# Patient Record
Sex: MALE | Race: WHITE | NOT HISPANIC OR LATINO | Employment: UNEMPLOYED | URBAN - METROPOLITAN AREA
[De-identification: names, ages, dates, MRNs, and addresses within clinical notes are randomized per-mention and may not be internally consistent; named-entity substitution may affect disease eponyms.]

---

## 2017-01-13 ENCOUNTER — OFFICE VISIT (OUTPATIENT)
Dept: URGENT CARE | Facility: URGENT CARE | Age: 52
End: 2017-01-13
Payer: COMMERCIAL

## 2017-01-13 VITALS
WEIGHT: 299.38 LBS | OXYGEN SATURATION: 98 % | BODY MASS INDEX: 41.77 KG/M2 | SYSTOLIC BLOOD PRESSURE: 120 MMHG | TEMPERATURE: 97.4 F | DIASTOLIC BLOOD PRESSURE: 70 MMHG | HEART RATE: 73 BPM

## 2017-01-13 DIAGNOSIS — I10 HYPERTENSION GOAL BP (BLOOD PRESSURE) < 140/90: ICD-10-CM

## 2017-01-13 DIAGNOSIS — J01.90 ACUTE SINUSITIS WITH SYMPTOMS > 10 DAYS: Primary | ICD-10-CM

## 2017-01-13 DIAGNOSIS — J20.9 ACUTE BRONCHITIS WITH SYMPTOMS > 10 DAYS: ICD-10-CM

## 2017-01-13 DIAGNOSIS — K58.0 IRRITABLE BOWEL SYNDROME WITH DIARRHEA: ICD-10-CM

## 2017-01-13 PROCEDURE — 99214 OFFICE O/P EST MOD 30 MIN: CPT | Performed by: FAMILY MEDICINE

## 2017-01-13 RX ORDER — CALCIUM CARBONATE 500 MG/1
1 TABLET, CHEWABLE ORAL DAILY
COMMUNITY

## 2017-01-13 NOTE — MR AVS SNAPSHOT
"              After Visit Summary   2017    Adarsh Salvador    MRN: [id number]           Patient Information     Date Of Birth          1965        Visit Information        Provider Department      2017 6:35 PM Vashti Davis MD [facility]        Today's Diagnoses     Acute sinusitis with symptoms > 10 days    -  1     Acute bronchitis with symptoms > 10 days         Irritable bowel syndrome with diarrhea         Hypertension goal BP (blood pressure) < 140/90            Follow-ups after your visit        Who to contact     If you have questions or need follow up information about today's clinic visit or your schedule please contact [facility] directly at [phone].  Normal or non-critical lab and imaging results will be communicated to you by MyChart, letter or phone within 4 business days after the clinic has received the results. If you do not hear from us within 7 days, please contact the clinic through MyChart or phone. If you have a critical or abnormal lab result, we will notify you by phone as soon as possible.  Submit refill requests through [employer] or call your pharmacy and they will forward the refill request to us. Please allow 3 business days for your refill to be completed.          Additional Information About Your Visit        MyChart Information     [employer] lets you send messages to your doctor, view your test results, renew your prescriptions, schedule appointments and more. To sign up, go to [email or web address] . Click on \"Log in\" on the left side of the screen, which will take you to the Welcome page. Then click on \"Sign up Now\" on the right side of the page.     You will be asked to enter the access code listed below, as well as some personal information. Please follow the directions to create your username and password.     Your access code is: [email or web address]  Expires: 2017 11:35 AM     Your access code will  in 90 days. If you need " help or a new code, please call your [facility] or [phone].        Care EveryWhere ID     This is your Care EveryWhere ID. This could be used by other organizations to access your [place] medical records  [id number]        Your Vitals Were     Pulse Temperature Pulse Oximetry             73 97.4  F (36.3  C) (Oral) 98%          Blood Pressure from Last 3 Encounters:   01/13/17 120/70   10/25/16 138/86   08/16/16 160/105    Weight from Last 3 Encounters:   01/13/17 299 lb 6 oz (135.796 kg)   10/25/16 293 lb (132.904 kg)   08/16/16 282 lb (127.914 kg)              Today, you had the following     No orders found for display         Today's Medication Changes          These changes are accurate as of: 1/13/17  7:46 PM.  If you have any questions, ask your nurse or doctor.               Start taking these medicines.        Dose/Directions    amoxicillin-clavulanate 875-125 MG per tablet   Commonly known as:  AUGMENTIN   Used for:  Acute sinusitis with symptoms > 10 days, Acute bronchitis with symptoms > 10 days   Started by:  Vashti Davis MD        Dose:  1 tablet   Take 1 tablet by mouth 2 times daily for 10 days   Quantity:  20 tablet   Refills:  0            Where to get your medicines      These medications were sent to [facility]/pharmacy #[id number] - [place] - [address]  [address] [place] [address]     Phone:  [phone]    - amoxicillin-clavulanate 875-125 MG per tablet             Primary Care Provider Office Phone # Fax #    Bertha Romero PA-C [phone]       [facility] [address] [address] [facility] [address]        Thank you!     Thank you for choosing [facility]  for your care. Our goal is always to provide you with excellent care. Hearing back from our patients is one way we can continue to improve our services. Please take a few minutes to complete the written survey that you may receive in the mail after your visit  with us. Thank you!             Your Updated Medication List - Protect others around you: Learn how to safely use, store and throw away your medicines at www.disposemymeds.org.          This list is accurate as of: 1/13/17  7:46 PM.  Always use your most recent med list.                   Brand Name Dispense Instructions for use    albuterol 108 (90 BASE) MCG/ACT Inhaler    PROAIR HFA/PROVENTIL HFA/VENTOLIN HFA    1 Inhaler    Inhale 2 puffs into the lungs every 6 hours as needed for shortness of breath / dyspnea or wheezing       ALEVE PO          amLODIPine 10 MG tablet    NORVASC    90 tablet    Take 1 tablet (10 mg) by mouth daily       amoxicillin-clavulanate 875-125 MG per tablet    AUGMENTIN    20 tablet    Take 1 tablet by mouth 2 times daily for 10 days       calcium carbonate 500 MG chewable tablet    TUMS     Take 1 chew tab by mouth daily       dicyclomine 20 MG tablet    BENTYL    40 tablet    Take 1 tablet (20 mg) by mouth 4 times daily as needed       diphenoxylate-atropine 2.5-0.025 MG per tablet    LOMOTIL    30 tablet    Take 2 tablets by mouth 4 times daily as needed for diarrhea       EPINEPHrine 0.3 MG/0.3ML injection     2 each    Inject 0.3 mLs (0.3 mg) into the muscle once as needed for anaphylaxis       hydrOXYzine 25 MG tablet    ATARAX    30 tablet    Take 2-4 tablets ( mg) by mouth nightly as needed for other (for sleep)       lisinopril 20 MG tablet    PRINIVIL/ZESTRIL    90 tablet    Take 1 tablet (20 mg) by mouth daily       multivitamin, therapeutic Tabs tablet      Take 1 tablet by mouth daily       pantoprazole 20 MG EC tablet    PROTONIX    30 tablet    Take by mouth 30-60 minutes before a meal.       sertraline 50 MG tablet    ZOLOFT    90 tablet    Take 1 tablet (50 mg) by mouth daily       simvastatin 20 MG tablet    ZOCOR    30 tablet    Take 1 tablet (20 mg) by mouth At Bedtime       TYLENOL PO      Take 1,000 mg by mouth every 8 hours as needed for mild pain or fever        VSL#3 Pack     30 each    Use 1-2 capsules/day

## 2017-01-13 NOTE — Clinical Note
[facility]  [address] [facility] [address]  Phone: [phone]    January 13, 2017        Adarsh Salvador  [address] [facility] [address]          To whom it may concern:    RE: Adarsh Salvador    Patient was seen and treated today at our clinic and missed work.  Please excuse from work tomorrow and on January 14, 2017.    Please contact me for questions or concerns.      Sincerely,        Vashti Davis MD

## 2017-01-14 NOTE — NURSING NOTE
"Chief Complaint   Patient presents with     URI     chest feels tight, right ear hurts, swollen glands on right neck, cannot cough. symptoms present since last Sunday       Initial /84 mmHg  Pulse 73  Temp(Src) 97.4  F (36.3  C) (Oral)  Wt 299 lb 6 oz (135.796 kg)  SpO2 98% Estimated body mass index is 41.77 kg/(m^2) as calculated from the following:    Height as of 8/16/16: 5' 11\" (1.803 m).    Weight as of this encounter: 299 lb 6 oz (135.796 kg).  BP completed using cuff size: bobo Carr [place]    "

## 2017-01-14 NOTE — PROGRESS NOTES
SUBJECTIVE:                                                    Adarsh Salvador is a 51 year old male who presents to clinic today for the following health issues:      RESPIRATORY SYMPTOMS      Duration: cold, chest congestion, coughing hurts    Description  nasal congestion, rhinorrhea, sore throat, facial pain/pressure, cough, ear pain right, headache, fatigue/malaise, hoarse voice     Severity: severe    Accompanying signs and symptoms: tight chest, coughing hurts    History (predisposing factors):  none    Precipitating or alleviating factors: Aleve and Antacids    Therapies tried and outcome:  Aleve and Antacids     Has had sinus drainage and cold for past couple weeks  Past 5 days has gotten much worse  Chest congestion. When the cold hits his lungs starts coughing   4 days ago right ear started hurting to yawn cough and eat.  Colds, sinus congestion, facial pain  Cough Yes  Greenish discharge  Fever No  Progressively getting worse: YES  Thought was getting better then started getting worse: YES  Getting better:  No  Exposure to pertussis or pertussis like symptoms: No    Problem list and histories reviewed & adjusted, as indicated.  Additional history: as documented    Problem list, Medication list, Allergies, and Medical/Social/Surgical histories reviewed in [facility] and updated as appropriate.    ROS:  Constitutional, HEENT, cardiovascular, pulmonary, gi and gu systems are negative, except as otherwise noted.    OBJECTIVE:                                                    /70 mmHg  Pulse 73  Temp(Src) 97.4  F (36.3  C) (Oral)  Wt 299 lb 6 oz (135.796 kg)  SpO2 98%  Body mass index is 41.77 kg/(m^2).  GENERAL: healthy, alert and no distress  EYES: Eyes grossly normal to inspection, PERRL and conjunctivae and sclerae normal  HENT: ear canals and TM's normal, nose and mouth without ulcers or lesions  Sinuses: turbinates erythematous maxillary sinus tenderness frontal sinus tenderness   NECK: no adenopathy,  no asymmetry, masses, or scars and thyroid normal to palpation  RESP: lungs clear to auscultation - no rales, rhonchi or wheezes   CV: regular rate and rhythm, normal S1 S2, no S3 or S4, no murmur, click or rub, no peripheral edema and peripheral pulses strong  ABDOMEN: soft, nontender, no hepatosplenomegaly, no masses and bowel sounds normal  MS: no gross musculoskeletal defects noted, no edema  SKIN: no suspicious lesions or rashes  NEURO: Normal strength and tone, mentation intact and speech normal  PSYCH: mentation appears normal, affect normal/bright    Diagnostic Test Results:  No results found for this or any previous visit (from the past 24 hour(s)).     ASSESSMENT/PLAN:                                                        ICD-10-CM    1. Acute sinusitis with symptoms > 10 days J01.90 amoxicillin-clavulanate (AUGMENTIN) 875-125 MG per tablet   2. Acute bronchitis with symptoms > 10 days J20.9 amoxicillin-clavulanate (AUGMENTIN) 875-125 MG per tablet   3. Irritable bowel syndrome with diarrhea K58.0    4. Hypertension goal BP (blood pressure) < 140/90 I10      Prescribed with augmentin  Side effects discussed warned about GI side effects and risk of cdiff.  Patient has IBS but has tolerated augmentin.  BP is at goal follow up with primary care provider   Recommend follow up with primary care provider if no, sooner if worse  Adverse reactions of medications discussed.  Over the counter medications discussed.   Aware to come back in if with worsening symptoms or if no relief despite treatment plan  Patient voiced understanding and had no further questions.     MD Vashti Bah MD  [facility]

## 2017-01-26 ENCOUNTER — OFFICE VISIT (OUTPATIENT)
Dept: FAMILY MEDICINE | Facility: CLINIC | Age: 52
End: 2017-01-26
Payer: COMMERCIAL

## 2017-01-26 ENCOUNTER — TELEPHONE (OUTPATIENT)
Dept: FAMILY MEDICINE | Facility: CLINIC | Age: 52
End: 2017-01-26

## 2017-01-26 VITALS
TEMPERATURE: 98.1 F | SYSTOLIC BLOOD PRESSURE: 129 MMHG | HEART RATE: 77 BPM | DIASTOLIC BLOOD PRESSURE: 83 MMHG | WEIGHT: 296 LBS | BODY MASS INDEX: 41.3 KG/M2 | OXYGEN SATURATION: 97 %

## 2017-01-26 DIAGNOSIS — R05.9 COUGH: Primary | ICD-10-CM

## 2017-01-26 DIAGNOSIS — R06.2 WHEEZING: Primary | ICD-10-CM

## 2017-01-26 PROCEDURE — 99214 OFFICE O/P EST MOD 30 MIN: CPT | Performed by: PHYSICIAN ASSISTANT

## 2017-01-26 RX ORDER — PREDNISONE 20 MG/1
TABLET ORAL
Qty: 20 TABLET | Refills: 0 | Status: SHIPPED | OUTPATIENT
Start: 2017-01-26 | End: 2017-03-06

## 2017-01-26 NOTE — TELEPHONE ENCOUNTER
dulera is not covered  We were able to give him a voucher for a free fill  Step therapy   Flovent, spiriva or combivent    New rx is needed for next fill    Hallie F/Tech  [facility]

## 2017-01-26 NOTE — MR AVS SNAPSHOT
After Visit Summary   1/26/2017    Adarsh Salvador    MRN: [id number]           Patient Information     Date Of Birth          1965        Visit Information        Provider Department      1/26/2017 11:00 AM Bertha Romero PA-C [facility]        Today's Diagnoses     Cough    -  1     BMI 40.0-44.9, adult (H)           Care Instructions    Schedule with nutritionist    Take prednisone with food when you have breakfast    Take antibiotic with food    Use dulera  inhaler twice daily until cough and shortness of breath are gone.  Then you can stop the inhaler.   Continue albuterol as needed          Follow-ups after your visit        Additional Services     NUTRITION REFERRAL       Your provider has referred you to: [facility]: [facility] [phone]   [email or web address]    Please be aware that coverage of these services is subject to the terms and limitations of your health insurance plan.  Call member services at your health plan with any benefit or coverage questions.      Please bring the following with you to your appointment:    (1) This referral request  (2) Any documents given to you regarding this referral  (3) Any specific questions you have about diet and/or food choices                  Who to contact     If you have questions or need follow up information about today's clinic visit or your schedule please contact [facility] directly at [phone].  Normal or non-critical lab and imaging results will be communicated to you by MyChart, letter or phone within 4 business days after the clinic has received the results. If you do not hear from us within 7 days, please contact the clinic through MyChart or phone. If you have a critical or abnormal lab result, we will notify you by phone as soon as possible.  Submit refill requests through [employer] or call your pharmacy and they will forward the refill  "request to us. Please allow 3 business days for your refill to be completed.          Additional Information About Your Visit        [employer] Information     [employer] lets you send messages to your doctor, view your test results, renew your prescriptions, schedule appointments and more. To sign up, go to [email or web address] . Click on \"Log in\" on the left side of the screen, which will take you to the Welcome page. Then click on \"Sign up Now\" on the right side of the page.     You will be asked to enter the access code listed below, as well as some personal information. Please follow the directions to create your username and password.     Your access code is: [email or web address]  Expires: 2017 11:40 AM     Your access code will  in 90 days. If you need help or a new code, please call your [place] clinic or [phone].        Care EveryWhere ID     This is your Care EveryWhere ID. This could be used by other organizations to access your [place] medical records  [id number]        Your Vitals Were     Pulse Temperature Pulse Oximetry             77 98.1  F (36.7  C) (Oral) 97%          Blood Pressure from Last 3 Encounters:   17 129/83   17 120/70   10/25/16 138/86    Weight from Last 3 Encounters:   17 296 lb (134.265 kg)   17 299 lb 6 oz (135.796 kg)   10/25/16 293 lb (132.904 kg)              We Performed the Following     NUTRITION REFERRAL          Today's Medication Changes          These changes are accurate as of: 17 11:40 AM.  If you have any questions, ask your nurse or doctor.               Start taking these medicines.        Dose/Directions    amoxicillin-clavulanate 875-125 MG per tablet   Commonly known as:  AUGMENTIN   Used for:  Cough   Started by:  Bertha Romero PA-C        Dose:  1 tablet   Take 1 tablet by mouth 2 times daily   Quantity:  20 tablet   Refills:  0       mometasone-formoterol 100-5 MCG/ACT oral inhaler   Commonly known as:  DULERA "   Used for:  Cough   Started by:  Bertha Romero PA-C        Dose:  2 puff   Inhale 2 puffs into the lungs 2 times daily Rinse mouth after use.   Quantity:  1 Inhaler   Refills:  3       predniSONE 20 MG tablet   Commonly known as:  DELTASONE   Used for:  Cough   Started by:  Bertha Romero PA-C        With food. Take 3 tabs (60 mg) by mouth daily x 3 days, 2 tabs (40 mg) daily x 3 days, 1 tab (20 mg) daily x 3 days, then 1/2 tab (10 mg) x 3 days.   Quantity:  20 tablet   Refills:  0            Where to get your medicines      These medications were sent to [facility] - [place], [place] - [address] [address]  [address] [address], [facility] [address]     Phone:  [phone]    - amoxicillin-clavulanate 875-125 MG per tablet  - mometasone-formoterol 100-5 MCG/ACT oral inhaler      Some of these will need a paper prescription and others can be bought over the counter.  Ask your nurse if you have questions.     Bring a paper prescription for each of these medications    - predniSONE 20 MG tablet             Primary Care Provider Office Phone # Fax #    Bertha Romero PA-C [phone]       [address] [address]        Thank you!     Thank you for choosing [facility]  for your care. Our goal is always to provide you with excellent care. Hearing back from our patients is one way we can continue to improve our services. Please take a few minutes to complete the written survey that you may receive in the mail after your visit with us. Thank you!             Your Updated Medication List - Protect others around you: Learn how to safely use, store and throw away your medicines at www.disposemymeds.org.          This list is accurate as of: 1/26/17 11:40 AM.  Always use your most recent med list.                   Brand Name Dispense Instructions for use    albuterol 108 (90 BASE) MCG/ACT Inhaler     PROAIR HFA/PROVENTIL HFA/VENTOLIN HFA    1 Inhaler    Inhale 2 puffs into the lungs every 6 hours as needed for shortness of breath / dyspnea or wheezing       ALEVE PO          amLODIPine 10 MG tablet    NORVASC    90 tablet    Take 1 tablet (10 mg) by mouth daily       amoxicillin-clavulanate 875-125 MG per tablet    AUGMENTIN    20 tablet    Take 1 tablet by mouth 2 times daily       calcium carbonate 500 MG chewable tablet    TUMS     Take 1 chew tab by mouth daily       dicyclomine 20 MG tablet    BENTYL    40 tablet    Take 1 tablet (20 mg) by mouth 4 times daily as needed       diphenoxylate-atropine 2.5-0.025 MG per tablet    LOMOTIL    30 tablet    Take 2 tablets by mouth 4 times daily as needed for diarrhea       EPINEPHrine 0.3 MG/0.3ML injection     2 each    Inject 0.3 mLs (0.3 mg) into the muscle once as needed for anaphylaxis       hydrOXYzine 25 MG tablet    ATARAX    30 tablet    Take 2-4 tablets ( mg) by mouth nightly as needed for other (for sleep)       lisinopril 20 MG tablet    PRINIVIL/ZESTRIL    90 tablet    Take 1 tablet (20 mg) by mouth daily       mometasone-formoterol 100-5 MCG/ACT oral inhaler    DULERA    1 Inhaler    Inhale 2 puffs into the lungs 2 times daily Rinse mouth after use.       multivitamin, therapeutic Tabs tablet      Take 1 tablet by mouth daily       pantoprazole 20 MG EC tablet    PROTONIX    30 tablet    Take by mouth 30-60 minutes before a meal.       predniSONE 20 MG tablet    DELTASONE    20 tablet    With food. Take 3 tabs (60 mg) by mouth daily x 3 days, 2 tabs (40 mg) daily x 3 days, 1 tab (20 mg) daily x 3 days, then 1/2 tab (10 mg) x 3 days.       sertraline 50 MG tablet    ZOLOFT    90 tablet    Take 1 tablet (50 mg) by mouth daily       simvastatin 20 MG tablet    ZOCOR    30 tablet    Take 1 tablet (20 mg) by mouth At Bedtime       TYLENOL PO      Take 1,000 mg by mouth every 8 hours as needed for mild pain or fever       VSL#3 Pack     30 each    Use  1-2 capsules/day

## 2017-01-26 NOTE — PROGRESS NOTES
SUBJECTIVE:                                                    Adarsh Salvador is a 51 year old male who presents to clinic today for the following health issues:      ENT Symptoms             Symptoms: cc Present Absent Comment   Fever/Chills  x  chills   Fatigue  x     Muscle Aches  x     Eye Irritation  x     Sneezing  x     Nasal Lobo/Drg  x  congestion   Sinus Pressure/Pain   x    Loss of smell   x    Dental pain   x    Sore Throat  x  slight   Swollen Glands   x    Ear Pain/Fullness  x  both   Cough  x     Wheeze   x    Chest Pain  x  Worse as day goes on    Shortness of breath  x     Rash   x    Other   x      Symptom duration:  over a month   Symptom severity:  moderate   Treatments tried:  Augmentin, mucinex at night, robitussin     Contacts:         Seen in  13 days ago, given augmentin for sinusitis and bronchitis. Has albuterol inhaler PRN. Has not used ICS. Not helping.  No recent chest xray.   Nonsmoker. oxygen 97 percent.  No edema, PND, or orthopnea.   Symptoms improved at first, then symptoms came back.   Cough and severe sinus congestion.  Productive sputum sometimes, is yellow. Has lots of sinus congestion also.   clear drainage from nose.   Tolerated prednisone previously.     Also weight question-  Works a lot.  Exercise-hard to fit in. Has not been able to lose weight. Has not been monitoring his diet.  Has not met with dietician.  Is open to this. His BMI is 41. When comparing his weight to a year ago it is about the same.        Problem list and histories reviewed & adjusted, as indicated.  Additional history: as documented    Patient Active Problem List   Diagnosis     GERD (gastroesophageal reflux disease)     Kidney stone     Chronic RLQ pain     Hyperlipidemia LDL goal <160     Hypertension goal BP (blood pressure) < 140/90     Constipation     Abdominal pain, right upper quadrant     Adult celiac disease     Chronic maxillary sinusitis     Chronic rhinitis     Obesity (BMI 30-39.9)      "Pure hyperglyceridemia     Anaphylactic reaction     Benign essential hypertension     Anemia in other chronic diseases classified elsewhere     Fatty liver     Irritable bowel syndrome with diarrhea     Right inguinal hernia     BMI 40.0-44.9, adult (H)     Past Surgical History   Procedure Laterality Date     Colonoscopy  5/1/2012     Procedure:COLONOSCOPY; screening colonoscopy; Surgeon:KINGSLEY DOS SANTOS; Location:[facility]     Hc breath hydrogen test  3/7/2014     Procedure: HYDROGEN BREATH TEST;  Surgeon: Darion Swift MD;  Location: [facility]     Orthopedic surgery       x3 Rt knee      C removal of kidney stone       Colonoscopy  4/21/2014     Procedure: COMBINED COLONOSCOPY, SINGLE BIOPSY/POLYPECTOMY BY BIOPSY;  Surgeon: Duane, William Charles, MD;  Location: [facility] OR       Social History   Substance Use Topics     Smoking status: Never Smoker      Smokeless tobacco: Current User     Types: Chew      Comment: Chew     Alcohol Use: No      Comment: \"quart a year\" 2012     Family History   Problem Relation Age of Onset     CANCER Mother 52     lung, smoked     Cancer - colorectal Father 53     unsure type, pt attributes to radiation exposure     DIABETES Other      nephew- type 1     DIABETES Maternal Aunt      1     DIABETES Maternal Aunt      2     DIABETES Paternal Uncle      1     DIABETES Paternal Uncle      2     DIABETES Paternal Uncle      3     DIABETES Paternal Uncle      4     Myocardial Infarction Father 45     Myocardial Infarction Paternal Grandfather 35         Current Outpatient Prescriptions   Medication Sig Dispense Refill     predniSONE (DELTASONE) 20 MG tablet With food. Take 3 tabs (60 mg) by mouth daily x 3 days, 2 tabs (40 mg) daily x 3 days, 1 tab (20 mg) daily x 3 days, then 1/2 tab (10 mg) x 3 days. 20 tablet 0     amoxicillin-clavulanate (AUGMENTIN) 875-125 MG per tablet Take 1 tablet by mouth 2 times daily 20 tablet 0     mometasone-formoterol (DULERA) 100-5 MCG/ACT oral inhaler Inhale " 2 puffs into the lungs 2 times daily Rinse mouth after use. 1 Inhaler 3     Naproxen Sodium (ALEVE PO)        calcium carbonate (TUMS) 500 MG chewable tablet Take 1 chew tab by mouth daily       albuterol (PROAIR HFA, PROVENTIL HFA, VENTOLIN HFA) 108 (90 BASE) MCG/ACT inhaler Inhale 2 puffs into the lungs every 6 hours as needed for shortness of breath / dyspnea or wheezing 1 Inhaler 1     sertraline (ZOLOFT) 50 MG tablet Take 1 tablet (50 mg) by mouth daily 90 tablet 0     dicyclomine (BENTYL) 20 MG tablet Take 1 tablet (20 mg) by mouth 4 times daily as needed 40 tablet 5     diphenoxylate-atropine (LOMOTIL) 2.5-0.025 MG per tablet Take 2 tablets by mouth 4 times daily as needed for diarrhea 30 tablet 1     Acetaminophen (TYLENOL PO) Take 1,000 mg by mouth every 8 hours as needed for mild pain or fever       pantoprazole (PROTONIX) 20 MG tablet Take by mouth 30-60 minutes before a meal. 30 tablet 3     multivitamin, therapeutic (THERA-VIT) TABS Take 1 tablet by mouth daily       amLODIPine (NORVASC) 10 MG tablet Take 1 tablet (10 mg) by mouth daily 90 tablet 1     lisinopril (PRINIVIL,ZESTRIL) 20 MG tablet Take 1 tablet (20 mg) by mouth daily 90 tablet 1     simvastatin (ZOCOR) 20 MG tablet Take 1 tablet (20 mg) by mouth At Bedtime 30 tablet 3     EPINEPHrine (EPIPEN) 0.3 MG/0.3ML injection Inject 0.3 mLs (0.3 mg) into the muscle once as needed for anaphylaxis 2 each 2     hydrOXYzine (ATARAX) 25 MG tablet Take 2-4 tablets ( mg) by mouth nightly as needed for other (for sleep) 30 tablet 1     Dietary Management Product (VSL#3) PACK Use 1-2 capsules/day 30 each 3     Allergies   Allergen Reactions     Hydrocodone-Acetaminophen Anaphylaxis and Itching     Hydromorphone Anaphylaxis     Morphine [Fumaric Acid] Anaphylaxis     Codeine Phosphate Itching     Tramadol Hives     Trazodone Hives     Ketorolac Tromethamine Rash       ROS:  Constitutional, HEENT, cardiovascular, pulmonary, gi and gu systems are negative,  except as otherwise noted.    OBJECTIVE:                                                    /83 mmHg  Pulse 77  Temp(Src) 98.1  F (36.7  C) (Oral)  Wt 296 lb (134.265 kg)  SpO2 97%  Body mass index is 41.3 kg/(m^2).  GENERAL:  No acute distress.  Interacts appropriately.  Breathing without difficulty.  Alert.  HEENT:  Tympanic membranes intact without effusion or erythema.  Oral mucosa moist. Posterior pharynx has no erythema.  Posterior pharynx has no exudate. Without edema.  NECK:  Soft and supple.  without tenderness.  Without lymphadenopathy.  Normal range of motion.    CARDIAC:   Regular rate and rhythm.  No murmurs, rubs, or gallops.   PULMONARY: Clear to auscultation bilaterally.  No  wheezes, crackles, or rhonchi.  Normal air exchange/breath sounds.  No use of accessory muscles.    PSYCH: Normal affect.  SKIN: No rashes.         ASSESSMENT/PLAN:                                                    ASSESSMENT / PLAN:  (R05) Cough  (primary encounter diagnosis)  Comment: exam benign, per patient  wheezing is worse at night.  Still has sinus congestion and bronchitis symptoms.   Plan: predniSONE (DELTASONE) 20 MG tablet,         amoxicillin-clavulanate (AUGMENTIN) 875-125 MG         per tablet, mometasone-formoterol (DULERA)         100-5 MCG/ACT oral inhaler        Chest x-ray if not improving in a week    Take with food. Side effects discussed.  Call with worsening symptoms or if no improvement in 5 days.  Analgesics for pain with food as needed.      (Z68.41) BMI 40.0-44.9, adult (H)  Comment: Likely related to excessive calories and lack of exercise. We'll start with nutrition referral as patient is not motivated to exercise at this time. He was warned that prednisone will cause him to be more hungry temporarily  Plan: NUTRITION REFERRAL            Patient Instructions   Schedule with nutritionist    Take prednisone with food when you have breakfast    Take antibiotic with food    Use dulera   inhaler twice daily until cough and shortness of breath are gone.  Then you can stop the inhaler.   Continue albuterol as needed            Bertha Romero PA-C  [facility]

## 2017-01-26 NOTE — NURSING NOTE
"Chief Complaint   Patient presents with     URI       Initial /83 mmHg  Pulse 77  Temp(Src) 98.1  F (36.7  C) (Oral)  Wt 296 lb (134.265 kg)  SpO2 97% Estimated body mass index is 41.3 kg/(m^2) as calculated from the following:    Height as of 8/16/16: 5' 11\" (1.803 m).    Weight as of this encounter: 296 lb (134.265 kg).  BP completed using cuff size: bobo Pagan MA    "

## 2017-01-26 NOTE — PATIENT INSTRUCTIONS
Schedule with nutritionist    Take prednisone with food when you have breakfast    Take antibiotic with food    Use dulera  inhaler twice daily until cough and shortness of breath are gone.  Then you can stop the inhaler.   Continue albuterol as needed

## 2017-01-31 ENCOUNTER — TELEPHONE (OUTPATIENT)
Dept: NURSING | Facility: CLINIC | Age: 52
End: 2017-01-31

## 2017-02-01 ENCOUNTER — TRANSFERRED RECORDS (OUTPATIENT)
Dept: HEALTH INFORMATION MANAGEMENT | Facility: CLINIC | Age: 52
End: 2017-02-01

## 2017-02-01 NOTE — TELEPHONE ENCOUNTER
"Call Type: Triage Call    Presenting Problem: \"Fifteeen minutes ago, my heart was beating very  hard and this knocked me on the floor.\" Pt. says that he was  sweating profusely and that his clothes are drenched. Chest pain now  = 4/10. Pt. is currently at work, as a .  Triage Note:  Guideline Title: Chest Pain  Recommended Disposition: Activate   Original Inclination: Wanted to speak with a nurse  Override Disposition:  Intended Action: Call 911  Physician Contacted: No  Chest pain spreading to the shoulders, neck, jaw, in one or both arms, stomach or  back lasting 5 or more minutes now or within the last hour. Pain is NOT  associated with taking a deep breath or a productive cough, movement, or touch to  a localized area. ?  YES  Loss of consciousness for any period of time ? NO  New or worsening signs and symptoms that may indicate shock ? NO  Pressure, fullness, squeezing sensation or pain anywhere in the chest lasting 5 or  more minutes now or within the last hour. Pain is NOT associated with taking a  deep breath or a productive cough, movement, or touch to a localized area on the  chest. ? NO  Physician Instructions:  Care Advice: IMMEDIATE ACTION  Write down provider's name. List or place the following in a bag for  transport with the patient: current prescription and/or nonprescription  medications  alternative treatments, therapies and medications  and street drugs.  Place person in a position of comfort and loosen tight clothing.  After calling , have the person chew one aspirin tablet (325 mg), or  4 baby aspirin (81mg) with a small amount of water now if conscious, not  allergic to aspirin, or if has not been told to avoid taking aspirin by  their provider.  It is important to use aspirin, not acetaminophen.  Take nitroglycerin as directed if prescribed by your provider. Men should  not take nitroglycerin within 36 hours of taking erectile dysfunction drugs  unless directed to do so " by their provider as it may cause a sudden drop in  blood pressure.  An adult should stay with the patient, preferably one trained in CPR. If  the person is not trained in CPR, then he or she should provide hands-only  (compression-only) CPR as recommended by the American Heart Association.

## 2017-02-15 ENCOUNTER — OFFICE VISIT (OUTPATIENT)
Dept: FAMILY MEDICINE | Facility: CLINIC | Age: 52
End: 2017-02-15
Payer: COMMERCIAL

## 2017-02-15 VITALS
HEIGHT: 71 IN | WEIGHT: 296 LBS | HEART RATE: 77 BPM | SYSTOLIC BLOOD PRESSURE: 112 MMHG | BODY MASS INDEX: 41.44 KG/M2 | TEMPERATURE: 97.5 F | OXYGEN SATURATION: 99 % | DIASTOLIC BLOOD PRESSURE: 70 MMHG

## 2017-02-15 DIAGNOSIS — S46.811A STRAIN OF TRAPEZIUS MUSCLE, RIGHT, INITIAL ENCOUNTER: Primary | ICD-10-CM

## 2017-02-15 DIAGNOSIS — M62.830 BACK MUSCLE SPASM: ICD-10-CM

## 2017-02-15 PROCEDURE — 99213 OFFICE O/P EST LOW 20 MIN: CPT | Performed by: PHYSICIAN ASSISTANT

## 2017-02-15 RX ORDER — NAPROXEN 500 MG/1
500 TABLET ORAL 2 TIMES DAILY PRN
Qty: 30 TABLET | Refills: 0 | Status: SHIPPED | OUTPATIENT
Start: 2017-02-15 | End: 2018-01-12

## 2017-02-15 RX ORDER — CYCLOBENZAPRINE HCL 10 MG
5-10 TABLET ORAL 3 TIMES DAILY PRN
Qty: 30 TABLET | Refills: 0 | Status: SHIPPED | OUTPATIENT
Start: 2017-02-15 | End: 2017-04-04

## 2017-02-15 NOTE — PATIENT INSTRUCTIONS
Rest the back. No heavy lifting or straining until your pain has resolved.     May slowly increased activities as tolerated. If something causes you pain, you are doing too much.    May take tylenol as needed for discomfort.    Do not take any other NSAIDs (ibuprofen, advil, aleve, naprosyn, etc) while taking the Naproxen.    Apply ice multiple times daily for the first 2-3 days. Then, alternate ice and heat multiple times daily. Ice will help to decrease swelling and pain. Heat will help to relax the muscles.    Take the flexeril as prescribed for muscle spasms. This may make you drowsy. Do not drive while taking this medication.    Follow up with physical therapy for evaluation and treatment of your back pain.    Follow up with primary care provider next week if no improvement of symptoms. Sooner if any worsening of symptoms.    Go to the Emergency Department if any weakness, numbness, severe worsening pain, or any other concerning symptoms.

## 2017-02-15 NOTE — MR AVS SNAPSHOT
After Visit Summary   2/15/2017    Adarsh Salvador    MRN: [id number]           Patient Information     Date Of Birth          1965        Visit Information        Provider Department      2/15/2017 10:40 AM Carolina Bernardo PA-C [facility]        Today's Diagnoses     Strain of trapezius muscle, right, initial encounter    -  1    Back muscle spasm          Care Instructions    Rest the back. No heavy lifting or straining until your pain has resolved.     May slowly increased activities as tolerated. If something causes you pain, you are doing too much.    May take tylenol as needed for discomfort.    Do not take any other NSAIDs (ibuprofen, advil, aleve, naprosyn, etc) while taking the Naproxen.    Apply ice multiple times daily for the first 2-3 days. Then, alternate ice and heat multiple times daily. Ice will help to decrease swelling and pain. Heat will help to relax the muscles.    Take the flexeril as prescribed for muscle spasms. This may make you drowsy. Do not drive while taking this medication.    Follow up with physical therapy for evaluation and treatment of your back pain.    Follow up with primary care provider next week if no improvement of symptoms. Sooner if any worsening of symptoms.    Go to the Emergency Department if any weakness, numbness, severe worsening pain, or any other concerning symptoms.           Follow-ups after your visit        Additional Services     [facility] PT, HAND, AND CHIROPRACTIC REFERRAL       **This order will print in the [facility] Scheduling Office**    Physical Therapy, Hand Therapy and Chiropractic Care are available through:    *[facility]  *[facility]  *[facility]    Call one number to schedule at any of the above locations: [phone].    Your provider has referred you to: Physical Therapy at [facility] or [facility]    Indication/Reason for Referral: Right trapezius strain and spasm  Onset of  "Illness: 2/14/17  Therapy Orders: Evaluate and Treat  Special Programs: None  Special Request: Manual Therapy: Myofascial Release/Massage    Chandler Arteaga      Additional Comments for the Therapist or Chiropractor: none    Please be aware that coverage of these services is subject to the terms and limitations of your health insurance plan.  Call member services at your health plan with any benefit or coverage questions.      Please bring the following to your appointment:    *Your personal calendar for scheduling future appointments  *Comfortable clothing                  Who to contact     If you have questions or need follow up information about today's clinic visit or your schedule please contact [facility] directly at [phone].  Normal or non-critical lab and imaging results will be communicated to you by MyChart, letter or phone within 4 business days after the clinic has received the results. If you do not hear from us within 7 days, please contact the clinic through MyChart or phone. If you have a critical or abnormal lab result, we will notify you by phone as soon as possible.  Submit refill requests through [employer] or call your pharmacy and they will forward the refill request to us. Please allow 3 business days for your refill to be completed.          Additional Information About Your Visit        Care EveryWhere ID     This is your Care EveryWhere ID. This could be used by other organizations to access your [place] medical records  [id number]        Your Vitals Were     Pulse Temperature Height Pulse Oximetry BMI (Body Mass Index)       77 97.5  F (36.4  C) (Oral) 5' 11\" (1.803 m) 99% 41.28 kg/m2        Blood Pressure from Last 3 Encounters:   02/15/17 (!) 139/91   01/26/17 129/83   01/13/17 120/70    Weight from Last 3 Encounters:   02/15/17 296 lb (134.3 kg)   01/26/17 296 lb (134.3 kg)   01/13/17 299 lb 6 oz (135.8 kg)              We Performed the Following     [facility] PT, HAND, AND " CHIROPRACTIC REFERRAL          Today's Medication Changes          These changes are accurate as of: 2/15/17 11:13 AM.  If you have any questions, ask your nurse or doctor.               Start taking these medicines.        Dose/Directions    cyclobenzaprine 10 MG tablet   Commonly known as:  FLEXERIL   Used for:  Back muscle spasm   Started by:  Carolina Bernardo PA-C        Dose:  5-10 mg   Take 0.5-1 tablets (5-10 mg) by mouth 3 times daily as needed for muscle spasms   Quantity:  30 tablet   Refills:  0       naproxen 500 MG tablet   Commonly known as:  NAPROSYN   Used for:  Back muscle spasm, Strain of trapezius muscle, right, initial encounter   Started by:  Carolina Bernardo PA-C        Dose:  500 mg   Take 1 tablet (500 mg) by mouth 2 times daily as needed for moderate pain   Quantity:  30 tablet   Refills:  0            Where to get your medicines      These medications were sent to [facility] [address] [place] [address]  [id number] [place] [address], [facility] [address]     Phone:  [phone]     cyclobenzaprine 10 MG tablet    naproxen 500 MG tablet                Primary Care Provider Office Phone # Fax #    Bertha Romero PA-C [phone]       [address] [address]        Thank you!     Thank you for choosing [facility]  for your care. Our goal is always to provide you with excellent care. Hearing back from our patients is one way we can continue to improve our services. Please take a few minutes to complete the written survey that you may receive in the mail after your visit with us. Thank you!             Your Updated Medication List - Protect others around you: Learn how to safely use, store and throw away your medicines at www.disposemymeds.org.          This list is accurate as of: 2/15/17 11:13 AM.  Always use your most recent med list.                   Brand Name Dispense  Instructions for use    albuterol 108 (90 BASE) MCG/ACT Inhaler    PROAIR HFA/PROVENTIL HFA/VENTOLIN HFA    1 Inhaler    Inhale 2 puffs into the lungs every 6 hours as needed for shortness of breath / dyspnea or wheezing       ALEVE PO          amLODIPine 10 MG tablet    NORVASC    90 tablet    Take 1 tablet (10 mg) by mouth daily       calcium carbonate 500 MG chewable tablet    TUMS     Take 1 chew tab by mouth daily       cyclobenzaprine 10 MG tablet    FLEXERIL    30 tablet    Take 0.5-1 tablets (5-10 mg) by mouth 3 times daily as needed for muscle spasms       dicyclomine 20 MG tablet    BENTYL    40 tablet    Take 1 tablet (20 mg) by mouth 4 times daily as needed       diphenoxylate-atropine 2.5-0.025 MG per tablet    LOMOTIL    30 tablet    Take 2 tablets by mouth 4 times daily as needed for diarrhea       EPINEPHrine 0.3 MG/0.3ML injection     2 each    Inject 0.3 mLs (0.3 mg) into the muscle once as needed for anaphylaxis       fluticasone 100 MCG/BLIST Aepb    FLOVENT DISKUS    1 Inhaler    Inhale 1 puff into the lungs 2 times daily       lisinopril 20 MG tablet    PRINIVIL/ZESTRIL    90 tablet    Take 1 tablet (20 mg) by mouth daily       mometasone-formoterol 100-5 MCG/ACT oral inhaler    DULERA    1 Inhaler    Inhale 2 puffs into the lungs 2 times daily Rinse mouth after use.       multivitamin, therapeutic Tabs tablet      Take 1 tablet by mouth daily       naproxen 500 MG tablet    NAPROSYN    30 tablet    Take 1 tablet (500 mg) by mouth 2 times daily as needed for moderate pain       pantoprazole 20 MG EC tablet    PROTONIX    30 tablet    Take by mouth 30-60 minutes before a meal.       predniSONE 20 MG tablet    DELTASONE    20 tablet    With food. Take 3 tabs (60 mg) by mouth daily x 3 days, 2 tabs (40 mg) daily x 3 days, 1 tab (20 mg) daily x 3 days, then 1/2 tab (10 mg) x 3 days.       sertraline 50 MG tablet    ZOLOFT    90 tablet    Take 1 tablet (50 mg) by mouth daily       simvastatin 20 MG  tablet    ZOCOR    30 tablet    Take 1 tablet (20 mg) by mouth At Bedtime       TYLENOL PO      Take 1,000 mg by mouth every 8 hours as needed for mild pain or fever       VSL#3 Pack     30 each    Use 1-2 capsules/day

## 2017-02-15 NOTE — PROGRESS NOTES
"  SUBJECTIVE:                                                    Adarsh Salvador is a 51 year old male who presents to clinic today for the following health issues:      Joint Pain     Onset: x 1 day    Description:   Location: right shoulder  Character: Sharp, Dull ache and Stabbing    Intensity: moderate    Progression of Symptoms: worse    Accompanying Signs & Symptoms:  Other symptoms: numbness   History:   Previous similar pain: no       Precipitating factors:   Trauma or overuse: YES    Alleviating factors:  Improved by: rest/inactivity and stretching       Therapies Tried and outcome: none    While building a bench, he lifted a table top and felt pain. Locates pain to the right trapezius area. Intermittent tingling in the forearm. Decreased range of motion the shoulder and neck due to pain.  He is right hand dominant.  He applied ice for a short period of time. Aleve took the edge off for a short period of time. Applied something like \"bengay.\"     This is keeping him from work as he is a .       Problem list and histories reviewed & adjusted, as indicated.  Additional history: as documented    Patient Active Problem List   Diagnosis     GERD (gastroesophageal reflux disease)     Kidney stone     Chronic RLQ pain     Hyperlipidemia LDL goal <160     Hypertension goal BP (blood pressure) < 140/90     Constipation     Abdominal pain, right upper quadrant     Adult celiac disease     Chronic maxillary sinusitis     Chronic rhinitis     Obesity (BMI 30-39.9)     Pure hyperglyceridemia     Anaphylactic reaction     Benign essential hypertension     Anemia in other chronic diseases classified elsewhere     Fatty liver     Irritable bowel syndrome with diarrhea     Right inguinal hernia     BMI 40.0-44.9, adult (H)     Past Surgical History   Procedure Laterality Date     Colonoscopy  5/1/2012     Procedure:COLONOSCOPY; screening colonoscopy; Surgeon:KINGSLEY DOS SANTOS; Location:[facility] OR     Hc breath hydrogen test  " "3/7/2014     Procedure: HYDROGEN BREATH TEST;  Surgeon: Darion Swift MD;  Location: [facility]     Orthopedic surgery       x3 Rt knee      C removal of kidney stone       Colonoscopy  4/21/2014     Procedure: COMBINED COLONOSCOPY, SINGLE BIOPSY/POLYPECTOMY BY BIOPSY;  Surgeon: Duane, William Charles, MD;  Location:  OR       Social History   Substance Use Topics     Smoking status: Never Smoker     Smokeless tobacco: Current User     Types: Chew      Comment: Chew     Alcohol use No      Comment: \"quart a year\" 2012     Family History   Problem Relation Age of Onset     CANCER Mother 52     lung, smoked     Cancer - colorectal Father 53     unsure type, pt attributes to radiation exposure     Myocardial Infarction Father 45     Myocardial Infarction Paternal Grandfather 35     DIABETES Other      nephew- type 1     DIABETES Maternal Aunt      1     DIABETES Maternal Aunt      2     DIABETES Paternal Uncle      1     DIABETES Paternal Uncle      2     DIABETES Paternal Uncle      3     DIABETES Paternal Uncle      4         Current Outpatient Prescriptions   Medication Sig Dispense Refill     predniSONE (DELTASONE) 20 MG tablet With food. Take 3 tabs (60 mg) by mouth daily x 3 days, 2 tabs (40 mg) daily x 3 days, 1 tab (20 mg) daily x 3 days, then 1/2 tab (10 mg) x 3 days. 20 tablet 0     mometasone-formoterol (DULERA) 100-5 MCG/ACT oral inhaler Inhale 2 puffs into the lungs 2 times daily Rinse mouth after use. 1 Inhaler 3     fluticasone (FLOVENT DISKUS) 100 MCG/BLIST AEPB Inhale 1 puff into the lungs 2 times daily 1 Inhaler 1     Naproxen Sodium (ALEVE PO)        calcium carbonate (TUMS) 500 MG chewable tablet Take 1 chew tab by mouth daily       albuterol (PROAIR HFA, PROVENTIL HFA, VENTOLIN HFA) 108 (90 BASE) MCG/ACT inhaler Inhale 2 puffs into the lungs every 6 hours as needed for shortness of breath / dyspnea or wheezing 1 Inhaler 1     sertraline (ZOLOFT) 50 MG tablet Take 1 tablet (50 mg) by mouth " "daily 90 tablet 0     dicyclomine (BENTYL) 20 MG tablet Take 1 tablet (20 mg) by mouth 4 times daily as needed 40 tablet 5     diphenoxylate-atropine (LOMOTIL) 2.5-0.025 MG per tablet Take 2 tablets by mouth 4 times daily as needed for diarrhea 30 tablet 1     Acetaminophen (TYLENOL PO) Take 1,000 mg by mouth every 8 hours as needed for mild pain or fever       pantoprazole (PROTONIX) 20 MG tablet Take by mouth 30-60 minutes before a meal. 30 tablet 3     multivitamin, therapeutic (THERA-VIT) TABS Take 1 tablet by mouth daily       amLODIPine (NORVASC) 10 MG tablet Take 1 tablet (10 mg) by mouth daily 90 tablet 1     lisinopril (PRINIVIL,ZESTRIL) 20 MG tablet Take 1 tablet (20 mg) by mouth daily 90 tablet 1     simvastatin (ZOCOR) 20 MG tablet Take 1 tablet (20 mg) by mouth At Bedtime 30 tablet 3     EPINEPHrine (EPIPEN) 0.3 MG/0.3ML injection Inject 0.3 mLs (0.3 mg) into the muscle once as needed for anaphylaxis 2 each 2     Dietary Management Product (VSL#3) PACK Use 1-2 capsules/day 30 each 3     Allergies   Allergen Reactions     Hydrocodone-Acetaminophen Anaphylaxis and Itching     Hydromorphone Anaphylaxis     Morphine [Fumaric Acid] Anaphylaxis     Codeine Phosphate Itching     Tramadol Hives     Trazodone Hives     Ketorolac Tromethamine Rash     BP Readings from Last 3 Encounters:   02/15/17 (!) 139/91   01/26/17 129/83   01/13/17 120/70    Wt Readings from Last 3 Encounters:   02/15/17 296 lb (134.3 kg)   01/26/17 296 lb (134.3 kg)   01/13/17 299 lb 6 oz (135.8 kg)                  Problem list, Medication list, Allergies, and Medical/Social/Surgical histories reviewed in EPIC and updated as appropriate.    ROS:  Constitutional, cardiovascular, pulmonary, and musculoskeletal systems are negative, except as otherwise noted.    OBJECTIVE:                                                    /70  Pulse 77  Temp 97.5  F (36.4  C) (Oral)  Ht 5' 11\" (1.803 m)  Wt 296 lb (134.3 kg)  SpO2 99%  BMI 41.28 " kg/m2  Body mass index is 41.28 kg/(m^2).  GENERAL: healthy, alert and no distress  RESP: lungs clear to auscultation - no rales, rhonchi or wheezes  CV: regular rate and rhythm, normal S1 S2, no S3 or S4, no murmur, click or rub, no peripheral edema and peripheral pulses strong  MS: Right Trapezius: moderate spasm present with moderate tenderness to palpation, no bony tenderness to palpation, decreased range of motion of the neck and right shoulder due to pain in the trapezius, equal  strength bilaterally, DTR's +2 bilaterally of the upper extremities, normal sensation of the upper extremities, radial pulses +2 bilaterally   SKIN: no suspicious lesions or rashes         ASSESSMENT/PLAN:                                                        ICD-10-CM    1. Strain of trapezius muscle, right, initial encounter S46.811A [facility]     naproxen (NAPROSYN) 500 MG tablet   2. Back muscle spasm M62.830 [facility]     cyclobenzaprine (FLEXERIL) 10 MG tablet     naproxen (NAPROSYN) 500 MG tablet       Patient Instructions   Rest the back. No heavy lifting or straining until your pain has resolved.     May slowly increased activities as tolerated. If something causes you pain, you are doing too much.    May take tylenol as needed for discomfort.    Do not take any other NSAIDs (ibuprofen, advil, aleve, naprosyn, etc) while taking the Naproxen.    Apply ice multiple times daily for the first 2-3 days. Then, alternate ice and heat multiple times daily. Ice will help to decrease swelling and pain. Heat will help to relax the muscles.    Take the flexeril as prescribed for muscle spasms. This may make you drowsy. Do not drive while taking this medication.    Follow up with physical therapy for evaluation and treatment of your back pain.    Follow up with primary care provider next week if no improvement of symptoms. Sooner if any worsening of symptoms.    Go to the  Emergency Department if any weakness, numbness, severe worsening pain, or any other concerning symptoms.         Carolina Bernardo PA-C  [facility]

## 2017-02-15 NOTE — NURSING NOTE
"Chief Complaint   Patient presents with     Musculoskeletal Problem     Pulled muscle on the R shoulder per pt x 1 day lifting are and possibly pulled it        Initial BP (!) 139/91  Pulse 77  Temp 97.5  F (36.4  C) (Oral)  Ht 5' 11\" (1.803 m)  Wt 296 lb (134.3 kg)  SpO2 99%  BMI 41.28 kg/m2 Estimated body mass index is 41.28 kg/(m^2) as calculated from the following:    Height as of this encounter: 5' 11\" (1.803 m).    Weight as of this encounter: 296 lb (134.3 kg).  Medication Reconciliation: complete      Munir Amaral MA    "

## 2017-02-21 ENCOUNTER — THERAPY VISIT (OUTPATIENT)
Dept: PHYSICAL THERAPY | Facility: CLINIC | Age: 52
End: 2017-02-21
Payer: COMMERCIAL

## 2017-02-21 DIAGNOSIS — M54.2 CERVICALGIA: Primary | ICD-10-CM

## 2017-02-21 DIAGNOSIS — M54.6 PAIN IN THORACIC SPINE: ICD-10-CM

## 2017-02-21 PROCEDURE — 97110 THERAPEUTIC EXERCISES: CPT | Mod: GP | Performed by: PHYSICAL THERAPIST

## 2017-02-21 PROCEDURE — 97161 PT EVAL LOW COMPLEX 20 MIN: CPT | Mod: GP | Performed by: PHYSICAL THERAPIST

## 2017-02-21 NOTE — PROGRESS NOTES
[facility] Initial Evaluation    Subjective:    Adarsh Salvador is a 51 year old male with a cervical spine condition.  Condition occurred with:  Lifting.  Condition occurred: at home.  This is a new condition  Onset 2/14/17 after lifting a table top - very little pain initially, then started having increased pain while at work. Date of MD order 2/15/17.    Patient reports pain:  Cervical right side, cervical left side and other (central thoracic pain).  Radiates to:  Lower arm right.   and is constant and reported as 7/10.  Associated symptoms:  Tingling and numbness (initially N/T, that has since resolved; occasional HA but not a main symptom). Pain is the same all the time.  Symptoms are exacerbated by rotating head, lifting and other (more activity during the day) and relieved by ice, heat, muscle relaxants and NSAID's (topical creams - not helpful).  Since onset symptoms are gradually improving.        General health as reported by patient is fair.  Pertinent medical history includes:  Overweight, high blood pressure and asthma (kidney stones).  Medical allergies: no.  Other surgeries include:  No.  Current medications:  Anti-depressants, muscle relaxants and high blood pressure medication.  Current occupation is  .  Patient is working in normal job without restrictions.  Primary job tasks include:  Lifting.    Barriers include:  None as reported by the patient.    Red flags:  None as reported by the patient.    Patient's goals are to decrease the pain, be able to lift without pain and be able to turn his head without pain.                     Objective:    Standing Alignment:    Cervical/Thoracic:  Forward head  Shoulder/UE:  Rounded shoulders                                  Cervical/Thoracic Evaluation    AROM:  AROM Cervical:    Flexion:            Min loss with ERP  Extension:       Min loss no pain  Rotation:         Left: min loss with ERP     Right: min loss with ERP  Side Bend:       Left: min loss with end range strain     Right:  Min loss with end range strain  AROM Thoracic:    Flexion:               WNL  Extension:          Mod loss with ERP  Rotation:            Left: min loss with end range strain     Right: min loss with end range strain      Headaches: cervical  Cervical Myotomes:  normal                  DTR's:  Cervical dtr's: diminished but symmetrical.  C5 (Biceps):  Left:  0  Right:  0     C6 (Brachioradialis):  Left:  0  Right:  0     C7 (Triceps):  Left:  0  Right:  0    Cervical Dermatomes:  normal                          Spinal Segmental Conclusions:    Level:  Hypo at T4, T5, T6, C6, C7 and T1      Cord Sign:  normal                                            [facility] Cervical Evaluation    Posture:  Sitting: fair  Standing: fair  Protruding Head: yes  Wry Neck: no  Correction of Posture: no effect    Movement Loss:    Flexion (Flex): min and pain  Retraction (RET): mod and pain  Extension (EXT): min  Lateral Flexion Right (LF R): min and pain  Lateral Flexion Left (LF L): min and pain  Rotation Right (ROT R): pain and min  Rotation Left (ROT L): pain and min  Test Movements:      RET: During: increases  After: no worse  Mechanical Response: no effect  Repeat RET: During: increases  After: no worse  Mechanical Response: IncROM                          Conclusion: derangement  Principle of Treatment:      Extension: retraction with self OP every 2 hours; posture correction                                             ROS    Assessment/Plan:      Patient is a 51 year old male with cervical and thoracic complaints.    Patient has the following significant findings with corresponding treatment plan.                Diagnosis 1:  Neck and arm pain with underlying hypomobility and poor posture - response consistent with a derangement    Pain -  hot/cold therapy, manual therapy, education, directional preference exercise and home program  Decreased ROM/flexibility - manual therapy,  therapeutic exercise and home program  Impaired muscle performance - neuro re-education and home program  Impaired posture - neuro re-education and home program    Therapy Evaluation Codes:   1) History comprised of:   Personal factors that impact the plan of care:      Past/current experiences.    Comorbidity factors that impact the plan of care are:      None.     Medications impacting care: None.  2) Examination of Body Systems comprised of:   Body structures and functions that impact the plan of care:      Cervical spine and Thoracic Spine.   Activity limitations that impact the plan of care are:      Lifting and Sitting.  3) Clinical presentation characteristics are:   Stable/Uncomplicated.  4) Decision-Making    Low complexity using standardized patient assessment instrument and/or measureable assessment of functional outcome.  Cumulative Therapy Evaluation is: Low complexity.    Previous and current functional limitations:  (See Goal Flow Sheet for this information)    Short term and Long term goals: (See Goal Flow Sheet for this information)     Communication ability:  Patient appears to be able to clearly communicate and understand verbal and written communication and follow directions correctly.  Treatment Explanation - The following has been discussed with the patient:   RX ordered/plan of care  Anticipated outcomes  Possible risks and side effects  This patient would benefit from PT intervention to resume normal activities.   Rehab potential is excellent.    Frequency:  1 X week, once daily  Duration:  for 5 weeks  Discharge Plan:  Achieve all LTG.  Independent in home treatment program.  Reach maximal therapeutic benefit.    Please refer to the daily flowsheet for treatment today, total treatment time and time spent performing 1:1 timed codes.

## 2017-02-21 NOTE — MR AVS SNAPSHOT
After Visit Summary   2/21/2017    Adarsh Salvador    MRN: [id number]           Patient Information     Date Of Birth          1965        Visit Information        Provider Department      2/21/2017 8:10 AM Xu Cummins, PT [facility]        Today's Diagnoses     Cervicalgia    -  1    Pain in thoracic spine           Follow-ups after your visit        Your next 10 appointments already scheduled     Feb 28, 2017  9:30 AM CST   [facility] Extremity with Xu Cummins PT   [facility] ([facility])    [address]  [place] [address]   [phone]            Mar 09, 2017  9:40 AM CST   [facility] Extremity with Xu Cummins PT   [facility] ([facility])    [address]  [place] [address]   [phone]            Mar 16, 2017  9:40 AM CDT   [facility] Extremity with Xu Cummins PT   [facility] ([facility])    [address]  [place] [address]   [phone]            Mar 27, 2017  9:20 AM CDT   [facility] Extremity with Xu Cummins PT   [facility] ([facility])    [address]  [place] [address]   [phone]              Who to contact     If you have questions or need follow up information about today's clinic visit or your schedule please contact [facility] directly at [phone].  Normal or non-critical lab and imaging results will be communicated to you by MyChart, letter or phone within 4 business days after the clinic has received the results. If you do not hear from us within 7 days, please contact the clinic through MyChart or phone. If you have a critical or abnormal lab result, we will notify you by phone as soon as possible.  Submit refill requests through [email or web address] or call your pharmacy and they will  forward the refill request to us. Please allow 3 business days for your refill to be completed.          Additional Information About Your Visit        Care EveryWhere ID     This is your Care EveryWhere ID. This could be used by other organizations to access your [facility] medical records  [id number]         Blood Pressure from Last 3 Encounters:   02/15/17 112/70   01/26/17 129/83   01/13/17 120/70    Weight from Last 3 Encounters:   02/15/17 134.3 kg (296 lb)   01/26/17 134.3 kg (296 lb)   01/13/17 135.8 kg (299 lb 6 oz)              We Performed the Following     HC PT EVAL, LOW COMPLEXITY     [facility] INITIAL EVAL REPORT     THERAPEUTIC EXERCISES        Primary Care Provider Office Phone # Fax #    Bertha Romero PA-C [phone]       [facility] [address] [address] [address]        Thank you!     Thank you for choosing [facility]  for your care. Our goal is always to provide you with excellent care. Hearing back from our patients is one way we can continue to improve our services. Please take a few minutes to complete the written survey that you may receive in the mail after your visit with us. Thank you!             Your Updated Medication List - Protect others around you: Learn how to safely use, store and throw away your medicines at www.disposemymeds.org.          This list is accurate as of: 2/21/17  8:46 AM.  Always use your most recent med list.                   Brand Name Dispense Instructions for use    albuterol 108 (90 BASE) MCG/ACT Inhaler    PROAIR HFA/PROVENTIL HFA/VENTOLIN HFA    1 Inhaler    Inhale 2 puffs into the lungs every 6 hours as needed for shortness of breath / dyspnea or wheezing       ALEVE PO          amLODIPine 10 MG tablet    NORVASC    90 tablet    Take 1 tablet (10 mg) by mouth daily       calcium carbonate 500 MG chewable tablet    TUMS     Take 1 chew tab by mouth daily       cyclobenzaprine 10 MG  tablet    FLEXERIL    30 tablet    Take 0.5-1 tablets (5-10 mg) by mouth 3 times daily as needed for muscle spasms       dicyclomine 20 MG tablet    BENTYL    40 tablet    Take 1 tablet (20 mg) by mouth 4 times daily as needed       diphenoxylate-atropine 2.5-0.025 MG per tablet    LOMOTIL    30 tablet    Take 2 tablets by mouth 4 times daily as needed for diarrhea       EPINEPHrine 0.3 MG/0.3ML injection     2 each    Inject 0.3 mLs (0.3 mg) into the muscle once as needed for anaphylaxis       fluticasone 100 MCG/BLIST Aepb    FLOVENT DISKUS    1 Inhaler    Inhale 1 puff into the lungs 2 times daily       lisinopril 20 MG tablet    PRINIVIL/ZESTRIL    90 tablet    Take 1 tablet (20 mg) by mouth daily       mometasone-formoterol 100-5 MCG/ACT oral inhaler    DULERA    1 Inhaler    Inhale 2 puffs into the lungs 2 times daily Rinse mouth after use.       multivitamin, therapeutic Tabs tablet      Take 1 tablet by mouth daily       naproxen 500 MG tablet    NAPROSYN    30 tablet    Take 1 tablet (500 mg) by mouth 2 times daily as needed for moderate pain       pantoprazole 20 MG EC tablet    PROTONIX    30 tablet    Take by mouth 30-60 minutes before a meal.       predniSONE 20 MG tablet    DELTASONE    20 tablet    With food. Take 3 tabs (60 mg) by mouth daily x 3 days, 2 tabs (40 mg) daily x 3 days, 1 tab (20 mg) daily x 3 days, then 1/2 tab (10 mg) x 3 days.       sertraline 50 MG tablet    ZOLOFT    90 tablet    Take 1 tablet (50 mg) by mouth daily       simvastatin 20 MG tablet    ZOCOR    30 tablet    Take 1 tablet (20 mg) by mouth At Bedtime       TYLENOL PO      Take 1,000 mg by mouth every 8 hours as needed for mild pain or fever       VSL#3 Pack     30 each    Use 1-2 capsules/day

## 2017-02-28 ENCOUNTER — THERAPY VISIT (OUTPATIENT)
Dept: PHYSICAL THERAPY | Facility: CLINIC | Age: 52
End: 2017-02-28
Payer: COMMERCIAL

## 2017-02-28 DIAGNOSIS — M54.6 PAIN IN THORACIC SPINE: ICD-10-CM

## 2017-02-28 DIAGNOSIS — M54.2 CERVICALGIA: ICD-10-CM

## 2017-02-28 PROCEDURE — 97140 MANUAL THERAPY 1/> REGIONS: CPT | Mod: GP | Performed by: PHYSICAL THERAPIST

## 2017-02-28 PROCEDURE — 97112 NEUROMUSCULAR REEDUCATION: CPT | Mod: GP | Performed by: PHYSICAL THERAPIST

## 2017-02-28 PROCEDURE — 97110 THERAPEUTIC EXERCISES: CPT | Mod: GP | Performed by: PHYSICAL THERAPIST

## 2017-02-28 NOTE — PROGRESS NOTES
Subjective:    HPI                    Objective:    System    Physical Exam    General     ROS    Assessment/Plan:      SUBJECTIVE  Subjective changes as noted by pt: Patient reports he is a little sore today. The neck is getting quite a bit better. No ill effects after last visit. Pain is located in the right shoulder and upper trap region currently. Has been doing retraction 2 X per day.   Current pain level: 5/10   Changes in function:  Yes (See Goal flowsheet attached for changes in current functional level)     Adverse reaction to treatment or activity:  None    OBJECTIVE  Changes in objective findings: Cervical AROM flexion chin to chest, extension min loss; SB min loss bilat; ROT min loss L only.      ASSESSMENT  Adarsh continues to require intervention to meet STG and LTG's: PT  Patient is progressing as expected.  Response to therapy has shown an improvement in  pain level and ROM   Progress made towards STG/LTG?  Yes (See Goal flowsheet attached for updates on achievement of STG and LTG)    PLAN  Current treatment program is being advanced to more complex exercises.    PTA/ATC plan:  N/A    Please refer to the daily flowsheet for treatment today, total treatment time and time spent performing 1:1 timed codes.

## 2017-02-28 NOTE — MR AVS SNAPSHOT
After Visit Summary   2/28/2017    Adarsh Salvador    MRN: [id number]           Patient Information     Date Of Birth          1965        Visit Information        Provider Department      2/28/2017 9:30 AM Xu Cummins, PT [facility]        Today's Diagnoses     Cervicalgia        Pain in thoracic spine           Follow-ups after your visit        Your next 10 appointments already scheduled     Mar 09, 2017  9:40 AM CST   RAMOS Extremity with Xu Cummins PT   [facility] ([facility])    [address]  [place] [address]   [phone]            Mar 16, 2017  9:40 AM CDT   RAMOS Extremity with Xu Cummins PT   [facility] ([facility])    [address]  [place] [address]   [phone]            Mar 27, 2017  9:20 AM CDT   RAM[facility] Extremity with Xu Cummins PT   [facility] ([facility])    [address]  [place] [address]   [phone]              Who to contact     If you have questions or need follow up information about today's clinic visit or your schedule please contact [facility] directly at [phone].  Normal or non-critical lab and imaging results will be communicated to you by MyChart, letter or phone within 4 business days after the clinic has received the results. If you do not hear from us within 7 days, please contact the clinic through MyChart or phone. If you have a critical or abnormal lab result, we will notify you by phone as soon as possible.  Submit refill requests through [employer] or call your pharmacy and they will forward the refill request to us. Please allow 3 business days for your refill to be completed.          Additional Information About Your Visit        Care EveryWhere ID     This is your Care EveryWhere ID. This could be  used by other organizations to access your [facility] medical records  [id number]         Blood Pressure from Last 3 Encounters:   02/15/17 112/70   01/26/17 129/83   01/13/17 120/70    Weight from Last 3 Encounters:   02/15/17 134.3 kg (296 lb)   01/26/17 134.3 kg (296 lb)   01/13/17 135.8 kg (299 lb 6 oz)              We Performed the Following     MANUAL THER TECH,1+REGIONS,EA 15 MIN     NEUROMUSCULAR RE-EDUCATION     THERAPEUTIC EXERCISES        Primary Care Provider Office Phone # Fax #    Bertha Romero PA-C [phone]       [facility] [address] [address] [facility] [address]        Thank you!     Thank you for choosing [facility]  for your care. Our goal is always to provide you with excellent care. Hearing back from our patients is one way we can continue to improve our services. Please take a few minutes to complete the written survey that you may receive in the mail after your visit with us. Thank you!             Your Updated Medication List - Protect others around you: Learn how to safely use, store and throw away your medicines at www.disposemymeds.org.          This list is accurate as of: 2/28/17  9:59 AM.  Always use your most recent med list.                   Brand Name Dispense Instructions for use    albuterol 108 (90 BASE) MCG/ACT Inhaler    PROAIR HFA/PROVENTIL HFA/VENTOLIN HFA    1 Inhaler    Inhale 2 puffs into the lungs every 6 hours as needed for shortness of breath / dyspnea or wheezing       ALEVE PO          amLODIPine 10 MG tablet    NORVASC    90 tablet    Take 1 tablet (10 mg) by mouth daily       calcium carbonate 500 MG chewable tablet    TUMS     Take 1 chew tab by mouth daily       cyclobenzaprine 10 MG tablet    FLEXERIL    30 tablet    Take 0.5-1 tablets (5-10 mg) by mouth 3 times daily as needed for muscle spasms       dicyclomine 20 MG tablet    BENTYL    40 tablet    Take 1 tablet (20 mg) by mouth 4 times  daily as needed       diphenoxylate-atropine 2.5-0.025 MG per tablet    LOMOTIL    30 tablet    Take 2 tablets by mouth 4 times daily as needed for diarrhea       EPINEPHrine 0.3 MG/0.3ML injection     2 each    Inject 0.3 mLs (0.3 mg) into the muscle once as needed for anaphylaxis       fluticasone 100 MCG/BLIST Aepb    FLOVENT DISKUS    1 Inhaler    Inhale 1 puff into the lungs 2 times daily       lisinopril 20 MG tablet    PRINIVIL/ZESTRIL    90 tablet    Take 1 tablet (20 mg) by mouth daily       mometasone-formoterol 100-5 MCG/ACT oral inhaler    DULERA    1 Inhaler    Inhale 2 puffs into the lungs 2 times daily Rinse mouth after use.       multivitamin, therapeutic Tabs tablet      Take 1 tablet by mouth daily       naproxen 500 MG tablet    NAPROSYN    30 tablet    Take 1 tablet (500 mg) by mouth 2 times daily as needed for moderate pain       pantoprazole 20 MG EC tablet    PROTONIX    30 tablet    Take by mouth 30-60 minutes before a meal.       predniSONE 20 MG tablet    DELTASONE    20 tablet    With food. Take 3 tabs (60 mg) by mouth daily x 3 days, 2 tabs (40 mg) daily x 3 days, 1 tab (20 mg) daily x 3 days, then 1/2 tab (10 mg) x 3 days.       sertraline 50 MG tablet    ZOLOFT    90 tablet    Take 1 tablet (50 mg) by mouth daily       simvastatin 20 MG tablet    ZOCOR    30 tablet    Take 1 tablet (20 mg) by mouth At Bedtime       TYLENOL PO      Take 1,000 mg by mouth every 8 hours as needed for mild pain or fever       VSL#3 Pack     30 each    Use 1-2 capsules/day

## 2017-03-06 ENCOUNTER — OFFICE VISIT (OUTPATIENT)
Dept: FAMILY MEDICINE | Facility: CLINIC | Age: 52
End: 2017-03-06
Payer: COMMERCIAL

## 2017-03-06 VITALS
OXYGEN SATURATION: 99 % | BODY MASS INDEX: 41.7 KG/M2 | TEMPERATURE: 99.1 F | SYSTOLIC BLOOD PRESSURE: 124 MMHG | HEART RATE: 106 BPM | DIASTOLIC BLOOD PRESSURE: 84 MMHG | WEIGHT: 299 LBS

## 2017-03-06 DIAGNOSIS — R07.0 THROAT PAIN: ICD-10-CM

## 2017-03-06 DIAGNOSIS — J10.1 INFLUENZA B: Primary | ICD-10-CM

## 2017-03-06 DIAGNOSIS — R05.9 COUGH: ICD-10-CM

## 2017-03-06 LAB
DEPRECATED S PYO AG THROAT QL EIA: NORMAL
FLUAV+FLUBV AG SPEC QL: ABNORMAL
FLUAV+FLUBV AG SPEC QL: NEGATIVE
MICRO REPORT STATUS: NORMAL
SPECIMEN SOURCE: ABNORMAL
SPECIMEN SOURCE: NORMAL

## 2017-03-06 PROCEDURE — 87804 INFLUENZA ASSAY W/OPTIC: CPT | Performed by: PHYSICIAN ASSISTANT

## 2017-03-06 PROCEDURE — 99213 OFFICE O/P EST LOW 20 MIN: CPT | Performed by: PHYSICIAN ASSISTANT

## 2017-03-06 PROCEDURE — 87880 STREP A ASSAY W/OPTIC: CPT | Performed by: PHYSICIAN ASSISTANT

## 2017-03-06 PROCEDURE — 87081 CULTURE SCREEN ONLY: CPT | Performed by: PHYSICIAN ASSISTANT

## 2017-03-06 RX ORDER — OSELTAMIVIR PHOSPHATE 75 MG/1
75 CAPSULE ORAL 2 TIMES DAILY
Qty: 10 CAPSULE | Refills: 0 | Status: SHIPPED | OUTPATIENT
Start: 2017-03-06 | End: 2017-04-04

## 2017-03-06 NOTE — MR AVS SNAPSHOT
After Visit Summary   3/6/2017    Adarsh Salvador    MRN: [id number]           Patient Information     Date Of Birth          1965        Visit Information        Provider Department      3/6/2017 11:20 AM Bertha Romero PA-C [facility]        Today's Diagnoses     Influenza B    -  1    Throat pain        Cough          Care Instructions      Influenza (Adult)    Influenza is also called the flu. It is a viral illness that affects the air passages of your lungs. It is different from the common cold. The flu can easily be passed from one to person to another. It may be spread through the air by coughing and sneezing. Or it can be spread by touching the sick person and then touching your own eyes, nose, or mouth.  The flu starts 1 to 3 days after you are exposed to the flu virus. It may last for 1 to 2 weeks. You usually don t need to take antibiotics unless you have a complication. This might be an ear or sinus infection or pneumonia.  Symptoms of the flu may be mild or severe. They can include extreme tiredness (wanting to stay in bed all day), chills, fevers, muscle aches, soreness with eye movement, headache, and a dry, hacking cough.  Home care  Follow these guidelines when caring for yourself at home:    Avoid being around cigarette smoke, whether yours or other people s.    Acetaminophen or ibuprofen will help ease your fever, muscle aches, and headache. Don t give aspirin to anyone younger than 18 who has the flu. Aspirin can harm the liver.    Nausea and loss of appetite are common with the flu. Eat light meals. Drink 6 to 8 glasses of liquids every day. Good choices are water, sport drinks, soft drinks without caffeine, juices, tea, and soup. Extra fluids will also help loosen secretions in your nose and lungs.    Over-the-counter cold medicines will not make the flu go away faster. But the medicines may help with coughing, sore throat, and congestion in your nose and  sinuses. Don t use a decongestant if you have high blood pressure.    Stay home until your fever has been gone for at least 24 hours without using medicine to reduce fever.  Follow-up care  Follow up with your health care provider, or as advised, if you are not getting better over the next week.  If you are 65 or older, talk with your provider about getting a pneumococcal vaccine every 5 years. You should also get this vaccine if you have chronic asthma or COPD. All adults should get a flu vaccine every fall. Ask your provider about this.  When to seek medical advice  Call your health care provider right away if any of these occur:    Cough with lots of colored sputum (mucus) or blood in your sputum    Chest pain, shortness of breath, wheezing, or difficulty breathing    Severe headache, or face, neck, or ear pain    New rash with fever    Fever of 101 F (38 C) oral or higher that doesn t get better with fever medicine    Confusion, behavior change, or seizure    Severe weakness or dizziness    You get a fever or cough after getting better for a few days       2788-0625 [employer]. [address] [place] [address]. All rights reserved. This information is not intended as a substitute for professional medical care. Always follow your healthcare professional's instructions.              Follow-ups after your visit        Your next 10 appointments already scheduled     Mar 09, 2017  9:40 AM CST   RAM[facility] Extremity with Xu Cummins PT   [facility] ([facility])    [address] [address] [address]   [phone]            Mar 16, 2017  9:40 AM CDT   RAM[facility] Extremity with [facility] ([facility])    [address]  [facility] [address]   [phone]            Mar 27, 2017  9:20 AM CDT   RAM[facility] Extremity with [facility] ([facility])    [address]  [address]  [place] [address] [phone]              Who to contact     If you have questions or need follow up information about today's clinic visit or your schedule please contact [facility] directly at [phone].  Normal or non-critical lab and imaging results will be communicated to you by MyChart, letter or phone within 4 business days after the clinic has received the results. If you do not hear from us within 7 days, please contact the clinic through MyChart or phone. If you have a critical or abnormal lab result, we will notify you by phone as soon as possible.  Submit refill requests through [employer] or call your pharmacy and they will forward the refill request to us. Please allow 3 business days for your refill to be completed.          Additional Information About Your Visit        Care EveryWhere ID     This is your Care EveryWhere ID. This could be used by other organizations to access your [facility] medical records  [id number]        Your Vitals Were     Pulse Temperature Pulse Oximetry BMI (Body Mass Index)          106 99.1  F (37.3  C) (Oral) 99% 41.7 kg/m2         Blood Pressure from Last 3 Encounters:   03/06/17 124/84   02/15/17 112/70   01/26/17 129/83    Weight from Last 3 Encounters:   03/06/17 299 lb (135.6 kg)   02/15/17 296 lb (134.3 kg)   01/26/17 296 lb (134.3 kg)              We Performed the Following     Beta strep group A culture     Influenza A/B antigen     Rapid strep screen          Today's Medication Changes          These changes are accurate as of: 3/6/17 12:35 PM.  If you have any questions, ask your nurse or doctor.               Start taking these medicines.        Dose/Directions    oseltamivir 75 MG capsule   Commonly known as:  TAMIFLU   Used for:  Cough, Influenza B   Started by:  Bertha Romero PA-C        Dose:  75 mg   Take 1 capsule (75 mg) by mouth 2 times daily   Quantity:  10  capsule   Refills:  0            Where to get your medicines      These medications were sent to [facility] #[id number] - [place], [place] - [address]  [address], [place] [address]     Phone:  [phone]     oseltamivir 75 MG capsule                Primary Care Provider Office Phone # Fax #    Bertha Romero PA-C [phone]       [facility] [address] [address] [facility] [address]        Thank you!     Thank you for choosing [facility]  for your care. Our goal is always to provide you with excellent care. Hearing back from our patients is one way we can continue to improve our services. Please take a few minutes to complete the written survey that you may receive in the mail after your visit with us. Thank you!             Your Updated Medication List - Protect others around you: Learn how to safely use, store and throw away your medicines at www.disposemymeds.org.          This list is accurate as of: 3/6/17 12:35 PM.  Always use your most recent med list.                   Brand Name Dispense Instructions for use    albuterol 108 (90 BASE) MCG/ACT Inhaler    PROAIR HFA/PROVENTIL HFA/VENTOLIN HFA    1 Inhaler    Inhale 2 puffs into the lungs every 6 hours as needed for shortness of breath / dyspnea or wheezing       amLODIPine 10 MG tablet    NORVASC    90 tablet    Take 1 tablet (10 mg) by mouth daily       calcium carbonate 500 MG chewable tablet    TUMS     Take 1 chew tab by mouth daily       cyclobenzaprine 10 MG tablet    FLEXERIL    30 tablet    Take 0.5-1 tablets (5-10 mg) by mouth 3 times daily as needed for muscle spasms       dicyclomine 20 MG tablet    BENTYL    40 tablet    Take 1 tablet (20 mg) by mouth 4 times daily as needed       diphenoxylate-atropine 2.5-0.025 MG per tablet    LOMOTIL    30 tablet    Take 2 tablets by mouth 4 times daily as needed for diarrhea       EPINEPHrine 0.3 MG/0.3ML injection     2 each    Inject 0.3 mLs (0.3  mg) into the muscle once as needed for anaphylaxis       fluticasone 100 MCG/BLIST Aepb    FLOVENT DISKUS    1 Inhaler    Inhale 1 puff into the lungs 2 times daily       lisinopril 20 MG tablet    PRINIVIL/ZESTRIL    90 tablet    Take 1 tablet (20 mg) by mouth daily       mometasone-formoterol 100-5 MCG/ACT oral inhaler    DULERA    1 Inhaler    Inhale 2 puffs into the lungs 2 times daily Rinse mouth after use.       multivitamin, therapeutic Tabs tablet      Take 1 tablet by mouth daily       naproxen 500 MG tablet    NAPROSYN    30 tablet    Take 1 tablet (500 mg) by mouth 2 times daily as needed for moderate pain       oseltamivir 75 MG capsule    TAMIFLU    10 capsule    Take 1 capsule (75 mg) by mouth 2 times daily       pantoprazole 20 MG EC tablet    PROTONIX    30 tablet    Take by mouth 30-60 minutes before a meal.       sertraline 50 MG tablet    ZOLOFT    90 tablet    Take 1 tablet (50 mg) by mouth daily       simvastatin 20 MG tablet    ZOCOR    30 tablet    Take 1 tablet (20 mg) by mouth At Bedtime       TYLENOL PO      Take 1,000 mg by mouth every 8 hours as needed for mild pain or fever       VSL#3 Pack     30 each    Use 1-2 capsules/day

## 2017-03-06 NOTE — PROGRESS NOTES
SUBJECTIVE:                                                    Adarsh Salvador is a 51 year old male who presents to clinic today for the following health issues:    ENT Symptoms             Symptoms: cc Present Absent Comment   Fever/Chills  x     Fatigue  x     Muscle Aches  x     Eye Irritation   x    Sneezing  x     Nasal Lobo/Drg  x     Sinus Pressure/Pain  x     Loss of smell  x     Dental pain   x    Sore Throat   x    Swollen Glands  x     Ear Pain/Fullness  x     Cough  x     Wheeze  x     Chest Pain   x    Shortness of breath  x     Rash   x    Other   x      Symptom duration:  x 3 days   Symptom severity:  mild   Treatments tried:  none   Contacts:  none       Oxygen is 99 percent.   Has been given inhaler previously when sick with cough.   Started off as sore throat, now has cough and ears are full. Is tired.  Has body aches.  Has had a fever of 101, this happened yesterday.   Chest feels tighter with shortness of breath.   No runny nose. No vomiting or diarrhea.   No rashes.   Decreased appetite.  Drinking fluids well.     Using advair and albuterol.         Problem list and histories reviewed & adjusted, as indicated.  Additional history: as documented    Patient Active Problem List   Diagnosis     GERD (gastroesophageal reflux disease)     Kidney stone     Chronic RLQ pain     Hyperlipidemia LDL goal <160     Hypertension goal BP (blood pressure) < 140/90     Constipation     Abdominal pain, right upper quadrant     Adult celiac disease     Chronic maxillary sinusitis     Chronic rhinitis     Obesity (BMI 30-39.9)     Pure hyperglyceridemia     Anaphylactic reaction     Benign essential hypertension     Anemia in other chronic diseases classified elsewhere     Fatty liver     Irritable bowel syndrome with diarrhea     Right inguinal hernia     BMI 40.0-44.9, adult (H)     Cervicalgia     Pain in thoracic spine     Past Surgical History   Procedure Laterality Date     Colonoscopy  5/1/2012      "Procedure:COLONOSCOPY; screening colonoscopy; Surgeon:KINGSLEY DOS SANTOS; Location:[facility]     Hc breath hydrogen test  3/7/2014     Procedure: HYDROGEN BREATH TEST;  Surgeon: Darion Swift MD;  Location: [facility]     Orthopedic surgery       x3 Rt knee      C removal of kidney stone       Colonoscopy  4/21/2014     Procedure: COMBINED COLONOSCOPY, SINGLE BIOPSY/POLYPECTOMY BY BIOPSY;  Surgeon: Duane, William Charles, MD;  Location: [facility] OR       Social History   Substance Use Topics     Smoking status: Never Smoker     Smokeless tobacco: Current User     Types: Chew      Comment: Chew     Alcohol use No      Comment: \"quart a year\" 2012     Family History   Problem Relation Age of Onset     CANCER Mother 52     lung, smoked     Cancer - colorectal Father 53     unsure type, pt attributes to radiation exposure     Myocardial Infarction Father 45     Myocardial Infarction Paternal Grandfather 35     DIABETES Other      nephew- type 1     DIABETES Maternal Aunt      1     DIABETES Maternal Aunt      2     DIABETES Paternal Uncle      1     DIABETES Paternal Uncle      2     DIABETES Paternal Uncle      3     DIABETES Paternal Uncle      4         Current Outpatient Prescriptions   Medication Sig Dispense Refill     cyclobenzaprine (FLEXERIL) 10 MG tablet Take 0.5-1 tablets (5-10 mg) by mouth 3 times daily as needed for muscle spasms 30 tablet 0     naproxen (NAPROSYN) 500 MG tablet Take 1 tablet (500 mg) by mouth 2 times daily as needed for moderate pain 30 tablet 0     mometasone-formoterol (DULERA) 100-5 MCG/ACT oral inhaler Inhale 2 puffs into the lungs 2 times daily Rinse mouth after use. 1 Inhaler 3     fluticasone (FLOVENT DISKUS) 100 MCG/BLIST AEPB Inhale 1 puff into the lungs 2 times daily 1 Inhaler 1     calcium carbonate (TUMS) 500 MG chewable tablet Take 1 chew tab by mouth daily       albuterol (PROAIR HFA, PROVENTIL HFA, VENTOLIN HFA) 108 (90 BASE) MCG/ACT inhaler Inhale 2 puffs into the lungs every 6 " hours as needed for shortness of breath / dyspnea or wheezing 1 Inhaler 1     sertraline (ZOLOFT) 50 MG tablet Take 1 tablet (50 mg) by mouth daily 90 tablet 0     dicyclomine (BENTYL) 20 MG tablet Take 1 tablet (20 mg) by mouth 4 times daily as needed 40 tablet 5     diphenoxylate-atropine (LOMOTIL) 2.5-0.025 MG per tablet Take 2 tablets by mouth 4 times daily as needed for diarrhea 30 tablet 1     Acetaminophen (TYLENOL PO) Take 1,000 mg by mouth every 8 hours as needed for mild pain or fever       pantoprazole (PROTONIX) 20 MG tablet Take by mouth 30-60 minutes before a meal. 30 tablet 3     multivitamin, therapeutic (THERA-VIT) TABS Take 1 tablet by mouth daily       amLODIPine (NORVASC) 10 MG tablet Take 1 tablet (10 mg) by mouth daily 90 tablet 1     lisinopril (PRINIVIL,ZESTRIL) 20 MG tablet Take 1 tablet (20 mg) by mouth daily 90 tablet 1     simvastatin (ZOCOR) 20 MG tablet Take 1 tablet (20 mg) by mouth At Bedtime 30 tablet 3     EPINEPHrine (EPIPEN) 0.3 MG/0.3ML injection Inject 0.3 mLs (0.3 mg) into the muscle once as needed for anaphylaxis 2 each 2     Dietary Management Product (VSL#3) PACK Use 1-2 capsules/day 30 each 3     Allergies   Allergen Reactions     Hydrocodone-Acetaminophen Anaphylaxis and Itching     Hydromorphone Anaphylaxis     Morphine [Fumaric Acid] Anaphylaxis     Codeine Phosphate Itching     Tramadol Hives     Trazodone Hives     Ketorolac Tromethamine Rash       ROS:  Constitutional, HEENT, cardiovascular, pulmonary, gi and gu systems are negative, except as otherwise noted.    OBJECTIVE:                                                    /84  Pulse 106  Temp 99.1  F (37.3  C) (Oral)  Wt 299 lb (135.6 kg)  SpO2 99%  BMI 41.7 kg/m2  Body mass index is 41.7 kg/(m^2).  GENERAL:  No acute distress.  Interacts appropriately.  Breathing without difficulty.  Alert.  HEENT:  Tympanic membranes intact without effusion or erythema.  Oral mucosa moist. Posterior pharynx has no erythema.   Posterior pharynx has no exudate. Without edema.  NECK:  Soft and supple.  without tenderness.  Without lymphadenopathy.  Normal range of motion.    CARDIAC:   Regular rate and rhythm.  No murmurs, rubs, or gallops.   PULMONARY: Clear to auscultation bilaterally.  No  wheezes, crackles, or rhonchi.  Normal air exchange/breath sounds.  No use of accessory muscles.    PSYCH: Normal affect.  SKIN: No rashes.      Results for orders placed or performed in visit on 03/06/17 (from the past 24 hour(s))   Influenza A/B antigen   Result Value Ref Range    Influenza A/B Agn Specimen Nasal     Influenza A Negative NEG    Influenza B (A) NEG     Positive   Test results must be correlated with clinical data. If necessary, results   should be confirmed by a molecular assay or viral culture.     Rapid strep screen   Result Value Ref Range    Specimen Description Throat     Rapid Strep A Screen       NEGATIVE: No Group A streptococcal antigen detected by immunoassay, await   culture report.      Micro Report Status FINAL 03/06/2017           ASSESSMENT/PLAN:                                                    ASSESSMENT / PLAN:  (J10.1) Influenza B  (primary encounter diagnosis)  Comment:   Plan: oseltamivir (TAMIFLU) 75 MG capsule        Continue inhalers PRN  See below  Note given for work  Rest, analgesics    (R07.0) Throat pain  Comment:   Plan: Influenza A/B antigen, Rapid strep screen, Beta        strep group A culture            (R05) Cough  Comment:   Plan: Influenza A/B antigen, oseltamivir (TAMIFLU) 75        MG capsule            Patient Instructions     Influenza (Adult)    Influenza is also called the flu. It is a viral illness that affects the air passages of your lungs. It is different from the common cold. The flu can easily be passed from one to person to another. It may be spread through the air by coughing and sneezing. Or it can be spread by touching the sick person and then touching your own eyes, nose, or  mouth.  The flu starts 1 to 3 days after you are exposed to the flu virus. It may last for 1 to 2 weeks. You usually don t need to take antibiotics unless you have a complication. This might be an ear or sinus infection or pneumonia.  Symptoms of the flu may be mild or severe. They can include extreme tiredness (wanting to stay in bed all day), chills, fevers, muscle aches, soreness with eye movement, headache, and a dry, hacking cough.  Home care  Follow these guidelines when caring for yourself at home:    Avoid being around cigarette smoke, whether yours or other people s.    Acetaminophen or ibuprofen will help ease your fever, muscle aches, and headache. Don t give aspirin to anyone younger than 18 who has the flu. Aspirin can harm the liver.    Nausea and loss of appetite are common with the flu. Eat light meals. Drink 6 to 8 glasses of liquids every day. Good choices are water, sport drinks, soft drinks without caffeine, juices, tea, and soup. Extra fluids will also help loosen secretions in your nose and lungs.    Over-the-counter cold medicines will not make the flu go away faster. But the medicines may help with coughing, sore throat, and congestion in your nose and sinuses. Don t use a decongestant if you have high blood pressure.    Stay home until your fever has been gone for at least 24 hours without using medicine to reduce fever.  Follow-up care  Follow up with your health care provider, or as advised, if you are not getting better over the next week.  If you are 65 or older, talk with your provider about getting a pneumococcal vaccine every 5 years. You should also get this vaccine if you have chronic asthma or COPD. All adults should get a flu vaccine every fall. Ask your provider about this.  When to seek medical advice  Call your health care provider right away if any of these occur:    Cough with lots of colored sputum (mucus) or blood in your sputum    Chest pain, shortness of breath, wheezing,  or difficulty breathing    Severe headache, or face, neck, or ear pain    New rash with fever    Fever of 101 F (38 C) oral or higher that doesn t get better with fever medicine    Confusion, behavior change, or seizure    Severe weakness or dizziness    You get a fever or cough after getting better for a few days       6525-3296 [employer]. [address] [place] [address]. All rights reserved. This information is not intended as a substitute for professional medical care. Always follow your healthcare professional's instructions.            Bertha Romero PA-C  [facility]

## 2017-03-06 NOTE — PATIENT INSTRUCTIONS
Influenza (Adult)    Influenza is also called the flu. It is a viral illness that affects the air passages of your lungs. It is different from the common cold. The flu can easily be passed from one to person to another. It may be spread through the air by coughing and sneezing. Or it can be spread by touching the sick person and then touching your own eyes, nose, or mouth.  The flu starts 1 to 3 days after you are exposed to the flu virus. It may last for 1 to 2 weeks. You usually don t need to take antibiotics unless you have a complication. This might be an ear or sinus infection or pneumonia.  Symptoms of the flu may be mild or severe. They can include extreme tiredness (wanting to stay in bed all day), chills, fevers, muscle aches, soreness with eye movement, headache, and a dry, hacking cough.  Home care  Follow these guidelines when caring for yourself at home:    Avoid being around cigarette smoke, whether yours or other people s.    Acetaminophen or ibuprofen will help ease your fever, muscle aches, and headache. Don t give aspirin to anyone younger than 18 who has the flu. Aspirin can harm the liver.    Nausea and loss of appetite are common with the flu. Eat light meals. Drink 6 to 8 glasses of liquids every day. Good choices are water, sport drinks, soft drinks without caffeine, juices, tea, and soup. Extra fluids will also help loosen secretions in your nose and lungs.    Over-the-counter cold medicines will not make the flu go away faster. But the medicines may help with coughing, sore throat, and congestion in your nose and sinuses. Don t use a decongestant if you have high blood pressure.    Stay home until your fever has been gone for at least 24 hours without using medicine to reduce fever.  Follow-up care  Follow up with your health care provider, or as advised, if you are not getting better over the next week.  If you are 65 or older, talk with your provider about getting a pneumococcal vaccine  every 5 years. You should also get this vaccine if you have chronic asthma or COPD. All adults should get a flu vaccine every fall. Ask your provider about this.  When to seek medical advice  Call your health care provider right away if any of these occur:    Cough with lots of colored sputum (mucus) or blood in your sputum    Chest pain, shortness of breath, wheezing, or difficulty breathing    Severe headache, or face, neck, or ear pain    New rash with fever    Fever of 101 F (38 C) oral or higher that doesn t get better with fever medicine    Confusion, behavior change, or seizure    Severe weakness or dizziness    You get a fever or cough after getting better for a few days       6116-2082 [employer]. [address] [place] [address], [place] [address]. All rights reserved. This information is not intended as a substitute for professional medical care. Always follow your healthcare professional's instructions.

## 2017-03-06 NOTE — NURSING NOTE
"Chief Complaint   Patient presents with     Cough     possible bronchitis per pt x 3 days       Initial /84  Pulse 106  Temp 99.1  F (37.3  C) (Oral)  Wt 299 lb (135.6 kg)  SpO2 99%  BMI 41.7 kg/m2 Estimated body mass index is 41.7 kg/(m^2) as calculated from the following:    Height as of 2/15/17: 5' 11\" (1.803 m).    Weight as of this encounter: 299 lb (135.6 kg).  Medication Reconciliation: complete      Munir Amaral MA    "

## 2017-03-06 NOTE — LETTER
[facility]  [address] [facility] [address]  Phone: [phone]    March 6, 2017        Adarsh Salvador  [address] [address]          To whom it may concern:    RE: Adarsh Salvador    Patient was seen and treated today at our clinic and missed work for acute illness. He may be excused today and return when symptoms are improving.     Please contact me for questions or concerns.      Sincerely,        Bertha Romero PA-C

## 2017-03-08 LAB
BACTERIA SPEC CULT: NORMAL
MICRO REPORT STATUS: NORMAL
SPECIMEN SOURCE: NORMAL

## 2017-03-16 ENCOUNTER — THERAPY VISIT (OUTPATIENT)
Dept: PHYSICAL THERAPY | Facility: CLINIC | Age: 52
End: 2017-03-16
Payer: COMMERCIAL

## 2017-03-16 DIAGNOSIS — M54.6 PAIN IN THORACIC SPINE: ICD-10-CM

## 2017-03-16 DIAGNOSIS — M54.2 CERVICALGIA: ICD-10-CM

## 2017-03-16 DIAGNOSIS — I10 HYPERTENSION GOAL BP (BLOOD PRESSURE) < 140/90: ICD-10-CM

## 2017-03-16 PROCEDURE — 97140 MANUAL THERAPY 1/> REGIONS: CPT | Mod: GP | Performed by: PHYSICAL THERAPIST

## 2017-03-16 PROCEDURE — 97110 THERAPEUTIC EXERCISES: CPT | Mod: GP | Performed by: PHYSICAL THERAPIST

## 2017-03-16 PROCEDURE — 97112 NEUROMUSCULAR REEDUCATION: CPT | Mod: GP | Performed by: PHYSICAL THERAPIST

## 2017-03-16 RX ORDER — LISINOPRIL 20 MG/1
20 TABLET ORAL DAILY
Qty: 90 TABLET | Refills: 0 | Status: SHIPPED | OUTPATIENT
Start: 2017-03-16 | End: 2017-06-11

## 2017-03-16 RX ORDER — AMLODIPINE BESYLATE 10 MG/1
10 TABLET ORAL DAILY
Qty: 90 TABLET | Refills: 0 | Status: SHIPPED | OUTPATIENT
Start: 2017-03-16 | End: 2017-06-21

## 2017-03-16 NOTE — PROGRESS NOTES
Subjective:    HPI                    Objective:    System    Physical Exam    General     ROS    Assessment/Plan:      SUBJECTIVE  Subjective changes as noted by pt: Patient returns after a 2 week hiatus from PT. He had to cancel his last visit because he was ill. He is feeling better but still recovering. The back has felt about the same through the illness. He has had a busy/heavy lifting week at work and he feels like that doesn't help him.   Current pain level: 4/10   Changes in function:  Yes (See Goal flowsheet attached for changes in current functional level)     Adverse reaction to treatment or activity:  None    OBJECTIVE  Changes in objective findings: Patient continues to sit with slouched posture and forward head. Improved mobility in thoracic spine but TTP remains.     ASSESSMENT  Adarsh continues to require intervention to meet STG and LTG's: PT  Patient is progressing as expected.  Response to therapy has shown an improvement in  pain level and ROM   Progress made towards STG/LTG?  Yes (See Goal flowsheet attached for updates on achievement of STG and LTG)    PLAN  Current treatment program is being advanced to more complex exercises.    PTA/ATC plan:  N/A    Please refer to the daily flowsheet for treatment today, total treatment time and time spent performing 1:1 timed codes.

## 2017-03-16 NOTE — MR AVS SNAPSHOT
After Visit Summary   3/16/2017    Adarsh Salvador    MRN: [id number]           Patient Information     Date Of Birth          1965        Visit Information        Provider Department      3/16/2017 9:40 AM Xu Cummins, PT [facility]        Today's Diagnoses     Cervicalgia        Pain in thoracic spine           Follow-ups after your visit        Your next 10 appointments already scheduled     Mar 27, 2017  9:20 AM CDT   [facility] Extremity with Xu Cummins PT   [facility] ([facility])    [address] [address]  [address]  [place] [id number] [phone]              Who to contact     If you have questions or need follow up information about today's clinic visit or your schedule please contact [facility] directly at [phone].  Normal or non-critical lab and imaging results will be communicated to you by MyChart, letter or phone within 4 business days after the clinic has received the results. If you do not hear from us within 7 days, please contact the clinic through MyChart or phone. If you have a critical or abnormal lab result, we will notify you by phone as soon as possible.  Submit refill requests through [employer] or call your pharmacy and they will forward the refill request to us. Please allow 3 business days for your refill to be completed.          Additional Information About Your Visit        Care EveryWhere ID     This is your Care EveryWhere ID. This could be used by other organizations to access your [facility] medical records  [id number]         Blood Pressure from Last 3 Encounters:   03/06/17 124/84   02/15/17 112/70   01/26/17 129/83    Weight from Last 3 Encounters:   03/06/17 135.6 kg (299 lb)   02/15/17 134.3 kg (296 lb)   01/26/17 134.3 kg (296 lb)              We Performed the Following     MANUAL THER TECH,1+REGIONS,EA 15 MIN     NEUROMUSCULAR RE-EDUCATION     THERAPEUTIC  EXERCISES        Primary Care Provider Office Phone # Fax #    Bertha Romero PA-C [phone]       [facility] [id number] [place] [address]        Thank you!     Thank you for choosing [facility]  for your care. Our goal is always to provide you with excellent care. Hearing back from our patients is one way we can continue to improve our services. Please take a few minutes to complete the written survey that you may receive in the mail after your visit with us. Thank you!             Your Updated Medication List - Protect others around you: Learn how to safely use, store and throw away your medicines at www.disposemymeds.org.          This list is accurate as of: 3/16/17 10:08 AM.  Always use your most recent med list.                   Brand Name Dispense Instructions for use    albuterol 108 (90 BASE) MCG/ACT Inhaler    PROAIR HFA/PROVENTIL HFA/VENTOLIN HFA    1 Inhaler    Inhale 2 puffs into the lungs every 6 hours as needed for shortness of breath / dyspnea or wheezing       amLODIPine 10 MG tablet    NORVASC    90 tablet    Take 1 tablet (10 mg) by mouth daily       calcium carbonate 500 MG chewable tablet    TUMS     Take 1 chew tab by mouth daily       cyclobenzaprine 10 MG tablet    FLEXERIL    30 tablet    Take 0.5-1 tablets (5-10 mg) by mouth 3 times daily as needed for muscle spasms       dicyclomine 20 MG tablet    BENTYL    40 tablet    Take 1 tablet (20 mg) by mouth 4 times daily as needed       diphenoxylate-atropine 2.5-0.025 MG per tablet    LOMOTIL    30 tablet    Take 2 tablets by mouth 4 times daily as needed for diarrhea       EPINEPHrine 0.3 MG/0.3ML injection     2 each    Inject 0.3 mLs (0.3 mg) into the muscle once as needed for anaphylaxis       fluticasone 100 MCG/BLIST Aepb    FLOVENT DISKUS    1 Inhaler    Inhale 1 puff into the lungs 2 times daily       lisinopril 20 MG tablet    PRINIVIL/ZESTRIL    90  tablet    Take 1 tablet (20 mg) by mouth daily       mometasone-formoterol 100-5 MCG/ACT oral inhaler    DULERA    1 Inhaler    Inhale 2 puffs into the lungs 2 times daily Rinse mouth after use.       multivitamin, therapeutic Tabs tablet      Take 1 tablet by mouth daily       naproxen 500 MG tablet    NAPROSYN    30 tablet    Take 1 tablet (500 mg) by mouth 2 times daily as needed for moderate pain       oseltamivir 75 MG capsule    TAMIFLU    10 capsule    Take 1 capsule (75 mg) by mouth 2 times daily       pantoprazole 20 MG EC tablet    PROTONIX    30 tablet    Take by mouth 30-60 minutes before a meal.       sertraline 50 MG tablet    ZOLOFT    90 tablet    Take 1 tablet (50 mg) by mouth daily       simvastatin 20 MG tablet    ZOCOR    30 tablet    Take 1 tablet (20 mg) by mouth At Bedtime       TYLENOL PO      Take 1,000 mg by mouth every 8 hours as needed for mild pain or fever       VSL#3 Pack     30 each    Use 1-2 capsules/day

## 2017-04-04 ENCOUNTER — OFFICE VISIT (OUTPATIENT)
Dept: FAMILY MEDICINE | Facility: CLINIC | Age: 52
End: 2017-04-04
Payer: COMMERCIAL

## 2017-04-04 VITALS
OXYGEN SATURATION: 98 % | DIASTOLIC BLOOD PRESSURE: 84 MMHG | SYSTOLIC BLOOD PRESSURE: 126 MMHG | BODY MASS INDEX: 41.98 KG/M2 | WEIGHT: 301 LBS | TEMPERATURE: 98.1 F | HEART RATE: 97 BPM

## 2017-04-04 DIAGNOSIS — M54.2 CERVICALGIA: Primary | ICD-10-CM

## 2017-04-04 DIAGNOSIS — F41.9 ANXIETY: ICD-10-CM

## 2017-04-04 DIAGNOSIS — M62.830 BACK MUSCLE SPASM: ICD-10-CM

## 2017-04-04 PROCEDURE — 99214 OFFICE O/P EST MOD 30 MIN: CPT | Performed by: PHYSICIAN ASSISTANT

## 2017-04-04 RX ORDER — CYCLOBENZAPRINE HCL 10 MG
5-10 TABLET ORAL 3 TIMES DAILY PRN
Qty: 90 TABLET | Refills: 1 | Status: SHIPPED | OUTPATIENT
Start: 2017-04-04 | End: 2018-01-12

## 2017-04-04 NOTE — MR AVS SNAPSHOT
After Visit Summary   4/4/2017    Adarsh Salvador    MRN: [id number]           Patient Information     Date Of Birth          1965        Visit Information        Provider Department      4/4/2017 12:00 PM Bertha Romero PA-C [facility]        Today's Diagnoses     Cervicalgia    -  1    Back muscle spasm        Anxiety          Care Instructions    Take flexeril as needed but do not mix with alcohol or drive after taking  To emergency room with worsening symptoms  Schedule with the specialist below, we may need to have you see neurologist also in the future if needed    Rafaela Phelan [facility]  [facility]  [address] [address]     Tel [phone]  Pager [phone]        Follow-ups after your visit        Additional Services     NEUROSURGERY REFERRAL       Your provider has referred you to: [place]: [facility] - [facility] [phone]   [email or web address]    Please be aware that coverage of these services is subject to the terms and limitations of your health insurance plan.  Call member services at your health plan with any benefit or coverage questions.      Please bring the following with you to your appointment:    (1) Any X-Rays, CTs or MRIs which have been performed.  Contact the facility where they were done to arrange for  prior to your scheduled appointment.   (2) List of current medications  (3) This referral request   (4) Any documents/labs given to you for this referral                  Who to contact     If you have questions or need follow up information about today's clinic visit or your schedule please contact [facility] directly at [phone].  Normal or non-critical lab and imaging results will be communicated to you by MyChart, letter or phone within 4 business days after the clinic has received the results. If you  do not hear from us within 7 days, please contact the clinic through [employer] or phone. If you have a critical or abnormal lab result, we will notify you by phone as soon as possible.  Submit refill requests through [employer] or call your pharmacy and they will forward the refill request to us. Please allow 3 business days for your refill to be completed.          Additional Information About Your Visit        Care EveryWhere ID     This is your Care EveryWhere ID. This could be used by other organizations to access your [place] medical records  [id number]        Your Vitals Were     Pulse Temperature Pulse Oximetry BMI (Body Mass Index)          97 98.1  F (36.7  C) (Oral) 98% 41.98 kg/m2         Blood Pressure from Last 3 Encounters:   04/04/17 126/84   03/06/17 124/84   02/15/17 112/70    Weight from Last 3 Encounters:   04/04/17 (!) 301 lb (136.5 kg)   03/06/17 299 lb (135.6 kg)   02/15/17 296 lb (134.3 kg)              We Performed the Following     NEUROSURGERY REFERRAL          Today's Medication Changes          These changes are accurate as of: 4/4/17 12:35 PM.  If you have any questions, ask your nurse or doctor.               Stop taking these medicines if you haven't already. Please contact your care team if you have questions.     oseltamivir 75 MG capsule   Commonly known as:  TAMIFLU   Stopped by:  Bertha Romero PA-C                Where to get your medicines      These medications were sent to [address], [address]  [address] [place] [address], [facility] [address]     Phone:  [phone]     cyclobenzaprine 10 MG tablet    sertraline 50 MG tablet                Primary Care Provider Office Phone # Fax #    Bertha Romero PA-C [phone]       [address] [address]        Thank you!     Thank you for choosing [facility]  for your care. Our goal is always to  provide you with excellent care. Hearing back from our patients is one way we can continue to improve our services. Please take a few minutes to complete the written survey that you may receive in the mail after your visit with us. Thank you!             Your Updated Medication List - Protect others around you: Learn how to safely use, store and throw away your medicines at www.disposemymeds.org.          This list is accurate as of: 4/4/17 12:35 PM.  Always use your most recent med list.                   Brand Name Dispense Instructions for use    albuterol 108 (90 BASE) MCG/ACT Inhaler    PROAIR HFA/PROVENTIL HFA/VENTOLIN HFA    1 Inhaler    Inhale 2 puffs into the lungs every 6 hours as needed for shortness of breath / dyspnea or wheezing       amLODIPine 10 MG tablet    NORVASC    90 tablet    Take 1 tablet (10 mg) by mouth daily       calcium carbonate 500 MG chewable tablet    TUMS     Take 1 chew tab by mouth daily       cyclobenzaprine 10 MG tablet    FLEXERIL    90 tablet    Take 0.5-1 tablets (5-10 mg) by mouth 3 times daily as needed for muscle spasms       dicyclomine 20 MG tablet    BENTYL    40 tablet    Take 1 tablet (20 mg) by mouth 4 times daily as needed       diphenoxylate-atropine 2.5-0.025 MG per tablet    LOMOTIL    30 tablet    Take 2 tablets by mouth 4 times daily as needed for diarrhea       EPINEPHrine 0.3 MG/0.3ML injection     2 each    Inject 0.3 mLs (0.3 mg) into the muscle once as needed for anaphylaxis       fluticasone 100 MCG/BLIST Aepb    FLOVENT DISKUS    1 Inhaler    Inhale 1 puff into the lungs 2 times daily       lisinopril 20 MG tablet    PRINIVIL/ZESTRIL    90 tablet    Take 1 tablet (20 mg) by mouth daily       mometasone-formoterol 100-5 MCG/ACT oral inhaler    DULERA    1 Inhaler    Inhale 2 puffs into the lungs 2 times daily Rinse mouth after use.       multivitamin, therapeutic Tabs tablet      Take 1 tablet by mouth daily       naproxen 500 MG tablet    NAPROSYN    30  tablet    Take 1 tablet (500 mg) by mouth 2 times daily as needed for moderate pain       pantoprazole 20 MG EC tablet    PROTONIX    30 tablet    Take by mouth 30-60 minutes before a meal.       sertraline 50 MG tablet    ZOLOFT    90 tablet    Take 1 tablet (50 mg) by mouth daily       simvastatin 20 MG tablet    ZOCOR    30 tablet    Take 1 tablet (20 mg) by mouth At Bedtime       TYLENOL PO      Take 1,000 mg by mouth every 8 hours as needed for mild pain or fever       VSL#3 Pack     30 each    Use 1-2 capsules/day

## 2017-04-04 NOTE — PROGRESS NOTES
SUBJECTIVE:                                                    Adarsh Salvador is a 51 year old male who presents to clinic today for the following health issues:      Neck Pain     Onset: x 1 month on and off    Description:   Location: Middle of the neck on the back  Radiation: none    Intensity: moderate    Progression of Symptoms:  same    Accompanying Signs & Symptoms:  Burning, prickly sensation (paresthesias) in arm(s): YES- fingers  Numbness in arm(s): no   Weakness in arm(s):  no   Fever: no   Headache: YES  Nausea and/or vomiting: no    History:   Trauma: no   Previous neck pain: yes  Previous surgery or injections: no   Previous Imaging (MRI,X ray):yes     Precipitating factors:   Does movement increase the pain:  YES    Alleviating factors:  none     Therapies Tried and outcome:  Ibuprofen and tylenol  Bilateral neck pain x 1 month.     No injury.   Does feel like his balance is off a lot and vertigo sensation comes and goes also for one month. Headache starts occipitally and goes to the front, no aura.  No vomiting but is nauseous.  No cold symptoms prior to this. When he looks up sometimes the room is spinning. This has been going on for a month also.  Also worsens if he moves his head side to side.   Wears glasses.   Does have a history of migraines.  This feels worse than a migraine sometimes. exedrin migraine did not help this last time.   Is out of flexeril. Neck does feel tight. He would like more of this.  Has had neck pain in the past.    Saw our sports medicine/spine specialist Dr. Ibarra in [phone] for neck pain.  Patient does not think he did physical therapy for his neck but has done it for his back.  Had MRI of neck in 2012 for neck pain.  Results are as follows:     IMPRESSION: Mild, diffuse degenerative changes throughout the  cervical spine. No spinal canal or neural foraminal stenosis at any  level of the cervical spine. No evidence for abnormality of the  cervical spinal  "cord.    Previously used flexeril and naproxen for tight muscles and aches.        Low back pain is a lot better. Did injections and physical therapy for that and it was very helpful.    No fevers.    Also will be runnning out of zoloft soon, feels it does help his mood. He would like to continue. Denies suicidal or homicidal thoughts.  Patient instructed to go to the emergency room or call 911 if these occur.    Problem list and histories reviewed & adjusted, as indicated.  Additional history: as documented    Patient Active Problem List   Diagnosis     GERD (gastroesophageal reflux disease)     Kidney stone     Chronic RLQ pain     Hyperlipidemia LDL goal <160     Hypertension goal BP (blood pressure) < 140/90     Constipation     Abdominal pain, right upper quadrant     Adult celiac disease     Chronic maxillary sinusitis     Chronic rhinitis     Obesity (BMI 30-39.9)     Pure hyperglyceridemia     Anaphylactic reaction     Benign essential hypertension     Anemia in other chronic diseases classified elsewhere     Fatty liver     Irritable bowel syndrome with diarrhea     Right inguinal hernia     BMI 40.0-44.9, adult (H)     Cervicalgia     Pain in thoracic spine     Past Surgical History:   Procedure Laterality Date     C REMOVAL OF KIDNEY STONE       COLONOSCOPY  5/1/2012    Procedure:COLONOSCOPY; screening colonoscopy; Surgeon:KINGSLEY DOS SANTOS; Location: OR     COLONOSCOPY  4/21/2014    Procedure: COMBINED COLONOSCOPY, SINGLE BIOPSY/POLYPECTOMY BY BIOPSY;  Surgeon: Duane, William Charles, MD;  Location:  OR     HC BREATH HYDROGEN TEST  3/7/2014    Procedure: HYDROGEN BREATH TEST;  Surgeon: Darion Swift MD;  Location: [facility]     ORTHOPEDIC SURGERY      x3 Rt knee        Social History   Substance Use Topics     Smoking status: Never Smoker     Smokeless tobacco: Current User     Types: Chew      Comment: Chew     Alcohol use No      Comment: \"quart a year\" 2012     Family History   Problem " Relation Age of Onset     CANCER Mother 52     lung, smoked     Cancer - colorectal Father 53     unsure type, pt attributes to radiation exposure     Myocardial Infarction Father 45     Myocardial Infarction Paternal Grandfather 35     DIABETES Other      nephew- type 1     DIABETES Maternal Aunt      1     DIABETES Maternal Aunt      2     DIABETES Paternal Uncle      1     DIABETES Paternal Uncle      2     DIABETES Paternal Uncle      3     DIABETES Paternal Uncle      4         Current Outpatient Prescriptions   Medication Sig Dispense Refill     cyclobenzaprine (FLEXERIL) 10 MG tablet Take 0.5-1 tablets (5-10 mg) by mouth 3 times daily as needed for muscle spasms 90 tablet 1     sertraline (ZOLOFT) 50 MG tablet Take 1 tablet (50 mg) by mouth daily 90 tablet 0     lisinopril (PRINIVIL/ZESTRIL) 20 MG tablet Take 1 tablet (20 mg) by mouth daily 90 tablet 0     amLODIPine (NORVASC) 10 MG tablet Take 1 tablet (10 mg) by mouth daily 90 tablet 0     naproxen (NAPROSYN) 500 MG tablet Take 1 tablet (500 mg) by mouth 2 times daily as needed for moderate pain 30 tablet 0     mometasone-formoterol (DULERA) 100-5 MCG/ACT oral inhaler Inhale 2 puffs into the lungs 2 times daily Rinse mouth after use. 1 Inhaler 3     fluticasone (FLOVENT DISKUS) 100 MCG/BLIST AEPB Inhale 1 puff into the lungs 2 times daily 1 Inhaler 1     calcium carbonate (TUMS) 500 MG chewable tablet Take 1 chew tab by mouth daily       albuterol (PROAIR HFA, PROVENTIL HFA, VENTOLIN HFA) 108 (90 BASE) MCG/ACT inhaler Inhale 2 puffs into the lungs every 6 hours as needed for shortness of breath / dyspnea or wheezing 1 Inhaler 1     dicyclomine (BENTYL) 20 MG tablet Take 1 tablet (20 mg) by mouth 4 times daily as needed 40 tablet 5     diphenoxylate-atropine (LOMOTIL) 2.5-0.025 MG per tablet Take 2 tablets by mouth 4 times daily as needed for diarrhea 30 tablet 1     Acetaminophen (TYLENOL PO) Take 1,000 mg by mouth every 8 hours as needed for mild pain or  fever       pantoprazole (PROTONIX) 20 MG tablet Take by mouth 30-60 minutes before a meal. 30 tablet 3     multivitamin, therapeutic (THERA-VIT) TABS Take 1 tablet by mouth daily       simvastatin (ZOCOR) 20 MG tablet Take 1 tablet (20 mg) by mouth At Bedtime 30 tablet 3     EPINEPHrine (EPIPEN) 0.3 MG/0.3ML injection Inject 0.3 mLs (0.3 mg) into the muscle once as needed for anaphylaxis 2 each 2     Dietary Management Product (VSL#3) PACK Use 1-2 capsules/day 30 each 3     [DISCONTINUED] sertraline (ZOLOFT) 50 MG tablet Take 1 tablet (50 mg) by mouth daily 90 tablet 0     Allergies   Allergen Reactions     Hydrocodone-Acetaminophen Anaphylaxis and Itching     Hydromorphone Anaphylaxis     Morphine [Fumaric Acid] Anaphylaxis     Codeine Phosphate Itching     Tramadol Hives     Trazodone Hives     Ketorolac Tromethamine Rash       ROS:  Constitutional, HEENT, cardiovascular, pulmonary, GI, , musculoskeletal, neuro, skin, endocrine and psych systems are negative, except as otherwise noted.    OBJECTIVE:                                                    /84  Pulse 97  Temp 98.1  F (36.7  C) (Oral)  Wt (!) 301 lb (136.5 kg)  SpO2 98%  BMI 41.98 kg/m2  Body mass index is 41.98 kg/(m^2).  GENERAL: alert, no distress and obese  EYES: Eyes grossly normal to inspection, PERRL and conjunctivae and sclerae normal  HENT: ear canals and TM's normal, nose and mouth without ulcers or lesions  NECK: no adenopathy, no asymmetry, masses, or scars and thyroid normal to palpation  RESP: lungs clear to auscultation - no rales, rhonchi or wheezes  CV: regular rate and rhythm, normal S1 S2, no S3 or S4, no murmur, click or rub, no peripheral edema and peripheral pulses strong  MS: no gross musculoskeletal defects noted, no edema  SKIN: no suspicious lesions or rashes  NEURO: Normal strength and tone, sensory exam grossly normal, mentation intact, speech normal, cranial nerves 2-12 intact, Romberg normal and rapid  alternating movements normal  PSYCH: mentation appears normal, affect normal/bright         ASSESSMENT/PLAN:                                                    ASSESSMENT / PLAN:  (M54.2) Cervicalgia  (primary encounter diagnosis)  Comment: with headache and dizziness/vertigo.  stenosis versus migraine headache versus tension headache versus disc herniation versus referred pain.  Depending on dizziness symptoms, may need to see neurologist also but will start with neurosurg. Patient prefers to see the provider that he saw for his lumbar spine for his cervical spine.  Plan: NEUROSURGERY REFERRAL            (F11.460) Back muscle spasm  Comment:   Plan: cyclobenzaprine (FLEXERIL) 10 MG tablet        Continue NSAIDs as needed    (F41.9) Anxiety  Comment: stable  Plan: sertraline (ZOLOFT) 50 MG tablet        Recheck 6 months    Patient Instructions   Take flexeril as needed but do not mix with alcohol or drive after taking  To emergency room with worsening symptoms  Schedule with the specialist below, we may need to have you see neurologist also in the future if needed    Rafaela Phelan [facility]  Spine and Brain Clinic  [address] [address]     Tel [phone]  Pager [phone]      Bertha Romero PA-C  [facility]

## 2017-04-04 NOTE — PATIENT INSTRUCTIONS
Take flexeril as needed but do not mix with alcohol or drive after taking  To emergency room with worsening symptoms  Schedule with the specialist below, we may need to have you see neurologist also in the future if needed    Rafaela Phelan [facility]  Spine and Brain Clinic  [address] [address]     Tel [phone]  Pager [phone]

## 2017-04-04 NOTE — NURSING NOTE
"Chief Complaint   Patient presents with     Neck Pain     neck pain and light headiness per pt on and off  x 1 month      Dizziness       Initial /84  Pulse 97  Temp 98.1  F (36.7  C) (Oral)  Wt (!) 301 lb (136.5 kg)  SpO2 98%  BMI 41.98 kg/m2 Estimated body mass index is 41.98 kg/(m^2) as calculated from the following:    Height as of 2/15/17: 5' 11\" (1.803 m).    Weight as of this encounter: 301 lb (136.5 kg).  Medication Reconciliation: complete      Munir Amaral MA    "

## 2017-04-07 ENCOUNTER — OFFICE VISIT (OUTPATIENT)
Dept: NEUROSURGERY | Facility: CLINIC | Age: 52
End: 2017-04-07
Attending: NURSE PRACTITIONER
Payer: COMMERCIAL

## 2017-04-07 VITALS
BODY MASS INDEX: 41.49 KG/M2 | WEIGHT: 296.4 LBS | HEIGHT: 71 IN | HEART RATE: 80 BPM | SYSTOLIC BLOOD PRESSURE: 122 MMHG | TEMPERATURE: 97.3 F | OXYGEN SATURATION: 98 % | DIASTOLIC BLOOD PRESSURE: 72 MMHG

## 2017-04-07 DIAGNOSIS — M54.2 CERVICALGIA: Primary | ICD-10-CM

## 2017-04-07 PROCEDURE — 99211 OFF/OP EST MAY X REQ PHY/QHP: CPT | Performed by: NURSE PRACTITIONER

## 2017-04-07 PROCEDURE — 99203 OFFICE O/P NEW LOW 30 MIN: CPT | Performed by: NURSE PRACTITIONER

## 2017-04-07 ASSESSMENT — PAIN SCALES - GENERAL: PAINLEVEL: SEVERE PAIN (6)

## 2017-04-07 NOTE — MR AVS SNAPSHOT
"              After Visit Summary   4/7/2017    Adarsh Savlador    MRN: [id number]           Patient Information     Date Of Birth          1965        Visit Information        Provider Department      4/7/2017 11:00 AM Rafaela Phelan APRN CNP [facility] Neurosurgery [facility]        Today's Diagnoses     Cervicalgia    -  1      Care Instructions    1.  Please schedule your CT cervical spine and xray.  I will contact you with the results.           Follow-ups after your visit        Future tests that were ordered for you today     Open Future Orders        Priority Expected Expires Ordered    CT Cervical Spine w/o Contrast Routine  4/7/2018 4/7/2017    XR Cervical Spine 2/3 Views Routine 4/7/2017 4/7/2018 4/7/2017            Who to contact     If you have questions or need follow up information about today's clinic visit or your schedule please contact [facility] NEUROSURGERY [facility] directly at [phone].  Normal or non-critical lab and imaging results will be communicated to you by MyChart, letter or phone within 4 business days after the clinic has received the results. If you do not hear from us within 7 days, please contact the clinic through MyChart or phone. If you have a critical or abnormal lab result, we will notify you by phone as soon as possible.  Submit refill requests through [employer] or call your pharmacy and they will forward the refill request to us. Please allow 3 business days for your refill to be completed.          Additional Information About Your Visit        Care EveryWhere ID     This is your Care EveryWhere ID. This could be used by other organizations to access your [place] medical records  [id number]        Your Vitals Were     Pulse Temperature Height Pulse Oximetry BMI (Body Mass Index)       80 97.3  F (36.3  C) (Oral) 5' 11.26\" (1.81 m) 98% 41.04 kg/m2        Blood Pressure from Last 3 Encounters:   04/07/17 122/72   04/04/17 126/84   03/06/17 124/84    Weight " from Last 3 Encounters:   04/07/17 296 lb 6.4 oz (134.4 kg)   04/04/17 (!) 301 lb (136.5 kg)   03/06/17 299 lb (135.6 kg)               Primary Care Provider Office Phone # Fax #    Bertha Romero PA-C [phone]       [facility] [address] [facility] [address]        Thank you!     Thank you for choosing [facility] NEUROSURGERY [facility]  for your care. Our goal is always to provide you with excellent care. Hearing back from our patients is one way we can continue to improve our services. Please take a few minutes to complete the written survey that you may receive in the mail after your visit with us. Thank you!             Your Updated Medication List - Protect others around you: Learn how to safely use, store and throw away your medicines at www.disposemymeds.org.          This list is accurate as of: 4/7/17 11:04 AM.  Always use your most recent med list.                   Brand Name Dispense Instructions for use    albuterol 108 (90 BASE) MCG/ACT Inhaler    PROAIR HFA/PROVENTIL HFA/VENTOLIN HFA    1 Inhaler    Inhale 2 puffs into the lungs every 6 hours as needed for shortness of breath / dyspnea or wheezing       amLODIPine 10 MG tablet    NORVASC    90 tablet    Take 1 tablet (10 mg) by mouth daily       calcium carbonate 500 MG chewable tablet    TUMS     Take 1 chew tab by mouth daily       cyclobenzaprine 10 MG tablet    FLEXERIL    90 tablet    Take 0.5-1 tablets (5-10 mg) by mouth 3 times daily as needed for muscle spasms       dicyclomine 20 MG tablet    BENTYL    40 tablet    Take 1 tablet (20 mg) by mouth 4 times daily as needed       diphenoxylate-atropine 2.5-0.025 MG per tablet    LOMOTIL    30 tablet    Take 2 tablets by mouth 4 times daily as needed for diarrhea       EPINEPHrine 0.3 MG/0.3ML injection     2 each    Inject 0.3 mLs (0.3 mg) into the muscle once as needed for anaphylaxis       fluticasone 100 MCG/BLIST Aepb    FLOVENT DISKUS    1  Inhaler    Inhale 1 puff into the lungs 2 times daily       lisinopril 20 MG tablet    PRINIVIL/ZESTRIL    90 tablet    Take 1 tablet (20 mg) by mouth daily       mometasone-formoterol 100-5 MCG/ACT oral inhaler    DULERA    1 Inhaler    Inhale 2 puffs into the lungs 2 times daily Rinse mouth after use.       multivitamin, therapeutic Tabs tablet      Take 1 tablet by mouth daily       naproxen 500 MG tablet    NAPROSYN    30 tablet    Take 1 tablet (500 mg) by mouth 2 times daily as needed for moderate pain       pantoprazole 20 MG EC tablet    PROTONIX    30 tablet    Take by mouth 30-60 minutes before a meal.       sertraline 50 MG tablet    ZOLOFT    90 tablet    Take 1 tablet (50 mg) by mouth daily       simvastatin 20 MG tablet    ZOCOR    30 tablet    Take 1 tablet (20 mg) by mouth At Bedtime       TYLENOL PO      Take 1,000 mg by mouth every 8 hours as needed for mild pain or fever       VSL#3 Pack     30 each    Use 1-2 capsules/day

## 2017-04-07 NOTE — PROGRESS NOTES
Dr. Laith Zuniga  [facility]  Neurosurgery Clinic Visit        CC: neck and arm pain    Primary care Provider: Bertha Romero      Reason For Visit:   I was asked by Dr. Romero to consult on the patient for cervical radicular pain.      HPI: Adarsh Salvador is a 51 year old male with cervical radicular pain. He reports that the pain began about 1 month ago. 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 He reports that the pain is in his neck and does radiate down both his arms. He has numbness to his fingers at times. He reports that Monday the pain was severe and had to leave work. He feels his arms are weak and he has been tripping more. He has not had PT or injection therapy for his pain.     Pain at its worst 10  Pain right now:  6    Past Medical History:   Diagnosis Date     Adult celiac disease      GERD (gastroesophageal reflux disease) 6/15/2011     Hypercholesterolemia 6/15/2011     Hypertension 6/15/2011     IBS (irritable bowel syndrome)      Kidney stone 6/15/2011    Pt believes these were Calcium stones       Past Medical History reviewed with patient during visit.    Past Surgical History:   Procedure Laterality Date     C REMOVAL OF KIDNEY STONE       COLONOSCOPY  5/1/2012    Procedure:COLONOSCOPY; screening colonoscopy; Surgeon:KINGSLEY DOS SANTOS; Location: OR     COLONOSCOPY  4/21/2014    Procedure: COMBINED COLONOSCOPY, SINGLE BIOPSY/POLYPECTOMY BY BIOPSY;  Surgeon: Duane, William Charles, MD;  Location:  OR     HC BREATH HYDROGEN TEST  3/7/2014    Procedure: HYDROGEN BREATH TEST;  Surgeon: Darion Swift MD;  Location: [facility]     ORTHOPEDIC SURGERY      x3 Rt knee      Past Surgical History reviewed with patient during visit.    Current Outpatient Prescriptions   Medication     cyclobenzaprine (FLEXERIL) 10 MG tablet     sertraline (ZOLOFT) 50 MG tablet     lisinopril (PRINIVIL/ZESTRIL) 20 MG tablet     amLODIPine  "(NORVASC) 10 MG tablet     naproxen (NAPROSYN) 500 MG tablet     mometasone-formoterol (DULERA) 100-5 MCG/ACT oral inhaler     fluticasone (FLOVENT DISKUS) 100 MCG/BLIST AEPB     calcium carbonate (TUMS) 500 MG chewable tablet     albuterol (PROAIR HFA, PROVENTIL HFA, VENTOLIN HFA) 108 (90 BASE) MCG/ACT inhaler     dicyclomine (BENTYL) 20 MG tablet     diphenoxylate-atropine (LOMOTIL) 2.5-0.025 MG per tablet     Acetaminophen (TYLENOL PO)     pantoprazole (PROTONIX) 20 MG tablet     multivitamin, therapeutic (THERA-VIT) TABS     simvastatin (ZOCOR) 20 MG tablet     EPINEPHrine (EPIPEN) 0.3 MG/0.3ML injection     Dietary Management Product (VSL#3) PACK     No current facility-administered medications for this visit.        Allergies   Allergen Reactions     Hydrocodone-Acetaminophen Anaphylaxis and Itching     Hydromorphone Anaphylaxis     Morphine [Fumaric Acid] Anaphylaxis     Codeine Phosphate Itching     Tramadol Hives     Trazodone Hives     Ketorolac Tromethamine Rash       Social History     Social History     Marital status: Single     Spouse name: N/A     Number of children: 0     Years of education: N/A     Occupational History     - with [employer]     Social History Main Topics     Smoking status: Never Smoker     Smokeless tobacco: Current User     Types: Chew      Comment: Chew     Alcohol use No      Comment: \"quart a year\" 2012     Drug use: No     Sexual activity: No     Other Topics Concern     None     Social History Narrative    Works at [employer], as a  (25+ years experience).         Family History   Problem Relation Age of Onset     CANCER Mother 52     lung, smoked     Cancer - colorectal Father 53     unsure type, pt attributes to radiation exposure     Myocardial Infarction Father 45     Myocardial Infarction Paternal Grandfather 35     DIABETES Other      nephew- type 1     DIABETES Maternal Aunt      1     DIABETES Maternal Aunt      2     DIABETES " "Paternal Uncle      1     DIABETES Paternal Uncle      2     DIABETES Paternal Uncle      3     DIABETES Paternal Uncle      4         Review Of Systems  Skin: negative  Eyes: negative  Ears/Nose/Throat: negative  Respiratory: No shortness of breath, dyspnea on exertion, cough, or hemoptysis  Cardiovascular: HTN, hyperlipidemia  Gastrointestinal: IBS, Has gastric pacer and can not have MRI scans.    Genitourinary: negative  Musculoskeletal: neck pain  Neurologic: numbness or tingling of hands  Psychiatric: negative  Hematologic/Lymphatic/Immunologic: negative  Endocrine: negative     ROS: 10 point ROS neg other than the symptoms noted above in the HPI.      Vital Signs: /72 (BP Location: Right arm)  Pulse 80  Temp 97.3  F (36.3  C) (Oral)  Ht 5' 11.26\" (1.81 m)  Wt 296 lb 6.4 oz (134.4 kg)  SpO2 98%  BMI 41.04 kg/m2    Examination:  Constitutional:  Alert, well nourished, NAD.  HEENT: Normocephalic, atraumatic.   Pulm:  Without shortness of breath   CV:  No pitting edema of BLE.    Neurological:  Awake  Alert  Oriented x 3  Speech clear  Cranial nerves II - XII intact  PERRL  EOMI  Face symmetric  Tongue midline  Motor exam   Shoulder Abduction:  Right:  5/5   Left:  5/5  Biceps:                      Right:  5/5   Left:  5/5  Triceps:                     Right:  5/5   Left:  5/5  Wrist Extensors:       Right:  5/5   Left:  5/5  Wrist Flexors:           Right:  5/5   Left:  5/5  Intrinsics:                   Right:  5/5   Left:  5/5   Hip Flexor:                Right: 5/5  Left:  5/5  Hip Adductor:             Right:  5/5  Left:  5/5  Hip Abductor:             Right:  5/5  Left:  5/5  Gastroc Soleus:        Right:  5/5  Left:  5/5  Tib/Ant:                      Right:  5/5  Left:  5/5  EHL:                          Right:  5/5  Left:  5/5   Sensation normal to bilateral upper and lower extremities    Gait: Able to stand from a seated position. Normal non-antalgic, non-myelopathic gait.  Able to heel/toe " walk without loss of balance  Cervical examination reveals good range of motion.  No tenderness to palpation of the cervical spine or paraspinous muscles bilaterally.    Imaging: NONE    Assessment/Plan:   Adarsh Salvador is a 51 year old male with cervical radicular pain. He reports that the pain began about 1 month ago. 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 He reports that the pain is in his neck and does radiate down both his arms. He has numbness to his fingers at times. He reports that Monday the pain was severe and had to leave work. He feels his arms are weak and he has been tripping more. He has not had PT or injection therapy for his pain. After further discussion it was noted that he has a gastric pacer for his IBS and he can not have MRI's. At this time we will obtain a xray and cervical CT scan. He would like a repeat injection to his lumbar spine but we will wait for the results of the scans.     Patient Instructions   1.  Please schedule your CT cervical spine and xray.  I will contact you with the results.            Rafaela Phelan [facility]  [facility]  [address] [address]    Tel [phone]  Pager [phone]

## 2017-04-07 NOTE — NURSING NOTE
"Adarsh Salvador is a 51 year old male who presents for:  Chief Complaint   Patient presents with     Neurologic Problem     Neck pain with bilateral arm numbness and tingling, headaches, hearing clicking and popping         Initial Vitals:  /72 (BP Location: Right arm)  Pulse 80  Temp 97.3  F (36.3  C) (Oral)  Ht 5' 11.26\" (1.81 m)  Wt 296 lb 6.4 oz (134.4 kg)  SpO2 98%  BMI 41.04 kg/m2 Estimated body mass index is 41.04 kg/(m^2) as calculated from the following:    Height as of this encounter: 5' 11.26\" (1.81 m).    Weight as of this encounter: 296 lb 6.4 oz (134.4 kg).. Body surface area is 2.6 meters squared. BP completed using cuff size: large  Severe Pain (6)    Do you feel safe in your environment?  Yes  Do you need any refills today? No    Nursing Comments: Neck pain with bilateral arm numbness and tingling, headaches, hearing clicking and popping.      5 min. nursing intake time  Aneta Lambert MA       Discharge plan: 1.  Please schedule your CT cervical spine and xray.  I will contact you with the results.     2 min. nursing discharge time  Aneta Lambert MA        "

## 2017-04-10 ENCOUNTER — HOSPITAL ENCOUNTER (OUTPATIENT)
Dept: CT IMAGING | Facility: CLINIC | Age: 52
End: 2017-04-10
Attending: NURSE PRACTITIONER
Payer: COMMERCIAL

## 2017-04-10 ENCOUNTER — TELEPHONE (OUTPATIENT)
Dept: NEUROSURGERY | Facility: CLINIC | Age: 52
End: 2017-04-10

## 2017-04-10 ENCOUNTER — TELEPHONE (OUTPATIENT)
Dept: PALLIATIVE MEDICINE | Facility: CLINIC | Age: 52
End: 2017-04-10

## 2017-04-10 ENCOUNTER — HOSPITAL ENCOUNTER (OUTPATIENT)
Dept: GENERAL RADIOLOGY | Facility: CLINIC | Age: 52
Discharge: HOME | End: 2017-04-10
Attending: NURSE PRACTITIONER | Admitting: NURSE PRACTITIONER
Payer: COMMERCIAL

## 2017-04-10 DIAGNOSIS — M54.12 CERVICAL RADICULAR PAIN: Primary | ICD-10-CM

## 2017-04-10 DIAGNOSIS — M54.2 CERVICALGIA: ICD-10-CM

## 2017-04-10 DIAGNOSIS — M54.16 LUMBAR RADICULAR PAIN: ICD-10-CM

## 2017-04-10 PROCEDURE — 72040 X-RAY EXAM NECK SPINE 2-3 VW: CPT

## 2017-04-10 PROCEDURE — 72125 CT NECK SPINE W/O DYE: CPT

## 2017-04-10 NOTE — TELEPHONE ENCOUNTER
Left voicemail for patient to schedule ROBERT.     Nohemi PICKETT    [facility]

## 2017-04-10 NOTE — TELEPHONE ENCOUNTER
Pt returned my call.  CT shows C5-6 right sided stenosis. Recommend injections and PT. He is open to this.

## 2017-04-11 NOTE — TELEPHONE ENCOUNTER
Pre-screening Questions for Radiology Injections:    Injection to be done at which interventional clinic site? [facility]    Procedure ordered by Rafaela Phelan    Procedure ordered? Cervical Epidural Steroid Injection    What insurance would patient like us to bill for this procedure? [employer]      [employer]-Any injection DO NOT SCHEDULE and route to Selma León.      [employer] insurance - For SI joint injections, DO NOT SCHEDULE and route Selma León.      [employer]- MBB's must be scheduled at LEAST two weeks apart      [employer] - Any injection besides hip/shoulder/knee joint DO NOT SCHEDULE and route to Selma León. She will obtain PA and call pt back to schedule procedure or notify pt of denial.     Any chance of pregnancy? Not Applicable   If YES, do NOT schedule and route to RN pool    Is an  needed? No     Patient has a drive home? (mandatory) YES:     Is patient taking any blood thinners (plavix, coumadin, jantoven, warfarin, heparin, pradaxa or dabigatran )? No   If hold needed, do NOT schedule, route to RN pool     Is patient taking any aspirin products? Yes - Pt takes 325mg daily; instructed to hold 6 day(s) prior to procedure.      If more than 325mg/day do NOT schedule; route to RN pool     For CERVICAL procedures, hold all aspirin products for 6 days.      Does the patient have a bleeding or clotting disorder? No     If yes, okay to schedule AND route to RN nurse pool    **For any patients with platelet count <100, must be forwarded to provider**    Is patient diabetic?  No  If YES, have them bring their glucometer.    Does patient have an active infection or treated for one within the past week? No     Is patient currently taking any antibiotics?  No     For patients on chronic, preventative, or prophylactic antibiotics, procedures may be scheduled.     For patients on antibiotics for active or recent infection:    Zack De Jesus,  Patel Wade-antibiotic course must have been completed for 4 days    Gila Feldman-antibiotic course must have been completed for 7 days    Is patient currently taking any steroid medications? (i.e. Prednisone, Medrol)  No     For patients on steroid medications:    Zack De Jesus [place], [place]-steroid course must have been completed for 4 days    Gila Feldman-steroid course must have been completed for 7 days    Reviewed with patient:  If you are started on any steroids or antibiotics between now and your appointment, you must contact us because it may affect our ability to perform your procedure.  Yes    Is patient actively being treated for cancer or immunocompromised, including the spleen having been removed? No    If YES, do NOT schedule and route to RN pool     **For Dr. Taylor patients without spleens should have the chart sent to her**    Are you able to get on and off an exam table with minimal or no assistance? Yes  If NO, do NOT schedule and route to RN pool    Are you able to roll over and lay on your stomach with minimal or no assistance? Yes  If NO, do NOT schedule and route to RN pool     Any allergies to contrast dye, iodine, shellfish, or numbing and steroid medications? No  If YES, route to RN pool AND add allergy information to appointment notes    Allergies: Hydrocodone-acetaminophen; Hydromorphone; Morphine [fumaric acid]; Codeine phosphate; Tramadol; Trazodone; and Ketorolac tromethamine        Has the patient had a flu shot or any other vaccinations within 7 days before or after the procedure.  No       Does patient have an MRI/CT?  YES: CT  (SI joint, hip injections, lumbar sympathetic blocks, and stellate ganglion blocks do not require an MRI)    Was the MRI done w/in the last 3 years?  Yes    Was MRI done at [facility]? Yes      If not, where was it done? N/A       If MRI was not done at [facility], [facility] do NOT schedule and route to nursing.  If  pt has an imaging disc, the injection may be scheduled but pt has to bring disc to appt. If they show up w/out disc the injection cannot be done    Reminders (please tell patient if applicable):       Instructed pt to arrive 30 minutes early for IV start if this is for a cervical procedure, ALL sympathetic (stellate ganglion, hypogastric, or lumbar sympathetic block) and all sedation procedures (RFA, spinal cord stimulation trials).  YES: Informed    -IVs are not routinely placed for Dixon and Egyhazi cervical case       If NPO for sedation, informed patient that it is okay to take medications with sips of water (except if they are to hold blood thinners).  Not Applicable   *DO take blood pressure medication if it is prescribed*      If this is for a cervical JANUARY, informed patient that aspirin needs to be held for 6 days.   YES: Informed      For all patients not having spinal cord stimulator (SCS) trials or radiofrequency ablations (RFAs), informed patient:  IV sedation is not provided for this procedure.  If you feel that an oral anti-anxiety medication is needed, you can discuss this further with your referring provider or primary care provider.  The Pain Clinic provider will discuss specifics of what the procedure includes at your appointment.  Most procedures last 10-20 minutes.  We use numbing medications to help with any discomfort during the procedure.  Not Applicable      Do not schedule procedures requiring IV placement in the first appointment of the day or first appointment after lunch.       For patients 85 or older we recommend having an adult stay w/ them for the remainder of the day.       Does the patient have any questions?  NO  Katja Armendariz  [facility]

## 2017-04-28 ENCOUNTER — RADIOLOGY INJECTION OFFICE VISIT (OUTPATIENT)
Dept: PALLIATIVE MEDICINE | Facility: CLINIC | Age: 52
End: 2017-04-28
Payer: COMMERCIAL

## 2017-04-28 ENCOUNTER — RADIANT APPOINTMENT (OUTPATIENT)
Dept: GENERAL RADIOLOGY | Facility: CLINIC | Age: 52
End: 2017-04-28
Attending: PHYSICAL MEDICINE & REHABILITATION
Payer: COMMERCIAL

## 2017-04-28 VITALS — OXYGEN SATURATION: 97 % | SYSTOLIC BLOOD PRESSURE: 160 MMHG | HEART RATE: 71 BPM | DIASTOLIC BLOOD PRESSURE: 100 MMHG

## 2017-04-28 DIAGNOSIS — M54.12 CERVICAL RADICULAR PAIN: ICD-10-CM

## 2017-04-28 DIAGNOSIS — M47.812 CERVICAL SPONDYLOSIS WITHOUT MYELOPATHY: ICD-10-CM

## 2017-04-28 DIAGNOSIS — M54.12 CERVICAL RADICULOPATHY: Primary | ICD-10-CM

## 2017-04-28 PROCEDURE — 62321 NJX INTERLAMINAR CRV/THRC: CPT | Performed by: PHYSICAL MEDICINE & REHABILITATION

## 2017-04-28 ASSESSMENT — PAIN SCALES - GENERAL: PAINLEVEL: SEVERE PAIN (7)

## 2017-04-28 NOTE — MR AVS SNAPSHOT
After Visit Summary   4/28/2017    Adarsh Salvador    MRN: [id number]           Patient Information     Date Of Birth          1965        Visit Information        Provider Department      4/28/2017 12:45 PM Vicki Dixon DO [facility]        Care Instructions    [facility] Procedure Discharge Instructions    Today you saw:    Dr. Vicki Dixon     Your procedure:  Epidural steroid injection     Medications used:  Lidocaine (anesthetic) Kenalog (steroid), normal saline  Omnipaque (contrast)            Be cautious when walking as numbness and/or weakness in the legs may occur up to 6-8 hours after the procedure due to effect of the local anesthetic    Do not drive for 6 hours. The effect of the local anesthetic could slow your reflexes.     Avoid strenuous activity for the first 24 hours. You may resume your regular activities after that.     You may shower, however avoid swimming, tub baths or hot tubs for 24 hours following your procedure    If you were fasting, you can resume your regular diet and medications    You may have a mild to moderate increase in pain for several days following the injection.      You may use ice packs for 10-15 minutes, 3 to 4 times a day at the injection site for comfort    Do not use heat to painful areas for 6 to 8 hours. This will give the local anesthetic time to wear off and prevent you from accidentally burning your skin.    You may use anti-inflammatory medications (such as Ibuprofen/Advil or Aleve) or Tylenol for pain control if necessary    If you have diabetes, check your blood sugar more frequently than usual as your blood sugar may be higher than normal for 10-14 days following a steroid injection. Contact your doctor who manages your diabetes if your blood sugar is higher than usual    It may take up to 14 days for the steroid medication to start working although you may feel the effect as early as a few days after the procedure.        If you experience any of the following, call the pain center nursing line during work hours at [phone] or on-call physician after hours at [phone]:  -Fever over 100 degree F  -Swelling, bleeding, redness, drainage, warmth at the injection site  -Progressive weakness or numbness in your legs or arms  -Loss of bowel or bladder function  -Unusual headache that is not relieved by Tylenol  -Unusual new onset of pain that is not improving    Phone #s:  Appointment scheduling line: [phone]          Follow-ups after your visit        Your next 10 appointments already scheduled     May 03, 2017  9:30 AM CDT   [facility]   [facility] ([facility])    [address]  [address]  [place] [address] [phone]              Who to contact     If you have questions or need follow up information about today's clinic visit or your schedule please contact [facility] directly at [phone].  Normal or non-critical lab and imaging results will be communicated to you by MyChart, letter or phone within 4 business days after the clinic has received the results. If you do not hear from us within 7 days, please contact the clinic through MyChart or phone. If you have a critical or abnormal lab result, we will notify you by phone as soon as possible.  Submit refill requests through [facility] or call your pharmacy and they will forward the refill request to us. Please allow 3 business days for your refill to be completed.          Additional Information About Your Visit        Care EveryWhere ID     This is your Care EveryWhere ID. This could be used by other organizations to access your [facility] medical records  [id number]         Blood Pressure from Last 3 Encounters:   04/07/17 122/72   04/04/17 126/84   03/06/17 124/84    Weight from Last 3 Encounters:   04/07/17 134.4 kg (296 lb 6.4 oz)   04/04/17 (!) 136.5 kg (301 lb)    03/06/17 135.6 kg (299 lb)              Today, you had the following     No orders found for display       Primary Care Provider Office Phone # Fax #    Bertha Romero PA-C [phone]       [facility] [address] [facility] [address]        Thank you!     Thank you for choosing [facility]  for your care. Our goal is always to provide you with excellent care. Hearing back from our patients is one way we can continue to improve our services. Please take a few minutes to complete the written survey that you may receive in the mail after your visit with us. Thank you!             Your Updated Medication List - Protect others around you: Learn how to safely use, store and throw away your medicines at www.disposemymeds.org.          This list is accurate as of: 4/28/17 12:51 PM.  Always use your most recent med list.                   Brand Name Dispense Instructions for use    albuterol 108 (90 BASE) MCG/ACT Inhaler    PROAIR HFA/PROVENTIL HFA/VENTOLIN HFA    1 Inhaler    Inhale 2 puffs into the lungs every 6 hours as needed for shortness of breath / dyspnea or wheezing       amLODIPine 10 MG tablet    NORVASC    90 tablet    Take 1 tablet (10 mg) by mouth daily       calcium carbonate 500 MG chewable tablet    TUMS     Take 1 chew tab by mouth daily       cyclobenzaprine 10 MG tablet    FLEXERIL    90 tablet    Take 0.5-1 tablets (5-10 mg) by mouth 3 times daily as needed for muscle spasms       dicyclomine 20 MG tablet    BENTYL    40 tablet    Take 1 tablet (20 mg) by mouth 4 times daily as needed       diphenoxylate-atropine 2.5-0.025 MG per tablet    LOMOTIL    30 tablet    Take 2 tablets by mouth 4 times daily as needed for diarrhea       EPINEPHrine 0.3 MG/0.3ML injection     2 each    Inject 0.3 mLs (0.3 mg) into the muscle once as needed for anaphylaxis       fluticasone 100 MCG/BLIST Aepb    FLOVENT DISKUS    1 Inhaler    Inhale 1 puff into the  lungs 2 times daily       lisinopril 20 MG tablet    PRINIVIL/ZESTRIL    90 tablet    Take 1 tablet (20 mg) by mouth daily       mometasone-formoterol 100-5 MCG/ACT oral inhaler    DULERA    1 Inhaler    Inhale 2 puffs into the lungs 2 times daily Rinse mouth after use.       multivitamin, therapeutic Tabs tablet      Take 1 tablet by mouth daily       naproxen 500 MG tablet    NAPROSYN    30 tablet    Take 1 tablet (500 mg) by mouth 2 times daily as needed for moderate pain       pantoprazole 20 MG EC tablet    PROTONIX    30 tablet    Take by mouth 30-60 minutes before a meal.       sertraline 50 MG tablet    ZOLOFT    90 tablet    Take 1 tablet (50 mg) by mouth daily       simvastatin 20 MG tablet    ZOCOR    30 tablet    Take 1 tablet (20 mg) by mouth At Bedtime       TYLENOL PO      Take 1,000 mg by mouth every 8 hours as needed for mild pain or fever       VSL#3 Pack     30 each    Use 1-2 capsules/day

## 2017-04-28 NOTE — PATIENT INSTRUCTIONS
[facility] Procedure Discharge Instructions    Today you saw:    Dr. Vicki Dixon     Your procedure:  Epidural steroid injection     Medications used:  Lidocaine (anesthetic) Kenalog (steroid), normal saline  Omnipaque (contrast)            Be cautious when walking as numbness and/or weakness in the legs may occur up to 6-8 hours after the procedure due to effect of the local anesthetic    Do not drive for 6 hours. The effect of the local anesthetic could slow your reflexes.     Avoid strenuous activity for the first 24 hours. You may resume your regular activities after that.     You may shower, however avoid swimming, tub baths or hot tubs for 24 hours following your procedure    If you were fasting, you can resume your regular diet and medications    You may have a mild to moderate increase in pain for several days following the injection.      You may use ice packs for 10-15 minutes, 3 to 4 times a day at the injection site for comfort    Do not use heat to painful areas for 6 to 8 hours. This will give the local anesthetic time to wear off and prevent you from accidentally burning your skin.    You may use anti-inflammatory medications (such as Ibuprofen/Advil or Aleve) or Tylenol for pain control if necessary    If you have diabetes, check your blood sugar more frequently than usual as your blood sugar may be higher than normal for 10-14 days following a steroid injection. Contact your doctor who manages your diabetes if your blood sugar is higher than usual    It may take up to 14 days for the steroid medication to start working although you may feel the effect as early as a few days after the procedure.       If you experience any of the following, call the pain center nursing line during work hours at [phone] or on-call physician after hours at [phone]:  -Fever over 100 degree F  -Swelling, bleeding, redness, drainage, warmth at the injection site  -Progressive weakness or numbness in  your legs or arms  -Loss of bowel or bladder function  -Unusual headache that is not relieved by Tylenol  -Unusual new onset of pain that is not improving    Phone #s:  Appointment scheduling line: [phone]

## 2017-04-28 NOTE — PROGRESS NOTES
[facility] - Procedure Note    Date of Visit: 4/28/2017    Procedure performed: C7-T1 interlaminar epidural steroid injection with fluoroscopic guidance  Diagnosis: Cervical spondylosis; Cervical radiculitis/radiculopathy  : Vicki Dixon DO  Anesthesia: none    Indications: Adarsh Salvador is a 51 year old male who is seen at the request of IRENE Hare CNP for cervical epidural steroid injection. The patient describes neck pain. The patient has been exhibiting symptoms consistent with cervical intraspinal inflammation and radiculopathy. Symptoms have been persistent, disabling, and intermittently severe. The patient reports minimal improvement with conservative treatment, including pain medication.    Cervical CT was done on 4/10/17 which showed   FINDINGS: There is no evidence of fracture.      Alignment: Normal.      Craniocervical junction: Normal.      C1-C2: Normal.      C2-C3: Normal disc, facet joints, spinal canal and neural foramina.      C3-C4: Minimal disc bulge. No stenosis.     C4-C5: Minimal disc bulge. No stenosis.      C5-C6: Right-sided uncinate spurring is present causing some mild to  moderate right-sided foraminal stenosis. There is no central canal  stenosis.     C6-C7: Minimal posterior osteophyte formation and disc bulging. No  stenosis.      C7-T1: Mild right-sided facet hypertrophy. No stenosis.          IMPRESSION: Mild multilevel degenerative changes with mild to moderate  right-sided foraminal stenosis at C5-C6.        Allergies:      Allergies   Allergen Reactions     Hydrocodone-Acetaminophen Anaphylaxis and Itching     Hydromorphone Anaphylaxis     Morphine [Fumaric Acid] Anaphylaxis     Codeine Phosphate Itching     Tramadol Hives     Trazodone Hives     Ketorolac Tromethamine Rash        Vitals:  BP (!) 162/96  Pulse 83  SpO2 96%    Review of Systems: The patient denies recent fever, chills, illness, use of antibiotics or anticoagulants. All other  10-point review of systems negative.     Procedure: The procedure and risks were explained, and informed written consent was obtained from the patient. Risks include but are not limited to: infection, bleeding, increased pain, and damage to soft tissue, nerve, muscle, and vasculature structures. After getting informed consent, patient was brought into the procedure suite and was placed in a prone position on the procedure table. A Pause for the Cause was performed. Patient was prepped and draped in sterile fashion.     The C7-T1 interspace was identified with use of fluoroscopy in AP view. A 25-gauge, 1.5 inch needle was used to anesthetize the skin and subcutaneous tissue entry site with a total of 2 ml of 1% lidocaine. Under fluoroscopic visualization, a 22-gauge, 3.5 inch Tuohy epidural needle was slowly advanced towards the epidural space a few millimeters right of midline. The latter part of the needle advancement was guided with fluoroscopy in the lateral view. The epidural space was identified using loss of resistance technique. After negative aspiration for heme and cerebrospinal fluid, a total of 1 mL of non-ionic contrast was injected to confirm needle placement. 9 mL of contrast was wasted. Epidurogram confirmed spread within the posterior epidural space. 2 ml of 40mg/ml of triamcinolone and 1 ml of preservative free saline was injected. The needle was removed.  Images were saved to PACS.    The patient tolerated the procedure well, and there was no evidence of procedural complications. No new sensory or motor deficits were noted following the procedure. The patient was stable and able to ambulate on discharge home. Post-procedure instructions were provided.     Pre-procedure pain score: 7/10 in the neck, 6/10 in the arm  Post-procedure pain score: 5/10 in the neck, 6/10 in the arm    Assessment/Plan: Adarsh Salvador is a 51 year old male s/p cervical interlaminar epidural steroid injection today for cervical  spondylosis and radiculitis/radiculopathy.     1. Following today's procedure, the patient was advised to contact the [facility] for any of the following:   Fever, chills, or night sweats   New onset of pain, numbness, or weakness   Any questions/concerns regarding the procedure  If unable to contact the Pain Center, the patient was instructed to go to a local Emergency Room for any complications.   2. The patient will receive a follow-up call in 1 week.   3. Follow-up with IRENE Hare CNP in 2 weeks for post-procedure evaluation.    Vicki Dixon DO  [facility]  [facility]

## 2017-04-28 NOTE — NURSING NOTE
Injection intake:    If this procedure is requiring IV sedation has patient been NPO for 6  Hours? NA    Is patient on coumadin, plavix or other prescribed blood thinner?   No    If patient is on coumadin was it held for 5 days?   NA    If patient is on plavix was it held for 7 days?    NA     Does patient take aspirin?  Yes -   ASA    If this is for a cervical procedure and patient is on aspirin has it been held for 6 days?   Yes    Any allergies to contrast dye, iodine, steroid and/or numbing medications?  NO    Is patient currently taking antibiotics or have an active infection?  NO    Does patient have a ? Yes       Is patient pregnant or breastfeeding?  Not Applicable    Are the vital signs normal?  Yes      Marizol Keene [facility] [place]

## 2017-04-28 NOTE — NURSING NOTE
Discharge Information    IV Discontiued Time:  NA    Amount of Fluid Infused:  NA    Discharge Criteria = When patient returns to baseline or as per MD order    Consciousness:  Pt is fully awake    Circulation:  BP +/- 20% of pre-procedure level    Respiration:  Patient is able to breathe deeply    O2 Sat:  Patient is able to maintain O2 Sat >92% on room air    Activity:  Moves 4 extremities on command    Ambulation:  Patient is able to stand and walk or stand and pivot into wheelchair    Dressing:  Clean/dry or No Dressing    Notes:   Discharge instructions and AVS given to patient    Patient meets criteria for discharge?  YES    Admitted to PCU?  No    Responsible adult present to accompany patient home?  Yes    Signature/Title:    Thalia Weber  RN Care Coordinator  [facility]

## 2017-05-03 ENCOUNTER — THERAPY VISIT (OUTPATIENT)
Dept: PHYSICAL THERAPY | Facility: CLINIC | Age: 52
End: 2017-05-03
Payer: COMMERCIAL

## 2017-05-03 DIAGNOSIS — M54.5 CHRONIC LEFT-SIDED LOW BACK PAIN, WITH SCIATICA PRESENCE UNSPECIFIED: ICD-10-CM

## 2017-05-03 DIAGNOSIS — M54.2 CERVICALGIA: Primary | ICD-10-CM

## 2017-05-03 DIAGNOSIS — G89.29 CHRONIC LEFT-SIDED LOW BACK PAIN, WITH SCIATICA PRESENCE UNSPECIFIED: ICD-10-CM

## 2017-05-03 PROCEDURE — 97110 THERAPEUTIC EXERCISES: CPT | Mod: GP | Performed by: PHYSICAL THERAPIST

## 2017-05-03 PROCEDURE — 97162 PT EVAL MOD COMPLEX 30 MIN: CPT | Mod: GP | Performed by: PHYSICAL THERAPIST

## 2017-05-03 NOTE — MR AVS SNAPSHOT
After Visit Summary   5/3/2017    Adarsh Salvador    MRN: [id number]           Patient Information     Date Of Birth          1965        Visit Information        Provider Department      5/3/2017 9:30 AM Jerry Gonzales, PT [facility]        Today's Diagnoses     Cervicalgia    -  1    Chronic left-sided low back pain, with sciatica presence unspecified           Follow-ups after your visit        Your next 10 appointments already scheduled     May 11, 2017  9:25 AM CDT   [facility] with Jerry Gonzales PT   [facility] ([facility])    [address] [facility] [address]  [place] [address]   [phone]            May 17, 2017 10:10 AM CDT   [facility] with Jerry Gonzales PT   [facility] ([facility])    [address] [facility] [address] [facility] [address]  [place] [address]   [phone]            May 25, 2017 10:15 AM CDT   [facility] with Jerry Gonzales PT   [facility] ([facility])    [address] [facility] [address]  [place] [address]   [phone]            May 31, 2017 10:10 AM CDT   [facility] with Jerry Gonzales PT   [facility] ([facility])    [address] [facility] [address]  [place] [address]   [phone]              Who to contact     If you have questions or need follow up information about today's clinic visit or your schedule please contact [facility] directly at [phone].  Normal or non-critical lab and imaging results will be communicated to you by MyChart, letter or phone within 4 business days after the clinic has received the results. If you do not hear from us within 7 days, please contact the clinic through MyChart or phone. If you have a critical or abnormal lab result, we will notify you by phone as soon as possible.  Submit refill requests through [employer] or call  your pharmacy and they will forward the refill request to us. Please allow 3 business days for your refill to be completed.          Additional Information About Your Visit        Care EveryWhere ID     This is your Care EveryWhere ID. This could be used by other organizations to access your [place] medical records  [id number]         Blood Pressure from Last 3 Encounters:   04/28/17 (!) 160/100   04/07/17 122/72   04/04/17 126/84    Weight from Last 3 Encounters:   04/07/17 134.4 kg (296 lb 6.4 oz)   04/04/17 (!) 136.5 kg (301 lb)   03/06/17 135.6 kg (299 lb)              We Performed the Following     PT Eval, Moderate Complexity ([address])     Therapeutic Exercises        Primary Care Provider Office Phone # Fax #    Bertha Romero PA-C [phone]       [address] [address]        Thank you!     Thank you for choosing [facility]  for your care. Our goal is always to provide you with excellent care. Hearing back from our patients is one way we can continue to improve our services. Please take a few minutes to complete the written survey that you may receive in the mail after your visit with us. Thank you!             Your Updated Medication List - Protect others around you: Learn how to safely use, store and throw away your medicines at www.disposemymeds.org.          This list is accurate as of: 5/3/17 11:47 AM.  Always use your most recent med list.                   Brand Name Dispense Instructions for use    albuterol 108 (90 BASE) MCG/ACT Inhaler    PROAIR HFA/PROVENTIL HFA/VENTOLIN HFA    1 Inhaler    Inhale 2 puffs into the lungs every 6 hours as needed for shortness of breath / dyspnea or wheezing       amLODIPine 10 MG tablet    NORVASC    90 tablet    Take 1 tablet (10 mg) by mouth daily       calcium carbonate 500 MG chewable tablet    TUMS     Take 1 chew tab by mouth daily       cyclobenzaprine 10 MG  tablet    FLEXERIL    90 tablet    Take 0.5-1 tablets (5-10 mg) by mouth 3 times daily as needed for muscle spasms       dicyclomine 20 MG tablet    BENTYL    40 tablet    Take 1 tablet (20 mg) by mouth 4 times daily as needed       diphenoxylate-atropine 2.5-0.025 MG per tablet    LOMOTIL    30 tablet    Take 2 tablets by mouth 4 times daily as needed for diarrhea       EPINEPHrine 0.3 MG/0.3ML injection     2 each    Inject 0.3 mLs (0.3 mg) into the muscle once as needed for anaphylaxis       fluticasone 100 MCG/BLIST Aepb    FLOVENT DISKUS    1 Inhaler    Inhale 1 puff into the lungs 2 times daily       lisinopril 20 MG tablet    PRINIVIL/ZESTRIL    90 tablet    Take 1 tablet (20 mg) by mouth daily       mometasone-formoterol 100-5 MCG/ACT oral inhaler    DULERA    1 Inhaler    Inhale 2 puffs into the lungs 2 times daily Rinse mouth after use.       multivitamin, therapeutic Tabs tablet      Take 1 tablet by mouth daily       naproxen 500 MG tablet    NAPROSYN    30 tablet    Take 1 tablet (500 mg) by mouth 2 times daily as needed for moderate pain       pantoprazole 20 MG EC tablet    PROTONIX    30 tablet    Take by mouth 30-60 minutes before a meal.       sertraline 50 MG tablet    ZOLOFT    90 tablet    Take 1 tablet (50 mg) by mouth daily       simvastatin 20 MG tablet    ZOCOR    30 tablet    Take 1 tablet (20 mg) by mouth At Bedtime       TYLENOL PO      Take 1,000 mg by mouth every 8 hours as needed for mild pain or fever       VSL#3 Pack     30 each    Use 1-2 capsules/day

## 2017-05-11 ENCOUNTER — TELEPHONE (OUTPATIENT)
Dept: NEUROSURGERY | Facility: CLINIC | Age: 52
End: 2017-05-11

## 2017-05-11 ENCOUNTER — THERAPY VISIT (OUTPATIENT)
Dept: PHYSICAL THERAPY | Facility: CLINIC | Age: 52
End: 2017-05-11
Payer: COMMERCIAL

## 2017-05-11 DIAGNOSIS — M54.5 CHRONIC LEFT-SIDED LOW BACK PAIN, WITH SCIATICA PRESENCE UNSPECIFIED: ICD-10-CM

## 2017-05-11 DIAGNOSIS — G89.29 CHRONIC LEFT-SIDED LOW BACK PAIN, WITH SCIATICA PRESENCE UNSPECIFIED: ICD-10-CM

## 2017-05-11 DIAGNOSIS — M54.2 CERVICALGIA: ICD-10-CM

## 2017-05-11 DIAGNOSIS — M54.16 LUMBAR RADICULAR PAIN: Primary | ICD-10-CM

## 2017-05-11 PROCEDURE — 97112 NEUROMUSCULAR REEDUCATION: CPT | Mod: GP | Performed by: PHYSICAL THERAPIST

## 2017-05-11 PROCEDURE — 97110 THERAPEUTIC EXERCISES: CPT | Mod: GP | Performed by: PHYSICAL THERAPIST

## 2017-05-11 NOTE — PROGRESS NOTES
Subjective:    HPI                    Objective:    System    Physical Exam    General     ROS    Assessment/Plan:      SUBJECTIVE  Subjective: Pt reports has largely had a good week until last night when back seemed more sore after doing more lifting.   Current Pain level: 6/10   Changes in function:  None     Adverse reaction to treatment or activity:  None    OBJECTIVE  Objective: Standing trunk flexion produced B LBP that did not change with repetition.  Relief of discomfort on standing extension and prone extension with pt applied overpressure.     ASSESSMENT  Adarsh continues to require intervention to meet STG and LTG's: PT  Patient is progressing as expected.  Response to therapy has shown an improvement in symptoms with extension  Progress made towards STG/LTG?  None    PLAN  Current treatment program is being advanced to more complex exercises.    PTA/ATC plan:  N/A    Please refer to the daily flowsheet for treatment today, total treatment time and time spent performing 1:1 timed codes.

## 2017-05-11 NOTE — TELEPHONE ENCOUNTER
Pt called requesting order for lumbar injection. Would like the injection done at [facility] again. Also wants to discuss possibility of getting work restrictions in place as pt states his job is physically demanding and recently pushing him past his limits, which furthers his already existing pain/symptoms.

## 2017-05-11 NOTE — MR AVS SNAPSHOT
After Visit Summary   5/11/2017    Adarsh Salvador    MRN: [id number]           Patient Information     Date Of Birth          1965        Visit Information        Provider Department      5/11/2017 9:25 AM Jerry Gonzales, PT [facility]        Today's Diagnoses     Chronic left-sided low back pain, with sciatica presence unspecified        Cervicalgia           Follow-ups after your visit        Your next 10 appointments already scheduled     May 17, 2017 10:10 AM CDT   [facility] with Jerry Gonzales PT   [facility] ([facility])    [address]  [place] [address]   [phone]            May 25, 2017 10:15 AM CDT   [facility] with Jerry Gonzales PT   [facility] ([facility])    [address] [facility] [address]  [place] [address]   [phone]            May 31, 2017 10:10 AM CDT   [facility] with Jerry Gonzales PT   [facility] ([facility]    [address] [facility] [address]  [place] [address]   [phone]              Who to contact     If you have questions or need follow up information about today's clinic visit or your schedule please contact [facility] directly at [phone].  Normal or non-critical lab and imaging results will be communicated to you by MyChart, letter or phone within 4 business days after the clinic has received the results. If you do not hear from us within 7 days, please contact the clinic through MyChart or phone. If you have a critical or abnormal lab result, we will notify you by phone as soon as possible.  Submit refill requests through [employer] or call your pharmacy and they will forward the refill request to us. Please allow 3 business days for your refill to be completed.          Additional Information About Your Visit        Care EveryWhere ID     This is  your Care EveryWhere ID. This could be used by other organizations to access your [place] medical records  [id number]         Blood Pressure from Last 3 Encounters:   04/28/17 (!) 160/100   04/07/17 122/72   04/04/17 126/84    Weight from Last 3 Encounters:   04/07/17 134.4 kg (296 lb 6.4 oz)   04/04/17 (!) 136.5 kg (301 lb)   03/06/17 135.6 kg (299 lb)              We Performed the Following     Neuromuscular Re-Education     Therapeutic Exercises        Primary Care Provider Office Phone # Fax #    Bertha Romero PA-C [phone]       [facility] [address] [address]        Thank you!     Thank you for choosing [facility]  for your care. Our goal is always to provide you with excellent care. Hearing back from our patients is one way we can continue to improve our services. Please take a few minutes to complete the written survey that you may receive in the mail after your visit with us. Thank you!             Your Updated Medication List - Protect others around you: Learn how to safely use, store and throw away your medicines at www.disposemymeds.org.          This list is accurate as of: 5/11/17 12:17 PM.  Always use your most recent med list.                   Brand Name Dispense Instructions for use    albuterol 108 (90 BASE) MCG/ACT Inhaler    PROAIR HFA/PROVENTIL HFA/VENTOLIN HFA    1 Inhaler    Inhale 2 puffs into the lungs every 6 hours as needed for shortness of breath / dyspnea or wheezing       amLODIPine 10 MG tablet    NORVASC    90 tablet    Take 1 tablet (10 mg) by mouth daily       calcium carbonate 500 MG chewable tablet    TUMS     Take 1 chew tab by mouth daily       cyclobenzaprine 10 MG tablet    FLEXERIL    90 tablet    Take 0.5-1 tablets (5-10 mg) by mouth 3 times daily as needed for muscle spasms       dicyclomine 20 MG tablet    BENTYL    40 tablet    Take 1 tablet (20 mg) by mouth 4 times daily as  needed       diphenoxylate-atropine 2.5-0.025 MG per tablet    LOMOTIL    30 tablet    Take 2 tablets by mouth 4 times daily as needed for diarrhea       EPINEPHrine 0.3 MG/0.3ML injection     2 each    Inject 0.3 mLs (0.3 mg) into the muscle once as needed for anaphylaxis       fluticasone 100 MCG/BLIST Aepb    FLOVENT DISKUS    1 Inhaler    Inhale 1 puff into the lungs 2 times daily       lisinopril 20 MG tablet    PRINIVIL/ZESTRIL    90 tablet    Take 1 tablet (20 mg) by mouth daily       mometasone-formoterol 100-5 MCG/ACT oral inhaler    DULERA    1 Inhaler    Inhale 2 puffs into the lungs 2 times daily Rinse mouth after use.       multivitamin, therapeutic Tabs tablet      Take 1 tablet by mouth daily       naproxen 500 MG tablet    NAPROSYN    30 tablet    Take 1 tablet (500 mg) by mouth 2 times daily as needed for moderate pain       pantoprazole 20 MG EC tablet    PROTONIX    30 tablet    Take by mouth 30-60 minutes before a meal.       sertraline 50 MG tablet    ZOLOFT    90 tablet    Take 1 tablet (50 mg) by mouth daily       simvastatin 20 MG tablet    ZOCOR    30 tablet    Take 1 tablet (20 mg) by mouth At Bedtime       TYLENOL PO      Take 1,000 mg by mouth every 8 hours as needed for mild pain or fever       VSL#3 Pack     30 each    Use 1-2 capsules/day

## 2017-05-12 NOTE — TELEPHONE ENCOUNTER
Spoke to patient. He had Lumbar JANUARY 4/22/2016, had great relief from it. He reports low back pain and left leg pain . OK per Rafaela Phelan to place injection order. Order for Transforaminal L[facility]I in [facility]. Patient would like injection at [facility]. Provided number to central scheduling to schedule injection. Advised to call back if any further question or concerns or if pain persists. Patient verbalized understanding.

## 2017-05-17 ENCOUNTER — THERAPY VISIT (OUTPATIENT)
Dept: PHYSICAL THERAPY | Facility: CLINIC | Age: 52
End: 2017-05-17
Payer: COMMERCIAL

## 2017-05-17 DIAGNOSIS — M54.2 CERVICALGIA: ICD-10-CM

## 2017-05-17 DIAGNOSIS — M54.5 CHRONIC LEFT-SIDED LOW BACK PAIN, WITH SCIATICA PRESENCE UNSPECIFIED: ICD-10-CM

## 2017-05-17 DIAGNOSIS — G89.29 CHRONIC LEFT-SIDED LOW BACK PAIN, WITH SCIATICA PRESENCE UNSPECIFIED: ICD-10-CM

## 2017-05-17 PROCEDURE — 97112 NEUROMUSCULAR REEDUCATION: CPT | Mod: GP | Performed by: PHYSICAL THERAPIST

## 2017-05-17 PROCEDURE — 97140 MANUAL THERAPY 1/> REGIONS: CPT | Mod: GP | Performed by: PHYSICAL THERAPIST

## 2017-05-17 PROCEDURE — 97110 THERAPEUTIC EXERCISES: CPT | Mod: GP | Performed by: PHYSICAL THERAPIST

## 2017-05-17 NOTE — PROGRESS NOTES
Subjective:    HPI                    Objective:    System    Physical Exam    General     ROS    Assessment/Plan:      SUBJECTIVE  Subjective: Pt reports has continued to stevens back pain that only sometimes responds to bending backward.  Is having repeat lumbar epidural injection later this week.  Reports overall best relief over course of pain has been with PT and injection.  He reports some tingling to L thigh that has not changed at all during PT but was better after last injection.   Current Pain level: 6/10   Changes in function:  None     Adverse reaction to treatment or activity:  None    OBJECTIVE  Objective: Standing trunk flexion increased pain in low back, relieved with extension.  No change in L thigh tingling with standing trunk AROM or prone extension.  Tender to palpation L paralumbars.     ASSESSMENT  Adarsh continues to require intervention to meet STG and LTG's: PT  No change of symptoms has been noted.  Response to therapy has shown lack of progress in  function  Progress made towards STG/LTG?  None    PLAN  Would like to see greater consistency with HEP as he has noted relief with extension in clinic.  F/u after injection.    PTA/ATC plan:  N/A    Please refer to the daily flowsheet for treatment today, total treatment time and time spent performing 1:1 timed codes.

## 2017-05-17 NOTE — MR AVS SNAPSHOT
After Visit Summary   5/17/2017    Adarsh Salvador    MRN: [id number]           Patient Information     Date Of Birth          1965        Visit Information        Provider Department      5/17/2017 10:10 AM Jerry Gonzales, PT [facility]        Today's Diagnoses     Chronic left-sided low back pain, with sciatica presence unspecified        Cervicalgia           Follow-ups after your visit        Your next 10 appointments already scheduled     May 19, 2017 11:00 AM CDT   XR LUMBAR TRANSFORAMINAL INJ SINGLE with SHXR4, SH IMAGING NURSE, SH MSK RAD   [facility] Radiology ([facility])    [address] [address] [phone]           For nerve root injection, please send or bring copies of any MRIs or other scans you have had.  Bring a list of your current medicines to your exam. (Include vitamins, minerals and over-the-counter medicines.) Leave your valuables at home.  Plan to have someone drive you home afterward.  Stop taking the following medicines (but talk to your doctor first):   If you take blood thinners, you may need to stop taking them a few days before treatment. Talk to your doctor before stopping these medicines.Stop taking Coumadin (warfarin) 3 days before treatment. Restart the day after treatment.   If you take Plavix, Ticlid, Pletal or Persantine, please ask your doctor if you should stop these medicines. You may need extra tests on the morning of your scan. You may take your other medicines as normal.  Stop all food and drink (including water) 3 hours before your test or treatment.  Please tell the doctor:   If you are allergic to X-ray dye (contrast fluid).   If you may be pregnant.  Injections take about 30 to 45 minutes. Most people spend up to 2 hours in the clinic or hospital.  Please call the Imaging Department at your exam site with any questions            May 25, 2017 10:15 AM CDT   RAM[facility] Spine with  "Jerry Gonzales PT   [facility] ([facility])    [address]  [address]  [place] [address] [phone]            May 31, 2017 10:10 AM CDT   RAM[facility] Spine with Jerry Gonzales PT   [facility] ([facility])    [address]  [place] [address] [phone]              Who to contact     If you have questions or need follow up information about today's clinic visit or your schedule please contact [facility] directly at [phone].  Normal or non-critical lab and imaging results will be communicated to you by WalkSourcehart, letter or phone within 4 business days after the clinic has received the results. If you do not hear from us within 7 days, please contact the clinic through WalkSourcehart or phone. If you have a critical or abnormal lab result, we will notify you by phone as soon as possible.  Submit refill requests through eVendor Check or call your pharmacy and they will forward the refill request to us. Please allow 3 business days for your refill to be completed.          Additional Information About Your Visit        WalkSourcehart Information     eVendor Check lets you send messages to your doctor, view your test results, renew your prescriptions, schedule appointments and more. To sign up, go to [email or web address] . Click on \"Log in\" on the left side of the screen, which will take you to the Welcome page. Then click on \"Sign up Now\" on the right side of the page.     You will be asked to enter the access code listed below, as well as some personal information. Please follow the directions to create your username and password.     Your access code is: [email or web address]  Expires: 8/15/2017  1:52 PM     Your access code will  in 90 days. If you need help or a new code, please call your [place] clinic or [phone].        Care EveryWhere ID     This is your Care EveryWhere ID. This could " be used by other organizations to access your [place] medical records  [id number]         Blood Pressure from Last 3 Encounters:   04/28/17 (!) 160/100   04/07/17 122/72   04/04/17 126/84    Weight from Last 3 Encounters:   04/07/17 134.4 kg (296 lb 6.4 oz)   04/04/17 (!) 136.5 kg (301 lb)   03/06/17 135.6 kg (299 lb)              We Performed the Following     Manual Ther Tech, 1+Regions, EA 15 min     Neuromuscular Re-Education     Therapeutic Exercises        Primary Care Provider Office Phone # Fax #    Bertha Romero PA-C [phone]       [address] [address]        Thank you!     Thank you for choosing [facility]  for your care. Our goal is always to provide you with excellent care. Hearing back from our patients is one way we can continue to improve our services. Please take a few minutes to complete the written survey that you may receive in the mail after your visit with us. Thank you!             Your Updated Medication List - Protect others around you: Learn how to safely use, store and throw away your medicines at www.disposemymeds.org.          This list is accurate as of: 5/17/17  1:52 PM.  Always use your most recent med list.                   Brand Name Dispense Instructions for use    albuterol 108 (90 BASE) MCG/ACT Inhaler    PROAIR HFA/PROVENTIL HFA/VENTOLIN HFA    1 Inhaler    Inhale 2 puffs into the lungs every 6 hours as needed for shortness of breath / dyspnea or wheezing       amLODIPine 10 MG tablet    NORVASC    90 tablet    Take 1 tablet (10 mg) by mouth daily       calcium carbonate 500 MG chewable tablet    TUMS     Take 1 chew tab by mouth daily       cyclobenzaprine 10 MG tablet    FLEXERIL    90 tablet    Take 0.5-1 tablets (5-10 mg) by mouth 3 times daily as needed for muscle spasms       dicyclomine 20 MG tablet    BENTYL    40 tablet    Take 1 tablet (20 mg) by mouth 4 times daily  as needed       diphenoxylate-atropine 2.5-0.025 MG per tablet    LOMOTIL    30 tablet    Take 2 tablets by mouth 4 times daily as needed for diarrhea       EPINEPHrine 0.3 MG/0.3ML injection     2 each    Inject 0.3 mLs (0.3 mg) into the muscle once as needed for anaphylaxis       fluticasone 100 MCG/BLIST Aepb    FLOVENT DISKUS    1 Inhaler    Inhale 1 puff into the lungs 2 times daily       lisinopril 20 MG tablet    PRINIVIL/ZESTRIL    90 tablet    Take 1 tablet (20 mg) by mouth daily       mometasone-formoterol 100-5 MCG/ACT oral inhaler    DULERA    1 Inhaler    Inhale 2 puffs into the lungs 2 times daily Rinse mouth after use.       multivitamin, therapeutic Tabs tablet      Take 1 tablet by mouth daily       naproxen 500 MG tablet    NAPROSYN    30 tablet    Take 1 tablet (500 mg) by mouth 2 times daily as needed for moderate pain       pantoprazole 20 MG EC tablet    PROTONIX    30 tablet    Take by mouth 30-60 minutes before a meal.       sertraline 50 MG tablet    ZOLOFT    90 tablet    Take 1 tablet (50 mg) by mouth daily       simvastatin 20 MG tablet    ZOCOR    30 tablet    Take 1 tablet (20 mg) by mouth At Bedtime       TYLENOL PO      Take 1,000 mg by mouth every 8 hours as needed for mild pain or fever       VSL#3 Pack     30 each    Use 1-2 capsules/day

## 2017-05-19 ENCOUNTER — HOSPITAL ENCOUNTER (OUTPATIENT)
Dept: GENERAL RADIOLOGY | Facility: CLINIC | Age: 52
End: 2017-05-19
Attending: NURSE PRACTITIONER | Admitting: RADIOLOGY
Payer: COMMERCIAL

## 2017-05-19 ENCOUNTER — HOSPITAL ENCOUNTER (OUTPATIENT)
Facility: CLINIC | Age: 52
Discharge: HOME | End: 2017-05-19
Attending: RADIOLOGY | Admitting: RADIOLOGY
Payer: COMMERCIAL

## 2017-05-19 VITALS
TEMPERATURE: 98.5 F | DIASTOLIC BLOOD PRESSURE: 56 MMHG | HEART RATE: 73 BPM | BODY MASS INDEX: 41.3 KG/M2 | RESPIRATION RATE: 18 BRPM | SYSTOLIC BLOOD PRESSURE: 109 MMHG | WEIGHT: 295 LBS | OXYGEN SATURATION: 96 % | HEIGHT: 71 IN

## 2017-05-19 DIAGNOSIS — M54.16 LUMBAR RADICULAR PAIN: ICD-10-CM

## 2017-05-19 PROCEDURE — 40000863 ZZH STATISTIC RADIOLOGY XRAY, US, CT, MAR, NM

## 2017-05-19 PROCEDURE — 25000125 ZZHC RX 250: Performed by: NURSE PRACTITIONER

## 2017-05-19 PROCEDURE — 25000128 H RX IP 250 OP 636: Performed by: NURSE PRACTITIONER

## 2017-05-19 PROCEDURE — 62323 NJX INTERLAMINAR LMBR/SAC: CPT

## 2017-05-19 PROCEDURE — 25000125 ZZHC RX 250: Performed by: PHYSICIAN ASSISTANT

## 2017-05-19 PROCEDURE — 25500064 ZZH RX 255 OP 636: Performed by: NURSE PRACTITIONER

## 2017-05-19 RX ORDER — IOPAMIDOL 408 MG/ML
10 INJECTION, SOLUTION INTRATHECAL ONCE
Status: COMPLETED | OUTPATIENT
Start: 2017-05-19 | End: 2017-05-19

## 2017-05-19 RX ORDER — DEXAMETHASONE SODIUM PHOSPHATE 10 MG/ML
20 INJECTION, SOLUTION INTRAMUSCULAR; INTRAVENOUS ONCE
Status: COMPLETED | OUTPATIENT
Start: 2017-05-19 | End: 2017-05-19

## 2017-05-19 RX ORDER — ONDANSETRON 4 MG/1
4 TABLET, FILM COATED ORAL ONCE
Status: COMPLETED | OUTPATIENT
Start: 2017-05-19 | End: 2017-05-19

## 2017-05-19 RX ADMIN — IOPAMIDOL 3 ML: 408 INJECTION, SOLUTION INTRATHECAL at 11:28

## 2017-05-19 RX ADMIN — ONDANSETRON 4 MG: 4 TABLET, ORALLY DISINTEGRATING ORAL at 11:35

## 2017-05-19 RX ADMIN — LIDOCAINE HYDROCHLORIDE 4 ML: 10 INJECTION, SOLUTION EPIDURAL; INFILTRATION; INTRACAUDAL; PERINEURAL at 11:45

## 2017-05-19 RX ADMIN — LIDOCAINE HYDROCHLORIDE 4 ML: 10 INJECTION, SOLUTION EPIDURAL; INFILTRATION; INTRACAUDAL; PERINEURAL at 11:25

## 2017-05-19 RX ADMIN — DEXAMETHASONE SODIUM PHOSPHATE 20 MG: 10 INJECTION, SOLUTION INTRAMUSCULAR; INTRAVENOUS at 11:45

## 2017-05-19 NOTE — PROGRESS NOTES
1155 Pt returned from radiology. Bandaid CDI to low back. Pt reports no change to pain.    OOB - steady on feet. Ambulates w/o difficulty. Bandaid CDI to puncture site.     Pt discharged per w/c to private vehicle. All personal belongings taken with pt.

## 2017-05-19 NOTE — PROGRESS NOTES
1039 pain at 7 low back. Reviewed discharge instructions and copy given. Aspirin held since the 10th.   1240 VSS. Pain at 4 low back. Had beverage and snack. Denies nausea. Some tingling in left leg same as he usually has. Epidural site CDI. Has copy of discharge instructions. Discharged to sister.

## 2017-05-19 NOTE — IP AVS SNAPSHOT
[address] [place] [address]    Phone:  [phone]                                       After Visit Summary   5/19/2017    Adarsh Salvador    MRN: [id number]           After Visit Summary Signature Page     I have received my discharge instructions, and my questions have been answered. I have discussed any challenges I see with this plan with the nurse or doctor.    ..........................................................................................................................................  Patient/Patient Representative Signature      ..........................................................................................................................................  Patient Representative Print Name and Relationship to Patient    ..................................................               ................................................  Date                                            Time    ..........................................................................................................................................  Reviewed by Signature/Title    ...................................................              ..............................................  Date                                                            Time

## 2017-05-19 NOTE — PROGRESS NOTES
RADIOLOGY PROCEDURE NOTE  Patient name: Adarsh Salvador  MRN: [id number]  : 1965    Pre-procedure diagnosis: Low back pain  Post-procedure diagnosis: Same    Procedure Date/Time: May 19, 2017  12:03 PM  Procedure: Caudal JANUARY  Estimated blood loss: None  Specimen(s) collected with description: none  The patient tolerated the procedure well with no immediate complications.  Significant findings:none    See imaging dictation for procedural details.    Provider name: Carter Mai  Assistant(s):None

## 2017-05-19 NOTE — IP AVS SNAPSHOT
MRN:[id number]                      After Visit Summary   5/19/2017    Adarsh Salvador    MRN: [id number]           Visit Information        Department      5/19/2017 10:12 AM [facility]          Review of your medicines      UNREVIEWED medicines. Ask your doctor about these medicines        Dose / Directions    albuterol 108 (90 BASE) MCG/ACT Inhaler   Commonly known as:  PROAIR HFA/PROVENTIL HFA/VENTOLIN HFA   Used for:  Cough        Dose:  2 puff   Inhale 2 puffs into the lungs every 6 hours as needed for shortness of breath / dyspnea or wheezing   Quantity:  1 Inhaler   Refills:  1       amLODIPine 10 MG tablet   Commonly known as:  NORVASC   Used for:  Hypertension goal BP (blood pressure) < 140/90        Dose:  10 mg   Take 1 tablet (10 mg) by mouth daily   Quantity:  90 tablet   Refills:  0       calcium carbonate 500 MG chewable tablet   Commonly known as:  TUMS        Dose:  1 chew tab   Take 1 chew tab by mouth daily   Refills:  0       cyclobenzaprine 10 MG tablet   Commonly known as:  FLEXERIL   Used for:  Back muscle spasm        Dose:  5-10 mg   Take 0.5-1 tablets (5-10 mg) by mouth 3 times daily as needed for muscle spasms   Quantity:  90 tablet   Refills:  1       dicyclomine 20 MG tablet   Commonly known as:  BENTYL   Used for:  Irritable bowel syndrome with diarrhea, Chronic diarrhea        Dose:  20 mg   Take 1 tablet (20 mg) by mouth 4 times daily as needed   Quantity:  40 tablet   Refills:  5       diphenoxylate-atropine 2.5-0.025 MG per tablet   Commonly known as:  LOMOTIL   Used for:  Chronic diarrhea, Irritable bowel syndrome with diarrhea        Dose:  2 tablet   Take 2 tablets by mouth 4 times daily as needed for diarrhea   Quantity:  30 tablet   Refills:  1       EPINEPHrine 0.3 MG/0.3ML injection   Used for:  Anaphylactic reaction        Dose:  0.3 mg   Inject 0.3 mLs (0.3 mg) into the muscle once as needed for anaphylaxis   Quantity:  2 each   Refills:  2        fluticasone 100 MCG/BLIST Aepb   Commonly known as:  FLOVENT DISKUS   Used for:  Wheezing        Dose:  1 puff   Inhale 1 puff into the lungs 2 times daily   Quantity:  1 Inhaler   Refills:  1       lisinopril 20 MG tablet   Commonly known as:  PRINIVIL/ZESTRIL   Used for:  Hypertension goal BP (blood pressure) < 140/90        Dose:  20 mg   Take 1 tablet (20 mg) by mouth daily   Quantity:  90 tablet   Refills:  0       mometasone-formoterol 100-5 MCG/ACT oral inhaler   Commonly known as:  DULERA   Used for:  Cough        Dose:  2 puff   Inhale 2 puffs into the lungs 2 times daily Rinse mouth after use.   Quantity:  1 Inhaler   Refills:  3       multivitamin, therapeutic Tabs tablet        Dose:  1 tablet   Take 1 tablet by mouth daily   Refills:  0       naproxen 500 MG tablet   Commonly known as:  NAPROSYN   Used for:  Back muscle spasm, Strain of trapezius muscle, right, initial encounter        Dose:  500 mg   Take 1 tablet (500 mg) by mouth 2 times daily as needed for moderate pain   Quantity:  30 tablet   Refills:  0       pantoprazole 20 MG EC tablet   Commonly known as:  PROTONIX   Used for:  Gastroesophageal reflux disease without esophagitis        Take by mouth 30-60 minutes before a meal.   Quantity:  30 tablet   Refills:  3       sertraline 50 MG tablet   Commonly known as:  ZOLOFT   Used for:  Anxiety        Dose:  50 mg   Take 1 tablet (50 mg) by mouth daily   Quantity:  90 tablet   Refills:  0       simvastatin 20 MG tablet   Commonly known as:  ZOCOR   Used for:  Hyperlipidemia LDL goal <160        Dose:  20 mg   Take 1 tablet (20 mg) by mouth At Bedtime   Quantity:  30 tablet   Refills:  3       TYLENOL PO        Dose:  1000 mg   Take 1,000 mg by mouth every 8 hours as needed for mild pain or fever   Refills:  0       VSL#3 Pack   Used for:  Chronic diarrhea        Use 1-2 capsules/day   Quantity:  30 each   Refills:  3                Protect others around you: Learn how to safely use, store  and throw away your medicines at www.disposemymeds.org.         Follow-ups after your visit        Your next 10 appointments already scheduled     May 19, 2017 11:00 AM CDT   XR LUMBAR TRANSFORAMINAL INJ SINGLE with NERY PAGE IMAGING NURSE, NERY SAMS   [facility] Radiology ([facility])    [address] [address] [phone]           For nerve root injection, please send or bring copies of any MRIs or other scans you have had.  Bring a list of your current medicines to your exam. (Include vitamins, minerals and over-the-counter medicines.) Leave your valuables at home.  Plan to have someone drive you home afterward.  Stop taking the following medicines (but talk to your doctor first):   If you take blood thinners, you may need to stop taking them a few days before treatment. Talk to your doctor before stopping these medicines.Stop taking Coumadin (warfarin) 3 days before treatment. Restart the day after treatment.   If you take Plavix, Ticlid, Pletal or Persantine, please ask your doctor if you should stop these medicines. You may need extra tests on the morning of your scan. You may take your other medicines as normal.  Stop all food and drink (including water) 3 hours before your test or treatment.  Please tell the doctor:   If you are allergic to X-ray dye (contrast fluid).   If you may be pregnant.  Injections take about 30 to 45 minutes. Most people spend up to 2 hours in the clinic or hospital.  Please call the Imaging Department at your exam site with any questions            May 25, 2017 10:15 AM CDT   RAMOS Spine with Jerry Gonzales PT   [facility] ([facility])    [address]  [address]  [place] [address]   [phone]            May 31, 2017 10:10 AM CDT   RAM[facility] Spine with Jerry Gonzales PT   [facility] ([facility])    [address]  [address]  [place]  [address]   [phone]               Care Instructions        Further instructions from your care team       Steroid Injection Discharge Instructions     After you go home:      You may resume your normal diet.    Care of Puncture Site:      If you have a bandaid on your puncture site, you may remove it the next morning    You may shower tomorrow    No bath tubs, whirlpools or swimming for at least 3 days     Activity:      You may go back to normal activity in 24 hours    You should let pain be your guide as to the extent of your activities    Maintain any activity limitations as ordered by your provider    Do NOT drive a vehicle until tomorrow    Medicines:      You may resume all medications    Resume your Warfarin/Coumadin at your regular dose today. Follow up with your provider to have your INR rechecked    Resume your Platelet Inhibitors and Aspirin tomorrow at your regular dose    For minor pain, you may take Acetaminophen (Tylenol) or Ibuprofen (Advil)    Pain:       You may experience increased or different pain over the next 24-48 hours    For the next 48 hrs - you may use ice packs for discomfort     Call your primary care doctor if:      You have severe pain that does not improve with pain medication    You have chills or a fever greater than 101 F (38 C)    The site is red, swollen, hot or tender    New problems with your bowel or bladder    Any questions or concerns    Other Instructions:      New numbness down your leg post injection is temporary and may last for up to 6 hours. You may need assistance with activity until your leg has normal sensation.    If you are diabetic, monitor your blood sugar closely. Contact the provider who manages your diabetes to help you control your blood sugar if needed.    For Your Information:      A steroid was injected to help decrease swelling and may help to reduce pain. It may take up to 7-10 days to obtain full results.    Some patients will get lasting relief  "from a single injection. Others may require up to 3 injections to get results. If you have more than one steroid injection, they should be given 2 weeks apart.    Side effects of your steroid injection are mild and will go away in 2-3 days  - Insomnia  - Heartburn  - Flushed face  - Water retention  - Increased appetite  - Increased blood sugar      If you have questions call:        [facility] Radiology Dept @ [phone]      The provider who performed your procedure was Dr Harman       Additional Information About Your Visit        GIGAS Information     GIGAS lets you send messages to your doctor, view your test results, renew your prescriptions, schedule appointments and more. To sign up, go to [email or web address] . Click on \"Log in\" on the left side of the screen, which will take you to the Welcome page. Then click on \"Sign up Now\" on the right side of the page.     You will be asked to enter the access code listed below, as well as some personal information. Please follow the directions to create your username and password.     Your access code is: [email or web address]  Expires: 8/15/2017  1:52 PM     Your access code will  in 90 days. If you need help or a new code, please call your [place] clinic or [phone].        Care EveryWhere ID     This is your Care EveryWhere ID. This could be used by other organizations to access your [place] medical records  [id number]         Primary Care Provider Office Phone # Fax #    Bertha Romero PA-C [phone]      Thank you!     Thank you for choosing [place] for your care. Our goal is always to provide you with excellent care. Hearing back from our patients is one way we can continue to improve our services. Please take a few minutes to complete the written survey that you may receive in the mail after you visit with us. Thank you!             Medication List: This is a list of all your medications and when to take " them. Check marks below indicate your daily home schedule. Keep this list as a reference.      Medications           Morning Afternoon Evening Bedtime As Needed    albuterol 108 (90 BASE) MCG/ACT Inhaler   Commonly known as:  PROAIR HFA/PROVENTIL HFA/VENTOLIN HFA   Inhale 2 puffs into the lungs every 6 hours as needed for shortness of breath / dyspnea or wheezing                                amLODIPine 10 MG tablet   Commonly known as:  NORVASC   Take 1 tablet (10 mg) by mouth daily                                calcium carbonate 500 MG chewable tablet   Commonly known as:  TUMS   Take 1 chew tab by mouth daily                                cyclobenzaprine 10 MG tablet   Commonly known as:  FLEXERIL   Take 0.5-1 tablets (5-10 mg) by mouth 3 times daily as needed for muscle spasms                                dicyclomine 20 MG tablet   Commonly known as:  BENTYL   Take 1 tablet (20 mg) by mouth 4 times daily as needed                                diphenoxylate-atropine 2.5-0.025 MG per tablet   Commonly known as:  LOMOTIL   Take 2 tablets by mouth 4 times daily as needed for diarrhea                                EPINEPHrine 0.3 MG/0.3ML injection   Inject 0.3 mLs (0.3 mg) into the muscle once as needed for anaphylaxis                                fluticasone 100 MCG/BLIST Aepb   Commonly known as:  FLOVENT DISKUS   Inhale 1 puff into the lungs 2 times daily                                lisinopril 20 MG tablet   Commonly known as:  PRINIVIL/ZESTRIL   Take 1 tablet (20 mg) by mouth daily                                mometasone-formoterol 100-5 MCG/ACT oral inhaler   Commonly known as:  DULERA   Inhale 2 puffs into the lungs 2 times daily Rinse mouth after use.                                multivitamin, therapeutic Tabs tablet   Take 1 tablet by mouth daily                                naproxen 500 MG tablet   Commonly known as:  NAPROSYN   Take 1 tablet (500 mg) by mouth 2 times daily as needed for  moderate pain                                pantoprazole 20 MG EC tablet   Commonly known as:  PROTONIX   Take by mouth 30-60 minutes before a meal.                                sertraline 50 MG tablet   Commonly known as:  ZOLOFT   Take 1 tablet (50 mg) by mouth daily                                simvastatin 20 MG tablet   Commonly known as:  ZOCOR   Take 1 tablet (20 mg) by mouth At Bedtime                                TYLENOL PO   Take 1,000 mg by mouth every 8 hours as needed for mild pain or fever                                VSL#3 Pack   Use 1-2 capsules/day

## 2017-05-19 NOTE — DISCHARGE INSTRUCTIONS
Steroid Injection Discharge Instructions     After you go home:      You may resume your normal diet.    Care of Puncture Site:      If you have a bandaid on your puncture site, you may remove it the next morning    You may shower tomorrow    No bath tubs, whirlpools or swimming for at least 3 days     Activity:      You may go back to normal activity in 24 hours    You should let pain be your guide as to the extent of your activities    Maintain any activity limitations as ordered by your provider    Do NOT drive a vehicle until tomorrow    Medicines:      You may resume all medications    Resume your Warfarin/Coumadin at your regular dose today. Follow up with your provider to have your INR rechecked    Resume your Platelet Inhibitors and Aspirin tomorrow at your regular dose    For minor pain, you may take Acetaminophen (Tylenol) or Ibuprofen (Advil)    Pain:       You may experience increased or different pain over the next 24-48 hours    For the next 48 hrs - you may use ice packs for discomfort     Call your primary care doctor if:      You have severe pain that does not improve with pain medication    You have chills or a fever greater than 101 F (38 C)    The site is red, swollen, hot or tender    New problems with your bowel or bladder    Any questions or concerns    Other Instructions:      New numbness down your leg post injection is temporary and may last for up to 6 hours. You may need assistance with activity until your leg has normal sensation.    If you are diabetic, monitor your blood sugar closely. Contact the provider who manages your diabetes to help you control your blood sugar if needed.    For Your Information:      A steroid was injected to help decrease swelling and may help to reduce pain. It may take up to 7-10 days to obtain full results.    Some patients will get lasting relief from a single injection. Others may require up to 3 injections to get results. If you have more than one  steroid injection, they should be given 2 weeks apart.    Side effects of your steroid injection are mild and will go away in 2-3 days  - Insomnia  - Heartburn  - Flushed face  - Water retention  - Increased appetite  - Increased blood sugar      If you have questions call:        [facility] Radiology Dept @ [phone]      The provider who performed your procedure was Dr Harman

## 2017-05-24 ENCOUNTER — TELEPHONE (OUTPATIENT)
Dept: FAMILY MEDICINE | Facility: CLINIC | Age: 52
End: 2017-05-24

## 2017-05-30 ENCOUNTER — TELEPHONE (OUTPATIENT)
Dept: NEUROSURGERY | Facility: CLINIC | Age: 52
End: 2017-05-30

## 2017-05-30 NOTE — TELEPHONE ENCOUNTER
Pt had injection on 5/19/17. States he is not getting any relief and has since then developed cramps in his leg, which was not a symptom previous to the injection. Would like to discuss.

## 2017-05-31 ENCOUNTER — THERAPY VISIT (OUTPATIENT)
Dept: PHYSICAL THERAPY | Facility: CLINIC | Age: 52
End: 2017-05-31
Payer: COMMERCIAL

## 2017-05-31 DIAGNOSIS — M54.5 CHRONIC LEFT-SIDED LOW BACK PAIN, WITH SCIATICA PRESENCE UNSPECIFIED: ICD-10-CM

## 2017-05-31 DIAGNOSIS — M54.2 CERVICALGIA: ICD-10-CM

## 2017-05-31 DIAGNOSIS — G89.29 CHRONIC LEFT-SIDED LOW BACK PAIN, WITH SCIATICA PRESENCE UNSPECIFIED: ICD-10-CM

## 2017-05-31 PROCEDURE — 97110 THERAPEUTIC EXERCISES: CPT | Mod: GP | Performed by: PHYSICAL THERAPIST

## 2017-05-31 PROCEDURE — 97140 MANUAL THERAPY 1/> REGIONS: CPT | Mod: GP | Performed by: PHYSICAL THERAPIST

## 2017-05-31 NOTE — PROGRESS NOTES
"Subjective:    HPI                    Objective:    System    Physical Exam    General     ROS    Assessment/Plan:      SUBJECTIVE  Subjective: Pt reports recent injection didn't change anything as far as symptoms have gone.  Still having unchanged back pain and symptoms into the leg.  He indicates injection a year ago had \"100% relief within two hours and it stayed that way\" while current injection ten days ago has not changed symptoms at all.   Current Pain level: 6/10   Changes in function:  Yes (See Goal flowsheet attached for changes in current functional level)     Adverse reaction to treatment or activity:  None    OBJECTIVE  Objective: Relief of low back pain with standing trunk extension.  Tender to palpation B upper traps.  Increased discomfort cervical AROM all directions with exception of B rotation.     ASSESSMENT  Adarsh continues to require intervention to meet STG and LTG's: PT  Continued good response to extension.  Progress made towards STG/LTG?  Yes (See Goal flowsheet attached for updates on achievement of STG and LTG)    PLAN  Focused more toward neck today with good initial response to MFR.    PTA/ATC plan:  N/A    Please refer to the daily flowsheet for treatment today, total treatment time and time spent performing 1:1 timed codes.              "

## 2017-05-31 NOTE — MR AVS SNAPSHOT
"              After Visit Summary   2017    Adarsh Salvador    MRN: [id number]           Patient Information     Date Of Birth          1965        Visit Information        Provider Department      2017 10:10 AM Jerry Gonzales PT [facility]        Today's Diagnoses     Chronic left-sided low back pain, with sciatica presence unspecified        Cervicalgia           Follow-ups after your visit        Who to contact     If you have questions or need follow up information about today's clinic visit or your schedule please contact [facility] directly at [phone].  Normal or non-critical lab and imaging results will be communicated to you by [email or web address], letter or phone within 4 business days after the clinic has received the results. If you do not hear from us within 7 days, please contact the clinic through [email or web address] or phone. If you have a critical or abnormal lab result, we will notify you by phone as soon as possible.  Submit refill requests through "[email or web address] or call your pharmacy and they will forward the refill request to us. Please allow 3 business days for your refill to be completed.          Additional Information About Your Visit        MyChart Information     "[email or web address] lets you send messages to your doctor, view your test results, renew your prescriptions, schedule appointments and more. To sign up, go to [email or web address] . Click on \"Log in\" on the left side of the screen, which will take you to the Welcome page. Then click on \"Sign up Now\" on the right side of the page.     You will be asked to enter the access code listed below, as well as some personal information. Please follow the directions to create your username and password.     Your access code is: [email or web address]  Expires: 8/15/2017  1:52 PM     Your access code will  in 90 days. If you need help or a new code, please call your [facility] clinic or [phone].        Care " EveryWhere ID     This is your Care EveryWhere ID. This could be used by other organizations to access your [facility] medical records  [id number]         Blood Pressure from Last 3 Encounters:   05/19/17 109/56   04/28/17 (!) 160/100   04/07/17 122/72    Weight from Last 3 Encounters:   05/19/17 133.8 kg (295 lb)   04/07/17 134.4 kg (296 lb 6.4 oz)   04/04/17 (!) 136.5 kg (301 lb)              We Performed the Following     Manual Ther Tech, 1+Regions, EA 15 min     Therapeutic Exercises        Primary Care Provider Office Phone # Fax #    Bertha Romero PA-C [phone]       [address] [address]        Thank you!     Thank you for choosing [facility]  for your care. Our goal is always to provide you with excellent care. Hearing back from our patients is one way we can continue to improve our services. Please take a few minutes to complete the written survey that you may receive in the mail after your visit with us. Thank you!             Your Updated Medication List - Protect others around you: Learn how to safely use, store and throw away your medicines at www.disposemymeds.org.          This list is accurate as of: 5/31/17 10:49 AM.  Always use your most recent med list.                   Brand Name Dispense Instructions for use    albuterol 108 (90 BASE) MCG/ACT Inhaler    PROAIR HFA/PROVENTIL HFA/VENTOLIN HFA    1 Inhaler    Inhale 2 puffs into the lungs every 6 hours as needed for shortness of breath / dyspnea or wheezing       amLODIPine 10 MG tablet    NORVASC    90 tablet    Take 1 tablet (10 mg) by mouth daily       ASPIRIN PO      Take 325 mg by mouth daily       calcium carbonate 500 MG chewable tablet    TUMS     Take 1 chew tab by mouth daily       cyclobenzaprine 10 MG tablet    FLEXERIL    90 tablet    Take 0.5-1 tablets (5-10 mg) by mouth 3 times daily as needed for muscle spasms       dicyclomine 20  MG tablet    BENTYL    40 tablet    Take 1 tablet (20 mg) by mouth 4 times daily as needed       diphenoxylate-atropine 2.5-0.025 MG per tablet    LOMOTIL    30 tablet    Take 2 tablets by mouth 4 times daily as needed for diarrhea       EPINEPHrine 0.3 MG/0.3ML injection     2 each    Inject 0.3 mLs (0.3 mg) into the muscle once as needed for anaphylaxis       fluticasone 100 MCG/BLIST Aepb    FLOVENT DISKUS    1 Inhaler    Inhale 1 puff into the lungs 2 times daily       lisinopril 20 MG tablet    PRINIVIL/ZESTRIL    90 tablet    Take 1 tablet (20 mg) by mouth daily       mometasone-formoterol 100-5 MCG/ACT oral inhaler    DULERA    1 Inhaler    Inhale 2 puffs into the lungs 2 times daily Rinse mouth after use.       multivitamin, therapeutic Tabs tablet      Take 1 tablet by mouth daily       naproxen 500 MG tablet    NAPROSYN    30 tablet    Take 1 tablet (500 mg) by mouth 2 times daily as needed for moderate pain       pantoprazole 20 MG EC tablet    PROTONIX    30 tablet    Take by mouth 30-60 minutes before a meal.       sertraline 50 MG tablet    ZOLOFT    90 tablet    Take 1 tablet (50 mg) by mouth daily       simvastatin 20 MG tablet    ZOCOR    30 tablet    Take 1 tablet (20 mg) by mouth At Bedtime       TYLENOL PO      Take 1,000 mg by mouth every 8 hours as needed for mild pain or fever       VSL#3 Pack     30 each    Use 1-2 capsules/day

## 2017-06-02 ENCOUNTER — TELEPHONE (OUTPATIENT)
Dept: NEUROSURGERY | Facility: CLINIC | Age: 52
End: 2017-06-02

## 2017-06-02 DIAGNOSIS — M54.16 LUMBAR RADICULAR PAIN: Primary | ICD-10-CM

## 2017-06-02 NOTE — TELEPHONE ENCOUNTER
Spoke with patient. He had caudal injection on 5/19 but it didn't provide as much relief like the lumbar JANUARY did on 4/22. He's wondering next steps.    Rafaela Phelan CNP offered him to try lumbar JANUARY again. If no relief, he can go to pain management since he's non-surgical. Pt states he'd like to proceed with lumbar JANUARY and will call to schedule.

## 2017-06-03 ENCOUNTER — NURSE TRIAGE (OUTPATIENT)
Dept: NURSING | Facility: CLINIC | Age: 52
End: 2017-06-03

## 2017-06-04 NOTE — TELEPHONE ENCOUNTER
"\"I was lifting a boat motor today when I suddenly felt a pain in my groin area.  The pain is constant and severe.  I have a history of a hernia repair last August.\"      Reason for Disposition    [1] MODERATE-SEVERE pain in penis, scrotum, or testicle AND [2] lasts > 1 hour    Additional Information    Negative: [1] Rash or color change of scrotum BUT [2] no swelling or pain    Negative: Inguinal hernia previously diagnosed by a physician    Followed a genital injury (e.g., penis, scrotum)    Negative: [1] Major bleeding (e.g., actively dripping or spurting) AND [2] can't be stopped    Negative: Shock suspected (e.g., cold/pale/clammy skin, too weak to stand, low BP, rapid pulse)    Negative: Amputation of penis    Negative: Sounds like a life-threatening emergency to the triager    Negative: Pulled muscle in thigh or groin    Negative: Wound looks infected    Protocols used: GENITAL INJURY - MALE-ADULT-, SCROTAL PAIN-ADULT-    "

## 2017-06-09 ENCOUNTER — HOSPITAL ENCOUNTER (OUTPATIENT)
Dept: GENERAL RADIOLOGY | Facility: CLINIC | Age: 52
End: 2017-06-09
Attending: NURSE PRACTITIONER | Admitting: COLON & RECTAL SURGERY
Payer: COMMERCIAL

## 2017-06-09 ENCOUNTER — HOSPITAL ENCOUNTER (OUTPATIENT)
Facility: CLINIC | Age: 52
Discharge: HOME | End: 2017-06-09
Admitting: NURSE PRACTITIONER
Payer: COMMERCIAL

## 2017-06-09 VITALS
HEIGHT: 71 IN | HEART RATE: 86 BPM | DIASTOLIC BLOOD PRESSURE: 85 MMHG | WEIGHT: 290 LBS | SYSTOLIC BLOOD PRESSURE: 116 MMHG | BODY MASS INDEX: 40.6 KG/M2 | RESPIRATION RATE: 18 BRPM | TEMPERATURE: 96.5 F | OXYGEN SATURATION: 97 %

## 2017-06-09 DIAGNOSIS — M54.16 LUMBAR RADICULAR PAIN: ICD-10-CM

## 2017-06-09 PROCEDURE — 62323 NJX INTERLAMINAR LMBR/SAC: CPT

## 2017-06-09 PROCEDURE — 25500064 ZZH RX 255 OP 636: Performed by: NURSE PRACTITIONER

## 2017-06-09 PROCEDURE — 40000863 ZZH STATISTIC RADIOLOGY XRAY, US, CT, MAR, NM

## 2017-06-09 PROCEDURE — 25000128 H RX IP 250 OP 636: Performed by: NURSE PRACTITIONER

## 2017-06-09 PROCEDURE — 25000125 ZZHC RX 250: Performed by: NURSE PRACTITIONER

## 2017-06-09 RX ORDER — DEXAMETHASONE SODIUM PHOSPHATE 10 MG/ML
10 INJECTION, SOLUTION INTRAMUSCULAR; INTRAVENOUS ONCE
Status: COMPLETED | OUTPATIENT
Start: 2017-06-09 | End: 2017-06-09

## 2017-06-09 RX ORDER — IOPAMIDOL 408 MG/ML
10 INJECTION, SOLUTION INTRATHECAL ONCE
Status: COMPLETED | OUTPATIENT
Start: 2017-06-09 | End: 2017-06-09

## 2017-06-09 RX ADMIN — LIDOCAINE HYDROCHLORIDE 10 ML: 10 INJECTION, SOLUTION EPIDURAL; INFILTRATION; INTRACAUDAL; PERINEURAL at 10:32

## 2017-06-09 RX ADMIN — IOPAMIDOL 3 ML: 408 INJECTION, SOLUTION INTRATHECAL at 10:40

## 2017-06-09 RX ADMIN — DEXAMETHASONE SODIUM PHOSPHATE 20 MG: 10 INJECTION, SOLUTION INTRAMUSCULAR; INTRAVENOUS at 10:42

## 2017-06-09 NOTE — DISCHARGE INSTRUCTIONS
Steroid Injection Discharge Instructions     After you go home:      You may resume your normal diet.    Care of Puncture Site:      If you have a bandaid on your puncture site, you may remove it the next morning    You may shower tomorrow    No bath tubs, whirlpools or swimming for at least 3 days     Activity:      You may go back to normal activity in 24 hours    You should let pain be your guide as to the extent of your activities    Maintain any activity limitations as ordered by your provider    Do NOT drive a vehicle until tomorrow    Medicines:      You may resume all medications    Resume your Warfarin/Coumadin at your regular dose today. Follow up with your provider to have your INR rechecked    Resume your Platelet Inhibitors and Aspirin tomorrow at your regular dose    For minor pain, you may take Acetaminophen (Tylenol) or Ibuprofen (Advil)    Pain:       You may experience increased or different pain over the next 24-48 hours    For the next 48 hrs - you may use ice packs for discomfort     Call your primary care doctor if:      You have severe pain that does not improve with pain medication    You have chills or a fever greater than 101 F (38 C)    The site is red, swollen, hot or tender    New problems with your bowel or bladder    Any questions or concerns    Other Instructions:      New numbness down your leg post injection is temporary and may last for up to 6 hours. You may need assistance with activity until your leg has normal sensation.    If you are diabetic, monitor your blood sugar closely. Contact the provider who manages your diabetes to help you control your blood sugar if needed.    For Your Information:      A steroid was injected to help decrease swelling and may help to reduce pain. It may take up to 7-10 days to obtain full results.    Some patients will get lasting relief from a single injection. Others may require up to 3 injections to get results. If you have more than one  steroid injection, they should be given 2 weeks apart.    Side effects of your steroid injection are mild and will go away in 2-3 days  - Insomnia  - Heartburn  - Flushed face  - Water retention  - Increased appetite  - Increased blood sugar      If you have questions call:        [facility] @ [phone]

## 2017-06-09 NOTE — IP AVS SNAPSHOT
[address] [place] [address]    Phone:  [phone]                                       After Visit Summary   6/9/2017    Adarsh Salvador    MRN: [id number]           After Visit Summary Signature Page     I have received my discharge instructions, and my questions have been answered. I have discussed any challenges I see with this plan with the nurse or doctor.    ..........................................................................................................................................  Patient/Patient Representative Signature      ..........................................................................................................................................  Patient Representative Print Name and Relationship to Patient    ..................................................               ................................................  Date                                            Time    ..........................................................................................................................................  Reviewed by Signature/Title    ...................................................              ..............................................  Date                                                            Time

## 2017-06-09 NOTE — PROGRESS NOTES
Care Suites Discharge Summary    Lower back Steroid epidural injection site dry and intact at time of discharge.    Discharge Criteria:   Discharge Criteria met per MD orders: Yes.   Vital signs stable.     Pt demonstrates ability to ambulate safely: Yes.  (See discharge questionnaire for additional information)    Discharge instructions & education:   Discharge instructions reviewed with patient and friend. Patient verbalizes understanding.   Additional patient education provided:  Steroid epidural injection     Medications:   Patient will be discharging on new medications- No. Patient verbalizes reason for use, start date, and side effects NA.    Items returned to patient:   Home and hospital acquired medications returned to patient NA   Listed belongings gathered and returned to patient: yes    Patient discharged to home with friend.     Adriel Galarza

## 2017-06-09 NOTE — IP AVS SNAPSHOT
MRN:[id number]                      After Visit Summary   6/9/2017    Adarsh Salvador    MRN: [id number]           Visit Information        Department      6/9/2017  8:43 AM [facility]          Review of your medicines      UNREVIEWED medicines. Ask your doctor about these medicines        Dose / Directions    albuterol 108 (90 BASE) MCG/ACT Inhaler   Commonly known as:  PROAIR HFA/PROVENTIL HFA/VENTOLIN HFA   Used for:  Cough        Dose:  2 puff   Inhale 2 puffs into the lungs every 6 hours as needed for shortness of breath / dyspnea or wheezing   Quantity:  1 Inhaler   Refills:  1       amLODIPine 10 MG tablet   Commonly known as:  NORVASC   Used for:  Hypertension goal BP (blood pressure) < 140/90        Dose:  10 mg   Take 1 tablet (10 mg) by mouth daily   Quantity:  90 tablet   Refills:  0       ASPIRIN PO        Dose:  325 mg   Take 325 mg by mouth daily   Refills:  0       calcium carbonate 500 MG chewable tablet   Commonly known as:  TUMS        Dose:  1 chew tab   Take 1 chew tab by mouth daily   Refills:  0       cyclobenzaprine 10 MG tablet   Commonly known as:  FLEXERIL   Used for:  Back muscle spasm        Dose:  5-10 mg   Take 0.5-1 tablets (5-10 mg) by mouth 3 times daily as needed for muscle spasms   Quantity:  90 tablet   Refills:  1       dicyclomine 20 MG tablet   Commonly known as:  BENTYL   Used for:  Irritable bowel syndrome with diarrhea, Chronic diarrhea        Dose:  20 mg   Take 1 tablet (20 mg) by mouth 4 times daily as needed   Quantity:  40 tablet   Refills:  5       diphenoxylate-atropine 2.5-0.025 MG per tablet   Commonly known as:  LOMOTIL   Used for:  Chronic diarrhea, Irritable bowel syndrome with diarrhea        Dose:  2 tablet   Take 2 tablets by mouth 4 times daily as needed for diarrhea   Quantity:  30 tablet   Refills:  1       EPINEPHrine 0.3 MG/0.3ML injection   Used for:  Anaphylactic reaction        Dose:  0.3 mg   Inject 0.3 mLs (0.3 mg)  into the muscle once as needed for anaphylaxis   Quantity:  2 each   Refills:  2       fluticasone 100 MCG/BLIST Aepb   Commonly known as:  FLOVENT DISKUS   Used for:  Wheezing        Dose:  1 puff   Inhale 1 puff into the lungs 2 times daily   Quantity:  1 Inhaler   Refills:  1       lisinopril 20 MG tablet   Commonly known as:  PRINIVIL/ZESTRIL   Used for:  Hypertension goal BP (blood pressure) < 140/90        Dose:  20 mg   Take 1 tablet (20 mg) by mouth daily   Quantity:  90 tablet   Refills:  0       mometasone-formoterol 100-5 MCG/ACT oral inhaler   Commonly known as:  DULERA   Used for:  Cough        Dose:  2 puff   Inhale 2 puffs into the lungs 2 times daily Rinse mouth after use.   Quantity:  1 Inhaler   Refills:  3       multivitamin, therapeutic Tabs tablet        Dose:  1 tablet   Take 1 tablet by mouth daily   Refills:  0       naproxen 500 MG tablet   Commonly known as:  NAPROSYN   Used for:  Back muscle spasm, Strain of trapezius muscle, right, initial encounter        Dose:  500 mg   Take 1 tablet (500 mg) by mouth 2 times daily as needed for moderate pain   Quantity:  30 tablet   Refills:  0       pantoprazole 20 MG EC tablet   Commonly known as:  PROTONIX   Used for:  Gastroesophageal reflux disease without esophagitis        Take by mouth 30-60 minutes before a meal.   Quantity:  30 tablet   Refills:  3       sertraline 50 MG tablet   Commonly known as:  ZOLOFT   Used for:  Anxiety        Dose:  50 mg   Take 1 tablet (50 mg) by mouth daily   Quantity:  90 tablet   Refills:  0       simvastatin 20 MG tablet   Commonly known as:  ZOCOR   Used for:  Hyperlipidemia LDL goal <160        Dose:  20 mg   Take 1 tablet (20 mg) by mouth At Bedtime   Quantity:  30 tablet   Refills:  3       TYLENOL PO        Dose:  1000 mg   Take 1,000 mg by mouth every 8 hours as needed for mild pain or fever   Refills:  0       VSL#3 Pack   Used for:  Chronic diarrhea        Use 1-2 capsules/day   Quantity:  30 each    Refills:  3                Protect others around you: Learn how to safely use, store and throw away your medicines at www.disposemymeds.org.         Follow-ups after your visit        Your next 10 appointments already scheduled     Jun 09, 2017 10:00 AM CDT   XR LUMBAR EPIDURAL INJECTION with NERY PAGE IMAGING NURSE, NERY SAMS   [facility] ([facility])    [address] [place] [address] [phone]           For nerve root injection, please send or bring copies of any MRIs or other scans you have had.  Bring a list of your current medicines to your exam. (Include vitamins, minerals and over-the-counter medicines.) Leave your valuables at home.  Plan to have someone drive you home afterward.  Stop taking the following medicines (but talk to your doctor first):   If you take blood thinners, you may need to stop taking them a few days before treatment. Talk to your doctor before stopping these medicines.Stop taking Coumadin (warfarin) 3 days before treatment. Restart the day after treatment.   If you take Plavix, Ticlid, Pletal or Persantine, please ask your doctor if you should stop these medicines. You may need extra tests on the morning of your scan. You may take your other medicines as normal.  Stop all food and drink (including water) 3 hours before your test or treatment.  Please tell the doctor:   If you are allergic to X-ray dye (contrast fluid).   If you may be pregnant.  Injections take about 30 to 45 minutes. Most people spend up to 2 hours in the clinic or hospital.  Please call the Imaging Department at your exam site with any questions            Jun 16, 2017  8:45 AM TERET   RAM[facility] Spine with Jerry Gonzales PT   [facility] ([facility])    [address]  [address]  [place] [address]   [phone]            Jun 21, 2017 10:10 AM TERET   RAM[facility] Spine with Jerry Gonzales PT   [facility] ([facility])    [address]  [address]  [place] [address]   [phone]            Jun 28, 2017 10:10 AM CDT   RAM[facility] Spine with Jerry Gonzales PT   [facility] ([facility])    [address]  [place] [address]   [phone]               Care Instructions        Further instructions from your care team       Steroid Injection Discharge Instructions     After you go home:      You may resume your normal diet.    Care of Puncture Site:      If you have a bandaid on your puncture site, you may remove it the next morning    You may shower tomorrow    No bath tubs, whirlpools or swimming for at least 3 days     Activity:      You may go back to normal activity in 24 hours    You should let pain be your guide as to the extent of your activities    Maintain any activity limitations as ordered by your provider    Do NOT drive a vehicle until tomorrow    Medicines:      You may resume all medications    Resume your Warfarin/Coumadin at your regular dose today. Follow up with your provider to have your INR rechecked    Resume your Platelet Inhibitors and Aspirin tomorrow at your regular dose    For minor pain, you may take Acetaminophen (Tylenol) or Ibuprofen (Advil)    Pain:       You may experience increased or different pain over the next 24-48 hours    For the next 48 hrs - you may use ice packs for discomfort     Call your primary care doctor if:      You have severe pain that does not improve with pain medication    You have chills or a fever greater than 101 F (38 C)    The site is red, swollen, hot or tender    New problems with your bowel or bladder    Any questions or concerns    Other Instructions:      New numbness down your leg post injection is temporary and may last for up to 6 hours. You may need assistance with activity until your leg has normal sensation.    If you are diabetic, monitor your blood sugar closely.  "Contact the provider who manages your diabetes to help you control your blood sugar if needed.    For Your Information:      A steroid was injected to help decrease swelling and may help to reduce pain. It may take up to 7-10 days to obtain full results.    Some patients will get lasting relief from a single injection. Others may require up to 3 injections to get results. If you have more than one steroid injection, they should be given 2 weeks apart.    Side effects of your steroid injection are mild and will go away in 2-3 days  - Insomnia  - Heartburn  - Flushed face  - Water retention  - Increased appetite  - Increased blood sugar      If you have questions call:        [facility] Radiology Dept @ [phone]               Additional Information About Your Visit        [employer] Information     The Personal Bee lets you send messages to your doctor, view your test results, renew your prescriptions, schedule appointments and more. To sign up, go to [email or web address] . Click on \"Log in\" on the left side of the screen, which will take you to the Welcome page. Then click on \"Sign up Now\" on the right side of the page.     You will be asked to enter the access code listed below, as well as some personal information. Please follow the directions to create your username and password.     Your access code is: [email or web address]  Expires: 8/15/2017  1:52 PM     Your access code will  in 90 days. If you need help or a new code, please call your [place] clinic or [phone].        Care EveryWhere ID     This is your Care EveryWhere ID. This could be used by other organizations to access your [place] medical records  [id number]        Your Vitals Were     Blood Pressure Pulse Temperature Respirations Height Weight    148/84 (BP Location: Left arm) 86 96.5  F (35.8  C) (Oral) 18 1.803 m (5' 11\") 131.5 kg (290 lb)    Pulse Oximetry BMI (Body Mass Index)                98% 40.45 kg/m2           Primary Care Provider " Office Phone # Fax #    Bertha Cummins ARIANE Romero [phone]      Thank you!     Thank you for choosing [facility] for your care. Our goal is always to provide you with excellent care. Hearing back from our patients is one way we can continue to improve our services. Please take a few minutes to complete the written survey that you may receive in the mail after you visit with us. Thank you!             Medication List: This is a list of all your medications and when to take them. Check marks below indicate your daily home schedule. Keep this list as a reference.      Medications           Morning Afternoon Evening Bedtime As Needed    albuterol 108 (90 BASE) MCG/ACT Inhaler   Commonly known as:  PROAIR HFA/PROVENTIL HFA/VENTOLIN HFA   Inhale 2 puffs into the lungs every 6 hours as needed for shortness of breath / dyspnea or wheezing                                amLODIPine 10 MG tablet   Commonly known as:  NORVASC   Take 1 tablet (10 mg) by mouth daily                                ASPIRIN PO   Take 325 mg by mouth daily                                calcium carbonate 500 MG chewable tablet   Commonly known as:  TUMS   Take 1 chew tab by mouth daily                                cyclobenzaprine 10 MG tablet   Commonly known as:  FLEXERIL   Take 0.5-1 tablets (5-10 mg) by mouth 3 times daily as needed for muscle spasms                                dicyclomine 20 MG tablet   Commonly known as:  BENTYL   Take 1 tablet (20 mg) by mouth 4 times daily as needed                                diphenoxylate-atropine 2.5-0.025 MG per tablet   Commonly known as:  LOMOTIL   Take 2 tablets by mouth 4 times daily as needed for diarrhea                                EPINEPHrine 0.3 MG/0.3ML injection   Inject 0.3 mLs (0.3 mg) into the muscle once as needed for anaphylaxis                                fluticasone 100 MCG/BLIST Aepb   Commonly known as:  FLOVENT DISKUS   Inhale 1 puff into the lungs 2  times daily                                lisinopril 20 MG tablet   Commonly known as:  PRINIVIL/ZESTRIL   Take 1 tablet (20 mg) by mouth daily                                mometasone-formoterol 100-5 MCG/ACT oral inhaler   Commonly known as:  DULERA   Inhale 2 puffs into the lungs 2 times daily Rinse mouth after use.                                multivitamin, therapeutic Tabs tablet   Take 1 tablet by mouth daily                                naproxen 500 MG tablet   Commonly known as:  NAPROSYN   Take 1 tablet (500 mg) by mouth 2 times daily as needed for moderate pain                                pantoprazole 20 MG EC tablet   Commonly known as:  PROTONIX   Take by mouth 30-60 minutes before a meal.                                sertraline 50 MG tablet   Commonly known as:  ZOLOFT   Take 1 tablet (50 mg) by mouth daily                                simvastatin 20 MG tablet   Commonly known as:  ZOCOR   Take 1 tablet (20 mg) by mouth At Bedtime                                TYLENOL PO   Take 1,000 mg by mouth every 8 hours as needed for mild pain or fever                                VSL#3 Pack   Use 1-2 capsules/day

## 2017-06-11 DIAGNOSIS — I10 HYPERTENSION GOAL BP (BLOOD PRESSURE) < 140/90: ICD-10-CM

## 2017-06-11 DIAGNOSIS — E78.5 HYPERLIPIDEMIA LDL GOAL <160: Primary | ICD-10-CM

## 2017-06-11 NOTE — LETTER
[facility]                                           [address] [place]  [address]    June 14, 2017    Adarsh Salvador  [address]  [place] [address]    Dear Adarsh,       We recently received a refill request for lisinopril (PRINIVIL/ZESTRIL) 20 MG tablet.  We have refilled this for a one time 30 day supply only because you are due for a:    Blood pressure office visit with provider and fasting lab appointment      Please schedule this lab appointment 4-5 days prior to the office visit.     Please call at your earliest convenience so that there will not be a delay with your future refills.          Thank you,   Your [facility] Care Team/  [phone]

## 2017-06-13 RX ORDER — LISINOPRIL 20 MG/1
TABLET ORAL
Qty: 30 TABLET | Refills: 0 | Status: SHIPPED | OUTPATIENT
Start: 2017-06-13 | End: 2017-12-12

## 2017-06-14 NOTE — TELEPHONE ENCOUNTER
Please review lab orders sign and close encounter. Dalila Tinajero MA/[place]    Mailed letter that lab and provider appt are due

## 2017-06-21 ENCOUNTER — THERAPY VISIT (OUTPATIENT)
Dept: PHYSICAL THERAPY | Facility: CLINIC | Age: 52
End: 2017-06-21
Payer: COMMERCIAL

## 2017-06-21 DIAGNOSIS — M54.5 CHRONIC LEFT-SIDED LOW BACK PAIN, WITH SCIATICA PRESENCE UNSPECIFIED: ICD-10-CM

## 2017-06-21 DIAGNOSIS — I10 HYPERTENSION GOAL BP (BLOOD PRESSURE) < 140/90: ICD-10-CM

## 2017-06-21 DIAGNOSIS — G89.29 CHRONIC LEFT-SIDED LOW BACK PAIN, WITH SCIATICA PRESENCE UNSPECIFIED: ICD-10-CM

## 2017-06-21 DIAGNOSIS — M54.2 CERVICALGIA: ICD-10-CM

## 2017-06-21 PROCEDURE — 97140 MANUAL THERAPY 1/> REGIONS: CPT | Mod: GP | Performed by: PHYSICAL THERAPIST

## 2017-06-21 PROCEDURE — 97110 THERAPEUTIC EXERCISES: CPT | Mod: GP | Performed by: PHYSICAL THERAPIST

## 2017-06-21 RX ORDER — AMLODIPINE BESYLATE 10 MG/1
10 TABLET ORAL DAILY
Qty: 30 TABLET | Refills: 0 | Status: SHIPPED | OUTPATIENT
Start: 2017-06-21 | End: 2017-08-29

## 2017-06-21 NOTE — MR AVS SNAPSHOT
"              After Visit Summary   6/21/2017    Adarsh Salvador    MRN: [id number]           Patient Information     Date Of Birth          1965        Visit Information        Provider Department      6/21/2017 10:10 AM Jerry Gonzales, [facility]        Today's Diagnoses     Chronic left-sided low back pain, with sciatica presence unspecified        Cervicalgia           Follow-ups after your visit        Your next 10 appointments already scheduled     Jun 28, 2017 10:10 AM CDT   RAM[facility] Spine with Jerry Gonzales PT   [facility] ([facility])    [address]  [address]  [place] [address] [phone]              Who to contact     If you have questions or need follow up information about today's clinic visit or your schedule please contact [facility] directly at [phone].  Normal or non-critical lab and imaging results will be communicated to you by Y-Clientshart, letter or phone within 4 business days after the clinic has received the results. If you do not hear from us within 7 days, please contact the clinic through [employer]-Clients[employer] or phone. If you have a critical or abnormal lab result, we will notify you by phone as soon as possible.  Submit refill requests through Proximetry or call your pharmacy and they will forward the refill request to us. Please allow 3 business days for your refill to be completed.          Additional Information About Your Visit        MyChart Information     Proximetry lets you send messages to your doctor, view your test results, renew your prescriptions, schedule appointments and more. To sign up, go to [email or web address] . Click on \"Log in\" on the left side of the screen, which will take you to the Welcome page. Then click on \"Sign up Now\" on the right side of the page.     You will be asked to enter the access code listed below, as well as some personal information. Please " follow the directions to create your username and password.     Your access code is: [email or web address]  Expires: 8/15/2017  1:52 PM     Your access code will  in 90 days. If you need help or a new code, please call your [place] clinic or [phone].        Care EveryWhere ID     This is your Care EveryWhere ID. This could be used by other organizations to access your [place] medical records  [id number]         Blood Pressure from Last 3 Encounters:   17 116/85   17 109/56   17 (!) 160/100    Weight from Last 3 Encounters:   17 131.5 kg (290 lb)   17 133.8 kg (295 lb)   17 134.4 kg (296 lb 6.4 oz)              We Performed the Following     Manual Ther Tech, 1+Regions, EA 15 min     Therapeutic Exercises        Primary Care Provider Office Phone # Fax #    Bertha Romero PA-C [phone]       [address] [address]        Equal Access to Services     SIMONE ZAYAS : Hadii aad ku hadasho Soomaali, waaxda luqadaha, qaybta kaalmada adeegyada, victor hugo saha haypatricen paolo armendariz . So [facility] [phone].    ATENCIÓN: Si habla español, tiene a lyons disposición servicios gratuitos de asistencia lingüística. [facility] [phone].    We comply with applicable federal civil rights laws and [place] laws. We do not discriminate on the basis of race, color, national origin, age, disability sex, sexual orientation or gender identity.            Thank you!     Thank you for choosing [facility]  for your care. Our goal is always to provide you with excellent care. Hearing back from our patients is one way we can continue to improve our services. Please take a few minutes to complete the written survey that you may receive in the mail after your visit with us. Thank you!             Your Updated Medication List - Protect others around you: Learn how to safely use, store and throw away your  medicines at www.disposemymeds.org.          This list is accurate as of: 6/21/17 10:51 AM.  Always use your most recent med list.                   Brand Name Dispense Instructions for use Diagnosis    albuterol 108 (90 BASE) MCG/ACT Inhaler    PROAIR HFA/PROVENTIL HFA/VENTOLIN HFA    1 Inhaler    Inhale 2 puffs into the lungs every 6 hours as needed for shortness of breath / dyspnea or wheezing    Cough       amLODIPine 10 MG tablet    NORVASC    90 tablet    Take 1 tablet (10 mg) by mouth daily    Hypertension goal BP (blood pressure) < 140/90       ASPIRIN PO      Take 325 mg by mouth daily        calcium carbonate 500 MG chewable tablet    TUMS     Take 1 chew tab by mouth daily        cyclobenzaprine 10 MG tablet    FLEXERIL    90 tablet    Take 0.5-1 tablets (5-10 mg) by mouth 3 times daily as needed for muscle spasms    Back muscle spasm       dicyclomine 20 MG tablet    BENTYL    40 tablet    Take 1 tablet (20 mg) by mouth 4 times daily as needed    Irritable bowel syndrome with diarrhea, Chronic diarrhea       diphenoxylate-atropine 2.5-0.025 MG per tablet    LOMOTIL    30 tablet    Take 2 tablets by mouth 4 times daily as needed for diarrhea    Chronic diarrhea, Irritable bowel syndrome with diarrhea       EPINEPHrine 0.3 MG/0.3ML injection     2 each    Inject 0.3 mLs (0.3 mg) into the muscle once as needed for anaphylaxis    Anaphylactic reaction       fluticasone 100 MCG/BLIST Aepb    FLOVENT DISKUS    1 Inhaler    Inhale 1 puff into the lungs 2 times daily    Wheezing       lisinopril 20 MG tablet    PRINIVIL/ZESTRIL    30 tablet    TAKE 1 TABLET EVERY DAY    Hypertension goal BP (blood pressure) < 140/90       mometasone-formoterol 100-5 MCG/ACT oral inhaler    DULERA    1 Inhaler    Inhale 2 puffs into the lungs 2 times daily Rinse mouth after use.    Cough       multivitamin, therapeutic Tabs tablet      Take 1 tablet by mouth daily        naproxen 500 MG tablet    NAPROSYN    30 tablet    Take 1  tablet (500 mg) by mouth 2 times daily as needed for moderate pain    Back muscle spasm, Strain of trapezius muscle, right, initial encounter       pantoprazole 20 MG EC tablet    PROTONIX    30 tablet    Take by mouth 30-60 minutes before a meal.    Gastroesophageal reflux disease without esophagitis       sertraline 50 MG tablet    ZOLOFT    90 tablet    Take 1 tablet (50 mg) by mouth daily    Anxiety       simvastatin 20 MG tablet    ZOCOR    30 tablet    Take 1 tablet (20 mg) by mouth At Bedtime    Hyperlipidemia LDL goal <160       TYLENOL PO      Take 1,000 mg by mouth every 8 hours as needed for mild pain or fever        VSL#3 Pack     30 each    Use 1-2 capsules/day    Chronic diarrhea

## 2017-06-21 NOTE — PROGRESS NOTES
Subjective:    HPI                    Objective:    System    Physical Exam    General     ROS    Assessment/Plan:      SUBJECTIVE  Subjective: Pt reports neck is improving, although isn't perfect yet.  Has had another injection in the back since last PT appt and he reports the back feels quite good but leg is unchanged.   Current Pain level: 4/10 (at neck)   Changes in function:  Yes (See Goal flowsheet attached for changes in current functional level)     Adverse reaction to treatment or activity:  None    OBJECTIVE  Objective: Tender to palpation B upper traps.  No symptoms centrally or distally with standing trunk AROM all directions.     ASSESSMENT  Adarsh continues to require intervention to meet STG and LTG's: PT  Patient is progressing as expected.  Response to therapy has shown an improvement in  function  Progress made towards STG/LTG?  Yes (See Goal flowsheet attached for updates on achievement of STG and LTG)    PLAN  Pt appears to be progressing overall.  One visit remains scheduled.  Reassess and determine further course of care.    PTA/ATC plan:  N/A    Please refer to the daily flowsheet for treatment today, total treatment time and time spent performing 1:1 timed codes.

## 2017-06-21 NOTE — TELEPHONE ENCOUNTER
Medication is being filled for 1 time refill only due to:  Due for OV for further refills. 30 day supply sent.      Saima Hernandez RN   [facility]

## 2017-06-28 ENCOUNTER — THERAPY VISIT (OUTPATIENT)
Dept: PHYSICAL THERAPY | Facility: CLINIC | Age: 52
End: 2017-06-28
Payer: COMMERCIAL

## 2017-06-28 DIAGNOSIS — M54.2 CERVICALGIA: ICD-10-CM

## 2017-06-28 DIAGNOSIS — M54.5 CHRONIC LEFT-SIDED LOW BACK PAIN, WITH SCIATICA PRESENCE UNSPECIFIED: ICD-10-CM

## 2017-06-28 DIAGNOSIS — G89.29 CHRONIC LEFT-SIDED LOW BACK PAIN, WITH SCIATICA PRESENCE UNSPECIFIED: ICD-10-CM

## 2017-06-28 PROCEDURE — 97140 MANUAL THERAPY 1/> REGIONS: CPT | Mod: GP | Performed by: PHYSICAL THERAPIST

## 2017-06-28 PROCEDURE — 97110 THERAPEUTIC EXERCISES: CPT | Mod: GP | Performed by: PHYSICAL THERAPIST

## 2017-06-28 NOTE — PROGRESS NOTES
"Subjective:    HPI  Oswestry Score: 6.67 %                 Objective:    System    Physical Exam    General     ROS    Assessment/Plan:      PROGRESS  REPORT    Progress reporting period is from 05/03/2017 to today.       SUBJECTIVE  Subjective: Pt reports low back feeling good, although no change to the leg.  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  \"You've given me a bunch of exercises and they all work.\"    Current Pain level: 3/10.     Initial Pain level: 6/10.   Changes in function:  Yes (See Goal flowsheet attached for changes in current functional level)  Adverse reaction to treatment or activity: None    OBJECTIVE  Objective: \"Pulling\" with cervical AROM flexion, retraction but negative extension, B rotation.  No discomfort trunk AROM all directions.  Tender to palpation R > L upper trap trigger points.     ASSESSMENT/PLAN  Updated problem list and treatment plan: Diagnosis 1:  Neck, back pain -- home program  STG/LTGs have been met or progress has been made towards goals:  Yes (See Goal flow sheet completed today.)  Assessment of Progress: The patient's condition is improving.  Self Management Plans:  Patient is independent in a home treatment program.  I have re-evaluated this patient and find that the nature, scope, duration and intensity of the therapy is appropriate for the medical condition of the patient.  Adarsh continues to require the following intervention to meet STG and LTG's:  PT intervention is no longer required to meet STG/LTG.    Recommendations:  Given progress, pt agrees no further PT scheduled.  He will return or let me know if there are further issues.  Otherwise will consider him discharged if I haven't heard from him in 60 days.    Please refer to the daily flowsheet for treatment today, total treatment time and time spent performing 1:1 timed codes.                "

## 2017-06-28 NOTE — MR AVS SNAPSHOT
"              After Visit Summary   2017    Adarsh Salvador    MRN: [id number]           Patient Information     Date Of Birth          1965        Visit Information        Provider Department      2017 10:10 AM Jerry Gonzales PT [facility]        Today's Diagnoses     Chronic left-sided low back pain, with sciatica presence unspecified        Cervicalgia           Follow-ups after your visit        Who to contact     If you have questions or need follow up information about today's clinic visit or your schedule please contact [facility] directly at [phone].  Normal or non-critical lab and imaging results will be communicated to you by [employer] Systemshart, letter or phone within 4 business days after the clinic has received the results. If you do not hear from us within 7 days, please contact the clinic through [employer] System[employer]art or phone. If you have a critical or abnormal lab result, we will notify you by phone as soon as possible.  Submit refill requests through [employer]ia or call your pharmacy and they will forward the refill request to us. Please allow 3 business days for your refill to be completed.          Additional Information About Your Visit        MyChart Information     Ph[employer]ia lets you send messages to your doctor, view your test results, renew your prescriptions, schedule appointments and more. To sign up, go to [email or web address] . Click on \"Log in\" on the left side of the screen, which will take you to the Welcome page. Then click on \"Sign up Now\" on the right side of the page.     You will be asked to enter the access code listed below, as well as some personal information. Please follow the directions to create your username and password.     Your access code is: [email or web address]  Expires: 8/15/2017  1:52 PM     Your access code will  in 90 days. If you need help or a new code, please call your [place] clinic or [phone].        Care " EveryWhere ID     This is your Care EveryWhere ID. This could be used by other organizations to access your [place] medical records  [id number]         Blood Pressure from Last 3 Encounters:   06/09/17 116/85   05/19/17 109/56   04/28/17 (!) 160/100    Weight from Last 3 Encounters:   06/09/17 131.5 kg (290 lb)   05/19/17 133.8 kg (295 lb)   04/07/17 134.4 kg (296 lb 6.4 oz)              We Performed the Following     Manual Ther Tech, 1+Regions, EA 15 min     Therapeutic Exercises        Primary Care Provider Office Phone # Fax #    Bertha Romero PA-C [phone]       [facility] [id number] [place] [address]        Equal Access to Services     SIMONE ZAYAS : Hadii aad ku hadasho Soomaali, waaxda luqadaha, qaybta kaalmada adeegyada, waxay yifan haydave armendariz . So [facility] [phone].    ATENCIÓN: Si habla español, tiene a lyons disposición servicios gratuitos de asistencia lingüística. Imani al [phone].    We comply with applicable federal civil rights laws and [place] laws. We do not discriminate on the basis of race, color, national origin, age, disability sex, sexual orientation or gender identity.            Thank you!     Thank you for choosing [facility]   for your care. Our goal is always to provide you with excellent care. Hearing back from our patients is one way we can continue to improve our services. Please take a few minutes to complete the written survey that you may receive in the mail after your visit with us. Thank you!             Your Updated Medication List - Protect others around you: Learn how to safely use, store and throw away your medicines at www.disposemymeds.org.          This list is accurate as of: 6/28/17 10:47 AM.  Always use your most recent med list.                   Brand Name Dispense Instructions for use Diagnosis    albuterol 108 (90 BASE) MCG/ACT Inhaler    PROAIR HFA/PROVENTIL  HFA/VENTOLIN HFA    1 Inhaler    Inhale 2 puffs into the lungs every 6 hours as needed for shortness of breath / dyspnea or wheezing    Cough       amLODIPine 10 MG tablet    NORVASC    30 tablet    Take 1 tablet (10 mg) by mouth daily Due for appointment for refills    Hypertension goal BP (blood pressure) < 140/90       ASPIRIN PO      Take 325 mg by mouth daily        calcium carbonate 500 MG chewable tablet    TUMS     Take 1 chew tab by mouth daily        cyclobenzaprine 10 MG tablet    FLEXERIL    90 tablet    Take 0.5-1 tablets (5-10 mg) by mouth 3 times daily as needed for muscle spasms    Back muscle spasm       dicyclomine 20 MG tablet    BENTYL    40 tablet    Take 1 tablet (20 mg) by mouth 4 times daily as needed    Irritable bowel syndrome with diarrhea, Chronic diarrhea       diphenoxylate-atropine 2.5-0.025 MG per tablet    LOMOTIL    30 tablet    Take 2 tablets by mouth 4 times daily as needed for diarrhea    Chronic diarrhea, Irritable bowel syndrome with diarrhea       EPINEPHrine 0.3 MG/0.3ML injection     2 each    Inject 0.3 mLs (0.3 mg) into the muscle once as needed for anaphylaxis    Anaphylactic reaction       fluticasone 100 MCG/BLIST Aepb    FLOVENT DISKUS    1 Inhaler    Inhale 1 puff into the lungs 2 times daily    Wheezing       lisinopril 20 MG tablet    PRINIVIL/ZESTRIL    30 tablet    TAKE 1 TABLET EVERY DAY    Hypertension goal BP (blood pressure) < 140/90       mometasone-formoterol 100-5 MCG/ACT oral inhaler    DULERA    1 Inhaler    Inhale 2 puffs into the lungs 2 times daily Rinse mouth after use.    Cough       multivitamin, therapeutic Tabs tablet      Take 1 tablet by mouth daily        naproxen 500 MG tablet    NAPROSYN    30 tablet    Take 1 tablet (500 mg) by mouth 2 times daily as needed for moderate pain    Back muscle spasm, Strain of trapezius muscle, right, initial encounter       pantoprazole 20 MG EC tablet    PROTONIX    30 tablet    Take by mouth 30-60 minutes  before a meal.    Gastroesophageal reflux disease without esophagitis       sertraline 50 MG tablet    ZOLOFT    90 tablet    Take 1 tablet (50 mg) by mouth daily    Anxiety       simvastatin 20 MG tablet    ZOCOR    30 tablet    Take 1 tablet (20 mg) by mouth At Bedtime    Hyperlipidemia LDL goal <160       TYLENOL PO      Take 1,000 mg by mouth every 8 hours as needed for mild pain or fever        VSL#3 Pack     30 each    Use 1-2 capsules/day    Chronic diarrhea

## 2017-06-29 ENCOUNTER — TELEPHONE (OUTPATIENT)
Dept: FAMILY MEDICINE | Facility: CLINIC | Age: 52
End: 2017-06-29

## 2017-06-29 NOTE — TELEPHONE ENCOUNTER
"Patient states that he is having dizziness.  States room is spinning continuously and can't even get up to go to bathroom.  Onset 10pm last night;  Was at work when started.  Did drive self home.  States is ok when lying down but difficult to sit up due to dizziness.  Room is spinning.  He states has not been drinking and doesn't use drugs.  Denies any other symptoms.  Had had an incidence of dizziness once prior but never went in;  \"It eventually went away\".  He was offered appointment today at 1:20; instructed will need .  He said can't wait that long has to go to work this afternoon.  Says not working is not an option for him.  Instruced him ED now or urgent care [facility] at 11am as his other options.  He states he will \"figure it out\".    Did cancel the 1:20 appointment scheduling had made with EVAN Romero PA-C for today per his request.  Lena Casillas RN    "

## 2017-07-12 ENCOUNTER — OFFICE VISIT (OUTPATIENT)
Dept: FAMILY MEDICINE | Facility: CLINIC | Age: 52
End: 2017-07-12
Payer: COMMERCIAL

## 2017-07-12 VITALS
DIASTOLIC BLOOD PRESSURE: 90 MMHG | HEIGHT: 71 IN | OXYGEN SATURATION: 97 % | TEMPERATURE: 97 F | BODY MASS INDEX: 41.16 KG/M2 | WEIGHT: 294 LBS | SYSTOLIC BLOOD PRESSURE: 138 MMHG | HEART RATE: 80 BPM

## 2017-07-12 DIAGNOSIS — M79.652 PAIN OF LEFT THIGH: Primary | ICD-10-CM

## 2017-07-12 PROCEDURE — 99213 OFFICE O/P EST LOW 20 MIN: CPT | Performed by: PHYSICIAN ASSISTANT

## 2017-07-12 RX ORDER — GABAPENTIN 300 MG/1
CAPSULE ORAL
Qty: 90 CAPSULE | Refills: 0 | Status: SHIPPED | OUTPATIENT
Start: 2017-07-12 | End: 2017-10-11

## 2017-07-12 ASSESSMENT — ANXIETY QUESTIONNAIRES
3. WORRYING TOO MUCH ABOUT DIFFERENT THINGS: NEARLY EVERY DAY
IF YOU CHECKED OFF ANY PROBLEMS ON THIS QUESTIONNAIRE, HOW DIFFICULT HAVE THESE PROBLEMS MADE IT FOR YOU TO DO YOUR WORK, TAKE CARE OF THINGS AT HOME, OR GET ALONG WITH OTHER PEOPLE: VERY DIFFICULT
2. NOT BEING ABLE TO STOP OR CONTROL WORRYING: NEARLY EVERY DAY
6. BECOMING EASILY ANNOYED OR IRRITABLE: NEARLY EVERY DAY
1. FEELING NERVOUS, ANXIOUS, OR ON EDGE: NEARLY EVERY DAY
7. FEELING AFRAID AS IF SOMETHING AWFUL MIGHT HAPPEN: NEARLY EVERY DAY
5. BEING SO RESTLESS THAT IT IS HARD TO SIT STILL: NEARLY EVERY DAY
GAD7 TOTAL SCORE: 21

## 2017-07-12 ASSESSMENT — PATIENT HEALTH QUESTIONNAIRE - PHQ9: 5. POOR APPETITE OR OVEREATING: NEARLY EVERY DAY

## 2017-07-12 NOTE — PROGRESS NOTES
SUBJECTIVE:                                                    Adarsh Salvador is a 52 year old male who presents to clinic today for the following health issues:        Musculoskeletal problem/pain      Duration: on and off 2 years    Description  Location: left upper leg    Intensity:  moderate    Accompanying signs and symptoms: see below    History  Previous similar problem: no   Previous evaluation:  none    Precipitating or alleviating factors:  Trauma or overuse: no   Aggravating factors include: standing, walking, climbing stairs and lifting    Therapies tried and outcome: rest/inactivity, ice and NSAID - aleve - not effective    He has had injections before, which have helped the pain, but his most recent injection did not relieve the pain in his thigh. He denies any of his back pain today, states it has resolved with his most recent injection.    Feels stabbing pain in his left thigh. Decreased sensation of the skin in the area of the pain. Denies tingling. Any pressure or hitting the leg causes more pain. Normal range of motion of the leg without any change in his symptoms. Denies loss of control of his bowel bladder. He will have weakness in the leg when the pain is at its worse. He denies any foot drop, tripping or falling due to leg weakness.  He has not seen orthopedics for evaluation.   He has not tried gabapentin.   No known injury to the thigh.  Ambulating with a limp by the evening.    Ears feel plugged bilaterally. He denies pain, drainage, and hearing loss. He is having some nasal congestion. Denies any other symptoms or concerns.       Problem list and histories reviewed & adjusted, as indicated.  Additional history: as documented    Patient Active Problem List   Diagnosis     GERD (gastroesophageal reflux disease)     Kidney stone     Chronic RLQ pain     Hyperlipidemia LDL goal <160     Hypertension goal BP (blood pressure) < 140/90     Constipation     Abdominal pain, right upper quadrant      "Adult celiac disease     Chronic maxillary sinusitis     Chronic rhinitis     Obesity (BMI 30-39.9)     Pure hyperglyceridemia     Anaphylactic reaction     Benign essential hypertension     Anemia in other chronic diseases classified elsewhere     Fatty liver     Irritable bowel syndrome with diarrhea     Right inguinal hernia     BMI 40.0-44.9, adult (H)     Cervicalgia     Pain in thoracic spine     Chronic left-sided low back pain, with sciatica presence unspecified     Past Surgical History:   Procedure Laterality Date     C REMOVAL OF KIDNEY STONE       COLONOSCOPY  5/1/2012    Procedure:COLONOSCOPY; screening colonoscopy; Surgeon:KINGSLEY DOS SANTOS; Location:[facility] OR     COLONOSCOPY  4/21/2014    Procedure: COMBINED COLONOSCOPY, SINGLE BIOPSY/POLYPECTOMY BY BIOPSY;  Surgeon: Duane, William Charles, MD;  Location: [facility] OR     HC BREATH HYDROGEN TEST  3/7/2014    Procedure: HYDROGEN BREATH TEST;  Surgeon: Darion Swift MD;  Location:  GI     ORTHOPEDIC SURGERY      x3 Rt knee        Social History   Substance Use Topics     Smoking status: Never Smoker     Smokeless tobacco: Current User     Types: Chew      Comment: Chew     Alcohol use No      Comment: \"quart a year\" 2012     Family History   Problem Relation Age of Onset     CANCER Mother 52     lung, smoked     Cancer - colorectal Father 53     unsure type, pt attributes to radiation exposure     Myocardial Infarction Father 45     Myocardial Infarction Paternal Grandfather 35     DIABETES Other      nephew- type 1     DIABETES Maternal Aunt      1     DIABETES Maternal Aunt      2     DIABETES Paternal Uncle      1     DIABETES Paternal Uncle      2     DIABETES Paternal Uncle      3     DIABETES Paternal Uncle      4         Current Outpatient Prescriptions   Medication Sig Dispense Refill     amLODIPine (NORVASC) 10 MG tablet Take 1 tablet (10 mg) by mouth daily Due for appointment for refills 30 tablet 0     lisinopril (PRINIVIL/ZESTRIL) 20 MG " tablet TAKE 1 TABLET EVERY DAY 30 tablet 0     ASPIRIN PO Take 325 mg by mouth daily       cyclobenzaprine (FLEXERIL) 10 MG tablet Take 0.5-1 tablets (5-10 mg) by mouth 3 times daily as needed for muscle spasms 90 tablet 1     sertraline (ZOLOFT) 50 MG tablet Take 1 tablet (50 mg) by mouth daily 90 tablet 0     naproxen (NAPROSYN) 500 MG tablet Take 1 tablet (500 mg) by mouth 2 times daily as needed for moderate pain 30 tablet 0     mometasone-formoterol (DULERA) 100-5 MCG/ACT oral inhaler Inhale 2 puffs into the lungs 2 times daily Rinse mouth after use. 1 Inhaler 3     fluticasone (FLOVENT DISKUS) 100 MCG/BLIST AEPB Inhale 1 puff into the lungs 2 times daily 1 Inhaler 1     calcium carbonate (TUMS) 500 MG chewable tablet Take 1 chew tab by mouth daily       albuterol (PROAIR HFA, PROVENTIL HFA, VENTOLIN HFA) 108 (90 BASE) MCG/ACT inhaler Inhale 2 puffs into the lungs every 6 hours as needed for shortness of breath / dyspnea or wheezing 1 Inhaler 1     dicyclomine (BENTYL) 20 MG tablet Take 1 tablet (20 mg) by mouth 4 times daily as needed 40 tablet 5     diphenoxylate-atropine (LOMOTIL) 2.5-0.025 MG per tablet Take 2 tablets by mouth 4 times daily as needed for diarrhea 30 tablet 1     Acetaminophen (TYLENOL PO) Take 1,000 mg by mouth every 8 hours as needed for mild pain or fever       pantoprazole (PROTONIX) 20 MG tablet Take by mouth 30-60 minutes before a meal. 30 tablet 3     multivitamin, therapeutic (THERA-VIT) TABS Take 1 tablet by mouth daily       simvastatin (ZOCOR) 20 MG tablet Take 1 tablet (20 mg) by mouth At Bedtime 30 tablet 3     EPINEPHrine (EPIPEN) 0.3 MG/0.3ML injection Inject 0.3 mLs (0.3 mg) into the muscle once as needed for anaphylaxis 2 each 2     Dietary Management Product (VSL#3) PACK Use 1-2 capsules/day 30 each 3     Allergies   Allergen Reactions     Artificial Sweetner (Informational Only) [Artificial Sweetner (Informational Only) ] GI Disturbance     ALL artificial sweetners cause  "severe diarrhea & flu symptoms     Hydromorphone Anaphylaxis     Morphine [Fumaric Acid] Anaphylaxis     Hydrocodone-Acetaminophen Itching     Tramadol Hives     Trazodone Hives     Codeine Phosphate Itching     Ketorolac Tromethamine Rash     BP Readings from Last 3 Encounters:   07/12/17 (!) 158/99   06/09/17 116/85   05/19/17 109/56    Wt Readings from Last 3 Encounters:   07/12/17 294 lb (133.4 kg)   06/09/17 290 lb (131.5 kg)   05/19/17 295 lb (133.8 kg)                    Reviewed and updated as needed this visit by clinical staff       Reviewed and updated as needed this visit by Provider         ROS:  Constitutional, HEENT, cardiovascular, pulmonary, musculoskeletal and integumentary systems are negative, except as otherwise noted.    OBJECTIVE:     BP (!) 158/99  Pulse 80  Temp 97  F (36.1  C) (Oral)  Ht 5' 11\" (1.803 m)  Wt 294 lb (133.4 kg)  SpO2 97%  BMI 41 kg/m2  Body mass index is 41 kg/(m^2).  GENERAL: healthy, alert and no distress  HENT: normal cephalic/atraumatic, ear canals and TM's normal, nose and mouth without ulcers or lesions, oropharynx clear and oral mucous membranes moist  NECK: no adenopathy, no asymmetry, masses, or scars and thyroid normal to palpation  RESP: lungs clear to auscultation - no rales, rhonchi or wheezes  CV: regular rate and rhythm, normal S1 S2, no S3 or S4, no murmur, click or rub, no peripheral edema and peripheral pulses strong  MS: Left thigh - no tenderness to light palpation, moderate tenderness to deep palpation of the anterior thigh, decreased sensation of the skin on the anterior thigh, normal range of motion of the leg without any pain, ambulating without a limp, DTR's +2 bilaterally, normal distal sensation, posterior tibial pulses +2 bilaterally, no obvious deformity observed or palpated  SKIN: no suspicious lesions or rashes        ASSESSMENT/PLAN:       ICD-10-CM    1. Pain of left thigh M79.652 gabapentin (NEURONTIN) 300 MG capsule     ORTHO  " REFERRAL     Suspect BP is elevated due to pain. Patient was advised to return to pharmacy in 2-3 weeks for recheck of his BP. He understood and agreed.    Patient Instructions   Take the gabapentin as prescribed.    May continue motrin and tylenol for discomfort.    May try wearing an ace wrap to help support and compress the thigh while at work.    Follow up with orthopedics for further evaluation.       Carolina Bernardo PA-C  [facility]

## 2017-07-12 NOTE — PATIENT INSTRUCTIONS
Take the gabapentin as prescribed.    May continue motrin and tylenol for discomfort.    May try wearing an ace wrap to help support and compress the thigh while at work.    Follow up with orthopedics for further evaluation.

## 2017-07-12 NOTE — MR AVS SNAPSHOT
After Visit Summary   7/12/2017    Adarsh Salvador    MRN: [id number]           Patient Information     Date Of Birth          1965        Visit Information        Provider Department      7/12/2017 9:40 AM Carolina Bernardo PA-C [facility]        Today's Diagnoses     Pain of left thigh    -  1      Care Instructions    Take the gabapentin as prescribed.    May continue motrin and tylenol for discomfort.    May try wearing an ace wrap to help support and compress the thigh while at work.    Follow up with orthopedics for further evaluation.           Follow-ups after your visit        Additional Services     ORTHO  REFERRAL       [facility] is referring you to the Orthopedic  Services at [facility].       The  Representative will assist you in the coordination of your Orthopedic and Musculoskeletal Care as prescribed by your physician.    The  Representative will call you within 1 business day to help schedule your appointment, or you may contact the  Representative at:    All areas ~ [phone]     Type of Referral : Non Surgical       Timeframe requested: 1 - 2 days    Coverage of these services is subject to the terms and limitations of your health insurance plan.  Please call member services at your health plan with any benefit or coverage questions.      If X-rays, CT or MRI's have been performed, please contact the facility where they were done to arrange for , prior to your scheduled appointment.  Please bring this referral request to your appointment and present it to your specialist.                  Who to contact     If you have questions or need follow up information about today's clinic visit or your schedule please contact [facility] directly at [phone].  Normal or non-critical lab and imaging results will be communicated to you by MyChart, letter or phone  "within 4 business days after the clinic has received the results. If you do not hear from us within 7 days, please contact the clinic through [employer] or phone. If you have a critical or abnormal lab result, we will notify you by phone as soon as possible.  Submit refill requests through [employer] or call your pharmacy and they will forward the refill request to us. Please allow 3 business days for your refill to be completed.          Additional Information About Your Visit        Uniiverse Information     Uniiverse lets you send messages to your doctor, view your test results, renew your prescriptions, schedule appointments and more. To sign up, go to [email or web address] . Click on \"Log in\" on the left side of the screen, which will take you to the Welcome page. Then click on \"Sign up Now\" on the right side of the page.     You will be asked to enter the access code listed below, as well as some personal information. Please follow the directions to create your username and password.     Your access code is: [email or web address]  Expires: 8/15/2017  1:52 PM     Your access code will  in 90 days. If you need help or a new code, please call your [employer] clinic or [phone].        Care EveryWhere ID     This is your Care EveryWhere ID. This could be used by other organizations to access your [employer] medical records  [id number]        Your Vitals Were     Pulse Temperature Height Pulse Oximetry BMI (Body Mass Index)       80 97  F (36.1  C) (Oral) 5' 11\" (1.803 m) 97% 41 kg/m2        Blood Pressure from Last 3 Encounters:   17 (!) 158/99   17 116/85   17 109/56    Weight from Last 3 Encounters:   17 294 lb (133.4 kg)   17 290 lb (131.5 kg)   17 295 lb (133.8 kg)              We Performed the Following     ORTHO  REFERRAL          Today's Medication Changes          These changes are accurate as of: 17 10:11 AM.  If you have any questions, ask your nurse or doctor.    "            Start taking these medicines.        Dose/Directions    gabapentin 300 MG capsule   Commonly known as:  NEURONTIN   Used for:  Pain of left thigh   Started by:  Carolina Bernardo PA-C        Take 1 tablet (300 mg) every night for 1-3 days, then 1 tablet twice daily for 1-3 days, then 1 tablet three times daily   Quantity:  90 capsule   Refills:  0            Where to get your medicines      These medications were sent to [facility]/pharmacy #[id number] - [place], [place] - [address]  [address] [address]     Phone:  [phone]     gabapentin 300 MG capsule                Primary Care Provider Office Phone # Fax #    Bertha Placido Romero PA-C [phone]       [facility] [address] [place] [address]        Equal Access to Services     SIMONE ZAYAS : Hadii aad ku hadasho Soomaali, waaxda luqadaha, qaybta kaalmada adeegyada, victor hugo armendariz . So [facility] [phone].    ATENCIÓN: Si habla español, tiene a lyons disposición servicios gratuitos de asistencia lingüística. Imani al [phone].    We comply with applicable federal civil rights laws and [place] laws. We do not discriminate on the basis of race, color, national origin, age, disability sex, sexual orientation or gender identity.            Thank you!     Thank you for choosing [facility]  for your care. Our goal is always to provide you with excellent care. Hearing back from our patients is one way we can continue to improve our services. Please take a few minutes to complete the written survey that you may receive in the mail after your visit with us. Thank you!             Your Updated Medication List - Protect others around you: Learn how to safely use, store and throw away your medicines at www.disposemymeds.org.          This list is accurate as of: 7/12/17 10:11 AM.  Always use your most recent med list.                   Brand Name Dispense  Instructions for use Diagnosis    albuterol 108 (90 BASE) MCG/ACT Inhaler    PROAIR HFA/PROVENTIL HFA/VENTOLIN HFA    1 Inhaler    Inhale 2 puffs into the lungs every 6 hours as needed for shortness of breath / dyspnea or wheezing    Cough       amLODIPine 10 MG tablet    NORVASC    30 tablet    Take 1 tablet (10 mg) by mouth daily Due for appointment for refills    Hypertension goal BP (blood pressure) < 140/90       ASPIRIN PO      Take 325 mg by mouth daily        calcium carbonate 500 MG chewable tablet    TUMS     Take 1 chew tab by mouth daily        cyclobenzaprine 10 MG tablet    FLEXERIL    90 tablet    Take 0.5-1 tablets (5-10 mg) by mouth 3 times daily as needed for muscle spasms    Back muscle spasm       dicyclomine 20 MG tablet    BENTYL    40 tablet    Take 1 tablet (20 mg) by mouth 4 times daily as needed    Irritable bowel syndrome with diarrhea, Chronic diarrhea       diphenoxylate-atropine 2.5-0.025 MG per tablet    LOMOTIL    30 tablet    Take 2 tablets by mouth 4 times daily as needed for diarrhea    Chronic diarrhea, Irritable bowel syndrome with diarrhea       EPINEPHrine 0.3 MG/0.3ML injection     2 each    Inject 0.3 mLs (0.3 mg) into the muscle once as needed for anaphylaxis    Anaphylactic reaction       fluticasone 100 MCG/BLIST Aepb    FLOVENT DISKUS    1 Inhaler    Inhale 1 puff into the lungs 2 times daily    Wheezing       gabapentin 300 MG capsule    NEURONTIN    90 capsule    Take 1 tablet (300 mg) every night for 1-3 days, then 1 tablet twice daily for 1-3 days, then 1 tablet three times daily    Pain of left thigh       lisinopril 20 MG tablet    PRINIVIL/ZESTRIL    30 tablet    TAKE 1 TABLET EVERY DAY    Hypertension goal BP (blood pressure) < 140/90       mometasone-formoterol 100-5 MCG/ACT oral inhaler    DULERA    1 Inhaler    Inhale 2 puffs into the lungs 2 times daily Rinse mouth after use.    Cough       multivitamin, therapeutic Tabs tablet      Take 1 tablet by mouth  daily        naproxen 500 MG tablet    NAPROSYN    30 tablet    Take 1 tablet (500 mg) by mouth 2 times daily as needed for moderate pain    Back muscle spasm, Strain of trapezius muscle, right, initial encounter       pantoprazole 20 MG EC tablet    PROTONIX    30 tablet    Take by mouth 30-60 minutes before a meal.    Gastroesophageal reflux disease without esophagitis       sertraline 50 MG tablet    ZOLOFT    90 tablet    Take 1 tablet (50 mg) by mouth daily    Anxiety       simvastatin 20 MG tablet    ZOCOR    30 tablet    Take 1 tablet (20 mg) by mouth At Bedtime    Hyperlipidemia LDL goal <160       TYLENOL PO      Take 1,000 mg by mouth every 8 hours as needed for mild pain or fever        VSL#3 Pack     30 each    Use 1-2 capsules/day    Chronic diarrhea

## 2017-07-12 NOTE — NURSING NOTE
"Chief Complaint   Patient presents with     Musculoskeletal Problem     left leg - upper - numbness  no known injury - x 2 years on and off     Ear Problem     plugged       Initial BP (!) 158/99  Pulse 80  Temp 97  F (36.1  C) (Oral)  Ht 5' 11\" (1.803 m)  Wt 294 lb (133.4 kg)  SpO2 97%  BMI 41 kg/m2 Estimated body mass index is 41 kg/(m^2) as calculated from the following:    Height as of this encounter: 5' 11\" (1.803 m).    Weight as of this encounter: 294 lb (133.4 kg).  Medication Reconciliation: complete  Shanthi Momin M.A.    "

## 2017-07-13 ASSESSMENT — PATIENT HEALTH QUESTIONNAIRE - PHQ9: SUM OF ALL RESPONSES TO PHQ QUESTIONS 1-9: 4

## 2017-07-13 ASSESSMENT — ANXIETY QUESTIONNAIRES: GAD7 TOTAL SCORE: 21

## 2017-07-17 NOTE — PROGRESS NOTES
This patient did not return to Physical Therapy to complete their plan of care, thus, full DC status is unknown. Please refer to the SOAP note dated 3/16/17 for discharge information.   This bout of care ranged from 2/21/17 to 3/16/17.  Discharge patient from PT at this time.

## 2017-07-27 ENCOUNTER — OFFICE VISIT (OUTPATIENT)
Dept: ORTHOPEDICS | Facility: CLINIC | Age: 52
End: 2017-07-27
Payer: COMMERCIAL

## 2017-07-27 VITALS
WEIGHT: 296 LBS | DIASTOLIC BLOOD PRESSURE: 84 MMHG | SYSTOLIC BLOOD PRESSURE: 124 MMHG | HEIGHT: 71 IN | HEART RATE: 78 BPM | BODY MASS INDEX: 41.44 KG/M2 | OXYGEN SATURATION: 98 %

## 2017-07-27 DIAGNOSIS — R20.0 NUMBNESS AND TINGLING OF LEFT LEG: ICD-10-CM

## 2017-07-27 DIAGNOSIS — M79.605 LEFT LEG PAIN: Primary | ICD-10-CM

## 2017-07-27 DIAGNOSIS — R20.2 NUMBNESS AND TINGLING OF LEFT LEG: ICD-10-CM

## 2017-07-27 PROCEDURE — 99213 OFFICE O/P EST LOW 20 MIN: CPT | Performed by: FAMILY MEDICINE

## 2017-07-27 ASSESSMENT — PAIN SCALES - GENERAL: PAINLEVEL: WORST PAIN (10)

## 2017-07-27 NOTE — MR AVS SNAPSHOT
After Visit Summary   2017    Adarsh Salvador    MRN: [id number]           Patient Information     Date Of Birth          1965        Visit Information        Provider Department      2017 9:40 AM Vinnie Espinoza, DO [facility]        Today's Diagnoses     Left leg pain    -  1    Numbness and tingling of left leg          Care Instructions    Thanks for coming today.  Ortho/Sports Medicine Clinic  [address] [place] [address]    To schedule future appointments in Ortho Clinic, you may call [phone].    To schedule ordered imaging by your provider:   Call Central Imaging [phone]    To schedule an injection ordered by your provider:  Call Central Imaging Injection scheduling line: [phone]  [employer] available online at:  [email or web address]    Please call if any further questions or concerns ([phone]).  Clinic hours 8 am to 5 pm.    Return to clinic (call) if symptoms worsen or fail to improve.            Follow-ups after your visit        Who to contact     If you have questions or need follow up information about today's clinic visit or your schedule please contact [facility] directly at [phone].  Normal or non-critical lab and imaging results will be communicated to you by [employer], letter or phone within 4 business days after the clinic has received the results. If you do not hear from us within 7 days, please contact the clinic through [employer] or phone. If you have a critical or abnormal lab result, we will notify you by phone as soon as possible.  Submit refill requests through [employer]and or call your pharmacy and they will forward the refill request to us. Please allow 3 business days for your refill to be completed.          Additional Information About Your Visit        [employer] Information     Xand is an electronic gateway that provides easy, online access to your medical records. With [employer]and,  "you can request a clinic appointment, read your test results, renew a prescription or communicate with your care team.     To sign up for [employer] visit the website at [email or web address]   You will be asked to enter the access code listed below, as well as some personal information. Please follow the directions to create your username and password.     Your access code is: [email or web address]  Expires: 8/15/2017  1:52 PM     Your access code will  in 90 days. If you need help or a new code, please contact your [facility] or call [phone] for assistance.        Care EveryWhere ID     This is your Care EveryWhere ID. This could be used by other organizations to access your [facility] medical records  [id number]        Your Vitals Were     Pulse Height Pulse Oximetry BMI (Body Mass Index)          78 1.803 m (5' 11\") 98% 41.28 kg/m2         Blood Pressure from Last 3 Encounters:   17 124/84   17 138/90   17 116/85    Weight from Last 3 Encounters:   17 134.3 kg (296 lb)   17 133.4 kg (294 lb)   17 131.5 kg (290 lb)              We Performed the Following     NEEDLE EMG 1 EXTREMITY          Today's Medication Changes          These changes are accurate as of: 17  9:53 AM.  If you have any questions, ask your nurse or doctor.               Start taking these medicines.        Dose/Directions    amitriptyline 25 MG tablet   Commonly known as:  ELAVIL   Used for:  Left leg pain, Numbness and tingling of left leg        Dose:  25 mg   Take 1 tablet (25 mg) by mouth At Bedtime   Quantity:  30 tablet   Refills:  1            Where to get your medicines      These medications were sent to [facility]/pharmacy #[id number] - [place], [place] - [address] [address]     Phone:  [phone]     amitriptyline 25 MG tablet                Primary Care Provider Office Phone # Fax #    Bertha Romero PA-C [phone] " [phone]       [facility] [address] [address]        Equal Access to Services     SIMONE ZAYAS : Hadii aad ku haddoloreso Sobeti, waaxda luqadaha, qaybta kaalmada aderyan, victor hugo artin hayaajefry fritzvioletta nazario kala machado. So [facility] [phone].    ATENCIÓN: Si habla español, tiene a lyons disposición servicios gratuitos de asistencia lingüística. Imani al [phone].    We comply with applicable federal civil rights laws and [place] laws. We do not discriminate on the basis of race, color, national origin, age, disability sex, sexual orientation or gender identity.            Thank you!     Thank you for choosing [facility]  for your care. Our goal is always to provide you with excellent care. Hearing back from our patients is one way we can continue to improve our services. Please take a few minutes to complete the written survey that you may receive in the mail after your visit with us. Thank you!             Your Updated Medication List - Protect others around you: Learn how to safely use, store and throw away your medicines at www.disposemymeds.org.          This list is accurate as of: 7/27/17  9:53 AM.  Always use your most recent med list.                   Brand Name Dispense Instructions for use Diagnosis    albuterol 108 (90 BASE) MCG/ACT Inhaler    PROAIR HFA/PROVENTIL HFA/VENTOLIN HFA    1 Inhaler    Inhale 2 puffs into the lungs every 6 hours as needed for shortness of breath / dyspnea or wheezing    Cough       amitriptyline 25 MG tablet    ELAVIL    30 tablet    Take 1 tablet (25 mg) by mouth At Bedtime    Left leg pain, Numbness and tingling of left leg       amLODIPine 10 MG tablet    NORVASC    30 tablet    Take 1 tablet (10 mg) by mouth daily Due for appointment for refills    Hypertension goal BP (blood pressure) < 140/90       ASPIRIN PO      Take 325 mg by mouth daily        calcium carbonate 500 MG chewable tablet    TUMS     Take 1 chew tab by  mouth daily        cyclobenzaprine 10 MG tablet    FLEXERIL    90 tablet    Take 0.5-1 tablets (5-10 mg) by mouth 3 times daily as needed for muscle spasms    Back muscle spasm       dicyclomine 20 MG tablet    BENTYL    40 tablet    Take 1 tablet (20 mg) by mouth 4 times daily as needed    Irritable bowel syndrome with diarrhea, Chronic diarrhea       diphenoxylate-atropine 2.5-0.025 MG per tablet    LOMOTIL    30 tablet    Take 2 tablets by mouth 4 times daily as needed for diarrhea    Chronic diarrhea, Irritable bowel syndrome with diarrhea       EPINEPHrine 0.3 MG/0.3ML injection 2-pack    EPIPEN/ADRENACLICK/or ANY BX GENERIC EQUIV    2 each    Inject 0.3 mLs (0.3 mg) into the muscle once as needed for anaphylaxis    Anaphylactic reaction       fluticasone 100 MCG/BLIST Aepb    FLOVENT DISKUS    1 Inhaler    Inhale 1 puff into the lungs 2 times daily    Wheezing       gabapentin 300 MG capsule    NEURONTIN    90 capsule    Take 1 tablet (300 mg) every night for 1-3 days, then 1 tablet twice daily for 1-3 days, then 1 tablet three times daily    Pain of left thigh       lisinopril 20 MG tablet    PRINIVIL/ZESTRIL    30 tablet    TAKE 1 TABLET EVERY DAY    Hypertension goal BP (blood pressure) < 140/90       mometasone-formoterol 100-5 MCG/ACT oral inhaler    DULERA    1 Inhaler    Inhale 2 puffs into the lungs 2 times daily Rinse mouth after use.    Cough       multivitamin, therapeutic Tabs tablet      Take 1 tablet by mouth daily        naproxen 500 MG tablet    NAPROSYN    30 tablet    Take 1 tablet (500 mg) by mouth 2 times daily as needed for moderate pain    Back muscle spasm, Strain of trapezius muscle, right, initial encounter       pantoprazole 20 MG EC tablet    PROTONIX    30 tablet    Take by mouth 30-60 minutes before a meal.    Gastroesophageal reflux disease without esophagitis       sertraline 50 MG tablet    ZOLOFT    90 tablet    Take 1 tablet (50 mg) by mouth daily    Anxiety       simvastatin  20 MG tablet    ZOCOR    30 tablet    Take 1 tablet (20 mg) by mouth At Bedtime    Hyperlipidemia LDL goal <160       TYLENOL PO      Take 1,000 mg by mouth every 8 hours as needed for mild pain or fever        VSL#3 Pack     30 each    Use 1-2 capsules/day    Chronic diarrhea

## 2017-07-27 NOTE — NURSING NOTE
"Adarsh Salvador's goals for this visit include: Evaluate and treat left leg pain  He requests these members of his care team be copied on today's visit information: yes    PCP: Bertha Romero    Referring Provider:  No referring provider defined for this encounter.    Chief Complaint   Patient presents with     Consult     Musculoskeletal Problem     Left lateral thigh pain. Starts in the left hip and goes to the knee. By the time he is done with work the pain is going from his left hip to his toes. No known injury. Pain x 2.5 years. Has had lumbar and cervical injections with no relief.        Initial /84 (BP Location: Left arm, Patient Position: Chair, Cuff Size: Adult Large)  Pulse 78  Ht 1.803 m (5' 11\")  Wt 134.3 kg (296 lb)  SpO2 98%  BMI 41.28 kg/m2 Estimated body mass index is 41.28 kg/(m^2) as calculated from the following:    Height as of this encounter: 1.803 m (5' 11\").    Weight as of this encounter: 134.3 kg (296 lb).  Medication Reconciliation: complete    "

## 2017-07-27 NOTE — PATIENT INSTRUCTIONS
Thanks for coming today.  Ortho/Sports Medicine Clinic  [address] [place] [address]    To schedule future appointments in Ortho Clinic, you may call [phone].    To schedule ordered imaging by your provider:   Call Central Imaging [phone]    To schedule an injection ordered by your provider:  Call Central Imaging Injection scheduling line: [phone]  [employer] available online at:  [email or web address]    Please call if any further questions or concerns ([phone]).  Clinic hours 8 am to 5 pm.    Return to clinic (call) if symptoms worsen or fail to improve.

## 2017-07-27 NOTE — PROGRESS NOTES
"HISTORY OF PRESENT ILLNESS  Mr. Salvador is a pleasant 52 year old year old male who presents to clinic today with left thigh pain and numbness.  He's seen at the request of Carolina Bernardo.  Adarsh explains that this started just over 2 years ago with no clear event.  He describes a left lateral leg numbness, tingling, and pain that starts at his lateral hip and radiates down to his knee. Symptoms wax and wane, unsure of a pattern.  Severe as of late.  Worse at the end of a long shift, he works as a , will radiate to his left foot at the end of the day.    He's been seen by multiple providers for this in the past, including neurosurgery. He had a CT myelogram performed without was essentially normal, not revealing a true etiology for his symptoms. He then had a series of lumbar injections, he does report that one of these injections helped his pain for a limited amount of time, maybe a few days. He received another injection in June that did not help him.  He's been taking gabapentin for about 2 weeks and has felt no effect on 300 mg three times a day.  This is severely affecting Adarsh's life, \"I just want solution and to get rid of it.\"  Additional history: as documented    MEDICAL HISTORY  Patient Active Problem List   Diagnosis     GERD (gastroesophageal reflux disease)     Kidney stone     Chronic RLQ pain     Hyperlipidemia LDL goal <160     Hypertension goal BP (blood pressure) < 140/90     Constipation     Abdominal pain, right upper quadrant     Adult celiac disease     Chronic maxillary sinusitis     Chronic rhinitis     Obesity (BMI 30-39.9)     Pure hyperglyceridemia     Anaphylactic reaction     Benign essential hypertension     Anemia in other chronic diseases classified elsewhere     Fatty liver     Irritable bowel syndrome with diarrhea     Right inguinal hernia     BMI 40.0-44.9, adult (H)     Cervicalgia     Pain in thoracic spine     Chronic left-sided low back pain, with sciatica presence unspecified "       Current Outpatient Prescriptions   Medication Sig Dispense Refill     gabapentin (NEURONTIN) 300 MG capsule Take 1 tablet (300 mg) every night for 1-3 days, then 1 tablet twice daily for 1-3 days, then 1 tablet three times daily 90 capsule 0     amLODIPine (NORVASC) 10 MG tablet Take 1 tablet (10 mg) by mouth daily Due for appointment for refills 30 tablet 0     lisinopril (PRINIVIL/ZESTRIL) 20 MG tablet TAKE 1 TABLET EVERY DAY 30 tablet 0     ASPIRIN PO Take 325 mg by mouth daily       cyclobenzaprine (FLEXERIL) 10 MG tablet Take 0.5-1 tablets (5-10 mg) by mouth 3 times daily as needed for muscle spasms 90 tablet 1     sertraline (ZOLOFT) 50 MG tablet Take 1 tablet (50 mg) by mouth daily 90 tablet 0     naproxen (NAPROSYN) 500 MG tablet Take 1 tablet (500 mg) by mouth 2 times daily as needed for moderate pain 30 tablet 0     mometasone-formoterol (DULERA) 100-5 MCG/ACT oral inhaler Inhale 2 puffs into the lungs 2 times daily Rinse mouth after use. 1 Inhaler 3     fluticasone (FLOVENT DISKUS) 100 MCG/BLIST AEPB Inhale 1 puff into the lungs 2 times daily 1 Inhaler 1     calcium carbonate (TUMS) 500 MG chewable tablet Take 1 chew tab by mouth daily       albuterol (PROAIR HFA, PROVENTIL HFA, VENTOLIN HFA) 108 (90 BASE) MCG/ACT inhaler Inhale 2 puffs into the lungs every 6 hours as needed for shortness of breath / dyspnea or wheezing 1 Inhaler 1     dicyclomine (BENTYL) 20 MG tablet Take 1 tablet (20 mg) by mouth 4 times daily as needed 40 tablet 5     diphenoxylate-atropine (LOMOTIL) 2.5-0.025 MG per tablet Take 2 tablets by mouth 4 times daily as needed for diarrhea 30 tablet 1     Acetaminophen (TYLENOL PO) Take 1,000 mg by mouth every 8 hours as needed for mild pain or fever       pantoprazole (PROTONIX) 20 MG tablet Take by mouth 30-60 minutes before a meal. 30 tablet 3     multivitamin, therapeutic (THERA-VIT) TABS Take 1 tablet by mouth daily       simvastatin (ZOCOR) 20 MG tablet Take 1 tablet (20 mg) by  "mouth At Bedtime 30 tablet 3     EPINEPHrine (EPIPEN) 0.3 MG/0.3ML injection Inject 0.3 mLs (0.3 mg) into the muscle once as needed for anaphylaxis 2 each 2     Dietary Management Product (VSL#3) PACK Use 1-2 capsules/day 30 each 3       Allergies   Allergen Reactions     Artificial Sweetner (Informational Only) [Artificial Sweetner (Informational Only) ] GI Disturbance     ALL artificial sweetners cause severe diarrhea & flu symptoms     Hydromorphone Anaphylaxis     Morphine [Fumaric Acid] Anaphylaxis     Hydrocodone-Acetaminophen Itching     Tramadol Hives     Trazodone Hives     Codeine Phosphate Itching     Ketorolac Tromethamine Rash       Family History   Problem Relation Age of Onset     CANCER Mother 52     lung, smoked     Cancer - colorectal Father 53     unsure type, pt attributes to radiation exposure     Myocardial Infarction Father 45     Myocardial Infarction Paternal Grandfather 35     DIABETES Other      nephew- type 1     DIABETES Maternal Aunt      1     DIABETES Maternal Aunt      2     DIABETES Paternal Uncle      1     DIABETES Paternal Uncle      2     DIABETES Paternal Uncle      3     DIABETES Paternal Uncle      4       Additional medical/Social/Surgical histories [facility] in [facility] and updated as appropriate.     REVIEW OF SYSTEMS (7/27/2017)  10 point ROS of systems including Constitutional, Eyes, Respiratory, Cardiovascular, Gastroenterology, Genitourinary, Integumentary, Musculoskeletal, Psychiatric were all negative except for pertinent positives noted in my HPI.     PHYSICAL EXAM  Vitals:    07/27/17 0922   BP: 124/84   BP Location: Left arm   Patient Position: Chair   Cuff Size: Adult Large   Pulse: 78   SpO2: 98%   Weight: 134.3 kg (296 lb)   Height: 1.803 m (5' 11\")     General  - obese  CV  - normal peripheral perfusion  Pulm  - normal respiratory pattern, non-labored  Musculoskeletal - lumbar spine  - stance: slow to rise  - inspection: normal bone and joint alignment, no obvious " scoliosis  - palpation: no paravertebral or bony tenderness  - strength: lower extremities 5/5 in all planes  - special tests:  (-) slump test  Neuro  - patellar and Achilles DTRs 2+ bilaterally, lower extremity sensory deficit throughout L4 and L5 distribution, grossly normal coordination, normal muscle tone  Skin  - no ecchymosis, erythema, warmth, or induration, no obvious rash  Psych  - interactive, appropriate, normal mood and affect          ASSESSMENT & PLAN  Mr. Salvador is a 52 year old year old male who is in the office today with thigh pain and numbness.    I reviewed Adarsh's films in the room with him, including his CT myelogram of the thoracic and lumbar spine.    The etiology of Adarsh's symptoms is unclear, although his symptoms do suggest a neurological etiology.  His symptoms do fit a dermatome pattern of L4 and L5.    Given the fact that his spine anatomy appears normal I do think it's reasonable to obtain a EMG of the left lower extremity.  He can get this at his earliest convenience.    We should follow up after his EMG.     I did prescribe Adarsh amitriptyline in place of gabapentin.    It was a pleasure taking care of Adarsh.        Vinnie Espinoza, , CAM

## 2017-07-28 ENCOUNTER — OFFICE VISIT (OUTPATIENT)
Dept: FAMILY MEDICINE | Facility: CLINIC | Age: 52
End: 2017-07-28
Payer: COMMERCIAL

## 2017-07-28 ENCOUNTER — RADIANT APPOINTMENT (OUTPATIENT)
Dept: GENERAL RADIOLOGY | Facility: CLINIC | Age: 52
End: 2017-07-28
Attending: PHYSICIAN ASSISTANT
Payer: COMMERCIAL

## 2017-07-28 VITALS
SYSTOLIC BLOOD PRESSURE: 127 MMHG | HEART RATE: 82 BPM | TEMPERATURE: 99 F | OXYGEN SATURATION: 97 % | BODY MASS INDEX: 39.61 KG/M2 | WEIGHT: 284 LBS | DIASTOLIC BLOOD PRESSURE: 78 MMHG

## 2017-07-28 DIAGNOSIS — M54.2 CERVICALGIA: Primary | ICD-10-CM

## 2017-07-28 DIAGNOSIS — M25.519 SHOULDER PAIN: ICD-10-CM

## 2017-07-28 PROCEDURE — 99213 OFFICE O/P EST LOW 20 MIN: CPT | Performed by: PHYSICIAN ASSISTANT

## 2017-07-28 PROCEDURE — 73030 X-RAY EXAM OF SHOULDER: CPT | Mod: RT

## 2017-07-28 ASSESSMENT — PAIN SCALES - GENERAL: PAINLEVEL: EXTREME PAIN (9)

## 2017-07-28 NOTE — PATIENT INSTRUCTIONS
For the neck/shoulder pain:  Follow up with Dr. Espinoza in 4-6 weeks  Continue with over the counter treatments, ice, heat, and gentle motion.  This is a flare that will likely improve in the next 4-6 weeks.  It is likely there is an underlying nerve impingement, however with normal strength and sensation, we would not image further at this time    Come in sooner if the pain is getting worse, or you have weakness or numbness and tingling in the right arm.

## 2017-07-28 NOTE — NURSING NOTE
"Chief Complaint   Patient presents with     Shoulder Pain     no known injury        Initial /78  Pulse 82  Temp 99  F (37.2  C) (Oral)  Wt 284 lb (128.8 kg)  SpO2 97%  BMI 39.61 kg/m2 Estimated body mass index is 39.61 kg/(m^2) as calculated from the following:    Height as of 7/27/17: 5' 11\" (1.803 m).    Weight as of this encounter: 284 lb (128.8 kg).  Medication Reconciliation: complete  "

## 2017-07-28 NOTE — MR AVS SNAPSHOT
After Visit Summary   7/28/2017    Adarsh Salvador    MRN: [id number]           Patient Information     Date Of Birth          1965        Visit Information        Provider Department      7/28/2017 12:00 PM Miriam Rodarte PA-C [facility]        Today's Diagnoses     Cervicalgia    -  1    Shoulder pain          Care Instructions    For the neck/shoulder pain:  Follow up with Dr. Espinoza in 4-6 weeks  Continue with over the counter treatments, ice, heat, and gentle motion.  This is a flare that will likely improve in the next 4-6 weeks.  It is likely there is an underlying nerve impingement, however with normal strength and sensation, we would not image further at this time    Come in sooner if the pain is getting worse, or you have weakness or numbness and tingling in the right arm.           Follow-ups after your visit        Your next 10 appointments already scheduled     Aug 21, 2017  8:00 AM CDT   New Visit with Rosette Gaspar MD   [facility] ([facility]    [address] [place] [id number] [phone]              Future tests that were ordered for you today     Open Future Orders        Priority Expected Expires Ordered    EMG Routine  7/27/2018 7/27/2017            Who to contact     If you have questions or need follow up information about today's clinic visit or your schedule please contact [facility] directly at [phone].  Normal or non-critical lab and imaging results will be communicated to you by MyChart, letter or phone within 4 business days after the clinic has received the results. If you do not hear from us within 7 days, please contact the clinic through MyChart or phone. If you have a critical or abnormal lab result, we will notify you by phone as soon as possible.  Submit refill requests through [employer] or call your pharmacy and they will forward the refill request to us. Please allow 3  "business days for your refill to be completed.          Additional Information About Your Visit        MyChart Information     [employer] lets you send messages to your doctor, view your test results, renew your prescriptions, schedule appointments and more. To sign up, go to [email or web address] . Click on \"Log in\" on the left side of the screen, which will take you to the Welcome page. Then click on \"Sign up Now\" on the right side of the page.     You will be asked to enter the access code listed below, as well as some personal information. Please follow the directions to create your username and password.     Your access code is: [email or web address]  Expires: 8/15/2017  1:52 PM     Your access code will  in 90 days. If you need help or a new code, please call your [place] clinic or [phone].        Care EveryWhere ID     This is your Care EveryWhere ID. This could be used by other organizations to access your [place] medical records  [id number]        Your Vitals Were     Pulse Temperature Pulse Oximetry BMI (Body Mass Index)          82 99  F (37.2  C) (Oral) 97% 39.61 kg/m2         Blood Pressure from Last 3 Encounters:   17 127/78   17 124/84   17 138/90    Weight from Last 3 Encounters:   17 284 lb (128.8 kg)   17 296 lb (134.3 kg)   17 294 lb (133.4 kg)               Primary Care Provider Office Phone # Fax #    Bertha Romero PA-C [phone]       [facility] [address] [facility] [address]        Equal Access to Services     SIMONE ZAYAS : Hadii mo Carter, waaxda luqadaha, qaybta kaalmada lisa, victor hugo machado. So [facility] [phone].    ATENCIÓN: Si habla español, tiene a lyons disposición servicios gratuitos de asistencia lingüística. Llame al [phone].    We comply with applicable federal civil rights laws and [place] laws. We do not discriminate on the basis of race, " color, national origin, age, disability sex, sexual orientation or gender identity.            Thank you!     Thank you for choosing [facility]  [facility]. Our goal is always to provide you with excellent care. Hearing back from our patients is one way we can continue to improve our services. Please take a few minutes to complete the written survey that you may receive in the mail after your visit with us. Thank you!             Your Updated Medication List - Protect others around you: Learn how to safely use, store and throw away your medicines at www.disposemymeds.org.          This list is accurate as of: 7/28/17 12:59 PM.  Always use your most recent med list.                   Brand Name Dispense Instructions for use Diagnosis    albuterol 108 (90 BASE) MCG/ACT Inhaler    PROAIR HFA/PROVENTIL HFA/VENTOLIN HFA    1 Inhaler    Inhale 2 puffs into the lungs every 6 hours as needed for shortness of breath / dyspnea or wheezing    Cough       amitriptyline 25 MG tablet    ELAVIL    30 tablet    Take 1 tablet (25 mg) by mouth At Bedtime    Left leg pain, Numbness and tingling of left leg       amLODIPine 10 MG tablet    NORVASC    30 tablet    Take 1 tablet (10 mg) by mouth daily Due for appointment for refills    Hypertension goal BP (blood pressure) < 140/90       ASPIRIN PO      Take 325 mg by mouth daily        calcium carbonate 500 MG chewable tablet    TUMS     Take 1 chew tab by mouth daily        cyclobenzaprine 10 MG tablet    FLEXERIL    90 tablet    Take 0.5-1 tablets (5-10 mg) by mouth 3 times daily as needed for muscle spasms    Back muscle spasm       dicyclomine 20 MG tablet    BENTYL    40 tablet    Take 1 tablet (20 mg) by mouth 4 times daily as needed    Irritable bowel syndrome with diarrhea, Chronic diarrhea       diphenoxylate-atropine 2.5-0.025 MG per tablet    LOMOTIL    30 tablet    Take 2 tablets by mouth 4 times daily as needed for diarrhea    Chronic diarrhea, Irritable  bowel syndrome with diarrhea       EPINEPHrine 0.3 MG/0.3ML injection 2-pack    EPIPEN/ADRENACLICK/or ANY BX GENERIC EQUIV    2 each    Inject 0.3 mLs (0.3 mg) into the muscle once as needed for anaphylaxis    Anaphylactic reaction       fluticasone 100 MCG/BLIST Aepb    FLOVENT DISKUS    1 Inhaler    Inhale 1 puff into the lungs 2 times daily    Wheezing       gabapentin 300 MG capsule    NEURONTIN    90 capsule    Take 1 tablet (300 mg) every night for 1-3 days, then 1 tablet twice daily for 1-3 days, then 1 tablet three times daily    Pain of left thigh       lisinopril 20 MG tablet    PRINIVIL/ZESTRIL    30 tablet    TAKE 1 TABLET EVERY DAY    Hypertension goal BP (blood pressure) < 140/90       mometasone-formoterol 100-5 MCG/ACT oral inhaler    DULERA    1 Inhaler    Inhale 2 puffs into the lungs 2 times daily Rinse mouth after use.    Cough       multivitamin, therapeutic Tabs tablet      Take 1 tablet by mouth daily        naproxen 500 MG tablet    NAPROSYN    30 tablet    Take 1 tablet (500 mg) by mouth 2 times daily as needed for moderate pain    Back muscle spasm, Strain of trapezius muscle, right, initial encounter       pantoprazole 20 MG EC tablet    PROTONIX    30 tablet    Take by mouth 30-60 minutes before a meal.    Gastroesophageal reflux disease without esophagitis       sertraline 50 MG tablet    ZOLOFT    90 tablet    Take 1 tablet (50 mg) by mouth daily    Anxiety       simvastatin 20 MG tablet    ZOCOR    30 tablet    Take 1 tablet (20 mg) by mouth At Bedtime    Hyperlipidemia LDL goal <160       TYLENOL PO      Take 1,000 mg by mouth every 8 hours as needed for mild pain or fever        VSL#3 Pack     30 each    Use 1-2 capsules/day    Chronic diarrhea

## 2017-07-28 NOTE — PROGRESS NOTES
SUBJECTIVE:                                                    Adarsh Salvador is a 52 year old male who presents to clinic today for the following health issues:      Joint Pain    Onset: last night    Description:   Location: right shoulder and right side neck  Character: Sharp    Intensity: severe    Progression of Symptoms: worse    Accompanying Signs & Symptoms:  Other symptoms: neck    History:   Previous similar pain: YES      Precipitating factors:   Trauma or overuse: YES    Alleviating factors:  Improved by: rest/inactivity and heat    Therapies Tried and outcome: heat and rest didn't help     This started without injury last night  The pain is worse with any movement, even standing  Pain is worse with arm or shoulder movement, driving is also difficult with moving the neck  He has some numbness in the right hand  Not sure about weakness  He tried Flexeril last night, without improvement - just made him tired  He was given amitriptyline yesterday for his radicular low back pain  He was welding all night, 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  Additional history: as documented    Patient Active Problem List   Diagnosis     GERD (gastroesophageal reflux disease)     Kidney stone     Chronic RLQ pain     Hyperlipidemia LDL goal <160     Hypertension goal BP (blood pressure) < 140/90     Constipation     Abdominal pain, right upper quadrant     Adult celiac disease     Chronic maxillary sinusitis     Chronic rhinitis     Obesity (BMI 30-39.9)     Pure hyperglyceridemia     Anaphylactic reaction     Benign essential hypertension     Anemia in other chronic diseases classified elsewhere     Fatty liver     Irritable bowel syndrome with diarrhea     Right inguinal hernia     BMI 40.0-44.9, adult (H)     Cervicalgia     Pain in thoracic spine     Chronic left-sided low back pain, with sciatica  "presence unspecified     Past Surgical History:   Procedure Laterality Date     C REMOVAL OF KIDNEY STONE       COLONOSCOPY  5/1/2012    Procedure:COLONOSCOPY; screening colonoscopy; Surgeon:KINGSLEY DOS SANTOS; Location:[facility] OR     COLONOSCOPY  4/21/2014    Procedure: COMBINED COLONOSCOPY, SINGLE BIOPSY/POLYPECTOMY BY BIOPSY;  Surgeon: Duane, William Charles, MD;  Location: [facility] OR     HC BREATH HYDROGEN TEST  3/7/2014    Procedure: HYDROGEN BREATH TEST;  Surgeon: Darion Swift MD;  [facility]     ORTHOPEDIC SURGERY      x3 Rt knee        Social History   Substance Use Topics     Smoking status: Never Smoker     Smokeless tobacco: Current User     Types: Chew      Comment: Chew     Alcohol use No      Comment: \"quart a year\" 2012     Family History   Problem Relation Age of Onset     CANCER Mother 52     lung, smoked     Cancer - colorectal Father 53     unsure type, pt attributes to radiation exposure     Myocardial Infarction Father 45     Myocardial Infarction Paternal Grandfather 35     DIABETES Other      nephew- type 1     DIABETES Maternal Aunt      1     DIABETES Maternal Aunt      2     DIABETES Paternal Uncle      1     DIABETES Paternal Uncle      2     DIABETES Paternal Uncle      3     DIABETES Paternal Uncle      4         Current Outpatient Prescriptions   Medication Sig Dispense Refill     amitriptyline (ELAVIL) 25 MG tablet Take 1 tablet (25 mg) by mouth At Bedtime 30 tablet 1     gabapentin (NEURONTIN) 300 MG capsule Take 1 tablet (300 mg) every night for 1-3 days, then 1 tablet twice daily for 1-3 days, then 1 tablet three times daily 90 capsule 0     amLODIPine (NORVASC) 10 MG tablet Take 1 tablet (10 mg) by mouth daily Due for appointment for refills 30 tablet 0     lisinopril (PRINIVIL/ZESTRIL) 20 MG tablet TAKE 1 TABLET EVERY DAY 30 tablet 0     ASPIRIN PO Take 325 mg by mouth daily       cyclobenzaprine (FLEXERIL) 10 MG tablet Take 0.5-1 tablets (5-10 mg) by mouth 3 times daily as " needed for muscle spasms 90 tablet 1     sertraline (ZOLOFT) 50 MG tablet Take 1 tablet (50 mg) by mouth daily 90 tablet 0     naproxen (NAPROSYN) 500 MG tablet Take 1 tablet (500 mg) by mouth 2 times daily as needed for moderate pain 30 tablet 0     mometasone-formoterol (DULERA) 100-5 MCG/ACT oral inhaler Inhale 2 puffs into the lungs 2 times daily Rinse mouth after use. 1 Inhaler 3     fluticasone (FLOVENT DISKUS) 100 MCG/BLIST AEPB Inhale 1 puff into the lungs 2 times daily 1 Inhaler 1     calcium carbonate (TUMS) 500 MG chewable tablet Take 1 chew tab by mouth daily       albuterol (PROAIR HFA, PROVENTIL HFA, VENTOLIN HFA) 108 (90 BASE) MCG/ACT inhaler Inhale 2 puffs into the lungs every 6 hours as needed for shortness of breath / dyspnea or wheezing 1 Inhaler 1     dicyclomine (BENTYL) 20 MG tablet Take 1 tablet (20 mg) by mouth 4 times daily as needed 40 tablet 5     diphenoxylate-atropine (LOMOTIL) 2.5-0.025 MG per tablet Take 2 tablets by mouth 4 times daily as needed for diarrhea 30 tablet 1     Acetaminophen (TYLENOL PO) Take 1,000 mg by mouth every 8 hours as needed for mild pain or fever       pantoprazole (PROTONIX) 20 MG tablet Take by mouth 30-60 minutes before a meal. 30 tablet 3     multivitamin, therapeutic (THERA-VIT) TABS Take 1 tablet by mouth daily       simvastatin (ZOCOR) 20 MG tablet Take 1 tablet (20 mg) by mouth At Bedtime 30 tablet 3     EPINEPHrine (EPIPEN) 0.3 MG/0.3ML injection Inject 0.3 mLs (0.3 mg) into the muscle once as needed for anaphylaxis 2 each 2     Dietary Management Product (VSL#3) PACK Use 1-2 capsules/day 30 each 3     Allergies   Allergen Reactions     Artificial Sweetner (Informational Only) [Artificial Sweetner (Informational Only) ] GI Disturbance     ALL artificial sweetners cause severe diarrhea & flu symptoms     Hydromorphone Anaphylaxis     Morphine [Fumaric Acid] Anaphylaxis     Hydrocodone-Acetaminophen Itching     Tramadol Hives     Trazodone Hives      Codeine Phosphate Itching     Ketorolac Tromethamine Rash         Reviewed and updated as needed this [facility] by [facility]         ROS:  As in HPI      OBJECTIVE:     /78  Pulse 82  Temp 99  F (37.2  C) (Oral)  Wt 284 lb (128.8 kg)  SpO2 97%  BMI 39.61 kg/m2  Body mass index is 39.61 kg/(m^2).  GENERAL: healthy, alert and no distress  RESP: lungs clear to auscultation - no rales, rhonchi or wheezes  CV: regular rate and rhythm, normal S1 S2, no murmur  MS: upper extremities: normal muscle tone, normal range of motion, no edema, peripheral pulses normal and Normal rotator cuff strength, biceps and triceps strength, and  strength bilaterally.   BACK: Cervical spine: restricted motion in all directions and greatest with neck extension, positive Spurling's test, tenderness right paraspinal muscles, neck motion makes his shoulder pain worse.   SKIN: no suspicious lesions or rashes        Diagnostic Test Results:  Right shoulder XR  3 views were ordered, obtained and interpreted  Normal alignment, no arthritic changes, no fracture.     ASSESSMENT/PLAN:     1. Cervicalgia  - I would not add any medications at this time.    2. Shoulder pain  - XR Shoulder Right G/E 3 Views; Future    For the neck/shoulder pain:  Follow up with Dr. Espinoza in 4-6 weeks  Continue with over the counter treatments, ice, heat, and gentle motion.  This is a flare that will likely improve in the next 4-6 weeks.  It is likely there is an underlying nerve impingement, however with normal strength and sensation, we would not image further at this time    Come in sooner if the pain is getting worse, or you have weakness or numbness and tingling in the right arm.       Miriam Rodarte PA-C  [facility]

## 2017-08-21 ENCOUNTER — OFFICE VISIT (OUTPATIENT)
Dept: NEUROLOGY | Facility: CLINIC | Age: 52
End: 2017-08-21
Attending: FAMILY MEDICINE
Payer: COMMERCIAL

## 2017-08-21 DIAGNOSIS — R20.0 NUMBNESS AND TINGLING OF LEFT LEG: ICD-10-CM

## 2017-08-21 DIAGNOSIS — R20.2 NUMBNESS AND TINGLING OF LEFT LEG: ICD-10-CM

## 2017-08-21 DIAGNOSIS — M79.605 LEFT LEG PAIN: ICD-10-CM

## 2017-08-21 DIAGNOSIS — G60.3 IDIOPATHIC PROGRESSIVE POLYNEUROPATHY: Primary | ICD-10-CM

## 2017-08-21 PROCEDURE — 95885 MUSC TST DONE W/NERV TST LIM: CPT | Performed by: PSYCHIATRY & NEUROLOGY

## 2017-08-21 PROCEDURE — 95910 NRV CNDJ TEST 7-8 STUDIES: CPT | Performed by: PSYCHIATRY & NEUROLOGY

## 2017-08-21 NOTE — PROGRESS NOTES
[facility]  Electrodiagnostic Laboratory    Nerve Conduction & EMG Report          Patient: Adarsh Salvador YOB: 1965  Patient ID: [id number] Age: 52 Years 3 Months  Gender: Male        History & Examination: 52 years old male with history of pain and numbness over the left lateral thigh radiating down to the leg, mild pain over the right leg. No symptoms over the hand.       Techniques: Motor conduction studies were done with surface recording electrodes. Sensory conduction studies were performed with surface electrodes, unless indicated otherwise by (n), designating the use of subdermal recording electrodes. Temperature was monitored and recorded throughout the study. Upper extremities were maintained at a temperature of 32 degrees Centigrade or higher.  Lower extremities were maintained at a temperature of 31degrees Centigrade or higher. EMG was done with a concentric needle electrode.        Results:  Nerve conduction studies  1. Sensory conduction studies show decreased SNAP with normal conduction velocity in the bilateral lower extremity.   2. Left ulnar SNAP is normal with mildly prolonged distal latency and reduced conduction velocity.   3. Motor conduction studies show decreased CMAP with normal distal latency in the bilateral lower extremities.     Needle EMG  1. MUAP was mildly reduced with increased duration in the left EHL. MUAPs were normal in the proximal muscles in the left lower extremities. There were no abnormal spontaneous activities.     Interpretation:  1. This test is abnormal.  2. 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 The study is not suggestive of left sided radiculopathy or lumbosacral plexopathy.   3. Nerve conduction studies for lateral femoral cutaneous nerves were attempted, but reliable results were not obtained due to the patient's body habitus. Therefore, this test does not provide diagnostic yield for  maralgia paresthetica. Clinical correlation is suggested.       EMG Physician: Rosette Gaspar MD           Sensory NCS      Nerve / Sites Rec. Site Onset Peak NP Amp Ref. PP Amp Dist Jacob Ref. Temp     ms ms  V  V  V cm m/s m/s  C   L ULNAR - Dig V      Dig V Wrist 2.92 3.70 14.0 8.0 8.2 12.5 42.9 48.0 33.4   L SURAL - Lat Mall      Calf Ankle 2.97 3.96 6.2 8.0 4.1 14 47.2 38.0 31.1   R SURAL - Lat Mall      Calf Ankle 3.54 4.43 6.4 8.0 4.0 14 39.5 38.0 30.4   L SUP PERONEAL      Lat Leg Martínez 3.59 4.53 2.8 6.0 3.0 14 39.0 38.0 31       Motor NCS      Nerve / Sites Rec. Site Lat Ref. Amp Ref. Rel Amp Dist Jacob Ref. Dur. Area Temp.     ms ms mV mV % cm m/s m/s ms %  C   L DEEP PERONEAL - EDB      Ankle EDB 5.52 6.00 2.0 2.5 100 8   5.99 100 31      FibHead EDB 15.16  1.5  76.3 37 38.4 38.0 9.06 81.7 31      Pop Fos EDB 17.60  1.5  73.9 10 40.9 38.0 9.48 76.6 30.8   R DEEP PERONEAL - EDB      Ankle EDB 4.74 6.00 2.5 2.5 100 8   6.61 100 31.2      FibHead EDB 14.84  2.3  94.5 37 36.6 38.0 8.39 96 31.1   L TIBIAL - AH      Ankle AH 4.38 6.00 1.5 4.0 100 8   9.22 100 31.2      Pop Fos AH 16.35  0.8  56.5 42 35.1 38.0 9.64 80.6 31.1   R TIBIAL - AH      Ankle AH 5.36 6.00 1.1 4.0 100 8   5.57 100 30.8       F  Wave      Nerve Min F Lat Max F Lat Mean FLat Temp.    ms ms ms  C   L TIBIAL 55.16 58.91 56.84 31.1       EMG Summary Table     Spontaneous MUAP Recruitment    IA Fib Fasc H.F. Amp Dur. PPP Pattern   L. TIB ANTERIOR Normal None None None N N None Normal   L. GASTROCN (MED) Normal None None None N N None Normal   L. VAST LATERALIS Normal None None None N N None Normal   L. EXT MILLER LONG Normal None None None N 2+ None Mild Reduced

## 2017-08-21 NOTE — MR AVS SNAPSHOT
After Visit Summary   2017    Adarsh Salvador    MRN: [id number]           Patient Information     Date Of Birth          1965        Visit Information        Provider Department      2017 8:00 AM Rosette Gaspar MD [facility]        Today's Diagnoses     Idiopathic progressive polyneuropathy    -  1    Numbness and tingling of left leg        Left leg pain           Follow-ups after your visit        Who to contact     If you have questions or need follow up information about today's clinic visit or your schedule please contact [facility] directly at [phone].  Normal or non-critical lab and imaging results will be communicated to you by MyChart, letter or phone within 4 business days after the clinic has received the results. If you do not hear from us within 7 days, please contact the clinic through Kueskihart or phone. If you have a critical or abnormal lab result, we will notify you by phone as soon as possible.  Submit refill requests through Edxact or call your pharmacy and they will forward the refill request to us. Please allow 3 business days for your refill to be completed.          Additional Information About Your Visit        MyChart Information     Edxact is an electronic gateway that provides easy, online access to your medical records. With Edxact, you can request a clinic appointment, read your test results, renew a prescription or communicate with your care team.     To sign up for Edxact visit the website at [email or web address]   You will be asked to enter the access code listed below, as well as some personal information. Please follow the directions to create your username and password.     Your access code is: [email or web address]  Expires: 2017 10:09 AM     Your access code will  in 90 days. If you need help or a new code, please contact your [facility] or call [phone] for  assistance.        Care EveryWhere ID     This is your Care EveryWhere ID. This could be used by other organizations to access your [place] medical records  [id number]         Blood Pressure from Last 3 Encounters:   07/28/17 127/78   07/27/17 124/84   07/12/17 138/90    Weight from Last 3 Encounters:   07/28/17 128.8 kg (284 lb)   07/27/17 134.3 kg (296 lb)   07/12/17 133.4 kg (294 lb)              We Performed the Following     EMG     NCS Motor with or without F-Wave, 9-10 nerves (66457)     NEEDLE EMG 1 EXTREMITY (94807)        Primary Care Provider Office Phone # Fax #    Bertha Romero PA-C [phone] [address] [address] [facility] [address]        Equal Access to Services     SIMONE ZAYAS : Hadii aad ku hadasho Sobeti, waaxda luqadaha, qaybta kaalmada adeviolettayagema, victor hugo armendariz . So [facility] [phone].    ATENCIÓN: Si habla español, tiene a lyons disposición servicios gratuitos de asistencia lingüística. Imani al [phone].    We comply with applicable federal civil rights laws and [place] laws. We do not discriminate on the basis of race, color, national origin, age, disability sex, sexual orientation or gender identity.            Thank you!     Thank you for choosing [facility]  for your care. Our goal is always to provide you with excellent care. Hearing back from our patients is one way we can continue to improve our services. Please take a few minutes to complete the written survey that you may receive in the mail after your visit with us. Thank you!             Your Updated Medication List - Protect others around you: Learn how to safely use, store and throw away your medicines at www.disposemymeds.org.          This list is accurate as of: 8/21/17 10:09 AM.  Always use your most recent med list.                   Brand Name Dispense Instructions for use Diagnosis    albuterol 108 (90 BASE) MCG/ACT Inhaler    PROAIR  HFA/PROVENTIL HFA/VENTOLIN HFA    1 Inhaler    Inhale 2 puffs into the lungs every 6 hours as needed for shortness of breath / dyspnea or wheezing    Cough       amitriptyline 25 MG tablet    ELAVIL    30 tablet    Take 1 tablet (25 mg) by mouth At Bedtime    Left leg pain, Numbness and tingling of left leg       amLODIPine 10 MG tablet    NORVASC    30 tablet    Take 1 tablet (10 mg) by mouth daily Due for appointment for refills    Hypertension goal BP (blood pressure) < 140/90       ASPIRIN PO      Take 325 mg by mouth daily        calcium carbonate 500 MG chewable tablet    TUMS     Take 1 chew tab by mouth daily        cyclobenzaprine 10 MG tablet    FLEXERIL    90 tablet    Take 0.5-1 tablets (5-10 mg) by mouth 3 times daily as needed for muscle spasms    Back muscle spasm       dicyclomine 20 MG tablet    BENTYL    40 tablet    Take 1 tablet (20 mg) by mouth 4 times daily as needed    Irritable bowel syndrome with diarrhea, Chronic diarrhea       diphenoxylate-atropine 2.5-0.025 MG per tablet    LOMOTIL    30 tablet    Take 2 tablets by mouth 4 times daily as needed for diarrhea    Chronic diarrhea, Irritable bowel syndrome with diarrhea       EPINEPHrine 0.3 MG/0.3ML injection 2-pack    EPIPEN/ADRENACLICK/or ANY BX GENERIC EQUIV    2 each    Inject 0.3 mLs (0.3 mg) into the muscle once as needed for anaphylaxis    Anaphylactic reaction       fluticasone 100 MCG/BLIST Aepb    FLOVENT DISKUS    1 Inhaler    Inhale 1 puff into the lungs 2 times daily    Wheezing       gabapentin 300 MG capsule    NEURONTIN    90 capsule    Take 1 tablet (300 mg) every night for 1-3 days, then 1 tablet twice daily for 1-3 days, then 1 tablet three times daily    Pain of left thigh       lisinopril 20 MG tablet    PRINIVIL/ZESTRIL    30 tablet    TAKE 1 TABLET EVERY DAY    Hypertension goal BP (blood pressure) < 140/90       mometasone-formoterol 100-5 MCG/ACT oral inhaler    DULERA    1 Inhaler    Inhale 2 puffs into the lungs 2  times daily Rinse mouth after use.    Cough       multivitamin, therapeutic Tabs tablet      Take 1 tablet by mouth daily        naproxen 500 MG tablet    NAPROSYN    30 tablet    Take 1 tablet (500 mg) by mouth 2 times daily as needed for moderate pain    Back muscle spasm, Strain of trapezius muscle, right, initial encounter       pantoprazole 20 MG EC tablet    PROTONIX    30 tablet    Take by mouth 30-60 minutes before a meal.    Gastroesophageal reflux disease without esophagitis       sertraline 50 MG tablet    ZOLOFT    90 tablet    Take 1 tablet (50 mg) by mouth daily    Anxiety       simvastatin 20 MG tablet    ZOCOR    30 tablet    Take 1 tablet (20 mg) by mouth At Bedtime    Hyperlipidemia LDL goal <160       TYLENOL PO      Take 1,000 mg by mouth every 8 hours as needed for mild pain or fever        VSL#3 Pack     30 each    Use 1-2 capsules/day    Chronic diarrhea

## 2017-08-28 PROBLEM — M54.5 CHRONIC LEFT-SIDED LOW BACK PAIN, WITH SCIATICA PRESENCE UNSPECIFIED: Status: RESOLVED | Noted: 2017-05-03 | Resolved: 2017-08-28

## 2017-08-28 PROBLEM — M54.2 CERVICALGIA: Status: RESOLVED | Noted: 2017-02-21 | Resolved: 2017-08-28

## 2017-08-28 PROBLEM — G89.29 CHRONIC LEFT-SIDED LOW BACK PAIN, WITH SCIATICA PRESENCE UNSPECIFIED: Status: RESOLVED | Noted: 2017-05-03 | Resolved: 2017-08-28

## 2017-08-28 NOTE — PROGRESS NOTES
See plan 06/28.  Have not heard from pt since and no appts are scheduled.  Consider note from that date to serve as final summary.

## 2017-08-29 DIAGNOSIS — I10 HYPERTENSION GOAL BP (BLOOD PRESSURE) < 140/90: ICD-10-CM

## 2017-08-29 DIAGNOSIS — M79.652 PAIN OF LEFT THIGH: ICD-10-CM

## 2017-08-29 NOTE — TELEPHONE ENCOUNTER
Appears the orthopedic provider stopped the gabapentin and started amitriptyline. Patient should not need refill of gabapentin. Please verify with patient. Thank you.    Carolina Bernardo PA-C

## 2017-08-30 RX ORDER — AMLODIPINE BESYLATE 10 MG/1
10 TABLET ORAL DAILY
Qty: 90 TABLET | Refills: 3 | Status: SHIPPED | OUTPATIENT
Start: 2017-08-30 | End: 2018-12-19

## 2017-08-30 RX ORDER — GABAPENTIN 300 MG/1
CAPSULE ORAL
OUTPATIENT
Start: 2017-08-30

## 2017-10-02 ENCOUNTER — OFFICE VISIT (OUTPATIENT)
Dept: ORTHOPEDICS | Facility: CLINIC | Age: 52
End: 2017-10-02
Payer: COMMERCIAL

## 2017-10-02 VITALS — HEART RATE: 85 BPM | SYSTOLIC BLOOD PRESSURE: 173 MMHG | DIASTOLIC BLOOD PRESSURE: 103 MMHG

## 2017-10-02 DIAGNOSIS — G62.9 POLYNEUROPATHY: Primary | ICD-10-CM

## 2017-10-02 PROCEDURE — 99213 OFFICE O/P EST LOW 20 MIN: CPT | Performed by: FAMILY MEDICINE

## 2017-10-02 ASSESSMENT — PAIN SCALES - GENERAL: PAINLEVEL: SEVERE PAIN (6)

## 2017-10-02 NOTE — MR AVS SNAPSHOT
After Visit Summary   10/2/2017    Adarsh Salvador    MRN: [id number]           Patient Information     Date Of Birth          1965        Visit Information        Provider Department      10/2/2017 10:00 AM Vinnie Espinoza DO [facility]        Today's Diagnoses     Polyneuropathy (H)    -  1      Care Instructions    Thanks for coming today.  Ortho/Sports Medicine Clinic  [address] [place] [address]    To schedule future appointments in Ortho Clinic, you may call [phone].    To schedule ordered imaging by your provider:   Call Central Imaging [phone]    To schedule an injection ordered by your provider:  Call Central Imaging Injection scheduling line: [phone]  Sensika Technologieshart available online at:  [email or web address]    Please call if any further questions or concerns ([phone]).  Clinic hours 8 am to 5 pm.    Return to clinic (call) if symptoms worsen or fail to improve.          Follow-ups after your visit        Additional Services     NEUROLOGY ADULT REFERRAL       Your provider has referred you for the following:   Consult at [facility]: [facility] [phone]   [email or web address]    Please be aware that coverage of these services is subject to the terms and limitations of your health insurance plan.  Call member services at your health plan with any benefit or coverage questions.      Please bring the following with you to your appointment:    (1) Any X-Rays, CTs or MRIs which have been performed.  Contact the facility where they were done to arrange for  prior to your scheduled appointment.    (2) List of current medications  (3) This referral request   (4) Any documents/labs given to you for this referral                  Your next 10 appointments already scheduled     Oct 11, 2017 11:00 AM CDT   New Visit with Wiliam Montalvo MD   [facility]  [facility] ([facility])    [address] [address] [place] [address] [phone]              Who to contact     If you have questions or need follow up information about today's clinic visit or your schedule please contact [facility] directly at [phone].  Normal or non-critical lab and imaging results will be communicated to you by MyChart, letter or phone within 4 business days after the clinic has received the results. If you do not hear from us within 7 days, please contact the clinic through MyChart or phone. If you have a critical or abnormal lab result, we will notify you by phone as soon as possible.  Submit refill requests through Askem or call your pharmacy and they will forward the refill request to us. Please allow 3 business days for your refill to be completed.          Additional Information About Your Visit        Askem Information     Askem is an electronic gateway that provides easy, online access to your medical records. With Askem, you can request a clinic appointment, read your test results, renew a prescription or communicate with your care team.     To sign up for Askem visit the website at [email or web address]   You will be asked to enter the access code listed below, as well as some personal information. Please follow the directions to create your username and password.     Your access code is: [email or web address]  Expires: 2017 10:09 AM     Your access code will  in 90 days. If you need help or a new code, please contact your [facility] or call [phone] for assistance.        Care EveryWhere ID     This is your Care EveryWhere ID. This could be used by other organizations to access your [place] medical records  [id number]        Your Vitals Were     Pulse                   85            Blood Pressure from Last 3 Encounters:   10/02/17 (!) 173/103   17 127/78    07/27/17 124/84    Weight from Last 3 Encounters:   07/28/17 128.8 kg (284 lb)   07/27/17 134.3 kg (296 lb)   07/12/17 133.4 kg (294 lb)              We Performed the Following     NEUROLOGY ADULT REFERRAL        Primary Care Provider Office Phone # Fax #    Bertha Placido Romero PA-C [phone] [address] [facility] [address]        Equal Access to Services     [facility] CHANA : Hadii aad ku hadasho Soomaali, waaxda luqadaha, qaybta kaalmada adeegyada, waxay idiin hayaan adeeg kharachucho laefrem . So [place] [phone].    ATENCIÓN: Si habla español, tiene a lyons disposición servicios gratuitos de asistencia lingüística. [facility] [phone].    We comply with applicable federal civil rights laws and [place] laws. We do not discriminate on the basis of race, color, national origin, age, disability, sex, sexual orientation, or gender identity.            Thank you!     Thank you for choosing [facility]  for your care. Our goal is always to provide you with excellent care. Hearing back from our patients is one way we can continue to improve our services. Please take a few minutes to complete the written survey that you may receive in the mail after your visit with us. Thank you!             Your Updated Medication List - Protect others around you: Learn how to safely use, store and throw away your medicines at www.disposemymeds.org.          This list is accurate as of: 10/2/17 10:41 AM.  Always use your most recent med list.                   Brand Name Dispense Instructions for use Diagnosis    albuterol 108 (90 BASE) MCG/ACT Inhaler    PROAIR HFA/PROVENTIL HFA/VENTOLIN HFA    1 Inhaler    Inhale 2 puffs into the lungs every 6 hours as needed for shortness of breath / dyspnea or wheezing    Cough       amitriptyline 25 MG tablet    ELAVIL    30 tablet    Take 1 tablet (25 mg) by mouth At Bedtime    Left leg pain, Numbness and tingling of left leg       amLODIPine 10 MG  tablet    NORVASC    90 tablet    Take 1 tablet (10 mg) by mouth daily    Hypertension goal BP (blood pressure) < 140/90       ASPIRIN PO      Take 325 mg by mouth daily        calcium carbonate 500 MG chewable tablet    TUMS     Take 1 chew tab by mouth daily        cyclobenzaprine 10 MG tablet    FLEXERIL    90 tablet    Take 0.5-1 tablets (5-10 mg) by mouth 3 times daily as needed for muscle spasms    Back muscle spasm       dicyclomine 20 MG tablet    BENTYL    40 tablet    Take 1 tablet (20 mg) by mouth 4 times daily as needed    Irritable bowel syndrome with diarrhea, Chronic diarrhea       diphenoxylate-atropine 2.5-0.025 MG per tablet    LOMOTIL    30 tablet    Take 2 tablets by mouth 4 times daily as needed for diarrhea    Chronic diarrhea, Irritable bowel syndrome with diarrhea       EPINEPHrine 0.3 MG/0.3ML injection 2-pack    EPIPEN/ADRENACLICK/or ANY BX GENERIC EQUIV    2 each    Inject 0.3 mLs (0.3 mg) into the muscle once as needed for anaphylaxis    Anaphylactic reaction       fluticasone 100 MCG/BLIST Aepb    FLOVENT DISKUS    1 Inhaler    Inhale 1 puff into the lungs 2 times daily    Wheezing       gabapentin 300 MG capsule    NEURONTIN    90 capsule    Take 1 tablet (300 mg) every night for 1-3 days, then 1 tablet twice daily for 1-3 days, then 1 tablet three times daily    Pain of left thigh       lisinopril 20 MG tablet    PRINIVIL/ZESTRIL    30 tablet    TAKE 1 TABLET EVERY DAY    Hypertension goal BP (blood pressure) < 140/90       mometasone-formoterol 100-5 MCG/ACT oral inhaler    DULERA    1 Inhaler    Inhale 2 puffs into the lungs 2 times daily Rinse mouth after use.    Cough       multivitamin, therapeutic Tabs tablet      Take 1 tablet by mouth daily        naproxen 500 MG tablet    NAPROSYN    30 tablet    Take 1 tablet (500 mg) by mouth 2 times daily as needed for moderate pain    Back muscle spasm, Strain of trapezius muscle, right, initial encounter       pantoprazole 20 MG EC tablet     PROTONIX    30 tablet    Take by mouth 30-60 minutes before a meal.    Gastroesophageal reflux disease without esophagitis       sertraline 50 MG tablet    ZOLOFT    90 tablet    Take 1 tablet (50 mg) by mouth daily    Anxiety       simvastatin 20 MG tablet    ZOCOR    30 tablet    Take 1 tablet (20 mg) by mouth At Bedtime    Hyperlipidemia LDL goal <160       TYLENOL PO      Take 1,000 mg by mouth every 8 hours as needed for mild pain or fever        VSL#3 Pack     30 each    Use 1-2 capsules/day    Chronic diarrhea

## 2017-10-02 NOTE — PROGRESS NOTES
HISTORY OF PRESENT ILLNESS  Mr. Salvador is a pleasant 52 year old year old male following up with left thigh pain.  He's here to review his EMG.  Adarsh is still having symptoms throughout each day.  Preventing him from doing daily activities, wakes him up at night.  Amitriptyline is helping with nighttime symptoms, although daytime is still problematic.  Additional history: as documented      REVIEW OF SYSTEMS (10/2/2017)  10 point ROS of systems including Constitutional, Eyes, Respiratory, Cardiovascular, Gastroenterology, Genitourinary, Integumentary, Musculoskeletal, Psychiatric were all negative except for pertinent positives noted in my HPI.     PHYSICAL EXAM  Vitals:    10/02/17 0953   BP: (!) 173/103   Pulse: 85       ASSESSMENT & PLAN  Mr. Salvador is a 52 year old year old male following up with left thigh pain.    I reviewed his EMG results in the room which shows polyneuropathy without evidence of radiculopathy.    Adarsh is bothered daily by his symptoms.  I'm not certain as to the etiology of his pain, we discussed common causes including diabetes, primary nerve disorders, and other conditions.    I'm referring Adarsh to neurology to discuss.  He's going to continue amitriptyline for now.    Adarsh has an appointment with his primary care team as well, his last A1C was 5.6%.    It was a pleasure seeing Adarsh.    20 minutes was spent in the room, 15 of which was spent on counseling and coordination of care.        Vinnie Espinoza DO, CAM

## 2017-10-02 NOTE — PATIENT INSTRUCTIONS
Thanks for coming today.  Ortho/Sports Medicine Clinic  [address] [place] [address]    To schedule future appointments in Ortho Clinic, you may call [phone].    To schedule ordered imaging by your provider:   Call Central Imaging [phone]    To schedule an injection ordered by your provider:  Call Central Imaging Injection scheduling line: [phone]  Z[employer] available online at:  [email or web address]    Please call if any further questions or concerns ([phone]).  Clinic hours 8 am to 5 pm.    Return to clinic (call) if symptoms worsen or fail to improve.

## 2017-10-03 NOTE — PROGRESS NOTES
SUBJECTIVE:   Adarsh Salvador is a 52 year old male who presents to clinic today for the following health issues:        Musculoskeletal problem/pain          Duration: 2 weeks     Description  Location: right hand    Intensity:  moderate    Accompanying signs and symptoms: swelling and stiffness of the right hand. He does have intermittent tingling of the 1-4 digits.    History  Previous similar problem: YES- many years ago  Previous evaluation:  none    Precipitating or alleviating factors:  Trauma or overuse: YES - overuse from work  Aggravating factors include: none    Therapies tried and outcome: rest/inactivity      He is right hand dominant.  He does a lot of repetitive work with his hands as he is a .      He would like to be checked for diabetes due to neuropathy in left leg. Previous Hemoglobin A1C was 5.6 in July. He does have a follow up appointment with neurology for further evaluation of his neuropathy next week.    Problem list and histories reviewed & adjusted, as indicated.  Additional history: as documented    Patient Active Problem List   Diagnosis     GERD (gastroesophageal reflux disease)     Kidney stone     Chronic RLQ pain     Hyperlipidemia LDL goal <160     Hypertension goal BP (blood pressure) < 140/90     Constipation     Abdominal pain, right upper quadrant     Adult celiac disease     Chronic maxillary sinusitis     Chronic rhinitis     Obesity (BMI 30-39.9)     Pure hyperglyceridemia     Anaphylactic reaction     Benign essential hypertension     Anemia in other chronic diseases classified elsewhere     Fatty liver     Irritable bowel syndrome with diarrhea     Right inguinal hernia     BMI 40.0-44.9, adult (H)     Pain in thoracic spine     Past Surgical History:   Procedure Laterality Date     C REMOVAL OF KIDNEY STONE       COLONOSCOPY  5/1/2012    Procedure:COLONOSCOPY; screening colonoscopy; Surgeon:KINGSLEY DOS SANTOS; Location:[facility]     COLONOSCOPY  4/21/2014    Procedure:  "COMBINED COLONOSCOPY, SINGLE BIOPSY/POLYPECTOMY BY BIOPSY;  Surgeon: Duane, William Charles, MD;  Location: [facility] OR     HC BREATH HYDROGEN TEST  3/7/2014    Procedure: HYDROGEN BREATH TEST;  Surgeon: Darion Swift MD;  Location: [facility]     ORTHOPEDIC SURGERY      x3 Rt knee        Social History   Substance Use Topics     Smoking status: Never Smoker     Smokeless tobacco: Current User     Types: Chew      Comment: Chew     Alcohol use No      Comment: \"quart a year\" 2012     Family History   Problem Relation Age of Onset     CANCER Mother 52     lung, smoked     Cancer - colorectal Father 53     unsure type, pt attributes to radiation exposure     Myocardial Infarction Father 45     Myocardial Infarction Paternal Grandfather 35     DIABETES Other      nephew- type 1     DIABETES Maternal Aunt      1     DIABETES Maternal Aunt      2     DIABETES Paternal Uncle      1     DIABETES Paternal Uncle      2     DIABETES Paternal Uncle      3     DIABETES Paternal Uncle      4         Current Outpatient Prescriptions   Medication Sig Dispense Refill     amLODIPine (NORVASC) 10 MG tablet Take 1 tablet (10 mg) by mouth daily 90 tablet 3     amitriptyline (ELAVIL) 25 MG tablet Take 1 tablet (25 mg) by mouth At Bedtime 30 tablet 1     gabapentin (NEURONTIN) 300 MG capsule Take 1 tablet (300 mg) every night for 1-3 days, then 1 tablet twice daily for 1-3 days, then 1 tablet three times daily 90 capsule 0     lisinopril (PRINIVIL/ZESTRIL) 20 MG tablet TAKE 1 TABLET EVERY DAY 30 tablet 0     ASPIRIN PO Take 325 mg by mouth daily       cyclobenzaprine (FLEXERIL) 10 MG tablet Take 0.5-1 tablets (5-10 mg) by mouth 3 times daily as needed for muscle spasms 90 tablet 1     sertraline (ZOLOFT) 50 MG tablet Take 1 tablet (50 mg) by mouth daily 90 tablet 0     naproxen (NAPROSYN) 500 MG tablet Take 1 tablet (500 mg) by mouth 2 times daily as needed for moderate pain 30 tablet 0     mometasone-formoterol (DULERA) 100-5 MCG/ACT oral " inhaler Inhale 2 puffs into the lungs 2 times daily Rinse mouth after use. 1 Inhaler 3     fluticasone (FLOVENT DISKUS) 100 MCG/BLIST AEPB Inhale 1 puff into the lungs 2 times daily 1 Inhaler 1     calcium carbonate (TUMS) 500 MG chewable tablet Take 1 chew tab by mouth daily       albuterol (PROAIR HFA, PROVENTIL HFA, VENTOLIN HFA) 108 (90 BASE) MCG/ACT inhaler Inhale 2 puffs into the lungs every 6 hours as needed for shortness of breath / dyspnea or wheezing 1 Inhaler 1     dicyclomine (BENTYL) 20 MG tablet Take 1 tablet (20 mg) by mouth 4 times daily as needed 40 tablet 5     diphenoxylate-atropine (LOMOTIL) 2.5-0.025 MG per tablet Take 2 tablets by mouth 4 times daily as needed for diarrhea 30 tablet 1     Acetaminophen (TYLENOL PO) Take 1,000 mg by mouth every 8 hours as needed for mild pain or fever       pantoprazole (PROTONIX) 20 MG tablet Take by mouth 30-60 minutes before a meal. 30 tablet 3     multivitamin, therapeutic (THERA-VIT) TABS Take 1 tablet by mouth daily       simvastatin (ZOCOR) 20 MG tablet Take 1 tablet (20 mg) by mouth At Bedtime 30 tablet 3     EPINEPHrine (EPIPEN) 0.3 MG/0.3ML injection Inject 0.3 mLs (0.3 mg) into the muscle once as needed for anaphylaxis 2 each 2     Dietary Management Product (VSL#3) PACK Use 1-2 capsules/day 30 each 3     Allergies   Allergen Reactions     Artificial Sweetner (Informational Only) [Artificial Sweetner (Informational Only) ] GI Disturbance     ALL artificial sweetners cause severe diarrhea & flu symptoms     Hydromorphone Anaphylaxis     Morphine [Fumaric Acid] Anaphylaxis     Hydrocodone-Acetaminophen Itching     Tramadol Hives     Trazodone Hives     Codeine Phosphate Itching     Ketorolac Tromethamine Rash     BP Readings from Last 3 Encounters:   10/04/17 134/83   10/02/17 (!) 173/103   07/28/17 127/78    Wt Readings from Last 3 Encounters:   10/04/17 295 lb (133.8 kg)   07/28/17 284 lb (128.8 kg)   07/27/17 296 lb (134.3 kg)                         Reviewed and updated as needed this visit by clinical staff     Reviewed and updated as needed this visit by Provider         ROS:  Constitutional, musculoskeletal and integumentary systems are negative, except as otherwise noted.      OBJECTIVE:   /83  Pulse 77  Temp 98  F (36.7  C) (Oral)  Wt 295 lb (133.8 kg)  SpO2 97%  BMI 41.14 kg/m2  Body mass index is 41.14 kg/(m^2).  GENERAL: healthy, alert and no distress  MS: right wrist/hand - no tenderness to palpation, no significant swelling, normal sensation and capillary refill, equal  strength bilaterally, increased tingling of the 1-4th digits with Phalen test.   SKIN: no suspicious lesions or rashes    Diagnostic Test Results:  Hemoglobin A1C is pending    ASSESSMENT/PLAN:       ICD-10-CM    1. Carpal tunnel syndrome of right wrist G56.01 ORTHO  REFERRAL   2. Screening for diabetes mellitus Z13.1 Hemoglobin A1c   3. Polyneuropathy G62.9 Hemoglobin A1c   4. Need for prophylactic vaccination and inoculation against influenza Z23 FLU VAC, SPLIT VIRUS IM > 3 YO (QUADRIVALENT) [02483]     Vaccine Administration, Initial [50538]       Patient Instructions   Wear the wrist brace daily.     Rest, ice, and elevate the right wrist often.    Follow up with hand specialist as soon as possible for further evaluation of your carpal tunnel symptoms.     If your lab results are abnormal and require treatment, you will be contacted by a nurse.    If your lab results are normal, a letter will be sent to you.          Carolina Bernardo PA-C  [facility]

## 2017-10-04 ENCOUNTER — TELEPHONE (OUTPATIENT)
Dept: FAMILY MEDICINE | Facility: CLINIC | Age: 52
End: 2017-10-04

## 2017-10-04 ENCOUNTER — OFFICE VISIT (OUTPATIENT)
Dept: FAMILY MEDICINE | Facility: CLINIC | Age: 52
End: 2017-10-04
Payer: COMMERCIAL

## 2017-10-04 VITALS
SYSTOLIC BLOOD PRESSURE: 134 MMHG | TEMPERATURE: 98 F | BODY MASS INDEX: 41.14 KG/M2 | OXYGEN SATURATION: 97 % | HEART RATE: 77 BPM | DIASTOLIC BLOOD PRESSURE: 83 MMHG | WEIGHT: 295 LBS

## 2017-10-04 DIAGNOSIS — Z23 NEED FOR PROPHYLACTIC VACCINATION AND INOCULATION AGAINST INFLUENZA: ICD-10-CM

## 2017-10-04 DIAGNOSIS — R73.09 ELEVATED HEMOGLOBIN A1C: Primary | ICD-10-CM

## 2017-10-04 DIAGNOSIS — G62.9 POLYNEUROPATHY: ICD-10-CM

## 2017-10-04 DIAGNOSIS — G56.01 CARPAL TUNNEL SYNDROME OF RIGHT WRIST: Primary | ICD-10-CM

## 2017-10-04 DIAGNOSIS — Z13.1 SCREENING FOR DIABETES MELLITUS: ICD-10-CM

## 2017-10-04 LAB — HBA1C MFR BLD: 6.2 % (ref 4.3–6)

## 2017-10-04 PROCEDURE — 90686 IIV4 VACC NO PRSV 0.5 ML IM: CPT | Performed by: PHYSICIAN ASSISTANT

## 2017-10-04 PROCEDURE — 83036 HEMOGLOBIN GLYCOSYLATED A1C: CPT | Performed by: PHYSICIAN ASSISTANT

## 2017-10-04 PROCEDURE — 99213 OFFICE O/P EST LOW 20 MIN: CPT | Mod: 25 | Performed by: PHYSICIAN ASSISTANT

## 2017-10-04 PROCEDURE — 36415 COLL VENOUS BLD VENIPUNCTURE: CPT | Performed by: PHYSICIAN ASSISTANT

## 2017-10-04 PROCEDURE — 90471 IMMUNIZATION ADMIN: CPT | Performed by: PHYSICIAN ASSISTANT

## 2017-10-04 NOTE — TELEPHONE ENCOUNTER
Left message on answering machine for patient to call back.  [place] [phone]   Zita Lee RN

## 2017-10-04 NOTE — PROGRESS NOTES
Injectable Influenza Immunization Documentation    1.  Is the person to be vaccinated sick today?   No    2. Does the person to be vaccinated have an allergy to a component   of the vaccine?   No    3. Has the person to be vaccinated ever had a serious reaction   to influenza vaccine in the past?   No    4. Has the person to be vaccinated ever had Guillain-Barré syndrome?   No    Form completed by Keisha Adam M.A.

## 2017-10-04 NOTE — MR AVS SNAPSHOT
After Visit Summary   10/4/2017    Adarsh Salvador    MRN: [id number]           Patient Information     Date Of Birth          1965        Visit Information        Provider Department      10/4/2017 10:40 AM Carolina Bernardo PA-C [facility]        Today's Diagnoses     Carpal tunnel syndrome of right wrist    -  1    Screening for diabetes mellitus        Polyneuropathy          Care Instructions    Wear the wrist brace daily.     Rest, ice, and elevate the right wrist often.    Follow up with hand specialist as soon as possible for further evaluation of your carpal tunnel symptoms.     If your lab results are abnormal and require treatment, you will be contacted by a nurse.    If your lab results are normal, a letter will be sent to you.              Follow-ups after your visit        Additional Services     ORTHO  REFERRAL       [facility] is referring you to the Orthopedic  Services at [facility].       The  Representative will assist you in the coordination of your Orthopedic and Musculoskeletal Care as prescribed by your physician.    The  Representative will call you within 1 business day to help schedule your appointment, or you may contact the  Representative at:    All areas ~ [phone]     Type of Referral : Hand specialist       Timeframe requested: earliest available appointment    Coverage of these services is subject to the terms and limitations of your health insurance plan.  Please call member services at your health plan with any benefit or coverage questions.      If X-rays, CT or MRI's have been performed, please contact the facility where they were done to arrange for , prior to your scheduled appointment.  Please bring this referral request to your appointment and present it to your specialist.                  Your next 10 appointments already scheduled     Oct 11,  " 11:00 AM CDT   New Visit with Wiliam Montalvo MD   [facility]    [address] [place] [id number] [phone]              Who to contact     If you have questions or need follow up information about today's clinic visit or your schedule please contact [facility] directly at [phone].  Normal or non-critical lab and imaging results will be communicated to you by [employer], letter or phone within 4 business days after the clinic has received the results. If you do not hear from us within 7 days, please contact the clinic through [employer] or phone. If you have a critical or abnormal lab result, we will notify you by phone as soon as possible.  Submit refill requests through [employer] or call your pharmacy and they will forward the refill request to us. Please allow 3 business days for your refill to be completed.          Additional Information About Your Visit        [employer] Information     [employer] lets you send messages to your doctor, view your test results, renew your prescriptions, schedule appointments and more. To sign up, go to [email or web address] . Click on \"Log in\" on the left side of the screen, which will take you to the Welcome page. Then click on \"Sign up Now\" on the right side of the page.     You will be asked to enter the access code listed below, as well as some personal information. Please follow the directions to create your username and password.     Your access code is: [email or web address]  Expires: 2017 10:09 AM     Your access code will  in 90 days. If you need help or a new code, please call your [facility] or [phone].        Care EveryWhere ID     This is your Care EveryWhere ID. This could be used by other organizations to access your [facility] medical records  [id number]        Your Vitals Were     Pulse Temperature Pulse Oximetry BMI (Body Mass Index)          77 98  F (36.7  C) " (Oral) 97% 41.14 kg/m2         Blood Pressure from Last 3 Encounters:   10/04/17 134/83   10/02/17 (!) 173/103   07/28/17 127/78    Weight from Last 3 Encounters:   10/04/17 295 lb (133.8 kg)   07/28/17 284 lb (128.8 kg)   07/27/17 296 lb (134.3 kg)              We Performed the Following     Hemoglobin A1c     ORTHO  REFERRAL        Primary Care Provider Office Phone # Fax #    Bertha Romero PA-C [phone] [address] [facility] [address]        Equal Access to Services     [facility] CHANA : Hadii aad ku hadasho Sobeti, waaxda luqadaha, qaybta kaalmada adeviolettayada, victor hugo armendariz . So [facility] [phone].    ATENCIÓN: Si habla español, tiene a lyons disposición servicios gratuitos de asistencia lingüística. [facility] [phone].    We comply with applicable federal civil rights laws and [place] laws. We do not discriminate on the basis of race, color, national origin, age, disability, sex, sexual orientation, or gender identity.            Thank you!     Thank you for choosing [facility]  for your care. Our goal is always to provide you with excellent care. Hearing back from our patients is one way we can continue to improve our services. Please take a few minutes to complete the written survey that you may receive in the mail after your visit with us. Thank you!             Your Updated Medication List - Protect others around you: Learn how to safely use, store and throw away your medicines at www.disposemymeds.org.          This list is accurate as of: 10/4/17 11:24 AM.  Always use your most recent med list.                   Brand Name Dispense Instructions for use Diagnosis    albuterol 108 (90 BASE) MCG/ACT Inhaler    PROAIR HFA/PROVENTIL HFA/VENTOLIN HFA    1 Inhaler    Inhale 2 puffs into the lungs every 6 hours as needed for shortness of breath / dyspnea or wheezing    Cough       amitriptyline 25 MG tablet    ELAVIL    30  tablet    Take 1 tablet (25 mg) by mouth At Bedtime    Left leg pain, Numbness and tingling of left leg       amLODIPine 10 MG tablet    NORVASC    90 tablet    Take 1 tablet (10 mg) by mouth daily    Hypertension goal BP (blood pressure) < 140/90       ASPIRIN PO      Take 325 mg by mouth daily        calcium carbonate 500 MG chewable tablet    TUMS     Take 1 chew tab by mouth daily        cyclobenzaprine 10 MG tablet    FLEXERIL    90 tablet    Take 0.5-1 tablets (5-10 mg) by mouth 3 times daily as needed for muscle spasms    Back muscle spasm       dicyclomine 20 MG tablet    BENTYL    40 tablet    Take 1 tablet (20 mg) by mouth 4 times daily as needed    Irritable bowel syndrome with diarrhea, Chronic diarrhea       diphenoxylate-atropine 2.5-0.025 MG per tablet    LOMOTIL    30 tablet    Take 2 tablets by mouth 4 times daily as needed for diarrhea    Chronic diarrhea, Irritable bowel syndrome with diarrhea       EPINEPHrine 0.3 MG/0.3ML injection 2-pack    EPIPEN/ADRENACLICK/or ANY BX GENERIC EQUIV    2 each    Inject 0.3 mLs (0.3 mg) into the muscle once as needed for anaphylaxis    Anaphylactic reaction       fluticasone 100 MCG/BLIST Aepb    FLOVENT DISKUS    1 Inhaler    Inhale 1 puff into the lungs 2 times daily    Wheezing       gabapentin 300 MG capsule    NEURONTIN    90 capsule    Take 1 tablet (300 mg) every night for 1-3 days, then 1 tablet twice daily for 1-3 days, then 1 tablet three times daily    Pain of left thigh       lisinopril 20 MG tablet    PRINIVIL/ZESTRIL    30 tablet    TAKE 1 TABLET EVERY DAY    Hypertension goal BP (blood pressure) < 140/90       mometasone-formoterol 100-5 MCG/ACT oral inhaler    DULERA    1 Inhaler    Inhale 2 puffs into the lungs 2 times daily Rinse mouth after use.    Cough       multivitamin, therapeutic Tabs tablet      Take 1 tablet by mouth daily        naproxen 500 MG tablet    NAPROSYN    30 tablet    Take 1 tablet (500 mg) by mouth 2 times daily as needed  for moderate pain    Back muscle spasm, Strain of trapezius muscle, right, initial encounter       pantoprazole 20 MG EC tablet    PROTONIX    30 tablet    Take by mouth 30-60 minutes before a meal.    Gastroesophageal reflux disease without esophagitis       sertraline 50 MG tablet    ZOLOFT    90 tablet    Take 1 tablet (50 mg) by mouth daily    Anxiety       simvastatin 20 MG tablet    ZOCOR    30 tablet    Take 1 tablet (20 mg) by mouth At Bedtime    Hyperlipidemia LDL goal <160       TYLENOL PO      Take 1,000 mg by mouth every 8 hours as needed for mild pain or fever        VSL#3 Pack     30 each    Use 1-2 capsules/day    Chronic diarrhea

## 2017-10-04 NOTE — NURSING NOTE
"Chief Complaint   Patient presents with     Musculoskeletal Problem     right hand stiffness - swelling x 2 weeks       Initial /83  Pulse 77  Temp 98  F (36.7  C) (Oral)  Wt 295 lb (133.8 kg)  SpO2 97%  BMI 41.14 kg/m2 Estimated body mass index is 41.14 kg/(m^2) as calculated from the following:    Height as of 7/27/17: 5' 11\" (1.803 m).    Weight as of this encounter: 295 lb (133.8 kg).  Medication Reconciliation: complete  Shanthi Momin M.A.    "

## 2017-10-04 NOTE — PATIENT INSTRUCTIONS
Wear the wrist brace daily.     Rest, ice, and elevate the right wrist often.    Follow up with hand specialist as soon as possible for further evaluation of your carpal tunnel symptoms.     If your lab results are abnormal and require treatment, you will be contacted by a nurse.    If your lab results are normal, a letter will be sent to you.

## 2017-10-04 NOTE — TELEPHONE ENCOUNTER
Please inform patient his hemoglobin A1C is in the prediabetic state. I would like him to follow up with diabetic education to help him improve his diet and exercise to help prevent him from becoming a diabetic. I have placed the referral. Please give him the information to set up an appointment. Thank you.    Carolina Bernardo PA-C

## 2017-10-06 NOTE — TELEPHONE ENCOUNTER
Pt notified of provider message as written.  Pt verbalized good understanding.  Scheduling number given to pt.  Mary Majano RN

## 2017-10-09 ENCOUNTER — PRE VISIT (OUTPATIENT)
Dept: NEUROLOGY | Facility: CLINIC | Age: 52
End: 2017-10-09

## 2017-10-09 NOTE — TELEPHONE ENCOUNTER
Left message with detailed instructions requesting pt call the clinic back to complete previsit call. Aleisha Patel RN

## 2017-10-10 DIAGNOSIS — M79.605 LEFT LEG PAIN: ICD-10-CM

## 2017-10-10 DIAGNOSIS — R20.0 NUMBNESS AND TINGLING OF LEFT LEG: ICD-10-CM

## 2017-10-10 DIAGNOSIS — R20.2 NUMBNESS AND TINGLING OF LEFT LEG: ICD-10-CM

## 2017-10-10 NOTE — TELEPHONE ENCOUNTER
amitriptyline (ELAVIL) 25 MG tablet 30 tablet 1 7/27/2017  --   Sig: Take 1 tablet (25 mg) by mouth At Bedtime   Class: E-Prescribe   Route: Oral   Order: [id number]   E-Prescribing Status: Receipt confirmed by pharmacy (7/27/2017  9:50 AM CDT)

## 2017-10-11 ENCOUNTER — OFFICE VISIT (OUTPATIENT)
Dept: NEUROLOGY | Facility: CLINIC | Age: 52
End: 2017-10-11
Attending: FAMILY MEDICINE
Payer: COMMERCIAL

## 2017-10-11 VITALS
DIASTOLIC BLOOD PRESSURE: 84 MMHG | BODY MASS INDEX: 41.84 KG/M2 | WEIGHT: 300 LBS | TEMPERATURE: 97.7 F | SYSTOLIC BLOOD PRESSURE: 132 MMHG | HEART RATE: 77 BPM | OXYGEN SATURATION: 99 %

## 2017-10-11 DIAGNOSIS — G56.01 CARPAL TUNNEL SYNDROME OF RIGHT WRIST: ICD-10-CM

## 2017-10-11 DIAGNOSIS — G57.12 MERALGIA PARESTHETICA OF LEFT SIDE: Primary | ICD-10-CM

## 2017-10-11 PROCEDURE — 99204 OFFICE O/P NEW MOD 45 MIN: CPT | Performed by: PSYCHIATRY & NEUROLOGY

## 2017-10-11 RX ORDER — NORTRIPTYLINE HCL 10 MG
CAPSULE ORAL
Qty: 90 CAPSULE | Refills: 0 | Status: SHIPPED | OUTPATIENT
Start: 2017-10-11 | End: 2017-11-12

## 2017-10-11 ASSESSMENT — PAIN SCALES - GENERAL: PAINLEVEL: SEVERE PAIN (6)

## 2017-10-11 NOTE — LETTER
10/11/2017        RE: Adarsh Salvador  [address] [facility] [address]        NEUROLOGY SPECIALTY CLINIC CONSULTATION         REQUESTING PHYSICIAN:  Vinnie Espinoza, , Sports Medicine      HISTORY OF PRESENT ILLNESS:  The patient Adarsh Salvador is a 52-year-old right-handed man seen at the request of Dr. Espinoza for neurologic consultation.  He has left leg numbness and pain and also right hand pain.        With regard to the left leg numbness the symptom is in the left lateral thigh.  He works as a .  When he is on his feet the symptom is worse.  He can only walk approximately one-half mile or so before the pain gets quite disabling.  He was fly-fishing, had been on his feet, and the pain got so bad he had to get out of the river.  If he sits down the pain is not quite as bad.  Lying down does not have much impact on the symptom.  He has had the problem for approximately 1 year.  It came on rather abruptly and was significant at onset.  It did not have a gradual onset.  It does seem worse now than it was at onset, however.  He did have a lumbar CT scan 2 years ago that showed degenerative change.  The symptoms he is currently experiencing are new since the scan 2 years ago.  In May and June of this year he did have caudal sacral epidural injections without benefit.  He does have a nerve stimulator for bowel control that was placed 2 years ago and has helped greatly.  Because of that, however, he cannot have an MRI.  He gets occasional symptoms in the right leg, but by and large the symptom is in the left lateral thigh.  If it gets bad it extends below the knee, but it is mostly above the knee.  It is not a weakness, but more a pain and numbness.  He describes the pain as pins and needles and a sticking sensation with electric shocks and burning.  He has no issues with bladder control.  He did try gabapentin, but it made him nauseated.  He is now on amitriptyline which does help, but he cannot take more than 25 mg at  night because it makes him quite tired.  He also gets nauseated and dopey with the amitriptyline.      The patient also has symptoms in the right hand which began 1-2 months ago.  He does have a history of carpal tunnel surgery 20 years ago.  These symptoms involve the right index and little finger.  It is a pain and awakens him at night.  He has to shake his hand out.  He does not have problems with speech or swallowing.  He does complain of impaired vision and reduced hearing.      PAST MEDICAL HISTORY:     1.  High blood pressure.   2.  Borderline diabetes.   3.  Asthma.        MEDICATIONS:  As listed in the EMR.      ALLERGIES:  Numerous medication allergies as listed in the EMR.        REVIEW OF SYSTEMS:  The patient has not had pertinent surgery or trauma to the head, neck or back.  He has no thyroid problems.        SOCIAL HISTORY:  The patient does not drink or smoke.  His diet is good, and he does take a daily multivitamin.  He is unmarried and without children.  He is a .      FAMILY HISTORY:  Positive for cancer and kidney stones.      PHYSICAL EXAMINATION:   GENERAL:  The patient is cooperative and in no distress.  He is obese.   VITAL SIGNS:  Blood pressure 132/84.     NECK:  There are no carotid bruits.   HEART:  Auscultation of the heart shows S1, S2 without murmur, rub or gallop.   RESPIRATORY:  Lungs clear to auscultation.   NEUROLOGIC:  The patient is alert, oriented and lucid.  Cranial nerve testing shows full visual fields to confrontation.  Funduscopic examination shows sharp disks bilaterally.  Eye movements are complete and conjugate without nystagmus.  Pupils react to light.  Facies are symmetric.  Tongue protrudes in the midline.  Motor evaluation shows no pronator drift, normal finger tapping, finger-nose-finger and heel-knee-shin.  He has good strength in the arms, hands, legs and feet.  He can walk on his heels and toes.  He can perform a partial genuflection with each  leg.  He can step up on a stair without difficulty.  Muscle stretch reflexes are absent in the arms, normal at the knees and questionably present at the right ankle and absent at the left ankle.  Toes are downgoing.  Sensory examination shows reduced light touch sensation in the right median territory.  Temperature and pinprick sensation are reduced in the territory of the left lateral cutaneous nerve of the thigh with involvement below the knee on the left as well.  Otherwise modalities are normal, including vibration.  Romberg sign is absent.      ASSESSMENT:   1.  Left meralgia paraesthetica.   2.  Right carpal tunnel syndrome.      DISCUSSION:  The patient is seen with the above problems.  He does have glucose intolerance.  He did have an electrodiagnostic study performed in 2017.  He says the symptoms in his hands were not present at the time of that test, and the right median nerve was not specifically tested on that occasion.      PLAN:      1.  With regard to the meralgia paraesthetica I am going to obtain a CT scan of the lumbar spine to make sure there is no structural lesion that could be contributing to this.  Amitriptyline has helped, but he cannot tolerate higher doses.  I am going to switch him to nortriptyline, building up to 30 mg at night to see if that will take the edge off his discomfort.  I told him that definitive treatment would involve weight loss, and he says he has been trying to do that for the last few years.   2.  With regard to the symptoms in the right hand I am going to check an EMG and nerve conduction study with specific attention to the right median nerve.     3.  I will see the patient in follow-up in 4 weeks for re-examination.         CHELSEA POZO MD             D: 10/11/2017 12:10   T: 10/12/2017 01:49   MT: [id number]      Name:     LUTHER BLACKMAN   MRN:      [id number]        Account:      [id number]   :      1965           Visit Date:   10/11/2017       Document: [id number]       cc: Bertha Espinoza DO        Sincerely,        Wiliam Montalvo MD

## 2017-10-11 NOTE — PATIENT INSTRUCTIONS
Having EMG and NCS Tests  You will be having electromyography (EMG) and nerve conduction studies (NCS) to measure muscle and nerve function. In most cases, both tests are done. NCS is most often done first. You will be asked to lie on an exam table with a blanket over you. You may have one or both of the following:    Nerve conduction study (NCS)  During NCS, mild electrical currents are used to test how fast impulses move along your nerves. The healthcare provider will put small metal disks (electrodes) on your skin on the area of your body being tested. This will be done using water-based gel or paste. A doctor or technologist will apply mild electrical currents to your skin. Your muscles will twitch, but the test won t harm you. Currents are usually applied to the same area several times. Usually the intensity of the electrical stimulation is increased on each body part. Despite some increasing discomfort that varies from person to person, the electrical shock is not dangerous. The test may continue on other parts of your body unless the reason for doing the test is limited to a small part of the body.  Electromyography (EMG)  Most of the electrodes will be removed for EMG. The doctor will clean the area being tested with alcohol. A very fine needle will be put into the muscles in this region. When the needle is inserted, you may feel as if your skin is being pinched. Try to relax and do as instructed, since you will be asked to relax and contract the muscle being tested. Following instructions will allow your doctor to interpret the test results.  Let the technologist know  For your safety and for the success of your test, tell the technologist if you:    Have any bleeding problems.    Take blood thinners (anticoagulants) or other medications, including aspirin.    Have any immune system problems.    Have had neck or back surgery.  You may also be asked questions about your overall health.  Before the  test  Prepare for your test as instructed. Shower or bathe, but don't use powder, oil, or lotion. Your skin should be clean and free of excess oil. Wear loose clothes. But know that you may be asked to change into a hospital gown. The entire test will take about 60 minutes. Be sure to allow extra time to check in.  After your test  Before you leave, all electrodes will be removed. You can then get right back to your normal routine. If you feel tired or have some discomfort, take it easy. If you were told to stop taking any medicines for your test, ask when you can start taking them again. Your doctor will let you know when your test results are ready.  Date Last Reviewed: 9/12/2015 2000-2017 [employer]. [address] [place] [address], [place] [address]. All rights reserved. This information is not intended as a substitute for professional medical care. Always follow your healthcare professional's instructions.

## 2017-10-11 NOTE — MR AVS SNAPSHOT
After Visit Summary   10/11/2017    Adarsh Salvador    MRN: [id number]           Patient Information     Date Of Birth          1965        Visit Information        Provider Department      10/11/2017 11:00 AM Wiliam Montalvo MD [facility]        Today's Diagnoses     Meralgia paresthetica of left side    -  1    Carpal tunnel syndrome of right wrist           Follow-ups after your visit        Follow-up notes from your care team     Return in about 4 weeks (around 11/8/2017).      Your next 10 appointments already scheduled     Oct 12, 2017 11:00 AM CDT   CT LUMBAR SPINE W/O CONTRAST with MGCT1   [facility])    [address] [place] [address] [id number] [phone]           Please bring any scans or X-rays taken at other hospitals, if similar tests were done. Also bring a list of your medicines, including vitamins, minerals and over-the-counter drugs. It is safest to leave personal items at home.  Be sure to tell your doctor:   If you have any allergies.   If there s any chance you are pregnant.   If you are breastfeeding.   If you have any special needs.  You do not need to do anything special to prepare.  Please wear loose clothing, such as a sweat suit or jogging clothes. Avoid snaps, zippers and other metal. We may ask you to undress and put on a hospital gown.            Oct 24, 2017  3:00 PM CDT   Return Visit with Wiliam Montalvo MD   [facility])    [address] [address] [place] [address] [phone]              Future tests that were ordered for you today     Open Future Orders        Priority Expected Expires Ordered    EMG Routine  10/11/2018 10/11/2017    CT Lumbar Spine w/o Contrast Routine  10/11/2018 10/11/2017            Who to contact     If you have questions or need follow up information about today's clinic visit or your schedule please  contact [facility] directly at [phone].  Normal or non-critical lab and imaging results will be communicated to you by MyChart, letter or phone within 4 business days after the clinic has received the results. If you do not hear from us within 7 days, please contact the clinic through MyChart or phone. If you have a critical or abnormal lab result, we will notify you by phone as soon as possible.  Submit refill requests through [employer] or call your pharmacy and they will forward the refill request to us. Please allow 3 business days for your refill to be completed.          Additional Information About Your Visit        [employer] Information     [employer] is an electronic gateway that provides easy, online access to your medical records. With [employer], you can request a clinic appointment, read your test results, renew a prescription or communicate with your care team.     To sign up for [employer] visit the website at [email or web address]   You will be asked to enter the access code listed below, as well as some personal information. Please follow the directions to create your username and password.     Your access code is: [email or web address]  Expires: 2017 10:09 AM     Your access code will  in 90 days. If you need help or a new code, please contact your [facility] or call [phone] for assistance.        Care EveryWhere ID     This is your Care EveryWhere ID. This could be used by other organizations to access your [place] medical records  [id number]        Your Vitals Were     Pulse Temperature Pulse Oximetry BMI (Body Mass Index)          77 97.7  F (36.5  C) (Oral) 99% 41.84 kg/m2         Blood Pressure from Last 3 Encounters:   10/11/17 132/84   10/04/17 134/83   10/02/17 (!) 173/103    Weight from Last 3 Encounters:   10/11/17 300 lb (136.1 kg)   10/04/17 295 lb (133.8 kg)   17 284 lb (128.8 kg)                 Today's Medication Changes           These changes are accurate as of: 10/11/17 12:04 PM.  If you have any questions, ask your nurse or doctor.               Start taking these medicines.        Dose/Directions    nortriptyline 10 MG capsule   Commonly known as:  PAMELOR   Used for:  Meralgia paresthetica of left side   Started by:  Wiliam Montalvo MD        Start at 1 tab at bedtime for 5 days, then 2 tabs at bedtime for 5 days, then 3 tabs at bedtime.   Quantity:  90 capsule   Refills:  0         Stop taking these medicines if you haven't already. Please contact your care team if you have questions.     amitriptyline 25 MG tablet   Commonly known as:  ELAVIL   Stopped by:  Wiliam Montalvo MD                Where to get your medicines      These medications were sent to [facility]/pharmacy #[id number] - [place], [place]  [address] [facility]  [address] [facility] [address]     Phone:  [phone]     nortriptyline 10 MG capsule                Primary Care Provider Office Phone # Fax #    Bertha Romero PA-C [phone] [address] [facility] [address]        Equal Access to Services     [facility]: Hadii aad ku hadasho Soomaali, waaxda luqadaha, qaybta kaalmada adeegyada, victor hugo armendariz . So [facility] [phone].    ATENCIÓN: Si habla español, tiene a lyons disposición servicios gratuitos de asistencia lingüística. [facility] [phone].    We comply with applicable federal civil rights laws and [place] laws. We do not discriminate on the basis of race, color, national origin, age, disability, sex, sexual orientation, or gender identity.            Thank you!     Thank you for choosing [facility]  for your care. Our goal is always to provide you with excellent care. Hearing back from our patients is one way we can continue to improve our services. Please take a few minutes to complete the written survey that you may receive in the mail after your visit with us.  Thank you!             Your Updated Medication List - Protect others around you: Learn how to safely use, store and throw away your medicines at www.disposemymeds.org.          This list is accurate as of: 10/11/17 12:04 PM.  Always use your most recent med list.                   Brand Name Dispense Instructions for use Diagnosis    albuterol 108 (90 BASE) MCG/ACT Inhaler    PROAIR HFA/PROVENTIL HFA/VENTOLIN HFA    1 Inhaler    Inhale 2 puffs into the lungs every 6 hours as needed for shortness of breath / dyspnea or wheezing    Cough       amLODIPine 10 MG tablet    NORVASC    90 tablet    Take 1 tablet (10 mg) by mouth daily    Hypertension goal BP (blood pressure) < 140/90       ASPIRIN PO      Take 325 mg by mouth daily        calcium carbonate 500 MG chewable tablet    TUMS     Take 1 chew tab by mouth daily        cyclobenzaprine 10 MG tablet    FLEXERIL    90 tablet    Take 0.5-1 tablets (5-10 mg) by mouth 3 times daily as needed for muscle spasms    Back muscle spasm       dicyclomine 20 MG tablet    BENTYL    40 tablet    Take 1 tablet (20 mg) by mouth 4 times daily as needed    Irritable bowel syndrome with diarrhea, Chronic diarrhea       diphenoxylate-atropine 2.5-0.025 MG per tablet    LOMOTIL    30 tablet    Take 2 tablets by mouth 4 times daily as needed for diarrhea    Chronic diarrhea, Irritable bowel syndrome with diarrhea       EPINEPHrine 0.3 MG/0.3ML injection 2-pack    EPIPEN/ADRENACLICK/or ANY BX GENERIC EQUIV    2 each    Inject 0.3 mLs (0.3 mg) into the muscle once as needed for anaphylaxis    Anaphylactic reaction       fluticasone 100 MCG/BLIST Aepb    FLOVENT DISKUS    1 Inhaler    Inhale 1 puff into the lungs 2 times daily    Wheezing       lisinopril 20 MG tablet    PRINIVIL/ZESTRIL    30 tablet    TAKE 1 TABLET EVERY DAY    Hypertension goal BP (blood pressure) < 140/90       mometasone-formoterol 100-5 MCG/ACT oral inhaler    DULERA    1 Inhaler    Inhale 2 puffs into the lungs 2  times daily Rinse mouth after use.    Cough       multivitamin, therapeutic Tabs tablet      Take 1 tablet by mouth daily        naproxen 500 MG tablet    NAPROSYN    30 tablet    Take 1 tablet (500 mg) by mouth 2 times daily as needed for moderate pain    Back muscle spasm, Strain of trapezius muscle, right, initial encounter       nortriptyline 10 MG capsule    PAMELOR    90 capsule    Start at 1 tab at bedtime for 5 days, then 2 tabs at bedtime for 5 days, then 3 tabs at bedtime.    Meralgia paresthetica of left side       sertraline 50 MG tablet    ZOLOFT    90 tablet    Take 1 tablet (50 mg) by mouth daily    Anxiety       simvastatin 20 MG tablet    ZOCOR    30 tablet    Take 1 tablet (20 mg) by mouth At Bedtime    Hyperlipidemia LDL goal <160       TYLENOL PO      Take 1,000 mg by mouth every 8 hours as needed for mild pain or fever        VSL#3 Pack     30 each    Use 1-2 capsules/day    Chronic diarrhea

## 2017-10-12 ENCOUNTER — RADIANT APPOINTMENT (OUTPATIENT)
Dept: CT IMAGING | Facility: CLINIC | Age: 52
End: 2017-10-12
Attending: PSYCHIATRY & NEUROLOGY
Payer: COMMERCIAL

## 2017-10-12 DIAGNOSIS — G57.12 MERALGIA PARESTHETICA OF LEFT SIDE: ICD-10-CM

## 2017-10-12 PROCEDURE — 72131 CT LUMBAR SPINE W/O DYE: CPT | Performed by: RADIOLOGY

## 2017-10-12 NOTE — PROGRESS NOTES
NEUROLOGY SPECIALTY CLINIC CONSULTATION         REQUESTING PHYSICIAN:  Vinnie Espinoza, DO, Sports Medicine      HISTORY OF PRESENT ILLNESS:  The patient Adarsh Salvador is a 52-year-old right-handed man seen at the request of Dr. Espinoza for neurologic consultation.  He has left leg numbness and pain and also right hand pain.        With regard to the left leg numbness the symptom is in the left lateral thigh.  He works as a .  When he is on his feet the symptom is worse.  He can only walk approximately one-half mile or so before the pain gets quite disabling.  He was fly-fishing, had been on his feet, and the pain got so bad he had to get out of the river.  If he sits down the pain is not quite as bad.  Lying down does not have much impact on the symptom.  He has had the problem for approximately 1 year.  It came on rather abruptly and was significant at onset.  It did not have a gradual onset.  It does seem worse now than it was at onset, however.  He did have a lumbar CT scan 2 years ago that showed degenerative change.  The symptoms he is currently experiencing are new since the scan 2 years ago.  In May and June of this year he did have caudal sacral epidural injections without benefit.  He does have a nerve stimulator for bowel control that was placed 2 years ago and has helped greatly.  Because of that, however, he cannot have an MRI.  He gets occasional symptoms in the right leg, but by and large the symptom is in the left lateral thigh.  If it gets bad it extends below the knee, but it is mostly above the knee.  It is not a weakness, but more a pain and numbness.  He describes the pain as pins and needles and a sticking sensation with electric shocks and burning.  He has no issues with bladder control.  He did try gabapentin, but it made him nauseated.  He is now on amitriptyline which does help, but he cannot take more than 25 mg at night because it makes him quite tired.  He also gets nauseated and dopey  with the amitriptyline.      The patient also has symptoms in the right hand which began 1-2 months ago.  He does have a history of carpal tunnel surgery 20 years ago.  These symptoms involve the right index and little finger.  It is a pain and awakens him at night.  He has to shake his hand out.  He does not have problems with speech or swallowing.  He does complain of impaired vision and reduced hearing.      PAST MEDICAL HISTORY:     1.  High blood pressure.   2.  Borderline diabetes.   3.  Asthma.        MEDICATIONS:  As listed in the EMR.      ALLERGIES:  Numerous medication allergies as listed in the EMR.        REVIEW OF SYSTEMS:  The patient has not had pertinent surgery or trauma to the head, neck or back.  He has no thyroid problems.        SOCIAL HISTORY:  The patient does not drink or smoke.  His diet is good, and he does take a daily multivitamin.  He is unmarried and without children.  He is a .      FAMILY HISTORY:  Positive for cancer and kidney stones.      PHYSICAL EXAMINATION:   GENERAL:  The patient is cooperative and in no distress.  He is obese.   VITAL SIGNS:  Blood pressure 132/84.     NECK:  There are no carotid bruits.   HEART:  Auscultation of the heart shows S1, S2 without murmur, rub or gallop.   RESPIRATORY:  Lungs clear to auscultation.   NEUROLOGIC:  The patient is alert, oriented and lucid.  Cranial nerve testing shows full visual fields to confrontation.  Funduscopic examination shows sharp disks bilaterally.  Eye movements are complete and conjugate without nystagmus.  Pupils react to light.  Facies are symmetric.  Tongue protrudes in the midline.  Motor evaluation shows no pronator drift, normal finger tapping, finger-nose-finger and heel-knee-shin.  He has good strength in the arms, hands, legs and feet.  He can walk on his heels and toes.  He can perform a partial genuflection with each leg.  He can step up on a stair without difficulty.  Muscle stretch  reflexes are absent in the arms, normal at the knees and questionably present at the right ankle and absent at the left ankle.  Toes are downgoing.  Sensory examination shows reduced light touch sensation in the right median territory.  Temperature and pinprick sensation are reduced in the territory of the left lateral cutaneous nerve of the thigh with involvement below the knee on the left as well.  Otherwise modalities are normal, including vibration.  Romberg sign is absent.      ASSESSMENT:   1.  Left meralgia paraesthetica.   2.  Right carpal tunnel syndrome.      DISCUSSION:  The patient is seen with the above problems.  He does have glucose intolerance.  He did have an electrodiagnostic study performed in 2017.  He says the symptoms in his hands were not present at the time of that test, and the right median nerve was not specifically tested on that occasion.      PLAN:      1.  With regard to the meralgia paraesthetica I am going to obtain a CT scan of the lumbar spine to make sure there is no structural lesion that could be contributing to this.  Amitriptyline has helped, but he cannot tolerate higher doses.  I am going to switch him to nortriptyline, building up to 30 mg at night to see if that will take the edge off his discomfort.  I told him that definitive treatment would involve weight loss, and he says he has been trying to do that for the last few years.   2.  With regard to the symptoms in the right hand I am going to check an EMG and nerve conduction study with specific attention to the right median nerve.     3.  I will see the patient in follow-up in 4 weeks for re-examination.         CHELSEA POZO MD             D: 10/11/2017 12:10   T: 10/12/2017 01:49   MT: [id number]      Name:     LUTHER BLACKMAN   MRN:      [id number]        Account:      [id number]   :      1965           Visit Date:   10/11/2017      Document: [id number]       cc: Bertha Birmingham  Alexis CONTEH

## 2017-10-24 ENCOUNTER — OFFICE VISIT (OUTPATIENT)
Dept: NEUROLOGY | Facility: CLINIC | Age: 52
End: 2017-10-24

## 2017-10-24 ENCOUNTER — OFFICE VISIT (OUTPATIENT)
Dept: NEUROLOGY | Facility: CLINIC | Age: 52
End: 2017-10-24
Payer: COMMERCIAL

## 2017-10-24 VITALS
BODY MASS INDEX: 41.53 KG/M2 | WEIGHT: 297.8 LBS | DIASTOLIC BLOOD PRESSURE: 93 MMHG | OXYGEN SATURATION: 97 % | HEART RATE: 74 BPM | SYSTOLIC BLOOD PRESSURE: 149 MMHG

## 2017-10-24 DIAGNOSIS — G60.3 IDIOPATHIC PROGRESSIVE POLYNEUROPATHY: Primary | ICD-10-CM

## 2017-10-24 DIAGNOSIS — M54.17 L-S RADICULOPATHY: ICD-10-CM

## 2017-10-24 DIAGNOSIS — G62.9 NEUROPATHY: Primary | ICD-10-CM

## 2017-10-24 DIAGNOSIS — M54.17 LEFT LUMBOSACRAL RADICULOPATHY: ICD-10-CM

## 2017-10-24 DIAGNOSIS — G56.01 CARPAL TUNNEL SYNDROME OF RIGHT WRIST: ICD-10-CM

## 2017-10-24 PROCEDURE — 86334 IMMUNOFIX E-PHORESIS SERUM: CPT | Performed by: PSYCHIATRY & NEUROLOGY

## 2017-10-24 PROCEDURE — 82784 ASSAY IGA/IGD/IGG/IGM EACH: CPT | Mod: 91 | Performed by: PSYCHIATRY & NEUROLOGY

## 2017-10-24 PROCEDURE — 36415 COLL VENOUS BLD VENIPUNCTURE: CPT | Performed by: PSYCHIATRY & NEUROLOGY

## 2017-10-24 PROCEDURE — 82784 ASSAY IGA/IGD/IGG/IGM EACH: CPT | Performed by: PSYCHIATRY & NEUROLOGY

## 2017-10-24 PROCEDURE — 00000402 ZZHCL STATISTIC TOTAL PROTEIN: Performed by: PSYCHIATRY & NEUROLOGY

## 2017-10-24 PROCEDURE — 84165 PROTEIN E-PHORESIS SERUM: CPT | Performed by: PSYCHIATRY & NEUROLOGY

## 2017-10-24 PROCEDURE — 99213 OFFICE O/P EST LOW 20 MIN: CPT | Performed by: PSYCHIATRY & NEUROLOGY

## 2017-10-24 ASSESSMENT — PAIN SCALES - GENERAL: PAINLEVEL: NO PAIN (0)

## 2017-10-24 NOTE — PROGRESS NOTES
[facility]  Electrodiagnostic Laboratory    Nerve Conduction & EMG Report          Patient:       Adarsh Salvador  Patient ID:    [id number]  Gender:        Male  YOB: 1965  Age:           52 Years 5 Months        History & Examination:  52 year old man with numbness and pain in lateral right hand and numbness in left lateral leg radiating down to foot. Eval for focal neuropathy in right hand and focal neuropathy vs radiculopathy in left leg.     Techniques: Motor and sensory conduction studies were done with surface recording electrodes. EMG was done with a concentric needle electrode.     Results:  Nerve conduction studies:  1. Bilateral lateral femoral cutaneous sensory responses were unobtainable.   2. Bilateral superficial peroneal and left sural sensory responses were normal.   3. Right median-D2, ulnar-D5, and radial sensory responses were normal.   4. Right median palmar response CV was mildly slowed. Right ulnar palmar CV was normal.   5. Left tibial-AH motor response showed normal DL, moderately reduced amplitude and mildly slowed CV.   6. Right tibial-AH motor response showed mildly prolonged DL and moderately reduced amplitude.  7. Right peroneal-EDB motor response showed mildly prolonged DL, borderline reduced amplitude and mildly slowed CV.  8. Left peroneal-EDB motor response showed normal DL, mildly reduced amplitude and mildly slowed CV.  9. Right median-APB and ulnar-ADM motor responses were normal.     Needle EM. Fibrillation potentials and positive sharp waves were seen in the right gastrocnemius muscle.   2. Large amplitude and/or long duration motor unit potentials (MUP) were seen in the bilateral TA and bilateral gastrocnemius muscles.   3. Rapidly firing MUPs with reduced recruitment were seen in the left gastrocnemius muscle.     Interpretation:  This is an abnormal study. 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 Although there are some modest demyelinating changes, these are likely explained on the basis of axon loss. Diagnostic considerations include lead or mercury toxicity, neuropathy associated with monoclonal gammopathy, diabetes (painless diabetic motor neuropathy), and genetically determined neuropathy. These electrophysiologic findings are not suggestive of a generalized disorder of lower motor neurons (e.g., motor neuron disease) or of a generalized or multifocal demyelinating neuropathy (e.g., CIDP or MMN).   2.) The abnormalities noted on the needle EMG study are most likely secondary to the chronic motor predominant polyneuropathy but could also be explained on the basis of chronic bilateral L5 and S1 lumbosacral polyradiculopathies. Chronic bilateral lumbosacral radiculopathies can not be excluded.   3.)  Attempt was made to look for a left lateral femoral cutaneous neuropathy. This nerve can be difficult to record in normal individuals with abdominal obesity. Because lateral femoral cutaneous responses could not be recorded on either the right or the left sides, this study is unable to confirm or exclude the presence of a left-sided injury to the lateral femoral cutaneous nerve.   4.) Finally, there is evidence of a very mild right-sided median neuropathy at the wrist (e.g., carpal tunnel syndrome).     Clinical correlation is recommended.     Carlos A Deleon MD  Department of Neurology           Sensory NCS      Nerve / Sites Rec. Site Onset Peak Ref. NP Amp Ref. PP Amp Dist Jacob Ref. Temp     ms ms ms  V  V  V cm m/s m/s  C   R MEDIAN - Dig II Anti      Wrist Dig II 2.92 3.80  15.4 10.0 24.0 14 48.0 48.0 33.5   R ULNAR - Dig V Anti      Wrist Dig V 2.55 3.59  11.2 8.0 14.4 12.5 49.0 48.0 33.4   R RADIAL - Snuff      Forearm Snuff 1.51 1.82  17.2 15.0 26.2 8 53.0 48.0 33.5   L SURAL - Lat Mall      Calf Ankle 2.92 3.80  8.2 8.0 7.5 14 48.0 38.0 30.8   L SUP  PERONEAL      Lat Leg Martínez 3.07 3.80  3.4  2.5 12.5 40.7 38.0 30.6   R SUP PERONEAL      Lat Leg Martínez 2.60 3.28  4.5  4.4 12.5 48.0 38.0 30.6   R MEDIAN - Ulnar - Palmar      Median Wrist 1.72 2.19 2.40 20.2  18.9 8 46.5  30.2      Ulnar Wrist 1.51 2.08 2.40 7.5  10.8 8 53.0  30.2   R LAT FEM CUT      Ing Lig Thigh NR NR  NR  NR 12 NR  30.6   L LAT FEM CUT      Ing Lig Thigh NR NR  NR  NR    30.8       Motor NCS      Nerve / Sites Rec. Site Lat Ref. Amp Ref. Rel Amp Dist Jacob Ref. Dur. Area Temp.     ms ms mV mV % cm m/s m/s ms %  C   R MEDIAN - APB      Wrist APB 3.85 4.40 6.1 5.0 100 8   5.52 100 33.1      Elbow APB 9.32  5.9  95.9 28 51.2 48.0 6.09 93.1 33.1   R ULNAR - ADM      Wrist ADM 3.49 3.50 9.6 5.0 100 8   5.52 100 33.4      B.Elbow ADM 7.60  9.7  101 22 53.5 48.0 5.78 102 33.4      A.Elbow ADM 9.53  9.7  101 10.5 54.5 48.0 5.78 102 33.4   L DEEP PERONEAL - EDB      Ankle EDB 5.68 6.00 2.3 2.5 100 8   7.29 100 31.1      FibHead EDB 15.05  1.8  78.5 35 37.3 38.0 9.79 68.7 31.1      Pop Fos EDB 18.02  1.7  73.8 11 37.1 38.0 9.53 62.8 31.1   R DEEP PERONEAL - EDB      Ankle EDB 6.20 6.00 2.8 2.5 100 8   6.56 100 24.6      FibHead EDB 15.52  2.6  94.8 34 36.5 38.0 7.66 109 24.6      Pop Fos EDB 17.81  2.4  86.8 10 43.6 38.0 7.86 108 24.6   L TIBIAL - AH      Ankle AH 5.26 6.00 1.8 4.0 100 8   7.45 100 24.6      Pop Fos AH 17.50  0.7  42 43 35.1 38.0 32.14 63.1 24.4   R TIBIAL - AH      Ankle AH 6.09 6.00 1.5 4.0 100 8   6.88 100 24.4       F  Wave      Nerve Min F Lat Max F Lat Mean FLat Temp.    ms ms ms  C   R MEDIAN 32.66 37.34 34.61 33.5   R ULNAR 28.70 34.95 33.18 33.4       EMG Summary Table     Spontaneous MUAP Recruitment    IA Fib PSW Fasc H.F. Amp Dur. PPP Pattern   L. VAST LATERALIS N None None None None N N N N   L. TIB ANTERIOR N None None None None 1+ 1+ N N   L. GASTROCN (MED) 1+ 1+ 1+ None None 1+ 1+ N MildReduced   L. PERON LONGUS N None None None None N N N N   L. GLUTEUS MAX N None None None  None N N N N   L. LUMB PSP (L) N None None None None       R. TIB ANTERIOR N None None None None 1+ 1+ N N   R. GASTROCN (MED) N None None None None 2+ 1+ N N

## 2017-10-24 NOTE — MR AVS SNAPSHOT
After Visit Summary   10/24/2017    Adarsh Salvador    MRN: [id number]           Patient Information     Date Of Birth          1965        Visit Information        Provider Department      10/24/2017 8:00 AM Carlos A Deleon MD [facility] NEUROSPECIALTIES         Follow-ups after your visit        Your next 10 appointments already scheduled     Oct 24, 2017  3:00 PM CDT   Return Visit with Wiliam Montalvo MD   [facility]    [address] [place] [address] [phone]            2017 10:30 AM CDT   New Visit with Wiliam Saenz MD   [facility] ([facility]    [address] [facility] [address] [phone]              Who to contact     Please call your clinic at [phone] to:    Ask questions about your health    Make or cancel appointments    Discuss your medicines    Learn about your test results    Speak to your doctor   If you have compliments or concerns about an experience at your clinic, or if you wish to file a complaint, please contact [facility] Patient Relations at [phone] or email us at [email or web address]         Additional Information About Your Visit        MyChart Information     R[employer] is an electronic gateway that provides easy, online access to your medical records. With [employer], you can request a clinic appointment, read your test results, renew a prescription or communicate with your care team.     To sign up for Rkylint visit the website at [email or web address]   You will be asked to enter the access code listed below, as well as some personal information. Please follow the directions to create your username and password.     Your access code is: [email or web address]  Expires: 2017 10:09 AM     Your access code will  in 90 days. If you need help or a new code, please contact your [facility] or call [phone] for assistance.        Care EveryWhere ID     This is your Care EveryWhere ID. This could be used by other organizations to access your [place] medical records  [id number]         Blood Pressure from Last 3 Encounters:   10/11/17 132/84   10/04/17 134/83   10/02/17 (!) 173/103    Weight from Last 3 Encounters:   10/11/17 300 lb (136.1 kg)   10/04/17 295 lb (133.8 kg)   07/28/17 284 lb (128.8 kg)              Today, you had the following     No orders found for display       Primary Care Provider Office Phone # Fax #    Bertha Romero PA-C [phone] [address] [address] [facility] [address]        Equal Access to Services     CHUCKIE ZAYAS : Hadii aad alex hadasho Soomaali, waaxda luqadaha, qaybta kaalmada adeegyada, victor hugo armendariz . So [facility] [phone].    ATENCIÓN: Si habla español, tiene a lyons disposición servicios gratuitos de asistencia lingüística. Imani al [phone].    We comply with applicable federal civil rights laws and [place] laws. We do not discriminate on the basis of race, color, national origin, age, disability, sex, sexual orientation, or gender identity.            Thank you!     Thank you for choosing [facility] NEUROSPECIALTIES  for your care. Our goal is always to provide you with excellent care. Hearing back from our patients is one way we can continue to improve our services. Please take a few minutes to complete the written survey that you may receive in the mail after your visit with us. Thank you!             Your Updated Medication List - Protect others around you: Learn how to safely use, store and throw away your medicines at www.disposemymeds.org.          This list is accurate as of: 10/24/17  9:18 AM.  Always use your most recent med list.                   Brand Name Dispense Instructions for use Diagnosis    albuterol 108 (90 BASE) MCG/ACT Inhaler    PROAIR HFA/PROVENTIL HFA/VENTOLIN HFA     1 Inhaler    Inhale 2 puffs into the lungs every 6 hours as needed for shortness of breath / dyspnea or wheezing    Cough       amLODIPine 10 MG tablet    NORVASC    90 tablet    Take 1 tablet (10 mg) by mouth daily    Hypertension goal BP (blood pressure) < 140/90       ASPIRIN PO      Take 325 mg by mouth daily        calcium carbonate 500 MG chewable tablet    TUMS     Take 1 chew tab by mouth daily        cyclobenzaprine 10 MG tablet    FLEXERIL    90 tablet    Take 0.5-1 tablets (5-10 mg) by mouth 3 times daily as needed for muscle spasms    Back muscle spasm       dicyclomine 20 MG tablet    BENTYL    40 tablet    Take 1 tablet (20 mg) by mouth 4 times daily as needed    Irritable bowel syndrome with diarrhea, Chronic diarrhea       diphenoxylate-atropine 2.5-0.025 MG per tablet    LOMOTIL    30 tablet    Take 2 tablets by mouth 4 times daily as needed for diarrhea    Chronic diarrhea, Irritable bowel syndrome with diarrhea       EPINEPHrine 0.3 MG/0.3ML injection 2-pack    EPIPEN/ADRENACLICK/or ANY BX GENERIC EQUIV    2 each    Inject 0.3 mLs (0.3 mg) into the muscle once as needed for anaphylaxis    Anaphylactic reaction       fluticasone 100 MCG/BLIST Aepb    FLOVENT DISKUS    1 Inhaler    Inhale 1 puff into the lungs 2 times daily    Wheezing       lisinopril 20 MG tablet    PRINIVIL/ZESTRIL    30 tablet    TAKE 1 TABLET EVERY DAY    Hypertension goal BP (blood pressure) < 140/90       mometasone-formoterol 100-5 MCG/ACT oral inhaler    DULERA    1 Inhaler    Inhale 2 puffs into the lungs 2 times daily Rinse mouth after use.    Cough       multivitamin, therapeutic Tabs tablet      Take 1 tablet by mouth daily        naproxen 500 MG tablet    NAPROSYN    30 tablet    Take 1 tablet (500 mg) by mouth 2 times daily as needed for moderate pain    Back muscle spasm, Strain of trapezius muscle, right, initial encounter       nortriptyline 10 MG capsule    PAMELOR    90 capsule    Start at 1 tab at bedtime for 5  days, then 2 tabs at bedtime for 5 days, then 3 tabs at bedtime.    Meralgia paresthetica of left side       sertraline 50 MG tablet    ZOLOFT    90 tablet    Take 1 tablet (50 mg) by mouth daily    Anxiety       simvastatin 20 MG tablet    ZOCOR    30 tablet    Take 1 tablet (20 mg) by mouth At Bedtime    Hyperlipidemia LDL goal <160       TYLENOL PO      Take 1,000 mg by mouth every 8 hours as needed for mild pain or fever        VSL#3 Pack     30 each    Use 1-2 capsules/day    Chronic diarrhea

## 2017-10-24 NOTE — NURSING NOTE
"Adarsh Salvador's goals for this visit include: return  He requests these members of his care team be copied on today's visit information:     PCP: Bertha Romero    Referring Provider:  No referring provider defined for this encounter.    Chief Complaint   Patient presents with     RECHECK       Initial BP (!) 149/93  Pulse 74  Wt 135.1 kg (297 lb 12.8 oz)  SpO2 97%  BMI 41.53 kg/m2 Estimated body mass index is 41.53 kg/(m^2) as calculated from the following:    Height as of 7/27/17: 1.803 m (5' 11\").    Weight as of this encounter: 135.1 kg (297 lb 12.8 oz).  Medication Reconciliation: complete    Do you need any medication refills at today's visit? n  "

## 2017-10-24 NOTE — LETTER
10/24/2017        RE: Adarsh Salvador  [address] [facility] [address]        NEUROLOGY SPECIALTY CLINIC CONSULTATION          REFERRING PROVIDER:  Vinnie Espinoza DO, Sports Medicine      HISTORY OF PRESENT ILLNESS:  The patient Adarsh Salvador is seen in follow-up with left thigh pain and sensory disturbance.  His presentation suggests meralgia paresthetica.  I did obtain a CT scan of the lumbar spine, and I reviewed those images with him.  There is a disk that is far lateral of left L4-5 and could compromise the L4 nerve root.  This could explain his symptoms.  The patient also had an electrodiagnostic study today with Dr. Deleon, and I reviewed that report with the patient.  He does have neuropathy that is motor greater than sensory.  The electrodiagnostic study also looked at the lateral cutaneous nerves of the thigh, but no response was obtained on either side, even the asymptomatic side.    Differential diagnosis includes lead or mercury toxicity, neuropathy associated with monoclonal gammopathy and painless diabetic neuropathy.  The patient says there is no way he could have exposure to lead or mercury.  He does have numbness in his feet.  He also has a mild right carpal tunnel syndrome.  The patient is on nortriptyline 30 mg at night which I prescribed for him, and he has found it more effective than the amitriptyline and tolerates it much better.  He says that things are going fairly well.      ASSESSMENT:   1.  Left thigh pain, L4 radiculopathy versus meralgia paraesthetica.   2.  Carpal tunnel syndrome.   3.  Sensory motor polyneuropathy.      DISCUSSION:  The patient is seen with the above problems.  He does have glucose intolerance, and that could explain the sensory motor polyneuropathy.  The patient says that it is not  possible that he could have a toxic neuropathy related to heavy metals.        PLAN:      1.  I am going to check a protein electrophoresis and immune fixation.       2.  Nortriptyline will be  continued at the current dose.     3.  I will see him in follow-up in 3 months for re-examination.     4.  The patient knows to call if there are questions or problems before the follow-up visit.   5.  Referral to PT for lumbar radic.        CHELSEA MONTALVO MD             D: 10/24/2017 15:26   T: 10/25/2017 05:06   MT: [id number]      Name:     LUTHER BLACKMAN   MRN:      [id number]        Account:      [id number]   :      1965           Visit Date:   10/24/2017      Document: [id number]       cc: Bertha Espinoza DO         Sincerely,        Chelsea Montalvo MD

## 2017-10-24 NOTE — MR AVS SNAPSHOT
"              After Visit Summary   10/24/2017    Adarsh Salvador    MRN: [id number]           Patient Information     Date Of Birth          1965        Visit Information        Provider Department      10/24/2017 3:00 PM Wiliam Montalvo MD [facility]        Today's Diagnoses     Neuropathy    -  1    Left lumbosacral radiculopathy           Follow-ups after your visit        Additional Services     PHYSICAL THERAPY REFERRAL       *This therapy referral will be filtered to a centralized scheduling office at [facility] and the patient will receive a call to schedule an appointment at a [facility] location most convenient for them. *     [facility] provides Physical Therapy evaluation and treatment and many specialty services across the [facility] system.  If requesting a specialty program, please choose from the list below.    If you have not heard from the scheduling office within 2 business days, please call [phone] for all locations, with the exception of [place], please call [phone].  Treatment: Evaluation & Treatment  Special Instructions/Modalities: none  Special Programs: None    Please be aware that coverage of these services is subject to the terms and limitations of your health insurance plan.  Call member services at your health plan with any benefit or coverage questions.      **Note to Provider:  If you are referring outside of [facility] for the therapy appointment, please list the name of the location in the \"special instructions\" above, print the referral and give to the patient to schedule the appointment.                  Follow-up notes from your care team     Return in about 3 months (around 1/24/2018).      Your next 10 appointments already scheduled     Nov 02, 2017 10:30 AM CDT   New Visit with Wiliam Saenz MD   [facility] ([facility]    [address] [facility] [address] "   [phone]            2018 10:30 AM CST   Return Visit with Wiliam Montalvo MD   [facility]    [address] [place] [address] [phone]              Future tests that were ordered for you today     Open Future Orders        Priority Expected Expires Ordered    Protein Immunofixation Serum Routine  10/24/2018 10/24/2017            Who to contact     If you have questions or need follow up information about today's clinic visit or your schedule please contact [facility] directly at [phone].  Normal or non-critical lab and imaging results will be communicated to you by MyChart, letter or phone within 4 business days after the clinic has received the results. If you do not hear from us within 7 days, please contact the clinic through [employer] or phone. If you have a critical or abnormal lab result, we will notify you by phone as soon as possible.  Submit refill requests through [email or web address] or call your pharmacy and they will forward the refill request to us. Please allow 3 business days for your refill to be completed.          Additional Information About Your Visit        [email or web address] Information     [email or web address] is an electronic gateway that provides easy, online access to your medical records. With [email or web address], you can request a clinic appointment, read your test results, renew a prescription or communicate with your care team.     To sign up for [email or web address] visit the website at [email or web address]   You will be asked to enter the access code listed below, as well as some personal information. Please follow the directions to create your username and password.     Your access code is: [email or web address]  Expires: 2017 10:09 AM     Your access code will  in 90 days. If you need help or a new code, please contact your [facility] or call [phone] for assistance.        Care EveryWhere  ID     This is your Care EveryWhere ID. This could be used by other organizations to access your [facility] medical records  [id number]        Your Vitals Were     Pulse Pulse Oximetry BMI (Body Mass Index)             74 97% 41.53 kg/m2          Blood Pressure from Last 3 Encounters:   10/24/17 (!) 149/93   10/11/17 132/84   10/04/17 134/83    Weight from Last 3 Encounters:   10/24/17 135.1 kg (297 lb 12.8 oz)   10/11/17 136.1 kg (300 lb)   10/04/17 133.8 kg (295 lb)              We Performed the Following     PHYSICAL THERAPY REFERRAL     Protein electrophoresis          Today's Medication Changes          These changes are accurate as of: 10/24/17  3:03 PM.  If you have any questions, ask your nurse or doctor.               These medicines have changed or have updated prescriptions.        Dose/Directions    nortriptyline 10 MG capsule   Commonly known as:  PAMELOR   This may have changed:    - how much to take  - how to take this  - when to take this  - additional instructions   Used for:  Meralgia paresthetica of left side        Start at 1 tab at bedtime for 5 days, then 2 tabs at bedtime for 5 days, then 3 tabs at bedtime.   Quantity:  90 capsule   Refills:  0                Primary Care Provider Office Phone # Fax #    Bertha Romero PA-C [phone] [address] [facility] [address]        Equal Access to Services     CHUCKIE ZAYAS AH: Hadii mo johnson hadasho Sofeliciaali, waaxda luqadaha, qaybta kaalmada adeegyada, victor hugo armendariz . So [facility] [phone].    ATENCIÓN: Si habla español, tiene a lyons disposición servicios gratuitos de asistencia lingüística. Llame al [phone].    We comply with applicable federal civil rights laws and [place] laws. We do not discriminate on the basis of race, color, national origin, age, disability, sex, sexual orientation, or gender identity.            Thank you!     Thank you for choosing [facility]   for your care. Our goal is always to provide you with excellent care. Hearing back from our patients is one way we can continue to improve our services. Please take a few minutes to complete the written survey that you may receive in the mail after your visit with us. Thank you!             Your Updated Medication List - Protect others around you: Learn how to safely use, store and throw away your medicines at www.disposemymeds.org.          This list is accurate as of: 10/24/17  3:03 PM.  Always use your most recent med list.                   Brand Name Dispense Instructions for use Diagnosis    albuterol 108 (90 BASE) MCG/ACT Inhaler    PROAIR HFA/PROVENTIL HFA/VENTOLIN HFA    1 Inhaler    Inhale 2 puffs into the lungs every 6 hours as needed for shortness of breath / dyspnea or wheezing    Cough       amLODIPine 10 MG tablet    NORVASC    90 tablet    Take 1 tablet (10 mg) by mouth daily    Hypertension goal BP (blood pressure) < 140/90       ASPIRIN PO      Take 325 mg by mouth daily        calcium carbonate 500 MG chewable tablet    TUMS     Take 1 chew tab by mouth daily        cyclobenzaprine 10 MG tablet    FLEXERIL    90 tablet    Take 0.5-1 tablets (5-10 mg) by mouth 3 times daily as needed for muscle spasms    Back muscle spasm       dicyclomine 20 MG tablet    BENTYL    40 tablet    Take 1 tablet (20 mg) by mouth 4 times daily as needed    Irritable bowel syndrome with diarrhea, Chronic diarrhea       diphenoxylate-atropine 2.5-0.025 MG per tablet    LOMOTIL    30 tablet    Take 2 tablets by mouth 4 times daily as needed for diarrhea    Chronic diarrhea, Irritable bowel syndrome with diarrhea       EPINEPHrine 0.3 MG/0.3ML injection 2-pack    EPIPEN/ADRENACLICK/or ANY BX GENERIC EQUIV    2 each    Inject 0.3 mLs (0.3 mg) into the muscle once as needed for anaphylaxis    Anaphylactic reaction       fluticasone 100 MCG/BLIST Aepb    FLOVENT DISKUS    1 Inhaler    Inhale 1 puff into the lungs 2 times daily     Wheezing       lisinopril 20 MG tablet    PRINIVIL/ZESTRIL    30 tablet    TAKE 1 TABLET EVERY DAY    Hypertension goal BP (blood pressure) < 140/90       mometasone-formoterol 100-5 MCG/ACT oral inhaler    DULERA    1 Inhaler    Inhale 2 puffs into the lungs 2 times daily Rinse mouth after use.    Cough       multivitamin, therapeutic Tabs tablet      Take 1 tablet by mouth daily        naproxen 500 MG tablet    NAPROSYN    30 tablet    Take 1 tablet (500 mg) by mouth 2 times daily as needed for moderate pain    Back muscle spasm, Strain of trapezius muscle, right, initial encounter       nortriptyline 10 MG capsule    PAMELOR    90 capsule    Start at 1 tab at bedtime for 5 days, then 2 tabs at bedtime for 5 days, then 3 tabs at bedtime.    Meralgia paresthetica of left side       sertraline 50 MG tablet    ZOLOFT    90 tablet    Take 1 tablet (50 mg) by mouth daily    Anxiety       simvastatin 20 MG tablet    ZOCOR    30 tablet    Take 1 tablet (20 mg) by mouth At Bedtime    Hyperlipidemia LDL goal <160       TYLENOL PO      Take 1,000 mg by mouth every 8 hours as needed for mild pain or fever        VSL#3 Pack     30 each    Use 1-2 capsules/day    Chronic diarrhea

## 2017-10-24 NOTE — LETTER
10/24/2017       RE: Adarsh Salvador  [address] [facility] [address]     Dear Colleague,    Thank you for referring your patient, Adarsh Salvador, to the [facility] at [facility]. Please see a copy of my visit note below.        [facility]  Electrodiagnostic Laboratory    Nerve Conduction & EMG Report          Patient:       Adarsh Salvador  Patient ID:    [id number]  Gender:        Male  YOB: 1965  Age:           52 Years 5 Months        History & Examination:  52 year old man with numbness and pain in lateral right hand and numbness in left lateral leg radiating down to foot. Eval for focal neuropathy in right hand and focal neuropathy vs radiculopathy in left leg.     Techniques: Motor and sensory conduction studies were done with surface recording electrodes. EMG was done with a concentric needle electrode.     Results:  Nerve conduction studies:  1. Bilateral lateral femoral cutaneous sensory responses were unobtainable.   2. Bilateral superficial peroneal and left sural sensory responses were normal.   3. Right median-D2, ulnar-D5, and radial sensory responses were normal.   4. Right median palmar response CV was mildly slowed. Right ulnar palmar CV was normal.   5. Left tibial-AH motor response showed normal DL, moderately reduced amplitude and mildly slowed CV.   6. Right tibial-AH motor response showed mildly prolonged DL and moderately reduced amplitude.  7. Right peroneal-EDB motor response showed mildly prolonged DL, borderline reduced amplitude and mildly slowed CV.  8. Left peroneal-EDB motor response showed normal DL, mildly reduced amplitude and mildly slowed CV.  9. Right median-APB and ulnar-ADM motor responses were normal.     Needle EM. Fibrillation potentials and positive sharp waves were seen in the right gastrocnemius muscle.   2. Large amplitude and/or long duration motor unit potentials (MUP) were seen in the bilateral TA and  bilateral gastrocnemius muscles.   3. Rapidly firing MUPs with reduced recruitment were seen in the left gastrocnemius muscle.     Interpretation:  This is an abnormal study. 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 Although there are some modest demyelinating changes, these are likely explained on the basis of axon loss. Diagnostic considerations include lead or mercury toxicity, neuropathy associated with monoclonal gammopathy, diabetes (painless diabetic motor neuropathy), and genetically determined neuropathy. These electrophysiologic findings are not suggestive of a generalized disorder of lower motor neurons (e.g., motor neuron disease) or of a generalized or multifocal demyelinating neuropathy (e.g., CIDP or MMN).   2.) The abnormalities noted on the needle EMG study are most likely secondary to the chronic motor predominant polyneuropathy but could also be explained on the basis of chronic bilateral L5 and S1 lumbosacral polyradiculopathies. Chronic bilateral lumbosacral radiculopathies can not be excluded.   3.)  Attempt was made to look for a left lateral femoral cutaneous neuropathy. This nerve can be difficult to record in normal individuals with abdominal obesity. Because lateral femoral cutaneous responses could not be recorded on either the right or the left sides, this study is unable to confirm or exclude the presence of a left-sided injury to the lateral femoral cutaneous nerve.   4.) Finally, there is evidence of a very mild right-sided median neuropathy at the wrist (e.g., carpal tunnel syndrome).     Clinical correlation is recommended.     Carlos A Deleon MD  Department of Neurology           Sensory NCS      Nerve / Sites Rec. Site Onset Peak Ref. NP Amp Ref. PP Amp Dist Jacob Ref. Temp     ms ms ms  V  V  V cm m/s m/s  C   R MEDIAN - Dig II Anti      Wrist Dig II 2.92 3.80  15.4 10.0 24.0 14 48.0 48.0 33.5    R ULNAR - Dig V Anti      Wrist Dig V 2.55 3.59  11.2 8.0 14.4 12.5 49.0 48.0 33.4   R RADIAL - Snuff      Forearm Snuff 1.51 1.82  17.2 15.0 26.2 8 53.0 48.0 33.5   L SURAL - Lat Mall      Calf Ankle 2.92 3.80  8.2 8.0 7.5 14 48.0 38.0 30.8   L SUP PERONEAL      Lat Leg Martínez 3.07 3.80  3.4  2.5 12.5 40.7 38.0 30.6   R SUP PERONEAL      Lat Leg Martínez 2.60 3.28  4.5  4.4 12.5 48.0 38.0 30.6   R MEDIAN - Ulnar - Palmar      Median Wrist 1.72 2.19 2.40 20.2  18.9 8 46.5  30.2      Ulnar Wrist 1.51 2.08 2.40 7.5  10.8 8 53.0  30.2   R LAT FEM CUT      Ing Lig Thigh NR NR  NR  NR 12 NR  30.6   L LAT FEM CUT      Ing Lig Thigh NR NR  NR  NR    30.8       Motor NCS      Nerve / Sites Rec. Site Lat Ref. Amp Ref. Rel Amp Dist Jacob Ref. Dur. Area Temp.     ms ms mV mV % cm m/s m/s ms %  C   R MEDIAN - APB      Wrist APB 3.85 4.40 6.1 5.0 100 8   5.52 100 33.1      Elbow APB 9.32  5.9  95.9 28 51.2 48.0 6.09 93.1 33.1   R ULNAR - ADM      Wrist ADM 3.49 3.50 9.6 5.0 100 8   5.52 100 33.4      B.Elbow ADM 7.60  9.7  101 22 53.5 48.0 5.78 102 33.4      A.Elbow ADM 9.53  9.7  101 10.5 54.5 48.0 5.78 102 33.4   L DEEP PERONEAL - EDB      Ankle EDB 5.68 6.00 2.3 2.5 100 8   7.29 100 31.1      FibHead EDB 15.05  1.8  78.5 35 37.3 38.0 9.79 68.7 31.1      Pop Fos EDB 18.02  1.7  73.8 11 37.1 38.0 9.53 62.8 31.1   R DEEP PERONEAL - EDB      Ankle EDB 6.20 6.00 2.8 2.5 100 8   6.56 100 24.6      FibHead EDB 15.52  2.6  94.8 34 36.5 38.0 7.66 109 24.6      Pop Fos EDB 17.81  2.4  86.8 10 43.6 38.0 7.86 108 24.6   L TIBIAL - AH      Ankle AH 5.26 6.00 1.8 4.0 100 8   7.45 100 24.6      Pop Fos AH 17.50  0.7  42 43 35.1 38.0 32.14 63.1 24.4   R TIBIAL - AH      Ankle AH 6.09 6.00 1.5 4.0 100 8   6.88 100 24.4       F  Wave      Nerve Min F Lat Max F Lat Mean FLat Temp.    ms ms ms  C   R MEDIAN 32.66 37.34 34.61 33.5   R ULNAR 28.70 34.95 33.18 33.4       EMG Summary Table     Spontaneous MUAP Recruitment    IA Fib PSW Fasc H.F. Amp Dur.  PPP Pattern   L. VAST LATERALIS N None None None None N N N N   L. TIB ANTERIOR N None None None None 1+ 1+ N N   L. GASTROCN (MED) 1+ 1+ 1+ None None 1+ 1+ N MildReduced   L. PERON LONGUS N None None None None N N N N   L. GLUTEUS MAX N None None None None N N N N   L. LUMB PSP (L) N None None None None       R. TIB ANTERIOR N None None None None 1+ 1+ N N   R. GASTROCN (MED) N None None None None 2+ 1+ N N

## 2017-10-24 NOTE — LETTER
October 24, 2017      Adarsh JERRY Modesto  [address] [facility] [address]        Dear ,    We are writing to inform you of your test results.    {results letter list:002746}    No results found from the In Basket message.    If you have any questions or concerns, please call the clinic at the number listed above.       Sincerely,        Wiliam Montalvo MD

## 2017-10-25 LAB
ALBUMIN SERPL ELPH-MCNC: 4.4 G/DL (ref 3.7–5.1)
ALPHA1 GLOB SERPL ELPH-MCNC: 0.3 G/DL (ref 0.2–0.4)
ALPHA2 GLOB SERPL ELPH-MCNC: 0.8 G/DL (ref 0.5–0.9)
B-GLOBULIN SERPL ELPH-MCNC: 0.9 G/DL (ref 0.6–1)
GAMMA GLOB SERPL ELPH-MCNC: 1.1 G/DL (ref 0.7–1.6)
IGA SERPL-MCNC: 185 MG/DL (ref 70–380)
IGG SERPL-MCNC: 1110 MG/DL (ref 695–1620)
IGM SERPL-MCNC: 90 MG/DL (ref 60–265)
M PROTEIN SERPL ELPH-MCNC: 0 G/DL
PROT PATTERN SERPL ELPH-IMP: NORMAL
PROT PATTERN SERPL IFE-IMP: NORMAL

## 2017-10-25 NOTE — PROGRESS NOTES
NEUROLOGY SPECIALTY CLINIC CONSULTATION          REFERRING PROVIDER:  Vinnie Espinoza DO, Sports Medicine      HISTORY OF PRESENT ILLNESS:  The patient Adarsh Salvador is seen in follow-up with left thigh pain and sensory disturbance.  His presentation suggests meralgia paresthetica.  I did obtain a CT scan of the lumbar spine, and I reviewed those images with him.  There is a disk that is far lateral of left L4-5 and could compromise the L4 nerve root.  This could explain his symptoms.  The patient also had an electrodiagnostic study today with Dr. Deleon, and I reviewed that report with the patient.  He does have neuropathy that is motor greater than sensory.  The electrodiagnostic study also looked at the lateral cutaneous nerves of the thigh, but no response was obtained on either side, even the asymptomatic side.    Differential diagnosis includes lead or mercury toxicity, neuropathy associated with monoclonal gammopathy and painless diabetic neuropathy.  The patient says there is no way he could have exposure to lead or mercury.  He does have numbness in his feet.  He also has a mild right carpal tunnel syndrome.  The patient is on nortriptyline 30 mg at night which I prescribed for him, and he has found it more effective than the amitriptyline and tolerates it much better.  He says that things are going fairly well.      ASSESSMENT:   1.  Left thigh pain, L4 radiculopathy versus meralgia paraesthetica.   2.  Carpal tunnel syndrome.   3.  Sensory motor polyneuropathy.      DISCUSSION:  The patient is seen with the above problems.  He does have glucose intolerance, and that could explain the sensory motor polyneuropathy.  The patient says that it is not  possible that he could have a toxic neuropathy related to heavy metals.        PLAN:      1.  I am going to check a protein electrophoresis and immune fixation.       2.  Nortriptyline will be continued at the current dose.     3.  I will see him in follow-up in 3  months for re-examination.     4.  The patient knows to call if there are questions or problems before the follow-up visit.   5.  Referral to PT for lumbar radic.        CHELSEA POZO MD             D: 10/24/2017 15:26   T: 10/25/2017 05:06   MT: KAYLENE#[id number]      Name:     LUTHER BLACKMAN   MRN:      [id number]        Account:      [id number]   :      1965           Visit Date:   10/24/2017      Document: [id number]       cc: Bertha Espinoza DO          addendum: left message on phone that IEP/ELP normal. Call if questions.

## 2017-11-02 ENCOUNTER — OFFICE VISIT (OUTPATIENT)
Dept: ORTHOPEDICS | Facility: CLINIC | Age: 52
End: 2017-11-02
Payer: COMMERCIAL

## 2017-11-02 VITALS
BODY MASS INDEX: 42.12 KG/M2 | DIASTOLIC BLOOD PRESSURE: 96 MMHG | WEIGHT: 302 LBS | HEART RATE: 89 BPM | SYSTOLIC BLOOD PRESSURE: 161 MMHG

## 2017-11-02 DIAGNOSIS — G56.01 CARPAL TUNNEL SYNDROME OF RIGHT WRIST: Primary | ICD-10-CM

## 2017-11-02 PROCEDURE — 99203 OFFICE O/P NEW LOW 30 MIN: CPT | Performed by: ORTHOPAEDIC SURGERY

## 2017-11-02 RX ORDER — METHYLPREDNISOLONE 4 MG
TABLET, DOSE PACK ORAL
Qty: 21 TABLET | Refills: 0 | Status: SHIPPED | OUTPATIENT
Start: 2017-11-02 | End: 2018-01-12

## 2017-11-02 ASSESSMENT — PAIN SCALES - GENERAL: PAINLEVEL: SEVERE PAIN (6)

## 2017-11-02 NOTE — LETTER
11/2/2017         RE: Adarsh Salvador  [address] [facility] [address]        Dear Colleague,    Thank you for referring your patient, Adarsh Salvador, to the [facility]. Please see a copy of my visit note below.    SUBJECTIVE:  Adarsh Salvador is a 52 year old male who is seen in consultation at the request of Carolina Bernardo PA-C for right hand pain, weakness, numbness, and tingling x 9-12 months. Patient reports dropping objects and severe pain in palm, thumb and index finger. Numbness is located in the index finger and third fingers    Treatments up to this point: wrist brace which has not helped    EMG: 10/24/17 with Dr Deleon @ [facility] Neurology Department:  Results:   4. Right median palmar response CV was mildly slowed. Right ulnar palmar CV was normal.   Consistent with very mild carpal tunnel syndrome      Job description:   .   Patients past medical, surgical, social and family histories reviewed.       Past medical history notable for:   1.  Left thigh pain, L4 radiculopathy versus meralgia paraesthetica.   2.  Carpal tunnel syndrome.   3.  Sensory motor polyneuropathy.  Past Medical History:   Diagnosis Date     Adult celiac disease      GERD (gastroesophageal reflux disease) 6/15/2011     Hypercholesterolemia 6/15/2011     Hypertension 6/15/2011     IBS (irritable bowel syndrome)      Kidney stone 6/15/2011    Pt believes these were Calcium stones     Past Surgical History:   Procedure Laterality Date     C REMOVAL OF KIDNEY STONE       COLONOSCOPY  5/1/2012    Procedure:COLONOSCOPY; screening colonoscopy; Surgeon:KINGSLEY DOS SANTOS; Location: OR     COLONOSCOPY  4/21/2014    Procedure: COMBINED COLONOSCOPY, SINGLE BIOPSY/POLYPECTOMY BY BIOPSY;  Surgeon: Duane, William Charles, MD;  Location:  OR     HC BREATH HYDROGEN TEST  3/7/2014    Procedure: HYDROGEN BREATH TEST;  Surgeon: Darion Swift MD;  Location: [facility]     ORTHOPEDIC SURGERY      x3 Rt knee       Social History     Social History  "    Marital status: Single     Spouse name: N/A     Number of children: 0     Years of education: N/A     Occupational History     - with [employer]     Social History Main Topics     Smoking status: Never Smoker     Smokeless tobacco: Current User     Types: Chew      Comment: Chew     Alcohol use No      Comment: \"quart a year\" 2012     Drug use: No     Sexual activity: No     Other Topics Concern     Not on file     Social History Narrative    Works at [employer], as a  (25+ years experience).            REVIEW OF SYSTEMS:  CONSTITUTIONAL:  NEGATIVE for fever, chills, change in weight  INTEGUMENTARY/SKIN:  NEGATIVE for worrisome rashes, moles or lesions  EYES:  NEGATIVE for vision changes or irritation  ENT/MOUTH:  NEGATIVE for ear, mouth and throat problems  RESP:  NEGATIVE for significant cough or SOB  BREAST:  NEGATIVE for masses, tenderness or discharge  CV:  NEGATIVE for chest pain, palpitations or peripheral edema  GI:  NEGATIVE for nausea, abdominal pain, heartburn, or change in bowel habits  :  Negative   MUSCULOSKELETAL:  See HPI above  NEURO:  NEGATIVE for weakness, dizziness or paresthesias  ENDOCRINE:  NEGATIVE for temperature intolerance, skin/hair changes  HEME/ALLERGY/IMMUNE:  NEGATIVE for bleeding problems  PSYCHIATRIC:  NEGATIVE for changes in mood or affect    EXAM:  BP (!) 161/96  Pulse 89  Wt (!) 137 kg (302 lb)  BMI 42.12 kg/m2  GENERAL APPEARANCE: healthy, alert and no distress   GAIT: NORMAL  PSYCH:  mentation appears normal and affect normal/bright  SKIN: no suspicious lesions or rashes  NEURO:  strength: adequate  negative thenar fasciculations.  Thenar atrophy when compared to the left side   Sensation diminished in index finger today.  Reflexes normal in upper extremities.   Vascular: Good capp refill and pulses    MUSCULOSKELETAL:    Tinel's: positive, numbenss is usually in index and third fingers  Phalen's: positive after 5 " seconds    ASSESSMENT/PLAN  1. Carpal tunnel syndrome, right side. Based on exam, moderate in severity but the EMG suggests very mild CTS     We talked about the options, which included activity modification,splinting, corticosteroid injection, medrol dose pack, and CTR. It appears the symptoms are suggestive of moderate than mild and I am not sure the extent the patient's neuropathy is playing in his symptoms. He is desperate to continue working without dropping things so we gave him a prescription for medrol dose pack and ordered image-guided corticosteroid injection right carpal tunnel. If he gets full relief from the oral steroid, he can postpone the injection. All questions were answered.     Return to clinic ZULMA Saenz MD  Dept. Orthopedic Surgery  [facility]    This document serves as a record of the services and decisions personally performed and made by Dr. DI Saenz MD. It was created on his behalf by Abhilash Dowling, a trained medical scribe. The creation of this record is based on the provider's personal observations and the statements of the patient. This document has been checked and approved by the attending provider.   Abhilash Dowling November 2, 2017 12:29 PM        Again, thank you for allowing me to participate in the care of your patient.        Sincerely,        Wiliam Saenz MD

## 2017-11-02 NOTE — PROGRESS NOTES
SUBJECTIVE:  Adarsh Salvador is a 52 year old male who is seen in consultation at the request of Carolina Bernardo PA-C for right hand pain, weakness, numbness, and tingling x 9-12 months. Patient reports dropping objects and severe pain in palm, thumb and index finger. Numbness is located in the index finger and third fingers    Treatments up to this point: wrist brace which has not helped    EMG: 10/24/17 with Dr Deleon @ [facility] Neurology Department:  Results:   4. Right median palmar response CV was mildly slowed. Right ulnar palmar CV was normal.   Consistent with very mild carpal tunnel syndrome      Job description:   .   Patients past medical, surgical, social and family histories reviewed.       Past medical history notable for:   1.  Left thigh pain, L4 radiculopathy versus meralgia paraesthetica.   2.  Carpal tunnel syndrome.   3.  Sensory motor polyneuropathy.  Past Medical History:   Diagnosis Date     Adult celiac disease      GERD (gastroesophageal reflux disease) 6/15/2011     Hypercholesterolemia 6/15/2011     Hypertension 6/15/2011     IBS (irritable bowel syndrome)      Kidney stone 6/15/2011    Pt believes these were Calcium stones     Past Surgical History:   Procedure Laterality Date     C REMOVAL OF KIDNEY STONE       COLONOSCOPY  5/1/2012    Procedure:COLONOSCOPY; screening colonoscopy; Surgeon:KINGSLEY DOS SANTOS; Location: OR     COLONOSCOPY  4/21/2014    Procedure: COMBINED COLONOSCOPY, SINGLE BIOPSY/POLYPECTOMY BY BIOPSY;  Surgeon: Duane, William Charles, MD;  Location:  OR     HC BREATH HYDROGEN TEST  3/7/2014    Procedure: HYDROGEN BREATH TEST;  Surgeon: Darion Swift MD;  Location: [facility]     ORTHOPEDIC SURGERY      x3 Rt knee       Social History     Social History     Marital status: Single     Spouse name: N/A     Number of children: 0     Years of education: N/A     Occupational History     - with [employer]     Social History Main Topics     Smoking status:  "Never Smoker     Smokeless tobacco: Current User     Types: Chew      Comment: Chew     Alcohol use No      Comment: \"quart a year\" 2012     Drug use: No     Sexual activity: No     Other Topics Concern     Not on file     Social History Narrative    Works at [employer], as a  (25+ years experience).            REVIEW OF SYSTEMS:  CONSTITUTIONAL:  NEGATIVE for fever, chills, change in weight  INTEGUMENTARY/SKIN:  NEGATIVE for worrisome rashes, moles or lesions  EYES:  NEGATIVE for vision changes or irritation  ENT/MOUTH:  NEGATIVE for ear, mouth and throat problems  RESP:  NEGATIVE for significant cough or SOB  BREAST:  NEGATIVE for masses, tenderness or discharge  CV:  NEGATIVE for chest pain, palpitations or peripheral edema  GI:  NEGATIVE for nausea, abdominal pain, heartburn, or change in bowel habits  :  Negative   MUSCULOSKELETAL:  See HPI above  NEURO:  NEGATIVE for weakness, dizziness or paresthesias  ENDOCRINE:  NEGATIVE for temperature intolerance, skin/hair changes  HEME/ALLERGY/IMMUNE:  NEGATIVE for bleeding problems  PSYCHIATRIC:  NEGATIVE for changes in mood or affect    EXAM:  BP (!) 161/96  Pulse 89  Wt (!) 137 kg (302 lb)  BMI 42.12 kg/m2  GENERAL APPEARANCE: healthy, alert and no distress   GAIT: NORMAL  PSYCH:  mentation appears normal and affect normal/bright  SKIN: no suspicious lesions or rashes  NEURO:  strength: adequate  negative thenar fasciculations.  Thenar atrophy when compared to the left side   Sensation diminished in index finger today.  Reflexes normal in upper extremities.   Vascular: Good capp refill and pulses    MUSCULOSKELETAL:    Tinel's: positive, numbenss is usually in index and third fingers  Phalen's: positive after 5 seconds    ASSESSMENT/PLAN  1. Carpal tunnel syndrome, right side. Based on exam, moderate in severity but the EMG suggests very mild CTS     We talked about the options, which included activity modification,splinting, corticosteroid " injection, medrol dose pack, and CTR. It appears the symptoms are suggestive of moderate than mild and I am not sure the extent the patient's neuropathy is playing in his symptoms. He is desperate to continue working without dropping things so we gave him a prescription for medrol dose pack and ordered image-guided corticosteroid injection right carpal tunnel. If he gets full relief from the oral steroid, he can postpone the injection. All questions were answered.     Return to clinic ZULMA Saenz MD  Dept. Orthopedic Surgery  [facility]    This document serves as a record of the services and decisions personally performed and made by Dr. DI Saenz MD. It was created on his behalf by Abhilash Dowling, a trained medical scribe. The creation of this record is based on the provider's personal observations and the statements of the patient. This document has been checked and approved by the attending provider.   Abhilash Dowling November 2, 2017 12:29 PM

## 2017-11-02 NOTE — NURSING NOTE
"Chief Complaint   Patient presents with     Consult     Right wrist carpal tunnel Pt states pain has been going on for a while. Pt states he is losing strength in his hand and is dropping objects out of his hand and is painful to  objects. 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 Therapies wears wrist brace to help and Aleve       Initial BP (!) 161/96  Pulse 89  Wt (!) 137 kg (302 lb)  BMI 42.12 kg/m2 Estimated body mass index is 42.12 kg/(m^2) as calculated from the following:    Height as of 7/27/17: 1.803 m (5' 11\").    Weight as of this encounter: 137 kg (302 lb).  Medication Reconciliation: manuel Parada CMA 11/2/2017 11:08 AM      "

## 2017-11-02 NOTE — MR AVS SNAPSHOT
After Visit Summary   11/2/2017    Adarsh Salvador    MRN: [id number]           Patient Information     Date Of Birth          1965        Visit Information        Provider Department      11/2/2017 10:30 AM Wiliam Saenz MD [facility]        Today's Diagnoses     Carpal tunnel syndrome of right wrist    -  1       Follow-ups after your visit        Additional Services     ORTHO  REFERRAL       [facility] is referring you to the Orthopedic  Services at [facility].       The  Representative will assist you in the coordination of your Orthopedic and Musculoskeletal Care as prescribed by your physician.    The  Representative will call you within 1 business day to help schedule your appointment, or you may contact the  Representative at:    All areas ~ [phone]     Type of Referral : Surgical / Specialist  Favio Oliveira DO @ [facility]    Procedure: Image guided Right wrist carpal tunnel steroid injection.      Timeframe requested: Routine    Coverage of these services is subject to the terms and limitations of your health insurance plan.  Please call member services at your health plan with any benefit or coverage questions.      If X-rays, CT or MRI's have been performed, please contact the facility where they were done to arrange for , prior to your scheduled appointment.  Please bring this referral request to your appointment and present it to your specialist.                  Your next 10 appointments already scheduled     Nov 06, 2017 11:00 AM CST   (Arrive by 10:45 AM)   [facility] ([facility]    [address]  [place] [address]   [phone]            Jan 09, 2018 10:30 AM CST   Return Visit with Wiliam Montalvo MD   [facility]    [id number] [place] [address]  [phone]              Who to contact     If you have questions or need follow up information about today's clinic visit or your schedule please contact [facility] directly at [phone].  Normal or non-critical lab and imaging results will be communicated to you by MyChart, letter or phone within 4 business days after the clinic has received the results. If you do not hear from us within 7 days, please contact the clinic through MyChart or phone. If you have a critical or abnormal lab result, we will notify you by phone as soon as possible.  Submit refill requests through [employer] or call your pharmacy and they will forward the refill request to us. Please allow 3 business days for your refill to be completed.          Additional Information About Your Visit        [employer] Information     [employer] lets you send messages to your doctor, view your test results, renew your prescriptions, schedule appointments and more. To sign up, go to [email or web address] . Click on \"Log in\" on the left side of the screen, which will take you to the Welcome page. Then click on \"Sign up Now\" on the right side of the page.     You will be asked to enter the access code listed below, as well as some personal information. Please follow the directions to create your username and password.     Your access code is: [email or web address]  Expires: 2017 10:09 AM     Your access code will  in 90 days. If you need help or a new code, please call your [facility] or [phone].        Care EveryWhere ID     This is your Care EveryWhere ID. This could be used by other organizations to access your [place] medical records  [id number]        Your Vitals Were     Pulse BMI (Body Mass Index)                89 42.12 kg/m2           Blood Pressure from Last 3 Encounters:   17 (!) 161/96   10/24/17 (!) 149/93   10/11/17 132/84    Weight from Last 3 Encounters:   17 (!) 137 kg (302 lb)   10/24/17 " 135.1 kg (297 lb 12.8 oz)   10/11/17 136.1 kg (300 lb)              We Performed the Following     ORTHO  REFERRAL          Today's Medication Changes          These changes are accurate as of: 11/2/17 11:59 PM.  If you have any questions, ask your nurse or doctor.               Start taking these medicines.        Dose/Directions    methylPREDNISolone 4 MG tablet   Commonly known as:  MEDROL DOSEPAK   Used for:  Carpal tunnel syndrome of right wrist   Started by:  Wiliam Saenz MD        Follow package instructions   Quantity:  21 tablet   Refills:  0         These medicines have changed or have updated prescriptions.        Dose/Directions    nortriptyline 10 MG capsule   Commonly known as:  PAMELOR   This may have changed:    - how much to take  - how to take this  - when to take this  - additional instructions   Used for:  Meralgia paresthetica of left side        Start at 1 tab at bedtime for 5 days, then 2 tabs at bedtime for 5 days, then 3 tabs at bedtime.   Quantity:  90 capsule   Refills:  0            Where to get your medicines      These medications were sent to [facility] [id number] [place] [address], [facility] [address]  [id number] [place] [address]     Phone:  [phone]     methylPREDNISolone 4 MG tablet                Primary Care Provider Office Phone # Fax #    Bertha Romero PA-C [phone] [address] [facility] [address]        Equal Access to Services     [facility] Hospital[facility]ELINDA : Hadii mo johnson hadasho Soomaali, waaxda luqadaha, qaybta kaalmada adeegyada, victor hugo armendariz . So [facility] [phone].    ATENCIÓN: Si habla lettyañol, tiene a lyons disposición servicios gratuitos de asistencia lingüística. Imani al [phone].    We comply with applicable federal civil rights laws and [place] laws. We do not discriminate on the basis of race, color, national origin, age, disability, sex, sexual orientation,  or gender identity.            Thank you!     Thank you for choosing [facility]  for your care. Our goal is always to provide you with excellent care. Hearing back from our patients is one way we can continue to improve our services. Please take a few minutes to complete the written survey that you may receive in the mail after your visit with us. Thank you!             Your Updated Medication List - Protect others around you: Learn how to safely use, store and throw away your medicines at www.disposemymeds.org.          This list is accurate as of: 11/2/17 11:59 PM.  Always use your most recent med list.                   Brand Name Dispense Instructions for use Diagnosis    albuterol 108 (90 BASE) MCG/ACT Inhaler    PROAIR HFA/PROVENTIL HFA/VENTOLIN HFA    1 Inhaler    Inhale 2 puffs into the lungs every 6 hours as needed for shortness of breath / dyspnea or wheezing    Cough       amLODIPine 10 MG tablet    NORVASC    90 tablet    Take 1 tablet (10 mg) by mouth daily    Hypertension goal BP (blood pressure) < 140/90       ASPIRIN PO      Take 325 mg by mouth daily        calcium carbonate 500 MG chewable tablet    TUMS     Take 1 chew tab by mouth daily        cyclobenzaprine 10 MG tablet    FLEXERIL    90 tablet    Take 0.5-1 tablets (5-10 mg) by mouth 3 times daily as needed for muscle spasms    Back muscle spasm       dicyclomine 20 MG tablet    BENTYL    40 tablet    Take 1 tablet (20 mg) by mouth 4 times daily as needed    Irritable bowel syndrome with diarrhea, Chronic diarrhea       diphenoxylate-atropine 2.5-0.025 MG per tablet    LOMOTIL    30 tablet    Take 2 tablets by mouth 4 times daily as needed for diarrhea    Chronic diarrhea, Irritable bowel syndrome with diarrhea       EPINEPHrine 0.3 MG/0.3ML injection 2-pack    EPIPEN/ADRENACLICK/or ANY BX GENERIC EQUIV    2 each    Inject 0.3 mLs (0.3 mg) into the muscle once as needed for anaphylaxis    Anaphylactic reaction       fluticasone  100 MCG/BLIST Aepb    FLOVENT DISKUS    1 Inhaler    Inhale 1 puff into the lungs 2 times daily    Wheezing       lisinopril 20 MG tablet    PRINIVIL/ZESTRIL    30 tablet    TAKE 1 TABLET EVERY DAY    Hypertension goal BP (blood pressure) < 140/90       methylPREDNISolone 4 MG tablet    MEDROL DOSEPAK    21 tablet    Follow package instructions    Carpal tunnel syndrome of right wrist       mometasone-formoterol 100-5 MCG/ACT oral inhaler    DULERA    1 Inhaler    Inhale 2 puffs into the lungs 2 times daily Rinse mouth after use.    Cough       multivitamin, therapeutic Tabs tablet      Take 1 tablet by mouth daily        naproxen 500 MG tablet    NAPROSYN    30 tablet    Take 1 tablet (500 mg) by mouth 2 times daily as needed for moderate pain    Back muscle spasm, Strain of trapezius muscle, right, initial encounter       nortriptyline 10 MG capsule    PAMELOR    90 capsule    Start at 1 tab at bedtime for 5 days, then 2 tabs at bedtime for 5 days, then 3 tabs at bedtime.    Meralgia paresthetica of left side       sertraline 50 MG tablet    ZOLOFT    90 tablet    Take 1 tablet (50 mg) by mouth daily    Anxiety       simvastatin 20 MG tablet    ZOCOR    30 tablet    Take 1 tablet (20 mg) by mouth At Bedtime    Hyperlipidemia LDL goal <160       TYLENOL PO      Take 1,000 mg by mouth every 8 hours as needed for mild pain or fever        VSL#3 Pack     30 each    Use 1-2 capsules/day    Chronic diarrhea

## 2017-11-12 DIAGNOSIS — G57.12 MERALGIA PARESTHETICA OF LEFT SIDE: ICD-10-CM

## 2017-11-13 RX ORDER — NORTRIPTYLINE HCL 10 MG
CAPSULE ORAL
Qty: 90 CAPSULE | Refills: 0 | Status: SHIPPED | OUTPATIENT
Start: 2017-11-13 | End: 2017-12-22

## 2017-12-08 ENCOUNTER — TELEPHONE (OUTPATIENT)
Dept: FAMILY MEDICINE | Facility: CLINIC | Age: 52
End: 2017-12-08

## 2017-12-08 NOTE — TELEPHONE ENCOUNTER
Only appointment left in clinic was at 2:10 pm, offered appointment to pt who declined.  Pt states he will come to urgent care [facility].  Mary Majano RN

## 2017-12-08 NOTE — TELEPHONE ENCOUNTER
Pt calling to request a same day appointment with Bertha Romero PA-C on 12/8/17 for a cough. I asked pt how long hes been experiencing this and he states  Quite a while, was unable to give exact length. Pt states he is a  and it is hard for him to do is job and handle this cough. Please call him at [phone] Ok to leave a detailed message.     Carri Das Scheduling

## 2017-12-12 ENCOUNTER — TELEPHONE (OUTPATIENT)
Dept: FAMILY MEDICINE | Facility: CLINIC | Age: 52
End: 2017-12-12

## 2017-12-12 ENCOUNTER — OFFICE VISIT (OUTPATIENT)
Dept: FAMILY MEDICINE | Facility: CLINIC | Age: 52
End: 2017-12-12
Payer: COMMERCIAL

## 2017-12-12 VITALS
OXYGEN SATURATION: 98 % | HEART RATE: 82 BPM | BODY MASS INDEX: 41.14 KG/M2 | TEMPERATURE: 98.3 F | DIASTOLIC BLOOD PRESSURE: 88 MMHG | SYSTOLIC BLOOD PRESSURE: 132 MMHG | WEIGHT: 295 LBS

## 2017-12-12 DIAGNOSIS — R05.9 COUGH: ICD-10-CM

## 2017-12-12 DIAGNOSIS — I10 HYPERTENSION GOAL BP (BLOOD PRESSURE) < 140/90: ICD-10-CM

## 2017-12-12 DIAGNOSIS — N20.0 KIDNEY STONE: Primary | ICD-10-CM

## 2017-12-12 DIAGNOSIS — R05.9 COUGH: Primary | ICD-10-CM

## 2017-12-12 DIAGNOSIS — Z13.220 LIPID SCREENING: ICD-10-CM

## 2017-12-12 LAB
ALBUMIN UR-MCNC: ABNORMAL MG/DL
APPEARANCE UR: CLEAR
BILIRUB UR QL STRIP: NEGATIVE
COLOR UR AUTO: YELLOW
GLUCOSE UR STRIP-MCNC: NEGATIVE MG/DL
HGB UR QL STRIP: ABNORMAL
KETONES UR STRIP-MCNC: NEGATIVE MG/DL
LEUKOCYTE ESTERASE UR QL STRIP: NEGATIVE
NITRATE UR QL: NEGATIVE
PH UR STRIP: 6 PH (ref 5–7)
RBC #/AREA URNS AUTO: ABNORMAL /HPF
SOURCE: ABNORMAL
SP GR UR STRIP: 1.02 (ref 1–1.03)
UROBILINOGEN UR STRIP-ACNC: 0.2 EU/DL (ref 0.2–1)
WBC #/AREA URNS AUTO: ABNORMAL /HPF

## 2017-12-12 PROCEDURE — 99214 OFFICE O/P EST MOD 30 MIN: CPT | Performed by: PHYSICIAN ASSISTANT

## 2017-12-12 PROCEDURE — 81001 URINALYSIS AUTO W/SCOPE: CPT | Performed by: PHYSICIAN ASSISTANT

## 2017-12-12 RX ORDER — LISINOPRIL 20 MG/1
TABLET ORAL
Qty: 30 TABLET | Refills: 1 | Status: SHIPPED | OUTPATIENT
Start: 2017-12-12 | End: 2018-01-12

## 2017-12-12 RX ORDER — ALBUTEROL SULFATE 90 UG/1
2 AEROSOL, METERED RESPIRATORY (INHALATION) EVERY 6 HOURS PRN
Qty: 1 INHALER | Refills: 3 | Status: SHIPPED | OUTPATIENT
Start: 2017-12-12 | End: 2019-01-11

## 2017-12-12 NOTE — MR AVS SNAPSHOT
After Visit Summary   12/12/2017    Adarsh Salvador    MRN: [id number]           Patient Information     Date Of Birth          1965        Visit Information        Provider Department      12/12/2017 9:40 AM Bertha Romero PA-C [facility]        Today's Diagnoses     Kidney stone    -  1    Hypertension goal BP (blood pressure) < 140/90        Cough        Lipid screening          Care Instructions    Return fasting for labs in 1 month (make lab only appointment)  Start back on lisinopril medication, I would like labs about 1 month after that  Take antibiotic with food  Let me know if your symptoms do not start improving over the next week or if they worsen at any time (fever, chest pain, etc)          Follow-ups after your visit        Your next 10 appointments already scheduled     Dec 14, 2017  8:30 AM CST   Return Visit with Wiliam Saenz MD   [facility] ([facility]    [address] [facility] [id number] [phone]            Jan 09, 2018 10:30 AM CST   Return Visit with Wiliam Montalvo MD   [facility] ([facility]    [address] [place] [address] [phone]              Future tests that were ordered for you today     Open Future Orders        Priority Expected Expires Ordered    Comprehensive metabolic panel Routine 1/12/2018 6/12/2018 12/12/2017    Lipid panel reflex to direct LDL Fasting Routine 1/12/2018 6/12/2018 12/12/2017            Who to contact     If you have questions or need follow up information about today's clinic visit or your schedule please contact [facility] directly at [phone].  Normal or non-critical lab and imaging results will be communicated to you by MyChart, letter or phone within 4 business days after the clinic has received the results. If you do not hear from us within 7 days, please contact the clinic through  [employer] or phone. If you have a critical or abnormal lab result, we will notify you by phone as soon as possible.  Submit refill requests through [employer] or call your pharmacy and they will forward the refill request to us. Please allow 3 business days for your refill to be completed.          Additional Information About Your Visit        Care EveryWhere ID     This is your Care EveryWhere ID. This could be used by other organizations to access your [facility] medical records  [id number]        Your Vitals Were     Pulse Temperature Pulse Oximetry BMI (Body Mass Index)          82 98.3  F (36.8  C) (Oral) 98% 41.14 kg/m2         Blood Pressure from Last 3 Encounters:   12/12/17 132/88   11/02/17 (!) 161/96   10/24/17 (!) 149/93    Weight from Last 3 Encounters:   12/12/17 295 lb (133.8 kg)   11/02/17 (!) 302 lb (137 kg)   10/24/17 297 lb 12.8 oz (135.1 kg)              We Performed the Following     UA reflex to Microscopic     Urine Microscopic          Today's Medication Changes          These changes are accurate as of: 12/12/17 10:48 AM.  If you have any questions, ask your nurse or doctor.               Start taking these medicines.        Dose/Directions    amoxicillin-clavulanate 875-125 MG per tablet   Commonly known as:  AUGMENTIN   Used for:  Cough   Started by:  Bertha Romero PA-C        Dose:  1 tablet   Take 1 tablet by mouth 2 times daily   Quantity:  20 tablet   Refills:  0         These medicines have changed or have updated prescriptions.        Dose/Directions    lisinopril 20 MG tablet   Commonly known as:  PRINIVIL/ZESTRIL   This may have changed:  See the new instructions.   Used for:  Hypertension goal BP (blood pressure) < 140/90   Changed by:  Bertha Romero PA-C        TAKE 1 TABLET EVERY DAY   Quantity:  30 tablet   Refills:  1         Stop taking these medicines if you haven't already. Please contact your care team if you have questions.     fluticasone 100  MCG/BLIST Aepb   Commonly known as:  FLOVENT DISKUS   Stopped by:  Bertha Romero PA-C                Where to get your medicines      These medications were sent to [facility]/pharmacy #[id number] - [place] - [address]  [address] [place] [address]     Phone:  [phone]     albuterol 108 (90 BASE) MCG/ACT Inhaler    amoxicillin-clavulanate 875-125 MG per tablet    lisinopril 20 MG tablet    mometasone-formoterol 100-5 MCG/ACT oral inhaler                Primary Care Provider Office Phone # Fax #    Bertha Romero PA-C [phone] [address] [facility] [address]        Equal Access to Services     SIMONE ZAYAS : Hadii mo johnson hadasho Soomaali, waaxda luqadaha, qaybta kaalmada adeegyada, victor hugo cordovain dena armendariz . So [facility] [phone].    ATENCIÓN: Si habla español, tiene a lyons disposición servicios gratuitos de asistencia lingüística. [facility] [phone].    We comply with applicable federal civil rights laws and [place] laws. We do not discriminate on the basis of race, color, national origin, age, disability, sex, sexual orientation, or gender identity.            Thank you!     Thank you for choosing [facility]  for your care. Our goal is always to provide you with excellent care. Hearing back from our patients is one way we can continue to improve our services. Please take a few minutes to complete the written survey that you may receive in the mail after your visit with us. Thank you!             Your Updated Medication List - Protect others around you: Learn how to safely use, store and throw away your medicines at www.disposemymeds.org.          This list is accurate as of: 12/12/17 10:48 AM.  Always use your most recent med list.                   Brand Name Dispense Instructions for use Diagnosis    albuterol 108 (90 BASE) MCG/ACT Inhaler    PROAIR HFA/PROVENTIL HFA/VENTOLIN HFA    1 Inhaler    Inhale 2 puffs into the  lungs every 6 hours as needed for shortness of breath / dyspnea or wheezing    Cough       amLODIPine 10 MG tablet    NORVASC    90 tablet    Take 1 tablet (10 mg) by mouth daily    Hypertension goal BP (blood pressure) < 140/90       amoxicillin-clavulanate 875-125 MG per tablet    AUGMENTIN    20 tablet    Take 1 tablet by mouth 2 times daily    Cough       ASPIRIN PO      Take 325 mg by mouth daily        calcium carbonate 500 MG chewable tablet    TUMS     Take 1 chew tab by mouth daily        cyclobenzaprine 10 MG tablet    FLEXERIL    90 tablet    Take 0.5-1 tablets (5-10 mg) by mouth 3 times daily as needed for muscle spasms    Back muscle spasm       dicyclomine 20 MG tablet    BENTYL    40 tablet    Take 1 tablet (20 mg) by mouth 4 times daily as needed    Irritable bowel syndrome with diarrhea, Chronic diarrhea       diphenoxylate-atropine 2.5-0.025 MG per tablet    LOMOTIL    30 tablet    Take 2 tablets by mouth 4 times daily as needed for diarrhea    Chronic diarrhea, Irritable bowel syndrome with diarrhea       EPINEPHrine 0.3 MG/0.3ML injection 2-pack    EPIPEN/ADRENACLICK/or ANY BX GENERIC EQUIV    2 each    Inject 0.3 mLs (0.3 mg) into the muscle once as needed for anaphylaxis    Anaphylactic reaction       lisinopril 20 MG tablet    PRINIVIL/ZESTRIL    30 tablet    TAKE 1 TABLET EVERY DAY    Hypertension goal BP (blood pressure) < 140/90       methylPREDNISolone 4 MG tablet    MEDROL DOSEPAK    21 tablet    Follow package instructions    Carpal tunnel syndrome of right wrist       mometasone-formoterol 100-5 MCG/ACT oral inhaler    DULERA    1 Inhaler    Inhale 2 puffs into the lungs 2 times daily Rinse mouth after use.    Cough       multivitamin, therapeutic Tabs tablet      Take 1 tablet by mouth daily        naproxen 500 MG tablet    NAPROSYN    30 tablet    Take 1 tablet (500 mg) by mouth 2 times daily as needed for moderate pain    Back muscle spasm, Strain of trapezius muscle, right, initial  encounter       nortriptyline 10 MG capsule    PAMELOR    90 capsule    START AT 1 CAP AT BEDTIME FOR 5 DAYS, THEN 2 CAPS AT BEDTIME FOR 5 DAYS, THEN 3 CAPS AT BEDTIME.    Meralgia paresthetica of left side       sertraline 50 MG tablet    ZOLOFT    90 tablet    Take 1 tablet (50 mg) by mouth daily    Anxiety       simvastatin 20 MG tablet    ZOCOR    30 tablet    Take 1 tablet (20 mg) by mouth At Bedtime    Hyperlipidemia LDL goal <160       TYLENOL PO      Take 1,000 mg by mouth every 8 hours as needed for mild pain or fever        VSL#3 Pack     30 each    Use 1-2 capsules/day    Chronic diarrhea

## 2017-12-12 NOTE — TELEPHONE ENCOUNTER
What does insurance cover, I will send whatever is covered that is similar.  If needed have the patient call if pharmacy doesn't know.  Have patient call insurance to see what is covered similar to dulera.     Cehrelle

## 2017-12-12 NOTE — NURSING NOTE
"Chief Complaint   Patient presents with     Cough     cough per pt x 3-4 weeks SX getting worst       Initial BP (!) 171/105  Pulse 82  Temp 98.3  F (36.8  C) (Oral)  Wt 295 lb (133.8 kg)  SpO2 98%  BMI 41.14 kg/m2 Estimated body mass index is 41.14 kg/(m^2) as calculated from the following:    Height as of 7/27/17: 5' 11\" (1.803 m).    Weight as of this encounter: 295 lb (133.8 kg).  Medication Reconciliation: complete      Munir Amaral MA    "

## 2017-12-12 NOTE — PATIENT INSTRUCTIONS
Return fasting for labs in 1 month (make lab only appointment)  Start back on lisinopril medication, I would like labs about 1 month after that  Take antibiotic with food  Let me know if your symptoms do not start improving over the next week or if they worsen at any time (fever, chest pain, etc)

## 2017-12-12 NOTE — LETTER
[facility]  [address] [facility] [address]  Phone: [phone]    December 12, 2017        Adarsh Salvador  [address] [address]          To whom it may concern:    RE: Adarsh Salvador    Patient was seen and treated today at our clinic for acute illness. He may be excused today/tonight for work and return when his symptoms improve.     Please contact me for questions or concerns.      Sincerely,        Bertha Romero PA-C

## 2017-12-13 NOTE — PROGRESS NOTES
"HISTORY OF PRESENT ILLNESS:    Adarsh Salvador is a 52 year old male with a history of neuropathy who is seen in follow up for his right wrist CTS which was very mild based on his 10/24/17 EMG but seemingly more moderate in severity based on the exam. The oral steroid stopped working after a few days. The patient did not get the image-guided steroid injection we ordered. He reports occasional swelling of the right hand at work, pain, numbness, weakness.    Treatments tried to this point: Medrol dose pack    Physical Exam:  Resp 16  Ht 1.803 m (5' 11\")  Wt 133.8 kg (295 lb)  BMI 41.14 kg/m2   Body mass index is 41.14 kg/(m^2).  General Appearance: healthy, alert and no distress   Skin: no suspicious lesions or rashes  Neuro: Normal strength and tone, mentation intact and speech normal  Vascular: good pulses, and cappillary refill   Lymph: no lymphadenopathy   Psych:  mentation appears normal and affect normal/bright  Resp: no increased work of breathing     MSK:  Neuro:   Definite thenar atrophy when compared to the left side.   Sensation: diminished in radial three digits  Tinel's: Positive over carpal tunnel  Phalen's: Positive     Impression:   1. Moderately severe CTS based on exam    Plan:   We discussed the treatment options. We decided to proceed with right CTR. He did not want to consider corticosteroid injection.  I discussed the nature, recovery times, risks, and alternatives of the procedure. All questions were answered.    Return to clinic: post-op follow up    DI Saenz MD  Dept. Orthopedic Surgery  [facility]     This document serves as a record of the services and decisions personally performed and made by Dr. DI Saenz MD. It was created on his behalf by Abhilash Dowling, a trained medical scribe. The creation of this record is based on the provider's personal observations and the statements of the patient. This document has been checked and approved by the attending provider. "   Abhilash Dowling December 14, 2017 8:44 AM

## 2017-12-13 NOTE — TELEPHONE ENCOUNTER
Called and spoke to pharmacy to see if there were alternatives. They said that he has [facility] and the phone bertram is [phone]. ID is [id number].    Called and spoke to [facility], patient needs to have tried and failed 2 of the following. Advair, Symbicort or Breo Elipta.     Can you send an RX for one of there please.    Dalila Tinajero MA/TC

## 2017-12-14 ENCOUNTER — OFFICE VISIT (OUTPATIENT)
Dept: ORTHOPEDICS | Facility: CLINIC | Age: 52
End: 2017-12-14
Payer: COMMERCIAL

## 2017-12-14 VITALS — BODY MASS INDEX: 41.3 KG/M2 | HEIGHT: 71 IN | WEIGHT: 295 LBS | RESPIRATION RATE: 16 BRPM

## 2017-12-14 DIAGNOSIS — G56.01 CARPAL TUNNEL SYNDROME OF RIGHT WRIST: Primary | ICD-10-CM

## 2017-12-14 PROCEDURE — 99213 OFFICE O/P EST LOW 20 MIN: CPT | Performed by: ORTHOPAEDIC SURGERY

## 2017-12-14 NOTE — NURSING NOTE
"Chief Complaint   Patient presents with     RECHECK     Right wrist carpal tunnel. Pt states wrist is still sore. Has been wearing the brace every but work because brace will not fit in glove he is for work.        Initial Resp 16  Ht 1.803 m (5' 11\")  Wt 133.8 kg (295 lb)  BMI 41.14 kg/m2 Estimated body mass index is 41.14 kg/(m^2) as calculated from the following:    Height as of this encounter: 1.803 m (5' 11\").    Weight as of this encounter: 133.8 kg (295 lb).  Medication Reconciliation: complete   Alisia Parada CMA 12/14/2017 8:22 AM      "

## 2017-12-14 NOTE — LETTER
"    12/14/2017         RE: Adarsh Salvador  [address] [facility] [address]        Dear Colleague,    Thank you for referring your patient, Adarsh Salvador, to the [facility]. Please see a copy of my visit note below.    HISTORY OF PRESENT ILLNESS:    Adarsh Salvador is a 52 year old male with a history of neuropathy who is seen in follow up for his right wrist CTS which was very mild based on his 10/24/17 EMG but seemingly more moderate in severity based on the exam. The oral steroid stopped working after a few days. The patient did not get the image-guided steroid injection we ordered. He reports occasional swelling of the right hand at work, pain, numbness, weakness.    Treatments tried to this point: Medrol dose pack    Physical Exam:  Resp 16  Ht 1.803 m (5' 11\")  Wt 133.8 kg (295 lb)  BMI 41.14 kg/m2   Body mass index is 41.14 kg/(m^2).  General Appearance: healthy, alert and no distress   Skin: no suspicious lesions or rashes  Neuro: Normal strength and tone, mentation intact and speech normal  Vascular: good pulses, and cappillary refill   Lymph: no lymphadenopathy   Psych:  mentation appears normal and affect normal/bright  Resp: no increased work of breathing     MSK:  Neuro:   Definite thenar atrophy when compared to the left side.   Sensation: diminished in radial three digits  Tinel's: Positive over carpal tunnel  Phalen's: Positive     Impression:   1. Moderately severe CTS based on exam    Plan:   We discussed the treatment options. We decided to proceed with right CTR. He did not want to consider corticosteroid injection.  I discussed the nature, recovery times, risks, and alternatives of the procedure. All questions were answered.    Return to clinic: post-op follow up    DI Saenz MD  Dept. Orthopedic Surgery  [facility]     This document serves as a record of the services and decisions personally performed and made by Dr. DI Saenz MD. It was created on his behalf by " Abhilash Dowling, a trained medical scribe. The creation of this record is based on the provider's personal observations and the statements of the patient. This document has been checked and approved by the attending provider.   Abhilash Dowling December 14, 2017 8:44 AM        Again, thank you for allowing me to participate in the care of your patient.        Sincerely,        Wiliam Saenz MD

## 2017-12-14 NOTE — NURSING NOTE
Medical Record Number: [id number]  Adarsh Salvador  YOB: 1965   Phone: [phone] (home) none (work)  Primary Provider: Bertha Romero    Diagnosis:  Carpal tunnel syndrome right wrist.    Surgeon: HUBER Saenz MD  Surgical Procedure: Right carpal tunnel release  ICD-10 Coded: g5601  Hospital: [facility]    In-Patient/ Out-Patient status: Outpatient  Length of surgery: 30 mn  Anesthesia: MAC  Implanted Cardiac Device:     Date of Surgery:  Call pt to coordinate  When post-op appointment needed:  2 weeks    Special Requests/ Equipment:: na    CPM Needed: na

## 2017-12-14 NOTE — MR AVS SNAPSHOT
"              After Visit Summary   12/14/2017    Adarsh Salvador    MRN: [id number]           Patient Information     Date Of Birth          1965        Visit Information        Provider Department      12/14/2017 8:30 AM Wiliam Saenz MD [facility]        Today's Diagnoses     Carpal tunnel syndrome of right wrist    -  1       Follow-ups after your visit        Your next 10 appointments already scheduled     Jan 09, 2018 10:30 AM CST   [facility] with [facility]ili[facility]vo [facility]   [facility]    [address] [place] [address] [phone]              Who to contact     If you have questions or need follow up information about today's clinic visit or your schedule please contact [facility] directly at [phone].  Normal or non-critical lab and imaging results will be communicated to you by MyChart, letter or phone within 4 business days after the clinic has received the results. If you do not hear from us within 7 days, please contact the clinic through MyChart or phone. If you have a critical or abnormal lab result, we will notify you by phone as soon as possible.  Submit refill requests through [employer] or call your pharmacy and they will forward the refill request to us. Please allow 3 business days for your refill to be completed.          Additional Information About Your Visit        Care EveryWhere ID     This is your Care EveryWhere ID. This could be used by other organizations to access your [facility] medical records  [id number]        Your Vitals Were     Respirations Height BMI (Body Mass Index)             16 1.803 m (5' 11\") 41.14 kg/m2          Blood Pressure from Last 3 Encounters:   12/12/17 132/88   11/02/17 (!) 161/96   10/24/17 (!) 149/93    Weight from Last 3 Encounters:   12/14/17 133.8 kg (295 lb)   12/12/17 133.8 kg (295 lb)   11/02/17 (!) 137 kg (302 lb)              Today, you had the " following     No orders found for display       Primary Care Provider Office Phone # Fax #    Bertha Romero PA-C [phone] [address] [facility] [address]        Equal Access to Services     SIMONE ZAYAS : Hadii mo johnson baileyo Soomaali, waaxda luqadaha, qaybta kaalmada adeviolettayada, victor hugo stjefry fritzvioletta nazario kala machado. So [facility] [phone].    ATENCIÓN: Si habla español, tiene a lyons disposición servicios gratuitos de asistencia lingüística. [facility] [phone].    We comply with applicable federal civil rights laws and [place] laws. We do not discriminate on the basis of race, color, national origin, age, disability, sex, sexual orientation, or gender identity.            Thank you!     Thank you for choosing [facility]  for your care. Our goal is always to provide you with excellent care. Hearing back from our patients is one way we can continue to improve our services. Please take a few minutes to complete the written survey that you may receive in the mail after your visit with us. Thank you!             Your Updated Medication List - Protect others around you: Learn how to safely use, store and throw away your medicines at www.disposemymeds.org.          This list is accurate as of: 12/14/17  8:48 AM.  Always use your most recent med list.                   Brand Name Dispense Instructions for use Diagnosis    albuterol 108 (90 BASE) MCG/ACT Inhaler    PROAIR HFA/PROVENTIL HFA/VENTOLIN HFA    1 Inhaler    Inhale 2 puffs into the lungs every 6 hours as needed for shortness of breath / dyspnea or wheezing    Cough       amLODIPine 10 MG tablet    NORVASC    90 tablet    Take 1 tablet (10 mg) by mouth daily    Hypertension goal BP (blood pressure) < 140/90       amoxicillin-clavulanate 875-125 MG per tablet    AUGMENTIN    20 tablet    Take 1 tablet by mouth 2 times daily    Cough       ASPIRIN PO      Take 325 mg by mouth daily        calcium carbonate  500 MG chewable tablet    TUMS     Take 1 chew tab by mouth daily        cyclobenzaprine 10 MG tablet    FLEXERIL    90 tablet    Take 0.5-1 tablets (5-10 mg) by mouth 3 times daily as needed for muscle spasms    Back muscle spasm       dicyclomine 20 MG tablet    BENTYL    40 tablet    Take 1 tablet (20 mg) by mouth 4 times daily as needed    Irritable bowel syndrome with diarrhea, Chronic diarrhea       diphenoxylate-atropine 2.5-0.025 MG per tablet    LOMOTIL    30 tablet    Take 2 tablets by mouth 4 times daily as needed for diarrhea    Chronic diarrhea, Irritable bowel syndrome with diarrhea       EPINEPHrine 0.3 MG/0.3ML injection 2-pack    EPIPEN/ADRENACLICK/or ANY BX GENERIC EQUIV    2 each    Inject 0.3 mLs (0.3 mg) into the muscle once as needed for anaphylaxis    Anaphylactic reaction       fluticasone-salmeterol 250-50 MCG/DOSE diskus inhaler    ADVAIR    3 Inhaler    Inhale 1 puff into the lungs 2 times daily    Cough       lisinopril 20 MG tablet    PRINIVIL/ZESTRIL    30 tablet    TAKE 1 TABLET EVERY DAY    Hypertension goal BP (blood pressure) < 140/90       methylPREDNISolone 4 MG tablet    MEDROL DOSEPAK    21 tablet    Follow package instructions    Carpal tunnel syndrome of right wrist       mometasone-formoterol 100-5 MCG/ACT oral inhaler    DULERA    1 Inhaler    Inhale 2 puffs into the lungs 2 times daily Rinse mouth after use.    Cough       multivitamin, therapeutic Tabs tablet      Take 1 tablet by mouth daily        naproxen 500 MG tablet    NAPROSYN    30 tablet    Take 1 tablet (500 mg) by mouth 2 times daily as needed for moderate pain    Back muscle spasm, Strain of trapezius muscle, right, initial encounter       nortriptyline 10 MG capsule    PAMELOR    90 capsule    START AT 1 CAP AT BEDTIME FOR 5 DAYS, THEN 2 CAPS AT BEDTIME FOR 5 DAYS, THEN 3 CAPS AT BEDTIME.    Meralgia paresthetica of left side       sertraline 50 MG tablet    ZOLOFT    90 tablet    Take 1 tablet (50 mg) by  mouth daily    Anxiety       simvastatin 20 MG tablet    ZOCOR    30 tablet    Take 1 tablet (20 mg) by mouth At Bedtime    Hyperlipidemia LDL goal <160       TYLENOL PO      Take 1,000 mg by mouth every 8 hours as needed for mild pain or fever        VSL#3 Pack     30 each    Use 1-2 capsules/day    Chronic diarrhea

## 2017-12-26 NOTE — PATIENT INSTRUCTIONS
[facility] [phone]  Call to ask to leave message for Dr. Saenz nurse asking about paperwork     Take blood pressure medication morning of surgery, avoid vitamins and naproxen, aspirin, or ibuprofen x 5 days before procedure          Before Your Surgery      Call your surgeon if there is any change in your health. This includes signs of a cold or flu (such as a sore throat, runny nose, cough, rash or fever).    Do not smoke, drink alcohol or take over the counter medicine (unless your surgeon or primary care doctor tells you to) for the 24 hours before and after surgery.    If you take prescribed drugs: Follow your doctor s orders about which medicines to take and which to stop until after surgery.    Eating and drinking prior to surgery: follow the instructions from your surgeon    Take a shower or bath the night before surgery. Use the soap your surgeon gave you to gently clean your skin. If you do not have soap from your surgeon, use your regular soap. Do not shave or scrub the surgery site.  Wear clean pajamas and have clean sheets on your bed.

## 2017-12-26 NOTE — PROGRESS NOTES
[facility]  [address] [facility] [address] [phone]  Dept: [phone]    PRE-OP EVALUATION:  Today's date: 2018    Adarsh Salvador (: 1965) presents for pre-operative evaluation assessment as requested by Dr. Saenz.  He requires evaluation and anesthesia risk assessment prior to undergoing surgery/procedure for treatment of CTS .  Proposed procedure: CTS surgery?    Date of Surgery/ Procedure: 2018  Time of Surgery/ Procedure: 11:30am  Hospital/Surgical Facility: [facility] [place]--- Dr. Saenz   Fax number for surgical facility: [phone]  Primary Physician: Bertha Romero  Type of Anesthesia Anticipated: to be determined    Patient has a Health Care Directive or Living Will:  NO    1. NO - Do you have a history of heart attack, stroke, stent, bypass or surgery on an artery in the head, neck, heart or legs?  2. NO - Do you ever have any pain or discomfort in your chest?  3. NO - Do you have a history of  Heart Failure?  4. NO - Are you troubled by shortness of breath when: walking on the level, up a slight hill or at night?     5. NO - Do you currently have a cold, bronchitis or other respiratory infection?  6. No - Do you have a cough, shortness of breath or wheezing?    7. NO - Do you sometimes get pains in the calves of your legs when you walk?  8. YES - Do you or anyone in your family have previous history of blood clots?  DVT in arm   9. NO - Do you or does anyone in your family have a serious bleeding problem such as prolonged bleeding following surgeries or cuts?  10. NO - Have you ever had problems with anemia or been told to take iron pills?  11. NO - Have you had any abnormal blood loss such as black, tarry or bloody stools, or abnormal vaginal bleeding?  12. NO - Have you ever had a blood transfusion?  13. NO - Have you or any of your relatives ever had problems with anesthesia?  14. NO - Do you have sleep apnea, excessive snoring or daytime  drowsiness?  15. NO - Do you have any prosthetic heart valves?  16. NO - Do you have prosthetic joints?  17. NO - Is there any chance that you may be pregnant?        HPI:                                                      Brief HPI related to upcoming procedure: h/o carpel tunnel surgery 20 some years ago, now having major symptoms again.  Uses hands a lot at work.  Will be having release surgery again.  Has pain and weakness. Has had symptoms for years but worse and now keeping him up at night.  No new symptoms since last visit with ortho.     Due to his radiculopathy in his leg he also requests a parking sticker today.  He is being followed by neurology who believes he has either meralg paresthetica or lumbar radiculopathy.         HYPERTENSION - Patient has longstanding history of mod-severe HTN , currently denies any symptoms referable to elevated blood pressure. Specifically denies chest pain, palpitations, dyspnea, orthopnea, PND or peripheral edema. Blood pressure readings have been in normal range. Current medication regimen is as listed below. Patient denies any side effects of medication.                                                     Mild persistent asthma-not needing inhaler much. Has been out of advair due to cost, will re-try sending. Oxygen and symptoms are stable however.     Obesity-has continued to gain weight despite counseling about nutrition/exercise.  No h/o cardiac events.  EKGs have been stable and no chest pain/sob.          Anemia-history of this, will recheck level today.     Pre-diabetes-will recheck a1c today.   Last a1c was 6.2. uPDATED LABS:hemoglobin still somewhat low but stable from previous  A1C just now meets criteria for diabetes.  Should not affect his surgery but he will return soon for follow up to discuss this and referral to diabetic education and starting medication versus major lifestyle changes.     Lab Results   Component Value Date    A1C 6.2 10/04/2017    A1C  5.6 07/26/2016    A1C 5.9 02/28/2014    A1C 5.6 12/04/2012                                                                                                                                         .    MEDICAL HISTORY:                                                    Patient Active Problem List    Diagnosis Date Noted     Obesity (BMI 30-39.9) 12/04/2012     Priority: High     Pain in thoracic spine 02/21/2017     Priority: Medium     BMI 40.0-44.9, adult (H) 01/26/2017     Priority: Medium     Right inguinal hernia 06/14/2016     Priority: Medium     Irritable bowel syndrome with diarrhea 03/21/2016     Priority: Medium     Fatty liver 02/22/2016     Priority: Medium     Benign essential hypertension 12/15/2015     Priority: Medium     Anemia in other chronic diseases classified elsewhere 12/15/2015     Priority: Medium     Anaphylactic reaction 08/14/2015     Priority: Medium     Pure hyperglyceridemia 07/12/2013     Priority: Medium     Chronic maxillary sinusitis 09/18/2012     Priority: Medium     Believes this is secondary to exposure to toxic fumes at work- he is a .  He regularly wears a mask ventilator and believes this helps.  Has only tried intermittent nasal washes, and OTC medications.  Not ever tried steroid nasal sprays or other controller medications.      1/17/2013  Presents with 5 days of sinus pressure, pain, nasal congestion, cough, muscle aches, more trouble with his back pain.  He did not get the Flu shot this year.      Works as a , and has not been using his ventilator due to time and work pressures.  He's been working 16 hour days recently due to being short-staffed.         Chronic rhinitis 09/18/2012     Priority: Medium     Adult celiac disease      Priority: Medium     Now follows a low gluten diet.  Symptoms persist.         Constipation 04/24/2012     Priority: Medium     Problem list name updated by automated process. Provider to review       Abdominal pain, right upper  "quadrant 04/24/2012     Priority: Medium     Hypertension goal BP (blood pressure) < 140/90 01/17/2012     Priority: Medium     GERD (gastroesophageal reflux disease) 06/15/2011     Priority: Medium     Kidney stone 06/15/2011     Priority: Medium     Pt states he has been told he has Calcium stones, and has been following a \"low calcium diet\" on his own.  He was not aware of any other dietary recommendations or foods to avoid.  He is very interested in this.  He endorses frequent pains that he believes are \"small stones passing.\"     He is very leery of Urology, as he's had severe pain with past stone removals and felt his pain was poorly controlled.         Chronic RLQ pain 06/15/2011     Priority: Medium     Hyperlipidemia LDL goal <160 01/17/2012     Priority: Low      Past Medical History:   Diagnosis Date     Adult celiac disease      GERD (gastroesophageal reflux disease) 6/15/2011     Hypercholesterolemia 6/15/2011     Hypertension 6/15/2011     IBS (irritable bowel syndrome)      Kidney stone 6/15/2011    Pt believes these were Calcium stones     Past Surgical History:   Procedure Laterality Date     C REMOVAL OF KIDNEY STONE       COLONOSCOPY  5/1/2012    Procedure:COLONOSCOPY; screening colonoscopy; Surgeon:KINGSLEY DOS SANTOS; Location:[facility] OR     COLONOSCOPY  4/21/2014    Procedure: COMBINED COLONOSCOPY, SINGLE BIOPSY/POLYPECTOMY BY BIOPSY;  Surgeon: Duane, William Charles, MD;  Location: [facility] OR     HC BREATH HYDROGEN TEST  3/7/2014    Procedure: HYDROGEN BREATH TEST;  Surgeon: Darion Swift MD;  Location: [facility]     ORTHOPEDIC SURGERY      x3 Rt knee      Current Outpatient Prescriptions   Medication Sig Dispense Refill     nortriptyline (PAMELOR) 10 MG capsule Take 3 capsules (30 mg) by mouth At Bedtime 90 capsule 3     fluticasone-salmeterol (ADVAIR) 250-50 MCG/DOSE diskus inhaler Inhale 1 puff into the lungs 2 times daily 3 Inhaler 1     lisinopril (PRINIVIL/ZESTRIL) 20 MG tablet TAKE 1 TABLET " EVERY DAY 30 tablet 1     albuterol (PROAIR HFA/PROVENTIL HFA/VENTOLIN HFA) 108 (90 BASE) MCG/ACT Inhaler Inhale 2 puffs into the lungs every 6 hours as needed for shortness of breath / dyspnea or wheezing 1 Inhaler 3     amoxicillin-clavulanate (AUGMENTIN) 875-125 MG per tablet Take 1 tablet by mouth 2 times daily 20 tablet 0     mometasone-formoterol (DULERA) 100-5 MCG/ACT oral inhaler Inhale 2 puffs into the lungs 2 times daily Rinse mouth after use. 1 Inhaler 3     methylPREDNISolone (MEDROL DOSEPAK) 4 MG tablet Follow package instructions 21 tablet 0     amLODIPine (NORVASC) 10 MG tablet Take 1 tablet (10 mg) by mouth daily 90 tablet 3     ASPIRIN PO Take 325 mg by mouth daily       cyclobenzaprine (FLEXERIL) 10 MG tablet Take 0.5-1 tablets (5-10 mg) by mouth 3 times daily as needed for muscle spasms 90 tablet 1     sertraline (ZOLOFT) 50 MG tablet Take 1 tablet (50 mg) by mouth daily 90 tablet 0     naproxen (NAPROSYN) 500 MG tablet Take 1 tablet (500 mg) by mouth 2 times daily as needed for moderate pain 30 tablet 0     calcium carbonate (TUMS) 500 MG chewable tablet Take 1 chew tab by mouth daily       dicyclomine (BENTYL) 20 MG tablet Take 1 tablet (20 mg) by mouth 4 times daily as needed 40 tablet 5     diphenoxylate-atropine (LOMOTIL) 2.5-0.025 MG per tablet Take 2 tablets by mouth 4 times daily as needed for diarrhea 30 tablet 1     Acetaminophen (TYLENOL PO) Take 1,000 mg by mouth every 8 hours as needed for mild pain or fever       multivitamin, therapeutic (THERA-VIT) TABS Take 1 tablet by mouth daily       simvastatin (ZOCOR) 20 MG tablet Take 1 tablet (20 mg) by mouth At Bedtime 30 tablet 3     EPINEPHrine (EPIPEN) 0.3 MG/0.3ML injection Inject 0.3 mLs (0.3 mg) into the muscle once as needed for anaphylaxis 2 each 2     Dietary Management Product (VSL#3) PACK Use 1-2 capsules/day 30 each 3     OTC products: NSAIDS    Allergies   Allergen Reactions     Artificial Sweetner (Informational Only)  "[Artificial Sweetner (Informational Only) ] GI Disturbance     ALL artificial sweetners cause severe diarrhea & flu symptoms     Hydromorphone Anaphylaxis     Morphine [Fumaric Acid] Anaphylaxis     Hydrocodone-Acetaminophen Itching     Tramadol Hives     Trazodone Hives     Gabapentin      GI upset per pt     Codeine Phosphate Itching     Ketorolac Tromethamine Rash      Latex Allergy: NO    Social History   Substance Use Topics     Smoking status: Never Smoker     Smokeless tobacco: Current User     Types: Chew      Comment: Chew     Alcohol use No      Comment: \"quart a year\" 2012     History   Drug Use No       REVIEW OF SYSTEMS:                                                    Constitutional, neuro, ENT, endocrine, pulmonary, cardiac, gastrointestinal, genitourinary, musculoskeletal, integument and psychiatric systems are negative, except as otherwise noted.      EXAM:                                                    /79  Pulse 78  Temp 97.3  F (36.3  C) (Oral)  Wt 297 lb (134.7 kg)  SpO2 99%  BMI 41.42 kg/m2    GENERAL APPEARANCE: over weight     EYES: EOMI,  PERRL     HENT: ear canals and TM's normal and nose and mouth without ulcers or lesions     NECK: no adenopathy, no asymmetry, masses, or scars and thyroid normal to palpation     RESP: lungs clear to auscultation - no rales, rhonchi or wheezes     CV: regular rates and rhythm, normal S1 S2, no S3 or S4 and no murmur, click or rub     MS: extremities normal- no gross deformities noted, no evidence of inflammation in joints, FROM in all extremities.     SKIN: no suspicious lesions or rashes     NEURO: Normal strength and tone, sensory exam grossly normal, mentation intact and speech normal     PSYCH: mentation appears normal. and affect normal/bright     LYMPHATICS: No axillary, cervical, or supraclavicular nodes    DIAGNOSTICS:                                                    EKG: appears normal, NSR, normal axis, normal intervals, no " acute ST/T changes c/w ischemia, no LVH by voltage criteria, unchanged from previous tracings  Hemoglobin (indicated for history of anemia or procedure with significant blood loss such as tonsillectomy, major intraperitoneal surgery, vascular surgery, major spine surgery, total joint replacement)  Serum Potassium  Serum Creatinine  Hemoglobin A1C    Recent Labs   Lab Test  10/04/17   1140  07/26/16   0938 06/13/16 02/22/16   1147   HGB   --   13.2*   --   13.4   PLT   --   194   --   198   NA   --   141   --   139   POTASSIUM   --   4.2   --   4.4   CR   --   1.10  1.07  0.94   A1C  6.2*  5.6   --    --         Results for orders placed or performed in visit on 01/12/18 (from the past 24 hour(s))   Hemoglobin   Result Value Ref Range    Hemoglobin 12.5 (L) 13.3 - 17.7 g/dL   Hemoglobin A1c   Result Value Ref Range    Hemoglobin A1C 6.5 (H) 4.3 - 6.0 %             IMPRESSION:                                                    Reason for surgery/procedure: R carpel tunnel release  Diagnosis/reason for consult: wrist pain/numbness    The proposed surgical procedure is considered LOW risk.    REVISED CARDIAC RISK INDEX  The patient has the following serious cardiovascular risks for perioperative complications such as (MI, PE, VFib and 3  AV Block):  No serious cardiac risks  INTERPRETATION: 0 risks: Class I (very low risk - 0.4% complication rate)    The patient has the following additional risks for perioperative complications:  No identified additional risks      ICD-10-CM    1. Preop general physical exam Z01.818 EKG 12-lead complete w/read - Clinics   2. Carpal tunnel syndrome of right wrist G56.01 Hemoglobin   3. BMI 40.0-44.9, adult (H) Z68.41    4. Hypertension goal BP (blood pressure) < 140/90 I10 Lipid panel reflex to direct LDL Fasting     Basic metabolic panel  (Ca, Cl, CO2, Creat, Gluc, K, Na, BUN)     lisinopril (PRINIVIL/ZESTRIL) 20 MG tablet     Hemoglobin A1c   5. Back muscle spasm M62.830  cyclobenzaprine (FLEXERIL) 10 MG tablet   6. Cough R05 fluticasone-salmeterol (ADVAIR) 250-50 MCG/DOSE diskus inhaler       RECOMMENDATIONS:                                                        --Patient is to take all scheduled medications on the day of surgery EXCEPT for modifications listed below.    APPROVAL GIVEN to proceed with proposed procedure, without further diagnostic evaluation       Signed Electronically by: Bertha Romero PA-C  Agreed with above, Gatito Sultana M.D.    Copy of this evaluation report is provided to requesting physician.    [place] Preop Guidelines

## 2018-01-09 ENCOUNTER — OFFICE VISIT (OUTPATIENT)
Dept: NEUROLOGY | Facility: CLINIC | Age: 53
End: 2018-01-09
Payer: COMMERCIAL

## 2018-01-09 VITALS
DIASTOLIC BLOOD PRESSURE: 95 MMHG | HEIGHT: 71 IN | BODY MASS INDEX: 41.98 KG/M2 | HEART RATE: 92 BPM | OXYGEN SATURATION: 98 % | WEIGHT: 299.9 LBS | SYSTOLIC BLOOD PRESSURE: 142 MMHG

## 2018-01-09 DIAGNOSIS — G62.9 NEUROPATHY: Primary | ICD-10-CM

## 2018-01-09 LAB
ERYTHROCYTE [SEDIMENTATION RATE] IN BLOOD BY WESTERGREN METHOD: 14 MM/H (ref 0–20)
TSH SERPL DL<=0.005 MIU/L-ACNC: 3.48 MU/L (ref 0.4–4)
VIT B12 SERPL-MCNC: 441 PG/ML (ref 193–986)

## 2018-01-09 PROCEDURE — 86235 NUCLEAR ANTIGEN ANTIBODY: CPT | Performed by: PSYCHIATRY & NEUROLOGY

## 2018-01-09 PROCEDURE — 99000 SPECIMEN HANDLING OFFICE-LAB: CPT | Performed by: PSYCHIATRY & NEUROLOGY

## 2018-01-09 PROCEDURE — 82607 VITAMIN B-12: CPT | Performed by: PSYCHIATRY & NEUROLOGY

## 2018-01-09 PROCEDURE — 85652 RBC SED RATE AUTOMATED: CPT | Performed by: PSYCHIATRY & NEUROLOGY

## 2018-01-09 PROCEDURE — 84443 ASSAY THYROID STIM HORMONE: CPT | Performed by: PSYCHIATRY & NEUROLOGY

## 2018-01-09 PROCEDURE — 83921 ORGANIC ACID SINGLE QUANT: CPT | Mod: 90 | Performed by: PSYCHIATRY & NEUROLOGY

## 2018-01-09 PROCEDURE — 86038 ANTINUCLEAR ANTIBODIES: CPT | Performed by: PSYCHIATRY & NEUROLOGY

## 2018-01-09 PROCEDURE — 36415 COLL VENOUS BLD VENIPUNCTURE: CPT | Performed by: PSYCHIATRY & NEUROLOGY

## 2018-01-09 PROCEDURE — 99213 OFFICE O/P EST LOW 20 MIN: CPT | Performed by: PSYCHIATRY & NEUROLOGY

## 2018-01-09 ASSESSMENT — PAIN SCALES - GENERAL: PAINLEVEL: MODERATE PAIN (5)

## 2018-01-09 NOTE — MR AVS SNAPSHOT
After Visit Summary   1/9/2018    Adarsh Salvador    MRN: [id number]           Patient Information     Date Of Birth          1965        Visit Information        Provider Department      1/9/2018 10:30 AM Wiliam Montalvo MD [facility]        Today's Diagnoses     Neuropathy    -  1       Follow-ups after your visit        Follow-up notes from your care team     Return in about 2 months (around 3/9/2018).      Your next 10 appointments already scheduled     Jan 12, 2018  9:00 AM CST   Pre[facility] with Bertha Romero PA-C   [facility]    [address] [facility] [address]   [phone]            Jan 16, 2018  8:45 AM CST   LAB with AN LAB   [facility]    [address] [facility] [address]   [phone]           Please do not eat 10-12 hours before your appointment if you are coming in fasting for labs on lipids, cholesterol, or glucose (sugar). This does not apply to pregnant women. Water, hot tea and black coffee (with nothing added) are okay. Do not drink other fluids, diet soda or chew gum.            Jan 26, 2018   Procedure with Wiliam Saenz MD   [facility] [place]    [address]  [facility] [address]   [phone]            Feb 08, 2018 10:30 AM CST   Return Visit with Wiliam Saenz MD   [facility]    [address] [facility] [address]   [phone]            Mar 13, 2018  9:30 AM CDT   Return Visit with Wiliam Montalvo MD   [facility]    [address]  [facility] [address]   [phone]              Future tests that were ordered for you today     Open Future Orders        Priority Expected Expires Ordered    ESR: Erythrocyte sedimentation rate Routine  1/9/2019 1/9/2018    TIMUR Scrn Rflx to Titer and  "Ptrn ([facility]) Routine  1/9/2019 1/9/2018    Sjogren's Ab  Anti-SS-A/-SS-B ([facility]) Routine  1/9/2019 1/9/2018    Glucose tolerance std non preg Routine  1/9/2019 1/9/2018    Vitamin B12 Routine  1/9/2019 1/9/2018    Methylmalonic acid Routine  1/9/2019 1/9/2018    TSH Routine  1/9/2019 1/9/2018            Who to contact     If you have questions or need follow up information about today's clinic visit or your schedule please contact [facility] directly at [phone].  Normal or non-critical lab and imaging results will be communicated to you by MyChart, letter or phone within 4 business days after the clinic has received the results. If you do not hear from us within 7 days, please contact the clinic through MyChart or phone. If you have a critical or abnormal lab result, we will notify you by phone as soon as possible.  Submit refill requests through [employer] or call your pharmacy and they will forward the refill request to us. Please allow 3 business days for your refill to be completed.          Additional Information About Your Visit        Care EveryWhere ID     This is your Care EveryWhere ID. This could be used by other organizations to access your [place] medical records  [id number]        Your Vitals Were     Pulse Height Pulse Oximetry BMI (Body Mass Index)          92 1.803 m (5' 11\") 98% 41.83 kg/m2         Blood Pressure from Last 3 Encounters:   01/09/18 (!) 142/95   12/12/17 132/88   11/02/17 (!) 161/96    Weight from Last 3 Encounters:   01/09/18 136 kg (299 lb 14.4 oz)   12/14/17 133.8 kg (295 lb)   12/12/17 133.8 kg (295 lb)               Primary Care Provider Office Phone # Fax #    Bertha Romero PA-C [phone] [address] [facility] [address]        Equal Access to Services     SIMONE ZAYAS AH: Edwige Carter, tamia canseco, qaybta kaalmavictor hugo pitt. So [facility] " [phone].    ATENCIÓN: Si cassie benz, tiene a lyons disposición servicios gratuitos de asistencia lingüística. Imani bhagat [phone].    We comply with applicable federal civil rights laws and [place] laws. We do not discriminate on the basis of race, color, national origin, age, disability, sex, sexual orientation, or gender identity.            Thank you!     Thank you for choosing [facility]  for your care. Our goal is always to provide you with excellent care. Hearing back from our patients is one way we can continue to improve our services. Please take a few minutes to complete the written survey that you may receive in the mail after your visit with us. Thank you!             Your Updated Medication List - Protect others around you: Learn how to safely use, store and throw away your medicines at www.disposemymeds.org.          This list is accurate as of: 1/9/18 11:00 AM.  Always use your most recent med list.                   Brand Name Dispense Instructions for use Diagnosis    albuterol 108 (90 BASE) MCG/ACT Inhaler    PROAIR HFA/PROVENTIL HFA/VENTOLIN HFA    1 Inhaler    Inhale 2 puffs into the lungs every 6 hours as needed for shortness of breath / dyspnea or wheezing    Cough       amLODIPine 10 MG tablet    NORVASC    90 tablet    Take 1 tablet (10 mg) by mouth daily    Hypertension goal BP (blood pressure) < 140/90       amoxicillin-clavulanate 875-125 MG per tablet    AUGMENTIN    20 tablet    Take 1 tablet by mouth 2 times daily    Cough       ASPIRIN PO      Take 325 mg by mouth daily        calcium carbonate 500 MG chewable tablet    TUMS     Take 1 chew tab by mouth daily        cyclobenzaprine 10 MG tablet    FLEXERIL    90 tablet    Take 0.5-1 tablets (5-10 mg) by mouth 3 times daily as needed for muscle spasms    Back muscle spasm       dicyclomine 20 MG tablet    BENTYL    40 tablet    Take 1 tablet (20 mg) by mouth 4 times daily as needed    Irritable bowel syndrome  with diarrhea, Chronic diarrhea       diphenoxylate-atropine 2.5-0.025 MG per tablet    LOMOTIL    30 tablet    Take 2 tablets by mouth 4 times daily as needed for diarrhea    Chronic diarrhea, Irritable bowel syndrome with diarrhea       EPINEPHrine 0.3 MG/0.3ML injection 2-pack    EPIPEN/ADRENACLICK/or ANY BX GENERIC EQUIV    2 each    Inject 0.3 mLs (0.3 mg) into the muscle once as needed for anaphylaxis    Anaphylactic reaction       fluticasone-salmeterol 250-50 MCG/DOSE diskus inhaler    ADVAIR    3 Inhaler    Inhale 1 puff into the lungs 2 times daily    Cough       lisinopril 20 MG tablet    PRINIVIL/ZESTRIL    30 tablet    TAKE 1 TABLET EVERY DAY    Hypertension goal BP (blood pressure) < 140/90       methylPREDNISolone 4 MG tablet    MEDROL DOSEPAK    21 tablet    Follow package instructions    Carpal tunnel syndrome of right wrist       mometasone-formoterol 100-5 MCG/ACT oral inhaler    DULERA    1 Inhaler    Inhale 2 puffs into the lungs 2 times daily Rinse mouth after use.    Cough       multivitamin, therapeutic Tabs tablet      Take 1 tablet by mouth daily        naproxen 500 MG tablet    NAPROSYN    30 tablet    Take 1 tablet (500 mg) by mouth 2 times daily as needed for moderate pain    Back muscle spasm, Strain of trapezius muscle, right, initial encounter       nortriptyline 10 MG capsule    PAMELOR    90 capsule    Take 3 capsules (30 mg) by mouth At Bedtime    Meralgia paresthetica of left side       sertraline 50 MG tablet    ZOLOFT    90 tablet    Take 1 tablet (50 mg) by mouth daily    Anxiety       simvastatin 20 MG tablet    ZOCOR    30 tablet    Take 1 tablet (20 mg) by mouth At Bedtime    Hyperlipidemia LDL goal <160       TYLENOL PO      Take 1,000 mg by mouth every 8 hours as needed for mild pain or fever        VSL#3 Pack     30 each    Use 1-2 capsules/day    Chronic diarrhea

## 2018-01-09 NOTE — LETTER
1/9/2018         RE: Adarsh Salvador  [address] [facility] [address]        Dear Colleague,    Thank you for referring your patient, Adarsh Salvador, to the [facility]. Please see a copy of my visit note below.    NEUROLOGY CONSULTATION       REFERRING PROVIDER:  Bertha Romero PA-C      HISTORY OF PRESENT ILLNESS:  Adarsh Salvador is seen in followup with left leg pain, right carpal tunnel syndrome and neuropathy.  I last saw the patient in October.  The patient has noticed no major change in the symptoms in his left leg.  The differential includes meralgia paresthetica or L4 radiculopathy.  He does have a far lateral disk on the left at L4-5 which could compromise the L4 nerve root.  He also has electrodiagnostic evidence of neuropathy.  He works as a , a 10-hour shift.  For his symptoms of discomfort, he is on nortriptyline.  He had been on amitriptyline, but did not tolerate it.  He takes 30 mg of nortriptyline to good effect.  He does not have any side effects from the medication.  He does have occasional joint pains and occasional skin rash.  He also has occasional dry eyes.      FAMILY HISTORY:  Positive for diabetes in his sister.      PHYSICAL EXAMINATION:   GENERAL:  The patient is cooperative and in no distress.   VITAL SIGNS:  His blood pressure is 142/95.   NEUROLOGIC:  He ambulates about the room and can go up on his heels and toes without difficulty.      ASSESSMENT:   1.  Sensory-motor neuropathy.   2.  Right carpal tunnel syndrome.   3.  Left meralgia paresthetica versus L4 radiculopathy.      DISCUSSION:  The patient is seen with the above problem list.  At this point, I am going to check additional labs for neuropathy.  He had a protein electrophoresis and immunofixation done which were normal.  I am going to add glucose tolerance test, TSH, B12, methylmalonic acid, sed rate, TIMUR and Sjogren antibodies.  He has upcoming surgery for the carpal tunnel.  I will see him in followup  in 2 months or sooner if there are problems.         CHELSEA MONTALVO MD             D: 2018 10:57   T: 2018 15:17   MT: TS      Name:     LUTHER BLACKMAN   MRN:      [id number]        Account:      [id number]   :      1965           Visit Date:   2018      Document: [id number]       cc: Bertha Romero PA-C      Again, thank you for allowing me to participate in the care of your patient.        Sincerely,        Chelsea Montalvo MD

## 2018-01-09 NOTE — PROGRESS NOTES
NEUROLOGY CONSULTATION       REFERRING PROVIDER:  Bertha Romero PA-C      HISTORY OF PRESENT ILLNESS:  Luther Blackman is seen in followup with left leg pain, right carpal tunnel syndrome and neuropathy.  I last saw the patient in October.  The patient has noticed no major change in the symptoms in his left leg.  The differential includes meralgia paresthetica or L4 radiculopathy.  He does have a far lateral disk on the left at L4-5 which could compromise the L4 nerve root.  He also has electrodiagnostic evidence of neuropathy.  He works as a , a 10-hour shift.  For his symptoms of discomfort, he is on nortriptyline.  He had been on amitriptyline, but did not tolerate it.  He takes 30 mg of nortriptyline to good effect.  He does not have any side effects from the medication.  He does have occasional joint pains and occasional skin rash.  He also has occasional dry eyes.      FAMILY HISTORY:  Positive for diabetes in his sister.      PHYSICAL EXAMINATION:   GENERAL:  The patient is cooperative and in no distress.   VITAL SIGNS:  His blood pressure is 142/95.   NEUROLOGIC:  He ambulates about the room and can go up on his heels and toes without difficulty.      ASSESSMENT:   1.  Sensory-motor neuropathy.   2.  Right carpal tunnel syndrome.   3.  Left meralgia paresthetica versus L4 radiculopathy.      DISCUSSION:  The patient is seen with the above problem list.  At this point, I am going to check additional labs for neuropathy.  He had a protein electrophoresis and immunofixation done which were normal.  I am going to add glucose tolerance test, TSH, B12, methylmalonic acid, sed rate, TIMUR and Sjogren antibodies.  He has upcoming surgery for the carpal tunnel.  I will see him in followup in 2 months or sooner if there are problems.         CHELSEA POZO MD             D: 01/09/2018 10:57   T: 01/09/2018 15:17   MT: TS      Name:     LUTHER BLACKMAN   MRN:      [id number]        Account:      [id number]    :      1965           Visit Date:   2018      Document: [id number]       cc: Bertha Romero PA-C         1/15 addendum: Left message for patient about labs.  Sjogren alvaro, TIMUR, TSH, B12/MMA, and sed rate all normal. Awaiting GTT.

## 2018-01-09 NOTE — NURSING NOTE
"Adarsh Salvador's goals for this visit include: Return  He requests these members of his care team be copied on today's visit information:     PCP: Bertha Romero    Referring Provider:  No referring provider defined for this encounter.    Chief Complaint   Patient presents with     RECHECK       Initial There were no vitals taken for this visit. Estimated body mass index is 41.14 kg/(m^2) as calculated from the following:    Height as of 12/14/17: 5' 11\" (1.803 m).    Weight as of 12/14/17: 295 lb (133.8 kg).  Medication Reconciliation: complete  "

## 2018-01-10 LAB
ANA SER QL IF: NEGATIVE
ENA SS-A IGG SER IA-ACNC: <0.2 AI (ref 0–0.9)
ENA SS-B IGG SER IA-ACNC: <0.2 AI (ref 0–0.9)

## 2018-01-12 ENCOUNTER — OFFICE VISIT (OUTPATIENT)
Dept: FAMILY MEDICINE | Facility: CLINIC | Age: 53
End: 2018-01-12
Payer: COMMERCIAL

## 2018-01-12 VITALS
SYSTOLIC BLOOD PRESSURE: 131 MMHG | TEMPERATURE: 97.3 F | HEART RATE: 78 BPM | WEIGHT: 297 LBS | OXYGEN SATURATION: 99 % | BODY MASS INDEX: 41.42 KG/M2 | DIASTOLIC BLOOD PRESSURE: 79 MMHG

## 2018-01-12 DIAGNOSIS — I10 HYPERTENSION GOAL BP (BLOOD PRESSURE) < 140/90: ICD-10-CM

## 2018-01-12 DIAGNOSIS — M62.830 BACK MUSCLE SPASM: ICD-10-CM

## 2018-01-12 DIAGNOSIS — R05.9 COUGH: ICD-10-CM

## 2018-01-12 DIAGNOSIS — Z01.818 PREOP GENERAL PHYSICAL EXAM: Primary | ICD-10-CM

## 2018-01-12 DIAGNOSIS — G56.01 CARPAL TUNNEL SYNDROME OF RIGHT WRIST: ICD-10-CM

## 2018-01-12 DIAGNOSIS — J45.30 MILD PERSISTENT ASTHMA WITHOUT COMPLICATION: ICD-10-CM

## 2018-01-12 LAB
ANION GAP SERPL CALCULATED.3IONS-SCNC: 8 MMOL/L (ref 3–14)
BUN SERPL-MCNC: 17 MG/DL (ref 7–30)
CALCIUM SERPL-MCNC: 8.6 MG/DL (ref 8.5–10.1)
CHLORIDE SERPL-SCNC: 108 MMOL/L (ref 94–109)
CHOLEST SERPL-MCNC: 284 MG/DL
CO2 SERPL-SCNC: 24 MMOL/L (ref 20–32)
CREAT SERPL-MCNC: 0.92 MG/DL (ref 0.66–1.25)
GFR SERPL CREATININE-BSD FRML MDRD: 87 ML/MIN/1.7M2
GLUCOSE SERPL-MCNC: 148 MG/DL (ref 70–99)
HBA1C MFR BLD: 6.5 % (ref 4.3–6)
HDLC SERPL-MCNC: 37 MG/DL
HGB BLD-MCNC: 12.5 G/DL (ref 13.3–17.7)
LDLC SERPL CALC-MCNC: 179 MG/DL
METHYLMALONATE SERPL-SCNC: 0.17 UMOL/L (ref 0–0.4)
NONHDLC SERPL-MCNC: 247 MG/DL
POTASSIUM SERPL-SCNC: 4.1 MMOL/L (ref 3.4–5.3)
SODIUM SERPL-SCNC: 140 MMOL/L (ref 133–144)
TRIGL SERPL-MCNC: 341 MG/DL

## 2018-01-12 PROCEDURE — 80061 LIPID PANEL: CPT | Performed by: PHYSICIAN ASSISTANT

## 2018-01-12 PROCEDURE — 80048 BASIC METABOLIC PNL TOTAL CA: CPT | Performed by: PHYSICIAN ASSISTANT

## 2018-01-12 PROCEDURE — 93000 ELECTROCARDIOGRAM COMPLETE: CPT | Performed by: PHYSICIAN ASSISTANT

## 2018-01-12 PROCEDURE — 36415 COLL VENOUS BLD VENIPUNCTURE: CPT | Performed by: PHYSICIAN ASSISTANT

## 2018-01-12 PROCEDURE — 85018 HEMOGLOBIN: CPT | Performed by: PHYSICIAN ASSISTANT

## 2018-01-12 PROCEDURE — 83036 HEMOGLOBIN GLYCOSYLATED A1C: CPT | Performed by: PHYSICIAN ASSISTANT

## 2018-01-12 PROCEDURE — 99215 OFFICE O/P EST HI 40 MIN: CPT | Performed by: PHYSICIAN ASSISTANT

## 2018-01-12 RX ORDER — CYCLOBENZAPRINE HCL 10 MG
5-10 TABLET ORAL 3 TIMES DAILY PRN
Qty: 90 TABLET | Refills: 1 | Status: SHIPPED | OUTPATIENT
Start: 2018-01-12 | End: 2021-04-13

## 2018-01-12 RX ORDER — LISINOPRIL 20 MG/1
TABLET ORAL
Qty: 90 TABLET | Refills: 1 | Status: SHIPPED | OUTPATIENT
Start: 2018-01-12 | End: 2018-11-06

## 2018-01-12 NOTE — MR AVS SNAPSHOT
After Visit Summary   1/12/2018    Adarsh Salvador    MRN: [id number]           Patient Information     Date Of Birth          1965        Visit Information        Provider Department      1/12/2018 9:00 AM Bertha Romero PA-C [facility]        Today's Diagnoses     Preop general physical exam    -  1    Carpal tunnel syndrome of right wrist        BMI 40.0-44.9, adult (H)        Hypertension goal BP (blood pressure) < 140/90        Back muscle spasm        Cough          Care Instructions    [facility] [phone]  Call to ask to leave message for Dr. Saenz nurse asking about paperwork     Take blood pressure medication morning of surgery, avoid vitamins and naproxen, aspirin, or ibuprofen x 5 days before procedure          Before Your Surgery      Call your surgeon if there is any change in your health. This includes signs of a cold or flu (such as a sore throat, runny nose, cough, rash or fever).    Do not smoke, drink alcohol or take over the counter medicine (unless your surgeon or primary care doctor tells you to) for the 24 hours before and after surgery.    If you take prescribed drugs: Follow your doctor s orders about which medicines to take and which to stop until after surgery.    Eating and drinking prior to surgery: follow the instructions from your surgeon    Take a shower or bath the night before surgery. Use the soap your surgeon gave you to gently clean your skin. If you do not have soap from your surgeon, use your regular soap. Do not shave or scrub the surgery site.  Wear clean pajamas and have clean sheets on your bed.           Follow-ups after your visit        Your next 10 appointments already scheduled     Jan 16, 2018  8:45 AM CST   LAB with AN LAB   [facility] ([facility])    [address] [facility] [address] [phone]           Please do not eat 10-12 hours before your appointment if you are coming in  fasting for labs on lipids, cholesterol, or glucose (sugar). This does not apply to pregnant women. Water, hot tea and black coffee (with nothing added) are okay. Do not drink other fluids, diet soda or chew gum.            Jan 26, 2018   Procedure with Wiliam Saenz MD   [facility]    [address] [facility] [address] [phone]            Feb 08, 2018 10:30 AM CST   [facility] Visit with Wiliam Saenz MD   [facility]    [address] [facility] [id number] [phone]            Mar 13, 2018  9:30 AM CDT   [facility] Visit with Wiliam [facility]alvo MD   [facility]    [address]  [facility] [address] [phone]              Who to contact     If you have questions or need follow up information about today's clinic visit or your schedule please contact [facility] directly at [phone].  Normal or non-critical lab and imaging results will be communicated to you by MyChart, letter or phone within 4 business days after the clinic has received the results. If you do not hear from us within 7 days, please contact the clinic through MyChart or phone. If you have a critical or abnormal lab result, we will notify you by phone as soon as possible.  Submit refill requests through [employer] or call your pharmacy and they will forward the refill request to us. Please allow 3 business days for your refill to be completed.          Additional Information About Your Visit        Care EveryWhere ID     This is your Care EveryWhere ID. This could be used by other organizations to access your [place] medical records  [id number]        Your Vitals Were     Pulse Temperature Pulse Oximetry BMI (Body Mass Index)          78 97.3  F (36.3  C) (Oral) 99% 41.42 kg/m2         Blood Pressure from Last 3 Encounters:   01/12/18 131/79   01/09/18 (!) 142/95   12/12/17  132/88    Weight from Last 3 Encounters:   01/12/18 297 lb (134.7 kg)   01/09/18 299 lb 14.4 oz (136 kg)   12/14/17 295 lb (133.8 kg)              We Performed the Following     Basic metabolic panel  (Ca, Cl, CO2, Creat, Gluc, K, Na, BUN)     EKG 12-lead complete w/read - Clinics     Hemoglobin A1c     Hemoglobin     Lipid panel reflex to direct LDL Fasting          Today's Medication Changes          These changes are accurate as of: 1/12/18  9:39 AM.  If you have any questions, ask your nurse or doctor.               Stop taking these medicines if you haven't already. Please contact your care team if you have questions.     amoxicillin-clavulanate 875-125 MG per tablet   Commonly known as:  AUGMENTIN   Stopped by:  Bertha Romero PA-C           methylPREDNISolone 4 MG tablet   Commonly known as:  MEDROL DOSEPAK   Stopped by:  Bertha Romero PA-C           mometasone-formoterol 100-5 MCG/ACT oral inhaler   Commonly known as:  DULERA   Stopped by:  Bertha Romero PA-C           naproxen 500 MG tablet   Commonly known as:  NAPROSYN   Stopped by:  Bertha Romero PA-C                Where to get your medicines      These medications were sent to [id number] [address] [address]     Phone:  [phone]     cyclobenzaprine 10 MG tablet    fluticasone-salmeterol 250-50 MCG/DOSE diskus inhaler    lisinopril 20 MG tablet                Primary Care Provider Office Phone # Fax #    Bertha Romero PA-C [phone] [address] [facility] [address]        Equal Access to Services     [facility] Center[facility] : Edwige Carter, tamia canseco, jasmyne gonzalez, victor hugo machado. [facility] [phone].    ATENCIÓN: Si habla español, tiene a lyons disposición servicios gratuitos de asistencia lingüística. Llame al [phone].    We comply with applicable  federal civil rights laws and [place] laws. We do not discriminate on the basis of race, color, national origin, age, disability, sex, sexual orientation, or gender identity.            Thank you!     Thank you for choosing [facility]  [facility]. Our goal is always to provide you with excellent care. Hearing back from our patients is one way we can continue to improve our services. Please take a few minutes to complete the written survey that you may receive in the mail after your visit with us. Thank you!             Your Updated Medication List - Protect others around you: Learn how to safely use, store and throw away your medicines at www.disposemymeds.org.          This list is accurate as of: 1/12/18  9:39 AM.  Always use your most recent med list.                   Brand Name Dispense Instructions for use Diagnosis    albuterol 108 (90 BASE) MCG/ACT Inhaler    PROAIR HFA/PROVENTIL HFA/VENTOLIN HFA    1 Inhaler    Inhale 2 puffs into the lungs every 6 hours as needed for shortness of breath / dyspnea or wheezing    Cough       amLODIPine 10 MG tablet    NORVASC    90 tablet    Take 1 tablet (10 mg) by mouth daily    Hypertension goal BP (blood pressure) < 140/90       ASPIRIN PO      Take 325 mg by mouth daily        calcium carbonate 500 MG chewable tablet    TUMS     Take 1 chew tab by mouth daily        cyclobenzaprine 10 MG tablet    FLEXERIL    90 tablet    Take 0.5-1 tablets (5-10 mg) by mouth 3 times daily as needed for muscle spasms    Back muscle spasm       dicyclomine 20 MG tablet    BENTYL    40 tablet    Take 1 tablet (20 mg) by mouth 4 times daily as needed    Irritable bowel syndrome with diarrhea, Chronic diarrhea       diphenoxylate-atropine 2.5-0.025 MG per tablet    LOMOTIL    30 tablet    Take 2 tablets by mouth 4 times daily as needed for diarrhea    Chronic diarrhea, Irritable bowel syndrome with diarrhea       EPINEPHrine 0.3 MG/0.3ML injection 2-pack     EPIPEN/ADRENACLICK/or ANY BX GENERIC EQUIV    2 each    Inject 0.3 mLs (0.3 mg) into the muscle once as needed for anaphylaxis    Anaphylactic reaction       fluticasone-salmeterol 250-50 MCG/DOSE diskus inhaler    ADVAIR    3 Inhaler    Inhale 1 puff into the lungs 2 times daily    Cough       lisinopril 20 MG tablet    PRINIVIL/ZESTRIL    90 tablet    TAKE 1 TABLET EVERY DAY    Hypertension goal BP (blood pressure) < 140/90       multivitamin, therapeutic Tabs tablet      Take 1 tablet by mouth daily        nortriptyline 10 MG capsule    PAMELOR    90 capsule    Take 3 capsules (30 mg) by mouth At Bedtime    Meralgia paresthetica of left side       sertraline 50 MG tablet    ZOLOFT    90 tablet    Take 1 tablet (50 mg) by mouth daily    Anxiety       simvastatin 20 MG tablet    ZOCOR    30 tablet    Take 1 tablet (20 mg) by mouth At Bedtime    Hyperlipidemia LDL goal <160       TYLENOL PO      Take 1,000 mg by mouth every 8 hours as needed for mild pain or fever        VSL#3 Pack     30 each    Use 1-2 capsules/day    Chronic diarrhea

## 2018-01-12 NOTE — NURSING NOTE
"Chief Complaint   Patient presents with     Pre-Op Exam     preop surgery on 01/26/2018 @ [facility]       Initial /79  Pulse 78  Temp 97.3  F (36.3  C) (Oral)  Wt 297 lb (134.7 kg)  SpO2 99%  BMI 41.42 kg/m2 Estimated body mass index is 41.42 kg/(m^2) as calculated from the following:    Height as of 1/9/18: 5' 11\" (1.803 m).    Weight as of this encounter: 297 lb (134.7 kg).  Medication Reconciliation: complete      Munir Amaral MA    "

## 2018-01-14 NOTE — PROGRESS NOTES
PLEASE CALL PATIENT: Dear Adarsh,      It was a pleasure to see you at your recent office visit.  Your test results are listed below.  Cholesterol has worsened. Kidney function normal. Unfortunately your blood sugars have now put you in the diabetic range, you have diabetes type 2 officially.  Please schedule with me to discuss these results and the steps going forward.  You should still be fine for surgery you are just into the diabetic range.  Your hemoglobin is pretty stable from before (your anemia).          If you have any questions or concerns, please call the clinic at [phone].    Sincerely,  Bertha Romero PA-C

## 2018-01-15 ENCOUNTER — TELEPHONE (OUTPATIENT)
Dept: FAMILY MEDICINE | Facility: CLINIC | Age: 53
End: 2018-01-15

## 2018-01-15 NOTE — TELEPHONE ENCOUNTER
Patient was in to see Bertha Romero and has the diagnoses of Asthma. Patient is due for an ACT and AAP. Please complete. Thank you.

## 2018-01-15 NOTE — TELEPHONE ENCOUNTER
Notes Recorded by Bertha Romero PA-C on 1/14/2018 at 2:20 PM  PLEASE CALL PATIENT: Dear Adarsh,      It was a pleasure to see you at your recent office visit.  Your test results are listed below.  Cholesterol has worsened. Kidney function normal. Unfortunately your blood sugars have now put you in the diabetic range, you have diabetes type 2 officially.  Please schedule with me to discuss these results and the steps going forward.  You should still be fine for surgery you are just into the diabetic range.  Your hemoglobin is pretty stable from before (your anemia).          If you have any questions or concerns, please call the clinic at [phone].    Sincerely,  Bertha Romero PA-C

## 2018-01-15 NOTE — TELEPHONE ENCOUNTER
Pt notified of provider message as written.  Pt verbalized good understanding.  Appointment made.  Mary Majano RN

## 2018-01-15 NOTE — PROGRESS NOTES
SUBJECTIVE:                                                    Adarsh Salvador is a 52 year old male who presents to clinic today for the following health issues:    Discuss new DX diabetes--patient has been prediabetic and is morbidly obese.  His most recent a1c at a preop was 6.5 now giving him diagnosis of diabetes type 2.  He has never been on medications.  Does have FH of diabetes.  Has not met with diabetic ed yet but will schedule. No chest pain or shortness of breath.     His weight has been pretty stable over the past year.  bmi is over 40.   Last eye exam-2 months ago.  Is up to date.       Due for  foot exam today--has neuropathy in left foot aready from leg neuropathy from ? Radiculopathy, seeing neuro already for this.   No sores or concerns.  Right foot has no issues per patient. No sores.     Lab Results   Component Value Date    A1C 6.5 01/12/2018    A1C 6.2 10/04/2017    A1C 5.6 07/26/2016    A1C 5.9 02/28/2014    A1C 5.6 12/04/2012     Already on ace inhibitor and statin and aspirin 325 mg.     No chest pain or shortness of breath.   No dizziness.   No leg edema.     2) anxiety/mood:  zoloft--would like increase  To 100 mg from 50 mg he feels due to work stress his mood has been worse/more irritable. Denies suicidal or homicidal thoughts.  Patient instructed to go to the emergency room or call 911 if these occur.  Will let me know if he would like therapy referral at any time.       3) IBS-stable. Needs medications renewed. No side effects.  Has seen gastroenterology several times, complex gastroenterology issues.       Problem list and histories reviewed & adjusted, as indicated.  Additional history: as documented    Patient Active Problem List   Diagnosis     GERD (gastroesophageal reflux disease)     Kidney stone     Chronic RLQ pain     Hyperlipidemia LDL goal <160     Constipation     Abdominal pain, right upper quadrant     Adult celiac disease     Chronic maxillary sinusitis     Chronic  rhinitis     Pure hyperglyceridemia     Anaphylactic reaction     Benign essential hypertension     Anemia in other chronic diseases classified elsewhere     Fatty liver     Irritable bowel syndrome with diarrhea     Right inguinal hernia     BMI 40.0-44.9, adult (H)     Pain in thoracic spine     Mild persistent asthma without complication     Type 2 diabetes mellitus without complication, without long-term current use of insulin (H)     Past Surgical History:   Procedure Laterality Date     C REMOVAL OF KIDNEY STONE       COLONOSCOPY  5/1/2012    Procedure:COLONOSCOPY; screening colonoscopy; Surgeon:KINGSLEY DOS SANTOS; Location:[facility]     COLONOSCOPY  4/21/2014    Procedure: COMBINED COLONOSCOPY, SINGLE BIOPSY/POLYPECTOMY BY BIOPSY;  Surgeon: Duane, William Charles, MD;  Location: [facility]     HC BREATH HYDROGEN TEST  3/7/2014    Procedure: HYDROGEN BREATH TEST;  Surgeon: Darion Swift MD;  [facility]     ORTHOPEDIC SURGERY      x3 Rt knee        Social History   Substance Use Topics     Smoking status: Never Smoker     Smokeless tobacco: Current User     Types: Chew      Comment: Chew     Alcohol use 0.0 oz/week     0 Standard drinks or equivalent per week      Comment: occasional     Family History   Problem Relation Age of Onset     CANCER Mother 52     lung, smoked     Cancer - colorectal Father 53     unsure type, pt attributes to radiation exposure     Myocardial Infarction Father 45     Myocardial Infarction Paternal Grandfather 35     DIABETES Other      nephew- type 1     DIABETES Maternal Aunt      1     DIABETES Maternal Aunt      2     DIABETES Paternal Uncle      1     DIABETES Paternal Uncle      2     DIABETES Paternal Uncle      3     DIABETES Paternal Uncle      4         Current Outpatient Prescriptions   Medication Sig Dispense Refill     metFORMIN (GLUCOPHAGE-XR) 500 MG 24 hr tablet Take 1 tablet (500 mg) by mouth daily (with dinner) 30 tablet 2     sertraline (ZOLOFT) 100 MG tablet  Take 1 tablet (100 mg) by mouth daily 90 tablet 1     atorvastatin (LIPITOR) 20 MG tablet Take 1 tablet (20 mg) by mouth daily 90 tablet 1     diphenoxylate-atropine (LOMOTIL) 2.5-0.025 MG per tablet Take 2 tablets by mouth 4 times daily as needed for diarrhea 30 tablet 1     dicyclomine (BENTYL) 20 MG tablet Take 1 tablet (20 mg) by mouth 4 times daily as needed 40 tablet 5     cyclobenzaprine (FLEXERIL) 10 MG tablet Take 0.5-1 tablets (5-10 mg) by mouth 3 times daily as needed for muscle spasms 90 tablet 1     lisinopril (PRINIVIL/ZESTRIL) 20 MG tablet TAKE 1 TABLET EVERY DAY 90 tablet 1     fluticasone-salmeterol (ADVAIR) 250-50 MCG/DOSE diskus inhaler Inhale 1 puff into the lungs 2 times daily 3 Inhaler 1     nortriptyline (PAMELOR) 10 MG capsule Take 3 capsules (30 mg) by mouth At Bedtime 90 capsule 3     albuterol (PROAIR HFA/PROVENTIL HFA/VENTOLIN HFA) 108 (90 BASE) MCG/ACT Inhaler Inhale 2 puffs into the lungs every 6 hours as needed for shortness of breath / dyspnea or wheezing 1 Inhaler 3     amLODIPine (NORVASC) 10 MG tablet Take 1 tablet (10 mg) by mouth daily 90 tablet 3     ASPIRIN PO Take 325 mg by mouth daily       sertraline (ZOLOFT) 50 MG tablet Take 1 tablet (50 mg) by mouth daily 90 tablet 0     calcium carbonate (TUMS) 500 MG chewable tablet Take 1 chew tab by mouth daily       Acetaminophen (TYLENOL PO) Take 1,000 mg by mouth every 8 hours as needed for mild pain or fever       multivitamin, therapeutic (THERA-VIT) TABS Take 1 tablet by mouth daily       EPINEPHrine (EPIPEN) 0.3 MG/0.3ML injection Inject 0.3 mLs (0.3 mg) into the muscle once as needed for anaphylaxis 2 each 2     Dietary Management Product (VSL#3) PACK Use 1-2 capsules/day 30 each 3     Allergies   Allergen Reactions     Artificial Sweetner (Informational Only) [Artificial Sweetner (Informational Only) ] GI Disturbance     ALL artificial sweetners cause severe diarrhea & flu symptoms     Hydromorphone Anaphylaxis     Morphine  [Fumaric Acid] Anaphylaxis     Hydrocodone-Acetaminophen Itching     Tramadol Hives     Trazodone Hives     Gabapentin      GI upset per pt     Codeine Phosphate Itching     Ketorolac Tromethamine Rash         ROS:  Constitutional, HEENT, cardiovascular, pulmonary, GI, , musculoskeletal, neuro, skin, endocrine and psych systems are negative, except as otherwise noted.      OBJECTIVE:   /86  Pulse 72  Temp 97.7  F (36.5  C) (Oral)  Wt 296 lb (134.3 kg)  SpO2 99%  BMI 41.28 kg/m2  Body mass index is 41.28 kg/(m^2).   GENERAL: alert and no distress  RESP: lungs clear to auscultation - no rales, rhonchi or wheezes  CV: regular rate and rhythm, normal S1 S2, no S3 or S4, no murmur, click or rub, no peripheral edema and peripheral pulses strong  MS: no gross musculoskeletal defects noted, no edema  NEURO: Normal strength and tone, mentation intact and speech normal  PSYCH: mentation appears normal, affect normal/bright  Diabetic foot exam: normal DP and PT pulses, no trophic changes or ulcerative lesions and  Sensation on left foot less than right, has had for years.      Lab Results   Component Value Date    A1C 6.5 01/12/2018    A1C 6.2 10/04/2017    A1C 5.6 07/26/2016    A1C 5.9 02/28/2014    A1C 5.6 12/04/2012         ASSESSMENT / PLAN:  (E11.9) Type 2 diabetes mellitus without complication, without long-term current use of insulin (H)  (primary encounter diagnosis)  Comment:  New diagnosis  Plan: Albumin Random Urine Quantitative with Creat         Ratio, DIABETES EDUCATOR REFERRAL, metFORMIN         (GLUCOPHAGE-XR) 500 MG 24 hr tablet, FOOT EXAM          Side effects and expectations discussed regarding metformin, if he works on diet/exercise he will likely be able to get off medication     (Z68.41) BMI 40.0-44.9, adult (H)  Comment:   Plan:     (J45.30) Mild persistent asthma without complication  Comment:   Plan:     (I10) Benign essential hypertension  Comment:   Plan:     (E78.5) Hyperlipidemia LDL  goal <160  Comment:   Plan: atorvastatin (LIPITOR) 20 MG tablet,         DISCONTINUED: simvastatin (ZOCOR) 20 MG tablet            (F41.9) Anxiety  Comment: worsening  Plan: sertraline (ZOLOFT) 100 MG tablet        Increase zoloft, recheck 3 months    (K52.9) Chronic diarrhea  Comment:   Plan: diphenoxylate-atropine (LOMOTIL) 2.5-0.025 MG         per tablet, dicyclomine (BENTYL) 20 MG tablet            (K58.0) Irritable bowel syndrome with diarrhea  Comment:   Plan: diphenoxylate-atropine (LOMOTIL) 2.5-0.025 MG         per tablet, dicyclomine (BENTYL) 20 MG tablet           Patient Instructions   Increase zoloft to 100 mg   Add metformin extended release once daily with a meal for blood sugars  Stop simvastatin and start Lipitor instead.  Recheck fasting labs in 3 months with me  Sooner if needed recheck with me  Schedule with diabetic educator as soon as you can        Bertha Romero PA-C  [facility]

## 2018-01-18 ENCOUNTER — DOCUMENTATION ONLY (OUTPATIENT)
Dept: LAB | Facility: CLINIC | Age: 53
End: 2018-01-18

## 2018-01-18 ENCOUNTER — OFFICE VISIT (OUTPATIENT)
Dept: FAMILY MEDICINE | Facility: CLINIC | Age: 53
End: 2018-01-18
Payer: COMMERCIAL

## 2018-01-18 VITALS
TEMPERATURE: 97.7 F | BODY MASS INDEX: 41.28 KG/M2 | OXYGEN SATURATION: 99 % | SYSTOLIC BLOOD PRESSURE: 134 MMHG | WEIGHT: 296 LBS | HEART RATE: 72 BPM | DIASTOLIC BLOOD PRESSURE: 86 MMHG

## 2018-01-18 DIAGNOSIS — K58.0 IRRITABLE BOWEL SYNDROME WITH DIARRHEA: ICD-10-CM

## 2018-01-18 DIAGNOSIS — I10 BENIGN ESSENTIAL HYPERTENSION: ICD-10-CM

## 2018-01-18 DIAGNOSIS — K52.9 CHRONIC DIARRHEA: ICD-10-CM

## 2018-01-18 DIAGNOSIS — E78.5 HYPERLIPIDEMIA LDL GOAL <160: ICD-10-CM

## 2018-01-18 DIAGNOSIS — E11.9 TYPE 2 DIABETES MELLITUS WITHOUT COMPLICATION, WITHOUT LONG-TERM CURRENT USE OF INSULIN (H): Primary | ICD-10-CM

## 2018-01-18 DIAGNOSIS — J45.30 MILD PERSISTENT ASTHMA WITHOUT COMPLICATION: ICD-10-CM

## 2018-01-18 DIAGNOSIS — F41.9 ANXIETY: ICD-10-CM

## 2018-01-18 PROCEDURE — 99207 C FOOT EXAM  NO CHARGE: CPT | Mod: 25 | Performed by: PHYSICIAN ASSISTANT

## 2018-01-18 PROCEDURE — 99214 OFFICE O/P EST MOD 30 MIN: CPT | Performed by: PHYSICIAN ASSISTANT

## 2018-01-18 RX ORDER — DIPHENOXYLATE HCL/ATROPINE 2.5-.025MG
2 TABLET ORAL 4 TIMES DAILY PRN
Qty: 30 TABLET | Refills: 1 | Status: SHIPPED | OUTPATIENT
Start: 2018-01-18 | End: 2019-05-09

## 2018-01-18 RX ORDER — SIMVASTATIN 20 MG
20 TABLET ORAL AT BEDTIME
Qty: 30 TABLET | Refills: 3 | Status: SHIPPED | OUTPATIENT
Start: 2018-01-18 | End: 2018-01-18

## 2018-01-18 RX ORDER — DICYCLOMINE HCL 20 MG
20 TABLET ORAL 4 TIMES DAILY PRN
Qty: 40 TABLET | Refills: 5 | Status: SHIPPED | OUTPATIENT
Start: 2018-01-18 | End: 2019-01-23

## 2018-01-18 RX ORDER — METFORMIN HCL 500 MG
500 TABLET, EXTENDED RELEASE 24 HR ORAL
Qty: 30 TABLET | Refills: 2 | Status: SHIPPED | OUTPATIENT
Start: 2018-01-18 | End: 2018-02-13

## 2018-01-18 RX ORDER — ATORVASTATIN CALCIUM 20 MG/1
20 TABLET, FILM COATED ORAL DAILY
Qty: 90 TABLET | Refills: 1 | Status: SHIPPED | OUTPATIENT
Start: 2018-01-18 | End: 2018-08-17

## 2018-01-18 RX ORDER — SERTRALINE HYDROCHLORIDE 100 MG/1
100 TABLET, FILM COATED ORAL DAILY
Qty: 90 TABLET | Refills: 1 | Status: SHIPPED | OUTPATIENT
Start: 2018-01-18 | End: 2018-08-17

## 2018-01-18 NOTE — MR AVS SNAPSHOT
After Visit Summary   1/18/2018    Adarsh Salvador    MRN: [id number]           Patient Information     Date Of Birth          1965        Visit Information        Provider Department      1/18/2018 8:40 AM Bertha Romero PA-C [facility]        Today's Diagnoses     Type 2 diabetes mellitus without complication, without long-term current use of insulin (H)    -  1    BMI 40.0-44.9, adult (H)        Mild persistent asthma without complication        Benign essential hypertension        Hyperlipidemia LDL goal <160        Anxiety          Care Instructions    Increase zoloft to 100 mg   Add metformin extended release once daily with a meal for blood sugars  Stop simvastatin and start Lipitor instead.  Recheck fasting labs in 3 months with me  Sooner if needed recheck with me  Schedule with diabetic educator as soon as you can          Follow-ups after your visit        Additional Services     DIABETES EDUCATOR REFERRAL       DIABETES SELF MANAGEMENT TRAINING (DSMT)      Your provider has referred you to Diabetes Education: [facility]: Diabetes Education - [facility] [phone]   [email or web address]     If an urgent visit is needed or A1C is above 12, Care Team to call the Diabetes  Education Team at [phone] or send an In Basket message to the Diabetes Education Pool (P DIAB ED-PATIENT CARE).    A  will call you to make your appointment. If it has been more than 3 business days since your referral was placed, please call the above phone number to schedule.    Type of training and number of hours: New Diagnosis: Initial group DSMT - 10 hours.      Medicare covers: 10 hours of initial DSMT in 12 month period from the time of first visit, plus 2 hours of follow-up DSMT annually, and additional hours as requested for insulin training.    Diabetes Type: Type 2 - Diet Control             Diabetes Co-Morbidities: none                A1C Goal:  <7.0       A1C is: Lab Results       Component                Value               Date                       A1C                      6.5                 01/12/2018              Diabetes Education Topics: Comprehensive Knowledge Assessment and Instruction and Blood glucose meter instruction     Special Educational Needs Requiring Individual DSMT: None       MEDICAL NUTRITION THERAPY (MNT) for Diabetes    Medical Nutrition Therapy with a Registered Dietitian can be provided in coordination with Diabetes Self-Management Training to assist in achieving optimal diabetes management.    MNT Type and Hours: Do not initiate MNT at this time.                       Medicare will cover: 3 hours initial MNT in 12 month period after first visit, plus 2 hours of follow-up MNT annually    Please be aware that coverage of these services is subject to the terms and limitations of your health insurance plan.  Call member services at your health plan to determine Diabetes Self-Management Training (Codes  &amp; ) and Medical Nutrition Therapy (Codes 33038 & 86988) benefits and ask which blood glucose monitor brands are covered by your plan.  Please bring the following with you to your appointment:    (1)  List of current medications   (2)  List of Blood Glucose Monitor brands that are covered by your insurance plan  (3)  Blood Glucose Monitor and log book  (4)   Food records for the 3 days prior to your visit    The Certified Diabetes Educator may make diabetes medication adjustments per the CDE Protocol and Collaborative Practice Agreement.                  Your next 10 appointments already scheduled     Jan 26, 2018   Procedure with Wiliam Saenz MD   [facility] (--)    [address] [place] [address]   [phone]            Feb 08, 2018 10:30 AM CST   [facility]iliam [facility]enz [facility]   [facility]    [address] [facility] [id number] [phone]            Mar 13, 2018  9:30 AM CDT   Return Visit with Wiliam Montalvo MD   [facility]    [address] [place] [address] [phone]              Who to contact     If you have questions or need follow up information about today's clinic visit or your schedule please contact [facility] directly at [phone].  Normal or non-critical lab and imaging results will be communicated to you by MyChart, letter or phone within 4 business days after the clinic has received the results. If you do not hear from us within 7 days, please contact the clinic through MyChart or phone. If you have a critical or abnormal lab result, we will notify you by phone as soon as possible.  Submit refill requests through [employer] or call your pharmacy and they will forward the refill request to us. Please allow 3 business days for your refill to be completed.          Additional Information About Your Visit        Care EveryWhere ID     This is your Care EveryWhere ID. This could be used by other organizations to access your [facility] medical records  [id number]        Your Vitals Were     Pulse Temperature Pulse Oximetry BMI (Body Mass Index)          72 97.7  F (36.5  C) (Oral) 99% 41.28 kg/m2         Blood Pressure from Last 3 Encounters:   01/18/18 134/86   01/12/18 131/79   01/09/18 (!) 142/95    Weight from Last 3 Encounters:   01/18/18 296 lb (134.3 kg)   01/12/18 297 lb (134.7 kg)   01/09/18 299 lb 14.4 oz (136 kg)              We Performed the Following     Albumin Random Urine Quantitative with Creat Ratio     DIABETES EDUCATOR REFERRAL     FOOT EXAM          Today's Medication Changes          These changes are accurate as of: 1/18/18  9:08 AM.  If you have any questions, ask your nurse or doctor.               Start taking these medicines.        Dose/Directions    atorvastatin 20 MG tablet    Commonly known as:  LIPITOR   Used for:  Hyperlipidemia LDL goal <160   Started by:  Bertha Romero PA-C        Dose:  20 mg   Take 1 tablet (20 mg) by mouth daily   Quantity:  90 tablet   Refills:  1       metFORMIN 500 MG 24 hr tablet   Commonly known as:  GLUCOPHAGE-XR   Used for:  Type 2 diabetes mellitus without complication, without long-term current use of insulin (H)   Started by:  Bertha Romero PA-C        Dose:  500 mg   Take 1 tablet (500 mg) by mouth daily (with dinner)   Quantity:  30 tablet   Refills:  2         These medicines have changed or have updated prescriptions.        Dose/Directions    * sertraline 50 MG tablet   Commonly known as:  ZOLOFT   This may have changed:  Another medication with the same name was added. Make sure you understand how and when to take each.   Used for:  Anxiety   Changed by:  Bertha Romero PA-C        Dose:  50 mg   Take 1 tablet (50 mg) by mouth daily   Quantity:  90 tablet   Refills:  0       * sertraline 100 MG tablet   Commonly known as:  ZOLOFT   This may have changed:  You were already taking a medication with the same name, and this prescription was added. Make sure you understand how and when to take each.   Used for:  Anxiety   Changed by:  Bertha Romero PA-C        Dose:  100 mg   Take 1 tablet (100 mg) by mouth daily   Quantity:  90 tablet   Refills:  1       * Notice:  This list has 2 medication(s) that are the same as other medications prescribed for you. Read the directions carefully, and ask your doctor or other care provider to review them with you.      Stop taking these medicines if you haven't already. Please contact your care team if you have questions.     simvastatin 20 MG tablet   Commonly known as:  ZOCOR   Stopped by:  Bertha Romero PA-C                Where to get your medicines      These medications were sent to [facility][id number] - [place], [place] - [address]  [place], [facility] [address]     Phone:  [phone]     atorvastatin 20 MG tablet    metFORMIN 500 MG 24 hr tablet    sertraline 100 MG tablet                Primary Care Provider Office Phone # Fax #    Bertha Romero PA-C [phone]       [address] [address] [facility] [address]        Equal Access to Services     [facility]: Hadii aad ku hadasho Soomaali, waaxda luqadaha, qaybta kaalmada adeegyada, waxay idiin hayaan adeeg khgregoriosh laefrem . So [facility] [phone].    ATENCIÓN: Si habla español, tiene a lyons disposición servicios gratuitos de asistencia lingüística. Imani al [phone].    We comply with applicable federal civil rights laws and [place] laws. We do not discriminate on the basis of race, color, national origin, age, disability, sex, sexual orientation, or gender identity.            Thank you!     Thank you for choosing [facility]  for your care. Our goal is always to provide you with excellent care. Hearing back from our patients is one way we can continue to improve our services. Please take a few minutes to complete the written survey that you may receive in the mail after your visit with us. Thank you!             Your Updated Medication List - Protect others around you: Learn how to safely use, store and throw away your medicines at www.disposemymeds.org.          This list is accurate as of: 1/18/18  9:08 AM.  Always use your most recent med list.                   Brand Name Dispense Instructions for use Diagnosis    albuterol 108 (90 BASE) MCG/ACT Inhaler    PROAIR HFA/PROVENTIL HFA/VENTOLIN HFA    1 Inhaler    Inhale 2 puffs into the lungs every 6 hours as needed for shortness of breath / dyspnea or wheezing    Cough       amLODIPine 10 MG tablet    NORVASC    90 tablet    Take 1 tablet (10 mg) by mouth daily    Hypertension goal BP (blood pressure) < 140/90       ASPIRIN PO      Take 325 mg by mouth daily        atorvastatin 20 MG tablet    LIPITOR    90  tablet    Take 1 tablet (20 mg) by mouth daily    Hyperlipidemia LDL goal <160       calcium carbonate 500 MG chewable tablet    TUMS     Take 1 chew tab by mouth daily        cyclobenzaprine 10 MG tablet    FLEXERIL    90 tablet    Take 0.5-1 tablets (5-10 mg) by mouth 3 times daily as needed for muscle spasms    Back muscle spasm       dicyclomine 20 MG tablet    BENTYL    40 tablet    Take 1 tablet (20 mg) by mouth 4 times daily as needed    Irritable bowel syndrome with diarrhea, Chronic diarrhea       diphenoxylate-atropine 2.5-0.025 MG per tablet    LOMOTIL    30 tablet    Take 2 tablets by mouth 4 times daily as needed for diarrhea    Chronic diarrhea, Irritable bowel syndrome with diarrhea       EPINEPHrine 0.3 MG/0.3ML injection 2-pack    EPIPEN/ADRENACLICK/or ANY BX GENERIC EQUIV    2 each    Inject 0.3 mLs (0.3 mg) into the muscle once as needed for anaphylaxis    Anaphylactic reaction       fluticasone-salmeterol 250-50 MCG/DOSE diskus inhaler    ADVAIR    3 Inhaler    Inhale 1 puff into the lungs 2 times daily    Cough       lisinopril 20 MG tablet    PRINIVIL/ZESTRIL    90 tablet    TAKE 1 TABLET EVERY DAY    Hypertension goal BP (blood pressure) < 140/90       metFORMIN 500 MG 24 hr tablet    GLUCOPHAGE-XR    30 tablet    Take 1 tablet (500 mg) by mouth daily (with dinner)    Type 2 diabetes mellitus without complication, without long-term current use of insulin (H)       multivitamin, therapeutic Tabs tablet      Take 1 tablet by mouth daily        nortriptyline 10 MG capsule    PAMELOR    90 capsule    Take 3 capsules (30 mg) by mouth At Bedtime    Meralgia paresthetica of left side       * sertraline 50 MG tablet    ZOLOFT    90 tablet    Take 1 tablet (50 mg) by mouth daily    Anxiety       * sertraline 100 MG tablet    ZOLOFT    90 tablet    Take 1 tablet (100 mg) by mouth daily    Anxiety       TYLENOL PO      Take 1,000 mg by mouth every 8 hours as needed for mild pain or fever        VSL#3  Pack     30 each    Use 1-2 capsules/day    Chronic diarrhea       * Notice:  This list has 2 medication(s) that are the same as other medications prescribed for you. Read the directions carefully, and ask your doctor or other care provider to review them with you.

## 2018-01-18 NOTE — PATIENT INSTRUCTIONS
Increase zoloft to 100 mg   Add metformin extended release once daily with a meal for blood sugars  Stop simvastatin and start Lipitor instead.  Recheck fasting labs in 3 months with me  Sooner if needed recheck with me  Schedule with diabetic educator as soon as you can

## 2018-01-18 NOTE — LETTER
My Asthma Action Plan  Name: Adarsh Salvador   YOB: 1965  Date: 1/18/2018   My doctor: Bertha Romero PA-C   My clinic: [facility]        My Control Medicine: ics  My Rescue Medicine: Albuterol (Proair/Ventolin/Proventil) inhaler asdasdasd  My Oral Steroid Medicine: asdasd My Asthma Severity: mild persistent  Avoid your asthma triggers: Zsadasd               GREEN ZONE   Good Control    I feel good    No cough or wheeze    Can work, sleep and play without asthma symptoms       Take your asthma control medicine every day.     1. If exercise triggers your asthma, take your rescue medication    15 minutes before exercise or sports, and    During exercise if you have asthma symptoms  2. Spacer to use with inhaler: If you have a spacer, make sure to use it with your inhaler             YELLOW ZONE Getting Worse  I have ANY of these:    I do not feel good    Cough or wheeze    Chest feels tight    Wake up at night   1. Keep taking your Green Zone medications  2. Start taking your rescue medicine:    every 20 minutes for up to 1 hour. Then every 4 hours for 24-48 hours.  3. If you stay in the Yellow Zone for more than 12-24 hours, contact your doctor.  4. If you do not return to the Green Zone in 12-24 hours or you get worse, start taking your oral steroid medicine if prescribed by your provider.           RED ZONE Medical Alert - Get Help  I have ANY of these:    I feel awful    Medicine is not helping    Breathing getting harder    Trouble walking or talking    Nose opens wide to breathe       1. Take your rescue medicine NOW  2. If your provider has prescribed an oral steroid medicine, start taking it NOW  3. Call your doctor NOW  4. If you are still in the Red Zone after 20 minutes and you have not reached your doctor:    Take your rescue medicine again and    Call 911 or go to the emergency room right away    See your regular doctor within 2 weeks of an Emergency Room or Urgent Care  visit for follow-up treatment.        Electronically signed by: Bertha Romero, January 18, 2018    Annual Reminders:  Meet with Asthma Educator,  Flu Shot in the Fall, consider Pneumonia Vaccination for patients with asthma (aged 19 and older).    Pharmacy:    [facility]ACY #[id number] - [place], [place] [id number] [facility]  WRITTEN PRESCRIPTION REQUESTED                    Asthma Triggers  How To Control Things That Make Your Asthma Worse    Triggers are things that make your asthma worse.  Look at the list below to help you find your triggers and what you can do about them.  You can help prevent asthma flare-ups by staying away from your triggers.      Trigger                                                          What you can do   Cigarette Smoke  Tobacco smoke can make asthma worse. Do not allow smoking in your home, car or around you.  Be sure no one smokes at a child s day care or school.  If you smoke, ask your health care provider for ways to help you quit.  Ask family members to quit too.  Ask your health care provider for a referral to Quit Plan to help you quit smoking, or call 9-930-933-PLAN.     Colds, Flu, Bronchitis  These are common triggers of asthma. Wash your hands often.  Don t touch your eyes, nose or mouth.  Get a flu shot every year.     Dust Mites  These are tiny bugs that live in cloth or carpet. They are too small to see. Wash sheets and blankets in hot water every week.   Encase pillows and mattress in dust mite proof covers.  Avoid having carpet if you can. If you have carpet, vacuum weekly.   Use a dust mask and HEPA vacuum.   Pollen and Outdoor Mold  Some people are allergic to trees, grass, or weed pollen, or molds. Try to keep your windows closed.  Limit time out doors when pollen count is high.   Ask you health care provider about taking medicine during allergy season.     Animal Dander  Some people are allergic to skin flakes, urine or saliva from pets with fur or feathers. Keep  pets with fur or feathers out of your home.    If you can t keep the pet outdoors, then keep the pet out of your bedroom.  Keep the bedroom door closed.  Keep pets off cloth furniture and away from stuffed toys.     Mice, Rats, and Cockroaches  Some people are allergic to the waste from these pests.   Cover food and garbage.  Clean up spills and food crumbs.  Store grease in the refrigerator.   Keep food out of the bedroom.   Indoor Mold  This can be a trigger if your home has high moisture. Fix leaking faucets, pipes, or other sources of water.   Clean moldy surfaces.  Dehumidify basement if it is damp and smelly.   Smoke, Strong Odors, and Sprays  These can reduce air quality. Stay away from strong odors and sprays, such as perfume, powder, hair spray, paints, smoke incense, paint, cleaning products, candles and new carpet.   Exercise or Sports  Some people with asthma have this trigger. Be active!  Ask your doctor about taking medicine before sports or exercise to prevent symptoms.    Warm up for 5-10 minutes before and after sports or exercise.     Other Triggers of Asthma  Cold air:  Cover your nose and mouth with a scarf.  Sometimes laughing or crying can be a trigger.  Some medicines and food can trigger asthma.

## 2018-01-18 NOTE — NURSING NOTE
"Chief Complaint   Patient presents with     Diabetes     F/U new DX for diabetes        Initial /86  Pulse 72  Temp 97.7  F (36.5  C) (Oral)  Wt 296 lb (134.3 kg)  SpO2 99%  BMI 41.28 kg/m2 Estimated body mass index is 41.28 kg/(m^2) as calculated from the following:    Height as of 1/9/18: 5' 11\" (1.803 m).    Weight as of this encounter: 296 lb (134.3 kg).  Medication Reconciliation: complete      Munir Amaral MA    "

## 2018-01-18 NOTE — PROGRESS NOTES
This patient did not go to the lab on 01/18/18 to leave a urine sample. Test for Microalbumin has been canceled, futured and pended for your review and signature. If you want this patient to return to the clinic, please have your team contact him to make a lab only appt.    Thank you, Keisha Conn MLT

## 2018-01-19 ENCOUNTER — TELEPHONE (OUTPATIENT)
Dept: FAMILY MEDICINE | Facility: CLINIC | Age: 53
End: 2018-01-19

## 2018-01-19 DIAGNOSIS — Z53.9 NO SHOW: Primary | ICD-10-CM

## 2018-01-19 ASSESSMENT — ASTHMA QUESTIONNAIRES: ACT_TOTALSCORE: 12

## 2018-01-19 NOTE — TELEPHONE ENCOUNTER
Diabetes Education Scheduling Outreach #1:    Call to patient to schedule. Left message with phone number to call to schedule.    Plan for 2nd outreach attempt within 1 week.    Jenn Yadav OnCall  Diabetes and Nutrition Scheduling

## 2018-01-24 ENCOUNTER — ANESTHESIA EVENT (OUTPATIENT)
Dept: SURGERY | Facility: AMBULATORY SURGERY CENTER | Age: 53
End: 2018-01-24

## 2018-01-26 ENCOUNTER — SURGERY (OUTPATIENT)
Age: 53
End: 2018-01-26

## 2018-01-26 ENCOUNTER — HOSPITAL ENCOUNTER (OUTPATIENT)
Facility: AMBULATORY SURGERY CENTER | Age: 53
Discharge: HOME | End: 2018-01-26
Attending: ORTHOPAEDIC SURGERY | Admitting: ORTHOPAEDIC SURGERY
Payer: COMMERCIAL

## 2018-01-26 ENCOUNTER — ANESTHESIA (OUTPATIENT)
Dept: SURGERY | Facility: AMBULATORY SURGERY CENTER | Age: 53
End: 2018-01-26
Payer: COMMERCIAL

## 2018-01-26 VITALS
TEMPERATURE: 97.5 F | OXYGEN SATURATION: 97 % | SYSTOLIC BLOOD PRESSURE: 114 MMHG | RESPIRATION RATE: 18 BRPM | DIASTOLIC BLOOD PRESSURE: 45 MMHG

## 2018-01-26 DIAGNOSIS — Z98.890 S/P CARPAL TUNNEL RELEASE: Primary | ICD-10-CM

## 2018-01-26 LAB — GLUCOSE BLDC GLUCOMTR-MCNC: 152 MG/DL (ref 70–99)

## 2018-01-26 PROCEDURE — G8916 PT W IV AB GIVEN ON TIME: HCPCS

## 2018-01-26 PROCEDURE — 64721 CARPAL TUNNEL SURGERY: CPT | Mod: RT | Performed by: ORTHOPAEDIC SURGERY

## 2018-01-26 PROCEDURE — 64721 CARPAL TUNNEL SURGERY: CPT | Mod: RT

## 2018-01-26 PROCEDURE — G8907 PT DOC NO EVENTS ON DISCHARG: HCPCS

## 2018-01-26 RX ORDER — SODIUM CHLORIDE, SODIUM LACTATE, POTASSIUM CHLORIDE, CALCIUM CHLORIDE 600; 310; 30; 20 MG/100ML; MG/100ML; MG/100ML; MG/100ML
INJECTION, SOLUTION INTRAVENOUS CONTINUOUS
Status: DISCONTINUED | OUTPATIENT
Start: 2018-01-26 | End: 2018-01-27 | Stop reason: HOSPADM

## 2018-01-26 RX ORDER — PROPOFOL 10 MG/ML
INJECTION, EMULSION INTRAVENOUS PRN
Status: DISCONTINUED | OUTPATIENT
Start: 2018-01-26 | End: 2018-01-26

## 2018-01-26 RX ORDER — ONDANSETRON 2 MG/ML
4 INJECTION INTRAMUSCULAR; INTRAVENOUS EVERY 30 MIN PRN
Status: DISCONTINUED | OUTPATIENT
Start: 2018-01-26 | End: 2018-01-27 | Stop reason: HOSPADM

## 2018-01-26 RX ORDER — FENTANYL CITRATE 50 UG/ML
25-50 INJECTION, SOLUTION INTRAMUSCULAR; INTRAVENOUS
Status: DISCONTINUED | OUTPATIENT
Start: 2018-01-26 | End: 2018-01-27 | Stop reason: HOSPADM

## 2018-01-26 RX ORDER — ACETAMINOPHEN 325 MG/1
975 TABLET ORAL ONCE
Status: COMPLETED | OUTPATIENT
Start: 2018-01-26 | End: 2018-01-26

## 2018-01-26 RX ORDER — NALOXONE HYDROCHLORIDE 0.4 MG/ML
.1-.4 INJECTION, SOLUTION INTRAMUSCULAR; INTRAVENOUS; SUBCUTANEOUS
Status: DISCONTINUED | OUTPATIENT
Start: 2018-01-26 | End: 2018-01-27 | Stop reason: HOSPADM

## 2018-01-26 RX ORDER — PROPOFOL 10 MG/ML
INJECTION, EMULSION INTRAVENOUS CONTINUOUS PRN
Status: DISCONTINUED | OUTPATIENT
Start: 2018-01-26 | End: 2018-01-26

## 2018-01-26 RX ORDER — CEFAZOLIN SODIUM 1 G/50ML
3 SOLUTION INTRAVENOUS
Status: COMPLETED | OUTPATIENT
Start: 2018-01-26 | End: 2018-01-26

## 2018-01-26 RX ORDER — HYDROCODONE BITARTRATE AND ACETAMINOPHEN 5; 325 MG/1; MG/1
1 TABLET ORAL
Status: DISCONTINUED | OUTPATIENT
Start: 2018-01-26 | End: 2018-01-27 | Stop reason: HOSPADM

## 2018-01-26 RX ORDER — FENTANYL CITRATE 50 UG/ML
25-50 INJECTION, SOLUTION INTRAMUSCULAR; INTRAVENOUS EVERY 5 MIN PRN
Status: DISCONTINUED | OUTPATIENT
Start: 2018-01-26 | End: 2018-01-27 | Stop reason: HOSPADM

## 2018-01-26 RX ORDER — ONDANSETRON 4 MG/1
4 TABLET, ORALLY DISINTEGRATING ORAL EVERY 30 MIN PRN
Status: DISCONTINUED | OUTPATIENT
Start: 2018-01-26 | End: 2018-01-27 | Stop reason: HOSPADM

## 2018-01-26 RX ORDER — IBUPROFEN 600 MG/1
600 TABLET, FILM COATED ORAL EVERY 6 HOURS PRN
Qty: 60 TABLET | Refills: 1 | Status: SHIPPED | OUTPATIENT
Start: 2018-01-26 | End: 2018-09-25

## 2018-01-26 RX ORDER — CEFAZOLIN SODIUM 1 G/3ML
1 INJECTION, POWDER, FOR SOLUTION INTRAMUSCULAR; INTRAVENOUS SEE ADMIN INSTRUCTIONS
Status: DISCONTINUED | OUTPATIENT
Start: 2018-01-26 | End: 2018-01-27 | Stop reason: HOSPADM

## 2018-01-26 RX ORDER — HYDROCODONE BITARTRATE AND ACETAMINOPHEN 5; 325 MG/1; MG/1
1-2 TABLET ORAL EVERY 4 HOURS PRN
Qty: 30 TABLET | Refills: 0 | Status: SHIPPED | OUTPATIENT
Start: 2018-01-26 | End: 2018-03-13

## 2018-01-26 RX ORDER — LIDOCAINE 40 MG/G
CREAM TOPICAL
Status: DISCONTINUED | OUTPATIENT
Start: 2018-01-26 | End: 2018-01-27 | Stop reason: HOSPADM

## 2018-01-26 RX ORDER — ACETAMINOPHEN 325 MG/1
975 TABLET ORAL ONCE
Status: DISCONTINUED | OUTPATIENT
Start: 2018-01-26 | End: 2018-01-27 | Stop reason: HOSPADM

## 2018-01-26 RX ORDER — LIDOCAINE HYDROCHLORIDE 20 MG/ML
INJECTION, SOLUTION INFILTRATION; PERINEURAL PRN
Status: DISCONTINUED | OUTPATIENT
Start: 2018-01-26 | End: 2018-01-26

## 2018-01-26 RX ORDER — FENTANYL CITRATE 50 UG/ML
INJECTION, SOLUTION INTRAMUSCULAR; INTRAVENOUS PRN
Status: DISCONTINUED | OUTPATIENT
Start: 2018-01-26 | End: 2018-01-26

## 2018-01-26 RX ORDER — MEPERIDINE HYDROCHLORIDE 25 MG/ML
12.5 INJECTION INTRAMUSCULAR; INTRAVENOUS; SUBCUTANEOUS
Status: DISCONTINUED | OUTPATIENT
Start: 2018-01-26 | End: 2018-01-27 | Stop reason: HOSPADM

## 2018-01-26 RX ORDER — ONDANSETRON 2 MG/ML
INJECTION INTRAMUSCULAR; INTRAVENOUS PRN
Status: DISCONTINUED | OUTPATIENT
Start: 2018-01-26 | End: 2018-01-26

## 2018-01-26 RX ADMIN — PROPOFOL 100 MCG/KG/MIN: 10 INJECTION, EMULSION INTRAVENOUS at 09:10

## 2018-01-26 RX ADMIN — ACETAMINOPHEN 975 MG: 325 TABLET ORAL at 08:22

## 2018-01-26 RX ADMIN — FENTANYL CITRATE 25 MCG: 50 INJECTION, SOLUTION INTRAMUSCULAR; INTRAVENOUS at 09:12

## 2018-01-26 RX ADMIN — SODIUM CHLORIDE, SODIUM LACTATE, POTASSIUM CHLORIDE, CALCIUM CHLORIDE: 600; 310; 30; 20 INJECTION, SOLUTION INTRAVENOUS at 09:08

## 2018-01-26 RX ADMIN — LIDOCAINE HYDROCHLORIDE 40 MG: 20 INJECTION, SOLUTION INFILTRATION; PERINEURAL at 09:10

## 2018-01-26 RX ADMIN — PROPOFOL 50 MG: 10 INJECTION, EMULSION INTRAVENOUS at 09:10

## 2018-01-26 RX ADMIN — CEFAZOLIN SODIUM 3 G: 1 SOLUTION INTRAVENOUS at 09:13

## 2018-01-26 RX ADMIN — ONDANSETRON 4 MG: 2 INJECTION INTRAMUSCULAR; INTRAVENOUS at 09:43

## 2018-01-26 RX ADMIN — Medication 5.5 ML: at 09:25

## 2018-01-26 ASSESSMENT — LIFESTYLE VARIABLES: TOBACCO_USE: 1

## 2018-01-26 NOTE — ANESTHESIA CARE TRANSFER NOTE
Patient: Adarsh Salvador    Procedure(s):  Right carpal tunnel release - Wound Class: I-Clean    Diagnosis: Right carpal tunnel syndrome  Diagnosis Additional Information: No value filed.    Anesthesia Type:   MAC     Note:  Airway :Room Air  Patient transferred to:Phase II        Vitals: (Last set prior to Anesthesia Care Transfer)    CRNA VITALS  1/26/2018 0921 - 1/26/2018 0955      1/26/2018             SpO2: 97 %                Electronically Signed By: IRENE Rollins CRNA  January 26, 2018  9:55 AM

## 2018-01-26 NOTE — ANESTHESIA PREPROCEDURE EVALUATION
Anesthesia Evaluation     . Pt has had prior anesthetic.     No history of anesthetic complications          ROS/MED HX    ENT/Pulmonary: Comment: Should be on a daily inhaler. Working on finding one insurance will cover    (+)tobacco use, Current use Mild Persistent asthma Treatment: Inhaler prn,  , . .    Neurologic:  - neg neurologic ROS     Cardiovascular:     (+) Dyslipidemia, hypertension----. : . . . :. . Previous cardiac testing Echodate:2013results:Interpretation Summary  No stress induced regional wall motion abnormalities.  Normal resting LV function with EF of approximately 60-65%; normal response   to exercise with increase to approximately 70-75%.  No ECG evidence of ischemia.  No subjective evidence of ischemia.  Normal functional capacity.  No significant valvular disease noted on routine screening color flow Doppler   and pulsed Doppler examination.date: results:ECG reviewed date:2018 results:NSR date: results:          METS/Exercise Tolerance:  >4 METS   Hematologic:  - neg hematologic  ROS       Musculoskeletal:  - neg musculoskeletal ROS       GI/Hepatic:     (+) GERD Other GI/Hepatic (IBS)       Renal/Genitourinary:     (+) Nephrolithiasis ,       Endo:     (+) type II DM Obesity, .      Psychiatric:  - neg psychiatric ROS       Infectious Disease:  - neg infectious disease ROS       Malignancy:      - no malignancy   Other:    - neg other ROS                 Physical Exam  Normal systems: cardiovascular, pulmonary and dental    Airway   Mallampati: I  TM distance: >3 FB  Neck ROM: full    Dental   (+) missing    Cardiovascular   Rhythm and rate: regular and normal      Pulmonary    breath sounds clear to auscultation                    Anesthesia Plan      History & Physical Review  History and physical reviewed and following examination; no interval change.    ASA Status:  3 .    NPO Status:  > 8 hours    Plan for MAC Reason for MAC:  Deep or markedly invasive procedure (G8)  PONV  prophylaxis:  Ondansetron (or other 5HT-3)       Postoperative Care  Postoperative pain management:  Multi-modal analgesia.      Consents  Anesthetic plan, risks, benefits and alternatives discussed with:  Patient.  Use of blood products discussed: No .   .                          .

## 2018-01-26 NOTE — ANESTHESIA POSTPROCEDURE EVALUATION
Patient: Adarsh Salvador    Procedure(s):  Right carpal tunnel release - Wound Class: I-Clean    Diagnosis:Right carpal tunnel syndrome  Diagnosis Additional Information: No value filed.    Anesthesia Type:  MAC    Note:  Anesthesia Post Evaluation    Patient location during evaluation: Phase 2  Patient participation: Able to fully participate in evaluation  Level of consciousness: awake and alert  Pain management: adequate  Airway patency: patent  Cardiovascular status: acceptable  Respiratory status: acceptable  Hydration status: acceptable  PONV: none     Anesthetic complications: None          Last vitals:  Vitals:    01/26/18 0952 01/26/18 1003 01/26/18 1010   BP: 116/65 105/56    Resp: 20 20 18   Temp:      SpO2: 95% 91% 97%         Electronically Signed By: Quan Clemente DO  January 26, 2018  10:17 AM

## 2018-01-26 NOTE — OP NOTE
OPERATIVE REPORT    DATE OF SERVICE:  January 26, 2018       PREOPERATIVE DIAGNOSIS  Right carpal tunnel syndrome--recurrent     POSTOPERATIVE DIAGNOSIS  Right carpal tunnel syndrome --recurrent.      NAME OF OPERATION  Right carpal tunnel release.     SURGEON  Wiliam Saenz MD     FIRST ASSISTANT  MICHELLE Camarena.     ANESTHESIA: MAC    ESTIMATED BLOOD LOSS: minimal     Complications: none    Specimen: none    INDICATIONS  The patient is a 52 year old male with moderate right carpal tunnel syndrome based on exam, but only very mild based on EMG. The symptoms have been quite bothersome.  Conservative treatment has failed. He refused corticosteroid injection.  He apparently had previous CARPAL TUNNEL RELEASE in the past that was successful, but symptoms returned recently, and are similar to his previous carpal tunnel syndrome symptoms. He also has neuropathy, which confounds his clinical picture somewhat, so we based our decision on the clinical picture.  Informed consent was obtained for the procedure.       PROCEDURE IN DETAIL  The patient was taken to the operating room and placed on the operating table in the supine position.  Tourniquet was placed around the proximal forearm. The limb was prepped and draped in the usual sterile fashion.  IV sedation was given by Anesthesia.  Local anesthetic using a combination of 0.25% Marcaine and 1% lidocaine was injected in the anticipated incision site.  The limb was exsanguinated and tourniquet inflated to 250 mmHg.    A longitudinal incision was made in the usual location at the base of the palm just ulnar to midline.  The incision was taken down through the skin and subcutaneous tissue carefully to the level of the palmar fascia.  The palmar fascia was then carefully incised, and the transverse carpal ligament was then exposed. The transverse carpal ligament was incised from distal to proximal under direct vision while protecting underlying structures with an  elevator.  The TCL was only somewhat tight but quite thick, with disorganized fiber pattern consistent with scar tissue.  Proximally, we protected the underlying structures using a clamp, and then completed the ligament incision using a tenotomy scissors.  Once I felt that the transverse carpal ligament was released completely, we spread the clamp to make sure that it was in fact completely released.  The wound was irrigated and the tourniquet deflated.  Minor bleeders were encountered and bovied.  The median nerve was observed to turn deep red . Then, 5-0 nylon sutures placed in a vertical mattress fashion were used to close the skin, and a sterile dressing was applied followed by a volar splint. The patient was then transferred to the hospital cart and to the recovery area in stable condition.    DI Saenz MD  Dept. Orthopedic Surgery  [facility]

## 2018-01-26 NOTE — IP AVS SNAPSHOT
MRN:[id number]                      After Visit Summary   1/26/2018    Adarsh Salvador    MRN: [id number]           Thank you!     Thank you for choosing [facility] for your care. Our goal is always to provide you with excellent care. Hearing back from our patients is one way we can continue to improve our services. Please take a few minutes to complete the written survey that you may receive in the mail after you visit with us. Thank you!        Patient Information     Date Of Birth          1965        About your hospital stay     You were admitted on:  January 26, 2018 You last received care in the:  [facility]    You were discharged on:  January 26, 2018       Who to Call     For medical emergencies, please call 911.  For non-urgent questions about your medical care, please call your primary care provider or clinic, [phone]  For questions related to your surgery, please call your surgery clinic        Attending Provider     Provider Specialty    Wiliam Saenz MD Orthopedics       Primary Care Provider Office Phone # Fax #    Bertha Romero PA-C [phone]      After Care Instructions     Discharge Instructions       Review outpatient procedure discharge instructions with patient as directed by Provider  Elevate and ice to affected hand x 48 hours, then, as needed.    Move fingers, no lifting with affected hand.    Keep splint on, keep it clean and dry    Return to clinic 10-14 days, call for appointment with Dr. Saenz, [phone]                  Your next 10 appointments already scheduled     Feb 08, 2018 10:30 AM CST   Return Visit with Wiliam Saenz MD   [facility] ([facility])    [address] [address] [facility] [address] [phone]            Mar 13, 2018  9:30 AM CDT   Return Visit with Wiliam Montalvo MD   [facility] ([facility]    [address] [place] [address]  N  [facility] [address]   [phone]              Further instructions from your care team       [facility]  Same-Day Surgery   Adult Discharge Orders & Instructions   For 24 hours after surgery  1. Get plenty of rest.  A responsible adult must stay with you for at least 24 hours after you leave the hospital.   2. Do not drive or use heavy equipment.  If you have weakness or tingling, don't drive or use heavy equipment until this feeling goes away.  3. Do not drink alcohol.  4. Avoid strenuous or risky activities.  Ask for help when climbing stairs.   5. You may feel lightheaded.  IF so, sit for a few minutes before standing.  Have someone help you get up.   6. If you have nausea (feel sick to your stomach): Drink only clear liquids such as apple juice, ginger ale, broth or 7-Up.  Rest may also help.  Be sure to drink enough fluids.  Move to a regular diet as you feel able.  7. You may have a slight fever. Call the doctor if your fever is over 100 F (37.7 C) (taken under the tongue) or lasts longer than 24 hours.  8. You may have a dry mouth, a sore throat, muscle aches or trouble sleeping.  These should go away after 24 hours.  9. Do not make important or legal decisions.   Call your doctor for any of the followin.  Signs of infection (fever, growing tenderness at the surgery site, a large amount of drainage or bleeding, severe pain, foul-smelling drainage, redness, swelling).    2. It has been over 8 to 10 hours since surgery and you are still not able to urinate (pass water).    3.  Headache for over 24 hours.    4.  Numbness, tingling or weakness the day after surgery (if you had spinal anesthesia).  To contact Dr Saenz call:  [phone]_________________________     Pending Results     No orders found from 2018 to 2018.            Admission Information     Date & Time Provider Department Dept. Phone    2018 Wiliam Saenz MD [facility]  [place] [phone]      Your Vitals Were     Blood Pressure Temperature Respirations Pulse Oximetry          116/65 97.5  F (36.4  C) (Temporal) 20 95%        Care EveryWhere ID     This is your Care EveryWhere ID. This could be used by other organizations to access your [facility] medical records  [id number]        Equal Access to Services     SIMONE ZAYAS : Hadii mo ku haddoloreso Soomaali, waaxda luqadaha, qaybta kaalmada lamryan, victor hugo saha macieljefry boone lesliechucho armendariz . So [facility] [phone].    ATENCIÓN: Si habla español, tiene a lyons disposición servicios gratuitos de asistencia lingüística. Imani al [phone].    We comply with applicable federal civil rights laws and [place] laws. We do not discriminate on the basis of race, color, national origin, age, disability, sex, sexual orientation, or gender identity.               Review of your medicines      START taking        Dose / Directions    HYDROcodone-acetaminophen 5-325 MG per tablet   Commonly known as:  NORCO   Used for:  S/P carpal tunnel release        Dose:  1-2 tablet   Take 1-2 tablets by mouth every 4 hours as needed for moderate to severe pain   Quantity:  30 tablet   Refills:  0       ibuprofen 600 MG tablet   Commonly known as:  ADVIL/MOTRIN   Used for:  S/P carpal tunnel release        Dose:  600 mg   Take 1 tablet (600 mg) by mouth every 6 hours as needed for pain (mild)   Quantity:  60 tablet   Refills:  1         CONTINUE these medicines which have NOT CHANGED        Dose / Directions    albuterol 108 (90 BASE) MCG/ACT Inhaler   Commonly known as:  PROAIR HFA/PROVENTIL HFA/VENTOLIN HFA   Used for:  Cough        Dose:  2 puff   Inhale 2 puffs into the lungs every 6 hours as needed for shortness of breath / dyspnea or wheezing   Quantity:  1 Inhaler   Refills:  3       ALEVE PO        Refills:  0       amLODIPine 10 MG tablet   Commonly known as:  NORVASC   Used for:  Hypertension goal BP (blood pressure) < 140/90        Dose:  10 mg    Take 1 tablet (10 mg) by mouth daily   Quantity:  90 tablet   Refills:  3       ASPIRIN PO        Dose:  325 mg   Take 325 mg by mouth daily   Refills:  0       atorvastatin 20 MG tablet   Commonly known as:  LIPITOR   Used for:  Hyperlipidemia LDL goal <160        Dose:  20 mg   Take 1 tablet (20 mg) by mouth daily   Quantity:  90 tablet   Refills:  1       calcium carbonate 500 MG chewable tablet   Commonly known as:  TUMS        Dose:  1 chew tab   Take 1 chew tab by mouth daily   Refills:  0       cyclobenzaprine 10 MG tablet   Commonly known as:  FLEXERIL   Used for:  Back muscle spasm        Dose:  5-10 mg   Take 0.5-1 tablets (5-10 mg) by mouth 3 times daily as needed for muscle spasms   Quantity:  90 tablet   Refills:  1       dicyclomine 20 MG tablet   Commonly known as:  BENTYL   Used for:  Irritable bowel syndrome with diarrhea, Chronic diarrhea        Dose:  20 mg   Take 1 tablet (20 mg) by mouth 4 times daily as needed   Quantity:  40 tablet   Refills:  5       diphenoxylate-atropine 2.5-0.025 MG per tablet   Commonly known as:  LOMOTIL   Used for:  Chronic diarrhea, Irritable bowel syndrome with diarrhea        Dose:  2 tablet   Take 2 tablets by mouth 4 times daily as needed for diarrhea   Quantity:  30 tablet   Refills:  1       EPINEPHrine 0.3 MG/0.3ML injection 2-pack   Commonly known as:  EPIPEN/ADRENACLICK/or ANY BX GENERIC EQUIV   Used for:  Anaphylactic reaction        Dose:  0.3 mg   Inject 0.3 mLs (0.3 mg) into the muscle once as needed for anaphylaxis   Quantity:  2 each   Refills:  2       fluticasone-salmeterol 250-50 MCG/DOSE diskus inhaler   Commonly known as:  ADVAIR   Used for:  Cough        Dose:  1 puff   Inhale 1 puff into the lungs 2 times daily   Quantity:  3 Inhaler   Refills:  1       lisinopril 20 MG tablet   Commonly known as:  PRINIVIL/ZESTRIL   Used for:  Hypertension goal BP (blood pressure) < 140/90        TAKE 1 TABLET EVERY DAY   Quantity:  90 tablet   Refills:  1        metFORMIN 500 MG 24 hr tablet   Commonly known as:  GLUCOPHAGE-XR   Used for:  Type 2 diabetes mellitus without complication, without long-term current use of insulin (H)        Dose:  500 mg   Take 1 tablet (500 mg) by mouth daily (with dinner)   Quantity:  30 tablet   Refills:  2       multivitamin, therapeutic Tabs tablet        Dose:  1 tablet   Take 1 tablet by mouth daily   Refills:  0       nortriptyline 10 MG capsule   Commonly known as:  PAMELOR   Used for:  Meralgia paresthetica of left side        Dose:  30 mg   Take 3 capsules (30 mg) by mouth At Bedtime   Quantity:  90 capsule   Refills:  3       * sertraline 50 MG tablet   Commonly known as:  ZOLOFT   Used for:  Anxiety        Dose:  50 mg   Take 1 tablet (50 mg) by mouth daily   Quantity:  90 tablet   Refills:  0       * sertraline 100 MG tablet   Commonly known as:  ZOLOFT   Used for:  Anxiety        Dose:  100 mg   Take 1 tablet (100 mg) by mouth daily   Quantity:  90 tablet   Refills:  1       TYLENOL PO        Dose:  1000 mg   Take 1,000 mg by mouth every 8 hours as needed for mild pain or fever   Refills:  0       VSL#3 Pack   Used for:  Chronic diarrhea        Use 1-2 capsules/day   Quantity:  30 each   Refills:  3       * Notice:  This list has 2 medication(s) that are the same as other medications prescribed for you. Read the directions carefully, and ask your doctor or other care provider to review them with you.         Where to get your medicines      Some of these will need a paper prescription and others can be bought over the counter. Ask your nurse if you have questions.     Bring a paper prescription for each of these medications     HYDROcodone-acetaminophen 5-325 MG per tablet    ibuprofen 600 MG tablet                Protect others around you: Learn how to safely use, store and throw away your medicines at www.disposemymeds.org.        Information about OPIOIDS     PRESCRIPTION OPIOIDS: WHAT YOU NEED TO KNOW    Prescription  opioids can be used to help relieve moderate to severe pain and are often prescribed following a surgery or injury, or for certain health conditions. These medications can be an important part of treatment but also come with serious risks. It is important to work with your health care provider to make sure you are getting the safest, most effective care.    WHAT ARE THE RISKS AND SIDE EFFECTS OF OPIOID USE?  Prescription opioids carry serious risks of addiction and overdose, especially with prolonged use. An opioid overdose, often marked by slowed breathing can cause sudden death. The use of prescription opioids can have a number of side effects as well, even when taken as directed:      Tolerance - meaning you might need to take more of a medication for the same pain relief    Physical dependence - meaning you have symptoms of withdrawal when a medication is stopped    Increased sensitivity to pain    Constipation    Nausea, vomiting, and dry mouth    Sleepiness and dizziness    Confusion    Depression    Low levels of testosterone that can result in lower sex drive, energy, and strength    Itching and sweating    RISKS ARE GREATER WITH:    History of drug misuse, substance use disorder, or overdose    Mental health conditions (such as depression or anxiety)    Sleep apnea    Older age (65 years or older)    Pregnancy    Avoid alcohol while taking prescription opioids.   Also, unless specifically advised by your health care provider, medications to avoid include:    Benzodiazepines (such as Xanax or Valium)    Muscle relaxants (such as Soma or Flexeril)    Hypnotics (such as Ambien or Lunesta)    Other prescription opioids    KNOW YOUR OPTIONS:  Talk to your health care provider about ways to manage your pain that do not involve prescription opioids. Some of these options may actually work better and have fewer risks and side effects:    Pain relievers such as acetaminophen, ibuprofen, and naproxen    Some  medications that are also used for depression or seizures    Physical therapy and exercise    Cognitive behavioral therapy, a psychological, goal-directed approach, in which patients learn how to modify physical, behavioral, and emotional triggers of pain and stress    IF YOU ARE PRESCRIBED OPIOIDS FOR PAIN:    Never take opioids in greater amounts or more often than prescribed    Follow up with your primary health care provider and work together to create a plan on how to manage your pain.    Talk about ways to help manage your pain that do not involve prescription opioids    Talk about all concerns and side effects    Help prevent misuse and abuse    Never sell or share prescription opioids    Never use another person's prescription opioids    Store prescription opioids in a secure place and out of reach of others (this may include visitors, children, friends, and family)    Visit www.cdc.gov/drugoverdose to learn about risks of opioid abuse and overdose    If you believe you may be struggling with addiction, tell your health care provider and ask for guidance or call [facility]'s National Helpline at 7-914-654-HELP    LEARN MORE / www.cdc.gov/drugoverdose/prescribing/guideline.html    Safely dispose of unused prescription opioids: Find your local drug take-back programs and more information about the importance of safe disposal at www.doseofreality.[place].gov             Medication List: This is a list of all your medications and when to take them. Check marks below indicate your daily home schedule. Keep this list as a reference.      Medications           Morning Afternoon Evening Bedtime As Needed    albuterol 108 (90 BASE) MCG/ACT Inhaler   Commonly known as:  PROAIR HFA/PROVENTIL HFA/VENTOLIN HFA   Inhale 2 puffs into the lungs every 6 hours as needed for shortness of breath / dyspnea or wheezing                                ALEVE PO                                amLODIPine 10 MG tablet   Commonly known as:   NORVASC   Take 1 tablet (10 mg) by mouth daily                                ASPIRIN PO   Take 325 mg by mouth daily                                atorvastatin 20 MG tablet   Commonly known as:  LIPITOR   Take 1 tablet (20 mg) by mouth daily                                calcium carbonate 500 MG chewable tablet   Commonly known as:  TUMS   Take 1 chew tab by mouth daily                                cyclobenzaprine 10 MG tablet   Commonly known as:  FLEXERIL   Take 0.5-1 tablets (5-10 mg) by mouth 3 times daily as needed for muscle spasms                                dicyclomine 20 MG tablet   Commonly known as:  BENTYL   Take 1 tablet (20 mg) by mouth 4 times daily as needed                                diphenoxylate-atropine 2.5-0.025 MG per tablet   Commonly known as:  LOMOTIL   Take 2 tablets by mouth 4 times daily as needed for diarrhea                                EPINEPHrine 0.3 MG/0.3ML injection 2-pack   Commonly known as:  EPIPEN/ADRENACLICK/or ANY BX GENERIC EQUIV   Inject 0.3 mLs (0.3 mg) into the muscle once as needed for anaphylaxis                                fluticasone-salmeterol 250-50 MCG/DOSE diskus inhaler   Commonly known as:  ADVAIR   Inhale 1 puff into the lungs 2 times daily                                HYDROcodone-acetaminophen 5-325 MG per tablet   Commonly known as:  NORCO   Take 1-2 tablets by mouth every 4 hours as needed for moderate to severe pain                                ibuprofen 600 MG tablet   Commonly known as:  ADVIL/MOTRIN   Take 1 tablet (600 mg) by mouth every 6 hours as needed for pain (mild)                                lisinopril 20 MG tablet   Commonly known as:  PRINIVIL/ZESTRIL   TAKE 1 TABLET EVERY DAY                                metFORMIN 500 MG 24 hr tablet   Commonly known as:  GLUCOPHAGE-XR   Take 1 tablet (500 mg) by mouth daily (with dinner)                                multivitamin, therapeutic Tabs tablet   Take 1 tablet by mouth  daily                                nortriptyline 10 MG capsule   Commonly known as:  PAMELOR   Take 3 capsules (30 mg) by mouth At Bedtime                                * sertraline 50 MG tablet   Commonly known as:  ZOLOFT   Take 1 tablet (50 mg) by mouth daily                                * sertraline 100 MG tablet   Commonly known as:  ZOLOFT   Take 1 tablet (100 mg) by mouth daily                                TYLENOL PO   Take 1,000 mg by mouth every 8 hours as needed for mild pain or fever   Last time this was given:  975 mg on 1/26/2018  8:22 AM                                VSL#3 Pack   Use 1-2 capsules/day                                * Notice:  This list has 2 medication(s) that are the same as other medications prescribed for you. Read the directions carefully, and ask your doctor or other care provider to review them with you.

## 2018-01-26 NOTE — DISCHARGE INSTRUCTIONS
[facility]  Same-Day Surgery   Adult Discharge Orders & Instructions   For 24 hours after surgery  1. Get plenty of rest.  A responsible adult must stay with you for at least 24 hours after you leave the hospital.   2. Do not drive or use heavy equipment.  If you have weakness or tingling, don't drive or use heavy equipment until this feeling goes away.  3. Do not drink alcohol.  4. Avoid strenuous or risky activities.  Ask for help when climbing stairs.   5. You may feel lightheaded.  IF so, sit for a few minutes before standing.  Have someone help you get up.   6. If you have nausea (feel sick to your stomach): Drink only clear liquids such as apple juice, ginger ale, broth or 7-Up.  Rest may also help.  Be sure to drink enough fluids.  Move to a regular diet as you feel able.  7. You may have a slight fever. Call the doctor if your fever is over 100 F (37.7 C) (taken under the tongue) or lasts longer than 24 hours.  8. You may have a dry mouth, a sore throat, muscle aches or trouble sleeping.  These should go away after 24 hours.  9. Do not make important or legal decisions.   Call your doctor for any of the followin.  Signs of infection (fever, growing tenderness at the surgery site, a large amount of drainage or bleeding, severe pain, foul-smelling drainage, redness, swelling).    2. It has been over 8 to 10 hours since surgery and you are still not able to urinate (pass water).    3.  Headache for over 24 hours.    4.  Numbness, tingling or weakness the day after surgery (if you had spinal anesthesia).  To contact Dr Saenz call:  [phone]_________________________

## 2018-01-26 NOTE — IP AVS SNAPSHOT
[facility]    [address] [place] [address]    Phone:  [phone]                                       After Visit Summary   1/26/2018    Adarsh Salvador    MRN: [id number]           After Visit Summary Signature Page     I have received my discharge instructions, and my questions have been answered. I have discussed any challenges I see with this plan with the nurse or doctor.    ..........................................................................................................................................  Patient/Patient Representative Signature      ..........................................................................................................................................  Patient Representative Print Name and Relationship to Patient    ..................................................               ................................................  Date                                            Time    ..........................................................................................................................................  Reviewed by Signature/Title    ...................................................              ..............................................  Date                                                            Time

## 2018-02-01 NOTE — TELEPHONE ENCOUNTER
Diabetes Education Scheduling Outreach #2:    Call to patient to schedule. Left message with phone number to call to schedule.    Letter sent to patient requesting to call to schedule.    Jenn Yadav OnCall  Diabetes and Nutrition Scheduling

## 2018-02-08 ENCOUNTER — OFFICE VISIT (OUTPATIENT)
Dept: ORTHOPEDICS | Facility: CLINIC | Age: 53
End: 2018-02-08
Payer: COMMERCIAL

## 2018-02-08 VITALS
RESPIRATION RATE: 16 BRPM | BODY MASS INDEX: 41.44 KG/M2 | HEIGHT: 71 IN | SYSTOLIC BLOOD PRESSURE: 176 MMHG | HEART RATE: 97 BPM | DIASTOLIC BLOOD PRESSURE: 102 MMHG | WEIGHT: 296 LBS | TEMPERATURE: 97 F

## 2018-02-08 DIAGNOSIS — Z98.890 S/P CARPAL TUNNEL RELEASE: Primary | ICD-10-CM

## 2018-02-08 PROCEDURE — 99024 POSTOP FOLLOW-UP VISIT: CPT | Performed by: ORTHOPAEDIC SURGERY

## 2018-02-08 ASSESSMENT — PAIN SCALES - GENERAL: PAINLEVEL: MODERATE PAIN (4)

## 2018-02-08 NOTE — NURSING NOTE
"Chief Complaint   Patient presents with     Post-op Wrist     PO# 1 Right carpal tunnel release.DOS: 1/26/2018 2 weeks p/o       Initial BP (!) 176/102 (BP Location: Left arm, Patient Position: Sitting, Cuff Size: Adult Large)  Pulse 97  Temp 97  F (36.1  C) (Oral)  Resp 16  Ht 1.803 m (5' 11\")  Wt 134.3 kg (296 lb)  BMI 41.28 kg/m2 Estimated body mass index is 41.28 kg/(m^2) as calculated from the following:    Height as of this encounter: 1.803 m (5' 11\").    Weight as of this encounter: 134.3 kg (296 lb).  Medication Reconciliation: complete   Anusha Garrison MA      "

## 2018-02-08 NOTE — PROGRESS NOTES
"Adarsh Salvador is here for follow up after right carpal tunnel release on 01/26/18. It has been approximately 2 weeks since the procedure. Patient has no complaints at this time. He reports improvements to sleeping compared to pre-op.    Physical Exam:  BP (!) 176/102 (BP Location: Left arm, Patient Position: Sitting, Cuff Size: Adult Large)  Pulse 97  Temp 97  F (36.1  C) (Oral)  Resp 16  Ht 1.803 m (5' 11\")  Wt 134.3 kg (296 lb)  BMI 41.28 kg/m2  General Appearance: healthy, alert and no distress   Skin: no suspicious lesions or rashes  Neuro: Normal strength and tone, mentation intact and speech normal  Vascular: good pulses, and cappillary refill   Lymph: no lymphadenopathy   Psych:  mentation appears normal and affect normal/bright  Resp: no increased work of breathing     Right Wrist/Hand Exam:  Inspection: Wound looks good, no evidence of infection.  Tender: Distal aspect of the incision.  Able to make a full fist    Assessment:  Doing well status post right carpal tunnel release    Plan:  Sutures were removed today.  Taught scar management techniques.  Discussed splint wear.  Hand therapy ordered. Can work on some  and strengthening exercises.  We had a discussion regarding return to work and risks with an early return to work. Our goal is to return back to work by the February 26th.    Return to clinic: 2 weeks    DI Saenz MD  Dept. Orthopedic Surgery  [facility]    This document serves as a record of the services and decisions personally performed and made by Dr. DI Saenz MD. It was created on his behalf by Abhilash Dowling, a trained medical scribe. The creation of this record is based on the provider's personal observations and the statements of the patient. This document has been checked and approved by the attending provider.   Abhilash Dowling February 8, 2018 10:45 AM  "

## 2018-02-08 NOTE — LETTER
"    2/8/2018         RE: Adarsh Salvador  [address] [facility] [address]        Dear Colleague,    Thank you for referring your patient, Adarsh Salvador, to the [facility]. Please see a copy of my visit note below.    Adarsh Salvador is here for follow up after right carpal tunnel release on 01/26/18. It has been approximately 2 weeks since the procedure. Patient has no complaints at this time. He reports improvements to sleeping compared to pre-op.    Physical Exam:  BP (!) 176/102 (BP Location: Left arm, Patient Position: Sitting, Cuff Size: Adult Large)  Pulse 97  Temp 97  F (36.1  C) (Oral)  Resp 16  Ht 1.803 m (5' 11\")  Wt 134.3 kg (296 lb)  BMI 41.28 kg/m2  General Appearance: healthy, alert and no distress   Skin: no suspicious lesions or rashes  Neuro: Normal strength and tone, mentation intact and speech normal  Vascular: good pulses, and cappillary refill   Lymph: no lymphadenopathy   Psych:  mentation appears normal and affect normal/bright  Resp: no increased work of breathing     Right Wrist/Hand Exam:  Inspection: Wound looks good, no evidence of infection.  Tender: Distal aspect of the incision.  Able to make a full fist    Assessment:  Doing well status post right carpal tunnel release    Plan:  Sutures were removed today.  Taught scar management techniques.  Discussed splint wear.  Hand therapy ordered. Can work on some  and strengthening exercises.  We had a discussion regarding return to work and risks with an early return to work. Our goal is to return back to work by the February 26th.    Return to clinic: 2 weeks    DI Saenz MD  Dept. Orthopedic Surgery  [facility]    This document serves as a record of the services and decisions personally performed and made by Dr. DI Saenz MD. It was created on his behalf by Abhilash Dowling, a trained medical scribe. The creation of this record is based on the provider's personal observations and the statements of the patient. " This document has been checked and approved by the attending provider.   Abhilash Dowling February 8, 2018 10:45 AM    Again, thank you for allowing me to participate in the care of your patient.        Sincerely,        Wiliam Saenz MD

## 2018-02-08 NOTE — PATIENT INSTRUCTIONS
Please remember to call and schedule a follow up appointment if one was recommended at your earliest convenience.   Orthopedics CLINIC HOURS TELEPHONE NUMBER   Wiliam Saenz M.D.      Anusha Garrison  Medical Assistant Tuesday 8 am -5 pm    Wednesday 8 am -1 pm    Thursday 8 am -5 pm   Specialty schedulers:   [phone] to make an appointment with any Specialty Provider.   Main Clinic:   [phone] to make an appointment with your primary provider   Urgent Care locations:    [facility] Monday-Friday 5 pm - 9 pm  Saturday-Sunday 9 am -5pm      Monday-Friday 11 am - 9 pm  Saturday 9 am - 5 pm [phone]     If SURGERY has been recommended, please call our Specialty Schedulers at [phone] to schedule your procedure.    If you need a medication refill, please contact your pharmacy. Please allow 3 business days for your refill to be completed.  Use [employer] (secure e-mail communication and access to your chart) to send a message or to make an appointment. Please ask how you can sign up for [employer].POST CARPAL TUNNEL RELEASE INSTRUCTIONS    Wear velcro splint day and night for 10 days.  Remove to do wrist range of motion and gentle gripping exercises 4-5 time per day, and for hygiene.     In 10 days, brace use during the day is OPTIONAL, BUT should be worn at night for 10 days.      In 3 weeks from now, brace wear at any time is OPTIONAL    Avoid heavy or repetitive use of the hand.  But you should do finger range of motion therapeutically.    Wound:  Once sutures are out, it is ok to get wound wet in the shower or gently running water.  NO SOAKING in water until 3 weeks postoperative, and the wound is sealed-over.  Scar massage: use Vitamin E (liquid inside Vitamin E tablet, or Vitamin E lotion) to gently massage the scar once per day for 30 seconds.

## 2018-02-08 NOTE — MR AVS SNAPSHOT
After Visit Summary   2/8/2018    Adarsh Salvador    MRN: [id number]           Patient Information     Date Of Birth          1965        Visit Information        Provider Department      2/8/2018 10:30 AM Wiliam Saenz MD [facility] Instructions    Please remember to call and schedule a follow up appointment if one was recommended at your earliest convenience.   Orthopedics CLINIC HOURS TELEPHONE NUMBER   Wiliam Saenz M.D.      Anusha Garrison  Medical Assistant Tuesday 8 am -5 pm    Wednesday 8 am -1 pm    Thursday 8 am -5 pm   Specialty schedulers:   [phone] to make an appointment with any Specialty Provider.   Main Clinic:   [phone] to make an appointment with your primary provider   Urgent Care locations:    [facility] Monday-Friday 5 pm - 9 pm  Saturday-Sunday 9 am -5pm      Monday-Friday 11 am - 9 pm  Saturday 9 am - 5 pm [phone]     If SURGERY has been recommended, please call our Specialty Schedulers at [phone] to schedule your procedure.    If you need a medication refill, please contact your pharmacy. Please allow 3 business days for your refill to be completed.  Use [employer] (secure e-mail communication and access to your chart) to send a message or to make an appointment. Please ask how you can sign up for [employer].POST CARPAL TUNNEL RELEASE INSTRUCTIONS    Wear velcro splint day and night for 10 days.  Remove to do wrist range of motion and gentle gripping exercises 4-5 time per day, and for hygiene.     In 10 days, brace use during the day is OPTIONAL, BUT should be worn at night for 10 days.      In 3 weeks from now, brace wear at any time is OPTIONAL    Avoid heavy or repetitive use of the hand.  But you should do finger range of motion therapeutically.    Wound:  Once sutures are out, it is ok to get wound wet in the shower or gently running water.  NO SOAKING in water until 3 weeks  "postoperative, and the wound is sealed-over.  Scar massage: use Vitamin E (liquid inside Vitamin E tablet, or Vitamin E lotion) to gently massage the scar once per day for 30 seconds.          Follow-ups after your visit        Your next 10 appointments already scheduled     Feb 12, 2018  9:30 AM CST   Diabetic Education with AN DIABETIC ED RESOURCE   [facility] ([facility])    [address] [facility] [address] [phone]            Mar 13, 2018  9:30 AM CDT   Return Visit with Wiliam Montalvo MD   [facility] ([facility])    [address] [address] [place] [address] [phone]              Who to contact     If you have questions or need follow up information about today's clinic visit or your schedule please contact [facility] directly at [phone].  Normal or non-critical lab and imaging results will be communicated to you by MyChart, letter or phone within 4 business days after the clinic has received the results. If you do not hear from us within 7 days, please contact the clinic through MyChart or phone. If you have a critical or abnormal lab result, we will notify you by phone as soon as possible.  Submit refill requests through [employer] or call your pharmacy and they will forward the refill request to us. Please allow 3 business days for your refill to be completed.          Additional Information About Your Visit        Care EveryWhere ID     This is your Care EveryWhere ID. This could be used by other organizations to access your [facility] medical records  [id number]        Your Vitals Were     Pulse Temperature Respirations Height BMI (Body Mass Index)       97 97  F (36.1  C) (Oral) 16 1.803 m (5' 11\") 41.28 kg/m2        Blood Pressure from Last 3 Encounters:   02/08/18 (!) 176/102   01/26/18 114/45   01/18/18 134/86    Weight from Last 3 Encounters:   02/08/18 134.3 kg (296 lb) "   01/18/18 134.3 kg (296 lb)   01/12/18 134.7 kg (297 lb)              Today, you had the following     No orders found for display       Primary Care Provider Office Phone # Fax #    Bertha Romero PA-C [phone] [address] [facility] [address]        Equal Access to Services     [facility] Center[facility]ELINDA : Hadii aad ku hadasho Soomaali, waaxda luqadaha, qaybta kaalmada adeegyada, waxay idiin hayaan adeeg khkymberly la'aan . So [facility] [phone].    ATENCIÓN: Si habla español, tiene a lyons disposición servicios gratuitos de asistencia lingüística. Llame al [phone].    We comply with applicable federal civil rights laws and [place] laws. We do not discriminate on the basis of race, color, national origin, age, disability, sex, sexual orientation, or gender identity.            Thank you!     Thank you for choosing [facility]  for your care. Our goal is always to provide you with excellent care. Hearing back from our patients is one way we can continue to improve our services. Please take a few minutes to complete the written survey that you may receive in the mail after your visit with us. Thank you!             Your Updated Medication List - Protect others around you: Learn how to safely use, store and throw away your medicines at www.disposemymeds.org.          This list is accurate as of 2/8/18 10:44 AM.  Always use your most recent med list.                   Brand Name Dispense Instructions for use Diagnosis    albuterol 108 (90 BASE) MCG/ACT Inhaler    PROAIR HFA/PROVENTIL HFA/VENTOLIN HFA    1 Inhaler    Inhale 2 puffs into the lungs every 6 hours as needed for shortness of breath / dyspnea or wheezing    Cough       ALEVE PO           amLODIPine 10 MG tablet    NORVASC    90 tablet    Take 1 tablet (10 mg) by mouth daily    Hypertension goal BP (blood pressure) < 140/90       ASPIRIN PO      Take 325 mg by mouth daily        atorvastatin 20 MG tablet    LIPITOR     90 tablet    Take 1 tablet (20 mg) by mouth daily    Hyperlipidemia LDL goal <160       calcium carbonate 500 MG chewable tablet    TUMS     Take 1 chew tab by mouth daily        cyclobenzaprine 10 MG tablet    FLEXERIL    90 tablet    Take 0.5-1 tablets (5-10 mg) by mouth 3 times daily as needed for muscle spasms    Back muscle spasm       dicyclomine 20 MG tablet    BENTYL    40 tablet    Take 1 tablet (20 mg) by mouth 4 times daily as needed    Irritable bowel syndrome with diarrhea, Chronic diarrhea       diphenoxylate-atropine 2.5-0.025 MG per tablet    LOMOTIL    30 tablet    Take 2 tablets by mouth 4 times daily as needed for diarrhea    Chronic diarrhea, Irritable bowel syndrome with diarrhea       EPINEPHrine 0.3 MG/0.3ML injection 2-pack    EPIPEN/ADRENACLICK/or ANY BX GENERIC EQUIV    2 each    Inject 0.3 mLs (0.3 mg) into the muscle once as needed for anaphylaxis    Anaphylactic reaction       fluticasone-salmeterol 250-50 MCG/DOSE diskus inhaler    ADVAIR    3 Inhaler    Inhale 1 puff into the lungs 2 times daily    Cough       HYDROcodone-acetaminophen 5-325 MG per tablet    NORCO    30 tablet    Take 1-2 tablets by mouth every 4 hours as needed for moderate to severe pain    S/P carpal tunnel release       ibuprofen 600 MG tablet    ADVIL/MOTRIN    60 tablet    Take 1 tablet (600 mg) by mouth every 6 hours as needed for pain (mild)    S/P carpal tunnel release       lisinopril 20 MG tablet    PRINIVIL/ZESTRIL    90 tablet    TAKE 1 TABLET EVERY DAY    Hypertension goal BP (blood pressure) < 140/90       metFORMIN 500 MG 24 hr tablet    GLUCOPHAGE-XR    30 tablet    Take 1 tablet (500 mg) by mouth daily (with dinner)    Type 2 diabetes mellitus without complication, without long-term current use of insulin (H)       multivitamin, therapeutic Tabs tablet      Take 1 tablet by mouth daily        nortriptyline 10 MG capsule    PAMELOR    90 capsule    Take 3 capsules (30 mg) by mouth At Bedtime     Meralgia paresthetica of left side       * sertraline 50 MG tablet    ZOLOFT    90 tablet    Take 1 tablet (50 mg) by mouth daily    Anxiety       * sertraline 100 MG tablet    ZOLOFT    90 tablet    Take 1 tablet (100 mg) by mouth daily    Anxiety       TYLENOL PO      Take 1,000 mg by mouth every 8 hours as needed for mild pain or fever        VSL#3 Pack     30 each    Use 1-2 capsules/day    Chronic diarrhea       * Notice:  This list has 2 medication(s) that are the same as other medications prescribed for you. Read the directions carefully, and ask your doctor or other care provider to review them with you.

## 2018-02-13 ENCOUNTER — ALLIED HEALTH/NURSE VISIT (OUTPATIENT)
Dept: EDUCATION SERVICES | Facility: CLINIC | Age: 53
End: 2018-02-13
Payer: COMMERCIAL

## 2018-02-13 DIAGNOSIS — E11.9 TYPE 2 DIABETES MELLITUS WITHOUT COMPLICATION, WITHOUT LONG-TERM CURRENT USE OF INSULIN (H): ICD-10-CM

## 2018-02-13 DIAGNOSIS — E11.9 DIABETES MELLITUS WITHOUT COMPLICATION (H): Primary | ICD-10-CM

## 2018-02-13 PROCEDURE — 99207 ZZC DROP WITH A PROCEDURE: CPT

## 2018-02-13 PROCEDURE — G0108 DIAB MANAGE TRN  PER INDIV: HCPCS

## 2018-02-13 RX ORDER — METFORMIN HCL 500 MG
1000 TABLET, EXTENDED RELEASE 24 HR ORAL
Qty: 180 TABLET | Refills: 2 | Status: SHIPPED | OUTPATIENT
Start: 2018-02-13 | End: 2018-03-16

## 2018-02-13 RX ORDER — LANCETS
EACH MISCELLANEOUS
Qty: 102 EACH | Refills: 11 | Status: SHIPPED | OUTPATIENT
Start: 2018-02-13 | End: 2022-12-22

## 2018-02-13 NOTE — MR AVS SNAPSHOT
After Visit Summary   2/13/2018    Adarsh Salvador    MRN: [id number]           Patient Information     Date Of Birth          1965        Visit Information        Provider Department      2/13/2018 10:30 AM AN DIABETIC ED RESOURCE [facility]        Today's Diagnoses     Type 2 diabetes mellitus without complication, without long-term current use of insulin (H)          Care Instructions    My Diabetes Care Goals:    Healthy Eating: 3 meals per day, 4-5 hrs apart and snacks in between .  Protein all meals and snack.    Being Active: walk at work when having your snack.     Monitoring: check your BG when you wake up.     Taking Medication: Metformin  mg take 2 pills with breakfast    Follow up:  Follow-up diabetes education appointment scheduled on Friday March 16 @ 11:30.      Bring blood glucose meter and logbook with you to all doctor and follow-up appointments.     [facility] for the [facility]:  For Your Diabetes Education and Nutrition Appointments Call:  [phone]   For Diabetes Education or Nutrition Related Questions:   Phone: [phone]  E-mail: [email or web address]  Fax: [phone]   If you need a medication refill please contact your pharmacy. Please allow 3 business days for your refills to be completed.    Instructions for emailing the Diabetes Educators    If you need to communicate a non-urgent message to a Diabetes Educator via email, please send to [email or web address].    Please follow the following email guidelines:    Subject line: Secure: your clinic name (example: Secure: [facility])  In the email please include: First name, middle initial, last name and date of birth.    We will be in touch with you within one (1) business day.             Follow-ups after your visit        Your next 10 appointments already scheduled     Feb 22, 2018  9:00 AM CST   [facility]    [facility]    [address] [facility] [address] [phone]            Mar 13, 2018  9:30 AM CDT   Return Visit with Wiliam Montalvo MD   [facility]    [address] [place] [address]   [phone]            Mar 16, 2018 11:30 AM CDT   Diabetic Education with AN DIABETIC ED RESOURCE   [facility] ([facility])    [address] [facility] [address] [phone]              Who to contact     If you have questions or need follow up information about today's clinic visit or your schedule please contact [facility] directly at [phone].  Normal or non-critical lab and imaging results will be communicated to you by MyChart, letter or phone within 4 business days after the clinic has received the results. If you do not hear from us within 7 days, please contact the clinic through MyChart or phone. If you have a critical or abnormal lab result, we will notify you by phone as soon as possible.  Submit refill requests through [employer] or call your pharmacy and they will forward the refill request to us. Please allow 3 business days for your refill to be completed.          Additional Information About Your Visit        Care EveryWhere ID     This is your Care EveryWhere ID. This could be used by other organizations to access your [place] medical records  [id number]         Blood Pressure from Last 3 Encounters:   02/08/18 (!) 176/102   01/26/18 114/45   01/18/18 134/86    Weight from Last 3 Encounters:   02/08/18 134.3 kg (296 lb)   01/18/18 134.3 kg (296 lb)   01/12/18 134.7 kg (297 lb)              Today, you had the following     No orders found for display         Today's Medication Changes          These changes are accurate as of 2/13/18 11:15 AM.  If you have any questions, ask your nurse or doctor.                Start taking these medicines.        Dose/Directions    blood glucose monitoring lancets   Used for:  Type 2 diabetes mellitus without complication, without long-term current use of insulin (H)        Use to test blood sugar 1 times daily or as directed.  Ok to substitute alternative if insurance prefers.   Quantity:  102 each   Refills:  11       blood glucose monitoring test strip   Commonly known as:  ACCU-CHEK GUIDE   Used for:  Type 2 diabetes mellitus without complication, without long-term current use of insulin (H)        Use to test blood sugar 1 times daily or as directed.   Quantity:  100 strip   Refills:  11         These medicines have changed or have updated prescriptions.        Dose/Directions    metFORMIN 500 MG 24 hr tablet   Commonly known as:  GLUCOPHAGE-XR   This may have changed:    - how much to take  - when to take this   Used for:  Type 2 diabetes mellitus without complication, without long-term current use of insulin (H)        Dose:  1000 mg   Take 2 tablets (1,000 mg) by mouth daily (with breakfast)   Quantity:  180 tablet   Refills:  2            Where to get your medicines      These medications were sent to [id number] - [place], [place] - [address] [address]     Phone:  [phone]     blood glucose monitoring lancets    blood glucose monitoring test strip    metFORMIN 500 MG 24 hr tablet                Primary Care Provider Office Phone # Fax #    Bertha Romero PA-C [phone] [address] [facility] [address]        Equal Access to Services     SIMONE ZAYAS : Edwige Carter, waaxda lusheldon, qaybta kaalmada lsia, victor hugo machado. So [facility] [phone].    ATENCIÓN: Si habla español, tiene a lyons disposición servicios gratuitos de asistencia lingüística. Imani al [phone].    We comply with applicable federal civil rights laws and [place] laws. We do not  discriminate on the basis of race, color, national origin, age, disability, sex, sexual orientation, or gender identity.            Thank you!     Thank you for choosing [facility]  for your care. Our goal is always to provide you with excellent care. Hearing back from our patients is one way we can continue to improve our services. Please take a few minutes to complete the written survey that you may receive in the mail after your visit with us. Thank you!             Your Updated Medication List - Protect others around you: Learn how to safely use, store and throw away your medicines at www.disposemymeds.org.          This list is accurate as of 2/13/18 11:15 AM.  Always use your most recent med list.                   Brand Name Dispense Instructions for use Diagnosis    albuterol 108 (90 BASE) MCG/ACT Inhaler    PROAIR HFA/PROVENTIL HFA/VENTOLIN HFA    1 Inhaler    Inhale 2 puffs into the lungs every 6 hours as needed for shortness of breath / dyspnea or wheezing    Cough       ALEVE PO           amLODIPine 10 MG tablet    NORVASC    90 tablet    Take 1 tablet (10 mg) by mouth daily    Hypertension goal BP (blood pressure) < 140/90       ASPIRIN PO      Take 325 mg by mouth daily        atorvastatin 20 MG tablet    LIPITOR    90 tablet    Take 1 tablet (20 mg) by mouth daily    Hyperlipidemia LDL goal <160       blood glucose monitoring lancets     102 each    Use to test blood sugar 1 times daily or as directed.  Ok to substitute alternative if insurance prefers.    Type 2 diabetes mellitus without complication, without long-term current use of insulin (H)       blood glucose monitoring test strip    ACCU-CHEK GUIDE    100 strip    Use to test blood sugar 1 times daily or as directed.    Type 2 diabetes mellitus without complication, without long-term current use of insulin (H)       calcium carbonate 500 MG chewable tablet    TUMS     Take 1 chew tab by mouth daily         cyclobenzaprine 10 MG tablet    FLEXERIL    90 tablet    Take 0.5-1 tablets (5-10 mg) by mouth 3 times daily as needed for muscle spasms    Back muscle spasm       dicyclomine 20 MG tablet    BENTYL    40 tablet    Take 1 tablet (20 mg) by mouth 4 times daily as needed    Irritable bowel syndrome with diarrhea, Chronic diarrhea       diphenoxylate-atropine 2.5-0.025 MG per tablet    LOMOTIL    30 tablet    Take 2 tablets by mouth 4 times daily as needed for diarrhea    Chronic diarrhea, Irritable bowel syndrome with diarrhea       EPINEPHrine 0.3 MG/0.3ML injection 2-pack    EPIPEN/ADRENACLICK/or ANY BX GENERIC EQUIV    2 each    Inject 0.3 mLs (0.3 mg) into the muscle once as needed for anaphylaxis    Anaphylactic reaction       fluticasone-salmeterol 250-50 MCG/DOSE diskus inhaler    ADVAIR    3 Inhaler    Inhale 1 puff into the lungs 2 times daily    Cough       HYDROcodone-acetaminophen 5-325 MG per tablet    NORCO    30 tablet    Take 1-2 tablets by mouth every 4 hours as needed for moderate to severe pain    S/P carpal tunnel release       ibuprofen 600 MG tablet    ADVIL/MOTRIN    60 tablet    Take 1 tablet (600 mg) by mouth every 6 hours as needed for pain (mild)    S/P carpal tunnel release       lisinopril 20 MG tablet    PRINIVIL/ZESTRIL    90 tablet    TAKE 1 TABLET EVERY DAY    Hypertension goal BP (blood pressure) < 140/90       metFORMIN 500 MG 24 hr tablet    GLUCOPHAGE-XR    180 tablet    Take 2 tablets (1,000 mg) by mouth daily (with breakfast)    Type 2 diabetes mellitus without complication, without long-term current use of insulin (H)       multivitamin, therapeutic Tabs tablet      Take 1 tablet by mouth daily        nortriptyline 10 MG capsule    PAMELOR    90 capsule    Take 3 capsules (30 mg) by mouth At Bedtime    Meralgia paresthetica of left side       * sertraline 50 MG tablet    ZOLOFT    90 tablet    Take 1 tablet (50 mg) by mouth daily    Anxiety       * sertraline 100 MG tablet     ZOLOFT    90 tablet    Take 1 tablet (100 mg) by mouth daily    Anxiety       TYLENOL PO      Take 1,000 mg by mouth every 8 hours as needed for mild pain or fever        VSL#3 Pack     30 each    Use 1-2 capsules/day    Chronic diarrhea       * Notice:  This list has 2 medication(s) that are the same as other medications prescribed for you. Read the directions carefully, and ask your doctor or other care provider to review them with you.

## 2018-02-13 NOTE — PROGRESS NOTES
Diabetes Self Management Training: Initial Assessment Visit for Newly Diagnosed Patients (Complete AADE Goals Flowsheet)    Adarsh Salvador presents today for education, evaluation of glucose control and modification of medication(s) related to Type 2 diabetes.    He is accompanied by self    Patient's diabetes management related comments/concerns: newly DX, he has been on leave from work had carpule tunnel surgery.    Patient's emotional response to diabetes: expresses readiness to learn and concern for health and well-being    Patient would like this visit to be focused around the following diabetes-related behaviors and goals: Diabetes pathophysiology, Assistance with making lifestyle changes, Self-care behavioral goal setting, Healthy Eating, Being Active, Monitoring, Taking Medication, Problem Solving, Reducing Risks and Healthy Coping    ASSESSMENT:  Patient Problem List and Family Medical History reviewed for relevant medical history, current medical status, and diabetes risk factors.    Current Diabetes Management per Patient:  Taking diabetes medications?   yes:     Diabetes Medication(s)     Biguanides Sig    metFORMIN (GLUCOPHAGE-XR) 500 MG 24 hr tablet Take 1 tablet (500 mg) by mouth daily (with dinner)      , Oral Medications: Metformin - Dose:  mg takes , Time: breakfast    *Abbreviated insulin dose documentation key: Insulin Trade Name (qdguxcpqz-ppppv-dxyevm-bedtime) - i.e. Humalog 5-5-5-0 (Humalog 5 units at breakfast, 5 units at lunch, and 5 units at dinner).    Past Diabetes Education: Newly diagnosed    Patient glucose self monitoring as follows: BG meter taught today.   BG meter: Accu-Chek Guide meter  BG results: not available,150     BG values are: unable to assess  Patient's most recent   Lab Results   Component Value Date    A1C 6.5 01/12/2018    is meeting goal of <7.0    Nutrition:  Patient has an inconsistent intake of carbohydrates    Breakfast - eggs, toast   Lunch - sandwich, soda,  "regular   Dinner - rice, or meat and potato and veggie   Snacks - some times, chips    Beverages:1-2 regular soda, tea, water    Cultural/Mandaeism diet restrictions: No     Biggest Challenge to Healthy Eating: knowing what to eat    Physical Activity:    Type: none    Diabetes Risk Factors:  family history, age over 45 years, hypertriglyceridemia, overweight/obesity and inactivity    Diabetes Complications:  Chronic Complication Prevention: Eyes: exam within in the last year? Yes  Nerve/Circulation: foot exam within the last year Yes  Dental Health: brushing/flossing regularly Yes, dental exam within last year No    Vitals:  There were no vitals taken for this visit.  Estimated body mass index is 41.28 kg/(m^2) as calculated from the following:    Height as of 2/8/18: 1.803 m (5' 11\").    Weight as of 2/8/18: 134.3 kg (296 lb).   Last 3 BP:   BP Readings from Last 3 Encounters:   02/08/18 (!) 176/102   01/26/18 114/45   01/18/18 134/86       History   Smoking Status     Never Smoker   Smokeless Tobacco     Current User     Types: Chew     Comment: Chew       Labs:  Lab Results   Component Value Date    A1C 6.5 01/12/2018     Lab Results   Component Value Date     01/12/2018     Lab Results   Component Value Date     01/12/2018     HDL Cholesterol   Date Value Ref Range Status   01/12/2018 37 (L) >39 mg/dL Final   ]  GFR Estimate   Date Value Ref Range Status   01/12/2018 87 >60 mL/min/1.7m2 Final     Comment:     Non  GFR Calc     GFR Estimate If Black   Date Value Ref Range Status   01/12/2018 >90 >60 mL/min/1.7m2 Final     Comment:      GFR Calc     Lab Results   Component Value Date    CR 0.92 01/12/2018     No results found for: MICROALBUMIN    Socio/Economic Considerations:    Support system: family    Health Beliefs and Attitudes:   Patient Activation Measure Survey Score:  DEMETRIUS Score (Last Two) 7/26/2012   DEMETRIUS Raw Score 0   Activation Score 0   DEMETRIUS Level 1 "       Stage of Change: PREPARATION (Decided to change - considering how)      Diabetes knowledge and skills assessment:     Patient is knowledgeable in diabetes management concepts related to: Taking Medication    Patient needs further education on the following diabetes management concepts: Healthy Eating, Being Active, Monitoring, Taking Medication, Problem Solving, Reducing Risks and Healthy Coping    Barriers to Learning Assessment: No Barriers identified    Based on learning assessment above, most appropriate setting for further diabetes education would be: Individual setting.    INTERVENTION:   Education provided today on:  AADE Self-Care Behaviors:  Healthy Eating: carbohydrate counting, consistency in amount, composition, and timing of food intake, weight reduction, heart healthy diet, eating out, portion control, plate planning method and label reading  Being Active: relationship to blood glucose and describe appropriate activity program  Monitoring: purpose, proper technique, log and interpret results, individual blood glucose targets and frequency of monitoring  Taking Medication: action of prescribed medication, side effects of prescribed medications and when to take medications  Patient was instructed on Accu-Chek Guide meter and was able to provide an accurate return demonstration. Patient's blood glucose reading today was 150 mg/dL.    Opportunities for ongoing education and support in diabetes-self management were discussed.    Pt verbalized understanding of concepts discussed and recommendations provided today.       Education Materials Provided:  [place] Understanding Diabetes Booklet, Carbohydrate Counting and Medication Information on Metformin    PLAN:  See Patient Instructions for co-developed, patient-stated behavior change goals.  AVS printed and provided to patient today.    FOLLOW-UP:  Follow-up appointment scheduled on March 16.  Chart routed to referring provider.    Ongoing plan for  education and support: Follow-up visit with diabetes educator in [place]    Radha Swanson RN/MYKEL Yadav Diabetes Educator    Time Spent: 60 minutes  Encounter Type: Individual    Any diabetes medication dose changes were made via the CDE Protocol and Collaborative Practice Agreement with the patient's primary care provider. A copy of this encounter was shared with the provider.

## 2018-02-13 NOTE — PATIENT INSTRUCTIONS
My Diabetes Care Goals:    Healthy Eating: 3 meals per day, 4-5 hrs apart and snacks in between .  Protein all meals and snack.    Being Active: walk at work when having your snack.     Monitoring: check your BG when you wake up.     Taking Medication: Metformin  mg take 2 pills with breakfast    Follow up:  Follow-up diabetes education appointment scheduled on Friday March 16 @ 11:30.      Bring blood glucose meter and logbook with you to all doctor and follow-up appointments.     [place] for the [facility]:  For Your Diabetes Education and Nutrition Appointments Call:  [phone]   For Diabetes Education or Nutrition Related Questions:   Phone: [phone]  E-mail: [email or web address]  Fax: [phone]   If you need a medication refill please contact your pharmacy. Please allow 3 business days for your refills to be completed.    Instructions for emailing the Diabetes Educators    If you need to communicate a non-urgent message to a Diabetes Educator via email, please send to [email or web address].    Please follow the following email guidelines:    Subject line: Secure: your clinic name (example: Secure: [facility])  In the email please include: First name, middle initial, last name and date of birth.    We will be in touch with you within one (1) business day.

## 2018-02-21 NOTE — PROGRESS NOTES
"Adarsh Salvador is here for follow up after right carpal tunnel release on 01/26/18. It has been approximately 4 weeks since the procedure. Patient reports some scar tenderness but no longer has any numbness or tingling. The patient has not started HT yet as he could not hear the message they left for him.    Physical Exam:  /77 (BP Location: Right arm, Patient Position: Sitting, Cuff Size: Adult Regular)  Pulse 73  Resp 16  Ht 1.803 m (5' 11\")  Wt 134.3 kg (296 lb)  SpO2 98%  BMI 41.28 kg/m2  General Appearance: healthy, alert and no distress   Skin: no suspicious lesions or rashes  Neuro: Normal strength and tone, mentation intact and speech normal   Strength: Fairly good   Vascular: good pulses, and cappillary refill   Lymph: no lymphadenopathy   Psych:  mentation appears normal and affect normal/bright  Resp: no increased work of breathing     Right Wrist/Hand Exam:  Tender: Some tenderness over the incision site    Assessment:  1. Doing well status post right carpal tunnel release  2. Mild pillar pain    Plan:  Continue scar massage.   Wear the brace sparingly as needed.   Work note written: return to work full duty on 02/26/18. All questions were answered.    Return to clinic: ZULMA Saenz MD  Dept. Orthopedic Surgery  [facility]    This document serves as a record of the services and decisions personally performed and made by Dr. DI Saenz MD. It was created on his behalf by Abhilash Dowling, a trained medical scribe. The creation of this record is based on the provider's personal observations and the statements of the patient. This document has been checked and approved by the attending provider.   Abhilash Dowling February 22, 2018 9:28 AM  "

## 2018-02-22 ENCOUNTER — OFFICE VISIT (OUTPATIENT)
Dept: ORTHOPEDICS | Facility: CLINIC | Age: 53
End: 2018-02-22
Payer: COMMERCIAL

## 2018-02-22 VITALS
WEIGHT: 296 LBS | HEART RATE: 73 BPM | RESPIRATION RATE: 16 BRPM | HEIGHT: 71 IN | DIASTOLIC BLOOD PRESSURE: 77 MMHG | SYSTOLIC BLOOD PRESSURE: 123 MMHG | OXYGEN SATURATION: 98 % | BODY MASS INDEX: 41.44 KG/M2

## 2018-02-22 DIAGNOSIS — Z98.890 S/P CARPAL TUNNEL RELEASE: Primary | ICD-10-CM

## 2018-02-22 PROCEDURE — 99024 POSTOP FOLLOW-UP VISIT: CPT | Performed by: ORTHOPAEDIC SURGERY

## 2018-02-22 ASSESSMENT — PAIN SCALES - GENERAL: PAINLEVEL: NO PAIN (0)

## 2018-02-22 NOTE — LETTER
[facility]  [address] [address]  Phone: [phone]    February 22, 2018        Adarsh Salvador  [address] [address]          To whom it may concern:    RE: Adarsh Salvador    Patient was seen and treated today at our clinic for right carpal tunnel release DOS: 1/26/2018. Patient may return to work on 2/26/2018 with the following:  No restrictions    Please contact me for questions or concerns.      Sincerely,      Next appointment: As needed        Electronically Signed (as below)  Wiliam Saenz M.D

## 2018-02-22 NOTE — PATIENT INSTRUCTIONS
Please remember to call and schedule a follow up appointment if one was recommended at your earliest convenience.   Orthopedics CLINIC HOURS TELEPHONE NUMBER   Wiliam Saenz M.D.      Anusha Garrison  Medical Assistant Tuesday 8 am -5 pm    Wednesday 8 am -1 pm    Thursday 8 am -5 pm   Specialty schedulers:   [phone] to make an appointment with any Specialty Provider.   Main Clinic:   [phone] to make an appointment with your primary provider   Urgent Care locations:    [facility] Monday-Friday 5 pm - 9 pm  Saturday-Sunday 9 am -5pm      Monday-Friday 11 am - 9 pm  Saturday 9 am - 5 pm [phone]     If SURGERY has been recommended, please call our Specialty Schedulers at [phone] to schedule your procedure.    If you need a medication refill, please contact your pharmacy. Please allow 3 business days for your refill to be completed.  Use [employer] (secure e-mail communication and access to your chart) to send a message or to make an appointment. Please ask how you can sign up for [employer].

## 2018-02-22 NOTE — NURSING NOTE
"Chief Complaint   Patient presents with     Surgical Followup     follow up after right carpal tunnel release on 01/26/18, 4 weeks post-op       Initial /77 (BP Location: Right arm, Patient Position: Sitting, Cuff Size: Adult Regular)  Pulse 73  Resp 16  Ht 1.803 m (5' 11\")  Wt 134.3 kg (296 lb)  SpO2 98%  BMI 41.28 kg/m2 Estimated body mass index is 41.28 kg/(m^2) as calculated from the following:    Height as of this encounter: 1.803 m (5' 11\").    Weight as of this encounter: 134.3 kg (296 lb).  Medication Reconciliation: complete   Anusha Garrison MA      "

## 2018-02-22 NOTE — LETTER
"    2/22/2018         RE: Adarsh Salvador  [address] [address]        Dear Colleague,    Thank you for referring your patient, Adarsh Salvador, to the [facility]. Please see a copy of my visit note below.    Adarsh Salvador is here for follow up after right carpal tunnel release on 01/26/18. It has been approximately 4 weeks since the procedure. Patient reports some scar tenderness but no longer has any numbness or tingling. The patient has not started HT yet as he could not hear the message they left for him.    Physical Exam:  /77 (BP Location: Right arm, Patient Position: Sitting, Cuff Size: Adult Regular)  Pulse 73  Resp 16  Ht 1.803 m (5' 11\")  Wt 134.3 kg (296 lb)  SpO2 98%  BMI 41.28 kg/m2  General Appearance: healthy, alert and no distress   Skin: no suspicious lesions or rashes  Neuro: Normal strength and tone, mentation intact and speech normal   Strength: Fairly good   Vascular: good pulses, and cappillary refill   Lymph: no lymphadenopathy   Psych:  mentation appears normal and affect normal/bright  Resp: no increased work of breathing     Right Wrist/Hand Exam:  Tender: Some tenderness over the incision site    Assessment:  1. Doing well status post right carpal tunnel release  2. Mild pillar pain    Plan:  Continue scar massage.   Wear the brace sparingly as needed.   Work note written: return to work full duty on 02/26/18. All questions were answered.    Return to clinic: ZULMA Saenz MD  Dept. Orthopedic Surgery  [facility]    This document serves as a record of the services and decisions personally performed and made by Dr. DI Saenz MD. It was created on his behalf by Abhilash Dowling, a trained medical scribe. The creation of this record is based on the provider's personal observations and the statements of the patient. This document has been checked and approved by the attending provider.   Abhilash Dowling February 22, 2018 9:28 AM    Again, thank " you for allowing me to participate in the care of your patient.        Sincerely,        Wiliam Saenz MD

## 2018-02-22 NOTE — MR AVS SNAPSHOT
After Visit Summary   2/22/2018    Adarsh Salvador    MRN: [id number]           Patient Information     Date Of Birth          1965        Visit Information        Provider Department      2/22/2018 9:00 AM Wiliam Saenz MD [facility]        Today's Diagnoses     S/P carpal tunnel release    -  1      Care Instructions    Please remember to call and schedule a follow up appointment if one was recommended at your earliest convenience.   Orthopedics CLINIC HOURS TELEPHONE NUMBER   Wiliam Saenz M.D.      Anusha Garrison  Medical Assistant Tuesday 8 am -5 pm    Wednesday 8 am -1 pm    Thursday 8 am -5 pm   Specialty schedulers:   [phone] to make an appointment with any Specialty Provider.   Main Clinic:   [phone] to make an appointment with your primary provider   Urgent Care locations:    [facility] Monday-Friday 5 pm - 9 pm  Saturday-Sunday 9 am -5pm      Monday-Friday 11 am - 9 pm  Saturday 9 am - 5 pm [phone]     If SURGERY has been recommended, please call our Specialty Schedulers at [phone] to schedule your procedure.    If you need a medication refill, please contact your pharmacy. Please allow 3 business days for your refill to be completed.  Use [employer] (secure e-mail communication and access to your chart) to send a message or to make an appointment. Please ask how you can sign up for My [employer].          Follow-ups after your visit        Your next 10 appointments already scheduled     Mar 13, 2018  9:30 AM CDT   Return Visit with Wiliam Montalvo MD   [facility]    [address] [address] [place] [address] [phone]            Mar 16, 2018 11:30 AM CDT   Diabetic Education with AN DIABETIC ED RESOURCE   [facility] ([facility])    [address] [facility] [address] [phone]              Who to  "contact     If you have questions or need follow up information about today's clinic visit or your schedule please contact [facility] directly at [phone].  Normal or non-critical lab and imaging results will be communicated to you by MyChart, letter or phone within 4 business days after the clinic has received the results. If you do not hear from us within 7 days, please contact the clinic through MyChart or phone. If you have a critical or abnormal lab result, we will notify you by phone as soon as possible.  Submit refill requests through Linkfluence or call your pharmacy and they will forward the refill request to us. Please allow 3 business days for your refill to be completed.          Additional Information About Your Visit        Care EveryWhere ID     This is your Care EveryWhere ID. This could be used by other organizations to access your [place] medical records  [id number]        Your Vitals Were     Pulse Respirations Height Pulse Oximetry BMI (Body Mass Index)       73 16 1.803 m (5' 11\") 98% 41.28 kg/m2        Blood Pressure from Last 3 Encounters:   02/22/18 123/77   02/08/18 (!) 176/102   01/26/18 114/45    Weight from Last 3 Encounters:   02/22/18 134.3 kg (296 lb)   02/08/18 134.3 kg (296 lb)   01/18/18 134.3 kg (296 lb)              Today, you had the following     No orders found for display       Primary Care Provider Office Phone # Fax #    Bertha Romero PA-C [phone]       [address] [facility] [address]        Equal Access to Services     CHUCKIE ZAYAS : Hadii mo johnson hadasho Soomaali, waaxda luqadaha, qaybta kaalmada adeegyada, wax yifan armendariz . So [facility] [phone].    ATENCIÓN: Si habla español, tiene a lyons disposición servicios gratuitos de asistencia lingüística. Llame al [phone].    We comply with applicable federal civil rights laws and [place] laws. We do not discriminate on the basis of race, color, " national origin, age, disability, sex, sexual orientation, or gender identity.            Thank you!     Thank you for choosing [facility]  [facility]. Our goal is always to provide you with excellent care. Hearing back from our patients is one way we can continue to improve our services. Please take a few minutes to complete the written survey that you may receive in the mail after your visit with us. Thank you!             Your Updated Medication List - Protect others around you: Learn how to safely use, store and throw away your medicines at www.disposemymeds.org.          This list is accurate as of 2/22/18  9:32 AM.  Always use your most recent med list.                   Brand Name Dispense Instructions for use Diagnosis    albuterol 108 (90 BASE) MCG/ACT Inhaler    PROAIR HFA/PROVENTIL HFA/VENTOLIN HFA    1 Inhaler    Inhale 2 puffs into the lungs every 6 hours as needed for shortness of breath / dyspnea or wheezing    Cough       ALEVE PO           amLODIPine 10 MG tablet    NORVASC    90 tablet    Take 1 tablet (10 mg) by mouth daily    Hypertension goal BP (blood pressure) < 140/90       ASPIRIN PO      Take 325 mg by mouth daily        atorvastatin 20 MG tablet    LIPITOR    90 tablet    Take 1 tablet (20 mg) by mouth daily    Hyperlipidemia LDL goal <160       blood glucose monitoring lancets     102 each    Use to test blood sugar 1 times daily or as directed.  Ok to substitute alternative if insurance prefers.    Type 2 diabetes mellitus without complication, without long-term current use of insulin (H)       blood glucose monitoring test strip    ACCU-CHEK GUIDE    100 strip    Use to test blood sugar 1 times daily or as directed.    Type 2 diabetes mellitus without complication, without long-term current use of insulin (H)       calcium carbonate 500 MG chewable tablet    TUMS     Take 1 chew tab by mouth daily        cyclobenzaprine 10 MG tablet    FLEXERIL    90 tablet    Take  0.5-1 tablets (5-10 mg) by mouth 3 times daily as needed for muscle spasms    Back muscle spasm       dicyclomine 20 MG tablet    BENTYL    40 tablet    Take 1 tablet (20 mg) by mouth 4 times daily as needed    Irritable bowel syndrome with diarrhea, Chronic diarrhea       diphenoxylate-atropine 2.5-0.025 MG per tablet    LOMOTIL    30 tablet    Take 2 tablets by mouth 4 times daily as needed for diarrhea    Chronic diarrhea, Irritable bowel syndrome with diarrhea       EPINEPHrine 0.3 MG/0.3ML injection 2-pack    EPIPEN/ADRENACLICK/or ANY BX GENERIC EQUIV    2 each    Inject 0.3 mLs (0.3 mg) into the muscle once as needed for anaphylaxis    Anaphylactic reaction       fluticasone-salmeterol 250-50 MCG/DOSE diskus inhaler    ADVAIR    3 Inhaler    Inhale 1 puff into the lungs 2 times daily    Cough       HYDROcodone-acetaminophen 5-325 MG per tablet    NORCO    30 tablet    Take 1-2 tablets by mouth every 4 hours as needed for moderate to severe pain    S/P carpal tunnel release       ibuprofen 600 MG tablet    ADVIL/MOTRIN    60 tablet    Take 1 tablet (600 mg) by mouth every 6 hours as needed for pain (mild)    S/P carpal tunnel release       lisinopril 20 MG tablet    PRINIVIL/ZESTRIL    90 tablet    TAKE 1 TABLET EVERY DAY    Hypertension goal BP (blood pressure) < 140/90       metFORMIN 500 MG 24 hr tablet    GLUCOPHAGE-XR    180 tablet    Take 2 tablets (1,000 mg) by mouth daily (with breakfast)    Type 2 diabetes mellitus without complication, without long-term current use of insulin (H)       multivitamin, therapeutic Tabs tablet      Take 1 tablet by mouth daily        nortriptyline 10 MG capsule    PAMELOR    90 capsule    Take 3 capsules (30 mg) by mouth At Bedtime    Meralgia paresthetica of left side       * sertraline 50 MG tablet    ZOLOFT    90 tablet    Take 1 tablet (50 mg) by mouth daily    Anxiety       * sertraline 100 MG tablet    ZOLOFT    90 tablet    Take 1 tablet (100 mg) by mouth daily     Anxiety       TYLENOL PO      Take 1,000 mg by mouth every 8 hours as needed for mild pain or fever        VSL#3 Pack     30 each    Use 1-2 capsules/day    Chronic diarrhea       * Notice:  This list has 2 medication(s) that are the same as other medications prescribed for you. Read the directions carefully, and ask your doctor or other care provider to review them with you.

## 2018-03-13 ENCOUNTER — OFFICE VISIT (OUTPATIENT)
Dept: NEUROLOGY | Facility: CLINIC | Age: 53
End: 2018-03-13
Payer: COMMERCIAL

## 2018-03-13 VITALS
DIASTOLIC BLOOD PRESSURE: 87 MMHG | WEIGHT: 301.8 LBS | HEART RATE: 71 BPM | BODY MASS INDEX: 42.09 KG/M2 | OXYGEN SATURATION: 98 % | SYSTOLIC BLOOD PRESSURE: 139 MMHG

## 2018-03-13 DIAGNOSIS — G62.9 NEUROPATHY: Primary | ICD-10-CM

## 2018-03-13 PROCEDURE — 99213 OFFICE O/P EST LOW 20 MIN: CPT | Mod: 24 | Performed by: PSYCHIATRY & NEUROLOGY

## 2018-03-13 RX ORDER — NORTRIPTYLINE HCL 25 MG
25 CAPSULE ORAL SEE ADMIN INSTRUCTIONS
Qty: 90 CAPSULE | Refills: 3 | Status: SHIPPED | OUTPATIENT
Start: 2018-03-13 | End: 2018-10-09

## 2018-03-13 ASSESSMENT — PAIN SCALES - GENERAL: PAINLEVEL: MODERATE PAIN (5)

## 2018-03-13 NOTE — LETTER
3/13/2018         RE: Adarsh Salvador  [address] [address]        Dear Colleague,    Thank you for referring your patient, Adarsh Salvador, to the [facility]. Please see a copy of my visit note below.    Visit Date:   03/13/2018      NEUROLOGY CONSULTATION      HISTORY OF PRESENT ILLNESS:  This patient is seen in followup with sensory disturbance and pain in his feet and also in the territory of the left lateral thigh.  I last saw him 2 months ago.  The symptoms in the left thigh are either meralgia paresthetica versus L4 radiculopathy.  He does have imaging that shows a disk abnormality on the left at L4-5 with possible compromise of the L4 nerve root.  He also has sensory disturbance in the feet.  It sounds like sensorimotor neuropathy.  He did have electrodiagnostic evidence to that effect.  I did do additional laboratory work on him at the time of his last visit.  Sjogren's antibody, antinuclear antibody, TSH, B12 and sedimentation rate all normal.  I had ordered a glucose tolerance test.  Instead, it looks like he had a fasting glucose that was elevated at 148 and a hemoglobin A1c that was elevated at 6.5.  His primary, Bertha Romero, has diagnosed him with type 2 diabetes.  He is now on metformin.      He did have carpal tunnel release on the right and that is mending.  He works as a .  Now he is mostly sitting down.  For his pain symptoms, he has been taking nortriptyline 30 mg for the meralgia paraesthetica and also the symptoms in his feet.  It has been quite helpful, but he says it is losing its impact.  He tolerates the medicine.  He has been on gabapentin and amitriptyline in the past.  Gabapentin made him feel dopey and upset his stomach.  He also takes sertraline 100 mg a day for depression.      PHYSICAL EXAMINATION:   GENERAL:  On exam today, the patient is cooperative and in no distress.   VITAL SIGNS:  His blood pressure is 139/87.     NEUROLOGIC:  He does have some  impairment in pinprick and temperature sense in the feet.  Vibratory sense is preserved, and reflexes at the ankles are low amplitude.      ASSESSMENT:   1.  Sensorimotor neuropathy involving the feet.   2.  Left side meralgia paresthetica versus L4 radiculopathy.   3.  Recent diagnosis of diabetes type 2.      DISCUSSION:  The patient is seen with the above problem list.  The main issue now has to do with pain management.  He has been on nortriptyline to good effect, but it is losing its effect.  I am going to give him 25 mg capsules and have him increase the dose to 50 mg at night for 10 days.  If that is insufficient, then he could go to 75 mg, but that would be the top dose I would put him on.  If that does not seem to control his symptoms of neuropathy and left side pain, a trial of duloxetine could be undertaken.  The sertraline would probably be discontinued and the duloxetine taking its place for his mood and also for neuropathic pain.  I will see him in followup in the next month or 2 for reexamination.         CHELSEA MONTALVO MD             D: 2018   T: 2018   MT: NERY      Name:     LUTHER BLACKMAN   MRN:      [id number]        Account:      [id number]   :      1965           Visit Date:   2018      Document: [id number]        Again, thank you for allowing me to participate in the care of your patient.        Sincerely,        Chelsea Montalvo MD

## 2018-03-13 NOTE — NURSING NOTE
"Adarsh Salvador's goals for this visit include: return  He requests these members of his care team be copied on today's visit information:     PCP: Bertha Romero    Referring Provider:  No referring provider defined for this encounter.    Chief Complaint   Patient presents with     RECHECK       Initial /87  Pulse 71  Wt (!) 136.9 kg (301 lb 12.8 oz)  SpO2 98%  BMI 42.09 kg/m2 Estimated body mass index is 42.09 kg/(m^2) as calculated from the following:    Height as of 2/22/18: 1.803 m (5' 11\").    Weight as of this encounter: 136.9 kg (301 lb 12.8 oz).  Medication Reconciliation: complete    Do you need any medication refills at today's visit? n  "

## 2018-03-13 NOTE — PROGRESS NOTES
Visit Date:   03/13/2018      NEUROLOGY CONSULTATION      HISTORY OF PRESENT ILLNESS:  This patient is seen in followup with sensory disturbance and pain in his feet and also in the territory of the left lateral thigh.  I last saw him 2 months ago.  The symptoms in the left thigh are either meralgia paresthetica versus L4 radiculopathy.  He does have imaging that shows a disk abnormality on the left at L4-5 with possible compromise of the L4 nerve root.  He also has sensory disturbance in the feet.  It sounds like sensorimotor neuropathy.  He did have electrodiagnostic evidence to that effect.  I did do additional laboratory work on him at the time of his last visit.  Sjogren's antibody, antinuclear antibody, TSH, B12 and sedimentation rate all normal.  I had ordered a glucose tolerance test.  Instead, it looks like he had a fasting glucose that was elevated at 148 and a hemoglobin A1c that was elevated at 6.5.  His primary, Bertha Romeor, has diagnosed him with type 2 diabetes.  He is now on metformin.      He did have carpal tunnel release on the right and that is mending.  He works as a .  Now he is mostly sitting down.  For his pain symptoms, he has been taking nortriptyline 30 mg for the meralgia paraesthetica and also the symptoms in his feet.  It has been quite helpful, but he says it is losing its impact.  He tolerates the medicine.  He has been on gabapentin and amitriptyline in the past.  Gabapentin made him feel dopey and upset his stomach.  He also takes sertraline 100 mg a day for depression.      PHYSICAL EXAMINATION:   GENERAL:  On exam today, the patient is cooperative and in no distress.   VITAL SIGNS:  His blood pressure is 139/87.     NEUROLOGIC:  He does have some impairment in pinprick and temperature sense in the feet.  Vibratory sense is preserved, and reflexes at the ankles are low amplitude.      ASSESSMENT:   1.  Sensorimotor neuropathy involving the feet.   2.  Left side meralgia  paresthetica versus L4 radiculopathy.   3.  Recent diagnosis of diabetes type 2.      DISCUSSION:  The patient is seen with the above problem list.  The main issue now has to do with pain management.  He has been on nortriptyline to good effect, but it is losing its effect.  I am going to give him 25 mg capsules and have him increase the dose to 50 mg at night for 10 days.  If that is insufficient, then he could go to 75 mg, but that would be the top dose I would put him on.  If that does not seem to control his symptoms of neuropathy and left side pain, a trial of duloxetine could be undertaken.  The sertraline would probably be discontinued and the duloxetine taking its place for his mood and also for neuropathic pain.  I will see him in followup in the next month or 2 for reexamination.         CHELSEA POZO MD             D: 2018   T: 2018   MT: NERY      Name:     LUTHER BLACKMAN   MRN:      [id number]        Account:      [id number]   :      1965           Visit Date:   2018      Document: [id number]

## 2018-03-13 NOTE — MR AVS SNAPSHOT
After Visit Summary   3/13/2018    Adarsh Salvador    MRN: [id number]           Patient Information     Date Of Birth          1965        Visit Information        Provider Department      3/13/2018 9:30 AM Wiliam Montalvo MD [facility]        Today's Diagnoses     Neuropathy    -  1       Follow-ups after your visit        Follow-up notes from your care team     Return in about 4 weeks (around 4/10/2018).      Your next 10 appointments already scheduled     Mar 16, 2018 11:30 AM CDT   Diabetic Education with AN DIABETIC ED RESOURCE   [facility] ([facility])    [address] [address] [id number] [phone]            Apr 18, 2018  9:30 AM CDT   Return Visit with Wiliam Montalvo MD   [facility]    [address] [address] [place] [address] [phone]              Who to contact     If you have questions or need follow up information about today's clinic visit or your schedule please contact [facility] directly at [phone].  Normal or non-critical lab and imaging results will be communicated to you by MyChart, letter or phone within 4 business days after the clinic has received the results. If you do not hear from us within 7 days, please contact the clinic through [employer] or phone. If you have a critical or abnormal lab result, we will notify you by phone as soon as possible.  Submit refill requests through [employer] or call your pharmacy and they will forward the refill request to us. Please allow 3 business days for your refill to be completed.          Additional Information About Your Visit        [employer] Information     [employer] is an electronic gateway that provides easy, online access to your medical records. With [employer], you can request a clinic appointment, read your test results, renew a prescription or communicate with your care team.      To sign up for [employer] visit the website at [email or web address]   You will be asked to enter the access code listed below, as well as some personal information. Please follow the directions to create your username and password.     Your access code is: [email or web address]  Expires: 2018  9:52 AM     Your access code will  in 90 days. If you need help or a new code, please contact your [facility] or call [phone] for assistance.        Care EveryWhere ID     This is your Care EveryWhere ID. This could be used by other organizations to access your [facility] medical records  [id number]        Your Vitals Were     Pulse Pulse Oximetry BMI (Body Mass Index)             71 98% 42.09 kg/m2          Blood Pressure from Last 3 Encounters:   18 139/87   18 123/77   18 (!) 176/102    Weight from Last 3 Encounters:   18 (!) 136.9 kg (301 lb 12.8 oz)   18 134.3 kg (296 lb)   18 134.3 kg (296 lb)              Today, you had the following     No orders found for display         Today's Medication Changes          These changes are accurate as of 3/13/18  9:52 AM.  If you have any questions, ask your nurse or doctor.               These medicines have changed or have updated prescriptions.        Dose/Directions    nortriptyline 25 MG capsule   Commonly known as:  PAMELOR   This may have changed:    - medication strength  - how much to take  - when to take this  - additional instructions   Used for:  Neuropathy   Changed by:  Wiliam Montalvo MD        Dose:  25 mg   Take 1 capsule (25 mg) by mouth See Admin Instructions Take two po q hs for 10 days, may increase to three po q hs if sx in feet persist.   Quantity:  90 capsule   Refills:  3       sertraline 100 MG tablet   Commonly known as:  ZOLOFT   This may have changed:  Another medication with the same name was removed. Continue taking this medication, and follow the directions you see  here.   Used for:  Anxiety   Changed by:  Wiliam Montalvo MD        Dose:  100 mg   Take 1 tablet (100 mg) by mouth daily   Quantity:  90 tablet   Refills:  1            Where to get your medicines      These medications were sent to [facility]/pharmacy #[id number] - [place] - [address]  [address], [place] [address]     Phone:  [phone]     nortriptyline 25 MG capsule                Primary Care Provider Office Phone # Fax #    Bertha Romero PA-C [phone] [address] [address] [facility] [address]        Equal Access to Services     [facility]: Hadii aad ku hadasho Soomaali, waaxda luqadaha, qaybta kaalmada adeegyada, victor hugo armendariz . So [facility] [phone].    ATENCIÓN: Si habla español, tiene a lyons disposición servicios gratuitos de asistencia lingüística. LlFort [facility] [phone].    We comply with applicable federal civil rights laws and [place] laws. We do not discriminate on the basis of race, color, national origin, age, disability, sex, sexual orientation, or gender identity.            Thank you!     Thank you for choosing [facility]  for your care. Our goal is always to provide you with excellent care. Hearing back from our patients is one way we can continue to improve our services. Please take a few minutes to complete the written survey that you may receive in the mail after your visit with us. Thank you!             Your Updated Medication List - Protect others around you: Learn how to safely use, store and throw away your medicines at www.disposemymeds.org.          This list is accurate as of 3/13/18  9:52 AM.  Always use your most recent med list.                   Brand Name Dispense Instructions for use Diagnosis    albuterol 108 (90 BASE) MCG/ACT Inhaler    PROAIR HFA/PROVENTIL HFA/VENTOLIN HFA    1 Inhaler    Inhale 2 puffs into the lungs every 6 hours as needed for shortness of breath / dyspnea or  wheezing    Cough       ALEVE PO      Take by mouth as needed        amLODIPine 10 MG tablet    NORVASC    90 tablet    Take 1 tablet (10 mg) by mouth daily    Hypertension goal BP (blood pressure) < 140/90       ASPIRIN PO      Take 325 mg by mouth daily        atorvastatin 20 MG tablet    LIPITOR    90 tablet    Take 1 tablet (20 mg) by mouth daily    Hyperlipidemia LDL goal <160       blood glucose monitoring lancets     102 each    Use to test blood sugar 1 times daily or as directed.  Ok to substitute alternative if insurance prefers.    Type 2 diabetes mellitus without complication, without long-term current use of insulin (H)       blood glucose monitoring test strip    ACCU-CHEK GUIDE    100 strip    Use to test blood sugar 1 times daily or as directed.    Type 2 diabetes mellitus without complication, without long-term current use of insulin (H)       calcium carbonate 500 MG chewable tablet    TUMS     Take 1 chew tab by mouth daily        cyclobenzaprine 10 MG tablet    FLEXERIL    90 tablet    Take 0.5-1 tablets (5-10 mg) by mouth 3 times daily as needed for muscle spasms    Back muscle spasm       dicyclomine 20 MG tablet    BENTYL    40 tablet    Take 1 tablet (20 mg) by mouth 4 times daily as needed    Irritable bowel syndrome with diarrhea, Chronic diarrhea       diphenoxylate-atropine 2.5-0.025 MG per tablet    LOMOTIL    30 tablet    Take 2 tablets by mouth 4 times daily as needed for diarrhea    Chronic diarrhea, Irritable bowel syndrome with diarrhea       EPINEPHrine 0.3 MG/0.3ML injection 2-pack    EPIPEN/ADRENACLICK/or ANY BX GENERIC EQUIV    2 each    Inject 0.3 mLs (0.3 mg) into the muscle once as needed for anaphylaxis    Anaphylactic reaction       fluticasone-salmeterol 250-50 MCG/DOSE diskus inhaler    ADVAIR    3 Inhaler    Inhale 1 puff into the lungs 2 times daily    Cough       ibuprofen 600 MG tablet    ADVIL/MOTRIN    60 tablet    Take 1 tablet (600 mg) by mouth every 6 hours as  needed for pain (mild)    S/P carpal tunnel release       lisinopril 20 MG tablet    PRINIVIL/ZESTRIL    90 tablet    TAKE 1 TABLET EVERY DAY    Hypertension goal BP (blood pressure) < 140/90       metFORMIN 500 MG 24 hr tablet    GLUCOPHAGE-XR    180 tablet    Take 2 tablets (1,000 mg) by mouth daily (with breakfast)    Type 2 diabetes mellitus without complication, without long-term current use of insulin (H)       multivitamin, therapeutic Tabs tablet      Take 1 tablet by mouth daily        nortriptyline 25 MG capsule    PAMELOR    90 capsule    Take 1 capsule (25 mg) by mouth See Admin Instructions Take two po q hs for 10 days, may increase to three po q hs if sx in feet persist.    Neuropathy       sertraline 100 MG tablet    ZOLOFT    90 tablet    Take 1 tablet (100 mg) by mouth daily    Anxiety       TYLENOL PO      Take 1,000 mg by mouth every 8 hours as needed for mild pain or fever        VSL#3 Pack     30 each    Use 1-2 capsules/day    Chronic diarrhea

## 2018-03-16 ENCOUNTER — ALLIED HEALTH/NURSE VISIT (OUTPATIENT)
Dept: EDUCATION SERVICES | Facility: CLINIC | Age: 53
End: 2018-03-16
Payer: COMMERCIAL

## 2018-03-16 DIAGNOSIS — E11.9 TYPE 2 DIABETES MELLITUS WITHOUT COMPLICATION, WITHOUT LONG-TERM CURRENT USE OF INSULIN (H): ICD-10-CM

## 2018-03-16 PROCEDURE — G0108 DIAB MANAGE TRN  PER INDIV: HCPCS

## 2018-03-16 RX ORDER — METFORMIN HCL 500 MG
1000 TABLET, EXTENDED RELEASE 24 HR ORAL 2 TIMES DAILY WITH MEALS
Qty: 360 TABLET | Refills: 1 | Status: SHIPPED | OUTPATIENT
Start: 2018-03-16 | End: 2018-12-19

## 2018-03-16 NOTE — PATIENT INSTRUCTIONS
My Diabetes Care Goals:    Healthy Eating: I will to continue to work with my boss to be sure to get in meals at work.    Being Active: I will keep walking at work to help with stress for sure.    Monitoring: I will keep checking my Blood glucoses as I have been doing.     Taking Medication: I will take Metformin  mg 2 tabs with breakfast and 2 tab. Lunch or dinner.      Follow up:  Follow-up diabetes education appointment schedule with Bertha and then call to make an appointment. To see me in 1 yr or when your A1C goes up.      Bring blood glucose meter and logbook with you to all doctor and follow-up appointments.     [place] [facility] for the [facility]:  For Your Diabetes Education and Nutrition Appointments Call:  [phone]   For Diabetes Education or Nutrition Related Questions:   Phone: [phone]  E-mail: [email or web address]  Fax: [phone]   If you need a medication refill please contact your pharmacy. Please allow 3 business days for your refills to be completed.    Instructions for emailing the Diabetes Educators    If you need to communicate a non-urgent message to a Diabetes Educator via email, please send to [email or web address].    Please follow the following email guidelines:    Subject line: Secure: your clinic name (example: Secure: [facility])  In the email please include: First name, middle initial, last name and date of birth.    We will be in touch with you within one (1) business day.

## 2018-03-16 NOTE — PROGRESS NOTES
Diabetes Self Management Training: Follow-up Visit    Adarsh Salvador presents today for education, evaluation of glucose control and modification of medication(s) related to Type 2 diabetes.    He is accompanied by self    Patient's diabetes management related comments/concerns: back to work again and has been very busy.      Patient would like this visit to be focused around the following diabetes-related behaviors and goals: Healthy Eating, Being Active, Monitoring and Taking Medication    ASSESSMENT:  Patient Problem List reviewed for relevant medical history and current medical status.    Current Diabetes Management per Patient:  Taking diabetes medications?   yes:     Diabetes Medication(s)     Biguanides Sig    metFORMIN (GLUCOPHAGE-XR) 500 MG 24 hr tablet Take 2 tablets (1,000 mg) by mouth daily (with breakfast)      , Oral Medications: Metformin - Dose:  mg takes 1000 mg , Time: breakfast and supper    *Abbreviated insulin dose documentation key: Insulin Trade Name (vndhpoikj-hulnj-urguuz-bedtime) - i.e. Humalog 5-5-5-0 (Humalog 5 units at breakfast, 5 units at lunch, and 5 units at dinner).    Patient glucose self monitoring as follows: two times daily.   BG meter: Accu-Chek Guide meter  BG results: see blood glucose log attached to this encounter     BG values are: In goal  Patient's most recent   Lab Results   Component Value Date    A1C 6.5 01/12/2018    is meeting goal of <7.0    Nutrition:  Patient eats 3 meals per day and has an inconsistent intake of carbohydrates, difficulty getting his breaks at work and may end up working until 2:00 am, has put in at least 13 hrs overtime at work.      Breakfast - bowl of cereal, milk,   Snack a HB eggs  Lunch - eats at home, fruit and hamburger.     Dinner - sandwich, a piece fruit   Snacks - fruit snacks or a cereal bar.     Beverages: H2O water, seltza water.  Drinks a lot at work as it is high.      Cultural/Moravian diet restrictions: No     Biggest  "Challenge to Healthy Eating: lack of time and stress at work.      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    Diabetes Complications:  Chronic Complication Prevention: Eyes: exam within in the last year? Yes  Nerve/Circulation: foot exam within the last year Yes  Dental Health: brushing/flossing regularly Yes, dental exam within last year No, has had bad experience with his past appointment.    Vitals:  There were no vitals taken for this visit.  Estimated body mass index is 42.09 kg/(m^2) as calculated from the following:    Height as of 2/22/18: 1.803 m (5' 11\").    Weight as of 3/13/18: 136.9 kg (301 lb 12.8 oz).   Last 3 BP:   BP Readings from Last 3 Encounters:   03/13/18 139/87   02/22/18 123/77   02/08/18 (!) 176/102       History   Smoking Status     Never Smoker   Smokeless Tobacco     Current User     Types: Chew     Comment: Chew       Labs:  Lab Results   Component Value Date    A1C 6.5 01/12/2018     Lab Results   Component Value Date     01/12/2018     Lab Results   Component Value Date     01/12/2018     HDL Cholesterol   Date Value Ref Range Status   01/12/2018 37 (L) >39 mg/dL Final   ]  GFR Estimate   Date Value Ref Range Status   01/12/2018 87 >60 mL/min/1.7m2 Final     Comment:     Non  GFR Calc     GFR Estimate If Black   Date Value Ref Range Status   01/12/2018 >90 >60 mL/min/1.7m2 Final     Comment:      GFR Calc     Lab Results   Component Value Date    CR 0.92 01/12/2018     No results found for: MICROALBUMIN    Health Beliefs and Attitudes:   Patient Activation Measure Survey Score:  DEMETRIUS Score (Last Two) 7/26/2012   DEMETRIUS Raw Score 0   Activation Score 0   DEMETRIUS Level 1       Stage of Change: ACTION (Actively working towards change)    Progress toward meeting diabetes-related behavioral goals:      Diabetes knowledge and skills assessment:     Patient is knowledgeable in diabetes management concepts " related to: Healthy Eating, Being Active, Monitoring and Taking Medication    Patient needs further education on the following diabetes management concepts: Healthy Eating, Being Active, Monitoring and Taking Medication    Barriers to Learning Assessment: No Barriers identified    Based on learning assessment above, most appropriate setting for further diabetes education would be: Individual setting.    INTERVENTION:    Education provided today on:  AADE Self-Care Behaviors:  Healthy Eating: carbohydrate counting, consistency in amount, composition, and timing of food intake, weight reduction, heart healthy diet, eating out, portion control, plate planning method and label reading  Being Active: relationship to blood glucose and describe appropriate activity program  Monitoring: purpose, proper technique, log and interpret results, individual blood glucose targets and frequency of monitoring  Taking Medication: action of prescribed medication, drawing up, administering and storing injectable diabetes medications and when to take medications    Opportunities for ongoing education and support in diabetes-self management were discussed.    Pt verbalized understanding of concepts discussed and recommendations provided today.       Education Materials Provided:  No new materials provided today    PLAN:  See Patient Instructions for co-developed, patient-stated behavior change goals.  AVS printed and provided to patient today.    FOLLOW-UP:  Follow-up with PCP recommended.  Follow-up yearly for diabetes education review.  Chart routed to referring provider.    Ongoing plan for education and support: Follow-up visit with diabetes educator in [place] Swanson RN/MYKEL Yadav Diabetes Educator    Time Spent: 60 minutes  Encounter Type: Individual    Any diabetes medication dose changes were made via the MYKEL Protocol and Collaborative Practice Agreement with the patient's primary care provider. A copy of this encounter  was shared with the provider.

## 2018-03-16 NOTE — MR AVS SNAPSHOT
After Visit Summary   3/16/2018    Adarsh Salvador    MRN: [id number]           Patient Information     Date Of Birth          1965        Visit Information        Provider Department      3/16/2018 11:30 AM AN DIABETIC ED RESOURCE [facility]        Today's Diagnoses     Type 2 diabetes mellitus without complication, without long-term current use of insulin (H)          Care Instructions    My Diabetes Care Goals:    Healthy Eating: I will to continue to work with my boss to be sure to get in meals at work.    Being Active: I will keep walking at work to help with stress for sure.    Monitoring: I will keep checking my Blood glucoses as I have been doing.     Taking Medication: I will take Metformin  mg 2 tabs with breakfast and 2 tab. Lunch or dinner.      Follow up:  Follow-up diabetes education appointment schedule with Bertha and then call to make an appointment. To see me in 1 yr or when your A1C goes up.      Bring blood glucose meter and logbook with you to all doctor and follow-up appointments.     [facility] for the [facility]:  For Your Diabetes Education and Nutrition Appointments Call:  [phone]   For Diabetes Education or Nutrition Related Questions:   Phone: [phone]  E-mail: [email or web address]  Fax: [phone]   If you need a medication refill please contact your pharmacy. Please allow 3 business days for your refills to be completed.    Instructions for emailing the Diabetes Educators    If you need to communicate a non-urgent message to a Diabetes Educator via email, please send to [email or web address].    Please follow the following email guidelines:    Subject line: Secure: your clinic name (example: Secure: [facility])  In the email please include: First name, middle initial, last name and date of birth.    We will be in touch with you within one (1) business day.             Follow-ups after  "your visit        Your next 10 appointments already scheduled     2018  9:30 AM CDT   Return Visit with Wiliam Montalvo MD   [facility] ([facility]    [id number] [place] [id number] [phone]              Who to contact     If you have questions or need follow up information about today's clinic visit or your schedule please contact [facility] directly at [phone].  Normal or non-critical lab and imaging results will be communicated to you by MyChart, letter or phone within 4 business days after the clinic has received the results. If you do not hear from us within 7 days, please contact the clinic through [employer] or phone. If you have a critical or abnormal lab result, we will notify you by phone as soon as possible.  Submit refill requests through [employer] or call your pharmacy and they will forward the refill request to us. Please allow 3 business days for your refill to be completed.          Additional Information About Your Visit        [employer] Information     [employer] lets you send messages to your doctor, view your test results, renew your prescriptions, schedule appointments and more. To sign up, go to [email or web address] . Click on \"Log in\" on the left side of the screen, which will take you to the Welcome page. Then click on \"Sign up Now\" on the right side of the page.     You will be asked to enter the access code listed below, as well as some personal information. Please follow the directions to create your username and password.     Your access code is: [email or web address]  Expires: 2018  9:52 AM     Your access code will  in 90 days. If you need help or a new code, please call your [facility] clinic or [phone].        Care EveryWhere ID     This is your Care EveryWhere ID. This could be used by other organizations to access your [facility] medical records  [id number]         Blood Pressure from " Last 3 Encounters:   03/13/18 139/87   02/22/18 123/77   02/08/18 (!) 176/102    Weight from Last 3 Encounters:   03/13/18 (!) 136.9 kg (301 lb 12.8 oz)   02/22/18 134.3 kg (296 lb)   02/08/18 134.3 kg (296 lb)              Today, you had the following     No orders found for display         Today's Medication Changes          These changes are accurate as of 3/16/18 11:57 AM.  If you have any questions, ask your nurse or doctor.               These medicines have changed or have updated prescriptions.        Dose/Directions    metFORMIN 500 MG 24 hr tablet   Commonly known as:  GLUCOPHAGE-XR   This may have changed:  when to take this   Used for:  Type 2 diabetes mellitus without complication, without long-term current use of insulin (H)        Dose:  1000 mg   Take 2 tablets (1,000 mg) by mouth 2 times daily (with meals)   Quantity:  360 tablet   Refills:  1            Where to get your medicines      These medications were sent to [facility]/pharmacy #[id number] - [facility] [address] [address]     Phone:  [phone]     metFORMIN 500 MG 24 hr tablet                Primary Care Provider Office Phone # Fax #    Bertha Romero PA-C [phone] [address] [address] [facility] [address]        Equal Access to Services     SIMONE ZAYAS : Edwige troncoso Sobeti, waaxda luqadaha, qaybta kaalmada lisa, victor hguo machado. So [facility] [phone].    ATENCIÓN: Si habla español, tiene a lyons disposición servicios gratuitos de asistencia lingüística. Imani al [phone].    We comply with applicable federal civil rights laws and [place] laws. We do not discriminate on the basis of race, color, national origin, age, disability, sex, sexual orientation, or gender identity.            Thank you!     Thank you for choosing [facility]  for your care. Our goal is always to provide you with excellent care. Hearing  back from our patients is one way we can continue to improve our services. Please take a few minutes to complete the written survey that you may receive in the mail after your visit with us. Thank you!             Your Updated Medication List - Protect others around you: Learn how to safely use, store and throw away your medicines at www.disposemymeds.org.          This list is accurate as of 3/16/18 11:57 AM.  Always use your most recent med list.                   Brand Name Dispense Instructions for use Diagnosis    albuterol 108 (90 BASE) MCG/ACT Inhaler    PROAIR HFA/PROVENTIL HFA/VENTOLIN HFA    1 Inhaler    Inhale 2 puffs into the lungs every 6 hours as needed for shortness of breath / dyspnea or wheezing    Cough       ALEVE PO      Take by mouth as needed        amLODIPine 10 MG tablet    NORVASC    90 tablet    Take 1 tablet (10 mg) by mouth daily    Hypertension goal BP (blood pressure) < 140/90       ASPIRIN PO      Take 325 mg by mouth daily        atorvastatin 20 MG tablet    LIPITOR    90 tablet    Take 1 tablet (20 mg) by mouth daily    Hyperlipidemia LDL goal <160       blood glucose monitoring lancets     102 each    Use to test blood sugar 1 times daily or as directed.  Ok to substitute alternative if insurance prefers.    Type 2 diabetes mellitus without complication, without long-term current use of insulin (H)       blood glucose monitoring test strip    ACCU-CHEK GUIDE    100 strip    Use to test blood sugar 1 times daily or as directed.    Type 2 diabetes mellitus without complication, without long-term current use of insulin (H)       calcium carbonate 500 MG chewable tablet    TUMS     Take 1 chew tab by mouth daily        cyclobenzaprine 10 MG tablet    FLEXERIL    90 tablet    Take 0.5-1 tablets (5-10 mg) by mouth 3 times daily as needed for muscle spasms    Back muscle spasm       dicyclomine 20 MG tablet    BENTYL    40 tablet    Take 1 tablet (20 mg) by mouth 4 times daily as needed     Irritable bowel syndrome with diarrhea, Chronic diarrhea       diphenoxylate-atropine 2.5-0.025 MG per tablet    LOMOTIL    30 tablet    Take 2 tablets by mouth 4 times daily as needed for diarrhea    Chronic diarrhea, Irritable bowel syndrome with diarrhea       EPINEPHrine 0.3 MG/0.3ML injection 2-pack    EPIPEN/ADRENACLICK/or ANY BX GENERIC EQUIV    2 each    Inject 0.3 mLs (0.3 mg) into the muscle once as needed for anaphylaxis    Anaphylactic reaction       fluticasone-salmeterol 250-50 MCG/DOSE diskus inhaler    ADVAIR    3 Inhaler    Inhale 1 puff into the lungs 2 times daily    Cough       ibuprofen 600 MG tablet    ADVIL/MOTRIN    60 tablet    Take 1 tablet (600 mg) by mouth every 6 hours as needed for pain (mild)    S/P carpal tunnel release       lisinopril 20 MG tablet    PRINIVIL/ZESTRIL    90 tablet    TAKE 1 TABLET EVERY DAY    Hypertension goal BP (blood pressure) < 140/90       metFORMIN 500 MG 24 hr tablet    GLUCOPHAGE-XR    360 tablet    Take 2 tablets (1,000 mg) by mouth 2 times daily (with meals)    Type 2 diabetes mellitus without complication, without long-term current use of insulin (H)       multivitamin, therapeutic Tabs tablet      Take 1 tablet by mouth daily        nortriptyline 25 MG capsule    PAMELOR    90 capsule    Take 1 capsule (25 mg) by mouth See Admin Instructions Take two po q hs for 10 days, may increase to three po q hs if sx in feet persist.    Neuropathy       sertraline 100 MG tablet    ZOLOFT    90 tablet    Take 1 tablet (100 mg) by mouth daily    Anxiety       TYLENOL PO      Take 1,000 mg by mouth every 8 hours as needed for mild pain or fever        VSL#3 Pack     30 each    Use 1-2 capsules/day    Chronic diarrhea

## 2018-04-13 ENCOUNTER — NURSE TRIAGE (OUTPATIENT)
Dept: NURSING | Facility: CLINIC | Age: 53
End: 2018-04-13

## 2018-04-14 NOTE — TELEPHONE ENCOUNTER
Reason for Disposition    [1] Thigh or calf pain AND [2] only 1 side AND [3] present > 1 hour    Additional Information    Negative: [1] Red area or streak AND [2] fever    Negative: [1] Swollen joint AND [2] fever    Negative: [1] Cast on leg or ankle AND [2] now increased pain    Negative: Patient sounds very sick or weak to the triager    Negative: Chest pain    Negative: Difficulty breathing    Negative: Entire foot is cool or blue in comparison to other side    Negative: Unable to walk    Protocols used: LEG PAIN-ADULT-AH  High thigh leg pain.  Araceli Babb RN  [facility]

## 2018-04-16 ENCOUNTER — OFFICE VISIT (OUTPATIENT)
Dept: FAMILY MEDICINE | Facility: CLINIC | Age: 53
End: 2018-04-16
Payer: COMMERCIAL

## 2018-04-16 VITALS
RESPIRATION RATE: 18 BRPM | HEIGHT: 71 IN | DIASTOLIC BLOOD PRESSURE: 86 MMHG | OXYGEN SATURATION: 100 % | SYSTOLIC BLOOD PRESSURE: 134 MMHG | WEIGHT: 300 LBS | BODY MASS INDEX: 42 KG/M2 | TEMPERATURE: 98 F | HEART RATE: 84 BPM

## 2018-04-16 DIAGNOSIS — H66.91 RIGHT OTITIS MEDIA, UNSPECIFIED OTITIS MEDIA TYPE: Primary | ICD-10-CM

## 2018-04-16 DIAGNOSIS — R05.9 COUGH: ICD-10-CM

## 2018-04-16 DIAGNOSIS — Z23 NEED FOR PROPHYLACTIC VACCINATION AGAINST STREPTOCOCCUS PNEUMONIAE (PNEUMOCOCCUS): ICD-10-CM

## 2018-04-16 PROCEDURE — 99214 OFFICE O/P EST MOD 30 MIN: CPT | Performed by: PHYSICIAN ASSISTANT

## 2018-04-16 RX ORDER — PREDNISONE 20 MG/1
20 TABLET ORAL DAILY
Qty: 10 TABLET | Refills: 0 | Status: SHIPPED | OUTPATIENT
Start: 2018-04-16 | End: 2018-08-17

## 2018-04-16 NOTE — MR AVS SNAPSHOT
"              After Visit Summary   4/16/2018    Adarsh Salvador    MRN: [id number]           Patient Information     Date Of Birth          1965        Visit Information        Provider Department      4/16/2018 11:40 AM Bertha Romero PA-C [facility]        Today's Diagnoses     Right otitis media, unspecified otitis media type    -  1    Need for prophylactic vaccination against Streptococcus pneumoniae (pneumococcus)        Cough           Follow-ups after your visit        Your next 10 appointments already scheduled     Apr 18, 2018  9:30 AM CDT   Return Visit with Wiliam Montalvo MD   [facility] ([facility]    [id number] [place] [address] [phone]              Who to contact     If you have questions or need follow up information about today's clinic visit or your schedule please contact [facility] directly at [phone].  Normal or non-critical lab and imaging results will be communicated to you by MyChart, letter or phone within 4 business days after the clinic has received the results. If you do not hear from us within 7 days, please contact the clinic through MyChart or phone. If you have a critical or abnormal lab result, we will notify you by phone as soon as possible.  Submit refill requests through [employer] or call your pharmacy and they will forward the refill request to us. Please allow 3 business days for your refill to be completed.          Additional Information About Your Visit        Care EveryWhere ID     This is your Care EveryWhere ID. This could be used by other organizations to access your [facility] medical records  [id number]        Your Vitals Were     Pulse Temperature Respirations Height Pulse Oximetry BMI (Body Mass Index)    84 98  F (36.7  C) (Oral) 18 5' 11\" (1.803 m) 100% 41.84 kg/m2       Blood Pressure from Last 3 Encounters:   04/16/18 134/86   03/13/18 139/87   02/22/18 " 123/77    Weight from Last 3 Encounters:   04/16/18 300 lb (136.1 kg)   03/13/18 (!) 301 lb 12.8 oz (136.9 kg)   02/22/18 296 lb (134.3 kg)              Today, you had the following     No orders found for display         Today's Medication Changes          These changes are accurate as of 4/16/18 12:06 PM.  If you have any questions, ask your nurse or doctor.               Start taking these medicines.        Dose/Directions    amoxicillin-clavulanate 875-125 MG per tablet   Commonly known as:  AUGMENTIN   Used for:  Right otitis media, unspecified otitis media type   Started by:  Bertha Romero PA-C        Dose:  1 tablet   Take 1 tablet by mouth 2 times daily   Quantity:  20 tablet   Refills:  0       predniSONE 20 MG tablet   Commonly known as:  DELTASONE   Used for:  Right otitis media, unspecified otitis media type, Cough   Started by:  Bertha Romero PA-C        Dose:  20 mg   Take 1 tablet (20 mg) by mouth daily   Quantity:  10 tablet   Refills:  0            Where to get your medicines      These medications were sent to [facility]/pharmacy #[id number] - [place], [place] - [address] [address]     Phone:  [phone]     amoxicillin-clavulanate 875-125 MG per tablet    predniSONE 20 MG tablet                Primary Care Provider Office Phone # Fax #    Bertha Romero PA-C [phone] [address] [facility] [address]        Equal Access to Services     CHUCKIE ZAYAS AH: Hadii mo johnson hadasho Soomaali, waaxda luqadaha, qaybta kaalmada adeegyada, waxay yifan machado. So [facility] [phone].    ATENCIÓN: Si habla tuyet, tiene a lyons disposición servicios gratuitos de asistencia lingüística. Imani al [phone].    We comply with applicable federal civil rights laws and [place] laws. We do not discriminate on the basis of race, color, national origin, age, disability, sex, sexual orientation, or gender  identity.            Thank you!     Thank you for choosing [facility]  for your care. Our goal is always to provide you with excellent care. Hearing back from our patients is one way we can continue to improve our services. Please take a few minutes to complete the written survey that you may receive in the mail after your visit with us. Thank you!             Your Updated Medication List - Protect others around you: Learn how to safely use, store and throw away your medicines at www.disposemymeds.org.          This list is accurate as of 4/16/18 12:06 PM.  Always use your most recent med list.                   Brand Name Dispense Instructions for use Diagnosis    albuterol 108 (90 Base) MCG/ACT Inhaler    PROAIR HFA/PROVENTIL HFA/VENTOLIN HFA    1 Inhaler    Inhale 2 puffs into the lungs every 6 hours as needed for shortness of breath / dyspnea or wheezing    Cough       ALEVE PO      Take by mouth as needed        amLODIPine 10 MG tablet    NORVASC    90 tablet    Take 1 tablet (10 mg) by mouth daily    Hypertension goal BP (blood pressure) < 140/90       amoxicillin-clavulanate 875-125 MG per tablet    AUGMENTIN    20 tablet    Take 1 tablet by mouth 2 times daily    Right otitis media, unspecified otitis media type       ASPIRIN PO      Take 325 mg by mouth daily        atorvastatin 20 MG tablet    LIPITOR    90 tablet    Take 1 tablet (20 mg) by mouth daily    Hyperlipidemia LDL goal <160       blood glucose monitoring lancets     102 each    Use to test blood sugar 1 times daily or as directed.  Ok to substitute alternative if insurance prefers.    Type 2 diabetes mellitus without complication, without long-term current use of insulin (H)       blood glucose monitoring test strip    ACCU-CHEK GUIDE    100 strip    Use to test blood sugar 1 times daily or as directed.    Type 2 diabetes mellitus without complication, without long-term current use of insulin (H)       calcium carbonate 500 MG  chewable tablet    TUMS     Take 1 chew tab by mouth daily        cyclobenzaprine 10 MG tablet    FLEXERIL    90 tablet    Take 0.5-1 tablets (5-10 mg) by mouth 3 times daily as needed for muscle spasms    Back muscle spasm       dicyclomine 20 MG tablet    BENTYL    40 tablet    Take 1 tablet (20 mg) by mouth 4 times daily as needed    Irritable bowel syndrome with diarrhea, Chronic diarrhea       diphenoxylate-atropine 2.5-0.025 MG per tablet    LOMOTIL    30 tablet    Take 2 tablets by mouth 4 times daily as needed for diarrhea    Chronic diarrhea, Irritable bowel syndrome with diarrhea       EPINEPHrine 0.3 MG/0.3ML injection 2-pack    EPIPEN/ADRENACLICK/or ANY BX GENERIC EQUIV    2 each    Inject 0.3 mLs (0.3 mg) into the muscle once as needed for anaphylaxis    Anaphylactic reaction       fluticasone-salmeterol 250-50 MCG/DOSE diskus inhaler    ADVAIR    3 Inhaler    Inhale 1 puff into the lungs 2 times daily    Cough       ibuprofen 600 MG tablet    ADVIL/MOTRIN    60 tablet    Take 1 tablet (600 mg) by mouth every 6 hours as needed for pain (mild)    S/P carpal tunnel release       lisinopril 20 MG tablet    PRINIVIL/ZESTRIL    90 tablet    TAKE 1 TABLET EVERY DAY    Hypertension goal BP (blood pressure) < 140/90       metFORMIN 500 MG 24 hr tablet    GLUCOPHAGE-XR    360 tablet    Take 2 tablets (1,000 mg) by mouth 2 times daily (with meals)    Type 2 diabetes mellitus without complication, without long-term current use of insulin (H)       multivitamin, therapeutic Tabs tablet      Take 1 tablet by mouth daily        nortriptyline 25 MG capsule    PAMELOR    90 capsule    Take 1 capsule (25 mg) by mouth See Admin Instructions Take two po q hs for 10 days, may increase to three po q hs if sx in feet persist.    Neuropathy       predniSONE 20 MG tablet    DELTASONE    10 tablet    Take 1 tablet (20 mg) by mouth daily    Right otitis media, unspecified otitis media type, Cough       sertraline 100 MG tablet     ZOLOFT    90 tablet    Take 1 tablet (100 mg) by mouth daily    Anxiety       TYLENOL PO      Take 1,000 mg by mouth every 8 hours as needed for mild pain or fever        VSL#3 Pack     30 each    Use 1-2 capsules/day    Chronic diarrhea

## 2018-04-16 NOTE — NURSING NOTE
"Chief Complaint   Patient presents with     Cough     severe coughing and wheezing per pt x 3 days     Health Maintenance     Eye Exam, Urine drug screen, pneumovax, and microalbumin       Initial BP (!) 154/95  Pulse 84  Temp 98  F (36.7  C) (Oral)  Resp 18  Ht 5' 11\" (1.803 m)  Wt 300 lb (136.1 kg)  SpO2 100%  BMI 41.84 kg/m2 Estimated body mass index is 41.84 kg/(m^2) as calculated from the following:    Height as of this encounter: 5' 11\" (1.803 m).    Weight as of this encounter: 300 lb (136.1 kg).  Medication Reconciliation: complete      Munir Amaral MA    "

## 2018-04-16 NOTE — PROGRESS NOTES
SUBJECTIVE:   Adarsh Salvador is a 52 year old male who presents to clinic today for the following health issues:    Patient has had cough for 2 weeks.  But much worse over the past night.  Mild upper congestion, mostly in the chest.  Feels it mostly in chest.  Throat is sore. Nonproductive cough. Oxygen is 100 percent.  Took albuterol this am, did not make him feel better .  No fevers.  Does have body aches and chills.  No recent antibiotics. Does feel some shortness of breath with his cough. No vomiting or change in bowel habits.  No known exposure.    Has had pneumonia in the past.  Was years ago.     Patient has diabetes.      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     r ear has been hurting.     Problem list and histories reviewed & adjusted, as indicated.  Additional history: as documented    Patient Active Problem List   Diagnosis     GERD (gastroesophageal reflux disease)     Kidney stone     Chronic RLQ pain     Hyperlipidemia LDL goal <160     Constipation     Abdominal pain, right upper quadrant     Adult celiac disease     Chronic maxillary sinusitis     Chronic rhinitis     Pure hyperglyceridemia     Anaphylactic reaction     Benign essential hypertension     Anemia in other chronic diseases classified elsewhere     Fatty liver     Irritable bowel syndrome with diarrhea     Right inguinal hernia     BMI 40.0-44.9, adult (H)     Pain in thoracic spine     Mild persistent asthma without complication     Type 2 diabetes mellitus without complication, without long-term current use of insulin (H)     Past Surgical History:   Procedure Laterality Date     C REMOVAL OF KIDNEY STONE       COLONOSCOPY  5/1/2012    Procedure:COLONOSCOPY; screening colonoscopy; Surgeon:KINGSLEY DOS SANTOS; Location:[facility]     COLONOSCOPY  4/21/2014    Procedure: COMBINED COLONOSCOPY, SINGLE BIOPSY/POLYPECTOMY BY BIOPSY;  Surgeon: Duane, William  MD Josse;  Location: [facility]     HC BREATH HYDROGEN TEST  3/7/2014    Procedure: HYDROGEN BREATH TEST;  Surgeon: Darion Swift MD;  Location: [facility]     ORTHOPEDIC SURGERY      x3 Rt knee      RELEASE CARPAL TUNNEL Right 1/26/2018    Procedure: RELEASE CARPAL TUNNEL;  Right carpal tunnel release;  Surgeon: Wiliam Saenz MD;  Location: [facility]       Social History   Substance Use Topics     Smoking status: Never Smoker     Smokeless tobacco: Current User     Types: Chew      Comment: Chew     Alcohol use 0.0 oz/week     0 Standard drinks or equivalent per week      Comment: occasional     Family History   Problem Relation Age of Onset     CANCER Mother 52     lung, smoked     Cancer - colorectal Father 53     unsure type, pt attributes to radiation exposure     Myocardial Infarction Father 45     Myocardial Infarction Paternal Grandfather 35     DIABETES Other      nephew- type 1     DIABETES Maternal Aunt      1     DIABETES Maternal Aunt      2     DIABETES Paternal Uncle      1     DIABETES Paternal Uncle      2     DIABETES Paternal Uncle      3     DIABETES Paternal Uncle      4         Current Outpatient Prescriptions   Medication Sig Dispense Refill     amoxicillin-clavulanate (AUGMENTIN) 875-125 MG per tablet Take 1 tablet by mouth 2 times daily 20 tablet 0     predniSONE (DELTASONE) 20 MG tablet Take 1 tablet (20 mg) by mouth daily 10 tablet 0     metFORMIN (GLUCOPHAGE-XR) 500 MG 24 hr tablet Take 2 tablets (1,000 mg) by mouth 2 times daily (with meals) 360 tablet 1     nortriptyline (PAMELOR) 25 MG capsule Take 1 capsule (25 mg) by mouth See Admin Instructions Take two po q hs for 10 days, may increase to three po q hs if sx in feet persist. 90 capsule 3     blood glucose monitoring (ACCU-CHEK GUIDE) test strip Use to test blood sugar 1 times daily or as directed. 100 strip 11     blood glucose monitoring (ACCU-CHEK FASTCLIX) lancets Use to test blood sugar 1 times daily or as directed.  Ok to  substitute alternative if insurance prefers. 102 each 11     Naproxen Sodium (ALEVE PO) Take by mouth as needed        ibuprofen (ADVIL/MOTRIN) 600 MG tablet Take 1 tablet (600 mg) by mouth every 6 hours as needed for pain (mild) 60 tablet 1     sertraline (ZOLOFT) 100 MG tablet Take 1 tablet (100 mg) by mouth daily 90 tablet 1     atorvastatin (LIPITOR) 20 MG tablet Take 1 tablet (20 mg) by mouth daily 90 tablet 1     diphenoxylate-atropine (LOMOTIL) 2.5-0.025 MG per tablet Take 2 tablets by mouth 4 times daily as needed for diarrhea 30 tablet 1     dicyclomine (BENTYL) 20 MG tablet Take 1 tablet (20 mg) by mouth 4 times daily as needed 40 tablet 5     cyclobenzaprine (FLEXERIL) 10 MG tablet Take 0.5-1 tablets (5-10 mg) by mouth 3 times daily as needed for muscle spasms 90 tablet 1     lisinopril (PRINIVIL/ZESTRIL) 20 MG tablet TAKE 1 TABLET EVERY DAY 90 tablet 1     fluticasone-salmeterol (ADVAIR) 250-50 MCG/DOSE diskus inhaler Inhale 1 puff into the lungs 2 times daily 3 Inhaler 1     albuterol (PROAIR HFA/PROVENTIL HFA/VENTOLIN HFA) 108 (90 BASE) MCG/ACT Inhaler Inhale 2 puffs into the lungs every 6 hours as needed for shortness of breath / dyspnea or wheezing 1 Inhaler 3     amLODIPine (NORVASC) 10 MG tablet Take 1 tablet (10 mg) by mouth daily 90 tablet 3     ASPIRIN PO Take 325 mg by mouth daily       calcium carbonate (TUMS) 500 MG chewable tablet Take 1 chew tab by mouth daily       Acetaminophen (TYLENOL PO) Take 1,000 mg by mouth every 8 hours as needed for mild pain or fever       multivitamin, therapeutic (THERA-VIT) TABS Take 1 tablet by mouth daily       EPINEPHrine (EPIPEN) 0.3 MG/0.3ML injection Inject 0.3 mLs (0.3 mg) into the muscle once as needed for anaphylaxis 2 each 2     Dietary Management Product (VSL#3) PACK Use 1-2 capsules/day 30 each 3     Allergies   Allergen Reactions     Artificial Sweetner (Informational Only) [Artificial Sweetner (Informational Only) ] GI Disturbance     ALL  "artificial sweetners cause severe diarrhea & flu symptoms     Hydromorphone Anaphylaxis     Ibuprofen GI Disturbance     Morphine [Fumaric Acid] Anaphylaxis     Hydrocodone-Acetaminophen Itching     Tramadol Hives     Trazodone Hives     Gabapentin      GI upset per pt     Codeine Phosphate Itching     Ketorolac Tromethamine Rash       Reviewed and updated as needed this visit by clinical staff  Tobacco  Allergies  Meds  Med Hx  Surg Hx  Fam Hx  Soc Hx      Reviewed and updated as needed this visit by Provider         ROS:  Constitutional, HEENT, cardiovascular, pulmonary, gi and gu systems are negative, except as otherwise noted.    OBJECTIVE:     /86  Pulse 84  Temp 98  F (36.7  C) (Oral)  Resp 18  Ht 5' 11\" (1.803 m)  Wt 300 lb (136.1 kg)  SpO2 100%  BMI 41.84 kg/m2  Body mass index is 41.84 kg/(m^2).  GENERAL:  No acute distress.  Interacts appropriately.  Breathing without difficulty.  Alert.  HEENT: R TM pink and dull. Left TM normal. Both intact.  Oral mucosa moist. Posterior pharynx has erythema.  Posterior pharynx has no exudate. no edema.  NECK:  Soft and supple.  without tenderness.  no lymphadenopathy.  Normal range of motion.    CARDIAC:   Regular rate and rhythm.  No murmurs, rubs, or gallops.   PULMONARY: Clear to auscultation bilaterally.  No  wheezes, crackles, or rhonchi.  Normal air exchange/breath sounds.  No use of accessory muscles.    PSYCH: Normal affect.  SKIN: No rashes.        ASSESSMENT/PLAN:     ASSESSMENT / PLAN:  (H66.91) Right otitis media, unspecified otitis media type  (primary encounter diagnosis)  Comment:   Plan: amoxicillin-clavulanate (AUGMENTIN) 875-125 MG         per tablet, predniSONE (DELTASONE) 20 MG tablet          Take with food. Side effects discussed.  Call with worsening symptoms or if no improvement in 5 days.  Analgesics for pain with food as needed.    (Z23) Need for prophylactic vaccination against Streptococcus pneumoniae " (pneumococcus)  Comment:   Plan:     (R05) Cough  Comment:   Plan: predniSONE (DELTASONE) 20 MG tablet        Just for a few days, will temporarily raise blood sugars  Patient has been on before       '    Bertha Romero PA-C  [facility]

## 2018-04-16 NOTE — LETTER
[facility]  [address] [address]  Phone: [phone]    April 16, 2018        Adarsh Salvador  [address] [address]          To whom it may concern:    RE: Adarsh Salvador    Patient was seen and treated today at our clinic and missed work for acute illness.  They may be excused today/tonight  and return when symptoms improve.       Please contact me for questions or concerns.      Sincerely,        Bertha Romero PA-C

## 2018-06-07 ENCOUNTER — TELEPHONE (OUTPATIENT)
Dept: FAMILY MEDICINE | Facility: CLINIC | Age: 53
End: 2018-06-07

## 2018-06-07 NOTE — TELEPHONE ENCOUNTER
Panel Management Review      Patient has the following on his problem list:     Asthma review     ACT Total Scores 1/18/2018   ACT TOTAL SCORE (Goal Greater than or Equal to 20) 12   In the past 12 months, how many times did you visit the emergency room for your asthma without being admitted to the hospital? 0   In the past 12 months, how many times were you hospitalized overnight because of your asthma? 0      1. Is Asthma diagnosis on the Problem List? Yes    2. Is Asthma listed on Health Maintenance? Yes    3. Patient is due for:  none    Diabetes    ASA: Passed    Last A1C  Lab Results   Component Value Date    A1C 6.5 01/12/2018    A1C 6.2 10/04/2017    A1C 5.6 07/26/2016    A1C 5.9 02/28/2014    A1C 5.6 12/04/2012     A1C tested: MONITOR    Last LDL:    Lab Results   Component Value Date    CHOL 284 01/12/2018     Lab Results   Component Value Date    HDL 37 01/12/2018     Lab Results   Component Value Date     01/12/2018     Lab Results   Component Value Date    TRIG 341 01/12/2018     Lab Results   Component Value Date    CHOLHDLRATIO 6.2 07/10/2013     Lab Results   Component Value Date    NHDL 247 01/12/2018       Is the patient on a Statin? YES             Is the patient on Aspirin? YES    Medications     [facility] CoA Reductase Inhibitors    atorvastatin (LIPITOR) 20 MG tablet    Salicylates    ASPIRIN PO          Last three blood pressure readings:  BP Readings from Last 3 Encounters:   04/16/18 134/86   03/13/18 139/87   02/22/18 123/77       Date of last diabetes office visit: 01/18/2018     Tobacco History:     History   Smoking Status     Never Smoker   Smokeless Tobacco     Current User     Types: Chew     Comment: Chew           Composite cancer screening  Chart review shows that this patient is due/due soon for the following None  Summary:    Patient is due/failing the following:   None    Action needed:   Patient needs office visit for diabetes follow up.    Type of outreach:    Sent  letter.    Questions for provider review:    None                                                                                                                                    Munir Amaral MA     Chart routed to closed.

## 2018-06-07 NOTE — LETTER
Adarsh Salvador  [address] [address]        June 7, 2018        Dear, Adarsh Salvador      Our records indicate that you have not scheduled for a(n)Diabetic check  which was recommended by your health care team. Monitoring and managing your preventative and chronic health conditions are very important to us.       If you have received your health care elsewhere, please provide us with that information so it can be documented in your chart.    Please call [phone] or message us through your [employer] account to schedule an appointment or provide information for your chart.     We look forward to seeing you and working with you on your health care needs.     Sincerely,   LORI Jessica           *If you have already scheduled an appointment, please disregard this reminder

## 2018-06-22 ENCOUNTER — OFFICE VISIT (OUTPATIENT)
Dept: URGENT CARE | Facility: URGENT CARE | Age: 53
End: 2018-06-22
Payer: COMMERCIAL

## 2018-06-22 VITALS
OXYGEN SATURATION: 98 % | SYSTOLIC BLOOD PRESSURE: 135 MMHG | HEART RATE: 91 BPM | BODY MASS INDEX: 41.34 KG/M2 | DIASTOLIC BLOOD PRESSURE: 76 MMHG | WEIGHT: 296.4 LBS | TEMPERATURE: 97.7 F

## 2018-06-22 DIAGNOSIS — R05.9 COUGH: Primary | ICD-10-CM

## 2018-06-22 DIAGNOSIS — J20.9 ACUTE BRONCHITIS, UNSPECIFIED ORGANISM: ICD-10-CM

## 2018-06-22 DIAGNOSIS — J06.9 VIRAL URI: ICD-10-CM

## 2018-06-22 PROCEDURE — 99213 OFFICE O/P EST LOW 20 MIN: CPT | Performed by: PHYSICIAN ASSISTANT

## 2018-06-22 RX ORDER — CODEINE PHOSPHATE AND GUAIFENESIN 10; 100 MG/5ML; MG/5ML
1 SOLUTION ORAL EVERY 4 HOURS PRN
Qty: 120 ML | Refills: 0 | Status: SHIPPED | OUTPATIENT
Start: 2018-06-22 | End: 2018-09-25

## 2018-06-22 RX ORDER — AZITHROMYCIN 250 MG/1
TABLET, FILM COATED ORAL
Qty: 6 TABLET | Refills: 0 | Status: SHIPPED | OUTPATIENT
Start: 2018-06-22 | End: 2018-08-17

## 2018-06-22 NOTE — MR AVS SNAPSHOT
After Visit Summary   6/22/2018    Adarsh Salvador    MRN: [id number]           Patient Information     Date Of Birth          1965        Visit Information        Provider Department      6/22/2018 6:25 PM Misael Cifuentes PA-C [facility]        Today's Diagnoses     Cough    -  1    Viral URI        Acute bronchitis, unspecified organism           Follow-ups after your visit        Who to contact     If you have questions or need follow up information about today's clinic visit or your schedule please contact [facility] directly at [phone].  Normal or non-critical lab and imaging results will be communicated to you by MyChart, letter or phone within 4 business days after the clinic has received the results. If you do not hear from us within 7 days, please contact the clinic through MyChart or phone. If you have a critical or abnormal lab result, we will notify you by phone as soon as possible.  Submit refill requests through [employer] or call your pharmacy and they will forward the refill request to us. Please allow 3 business days for your refill to be completed.          Additional Information About Your Visit        Care EveryWhere ID     This is your Care EveryWhere ID. This could be used by other organizations to access your [place] medical records  [id number]        Your Vitals Were     Pulse Temperature Pulse Oximetry BMI (Body Mass Index)          91 97.7  F (36.5  C) (Tympanic) 98% 41.34 kg/m2         Blood Pressure from Last 3 Encounters:   06/22/18 135/76   04/16/18 134/86   03/13/18 139/87    Weight from Last 3 Encounters:   06/22/18 296 lb 6.4 oz (134.4 kg)   04/16/18 300 lb (136.1 kg)   03/13/18 (!) 301 lb 12.8 oz (136.9 kg)              Today, you had the following     No orders found for display         Today's Medication Changes          These changes are accurate as of 6/22/18  8:03 PM.  If you have any questions, ask your nurse or doctor.                Start taking these medicines.        Dose/Directions    azithromycin 250 MG tablet   Commonly known as:  ZITHROMAX   Used for:  Acute bronchitis, unspecified organism, Viral URI, Cough        Two tablets first day, then one tablet daily for four days.   Quantity:  6 tablet   Refills:  0       guaiFENesin-codeine 100-10 MG/5ML Soln solution   Commonly known as:  ROBITUSSIN AC   Used for:  Acute bronchitis, unspecified organism, Viral URI, Cough        Dose:  1 tsp.   Take 5 mLs by mouth every 4 hours as needed for cough   Quantity:  120 mL   Refills:  0            Where to get your medicines      These medications were sent to [facility] #[id number] - [place], [place] - [address]  [address] [address]     Phone:  [phone]     azithromycin 250 MG tablet         Some of these will need a paper prescription and others can be bought over the counter.  Ask your nurse if you have questions.     Bring a paper prescription for each of these medications     guaiFENesin-codeine 100-10 MG/5ML Soln solution                Primary Care Provider Office Phone # Fax #    Bertha Romero PA-C [phone] [address] [address] [address]        Equal Access to Services     CHUCKIE ZAYAS : Hadii aad ku hadasho Sofeliciaali, waaxda luqadaha, qaybta kaalmada adeegyada, victor hugo machado. So [facility] [phone].    ATENCIÓN: Si habla español, tiene a lyons disposición servicios gratuitos de asistencia lingüística. [facility] [phone].    We comply with applicable federal civil rights laws and [place] laws. We do not discriminate on the basis of race, color, national origin, age, disability, sex, sexual orientation, or gender identity.            Thank you!     Thank you for choosing [facility]  for your care. Our goal is always to provide you with excellent care. Hearing back from our patients is one way we can continue to improve our services.  Please take a few minutes to complete the written survey that you may receive in the mail after your visit with us. Thank you!             Your Updated Medication List - Protect others around you: Learn how to safely use, store and throw away your medicines at www.disposemymeds.org.          This list is accurate as of 6/22/18  8:03 PM.  Always use your most recent med list.                   Brand Name Dispense Instructions for use Diagnosis    albuterol 108 (90 Base) MCG/ACT Inhaler    PROAIR HFA/PROVENTIL HFA/VENTOLIN HFA    1 Inhaler    Inhale 2 puffs into the lungs every 6 hours as needed for shortness of breath / dyspnea or wheezing    Cough       ALEVE PO      Take by mouth as needed        amLODIPine 10 MG tablet    NORVASC    90 tablet    Take 1 tablet (10 mg) by mouth daily    Hypertension goal BP (blood pressure) < 140/90       ASPIRIN PO      Take 325 mg by mouth daily        atorvastatin 20 MG tablet    LIPITOR    90 tablet    Take 1 tablet (20 mg) by mouth daily    Hyperlipidemia LDL goal <160       azithromycin 250 MG tablet    ZITHROMAX    6 tablet    Two tablets first day, then one tablet daily for four days.    Acute bronchitis, unspecified organism, Viral URI, Cough       blood glucose monitoring lancets     102 each    Use to test blood sugar 1 times daily or as directed.  Ok to substitute alternative if insurance prefers.    Type 2 diabetes mellitus without complication, without long-term current use of insulin (H)       blood glucose monitoring test strip    ACCU-CHEK GUIDE    100 strip    Use to test blood sugar 1 times daily or as directed.    Type 2 diabetes mellitus without complication, without long-term current use of insulin (H)       calcium carbonate 500 MG chewable tablet    TUMS     Take 1 chew tab by mouth daily        cyclobenzaprine 10 MG tablet    FLEXERIL    90 tablet    Take 0.5-1 tablets (5-10 mg) by mouth 3 times daily as needed for muscle spasms    Back muscle spasm        dicyclomine 20 MG tablet    BENTYL    40 tablet    Take 1 tablet (20 mg) by mouth 4 times daily as needed    Irritable bowel syndrome with diarrhea, Chronic diarrhea       diphenoxylate-atropine 2.5-0.025 MG per tablet    LOMOTIL    30 tablet    Take 2 tablets by mouth 4 times daily as needed for diarrhea    Chronic diarrhea, Irritable bowel syndrome with diarrhea       EPINEPHrine 0.3 MG/0.3ML injection 2-pack    EPIPEN/ADRENACLICK/or ANY BX GENERIC EQUIV    2 each    Inject 0.3 mLs (0.3 mg) into the muscle once as needed for anaphylaxis    Anaphylactic reaction       fluticasone-salmeterol 250-50 MCG/DOSE diskus inhaler    ADVAIR    3 Inhaler    Inhale 1 puff into the lungs 2 times daily    Cough       guaiFENesin-codeine 100-10 MG/5ML Soln solution    ROBITUSSIN AC    120 mL    Take 5 mLs by mouth every 4 hours as needed for cough    Acute bronchitis, unspecified organism, Viral URI, Cough       ibuprofen 600 MG tablet    ADVIL/MOTRIN    60 tablet    Take 1 tablet (600 mg) by mouth every 6 hours as needed for pain (mild)    S/P carpal tunnel release       lisinopril 20 MG tablet    PRINIVIL/ZESTRIL    90 tablet    TAKE 1 TABLET EVERY DAY    Hypertension goal BP (blood pressure) < 140/90       metFORMIN 500 MG 24 hr tablet    GLUCOPHAGE-XR    360 tablet    Take 2 tablets (1,000 mg) by mouth 2 times daily (with meals)    Type 2 diabetes mellitus without complication, without long-term current use of insulin (H)       multivitamin, therapeutic Tabs tablet      Take 1 tablet by mouth daily        nortriptyline 25 MG capsule    PAMELOR    90 capsule    Take 1 capsule (25 mg) by mouth See Admin Instructions Take two po q hs for 10 days, may increase to three po q hs if sx in feet persist.    Neuropathy       predniSONE 20 MG tablet    DELTASONE    10 tablet    Take 1 tablet (20 mg) by mouth daily    Right otitis media, unspecified otitis media type, Cough       sertraline 100 MG tablet    ZOLOFT    90 tablet    Take 1  tablet (100 mg) by mouth daily    Anxiety       TYLENOL PO      Take 1,000 mg by mouth every 8 hours as needed for mild pain or fever        VSL#3 Pack     30 each    Use 1-2 capsules/day    Chronic diarrhea

## 2018-06-23 NOTE — PROGRESS NOTES
SUBJECTIVE:  Adarsh Salvador, a 53 year old male scheduled an appointment to discuss the following issues:    10 or so days of cough that is non productive, no fevers, no chills, some sinus congestion but no sinus pain. He denies ear symptoms. He gets this a few times a year. Works as a  and admits he is exposed to a lot of dust and metal particulate. Reports that he feels fine otherwise. In the past he notes that he needs antibiotics to clear this. He is a non smoker and hasn't ever smoked.    Is a diabetic.   Lab Results   Component Value Date    A1C 6.5 01/12/2018        Patient Active Problem List   Diagnosis     GERD (gastroesophageal reflux disease)     Kidney stone     Chronic RLQ pain     Hyperlipidemia LDL goal <160     Constipation     Abdominal pain, right upper quadrant     Adult celiac disease     Chronic maxillary sinusitis     Chronic rhinitis     Pure hyperglyceridemia     Anaphylactic reaction     Benign essential hypertension     Anemia in other chronic diseases classified elsewhere     Fatty liver     Irritable bowel syndrome with diarrhea     Right inguinal hernia     BMI 40.0-44.9, adult (H)     Pain in thoracic spine     Mild persistent asthma without complication     Type 2 diabetes mellitus without complication, without long-term current use of insulin (H)      Current Outpatient Prescriptions   Medication     Acetaminophen (TYLENOL PO)     albuterol (PROAIR HFA/PROVENTIL HFA/VENTOLIN HFA) 108 (90 BASE) MCG/ACT Inhaler     amLODIPine (NORVASC) 10 MG tablet     atorvastatin (LIPITOR) 20 MG tablet     azithromycin (ZITHROMAX) 250 MG tablet     blood glucose monitoring (ACCU-CHEK FASTCLIX) lancets     blood glucose monitoring (ACCU-CHEK GUIDE) test strip     calcium carbonate (TUMS) 500 MG chewable tablet     cyclobenzaprine (FLEXERIL) 10 MG tablet     dicyclomine (BENTYL) 20 MG tablet     Dietary Management Product (VSL#3) PACK     diphenoxylate-atropine (LOMOTIL) 2.5-0.025 MG per tablet      EPINEPHrine (EPIPEN) 0.3 MG/0.3ML injection     fluticasone-salmeterol (ADVAIR) 250-50 MCG/DOSE diskus inhaler     guaiFENesin-codeine (ROBITUSSIN AC) 100-10 MG/5ML SOLN solution     ibuprofen (ADVIL/MOTRIN) 600 MG tablet     lisinopril (PRINIVIL/ZESTRIL) 20 MG tablet     metFORMIN (GLUCOPHAGE-XR) 500 MG 24 hr tablet     multivitamin, therapeutic (THERA-VIT) TABS     Naproxen Sodium (ALEVE PO)     nortriptyline (PAMELOR) 25 MG capsule     predniSONE (DELTASONE) 20 MG tablet     sertraline (ZOLOFT) 100 MG tablet     ASPIRIN PO     No current facility-administered medications for this visit.         Medical, social, surgical, and family histories reviewed.    ROS:  Constitutional, neuro, ENT, endocrine, pulmonary, cardiac, gastrointestinal, genitourinary, musculoskeletal, integument and psychiatric systems are negative, except as otherwise noted.    OBJECTIVE:  /76  Pulse 91  Temp 97.7  F (36.5  C) (Tympanic)  Wt 296 lb 6.4 oz (134.4 kg)  SpO2 98%  BMI 41.34 kg/m2  EXAM:  GENERAL APPEARANCE: healthy, alert and no distress  HENT: ear canals and TM's normal and nose and mouth without ulcers or lesions  NECK: no adenopathy, no asymmetry, masses, or scars and thyroid normal to palpation  CV: regular rates and rhythm, normal S1 S2, no S3 or S4 and no murmur, click or rub  LYMPHATICS: no cervical adenopathy  SKIN: no suspicious lesions or rashes    ASSESSMENT/PLAN:  (R05) Cough  (primary encounter diagnosis)  Comment:   Plan: azithromycin (ZITHROMAX) 250 MG tablet,         guaiFENesin-codeine (ROBITUSSIN AC) 100-10         MG/5ML SOLN solution        Lungs clear - bronchitis, nearly 10 days. Probably viral. Recommend cough management, fluids, rest and if the cough syrup helps him get better sleep I expect he will feel better. If symptoms persisting in the next 48-72 hours, he could start the antibiotics. This prescription is given with a discussion of side effects, risks and proper use.  Instructions are given to  follow up if not improving or symptoms change or worsen as discussed.     (J06.9,  B97.89) Viral URI  Comment:   Plan: azithromycin (ZITHROMAX) 250 MG tablet,         guaiFENesin-codeine (ROBITUSSIN AC) 100-10         MG/5ML SOLN solution        As noted     (J20.9) Acute bronchitis, unspecified organism  Comment:   Plan: azithromycin (ZITHROMAX) 250 MG tablet,         guaiFENesin-codeine (ROBITUSSIN AC) 100-10         MG/5ML SOLN solution        As noted.    Misael Cifuentes, MPAS, PA-C

## 2018-08-15 ENCOUNTER — TELEPHONE (OUTPATIENT)
Dept: FAMILY MEDICINE | Facility: CLINIC | Age: 53
End: 2018-08-15

## 2018-08-15 NOTE — TELEPHONE ENCOUNTER
Reason for call:  Other   Patient called regarding (reason for call): appointment  Additional comments: Patient would like to see if Bertha can work him in on Thursday, 08/16. He prefers to see her for an A1C check and discuss issues about a surgical mesh he had a few years ago. Morning around 10/10:30am works best because he works in the afternoon.    Phone number to reach patient:  Home number on file [phone] (home)    Best Time:  Any time    Can we leave a detailed message on this number?  YES    Mary PICKETT  Central Scheduler

## 2018-08-15 NOTE — TELEPHONE ENCOUNTER
Left message on pt vm that Bertha SANDOVAL unable to add on 8/16.  Advised would have available appointment's at end of the next week or pt should keep appointment scheduled with Dr. Traore on 8/17/18.  To provider to cosign.  Mary CROFTN, RN

## 2018-08-16 NOTE — PROGRESS NOTES
SUBJECTIVE:   Adarsh Salvador is a 53 year old male who presents to clinic today for the following health issues:      Diabetes Follow-up    Patient is checking blood sugars: once daily.  Results are as follows:         am - 130    Diabetic concerns: None     Symptoms of hypoglycemia (low blood sugar): shaky, dizzy     Paresthesias (numbness or burning in feet) or sores: Yes      Date of last diabetic eye exam: UTD    The 10-year ASCVD risk score (Татьяна SOTO Jr, et al., 2013) is: 13.7%    Values used to calculate the score:      Age: 53 years      Sex: Male      Is Non- : No      Diabetic: Yes      Tobacco smoker: No      Systolic Blood Pressure: 109 mmHg      Is BP treated: Yes      HDL Cholesterol: 37 mg/dL      Total Cholesterol: 284 mg/dL      Diabetes Management Resources    Hyperlipidemia Follow-Up      Rate your low fat/cholesterol diet?: fair    Taking statin?  Yes, no muscle aches from statin    Other lipid medications/supplements?:  None    Is on lipitor 20mg daily.  Most recent LDL was 170.  And CVD risk is > 7.5% (see above)     Hypertension Follow-up      Outpatient blood pressures are being checked at home.  Results are normal .    Low Salt Diet: not monitoring salt    BP Readings from Last 2 Encounters:   08/17/18 109/68   06/22/18 135/76     Hemoglobin A1C (%)   Date Value   08/17/2018 6.5 (H)   01/12/2018 6.5 (H)     LDL Cholesterol Calculated (mg/dL)   Date Value   01/12/2018 179 (H)   12/15/2015 153 (H)       Amount of exercise or physical activity: None    Problems taking medications regularly: No    Medication side effects: none    Diet: regular (no restrictions)    Depression and Anxiety Follow-Up    Status since last visit: Worsened -PHQ-9 is worse, CONNOR-7 may be a bit better.     Other associated symptoms:None    Complicating factors: job     Significant life event: No     Current substance abuse: None    PHQ-9 7/14/2016 7/12/2017 8/17/2018   Total Score 7 4 14   Q9: Suicide  Ideation Not at all Not at all Not at all     CONNOR-7 SCORE 7/14/2016 7/12/2017 8/17/2018   Total Score 16 21 10     Patient is on Sertraline 100mg/d  Patient says his stressor is his job, but he loves his job as well. He is a .       PHQ-9  English  PHQ-9   Any Language  CONNOR-7  Suicide Assessment Five-step Evaluation and Treatment (SAFE-T)             Reviewed and updated as needed this visit by clinical staff  Tobacco  Allergies  Meds  Problems  Med Hx  Surg Hx  Fam Hx  Soc Hx        Reviewed and updated as needed this visit by Provider  Meds  Problems           Patient Active Problem List   Diagnosis     GERD (gastroesophageal reflux disease)     Kidney stone     Chronic RLQ pain     Hyperlipidemia LDL goal <160     Constipation     Abdominal pain, right upper quadrant     Adult celiac disease     Chronic maxillary sinusitis     Chronic rhinitis     Pure hyperglyceridemia     Anaphylactic reaction     Benign essential hypertension     Anemia in other chronic diseases classified elsewhere     Fatty liver     Irritable bowel syndrome with diarrhea     Right inguinal hernia     BMI 40.0-44.9, adult (H)     Pain in thoracic spine     Mild persistent asthma without complication     Type 2 diabetes mellitus without complication, without long-term current use of insulin (H)     Hyperlipidemia LDL goal <100       Past Medical History:   Diagnosis Date     Adult celiac disease      GERD (gastroesophageal reflux disease) 6/15/2011     Hypercholesterolemia 6/15/2011     Hypertension 6/15/2011     IBS (irritable bowel syndrome)      Kidney stone 6/15/2011    Pt believes these were Calcium stones     Uncomplicated asthma        Past Surgical History:   Procedure Laterality Date     C REMOVAL OF KIDNEY STONE       COLONOSCOPY  5/1/2012    Procedure:COLONOSCOPY; screening colonoscopy; Surgeon:KINGSLEY DOS SANTOS; Location:[facility] OR     COLONOSCOPY  4/21/2014    Procedure: COMBINED COLONOSCOPY, SINGLE BIOPSY/POLYPECTOMY  BY BIOPSY;  Surgeon: Duane, William Charles, MD;  Location: [facility] OR     HC BREATH HYDROGEN TEST  3/7/2014    Procedure: HYDROGEN BREATH TEST;  Surgeon: Darion Swift MD;  Location: [facility]     ORTHOPEDIC SURGERY      x3 Rt knee      RELEASE CARPAL TUNNEL Right 1/26/2018    Procedure: RELEASE CARPAL TUNNEL;  Right carpal tunnel release;  Surgeon: Wiliam Saenz MD;  Location: [facility] OR       Family History   Problem Relation Age of Onset     Cancer Mother 52     lung, smoked     Cancer - colorectal Father 53     unsure type, pt attributes to radiation exposure     Myocardial Infarction Father 45     Myocardial Infarction Paternal Grandfather 35     Diabetes Other      nephew- type 1     Diabetes Maternal Aunt      1     Diabetes Maternal Aunt      2     Diabetes Paternal Uncle      1     Diabetes Paternal Uncle      2     Diabetes Paternal Uncle      3     Diabetes Paternal Uncle      4       Social History   Substance Use Topics     Smoking status: Never Smoker     Smokeless tobacco: Current User     Types: Chew      Comment: Chew     Alcohol use 0.0 oz/week     0 Standard drinks or equivalent per week      Comment: occasional       Current Outpatient Prescriptions   Medication     Acetaminophen (TYLENOL PO)     albuterol (PROAIR HFA/PROVENTIL HFA/VENTOLIN HFA) 108 (90 BASE) MCG/ACT Inhaler     amLODIPine (NORVASC) 10 MG tablet     ASPIRIN PO     atorvastatin (LIPITOR) 40 MG tablet     blood glucose monitoring (ACCU-CHEK FASTCLIX) lancets     blood glucose monitoring (ACCU-CHEK GUIDE) test strip     calcium carbonate (TUMS) 500 MG chewable tablet     cyclobenzaprine (FLEXERIL) 10 MG tablet     dicyclomine (BENTYL) 20 MG tablet     Dietary Management Product (VSL#3) PACK     diphenoxylate-atropine (LOMOTIL) 2.5-0.025 MG per tablet     EPINEPHrine (EPIPEN) 0.3 MG/0.3ML injection     escitalopram (LEXAPRO) 10 MG tablet     fluticasone-salmeterol (ADVAIR) 250-50 MCG/DOSE diskus inhaler     guaiFENesin-codeine  (ROBITUSSIN AC) 100-10 MG/5ML SOLN solution     ibuprofen (ADVIL/MOTRIN) 600 MG tablet     lisinopril (PRINIVIL/ZESTRIL) 20 MG tablet     metFORMIN (GLUCOPHAGE-XR) 500 MG 24 hr tablet     multivitamin, therapeutic (THERA-VIT) TABS     Naproxen Sodium (ALEVE PO)     nortriptyline (PAMELOR) 25 MG capsule     sertraline (ZOLOFT) 50 MG tablet     No current facility-administered medications for this visit.          ROS:  Constitutional, HEENT, cardiovascular, pulmonary, GI, , musculoskeletal, neuro, skin, endocrine and psych systems are negative, except as otherwise noted.     OBJECTIVE:                                                    /68  Pulse 81  Temp 98.5  F (36.9  C) (Oral)  Wt 296 lb (134.3 kg)  SpO2 97%  BMI 41.28 kg/m2     GENERAL APPEARANCE: healthy, alert and in no distress  EYES: Eyes grossly normal to inspection, and conjunctivae and sclerae normal  HENT: head normocephalic and atraumatic and mouth without ulcers or lesions, oropharynx clear and oral mucous membranes moist  NECK: no noticeable adenopathy, no asymmetry, masses, or scars   RESP: lungs clear to auscultation - no rales, rhonchi or wheezes  CV: regular rate and rhythm, normal S1 S2, no S3 or S4, no murmur, click or rub, no peripheral edema and peripheral pulses strong  ABDOMEN: soft, nontender, no hepatosplenomegaly, no masses and bowel sounds normal  MS: no musculoskeletal defects are noted and gait is age appropriate without ataxia  SKIN: no suspicious lesions or rashes  NEURO: mentation intact and speech normal  PSYCH: mentation appears normal and affect normal/bright.    Results for orders placed or performed in visit on 08/17/18   HEMOGLOBIN A1C   Result Value Ref Range    Hemoglobin A1C 6.5 (H) 0 - 5.6 %   Albumin Random Urine Quantitative with Creat Ratio   Result Value Ref Range    Creatinine Urine 192 mg/dL    Albumin Urine mg/L 26 mg/L    Albumin Urine mg/g Cr 13.28 0 - 17 mg/g Cr   Basic metabolic panel   Result Value  Ref Range    Sodium 141 133 - 144 mmol/L    Potassium 4.3 3.4 - 5.3 mmol/L    Chloride 108 94 - 109 mmol/L    Carbon Dioxide 27 20 - 32 mmol/L    Anion Gap 6 3 - 14 mmol/L    Glucose 133 (H) 70 - 99 mg/dL    Urea Nitrogen 15 7 - 30 mg/dL    Creatinine 0.98 0.66 - 1.25 mg/dL    GFR Estimate 80 >60 mL/min/1.7m2    GFR Estimate If Black >90 >60 mL/min/1.7m2    Calcium 8.9 8.5 - 10.1 mg/dL       No results found for this or any previous visit (from the past 744 hour(s)).      ASSESSMENT/PLAN:                                                        ICD-10-CM    1. Type 2 diabetes mellitus without complication, without long-term current use of insulin (H) E11.9 HEMOGLOBIN A1C     Albumin Random Urine Quantitative with Creat Ratio     Basic metabolic panel   2. Mild persistent asthma without complication J45.30    3. BMI 40.0-44.9, adult (H) Z68.41    4. Benign essential hypertension I10    5. Depressed mood F32.9    6. Hyperlipidemia LDL goal <100 E78.5    7. Hyperlipidemia LDL goal <160 E78.5 atorvastatin (LIPITOR) 40 MG tablet   8. Anxiety F41.9 sertraline (ZOLOFT) 50 MG tablet     escitalopram (LEXAPRO) 10 MG tablet   9. Need for prophylactic vaccination against Streptococcus pneumoniae (pneumococcus) Z23 Pneumococcal vaccine 23 valent PPSV23  (Pneumovax) [[id number]]       Patient Instructions   For your diabetes and high cholesterol:  Please increase your Lipitor (atorvastatin) from 20mg to 40mg daily. You cholesterol should then be rechecked with a fasting lab test in 3-6 months.  We will let you know your test results by [facility].     For your asthma:  You have indicated that Advair may be too expensive.   Please call the [place] Prescription Assistance Program at [phone] to ask them if there is a way to get a discount on this medication(s) or if similar medications may be affordable for you.  Please continue to your use Albuterol as needed.  I would advise a return appointment with your PCP in 2.5 months to recheck  your asthma symptoms.     For your mood symptoms:  Please decrease the Zoloft from 100mg daily now to 50 mg by mouth daily For 2 weeks, then take 25mg (half a tab) daily for 2 weeks & stop.  On 8.31.18, also start taking the Lexapro as 10mg daily.  return to clinic in 2.5 months with your PCP to recheck your mood symptoms.  You may continue your current Pamelor.           Keisha Traore MD    [facility]  [address] [facility] [address] [phone]

## 2018-08-17 ENCOUNTER — OFFICE VISIT (OUTPATIENT)
Dept: INTERNAL MEDICINE | Facility: CLINIC | Age: 53
End: 2018-08-17
Payer: COMMERCIAL

## 2018-08-17 VITALS
BODY MASS INDEX: 41.28 KG/M2 | TEMPERATURE: 98.5 F | WEIGHT: 296 LBS | SYSTOLIC BLOOD PRESSURE: 109 MMHG | DIASTOLIC BLOOD PRESSURE: 68 MMHG | OXYGEN SATURATION: 97 % | HEART RATE: 81 BPM

## 2018-08-17 DIAGNOSIS — E78.5 HYPERLIPIDEMIA LDL GOAL <160: ICD-10-CM

## 2018-08-17 DIAGNOSIS — E78.5 HYPERLIPIDEMIA LDL GOAL <100: ICD-10-CM

## 2018-08-17 DIAGNOSIS — F41.9 ANXIETY: ICD-10-CM

## 2018-08-17 DIAGNOSIS — R45.89 DEPRESSED MOOD: ICD-10-CM

## 2018-08-17 DIAGNOSIS — I10 BENIGN ESSENTIAL HYPERTENSION: ICD-10-CM

## 2018-08-17 DIAGNOSIS — Z23 NEED FOR PROPHYLACTIC VACCINATION AGAINST STREPTOCOCCUS PNEUMONIAE (PNEUMOCOCCUS): ICD-10-CM

## 2018-08-17 DIAGNOSIS — J45.30 MILD PERSISTENT ASTHMA WITHOUT COMPLICATION: ICD-10-CM

## 2018-08-17 DIAGNOSIS — E11.9 TYPE 2 DIABETES MELLITUS WITHOUT COMPLICATION, WITHOUT LONG-TERM CURRENT USE OF INSULIN (H): Primary | ICD-10-CM

## 2018-08-17 LAB
ANION GAP SERPL CALCULATED.3IONS-SCNC: 6 MMOL/L (ref 3–14)
BUN SERPL-MCNC: 15 MG/DL (ref 7–30)
CALCIUM SERPL-MCNC: 8.9 MG/DL (ref 8.5–10.1)
CHLORIDE SERPL-SCNC: 108 MMOL/L (ref 94–109)
CO2 SERPL-SCNC: 27 MMOL/L (ref 20–32)
CREAT SERPL-MCNC: 0.98 MG/DL (ref 0.66–1.25)
CREAT UR-MCNC: 192 MG/DL
GFR SERPL CREATININE-BSD FRML MDRD: 80 ML/MIN/1.7M2
GLUCOSE SERPL-MCNC: 133 MG/DL (ref 70–99)
HBA1C MFR BLD: 6.5 % (ref 0–5.6)
MICROALBUMIN UR-MCNC: 26 MG/L
MICROALBUMIN/CREAT UR: 13.28 MG/G CR (ref 0–17)
POTASSIUM SERPL-SCNC: 4.3 MMOL/L (ref 3.4–5.3)
SODIUM SERPL-SCNC: 141 MMOL/L (ref 133–144)

## 2018-08-17 PROCEDURE — 82043 UR ALBUMIN QUANTITATIVE: CPT | Performed by: INTERNAL MEDICINE

## 2018-08-17 PROCEDURE — 83036 HEMOGLOBIN GLYCOSYLATED A1C: CPT | Performed by: INTERNAL MEDICINE

## 2018-08-17 PROCEDURE — 80048 BASIC METABOLIC PNL TOTAL CA: CPT | Performed by: INTERNAL MEDICINE

## 2018-08-17 PROCEDURE — 99214 OFFICE O/P EST MOD 30 MIN: CPT | Performed by: INTERNAL MEDICINE

## 2018-08-17 PROCEDURE — 36415 COLL VENOUS BLD VENIPUNCTURE: CPT | Performed by: INTERNAL MEDICINE

## 2018-08-17 RX ORDER — ATORVASTATIN CALCIUM 40 MG/1
40 TABLET, FILM COATED ORAL DAILY
Qty: 90 TABLET | Refills: 3 | Status: SHIPPED | OUTPATIENT
Start: 2018-08-17 | End: 2019-10-29

## 2018-08-17 RX ORDER — ESCITALOPRAM OXALATE 10 MG/1
10 TABLET ORAL DAILY
Qty: 30 TABLET | Refills: 1 | Status: SHIPPED | OUTPATIENT
Start: 2018-08-17 | End: 2019-06-11

## 2018-08-17 ASSESSMENT — ANXIETY QUESTIONNAIRES
6. BECOMING EASILY ANNOYED OR IRRITABLE: MORE THAN HALF THE DAYS
3. WORRYING TOO MUCH ABOUT DIFFERENT THINGS: SEVERAL DAYS
7. FEELING AFRAID AS IF SOMETHING AWFUL MIGHT HAPPEN: SEVERAL DAYS
4. TROUBLE RELAXING: MORE THAN HALF THE DAYS
2. NOT BEING ABLE TO STOP OR CONTROL WORRYING: MORE THAN HALF THE DAYS
5. BEING SO RESTLESS THAT IT IS HARD TO SIT STILL: SEVERAL DAYS
IF YOU CHECKED OFF ANY PROBLEMS ON THIS QUESTIONNAIRE, HOW DIFFICULT HAVE THESE PROBLEMS MADE IT FOR YOU TO DO YOUR WORK, TAKE CARE OF THINGS AT HOME, OR GET ALONG WITH OTHER PEOPLE: SOMEWHAT DIFFICULT
GAD7 TOTAL SCORE: 10
1. FEELING NERVOUS, ANXIOUS, OR ON EDGE: SEVERAL DAYS

## 2018-08-17 ASSESSMENT — ASTHMA QUESTIONNAIRES
QUESTION_2 LAST FOUR WEEKS HOW OFTEN HAVE YOU HAD SHORTNESS OF BREATH: ONCE A DAY
QUESTION_3 LAST FOUR WEEKS HOW OFTEN DID YOUR ASTHMA SYMPTOMS (WHEEZING, COUGHING, SHORTNESS OF BREATH, CHEST TIGHTNESS OR PAIN) WAKE YOU UP AT NIGHT OR EARLIER THAN USUAL IN THE MORNING: TWO OR THREE NIGHTS A WEEK
QUESTION_4 LAST FOUR WEEKS HOW OFTEN HAVE YOU USED YOUR RESCUE INHALER OR NEBULIZER MEDICATION (SUCH AS ALBUTEROL): ONE OR TWO TIMES PER DAY
ACT_TOTALSCORE: 12
QUESTION_1 LAST FOUR WEEKS HOW MUCH OF THE TIME DID YOUR ASTHMA KEEP YOU FROM GETTING AS MUCH DONE AT WORK, SCHOOL OR AT HOME: SOME OF THE TIME
QUESTION_5 LAST FOUR WEEKS HOW WOULD YOU RATE YOUR ASTHMA CONTROL: SOMEWHAT CONTROLLED

## 2018-08-17 NOTE — NURSING NOTE
"Chief Complaint   Patient presents with     Diabetes     Health Maintenance     ACT, CONNOR, PHQ-9       Initial /68  Pulse 81  Temp 98.5  F (36.9  C) (Oral)  Wt 296 lb (134.3 kg)  SpO2 97%  BMI 41.28 kg/m2 Estimated body mass index is 41.28 kg/(m^2) as calculated from the following:    Height as of 4/16/18: 5' 11\" (1.803 m).    Weight as of this encounter: 296 lb (134.3 kg).  Medication Reconciliation: complete    Martina Camara CMA      "

## 2018-08-17 NOTE — PATIENT INSTRUCTIONS
For your diabetes and high cholesterol:  Please increase your Lipitor (atorvastatin) from 20mg to 40mg daily. You cholesterol should then be rechecked with a fasting lab test in 3-6 months.  [facility]     For your asthma:  You have indicated that Advair may be too expensive.   Please call the [place] Prescription Assistance Program at [phone] to ask them if there is a way to get a discount on this medication(s) or if similar medications may be affordable for you.  Please continue to your use Albuterol as needed.  I would advise a return appointment with your PCP in 2.5 months to recheck your asthma symptoms.     For your mood symptoms:  Please decrease the Zoloft from 100mg daily now to 50 mg by mouth daily For 2 weeks, then take 25mg (half a tab) daily for 2 weeks & stop.  On 8.31.18, also start taking the Lexapro as 10mg daily.  return to clinic in 2.5 months with your PCP to recheck your mood symptoms.  You may continue your current Pamelor.

## 2018-08-17 NOTE — MR AVS SNAPSHOT
After Visit Summary   8/17/2018    Adarsh Salvador    MRN: [id number]           Patient Information     Date Of Birth          1965        Visit Information        Provider Department      8/17/2018 10:30 AM Keisha Traore MD [facility]        Today's Diagnoses     Need for prophylactic vaccination against Streptococcus pneumoniae (pneumococcus)    -  1    Type 2 diabetes mellitus without complication, without long-term current use of insulin (H)        Mild persistent asthma without complication        BMI 40.0-44.9, adult (H)        Benign essential hypertension        Hyperlipidemia LDL goal <100        Hyperlipidemia LDL goal <160        Anxiety        Depressed mood          Care Instructions    For your diabetes and high cholesterol:  Please increase your Lipitor (atorvastatin) from 20mg to 40mg daily. You cholesterol should then be rechecked with a fasting lab test in 3-6 months.  We will let you know your test results by [facility].     For your asthma:  You have indicated that Advair may be too expensive.   Please call the [employer] [facility] at [phone] to ask them if there is a way to get a discount on this medication(s) or if similar medications may be affordable for you.  Please continue to your use Albuterol as needed.  I would advise a return appointment with your PCP in 2.5 months to recheck your asthma symptoms.     For your mood symptoms:  Please decrease the Zoloft from 100mg daily now to 50 mg by mouth daily For 2 weeks, then take 25mg (half a tab) daily for 2 weeks & stop.  On 8.31.18, also start taking the Lexapro as 10mg daily.  return to clinic in 2.5 months with your PCP to recheck your mood symptoms.  You may continue your current Pamelor.               Follow-ups after your visit        Who to contact     If you have questions or need follow up information about today's clinic visit or your schedule please contact [facility] directly at [phone].  Normal or non-critical lab and imaging results will be communicated to you by MyChart, letter or phone within 4 business days after the clinic has received the results. If you do not hear from us within 7 days, please contact the clinic through MyChart or phone. If you have a critical or abnormal lab result, we will notify you by phone as soon as possible.  Submit refill requests through [employer] or call your pharmacy and they will forward the refill request to us. Please allow 3 business days for your refill to be completed.          Additional Information About Your Visit        Care EveryWhere ID     This is your Care EveryWhere ID. This could be used by other organizations to access your [facility] medical records  [id number]        Your Vitals Were     Pulse Temperature Pulse Oximetry BMI (Body Mass Index)          81 98.5  F (36.9  C) (Oral) 97% 41.28 kg/m2         Blood Pressure from Last 3 Encounters:   08/17/18 109/68   06/22/18 135/76   04/16/18 134/86    Weight from Last 3 Encounters:   08/17/18 296 lb (134.3 kg)   06/22/18 296 lb 6.4 oz (134.4 kg)   04/16/18 300 lb (136.1 kg)              We Performed the Following     Albumin Random Urine Quantitative with Creat Ratio     Basic metabolic panel     HEMOGLOBIN A1C     Pneumococcal vaccine 23 valent PPSV23  (Pneumovax) [22914]          Today's Medication Changes          These changes are accurate as of 8/17/18 11:07 AM.  If you have any questions, ask your nurse or doctor.               Start taking these medicines.        Dose/Directions    escitalopram 10 MG tablet   Commonly known as:  LEXAPRO   Used for:  Anxiety   Started by:  Keisha Traore MD        Dose:  10 mg   Take 1 tablet (10 mg) by mouth daily   Quantity:  30 tablet   Refills:  1         These medicines have changed or have updated prescriptions.        Dose/Directions    atorvastatin 40 MG tablet   Commonly known as:  LIPITOR   This  may have changed:    - medication strength  - how much to take   Used for:  Hyperlipidemia LDL goal <160   Changed by:  Keisha Traore MD        Dose:  40 mg   Take 1 tablet (40 mg) by mouth daily   Quantity:  90 tablet   Refills:  3       sertraline 50 MG tablet   Commonly known as:  ZOLOFT   This may have changed:    - medication strength  - how much to take  - additional instructions   Used for:  Anxiety   Changed by:  Keisha Traore MD        Dose:  50 mg   Take 1 tablet (50 mg) by mouth daily For 2 weeks, then take 25mg (half a tab) daily for 2 weeks & stop   Quantity:  21 tablet   Refills:  0            Where to get your medicines      These medications were sent to [facility]/pharmacy #[id number] - [place], [place] - [address]  [address] [address]     Phone:  [phone]     atorvastatin 40 MG tablet    escitalopram 10 MG tablet    sertraline 50 MG tablet                Primary Care Provider Office Phone # Fax #    Bertha Romero PA-C [phone] [address] [address] [address]        Equal Access to Services     [facility] HospitalBELINDA : Hadii mo ku hadasho Soomaali, waaxda luqadaha, qaybta kaalmada adeegyada, victor hugo armendariz . So [facility] [phone].    ATENCIÓN: Si habla español, tiene a lyons disposición servicios gratuitos de asistencia lingüística. [facility] [phone].    We comply with applicable federal civil rights laws and [place] laws. We do not discriminate on the basis of race, color, national origin, age, disability, sex, sexual orientation, or gender identity.            Thank you!     Thank you for choosing [facility]  for your care. Our goal is always to provide you with excellent care. Hearing back from our patients is one way we can continue to improve our services. Please take a few minutes to complete the written survey that you may receive in the mail after your visit with us. Thank  you!             Your Updated Medication List - Protect others around you: Learn how to safely use, store and throw away your medicines at www.disposemymeds.org.          This list is accurate as of 8/17/18 11:07 AM.  Always use your most recent med list.                   Brand Name Dispense Instructions for use Diagnosis    albuterol 108 (90 Base) MCG/ACT inhaler    PROAIR HFA/PROVENTIL HFA/VENTOLIN HFA    1 Inhaler    Inhale 2 puffs into the lungs every 6 hours as needed for shortness of breath / dyspnea or wheezing    Cough       ALEVE PO      Take by mouth as needed        amLODIPine 10 MG tablet    NORVASC    90 tablet    Take 1 tablet (10 mg) by mouth daily    Hypertension goal BP (blood pressure) < 140/90       ASPIRIN PO      Take 325 mg by mouth daily        atorvastatin 40 MG tablet    LIPITOR    90 tablet    Take 1 tablet (40 mg) by mouth daily    Hyperlipidemia LDL goal <160       blood glucose monitoring lancets     102 each    Use to test blood sugar 1 times daily or as directed.  Ok to substitute alternative if insurance prefers.    Type 2 diabetes mellitus without complication, without long-term current use of insulin (H)       blood glucose monitoring test strip    ACCU-CHEK GUIDE    100 strip    Use to test blood sugar 1 times daily or as directed.    Type 2 diabetes mellitus without complication, without long-term current use of insulin (H)       calcium carbonate 500 MG chewable tablet    TUMS     Take 1 chew tab by mouth daily        cyclobenzaprine 10 MG tablet    FLEXERIL    90 tablet    Take 0.5-1 tablets (5-10 mg) by mouth 3 times daily as needed for muscle spasms    Back muscle spasm       dicyclomine 20 MG tablet    BENTYL    40 tablet    Take 1 tablet (20 mg) by mouth 4 times daily as needed    Irritable bowel syndrome with diarrhea, Chronic diarrhea       diphenoxylate-atropine 2.5-0.025 MG per tablet    LOMOTIL    30 tablet    Take 2 tablets by mouth 4 times daily as needed for  diarrhea    Chronic diarrhea, Irritable bowel syndrome with diarrhea       EPINEPHrine 0.3 MG/0.3ML injection 2-pack    EPIPEN/ADRENACLICK/or ANY BX GENERIC EQUIV    2 each    Inject 0.3 mLs (0.3 mg) into the muscle once as needed for anaphylaxis    Anaphylactic reaction       escitalopram 10 MG tablet    LEXAPRO    30 tablet    Take 1 tablet (10 mg) by mouth daily    Anxiety       fluticasone-salmeterol 250-50 MCG/DOSE diskus inhaler    ADVAIR    3 Inhaler    Inhale 1 puff into the lungs 2 times daily    Cough       guaiFENesin-codeine 100-10 MG/5ML Soln solution    ROBITUSSIN AC    120 mL    Take 5 mLs by mouth every 4 hours as needed for cough    Acute bronchitis, unspecified organism, Viral URI, Cough       ibuprofen 600 MG tablet    ADVIL/MOTRIN    60 tablet    Take 1 tablet (600 mg) by mouth every 6 hours as needed for pain (mild)    S/P carpal tunnel release       lisinopril 20 MG tablet    PRINIVIL/ZESTRIL    90 tablet    TAKE 1 TABLET EVERY DAY    Hypertension goal BP (blood pressure) < 140/90       metFORMIN 500 MG 24 hr tablet    GLUCOPHAGE-XR    360 tablet    Take 2 tablets (1,000 mg) by mouth 2 times daily (with meals)    Type 2 diabetes mellitus without complication, without long-term current use of insulin (H)       multivitamin, therapeutic Tabs tablet      Take 1 tablet by mouth daily        nortriptyline 25 MG capsule    PAMELOR    90 capsule    Take 1 capsule (25 mg) by mouth See Admin Instructions Take two po q hs for 10 days, may increase to three po q hs if sx in feet persist.    Neuropathy       sertraline 50 MG tablet    ZOLOFT    21 tablet    Take 1 tablet (50 mg) by mouth daily For 2 weeks, then take 25mg (half a tab) daily for 2 weeks & stop    Anxiety       TYLENOL PO      Take 1,000 mg by mouth every 8 hours as needed for mild pain or fever        VSL#3 Pack     30 each    Use 1-2 capsules/day    Chronic diarrhea

## 2018-08-17 NOTE — LETTER
August 20, 2018    Adarsh Salvador  [address]  [place] [address]          Dear Adarsh,     Your lab results are within normal limits for you.    Please continue the treatment plan that we discussed at your last clinic visit and let me know if you have any questions via [employer] or by calling [phone].      Keisha Traore MD/arely    Results for orders placed or performed in visit on 08/17/18   HEMOGLOBIN A1C   Result Value Ref [place]    Hemoglobin A1C 6.5 (H) 0 - 5.6 %   Albumin Random Urine Quantitative with Creat Ratio   Result Value Ref Range    Creatinine Urine 192 mg/dL    Albumin Urine mg/L 26 mg/L    Albumin Urine mg/g Cr 13.28 0 - 17 mg/g Cr   Basic metabolic panel   Result Value Ref [place]    Sodium 141 133 - 144 mmol/L    Potassium 4.3 3.4 - 5.3 mmol/L    Chloride 108 94 - 109 mmol/L    Carbon Dioxide 27 20 - 32 mmol/L    Anion Gap 6 3 - 14 mmol/L    Glucose 133 (H) 70 - 99 mg/dL    Urea Nitrogen 15 7 - 30 mg/dL    Creatinine 0.98 0.66 - 1.25 mg/dL    GFR Estimate 80 >60 mL/min/1.7m2    GFR Estimate If Black >90 >60 mL/min/1.7m2    Calcium 8.9 8.5 - 10.1 mg/dL

## 2018-08-18 ASSESSMENT — ANXIETY QUESTIONNAIRES: GAD7 TOTAL SCORE: 10

## 2018-08-18 ASSESSMENT — ASTHMA QUESTIONNAIRES: ACT_TOTALSCORE: 12

## 2018-08-18 ASSESSMENT — PATIENT HEALTH QUESTIONNAIRE - PHQ9: SUM OF ALL RESPONSES TO PHQ QUESTIONS 1-9: 14

## 2018-08-18 NOTE — PROGRESS NOTES
Please send letter informing the patient and attach results:    Dear Adarsh,     Your lab results are within normal limits for you.    Please continue the treatment plan that we discussed at your last clinic visit and let me know if you have any questions via [employer] or by calling [phone].      Keisha Traore MD

## 2018-09-17 ENCOUNTER — TRANSFERRED RECORDS (OUTPATIENT)
Dept: HEALTH INFORMATION MANAGEMENT | Facility: CLINIC | Age: 53
End: 2018-09-17

## 2018-09-17 NOTE — PROGRESS NOTES
SUBJECTIVE:                                                    Adarsh Salvador is a 53 year old male who presents to clinic today for the following health issues:    Genitourinary - Male  Onset: x 3-4 days    Description:   Dysuria (painful urination): YES  Hematuria (blood in urine): no   Frequency: YES  Are you urinating at night : YES  Hesitancy (delay in urine): no   Retention (unable to empty): no   Decrease in urinary flow: no   Incontinence: no     Progression of Symptoms:  same    Accompanying Signs & Symptoms:  Fever: no   Back/Flank pain: YES  Urethral discharge: no   Testicle lumps/masses/pain: YES- testicle  Nausea and/or vomiting: YES  Abdominal pain: YES    History:   History of frequent UTI's: YES  History of kidney stones: YES  History of hernias: YES- had 2 in the past  Personal or Family history of Prostate problems: no  Sexually active: no     Precipitating factors:   None    Alleviating factors:  Moving around    Patient seen in emergency room for passed kidney stone and pain. CT showed several non-obstructing stones. His urine was normal.      He has been seen for testicular pain this year in emergency room and had normal US.     No known trigger for this.     Urologist-has not seen in years.     He got pain medications but never filled them yet. He still feels tired from pain medications given in emergency room.     Needs note for work.       Problem list and histories reviewed & adjusted, as indicated.  Additional history: as documented    Patient Active Problem List   Diagnosis     GERD (gastroesophageal reflux disease)     Kidney stone     Chronic RLQ pain     Hyperlipidemia LDL goal <160     Constipation     Abdominal pain, right upper quadrant     Adult celiac disease     Chronic maxillary sinusitis     Chronic rhinitis     Pure hyperglyceridemia     Anaphylactic reaction     Benign essential hypertension     Anemia in other chronic diseases classified elsewhere     Fatty liver     Irritable  bowel syndrome with diarrhea     Right inguinal hernia     BMI 40.0-44.9, adult (H)     Pain in thoracic spine     Mild persistent asthma without complication     Type 2 diabetes mellitus without complication, without long-term current use of insulin (H)     Hyperlipidemia LDL goal <100     Past Surgical History:   Procedure Laterality Date     C REMOVAL OF KIDNEY STONE       COLONOSCOPY  5/1/2012    Procedure:COLONOSCOPY; screening colonoscopy; Surgeon:KINGSLEY DOS SANTOS; Location:[facility] OR     COLONOSCOPY  4/21/2014    Procedure: COMBINED COLONOSCOPY, SINGLE BIOPSY/POLYPECTOMY BY BIOPSY;  Surgeon: Duane, William Charles, MD;  Location: [facility] OR     HC BREATH HYDROGEN TEST  3/7/2014    Procedure: HYDROGEN BREATH TEST;  Surgeon: Darion Swift MD;  Location: [facility]     ORTHOPEDIC SURGERY      x3 Rt knee      RELEASE CARPAL TUNNEL Right 1/26/2018    Procedure: RELEASE CARPAL TUNNEL;  Right carpal tunnel release;  Surgeon: Wiliam Saenz MD;  Location: [facility] OR       Social History   Substance Use Topics     Smoking status: Never Smoker     Smokeless tobacco: Current User     Types: Chew      Comment: Chew     Alcohol use 0.0 oz/week     0 Standard drinks or equivalent per week      Comment: occasional     Family History   Problem Relation Age of Onset     Cancer Mother 52     lung, smoked     Cancer - colorectal Father 53     unsure type, pt attributes to radiation exposure     Myocardial Infarction Father 45     Myocardial Infarction Paternal Grandfather 35     Diabetes Other      nephew- type 1     Diabetes Maternal Aunt      1     Diabetes Maternal Aunt      2     Diabetes Paternal Uncle      1     Diabetes Paternal Uncle      2     Diabetes Paternal Uncle      3     Diabetes Paternal Uncle      4         Current Outpatient Prescriptions   Medication Sig Dispense Refill     Acetaminophen (TYLENOL PO) Take 1,000 mg by mouth every 8 hours as needed for mild pain or fever       albuterol (PROAIR HFA/PROVENTIL  HFA/VENTOLIN HFA) 108 (90 BASE) MCG/ACT Inhaler Inhale 2 puffs into the lungs every 6 hours as needed for shortness of breath / dyspnea or wheezing 1 Inhaler 3     amLODIPine (NORVASC) 10 MG tablet Take 1 tablet (10 mg) by mouth daily 90 tablet 3     ASPIRIN PO Take 325 mg by mouth daily       atorvastatin (LIPITOR) 40 MG tablet Take 1 tablet (40 mg) by mouth daily 90 tablet 3     blood glucose monitoring (ACCU-CHEK FASTCLIX) lancets Use to test blood sugar 1 times daily or as directed.  Ok to substitute alternative if insurance prefers. 102 each 11     blood glucose monitoring (ACCU-CHEK GUIDE) test strip Use to test blood sugar 1 times daily or as directed. 100 strip 11     calcium carbonate (TUMS) 500 MG chewable tablet Take 1 chew tab by mouth daily       cyclobenzaprine (FLEXERIL) 10 MG tablet Take 0.5-1 tablets (5-10 mg) by mouth 3 times daily as needed for muscle spasms 90 tablet 1     dicyclomine (BENTYL) 20 MG tablet Take 1 tablet (20 mg) by mouth 4 times daily as needed 40 tablet 5     Dietary Management Product (VSL#3) PACK Use 1-2 capsules/day 30 each 3     diphenoxylate-atropine (LOMOTIL) 2.5-0.025 MG per tablet Take 2 tablets by mouth 4 times daily as needed for diarrhea 30 tablet 1     EPINEPHrine (EPIPEN) 0.3 MG/0.3ML injection Inject 0.3 mLs (0.3 mg) into the muscle once as needed for anaphylaxis 2 each 2     escitalopram (LEXAPRO) 10 MG tablet Take 1 tablet (10 mg) by mouth daily 30 tablet 1     fluticasone-salmeterol (ADVAIR) 250-50 MCG/DOSE diskus inhaler Inhale 1 puff into the lungs 2 times daily 3 Inhaler 1     guaiFENesin-codeine (ROBITUSSIN AC) 100-10 MG/5ML SOLN solution Take 5 mLs by mouth every 4 hours as needed for cough 120 mL 0     ibuprofen (ADVIL/MOTRIN) 600 MG tablet Take 1 tablet (600 mg) by mouth every 6 hours as needed for pain (mild) 60 tablet 1     lisinopril (PRINIVIL/ZESTRIL) 20 MG tablet TAKE 1 TABLET EVERY DAY 90 tablet 1     metFORMIN (GLUCOPHAGE-XR) 500 MG 24 hr tablet  Take 2 tablets (1,000 mg) by mouth 2 times daily (with meals) 360 tablet 1     multivitamin, therapeutic (THERA-VIT) TABS Take 1 tablet by mouth daily       Naproxen Sodium (ALEVE PO) Take by mouth as needed        nortriptyline (PAMELOR) 25 MG capsule Take 1 capsule (25 mg) by mouth See Admin Instructions Take two po q hs for 10 days, may increase to three po q hs if sx in feet persist. 90 capsule 3     sertraline (ZOLOFT) 50 MG tablet Take 1 tablet (50 mg) by mouth daily For 2 weeks, then take 25mg (half a tab) daily for 2 weeks & stop 21 tablet 0     Allergies   Allergen Reactions     Artificial Sweetner (Informational Only) [Artificial Sweetner (Informational Only) ] GI Disturbance     ALL artificial sweetners cause severe diarrhea & flu symptoms     Hydromorphone Anaphylaxis     Ibuprofen GI Disturbance     Morphine [Fumaric Acid] Anaphylaxis     Hydrocodone-Acetaminophen Itching     Tramadol Hives     Trazodone Hives     Gabapentin      GI upset per pt     Codeine Phosphate Itching     Ketorolac Tromethamine Rash       ROS:  Constitutional, HEENT, cardiovascular, pulmonary, GI, , musculoskeletal, neuro, skin, endocrine and psych systems are negative, except as otherwise noted.    OBJECTIVE:     /74  Pulse 91  Temp 97.2  F (36.2  C) (Oral)  Resp 18  Wt 294 lb (133.4 kg)  SpO2 96%  BMI 41 kg/m2  Body mass index is 41 kg/(m^2).  GENERAL: alert, no distress and obese  HENT: oropharynx clear and oral mucous membranes moist  RESP: lungs clear to auscultation - no rales, rhonchi or wheezes  CV: regular rate and rhythm, normal S1 S2, no S3 or S4, no murmur, click or rub, no peripheral edema and peripheral pulses strong  ABDOMEN: soft, nontender, no hepatosplenomegaly, no masses and bowel sounds normal  MS: no gross musculoskeletal defects noted, no edema    Results for orders placed or performed in visit on 09/18/18 (from the past 24 hour(s))   *UA reflex to Microscopic and Culture ([facility] and [facility]  [facility] (except [place] and [place])   Result Value Ref [place]    Color Urine Yellow     Appearance Urine Clear     Glucose Urine Negative NEG^Negative mg/dL    Bilirubin Urine Negative NEG^Negative    Ketones Urine Negative NEG^Negative mg/dL    Specific Gravity Urine 1.020 1.003 - 1.035    Blood Urine Negative NEG^Negative    pH Urine 6.0 5.0 - 7.0 pH    Protein Albumin Urine Negative NEG^Negative mg/dL    Urobilinogen Urine 0.2 0.2 - 1.0 EU/dL    Nitrite Urine Negative NEG^Negative    Leukocyte Esterase Urine Negative NEG^Negative    Source Midstream Urine          ASSESSMENT/PLAN:     ASSESSMENT / PLAN:  (R35.0) Urinary frequency  (primary encounter diagnosis)  Comment: urine looks normal. Could be prostatitis, epididymitis, or other. patient also has issues with kidney stones which complicates things. Will start on empiric antibiotic and have follow-up with urology.  To emergency room with worsening symptoms or fevers    Plan: *UA reflex to Microscopic and Culture ([place]         and [facility] [place] (except [place] and         [place]), ciprofloxacin (CIPRO) 500 MG tablet,         UROLOGY ADULT REFERRAL            Patient Instructions   Take antibiotic with food      Billin min spent face-to-face with patient. 20 min on history, 4 on exam, 1 on discussing diagnosis and treatment plan.     eBrtha Romero PA-C  [facility]

## 2018-09-18 ENCOUNTER — OFFICE VISIT (OUTPATIENT)
Dept: FAMILY MEDICINE | Facility: CLINIC | Age: 53
End: 2018-09-18
Payer: COMMERCIAL

## 2018-09-18 VITALS
RESPIRATION RATE: 18 BRPM | SYSTOLIC BLOOD PRESSURE: 126 MMHG | HEART RATE: 91 BPM | WEIGHT: 294 LBS | TEMPERATURE: 97.2 F | BODY MASS INDEX: 41 KG/M2 | DIASTOLIC BLOOD PRESSURE: 74 MMHG | OXYGEN SATURATION: 96 %

## 2018-09-18 DIAGNOSIS — R35.0 URINARY FREQUENCY: Primary | ICD-10-CM

## 2018-09-18 LAB
ALBUMIN UR-MCNC: NEGATIVE MG/DL
APPEARANCE UR: CLEAR
BILIRUB UR QL STRIP: NEGATIVE
COLOR UR AUTO: YELLOW
GLUCOSE UR STRIP-MCNC: NEGATIVE MG/DL
HGB UR QL STRIP: NEGATIVE
KETONES UR STRIP-MCNC: NEGATIVE MG/DL
LEUKOCYTE ESTERASE UR QL STRIP: NEGATIVE
NITRATE UR QL: NEGATIVE
PH UR STRIP: 6 PH (ref 5–7)
SOURCE: NORMAL
SP GR UR STRIP: 1.02 (ref 1–1.03)
UROBILINOGEN UR STRIP-ACNC: 0.2 EU/DL (ref 0.2–1)

## 2018-09-18 PROCEDURE — 99214 OFFICE O/P EST MOD 30 MIN: CPT | Performed by: PHYSICIAN ASSISTANT

## 2018-09-18 PROCEDURE — 81003 URINALYSIS AUTO W/O SCOPE: CPT | Performed by: PHYSICIAN ASSISTANT

## 2018-09-18 RX ORDER — CIPROFLOXACIN 500 MG/1
500 TABLET, FILM COATED ORAL 2 TIMES DAILY
Qty: 10 TABLET | Refills: 0 | Status: SHIPPED | OUTPATIENT
Start: 2018-09-18 | End: 2019-01-29

## 2018-09-18 NOTE — LETTER
[facility]  [address] [address]  Phone: [phone]    September 18, 2018        Adarsh Salvador  [address] [address]          To whom it may concern:    RE: Adarsh Salvador    Patient was seen and treated today at our clinic.  Patient can go back tomorrow , he should be excused today.     Please contact me for questions or concerns.      Sincerely,        Bertha Romero PA-C

## 2018-09-18 NOTE — LETTER
My Depression Action Plan  Name: Adarsh Salvador   Date of Birth 1965  Date: 9/17/2018    My doctor: Bertha Romero   My clinic: [address] [address] [phone]          GREEN    ZONE   Good Control    What it looks like:     Things are going generally well. You have normal up s and down s. You may even feel depressed from time to time, but bad moods usually last less than a day.   What you need to do:  1. Continue to care for yourself (see self care plan)  2. Check your depression survival kit and update it as needed  3. Follow your physician s recommendations including any medication.  4. Do not stop taking medication unless you consult with your physician first.           YELLOW         ZONE Getting Worse    What it looks like:     Depression is starting to interfere with your life.     It may be hard to get out of bed; you may be starting to isolate yourself from others.    Symptoms of depression are starting to last most all day and this has happened for several days.     You may have suicidal thoughts but they are not constant.   What you need to do:     1. Call your care team, your response to treatment will improve if you keep your care team informed of your progress. Yellow periods are signs an adjustment may need to be made.     2. Continue your self-care, even if you have to fake it!    3. Talk to someone in your support network    4. Open up your depression survival kit           RED    ZONE Medical Alert - Get Help    What it looks like:     Depression is seriously interfering with your life.     You may experience these or other symptoms: You can t get out of bed most days, can t work or engage in other necessary activities, you have trouble taking care of basic hygiene, or basic responsibilities, thoughts of suicide or death that will not go away, self-injurious behavior.     What you need to do:  1. Call your care team and  request a same-day appointment. If they are not available (weekends or after hours) call your local crisis line, emergency room or 911.            Depression Self Care Plan / Survival Kit    Self-Care for Depression  Here s the deal. Your body and mind are really not as separate as most people think.  What you do and think affects how you feel and how you feel influences what you do and think. This means if you do things that people who feel good do, it will help you feel better.  Sometimes this is all it takes.  There is also a place for medication and therapy depending on how severe your depression is, so be sure to consult with your medical provider and/ or Behavioral Health Consultant if your symptoms are worsening or not improving.     In order to better manage my stress, I will:    Exercise  Get some form of exercise, every day. This will help reduce pain and release endorphins, the  feel good  chemicals in your brain. This is almost as good as taking antidepressants!  This is not the same as joining a gym and then never going! (they count on that by the way ) It can be as simple as just going for a walk or doing some gardening, anything that will get you moving.      Hygiene   Maintain good hygiene (Get out of bed in the morning, Make your bed, Brush your teeth, Take a shower, and Get dressed like you were going to work, even if you are unemployed).  If your clothes don't fit try to get ones that do.    Diet  I will strive to eat foods that are good for me, drink plenty of water, and avoid excessive sugar, caffeine, alcohol, and other mood-altering substances.  Some foods that are helpful in depression are: complex carbohydrates, B vitamins, flaxseed, fish or fish oil, fresh fruits and vegetables.    Psychotherapy  I agree to participate in Individual Therapy (if recommended).    Medication  If prescribed medications, I agree to take them.  Missing doses can result in serious side effects.  I understand that  drinking alcohol, or other illicit drug use, may cause potential side effects.  I will not stop my medication abruptly without first discussing it with my provider.    Staying Connected With Others  I will stay in touch with my friends, family members, and my primary care provider/team.    Use your imagination  Be creative.  We all have a creative side; it doesn t matter if it s oil painting, sand castles, or mud pies! This will also kick up the endorphins.    Witness Beauty  (AKA stop and smell the roses) Take a look outside, even in mid-winter. Notice colors, textures. Watch the squirrels and birds.     Service to others  Be of service to others.  There is always someone else in need.  By helping others we can  get out of ourselves  and remember the really important things.  This also provides opportunities for practicing all the other parts of the program.    Humor  Laugh and be silly!  Adjust your TV habits for less news and crime-drama and more comedy.    Control your stress  Try breathing deep, massage therapy, biofeedback, and meditation. Find time to relax each day.     My support system    Clinic Contact:  Phone number:    Contact 1:  Phone number:    Contact 2:  Phone number:    Gnosticism/:  Phone number:    Therapist:  Phone number:    Local crisis center:    Phone number:    Other community support:  Phone number:

## 2018-09-18 NOTE — MR AVS SNAPSHOT
After Visit Summary   9/18/2018    Adarsh Salvador    MRN: [id number]           Patient Information     Date Of Birth          1965        Visit Information        Provider Department      9/18/2018 10:40 AM Bertha Romero PA-C [facility]        Today's Diagnoses     Urinary frequency    -  1      Care Instructions    Take antibiotic with food            Follow-ups after your visit        Additional Services     UROLOGY ADULT REFERRAL       Your provider has referred you to: [facility] [facility] [phone]   [email or web address] [place] [facility]    Please be aware that coverage of these services is subject to the terms and limitations of your health insurance plan.  Call member services at your health plan with any benefit or coverage questions.      Please bring the following with you to your appointment:    (1) Any X-Rays, CTs or MRIs which have been performed.  Contact the facility where they were done to arrange for  prior to your scheduled appointment.    (2) List of current medications  (3) This referral request   (4) Any documents/labs given to you for this referral                  Your next 10 appointments already scheduled     Oct 09, 2018  8:00 AM CDT   [facility] Wiliam Montalvo [facility]   [facility]    [address] [place] [address] [phone]            Nov 16, 2018  9:00 AM CST   [facility] Visit with Bertha Romero PA-C   [facility]    [address] [facility] [address] [phone]           Bring a current list of meds and any records pertaining to this visit. For Physicals, please bring immunization records and any forms needing to be filled out. Please arrive 10 minutes early to complete paperwork.              Who to contact     If you  have questions or need follow up information about today's clinic visit or your schedule please contact [facility] directly at [phone].  Normal or non-critical lab and imaging results will be communicated to you by MyChart, letter or phone within 4 business days after the clinic has received the results. If you do not hear from us within 7 days, please contact the clinic through MyChart or phone. If you have a critical or abnormal lab result, we will notify you by phone as soon as possible.  Submit refill requests through [employer] or call your pharmacy and they will forward the refill request to us. Please allow 3 business days for your refill to be completed.          Additional Information About Your Visit        Care EveryWhere ID     This is your Care EveryWhere ID. This could be used by other organizations to access your [place] medical records  [id number]        Your Vitals Were     Pulse Temperature Respirations Pulse Oximetry BMI (Body Mass Index)       91 97.2  F (36.2  C) (Oral) 18 96% 41 kg/m2        Blood Pressure from Last 3 Encounters:   09/18/18 126/74   08/17/18 109/68   06/22/18 135/76    Weight from Last 3 Encounters:   09/18/18 294 lb (133.4 kg)   08/17/18 296 lb (134.3 kg)   06/22/18 296 lb 6.4 oz (134.4 kg)              We Performed the Following     *UA reflex to Microscopic and Culture ([facility] (except [place] and [place])     UROLOGY ADULT REFERRAL          Today's Medication Changes          These changes are accurate as of 9/18/18 11:24 AM.  If you have any questions, ask your nurse or doctor.               Start taking these medicines.        Dose/Directions    ciprofloxacin 500 MG tablet   Commonly known as:  CIPRO   Used for:  Urinary frequency   Started by:  Bertha Romero PA-C        Dose:  500 mg   Take 1 tablet (500 mg) by mouth 2 times daily for 5 days With food.   Quantity:  10 tablet   Refills:  0            Where to get your  medicines      These medications were sent to [facility]/pharmacy #[id number] - [place] - [address]  [address], [place] [address]     Phone:  [phone]     ciprofloxacin 500 MG tablet                Primary Care Provider Office Phone # Fax #    Bertha Romero PA-C [phone] [address] [facility] [address]        Equal Access to Services     SIMONE ZAYAS : Hadii aad ku hadasho Soomaali, waaxda luqadaha, qaybta kaalmada adeegyada, waxay idiin hayaan adeeg bernardgregoriochucho laefrem . So [facility] [phone].    ATENCIÓN: Si danala esphuber, tiene a lyons disposición servicios gratuitos de asistencia lingüística. Imani al [phone].    We comply with applicable federal civil rights laws and [place] laws. We do not discriminate on the basis of race, color, national origin, age, disability, sex, sexual orientation, or gender identity.            Thank you!     Thank you for choosing [facility]  for your care. Our goal is always to provide you with excellent care. Hearing back from our patients is one way we can continue to improve our services. Please take a few minutes to complete the written survey that you may receive in the mail after your visit with us. Thank you!             Your Updated Medication List - Protect others around you: Learn how to safely use, store and throw away your medicines at www.disposemymeds.org.          This list is accurate as of 9/18/18 11:24 AM.  Always use your most recent med list.                   Brand Name Dispense Instructions for use Diagnosis    albuterol 108 (90 Base) MCG/ACT inhaler    PROAIR HFA/PROVENTIL HFA/VENTOLIN HFA    1 Inhaler    Inhale 2 puffs into the lungs every 6 hours as needed for shortness of breath / dyspnea or wheezing    Cough       ALEVE PO      Take by mouth as needed        amLODIPine 10 MG tablet    NORVASC    90 tablet    Take 1 tablet (10 mg) by mouth daily    Hypertension goal BP (blood pressure) <  140/90       ASPIRIN PO      Take 325 mg by mouth daily        atorvastatin 40 MG tablet    LIPITOR    90 tablet    Take 1 tablet (40 mg) by mouth daily    Hyperlipidemia LDL goal <160       blood glucose monitoring lancets     102 each    Use to test blood sugar 1 times daily or as directed.  Ok to substitute alternative if insurance prefers.    Type 2 diabetes mellitus without complication, without long-term current use of insulin (H)       blood glucose monitoring test strip    ACCU-CHEK GUIDE    100 strip    Use to test blood sugar 1 times daily or as directed.    Type 2 diabetes mellitus without complication, without long-term current use of insulin (H)       calcium carbonate 500 MG chewable tablet    TUMS     Take 1 chew tab by mouth daily        ciprofloxacin 500 MG tablet    CIPRO    10 tablet    Take 1 tablet (500 mg) by mouth 2 times daily for 5 days With food.    Urinary frequency       cyclobenzaprine 10 MG tablet    FLEXERIL    90 tablet    Take 0.5-1 tablets (5-10 mg) by mouth 3 times daily as needed for muscle spasms    Back muscle spasm       dicyclomine 20 MG tablet    BENTYL    40 tablet    Take 1 tablet (20 mg) by mouth 4 times daily as needed    Irritable bowel syndrome with diarrhea, Chronic diarrhea       diphenoxylate-atropine 2.5-0.025 MG per tablet    LOMOTIL    30 tablet    Take 2 tablets by mouth 4 times daily as needed for diarrhea    Chronic diarrhea, Irritable bowel syndrome with diarrhea       EPINEPHrine 0.3 MG/0.3ML injection 2-pack    EPIPEN/ADRENACLICK/or ANY BX GENERIC EQUIV    2 each    Inject 0.3 mLs (0.3 mg) into the muscle once as needed for anaphylaxis    Anaphylactic reaction       escitalopram 10 MG tablet    LEXAPRO    30 tablet    Take 1 tablet (10 mg) by mouth daily    Anxiety       fluticasone-salmeterol 250-50 MCG/DOSE diskus inhaler    ADVAIR    3 Inhaler    Inhale 1 puff into the lungs 2 times daily    Cough       guaiFENesin-codeine 100-10 MG/5ML Soln solution     ROBITUSSIN AC    120 mL    Take 5 mLs by mouth every 4 hours as needed for cough    Acute bronchitis, unspecified organism, Viral URI, Cough       ibuprofen 600 MG tablet    ADVIL/MOTRIN    60 tablet    Take 1 tablet (600 mg) by mouth every 6 hours as needed for pain (mild)    S/P carpal tunnel release       lisinopril 20 MG tablet    PRINIVIL/ZESTRIL    90 tablet    TAKE 1 TABLET EVERY DAY    Hypertension goal BP (blood pressure) < 140/90       metFORMIN 500 MG 24 hr tablet    GLUCOPHAGE-XR    360 tablet    Take 2 tablets (1,000 mg) by mouth 2 times daily (with meals)    Type 2 diabetes mellitus without complication, without long-term current use of insulin (H)       multivitamin, therapeutic Tabs tablet      Take 1 tablet by mouth daily        nortriptyline 25 MG capsule    PAMELOR    90 capsule    Take 1 capsule (25 mg) by mouth See Admin Instructions Take two po q hs for 10 days, may increase to three po q hs if sx in feet persist.    Neuropathy       sertraline 50 MG tablet    ZOLOFT    21 tablet    Take 1 tablet (50 mg) by mouth daily For 2 weeks, then take 25mg (half a tab) daily for 2 weeks & stop    Anxiety       TYLENOL PO      Take 1,000 mg by mouth every 8 hours as needed for mild pain or fever        VSL#3 Pack     30 each    Use 1-2 capsules/day    Chronic diarrhea

## 2018-09-18 NOTE — NURSING NOTE
"Chief Complaint   Patient presents with     UTI     possible bladder infection per pt x      Health Maintenance     orders pended       Initial BP (!) 148/91  Pulse 91  Temp 97.2  F (36.2  C) (Oral)  Resp 18  Wt 294 lb (133.4 kg)  SpO2 96%  BMI 41 kg/m2 Estimated body mass index is 41 kg/(m^2) as calculated from the following:    Height as of 4/16/18: 5' 11\" (1.803 m).    Weight as of this encounter: 294 lb (133.4 kg).  Medication Reconciliation: complete      Munir Amaral MA    "

## 2018-09-25 ENCOUNTER — OFFICE VISIT (OUTPATIENT)
Dept: FAMILY MEDICINE | Facility: CLINIC | Age: 53
End: 2018-09-25
Payer: COMMERCIAL

## 2018-09-25 VITALS
BODY MASS INDEX: 40.87 KG/M2 | SYSTOLIC BLOOD PRESSURE: 119 MMHG | RESPIRATION RATE: 16 BRPM | DIASTOLIC BLOOD PRESSURE: 77 MMHG | HEART RATE: 75 BPM | OXYGEN SATURATION: 98 % | TEMPERATURE: 97.1 F | WEIGHT: 293 LBS

## 2018-09-25 DIAGNOSIS — M79.672 LEFT FOOT PAIN: Primary | ICD-10-CM

## 2018-09-25 PROCEDURE — 99214 OFFICE O/P EST MOD 30 MIN: CPT | Performed by: INTERNAL MEDICINE

## 2018-09-25 ASSESSMENT — PAIN SCALES - GENERAL: PAINLEVEL: MODERATE PAIN (5)

## 2018-09-25 NOTE — NURSING NOTE
"Chief Complaint   Patient presents with     Musculoskeletal Problem     left foot pain since 9/23/18, \"stepped down off curb wrong\"       Initial BP (!) 119/7  Pulse 75  Temp 97.1  F (36.2  C) (Oral)  Resp 16  Wt 293 lb (132.9 kg)  SpO2 98%  BMI 40.87 kg/m2 Estimated body mass index is 40.87 kg/(m^2) as calculated from the following:    Height as of 4/16/18: 5' 11\" (1.803 m).    Weight as of this encounter: 293 lb (132.9 kg).  Medication Reconciliation: complete  Albertina Valdez MA    "

## 2018-09-25 NOTE — PROGRESS NOTES
SUBJECTIVE:  Adarsh Salvador is an 53 year old male who presents for left foot pain.  Started a couple days ago.  Not sure if he stepped off a curb wrong or twisted it.  Doesn't recall a specific injury or moment it started hurting.  Seems to have been gradual and worsening since onset.  Not sure if it's been swollen or red. Hurts to step down on foot, especially in the heel.  Hurts more after sitting for a while and then getting up.  Hurt today when got out of bed.  Not seem to improve during the day.  Used an ace wrap which didn't help.  Used some biofreeze which didn't help.  No past hx of injury or surgery on this foot.  Has some neuropathy in feet but this pain is much different.  No fevers.  No recent fevers.  Other foot feels fine except for chronic neuropathy.  No other joint pains.         has a past medical history of Adult celiac disease; GERD (gastroesophageal reflux disease) (6/15/2011); Hypercholesterolemia (6/15/2011); Hypertension (6/15/2011); IBS (irritable bowel syndrome); Kidney stone (6/15/2011); and Uncomplicated asthma.   Patient Active Problem List   Diagnosis     GERD (gastroesophageal reflux disease)     Kidney stone     Chronic RLQ pain     Hyperlipidemia LDL goal <160     Constipation     Abdominal pain, right upper quadrant     Adult celiac disease     Chronic maxillary sinusitis     Chronic rhinitis     Pure hyperglyceridemia     Anaphylactic reaction     Benign essential hypertension     Anemia in other chronic diseases classified elsewhere     Fatty liver     Irritable bowel syndrome with diarrhea     Right inguinal hernia     BMI 40.0-44.9, adult (H)     Pain in thoracic spine     Mild persistent asthma without complication     Type 2 diabetes mellitus without complication, without long-term current use of insulin (H)     Hyperlipidemia LDL goal <100     Social History     Social History     Marital status: Single     Spouse name: N/A     Number of children: 0     Years of education: N/A      Occupational History     - with [employer]     Social History Main Topics     Smoking status: Never Smoker     Smokeless tobacco: Current User     Types: Chew      Comment: Chew     Alcohol use 0.0 oz/week     0 Standard drinks or equivalent per week      Comment: occasional     Drug use: No     Sexual activity: No     Other Topics Concern     None     Social History Narrative    Works at [employer], as a  (25+ years experience).       Family History   Problem Relation Age of Onset     Cancer Mother 52     lung, smoked     Cancer - colorectal Father 53     unsure type, pt attributes to radiation exposure     Myocardial Infarction Father 45     Myocardial Infarction Paternal Grandfather 35     Diabetes Other      nephew- type 1     Diabetes Maternal Aunt      1     Diabetes Maternal Aunt      2     Diabetes Paternal Uncle      1     Diabetes Paternal Uncle      2     Diabetes Paternal Uncle      3     Diabetes Paternal Uncle      4       ALLERGIES:  Artificial sweetner (informational only) [artificial sweetner (informational only) ]; Hydromorphone; Ibuprofen; Morphine [fumaric acid]; Hydrocodone-acetaminophen; Tramadol; Trazodone; Gabapentin; Codeine phosphate; and Ketorolac tromethamine    Current Outpatient Prescriptions   Medication     Acetaminophen (TYLENOL PO)     albuterol (PROAIR HFA/PROVENTIL HFA/VENTOLIN HFA) 108 (90 BASE) MCG/ACT Inhaler     amLODIPine (NORVASC) 10 MG tablet     ASPIRIN PO     atorvastatin (LIPITOR) 40 MG tablet     blood glucose monitoring (ACCU-CHEK FASTCLIX) lancets     blood glucose monitoring (ACCU-CHEK GUIDE) test strip     calcium carbonate (TUMS) 500 MG chewable tablet     cyclobenzaprine (FLEXERIL) 10 MG tablet     dicyclomine (BENTYL) 20 MG tablet     Dietary Management Product (VSL#3) PACK     diphenoxylate-atropine (LOMOTIL) 2.5-0.025 MG per tablet     EPINEPHrine (EPIPEN) 0.3 MG/0.3ML injection     escitalopram (LEXAPRO) 10 MG tablet      fluticasone-salmeterol (ADVAIR) 250-50 MCG/DOSE diskus inhaler     lisinopril (PRINIVIL/ZESTRIL) 20 MG tablet     metFORMIN (GLUCOPHAGE-XR) 500 MG 24 hr tablet     multivitamin, therapeutic (THERA-VIT) TABS     Naproxen Sodium (ALEVE PO)     nortriptyline (PAMELOR) 25 MG capsule     sertraline (ZOLOFT) 50 MG tablet     No current facility-administered medications for this visit.          ROS:  ROS is done and is negative for general/constitutional, eye, ENT, Respiratory, cardiovascular, GI, , Skin, musculoskeletal except as noted elsewhere.  All other review of systems negative except as noted elsewhere.      OBJECTIVE:  /77  Pulse 75  Temp 97.1  F (36.2  C) (Oral)  Resp 16  Wt 293 lb (132.9 kg)  SpO2 98%  BMI 40.87 kg/m2  GENERAL APPEARANCE: Alert, in no acute distress  EYES: normal  NOSE:normal  NECK:No adenopathy,masses or thyromegaly  RESP: normal and clear to auscultation  CV:regular rate and rhythm and no murmurs, clicks, or gallops  ABDOMEN: Abdomen soft, non-tender. BS normal. No masses, organomegaly  SKIN: no ulcers, lesions or rash  MUSCULOSKELETAL:tenderness with palpation of the plantar surface of heel, more medially than laterally.  Palpation triggers the pain he has been having.  Pain radiates toward front of foot and toward medial ankle, but ankle is not tender to palpation.  No pain with rom testing of foot or ankle.        RESULTS  .  No results found for this or any previous visit (from the past 48 hour(s)).    ASSESSMENT/PLAN:    ASSESSMENT / PLAN:  (M51.574) Left foot pain  (primary encounter diagnosis)  Comment: sxs and exam c/w plantar fascitis. He has h/o diabetes, obesity, and of neuropathy of the feet, which likely contribute to his foot pain as well, but his current sxs are different than his chronic ones and are c/w plantar fascitis.  Plan: ORTHO  REFERRAL        I reviewed supportive care including stretching, icing, supportive shoes with good inserts for cushion,  and nsaids as tolerated, expected course, and signs of concern.  Follow up with podiatry if he does not improve within 7 day(s) or if worsens in any way.  Reviewed red flag symptoms and is to go to the ER if experiences any of these.  He is to continue his routine f/us for DM and neuropathy.      See [facility] for orders, medications, letters, patient instructions    Bertha Otero M.D.

## 2018-09-25 NOTE — MR AVS SNAPSHOT
After Visit Summary   9/25/2018    Adarsh Salvador    MRN: [id number]           Patient Information     Date Of Birth          1965        Visit Information        Provider Department      9/25/2018 8:00 AM Bertha Otero MD [facility]        Today's Diagnoses     Left foot pain    -  1      Care Instructions      Plantar Fasciitis  Plantar fasciitis is a painful swelling of the plantar fascia. The plantar fascia is a thick, fibrous layer of tissue. It covers the bones on the bottom of your foot. And it supports the foot bones in an arched position.  This can happen gradually or suddenly. It usually affects one foot at a time. Heel pain can be sharp, like a knife sticking into the bottom of your foot. You may feel pain after exercising, long-distance jogging, stair climbing, long periods of standing, or after standing up.  Risk factors include: non-active lifestyle, arthritis, diabetes, obesity or recent weight gain, flat foot, high arch. Wearing high heels, loose shoes, or shoes with poor arch support for long periods of time adds to the risk. This problem is commonly found in runners and dancers. It also found in people who stand on hard surfaces for long periods of time.  Foot pain from this condition is usually worse in the morning. But it improves with walking. By the end of the day there may be a dull aching. Treatment requires short-term rest and controlling swelling. It may take up to 9 months before all symptoms go away. Rarely, a steroid injection into the foot, or surgery, may be needed.  Home care    If you are overweight, lose weight to help healing.    Choose supportive shoes with good arch support and shock absorbency. Replace athletic shoes when they become worn out. Don t walk or run barefoot.    Premade or custom-fitted shoe inserts may be helpful. Inserts made of silicone seem to be the most effective. Custom-made inserts can be provided by a podiatrist or foot  specialist, physical therapist, or orthopedist.    Premade or custom-made night splints keep the heel stretched out while you sleep. They may prevent morning pain.    Avoid activities that stress the feet: jogging, prolonged standing or walking, contact sports, etc.    First thing in the morning and before sports, stretch the bottom of your feet. Gently flex your ankle so the toes move toward your knee.    Icing may help control heel pain. Apply an ice pack to the heel for 10-20 minutes as a preventive. Or ice your heel after a severe flare-up of symptoms. You may repeat this every 1-2 hours as needed.    You may use over-the-counter pain medicine to control pain, unless another medicine was prescribed. Anti-inflammatory pain medicines, such as ibuprofen or naproxen, may work better than acetaminophen. If you have chronic liver or kidney disease or ever had a stomach ulcer or GI bleeding, talk with your healthcare provider before using these medicines.  Follow-up care  Follow up with your healthcare provider, physical therapist, or podiatrist or foot specialist as advised.  Call for an appointment if pain worsens or there is no relief after a few weeks of home treatment. Shoe inserts, a night splint, or a special boot may be required.  If X-rays were taken, you will be told of any new findings that may affect your care.  When to seek medical advice  Call your healthcare provider right away if any of these occur:    Foot swelling    Redness with increasing pain  Date Last Reviewed: 11/21/2015 2000-2017 [employer]. [address] [place] [address]. All rights reserved. This information is not intended as a substitute for professional medical care. Always follow your healthcare professional's instructions.                Follow-ups after your visit        Additional Services     ORTHO  REFERRAL       [facility] is referring you to the Orthopedic  Services at  [facility].       The  Representative will assist you in the coordination of your Orthopedic and Musculoskeletal Care as prescribed by your physician.    The  Representative will call you within 1 business day to help schedule your appointment, or you may contact the  Representative at:    All areas ~ [phone]     Type of Referral : Podiatry / Foot & Ankle Surgery       Timeframe requested: 3 - 5 days    Coverage of these services is subject to the terms and limitations of your health insurance plan.  Please call member services at your health plan with any benefit or coverage questions.      If X-rays, CT or MRI's have been performed, please contact the facility where they were done to arrange for , prior to your scheduled appointment.  Please bring this referral request to your appointment and present it to your specialist.                  Your next 10 appointments already scheduled     Oct 09, 2018  8:00 AM CDT   Return Visit with Wiliam Montalvo MD   [facility] ([facility]    [address] [place] [id number] [phone]            Nov 16, 2018  9:00 AM CST   Office Visit with Bertha Romero PA-C   [facility] ([facility])    [address] [facility] [address] [phone]           Bring a current list of meds and any records pertaining to this visit. For Physicals, please bring immunization records and any forms needing to be filled out. Please arrive 10 minutes early to complete paperwork.              Who to contact     If you have questions or need follow up information about today's clinic visit or your schedule please contact [facility] directly at [phone].  Normal or non-critical lab and imaging results will be communicated to you by MyChart, letter or phone within 4 business days after the clinic has received the  results. If you do not hear from us within 7 days, please contact the clinic through [employer] or phone. If you have a critical or abnormal lab result, we will notify you by phone as soon as possible.  Submit refill requests through [employer] or call your pharmacy and they will forward the refill request to us. Please allow 3 business days for your refill to be completed.          Additional Information About Your Visit        Care EveryWhere ID     This is your Care EveryWhere ID. This could be used by other organizations to access your [facility] medical records  [id number]        Your Vitals Were     Pulse Temperature Respirations Pulse Oximetry BMI (Body Mass Index)       75 97.1  F (36.2  C) (Oral) 16 98% 40.87 kg/m2        Blood Pressure from Last 3 Encounters:   09/25/18 (!) 119/7   09/18/18 126/74   08/17/18 109/68    Weight from Last 3 Encounters:   09/25/18 293 lb (132.9 kg)   09/18/18 294 lb (133.4 kg)   08/17/18 296 lb (134.3 kg)              We Performed the Following     ORTHO  REFERRAL        Primary Care Provider Office Phone # Fax #    Bertha Romero PA-C [phone] [address] [facility] [address]        Equal Access to Services     SIMONE ZAYAS : Hadii mo ku hadasho Soomaali, waaxda luqadaha, qaybta kaalmada adeegyada, waxdylan armendariz . So [facility] [phone].    ATENCIÓN: Si habla español, tiene a lyons disposición servicios gratuitos de asistencia lingüística. Llame al [phone].    We comply with applicable federal civil rights laws and [place] laws. We do not discriminate on the basis of race, color, national origin, age, disability, sex, sexual orientation, or gender identity.            Thank you!     Thank you for choosing [facility]  for your care. Our goal is always to provide you with excellent care. Hearing back from our patients is one way we can continue to improve our services. Please take a few minutes  to complete the written survey that you may receive in the mail after your visit with us. Thank you!             Your Updated Medication List - Protect others around you: Learn how to safely use, store and throw away your medicines at www.disposemymeds.org.          This list is accurate as of 9/25/18  8:31 AM.  Always use your most recent med list.                   Brand Name Dispense Instructions for use Diagnosis    albuterol 108 (90 Base) MCG/ACT inhaler    PROAIR HFA/PROVENTIL HFA/VENTOLIN HFA    1 Inhaler    Inhale 2 puffs into the lungs every 6 hours as needed for shortness of breath / dyspnea or wheezing    Cough       ALEVE PO      Take by mouth as needed        amLODIPine 10 MG tablet    NORVASC    90 tablet    Take 1 tablet (10 mg) by mouth daily    Hypertension goal BP (blood pressure) < 140/90       ASPIRIN PO      Take 325 mg by mouth daily        atorvastatin 40 MG tablet    LIPITOR    90 tablet    Take 1 tablet (40 mg) by mouth daily    Hyperlipidemia LDL goal <160       blood glucose monitoring lancets     102 each    Use to test blood sugar 1 times daily or as directed.  Ok to substitute alternative if insurance prefers.    Type 2 diabetes mellitus without complication, without long-term current use of insulin (H)       blood glucose monitoring test strip    ACCU-CHEK GUIDE    100 strip    Use to test blood sugar 1 times daily or as directed.    Type 2 diabetes mellitus without complication, without long-term current use of insulin (H)       calcium carbonate 500 MG chewable tablet    TUMS     Take 1 chew tab by mouth daily        cyclobenzaprine 10 MG tablet    FLEXERIL    90 tablet    Take 0.5-1 tablets (5-10 mg) by mouth 3 times daily as needed for muscle spasms    Back muscle spasm       dicyclomine 20 MG tablet    BENTYL    40 tablet    Take 1 tablet (20 mg) by mouth 4 times daily as needed    Irritable bowel syndrome with diarrhea, Chronic diarrhea       diphenoxylate-atropine 2.5-0.025 MG  per tablet    LOMOTIL    30 tablet    Take 2 tablets by mouth 4 times daily as needed for diarrhea    Chronic diarrhea, Irritable bowel syndrome with diarrhea       EPINEPHrine 0.3 MG/0.3ML injection 2-pack    EPIPEN/ADRENACLICK/or ANY BX GENERIC EQUIV    2 each    Inject 0.3 mLs (0.3 mg) into the muscle once as needed for anaphylaxis    Anaphylactic reaction       escitalopram 10 MG tablet    LEXAPRO    30 tablet    Take 1 tablet (10 mg) by mouth daily    Anxiety       fluticasone-salmeterol 250-50 MCG/DOSE diskus inhaler    ADVAIR    3 Inhaler    Inhale 1 puff into the lungs 2 times daily    Cough       lisinopril 20 MG tablet    PRINIVIL/ZESTRIL    90 tablet    TAKE 1 TABLET EVERY DAY    Hypertension goal BP (blood pressure) < 140/90       metFORMIN 500 MG 24 hr tablet    GLUCOPHAGE-XR    360 tablet    Take 2 tablets (1,000 mg) by mouth 2 times daily (with meals)    Type 2 diabetes mellitus without complication, without long-term current use of insulin (H)       multivitamin, therapeutic Tabs tablet      Take 1 tablet by mouth daily        nortriptyline 25 MG capsule    PAMELOR    90 capsule    Take 1 capsule (25 mg) by mouth See Admin Instructions Take two po q hs for 10 days, may increase to three po q hs if sx in feet persist.    Neuropathy       sertraline 50 MG tablet    ZOLOFT    21 tablet    Take 1 tablet (50 mg) by mouth daily For 2 weeks, then take 25mg (half a tab) daily for 2 weeks & stop    Anxiety       TYLENOL PO      Take 1,000 mg by mouth every 8 hours as needed for mild pain or fever        VSL#3 Pack     30 each    Use 1-2 capsules/day    Chronic diarrhea

## 2018-09-25 NOTE — PATIENT INSTRUCTIONS
Plantar Fasciitis  Plantar fasciitis is a painful swelling of the plantar fascia. The plantar fascia is a thick, fibrous layer of tissue. It covers the bones on the bottom of your foot. And it supports the foot bones in an arched position.  This can happen gradually or suddenly. It usually affects one foot at a time. Heel pain can be sharp, like a knife sticking into the bottom of your foot. You may feel pain after exercising, long-distance jogging, stair climbing, long periods of standing, or after standing up.  Risk factors include: non-active lifestyle, arthritis, diabetes, obesity or recent weight gain, flat foot, high arch. Wearing high heels, loose shoes, or shoes with poor arch support for long periods of time adds to the risk. This problem is commonly found in runners and dancers. It also found in people who stand on hard surfaces for long periods of time.  Foot pain from this condition is usually worse in the morning. But it improves with walking. By the end of the day there may be a dull aching. Treatment requires short-term rest and controlling swelling. It may take up to 9 months before all symptoms go away. Rarely, a steroid injection into the foot, or surgery, may be needed.  Home care    If you are overweight, lose weight to help healing.    Choose supportive shoes with good arch support and shock absorbency. Replace athletic shoes when they become worn out. Don t walk or run barefoot.    Premade or custom-fitted shoe inserts may be helpful. Inserts made of silicone seem to be the most effective. Custom-made inserts can be provided by a podiatrist or foot specialist, physical therapist, or orthopedist.    Premade or custom-made night splints keep the heel stretched out while you sleep. They may prevent morning pain.    Avoid activities that stress the feet: jogging, prolonged standing or walking, contact sports, etc.    First thing in the morning and before sports, stretch the bottom of your feet.  Gently flex your ankle so the toes move toward your knee.    Icing may help control heel pain. Apply an ice pack to the heel for 10-20 minutes as a preventive. Or ice your heel after a severe flare-up of symptoms. You may repeat this every 1-2 hours as needed.    You may use over-the-counter pain medicine to control pain, unless another medicine was prescribed. Anti-inflammatory pain medicines, such as ibuprofen or naproxen, may work better than acetaminophen. If you have chronic liver or kidney disease or ever had a stomach ulcer or GI bleeding, talk with your healthcare provider before using these medicines.  Follow-up care  Follow up with your healthcare provider, physical therapist, or podiatrist or foot specialist as advised.  Call for an appointment if pain worsens or there is no relief after a few weeks of home treatment. Shoe inserts, a night splint, or a special boot may be required.  If X-rays were taken, you will be told of any new findings that may affect your care.  When to seek medical advice  Call your healthcare provider right away if any of these occur:    Foot swelling    Redness with increasing pain  Date Last Reviewed: 11/21/2015 2000-2017 [employer]. [address] [place] [address], [place] [address]. All rights reserved. This information is not intended as a substitute for professional medical care. Always follow your healthcare professional's instructions.

## 2018-10-09 ENCOUNTER — OFFICE VISIT (OUTPATIENT)
Dept: NEUROLOGY | Facility: CLINIC | Age: 53
End: 2018-10-09
Payer: COMMERCIAL

## 2018-10-09 VITALS
HEART RATE: 78 BPM | BODY MASS INDEX: 41.93 KG/M2 | WEIGHT: 300.6 LBS | SYSTOLIC BLOOD PRESSURE: 127 MMHG | OXYGEN SATURATION: 99 % | DIASTOLIC BLOOD PRESSURE: 84 MMHG

## 2018-10-09 DIAGNOSIS — M79.604 BILATERAL LEG PAIN: Primary | ICD-10-CM

## 2018-10-09 DIAGNOSIS — M79.605 BILATERAL LEG PAIN: Primary | ICD-10-CM

## 2018-10-09 DIAGNOSIS — E11.42 DIABETIC POLYNEUROPATHY ASSOCIATED WITH TYPE 2 DIABETES MELLITUS (H): ICD-10-CM

## 2018-10-09 PROCEDURE — 99214 OFFICE O/P EST MOD 30 MIN: CPT | Performed by: PSYCHIATRY & NEUROLOGY

## 2018-10-09 RX ORDER — DULOXETIN HYDROCHLORIDE 20 MG/1
20 CAPSULE, DELAYED RELEASE ORAL SEE ADMIN INSTRUCTIONS
Qty: 90 CAPSULE | Refills: 1 | Status: SHIPPED | OUTPATIENT
Start: 2018-10-09 | End: 2018-11-12

## 2018-10-09 ASSESSMENT — PAIN SCALES - GENERAL: PAINLEVEL: MODERATE PAIN (5)

## 2018-10-09 NOTE — LETTER
10/9/2018         RE: Adarsh Salvador  [address] [facility] [address]        Dear Colleague,    Thank you for referring your patient, Adarsh Salvador, to the [facility]. Please see a copy of my visit note below.    Visit Date:   10/09/2018      NEUROLOGY FOLLOWUP       PRIMARY CARE PHYSICIAN:  Bertha Romero PA-C       HISTORY OF PRESENT ILLNESS:  This patient is seen in followup with painful feet and also left leg pain.  I last saw him 7 months ago.  He has diagnoses of sensory motor neuropathy involving the feet, likely due to diabetes and also meralgia paresthetica versus L4 radiculopathy.  He reports that things are not going that well.  At the time of his last visit, I started him on nortriptyline building up to 75 mg at night.  He says it does help him to sleep, but does not really help the pain and does not do much for his feet.  By the end of the evening, especially if he has been on his feet a lot, it is very painful for him to walk.  He has this focal pain in the left heel as well.  The pain is in the bottoms of his feet.  He describes it as a combination of pins-and-needles as well as a sharp stinging pain.  It is not a burning pain.  He also has pain in the left upper thigh on the lateral aspect.  The right side is also becoming painful in the same territory.        He did have a CT scan of the lumbar spine performed in 2017.  It did show some evidence for disk herniation at L4-L5 with possible compromise of the left L4 nerve root.  Either that is the cause of the left thigh pain or it is meralgia paresthetica.  Now that the symptom is present on the right, I think it is likely meralgia paraesthetica.  He does have issues with overweight and has been struggling with his weight.  He does have a nerve stimulator in place for his bowel control and cannot have an MRI.      PHYSICAL EXAMINATION:   GENERAL:  On examination, the patient is cooperative and in no distress.   VITAL SIGNS:  His blood  pressure is 127/84.   NEUROLOGIC:  He has reduced muscle bulk in the feet.  He has excellent strength in the feet and toes.  Muscle stretch reflexes are trace at the knees and absent at the ankles.  Toes are downgoing.  He has preserved vibration in the toes with reduced temperature sense in the lateral thighs bilaterally and also on the top of the left foot.  Temperature and pinprick sensation are normal on the right foot.  Pinprick sensation is somewhat reduced on the top of the left foot.  He can walk on his toes, but cannot walk on his left heel because of discomfort.  He can perform a squat without much difficulty.        ASSESSMENT:    1.  Sensorimotor neuropathy involving the feet.   2.  Probable meralgia paresthetica, left greater than right.   3.  Left foot pain.      DISCUSSION:  The patient is seen with the above problem list.  His presentation looks very similar to when I last saw him in March.  I suspect that proximal leg pain is meralgia paraesthetica.  I am going to repeat the CT scan of the lumbar spine in this regard.      With regard to the symptoms in his feet, it does keep him up at night.  It sounds like diabetic neuropathy.  Nortriptyline has only been partially successful.  I am going to discontinue it and start him on duloxetine.  Side effects were reviewed from a medical resource.  [facility], especially the left foot.  I will see him in followup in 6 weeks.      This was a 25-minute appointment, more than half of which was spent in counseling and coordination of care.         CHELSEA POZO MD             D: 10/09/2018   T: 10/09/2018   MT: BERTRAM      Name:     LUTHER BLACKMAN   MRN:      [id number]        Account:      [id number]   :      1965           Visit Date:   10/09/2018      Document: [id number]       Again, thank you for allowing me to participate in the care of your patient.         Sincerely,        Wiliam Montalvo MD

## 2018-10-09 NOTE — PROGRESS NOTES
Visit Date:   10/09/2018      NEUROLOGY FOLLOWUP       PRIMARY CARE PHYSICIAN:  Bertha Romero PA-C       HISTORY OF PRESENT ILLNESS:  This patient is seen in followup with painful feet and also left leg pain.  I last saw him 7 months ago.  He has diagnoses of sensory motor neuropathy involving the feet, likely due to diabetes and also meralgia paresthetica versus L4 radiculopathy.  He reports that things are not going that well.  At the time of his last visit, I started him on nortriptyline building up to 75 mg at night.  He says it does help him to sleep, but does not really help the pain and does not do much for his feet.  By the end of the evening, especially if he has been on his feet a lot, it is very painful for him to walk.  He has this focal pain in the left heel as well.  The pain is in the bottoms of his feet.  He describes it as a combination of pins-and-needles as well as a sharp stinging pain.  It is not a burning pain.  He also has pain in the left upper thigh on the lateral aspect.  The right side is also becoming painful in the same territory.        He did have a CT scan of the lumbar spine performed in 2017.  It did show some evidence for disk herniation at L4-L5 with possible compromise of the left L4 nerve root.  Either that is the cause of the left thigh pain or it is meralgia paresthetica.  Now that the symptom is present on the right, I think it is likely meralgia paraesthetica.  He does have issues with overweight and has been struggling with his weight.  He does have a nerve stimulator in place for his bowel control and cannot have an MRI.      PHYSICAL EXAMINATION:   GENERAL:  On examination, the patient is cooperative and in no distress.   VITAL SIGNS:  His blood pressure is 127/84.   NEUROLOGIC:  He has reduced muscle bulk in the feet.  He has excellent strength in the feet and toes.  Muscle stretch reflexes are trace at the knees and absent at the ankles.  Toes are downgoing.  He has  preserved vibration in the toes with reduced temperature sense in the lateral thighs bilaterally and also on the top of the left foot.  Temperature and pinprick sensation are normal on the right foot.  Pinprick sensation is somewhat reduced on the top of the left foot.  He can walk on his toes, but cannot walk on his left heel because of discomfort.  He can perform a squat without much difficulty.        ASSESSMENT:    1.  Sensorimotor neuropathy involving the feet.   2.  Probable meralgia paresthetica, left greater than right.   3.  Left foot pain.      DISCUSSION:  The patient is seen with the above problem list.  His presentation looks very similar to when I last saw him in March.  I suspect that proximal leg pain is meralgia paraesthetica.  I am going to repeat the CT scan of the lumbar spine in this regard.      With regard to the symptoms in his feet, it does keep him up at night.  It sounds like diabetic neuropathy.  Nortriptyline has only been partially successful.  I am going to discontinue it and start him on duloxetine.  Side effects were reviewed from a medical resource.  I am going to look into his referral to [facility] for podiatry with regard to his feet, especially the left foot.  I will see him in followup in 6 weeks.      This was a 25-minute appointment, more than half of which was spent in counseling and coordination of care.         CHELSEA POZO MD             D: 10/09/2018   T: 10/09/2018   MT: BERTRAM      Name:     LUTHER BLACKMAN   MRN:      [id number]        Account:      [id number]   :      1965           Visit Date:   10/09/2018      Document: [id number]

## 2018-10-09 NOTE — MR AVS SNAPSHOT
After Visit Summary   10/9/2018    Adarsh Salvador    MRN: [id number]           Patient Information     Date Of Birth          1965        Visit Information        Provider Department      10/9/2018 8:00 AM Wiliam Montalvo MD [facility]        Today's Diagnoses     Bilateral leg pain    -  1    Diabetic polyneuropathy associated with type 2 diabetes mellitus (H)           Follow-ups after your visit        Follow-up notes from your care team     Return in about 6 weeks (around 11/20/2018).      Your next 10 appointments already scheduled     Oct 11, 2018 10:30 AM CDT   CT LUMBAR SPINE W/O CONTRAST with MGCT1   [facility] ([facility])    [id number] [place] [address] [phone]           How do I prepare for my exam? (Food and drink instructions) No Food and Drink Restrictions.  How do I prepare for my exam? (Other instructions) You do not need to do anything special to prepare for this exam. For a sinus scan: Use your nose spray (nasal decongestant spray) as directed.  What should I wear: Please wear loose clothing, such as a sweat suit or jogging clothes. Avoid snaps, zippers and other metal. We may ask you to undress and put on a hospital gown.  How long does the exam take: Most scans take less than 20 minutes.  What should I bring: Please bring any scans or X-rays taken at other hospitals, if similar tests were done. Also bring a list of your medicines, including vitamins, minerals and over-the-counter drugs. It is safest to leave personal items at home.  Do I need a : No  is needed.  What do I need to tell my doctor? Be sure to tell your doctor: * If you have any allergies. * If there s any chance you are pregnant. * If you are breastfeeding.  What should I do after the exam: No restrictions, You may resume normal activities.  What is this test: A CT (computed tomography) scan is a series of pictures that  allows us to look inside your body. The scanner creates images of the body in cross sections, much like slices of bread. This helps us see any problems more clearly.  Who should I call with questions: If you have any questions, please call the Imaging Department where you will have your exam. Directions, parking instructions, and other information is available on our website, [email or web address].            Oct 24, 2018 10:20 AM CDT   New Visit with Quan Pace DPM   [facility] ([facility]    [address] [place] [address] [phone]            Nov 16, 2018  9:00 AM CST   Office Visit with Bertha Romero PA-C   [facility] ([facility])    [address] [facility] [address] [phone]           Bring a current list of meds and any records pertaining to this visit. For Physicals, please bring immunization records and any forms needing to be filled out. Please arrive 10 minutes early to complete paperwork.            Nov 28, 2018 10:30 AM CST   Return Visit with Wiliam Montalvo MD   [facility] ([facility])    [address] [place] [address] [phone]              Future tests that were ordered for you today     Open Future Orders        Priority Expected Expires Ordered    CT Lumbar Spine w/o Contrast Routine  10/9/2019 10/9/2018            Who to contact     If you have questions or need follow up information about today's clinic visit or your schedule please contact [facility] directly at [phone].  Normal or non-critical lab and imaging results will be communicated to you by MyChart, letter or phone within 4 business days after the clinic has received the results. If you do not hear from us within 7 days, please contact the clinic through MyChart or phone. If you have a critical or abnormal lab result, we will notify  you by phone as soon as possible.  Submit refill requests through AboutMyStar or call your pharmacy and they will forward the refill request to us. Please allow 3 business days for your refill to be completed.          Additional Information About Your Visit        Care EveryWhere ID     This is your Care EveryWhere ID. This could be used by other organizations to access your [facility] medical records  [id number]        Your Vitals Were     Pulse Pulse Oximetry BMI (Body Mass Index)             78 99% 41.93 kg/m2          Blood Pressure from Last 3 Encounters:   10/09/18 127/84   09/25/18 119/77   09/18/18 126/74    Weight from Last 3 Encounters:   10/09/18 136.4 kg (300 lb 9.6 oz)   09/25/18 132.9 kg (293 lb)   09/18/18 133.4 kg (294 lb)                 Today's Medication Changes          These changes are accurate as of 10/9/18  8:43 AM.  If you have any questions, ask your nurse or doctor.               Start taking these medicines.        Dose/Directions    DULoxetine 20 MG EC capsule   Commonly known as:  CYMBALTA   Used for:  Diabetic polyneuropathy associated with type 2 diabetes mellitus (H)   Started by:  Wiliam Montalvo MD        Dose:  20 mg   Take 1 capsule (20 mg) by mouth See Admin Instructions Take one po q day for one week, then two po q day for one week, then three po q day.   Quantity:  90 capsule   Refills:  1         Stop taking these medicines if you haven't already. Please contact your care team if you have questions.     nortriptyline 25 MG capsule   Commonly known as:  PAMELOR   Stopped by:  Wiliam Montalvo MD                Where to get your medicines      These medications were sent to [facility]/pharmacy #[id number] - [place], [place] - [address] [facility]  [address] [place] [address]     Phone:  [phone]     DULoxetine 20 MG EC capsule                Primary Care Provider Office Phone # Fax #    Bertha Romero PA-C [phone] [address] [place]  [place] [address]        Equal Access to Services     BUZZCHUCKIE CHANA : Hadii mo johnson karyna Carter, waaracelida luqadaha, qaybta kapadminigema gonzalez, victor hugo wagnergregoriochucho machado. So [facility] [phone].    ATENCIÓN: Si habla español, tiene a lyons disposición servicios gratuitos de asistencia lingüística. Bennettame al [phone].    We comply with applicable federal civil rights laws and [place] laws. We do not discriminate on the basis of race, color, national origin, age, disability, sex, sexual orientation, or gender identity.            Thank you!     Thank you for choosing [facility]  for your care. Our goal is always to provide you with excellent care. Hearing back from our patients is one way we can continue to improve our services. Please take a few minutes to complete the written survey that you may receive in the mail after your visit with us. Thank you!             Your Updated Medication List - Protect others around you: Learn how to safely use, store and throw away your medicines at www.disposemymeds.org.          This list is accurate as of 10/9/18  8:43 AM.  Always use your most recent med list.                   Brand Name Dispense Instructions for use Diagnosis    albuterol 108 (90 Base) MCG/ACT inhaler    PROAIR HFA/PROVENTIL HFA/VENTOLIN HFA    1 Inhaler    Inhale 2 puffs into the lungs every 6 hours as needed for shortness of breath / dyspnea or wheezing    Cough       ALEVE PO      Take by mouth as needed        amLODIPine 10 MG tablet    NORVASC    90 tablet    Take 1 tablet (10 mg) by mouth daily    Hypertension goal BP (blood pressure) < 140/90       ASPIRIN PO      Take 325 mg by mouth daily        atorvastatin 40 MG tablet    LIPITOR    90 tablet    Take 1 tablet (40 mg) by mouth daily    Hyperlipidemia LDL goal <160       blood glucose monitoring lancets     102 each    Use to test blood sugar 1 times daily or as directed.  Ok to substitute alternative if insurance prefers.     Type 2 diabetes mellitus without complication, without long-term current use of insulin (H)       blood glucose monitoring test strip    ACCU-CHEK GUIDE    100 strip    Use to test blood sugar 1 times daily or as directed.    Type 2 diabetes mellitus without complication, without long-term current use of insulin (H)       calcium carbonate 500 MG chewable tablet    TUMS     Take 1 chew tab by mouth daily        cyclobenzaprine 10 MG tablet    FLEXERIL    90 tablet    Take 0.5-1 tablets (5-10 mg) by mouth 3 times daily as needed for muscle spasms    Back muscle spasm       dicyclomine 20 MG tablet    BENTYL    40 tablet    Take 1 tablet (20 mg) by mouth 4 times daily as needed    Irritable bowel syndrome with diarrhea, Chronic diarrhea       diphenoxylate-atropine 2.5-0.025 MG per tablet    LOMOTIL    30 tablet    Take 2 tablets by mouth 4 times daily as needed for diarrhea    Chronic diarrhea, Irritable bowel syndrome with diarrhea       DULoxetine 20 MG EC capsule    CYMBALTA    90 capsule    Take 1 capsule (20 mg) by mouth See Admin Instructions Take one po q day for one week, then two po q day for one week, then three po q day.    Diabetic polyneuropathy associated with type 2 diabetes mellitus (H)       EPINEPHrine 0.3 MG/0.3ML injection 2-pack    EPIPEN/ADRENACLICK/or ANY BX GENERIC EQUIV    2 each    Inject 0.3 mLs (0.3 mg) into the muscle once as needed for anaphylaxis    Anaphylactic reaction       escitalopram 10 MG tablet    LEXAPRO    30 tablet    Take 1 tablet (10 mg) by mouth daily    Anxiety       fluticasone-salmeterol 250-50 MCG/DOSE diskus inhaler    ADVAIR    3 Inhaler    Inhale 1 puff into the lungs 2 times daily    Cough       lisinopril 20 MG tablet    PRINIVIL/ZESTRIL    90 tablet    TAKE 1 TABLET EVERY DAY    Hypertension goal BP (blood pressure) < 140/90       metFORMIN 500 MG 24 hr tablet    GLUCOPHAGE-XR    360 tablet    Take 2 tablets (1,000 mg) by mouth 2 times daily (with meals)    Type  2 diabetes mellitus without complication, without long-term current use of insulin (H)       multivitamin, therapeutic Tabs tablet      Take 1 tablet by mouth daily        TYLENOL PO      Take 1,000 mg by mouth every 8 hours as needed for mild pain or fever        VSL#3 Pack     30 each    Use 1-2 capsules/day    Chronic diarrhea

## 2018-10-09 NOTE — NURSING NOTE
Adarsh Salvador's goals for this visit include: return  He requests these members of his care team be copied on today's visit information:     PCP: Bertha Romero    Referring Provider:  No referring provider defined for this encounter.    /84  Pulse 78  Wt 136.4 kg (300 lb 9.6 oz)  SpO2 99%  BMI 41.93 kg/m2    Do you need any medication refills at today's visit? n

## 2018-10-11 ENCOUNTER — RADIANT APPOINTMENT (OUTPATIENT)
Dept: CT IMAGING | Facility: CLINIC | Age: 53
End: 2018-10-11
Attending: PSYCHIATRY & NEUROLOGY
Payer: COMMERCIAL

## 2018-10-11 DIAGNOSIS — E11.42 DIABETIC POLYNEUROPATHY ASSOCIATED WITH TYPE 2 DIABETES MELLITUS (H): ICD-10-CM

## 2018-10-11 DIAGNOSIS — M79.604 BILATERAL LEG PAIN: ICD-10-CM

## 2018-10-11 DIAGNOSIS — M79.605 BILATERAL LEG PAIN: ICD-10-CM

## 2018-10-11 PROCEDURE — 72131 CT LUMBAR SPINE W/O DYE: CPT | Performed by: RADIOLOGY

## 2018-10-11 NOTE — TELEPHONE ENCOUNTER
Central Prior Authorization Team   Phone: [phone]      PA Initiation - manual fax    Medication: DULoxetine (CYMBALTA) 20 MG EC capsule  Insurance Company:    Pharmacy Filling the Rx: [facility]/PHARMACY #[id number] - [place] - [address] [facility]  Filling Pharmacy Phone: [phone]  Filling Pharmacy Fax: [phone]  Start Date: 10/11/2018

## 2018-10-11 NOTE — TELEPHONE ENCOUNTER
Prior Authorization Retail Medication Request    Medication/Dose: (CYMBALTA) 20 MG EC   ICD code (if different than what is on RX):  E11.42  Previously Tried and Failed:  Nortriptyline   Rationale:  With regard to the symptoms in his feet, it does keep him up at night.  It sounds like diabetic neuropathy.  Nortriptyline has only been partially successful.  I am going to discontinue it and start him on duloxetine.      Ins[facility]   Insurance ID:  [id number]       Pharmacy Information (if different than what is on RX)  Name:  [facility]ANNEN [facility]jivar  Phone:  [phone]    Routed to the PA team to complete.  Thalia Husain RN

## 2018-10-11 NOTE — TELEPHONE ENCOUNTER
The pt was informed on his self identified VM that the PA process was initiated and that the pharmacy liaison will be in touch with him regarding the outcome.  Thalia Husain RN

## 2018-10-11 NOTE — TELEPHONE ENCOUNTER
[facility] Call Center    Phone Message    May a detailed message be left on voicemail: yes    Reason for Call: Other: [facility] [place] is calling because insurance is not covering the Duloxetine.  She states that there may need to be a prior auth for this or the directions may need to be written differently.  Please advise.     Action Taken: Message routed to:  Adult Clinics: Neurology p [id number]

## 2018-10-15 NOTE — TELEPHONE ENCOUNTER
Called [employer] ([phone], opt# 4) to check status of P/A request. They confirmed they have it, but is has not been started yet, as they take them as they come in. I faxed this in last Thurs so they should be getting to it soon.

## 2018-10-15 NOTE — TELEPHONE ENCOUNTER
I do not believe that there is anything that we need to do, just waiting on their answer.  Thalia Husain RN

## 2018-10-17 DIAGNOSIS — E11.42 DIABETIC POLYNEUROPATHY ASSOCIATED WITH TYPE 2 DIABETES MELLITUS (H): ICD-10-CM

## 2018-10-17 NOTE — TELEPHONE ENCOUNTER
Prior Authorization Approval - P/A is approved for a 1 month supply of the (20mg titration dose). After that the patient will need to use (60mg capsules) for desired dose.  (See below screenshot of letter). Will ask for a new rx of 60mg to be sent to pharmacy for future fills.    Authorization Effective Date: 10/17/2018  Authorization Expiration Date:    Medication: DULoxetine (CYMBALTA) 20 MG EC capsule-P/A APPROVED  Approved Dose/Quantity: 90  Reference #: CASE # [id number]   Insurance Company: [employer] - Phone [phone] Fax [phone]  Expected CoPay:       CoPay Card Available:      Foundation Assistance Needed:    Which Pharmacy is filling the prescription (Not needed for infusion/clinic administered): [id number] [place], [place] - [address] [facility]  Pharmacy Notified: Yes - via voicemail  Patient Notified:

## 2018-10-17 NOTE — TELEPHONE ENCOUNTER
Deb Mccallum   [facility] Neu[facility] Adult [place] 2 hours ago (10:09 AM)                  P/A is approved for a 1 month supply of the (20mg titration dose). After that the patient will need to use (60mg capsules) for desired dose (See screenshot of letter). I left a voicemail for pharmacy stating this, and asked them to let the patient know once the rx is ready for . Can you send a new rx of 60mg to be sent to pharmacy for future fills?  (Routing comment)

## 2018-10-20 ENCOUNTER — TRANSFERRED RECORDS (OUTPATIENT)
Dept: HEALTH INFORMATION MANAGEMENT | Facility: CLINIC | Age: 53
End: 2018-10-20

## 2018-10-24 ENCOUNTER — OFFICE VISIT (OUTPATIENT)
Dept: PODIATRY | Facility: CLINIC | Age: 53
End: 2018-10-24
Payer: COMMERCIAL

## 2018-10-24 ENCOUNTER — RADIANT APPOINTMENT (OUTPATIENT)
Dept: GENERAL RADIOLOGY | Facility: CLINIC | Age: 53
End: 2018-10-24
Attending: PODIATRIST
Payer: COMMERCIAL

## 2018-10-24 VITALS
WEIGHT: 296.5 LBS | BODY MASS INDEX: 41.51 KG/M2 | HEIGHT: 71 IN | HEART RATE: 76 BPM | SYSTOLIC BLOOD PRESSURE: 127 MMHG | DIASTOLIC BLOOD PRESSURE: 81 MMHG

## 2018-10-24 DIAGNOSIS — M79.672 PAIN OF LEFT HEEL: Primary | ICD-10-CM

## 2018-10-24 DIAGNOSIS — M79.672 PAIN OF LEFT HEEL: ICD-10-CM

## 2018-10-24 PROCEDURE — 99203 OFFICE O/P NEW LOW 30 MIN: CPT | Performed by: PODIATRIST

## 2018-10-24 PROCEDURE — 73610 X-RAY EXAM OF ANKLE: CPT | Mod: LT | Performed by: RADIOLOGY

## 2018-10-24 ASSESSMENT — PAIN SCALES - GENERAL: PAINLEVEL: MODERATE PAIN (5)

## 2018-10-24 NOTE — NURSING NOTE
"Adarsh Salvador's goals for this visit include:   Chief Complaint   Patient presents with     Left Foot - Pain     Consult       He requests these members of his care team be copied on today's visit information: PCP    PCP: Bertha Romero    Referring Provider:  [place] [address]    /81 (BP Location: Right arm, Patient Position: Chair, Cuff Size: Adult Large)  Pulse 76  Ht 1.803 m (5' 11\")  Wt 134.5 kg (296 lb 8 oz)  BMI 41.35 kg/m2    Do you need any medication refills at today's visit? None      macular degeneration      "

## 2018-10-24 NOTE — MR AVS SNAPSHOT
After Visit Summary   10/24/2018    Adarsh Salvador    MRN: [id number]           Patient Information     Date Of Birth          1965        Visit Information        Provider Department      10/24/2018 10:20 AM Quan Pace DPM [facility]        Today's Diagnoses     Pain of left heel    -  1      Care Instructions    Thanks for coming today.  Ortho/Sports Medicine Clinic  [address] [place] [address]    To schedule future appointments in Ortho Clinic, you may call [phone].    To schedule ordered imaging by your provider:   Call Central Imaging [phone]    To schedule an injection ordered by your provider:  Call Central Imaging Injection scheduling line: [phone]  [employer] Companyhart available online at:  [email or web address]    Please call if any further questions or concerns ([phone]).  Clinic hours 8 am to 5 pm.    Return to clinic (call) if symptoms worsen or fail to improve.            Follow-ups after your visit        Your next 10 appointments already scheduled     Oct 24, 2018 11:25 AM CDT   XR ANKLE LEFT G/E 3 VIEWS with MGXR2   [facility] ([facility])    [address] [place] [address] [phone]           How do I prepare for my exam? (Food and drink instructions) No Food and Drink Restrictions.  How do I prepare for my exam? (Other instructions) You do not need to do anything special for this exam.  What should I wear: Wear comfortable clothes.  How long does the exam take: Most scans take less than 5 minutes.  What should I bring: Bring a list of your medicines, including vitamins, minerals and over-the-counter drugs. It is safest to leave personal items at home.  Do I need a :  No  is needed.  What do I need to tell my doctor: Tell your doctor if there s any chance you are pregnant.  What should I do after the exam: No restrictions, You may resume normal  activities.  What is this test: An image of a specific body part shown in shades of black and white.  Who should I call with questions: If you have any questions, please call the Imaging Department where you will have your exam. Directions, parking instructions, and other information is available on our website, [email or web address].            Oct 25, 2018 10:00 AM CDT   SHORT with Frederic Thomas PA-C   [facility]    [address] [facility] [address]   [phone]            Nov 02, 2018 10:00 AM CDT   Nurse Only with An Rn   [facility] ([facility]    [address] [facility] [address]   [phone]            Nov 16, 2018  9:20 AM CST   Office Visit with Bertha Romero PA-C   [facility]    [address] [facility] [address]   [phone]           Bring a current list of meds and any records pertaining to this visit. For Physicals, please bring immunization records and any forms needing to be filled out. Please arrive 10 minutes early to complete paperwork.            Nov 28, 2018 10:30 AM CST   Return Visit with Wiliam [facility]   [facility]    [address] [place] [address] [phone]              Who to contact     If you have questions or need follow up information about today's clinic visit or your schedule please contact [facility] directly at [phone].  Normal or non-critical lab and imaging results will be communicated to you by MyChart, letter or phone within 4 business days after the clinic has received the results. If you do not hear from us within 7 days, please contact the clinic through MyChart or phone. If you have a critical or abnormal lab result, we will notify you by phone as soon as possible.  Submit refill requests through [employer] or call  "your pharmacy and they will forward the refill request to us. Please allow 3 business days for your refill to be completed.          Additional Information About Your Visit        Care EveryWhere ID     This is your Care EveryWhere ID. This could be used by other organizations to access your [facility] medical records  [id number]        Your Vitals Were     Pulse Height BMI (Body Mass Index)             76 1.803 m (5' 11\") 41.35 kg/m2          Blood Pressure from Last 3 Encounters:   10/24/18 127/81   10/09/18 127/84   09/25/18 119/77    Weight from Last 3 Encounters:   10/24/18 134.5 kg (296 lb 8 oz)   10/09/18 136.4 kg (300 lb 9.6 oz)   09/25/18 132.9 kg (293 lb)               Primary Care Provider Office Phone # Fax #    Bertha Romero PA-C [phone]       [address] [facility] [address]        Equal Access to Services     SIMONE ZAYAS : Hadii aad ku hadasho Soomaali, waaxda luqadaha, qaybta kaalmada adeegyada, waxay idiin hayaan lameg kharachucho armendariz . So [facility] [phone].    ATENCIÓN: Si habla español, tiene a lyons disposición servicios gratuitos de asistencia lingüística. [facility] [phone].    We comply with applicable federal civil rights laws and [place] laws. We do not discriminate on the basis of race, color, national origin, age, disability, sex, sexual orientation, or gender identity.            Thank you!     Thank you for choosing [facility] [[email or web address]]  for your care. Our goal is always to provide you with excellent care. Hearing back from our patients is one way we can continue to improve our services. Please take a few minutes to complete the written survey that you may receive in the mail after your visit with us. Thank you!             Your Updated Medication List - Protect others around you: Learn how to safely use, store and throw away your medicines at www.disposemymeds.org.          This list is accurate as of 10/24/18 11:19 AM.  Always use your most " recent med list.                   Brand Name Dispense Instructions for use Diagnosis    albuterol 108 (90 Base) MCG/ACT inhaler    PROAIR HFA/PROVENTIL HFA/VENTOLIN HFA    1 Inhaler    Inhale 2 puffs into the lungs every 6 hours as needed for shortness of breath / dyspnea or wheezing    Cough       ALEVE PO      Take by mouth as needed        amLODIPine 10 MG tablet    NORVASC    90 tablet    Take 1 tablet (10 mg) by mouth daily    Hypertension goal BP (blood pressure) < 140/90       ASPIRIN PO      Take 325 mg by mouth daily        atorvastatin 40 MG tablet    LIPITOR    90 tablet    Take 1 tablet (40 mg) by mouth daily    Hyperlipidemia LDL goal <160       blood glucose monitoring lancets     102 each    Use to test blood sugar 1 times daily or as directed.  Ok to substitute alternative if insurance prefers.    Type 2 diabetes mellitus without complication, without long-term current use of insulin (H)       blood glucose monitoring test strip    ACCU-CHEK GUIDE    100 strip    Use to test blood sugar 1 times daily or as directed.    Type 2 diabetes mellitus without complication, without long-term current use of insulin (H)       calcium carbonate 500 MG chewable tablet    TUMS     Take 1 chew tab by mouth daily        cyclobenzaprine 10 MG tablet    FLEXERIL    90 tablet    Take 0.5-1 tablets (5-10 mg) by mouth 3 times daily as needed for muscle spasms    Back muscle spasm       dicyclomine 20 MG tablet    BENTYL    40 tablet    Take 1 tablet (20 mg) by mouth 4 times daily as needed    Irritable bowel syndrome with diarrhea, Chronic diarrhea       diphenoxylate-atropine 2.5-0.025 MG per tablet    LOMOTIL    30 tablet    Take 2 tablets by mouth 4 times daily as needed for diarrhea    Chronic diarrhea, Irritable bowel syndrome with diarrhea       DULoxetine 20 MG EC capsule    CYMBALTA    90 capsule    Take 1 capsule (20 mg) by mouth See Admin Instructions Take one po q day for one week, then two po q day for one  week, then three po q day.    Diabetic polyneuropathy associated with type 2 diabetes mellitus (H)       EPINEPHrine 0.3 MG/0.3ML injection 2-pack    EPIPEN/ADRENACLICK/or ANY BX GENERIC EQUIV    2 each    Inject 0.3 mLs (0.3 mg) into the muscle once as needed for anaphylaxis    Anaphylactic reaction       escitalopram 10 MG tablet    LEXAPRO    30 tablet    Take 1 tablet (10 mg) by mouth daily    Anxiety       fluticasone-salmeterol 250-50 MCG/DOSE diskus inhaler    ADVAIR    3 Inhaler    Inhale 1 puff into the lungs 2 times daily    Cough       lisinopril 20 MG tablet    PRINIVIL/ZESTRIL    90 tablet    TAKE 1 TABLET EVERY DAY    Hypertension goal BP (blood pressure) < 140/90       metFORMIN 500 MG 24 hr tablet    GLUCOPHAGE-XR    360 tablet    Take 2 tablets (1,000 mg) by mouth 2 times daily (with meals)    Type 2 diabetes mellitus without complication, without long-term current use of insulin (H)       multivitamin, therapeutic Tabs tablet      Take 1 tablet by mouth daily        TYLENOL PO      Take 1,000 mg by mouth every 8 hours as needed for mild pain or fever        VSL#3 Pack     30 each    Use 1-2 capsules/day    Chronic diarrhea

## 2018-10-24 NOTE — PROGRESS NOTES
Date of Service: 10/24/2018    Chief Complaints and History of Present Illnesses   Patient presents with     Left Foot - Pain     Consult          HPI: Adarsh is a 53 year old male who presents today for further evaluation of left heel pain.  Nature: sharp/dull/aching    Location: Left plantar heel.    Duration: 1 month.    Onset: gradual. No inciting trauma.     Course: worsening.    Aggravating/alleviating factors: + post-static dyskinesia. Continued walking aggravates. Prolonged rest alleviates.     Previous Treatments: stretching. Icing, supportive shoes.   He is a type 2 diabetic with BL LE neuropathy 2/2 diabetes and possible radiculopathy.   He has recently cut his hand on a rotary saw. Was seen in the ED. Continues to have bleeding in the area.       Review of Systems: No n/v/d/f/c/ns/sob/cp    PMH:   Past Medical History:   Diagnosis Date     Adult celiac disease      GERD (gastroesophageal reflux disease) 6/15/2011     Hypercholesterolemia 6/15/2011     Hypertension 6/15/2011     IBS (irritable bowel syndrome)      Kidney stone 6/15/2011    Pt believes these were Calcium stones     Uncomplicated asthma        PSxH:   Past Surgical History:   Procedure Laterality Date     C REMOVAL OF KIDNEY STONE       COLONOSCOPY  5/1/2012    Procedure:COLONOSCOPY; screening colonoscopy; Surgeon:KINGSLEY DOS SANTOS; Location:[facility]     COLONOSCOPY  4/21/2014    Procedure: COMBINED COLONOSCOPY, SINGLE BIOPSY/POLYPECTOMY BY BIOPSY;  Surgeon: Duane, William Charles, MD;  Location: [facility]     HC BREATH HYDROGEN TEST  3/7/2014    Procedure: HYDROGEN BREATH TEST;  Surgeon: Darion Swift MD;  Location: [facility]     ORTHOPEDIC SURGERY      x3 Rt knee      RELEASE CARPAL TUNNEL Right 1/26/2018    Procedure: RELEASE CARPAL TUNNEL;  Right carpal tunnel release;  Surgeon: Wiliam Saenz MD;  Location: [facility]       Allergies: Artificial sweetner (informational only) [artificial sweetner (informational only) ]; Hydromorphone;  "Ibuprofen; Morphine [fumaric acid]; Hydrocodone-acetaminophen; Tramadol; Trazodone; Gabapentin; Codeine phosphate; and Ketorolac tromethamine    SH:   Social History     Social History     Marital status: Single     Spouse name: N/A     Number of children: 0     Years of education: N/A     Occupational History     - with [employer]     Social History Main Topics     Smoking status: Never Smoker     Smokeless tobacco: Current User     Types: Chew      Comment: Chew     Alcohol use 0.0 oz/week     0 Standard drinks or equivalent per week      Comment: occasional     Drug use: No     Sexual activity: No     Other Topics Concern     Not on file     Social History Narrative    Works at [employer], as a  (25+ years experience).         FH:   Family History   Problem Relation Age of Onset     Cancer Mother 52     lung, smoked     Cancer - colorectal Father 53     unsure type, pt attributes to radiation exposure     Myocardial Infarction Father 45     Myocardial Infarction Paternal Grandfather 35     Diabetes Other      nephew- type 1     Diabetes Maternal Aunt      1     Diabetes Maternal Aunt      2     Diabetes Paternal Uncle      1     Diabetes Paternal Uncle      2     Diabetes Paternal Uncle      3     Diabetes Paternal Uncle      4       Objective:  /81 (BP Location: Right arm, Patient Position: Chair, Cuff Size: Adult Large)  Pulse 76  Ht 1.803 m (5' 11\")  Wt 134.5 kg (296 lb 8 oz)  BMI 41.35 kg/m2      PT and DP pulses are 1/4 bilaterally. CRT is 4 seconds. Diminished pedal hair.   Gross sensation is intact bilaterally. Protective sensation is diminished when tested with a 5.07G monofilament, largely in the medial column on the left foot.   Equinus is mild bilaterally. No pain with active or passive ROM of the ankle, MTJ, 1st ray, or halluces bilaterally. Pain is noted with palpation of the medial and lateral portions of the planar fascial attachment of the calcaneus on " the left. Pain with lateral calcaneal compression and with ROM of the STJ. Pain noted with palpation of the left ATFL. MMT is 5/5 with anterior ankle pain on the left foot.   Nails normal bilaterally. No open lesions are noted.     left ankle xrays indicated in 3 weightbearing views.    No fractures. Normal alignment. No soft tissue abnormality. No plantar bone spurring. Some lucency at the plantar calcaneus.       Assessment: Left heel pain - not fully clear etiology - plantar fasciitis vs rupture vs other soft tissue pathology.     Plan:  - Pt seen and evaluated.  - Xrays taken and discussed with him.  - Recommendations: CAM boot to help decrease ankle ROM. He agrees to this and the boot was dispensed to him. I would wait on and steroid injections for now. I would like to get an US to see if the PF is avulsed from the calcaneus, and he does have a healing laceration on his finger and is diabetic. After this is healed, can consider steroid injections.   - Will get an US to the area, as MRI is not compatible with back hardware.   - Will call with US results.   - See again in 3 weeks.

## 2018-10-24 NOTE — PATIENT INSTRUCTIONS
Thanks for coming today.  [facility]  [address] [place] [address]    To schedule future appointments in Ortho Clinic, you may call [phone].    To schedule ordered imaging by your provider:   Call [facility] [phone]    To schedule an injection ordered by your provider:  Call [facility] Injection scheduling line: [phone]  [email or web address] available online at:  [email or web address]    Please call if any further questions or concerns ([phone]).  Clinic hours 8 am to 5 pm.    Return to clinic (call) if symptoms worsen or fail to improve.

## 2018-10-24 NOTE — LETTER
10/24/2018         RE: Adarsh Salvador  [address] [facility] [address]        Dear Colleague,    Thank you for referring your patient, Adarsh Salvador, to the [facility]. Please see a copy of my visit note below.    Date of Service: 10/24/2018    Chief Complaints and History of Present Illnesses   Patient presents with     Left Foot - Pain     Consult          HPI: Adarsh is a 53 year old male who presents today for further evaluation of left heel pain.  Nature: sharp/dull/aching    Location: Left plantar heel.    Duration: 1 month.    Onset: gradual. No inciting trauma.     Course: worsening.    Aggravating/alleviating factors: + post-static dyskinesia. Continued walking aggravates. Prolonged rest alleviates.     Previous Treatments: stretching. Icing, supportive shoes.   He is a type 2 diabetic with BL LE neuropathy 2/2 diabetes and possible radiculopathy.   He has recently cut his hand on a rotary saw. Was seen in the ED. Continues to have bleeding in the area.       Review of Systems: No n/v/d/f/c/ns/sob/cp    PMH:   Past Medical History:   Diagnosis Date     Adult celiac disease      GERD (gastroesophageal reflux disease) 6/15/2011     Hypercholesterolemia 6/15/2011     Hypertension 6/15/2011     IBS (irritable bowel syndrome)      Kidney stone 6/15/2011    Pt believes these were Calcium stones     Uncomplicated asthma        PSxH:   Past Surgical History:   Procedure Laterality Date     C REMOVAL OF KIDNEY STONE       COLONOSCOPY  5/1/2012    Procedure:COLONOSCOPY; screening colonoscopy; Surgeon:KINGSLEY DOS SANTOS; Location:[facility]     COLONOSCOPY  4/21/2014    Procedure: COMBINED COLONOSCOPY, SINGLE BIOPSY/POLYPECTOMY BY BIOPSY;  Surgeon: Duane, William Charles, MD;  Location: [facility]     HC BREATH HYDROGEN TEST  3/7/2014    Procedure: HYDROGEN BREATH TEST;  Surgeon: Darion Swift MD;  Location: [facility]     ORTHOPEDIC SURGERY      x3 Rt knee      RELEASE CARPAL TUNNEL Right 1/26/2018    Procedure:  "RELEASE CARPAL TUNNEL;  Right carpal tunnel release;  Surgeon: Wiliam Saenz MD;  Location: [facility]       Allergies: Artificial sweetner (informational only) [artificial sweetner (informational only) ]; Hydromorphone; Ibuprofen; Morphine [fumaric acid]; Hydrocodone-acetaminophen; Tramadol; Trazodone; Gabapentin; Codeine phosphate; and Ketorolac tromethamine    SH:   Social History     Social History     Marital status: Single     Spouse name: N/A     Number of children: 0     Years of education: N/A     Occupational History     - with [employer]     Social History Main Topics     Smoking status: Never Smoker     Smokeless tobacco: Current User     Types: Chew      Comment: Chew     Alcohol use 0.0 oz/week     0 Standard drinks or equivalent per week      Comment: occasional     Drug use: No     Sexual activity: No     Other Topics Concern     Not on file     Social History Narrative    Works at [employer], as a  (25+ years experience).         FH:   Family History   Problem Relation Age of Onset     Cancer Mother 52     lung, smoked     Cancer - colorectal Father 53     unsure type, pt attributes to radiation exposure     Myocardial Infarction Father 45     Myocardial Infarction Paternal Grandfather 35     Diabetes Other      nephew- type 1     Diabetes Maternal Aunt      1     Diabetes Maternal Aunt      2     Diabetes Paternal Uncle      1     Diabetes Paternal Uncle      2     Diabetes Paternal Uncle      3     Diabetes Paternal Uncle      4       Objective:  /81 (BP Location: Right arm, Patient Position: Chair, Cuff Size: Adult Large)  Pulse 76  Ht 1.803 m (5' 11\")  Wt 134.5 kg (296 lb 8 oz)  BMI 41.35 kg/m2      PT and DP pulses are 1/4 bilaterally. CRT is 4 seconds. Diminished pedal hair.   Gross sensation is intact bilaterally. Protective sensation is diminished when tested with a 5.07G monofilament, largely in the medial column on the left foot.   Equinus is " mild bilaterally. No pain with active or passive ROM of the ankle, MTJ, 1st ray, or halluces bilaterally. Pain is noted with palpation of the medial and lateral portions of the planar fascial attachment of the calcaneus on the left. Pain with lateral calcaneal compression and with ROM of the STJ. Pain noted with palpation of the left ATFL. MMT is 5/5 with anterior ankle pain on the left foot.   Nails normal bilaterally. No open lesions are noted.     left ankle xrays indicated in 3 weightbearing views.    No fractures. Normal alignment. No soft tissue abnormality. No plantar bone spurring. Some lucency at the plantar calcaneus.       Assessment: Left heel pain - not fully clear etiology - plantar fasciitis vs rupture vs other soft tissue pathology.     Plan:  - Pt seen and evaluated.  - Xrays taken and discussed with him.  - Recommendations: CAM boot to help decrease ankle ROM. He agrees to this and the boot was dispensed to him. I would wait on and steroid injections for now. I would like to get an US to see if the PF is avulsed from the calcaneus, and he does have a healing laceration on his finger and is diabetic. After this is healed, can consider steroid injections.   - Will get an US to the area, as MRI is not compatible with back hardware.   - Will call with US results.   - See again in 3 weeks.                Again, thank you for allowing me to participate in the care of your patient.        Sincerely,        Quan Pace DPM

## 2018-10-25 ENCOUNTER — OFFICE VISIT (OUTPATIENT)
Dept: FAMILY MEDICINE | Facility: CLINIC | Age: 53
End: 2018-10-25
Payer: COMMERCIAL

## 2018-10-25 VITALS
BODY MASS INDEX: 41.86 KG/M2 | TEMPERATURE: 97.5 F | WEIGHT: 299 LBS | HEIGHT: 71 IN | HEART RATE: 82 BPM | DIASTOLIC BLOOD PRESSURE: 80 MMHG | SYSTOLIC BLOOD PRESSURE: 135 MMHG | OXYGEN SATURATION: 98 % | RESPIRATION RATE: 19 BRPM

## 2018-10-25 DIAGNOSIS — Z51.89 ENCOUNTER FOR WOUND CARE: Primary | ICD-10-CM

## 2018-10-25 PROCEDURE — 99213 OFFICE O/P EST LOW 20 MIN: CPT | Performed by: PHYSICIAN ASSISTANT

## 2018-10-25 NOTE — PROGRESS NOTES
SUBJECTIVE:   Adarsh Salvador is a 53 year old male seen today who  has a past medical history of Adult celiac disease; Benign essential hypertension (12/15/2015); GERD (gastroesophageal reflux disease) (6/15/2011); Hypercholesterolemia (6/15/2011); Hypertension (6/15/2011); IBS (irritable bowel syndrome); Kidney stone (6/15/2011); Type 2 diabetes mellitus without complication, without long-term current use of insulin (H) (1/18/2018); and Uncomplicated asthma.   who presents to clinic today for the following health issues:           ED/UC Followup:    Facility:  [facility]  Date of visit: 10/20/18  Reason for visit: laceration of right index finger from table saw.  Current Status: here for wound care check up     Care Everywhere Reviewed- 11 sutures place.    Problem list and histories reviewed & adjusted, as indicated.  Additional history: as documented    Patient Active Problem List   Diagnosis     GERD (gastroesophageal reflux disease)     Kidney stone     Chronic RLQ pain     Constipation     Abdominal pain, right upper quadrant     Adult celiac disease     Chronic maxillary sinusitis     Chronic rhinitis     Pure hyperglyceridemia     Anaphylactic reaction     Benign essential hypertension     Anemia in other chronic diseases classified elsewhere     Fatty liver     Irritable bowel syndrome with diarrhea     Right inguinal hernia     BMI 40.0-44.9, adult (H)     Pain in thoracic spine     Mild persistent asthma without complication     Type 2 diabetes mellitus without complication, without long-term current use of insulin (H)     Hyperlipidemia LDL goal <100     Pain of left heel     Past Surgical History:   Procedure Laterality Date     C REMOVAL OF KIDNEY STONE       COLONOSCOPY  5/1/2012    Procedure:COLONOSCOPY; screening colonoscopy; Surgeon:KINGSLEY DOS SANTOS; Location:[facility]     COLONOSCOPY  4/21/2014    Procedure: COMBINED COLONOSCOPY, SINGLE BIOPSY/POLYPECTOMY BY BIOPSY;  Surgeon: Duane, William Charles, MD;   Location: [facility]     HC BREATH HYDROGEN TEST  3/7/2014    Procedure: HYDROGEN BREATH TEST;  Surgeon: Darion Swift MD;  Location: [facility]     ORTHOPEDIC SURGERY      x3 Rt knee      RELEASE CARPAL TUNNEL Right 1/26/2018    Procedure: RELEASE CARPAL TUNNEL;  Right carpal tunnel release;  Surgeon: Wiliam Saenz MD;  Location: [facility]       Social History   Substance Use Topics     Smoking status: Never Smoker     Smokeless tobacco: Current User     Types: Chew      Comment: Chew     Alcohol use 0.0 oz/week     0 Standard drinks or equivalent per week      Comment: occasional     Family History   Problem Relation Age of Onset     Cancer Mother 52     lung, smoked     Cancer - colorectal Father 53     unsure type, pt attributes to radiation exposure     Myocardial Infarction Father 45     Myocardial Infarction Paternal Grandfather 35     Diabetes Other      nephew- type 1     Diabetes Maternal Aunt      1     Diabetes Maternal Aunt      2     Diabetes Paternal Uncle      1     Diabetes Paternal Uncle      2     Diabetes Paternal Uncle      3     Diabetes Paternal Uncle      4         Current Outpatient Prescriptions   Medication Sig Dispense Refill     Acetaminophen (TYLENOL PO) Take 1,000 mg by mouth every 8 hours as needed for mild pain or fever       albuterol (PROAIR HFA/PROVENTIL HFA/VENTOLIN HFA) 108 (90 BASE) MCG/ACT Inhaler Inhale 2 puffs into the lungs every 6 hours as needed for shortness of breath / dyspnea or wheezing 1 Inhaler 3     amLODIPine (NORVASC) 10 MG tablet Take 1 tablet (10 mg) by mouth daily 90 tablet 3     ASPIRIN PO Take 325 mg by mouth daily       atorvastatin (LIPITOR) 40 MG tablet Take 1 tablet (40 mg) by mouth daily 90 tablet 3     blood glucose monitoring (ACCU-CHEK FASTCLIX) lancets Use to test blood sugar 1 times daily or as directed.  Ok to substitute alternative if insurance prefers. 102 each 11     blood glucose monitoring (ACCU-CHEK GUIDE) test strip Use to test blood  sugar 1 times daily or as directed. 100 strip 11     calcium carbonate (TUMS) 500 MG chewable tablet Take 1 chew tab by mouth daily       cyclobenzaprine (FLEXERIL) 10 MG tablet Take 0.5-1 tablets (5-10 mg) by mouth 3 times daily as needed for muscle spasms 90 tablet 1     dicyclomine (BENTYL) 20 MG tablet Take 1 tablet (20 mg) by mouth 4 times daily as needed 40 tablet 5     Dietary Management Product (VSL#3) PACK Use 1-2 capsules/day 30 each 3     diphenoxylate-atropine (LOMOTIL) 2.5-0.025 MG per tablet Take 2 tablets by mouth 4 times daily as needed for diarrhea 30 tablet 1     DULoxetine (CYMBALTA) 20 MG EC capsule Take 1 capsule (20 mg) by mouth See Admin Instructions Take one po q day for one week, then two po q day for one week, then three po q day. 90 capsule 1     EPINEPHrine (EPIPEN) 0.3 MG/0.3ML injection Inject 0.3 mLs (0.3 mg) into the muscle once as needed for anaphylaxis 2 each 2     escitalopram (LEXAPRO) 10 MG tablet Take 1 tablet (10 mg) by mouth daily 30 tablet 1     fluticasone-salmeterol (ADVAIR) 250-50 MCG/DOSE diskus inhaler Inhale 1 puff into the lungs 2 times daily 3 Inhaler 1     lisinopril (PRINIVIL/ZESTRIL) 20 MG tablet TAKE 1 TABLET EVERY DAY 90 tablet 1     metFORMIN (GLUCOPHAGE-XR) 500 MG 24 hr tablet Take 2 tablets (1,000 mg) by mouth 2 times daily (with meals) 360 tablet 1     multivitamin, therapeutic (THERA-VIT) TABS Take 1 tablet by mouth daily       Naproxen Sodium (ALEVE PO) Take by mouth as needed        Allergies   Allergen Reactions     Artificial Sweetner (Informational Only) [Artificial Sweetner (Informational Only) ] GI Disturbance     ALL artificial sweetners cause severe diarrhea & flu symptoms     Hydromorphone Anaphylaxis     Ibuprofen GI Disturbance     Morphine [Fumaric Acid] Anaphylaxis     Hydrocodone-Acetaminophen Itching     Tramadol Hives     Trazodone Hives     Gabapentin      GI upset per pt     Codeine Phosphate Itching     Ketorolac Tromethamine Rash  "    Recent Labs   Lab Test  08/17/18   1114  01/12/18   0955  01/09/18   1117  10/04/17   1140  07/26/16   0938   02/22/16   1147   12/15/15   1036   02/28/14   1521  02/17/14   1003  07/10/13   0946   A1C  6.5*  6.5*   --   6.2*  5.6   --    --    --    --    --   5.9   --    --    LDL   --   179*   --    --    --    --    --    --   153*   --    --    --   155*   HDL   --   37*   --    --    --    --    --    --   36*   --    --    --   38*   TRIG   --   341*   --    --    --    --    --    --   331*   --    --    --   216*   ALT   --    --    --    --   25   --   24   --    --    --   37  30   --    CR  0.98  0.92   --    --   1.10   < >  0.94   < >  0.96   < >  0.89  0.87  0.93   GFRESTIMATED  80  87   --    --   71   --   85   --   84   < >  >90  >90  87   GFRESTBLACK  >90  >90   --    --   86   --   >90   GFR Calc     --   >90   GFR Calc     < >  >90  >90  >90   POTASSIUM  4.3  4.1   --    --   4.2   --   4.4   < >  3.8   < >  4.0  3.9  3.7   TSH   --    --   3.48   --    --    --    --    --    --    --    --   4.08   --     < > = values in this interval not displayed.      BP Readings from Last 3 Encounters:   10/25/18 135/80   10/24/18 127/81   10/09/18 127/84    Wt Readings from Last 3 Encounters:   10/25/18 299 lb (135.6 kg)   10/24/18 296 lb 8 oz (134.5 kg)   10/09/18 300 lb 9.6 oz (136.4 kg)                  Labs reviewed in EPIC    Reviewed and updated as needed this visit by clinical staff  Tobacco  Allergies  Med Hx  Surg Hx  Fam Hx  Soc Hx      Reviewed and updated as needed this visit by Provider         ROS:  Constitutional, HEENT, cardiovascular, pulmonary, gi and gu systems are negative, except as otherwise noted.    OBJECTIVE:     /80  Pulse 82  Temp 97.5  F (36.4  C) (Oral)  Resp 19  Ht 5' 11\" (1.803 m)  Wt 299 lb (135.6 kg)  SpO2 98%  BMI 41.7 kg/m2  Body mass index is 41.7 kg/(m^2).  GENERAL: healthy, alert and no distress  Right index " finger: healing wound with no signs of infection. Neurovascularly Intact Distally.  Wound is slightly macerated in some areas. No discharge.     Diagnostic Test Results:  none     ASSESSMENT/PLAN:       ICD-10-CM    1. Encounter for wound care Z51.89    dressing applied.   Wound care discussed  warning signs discussed.  Recheck 1 wks for suture removal.     Frederic Thomas PA-C  [facility]

## 2018-10-25 NOTE — MR AVS SNAPSHOT
After Visit Summary   10/25/2018    Adarsh Salvador    MRN: [id number]           Patient Information     Date Of Birth          1965        Visit Information        Provider Department      10/25/2018 10:00 AM Frederic Thomas PA-C [facility]         Follow-ups after your visit        Follow-up notes from your care team     Return in about 1 week (around 11/1/2018) for recheck.      Your next 10 appointments already scheduled     Oct 25, 2018  2:00 PM CDT   US EXTREMITY NON VASCULAR LEFT with MGUS1, MG US TECH   [facility] ([facility])    [address] [place] [address] [phone]           How do I prepare for my exam? (Food and drink instructions) No Food and Drink Restrictions.  How do I prepare for my exam? (Other instructions) You do not need to do anything special to prepare for your exam.  What should I wear: Wear comfortable clothes.  How long does the exam take: Most ultrasounds take 30 to 60 minutes.  What should I bring: Bring a list of your medicines, including vitamins, minerals and over-the-counter drugs. It is safest to leave personal items at home.  Do I need a :  No  is needed.  What do I need to tell my doctor: Tell your doctor about any allergies you may have.  What should I do after the exam: No restrictions, you may resume normal activities.  What is this test: An ultrasound uses sound waves to make pictures of the body. Sound waves do not cause pain. The only discomfort may be the pressure of the wand against your skin or full bladder.  Who should I call with questions: If you have any questions, please call the Imaging Department where you will have your exam. Directions, parking instructions, and other information are available on our website, [email or web address].            Nov 02, 2018 10:00 AM CDT   Nurse Only with An Rn   [facility]    [address]  [facility] [address]   [phone]            Nov 14, 2018 10:00 AM CST   Return Visit with Quan Pace DPM   [facility] ([facility])    [address] [place] [address]   [phone]            Nov 16, 2018  9:20 AM CST   Office Visit with Bertha Romero PA-C   [facility] ([facility])    [address] [facility] [address] [phone]           Bring a current list of meds and any records pertaining to this visit. For Physicals, please bring immunization records and any forms needing to be filled out. Please arrive 10 minutes early to complete paperwork.            Nov 28, 2018 10:30 AM CST   Return Visit with Wiliam Montalvo MD   [facility] ([facility])    [address] [place] [id number] [phone]              Future tests that were ordered for you today     Open Future Orders        Priority Expected Expires Ordered    US Extremity Non Vascular Left Routine  10/24/2019 10/24/2018            Who to contact     If you have questions or need follow up information about today's clinic visit or your schedule please contact [facility] directly at [phone].  Normal or non-critical lab and imaging results will be communicated to you by MyChart, letter or phone within 4 business days after the clinic has received the results. If you do not hear from us within 7 days, please contact the clinic through MyChart or phone. If you have a critical or abnormal lab result, we will notify you by phone as soon as possible.  Submit refill requests through [employer] or call your pharmacy and they will forward the refill request to us. Please allow 3 business days for your refill to be completed.          Additional Information About Your Visit        Care EveryWhere ID     This is your Care EveryWhere ID. This could be used by other  "organizations to access your [facility] medical records  [id number]        Your Vitals Were     Pulse Temperature Respirations Height Pulse Oximetry BMI (Body Mass Index)    82 97.5  F (36.4  C) (Oral) 19 5' 11\" (1.803 m) 98% 41.7 kg/m2       Blood Pressure from Last 3 Encounters:   10/25/18 135/80   10/24/18 127/81   10/09/18 127/84    Weight from Last 3 Encounters:   10/25/18 299 lb (135.6 kg)   10/24/18 296 lb 8 oz (134.5 kg)   10/09/18 300 lb 9.6 oz (136.4 kg)              Today, you had the following     No orders found for display       Primary Care Provider Office Phone # Fax #    Bertha Romero PA-C [phone] [address] [facility] [address]        Equal Access to Services     [facility] : Hadii aad ku hadasho Soomaali, waaxda luqadaha, qaybta kaalmada adeegyada, waxay artin haydave armendariz . So [facility] [phone].    ATENCIÓN: Si habla español, tiene a lyons disposición servicios gratuitos de asistencia lingüística. Bennettmac al [phone].    We comply with applicable federal civil rights laws and [place] laws. We do not discriminate on the basis of race, color, national origin, age, disability, sex, sexual orientation, or gender identity.            Thank you!     Thank you for choosing [facility]  for your care. Our goal is always to provide you with excellent care. Hearing back from our patients is one way we can continue to improve our services. Please take a few minutes to complete the written survey that you may receive in the mail after your visit with us. Thank you!             Your Updated Medication List - Protect others around you: Learn how to safely use, store and throw away your medicines at www.disposemymeds.org.          This list is accurate as of 10/25/18 10:31 AM.  Always use your most recent med list.                   Brand Name Dispense Instructions for use Diagnosis    albuterol 108 (90 Base) MCG/ACT inhaler    PROAIR " HFA/PROVENTIL HFA/VENTOLIN HFA    1 Inhaler    Inhale 2 puffs into the lungs every 6 hours as needed for shortness of breath / dyspnea or wheezing    Cough       ALEVE PO      Take by mouth as needed        amLODIPine 10 MG tablet    NORVASC    90 tablet    Take 1 tablet (10 mg) by mouth daily    Hypertension goal BP (blood pressure) < 140/90       ASPIRIN PO      Take 325 mg by mouth daily        atorvastatin 40 MG tablet    LIPITOR    90 tablet    Take 1 tablet (40 mg) by mouth daily    Hyperlipidemia LDL goal <160       blood glucose monitoring lancets     102 each    Use to test blood sugar 1 times daily or as directed.  Ok to substitute alternative if insurance prefers.    Type 2 diabetes mellitus without complication, without long-term current use of insulin (H)       blood glucose monitoring test strip    ACCU-CHEK GUIDE    100 strip    Use to test blood sugar 1 times daily or as directed.    Type 2 diabetes mellitus without complication, without long-term current use of insulin (H)       calcium carbonate 500 MG chewable tablet    TUMS     Take 1 chew tab by mouth daily        cyclobenzaprine 10 MG tablet    FLEXERIL    90 tablet    Take 0.5-1 tablets (5-10 mg) by mouth 3 times daily as needed for muscle spasms    Back muscle spasm       dicyclomine 20 MG tablet    BENTYL    40 tablet    Take 1 tablet (20 mg) by mouth 4 times daily as needed    Irritable bowel syndrome with diarrhea, Chronic diarrhea       diphenoxylate-atropine 2.5-0.025 MG per tablet    LOMOTIL    30 tablet    Take 2 tablets by mouth 4 times daily as needed for diarrhea    Chronic diarrhea, Irritable bowel syndrome with diarrhea       DULoxetine 20 MG EC capsule    CYMBALTA    90 capsule    Take 1 capsule (20 mg) by mouth See Admin Instructions Take one po q day for one week, then two po q day for one week, then three po q day.    Diabetic polyneuropathy associated with type 2 diabetes mellitus (H)       EPINEPHrine 0.3 MG/0.3ML injection  2-pack    EPIPEN/ADRENACLICK/or ANY BX GENERIC EQUIV    2 each    Inject 0.3 mLs (0.3 mg) into the muscle once as needed for anaphylaxis    Anaphylactic reaction       escitalopram 10 MG tablet    LEXAPRO    30 tablet    Take 1 tablet (10 mg) by mouth daily    Anxiety       fluticasone-salmeterol 250-50 MCG/DOSE diskus inhaler    ADVAIR    3 Inhaler    Inhale 1 puff into the lungs 2 times daily    Cough       lisinopril 20 MG tablet    PRINIVIL/ZESTRIL    90 tablet    TAKE 1 TABLET EVERY DAY    Hypertension goal BP (blood pressure) < 140/90       metFORMIN 500 MG 24 hr tablet    GLUCOPHAGE-XR    360 tablet    Take 2 tablets (1,000 mg) by mouth 2 times daily (with meals)    Type 2 diabetes mellitus without complication, without long-term current use of insulin (H)       multivitamin, therapeutic Tabs tablet      Take 1 tablet by mouth daily        TYLENOL PO      Take 1,000 mg by mouth every 8 hours as needed for mild pain or fever        VSL#3 Pack     30 each    Use 1-2 capsules/day    Chronic diarrhea

## 2018-10-26 ENCOUNTER — RADIANT APPOINTMENT (OUTPATIENT)
Dept: ULTRASOUND IMAGING | Facility: CLINIC | Age: 53
End: 2018-10-26
Attending: PODIATRIST
Payer: COMMERCIAL

## 2018-10-26 DIAGNOSIS — M79.672 PAIN OF LEFT HEEL: ICD-10-CM

## 2018-10-29 ENCOUNTER — TELEPHONE (OUTPATIENT)
Dept: PODIATRY | Facility: CLINIC | Age: 53
End: 2018-10-29

## 2018-10-29 NOTE — TELEPHONE ENCOUNTER
[facility] Call Center    Phone Message    May a detailed message be left on voicemail: yes    Reason for Call: Other: Patient received a boot for his foot last week on 10/24 but states one of the straps broke. Wondering if he can  a new one. Please advise [phone].      Action Taken: Message routed to:  Adult Clinics: Podiatry p [phone]

## 2018-10-29 NOTE — TELEPHONE ENCOUNTER
Called Pt back about boot, he reports that the top strap of the boot broke, he has only had it for 5 days. Spoke with [facility] and they stated that Pt can bring boot and paperwork from office visit (yellow carbon copy) to a showroom location and they will exchange the boot. Called Pt back and left a  with this information.    Also relayed on original phone call that per huddle with Dr. Pace regarding last US, left foot showed plantar fasciitis without tearing, right was unremarkable. Dr. Pace would like to discuss treatment at next appointment (no change at this time).    Jeronimo Boyle RN

## 2018-10-31 NOTE — PROGRESS NOTES
Placed @ [facility] on 10/20/18.  Keisha Adam R.N.    Adarsh Salvador presents to the clinic today for removal of sutures.  The patient has had the sutures in place for 13 days.  There has been history of infection or drainage.  11 sutures are seen located on the right index finger.  The wound is not healing well has signs of infection (purulent drainage on top of wound). Patient states that this is the worst it has looked and is very painful to touch. It measures 3 cm long x 1 cm wide.  There is various raw flesh areas around the wound.  Bertha Romero was brought in to view wound.  He will be added to her schedule and he was notified of this due to infections.  Instructions to remove sutures and place antibiotic ointment with breathable dressing.  Sutures were removed with minimal effort but it was painful for the patient.  Telfa placed over wound and tube gauze used. Tetanus status is up to date. Routine wound care discussed.  The patient will follow up next week per Bertha Romero if not looking better, patient verbalized good understanding.      Please see Bertha Camejo's 11/2/18 office note.      To provider for cosign.  Keisha Adam R.N.

## 2018-11-02 ENCOUNTER — OFFICE VISIT (OUTPATIENT)
Dept: FAMILY MEDICINE | Facility: CLINIC | Age: 53
End: 2018-11-02
Payer: COMMERCIAL

## 2018-11-02 ENCOUNTER — ALLIED HEALTH/NURSE VISIT (OUTPATIENT)
Dept: NURSING | Facility: CLINIC | Age: 53
End: 2018-11-02
Payer: COMMERCIAL

## 2018-11-02 VITALS
TEMPERATURE: 97.9 F | SYSTOLIC BLOOD PRESSURE: 152 MMHG | HEART RATE: 68 BPM | WEIGHT: 297 LBS | BODY MASS INDEX: 41.42 KG/M2 | OXYGEN SATURATION: 99 % | DIASTOLIC BLOOD PRESSURE: 88 MMHG

## 2018-11-02 VITALS
HEART RATE: 68 BPM | DIASTOLIC BLOOD PRESSURE: 88 MMHG | BODY MASS INDEX: 41.42 KG/M2 | TEMPERATURE: 97.9 F | SYSTOLIC BLOOD PRESSURE: 152 MMHG | OXYGEN SATURATION: 99 % | WEIGHT: 297 LBS

## 2018-11-02 DIAGNOSIS — L08.9 FINGER INFECTION: Primary | ICD-10-CM

## 2018-11-02 DIAGNOSIS — Z48.02 ENCOUNTER FOR REMOVAL OF SUTURES: ICD-10-CM

## 2018-11-02 PROCEDURE — 99213 OFFICE O/P EST LOW 20 MIN: CPT | Performed by: PHYSICIAN ASSISTANT

## 2018-11-02 NOTE — PROGRESS NOTES
SUBJECTIVE:   Adarsh Salvador is a 53 year old male who presents to clinic today for the following health issues:      Seen in emergency room on 1-20 for right index finger from a table saw, sustained a laceration. Was given several sutures which are to be removed today by the RN. Rn came to get me because patient has some concerns for infection also.   patient finished keflex on Friday and since then has had more pain and some drainage. The finger is also more edematous and changed in color per patient.  No fevers.  Patient does have DIABETES. This puts him at increased risk for infeciton.         Problem list and histories reviewed & adjusted, as indicated.  Additional history: as documented    Patient Active Problem List   Diagnosis     GERD (gastroesophageal reflux disease)     Kidney stone     Chronic RLQ pain     Constipation     Abdominal pain, right upper quadrant     Adult celiac disease     Chronic maxillary sinusitis     Chronic rhinitis     Pure hyperglyceridemia     Anaphylactic reaction     Benign essential hypertension     Anemia in other chronic diseases classified elsewhere     Fatty liver     Irritable bowel syndrome with diarrhea     Right inguinal hernia     BMI 40.0-44.9, adult (H)     Pain in thoracic spine     Mild persistent asthma without complication     Type 2 diabetes mellitus without complication, without long-term current use of insulin (H)     Hyperlipidemia LDL goal <100     Pain of left heel     Past Surgical History:   Procedure Laterality Date     C REMOVAL OF KIDNEY STONE       COLONOSCOPY  5/1/2012    Procedure:COLONOSCOPY; screening colonoscopy; Surgeon:KINGSLEY DOS SANTOS; Location: OR     COLONOSCOPY  4/21/2014    Procedure: COMBINED COLONOSCOPY, SINGLE BIOPSY/POLYPECTOMY BY BIOPSY;  Surgeon: Duane, William Charles, MD;  Location:  OR     HC BREATH HYDROGEN TEST  3/7/2014    Procedure: HYDROGEN BREATH TEST;  Surgeon: Darion Swift MD;  Location:  GI     ORTHOPEDIC  SURGERY      x3 Rt knee      RELEASE CARPAL TUNNEL Right 1/26/2018    Procedure: RELEASE CARPAL TUNNEL;  Right carpal tunnel release;  Surgeon: Wiliam Saenz MD;  Location:  OR       Social History   Substance Use Topics     Smoking status: Never Smoker     Smokeless tobacco: Current User     Types: Chew      Comment: Chew     Alcohol use 0.0 oz/week     0 Standard drinks or equivalent per week      Comment: occasional     Family History   Problem Relation Age of Onset     Cancer Mother 52     lung, smoked     Cancer - colorectal Father 53     unsure type, pt attributes to radiation exposure     Myocardial Infarction Father 45     Myocardial Infarction Paternal Grandfather 35     Diabetes Other      nephew- type 1     Diabetes Maternal Aunt      1     Diabetes Maternal Aunt      2     Diabetes Paternal Uncle      1     Diabetes Paternal Uncle      2     Diabetes Paternal Uncle      3     Diabetes Paternal Uncle      4         Current Outpatient Prescriptions   Medication Sig Dispense Refill     amoxicillin-clavulanate (AUGMENTIN) 875-125 MG per tablet Take 1 tablet by mouth 2 times daily for 10 days 20 tablet 0     Acetaminophen (TYLENOL PO) Take 1,000 mg by mouth every 8 hours as needed for mild pain or fever       albuterol (PROAIR HFA/PROVENTIL HFA/VENTOLIN HFA) 108 (90 BASE) MCG/ACT Inhaler Inhale 2 puffs into the lungs every 6 hours as needed for shortness of breath / dyspnea or wheezing 1 Inhaler 3     amLODIPine (NORVASC) 10 MG tablet Take 1 tablet (10 mg) by mouth daily 90 tablet 3     ASPIRIN PO Take 325 mg by mouth daily       atorvastatin (LIPITOR) 40 MG tablet Take 1 tablet (40 mg) by mouth daily 90 tablet 3     blood glucose monitoring (ACCU-CHEK FASTCLIX) lancets Use to test blood sugar 1 times daily or as directed.  Ok to substitute alternative if insurance prefers. 102 each 11     blood glucose monitoring (ACCU-CHEK GUIDE) test strip Use to test blood sugar 1 times daily or as directed. 100  strip 11     calcium carbonate (TUMS) 500 MG chewable tablet Take 1 chew tab by mouth daily       cyclobenzaprine (FLEXERIL) 10 MG tablet Take 0.5-1 tablets (5-10 mg) by mouth 3 times daily as needed for muscle spasms 90 tablet 1     dicyclomine (BENTYL) 20 MG tablet Take 1 tablet (20 mg) by mouth 4 times daily as needed 40 tablet 5     Dietary Management Product (VSL#3) PACK Use 1-2 capsules/day 30 each 3     diphenoxylate-atropine (LOMOTIL) 2.5-0.025 MG per tablet Take 2 tablets by mouth 4 times daily as needed for diarrhea 30 tablet 1     DULoxetine (CYMBALTA) 20 MG EC capsule Take 1 capsule (20 mg) by mouth See Admin Instructions Take one po q day for one week, then two po q day for one week, then three po q day. 90 capsule 1     EPINEPHrine (EPIPEN) 0.3 MG/0.3ML injection Inject 0.3 mLs (0.3 mg) into the muscle once as needed for anaphylaxis 2 each 2     escitalopram (LEXAPRO) 10 MG tablet Take 1 tablet (10 mg) by mouth daily 30 tablet 1     fluticasone-salmeterol (ADVAIR) 250-50 MCG/DOSE diskus inhaler Inhale 1 puff into the lungs 2 times daily 3 Inhaler 1     lisinopril (PRINIVIL/ZESTRIL) 20 MG tablet TAKE 1 TABLET EVERY DAY 90 tablet 1     metFORMIN (GLUCOPHAGE-XR) 500 MG 24 hr tablet Take 2 tablets (1,000 mg) by mouth 2 times daily (with meals) 360 tablet 1     multivitamin, therapeutic (THERA-VIT) TABS Take 1 tablet by mouth daily       Naproxen Sodium (ALEVE PO) Take by mouth as needed        Allergies   Allergen Reactions     Artificial Sweetner (Informational Only) [Artificial Sweetner (Informational Only) ] GI Disturbance     ALL artificial sweetners cause severe diarrhea & flu symptoms     Hydromorphone Anaphylaxis     Ibuprofen GI Disturbance     Morphine [Fumaric Acid] Anaphylaxis     Hydrocodone-Acetaminophen Itching     Tramadol Hives     Trazodone Hives     Gabapentin      GI upset per pt     Codeine Phosphate Itching     Ketorolac Tromethamine Rash       Reviewed and updated as needed this visit  by clinical staff       Reviewed and updated as needed this visit by Provider         ROS:  Constitutional, HEENT, cardiovascular, pulmonary, GI, , musculoskeletal, neuro, skin, endocrine and psych systems are negative, except as otherwise noted.    OBJECTIVE:     /86 (BP Location: Right arm)  Pulse 86  Temp 98.9  F (37.2  C) (Oral)    GENERAL: alert and no distress  RESP: lungs clear to auscultation - no rales, rhonchi or wheezes  CV: regular rate and rhythm, normal S1 S2, no S3 or S4, no murmur, click or rub, no peripheral edema and peripheral pulses strong  MS and skin and neuro: {:R index finger-sensation intact grossly. Several sutures present. Wound is well approximated but finger is edematous and has some erythema, is draining some white purulent drainage. Tender to touch. Sutures were removed by RN, area was soaked and cleansed.  We will start him on stronger antibiotics and have him f/u next week. To emergency room with worsening symptoms or fevers.   Symptoms are only around the sutures, not going into hand or forearm.         ASSESSMENT/PLAN:     ASSESSMENT / PLAN:  (L08.9) Finger infection  (primary encounter diagnosis)  Comment:   Plan: amoxicillin-clavulanate (AUGMENTIN) 875-125 MG         per tablet            Take with food  See above  Recheck early next week already scheduled    Bertha Romero PA-C  [facility]

## 2018-11-02 NOTE — MR AVS SNAPSHOT
After Visit Summary   11/2/2018    Adarsh Salvador    MRN: [id number]           Patient Information     Date Of Birth          1965        Visit Information        Provider Department      11/2/2018 10:00 AM Rn, Alexa [facility]        Today's Diagnoses     Finger infection    -  1    Encounter for removal of sutures           Follow-ups after your visit        Your next 10 appointments already scheduled     Nov 09, 2018 10:20 AM CST   Office Visit with Bertha Romero PA-C   [facility] ([facility])    [address] [facility] [address] [phone]           Bring a current list of meds and any records pertaining to this visit. For Physicals, please bring immunization records and any forms needing to be filled out. Please arrive 10 minutes early to complete paperwork.            Nov 14, 2018 10:00 AM CST   Return Visit with Quan Pace DPM   [facility])    [address] [place] [id number] [phone]            Nov 16, 2018  9:20 AM CST   Office Visit with Bertha Romero PA-C   [facility] ([facility])    [address] [facility] [address] [phone]           Bring a current list of meds and any records pertaining to this visit. For Physicals, please bring immunization records and any forms needing to be filled out. Please arrive 10 minutes early to complete paperwork.            Nov 28, 2018 10:30 AM CST   Return Visit with Wiliam Montalvo MD   [facility])    [address] [place] [id number] [phone]              Who to contact     If you have questions or need follow up information about today's clinic visit or your schedule please contact [facility] directly at [phone].  Normal or non-critical lab and imaging results will be  communicated to you by MyChart, letter or phone within 4 business days after the clinic has received the results. If you do not hear from us within 7 days, please contact the clinic through MyChart or phone. If you have a critical or abnormal lab result, we will notify you by phone as soon as possible.  Submit refill requests through [employer] or call your pharmacy and they will forward the refill request to us. Please allow 3 business days for your refill to be completed.          Additional Information About Your Visit        Care EveryWhere ID     This is your Care EveryWhere ID. This could be used by other organizations to access your [place] medical records  [id number]        Your Vitals Were     Pulse Temperature Pulse Oximetry BMI (Body Mass Index)          68 97.9  F (36.6  C) (Oral) 99% 41.42 kg/m2         Blood Pressure from Last 3 Encounters:   11/02/18 152/88   11/02/18 152/88   10/25/18 135/80    Weight from Last 3 Encounters:   11/02/18 297 lb (134.7 kg)   11/02/18 297 lb (134.7 kg)   10/25/18 299 lb (135.6 kg)              Today, you had the following     No orders found for display         Today's Medication Changes          These changes are accurate as of 11/2/18 12:26 PM.  If you have any questions, ask your nurse or doctor.               Start taking these medicines.        Dose/Directions    amoxicillin-clavulanate 875-125 MG per tablet   Commonly known as:  AUGMENTIN   Used for:  Finger infection   Started by:  Bertha Romero PA-C        Dose:  1 tablet   Take 1 tablet by mouth 2 times daily for 10 days   Quantity:  20 tablet   Refills:  0            Where to get your medicines      Some of these will need a paper prescription and others can be bought over the counter.  Ask your nurse if you have questions.     Bring a paper prescription for each of these medications     amoxicillin-clavulanate 875-125 MG per tablet                Primary Care Provider Office Phone # Fax #    Bertha  Placido Romero PA-C [phone]       [address] [facility] [phone]        Equal Access to Services     SIMONE ZAYAS : Hadpaul mo johnson baileyo Sofeliciaali, waaracelida luqadaha, qaybta kaalmada lisa, victor hugo stjefry fritzvioletta nazario kala machado. So [facility] [phone].    ATENCIÓN: Si habla español, tiene a lyons disposición servicios gratuitos de asistencia lingüística. Llame al [phone].    We comply with applicable federal civil rights laws and [place] laws. We do not discriminate on the basis of race, color, national origin, age, disability, sex, sexual orientation, or gender identity.            Thank you!     Thank you for choosing [facility]  for your care. Our goal is always to provide you with excellent care. Hearing back from our patients is one way we can continue to improve our services. Please take a few minutes to complete the written survey that you may receive in the mail after your visit with us. Thank you!             Your Updated Medication List - Protect others around you: Learn how to safely use, store and throw away your medicines at www.disposemymeds.org.          This list is accurate as of 11/2/18 12:26 PM.  Always use your most recent med list.                   Brand Name Dispense Instructions for use Diagnosis    albuterol 108 (90 Base) MCG/ACT inhaler    PROAIR HFA/PROVENTIL HFA/VENTOLIN HFA    1 Inhaler    Inhale 2 puffs into the lungs every 6 hours as needed for shortness of breath / dyspnea or wheezing    Cough       ALEVE PO      Take by mouth as needed        amLODIPine 10 MG tablet    NORVASC    90 tablet    Take 1 tablet (10 mg) by mouth daily    Hypertension goal BP (blood pressure) < 140/90       amoxicillin-clavulanate 875-125 MG per tablet    AUGMENTIN    20 tablet    Take 1 tablet by mouth 2 times daily for 10 days    Finger infection       ASPIRIN PO      Take 325 mg by mouth daily        atorvastatin 40 MG tablet    LIPITOR    90 tablet     Take 1 tablet (40 mg) by mouth daily    Hyperlipidemia LDL goal <160       blood glucose monitoring lancets     102 each    Use to test blood sugar 1 times daily or as directed.  Ok to substitute alternative if insurance prefers.    Type 2 diabetes mellitus without complication, without long-term current use of insulin (H)       blood glucose monitoring test strip    ACCU-CHEK GUIDE    100 strip    Use to test blood sugar 1 times daily or as directed.    Type 2 diabetes mellitus without complication, without long-term current use of insulin (H)       calcium carbonate 500 MG chewable tablet    TUMS     Take 1 chew tab by mouth daily        cyclobenzaprine 10 MG tablet    FLEXERIL    90 tablet    Take 0.5-1 tablets (5-10 mg) by mouth 3 times daily as needed for muscle spasms    Back muscle spasm       dicyclomine 20 MG tablet    BENTYL    40 tablet    Take 1 tablet (20 mg) by mouth 4 times daily as needed    Irritable bowel syndrome with diarrhea, Chronic diarrhea       diphenoxylate-atropine 2.5-0.025 MG per tablet    LOMOTIL    30 tablet    Take 2 tablets by mouth 4 times daily as needed for diarrhea    Chronic diarrhea, Irritable bowel syndrome with diarrhea       DULoxetine 20 MG EC capsule    CYMBALTA    90 capsule    Take 1 capsule (20 mg) by mouth See Admin Instructions Take one po q day for one week, then two po q day for one week, then three po q day.    Diabetic polyneuropathy associated with type 2 diabetes mellitus (H)       EPINEPHrine 0.3 MG/0.3ML injection 2-pack    EPIPEN/ADRENACLICK/or ANY BX GENERIC EQUIV    2 each    Inject 0.3 mLs (0.3 mg) into the muscle once as needed for anaphylaxis    Anaphylactic reaction       escitalopram 10 MG tablet    LEXAPRO    30 tablet    Take 1 tablet (10 mg) by mouth daily    Anxiety       fluticasone-salmeterol 250-50 MCG/DOSE diskus inhaler    ADVAIR    3 Inhaler    Inhale 1 puff into the lungs 2 times daily    Cough       lisinopril 20 MG tablet     PRINIVIL/ZESTRIL    90 tablet    TAKE 1 TABLET EVERY DAY    Hypertension goal BP (blood pressure) < 140/90       metFORMIN 500 MG 24 hr tablet    GLUCOPHAGE-XR    360 tablet    Take 2 tablets (1,000 mg) by mouth 2 times daily (with meals)    Type 2 diabetes mellitus without complication, without long-term current use of insulin (H)       multivitamin, therapeutic Tabs tablet      Take 1 tablet by mouth daily        TYLENOL PO      Take 1,000 mg by mouth every 8 hours as needed for mild pain or fever        VSL#3 Pack     30 each    Use 1-2 capsules/day    Chronic diarrhea

## 2018-11-02 NOTE — MR AVS SNAPSHOT
After Visit Summary   11/2/2018    Adarsh Salvador    MRN: [id number]           Patient Information     Date Of Birth          1965        Visit Information        Provider Department      11/2/2018 10:40 AM Bertha Romero PA-C [facility]        Today's Diagnoses     Finger infection    -  1       Follow-ups after your visit        Your next 10 appointments already scheduled     Nov 02, 2018 10:40 AM CDT   Office Visit with Bertha Romero PA-C   [facility] ([facility])    [address] [facility] [address] [phone]           Bring a current list of meds and any records pertaining to this visit. For Physicals, please bring immunization records and any forms needing to be filled out. Please arrive 10 minutes early to complete paperwork.            Nov 09, 2018 10:20 AM CST   Office Visit with Bertha Romero PA-C   [facility] ([facility])    [address] [facility] [address] [phone]           Bring a current list of meds and any records pertaining to this visit. For Physicals, please bring immunization records and any forms needing to be filled out. Please arrive 10 minutes early to complete paperwork.            Nov 14, 2018 10:00 AM CST   Return Visit with Quan Pace DPM   [facility] ([facility])    [address] [place] [phone]   [phone]            Nov 16, 2018  9:20 AM CST   Office Visit with Bertha Romero PA-C   [facility] ([facility])    [address] [facility] [address] [phone]           Bring a current list of meds and any records pertaining to this visit. For Physicals, please bring immunization records and any forms needing to be filled out. Please arrive 10 minutes early to complete paperwork.            Nov 28, 2018 10:30 AM CST   Return  Visit with Wiliam [facility]vo MD   [facility]    [address] [place] [address] [phone]              Who to contact     If you have questions or need follow up information about today's clinic visit or your schedule please contact [facility] directly at [phone].  Normal or non-critical lab and imaging results will be communicated to you by MyChart, letter or phone within 4 business days after the clinic has received the results. If you do not hear from us within 7 days, please contact the clinic through MyChart or phone. If you have a critical or abnormal lab result, we will notify you by phone as soon as possible.  Submit refill requests through [employer] or call your pharmacy and they will forward the refill request to us. Please allow 3 business days for your refill to be completed.          Additional Information About Your Visit        Care EveryWhere ID     This is your Care EveryWhere ID. This could be used by other organizations to access your [place] medical records  [id number]         Blood Pressure from Last 3 Encounters:   10/25/18 135/80   10/24/18 127/81   10/09/18 127/84    Weight from Last 3 Encounters:   10/25/18 299 lb (135.6 kg)   10/24/18 296 lb 8 oz (134.5 kg)   10/09/18 300 lb 9.6 oz (136.4 kg)              Today, you had the following     No orders found for display         Today's Medication Changes          These changes are accurate as of 11/2/18 10:30 AM.  If you have any questions, ask your nurse or doctor.               Start taking these medicines.        Dose/Directions    amoxicillin-clavulanate 875-125 MG per tablet   Commonly known as:  AUGMENTIN   Used for:  Finger infection   Started by:  Bertha Romero PA-C        Dose:  1 tablet   Take 1 tablet by mouth 2 times daily for 10 days   Quantity:  20 tablet   Refills:  0            Where to get your medicines      Some of these  will need a paper prescription and others can be bought over the counter.  Ask your nurse if you have questions.     Bring a paper prescription for each of these medications     amoxicillin-clavulanate 875-125 MG per tablet                Primary Care Provider Office Phone # Fax #    Bertha Romero PA-C [phone] [address] [facility] [address]        Equal Access to Services     SIMONE ZAYAS : Hadii aad ku hadasho Soomaali, waaxda luqadaha, qaybta kaalmada adeegyada, waxay idiin hayaan adeeg khkymberly laRaymondave . So [facility] [phone].    ATENCIÓN: Si habla esphuber, tiene a lyons disposición servicios gratuitos de asistencia lingüística. Llame al [phone].    We comply with applicable federal civil rights laws and [place] laws. We do not discriminate on the basis of race, color, national origin, age, disability, sex, sexual orientation, or gender identity.            Thank you!     Thank you for choosing [facility]  for your care. Our goal is always to provide you with excellent care. Hearing back from our patients is one way we can continue to improve our services. Please take a few minutes to complete the written survey that you may receive in the mail after your visit with us. Thank you!             Your Updated Medication List - Protect others around you: Learn how to safely use, store and throw away your medicines at www.disposemymeds.org.          This list is accurate as of 11/2/18 10:30 AM.  Always use your most recent med list.                   Brand Name Dispense Instructions for use Diagnosis    albuterol 108 (90 Base) MCG/ACT inhaler    PROAIR HFA/PROVENTIL HFA/VENTOLIN HFA    1 Inhaler    Inhale 2 puffs into the lungs every 6 hours as needed for shortness of breath / dyspnea or wheezing    Cough       ALEVE PO      Take by mouth as needed        amLODIPine 10 MG tablet    NORVASC    90 tablet    Take 1 tablet (10 mg) by mouth daily    Hypertension  goal BP (blood pressure) < 140/90       amoxicillin-clavulanate 875-125 MG per tablet    AUGMENTIN    20 tablet    Take 1 tablet by mouth 2 times daily for 10 days    Finger infection       ASPIRIN PO      Take 325 mg by mouth daily        atorvastatin 40 MG tablet    LIPITOR    90 tablet    Take 1 tablet (40 mg) by mouth daily    Hyperlipidemia LDL goal <160       blood glucose monitoring lancets     102 each    Use to test blood sugar 1 times daily or as directed.  Ok to substitute alternative if insurance prefers.    Type 2 diabetes mellitus without complication, without long-term current use of insulin (H)       blood glucose monitoring test strip    ACCU-CHEK GUIDE    100 strip    Use to test blood sugar 1 times daily or as directed.    Type 2 diabetes mellitus without complication, without long-term current use of insulin (H)       calcium carbonate 500 MG chewable tablet    TUMS     Take 1 chew tab by mouth daily        cyclobenzaprine 10 MG tablet    FLEXERIL    90 tablet    Take 0.5-1 tablets (5-10 mg) by mouth 3 times daily as needed for muscle spasms    Back muscle spasm       dicyclomine 20 MG tablet    BENTYL    40 tablet    Take 1 tablet (20 mg) by mouth 4 times daily as needed    Irritable bowel syndrome with diarrhea, Chronic diarrhea       diphenoxylate-atropine 2.5-0.025 MG per tablet    LOMOTIL    30 tablet    Take 2 tablets by mouth 4 times daily as needed for diarrhea    Chronic diarrhea, Irritable bowel syndrome with diarrhea       DULoxetine 20 MG EC capsule    CYMBALTA    90 capsule    Take 1 capsule (20 mg) by mouth See Admin Instructions Take one po q day for one week, then two po q day for one week, then three po q day.    Diabetic polyneuropathy associated with type 2 diabetes mellitus (H)       EPINEPHrine 0.3 MG/0.3ML injection 2-pack    EPIPEN/ADRENACLICK/or ANY BX GENERIC EQUIV    2 each    Inject 0.3 mLs (0.3 mg) into the muscle once as needed for anaphylaxis    Anaphylactic reaction        escitalopram 10 MG tablet    LEXAPRO    30 tablet    Take 1 tablet (10 mg) by mouth daily    Anxiety       fluticasone-salmeterol 250-50 MCG/DOSE diskus inhaler    ADVAIR    3 Inhaler    Inhale 1 puff into the lungs 2 times daily    Cough       lisinopril 20 MG tablet    PRINIVIL/ZESTRIL    90 tablet    TAKE 1 TABLET EVERY DAY    Hypertension goal BP (blood pressure) < 140/90       metFORMIN 500 MG 24 hr tablet    GLUCOPHAGE-XR    360 tablet    Take 2 tablets (1,000 mg) by mouth 2 times daily (with meals)    Type 2 diabetes mellitus without complication, without long-term current use of insulin (H)       multivitamin, therapeutic Tabs tablet      Take 1 tablet by mouth daily        TYLENOL PO      Take 1,000 mg by mouth every 8 hours as needed for mild pain or fever        VSL#3 Pack     30 each    Use 1-2 capsules/day    Chronic diarrhea

## 2018-11-06 DIAGNOSIS — I10 HYPERTENSION GOAL BP (BLOOD PRESSURE) < 140/90: ICD-10-CM

## 2018-11-07 RX ORDER — LISINOPRIL 20 MG/1
TABLET ORAL
Qty: 90 TABLET | Refills: 1 | Status: SHIPPED | OUTPATIENT
Start: 2018-11-07 | End: 2019-06-12

## 2018-11-12 RX ORDER — DULOXETIN HYDROCHLORIDE 60 MG/1
60 CAPSULE, DELAYED RELEASE ORAL DAILY
Qty: 90 CAPSULE | Refills: 3 | Status: SHIPPED | OUTPATIENT
Start: 2018-11-12 | End: 2019-11-12

## 2018-11-14 ENCOUNTER — OFFICE VISIT (OUTPATIENT)
Dept: PODIATRY | Facility: CLINIC | Age: 53
End: 2018-11-14
Payer: COMMERCIAL

## 2018-11-14 VITALS
SYSTOLIC BLOOD PRESSURE: 132 MMHG | RESPIRATION RATE: 18 BRPM | DIASTOLIC BLOOD PRESSURE: 82 MMHG | OXYGEN SATURATION: 96 % | HEART RATE: 70 BPM

## 2018-11-14 DIAGNOSIS — M72.2 PLANTAR FASCIITIS OF LEFT FOOT: Primary | ICD-10-CM

## 2018-11-14 PROCEDURE — 99213 OFFICE O/P EST LOW 20 MIN: CPT | Performed by: PODIATRIST

## 2018-11-14 ASSESSMENT — PAIN SCALES - GENERAL: PAINLEVEL: MILD PAIN (2)

## 2018-11-14 NOTE — PROGRESS NOTES
Chief Complaint:   Chief Complaint   Patient presents with     RECHECK     3 week follow up Left heel pain          Allergies   Allergen Reactions     Artificial Sweetner (Informational Only) [Artificial Sweetner (Informational Only) ] GI Disturbance     ALL artificial sweetners cause severe diarrhea & flu symptoms     Hydromorphone Anaphylaxis     Ibuprofen GI Disturbance     Morphine [Fumaric Acid] Anaphylaxis     Hydrocodone-Acetaminophen Itching     Tramadol Hives     Trazodone Hives     Gabapentin      GI upset per pt     Codeine Phosphate Itching     Ketorolac Tromethamine Rash         Subjective: Adarsh is a 53 year old male who presents to the clinic today for a follow up of left plantar fasciitis. He continues to wear the boot as directed. He relates that the pain has significantly decreased. However, he does have pain after taking the boot off and walking. Pain takes a couple of hours to kick in when the boot is off. Finger laceration is still healing. He did have the US performed.     Objective  Data Unavailable 70 18 132/82 Data Unavailable 0 lbs 0 oz  Impression:   1. Thickening of the left proximal plantar fascia at the calcaneal  insertion indicating plantar fasciitis. There is no evidence for  full-thickness tear.  2. Right plantar fascia is ultrasonographically unremarkable.     I have personally reviewed the examination and initial interpretation  and I agree with the findings.     KINGSLEY COREY MD    Some discomfort noted with palpation of the medial attachment of the plantar fascia on the calcaneus on the left foot. No pain medially or along the medial band of the plantar fascia. No edema or ecchymosis. No pain along the PT, peroneal, or Achilles tendons on the left.    Assessment: left foot plantar fasciitis    Plan:   - Pt seen and evaluated  - Recommend waiting for steroid injections until finger has healed. Recommendations: Night splint to use nightly. Voltaren gel BID PRN. Use the CAM for  another 2 weeks, then transition into a regular shoe.   - Pt to return to clinic in 1 month.

## 2018-11-14 NOTE — NURSING NOTE
Adarsh Salvador's chief complaint for this visit includes:  Chief Complaint   Patient presents with     RECHECK     3 week follow up Left heel pain     PCP: Bertha Romero    Referring Provider:  No referring provider defined for this encounter.    /82 (BP Location: Left arm, Patient Position: Sitting, Cuff Size: Adult Large)  Pulse 70  Resp 18  SpO2 96%  Mild Pain (2)     Do you need any medication refills at today's visit? No

## 2018-11-14 NOTE — MR AVS SNAPSHOT
After Visit Summary   2018    Adarsh Salvador    MRN: [id number]           Patient Information     Date Of Birth          1965        Visit Information        Provider Department      2018 10:00 AM Quan Pace DPM [facility]        Today's Diagnoses     Plantar fasciitis of left foot    -  1      Care Instructions    Thanks for coming today.  [facility]  [address] [place] [address]    To schedule future appointments in Ortho Clinic, you may call [phone].    To schedule ordered imaging by your provider:   Call Central Imaging [phone]    To schedule an injection ordered by your provider:  Call Central Imaging Injection scheduling line: [phone]  Endeka Grouphart available online at:  [email or web address]    Please call if any further questions or concerns ([phone]).  Clinic hours 8 am to 5 pm.    Return to clinic (call) if symptoms worsen or fail to improve.            Follow-ups after your visit        Additional Services     PHYSICAL THERAPY REFERRAL (Internal)       Physical Therapy Referral                  Your next 10 appointments already scheduled     2018  9:20 AM CST   Office Visit with Bertha Romero PA-C   [facility] ([facility])    [address] [facility] [address] [phone]           Bring a current list of meds and any records pertaining to this visit. For Physicals, please bring immunization records and any forms needing to be filled out. Please arrive 10 minutes early to complete paperwork.            2018 10:10 AM CST   (Arrive by 9:55 AM)   [facility] Extremity with Bull Contreras, PT   [facility] ([facility]  )    [address] [facility] [address]   [phone]            2018 10:30 AM CST   Return Visit with Wiliam Montalvo MD   [facility]  [facility])    [address] [place] [address] [phone]            Dec 12, 2018 10:00 AM CST   Return Visit with Quan Pace DPM   [facility] ([facility])    [address] [place] [id number] [phone]              Future tests that were ordered for you today     Open Future Orders        Priority Expected Expires Ordered    PHYSICAL THERAPY REFERRAL (Internal) Routine  11/14/2019 11/14/2018            Who to contact     If you have questions or need follow up information about today's clinic visit or your schedule please contact [facility] directly at [phone].  Normal or non-critical lab and imaging results will be communicated to you by MyChart, letter or phone within 4 business days after the clinic has received the results. If you do not hear from us within 7 days, please contact the clinic through MyChart or phone. If you have a critical or abnormal lab result, we will notify you by phone as soon as possible.  Submit refill requests through [email or web address] or call your pharmacy and they will forward the refill request to us. Please allow 3 business days for your refill to be completed.          Additional Information About Your Visit        Care EveryWhere ID     This is your Care EveryWhere ID. This could be used by other organizations to access your [place] medical records  [id number]        Your Vitals Were     Pulse Respirations Pulse Oximetry             70 18 96%          Blood Pressure from Last 3 Encounters:   11/14/18 132/82   11/02/18 152/88   11/02/18 152/88    Weight from Last 3 Encounters:   11/02/18 134.7 kg (297 lb)   11/02/18 134.7 kg (297 lb)   10/25/18 135.6 kg (299 lb)                 Today's Medication Changes          These changes are accurate as of 11/14/18 11:06 AM.  If you have any questions, ask your nurse or doctor.               Start taking these medicines.         Dose/Directions    diclofenac 1 % Gel topical gel   Commonly known as:  VOLTAREN   Used for:  Plantar fasciitis of left foot   Started by:  Quan Pace DPM        Apply 2 grams to feet two times daily using enclosed dosing card.   Quantity:  100 g   Refills:  1            Where to get your medicines      These medications were sent to [facility]/pharmacy #[id number] - [place] - [address] [facility]  [address], [place] [address]     Phone:  [phone]     diclofenac 1 % Gel topical gel                Primary Care Provider Office Phone # Fax #    Bertha Romero PA-C [phone] [address] [facility] [address]        Equal Access to Services     SIMONE ZAYAS : Edwige Carter, waaxda harleen, qaybta kaalmada lisa, victor hugo machado. So [facility] [phone].    ATENCIÓN: Si habla español, tiene a lyons disposición servicios gratuitos de asistencia lingüística. Llame al [phone].    We comply with applicable federal civil rights laws and [place] laws. We do not discriminate on the basis of race, color, national origin, age, disability, sex, sexual orientation, or gender identity.            Thank you!     Thank you for choosing [facility]  for your care. Our goal is always to provide you with excellent care. Hearing back from our patients is one way we can continue to improve our services. Please take a few minutes to complete the written survey that you may receive in the mail after your visit with us. Thank you!             Your Updated Medication List - Protect others around you: Learn how to safely use, store and throw away your medicines at www.disposemymeds.org.          This list is accurate as of 11/14/18 11:06 AM.  Always use your most recent med list.                   Brand Name Dispense Instructions for use Diagnosis    albuterol 108 (90 Base) MCG/ACT inhaler    PROAIR HFA/PROVENTIL HFA/VENTOLIN HFA     1 Inhaler    Inhale 2 puffs into the lungs every 6 hours as needed for shortness of breath / dyspnea or wheezing    Cough       ALEVE PO      Take by mouth as needed        amLODIPine 10 MG tablet    NORVASC    90 tablet    Take 1 tablet (10 mg) by mouth daily    Hypertension goal BP (blood pressure) < 140/90       ASPIRIN PO      Take 325 mg by mouth daily        atorvastatin 40 MG tablet    LIPITOR    90 tablet    Take 1 tablet (40 mg) by mouth daily    Hyperlipidemia LDL goal <160       blood glucose monitoring lancets     102 each    Use to test blood sugar 1 times daily or as directed.  Ok to substitute alternative if insurance prefers.    Type 2 diabetes mellitus without complication, without long-term current use of insulin (H)       blood glucose monitoring test strip    ACCU-CHEK GUIDE    100 strip    Use to test blood sugar 1 times daily or as directed.    Type 2 diabetes mellitus without complication, without long-term current use of insulin (H)       calcium carbonate 500 MG chewable tablet    TUMS     Take 1 chew tab by mouth daily        cyclobenzaprine 10 MG tablet    FLEXERIL    90 tablet    Take 0.5-1 tablets (5-10 mg) by mouth 3 times daily as needed for muscle spasms    Back muscle spasm       diclofenac 1 % Gel topical gel    VOLTAREN    100 g    Apply 2 grams to feet two times daily using enclosed dosing card.    Plantar fasciitis of left foot       dicyclomine 20 MG tablet    BENTYL    40 tablet    Take 1 tablet (20 mg) by mouth 4 times daily as needed    Irritable bowel syndrome with diarrhea, Chronic diarrhea       diphenoxylate-atropine 2.5-0.025 MG per tablet    LOMOTIL    30 tablet    Take 2 tablets by mouth 4 times daily as needed for diarrhea    Chronic diarrhea, Irritable bowel syndrome with diarrhea       DULoxetine 60 MG EC capsule    CYMBALTA    90 capsule    Take 1 capsule (60 mg) by mouth daily    Diabetic polyneuropathy associated with type 2 diabetes mellitus (H)        EPINEPHrine 0.3 MG/0.3ML injection 2-pack    EPIPEN/ADRENACLICK/or ANY BX GENERIC EQUIV    2 each    Inject 0.3 mLs (0.3 mg) into the muscle once as needed for anaphylaxis    Anaphylactic reaction       escitalopram 10 MG tablet    LEXAPRO    30 tablet    Take 1 tablet (10 mg) by mouth daily    Anxiety       fluticasone-salmeterol 250-50 MCG/DOSE diskus inhaler    ADVAIR    3 Inhaler    Inhale 1 puff into the lungs 2 times daily    Cough       lisinopril 20 MG tablet    PRINIVIL/ZESTRIL    90 tablet    TAKE 1 TABLET BY MOUTH EVERY DAY    Hypertension goal BP (blood pressure) < 140/90       metFORMIN 500 MG 24 hr tablet    GLUCOPHAGE-XR    360 tablet    Take 2 tablets (1,000 mg) by mouth 2 times daily (with meals)    Type 2 diabetes mellitus without complication, without long-term current use of insulin (H)       multivitamin, therapeutic Tabs tablet      Take 1 tablet by mouth daily        TYLENOL PO      Take 1,000 mg by mouth every 8 hours as needed for mild pain or fever        VSL#3 Pack     30 each    Use 1-2 capsules/day    Chronic diarrhea

## 2018-11-14 NOTE — PATIENT INSTRUCTIONS
Thanks for coming today.  [facility] Clinic  [address] [place] [address]    To schedule future appointments in Ortho Clinic, you may call [phone].    To schedule ordered imaging by your provider:   Call Central Imaging [phone]    To schedule an injection ordered by your provider:  Call Central Imaging Injection scheduling line: [phone]  [email or web address] available online at:  [email or web address]    Please call if any further questions or concerns ([phone]).  Clinic hours 8 am to 5 pm.    Return to clinic (call) if symptoms worsen or fail to improve.

## 2018-11-20 ENCOUNTER — THERAPY VISIT (OUTPATIENT)
Dept: PHYSICAL THERAPY | Facility: CLINIC | Age: 53
End: 2018-11-20
Payer: COMMERCIAL

## 2018-11-20 DIAGNOSIS — M79.672 PAIN OF LEFT HEEL: Primary | ICD-10-CM

## 2018-11-20 DIAGNOSIS — M72.2 PLANTAR FASCIITIS OF LEFT FOOT: ICD-10-CM

## 2018-11-20 PROCEDURE — 97161 PT EVAL LOW COMPLEX 20 MIN: CPT | Mod: GP | Performed by: PHYSICAL THERAPIST

## 2018-11-20 PROCEDURE — 97112 NEUROMUSCULAR REEDUCATION: CPT | Mod: GP | Performed by: PHYSICAL THERAPIST

## 2018-11-20 NOTE — PROGRESS NOTES
[facility] Initial Evaluation  Subjective:  Patient is a 53 year old male presenting with rehab left ankle/foot hpi.   Adarsh Salvador is a 53 year old male with a left foot condition.  Occurance: Just woke one day and foot was hurting like crazy.   Condition occurred: for unknown reasons.  This is a new condition  Been dealing with the pain now for to 2 to 2.5 months. 11/14/18.    Patient reports pain:  Medial calcaneal tuberosity.  Radiates to:  Foot.   and is constant and reported as 8/10.  Associated symptoms:  Loss of motion/stiffness and buckling/giving out. Pain is worse in the P.M..  Exacerbated by: first step in the morning and prolonged standing at work at night. Relieved by: boot does seem to be helping.   Since onset symptoms are gradually improving.  Special tests:  X-ray (US thickening of Plantar fasica insertion).      General health as reported by patient is fair.  Pertinent medical history includes:  Implanted devices, asthma, depression, diabetes, high blood pressure, incontinence, migraines/headaches, numbness/tingling and overweight.  Medical allergies: no.  Other surgeries include:  Orthopedic surgery and other (kidney stone, Kidney stone hernia).  Current medications:  Anti-depressants, high blood pressure medication and muscle relaxants.  Current occupation is .  Patient is working in normal job without restrictions.  Primary job tasks include:  Repetitive tasks (standing).    Barriers include:  None as reported by patient.    Red flags:  None as reported by patient.                        Objective:  Standing Alignment:                Ankle/Foot:  Pes planus L and pes planus R    Gait:    Gait Type:  Antalgic   Assistive Devices:  CAM            Ankle/Foot Evaluation  ROM:    AROM:    Dorsiflexion:  Left:   12  Right:   6  Plantarflexion:  Left:  54    Right:  54  Inversion:  Left:  58     Right:  54  Eversion:  20     Right:  12        Strength:    Dorsiflexion:  Left:  5/5   Strong/pain free    Pain:   Right: 5/5   Strong/pain free  Pain:  Plantarflexion: Left: 4/5   Weak/pain free  Pain:   Right: 5/5  Strong/pain free  Pain:  Inversion:Left: 5/5  Strong/pain free  Pain:     Right: 5/5  Strong/pain free  Pain:  Eversion:Left: 5/5  Strong/painful  Pain:  Right: 5/5  Strong/pain free  Pain:  Flexion Great Toe:Left: 5/5  Strong/painful  Pain:  Right: 5/5   Strong/pain free  Pain:  Extension Great Toe:Right: 5/5  Strong/pain free  Pain:              LIGAMENT TESTING: normal                PALPATION:   Left ankle tenderness present at:  plantar fascia and medial calcaneal  Left ankle tenderness not present at:   achilles tendon; anterior tibialis; posterior tibialis or peroneals    Right ankle tenderness not present at:  posterior tibialis                                                        General     ROS    Assessment/Plan:    Patient is a 53 year old male with left foot complaints.    Patient has the following significant findings with corresponding treatment plan.                Diagnosis 1:  Left heel pain / plantar fasciitis  Pain -  hot/cold therapy, US, manual therapy, self management, education, directional preference exercise and home program  Decreased strength - therapeutic exercise, therapeutic activities and home program  Impaired gait - gait training and home program  Decreased function - therapeutic activities and home program  Impaired posture - neuro re-education, therapeutic activities and home program    Therapy Evaluation Codes:   1) History comprised of:   Personal factors that impact the plan of care:      Profession.    Comorbidity factors that impact the plan of care are:      Asthma, Diabetes, Depression, High blood pressure, Migraines/headaches, Numbness/tingling and Overweight.     Medications impacting care: Anti-depressant, High blood pressure and Muscle relaxant.  2) Examination of Body Systems comprised of:   Body structures and functions that impact  the plan of care:      foot.   Activity limitations that impact the plan of care are:      Standing, Walking and Working.  3) Clinical presentation characteristics are:   Stable/Uncomplicated.  4) Decision-Making    Low complexity using standardized patient assessment instrument and/or measureable assessment of functional outcome.  Cumulative Therapy Evaluation is: Low complexity.    Previous and current functional limitations:  (See Goal Flow Sheet for this information)    Short term and Long term goals: (See Goal Flow Sheet for this information)     Communication ability:  Patient appears to be able to clearly communicate and understand verbal and written communication and follow directions correctly.  Treatment Explanation - The following has been discussed with the patient:   RX ordered/plan of care  Anticipated outcomes  Possible risks and side effects  This patient would benefit from PT intervention to resume normal activities.   Rehab potential is good.    Frequency:  1 X week, once daily  Duration:  for 8 weeks  Discharge Plan:  Achieve all LTG.  Independent in home treatment program.  Reach maximal therapeutic benefit.    Please refer to the daily flowsheet for treatment today, total treatment time and time spent performing 1:1 timed codes.

## 2018-11-23 ENCOUNTER — OFFICE VISIT (OUTPATIENT)
Dept: FAMILY MEDICINE | Facility: OTHER | Age: 53
End: 2018-11-23
Payer: COMMERCIAL

## 2018-11-23 VITALS
WEIGHT: 298 LBS | DIASTOLIC BLOOD PRESSURE: 70 MMHG | TEMPERATURE: 98.4 F | SYSTOLIC BLOOD PRESSURE: 116 MMHG | BODY MASS INDEX: 41.56 KG/M2 | OXYGEN SATURATION: 98 % | RESPIRATION RATE: 16 BRPM | HEART RATE: 90 BPM

## 2018-11-23 DIAGNOSIS — S61.200S: Primary | ICD-10-CM

## 2018-11-23 LAB
CRP SERPL-MCNC: <2.9 MG/L (ref 0–8)
ERYTHROCYTE [DISTWIDTH] IN BLOOD BY AUTOMATED COUNT: 14.3 % (ref 10–15)
HCT VFR BLD AUTO: 37.9 % (ref 40–53)
HGB BLD-MCNC: 12.3 G/DL (ref 13.3–17.7)
MCH RBC QN AUTO: 29.1 PG (ref 26.5–33)
MCHC RBC AUTO-ENTMCNC: 32.5 G/DL (ref 31.5–36.5)
MCV RBC AUTO: 90 FL (ref 78–100)
PLATELET # BLD AUTO: 221 10E9/L (ref 150–450)
RBC # BLD AUTO: 4.23 10E12/L (ref 4.4–5.9)
WBC # BLD AUTO: 7.8 10E9/L (ref 4–11)

## 2018-11-23 PROCEDURE — 99213 OFFICE O/P EST LOW 20 MIN: CPT | Performed by: FAMILY MEDICINE

## 2018-11-23 PROCEDURE — 36415 COLL VENOUS BLD VENIPUNCTURE: CPT | Performed by: FAMILY MEDICINE

## 2018-11-23 PROCEDURE — 86140 C-REACTIVE PROTEIN: CPT | Performed by: FAMILY MEDICINE

## 2018-11-23 PROCEDURE — 85027 COMPLETE CBC AUTOMATED: CPT | Performed by: FAMILY MEDICINE

## 2018-11-23 ASSESSMENT — PAIN SCALES - GENERAL: PAINLEVEL: SEVERE PAIN (6)

## 2018-11-23 NOTE — MR AVS SNAPSHOT
After Visit Summary   11/23/2018    Adarsh Salvador    MRN: [id number]           Patient Information     Date Of Birth          1965        Visit Information        Provider Department      11/23/2018 1:40 PM Erica Suarez MD [facility]        Today's Diagnoses     Open wound of right index finger without damage to nail, sequela    -  1       Follow-ups after your visit        Your next 10 appointments already scheduled     Nov 27, 2018 10:50 AM CST   RAMOS Extremity with Bull Contreras, PT   [facility] Physical Therapy ([facility]  )    [address] [facility] [address] [phone]            Nov 28, 2018 10:30 AM CST   Return Visit with Wiliam Montalvo MD   [facility] ([facility])    [address] [facility] [id number] [phone]            Dec 04, 2018 10:50 AM CST   RAMOS Extremity with Bull Contreras, PT   [facility] Physical Therapy ([facility]  )    [address] [facility] [phone] [phone]            Dec 11, 2018 10:50 AM CST   RAMOS Extremity with Bull Contreras, PT   [facility] Physical Therapy ([facility]  )    [address] [facility] [phone] [phone]            Dec 12, 2018 10:00 AM CST   Return Visit with Quan Pace DPM   [facility] ([facility])    [address] [place] [id number] [phone]              Who to contact     If you have questions or need follow up information about today's clinic visit or your schedule please contact [facility] directly at [phone].  Normal or non-critical lab and imaging results will be communicated to you by MyChart, letter or phone within 4 business days after the clinic has received the results. If you do not hear from us within 7 days, please contact the clinic through  "Mechanologyhart or phone. If you have a critical or abnormal lab result, we will notify you by phone as soon as possible.  Submit refill requests through AgentPair or call your pharmacy and they will forward the refill request to us. Please allow 3 business days for your refill to be completed.          Additional Information About Your Visit        Mechanologyhart Information     AgentPair lets you send messages to your doctor, view your test results, renew your prescriptions, schedule appointments and more. To sign up, go to [email or web address] . Click on \"Log in\" on the left side of the screen, which will take you to the Welcome page. Then click on \"Sign up Now\" on the right side of the page.     You will be asked to enter the access code listed below, as well as some personal information. Please follow the directions to create your username and password.     Your access code is: [email or web address]  Expires: 2019  9:45 AM     Your access code will  in 90 days. If you need help or a new code, please call your [place] clinic or [phone].        Care EveryWhere ID     This is your Care EveryWhere ID. This could be used by other organizations to access your [place] medical records  [id number]        Your Vitals Were     Pulse Temperature Respirations Pulse Oximetry BMI (Body Mass Index)       90 98.4  F (36.9  C) 16 98% 41.56 kg/m2        Blood Pressure from Last 3 Encounters:   18 116/70   18 132/82   18 152/88    Weight from Last 3 Encounters:   18 298 lb (135.2 kg)   18 297 lb (134.7 kg)   18 297 lb (134.7 kg)              We Performed the Following     CBC with platelets     CRP inflammation          Today's Medication Changes          These changes are accurate as of 18  2:29 PM.  If you have any questions, ask your nurse or doctor.               Start taking these medicines.        Dose/Directions    amoxicillin-clavulanate 875-125 MG per tablet   Commonly known as:  " AUGMENTIN   Used for:  Open wound of right index finger without damage to nail, sequela   Started by:  Erica Suarez MD        Dose:  1 tablet   Take 1 tablet by mouth 2 times daily   Quantity:  20 tablet   Refills:  0            Where to get your medicines      These medications were sent to [facility]/pharmacy #[id number] - [place] - [address] [facility]  [address] [facility] [address]     Phone:  [phone]     amoxicillin-clavulanate 875-125 MG per tablet                Primary Care Provider Office Phone # Fax #    Bertha Romero PA-C [phone] [address] [facility] [address]        Equal Access to Services     CHUCKIE ZAAYS : Hadii aad ku hadasho Sobeti, waaxda luqadaha, qaybta kaalmada adeegyada, victor hugo armendariz . So [facility] [phone].    ATENCIÓN: Si habla español, tiene a lyons disposición servicios gratuitos de asistencia lingüística. Llame al [phone].    We comply with applicable federal civil rights laws and [place] laws. We do not discriminate on the basis of race, color, national origin, age, disability, sex, sexual orientation, or gender identity.            Thank you!     Thank you for choosing [facility]  for your care. Our goal is always to provide you with excellent care. Hearing back from our patients is one way we can continue to improve our services. Please take a few minutes to complete the written survey that you may receive in the mail after your visit with us. Thank you!             Your Updated Medication List - Protect others around you: Learn how to safely use, store and throw away your medicines at www.disposemymeds.org.          This list is accurate as of 11/23/18  2:29 PM.  Always use your most recent med list.                   Brand Name Dispense Instructions for use Diagnosis    albuterol 108 (90 Base) MCG/ACT inhaler    PROAIR HFA/PROVENTIL HFA/VENTOLIN HFA    1 Inhaler    Inhale 2 puffs  into the lungs every 6 hours as needed for shortness of breath / dyspnea or wheezing    Cough       ALEVE PO      Take by mouth as needed        amLODIPine 10 MG tablet    NORVASC    90 tablet    Take 1 tablet (10 mg) by mouth daily    Hypertension goal BP (blood pressure) < 140/90       amoxicillin-clavulanate 875-125 MG per tablet    AUGMENTIN    20 tablet    Take 1 tablet by mouth 2 times daily    Open wound of right index finger without damage to nail, sequela       ASPIRIN PO      Take 325 mg by mouth daily        atorvastatin 40 MG tablet    LIPITOR    90 tablet    Take 1 tablet (40 mg) by mouth daily    Hyperlipidemia LDL goal <160       blood glucose monitoring lancets     102 each    Use to test blood sugar 1 times daily or as directed.  Ok to substitute alternative if insurance prefers.    Type 2 diabetes mellitus without complication, without long-term current use of insulin (H)       blood glucose monitoring test strip    ACCU-CHEK GUIDE    100 strip    Use to test blood sugar 1 times daily or as directed.    Type 2 diabetes mellitus without complication, without long-term current use of insulin (H)       calcium carbonate 500 MG chewable tablet    TUMS     Take 1 chew tab by mouth daily        cyclobenzaprine 10 MG tablet    FLEXERIL    90 tablet    Take 0.5-1 tablets (5-10 mg) by mouth 3 times daily as needed for muscle spasms    Back muscle spasm       dicyclomine 20 MG tablet    BENTYL    40 tablet    Take 1 tablet (20 mg) by mouth 4 times daily as needed    Irritable bowel syndrome with diarrhea, Chronic diarrhea       diphenoxylate-atropine 2.5-0.025 MG tablet    LOMOTIL    30 tablet    Take 2 tablets by mouth 4 times daily as needed for diarrhea    Chronic diarrhea, Irritable bowel syndrome with diarrhea       DULoxetine 60 MG capsule    CYMBALTA    90 capsule    Take 1 capsule (60 mg) by mouth daily    Diabetic polyneuropathy associated with type 2 diabetes mellitus (H)       EPINEPHrine 0.3  MG/0.3ML injection 2-pack    EPIPEN/ADRENACLICK/or ANY BX GENERIC EQUIV    2 each    Inject 0.3 mLs (0.3 mg) into the muscle once as needed for anaphylaxis    Anaphylactic reaction       escitalopram 10 MG tablet    LEXAPRO    30 tablet    Take 1 tablet (10 mg) by mouth daily    Anxiety       fluticasone-salmeterol 250-50 MCG/DOSE inhaler    ADVAIR    3 Inhaler    Inhale 1 puff into the lungs 2 times daily    Cough       lisinopril 20 MG tablet    PRINIVIL/ZESTRIL    90 tablet    TAKE 1 TABLET BY MOUTH EVERY DAY    Hypertension goal BP (blood pressure) < 140/90       metFORMIN 500 MG 24 hr tablet    GLUCOPHAGE-XR    360 tablet    Take 2 tablets (1,000 mg) by mouth 2 times daily (with meals)    Type 2 diabetes mellitus without complication, without long-term current use of insulin (H)       multivitamin, therapeutic Tabs tablet      Take 1 tablet by mouth daily        TYLENOL PO      Take 1,000 mg by mouth every 8 hours as needed for mild pain or fever

## 2018-11-23 NOTE — PROGRESS NOTES
SUBJECTIVE:   Adarsh Salvador is a 53 year old male who presents to clinic today for the following health issues:    HPI   Right Index Finger injury    Onset: Approx. 1 month -   Oct. 20 /1018    Description:   Location: Right index finger  Character: Stabbing and Gnawing    Intensity: moderate, severe    Progression of Symptoms: worse    Accompanying Signs & Symptoms:  Other symptoms: radiation of pain to end of finger, numbness, tingling, swelling and redness    History:   Previous similar pain: no       Precipitating factors:   Trauma or overuse: YES    Alleviating factors:  Improved by: acetaminophen    Therapies Tried and outcome:   Tylenol..   Protects with bandaids when he works as a .    He states he has a family member who is an RN and told him that his finger was still infected.  He denies purulent drainage or fever.  Has limited range of motion of his right index distal interphalangeal joint since the injury.  He is soaking his finger and placing antibacterial salve on it once a day.  He states his blood sugars are ranging 120-130 fasting.    Problem list and histories reviewed & adjusted, as indicated.  Additional history: as documented    Patient Active Problem List   Diagnosis     GERD (gastroesophageal reflux disease)     Kidney stone     Chronic RLQ pain     Constipation     Abdominal pain, right upper quadrant     Adult celiac disease     Chronic maxillary sinusitis     Chronic rhinitis     Pure hyperglyceridemia     Anaphylactic reaction     Benign essential hypertension     Anemia in other chronic diseases classified elsewhere     Fatty liver     Irritable bowel syndrome with diarrhea     Right inguinal hernia     BMI 40.0-44.9, adult (H)     Pain in thoracic spine     Mild persistent asthma without complication     Type 2 diabetes mellitus without complication, without long-term current use of insulin (H)     Hyperlipidemia LDL goal <100     Pain of left heel     Plantar fasciitis of left foot      Past Surgical History:   Procedure Laterality Date     C REMOVAL OF KIDNEY STONE       COLONOSCOPY  5/1/2012    Procedure:COLONOSCOPY; screening colonoscopy; Surgeon:KINGSLEY DOS SANTOS; Location:[facility]     COLONOSCOPY  4/21/2014    Procedure: COMBINED COLONOSCOPY, SINGLE BIOPSY/POLYPECTOMY BY BIOPSY;  Surgeon: Duane, William Charles, MD;  Location: [facility]     HC BREATH HYDROGEN TEST  3/7/2014    Procedure: HYDROGEN BREATH TEST;  Surgeon: Darion Swift MD;  Location: [facility]     ORTHOPEDIC SURGERY      x3 Rt knee      RELEASE CARPAL TUNNEL Right 1/26/2018    Procedure: RELEASE CARPAL TUNNEL;  Right carpal tunnel release;  Surgeon: Wiliam Saenz MD;  Location: [facility]       Social History   Substance Use Topics     Smoking status: Never Smoker     Smokeless tobacco: Current User     Types: Chew      Comment: Chew     Alcohol use 0.0 oz/week     0 Standard drinks or equivalent per week      Comment: occasional     Family History   Problem Relation Age of Onset     Cancer Mother 52     lung, smoked     Cancer - colorectal Father 53     unsure type, pt attributes to radiation exposure     Myocardial Infarction Father 45     Myocardial Infarction Paternal Grandfather 35     Diabetes Other      nephew- type 1     Diabetes Maternal Aunt      1     Diabetes Maternal Aunt      2     Diabetes Paternal Uncle      1     Diabetes Paternal Uncle      2     Diabetes Paternal Uncle      3     Diabetes Paternal Uncle      4           OBJECTIVE:     /70  Pulse 90  Temp 98.4  F (36.9  C)  Resp 16  Wt 298 lb (135.2 kg)  SpO2 98%  BMI 41.56 kg/m2  Body mass index is 41.56 kg/(m^2).  Gen: no apparent distress  Right finger:  Near distal interphalangeal joint, excluding the nailbed there is a 1 cm open area surrounded by mild erythema.  No drainage to culture.  Appears to have some granulation tissue.  Unable to fully flex distal interphalangeal joint and has some swelling.    ASSESSMENT/PLAN:     1. Open wound of  right index finger without damage to nail, sequela  I am not convinced that it is still infected, suspect that the swelling and redness is still from healing.  However with diabetes and the hope to have steroid injection for plantar fasciitis--will treat with antibiotics.  Podiatry not willing to do steroid injection until would is healed.  Will also check labs to assess inflammation/infection.     - CBC with platelets  - CRP inflammation  - amoxicillin-clavulanate (AUGMENTIN) 875-125 MG per tablet; Take 1 tablet by mouth 2 times daily  Dispense: 20 tablet; Refill: 0    Erica Suarez MD  [facility]

## 2018-11-23 NOTE — PROGRESS NOTES
"  SUBJECTIVE:   Adarsh Salvador is a 53 year old male who presents to clinic today for the following health issues:  {Provider please address medication reconciliation discrepancies--rooming staff please delete if no med/rec issues}    HPI  {additional problems for roomer to add, delete if none:153707}  Problem list and histories reviewed & adjusted, as indicated.  Additional history: {NONE - AS DOCUMENTED:039125::\"as documented\"}    {ACUTE Problem SUPERLIST - brief histories:792300}    {HIST REVIEW/ LINKS 2:400513}    {PROVIDER CHARTING PREFERENCE:379670}  "

## 2018-11-27 ENCOUNTER — THERAPY VISIT (OUTPATIENT)
Dept: PHYSICAL THERAPY | Facility: CLINIC | Age: 53
End: 2018-11-27
Payer: COMMERCIAL

## 2018-11-27 DIAGNOSIS — M72.2 PLANTAR FASCIITIS OF LEFT FOOT: ICD-10-CM

## 2018-11-27 DIAGNOSIS — M79.672 PAIN OF LEFT HEEL: ICD-10-CM

## 2018-11-27 PROCEDURE — 97035 APP MDLTY 1+ULTRASOUND EA 15: CPT | Mod: GP | Performed by: PHYSICAL THERAPIST

## 2018-11-27 PROCEDURE — 97140 MANUAL THERAPY 1/> REGIONS: CPT | Mod: GP | Performed by: PHYSICAL THERAPIST

## 2018-11-27 PROCEDURE — 97110 THERAPEUTIC EXERCISES: CPT | Mod: GP | Performed by: PHYSICAL THERAPIST

## 2018-11-28 ENCOUNTER — OFFICE VISIT (OUTPATIENT)
Dept: NEUROLOGY | Facility: CLINIC | Age: 53
End: 2018-11-28
Payer: COMMERCIAL

## 2018-11-28 VITALS
DIASTOLIC BLOOD PRESSURE: 84 MMHG | WEIGHT: 293 LBS | OXYGEN SATURATION: 98 % | SYSTOLIC BLOOD PRESSURE: 126 MMHG | HEART RATE: 68 BPM | BODY MASS INDEX: 40.87 KG/M2

## 2018-11-28 DIAGNOSIS — M54.50 CHRONIC BILATERAL LOW BACK PAIN WITHOUT SCIATICA: Primary | ICD-10-CM

## 2018-11-28 DIAGNOSIS — G89.29 CHRONIC BILATERAL LOW BACK PAIN WITHOUT SCIATICA: Primary | ICD-10-CM

## 2018-11-28 PROCEDURE — 99213 OFFICE O/P EST LOW 20 MIN: CPT | Performed by: PSYCHIATRY & NEUROLOGY

## 2018-11-28 NOTE — NURSING NOTE
Adarsh Salvador's goals for this visit include:   Chief Complaint   Patient presents with     RECHECK     He requests these members of his care team be copied on today's visit information: PCP    PCP: Bertha Romero    Referring Provider:  No referring provider defined for this encounter.    /84 (BP Location: Left arm, Patient Position: Sitting, Cuff Size: Adult Large)  Pulse 68  Wt 132.9 kg (293 lb)  SpO2 98%  BMI 40.87 kg/m2    Do you need any medication refills at today's visit?

## 2018-11-28 NOTE — LETTER
11/28/2018         RE: Adarsh Salvador  [address] [address]        Dear Colleague,    Thank you for referring your patient, Adarsh Salvador, to the [facility]. Please see a copy of my visit note below.    Visit Date:   11/28/2018      NEUROLOGY CONSULTATION   HISTORY OF PRESENT ILLNESS: This patient has seen and followed up with multiple issues including painful neuropathy, bilateral meralgia paresthetica, left greater than right.  I last saw him about 6 weeks ago.  He reports his main problem now is low back pain.  He has a history of chronic low back problems.  He works evenings as a .  When he is doing a lot of lifting of heavy parts, his back will start to bother him more and more.  He has gone through physical therapy in the past for the back problems.  He does have a history of diabetes.  He has diabetic neuropathy and also bilateral meralgia paresthetica.  I had tried him on nortriptyline and gabapentin.  Most recently, he has been on duloxetine and is at a dose of 60 mg a day.  He has found it helpful for the neuropathy in his feet.  He is not having any side effects.   PHYSICAL EXAMINATION:  On exam today, the patient is cooperative and in no distress.  His blood pressure is 126/84.  He easily gets out of a chair without the use of his arms.  He can walk on his heels and toes.  He steps up on a stair without difficulty.  Reflexes are low amplitude at the knees and trace at the ankles. He has fairly good lumbar range of motion, although bending forward at the waist does provoke some aching in his low back.   ASSESSMENT:   1.  Low back pain.   2.  Diabetic neuropathy.   3.  Bilateral meralgia paresthetica.   DISCUSSION:  The patient is seen with the above problem list.  At this point, his main concern has to do with his low back pain that is made worse with lifting.  I am going to send him back to physical therapy.  He says he does do his exercises regularly.  Hopefully,  refresher on low back exercise and strengthening will be helpful.  No change will be made in duloxetine.  I will see him in followup in 2 months for reexamination.         CHELSEA MONTALVO MD             D: 2018   T: 2018   MT: BHARGAVI      Name:     LUTHER BLACKMAN   MRN:      [id number]        Account:      [id number]   :      1965           Visit Date:   2018      Document: [id number]       Again, thank you for allowing me to participate in the care of your patient.        Sincerely,        Chelsea Montalvo MD

## 2018-11-28 NOTE — MR AVS SNAPSHOT
After Visit Summary   11/28/2018    Adarsh Salvador    MRN: [id number]           Patient Information     Date Of Birth          1965        Visit Information        Provider Department      11/28/2018 10:30 AM Wiliam Montalvo MD [facility]        Today's Diagnoses     Chronic bilateral low back pain without sciatica    -  1       Follow-ups after your visit        Additional Services     PHYSICAL THERAPY REFERRAL       If you have not heard from the scheduling office within 2 business days, please call [phone] for all locations, with the exception of [place], please call [phone] and [place], please call [phone].    Please be aware that coverage of these services is subject to the terms and limitations of your health insurance plan.  Call member services at your health plan with any benefit or coverage questions.                  Follow-up notes from your care team     Return in about 2 months (around 1/28/2019).      Your next 10 appointments already scheduled     Dec 04, 2018 10:50 AM CST   RAMOS Extremity with Bull Contreras, PT   [facility] ([facility]  )    [address] [facility] [address]   [phone]            Dec 11, 2018 10:50 AM CST   RAMOS Extremity with Bull Contreras, PT   [facility] ([facility]  )    [address] [facility] [address] [phone]            Dec 12, 2018 10:00 AM CST   [facility]    [address] [facility] [address]   [phone]            Jan 29, 2019 10:30 AM CST   [facility]    [address] [facility] [address]   [phone]              Future tests that were ordered for you today     Open Future Orders         Priority Expected Expires Ordered    PHYSICAL THERAPY REFERRAL Routine  2019            Who to contact     If you have questions or need follow up information about today's clinic visit or your schedule please contact [facility] directly at [phone].  Normal or non-critical lab and imaging results will be communicated to you by MyChart, letter or phone within 4 business days after the clinic has received the results. If you do not hear from us within 7 days, please contact the clinic through MyChart or phone. If you have a critical or abnormal lab result, we will notify you by phone as soon as possible.  Submit refill requests through [employer] or call your pharmacy and they will forward the refill request to us. Please allow 3 business days for your refill to be completed.          Additional Information About Your Visit        NurseGrid Information     [employer]urse[employer] is an electronic gateway that provides easy, online access to your medical records. With NurseGrid, you can request a clinic appointment, read your test results, renew a prescription or communicate with your care team.     To sign up for [employer] visit the website at [email or web address]   You will be asked to enter the access code listed below, as well as some personal information. Please follow the directions to create your username and password.     Your access code is: [email or web address]  Expires: 2019  9:45 AM     Your access code will  in 90 days. If you need help or a new code, please contact your [facility] or call [phone] for assistance.        Care EveryWhere ID     This is your Care EveryWhere ID. This could be used by other organizations to access your [place] medical records  [id number]        Your Vitals Were     Pulse Pulse Oximetry BMI (Body Mass Index)             68 98% 40.87 kg/m2          Blood Pressure from Last 3 Encounters:   18 126/84   18  116/70   11/14/18 132/82    Weight from Last 3 Encounters:   11/28/18 132.9 kg (293 lb)   11/23/18 135.2 kg (298 lb)   11/02/18 134.7 kg (297 lb)               Primary Care Provider Office Phone # Fax #    Bertha Romero PA-C [phone]       [address] [address]        Equal Access to Services     SIMONE ZAYAS : Hadii aad ku hadasho Soomaali, waaxda luqadaha, qaybta kaalmada adeegyada, waxay idiin hayaan adeeg kharash la'dave . [facility] [phone].    ATENCIÓN: Si habla español, tiene a lyons disposición servicios gratuitos de asistencia lingüística. [facility] [phone].    We comply with applicable federal civil rights laws and [place] laws. We do not discriminate on the basis of race, color, national origin, age, disability, sex, sexual orientation, or gender identity.            Thank you!     Thank you for choosing [facility]  for your care. Our goal is always to provide you with excellent care. Hearing back from our patients is one way we can continue to improve our services. Please take a few minutes to complete the written survey that you may receive in the mail after your visit with us. Thank you!             Your Updated Medication List - Protect others around you: Learn how to safely use, store and throw away your medicines at www.disposemymeds.org.          This list is accurate as of 11/28/18 10:57 AM.  Always use your most recent med list.                   Brand Name Dispense Instructions for use Diagnosis    albuterol 108 (90 Base) MCG/ACT inhaler    PROAIR HFA/PROVENTIL HFA/VENTOLIN HFA    1 Inhaler    Inhale 2 puffs into the lungs every 6 hours as needed for shortness of breath / dyspnea or wheezing    Cough       ALEVE PO      Take by mouth as needed        amLODIPine 10 MG tablet    NORVASC    90 tablet    Take 1 tablet (10 mg) by mouth daily    Hypertension goal BP (blood pressure) < 140/90       amoxicillin-clavulanate 875-125 MG  tablet    AUGMENTIN    20 tablet    Take 1 tablet by mouth 2 times daily    Open wound of right index finger without damage to nail, sequela       ASPIRIN PO      Take 325 mg by mouth daily        atorvastatin 40 MG tablet    LIPITOR    90 tablet    Take 1 tablet (40 mg) by mouth daily    Hyperlipidemia LDL goal <160       blood glucose monitoring lancets     102 each    Use to test blood sugar 1 times daily or as directed.  Ok to substitute alternative if insurance prefers.    Type 2 diabetes mellitus without complication, without long-term current use of insulin (H)       blood glucose monitoring test strip    ACCU-CHEK GUIDE    100 strip    Use to test blood sugar 1 times daily or as directed.    Type 2 diabetes mellitus without complication, without long-term current use of insulin (H)       calcium carbonate 500 MG chewable tablet    TUMS     Take 1 chew tab by mouth daily        cyclobenzaprine 10 MG tablet    FLEXERIL    90 tablet    Take 0.5-1 tablets (5-10 mg) by mouth 3 times daily as needed for muscle spasms    Back muscle spasm       dicyclomine 20 MG tablet    BENTYL    40 tablet    Take 1 tablet (20 mg) by mouth 4 times daily as needed    Irritable bowel syndrome with diarrhea, Chronic diarrhea       diphenoxylate-atropine 2.5-0.025 MG tablet    LOMOTIL    30 tablet    Take 2 tablets by mouth 4 times daily as needed for diarrhea    Chronic diarrhea, Irritable bowel syndrome with diarrhea       DULoxetine 60 MG capsule    CYMBALTA    90 capsule    Take 1 capsule (60 mg) by mouth daily    Diabetic polyneuropathy associated with type 2 diabetes mellitus (H)       EPINEPHrine 0.3 MG/0.3ML injection 2-pack    EPIPEN/ADRENACLICK/or ANY BX GENERIC EQUIV    2 each    Inject 0.3 mLs (0.3 mg) into the muscle once as needed for anaphylaxis    Anaphylactic reaction       escitalopram 10 MG tablet    LEXAPRO    30 tablet    Take 1 tablet (10 mg) by mouth daily    Anxiety       fluticasone-salmeterol 250-50 MCG/DOSE  inhaler    ADVAIR    3 Inhaler    Inhale 1 puff into the lungs 2 times daily    Cough       lisinopril 20 MG tablet    PRINIVIL/ZESTRIL    90 tablet    TAKE 1 TABLET BY MOUTH EVERY DAY    Hypertension goal BP (blood pressure) < 140/90       metFORMIN 500 MG 24 hr tablet    GLUCOPHAGE-XR    360 tablet    Take 2 tablets (1,000 mg) by mouth 2 times daily (with meals)    Type 2 diabetes mellitus without complication, without long-term current use of insulin (H)       multivitamin, therapeutic Tabs tablet      Take 1 tablet by mouth daily        TYLENOL PO      Take 1,000 mg by mouth every 8 hours as needed for mild pain or fever

## 2018-11-28 NOTE — PROGRESS NOTES
Visit Date:   11/28/2018      NEUROLOGY CONSULTATION   HISTORY OF PRESENT ILLNESS: This patient has seen and followed up with multiple issues including painful neuropathy, bilateral meralgia paresthetica, left greater than right.  I last saw him about 6 weeks ago.  He reports his main problem now is low back pain.  He has a history of chronic low back problems.  He works evenings as a .  When he is doing a lot of lifting of heavy parts, his back will start to bother him more and more.  He has gone through physical therapy in the past for the back problems.  He does have a history of diabetes.  He has diabetic neuropathy and also bilateral meralgia paresthetica.  I had tried him on nortriptyline and gabapentin.  Most recently, he has been on duloxetine and is at a dose of 60 mg a day.  He has found it helpful for the neuropathy in his feet.  He is not having any side effects.   PHYSICAL EXAMINATION:  On exam today, the patient is cooperative and in no distress.  His blood pressure is 126/84.  He easily gets out of a chair without the use of his arms.  He can walk on his heels and toes.  He steps up on a stair without difficulty.  Reflexes are low amplitude at the knees and trace at the ankles. He has fairly good lumbar range of motion, although bending forward at the waist does provoke some aching in his low back.   ASSESSMENT:   1.  Low back pain.   2.  Diabetic neuropathy.   3.  Bilateral meralgia paresthetica.   DISCUSSION:  The patient is seen with the above problem list.  At this point, his main concern has to do with his low back pain that is made worse with lifting.  I am going to send him back to physical therapy.  He says he does do his exercises regularly.  Hopefully, refresher on low back exercise and strengthening will be helpful.  No change will be made in duloxetine.  I will see him in followup in 2 months for reexamination.         CHELSEA POZO MD             D: 11/28/2018   T: 11/28/2018    MT: BHARGAVI      Name:     LUTHER BLACKMAN   MRN:      [id number]        Account:      [id number]   :      1965           Visit Date:   2018      Document: [id number]

## 2018-12-04 ENCOUNTER — THERAPY VISIT (OUTPATIENT)
Dept: PHYSICAL THERAPY | Facility: CLINIC | Age: 53
End: 2018-12-04
Payer: COMMERCIAL

## 2018-12-04 DIAGNOSIS — M79.672 PAIN OF LEFT HEEL: ICD-10-CM

## 2018-12-04 DIAGNOSIS — M72.2 PLANTAR FASCIITIS OF LEFT FOOT: ICD-10-CM

## 2018-12-04 PROCEDURE — 97035 APP MDLTY 1+ULTRASOUND EA 15: CPT | Mod: GP | Performed by: PHYSICAL THERAPIST

## 2018-12-04 PROCEDURE — 97110 THERAPEUTIC EXERCISES: CPT | Mod: GP | Performed by: PHYSICAL THERAPIST

## 2018-12-04 PROCEDURE — 97140 MANUAL THERAPY 1/> REGIONS: CPT | Mod: GP | Performed by: PHYSICAL THERAPIST

## 2018-12-11 ENCOUNTER — THERAPY VISIT (OUTPATIENT)
Dept: PHYSICAL THERAPY | Facility: CLINIC | Age: 53
End: 2018-12-11
Payer: COMMERCIAL

## 2018-12-11 DIAGNOSIS — M79.672 PAIN OF LEFT HEEL: ICD-10-CM

## 2018-12-11 DIAGNOSIS — M72.2 PLANTAR FASCIITIS OF LEFT FOOT: ICD-10-CM

## 2018-12-11 PROCEDURE — 97140 MANUAL THERAPY 1/> REGIONS: CPT | Mod: GP | Performed by: PHYSICAL THERAPIST

## 2018-12-11 PROCEDURE — 97035 APP MDLTY 1+ULTRASOUND EA 15: CPT | Mod: GP | Performed by: PHYSICAL THERAPIST

## 2018-12-11 PROCEDURE — 97110 THERAPEUTIC EXERCISES: CPT | Mod: GP | Performed by: PHYSICAL THERAPIST

## 2018-12-17 ENCOUNTER — TELEPHONE (OUTPATIENT)
Dept: ORTHOPEDICS | Facility: CLINIC | Age: 53
End: 2018-12-17

## 2018-12-17 DIAGNOSIS — E11.9 TYPE 2 DIABETES MELLITUS WITHOUT COMPLICATION, WITHOUT LONG-TERM CURRENT USE OF INSULIN (H): ICD-10-CM

## 2018-12-17 NOTE — TELEPHONE ENCOUNTER
Per huddle with Dr. 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    Message sent to Dr. Pace to review.    Jeronimo Boyle RN

## 2018-12-19 ENCOUNTER — OFFICE VISIT (OUTPATIENT)
Dept: PODIATRY | Facility: CLINIC | Age: 53
End: 2018-12-19
Payer: COMMERCIAL

## 2018-12-19 VITALS — DIASTOLIC BLOOD PRESSURE: 95 MMHG | SYSTOLIC BLOOD PRESSURE: 153 MMHG | HEART RATE: 78 BPM

## 2018-12-19 DIAGNOSIS — M72.2 PLANTAR FASCIITIS OF LEFT FOOT: Primary | ICD-10-CM

## 2018-12-19 DIAGNOSIS — I10 HYPERTENSION GOAL BP (BLOOD PRESSURE) < 140/90: ICD-10-CM

## 2018-12-19 PROCEDURE — 99207 ZZC DROP WITH A PROCEDURE: CPT | Performed by: PODIATRIST

## 2018-12-19 PROCEDURE — 20550 NJX 1 TENDON SHEATH/LIGAMENT: CPT | Performed by: PODIATRIST

## 2018-12-19 RX ORDER — METFORMIN HCL 500 MG
1000 TABLET, EXTENDED RELEASE 24 HR ORAL 2 TIMES DAILY WITH MEALS
Qty: 360 TABLET | Refills: 0 | Status: SHIPPED | OUTPATIENT
Start: 2018-12-19 | End: 2020-01-14

## 2018-12-19 ASSESSMENT — PAIN SCALES - GENERAL: PAINLEVEL: SEVERE PAIN (6)

## 2018-12-19 NOTE — LETTER
12/19/2018         RE: Adarsh Salvador  [address] [address]        Dear Colleague,    Thank you for referring your patient, Adarsh Salvador, to the [facility]. Please see a copy of my visit note below.    Chief Complaint   Patient presents with     Follow Up     Plantar Fascitis            Allergies   Allergen Reactions     Artificial Sweetner (Informational Only) [Artificial Sweetner (Informational Only) ] GI Disturbance     ALL artificial sweetners cause severe diarrhea & flu symptoms     Hydromorphone Anaphylaxis     Ibuprofen GI Disturbance     Morphine [Fumaric Acid] Anaphylaxis     Hydrocodone-Acetaminophen Itching     Tramadol Hives     Trazodone Hives     Gabapentin      GI upset per pt     Codeine Phosphate Itching     Ketorolac Tromethamine Rash         Subjective: Adarsh is a 53 year old male who presents to the clinic today for a follow up of left heel plantar fasciitis. He relates that he has been using the boot while at work. He has been going to PT with varying results. Sometimes feeling good and sometimes go back to having pain. He has been using the night splint and Voltaren. He cannot wear the CAM at work.     Objective  Data Unavailable Data Unavailable Data Unavailable Data Unavailable Data Unavailable 0 lbs 0 oz  Pain noted today with palpation of the medial attachment of the plantar fascia on the calcaneus on the left. Some discomfort on the lateral calcaneus. Some pain along the tensed band of the plantar fascia. No pain along the PT, peroneal, or Achilles tendons on the left.     Assessment: Plantar fasciitis on the left foot. Has tried multiple conservative treatments.     Plan:   - Pt seen and evaluated  - I discussed escalating his treatment and he would like to try an injection. His finger laceration has healed. I discussed the risks, complications. Alternatives, benefits, and answered all of his questions. Consent was signed. After skin prep, an injection  consisting of 3cc's of a  1:1 mix of 1% lidocaine plain + Kenalog-40 + dexamethasone 4mg was injected into the left heel trigger point. He tolerated this well with no complications.   - He should continue stretching.   - Pt to return to clinic in 1 month.       Again, thank you for allowing me to participate in the care of your patient.        Sincerely,        Quan Pace DPM

## 2018-12-19 NOTE — LETTER
December 19, 2018      RE: Adarsh Salvador  [address] [facility] [address] [address]       To whom it may concern:    Adarsh Salvador was seen in our clinic today for his left foot. 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 Please also minimize the standing he does outside of the boot at work to keep him from re-aggravating his symptoms. He will follow up in about 1 month and restrictions will be re-addressed at that time.    Sincerely,          Quan Pace DPM

## 2018-12-19 NOTE — NURSING NOTE
Adarsh Salvador's chief complaint for this visit includes:  Chief Complaint   Patient presents with     Follow Up     Plantar Fascitis     PCP: Bertha Romero    Referring Provider:  No referring provider defined for this encounter.    BP (!) 153/95 (BP Location: Right arm, Patient Position: Sitting, Cuff Size: Adult Large)   Pulse 78   Severe Pain (6)     Do you need any medication refills at today's visit? no

## 2018-12-19 NOTE — PROGRESS NOTES
Chief Complaint   Patient presents with     Follow Up     Plantar Fascitis            Allergies   Allergen Reactions     Artificial Sweetner (Informational Only) [Artificial Sweetner (Informational Only) ] GI Disturbance     ALL artificial sweetners cause severe diarrhea & flu symptoms     Hydromorphone Anaphylaxis     Ibuprofen GI Disturbance     Morphine [Fumaric Acid] Anaphylaxis     Hydrocodone-Acetaminophen Itching     Tramadol Hives     Trazodone Hives     Gabapentin      GI upset per pt     Codeine Phosphate Itching     Ketorolac Tromethamine Rash         Subjective: Adarsh is a 53 year old male who presents to the clinic today for a follow up of left heel plantar fasciitis. He relates that he has been using the boot while at work. He has been going to PT with varying results. Sometimes feeling good and sometimes go back to having pain. He has been using the night splint and Voltaren. He cannot wear the CAM at work.     Objective  Data Unavailable Data Unavailable Data Unavailable Data Unavailable Data Unavailable 0 lbs 0 oz  Pain noted today with palpation of the medial attachment of the plantar fascia on the calcaneus on the left. Some discomfort on the lateral calcaneus. Some pain along the tensed band of the plantar fascia. No pain along the PT, peroneal, or Achilles tendons on the left.     Assessment: Plantar fasciitis on the left foot. Has tried multiple conservative treatments.     Plan:   - Pt seen and evaluated  - I discussed escalating his treatment and he would like to try an injection. His finger laceration has healed. I discussed the risks, complications. Alternatives, benefits, and answered all of his questions. Consent was signed. After skin prep, an injection consisting of 3cc's of a  1:1 mix of 1% lidocaine plain + Kenalog-40 + dexamethasone 4mg was injected into the left heel trigger point. He tolerated this well with no complications.   - He should continue stretching.   - Pt to return to  clinic in 1 month.

## 2018-12-19 NOTE — TELEPHONE ENCOUNTER
[facility]  Inpatient Progress Note  James Birch  4/18/1926    10/12/2018  Reason for Follow Up:  constipation    Subjective     Subjective:   Pt is feeling better today less nausea however he has not had a BM. NO fever. No abdominal pain. KUB reviewed and discussed with the patient.     Current Facility-Administered Medications:   •  budesonide-formoterol (SYMBICORT) 80-4.5 MCG/ACT inhaler 2 puff, 2 puff, Inhalation, BID - RT, Hemal William MD, 2 puff at 10/12/18 0808  •  diltiaZEM CD (CARDIZEM CD) 24 hr capsule 120 mg, 120 mg, Oral, Q24H, Hemal William MD, 120 mg at 10/12/18 0758  •  finasteride (PROSCAR) tablet 5 mg, 5 mg, Oral, Daily, Hemal William MD, 5 mg at 10/12/18 0759  •  guaiFENesin (MUCINEX) 12 hr tablet 600 mg, 600 mg, Oral, Q12H, Hemal William MD, 600 mg at 10/12/18 0759  •  HYDROcodone-acetaminophen (NORCO) 5-325 MG per tablet 1 tablet, 1 tablet, Oral, Q6H PRN, Hemal William MD, 1 tablet at 10/11/18 2151  •  Influenza Vac Subunit Quad (FLUCELVAX) injection 0.5 mL, 0.5 mL, Intramuscular, During Hospitalization, Hemal William MD  •  ketotifen (ZADITOR) 0.025 % ophthalmic solution 1 drop, 1 drop, Both Eyes, BID, Hemal William MD, 1 drop at 10/12/18 0758  •  O2 (OXYGEN), 1 L/min, Inhalation, Nightly, Hemal William MD  •  ondansetron (ZOFRAN) tablet 4 mg, 4 mg, Oral, Q8H PRN, Hemal William MD  •  pantoprazole (PROTONIX) EC tablet 40 mg, 40 mg, Oral, QAM, Hemal William MD, 40 mg at 10/12/18 0655  •  polyethylene glycol (MIRALAX) powder 17 g, 17 g, Oral, BID, Joy Ambriz APRN  •  rivaroxaban (XARELTO) tablet 15 mg, 15 mg, Oral, Daily, Hemal William MD, 15 mg at 10/12/18 0758  •  sodium chloride 0.9 % flush 3 mL, 3 mL, Intravenous, Q12H, Hemal William MD  •  sodium chloride 0.9 % flush 3-10 mL, 3-10 mL, Intravenous, PRN, Hemal William MD  •  sodium chloride 0.9 %  Pt seen in clinic today, letter drafted in that encounter.  Placed at  for him to .    Jeronimo Boyle RN    infusion, 75 mL/hr, Intravenous, Continuous, Angeli Vidal APRN, Last Rate: 125 mL/hr at 10/12/18 0655, 125 mL/hr at 10/12/18 0655  •  tamsulosin (FLOMAX) 24 hr capsule 0.4 mg, 0.4 mg, Oral, Nightly, Hemal William MD, 0.4 mg at 10/11/18 2015    Review of Systems:    Review of Systems   Constitutional: Negative for activity change, appetite change, chills, diaphoresis, fatigue, fever and unexpected weight change.   HENT: Negative for ear pain, hearing loss, mouth sores, sore throat, trouble swallowing and voice change.    Eyes: Negative.    Respiratory: Negative for cough, choking, shortness of breath and wheezing.    Cardiovascular: Negative for chest pain and palpitations.   Gastrointestinal: Positive for constipation. Negative for abdominal pain, blood in stool, diarrhea, nausea and vomiting.   Endocrine: Negative for cold intolerance and heat intolerance.   Genitourinary: Negative for decreased urine volume, dysuria, frequency, hematuria and urgency.   Musculoskeletal: Negative for back pain, gait problem and myalgias.   Skin: Negative for color change, pallor and rash.   Allergic/Immunologic: Negative for food allergies and immunocompromised state.   Neurological: Negative for dizziness, tremors, seizures, syncope, weakness, light-headedness, numbness and headaches.   Hematological: Negative for adenopathy. Does not bruise/bleed easily.   Psychiatric/Behavioral: Negative for agitation and confusion. The patient is not nervous/anxious.    All other systems reviewed and are negative.       Objective     Vital Signs  Temp:  [97.4 °F (36.3 °C)-98.5 °F (36.9 °C)] 97.7 °F (36.5 °C)  Heart Rate:  [60-84] 73  Resp:  [16-18] 16  BP: ()/(56-82) 92/56  There is no height or weight on file to calculate BMI.    Intake/Output Summary (Last 24 hours) at 10/12/18 1053  Last data filed at 10/12/18 0503   Gross per 24 hour   Intake                0 ml   Output              300 ml   Net             -300 ml      No intake/output data recorded.       Physical Exam:   General: patient awake, alert and cooperative, no acute distress   Eyes: Normal lids and lashes, no scleral icterus   Neck: supple, no JVD   Skin: warm and dry   Cardiovascular: regular rhythm and rate, no murmurs auscultated   Pulm: clear to auscultation bilaterally, regular and unlabored   Abdomen: soft, nontender, nondistended; normal bowel sounds   Psychiatric: Normal mood and behavior; converses appropriately     Results Review:    I have reviewed all of the patients current test results      Results from last 7 days  Lab Units 10/12/18  0646 10/11/18  1417 10/10/18  1300   WBC 10*3/mm3 4.86 5.03 6.13   HEMOGLOBIN g/dL 12.4* 11.8* 12.9*   HEMATOCRIT % 36.0* 33.7* 38.7*   PLATELETS 10*3/mm3 173 212 224         Results from last 7 days  Lab Units 10/12/18  0622 10/11/18  1417 10/10/18  1300   SODIUM mmol/L 135 135 137   POTASSIUM mmol/L 4.1 4.4 4.7   CHLORIDE mmol/L 103 97* 97*   CO2 mmol/L 24.0 28.0 28.0   BUN mg/dL 32* 40* 40*   CREATININE mg/dL 1.92* 2.20* 2.19*   CALCIUM mg/dL 8.8 9.1 9.6   BILIRUBIN mg/dL  --  0.8  --    ALK PHOS U/L  --  57  --    ALT (SGPT) U/L  --  22  --    AST (SGOT) U/L  --  27  --    GLUCOSE mg/dL 81 95 100               Lab Results  Lab Value Date/Time   LIPASE 144 10/11/2018 1417       Radiology:    Imaging Results (last 24 hours)     Procedure Component Value Units Date/Time    XR Abdomen KUB [[id number]] Collected:  10/11/18 1957     Updated:  10/11/18 2001    Narrative:       EXAMINATION:   XR ABDOMEN KUB-  10/11/2018 7:57 PM CDT     XR ABDOMEN KUB- 10/11/2018 7:46 PM CDT     HISTORY: Daily vomiting       COMPARISON: None     FINDINGS:  There is a nonspecific bowel gas pattern. No soft tissue mass or  pathologic calcification  is visualized.     Degenerative changes noted in the lumbar spine. Mesh is present in the  lower abdomen. Right hip prosthesis is present..        Impression:       1. Non specific bowel gas  pattern..         This report was finalized on 10/11/2018 19:58 by Dr. Oskar Blanc MD.            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   • DAMIAN-intolerant G47.33   • Autonomic dysfunction G90.9   • Cardiac arrhythmia-a fib, SVT I49.9   • Congestive heart failure with left ventricular diastolic dysfunction, NYHA class 3 (CMS/[facility]) I50.30   • Wellness examination-done Z00.00   • Gait difficulty-cane/walker R26.9   • Asthma J45.909   • Ceantcbvqesgcp-xmi-kqah/xarelto Z79.01   • Chronic kidney disease-3-IgA/nephrology N18.9   • Prostatism N40.0   • Urolithiasis N20.9   • Varicose veins of legs I83.93   • Hyperlipidemia-statin E78.5   • Hypertension I10   • Hypersomnia G47.10   • Gastroesophageal reflux disease K21.9   • COPD (chronic obstructive pulmonary disease) (CMS/[facility]) J44.9   • Nocturnal hypoxia-oxygen advised G47.34   • Laboratory test* Z01.89   • History of tobacco use Z87.891   • Acute renal failure (CMS/[facility]) N17.9       1. Nausea and constipation    I will start Miralax to try to get him going. Amylase and Lipase normal. KUB reviewed unremarkable.     EMR Dragon/transcription disclaimer: Much of this encounter note is electronic transcription/translation of spoken language to printed text. The electronic translation of spoken language may be erroneous, or at times, nonsensical words or phrases may be inadvertently transcribed. Although I have reviewed the note for such errors, some may still exist.    Joy Ambriz, WILLIAM  10/12/18  10:53 AM          Greg Sargent MD  12:44 PM  10/12/2018

## 2018-12-19 NOTE — NURSING NOTE
Prior to injection, verified patient identity using patient's name and date of birth, injection performed by Dr. Pace.  Due to injection administration, patient instructed to remain in clinic for 15 minutes  afterwards, and to report any adverse reaction to me immediately.    Trigger point injection was performed. into left heel.  3cc's of a  1:1 mix of 1% lidocaine plain + Kenalog-40 + dexamethasone 4mg was injected into the left heel trigger point.    Drug Amount Wasted:  None.  Vial/Syringe: Single dose vial     Jeronimo Boyle RN

## 2018-12-19 NOTE — PATIENT INSTRUCTIONS
Thanks for coming today.  [facility]  [address] [place] [address]    To schedule future appointments in Ortho Clinic, you may call [phone].    To schedule ordered imaging by your provider:   Call [facility] [phone]    To schedule an injection ordered by your provider:  Call [facility] Injection scheduling line: [phone]  [facility] available online at:  [email or web address]    Please call if any further questions or concerns ([phone]).  Clinic hours 8 am to 5 pm.    Return to clinic (call) if symptoms worsen or fail to improve.

## 2018-12-20 RX ORDER — AMLODIPINE BESYLATE 10 MG/1
10 TABLET ORAL DAILY
Qty: 90 TABLET | Refills: 2 | Status: SHIPPED | OUTPATIENT
Start: 2018-12-20 | End: 2020-01-14

## 2018-12-21 ENCOUNTER — THERAPY VISIT (OUTPATIENT)
Dept: PHYSICAL THERAPY | Facility: CLINIC | Age: 53
End: 2018-12-21
Payer: COMMERCIAL

## 2018-12-21 DIAGNOSIS — M72.2 PLANTAR FASCIITIS OF LEFT FOOT: ICD-10-CM

## 2018-12-21 DIAGNOSIS — M79.672 PAIN OF LEFT HEEL: ICD-10-CM

## 2018-12-21 PROCEDURE — 97035 APP MDLTY 1+ULTRASOUND EA 15: CPT | Mod: GP | Performed by: PHYSICAL THERAPIST

## 2018-12-21 PROCEDURE — 97140 MANUAL THERAPY 1/> REGIONS: CPT | Mod: GP | Performed by: PHYSICAL THERAPIST

## 2018-12-21 PROCEDURE — 97110 THERAPEUTIC EXERCISES: CPT | Mod: GP | Performed by: PHYSICAL THERAPIST

## 2019-01-07 ENCOUNTER — TELEPHONE (OUTPATIENT)
Dept: FAMILY MEDICINE | Facility: CLINIC | Age: 54
End: 2019-01-07

## 2019-01-07 NOTE — TELEPHONE ENCOUNTER
PHQ-9 due now for patient ( 4-8 months from index date)  Index date:       8/17/18  Index PHQ9 :   14  FU start date:   12/17/18  FU End date :   4/17/19    RN- Contact patient for PHQ-9. Remission considered if follow up PHQ-9 less than 5.  If greater than 5 consider follow up appointment, e-visit for medication follow up and evaluation.

## 2019-01-08 ENCOUNTER — THERAPY VISIT (OUTPATIENT)
Dept: PHYSICAL THERAPY | Facility: CLINIC | Age: 54
End: 2019-01-08
Payer: COMMERCIAL

## 2019-01-08 DIAGNOSIS — M79.672 PAIN OF LEFT HEEL: ICD-10-CM

## 2019-01-08 DIAGNOSIS — M72.2 PLANTAR FASCIITIS OF LEFT FOOT: ICD-10-CM

## 2019-01-08 PROCEDURE — 97110 THERAPEUTIC EXERCISES: CPT | Mod: GP | Performed by: PHYSICAL THERAPIST

## 2019-01-08 PROCEDURE — 97140 MANUAL THERAPY 1/> REGIONS: CPT | Mod: GP | Performed by: PHYSICAL THERAPIST

## 2019-01-08 PROCEDURE — 97035 APP MDLTY 1+ULTRASOUND EA 15: CPT | Mod: GP | Performed by: PHYSICAL THERAPIST

## 2019-01-10 ENCOUNTER — TELEPHONE (OUTPATIENT)
Dept: FAMILY MEDICINE | Facility: CLINIC | Age: 54
End: 2019-01-10

## 2019-01-10 NOTE — TELEPHONE ENCOUNTER
"Patient states that with every breath he takes he feels like he has burning down into lungs.   is able to take a full breath and breath as normal.  He states he's been getting sweats/chills all week.  Doesn't know if he's got a fever.  Also has a cough but \"not real deep\".  Hx of asthma.  Does use an inhaler (keeps it on him at all times) which he says does give some relief.  Is driving to work now.   works until 12:45am.  Won't give up this shift to be seen because it's all overtime.  He states is able to walk without worsening shortness of breath.  Is speaking full sentences/conversation without my hearing evidence of shortness of breath.  He has tomorrow off and wanting appointment.  First available is in mid afternoon.  He states can be seen at that time.  I did let him know that if his condition worsens he needs to be seen sooner.  Aware of our urgent care hrs from 5pm to 9pm tonight.  If difficulty breathing and no help with inhaler needs to go to ED.  He states understanding.  He again states he typically has been better in a.m.; worsens later in day when at work.  He is a .  Refuses to not go to work today.     "

## 2019-01-11 ENCOUNTER — OFFICE VISIT (OUTPATIENT)
Dept: FAMILY MEDICINE | Facility: CLINIC | Age: 54
End: 2019-01-11
Payer: COMMERCIAL

## 2019-01-11 VITALS
BODY MASS INDEX: 41.28 KG/M2 | TEMPERATURE: 98.2 F | WEIGHT: 296 LBS | SYSTOLIC BLOOD PRESSURE: 134 MMHG | DIASTOLIC BLOOD PRESSURE: 83 MMHG | OXYGEN SATURATION: 98 % | HEART RATE: 74 BPM

## 2019-01-11 DIAGNOSIS — E11.9 TYPE 2 DIABETES MELLITUS WITHOUT COMPLICATION, WITHOUT LONG-TERM CURRENT USE OF INSULIN (H): ICD-10-CM

## 2019-01-11 DIAGNOSIS — R05.9 COUGH: ICD-10-CM

## 2019-01-11 DIAGNOSIS — J45.901 EXACERBATION OF PERSISTENT ASTHMA, UNSPECIFIED ASTHMA SEVERITY: Primary | ICD-10-CM

## 2019-01-11 LAB — HBA1C MFR BLD: 6.8 % (ref 0–5.6)

## 2019-01-11 PROCEDURE — 99214 OFFICE O/P EST MOD 30 MIN: CPT | Performed by: PHYSICIAN ASSISTANT

## 2019-01-11 PROCEDURE — 83036 HEMOGLOBIN GLYCOSYLATED A1C: CPT | Performed by: PHYSICIAN ASSISTANT

## 2019-01-11 PROCEDURE — 36415 COLL VENOUS BLD VENIPUNCTURE: CPT | Performed by: PHYSICIAN ASSISTANT

## 2019-01-11 RX ORDER — ALBUTEROL SULFATE 90 UG/1
2 AEROSOL, METERED RESPIRATORY (INHALATION) EVERY 6 HOURS PRN
Qty: 1 INHALER | Refills: 3 | Status: SHIPPED | OUTPATIENT
Start: 2019-01-11 | End: 2020-12-03

## 2019-01-11 RX ORDER — PREDNISONE 20 MG/1
20 TABLET ORAL DAILY
Qty: 7 TABLET | Refills: 0 | Status: SHIPPED | OUTPATIENT
Start: 2019-01-11 | End: 2019-01-29

## 2019-01-11 RX ORDER — AZITHROMYCIN 250 MG/1
TABLET, FILM COATED ORAL
Qty: 6 TABLET | Refills: 0 | Status: SHIPPED | OUTPATIENT
Start: 2019-01-11 | End: 2019-01-29

## 2019-01-11 NOTE — LETTER
January 14, 2019    Adarsh Salvador  [address] [place] [address]        Dear Adarsh,    It was a pleasure to see you at your recent office visit.  Your test results are listed below.  a1c has worsened some (blood sugars) but still below goal of 7.0. We should recheck this lab in 6 months.         If you have any questions or concerns, please call the clinic at [phone].    Sincerely,  Bertha Romero PA-C    Results for orders placed or performed in visit on 01/11/19   Hemoglobin A1c   Result Value Ref Range    Hemoglobin A1C 6.8 (H) 0 - 5.6 %

## 2019-01-11 NOTE — PROGRESS NOTES
SUBJECTIVE:   Adarsh Salvador is a 53 year old male who presents to clinic today for the following health issues:      RESPIRATORY SYMPTOMS      Duration: X 3 days    Description  cough, wheezing and shortness of breath    Severity: moderate    Accompanying signs and symptoms: None    History (predisposing factors):  Asthma    Precipitating or alleviating factors: None    Therapies tried and outcome:  none    Started off with cough when he was exposed to more cold air.   Oxygen is 98 percent.   Patient with h/o diabetes and asthma.   Is taking advair are prescribed. Needing albuterol every 4 hours, helps only temporarily. No sore throat. Runny nose. Aches all over.   Apart from today he feels his asthma is well controlled.   Wheezing worsens at night.   Cough is nonproductive.   Prednisone has helped In the past. Is overdue for a1c will get today.     Problem list and histories reviewed & adjusted, as indicated.  Additional history: as documented    Patient Active Problem List   Diagnosis     GERD (gastroesophageal reflux disease)     Kidney stone     Chronic RLQ pain     Constipation     Abdominal pain, right upper quadrant     Adult celiac disease     Chronic maxillary sinusitis     Chronic rhinitis     Pure hyperglyceridemia     Anaphylactic reaction     Benign essential hypertension     Anemia in other chronic diseases classified elsewhere     Fatty liver     Irritable bowel syndrome with diarrhea     Right inguinal hernia     BMI 40.0-44.9, adult (H)     Pain in thoracic spine     Mild persistent asthma without complication     Type 2 diabetes mellitus without complication, without long-term current use of insulin (H)     Hyperlipidemia LDL goal <100     Pain of left heel     Plantar fasciitis of left foot     Past Surgical History:   Procedure Laterality Date     C REMOVAL OF KIDNEY STONE       COLONOSCOPY  5/1/2012    Procedure:COLONOSCOPY; screening colonoscopy; Surgeon:KINGSLEY DOS SANTOS; Location: OR      COLONOSCOPY  4/21/2014    Procedure: COMBINED COLONOSCOPY, SINGLE BIOPSY/POLYPECTOMY BY BIOPSY;  Surgeon: Duane, William Charles, MD;  Location: [facility] OR     HC BREATH HYDROGEN TEST  3/7/2014    Procedure: HYDROGEN BREATH TEST;  Surgeon: Darion Swift MD;  Location: [facility]     ORTHOPEDIC SURGERY      x3 Rt knee      RELEASE CARPAL TUNNEL Right 1/26/2018    Procedure: RELEASE CARPAL TUNNEL;  Right carpal tunnel release;  Surgeon: Wiliam Saenz MD;  Location: [facility] OR       Social History     Tobacco Use     Smoking status: Never Smoker     Smokeless tobacco: Current User     Types: Chew     Tobacco comment: Chew   Substance Use Topics     Alcohol use: Yes     Alcohol/week: 0.0 oz     Comment: occasional     Family History   Problem Relation Age of Onset     Cancer Mother 52        lung, smoked     Cancer - colorectal Father 53        unsure type, pt attributes to radiation exposure     Myocardial Infarction Father 45     Myocardial Infarction Paternal Grandfather 35     Diabetes Other         nephew- type 1     Diabetes Maternal Aunt         1     Diabetes Maternal Aunt         2     Diabetes Paternal Uncle         1     Diabetes Paternal Uncle         2     Diabetes Paternal Uncle         3     Diabetes Paternal Uncle         4         Current Outpatient Medications   Medication Sig Dispense Refill     Acetaminophen (TYLENOL PO) Take 1,000 mg by mouth every 8 hours as needed for mild pain or fever       albuterol (PROAIR HFA/PROVENTIL HFA/VENTOLIN HFA) 108 (90 BASE) MCG/ACT Inhaler Inhale 2 puffs into the lungs every 6 hours as needed for shortness of breath / dyspnea or wheezing 1 Inhaler 3     amLODIPine (NORVASC) 10 MG tablet TAKE 1 TABLET (10 MG) BY MOUTH DAILY 90 tablet 2     amoxicillin-clavulanate (AUGMENTIN) 875-125 MG per tablet Take 1 tablet by mouth 2 times daily 20 tablet 0     ASPIRIN PO Take 325 mg by mouth daily       atorvastatin (LIPITOR) 40 MG tablet Take 1 tablet (40 mg) by mouth daily 90  tablet 3     blood glucose monitoring (ACCU-CHEK FASTCLIX) lancets Use to test blood sugar 1 times daily or as directed.  Ok to substitute alternative if insurance prefers. 102 each 11     blood glucose monitoring (ACCU-CHEK GUIDE) test strip Use to test blood sugar 1 times daily or as directed. 100 strip 11     calcium carbonate (TUMS) 500 MG chewable tablet Take 1 chew tab by mouth daily       cyclobenzaprine (FLEXERIL) 10 MG tablet Take 0.5-1 tablets (5-10 mg) by mouth 3 times daily as needed for muscle spasms 90 tablet 1     dicyclomine (BENTYL) 20 MG tablet Take 1 tablet (20 mg) by mouth 4 times daily as needed 40 tablet 5     diphenoxylate-atropine (LOMOTIL) 2.5-0.025 MG per tablet Take 2 tablets by mouth 4 times daily as needed for diarrhea 30 tablet 1     DULoxetine (CYMBALTA) 60 MG EC capsule Take 1 capsule (60 mg) by mouth daily 90 capsule 3     EPINEPHrine (EPIPEN) 0.3 MG/0.3ML injection Inject 0.3 mLs (0.3 mg) into the muscle once as needed for anaphylaxis 2 each 2     escitalopram (LEXAPRO) 10 MG tablet Take 1 tablet (10 mg) by mouth daily 30 tablet 1     fluticasone-salmeterol (ADVAIR) 250-50 MCG/DOSE diskus inhaler Inhale 1 puff into the lungs 2 times daily 3 Inhaler 1     lisinopril (PRINIVIL/ZESTRIL) 20 MG tablet TAKE 1 TABLET BY MOUTH EVERY DAY 90 tablet 1     metFORMIN (GLUCOPHAGE-XR) 500 MG 24 hr tablet Take 2 tablets (1,000 mg) by mouth 2 times daily (with meals) 360 tablet 0     multivitamin, therapeutic (THERA-VIT) TABS Take 1 tablet by mouth daily       Naproxen Sodium (ALEVE PO) Take by mouth as needed        Allergies   Allergen Reactions     Artificial Sweetner (Informational Only) [Artificial Sweetner (Informational Only) ] GI Disturbance     ALL artificial sweetners cause severe diarrhea & flu symptoms     Hydromorphone Anaphylaxis     Ibuprofen GI Disturbance     Morphine [Fumaric Acid] Anaphylaxis     Hydrocodone-Acetaminophen Itching     Tramadol Hives     Trazodone Hives      Gabapentin      GI upset per pt     Keflex [Cephalexin] Diarrhea     Upset stomach     Codeine Phosphate Itching     Ketorolac Tromethamine Rash       Reviewed and updated as needed this visit by clinical staff  Allergies       Reviewed and updated as needed this visit by Provider         ROS:  Constitutional, HEENT, cardiovascular, pulmonary, GI, , musculoskeletal, neuro, skin, endocrine and psych systems are negative, except as otherwise noted.    OBJECTIVE:     /83   Pulse 74   Temp 98.2  F (36.8  C) (Oral)   Wt 134.3 kg (296 lb)   SpO2 98%   BMI 41.28 kg/m    Body mass index is 41.28 kg/m .  GENERAL:  No acute distress.  Interacts appropriately.  Breathing without difficulty.  Alert.  HEENT:  Tympanic membranes intact without effusion or erythema.  Oral mucosa moist. Posterior pharynx has no erythema.  Posterior pharynx has no exudate. no edema.  NECK:  Soft and supple.  without tenderness.  no lymphadenopathy.  Normal range of motion.    CARDIAC:   Regular rate and rhythm.  No murmurs, rubs, or gallops.   PULMONARY: Clear to auscultation bilaterally.  No  wheezes, crackles, or rhonchi.  Normal air exchange/breath sounds.  No use of accessory muscles.    PSYCH: Normal affect.  SKIN: No rashes.        ASSESSMENT/PLAN:     ASSESSMENT / PLAN:  (J45.901) Exacerbation of persistent asthma, unspecified asthma severity  (primary encounter diagnosis)  Comment:   Plan: azithromycin (ZITHROMAX) 250 MG tablet,         predniSONE (DELTASONE) 20 MG tablet            (E11.9) Type 2 diabetes mellitus without complication, without long-term current use of insulin (H)  Comment:   Plan: Hemoglobin A1c            (R05) Cough  Comment:   Plan: albuterol (PROAIR HFA/PROVENTIL HFA/VENTOLIN         HFA) 108 (90 Base) MCG/ACT inhaler                Exam benign  Take with food. Side effects discussed.  Call with worsening symptoms or if no improvement in 4-5 days.  Analgesics for pain with food as needed.      Bertha  Placido Romero PA-C  [facility]

## 2019-01-14 NOTE — RESULT ENCOUNTER NOTE
Dear Adarsh,      It was a pleasure to see you at your recent office visit.  Your test results are listed below.  a1c has worsened some (blood sugars) but still below goal of 7.0. We should recheck this lab in 6 months.         If you have any questions or concerns, please call the clinic at [phone].    Sincerely,  Bertha Romero PA-C

## 2019-01-16 ASSESSMENT — PATIENT HEALTH QUESTIONNAIRE - PHQ9: SUM OF ALL RESPONSES TO PHQ QUESTIONS 1-9: 6

## 2019-01-16 NOTE — TELEPHONE ENCOUNTER
Pt completed PHQ-9 with score of 6.  This is improved score.  Pt feels medication is working, but he may want to discuss change in future. Pt is not interested in seeing a therapist now. To provider as FYI.  Mary Majano RN

## 2019-01-22 ENCOUNTER — THERAPY VISIT (OUTPATIENT)
Dept: PHYSICAL THERAPY | Facility: CLINIC | Age: 54
End: 2019-01-22
Payer: COMMERCIAL

## 2019-01-22 DIAGNOSIS — M72.2 PLANTAR FASCIITIS OF LEFT FOOT: ICD-10-CM

## 2019-01-22 DIAGNOSIS — M79.672 PAIN OF LEFT HEEL: ICD-10-CM

## 2019-01-22 PROCEDURE — 97035 APP MDLTY 1+ULTRASOUND EA 15: CPT | Mod: GP | Performed by: PHYSICAL THERAPIST

## 2019-01-22 PROCEDURE — 97110 THERAPEUTIC EXERCISES: CPT | Mod: GP | Performed by: PHYSICAL THERAPIST

## 2019-01-22 PROCEDURE — 97140 MANUAL THERAPY 1/> REGIONS: CPT | Mod: GP | Performed by: PHYSICAL THERAPIST

## 2019-01-22 NOTE — PROGRESS NOTES
[facility] Initial Evaluation  Subjective:  HPI                    Objective:  System    Physical Exam    General     ROS    Assessment/Plan:    PROGRESS  REPORT    Progress reporting period is from 12/11/18 to 1/22/19.       SUBJECTIVE  Now that hes been back work things are getting worse. Pain has been in the heel and his whole foot is tingling. Walking and being his feet still tend to aggravate it. For a few days was feeling good and now lately its just been hurting again.     Current Pain level: 5/10.     Initial Pain level: 4/10.   Changes in function:  Yes (See Goal flowsheet attached for changes in current functional level)  Adverse reaction to treatment or activity: prolonged standing / walking at work and calf raises standing.     OBJECTIVE  Changes noted in objective findings:  Yes, 10 deg DF with pull in calf slight pain in foot compared to right 12 deg, 65 deg PF on left no pain right 54 deg, Inversion left 54 compared to right 54 deg, 20 deg ever bilaterally no pain, Strength: 5/5 inversion bilat no pain, 5/5 eversion bilat. no pain, 5/5 DF bilat no pain, PF 4/5 bilaterally but painful on the left into heel.      ASSESSMENT/PLAN  Updated problem list and treatment plan: Diagnosis 1:  Left foot pain / plantar fasicia  Pain -  hot/cold therapy, US, manual therapy, self management, education, directional preference exercise and home program  Decreased strength - therapeutic exercise, therapeutic activities and home program  Impaired gait - gait training and home program  Decreased function - therapeutic activities and home program  STG/LTGs have been met or progress has been made towards goals:  Yes, but has been set back from being at work again  Assessment of Progress: The patient's condition has exacerbated.  Self Management Plans:  Patient is independent in a home treatment program.  I have re-evaluated this patient and find that the nature, scope, duration and intensity of the  therapy is appropriate for the medical condition of the patient.  Adarsh continues to require the following intervention to meet STG and LTG's:  PT    Recommendations:  This patient would benefit from continued therapy.     Frequency:  1 X week, once daily  Duration:  for 1 weeks        Please refer to the daily flowsheet for treatment today, total treatment time and time spent performing 1:1 timed codes.

## 2019-01-22 NOTE — PROGRESS NOTES
Chief Complaint   Patient presents with     Left Foot - RECHECK     Plantar fasciiatis            Allergies   Allergen Reactions     Artificial Sweetner (Informational Only) [Artificial Sweetner (Informational Only) ] GI Disturbance     ALL artificial sweetners cause severe diarrhea & flu symptoms     Hydromorphone Anaphylaxis     Ibuprofen GI Disturbance     Morphine [Fumaric Acid] Anaphylaxis     Hydrocodone-Acetaminophen Itching     Tramadol Hives     Trazodone Hives     Gabapentin      GI upset per pt     Keflex [Cephalexin] Diarrhea     Upset stomach     Codeine Phosphate Itching     Ketorolac Tromethamine Rash         Subjective: Adarsh is a 53 year old male who presents to the clinic today for a follow up of left plantar fasciitis. He had an injection into the left heel at the last visit. He continues with PT. He relates that he is feeling a little better since the injection. He relates that he was on his foot all night last night and his job has not be allowing him to sit, which aggravates the heel.     Objective  Data Unavailable Data Unavailable Data Unavailable Data Unavailable Data Unavailable 0 lbs 0 oz  Pain noted today with palpation of the medial attachment of the plantar fascia on the calcaneus. Some pain in the medial band of the PF at the distal aspect. No pain along the courses of the PT, peroneal, or Achilles tendons on the left.     Assessment: PF on the left foot - somewhat improved after the injection. Still gets pain after standing all night.     Plan:   - Pt seen and evaluated  - Recommend a pair of custom orthotics with p-wings and holes in heels. He agrees to this and these were molded and sent to the lab.   - Work note written.   - Pt to return to clinic in 2 months.

## 2019-01-23 ENCOUNTER — OFFICE VISIT (OUTPATIENT)
Dept: PODIATRY | Facility: CLINIC | Age: 54
End: 2019-01-23
Payer: COMMERCIAL

## 2019-01-23 VITALS — HEART RATE: 78 BPM | DIASTOLIC BLOOD PRESSURE: 68 MMHG | SYSTOLIC BLOOD PRESSURE: 112 MMHG | OXYGEN SATURATION: 98 %

## 2019-01-23 DIAGNOSIS — M79.672 PAIN OF LEFT HEEL: Primary | ICD-10-CM

## 2019-01-23 PROCEDURE — 99213 OFFICE O/P EST LOW 20 MIN: CPT | Performed by: PODIATRIST

## 2019-01-23 RX ORDER — DIPHENOXYLATE HCL/ATROPINE 2.5-.025MG
2 TABLET ORAL
COMMUNITY
End: 2019-01-23

## 2019-01-23 RX ORDER — DICYCLOMINE HCL 20 MG
20 TABLET ORAL
COMMUNITY
End: 2019-05-09

## 2019-01-23 RX ORDER — EPINEPHRINE 0.3 MG/.3ML
0.3 INJECTION SUBCUTANEOUS
COMMUNITY
End: 2021-09-27 | Stop reason: ALTCHOICE

## 2019-01-23 RX ORDER — NAPROXEN SODIUM 220 MG
220 TABLET ORAL DAILY PRN
Status: ON HOLD | COMMUNITY
End: 2023-09-27

## 2019-01-23 ASSESSMENT — PAIN SCALES - GENERAL: PAINLEVEL: MODERATE PAIN (5)

## 2019-01-23 NOTE — LETTER
1/23/2019         RE: Adarsh Salvador  [address] [facility] [address]        Dear Colleague,    Thank you for referring your patient, Adarsh Salvador, to the [facility]. Please see a copy of my visit note below.    Chief Complaint   Patient presents with     Left Foot - RECHECK     Plantar fasciiatis            Allergies   Allergen Reactions     Artificial Sweetner (Informational Only) [Artificial Sweetner (Informational Only) ] GI Disturbance     ALL artificial sweetners cause severe diarrhea & flu symptoms     Hydromorphone Anaphylaxis     Ibuprofen GI Disturbance     Morphine [Fumaric Acid] Anaphylaxis     Hydrocodone-Acetaminophen Itching     Tramadol Hives     Trazodone Hives     Gabapentin      GI upset per pt     Keflex [Cephalexin] Diarrhea     Upset stomach     Codeine Phosphate Itching     Ketorolac Tromethamine Rash         Subjective: Adarsh is a 53 year old male who presents to the clinic today for a follow up of left plantar fasciitis. He had an injection into the left heel at the last visit. He continues with PT. He relates that he is feeling a little better since the injection. He relates that he was on his foot all night last night and his job has not be allowing him to sit, which aggravates the heel.     Objective  Data Unavailable Data Unavailable Data Unavailable Data Unavailable Data Unavailable 0 lbs 0 oz  Pain noted today with palpation of the medial attachment of the plantar fascia on the calcaneus. Some pain in the medial band of the PF at the distal aspect. No pain along the courses of the PT, peroneal, or Achilles tendons on the left.     Assessment: PF on the left foot - somewhat improved after the injection. Still gets pain after standing all night.     Plan:   - Pt seen and evaluated  - Recommend a pair of custom orthotics with p-wings and holes in heels. He agrees to this and these were molded and sent to the lab.   - Work note written.   - Pt to return to clinic in 2  months.       Again, thank you for allowing me to participate in the care of your patient.        Sincerely,        Quan Pace DPM

## 2019-01-23 NOTE — PATIENT INSTRUCTIONS
Thanks for coming today.  [facility]  [address] [place] [address]    To schedule future appointments in Ortho Clinic, you may call [phone].    To schedule ordered imaging by your provider:   Call [facility] [phone]    To schedule an injection ordered by your provider:  Call [facility] Injection scheduling line: [phone]  [employer] available online at:  [email or web address]    Please call if any further questions or concerns ([phone]).  Clinic hours 8 am to 5 pm.    Return to clinic (call) if symptoms worsen or fail to improve.

## 2019-01-23 NOTE — NURSING NOTE
Adarsh Salvador's chief complaint for this visit includes:  Chief Complaint   Patient presents with     Left Foot - RECHECK     Plantar fasciiatis     PCP: Bertha Romero    Referring Provider:  No referring provider defined for this encounter.    /68 (BP Location: Left arm, Patient Position: Sitting, Cuff Size: Adult Large)   Pulse 78   SpO2 98%   Moderate Pain (5)     Do you need any medication refills at today's visit? no

## 2019-01-23 NOTE — LETTER
Adarsh Salvador     1965  Encounter date/time:  1/23/19 1036   [id number]    Report of Workability    To whom it may concern:     Adarsh Salvador was seen in our clinic today for his left foot. 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 Please also minimize the standing he does outside of the boot at work to keep him from re-aggravating his symptoms. He will follow up in about 2 months and restrictions will be re-addressed at that time.     Sincerely,              Quan Pace DPM

## 2019-01-29 ENCOUNTER — OFFICE VISIT (OUTPATIENT)
Dept: URGENT CARE | Facility: URGENT CARE | Age: 54
End: 2019-01-29
Payer: COMMERCIAL

## 2019-01-29 ENCOUNTER — THERAPY VISIT (OUTPATIENT)
Dept: PHYSICAL THERAPY | Facility: CLINIC | Age: 54
End: 2019-01-29
Payer: COMMERCIAL

## 2019-01-29 ENCOUNTER — OFFICE VISIT (OUTPATIENT)
Dept: NEUROLOGY | Facility: CLINIC | Age: 54
End: 2019-01-29
Payer: COMMERCIAL

## 2019-01-29 VITALS
OXYGEN SATURATION: 97 % | SYSTOLIC BLOOD PRESSURE: 135 MMHG | WEIGHT: 300.4 LBS | BODY MASS INDEX: 41.9 KG/M2 | HEART RATE: 75 BPM | DIASTOLIC BLOOD PRESSURE: 86 MMHG

## 2019-01-29 VITALS
HEART RATE: 89 BPM | TEMPERATURE: 97.3 F | DIASTOLIC BLOOD PRESSURE: 80 MMHG | BODY MASS INDEX: 42.26 KG/M2 | OXYGEN SATURATION: 98 % | WEIGHT: 303 LBS | SYSTOLIC BLOOD PRESSURE: 131 MMHG

## 2019-01-29 DIAGNOSIS — M72.2 PLANTAR FASCIITIS OF LEFT FOOT: ICD-10-CM

## 2019-01-29 DIAGNOSIS — G89.29 CHRONIC MIDLINE LOW BACK PAIN WITHOUT SCIATICA: ICD-10-CM

## 2019-01-29 DIAGNOSIS — M54.50 CHRONIC MIDLINE LOW BACK PAIN WITHOUT SCIATICA: ICD-10-CM

## 2019-01-29 DIAGNOSIS — G62.9 NEUROPATHY: Primary | ICD-10-CM

## 2019-01-29 DIAGNOSIS — R10.11 RUQ ABDOMINAL PAIN: Primary | ICD-10-CM

## 2019-01-29 DIAGNOSIS — M79.672 PAIN OF LEFT HEEL: ICD-10-CM

## 2019-01-29 PROCEDURE — 97110 THERAPEUTIC EXERCISES: CPT | Mod: GP | Performed by: PHYSICAL THERAPIST

## 2019-01-29 PROCEDURE — 97035 APP MDLTY 1+ULTRASOUND EA 15: CPT | Mod: GP | Performed by: PHYSICAL THERAPIST

## 2019-01-29 PROCEDURE — 97140 MANUAL THERAPY 1/> REGIONS: CPT | Mod: GP | Performed by: PHYSICAL THERAPIST

## 2019-01-29 PROCEDURE — 99213 OFFICE O/P EST LOW 20 MIN: CPT | Performed by: PSYCHIATRY & NEUROLOGY

## 2019-01-29 PROCEDURE — 99214 OFFICE O/P EST MOD 30 MIN: CPT | Performed by: PHYSICIAN ASSISTANT

## 2019-01-29 RX ORDER — DULOXETIN HYDROCHLORIDE 30 MG/1
30 CAPSULE, DELAYED RELEASE ORAL DAILY
Qty: 90 CAPSULE | Refills: 3 | Status: SHIPPED | OUTPATIENT
Start: 2019-01-29 | End: 2019-11-12

## 2019-01-29 ASSESSMENT — PAIN SCALES - GENERAL: PAINLEVEL: MODERATE PAIN (5)

## 2019-01-29 NOTE — PROGRESS NOTES
Subjective:  HPI                    Objective:  System    Physical Exam    General     ROS    Assessment/Plan:    DISCHARGE REPORT    Progress reporting period is from 1/22/19 to 1/29/19.       SUBJECTIVE  Patient on two months of restricitions and going to be getting custom orthotics.     Current Pain level: 3/10.     Initial Pain level: 4/10.   Changes in function:  Yes, but progress has plateaued  Adverse reaction to treatment or activity: None    OBJECTIVE  Changes noted in objective findings:  None  Objective: 10 deg DF with pull in calf slight pain in foot compared to right 12 deg, 65 deg PF on left no pain right 54 deg, Inversion left 54 compared to right 54 deg, 20 deg ever bilaterally no pain, Strength: 5/5 inversion bilat no pain, 5/5 eversion bilat. no pain, 5/5 DF bilat no pain, PF 4/5 bilaterally but painful on the left into heel.      ASSESSMENT/PLAN  Updated problem list and treatment plan: Diagnosis 1:  Left plantar fasciitis  Pain -  home program  Decreased strength - home program  Decreased function - home program  STG/LTGs have been met or progress has been made towards goals:  Yes, but progress has plateaued  Assessment of Progress: The patient's progress has plateaued.  Self Management Plans:  Patient is independent in a home treatment program.  I have re-evaluated this patient and find that the nature, scope, duration and intensity of the therapy is appropriate for the medical condition of the patient.  Adarsh continues to require the following intervention to meet STG and LTG's:  HEP    Recommendations:  This patient is ready to be discharged from therapy and continue their home treatment program.    Please refer to the daily flowsheet for treatment today, total treatment time and time spent performing 1:1 timed codes.

## 2019-01-29 NOTE — LETTER
1/29/2019         RE: Adarsh Salvador  [address] [facility] [address]        Dear Colleague,    Thank you for referring your patient, Adarsh Salvador, to the [facility]. Please see a copy of my visit note below.    Visit Date:   01/29/2019      HISTORY OF PRESENT ILLNESS:  The patient is seen in follow-up with issues of diabetic neuropathy, low back pain, and bilateral meralgia paresthetica.  I last saw the patient 2 months ago.  He says things are not going as well as they were in November.  He is having breakthrough pain at night.  By that he means his left leg will go numb and his feet will feel like he is walking on broken glass.  He is on duloxetine 60 mg daily.  This has been working quite well for him, but now it seems to be losing its impact.  This is frustrating for him.  He does note that he lives with his sister and she thinks his mood has been much improved since taking the duloxetine.  He has already been on gabapentin and nortriptyline.  His back continues to cause him problems.  He does report that he has a history of depression.  He has been on medicine for this in the past.  He is easily agitated and upset.  The duloxetine has been helpful for these symptoms.      PHYSICAL EXAMINATION:   GENERAL:  The patient is cooperative and in no distress.   VITAL SIGNS:  His blood pressure is 135/86.   MUSCULOSKELETAL:  Straight leg raising is negative.  He does have some limitation in lumbar range of motion with a provocation of symptoms and lateral rotation.  Reflexes are present at the knees and ankles.      ASSESSMENT:   1.  Diabetic neuropathy.   2.  Bilateral meralgia paresthetica.   3.  Low back pain.      DISCUSSION:  The patient is seen with the above problem list.  At this point, I am going to increase his duloxetine so he takes a total of 90 mg daily.  He will take a 60 mg and 30 mg.  I am going to refer him back to physical therapy to see if they can work with his back and get him in  a program of exercise to improve strength and endurance.  I will see him in followup in 2 months for reexamination.  He knows to call if there are questions or problems before then.         WILIAM MONTALVO MD             D: 2019   T: 2019   MT: NORM      Name:     LUTHER BLACKMAN   MRN:      [id number]        Account:      [id number]   :      1965           Visit Date:   2019      Document: [id number]       Again, thank you for allowing me to participate in the care of your patient.        Sincerely,        Wiliam Montalvo MD

## 2019-01-29 NOTE — NURSING NOTE
Adarsh Salvador's goals for this visit include: return  He requests these members of his care team be copied on today's visit information:     PCP: Bertha Romero    Referring Provider:  No referring provider defined for this encounter.    /86   Pulse 75   Wt 136.3 kg (300 lb 6.4 oz)   SpO2 97%   BMI 41.90 kg/m      Do you need any medication refills at today's visit? ?

## 2019-01-30 NOTE — PROGRESS NOTES
S: 53-year-old asthmatic and diabetic presents for evaluation of right upper quadrant pain since yesterday.  He rates the pain 8 or 9/10.  He started with some diarrhea about 20 minutes ago.  Worse with eating.  Some nausea.  H/O kidney stones but this feels different. No fever. Has FHX gallstones. Recent azith antibiotic and Prednisone for asthma flare.        Allergies   Allergen Reactions     Artificial Sweetner (Informational Only) [Artificial Sweetner (Informational Only) ] GI Disturbance     ALL artificial sweetners cause severe diarrhea & flu symptoms     Hydromorphone Anaphylaxis     Ibuprofen GI Disturbance     Morphine [Fumaric Acid] Anaphylaxis     Hydrocodone-Acetaminophen Itching     Tramadol Hives     Trazodone Hives     Gabapentin      GI upset per pt     Keflex [Cephalexin] Diarrhea     Upset stomach     Codeine Phosphate Itching     Ketorolac Tromethamine Rash       Past Medical History:   Diagnosis Date     Adult celiac disease      Benign essential hypertension 12/15/2015     GERD (gastroesophageal reflux disease) 6/15/2011     Hypercholesterolemia 6/15/2011     Hypertension 6/15/2011     IBS (irritable bowel syndrome)      Kidney stone 6/15/2011    Pt believes these were Calcium stones     Type 2 diabetes mellitus without complication, without long-term current use of insulin (H) 1/18/2018     Uncomplicated asthma          Current Outpatient Medications on File Prior to Visit:  albuterol (PROAIR HFA/PROVENTIL HFA/VENTOLIN HFA) 108 (90 Base) MCG/ACT inhaler Inhale 2 puffs into the lungs every 6 hours as needed for shortness of breath / dyspnea or wheezing   amLODIPine (NORVASC) 10 MG tablet TAKE 1 TABLET (10 MG) BY MOUTH DAILY   ASPIRIN PO Take 325 mg by mouth daily   aspirin-acetaminophen-caffeine (EXCEDRIN MIGRAINE) 250-250-65 MG tablet Take 1 tablet by mouth   atorvastatin (LIPITOR) 40 MG tablet Take 1 tablet (40 mg) by mouth daily   blood glucose monitoring (ACCU-CHEK FASTCLIX) lancets Use to  test blood sugar 1 times daily or as directed.  Ok to substitute alternative if insurance prefers.   blood glucose monitoring (ACCU-CHEK GUIDE) test strip Use to test blood sugar 1 times daily or as directed.   calcium carbonate (TUMS) 500 MG chewable tablet Take 1 chew tab by mouth daily   cyclobenzaprine (FLEXERIL) 10 MG tablet Take 0.5-1 tablets (5-10 mg) by mouth 3 times daily as needed for muscle spasms   dicyclomine (BENTYL) 20 MG tablet Take 20 mg by mouth   diphenoxylate-atropine (LOMOTIL) 2.5-0.025 MG per tablet Take 2 tablets by mouth 4 times daily as needed for diarrhea   DULoxetine (CYMBALTA) 30 MG capsule Take 1 capsule (30 mg) by mouth daily Take with 60 mg capsule, total daily dose 90 mg.   DULoxetine (CYMBALTA) 60 MG EC capsule Take 1 capsule (60 mg) by mouth daily   EPINEPHrine (EPIPEN) 0.3 MG/0.3ML injection Inject 0.3 mLs (0.3 mg) into the muscle once as needed for anaphylaxis   EPINEPHrine (EPIPEN/ADRENACLICK/OR ANY BX GENERIC EQUIV) 0.3 MG/0.3ML injection 2-pack Inject 0.3 mLs into the muscle   escitalopram (LEXAPRO) 10 MG tablet Take 1 tablet (10 mg) by mouth daily   fluticasone-salmeterol (ADVAIR) 250-50 MCG/DOSE diskus inhaler Inhale 1 puff into the lungs 2 times daily   lisinopril (PRINIVIL/ZESTRIL) 20 MG tablet TAKE 1 TABLET BY MOUTH EVERY DAY   metFORMIN (GLUCOPHAGE-XR) 500 MG 24 hr tablet Take 2 tablets (1,000 mg) by mouth 2 times daily (with meals)   multivitamin, therapeutic (THERA-VIT) TABS Take 1 tablet by mouth daily   Naproxen Sodium (ALEVE PO) Take by mouth as needed    naproxen sodium (ANAPROX) 220 MG tablet Take 220 mg by mouth     No current facility-administered medications on file prior to visit.     Social History     Tobacco Use     Smoking status: Never Smoker     Smokeless tobacco: Current User     Types: Chew     Tobacco comment: Chew   Substance Use Topics     Alcohol use: Yes     Alcohol/week: 0.0 oz     Comment: occasional     Drug use: No       ROS:  General: negative  for fever  Resp: negative for chest pain   CV: negative for chest pain  ABD: as above  : negative for dysuria  Neurologic:negative for Headache    OBJECTIVE:  /80 (Cuff Size: Adult Large)   Pulse 89   Temp 97.3  F (36.3  C) (Oral)   Wt 137.4 kg (303 lb)   SpO2 98%   BMI 42.26 kg/m     General:   awake, alert, and cooperative.  NAD.   Head: Normocephalic, atraumatic.  Eyes: Conjunctiva clear, non icteric.   Heart: Regular rate and rhythm. No murmur.  Lungs: Chest is clear; no wheezes or rales.  ABD: soft, moderate RUQ tenderness  Neuro: Alert and oriented - normal speech.     ASSESSMENT:    ICD-10-CM    1. RUQ abdominal pain R10.11        PLAN: ? Gallstones. Needs to go to the ER for eval. of this. Unable to get labs to evaluate this in a timely fashion. Also do not have US or CT scan.       Advised about symptoms which might herald more serious problems.        Ana Shabazz PA-C

## 2019-01-30 NOTE — PROGRESS NOTES
Visit Date:   01/29/2019      HISTORY OF PRESENT ILLNESS:  The patient is seen in follow-up with issues of diabetic neuropathy, low back pain, and bilateral meralgia paresthetica.  I last saw the patient 2 months ago.  He says things are not going as well as they were in November.  He is having breakthrough pain at night.  By that he means his left leg will go numb and his feet will feel like he is walking on broken glass.  He is on duloxetine 60 mg daily.  This has been working quite well for him, but now it seems to be losing its impact.  This is frustrating for him.  He does note that he lives with his sister and she thinks his mood has been much improved since taking the duloxetine.  He has already been on gabapentin and nortriptyline.  His back continues to cause him problems.  He does report that he has a history of depression.  He has been on medicine for this in the past.  He is easily agitated and upset.  The duloxetine has been helpful for these symptoms.      PHYSICAL EXAMINATION:   GENERAL:  The patient is cooperative and in no distress.   VITAL SIGNS:  His blood pressure is 135/86.   MUSCULOSKELETAL:  Straight leg raising is negative.  He does have some limitation in lumbar range of motion with a provocation of symptoms and lateral rotation.  Reflexes are present at the knees and ankles.      ASSESSMENT:   1.  Diabetic neuropathy.   2.  Bilateral meralgia paresthetica.   3.  Low back pain.      DISCUSSION:  The patient is seen with the above problem list.  At this point, I am going to increase his duloxetine so he takes a total of 90 mg daily.  He will take a 60 mg and 30 mg.  I am going to refer him back to physical therapy to see if they can work with his back and get him in a program of exercise to improve strength and endurance.  I will see him in followup in 2 months for reexamination.  He knows to call if there are questions or problems before then.         CHELSEA POZO MD             D:  2019   T: 2019   MT: NORM      Name:     LUTHER BLACKMAN   MRN:      [id number]        Account:      [id number]   :      1965           Visit Date:   2019      Document: [id number]

## 2019-03-25 DIAGNOSIS — E11.9 TYPE 2 DIABETES MELLITUS WITHOUT COMPLICATION, WITHOUT LONG-TERM CURRENT USE OF INSULIN (H): ICD-10-CM

## 2019-03-25 RX ORDER — BLOOD SUGAR DIAGNOSTIC
STRIP MISCELLANEOUS
Qty: 100 STRIP | Refills: 3 | Status: SHIPPED | OUTPATIENT
Start: 2019-03-25 | End: 2020-12-03

## 2019-04-02 ENCOUNTER — OFFICE VISIT (OUTPATIENT)
Dept: NEUROLOGY | Facility: CLINIC | Age: 54
End: 2019-04-02
Payer: COMMERCIAL

## 2019-04-02 VITALS
DIASTOLIC BLOOD PRESSURE: 98 MMHG | OXYGEN SATURATION: 99 % | SYSTOLIC BLOOD PRESSURE: 165 MMHG | HEART RATE: 89 BPM | BODY MASS INDEX: 42.51 KG/M2 | WEIGHT: 304.8 LBS

## 2019-04-02 DIAGNOSIS — R40.0 DAYTIME SOMNOLENCE: Primary | ICD-10-CM

## 2019-04-02 DIAGNOSIS — G62.9 NEUROPATHY: ICD-10-CM

## 2019-04-02 PROCEDURE — 99214 OFFICE O/P EST MOD 30 MIN: CPT | Performed by: PSYCHIATRY & NEUROLOGY

## 2019-04-02 RX ORDER — BACLOFEN 10 MG/1
10 TABLET ORAL SEE ADMIN INSTRUCTIONS
Qty: 60 TABLET | Refills: 1 | Status: SHIPPED | OUTPATIENT
Start: 2019-04-02 | End: 2019-06-11

## 2019-04-02 NOTE — PROGRESS NOTES
Visit Date:   04/02/2019      NEUROLOGY CONSULTATION      A patient of Bertha Romero PA-C.      This patient is seen in followup with neuropathic pain.  I last saw him about 2 months ago.  The patient also was having back problems at that time.  His neuropathic pain is related to diabetic neuropathy.  He has been on gabapentin and nortriptyline without benefit.  He has been on TRAMADOL in the past and his medications indicate it is an ALLERGY ALONG WITH OTHER NARCOTICS.  He is currently on duloxetine.  He usually awakens about 9 a.m.  He takes 60 mg of duloxetine.  He takes another 30 mg at about 3:00 p.m. right after he gets to work.  He works from 2:15 in the afternoon until 12:45 a.m. as a .  It is a 10-hour shift.  He did go to Physical Therapy for his low back and had a good result.  He is doing his stretching exercises on a daily basis and that has been quite helpful.  However, his feet bother him and also his lateral thighs.  His lateral thighs bother him whether he is sitting down or lying and if he is on his feet that can make the symptoms worse.  His feet feel like he is walking on broken glass at the end of his work shift.  When he goes to bed, his legs start to bother him more and more.  He gets up and will walk around, but that provides him with no relief.  His sleep is not refreshing.  He does not know if he snores loudly.  He does not fall asleep at work or driving, but if he has a day off he is drowsy during the day.      PHYSICAL EXAMINATION:   GENERAL:  The patient is cooperative and in no distress.   VITAL SIGNS:  His blood pressure is 165/98.   NEUROLOGIC:  There is nothing to add on neurologic exam.      ASSESSMENT:   1.  Painful diabetic neuropathy.   2.  Low back problems, improved.   3.  Bilateral meralgia paraesthetica.   4.  Daytime sleepiness, evaluate for sleep apnea.      DISCUSSION:  The patient is seen with the above problem list.  The main issue is the pain in his  legs and feet and his poor sleep.  The neuropathy could be causing the poor sleep, but given his body habitus he could also have sleep apnea.  The question now is what to do about trying to improve his sleep and reduce the pain.  I am going to start him on baclofen and have him take 20 mg at night, but not take it during the day.  I am also going to refer him for sleep consultation.  No change will be made in the duloxetine.  He is not interested in narcotics, and I would be reluctant to prescribe them in any case.  He also has numerous medication allergies listed to narcotic analgesics.  I will see him in followup in 2 months.  He did note that he has to work at least another year before he could apply for disability.  Hopefully, we can get these symptoms under better control for that to happen.         CHELSEA POZO MD             D: 2019   T: 2019   MT: CHRISSY      Name:     LUTHER BLACKMAN   MRN:      [id number]        Account:      [id number]   :      1965           Visit Date:   2019      Document: [id number]       cc: Bertha Romero PA-C

## 2019-04-02 NOTE — LETTER
4/2/2019         RE: Adarsh Salvador  [address] [facility]  [place] [address]        Dear Colleague,    Thank you for referring your patient, Adarsh Salvador, to the [facility]. Please see a copy of my visit note below.    Visit Date:   04/02/2019      NEUROLOGY CONSULTATION      A patient of Bertha Romero PA-C.      This patient is seen in followup with neuropathic pain.  I last saw him about 2 months ago.  The patient also was having back problems at that time.  His neuropathic pain is related to diabetic neuropathy.  He has been on gabapentin and nortriptyline without benefit.  He has been on TRAMADOL in the past and his medications indicate it is an ALLERGY ALONG WITH OTHER NARCOTICS.  He is currently on duloxetine.  He usually awakens about 9 a.m.  He takes 60 mg of duloxetine.  He takes another 30 mg at about 3:00 p.m. right after he gets to work.  He works from 2:15 in the afternoon until 12:45 a.m. as a .  It is a 10-hour shift.  He did go to Physical Therapy for his low back and had a good result.  He is doing his stretching exercises on a daily basis and that has been quite helpful.  However, his feet bother him and also his lateral thighs.  His lateral thighs bother him whether he is sitting down or lying and if he is on his feet that can make the symptoms worse.  His feet feel like he is walking on broken glass at the end of his work shift.  When he goes to bed, his legs start to bother him more and more.  He gets up and will walk around, but that provides him with no relief.  His sleep is not refreshing.  He does not know if he snores loudly.  He does not fall asleep at work or driving, but if he has a day off he is drowsy during the day.      PHYSICAL EXAMINATION:   GENERAL:  The patient is cooperative and in no distress.   VITAL SIGNS:  His blood pressure is 165/98.   NEUROLOGIC:  There is nothing to add on neurologic exam.      ASSESSMENT:   1.  Painful diabetic neuropathy.    2.  Low back problems, improved.   3.  Bilateral meralgia paraesthetica.   4.  Daytime sleepiness, evaluate for sleep apnea.      DISCUSSION:  The patient is seen with the above problem list.  The main issue is the pain in his legs and feet and his poor sleep.  The neuropathy could be causing the poor sleep, but given his body habitus he could also have sleep apnea.  The question now is what to do about trying to improve his sleep and reduce the pain.  I am going to start him on baclofen and have him take 20 mg at night, but not take it during the day.  I am also going to refer him for sleep consultation.  No change will be made in the duloxetine.  He is not interested in narcotics, and I would be reluctant to prescribe them in any case.  He also has numerous medication allergies listed to narcotic analgesics.  I will see him in followup in 2 months.  He did note that he has to work at least another year before he could apply for disability.  Hopefully, we can get these symptoms under better control for that to happen.         CHELSEA MONTALVO MD             D: 2019   T: 2019   MT: CHRISSY      Name:     LUTHER BLACKMAN   MRN:      [id number]        Account:      [id number]   :      1965           Visit Date:   2019      Document: [id number]       cc: Bertha Romero PA-C       Again, thank you for allowing me to participate in the care of your patient.        Sincerely,        Chelsea Montalvo MD

## 2019-04-02 NOTE — NURSING NOTE
Adarsh Salvador's goals for this visit include: neuropathy  He requests these members of his care team be copied on today's visit information: no    PCP: Bertha Romero    Referring Provider:  No referring provider defined for this encounter.    There were no vitals taken for this visit.    Do you need any medication refills at today's visit? no    Emily Brooks LPN

## 2019-04-05 NOTE — PROGRESS NOTES
SUBJECTIVE:                                                    Adarsh Salvador is a 53 year old male who presents to clinic today for the following health issues:    ENT Symptoms             Symptoms: cc Present Absent Comment   Fever/Chills   x    Fatigue  x     Muscle Aches  x     Eye Irritation   x    Sneezing  x     Nasal Lobo/Drg  x     Sinus Pressure/Pain   x    Loss of smell   x    Dental pain   x    Sore Throat   x    Swollen Glands   x    Ear Pain/Fullness  x     Cough  x  Mostly dry but has some yellowish green sputum   Wheeze  x     Chest Pain   x    Shortness of breath  x  With cough, oxygen is 98 percent   Rash   x    Other   x      Symptom duration:  x 1 week   Symptom severity:  mild   Treatments tried:  OTC   Contacts:  none     Patient has h/o asthma and diabetes type 2.  He had the z pack January 2019 (3 months ago).     Has been on zpack and augmentin previously and prednisone.   He feels this never fully went away since January. No fevers.   Has been wheezing and more tight at night, feels he needs prednisone again.   Has ICS and albuterol does not need refills at this time.     Baclofen-helps with RLS, got through neurology. Helps with sleep.     2) when lifting he feels pain in his testicle and right groin area. Had surgery on this area and he is concerns. Will refer back to surgeon.           Problem list and histories reviewed & adjusted, as indicated.  Additional history: as documented    Patient Active Problem List   Diagnosis     GERD (gastroesophageal reflux disease)     Kidney stone     Chronic RLQ pain     Constipation     Abdominal pain, right upper quadrant     Adult celiac disease     Chronic maxillary sinusitis     Chronic rhinitis     Pure hyperglyceridemia     Anaphylactic reaction     Benign essential hypertension     Anemia in other chronic diseases classified elsewhere     Fatty liver     Irritable bowel syndrome with diarrhea     Right inguinal hernia     BMI 40.0-44.9, adult (H)      Pain in thoracic spine     Mild persistent asthma without complication     Type 2 diabetes mellitus without complication, without long-term current use of insulin (H)     Hyperlipidemia LDL goal <100     Past Surgical History:   Procedure Laterality Date     C REMOVAL OF KIDNEY STONE       COLONOSCOPY  5/1/2012    Procedure:COLONOSCOPY; screening colonoscopy; Surgeon:KINGSLEY DOS SANTOS; Location:[facility]     COLONOSCOPY  4/21/2014    Procedure: COMBINED COLONOSCOPY, SINGLE BIOPSY/POLYPECTOMY BY BIOPSY;  Surgeon: Duane, William Charles, MD;  Location: [facility]     HC BREATH HYDROGEN TEST  3/7/2014    Procedure: HYDROGEN BREATH TEST;  Surgeon: Darion Swift MD;  Location: [facility]     ORTHOPEDIC SURGERY      x3 Rt knee      RELEASE CARPAL TUNNEL Right 1/26/2018    Procedure: RELEASE CARPAL TUNNEL;  Right carpal tunnel release;  Surgeon: Wiliam Saenz MD;  Location: [facility]       Social History     Tobacco Use     Smoking status: Never Smoker     Smokeless tobacco: Current User     Types: Chew     Tobacco comment: Chew   Substance Use Topics     Alcohol use: Yes     Alcohol/week: 0.0 oz     Comment: occasional     Family History   Problem Relation Age of Onset     Cancer Mother 52        lung, smoked     Cancer - colorectal Father 53        unsure type, pt attributes to radiation exposure     Myocardial Infarction Father 45     Myocardial Infarction Paternal Grandfather 35     Diabetes Other         nephew- type 1     Diabetes Maternal Aunt         1     Diabetes Maternal Aunt         2     Diabetes Paternal Uncle         1     Diabetes Paternal Uncle         2     Diabetes Paternal Uncle         3     Diabetes Paternal Uncle         4         Current Outpatient Medications   Medication Sig Dispense Refill     ACCU-CHEK GUIDE test strip USE TO TEST BLOOD SUGAR 1 TIMES DAILY OR AS DIRECTED. 100 strip 3     albuterol (PROAIR HFA/PROVENTIL HFA/VENTOLIN HFA) 108 (90 Base) MCG/ACT inhaler Inhale 2 puffs into the  lungs every 6 hours as needed for shortness of breath / dyspnea or wheezing 1 Inhaler 3     amLODIPine (NORVASC) 10 MG tablet TAKE 1 TABLET (10 MG) BY MOUTH DAILY 90 tablet 2     amoxicillin-clavulanate (AUGMENTIN) 875-125 MG tablet Take 1 tablet by mouth 2 times daily for 10 days 20 tablet 0     ASPIRIN PO Take 325 mg by mouth daily       aspirin-acetaminophen-caffeine (EXCEDRIN MIGRAINE) 250-250-65 MG tablet Take 1 tablet by mouth       atorvastatin (LIPITOR) 40 MG tablet Take 1 tablet (40 mg) by mouth daily 90 tablet 3     baclofen (LIORESAL) 10 MG tablet Take 1 tablet (10 mg) by mouth See Admin Instructions 1-2 po q hs. 60 tablet 1     blood glucose monitoring (ACCU-CHEK FASTCLIX) lancets Use to test blood sugar 1 times daily or as directed.  Ok to substitute alternative if insurance prefers. 102 each 11     calcium carbonate (TUMS) 500 MG chewable tablet Take 1 chew tab by mouth daily       cyclobenzaprine (FLEXERIL) 10 MG tablet Take 0.5-1 tablets (5-10 mg) by mouth 3 times daily as needed for muscle spasms 90 tablet 1     dicyclomine (BENTYL) 20 MG tablet Take 20 mg by mouth       diphenoxylate-atropine (LOMOTIL) 2.5-0.025 MG per tablet Take 2 tablets by mouth 4 times daily as needed for diarrhea 30 tablet 1     DULoxetine (CYMBALTA) 30 MG capsule Take 1 capsule (30 mg) by mouth daily Take with 60 mg capsule, total daily dose 90 mg. 90 capsule 3     DULoxetine (CYMBALTA) 60 MG EC capsule Take 1 capsule (60 mg) by mouth daily 90 capsule 3     EPINEPHrine (EPIPEN) 0.3 MG/0.3ML injection Inject 0.3 mLs (0.3 mg) into the muscle once as needed for anaphylaxis 2 each 2     EPINEPHrine (EPIPEN/ADRENACLICK/OR ANY BX GENERIC EQUIV) 0.3 MG/0.3ML injection 2-pack Inject 0.3 mLs into the muscle       escitalopram (LEXAPRO) 10 MG tablet Take 1 tablet (10 mg) by mouth daily 30 tablet 1     fluticasone-salmeterol (ADVAIR) 250-50 MCG/DOSE diskus inhaler Inhale 1 puff into the lungs 2 times daily 3 Inhaler 1     lisinopril  "(PRINIVIL/ZESTRIL) 20 MG tablet TAKE 1 TABLET BY MOUTH EVERY DAY 90 tablet 1     metFORMIN (GLUCOPHAGE-XR) 500 MG 24 hr tablet Take 2 tablets (1,000 mg) by mouth 2 times daily (with meals) 360 tablet 0     multivitamin, therapeutic (THERA-VIT) TABS Take 1 tablet by mouth daily       Naproxen Sodium (ALEVE PO) Take by mouth as needed        naproxen sodium (ANAPROX) 220 MG tablet Take 220 mg by mouth       predniSONE (DELTASONE) 20 MG tablet Take 20 mg by mouth daily for 7 days. 7 tablet 0     Allergies   Allergen Reactions     Artificial Sweetner (Informational Only) [Artificial Sweetner (Informational Only) ] GI Disturbance     ALL artificial sweetners cause severe diarrhea & flu symptoms     Hydromorphone Anaphylaxis     Ibuprofen GI Disturbance     Morphine [Fumaric Acid] Anaphylaxis     Hydrocodone-Acetaminophen Itching     Tramadol Hives     Trazodone Hives     Gabapentin      GI upset per pt     Keflex [Cephalexin] Diarrhea     Upset stomach     Codeine Phosphate Itching     Ketorolac Tromethamine Rash       ROS:  Constitutional, HEENT, cardiovascular, pulmonary, GI, , musculoskeletal, neuro, skin, endocrine and psych systems are negative, except as otherwise noted.    OBJECTIVE:     /88   Pulse 82   Temp 98.5  F (36.9  C) (Oral)   Resp 16   Ht 1.803 m (5' 11\")   Wt 137.4 kg (303 lb)   SpO2 98%   BMI 42.26 kg/m    Body mass index is 42.26 kg/m .  GENERAL: alert, no distress and obese  EYES: Eyes grossly normal to inspection, PERRL and conjunctivae and sclerae normal  HENT: ear canals and TM's normal, nose and mouth without ulcers or lesions  NECK: no adenopathy, no asymmetry, masses, or scars and thyroid normal to palpation  RESP: lungs clear to auscultation - no rales, rhonchi or wheezes  CV: regular rate and rhythm, normal S1 S2, no S3 or S4, no murmur, click or rub, no peripheral edema and peripheral pulses strong  MS: no gross musculoskeletal defects noted, no edema  NEURO: Normal strength " and tone, mentation intact and speech normal  PSYCH: mentation appears normal, affect normal/bright      ASSESSMENT/PLAN:     ASSESSMENT / PLAN:  (J45.41) Moderate persistent asthma with exacerbation  (primary encounter diagnosis)  Comment:  Worsening, if not resolved with the below treatment then he will follow up and we will get CXR  Plan: predniSONE (DELTASONE) 20 MG tablet,         amoxicillin-clavulanate (AUGMENTIN) 875-125 MG         tablet            To emergency room with severe shortness of breath or chest pain    (R10.31,  G89.29) Groin pain, chronic, right  Comment: hernia versus sprain/strain versus other  Plan: GENERAL SURG ADULT REFERRAL            Billin min spent face-to-face with patient. 20 min on history, 1 on exam, 4 on discussing diagnosis and treatment plan.       Bertha Romero PA-C  [facility]

## 2019-04-09 ENCOUNTER — OFFICE VISIT (OUTPATIENT)
Dept: FAMILY MEDICINE | Facility: CLINIC | Age: 54
End: 2019-04-09
Payer: COMMERCIAL

## 2019-04-09 VITALS
WEIGHT: 303 LBS | SYSTOLIC BLOOD PRESSURE: 128 MMHG | HEIGHT: 71 IN | DIASTOLIC BLOOD PRESSURE: 88 MMHG | OXYGEN SATURATION: 98 % | BODY MASS INDEX: 42.42 KG/M2 | TEMPERATURE: 98.5 F | RESPIRATION RATE: 16 BRPM | HEART RATE: 82 BPM

## 2019-04-09 DIAGNOSIS — J45.41 MODERATE PERSISTENT ASTHMA WITH EXACERBATION: Primary | ICD-10-CM

## 2019-04-09 DIAGNOSIS — G89.29 GROIN PAIN, CHRONIC, RIGHT: ICD-10-CM

## 2019-04-09 DIAGNOSIS — R10.31 GROIN PAIN, CHRONIC, RIGHT: ICD-10-CM

## 2019-04-09 PROCEDURE — 99214 OFFICE O/P EST MOD 30 MIN: CPT | Performed by: PHYSICIAN ASSISTANT

## 2019-04-09 RX ORDER — PREDNISONE 20 MG/1
20 TABLET ORAL DAILY
Qty: 7 TABLET | Refills: 0 | Status: SHIPPED | OUTPATIENT
Start: 2019-04-09 | End: 2019-05-09

## 2019-04-09 ASSESSMENT — MIFFLIN-ST. JEOR: SCORE: 2251.53

## 2019-04-09 NOTE — LETTER
My Asthma Action Plan  Name: Adarsh Salvador   YOB: 1965  Date: 4/5/2019   My doctor: Bertha Romero PA-C   My clinic: [facility]   Good Control    I feel good    No cough or wheeze    Can work, sleep and play without asthma symptoms       Take your asthma control medicine every day.     1. If exercise triggers your asthma, take your rescue medication    15 minutes before exercise or sports, and    During exercise if you have asthma symptoms  2. Spacer to use with inhaler: If you have a spacer, make sure to use it with your inhaler             YELLOW ZONE Getting Worse  I have ANY of these:    I do not feel good    Cough or wheeze    Chest feels tight    Wake up at night   1. Keep taking your Green Zone medications  2. Start taking your rescue medicine:    every 20 minutes for up to 1 hour. Then every 4 hours for 24-48 hours.  3. If you stay in the Yellow Zone for more than 12-24 hours, contact your doctor.  4. If you do not return to the Green Zone in 12-24 hours or you get worse, start taking your oral steroid medicine if prescribed by your provider.           RED ZONE Medical Alert - Get Help  I have ANY of these:    I feel awful    Medicine is not helping    Breathing getting harder    Trouble walking or talking    Nose opens wide to breathe       1. Take your rescue medicine NOW  2. If your provider has prescribed an oral steroid medicine, start taking it NOW  3. Call your doctor NOW  4. If you are still in the Red Zone after 20 minutes and you have not reached your doctor:    Take your rescue medicine again and    Call 911 or go to the emergency room right away    See your regular doctor within 2 weeks of an Emergency Room or Urgent Care visit for follow-up treatment.          Annual Reminders:  Meet with Asthma Educator,  Flu Shot in the Fall, consider Pneumonia Vaccination for patients with asthma (aged 19 and older).    Pharmacy:     [facility] #[id number] - [place]  WRITTEN PRESCRIPTION REQUESTED                      Asthma Triggers  How To Control Things That Make Your Asthma Worse    Triggers are things that make your asthma worse.  Look at the list below to help you find your triggers and what you can do about them.  You can help prevent asthma flare-ups by staying away from your triggers.      Trigger                                                          What you can do   Cigarette Smoke  Tobacco smoke can make asthma worse. Do not allow smoking in your home, car or around you.  Be sure no one smokes at a child s day care or school.  If you smoke, ask your health care provider for ways to help you quit.  Ask family members to quit too.  Ask your health care provider for a referral to Quit Plan to help you quit smoking, or call 3-104-280-PLAN.     Colds, Flu, Bronchitis  These are common triggers of asthma. Wash your hands often.  Don t touch your eyes, nose or mouth.  Get a flu shot every year.     Dust Mites  These are tiny bugs that live in cloth or carpet. They are too small to see. Wash sheets and blankets in hot water every week.   Encase pillows and mattress in dust mite proof covers.  Avoid having carpet if you can. If you have carpet, vacuum weekly.   Use a dust mask and HEPA vacuum.   Pollen and Outdoor Mold  Some people are allergic to trees, grass, or weed pollen, or molds. Try to keep your windows closed.  Limit time out doors when pollen count is high.   Ask you health care provider about taking medicine during allergy season.     Animal Dander  Some people are allergic to skin flakes, urine or saliva from pets with fur or feathers. Keep pets with fur or feathers out of your home.    If you can t keep the pet outdoors, then keep the pet out of your bedroom.  Keep the bedroom door closed.  Keep pets off cloth furniture and away from stuffed toys.     Mice, Rats, and Cockroaches  Some people are allergic  to the waste from these pests.   Cover food and garbage.  Clean up spills and food crumbs.  Store grease in the refrigerator.   Keep food out of the bedroom.   Indoor Mold  This can be a trigger if your home has high moisture. Fix leaking faucets, pipes, or other sources of water.   Clean moldy surfaces.  Dehumidify basement if it is damp and smelly.   Smoke, Strong Odors, and Sprays  These can reduce air quality. Stay away from strong odors and sprays, such as perfume, powder, hair spray, paints, smoke incense, paint, cleaning products, candles and new carpet.   Exercise or Sports  Some people with asthma have this trigger. Be active!  Ask your doctor about taking medicine before sports or exercise to prevent symptoms.    Warm up for 5-10 minutes before and after sports or exercise.     Other Triggers of Asthma  Cold air:  Cover your nose and mouth with a scarf.  Sometimes laughing or crying can be a trigger.  Some medicines and food can trigger asthma.

## 2019-04-10 ASSESSMENT — ASTHMA QUESTIONNAIRES: ACT_TOTALSCORE: 9

## 2019-04-16 NOTE — PROGRESS NOTES
SUBJECTIVE:   Adarsh Salvador is a 53 year old male who presents to clinic today for the following   health issues:    Asthma Follow-Up    Was ACT completed today?  No      Respiratory symptoms:   Cough: Yes--nonproductive   Wheezing: Yes   Shortness of breath: Yes---gets winded more easily now also.  Since January.         Use of short- acting(rescue) inhaler: YES    Taking controlled (daily) meds as prescribed: Yes    ER/UC visits or hospital admissions since last visit: none     Recent asthma triggers that patient is dealing with: None       Amount of exercise or physical activity: None    Problems taking medications regularly: No    Medication side effects: none    Diet: regular (no restrictions)      Patient with h/o asthma on advair Was given prednisone 20 mg daily x 7 days and augmentin on 4-9-19 for ongoing cough/sob. Since then he has felt zero improvement. If anything he feels is shortness of breath has worsened with walking around.  No fevers. No new symptoms. Dry cough since January. No edema.  Is morbidly obese.   No recent chest xray.    Oxygen is 98 percent.   Can sleep lying flat most nights be he states some nights he sleeps in recliner. No waking up gasping for breathe per patient.   No sweating, jaw pain, arm pain, or chest pains.      His RF include diabetes, HTN, elevated lipids  Additional history: as documented    Reviewed  and updated as needed this visit by clinical staff         Reviewed and updated as needed this visit by Provider         Patient Active Problem List   Diagnosis     GERD (gastroesophageal reflux disease)     Kidney stone     Chronic RLQ pain     Constipation     Abdominal pain, right upper quadrant     Adult celiac disease     Chronic maxillary sinusitis     Chronic rhinitis     Pure hyperglyceridemia     Anaphylactic reaction     Benign essential hypertension     Anemia in other chronic diseases classified elsewhere     Fatty liver     Irritable bowel syndrome with diarrhea      Right inguinal hernia     BMI 40.0-44.9, adult (H)     Pain in thoracic spine     Mild persistent asthma without complication     Type 2 diabetes mellitus without complication, without long-term current use of insulin (H)     Hyperlipidemia LDL goal <100     Past Surgical History:   Procedure Laterality Date     C REMOVAL OF KIDNEY STONE       COLONOSCOPY  5/1/2012    Procedure:COLONOSCOPY; screening colonoscopy; Surgeon:KINGSLEY DOS SANTOS; Location:[facility]     COLONOSCOPY  4/21/2014    Procedure: COMBINED COLONOSCOPY, SINGLE BIOPSY/POLYPECTOMY BY BIOPSY;  Surgeon: Duane, William Charles, MD;  Location: [facility]     HC BREATH HYDROGEN TEST  3/7/2014    Procedure: HYDROGEN BREATH TEST;  Surgeon: Darion Swift MD;  Location: [facility]     ORTHOPEDIC SURGERY      x3 Rt knee      RELEASE CARPAL TUNNEL Right 1/26/2018    Procedure: RELEASE CARPAL TUNNEL;  Right carpal tunnel release;  Surgeon: Wiliam Saenz MD;  Location: [facility]       Social History     Tobacco Use     Smoking status: Never Smoker     Smokeless tobacco: Current User     Types: Chew     Tobacco comment: Chew   Substance Use Topics     Alcohol use: Yes     Alcohol/week: 0.0 oz     Comment: occasional     Family History   Problem Relation Age of Onset     Cancer Mother 52        lung, smoked     Cancer - colorectal Father 53        unsure type, pt attributes to radiation exposure     Myocardial Infarction Father 45     Myocardial Infarction Paternal Grandfather 35     Diabetes Other         nephew- type 1     Diabetes Maternal Aunt         1     Diabetes Maternal Aunt         2     Diabetes Paternal Uncle         1     Diabetes Paternal Uncle         2     Diabetes Paternal Uncle         3     Diabetes Paternal Uncle         4         Current Outpatient Medications   Medication Sig Dispense Refill     ACCU-CHEK GUIDE test strip USE TO TEST BLOOD SUGAR 1 TIMES DAILY OR AS DIRECTED. 100 strip 3     albuterol (PROAIR HFA/PROVENTIL HFA/VENTOLIN HFA)  108 (90 Base) MCG/ACT inhaler Inhale 2 puffs into the lungs every 6 hours as needed for shortness of breath / dyspnea or wheezing 1 Inhaler 3     amLODIPine (NORVASC) 10 MG tablet TAKE 1 TABLET (10 MG) BY MOUTH DAILY 90 tablet 2     amoxicillin-clavulanate (AUGMENTIN) 875-125 MG tablet Take 1 tablet by mouth 2 times daily for 10 days 20 tablet 0     ASPIRIN PO Take 325 mg by mouth daily       aspirin-acetaminophen-caffeine (EXCEDRIN MIGRAINE) 250-250-65 MG tablet Take 1 tablet by mouth       atorvastatin (LIPITOR) 40 MG tablet Take 1 tablet (40 mg) by mouth daily 90 tablet 3     baclofen (LIORESAL) 10 MG tablet Take 1 tablet (10 mg) by mouth See Admin Instructions 1-2 po q hs. 60 tablet 1     blood glucose monitoring (ACCU-CHEK FASTCLIX) lancets Use to test blood sugar 1 times daily or as directed.  Ok to substitute alternative if insurance prefers. 102 each 11     calcium carbonate (TUMS) 500 MG chewable tablet Take 1 chew tab by mouth daily       cyclobenzaprine (FLEXERIL) 10 MG tablet Take 0.5-1 tablets (5-10 mg) by mouth 3 times daily as needed for muscle spasms 90 tablet 1     dicyclomine (BENTYL) 20 MG tablet Take 20 mg by mouth       diphenoxylate-atropine (LOMOTIL) 2.5-0.025 MG per tablet Take 2 tablets by mouth 4 times daily as needed for diarrhea 30 tablet 1     DULoxetine (CYMBALTA) 30 MG capsule Take 1 capsule (30 mg) by mouth daily Take with 60 mg capsule, total daily dose 90 mg. 90 capsule 3     DULoxetine (CYMBALTA) 60 MG EC capsule Take 1 capsule (60 mg) by mouth daily 90 capsule 3     EPINEPHrine (EPIPEN) 0.3 MG/0.3ML injection Inject 0.3 mLs (0.3 mg) into the muscle once as needed for anaphylaxis 2 each 2     EPINEPHrine (EPIPEN/ADRENACLICK/OR ANY BX GENERIC EQUIV) 0.3 MG/0.3ML injection 2-pack Inject 0.3 mLs into the muscle       escitalopram (LEXAPRO) 10 MG tablet Take 1 tablet (10 mg) by mouth daily 30 tablet 1     fluticasone-salmeterol (ADVAIR) 250-50 MCG/DOSE diskus inhaler Inhale 1 puff into  the lungs 2 times daily 3 Inhaler 1     lisinopril (PRINIVIL/ZESTRIL) 20 MG tablet TAKE 1 TABLET BY MOUTH EVERY DAY 90 tablet 1     metFORMIN (GLUCOPHAGE-XR) 500 MG 24 hr tablet Take 2 tablets (1,000 mg) by mouth 2 times daily (with meals) 360 tablet 0     multivitamin, therapeutic (THERA-VIT) TABS Take 1 tablet by mouth daily       Naproxen Sodium (ALEVE PO) Take by mouth as needed        naproxen sodium (ANAPROX) 220 MG tablet Take 220 mg by mouth       Allergies   Allergen Reactions     Artificial Sweetner (Informational Only) [Artificial Sweetner (Informational Only) ] GI Disturbance     ALL artificial sweetners cause severe diarrhea & flu symptoms     Hydromorphone Anaphylaxis     Ibuprofen GI Disturbance     Morphine [Fumaric Acid] Anaphylaxis     Hydrocodone-Acetaminophen Itching     Tramadol Hives     Trazodone Hives     Gabapentin      GI upset per pt     Keflex [Cephalexin] Diarrhea     Upset stomach     Codeine Phosphate Itching     Ketorolac Tromethamine Rash       ROS:  Constitutional, HEENT, cardiovascular, pulmonary, GI, , musculoskeletal, neuro, skin, endocrine and psych systems are negative, except as otherwise noted.    OBJECTIVE:     /74   Pulse 89   Temp 98.7  F (37.1  C) (Oral)   Resp 18   Wt 137.4 kg (303 lb)   SpO2 98%   BMI 42.26 kg/m    Body mass index is 42.26 kg/m .  GENERAL:obese , alert and no distress  RESP: lungs clear to auscultation - no rales, rhonchi or wheezes  CV: regular rate and rhythm, normal S1 S2, no S3 or S4, no murmur, click or rub, no peripheral edema and peripheral pulses strong  ABDOMEN: soft, nontender, no hepatosplenomegaly, no masses and bowel sounds normal  MS: no gross musculoskeletal defects noted, no edema  PSYCH: mentation appears normal, affect normal/bright    Chest xray- normal except mildly enlarged cardiac silohuette    ekg-normal   ASSESSMENT/PLAN:   ASSESSMENT / PLAN:  (R06.02) SOB (shortness of breath)  (primary encounter  diagnosis)  Comment: gradually worsening since January. He felt like initially prednisone was helpful but not anymore.  This could be from his asthma but we also need to rule out angina. His RF include diabetes, HTN, elevated lipids. Never smoked. This could also be from being overweight/deconditioned.  Could also be infection, CHF, or other cause. No pneumonia seen on cxr. Will get stress echo to further examine heart as a cause    Plan: EKG 12-lead complete w/read - Clinics,         Echocardiogram Dobutamine Stress, XR Chest 2         Views          EKG is normal.  Patient is stable so should be able to continue treating as outpatient but he is given reasons to go to emergency room     See more plan below    Morbid obesity- has been given references before. He feels he does not have time to work on it at this point      Patient Instructions   EKG normal  Schedule stress test, if you can't get in next week let me know so we can get you in somewhere  To emergency room with any worsening symptoms   I will follow up with chest xray results  If stress test shows this is not from your heart then we will need to adjust asthma medications and get you in with a specialist          Bertha Romero PA-C  [facility]

## 2019-04-18 ENCOUNTER — OFFICE VISIT (OUTPATIENT)
Dept: FAMILY MEDICINE | Facility: CLINIC | Age: 54
End: 2019-04-18
Payer: COMMERCIAL

## 2019-04-18 ENCOUNTER — ANCILLARY PROCEDURE (OUTPATIENT)
Dept: GENERAL RADIOLOGY | Facility: CLINIC | Age: 54
End: 2019-04-18
Attending: PHYSICIAN ASSISTANT
Payer: COMMERCIAL

## 2019-04-18 VITALS
HEART RATE: 89 BPM | OXYGEN SATURATION: 98 % | SYSTOLIC BLOOD PRESSURE: 126 MMHG | WEIGHT: 303 LBS | RESPIRATION RATE: 18 BRPM | BODY MASS INDEX: 42.26 KG/M2 | TEMPERATURE: 98.7 F | DIASTOLIC BLOOD PRESSURE: 74 MMHG

## 2019-04-18 DIAGNOSIS — R06.02 SOB (SHORTNESS OF BREATH): Primary | ICD-10-CM

## 2019-04-18 DIAGNOSIS — R06.02 SOB (SHORTNESS OF BREATH): ICD-10-CM

## 2019-04-18 PROCEDURE — 99214 OFFICE O/P EST MOD 30 MIN: CPT | Performed by: PHYSICIAN ASSISTANT

## 2019-04-18 PROCEDURE — 71046 X-RAY EXAM CHEST 2 VIEWS: CPT

## 2019-04-18 PROCEDURE — 93000 ELECTROCARDIOGRAM COMPLETE: CPT | Performed by: PHYSICIAN ASSISTANT

## 2019-04-18 NOTE — LETTER
My Depression Action Plan  Name: Adarsh Salvador   Date of Birth 1965  Date: 4/16/2019    My doctor: Bertha Romero   My clinic: [address] [address] [phone]          GREEN    ZONE   Good Control    What it looks like:     Things are going generally well. You have normal up s and down s. You may even feel depressed from time to time, but bad moods usually last less than a day.   What you need to do:  1. Continue to care for yourself (see self care plan)  2. Check your depression survival kit and update it as needed  3. Follow your physician s recommendations including any medication.  4. Do not stop taking medication unless you consult with your physician first.           YELLOW         ZONE Getting Worse    What it looks like:     Depression is starting to interfere with your life.     It may be hard to get out of bed; you may be starting to isolate yourself from others.    Symptoms of depression are starting to last most all day and this has happened for several days.     You may have suicidal thoughts but they are not constant.   What you need to do:     1. Call your care team, your response to treatment will improve if you keep your care team informed of your progress. Yellow periods are signs an adjustment may need to be made.     2. Continue your self-care, even if you have to fake it!    3. Talk to someone in your support network    4. Open up your depression survival kit           RED    ZONE Medical Alert - Get Help    What it looks like:     Depression is seriously interfering with your life.     You may experience these or other symptoms: You can t get out of bed most days, can t work or engage in other necessary activities, you have trouble taking care of basic hygiene, or basic responsibilities, thoughts of suicide or death that will not go away, self-injurious behavior.     What you need to do:  1. Call your care team and  request a same-day appointment. If they are not available (weekends or after hours) call your local crisis line, emergency room or 911.            Depression Self Care Plan / Survival Kit    Self-Care for Depression  Here s the deal. Your body and mind are really not as separate as most people think.  What you do and think affects how you feel and how you feel influences what you do and think. This means if you do things that people who feel good do, it will help you feel better.  Sometimes this is all it takes.  There is also a place for medication and therapy depending on how severe your depression is, so be sure to consult with your medical provider and/ or Behavioral Health Consultant if your symptoms are worsening or not improving.     In order to better manage my stress, I will:    Exercise  Get some form of exercise, every day. This will help reduce pain and release endorphins, the  feel good  chemicals in your brain. This is almost as good as taking antidepressants!  This is not the same as joining a gym and then never going! (they count on that by the way ) It can be as simple as just going for a walk or doing some gardening, anything that will get you moving.      Hygiene   Maintain good hygiene (Get out of bed in the morning, Make your bed, Brush your teeth, Take a shower, and Get dressed like you were going to work, even if you are unemployed).  If your clothes don't fit try to get ones that do.    Diet  I will strive to eat foods that are good for me, drink plenty of water, and avoid excessive sugar, caffeine, alcohol, and other mood-altering substances.  Some foods that are helpful in depression are: complex carbohydrates, B vitamins, flaxseed, fish or fish oil, fresh fruits and vegetables.    Psychotherapy  I agree to participate in Individual Therapy (if recommended).    Medication  If prescribed medications, I agree to take them.  Missing doses can result in serious side effects.  I understand that  drinking alcohol, or other illicit drug use, may cause potential side effects.  I will not stop my medication abruptly without first discussing it with my provider.    Staying Connected With Others  I will stay in touch with my friends, family members, and my primary care provider/team.    Use your imagination  Be creative.  We all have a creative side; it doesn t matter if it s oil painting, sand castles, or mud pies! This will also kick up the endorphins.    Witness Beauty  (AKA stop and smell the roses) Take a look outside, even in mid-winter. Notice colors, textures. Watch the squirrels and birds.     Service to others  Be of service to others.  There is always someone else in need.  By helping others we can  get out of ourselves  and remember the really important things.  This also provides opportunities for practicing all the other parts of the program.    Humor  Laugh and be silly!  Adjust your TV habits for less news and crime-drama and more comedy.    Control your stress  Try breathing deep, massage therapy, biofeedback, and meditation. Find time to relax each day.     My support system    Clinic Contact:  Phone number:    Contact 1:  Phone number:    Contact 2:  Phone number:    Catholic/:  Phone number:    Therapist:  Phone number:    Local crisis center:    Phone number:    Other community support:  Phone number:

## 2019-04-18 NOTE — RESULT ENCOUNTER NOTE
Pato Altamirano,       Your recent test results are attached, if you have any questions or concerns please feel free to contact me via e-mail or call [phone]. Negative chest xray for lung diease or pneumonia.  Heart is read as slightly enlarged, but the echo you are getting will tell us if this is true or not and is a much better test for that.       Sincerely,  Bertha Romero PA-C

## 2019-04-25 ENCOUNTER — ANCILLARY PROCEDURE (OUTPATIENT)
Dept: CARDIOLOGY | Facility: CLINIC | Age: 54
End: 2019-04-25
Attending: PHYSICIAN ASSISTANT
Payer: COMMERCIAL

## 2019-04-25 DIAGNOSIS — R06.02 SOB (SHORTNESS OF BREATH): ICD-10-CM

## 2019-04-25 PROCEDURE — 93352 ADMIN ECG CONTRAST AGENT: CPT | Performed by: INTERNAL MEDICINE

## 2019-04-25 PROCEDURE — 93017 CV STRESS TEST TRACING ONLY: CPT | Performed by: INTERNAL MEDICINE

## 2019-04-25 PROCEDURE — 93016 CV STRESS TEST SUPVJ ONLY: CPT | Performed by: INTERNAL MEDICINE

## 2019-04-25 PROCEDURE — 93321 DOPPLER ECHO F-UP/LMTD STD: CPT | Performed by: INTERNAL MEDICINE

## 2019-04-25 PROCEDURE — 93325 DOPPLER ECHO COLOR FLOW MAPG: CPT | Performed by: INTERNAL MEDICINE

## 2019-04-25 PROCEDURE — 93350 STRESS TTE ONLY: CPT | Performed by: INTERNAL MEDICINE

## 2019-04-25 PROCEDURE — 93018 CV STRESS TEST I&R ONLY: CPT | Performed by: INTERNAL MEDICINE

## 2019-04-25 RX ORDER — METOPROLOL TARTRATE 1 MG/ML
5 INJECTION, SOLUTION INTRAVENOUS EVERY 5 MIN PRN
Status: ACTIVE | OUTPATIENT
Start: 2019-04-25

## 2019-04-25 RX ORDER — ATROPINE SULFATE 0.4 MG/ML
0.4 AMPUL (ML) INJECTION ONCE
Status: COMPLETED | OUTPATIENT
Start: 2019-04-25 | End: 2019-04-25

## 2019-04-25 RX ORDER — DOBUTAMINE HYDROCHLORIDE 200 MG/100ML
2.5-2 INJECTION INTRAVENOUS CONTINUOUS
Status: ACTIVE | OUTPATIENT
Start: 2019-04-25

## 2019-04-25 RX ADMIN — Medication 0.4 MG: at 15:52

## 2019-04-25 RX ADMIN — METOPROLOL TARTRATE 2 MG: 1 INJECTION, SOLUTION INTRAVENOUS at 15:59

## 2019-04-25 RX ADMIN — DOBUTAMINE HYDROCHLORIDE 10 MCG/KG/MIN: 200 INJECTION INTRAVENOUS at 15:41

## 2019-04-25 RX ADMIN — Medication 5 ML: at 16:06

## 2019-04-26 ENCOUNTER — TELEPHONE (OUTPATIENT)
Dept: FAMILY MEDICINE | Facility: CLINIC | Age: 54
End: 2019-04-26

## 2019-04-26 DIAGNOSIS — R06.02 SOB (SHORTNESS OF BREATH): Primary | ICD-10-CM

## 2019-04-26 NOTE — TELEPHONE ENCOUNTER
PLEASE CALL PATIENT: Dear Adarsh,       It was a pleasure to see you at your recent office visit.  Your test results are listed below.  GOOD news is your heart looks great. Pumping normally, no issues of blood flow to the heart, and valves look normal.  This means your heart is not causing your shortness of breath.  Therefore I have referred you to pulmonary specialist. We will also need to work on weight loss as this worsens shortness of breath but with your clinical history it will be best to have you see pulm to make sure there are no other causes for your worsening shortness of breath (stemming from your lungs).         If you have any questions or concerns, please call the clinic at [phone].     Sincerely,   Bertha Romero PA-C

## 2019-04-26 NOTE — RESULT ENCOUNTER NOTE
PLEASE CALL PATIENT: Dear Adarsh,      It was a pleasure to see you at your recent office visit.  Your test results are listed below.  GOOD news is your heart looks great. Pumping normally, no issues of blood flow to the heart, and valves look normal.  This means your heart is not causing your shortness of breath.  Therefore I have referred you to pulmonary specialist. We will also need to work on weight loss as this worsens shortness of breath but with your clinical history it will be best to have you see pulm to make sure there are no other causes for your worsening shortness of breath (stemming from your lungs).         If you have any questions or concerns, please call the clinic at [phone].    Sincerely,  Bertha Romero PA-C

## 2019-04-26 NOTE — TELEPHONE ENCOUNTER
Left message on answering machine for patient to call back to [phone].  Mary Majano BSN, RN

## 2019-04-29 NOTE — TELEPHONE ENCOUNTER
Pt notified of provider message as written.  Pt verbalized good understanding.  Scheduling number given to pt.  Mary CROFTN, RN

## 2019-05-08 PROBLEM — E11.9 TYPE 2 DIABETES MELLITUS WITHOUT COMPLICATION, WITHOUT LONG-TERM CURRENT USE OF INSULIN (H): Chronic | Status: ACTIVE | Noted: 2018-01-18

## 2019-05-08 PROBLEM — F41.1 GAD (GENERALIZED ANXIETY DISORDER): Status: ACTIVE | Noted: 2019-05-08

## 2019-05-08 PROBLEM — E11.42 DIABETIC POLYNEUROPATHY ASSOCIATED WITH TYPE 2 DIABETES MELLITUS (H): Chronic | Status: ACTIVE | Noted: 2019-05-08

## 2019-05-08 PROBLEM — E78.5 HYPERLIPIDEMIA LDL GOAL <100: Chronic | Status: ACTIVE | Noted: 2018-08-17

## 2019-05-08 PROBLEM — J45.30 MILD PERSISTENT ASTHMA WITHOUT COMPLICATION: Chronic | Status: ACTIVE | Noted: 2018-01-12

## 2019-05-08 PROBLEM — E11.42 DIABETIC POLYNEUROPATHY ASSOCIATED WITH TYPE 2 DIABETES MELLITUS (H): Status: ACTIVE | Noted: 2019-05-08

## 2019-05-09 ENCOUNTER — OFFICE VISIT (OUTPATIENT)
Dept: SLEEP MEDICINE | Facility: CLINIC | Age: 54
End: 2019-05-09
Attending: PSYCHIATRY & NEUROLOGY
Payer: COMMERCIAL

## 2019-05-09 VITALS — WEIGHT: 308 LBS | HEART RATE: 83 BPM | OXYGEN SATURATION: 98 % | BODY MASS INDEX: 43.12 KG/M2 | HEIGHT: 71 IN

## 2019-05-09 DIAGNOSIS — R40.0 DAYTIME SOMNOLENCE: Primary | ICD-10-CM

## 2019-05-09 DIAGNOSIS — G47.00 INSOMNIA, UNSPECIFIED TYPE: ICD-10-CM

## 2019-05-09 DIAGNOSIS — E11.42 DIABETIC POLYNEUROPATHY ASSOCIATED WITH TYPE 2 DIABETES MELLITUS (H): Chronic | ICD-10-CM

## 2019-05-09 DIAGNOSIS — R06.89 DYSPNEA AND RESPIRATORY ABNORMALITY: ICD-10-CM

## 2019-05-09 DIAGNOSIS — R53.83 MALAISE AND FATIGUE: ICD-10-CM

## 2019-05-09 DIAGNOSIS — R06.00 DYSPNEA AND RESPIRATORY ABNORMALITY: ICD-10-CM

## 2019-05-09 DIAGNOSIS — E11.9 TYPE 2 DIABETES MELLITUS WITHOUT COMPLICATION, WITHOUT LONG-TERM CURRENT USE OF INSULIN (H): Chronic | ICD-10-CM

## 2019-05-09 DIAGNOSIS — R53.81 MALAISE AND FATIGUE: ICD-10-CM

## 2019-05-09 DIAGNOSIS — I10 BENIGN ESSENTIAL HYPERTENSION: Chronic | ICD-10-CM

## 2019-05-09 PROBLEM — M54.6 PAIN IN THORACIC SPINE: Status: RESOLVED | Noted: 2017-02-21 | Resolved: 2019-05-09

## 2019-05-09 PROBLEM — F41.1 GAD (GENERALIZED ANXIETY DISORDER): Chronic | Status: ACTIVE | Noted: 2019-05-08

## 2019-05-09 PROCEDURE — 99204 OFFICE O/P NEW MOD 45 MIN: CPT | Performed by: INTERNAL MEDICINE

## 2019-05-09 RX ORDER — DICYCLOMINE HCL 20 MG
20 TABLET ORAL EVERY 6 HOURS
COMMUNITY
End: 2020-10-20

## 2019-05-09 ASSESSMENT — MIFFLIN-ST. JEOR: SCORE: 2269.21

## 2019-05-09 NOTE — PROGRESS NOTES
Sleep Consultation:    Date on this visit: 5/9/2019    Adarsh Salvador is sent by Wiliam Montalvo for a sleep consultation regarding daytime sleepiness.    Primary Physician: Bertha Romero     Chief Complaint   Patient presents with     Sleep Problem     consult- hard to fall asleep, stay asleep and tired during the day      He has difficulty falling asleep 3-4 times a week. This has been a problem for 3-4 years. No triggers identified. He states he is 'wound up from work'. He admits to trouble turning his brain off. On good nights he falls asleep in 30-60 minutes. On bad nights he falls asleep in 3-4 hours. He will get up and watch TV, play games on phone or gets up and ties flies.      Adarsh goes to bed at 2-4 AM during the week. He gets up at 9:00 AM without an alarm  He wakes up infrequently.     On weekends, Adarsh goes to bed at 2-4 AM.  He gets up at 11:00 AM without an alarm    Patient does use electronics in bed and watch TV in bed when he first gets into bed.      Adarsh does not do shift work.  He works evening shifts.      Adarsh does snore snoring is very loud at times. Patient does not have a regular bed partner. He does not have witnessed apneas. Patient sleeps on his back and side.     He has occasional morning headaches, denies no snort arousals or restless legs.     He has neuropathy in his legs, it intereferes with sleep 2/month.     Adarsh denies any sleep walking, sleep talking, dream enactment, sleep paralysis, cataplexy and hypnogogic/hypnopompic hallucinations.    Adarsh has reflux at night.      Patient describes themself as a morning person.  He hates his evening shifts.     Patient's [place] Sleepiness score 11/24 consistent with excessive  daytime sleepiness.  He has more fatigue than sleepiness.     Adarsh naps rarely.  He takes some inadvertant naps on weekends.  He denies dozing while driving. He has had to pull over for a nap. He uses little caffeine    Recent Labs   Lab Test  08/17/18  1114 01/12/18  0955    140   POTASSIUM 4.3 4.1   CHLORIDE 108 108   CO2 27 24   ANIONGAP 6 8   * 148*   BUN 15 17   CR 0.98 0.92   LUZ 8.9 8.6     CBC RESULTS:   Recent Labs   Lab Test 11/23/18  1430   WBC 7.8   RBC 4.23*   HGB 12.3*   HCT 37.9*   MCV 90   MCH 29.1   MCHC 32.5   RDW 14.3        Lab Results   Component Value Date    TSH 3.48 01/09/2018         Allergies:    Allergies   Allergen Reactions     Artificial Sweetner (Informational Only) [Artificial Sweetner (Informational Only) ] GI Disturbance     ALL artificial sweetners cause severe diarrhea & flu symptoms     Hydromorphone Anaphylaxis     Ibuprofen GI Disturbance     Morphine [Fumaric Acid] Anaphylaxis     Hydrocodone-Acetaminophen Itching     Tramadol Hives     Trazodone Hives     Gabapentin      GI upset per pt     Keflex [Cephalexin] Diarrhea     Upset stomach     Codeine Phosphate Itching     Ketorolac Tromethamine Rash       Medications:    Current Outpatient Medications   Medication Sig Dispense Refill     ACCU-CHEK GUIDE test strip USE TO TEST BLOOD SUGAR 1 TIMES DAILY OR AS DIRECTED. 100 strip 3     albuterol (PROAIR HFA/PROVENTIL HFA/VENTOLIN HFA) 108 (90 Base) MCG/ACT inhaler Inhale 2 puffs into the lungs every 6 hours as needed for shortness of breath / dyspnea or wheezing 1 Inhaler 3     amLODIPine (NORVASC) 10 MG tablet TAKE 1 TABLET (10 MG) BY MOUTH DAILY 90 tablet 2     ASPIRIN PO Take 325 mg by mouth daily       aspirin-acetaminophen-caffeine (EXCEDRIN MIGRAINE) 250-250-65 MG tablet Take 1 tablet by mouth       atorvastatin (LIPITOR) 40 MG tablet Take 1 tablet (40 mg) by mouth daily 90 tablet 3     baclofen (LIORESAL) 10 MG tablet Take 1 tablet (10 mg) by mouth See Admin Instructions 1-2 po q hs. 60 tablet 1     blood glucose monitoring (ACCU-CHEK FASTCLIX) lancets Use to test blood sugar 1 times daily or as directed.  Ok to substitute alternative if insurance prefers. 102 each 11     calcium carbonate  (TUMS) 500 MG chewable tablet Take 1 chew tab by mouth daily       cyclobenzaprine (FLEXERIL) 10 MG tablet Take 0.5-1 tablets (5-10 mg) by mouth 3 times daily as needed for muscle spasms 90 tablet 1     dicyclomine (BENTYL) 20 MG tablet Take 20 mg by mouth every 6 hours       DULoxetine (CYMBALTA) 30 MG capsule Take 1 capsule (30 mg) by mouth daily Take with 60 mg capsule, total daily dose 90 mg. 90 capsule 3     DULoxetine (CYMBALTA) 60 MG EC capsule Take 1 capsule (60 mg) by mouth daily 90 capsule 3     EPINEPHrine (EPIPEN) 0.3 MG/0.3ML injection Inject 0.3 mLs (0.3 mg) into the muscle once as needed for anaphylaxis 2 each 2     EPINEPHrine (EPIPEN/ADRENACLICK/OR ANY BX GENERIC EQUIV) 0.3 MG/0.3ML injection 2-pack Inject 0.3 mLs into the muscle       escitalopram (LEXAPRO) 10 MG tablet Take 1 tablet (10 mg) by mouth daily 30 tablet 1     fluticasone-salmeterol (ADVAIR) 250-50 MCG/DOSE diskus inhaler Inhale 1 puff into the lungs 2 times daily 3 Inhaler 1     lisinopril (PRINIVIL/ZESTRIL) 20 MG tablet TAKE 1 TABLET BY MOUTH EVERY DAY 90 tablet 1     metFORMIN (GLUCOPHAGE-XR) 500 MG 24 hr tablet Take 2 tablets (1,000 mg) by mouth 2 times daily (with meals) 360 tablet 0     multivitamin, therapeutic (THERA-VIT) TABS Take 1 tablet by mouth daily       Naproxen Sodium (ALEVE PO) Take by mouth as needed        naproxen sodium (ANAPROX) 220 MG tablet Take 220 mg by mouth         Problem List:  Patient Active Problem List    Diagnosis Date Noted     CONNOR (generalized anxiety disorder) 05/08/2019     Priority: Medium     Hyperlipidemia LDL goal <100 08/17/2018     Priority: Medium     Type 2 diabetes mellitus without complication, without long-term current use of insulin (H) 01/18/2018     Priority: Medium     Mild persistent asthma without complication 01/12/2018     Priority: Medium     BMI 40.0-44.9, adult (H) 01/26/2017     Priority: Medium     Fatty liver 02/22/2016     Priority: Medium     Benign essential hypertension  12/15/2015     Priority: Medium     Anemia in other chronic diseases classified elsewhere 12/15/2015     Priority: Medium     Hypertriglyceridemia 07/12/2013     Priority: Medium     Diabetic polyneuropathy associated with type 2 diabetes mellitus (H) 05/08/2019     Priority: Low     Right inguinal hernia 06/14/2016     Priority: Low     Irritable bowel syndrome with diarrhea 03/21/2016     Priority: Low     Chronic maxillary sinusitis 09/18/2012     Priority: Low     Believes this is secondary to exposure to toxic fumes at work- he is a .  He regularly wears a mask ventilator and believes this helps.  Has only tried intermittent nasal washes, and OTC medications.  Not ever tried steroid nasal sprays or other controller medications.         Chronic rhinitis 09/18/2012     Priority: Low     Constipation 04/24/2012     Priority: Low     GERD (gastroesophageal reflux disease) 06/15/2011     Priority: Low     Chronic RLQ pain 06/15/2011     Priority: Low        Past Medical/Surgical History:  Past Medical History:   Diagnosis Date     Anaphylactic reaction 8/14/2015     Kidney stone 6/15/2011    Pt believes these were Calcium stones     Past Surgical History:   Procedure Laterality Date     COLONOSCOPY  5/1/2012    Procedure:COLONOSCOPY; screening colonoscopy; Surgeon:KINGSLEY DOS SANTOS; Location:[facility] OR     COLONOSCOPY  4/21/2014    Procedure: COMBINED COLONOSCOPY, SINGLE BIOPSY/POLYPECTOMY BY BIOPSY;  Surgeon: Duane, William Charles, MD;  Location: [facility] OR     CYSTOSCOPY  05/01/2008    CYSTOSCOPY W/ URETERAL STENT PLACEMENT 05/01/2008      CYSTOSCOPY  09/26/2010    CYSTOSCOPY W/ URETERAL STENT PLACEMENT 09/26/2010 Left      CYSTOSCOPY  05/18/2012    CYSTOSCOPY, LEFT URETEROSCOPY AND STENT PLACEMENT left retrograde 05/18/2012      CYSTOSCOPY,+URETEROSCOPY  06/10/2008    URETEROSCOPY 06/10/2008 Right      HC BREATH HYDROGEN TEST  3/7/2014    Procedure: HYDROGEN BREATH TEST;  Surgeon: Darion Swift MD;   Location: [facility]     LITHOTRIPSY  09/30/2010    LITHOTRIPSY 09/30/2010 LEFT EXTRACORPOREAL SHOCK WAVE LITHOTRIPSY, FLEXIBLE CYSTOSCOPY, LEFT STENT REMOVAL       ORTHOPEDIC SURGERY  10/03/2007    KNEE ARTHROSCOPY 10/03/2007 RightX2       RELEASE CARPAL TUNNEL Right 1/26/2018    Procedure: RELEASE CARPAL TUNNEL;  Right carpal tunnel release;  Surgeon: Wiliam Saenz MD;  Location: [facility] OR     VASCULAR SURGERY  11/24/2008    Radiofrequency ablation left saphenous vein 11/24/2008 Done by Dr. Lozoya         Social History:  Social History     Socioeconomic History     Marital status: Single     Spouse name: Not on file     Number of children: 0     Years of education: Not on file     Highest education level: Not on file   Occupational History     Occupation: - with robotics     Employer: [employer]   Social Needs     Financial resource strain: Not on file     Food insecurity:     Worry: Not on file     Inability: Not on file     Transportation needs:     Medical: Not on file     Non-medical: Not on file   Tobacco Use     Smoking status: Never Smoker     Smokeless tobacco: Current User     Types: Chew     Tobacco comment: Chew   Substance and Sexual Activity     Alcohol use: Yes     Alcohol/week: 0.0 oz     Comment: occasional     Drug use: No     Sexual activity: Never   Lifestyle     Physical activity:     Days per week: Not on file     Minutes per session: Not on file     Stress: Not on file   Relationships     Social connections:     Talks on phone: Not on file     Gets together: Not on file     Attends [employer] service: Not on file     Active member of club or organization: Not on file     Attends meetings of clubs or organizations: Not on file     Relationship status: Not on file     Intimate partner violence:     Fear of current or ex partner: Not on file     Emotionally abused: Not on file     Physically abused: Not on file     Forced sexual activity: Not on file   Other Topics Concern      "Parent/sibling w/ CABG, MI or angioplasty before 65F 55M? Not Asked   Social History Narrative    Works at [employer], as a  (25+ years experience).         Family History:  Family History   Problem Relation Age of Onset     Cancer Mother 52        lung, smoked     Cancer - colorectal Father 53        unsure type, pt attributes to radiation exposure     Myocardial Infarction Father 45     Coronary Artery Disease Father      Myocardial Infarction Paternal Grandfather 35     Diabetes Other         nephew- type 1     Diabetes Maternal Aunt         1     Diabetes Maternal Aunt         2     Diabetes Paternal Uncle         1     Diabetes Paternal Uncle         2     Diabetes Paternal Uncle         3     Diabetes Paternal Uncle         4     Colon Cancer No family hx of        Review of Systems:  A complete review of systems reviewed by me is negative with the exeption of what has been mentioned in the history of present illness.  CONSTITUTIONAL:  POSITIVE for  weight gain  EYES:  POSITIVE for changes in vision  ENT:  POSITIVE for  sinus pain, post-nasal drip and runny nose  CARDIAC:  POSITIVE for  fast heart beats, fluttering in chest and chest pain  NEUROLOGIC:  POSITIVE for  headaches and weakness or numbness in the arms or legs  DERMATOLOGIC: NEGATIVE for rashes, new moles or change in mole(s)  PULMONARY:  POSITIVE for  SOB at rest, SOB with activity, dry cough and productive cough  GASTROINTESTINAL:  POSITIVE for  loose or watery stools and constipation  GENITOURINARY:  POSITIVE for  pain during urination  MUSCULOSKELETAL:  POSITIVE for  muscle pain, bone or joint pain and swollen joints  LYMPHATIC: NEGATIVE for swollen lymph nodes, lumps or bumps in the breasts or nipple discharge.    Physical Examination:  Vitals: Pulse 83   Ht 1.803 m (5' 11\")   Wt 139.7 kg (308 lb)   SpO2 98%   BMI 42.96 kg/m    BMI= Body mass index is 42.96 kg/m .    Neck Cir (cm): 41 cm    [place] Total Score 5/9/2019   Total " score - [place] 11       [place] Total Score: 17 (05/09/19 1113)    GENERAL APPEARANCE: healthy, alert and no distress  EYES: Eyes grossly normal to inspection and conjunctivae and sclerae normal  HENT: ear canals and TM's normal and nose and mouth without ulcers or lesions  NECK: no adenopathy, no asymmetry, masses, or scars and thyroid normal to palpation  RESP: lungs clear to auscultation - no rales, rhonchi or wheezes  CV: regular rates and rhythm, normal S1 S2, no S3 or S4 and no murmur, click or rub  ABDOMEN: soft, nontender, without hepatosplenomegaly or masses  MS: extremities normal- no gross deformities noted  NEURO: Normal strength and tone, mentation intact, speech normal and cranial nerves 2-12 intact  PSYCH: mentation appears normal and affect flat  Mallampati Class: II.  Tonsillar Stage: 1  hidden by pillars.    Impression/Plan:    Snoring, no bed-partner, reflux at night, fatigue/excessive daytime sleepiness (ESS 11), obesity. Comorbid hypertension, diabetes mellitus .  Recommend starting with Home Sleep Apnea Test  Due to delayed sleep phase syndrome dsepite insomnia. STOPBANG 6    Sleep onset difficulties  Suspect primary problem is psychophysiologic insomnia   His hyper arousal seems linked to work more than sleep  - We discussed stimulus control  Read the book Say Good Night To Insomnia        Adarsh to follow up with Primary Care provider regarding elevated blood pressure.     Literature provided regarding sleep apnea and insomnia.      He will follow up with me in approximately two weeks after his sleep study has been competed to review the results and discuss plan of care.       Polysomnography reviewed.  Obstructive sleep apnea reviewed.  Complications of untreated sleep apnea were reviewed.    [facility]     CC: Wiliam Montalvo

## 2019-05-09 NOTE — NURSING NOTE
"Chief Complaint   Patient presents with     Sleep Problem       Initial /90   Pulse 83   Ht 1.803 m (5' 11\")   Wt 139.7 kg (308 lb)   SpO2 98%   BMI 42.96 kg/m   Estimated body mass index is 42.96 kg/m  as calculated from the following:    Height as of this encounter: 1.803 m (5' 11\").    Weight as of this encounter: 139.7 kg (308 lb).    Medication Reconciliation: complete    Neck circumference: 16 inches / 41 centimeters.        "

## 2019-05-09 NOTE — PATIENT INSTRUCTIONS
Your BMI is Body mass index is 42.96 kg/m .  Weight management is a personal decision.  If you are interested in exploring weight loss strategies, the following discussion covers the approaches that may be successful. Body mass index (BMI) is one way to tell whether you are at a healthy weight, overweight, or obese. It measures your weight in relation to your height.  A BMI of 18.5 to 24.9 is in the healthy range. A person with a BMI of 25 to 29.9 is considered overweight, and someone with a BMI of 30 or greater is considered obese. More than two-thirds of American adults are considered overweight or obese.  Being overweight or obese increases the risk for further weight gain. Excess weight may lead to heart disease and diabetes.  Creating and following plans for healthy eating and physical activity may help you improve your health.  Weight control is part of healthy lifestyle and includes exercise, emotional health, and healthy eating habits. Careful eating habits lifelong are the mainstay of weight control. Though there are significant health benefits from weight loss, long-term weight loss with diet alone may be very difficult to achieve- studies show long-term success with dietary management in less than 10% of people. Attaining a healthy weight may be especially difficult to achieve in those with severe obesity. In some cases, medications, devices and surgical management might be considered.  What can you do?  If you are overweight or obese and are interested in methods for weight loss, you should discuss this with your provider.     Consider reducing daily calorie intake by 500 calories.     Keep a food journal.     Avoiding skipping meals, consider cutting portions instead.    Diet combined with exercise helps maintain muscle while optimizing fat loss. Strength training is particularly important for building and maintaining muscle mass. Exercise helps reduce stress, increase energy, and improves fitness.  Increasing exercise without diet control, however, may not burn enough calories to loose weight.       Start walking three days a week 10-20 minutes at a time    Work towards walking thirty minutes five days a week     Eventually, increase the speed of your walking for 1-2 minutes at time    In addition, we recommend that you review healthy lifestyles and methods for weight loss available through the National Institutes of Health patient information sites:  http://win.niddk.nih.gov/publications/index.htm    And look into health and wellness programs that may be available through your health insurance provider, employer, local community center, or [employer] club.    Weight management plan: Patient was referred to their PCP to discuss a diet and exercise plan.      Read the book Say Good Night To Insomnia

## 2019-05-20 ENCOUNTER — OFFICE VISIT (OUTPATIENT)
Dept: SLEEP MEDICINE | Facility: CLINIC | Age: 54
End: 2019-05-20
Payer: COMMERCIAL

## 2019-05-20 DIAGNOSIS — I10 BENIGN ESSENTIAL HYPERTENSION: Chronic | ICD-10-CM

## 2019-05-20 DIAGNOSIS — R40.0 DAYTIME SOMNOLENCE: ICD-10-CM

## 2019-05-20 DIAGNOSIS — R06.83 SNORING: Primary | ICD-10-CM

## 2019-05-20 DIAGNOSIS — E11.42 DIABETIC POLYNEUROPATHY ASSOCIATED WITH TYPE 2 DIABETES MELLITUS (H): Chronic | ICD-10-CM

## 2019-05-20 DIAGNOSIS — E11.9 TYPE 2 DIABETES MELLITUS WITHOUT COMPLICATION, WITHOUT LONG-TERM CURRENT USE OF INSULIN (H): Chronic | ICD-10-CM

## 2019-05-20 DIAGNOSIS — R53.81 MALAISE AND FATIGUE: ICD-10-CM

## 2019-05-20 DIAGNOSIS — R06.00 DYSPNEA AND RESPIRATORY ABNORMALITY: ICD-10-CM

## 2019-05-20 DIAGNOSIS — G47.00 INSOMNIA, UNSPECIFIED TYPE: ICD-10-CM

## 2019-05-20 DIAGNOSIS — R06.89 DYSPNEA AND RESPIRATORY ABNORMALITY: ICD-10-CM

## 2019-05-20 DIAGNOSIS — R53.83 MALAISE AND FATIGUE: ICD-10-CM

## 2019-05-20 NOTE — PROGRESS NOTES
Pt is completing a home sleep test. Pt was instructed on how to put on the Noxturnal T3 device and associated equipment before going to bed and given the opportunity to practice putting it on before leaving the sleep center. Pt was reminded to bring the home sleep test kit back to the center tomorrow, at agreed upon time for download and reporting.     Saida Troncoso MA on 5/20/2019 at 10:36 AM

## 2019-05-21 ENCOUNTER — DOCUMENTATION ONLY (OUTPATIENT)
Dept: SLEEP MEDICINE | Facility: CLINIC | Age: 54
End: 2019-05-21
Payer: COMMERCIAL

## 2019-05-21 PROCEDURE — G0399 HOME SLEEP TEST/TYPE 3 PORTA: HCPCS | Performed by: INTERNAL MEDICINE

## 2019-05-21 NOTE — PROGRESS NOTES
This HSAT was performed using a Noxturnal T3 device which recorded snore, sound, movement activity, body position, nasal pressure, oronasal thermal airflow, pulse, oximetry and both chest and abdominal respiratory effort. HSAT data was restricted to the time patient states they were in bed.     HSAT was scored using 1B 4% hypopnea rule.     HST AHI (Non-PAT): 3.8  Snoring was reported as intermittent.  Time with SpO2 below 89% was 2.7 minutes.   Overall signal quality was fair     Pt will follow up with sleep provider to determine appropriate therapy.

## 2019-05-29 DIAGNOSIS — J45.909 ASTHMA: Primary | ICD-10-CM

## 2019-05-29 DIAGNOSIS — R06.02 SOB (SHORTNESS OF BREATH): ICD-10-CM

## 2019-05-29 NOTE — PROCEDURES
"HOME SLEEP STUDY INTERPRETATION    Patient: Adarsh Salvador  MRN: [id number]  YOB: 1965  Study Date: 2019  Referring Provider: Bertha Romero  Ordering Provider: Homar Heart MD     Indications for Home Study: Adarsh Salvador is a 54 year old male with symptoms suggestive of obstructive sleep apnea.    Estimated body mass index is 42.96 kg/m  as calculated from the following:    Height as of 19: 1.803 m (5' 11\").    Weight as of 19: 139.7 kg (308 lb).  Total score - [place]: 11 (2019 11:13 AM)  STOP-BAN/8    Data: A full night home sleep study was performed recording the standard physiologic parameters including body position, movement, sound, nasal pressure, thermal oral airflow, chest and abdominal movements with respiratory inductance plethysmography, and oxygen saturation by pulse oximetry. Pulse rate was estimated by oximetry recording. This study was considered adequate based on > 4 hours of quality oximetry and respiratory recording. As specified by the AASM Manual for the Scoring of Sleep and Associated events, version 2.3, Rule VIII.D 1B, 4% oxygen desaturation scoring for hypopneas is used as a standard of care on all home sleep apnea testing.    Analysis Time:  379 minutes    Respiration:   Sleep Associated Hypoxemia: sustained hypoxemia was not present. Baseline oxygen saturation was 93%.  Time with saturation less than or equal to 88% was 84 minutes. The lowest oxygen saturation was 2.7%.   Snoring: Snoring was present.  Respiratory events: The home study revealed a presence of 4 obstructive apneas and 1 mixed and central apneas. There were 19 hypopneas resulting in a combined apnea/hypopnea index [AHI] of 3.8 events per hour.  AHI was 5.6 per hour supine, n/a per hour prone, n/a per hour on left side, and 0.5 per hour on right side.   Pattern: Excluding events noted above, respiratory rate and pattern was mildly tachpneic.    Position: Percent of time spent: supine - " 65%, prone - 0%, on left - 0%, on right - 35%.    Heart Rate: By pulse oximetry normal rate was noted.     Assessment:   No evidence of severe obstructive sleep apnea.  Sleep associated hypoxemia was not present.  Sensitivity may be reduced by somewhat due to loss of O2 readings and nasal pressure readings     Recommendations:  Consider attended Polysomnogram if clinical suspicion of obstructive sleep apnea is moderate or high.  Suggest optimizing sleep hygiene and avoiding sleep deprivation.  Weight management.    Diagnosis Code(s): Obstructive Sleep Apnea G47.33, Hypoxemia G47.36    Homar Heart MD, May 29, 2019   Diplomate, American Board of Internal Medicine, Sleep Medicine

## 2019-06-05 ENCOUNTER — OFFICE VISIT (OUTPATIENT)
Dept: PODIATRY | Facility: CLINIC | Age: 54
End: 2019-06-05
Payer: COMMERCIAL

## 2019-06-05 VITALS — HEART RATE: 89 BPM | OXYGEN SATURATION: 97 %

## 2019-06-05 DIAGNOSIS — M79.672 PAIN OF LEFT HEEL: Primary | ICD-10-CM

## 2019-06-05 PROCEDURE — 99213 OFFICE O/P EST LOW 20 MIN: CPT | Performed by: PODIATRIST

## 2019-06-05 ASSESSMENT — PAIN SCALES - GENERAL: PAINLEVEL: EXTREME PAIN (8)

## 2019-06-05 NOTE — NURSING NOTE
Adarsh Salvador's chief complaint for this visit includes:  Chief Complaint   Patient presents with     Left Foot - Follow Up, Pain     PCP: Bertha Romero    Referring Provider:  Referred Self, MD  No address on file    Pulse 89   SpO2 97%   Extreme Pain (8)     Do you need any medication refills at today's visit? No    Priya Casas LPN

## 2019-06-05 NOTE — PATIENT INSTRUCTIONS
Thanks for coming today.  [facility]  [address] [place] [address]    To schedule future appointments in Ortho Clinic, you may call [phone].    To schedule ordered imaging by your provider:   Call Central Imaging [phone]    To schedule an injection ordered by your provider:  Call Central Imaging Injection scheduling line: [phone]  [employer] available online at:  [email or web address]    Please call if any further questions or concerns ([phone]).  Clinic hours 8 am to 5 pm.    Return to clinic (call) if symptoms worsen or fail to improve.

## 2019-06-05 NOTE — PROGRESS NOTES
Chief Complaint:   Chief Complaint   Patient presents with     Left Foot - Follow Up, Pain          Allergies   Allergen Reactions     Artificial Sweetner (Informational Only) [Artificial Sweetner (Informational Only) ] GI Disturbance     ALL artificial sweetners cause severe diarrhea & flu symptoms     Hydromorphone Anaphylaxis     Ibuprofen GI Disturbance     Morphine [Fumaric Acid] Anaphylaxis     Hydrocodone-Acetaminophen Itching     Tramadol Hives     Trazodone Hives     Gabapentin      GI upset per pt     Keflex [Cephalexin] Diarrhea     Upset stomach     Codeine Phosphate Itching     Ketorolac Tromethamine Rash         Subjective: Adarsh is a 54 year old male who presents to the clinic today for a follow up of left heel pain. He relates that he has pain after walking on the foot for multiple hours. Relates that the pain did go away and started again last month. He does have his orthotics and these are slightly helpful. He does see Dr. Montalvo in neurology who is managing his neuropathy. HE relates that the pain is burning on the bottom of both feet. No post-static dyskinesia. Hurts more as the day progresses.    Objective  Data Unavailable 89 Data Unavailable Data Unavailable Data Unavailable 0 lbs 0 oz  Pain noted today with palpation of the medial attachment of the plantar fascia on the calcaneus. Some pain in the medial band of the PF at the distal aspect. No pain along the courses of the PT, peroneal, or Achilles tendons on the left. Pain noted with ankle ROM in plantar and dorsiflexion.        Assessment: Pain ion the bottom of the left foot after standing at work for multiple hours. Unclear the etiology of the pain. I do no think that his pain is entirely from his plantar fascia. I think he has a component of radiculopathy and diabetic neuropathy. Unfortunately, he cannot have an MRI to assess the soft tissue. Will order an US to look for tearing. He does have an appt with neurology in a week. He relates  that during the last episode, PT was helpful. For now, will get him some PT.     Plan:   - Pt seen and evaluated  - Ordered US for the heel.  - Referral for PT.  - Cont with orthotics.   - He will discuss about neuropathic components with Dr. Montalvo.  - Pt to return to clinic in 1 month.

## 2019-06-05 NOTE — LETTER
6/5/2019         RE: Adarsh Salvador  [address] [facility] [address]        Dear Colleague,    Thank you for referring your patient, Adarsh Salvador, to the [facility]. Please see a copy of my visit note below.    Chief Complaint:   Chief Complaint   Patient presents with     Left Foot - Follow Up, Pain          Allergies   Allergen Reactions     Artificial Sweetner (Informational Only) [Artificial Sweetner (Informational Only) ] GI Disturbance     ALL artificial sweetners cause severe diarrhea & flu symptoms     Hydromorphone Anaphylaxis     Ibuprofen GI Disturbance     Morphine [Fumaric Acid] Anaphylaxis     Hydrocodone-Acetaminophen Itching     Tramadol Hives     Trazodone Hives     Gabapentin      GI upset per pt     Keflex [Cephalexin] Diarrhea     Upset stomach     Codeine Phosphate Itching     Ketorolac Tromethamine Rash         Subjective: Adarsh is a 54 year old male who presents to the clinic today for a follow up of left heel pain. He relates that he has pain after walking on the foot for multiple hours. Relates that the pain did go away and started again last month. He does have his orthotics and these are slightly helpful. He does see Dr. Montalvo in neurology who is managing his neuropathy. HE relates that the pain is burning on the bottom of both feet. No post-static dyskinesia. Hurts more as the day progresses.    Objective  Data Unavailable 89 Data Unavailable Data Unavailable Data Unavailable 0 lbs 0 oz  Pain noted today with palpation of the medial attachment of the plantar fascia on the calcaneus. Some pain in the medial band of the PF at the distal aspect. No pain along the courses of the PT, peroneal, or Achilles tendons on the left.  Pain noted with ankle ROM in plantar and dorsiflexion.        Assessment: Pain ion the bottom of the left foot after standing at work for multiple hours. Unclear the etiology of the pain. I do no think that his pain is entirely from his plantar fascia.  I think he has a component of radiculopathy and diabetic neuropathy. Unfortunately, he cannot have an MRI to assess the soft tissue. Will order an US to look for tearing. He does have an appt with neurology in a week. He relates that during the last episode, PT was helpful. For now, will get him some PT.     Plan:   - Pt seen and evaluated  - Ordered US for the heel.  - Referral for PT.  - Cont with orthotics.   - He will discuss about neuropathic components with Dr. Montalvo.  - Pt to return to clinic in 1 month.       Again, thank you for allowing me to participate in the care of your patient.        Sincerely,        Quan Pace DPM

## 2019-06-11 ENCOUNTER — OFFICE VISIT (OUTPATIENT)
Dept: NEUROLOGY | Facility: CLINIC | Age: 54
End: 2019-06-11
Payer: COMMERCIAL

## 2019-06-11 VITALS
OXYGEN SATURATION: 98 % | HEART RATE: 81 BPM | BODY MASS INDEX: 42.36 KG/M2 | WEIGHT: 303.7 LBS | DIASTOLIC BLOOD PRESSURE: 90 MMHG | SYSTOLIC BLOOD PRESSURE: 151 MMHG

## 2019-06-11 DIAGNOSIS — G62.9 NEUROPATHY: ICD-10-CM

## 2019-06-11 PROCEDURE — 99214 OFFICE O/P EST MOD 30 MIN: CPT | Performed by: PSYCHIATRY & NEUROLOGY

## 2019-06-11 RX ORDER — BACLOFEN 10 MG/1
10 TABLET ORAL SEE ADMIN INSTRUCTIONS
Qty: 60 TABLET | Refills: 1 | Status: SHIPPED | OUTPATIENT
Start: 2019-06-11 | End: 2020-02-12

## 2019-06-11 RX ORDER — SAW/PYGEUM/BETA/HERB/D3/B6/ZN 30 MG-25MG
200 CAPSULE ORAL 3 TIMES DAILY
Qty: 90 CAPSULE | Refills: 3 | Status: SHIPPED | OUTPATIENT
Start: 2019-06-11 | End: 2022-12-22

## 2019-06-11 ASSESSMENT — PAIN SCALES - GENERAL: PAINLEVEL: EXTREME PAIN (8)

## 2019-06-11 NOTE — NURSING NOTE
Adarsh Salvador's goals for this visit include: return  He requests these members of his care team be copied on today's visit information:     PCP: Bertha Romero    Referring Provider:  Wiliam Montalvo MD  [address] [place] [address] [address]  [place] [address]    /90   Pulse 81   Wt 137.8 kg (303 lb 11.2 oz)   SpO2 98%   BMI 42.36 kg/m      Do you need any medication refills at today's visit? n

## 2019-06-11 NOTE — LETTER
6/11/2019         RE: Adarsh Salvador  [address] [facility] [address]        Dear Colleague,    Thank you for referring your patient, Adarsh Salvador, to the [facility]. Please see a copy of my visit note below.    Visit Date:   06/11/2019      NEUROLOGY CONSULTATION      HISTORY OF PRESENT ILLNESS:  This patient is seen in followup with painful diabetic neuropathy.  I last saw the patient 2 months ago.  He is on a regimen of duloxetine 60 mg on awaking and 30 mg when he goes to work in the midafternoon.  He takes 10-20 mg of baclofen when he goes to bed.  It does help the symptoms in his legs, although it does make him quite sleepy.  He does not take it during the day.  His left foot hurts more than the right.  He has been told that the symptom of pain in the left foot is more related to neuropathy than to plantar fasciitis.  The bottom of the foot feels swollen to him.  It is more difficult for him to work as a  in the standing position than if he is sitting.  Last night he was sitting and his feet still bother him a lot today.  He has grading it at an 8/10.  He has been on gabapentin, nortriptyline and tramadol without much success and with side effects.  He is quite frustrated with his situation.      PHYSICAL EXAMINATION:   GENERAL:  The patient is cooperative and in no distress.   VITAL SIGNS:  His blood pressure is 151/90.     EXTREMITIES:  I do not see swelling on the tops of the feet.  There may be some swelling in the arch on the bottom of the left foot.  Temperature sense is impaired in the feet bilaterally and preserved in the calves.  Vibratory sense is reduced in the left great toe compared to the right.  He has good strength in the toes and feet.      He did have a sleep study, and it appears that he does not have obstructive sleep apnea.  He does have a followup in the Sleep Clinic.      ASSESSMENT:   1.  Painful diabetic neuropathy.   2.  Bilateral meralgia paraesthetica.   3.   Daytime sleepiness.      DISCUSSION:  The patient is seen with the above problem list.  At this point, I am going to add alpha lipoic acid to his regimen 200 mg 3 times a day.  He will build up to that gradually.  If that is ineffective, lidocaine patch might be given for the bottom of the left foot to see if that might help.  I encouraged him to call me with a progress report.  Otherwise, I will see him in followup in 2 months.      This was a 25-minute appointment, more than half of which was spent in counseling and coordination of care.         CHELSEA MONTALVO MD             D: 2019   T: 2019   MT: BHARGAVI      Name:     LUTHER BLACKMAN   MRN:      [id number]        Account:      [id number]   :      1965           Visit Date:   2019      Document: [id number]       Again, thank you for allowing me to participate in the care of your patient.        Sincerely,        Chelsea Montalvo MD

## 2019-06-11 NOTE — PROGRESS NOTES
Visit Date:   06/11/2019      NEUROLOGY CONSULTATION      HISTORY OF PRESENT ILLNESS:  This patient is seen in followup with painful diabetic neuropathy.  I last saw the patient 2 months ago.  He is on a regimen of duloxetine 60 mg on awaking and 30 mg when he goes to work in the midafternoon.  He takes 10-20 mg of baclofen when he goes to bed.  It does help the symptoms in his legs, although it does make him quite sleepy.  He does not take it during the day.  His left foot hurts more than the right.  He has been told that the symptom of pain in the left foot is more related to neuropathy than to plantar fasciitis.  The bottom of the foot feels swollen to him.  It is more difficult for him to work as a  in the standing position than if he is sitting.  Last night he was sitting and his feet still bother him a lot today.  He has grading it at an 8/10.  He has been on gabapentin, nortriptyline and tramadol without much success and with side effects.  He is quite frustrated with his situation.      PHYSICAL EXAMINATION:   GENERAL:  The patient is cooperative and in no distress.   VITAL SIGNS:  His blood pressure is 151/90.     EXTREMITIES:  I do not see swelling on the tops of the feet.  There may be some swelling in the arch on the bottom of the left foot.  Temperature sense is impaired in the feet bilaterally and preserved in the calves.  Vibratory sense is reduced in the left great toe compared to the right.  He has good strength in the toes and feet.      He did have a sleep study, and it appears that he does not have obstructive sleep apnea.  He does have a followup in the Sleep Clinic.      ASSESSMENT:   1.  Painful diabetic neuropathy.   2.  Bilateral meralgia paraesthetica.   3.  Daytime sleepiness.      DISCUSSION:  The patient is seen with the above problem list.  At this point, I am going to add alpha lipoic acid to his regimen 200 mg 3 times a day.  He will build up to that gradually.  If that is  ineffective, lidocaine patch might be given for the bottom of the left foot to see if that might help.  I encouraged him to call me with a progress report.  Otherwise, I will see him in followup in 2 months.      This was a 25-minute appointment, more than half of which was spent in counseling and coordination of care.         CHELSEA POZO MD             D: 2019   T: 2019   MT: BHARGAVI      Name:     LUTHER BLACKMAN   MRN:      [id number]        Account:      [id number]   :      1965           Visit Date:   2019      Document: [id number]

## 2019-06-12 DIAGNOSIS — I10 HYPERTENSION GOAL BP (BLOOD PRESSURE) < 140/90: ICD-10-CM

## 2019-06-12 NOTE — LETTER
June 14, 2019    Adarsh Salvador  [address] [facility] [address]    Dear Adarsh,       We recently received a refill request for Lisinopril (PRINIVIL/ZESTRIL) 20 MG tablet.  We have refilled this for a one time 30 day supply only because you are due for a:    Cholesterol/Hypertension office visit and Fasting lab appointment      Please schedule this lab appointment 4-5 days prior to the office visit.     Please call at your earliest convenience so that there will not be a delay with your future refills.          Thank you,   Your [facility] Team/mkr  [phone]

## 2019-06-13 RX ORDER — LISINOPRIL 20 MG/1
TABLET ORAL
Qty: 30 TABLET | Refills: 0 | Status: SHIPPED | OUTPATIENT
Start: 2019-06-13 | End: 2019-10-03

## 2019-06-13 NOTE — TELEPHONE ENCOUNTER
Medication is being filled for 1 time refill only due to:  Patient needs to be seen for fasting lab appointment and appointment with the provider for further refills. Cholesterol and hypertension.  Mary CROFTN, RN

## 2019-06-14 ENCOUNTER — ANCILLARY PROCEDURE (OUTPATIENT)
Dept: ULTRASOUND IMAGING | Facility: CLINIC | Age: 54
End: 2019-06-14
Attending: PODIATRIST
Payer: COMMERCIAL

## 2019-06-14 DIAGNOSIS — M79.672 PAIN OF LEFT HEEL: ICD-10-CM

## 2019-06-17 ENCOUNTER — TELEPHONE (OUTPATIENT)
Dept: ORTHOPEDICS | Facility: CLINIC | Age: 54
End: 2019-06-17

## 2019-06-17 NOTE — TELEPHONE ENCOUNTER
Incidental finding on Ultrasound Non Vascular Left Extremity, reporting there is a complete tear of Complete tear of proximal medial band left plantar fascia from the  origin of the medial band from the calcaneal tuberosity extending 4.5  cm distally. This has progressed since the thickening of the proximal  portion of the medial band on the previous ultrasound 10/26/2018.

## 2019-06-18 ENCOUNTER — DOCUMENTATION ONLY (OUTPATIENT)
Dept: CARE COORDINATION | Facility: CLINIC | Age: 54
End: 2019-06-18

## 2019-06-28 ENCOUNTER — THERAPY VISIT (OUTPATIENT)
Dept: PHYSICAL THERAPY | Facility: CLINIC | Age: 54
End: 2019-06-28
Attending: PODIATRIST
Payer: COMMERCIAL

## 2019-06-28 DIAGNOSIS — M79.672 LEFT FOOT PAIN: ICD-10-CM

## 2019-06-28 DIAGNOSIS — M79.672 PAIN OF LEFT HEEL: ICD-10-CM

## 2019-06-28 PROCEDURE — 97110 THERAPEUTIC EXERCISES: CPT | Mod: GP | Performed by: PHYSICAL THERAPIST

## 2019-06-28 PROCEDURE — 97161 PT EVAL LOW COMPLEX 20 MIN: CPT | Mod: GP | Performed by: PHYSICAL THERAPIST

## 2019-06-28 NOTE — PROGRESS NOTES
[facility] Initial Evaluation  Subjective:  The history is provided by the patient. No  was used.   Type of problem:  Left foot   Condition occurred with:  Insidious onset. This is a recurrent condition   Problem details: Had a history of heel pain from being on his feet and got better with therapy and immobilization and now he his hurting again. The pain came back now after about 1 month of discontinuing therapy. .   Patient reports pain:  Medial calcaneal tuberosity and longitudinal arch. Radiates to: toes and back of heel at times. Associated symptoms:  Numbness (not sure if neuroapathy or this but has seemed to worsen lately). Exacerbated by: does not really seem to matter whether he is sitting or standing, just hurts all the time. Relieved by: the blue emu with lidocane and biofreeze for a while but seems to not be helping as much.     Adarsh Salvador being seen for left heel pain.   Problem began 6/5/2019 (date of MD order). Where condition occurred: for unknown reasons.Problem occurred: unknown  and reported as 7/10 on pain scale. General health as reported by patient is fair. Pertinent medical history includes:  Asthma, implanted device, overweight and diabetes.    Surgeries include:  Orthopedic surgery (kidney stones, knee R ).  Current medications:  Anti-depressants and high blood pressure medication.   Primary job tasks include:  Repetitive tasks, prolonged sitting and operating a machine/assembly.  Pain is described as aching and is intermittent. Pain is worse in the P.M.. Since onset symptoms are gradually worsening. Imaging testing: US down 6/14/19 : that show complete tear of medial band of left plantar fascia.  Previous treatment includes physical therapy (which helped but did not last and did not stop doing exercises). There was moderate improvement following previous treatment.   Patient is . Restrictions include:  Working in normal job without  restrictions.    Barriers include:  None as reported by patient.  Red flags:  None as reported by patient.                      Objective:  System    Ankle/Foot Evaluation  ROM:  Arom ankle eval: very mobile great toe.  AROM:    Dorsiflexion:  Left:   8 increase pain  Right:   7  Plantarflexion:  Left:  66 decreases pain    Right:  68  Inversion:  Left:  46     Right:  45  Eversion:  36     Right:  40  Great toe flexion: Left:  67     Right:   Great Toe Extension: Left:  88 increase pain     Right:  PROM:    Dorsiflexion: Left:    15 slight pain     Right:     Plantarflexion: Left:    66 no pain     Right:   Inversion: Left:      48 with substantial pain     Right:    Eversion: Left:  40 no pain     Right:           Strength:    Dorsiflexion:  Left: 5/5   Strong/pain free    Pain:   Right: 5/5   Strong/pain free  Pain:  Plantarflexion: Left: 5/5   Strong/pain free  Pain:   Right: 5/5  Strong/pain free  Pain:  Inversion:Left: 5/5  Strong/pain free  Pain:     Right: 5/5  Strong/pain free  Pain:  Eversion:Left: 5/5  Strong/pain free  Pain:  Right: 5/5  Strong/pain free  Pain:  Flexion Great Toe:Left: 5/5  Strong/pain free  Pain:  Right: 5/5   Strong/pain free  Pain:  Extension Great Toe:Left: 5/5  Strong/pain free  Pain:  Right: 5/5  Strong/pain free  Pain:  Anterior Tibialis:Left: 5/5  Strong/painful  Pain:  Right: 5/5  Strong/pain free  Pain:  Posterior Tibialis: Left: 5/5  Strong/painful  Pain:  Right: 5/5  Strong/pain free  Pain:  Peroneals: Left: 5/5  Strong/pain free  Pain:  Right: 5/5  Strong/pain free  Pain:  Extensor Digitorum: Left: 5/5  Strong/pain free  Pain:Right: 5/5  Strong/pain free  Pain:      LIGAMENT TESTING:   Anterior Drawer (ATF) Left: neg     Posterior Drawer (PTF) Left: neg     Varus Stress (Calc Fib) Left: neg      Valgus Stress (Deltoid) Left: neg      Rotation (Deltoid) Left: neg      External Rotation (High Ankle) Left: neg         PALPATION:   Left ankle tenderness present at:  plantar  fascia and medial calcaneal  Left ankle tenderness not present at:   gastroc/soleus; achilles tendon; anterior tibialis; anterior talofibular ligament; posterior talofibular ligament; calcaneofibular ligament; medial malleolus or lateral malleolus    Right ankle tenderness not present at:  plantar fascia or medial calcaneal    MOBILITY TESTING:       Talocrural Left: hypermobile      Subtalar Left: hypermobile      Midtarsal Left: hypermobile      First Ray Left: hypermobile                                                        General     ROS    Assessment/Plan:    Patient is a 54 year old male with left foot pain complaints.    Patient has the following significant findings with corresponding treatment plan.                Diagnosis 1:  Left foot pain  Pain -  hot/cold therapy, US, manual therapy, self management, education and home program  Decreased strength - therapeutic exercise, therapeutic activities and home program  Decreased function - therapeutic activities and home program    Therapy Evaluation Codes:   1) History comprised of:   Personal factors that impact the plan of care:      Past/current experiences and Time since onset of symptoms.    Comorbidity factors that impact the plan of care are:      Asthma, Diabetes, High blood pressure, Implanted device, Overweight and Pain at night/rest.     Medications impacting care: Anti-depressant and High blood pressure.  2) Examination of Body Systems comprised of:   Body structures and functions that impact the plan of care:      foot.   Activity limitations that impact the plan of care are:      Standing, Walking and Working.  3) Clinical presentation characteristics are:   Stable/Uncomplicated.  4) Decision-Making    Low complexity using standardized patient assessment instrument and/or measureable assessment of functional outcome.  Cumulative Therapy Evaluation is: Low complexity.    Previous and current functional limitations:  (See Goal Flow Sheet for  this information)    Short term and Long term goals: (See Goal Flow Sheet for this information)     Communication ability:  Patient appears to be able to clearly communicate and understand verbal and written communication and follow directions correctly.  Treatment Explanation - The following has been discussed with the patient:   RX ordered/plan of care  Anticipated outcomes  Possible risks and side effects  This patient would benefit from PT intervention to resume normal activities.   Rehab potential is fair.    Frequency:  1 X week, once daily  Duration:  for 6 weeks  Discharge Plan:  Achieve all LTG.  Independent in home treatment program.  Reach maximal therapeutic benefit.    Please refer to the daily flowsheet for treatment today, total treatment time and time spent performing 1:1 timed codes.

## 2019-07-01 NOTE — TELEPHONE ENCOUNTER
RECORDS RECEIVED FROM: Internal   DATE RECEIVED: 7.25.19   NOTES STATUS DETAILS   OFFICE NOTE from referring provider Internal 4.26.19 Dr. Romero  4.18.19 4.9.19 1.11.19   OFFICE NOTE from other specialist Internal 6.22.18  Dr. Cifuentes   DISCHARGE SUMMARY from hospital N/A    DISCHARGE REPORT from the ER N/A    MEDICATION LIST Internal    IMAGING  (NEED IMAGES AND REPORTS)     CT SCAN N/A    CHEST XRAY (CXR) Internal 4.18.19  Scheduled for 7.25.19   TESTS     PULMONARY FUNCTION TESTING (PFT) In process Scheduled for 7.25.19

## 2019-07-10 ENCOUNTER — OFFICE VISIT (OUTPATIENT)
Dept: PODIATRY | Facility: CLINIC | Age: 54
End: 2019-07-10
Payer: COMMERCIAL

## 2019-07-10 DIAGNOSIS — M72.2 PLANTAR FASCIITIS OF LEFT FOOT: Primary | ICD-10-CM

## 2019-07-10 DIAGNOSIS — M62.9 NONTRAUMATIC TEAR OF PLANTAR FASCIA: ICD-10-CM

## 2019-07-10 PROCEDURE — 29405 APPL SHORT LEG CAST: CPT | Mod: LT | Performed by: PODIATRIST

## 2019-07-10 NOTE — NURSING NOTE
Adarsh Salvador's chief complaint for this visit includes:  Chief Complaint   Patient presents with     Left Foot - Pain, Follow Up     PCP: Bertha Romero    Referring Provider:  Referred Self, MD  No address on file    There were no vitals taken for this visit.  Data Unavailable     Do you need any medication refills at today's visit? No    Pryia Casas LPN

## 2019-07-10 NOTE — LETTER
7/10/2019         RE: Adarsh Slavador  [address] [address]        Dear Colleague,    Thank you for referring your patient, Adarsh Salvador, to the [email or web address] Please see a copy of my visit note below.    Chief Complaint:   Chief Complaint   Patient presents with     Left Foot - Pain, Follow Up          Allergies   Allergen Reactions     Artificial Sweetner (Informational Only) [Artificial Sweetner (Informational Only) ] GI Disturbance     ALL artificial sweetners cause severe diarrhea & flu symptoms     Hydromorphone Anaphylaxis     Ibuprofen GI Disturbance     Morphine [Fumaric Acid] Anaphylaxis     Hydrocodone-Acetaminophen Itching     Tramadol Hives     Trazodone Hives     Gabapentin      GI upset per pt     Keflex [Cephalexin] Diarrhea     Upset stomach     Codeine Phosphate Itching     Ketorolac Tromethamine Rash         Subjective: Adarsh is a 54 year old male who presents to the clinic today for a follow up of left heel pain. He did see Dr. Montalvo who added on alpha lipoic acid to his medications. Adarsh relates that he does continue to have pain in the foot. He relates the most pain after working. He does walk a significant amount on concrete floors.     Objective    Pain noted today with palpation of the medial attachment of the plantar fascia on the calcaneus. Some pain in the medial band of the PF at the distal aspect. No pain along the courses of the PT, peroneal, or Achilles tendons on the left. Pain noted with ankle ROM in plantar and dorsiflexion.    US IMPRESSION:   1. Complete tear of proximal medial band left plantar fascia from the  origin of the medial band from the calcaneal tuberosity extending 4.5  cm distally. This has progressed since the thickening of the proximal  portion of the medial band on the previous ultrasound 10/26/2018.  2. Lateral band of left plantar fascia thought to be intact.  3. Plantar fascia on the right unremarkable.     [Consider Follow Up:  Complete tear of the medial band of the plantar  fascia proximally. This extends from its origin from the calcaneal  tuberosity to 4.5 cm distally. See above report.]     This report will be copied to the [facility] to ensure a  provider acknowledges the finding.      KINGSLEY COREY MD    Assessment: Left plantar fascia tearing - confirmed with US.  Diabetic neuropathy.    Plan:   - Pt seen and evaluated  - I discussed the US results with him. In order to get this fully healed, he will need to remain NWB on the foot. He will also need immobilization for a few weeks. I offered a knee scooter or crutches, and he thinks he can get around easier on crutches. I have also recommended casting. He agrees to this. A fiberglass, short leg cast was applied to the left leg.   - See again in 2 weeks.       Again, thank you for allowing me to participate in the care of your patient.        Sincerely,        Quan Pace DPM

## 2019-07-10 NOTE — PROGRESS NOTES
Chief Complaint:   Chief Complaint   Patient presents with     Left Foot - Pain, Follow Up          Allergies   Allergen Reactions     Artificial Sweetner (Informational Only) [Artificial Sweetner (Informational Only) ] GI Disturbance     ALL artificial sweetners cause severe diarrhea & flu symptoms     Hydromorphone Anaphylaxis     Ibuprofen GI Disturbance     Morphine [Fumaric Acid] Anaphylaxis     Hydrocodone-Acetaminophen Itching     Tramadol Hives     Trazodone Hives     Gabapentin      GI upset per pt     Keflex [Cephalexin] Diarrhea     Upset stomach     Codeine Phosphate Itching     Ketorolac Tromethamine Rash         Subjective: Adarsh is a 54 year old male who presents to the clinic today for a follow up of left heel pain. He did see Dr. Montalvo who added on alpha lipoic acid to his medications. Adarsh relates that he does continue to have pain in the foot. He relates the most pain after working. He does walk a significant amount on concrete floors.     Objective    Pain noted today with palpation of the medial attachment of the plantar fascia on the calcaneus. Some pain in the medial band of the PF at the distal aspect. No pain along the courses of the PT, peroneal, or Achilles tendons on the left. Pain noted with ankle ROM in plantar and dorsiflexion.    US IMPRESSION:   1. Complete tear of proximal medial band left plantar fascia from the  origin of the medial band from the calcaneal tuberosity extending 4.5  cm distally. This has progressed since the thickening of the proximal  portion of the medial band on the previous ultrasound 10/26/2018.  2. Lateral band of left plantar fascia thought to be intact.  3. Plantar fascia on the right unremarkable.     [Consider Follow Up: Complete tear of the medial band of the plantar  fascia proximally. This extends from its origin from the calcaneal  tuberosity to 4.5 cm distally. See above report.]     This report will be copied to the [facility] to ensure  a  provider acknowledges the finding.      KINGSLEY COREY MD    Assessment: Left plantar fascia tearing - confirmed with US.  Diabetic neuropathy.    Plan:   - Pt seen and evaluated  - I discussed the US results with him. In order to get this fully healed, he will need to remain NWB on the foot. He will also need immobilization for a few weeks. I offered a knee scooter or crutches, and he thinks he can get around easier on crutches. I have also recommended casting. He agrees to this. A fiberglass, short leg cast was applied to the left leg.   - See again in 2 weeks.

## 2019-07-10 NOTE — PATIENT INSTRUCTIONS
Thanks for coming today.  [facility]  [address] [place] [address]    To schedule future appointments in Ortho Clinic, you may call [phone].    To schedule ordered imaging by your provider:   Call Central Imaging [phone]    To schedule an injection ordered by your provider:  Call Central Imaging Injection scheduling line: [phone]  [employer] available online at:  [email or web address]    Please call if any further questions or concerns ([phone]).  Clinic hours 8 am to 5 pm.    Return to clinic (call) if symptoms worsen or fail to improve.

## 2019-07-15 ENCOUNTER — APPOINTMENT (OUTPATIENT)
Dept: NURSING | Facility: CLINIC | Age: 54
End: 2019-07-15
Payer: COMMERCIAL

## 2019-07-15 ENCOUNTER — TELEPHONE (OUTPATIENT)
Dept: FAMILY MEDICINE | Facility: CLINIC | Age: 54
End: 2019-07-15

## 2019-07-24 ENCOUNTER — OFFICE VISIT (OUTPATIENT)
Dept: PODIATRY | Facility: CLINIC | Age: 54
End: 2019-07-24
Payer: COMMERCIAL

## 2019-07-24 ENCOUNTER — TELEPHONE (OUTPATIENT)
Dept: PODIATRY | Facility: CLINIC | Age: 54
End: 2019-07-24

## 2019-07-24 VITALS
HEART RATE: 124 BPM | DIASTOLIC BLOOD PRESSURE: 82 MMHG | WEIGHT: 203 LBS | SYSTOLIC BLOOD PRESSURE: 148 MMHG | HEIGHT: 71 IN | BODY MASS INDEX: 28.42 KG/M2

## 2019-07-24 DIAGNOSIS — M79.672 PAIN OF LEFT HEEL: ICD-10-CM

## 2019-07-24 DIAGNOSIS — M62.9 NONTRAUMATIC TEAR OF PLANTAR FASCIA: ICD-10-CM

## 2019-07-24 DIAGNOSIS — E11.42 DIABETIC POLYNEUROPATHY ASSOCIATED WITH TYPE 2 DIABETES MELLITUS (H): Primary | Chronic | ICD-10-CM

## 2019-07-24 PROCEDURE — 99213 OFFICE O/P EST LOW 20 MIN: CPT | Performed by: PODIATRIST

## 2019-07-24 ASSESSMENT — MIFFLIN-ST. JEOR: SCORE: 1792.93

## 2019-07-24 NOTE — LETTER
"    7/24/2019         RE: Adarsh Salvador  [address] [address]        Dear Colleague,    Thank you for referring your patient, Adarsh Salvador, to the [facility]. Please see a copy of my visit note below.    Adarsh Salvador's chief complaint for this visit includes:  Chief Complaint   Patient presents with     Follow Up      2 week follow up       PCP: Bertha Romero    Referring Provider:  Referred Self, MD  No address on file    /82   Pulse 124   Ht 1.803 m (5' 11\")   Wt 92.1 kg (203 lb)   BMI 28.31 kg/m     Data Unavailable     Chief Complaint:   Chief Complaint   Patient presents with     Follow Up      2 week follow up          Allergies   Allergen Reactions     Artificial Sweetner (Informational Only) [Artificial Sweetner (Informational Only) ] GI Disturbance     ALL artificial sweetners cause severe diarrhea & flu symptoms     Hydromorphone Anaphylaxis     Ibuprofen GI Disturbance     Morphine [Fumaric Acid] Anaphylaxis     Hydrocodone-Acetaminophen Itching     Tramadol Hives     Trazodone Hives     Gabapentin      GI upset per pt     Keflex [Cephalexin] Diarrhea     Upset stomach     Codeine Phosphate Itching     Ketorolac Tromethamine Rash         Subjective: Adarsh is a 54 year old male who presents to the clinic today for a follow up of left plantar fascia tear. Relates that he is feeling better since the cast. He did walk significantly on the last cast and cracked it and it needed to be replaced. Relates that he tripped on a cat in this cast and has cracked it. Relates that he can go back to work in a cast or boot. Feels ready to go back.     Objective  Data Unavailable 124 Data Unavailable 148/82 5' 11\" 203 lbs 0 oz  Pain noted today with palpation of the medial attachment of the plantar fascia on the calcaneus. This is decreased from the last visit. Some pain in the medial band of the PF at the distal aspect. No pain along the courses of the PT, peroneal, or Achilles " tendons on the left. Pain noted with ankle ROM in plantar and dorsiflexion.    Assessment: Left plantar fascial tear. He has been casted for 2 weeks, however, even in the cast, he was still bearing weight while being off from work. He does still have some pain in the foot. He will likely continue to feel discomfort in the foot, as I do not think he will remain non or even partial weight bearing.     Plan:   - Pt seen and evaluated  - Go to a boot with crutches. Can go back to work with limitations. Note was written.   - Pt to return to clinic in 1 month.       Again, thank you for allowing me to participate in the care of your patient.        Sincerely,        Quan Pace DPM

## 2019-07-24 NOTE — LETTER
Return to Work  2019     Seen today: yes    Patient:  Adarsh Salvador  :   1965  MRN:     [id number]  Physician: QUAN PACE    Adarsh Salvador may return to work on Date: 19. He has been out of work on my orders since 7/10/19.       The next clinic appointment is scheduled for 2 weeks.    Patient limitations:  Adarsh can work on the above date, however he needs to use the boot that was given to him in clinic and crutches.               Electronically signed by Quan Pace DPM

## 2019-07-24 NOTE — PROGRESS NOTES
"Adarsh Salvador's chief complaint for this visit includes:  Chief Complaint   Patient presents with     Follow Up      2 week follow up       PCP: Bertha Romero    Referring Provider:  Referred Self, MD  No address on file    /82   Pulse 124   Ht 1.803 m (5' 11\")   Wt 92.1 kg (203 lb)   BMI 28.31 kg/m    Data Unavailable     Chief Complaint:   Chief Complaint   Patient presents with     Follow Up      2 week follow up          Allergies   Allergen Reactions     Artificial Sweetner (Informational Only) [Artificial Sweetner (Informational Only) ] GI Disturbance     ALL artificial sweetners cause severe diarrhea & flu symptoms     Hydromorphone Anaphylaxis     Ibuprofen GI Disturbance     Morphine [Fumaric Acid] Anaphylaxis     Hydrocodone-Acetaminophen Itching     Tramadol Hives     Trazodone Hives     Gabapentin      GI upset per pt     Keflex [Cephalexin] Diarrhea     Upset stomach     Codeine Phosphate Itching     Ketorolac Tromethamine Rash         Subjective: Adarsh is a 54 year old male who presents to the clinic today for a follow up of left plantar fascia tear. Relates that he is feeling better since the cast. He did walk significantly on the last cast and cracked it and it needed to be replaced. Relates that he tripped on a cat in this cast and has cracked it. Relates that he can go back to work in a cast or boot. Feels ready to go back.     Objective  Data Unavailable 124 Data Unavailable 148/82 5' 11\" 203 lbs 0 oz  Pain noted today with palpation of the medial attachment of the plantar fascia on the calcaneus. This is decreased from the last visit. Some pain in the medial band of the PF at the distal aspect. No pain along the courses of the PT, peroneal, or Achilles tendons on the left. Pain noted with ankle ROM in plantar and dorsiflexion.    Assessment: Left plantar fascial tear. He has been casted for 2 weeks, however, even in the cast, he was still bearing weight while being off from work. " He does still have some pain in the foot. He will likely continue to feel discomfort in the foot, as I do not think he will remain non or even partial weight bearing.     Plan:   - Pt seen and evaluated  - Go to a boot with crutches. Can go back to work with limitations. Note was written.   - Pt to return to clinic in 1 month.

## 2019-07-24 NOTE — PATIENT INSTRUCTIONS
Thanks for coming today.  [facility]  [address] [place] [address]    To schedule future appointments in Ortho Clinic, you may call [phone].    To schedule ordered imaging by your provider:   Call Central Imaging [phone]    To schedule an injection ordered by your provider:  Call [facility] Injection scheduling line: [phone]  [employer] available online at:  [email or web address]    Please call if any further questions or concerns ([phone]).  Clinic hours 8 am to 5 pm.    Return to clinic (call) if symptoms worsen or fail to improve.

## 2019-07-25 ENCOUNTER — PRE VISIT (OUTPATIENT)
Dept: PULMONOLOGY | Facility: CLINIC | Age: 54
End: 2019-07-25

## 2019-07-25 ENCOUNTER — OFFICE VISIT (OUTPATIENT)
Dept: PULMONOLOGY | Facility: CLINIC | Age: 54
End: 2019-07-25
Attending: PHYSICIAN ASSISTANT
Payer: COMMERCIAL

## 2019-07-25 ENCOUNTER — ANCILLARY PROCEDURE (OUTPATIENT)
Dept: GENERAL RADIOLOGY | Facility: CLINIC | Age: 54
End: 2019-07-25
Payer: COMMERCIAL

## 2019-07-25 VITALS
HEIGHT: 71 IN | SYSTOLIC BLOOD PRESSURE: 129 MMHG | BODY MASS INDEX: 28.42 KG/M2 | HEART RATE: 73 BPM | DIASTOLIC BLOOD PRESSURE: 83 MMHG | RESPIRATION RATE: 18 BRPM | OXYGEN SATURATION: 97 % | WEIGHT: 203 LBS

## 2019-07-25 DIAGNOSIS — R06.02 SOB (SHORTNESS OF BREATH): ICD-10-CM

## 2019-07-25 DIAGNOSIS — J45.909 ASTHMA: ICD-10-CM

## 2019-07-25 DIAGNOSIS — J45.50 SEVERE PERSISTENT ASTHMA WITHOUT COMPLICATION (H): ICD-10-CM

## 2019-07-25 DIAGNOSIS — J32.4 CHRONIC PANSINUSITIS: Primary | ICD-10-CM

## 2019-07-25 PROCEDURE — G0463 HOSPITAL OUTPT CLINIC VISIT: HCPCS | Mod: ZF

## 2019-07-25 ASSESSMENT — ENCOUNTER SYMPTOMS
EYE IRRITATION: 1
EYE REDNESS: 0
INSOMNIA: 1
BOWEL INCONTINENCE: 1
SHORTNESS OF BREATH: 1
FATIGUE: 1
SPEECH CHANGE: 0
LEG PAIN: 0
MUSCLE WEAKNESS: 1
DYSPNEA ON EXERTION: 1
INCREASED ENERGY: 0
NAIL CHANGES: 0
MYALGIAS: 1
NERVOUS/ANXIOUS: 1
COUGH: 1
TINGLING: 1
FEVER: 0
SNORES LOUDLY: 0
LIGHT-HEADEDNESS: 0
PALPITATIONS: 0
SINUS PAIN: 1
NECK PAIN: 0
DOUBLE VISION: 0
TREMORS: 0
NAUSEA: 1
DECREASED CONCENTRATION: 0
HEMOPTYSIS: 0
ABDOMINAL PAIN: 1
NIGHT SWEATS: 1
DECREASED APPETITE: 0
JAUNDICE: 0
WEAKNESS: 1
BLOOD IN STOOL: 0
ORTHOPNEA: 0
SYNCOPE: 0
TROUBLE SWALLOWING: 1
DIZZINESS: 1
DEPRESSION: 1
DIARRHEA: 1
SORE THROAT: 0
HALLUCINATIONS: 0
WEIGHT LOSS: 0
CONSTIPATION: 1
BACK PAIN: 1
EYE PAIN: 0
MEMORY LOSS: 0
POOR WOUND HEALING: 0
EXERCISE INTOLERANCE: 0
SMELL DISTURBANCE: 0
HEARTBURN: 1
SEIZURES: 0
SPUTUM PRODUCTION: 1
MUSCLE CRAMPS: 1
ARTHRALGIAS: 1
ALTERED TEMPERATURE REGULATION: 1
POLYDIPSIA: 1
CHILLS: 0
DISTURBANCES IN COORDINATION: 0
HOARSE VOICE: 0
POSTURAL DYSPNEA: 0
LOSS OF CONSCIOUSNESS: 0
SLEEP DISTURBANCES DUE TO BREATHING: 0
PANIC: 0
HYPERTENSION: 1
WEIGHT GAIN: 1
HEADACHES: 1
TASTE DISTURBANCE: 0
SINUS CONGESTION: 1
RECTAL PAIN: 1
EYE WATERING: 0
HYPOTENSION: 0
JOINT SWELLING: 0
COUGH DISTURBING SLEEP: 1
SKIN CHANGES: 0
STIFFNESS: 1
VOMITING: 0
WHEEZING: 1
NECK MASS: 0
PARALYSIS: 0
BLOATING: 0
POLYPHAGIA: 0
NUMBNESS: 1

## 2019-07-25 ASSESSMENT — MIFFLIN-ST. JEOR: SCORE: 1792.67

## 2019-07-25 ASSESSMENT — PAIN SCALES - GENERAL: PAINLEVEL: NO PAIN (0)

## 2019-07-25 NOTE — LETTER
7/25/2019       RE: Adarsh Salvador  [address] [id number]     Dear Colleague,    Thank you for referring your patient, Adarsh Salvador, to the [facility] at [facility]. Please see a copy of my visit note below.    [facility]  Pulmonary Medicine  Visit Clinic Note  July 25, 2019         ASSESSMENT & PLAN       Shortness of Breath: Asthma seems like a reasonable diagnosis at this point.  He has cough and shortness of breath with exacerbations that response to prednisone.  We are missing objective data regarding bronchial hyperreponsiveness.  His PFTs today fall within the normal range.  His lung volumes are low normal which could represent obesity or fibrotic lung disease.     The combination of sinus symptoms and pulmonary symptoms does bring up a potential unifying diagnosis.  Atopy with rhinitis and asthma, aspirin exacerbated respiratory disease (AERD), common variable immune deficiency, or vasculitis.    Lisinopril could cause chronic cough, but would not explain his shortness of breath.      For a diagnostic work up, I will get a sinus and chest CT looking for sinus polyps, pulmonary fibrosis, small airways disease or bronchiectasis.  I will also get  CBC, ANCA, IgE, IgG levels.     For therapy, will increase his advair.  Adding singulair may be an option down the road.  I have asked him to discuss with his primary care doctor switching his lisinopril to an ARB to see if his cough improves.          RTC in 3 months      Geoffrey Uriostegui MD     I spent a total of 60 minutes face-to-face with Adarsh Salvador during today's office visit.  Over 50% of this time was spent counseling the patient and/or coordinating care regarding shortness of breath.  See note for details.         Today's visit note:     Chief Complaint: Adarsh Salvador is a 54 year old year old male who is being seen for Consult (Shortness of Breath, Asthma )      HISTORY  OF PRESENT ILLNESS:    This is a 54-year-old male with a history of diabetes and hypertension who presents to the pulmonary clinic today to discuss shortness of breath.    He says about 1 year ago his shortness of breath got substantially worse.  He is currently taking Advair and albuterol.  He takes his albuterol 3-4 times a day.  Shortness of breath is made worse with exertion, dust, ragweed and certain types of wood products that he works with.  He is a  and works second shift which is about 10 to 12 hours.  He works for [employer] .    He has been given a diagnosis of sinusitis and asthma in the past.  He gets treated with prednisone 3-4 times a year and antibiotics about the same.  Asthma was diagnosed 2 to 3 years ago by his primary care physician.  There are no pulmonary function testing done at that time, but he had signs and symptoms consistent with the diagnosis.  He has never been to the emergency department or hospital for his breathing.  He denies any eczema, but he does have joint pains.  He has substantial neuropathy, but he states Cymbalta has helped out.  He takes aspirin daily and Aleve as needed.  He usually takes Aleve every day when he is working.  TIG (tungsten inert gas).     His nasal congestion is minimal at this time he denies any fevers.           Past Medical and Surgical History:     Past Medical History:   Diagnosis Date     Anaphylactic reaction 8/14/2015     Kidney stone 6/15/2011    Pt believes these were Calcium stones     Past Surgical History:   Procedure Laterality Date     COLONOSCOPY  5/1/2012    Procedure:COLONOSCOPY; screening colonoscopy; Surgeon:KINGSLEY DOS SANTOS; Location: OR     COLONOSCOPY  4/21/2014    Procedure: COMBINED COLONOSCOPY, SINGLE BIOPSY/POLYPECTOMY BY BIOPSY;  Surgeon: Duane, William Charles, MD;  Location: [facility] OR     CYSTOSCOPY  05/01/2008    CYSTOSCOPY W/ URETERAL STENT PLACEMENT 05/01/2008      CYSTOSCOPY  09/26/2010    CYSTOSCOPY W/ URETERAL  STENT PLACEMENT 09/26/2010 Left      CYSTOSCOPY  05/18/2012    CYSTOSCOPY, LEFT URETEROSCOPY AND STENT PLACEMENT left retrograde 05/18/2012      CYSTOSCOPY,+URETEROSCOPY  06/10/2008    URETEROSCOPY 06/10/2008 Right      HC BREATH HYDROGEN TEST  3/7/2014    Procedure: HYDROGEN BREATH TEST;  Surgeon: Darion Swift MD;  Location: [facility]     LITHOTRIPSY  09/30/2010    LITHOTRIPSY 09/30/2010 LEFT EXTRACORPOREAL SHOCK WAVE LITHOTRIPSY, FLEXIBLE CYSTOSCOPY, LEFT STENT REMOVAL       ORTHOPEDIC SURGERY  10/03/2007    KNEE ARTHROSCOPY 10/03/2007 RightX2       RELEASE CARPAL TUNNEL Right 1/26/2018    Procedure: RELEASE CARPAL TUNNEL;  Right carpal tunnel release;  Surgeon: Wiliam Saenz MD;  Location: [facility] OR     VASCULAR SURGERY  11/24/2008    Radiofrequency ablation left saphenous vein 11/24/2008 Done by Dr. Lozoya             Family History:     Family History   Problem Relation Age of Onset     Cancer Mother 52        lung, smoked     Cancer - colorectal Father 53        unsure type, pt attributes to radiation exposure     Myocardial Infarction Father 45     Coronary Artery Disease Father      Myocardial Infarction Paternal Grandfather 35     Diabetes Other         nephew- type 1     Diabetes Maternal Aunt         1     Diabetes Maternal Aunt         2     Diabetes Paternal Uncle         1     Diabetes Paternal Uncle         2     Diabetes Paternal Uncle         3     Diabetes Paternal Uncle         4     Colon Cancer No family hx of               Social History:     Social History     Socioeconomic History     Marital status: Single     Spouse name: Not on file     Number of children: 0     Years of education: Not on file     Highest education level: Not on file   Occupational History     Occupation: - with robotics     Employer: [employer]   Social Needs     Financial resource strain: Not on file     Food insecurity:     Worry: Not on file     Inability: Not on file     Transportation needs:      Medical: Not on file     Non-medical: Not on file   Tobacco Use     Smoking status: Never Smoker     Smokeless tobacco: Current User     Types: Chew     Tobacco comment: Chew   Substance and Sexual Activity     Alcohol use: Yes     Alcohol/week: 0.0 oz     Comment: occasional     Drug use: No     Sexual activity: Never   Lifestyle     Physical activity:     Days per week: Not on file     Minutes per session: Not on file     Stress: Not on file   Relationships     Social connections:     Talks on phone: Not on file     Gets together: Not on file     Attends Restoration service: Not on file     Active member of club or organization: Not on file     Attends meetings of clubs or organizations: Not on file     Relationship status: Not on file     Intimate partner violence:     Fear of current or ex partner: Not on file     Emotionally abused: Not on file     Physically abused: Not on file     Forced sexual activity: Not on file   Other Topics Concern     Parent/sibling w/ CABG, MI or angioplasty before 65F 55M? Not Asked   Social History Narrative    Works at [employer], as a  (25+ years experience).     2nd hand smoke  Chews tobacco.   Never smoker.          Medications:     Current Outpatient Medications   Medication     ACCU-CHEK GUIDE test strip     albuterol (PROAIR HFA/PROVENTIL HFA/VENTOLIN HFA) 108 (90 Base) MCG/ACT inhaler     Alpha Lipoic Acid 200 MG CAPS     amLODIPine (NORVASC) 10 MG tablet     ASPIRIN PO     aspirin-acetaminophen-caffeine (EXCEDRIN MIGRAINE) 250-250-65 MG tablet     atorvastatin (LIPITOR) 40 MG tablet     baclofen (LIORESAL) 10 MG tablet     blood glucose monitoring (ACCU-CHEK FASTCLIX) lancets     calcium carbonate (TUMS) 500 MG chewable tablet     cyclobenzaprine (FLEXERIL) 10 MG tablet     dicyclomine (BENTYL) 20 MG tablet     DULoxetine (CYMBALTA) 30 MG capsule     DULoxetine (CYMBALTA) 60 MG EC capsule     EPINEPHrine (EPIPEN) 0.3 MG/0.3ML injection     EPINEPHrine  (EPIPEN/ADRENACLICK/OR ANY BX GENERIC EQUIV) 0.3 MG/0.3ML injection 2-pack     fluticasone-salmeterol (ADVAIR) 250-50 MCG/DOSE diskus inhaler     lisinopril (PRINIVIL/ZESTRIL) 20 MG tablet     metFORMIN (GLUCOPHAGE-XR) 500 MG 24 hr tablet     multivitamin, therapeutic (THERA-VIT) TABS     Naproxen Sodium (ALEVE PO)     naproxen sodium (ANAPROX) 220 MG tablet     order for DME     order for DME     No current facility-administered medications for this visit.      Facility-Administered Medications Ordered in Other Visits   Medication     DOBUTamine 500 mg in dextrose 5% 250 mL (adult std conc) premix     metoprolol (LOPRESSOR) injection 5 mg            Review of Systems:       Answers for HPI/ROS submitted by the patient on 7/25/2019   General Symptoms: Yes  Skin Symptoms: Yes  HENT Symptoms: Yes  EYE SYMPTOMS: Yes  HEART SYMPTOMS: Yes  LUNG SYMPTOMS: Yes  INTESTINAL SYMPTOMS: Yes  URINARY SYMPTOMS: No  REPRODUCTIVE SYMPTOMS: No  SKELETAL SYMPTOMS: Yes  BLOOD SYMPTOMS: No  NERVOUS SYSTEM SYMPTOMS: Yes  MENTAL HEALTH SYMPTOMS: Yes  Fever: No  Loss of appetite: No  Weight loss: No  Weight gain: Yes  Fatigue: Yes  Night sweats: Yes  Chills: No  Increased stress: Yes  Excessive hunger: No  Excessive thirst: Yes  Feeling hot or cold when others believe the temperature is normal: Yes  Loss of height: No  Post-operative complications: No  Surgical site pain: No  Hallucinations: No  Change in or Loss of Energy: No  Hyperactivity: No  Confusion: No  Changes in hair: No  Changes in moles/birth marks: No  Itching: No  Rashes: No  Changes in nails: No  Acne: No  Change in facial hair: No  Warts: Yes  Non-healing sores: No  Scarring: Yes  Flaking of skin: No  Color changes of hands/feet in cold : No  Sun sensitivity: No  Skin thickening: No  Ear pain: No  Ear discharge: No  Hearing loss: Yes  Tinnitus: Yes  Nosebleeds: Yes  Congestion: Yes  Sinus pain: Yes  Trouble swallowing: Yes   Voice hoarseness: No  Mouth sores: Yes  Sore  throat: No  Tooth pain: No  Gum tenderness: Yes  Bleeding gums: No  Change in taste: No  Change in sense of smell: No  Dry mouth: Yes  Hearing aid used: No  Neck lump: No  Eye pain: No  Vision loss: Yes  Dry eyes: No  Watery eyes: No  Eye bulging: Yes  Double vision: No  Flashing of lights: No  Spots: No  Floaters: No  Redness: No  Crossed eyes: No  Tunnel Vision: No  Yellowing of eyes: No  Eye irritation: Yes  Cough: Yes  Sputum or phlegm: Yes  Coughing up blood: No  Difficulty breating or shortness of breath: Yes  Snoring: No  Wheezing: Yes  Difficulty breathing on exertion: Yes  Nighttime Cough: Yes  Difficulty breathing when lying flat: No  Chest pain or pressure: No  Fast or irregular heartbeat: No  Pain in legs with walking: No  Trouble breathing while lying down: No  Fingers or toes appear blue: No  High blood pressure: Yes  Low blood pressure: No  Fainting: No  Murmurs: No  Pacemaker: No  Varicose veins: Yes  Edema or swelling: No  Wake up at night with shortness of breath: No  Light-headedness: No  Exercise intolerance: No  Heart burn or indigestion: Yes  Nausea: Yes  Vomiting: No  Abdominal pain: Yes  Bloating: No  Constipation: Yes  Diarrhea: Yes  Blood in stool: No  Black stools: No  Rectal or Anal pain: Yes  Fecal incontinence: Yes  Yellowing of skin or eyes: No  Vomit with blood: No  Change in stools: Yes  Back pain: Yes  Muscle aches: Yes  Neck pain: No  Swollen joints: No  Joint pain: Yes  Bone pain: Yes  Muscle cramps: Yes  Muscle weakness: Yes  Joint stiffness: Yes  Bone fracture: No  Trouble with coordination: No  Dizziness or trouble with balance: Yes  Fainting or black-out spells: No  Memory loss: No  Headache: Yes  Seizures: No  Speech problems: No  Tingling: Yes  Tremor: No  Weakness: Yes  Difficulty walking: Yes  Paralysis: No  Numbness: Yes  Nervous or Anxious: Yes  Depression: Yes  Trouble sleeping: Yes  Trouble thinking or concentrating: No  Mood changes: No  Panic attacks:  "No        PHYSICAL EXAM:  /83 (BP Location: Right arm, Patient Position: Chair, Cuff Size: Adult Large)   Pulse 73   Resp 18   Ht 1.803 m (5' 10.98\")   Wt 92.1 kg (203 lb)   SpO2 97%   BMI 28.33 kg/m        General: Pleasant, no apparent distress  Eyes: Anicteric  Nose: Nasal mucosa with no edema or hyperemia.  No polyps  Ears: Hearing grossly normal  Mouth: Oral mucosa is moist, without any lesions. No oropharyngeal exudate.  Neck: supple, no thyromegaly  Lymphatics: No cervical or supraclavicular nodes  Respiratory: Good air movement. No crackles. No rhonchi. No wheezes  Cardiac: RRR, normal S1, S2. No murmurs. No JVD  Musculoskeletal: Extremities normal. No clubbing. No cyanosis. No edema.  He is wearing a boot cast on his left foot  Skin: No rash on limited exam  Neuro: Normal mentation. Normal speech.  Psych:Normal affect           Data:   All laboratory and imaging data reviewed.      PFT:        PFT Interpretation:  No airflow obstruction.   Lung volumes are low-normal.  Valid Maneuver    CXR: Pending radiology review.  On my review there appears to be an infiltrate in the left lower lobe.  This may be a dilated airway.      Recent Results (from the past 168 hour(s))   General PFT Lab (Please always keep checked)    Collection Time: 07/25/19  7:13 AM   Result Value Ref Range    FVC-Pred 5.10 L    FVC-Pre 4.09 L    FVC-%Pred-Pre 80 %    FEV1-Pre 3.44 L    FEV1-%Pred-Pre 86 %    FEV1FVC-Pred 79 %    FEV1FVC-Pre 84 %    FEFMax-Pred 9.95 L/sec    FEFMax-Pre 9.10 L/sec    FEFMax-%Pred-Pre 91 %    FEF2575-Pred 3.55 L/sec    FEF2575-Pre 3.93 L/sec    TYO7023-%Pred-Pre 110 %    ExpTime-Pre 7.14 sec    FIFMax-Pre 6.78 L/sec    VC-Pred 5.35 L    VC-Pre 4.17 L    VC-%Pred-Pre 77 %    IC-Pred 4.62 L    IC-Pre 3.44 L    IC-%Pred-Pre 74 %    ERV-Pred 0.73 L    ERV-Pre 0.73 L    ERV-%Pred-Pre 99 %    FEV1FEV6-Pred 80 %    FEV1FEV6-Pre 85 %    FRCPleth-Pred 3.60 L    FRCPleth-Pre 2.58 L    FRCPleth-%Pred-Pre 71 % "    RVPleth-Pred 2.28 L    RVPleth-Pre 1.85 L    RVPleth-%Pred-Pre 81 %    TLCPleth-Pred 7.33 L    TLCPleth-Pre 6.02 L    TLCPleth-%Pred-Pre 82 %    DLCOunc-Pred 29.92 ml/min/mmHg    DLCOunc-Pre 33.29 ml/min/mmHg    DLCOunc-%Pred-Pre 111 %    VA-Pre 5.74 L    VA-%Pred-Pre 83 %    FEV1SVC-Pred 75 %    FEV1SVC-Pre 82 %                             Again, thank you for allowing me to participate in the care of your patient.      Sincerely,    Clint Uriostegui MD

## 2019-07-25 NOTE — LETTER
7/25/2019       RE: Adarsh Salvador  [address] [facility] [id number]     Dear Colleague,    Thank you for referring your patient, Adarsh Salvador, to the [facility] at [facility]. Please see a copy of my visit note below.    [facility]  Pulmonary Medicine  Visit Clinic Note  July 25, 2019         ASSESSMENT & PLAN       Shortness of Breath: Asthma seems like a reasonable diagnosis at this point.  He has cough and shortness of breath with exacerbations that response to prednisone.  We are missing objective data regarding bronchial hyperreponsiveness.  His PFTs today fall within the normal range.  His lung volumes are low normal which could represent obesity or fibrotic lung disease.     The combination of sinus symptoms and pulmonary symptoms does bring up a potential unifying diagnosis.  Atopy with rhinitis and asthma, aspirin exacerbated respiratory disease (AERD), common variable immune deficiency, or vasculitis.    Lisinopril could cause chronic cough, but would not explain his shortness of breath.      For a diagnostic work up, I will get a sinus and chest CT looking for sinus polyps, pulmonary fibrosis, small airways disease or bronchiectasis.  I will also get  CBC, ANCA, IgE, IgG levels.     For therapy, will increase his advair.  Adding singulair may be an option down the road.  I have asked him to discuss with his primary care doctor switching his lisinopril to an ARB to see if his cough improves.          RTC in 3 months      Geoffrey Uriostegui MD     I spent a total of 60 minutes face-to-face with Adarsh Salvador during today's office visit.  Over 50% of this time was spent counseling the patient and/or coordinating care regarding shortness of breath.  See note for details.         Today's visit note:     Chief Complaint: Adarsh Salvador is a 54 year old year old male who is being seen for Consult (Shortness of Breath, Asthma )      HISTORY  OF PRESENT ILLNESS:    This is a 54-year-old male with a history of diabetes and hypertension who presents to the pulmonary clinic today to discuss shortness of breath.    He says about 1 year ago his shortness of breath got substantially worse.  He is currently taking Advair and albuterol.  He takes his albuterol 3-4 times a day.  Shortness of breath is made worse with exertion, dust, ragweed and certain types of wood products that he works with.  He is a  and works second shift which is about 10 to 12 hours.  He works for [employer] .    He has been given a diagnosis of sinusitis and asthma in the past.  He gets treated with prednisone 3-4 times a year and antibiotics about the same.  Asthma was diagnosed 2 to 3 years ago by his primary care physician.  There are no pulmonary function testing done at that time, but he had signs and symptoms consistent with the diagnosis.  He has never been to the emergency department or hospital for his breathing.  He denies any eczema, but he does have joint pains.  He has substantial neuropathy, but he states Cymbalta has helped out.  He takes aspirin daily and Aleve as needed.  He usually takes Aleve every day when he is working.  TIG (tungsten inert gas).     His nasal congestion is minimal at this time he denies any fevers.           Past Medical and Surgical History:     Past Medical History:   Diagnosis Date     Anaphylactic reaction 8/14/2015     Kidney stone 6/15/2011    Pt believes these were Calcium stones     Past Surgical History:   Procedure Laterality Date     COLONOSCOPY  5/1/2012    Procedure:COLONOSCOPY; screening colonoscopy; Surgeon:KINGSLEY DOS SANTOS; Location: OR     COLONOSCOPY  4/21/2014    Procedure: COMBINED COLONOSCOPY, SINGLE BIOPSY/POLYPECTOMY BY BIOPSY;  Surgeon: Duane, William Charles, MD;  Location: [facility] OR     CYSTOSCOPY  05/01/2008    CYSTOSCOPY W/ URETERAL STENT PLACEMENT 05/01/2008      CYSTOSCOPY  09/26/2010    CYSTOSCOPY W/ URETERAL  STENT PLACEMENT 09/26/2010 Left      CYSTOSCOPY  05/18/2012    CYSTOSCOPY, LEFT URETEROSCOPY AND STENT PLACEMENT left retrograde 05/18/2012      CYSTOSCOPY,+URETEROSCOPY  06/10/2008    URETEROSCOPY 06/10/2008 Right      HC BREATH HYDROGEN TEST  3/7/2014    Procedure: HYDROGEN BREATH TEST;  Surgeon: Darion Swift MD;  Location: [facility]     LITHOTRIPSY  09/30/2010    LITHOTRIPSY 09/30/2010 LEFT EXTRACORPOREAL SHOCK WAVE LITHOTRIPSY, FLEXIBLE CYSTOSCOPY, LEFT STENT REMOVAL       ORTHOPEDIC SURGERY  10/03/2007    KNEE ARTHROSCOPY 10/03/2007 RightX2       RELEASE CARPAL TUNNEL Right 1/26/2018    Procedure: RELEASE CARPAL TUNNEL;  Right carpal tunnel release;  Surgeon: Wiliam Saenz MD;  Location: [facility] OR     VASCULAR SURGERY  11/24/2008    Radiofrequency ablation left saphenous vein 11/24/2008 Done by Dr. Lozoya             Family History:     Family History   Problem Relation Age of Onset     Cancer Mother 52        lung, smoked     Cancer - colorectal Father 53        unsure type, pt attributes to radiation exposure     Myocardial Infarction Father 45     Coronary Artery Disease Father      Myocardial Infarction Paternal Grandfather 35     Diabetes Other         nephew- type 1     Diabetes Maternal Aunt         1     Diabetes Maternal Aunt         2     Diabetes Paternal Uncle         1     Diabetes Paternal Uncle         2     Diabetes Paternal Uncle         3     Diabetes Paternal Uncle         4     Colon Cancer No family hx of               Social History:     Social History     Socioeconomic History     Marital status: Single     Spouse name: Not on file     Number of children: 0     Years of education: Not on file     Highest education level: Not on file   Occupational History     Occupation: - with robotics     Employer: [employer]   Social Needs     Financial resource strain: Not on file     Food insecurity:     Worry: Not on file     Inability: Not on file     Transportation needs:      Medical: Not on file     Non-medical: Not on file   Tobacco Use     Smoking status: Never Smoker     Smokeless tobacco: Current User     Types: Chew     Tobacco comment: Chew   Substance and Sexual Activity     Alcohol use: Yes     Alcohol/week: 0.0 oz     Comment: occasional     Drug use: No     Sexual activity: Never   Lifestyle     Physical activity:     Days per week: Not on file     Minutes per session: Not on file     Stress: Not on file   Relationships     Social connections:     Talks on phone: Not on file     Gets together: Not on file     Attends Orthodox service: Not on file     Active member of club or organization: Not on file     Attends meetings of clubs or organizations: Not on file     Relationship status: Not on file     Intimate partner violence:     Fear of current or ex partner: Not on file     Emotionally abused: Not on file     Physically abused: Not on file     Forced sexual activity: Not on file   Other Topics Concern     Parent/sibling w/ CABG, MI or angioplasty before 65F 55M? Not Asked   Social History Narrative    Works at [employer], as a  (25+ years experience).     2nd hand smoke  Chews tobacco.   Never smoker.          Medications:     Current Outpatient Medications   Medication     ACCU-CHEK GUIDE test strip     albuterol (PROAIR HFA/PROVENTIL HFA/VENTOLIN HFA) 108 (90 Base) MCG/ACT inhaler     Alpha Lipoic Acid 200 MG CAPS     amLODIPine (NORVASC) 10 MG tablet     ASPIRIN PO     aspirin-acetaminophen-caffeine (EXCEDRIN MIGRAINE) 250-250-65 MG tablet     atorvastatin (LIPITOR) 40 MG tablet     baclofen (LIORESAL) 10 MG tablet     blood glucose monitoring (ACCU-CHEK FASTCLIX) lancets     calcium carbonate (TUMS) 500 MG chewable tablet     cyclobenzaprine (FLEXERIL) 10 MG tablet     dicyclomine (BENTYL) 20 MG tablet     DULoxetine (CYMBALTA) 30 MG capsule     DULoxetine (CYMBALTA) 60 MG EC capsule     EPINEPHrine (EPIPEN) 0.3 MG/0.3ML injection     EPINEPHrine  (EPIPEN/ADRENACLICK/OR ANY BX GENERIC EQUIV) 0.3 MG/0.3ML injection 2-pack     fluticasone-salmeterol (ADVAIR) 250-50 MCG/DOSE diskus inhaler     lisinopril (PRINIVIL/ZESTRIL) 20 MG tablet     metFORMIN (GLUCOPHAGE-XR) 500 MG 24 hr tablet     multivitamin, therapeutic (THERA-VIT) TABS     Naproxen Sodium (ALEVE PO)     naproxen sodium (ANAPROX) 220 MG tablet     order for DME     order for DME     No current facility-administered medications for this visit.      Facility-Administered Medications Ordered in Other Visits   Medication     DOBUTamine 500 mg in dextrose 5% 250 mL (adult std conc) premix     metoprolol (LOPRESSOR) injection 5 mg            Review of Systems:       Answers for HPI/ROS submitted by the patient on 7/25/2019   General Symptoms: Yes  Skin Symptoms: Yes  HENT Symptoms: Yes  EYE SYMPTOMS: Yes  HEART SYMPTOMS: Yes  LUNG SYMPTOMS: Yes  INTESTINAL SYMPTOMS: Yes  URINARY SYMPTOMS: No  REPRODUCTIVE SYMPTOMS: No  SKELETAL SYMPTOMS: Yes  BLOOD SYMPTOMS: No  NERVOUS SYSTEM SYMPTOMS: Yes  MENTAL HEALTH SYMPTOMS: Yes  Fever: No  Loss of appetite: No  Weight loss: No  Weight gain: Yes  Fatigue: Yes  Night sweats: Yes  Chills: No  Increased stress: Yes  Excessive hunger: No  Excessive thirst: Yes  Feeling hot or cold when others believe the temperature is normal: Yes  Loss of height: No  Post-operative complications: No  Surgical site pain: No  Hallucinations: No  Change in or Loss of Energy: No  Hyperactivity: No  Confusion: No  Changes in hair: No  Changes in moles/birth marks: No  Itching: No  Rashes: No  Changes in nails: No  Acne: No  Change in facial hair: No  Warts: Yes  Non-healing sores: No  Scarring: Yes  Flaking of skin: No  Color changes of hands/feet in cold : No  Sun sensitivity: No  Skin thickening: No  Ear pain: No  Ear discharge: No  Hearing loss: Yes  Tinnitus: Yes  Nosebleeds: Yes  Congestion: Yes  Sinus pain: Yes  Trouble swallowing: Yes   Voice hoarseness: No  Mouth sores: Yes  Sore  throat: No  Tooth pain: No  Gum tenderness: Yes  Bleeding gums: No  Change in taste: No  Change in sense of smell: No  Dry mouth: Yes  Hearing aid used: No  Neck lump: No  Eye pain: No  Vision loss: Yes  Dry eyes: No  Watery eyes: No  Eye bulging: Yes  Double vision: No  Flashing of lights: No  Spots: No  Floaters: No  Redness: No  Crossed eyes: No  Tunnel Vision: No  Yellowing of eyes: No  Eye irritation: Yes  Cough: Yes  Sputum or phlegm: Yes  Coughing up blood: No  Difficulty breating or shortness of breath: Yes  Snoring: No  Wheezing: Yes  Difficulty breathing on exertion: Yes  Nighttime Cough: Yes  Difficulty breathing when lying flat: No  Chest pain or pressure: No  Fast or irregular heartbeat: No  Pain in legs with walking: No  Trouble breathing while lying down: No  Fingers or toes appear blue: No  High blood pressure: Yes  Low blood pressure: No  Fainting: No  Murmurs: No  Pacemaker: No  Varicose veins: Yes  Edema or swelling: No  Wake up at night with shortness of breath: No  Light-headedness: No  Exercise intolerance: No  Heart burn or indigestion: Yes  Nausea: Yes  Vomiting: No  Abdominal pain: Yes  Bloating: No  Constipation: Yes  Diarrhea: Yes  Blood in stool: No  Black stools: No  Rectal or Anal pain: Yes  Fecal incontinence: Yes  Yellowing of skin or eyes: No  Vomit with blood: No  Change in stools: Yes  Back pain: Yes  Muscle aches: Yes  Neck pain: No  Swollen joints: No  Joint pain: Yes  Bone pain: Yes  Muscle cramps: Yes  Muscle weakness: Yes  Joint stiffness: Yes  Bone fracture: No  Trouble with coordination: No  Dizziness or trouble with balance: Yes  Fainting or black-out spells: No  Memory loss: No  Headache: Yes  Seizures: No  Speech problems: No  Tingling: Yes  Tremor: No  Weakness: Yes  Difficulty walking: Yes  Paralysis: No  Numbness: Yes  Nervous or Anxious: Yes  Depression: Yes  Trouble sleeping: Yes  Trouble thinking or concentrating: No  Mood changes: No  Panic attacks:  "No        PHYSICAL EXAM:  /83 (BP Location: Right arm, Patient Position: Chair, Cuff Size: Adult Large)   Pulse 73   Resp 18   Ht 1.803 m (5' 10.98\")   Wt 92.1 kg (203 lb)   SpO2 97%   BMI 28.33 kg/m        General: Pleasant, no apparent distress  Eyes: Anicteric  Nose: Nasal mucosa with no edema or hyperemia.  No polyps  Ears: Hearing grossly normal  Mouth: Oral mucosa is moist, without any lesions. No oropharyngeal exudate.  Neck: supple, no thyromegaly  Lymphatics: No cervical or supraclavicular nodes  Respiratory: Good air movement. No crackles. No rhonchi. No wheezes  Cardiac: RRR, normal S1, S2. No murmurs. No JVD  Musculoskeletal: Extremities normal. No clubbing. No cyanosis. No edema.  He is wearing a boot cast on his left foot  Skin: No rash on limited exam  Neuro: Normal mentation. Normal speech.  Psych:Normal affect           Data:   All laboratory and imaging data reviewed.      PFT:        PFT Interpretation:  No airflow obstruction.   Lung volumes are low-normal.  Valid Maneuver    CXR: Pending radiology review.  On my review there appears to be an infiltrate in the left lower lobe.  This may be a dilated airway.      Recent Results (from the past 168 hour(s))   General PFT Lab (Please always keep checked)    Collection Time: 07/25/19  7:13 AM   Result Value Ref Range    FVC-Pred 5.10 L    FVC-Pre 4.09 L    FVC-%Pred-Pre 80 %    FEV1-Pre 3.44 L    FEV1-%Pred-Pre 86 %    FEV1FVC-Pred 79 %    FEV1FVC-Pre 84 %    FEFMax-Pred 9.95 L/sec    FEFMax-Pre 9.10 L/sec    FEFMax-%Pred-Pre 91 %    FEF2575-Pred 3.55 L/sec    FEF2575-Pre 3.93 L/sec    UHH5719-%Pred-Pre 110 %    ExpTime-Pre 7.14 sec    FIFMax-Pre 6.78 L/sec    VC-Pred 5.35 L    VC-Pre 4.17 L    VC-%Pred-Pre 77 %    IC-Pred 4.62 L    IC-Pre 3.44 L    IC-%Pred-Pre 74 %    ERV-Pred 0.73 L    ERV-Pre 0.73 L    ERV-%Pred-Pre 99 %    FEV1FEV6-Pred 80 %    FEV1FEV6-Pre 85 %    FRCPleth-Pred 3.60 L    FRCPleth-Pre 2.58 L    FRCPleth-%Pred-Pre 71 % "    RVPleth-Pred 2.28 L    RVPleth-Pre 1.85 L    RVPleth-%Pred-Pre 81 %    TLCPleth-Pred 7.33 L    TLCPleth-Pre 6.02 L    TLCPleth-%Pred-Pre 82 %    DLCOunc-Pred 29.92 ml/min/mmHg    DLCOunc-Pre 33.29 ml/min/mmHg    DLCOunc-%Pred-Pre 111 %    VA-Pre 5.74 L    VA-%Pred-Pre 83 %    FEV1SVC-Pred 75 %    FEV1SVC-Pre 82 %                                           Again, thank you for allowing me to participate in the care of your patient.      Sincerely,    Clint Uriostegui MD

## 2019-07-25 NOTE — NURSING NOTE
Chief Complaint   Patient presents with     Consult     Shortness of Breath, Asthma      Taylor Jeffers, CARLEY

## 2019-07-25 NOTE — PROGRESS NOTES
[facility]  Pulmonary Medicine  Visit Clinic Note  July 25, 2019         ASSESSMENT & PLAN       Shortness of Breath: Asthma seems like a reasonable diagnosis at this point.  He has cough and shortness of breath with exacerbations that response to prednisone.  We are missing objective data regarding bronchial hyperreponsiveness.  His PFTs today fall within the normal range.  His lung volumes are low normal which could represent obesity or fibrotic lung disease.     The combination of sinus symptoms and pulmonary symptoms does bring up a potential unifying diagnosis.  Atopy with rhinitis and asthma, aspirin exacerbated respiratory disease (AERD), common variable immune deficiency, or vasculitis.    Lisinopril could cause chronic cough, but would not explain his shortness of breath.      For a diagnostic work up, I will get a sinus and chest CT looking for sinus polyps, pulmonary fibrosis, small airways disease or bronchiectasis.  I will also get  CBC, ANCA, IgE, IgG levels.     For therapy, will increase his advair.  Adding singulair may be an option down the road.  I have asked him to discuss with his primary care doctor switching his lisinopril to an ARB to see if his cough improves.          RTC in 3 months      Geoffrey Uriostegui MD     I spent a total of 60 minutes face-to-face with Adarsh Salvador during today's office visit.  Over 50% of this time was spent counseling the patient and/or coordinating care regarding shortness of breath.  See note for details.         Today's visit note:     Chief Complaint: Adarsh Salvador is a 54 year old year old male who is being seen for Consult (Shortness of Breath, Asthma )      HISTORY OF PRESENT ILLNESS:    This is a 54-year-old male with a history of diabetes and hypertension who presents to the pulmonary clinic today to discuss shortness of breath.    He says about 1 year ago his shortness of breath got substantially worse.  He is currently taking Advair and  albuterol.  He takes his albuterol 3-4 times a day.  Shortness of breath is made worse with exertion, dust, ragweed and certain types of wood products that he works with.  He is a  and works second shift which is about 10 to 12 hours.  He works for [employer] .    He has been given a diagnosis of sinusitis and asthma in the past.  He gets treated with prednisone 3-4 times a year and antibiotics about the same.  Asthma was diagnosed 2 to 3 years ago by his primary care physician.  There are no pulmonary function testing done at that time, but he had signs and symptoms consistent with the diagnosis.  He has never been to the emergency department or hospital for his breathing.  He denies any eczema, but he does have joint pains.  He has substantial neuropathy, but he states Cymbalta has helped out.  He takes aspirin daily and Aleve as needed.  He usually takes Aleve every day when he is working.  TIG (tungsten inert gas).     His nasal congestion is minimal at this time he denies any fevers.           Past Medical and Surgical History:     Past Medical History:   Diagnosis Date     Anaphylactic reaction 8/14/2015     Kidney stone 6/15/2011    Pt believes these were Calcium stones     Past Surgical History:   Procedure Laterality Date     COLONOSCOPY  5/1/2012    Procedure:COLONOSCOPY; screening colonoscopy; Surgeon:KINGSLEY DOS SANTOS; Location:[facility] OR     COLONOSCOPY  4/21/2014    Procedure: COMBINED COLONOSCOPY, SINGLE BIOPSY/POLYPECTOMY BY BIOPSY;  Surgeon: Duane, William Charles, MD;  Location: [facility] OR     CYSTOSCOPY  05/01/2008    CYSTOSCOPY W/ URETERAL STENT PLACEMENT 05/01/2008      CYSTOSCOPY  09/26/2010    CYSTOSCOPY W/ URETERAL STENT PLACEMENT 09/26/2010 Left      CYSTOSCOPY  05/18/2012    CYSTOSCOPY, LEFT URETEROSCOPY AND STENT PLACEMENT left retrograde 05/18/2012      CYSTOSCOPY,+URETEROSCOPY  06/10/2008    URETEROSCOPY 06/10/2008 Right      HC BREATH HYDROGEN TEST  3/7/2014    Procedure: HYDROGEN  BREATH TEST;  Surgeon: Darion Swift MD;  Location: [facility]     LITHOTRIPSY  09/30/2010    LITHOTRIPSY 09/30/2010 LEFT EXTRACORPOREAL SHOCK WAVE LITHOTRIPSY, FLEXIBLE CYSTOSCOPY, LEFT STENT REMOVAL       ORTHOPEDIC SURGERY  10/03/2007    KNEE ARTHROSCOPY 10/03/2007 RightX2       RELEASE CARPAL TUNNEL Right 1/26/2018    Procedure: RELEASE CARPAL TUNNEL;  Right carpal tunnel release;  Surgeon: Wiliam Saenz MD;  Location: [facility] OR     VASCULAR SURGERY  11/24/2008    Radiofrequency ablation left saphenous vein 11/24/2008 Done by Dr. Lozoya             Family History:     Family History   Problem Relation Age of Onset     Cancer Mother 52        lung, smoked     Cancer - colorectal Father 53        unsure type, pt attributes to radiation exposure     Myocardial Infarction Father 45     Coronary Artery Disease Father      Myocardial Infarction Paternal Grandfather 35     Diabetes Other         nephew- type 1     Diabetes Maternal Aunt         1     Diabetes Maternal Aunt         2     Diabetes Paternal Uncle         1     Diabetes Paternal Uncle         2     Diabetes Paternal Uncle         3     Diabetes Paternal Uncle         4     Colon Cancer No family hx of               Social History:     Social History     Socioeconomic History     Marital status: Single     Spouse name: Not on file     Number of children: 0     Years of education: Not on file     Highest education level: Not on file   Occupational History     Occupation: - with robotics     Employer: [employer]   Social Needs     Financial resource strain: Not on file     Food insecurity:     Worry: Not on file     Inability: Not on file     Transportation needs:     Medical: Not on file     Non-medical: Not on file   Tobacco Use     Smoking status: Never Smoker     Smokeless tobacco: Current User     Types: Chew     Tobacco comment: Chew   Substance and Sexual Activity     Alcohol use: Yes     Alcohol/week: 0.0 oz     Comment: occasional      Drug use: No     Sexual activity: Never   Lifestyle     Physical activity:     Days per week: Not on file     Minutes per session: Not on file     Stress: Not on file   Relationships     Social connections:     Talks on phone: Not on file     Gets together: Not on file     Attends Rastafari service: Not on file     Active member of club or organization: Not on file     Attends meetings of clubs or organizations: Not on file     Relationship status: Not on file     Intimate partner violence:     Fear of current or ex partner: Not on file     Emotionally abused: Not on file     Physically abused: Not on file     Forced sexual activity: Not on file   Other Topics Concern     Parent/sibling w/ CABG, MI or angioplasty before 65F 55M? Not Asked   Social History Narrative    Works at [employer], as a  (25+ years experience).     2nd hand smoke  Chews tobacco.   Never smoker.          Medications:     Current Outpatient Medications   Medication     ACCU-CHEK GUIDE test strip     albuterol (PROAIR HFA/PROVENTIL HFA/VENTOLIN HFA) 108 (90 Base) MCG/ACT inhaler     Alpha Lipoic Acid 200 MG CAPS     amLODIPine (NORVASC) 10 MG tablet     ASPIRIN PO     aspirin-acetaminophen-caffeine (EXCEDRIN MIGRAINE) 250-250-65 MG tablet     atorvastatin (LIPITOR) 40 MG tablet     baclofen (LIORESAL) 10 MG tablet     blood glucose monitoring (ACCU-CHEK FASTCLIX) lancets     calcium carbonate (TUMS) 500 MG chewable tablet     cyclobenzaprine (FLEXERIL) 10 MG tablet     dicyclomine (BENTYL) 20 MG tablet     DULoxetine (CYMBALTA) 30 MG capsule     DULoxetine (CYMBALTA) 60 MG EC capsule     EPINEPHrine (EPIPEN) 0.3 MG/0.3ML injection     EPINEPHrine (EPIPEN/ADRENACLICK/OR ANY BX GENERIC EQUIV) 0.3 MG/0.3ML injection 2-pack     fluticasone-salmeterol (ADVAIR) 250-50 MCG/DOSE diskus inhaler     lisinopril (PRINIVIL/ZESTRIL) 20 MG tablet     metFORMIN (GLUCOPHAGE-XR) 500 MG 24 hr tablet     multivitamin, therapeutic (THERA-VIT) TABS      Naproxen Sodium (ALEVE PO)     naproxen sodium (ANAPROX) 220 MG tablet     order for DME     order for DME     No current facility-administered medications for this visit.      Facility-Administered Medications Ordered in Other Visits   Medication     DOBUTamine 500 mg in dextrose 5% 250 mL (adult std conc) premix     metoprolol (LOPRESSOR) injection 5 mg            Review of Systems:       Answers for HPI/ROS submitted by the patient on 7/25/2019   General Symptoms: Yes  Skin Symptoms: Yes  HENT Symptoms: Yes  EYE SYMPTOMS: Yes  HEART SYMPTOMS: Yes  LUNG SYMPTOMS: Yes  INTESTINAL SYMPTOMS: Yes  URINARY SYMPTOMS: No  REPRODUCTIVE SYMPTOMS: No  SKELETAL SYMPTOMS: Yes  BLOOD SYMPTOMS: No  NERVOUS SYSTEM SYMPTOMS: Yes  MENTAL HEALTH SYMPTOMS: Yes  Fever: No  Loss of appetite: No  Weight loss: No  Weight gain: Yes  Fatigue: Yes  Night sweats: Yes  Chills: No  Increased stress: Yes  Excessive hunger: No  Excessive thirst: Yes  Feeling hot or cold when others believe the temperature is normal: Yes  Loss of height: No  Post-operative complications: No  Surgical site pain: No  Hallucinations: No  Change in or Loss of Energy: No  Hyperactivity: No  Confusion: No  Changes in hair: No  Changes in moles/birth marks: No  Itching: No  Rashes: No  Changes in nails: No  Acne: No  Change in facial hair: No  Warts: Yes  Non-healing sores: No  Scarring: Yes  Flaking of skin: No  Color changes of hands/feet in cold : No  Sun sensitivity: No  Skin thickening: No  Ear pain: No  Ear discharge: No  Hearing loss: Yes  Tinnitus: Yes  Nosebleeds: Yes  Congestion: Yes  Sinus pain: Yes  Trouble swallowing: Yes   Voice hoarseness: No  Mouth sores: Yes  Sore throat: No  Tooth pain: No  Gum tenderness: Yes  Bleeding gums: No  Change in taste: No  Change in sense of smell: No  Dry mouth: Yes  Hearing aid used: No  Neck lump: No  Eye pain: No  Vision loss: Yes  Dry eyes: No  Watery eyes: No  Eye bulging: Yes  Double vision: No  Flashing of  "lights: No  Spots: No  Floaters: No  Redness: No  Crossed eyes: No  Tunnel Vision: No  Yellowing of eyes: No  Eye irritation: Yes  Cough: Yes  Sputum or phlegm: Yes  Coughing up blood: No  Difficulty breating or shortness of breath: Yes  Snoring: No  Wheezing: Yes  Difficulty breathing on exertion: Yes  Nighttime Cough: Yes  Difficulty breathing when lying flat: No  Chest pain or pressure: No  Fast or irregular heartbeat: No  Pain in legs with walking: No  Trouble breathing while lying down: No  Fingers or toes appear blue: No  High blood pressure: Yes  Low blood pressure: No  Fainting: No  Murmurs: No  Pacemaker: No  Varicose veins: Yes  Edema or swelling: No  Wake up at night with shortness of breath: No  Light-headedness: No  Exercise intolerance: No  Heart burn or indigestion: Yes  Nausea: Yes  Vomiting: No  Abdominal pain: Yes  Bloating: No  Constipation: Yes  Diarrhea: Yes  Blood in stool: No  Black stools: No  Rectal or Anal pain: Yes  Fecal incontinence: Yes  Yellowing of skin or eyes: No  Vomit with blood: No  Change in stools: Yes  Back pain: Yes  Muscle aches: Yes  Neck pain: No  Swollen joints: No  Joint pain: Yes  Bone pain: Yes  Muscle cramps: Yes  Muscle weakness: Yes  Joint stiffness: Yes  Bone fracture: No  Trouble with coordination: No  Dizziness or trouble with balance: Yes  Fainting or black-out spells: No  Memory loss: No  Headache: Yes  Seizures: No  Speech problems: No  Tingling: Yes  Tremor: No  Weakness: Yes  Difficulty walking: Yes  Paralysis: No  Numbness: Yes  Nervous or Anxious: Yes  Depression: Yes  Trouble sleeping: Yes  Trouble thinking or concentrating: No  Mood changes: No  Panic attacks: No        PHYSICAL EXAM:  /83 (BP Location: Right arm, Patient Position: Chair, Cuff Size: Adult Large)   Pulse 73   Resp 18   Ht 1.803 m (5' 10.98\")   Wt 92.1 kg (203 lb)   SpO2 97%   BMI 28.33 kg/m       General: Pleasant, no apparent distress  Eyes: Anicteric  Nose: Nasal mucosa with " no edema or hyperemia.  No polyps  Ears: Hearing grossly normal  Mouth: Oral mucosa is moist, without any lesions. No oropharyngeal exudate.  Neck: supple, no thyromegaly  Lymphatics: No cervical or supraclavicular nodes  Respiratory: Good air movement. No crackles. No rhonchi. No wheezes  Cardiac: RRR, normal S1, S2. No murmurs. No JVD  Musculoskeletal: Extremities normal. No clubbing. No cyanosis. No edema.  He is wearing a boot cast on his left foot  Skin: No rash on limited exam  Neuro: Normal mentation. Normal speech.  Psych:Normal affect           Data:   All laboratory and imaging data reviewed.      PFT:        PFT Interpretation:  No airflow obstruction.   Lung volumes are low-normal.  Valid Maneuver    CXR: Pending radiology review.  On my review there appears to be an infiltrate in the left lower lobe.  This may be a dilated airway.      Recent Results (from the past 168 hour(s))   General PFT Lab (Please always keep checked)    Collection Time: 07/25/19  7:13 AM   Result Value Ref Range    FVC-Pred 5.10 L    FVC-Pre 4.09 L    FVC-%Pred-Pre 80 %    FEV1-Pre 3.44 L    FEV1-%Pred-Pre 86 %    FEV1FVC-Pred 79 %    FEV1FVC-Pre 84 %    FEFMax-Pred 9.95 L/sec    FEFMax-Pre 9.10 L/sec    FEFMax-%Pred-Pre 91 %    FEF2575-Pred 3.55 L/sec    FEF2575-Pre 3.93 L/sec    IYT2596-%Pred-Pre 110 %    ExpTime-Pre 7.14 sec    FIFMax-Pre 6.78 L/sec    VC-Pred 5.35 L    VC-Pre 4.17 L    VC-%Pred-Pre 77 %    IC-Pred 4.62 L    IC-Pre 3.44 L    IC-%Pred-Pre 74 %    ERV-Pred 0.73 L    ERV-Pre 0.73 L    ERV-%Pred-Pre 99 %    FEV1FEV6-Pred 80 %    FEV1FEV6-Pre 85 %    FRCPleth-Pred 3.60 L    FRCPleth-Pre 2.58 L    FRCPleth-%Pred-Pre 71 %    RVPleth-Pred 2.28 L    RVPleth-Pre 1.85 L    RVPleth-%Pred-Pre 81 %    TLCPleth-Pred 7.33 L    TLCPleth-Pre 6.02 L    TLCPleth-%Pred-Pre 82 %    DLCOunc-Pred 29.92 ml/min/mmHg    DLCOunc-Pre 33.29 ml/min/mmHg    DLCOunc-%Pred-Pre 111 %    VA-Pre 5.74 L    VA-%Pred-Pre 83 %    FEV1SVC-Pred 75 %     FEV1SVC-Pre 82 %

## 2019-07-25 NOTE — PATIENT INSTRUCTIONS
I recommend talking with your primary care physician regarding switching lisinopril to something else to see if it helps your cough.

## 2019-07-26 ENCOUNTER — ANCILLARY PROCEDURE (OUTPATIENT)
Dept: CT IMAGING | Facility: CLINIC | Age: 54
End: 2019-07-26
Payer: COMMERCIAL

## 2019-07-26 DIAGNOSIS — R06.02 SOB (SHORTNESS OF BREATH): ICD-10-CM

## 2019-07-26 DIAGNOSIS — J45.50 SEVERE PERSISTENT ASTHMA WITHOUT COMPLICATION (H): ICD-10-CM

## 2019-07-26 DIAGNOSIS — J32.4 CHRONIC PANSINUSITIS: ICD-10-CM

## 2019-07-26 LAB
BASOPHILS # BLD AUTO: 0 10E9/L (ref 0–0.2)
BASOPHILS NFR BLD AUTO: 0.5 %
DIFFERENTIAL METHOD BLD: ABNORMAL
EOSINOPHIL # BLD AUTO: 0.2 10E9/L (ref 0–0.7)
EOSINOPHIL NFR BLD AUTO: 3.2 %
ERYTHROCYTE [DISTWIDTH] IN BLOOD BY AUTOMATED COUNT: 14.2 % (ref 10–15)
HCT VFR BLD AUTO: 39.7 % (ref 40–53)
HGB BLD-MCNC: 12.7 G/DL (ref 13.3–17.7)
IGE SERPL-ACNC: 54 KIU/L (ref 0–114)
IGG SERPL-MCNC: 1020 MG/DL (ref 695–1620)
IMM GRANULOCYTES # BLD: 0 10E9/L (ref 0–0.4)
IMM GRANULOCYTES NFR BLD: 0.3 %
LYMPHOCYTES # BLD AUTO: 2.1 10E9/L (ref 0.8–5.3)
LYMPHOCYTES NFR BLD AUTO: 32.8 %
MCH RBC QN AUTO: 29.3 PG (ref 26.5–33)
MCHC RBC AUTO-ENTMCNC: 32 G/DL (ref 31.5–36.5)
MCV RBC AUTO: 92 FL (ref 78–100)
MONOCYTES # BLD AUTO: 0.6 10E9/L (ref 0–1.3)
MONOCYTES NFR BLD AUTO: 10.1 %
NEUTROPHILS # BLD AUTO: 3.4 10E9/L (ref 1.6–8.3)
NEUTROPHILS NFR BLD AUTO: 53.1 %
NRBC # BLD AUTO: 0 10*3/UL
NRBC BLD AUTO-RTO: 0 /100
PLATELET # BLD AUTO: 209 10E9/L (ref 150–450)
RBC # BLD AUTO: 4.33 10E12/L (ref 4.4–5.9)
WBC # BLD AUTO: 6.3 10E9/L (ref 4–11)

## 2019-07-27 LAB
RESULT: NORMAL
SEND OUTS MISC TEST CODE: NORMAL
SEND OUTS MISC TEST SPECIMEN: NORMAL
TEST NAME: NORMAL

## 2019-07-29 LAB
ANCA AB PATTERN SER IF-IMP: NORMAL
C-ANCA TITR SER IF: NORMAL {TITER}
DLCOUNC-%PRED-PRE: 111 %
DLCOUNC-PRE: 33.29 ML/MIN/MMHG
DLCOUNC-PRED: 29.92 ML/MIN/MMHG
ERV-%PRED-PRE: 99 %
ERV-PRE: 0.73 L
ERV-PRED: 0.73 L
EXPTIME-PRE: 7.14 SEC
FEF2575-%PRED-PRE: 110 %
FEF2575-PRE: 3.93 L/SEC
FEF2575-PRED: 3.55 L/SEC
FEFMAX-%PRED-PRE: 91 %
FEFMAX-PRE: 9.1 L/SEC
FEFMAX-PRED: 9.95 L/SEC
FEV1-%PRED-PRE: 86 %
FEV1-PRE: 3.44 L
FEV1FEV6-PRE: 85 %
FEV1FEV6-PRED: 80 %
FEV1FVC-PRE: 84 %
FEV1FVC-PRED: 79 %
FEV1SVC-PRE: 82 %
FEV1SVC-PRED: 75 %
FIFMAX-PRE: 6.78 L/SEC
FRCPLETH-%PRED-PRE: 71 %
FRCPLETH-PRE: 2.58 L
FRCPLETH-PRED: 3.6 L
FVC-%PRED-PRE: 80 %
FVC-PRE: 4.09 L
FVC-PRED: 5.1 L
IC-%PRED-PRE: 74 %
IC-PRE: 3.44 L
IC-PRED: 4.62 L
RVPLETH-%PRED-PRE: 81 %
RVPLETH-PRE: 1.85 L
RVPLETH-PRED: 2.28 L
TLCPLETH-%PRED-PRE: 82 %
TLCPLETH-PRE: 6.02 L
TLCPLETH-PRED: 7.33 L
VA-%PRED-PRE: 83 %
VA-PRE: 5.74 L
VC-%PRED-PRE: 77 %
VC-PRE: 4.17 L
VC-PRED: 5.35 L

## 2019-07-29 NOTE — TELEPHONE ENCOUNTER
[facility] Call Center    Phone Message    May a detailed message be left on voicemail: yes    Reason for Call: Other: Pt states he needs ppw signed by lisa BEDOLLA. Writer told pt he will not be at  until wednesday. He would like a call back to see who can sign the ppw and when Lisa will be back or where he is today/tomorrow     Action Taken: Message routed to:  Adult Clinics: Podiatry p [phone]

## 2019-07-29 NOTE — TELEPHONE ENCOUNTER
Informed patient that Dr. Pace is at the [facility] and gave him the fax number there. Patient will call if any other issues arise.    Priya DUMONT LPN

## 2019-07-30 ENCOUNTER — DOCUMENTATION ONLY (OUTPATIENT)
Dept: PULMONOLOGY | Facility: CLINIC | Age: 54
End: 2019-07-30

## 2019-07-30 LAB — ANA SER QL IF: NEGATIVE

## 2019-07-30 NOTE — PROGRESS NOTES
Testing reviewed.   Blood work normal.   Sinus CT normal  Chest CT with mild air trapping.    Will see him in 3 months to see how higher advair dose helped out.  Geoffrey Uriostegui MD

## 2019-07-31 ENCOUNTER — TELEPHONE (OUTPATIENT)
Dept: PODIATRY | Facility: CLINIC | Age: 54
End: 2019-07-31

## 2019-07-31 NOTE — TELEPHONE ENCOUNTER
Pt dropped off both FMLA and STD forms asking that they urgently be done today. Provider is in clinic and will review with him. Pt instructed that they may not be able to be completed today.     Of note, I had a discussion regarding forms for his work on 7/10/19 when he first got his left leg cast placed and he was advised to send them or drop them off for us to complete and that they may take a few days to complete. Pt was adamant at that time that he would have forms for us to complete the next day. Nothing was received.    Jeronimo Boyle RN

## 2019-07-31 NOTE — TELEPHONE ENCOUNTER
Pt came in and signed LIZY, forms given to him (placed at check in B) and forms faxed to his companies HR.    Jeronimo Boyle RN

## 2019-08-01 NOTE — PROGRESS NOTES
Left message with patient that all testing looks normal per Dr Uriostegui. He will see you back in 3 months.

## 2019-08-07 ENCOUNTER — OFFICE VISIT (OUTPATIENT)
Dept: PODIATRY | Facility: CLINIC | Age: 54
End: 2019-08-07
Payer: COMMERCIAL

## 2019-08-07 DIAGNOSIS — M72.2 PLANTAR FASCIITIS: ICD-10-CM

## 2019-08-07 DIAGNOSIS — M79.672 LEFT FOOT PAIN: Primary | ICD-10-CM

## 2019-08-07 DIAGNOSIS — E11.42 DIABETIC POLYNEUROPATHY ASSOCIATED WITH TYPE 2 DIABETES MELLITUS (H): Chronic | ICD-10-CM

## 2019-08-07 PROCEDURE — 99212 OFFICE O/P EST SF 10 MIN: CPT | Performed by: PODIATRIST

## 2019-08-07 NOTE — NURSING NOTE
Adarsh Salvador's chief complaint for this visit includes:  Chief Complaint   Patient presents with     Left Foot - Follow Up     PCP: Bertha Romero    Referring Provider:  Referred Self, MD  No address on file    There were no vitals taken for this visit.  Data Unavailable     Do you need any medication refills at today's visit? No    Priya Casas LPN

## 2019-08-07 NOTE — LETTER
8/7/2019         RE: Adarsh Salvador  [address] [address]        Dear Colleague,    Thank you for referring your patient, Adarsh Salvador, to the [facility]. Please see a copy of my visit note below.    Chief Complaint:   Chief Complaint   Patient presents with     Left Foot - Follow Up          Allergies   Allergen Reactions     Artificial Sweetner (Informational Only) [Artificial Sweetner (Informational Only) ] GI Disturbance     ALL artificial sweetners cause severe diarrhea & flu symptoms     Hydromorphone Anaphylaxis     Ibuprofen GI Disturbance     Morphine [Fumaric Acid] Anaphylaxis     Hydrocodone-Acetaminophen Itching     Tramadol Hives     Trazodone Hives     Gabapentin      GI upset per pt     Keflex [Cephalexin] Diarrhea     Upset stomach     Codeine Phosphate Itching     Ketorolac Tromethamine Rash         Subjective: Adarsh is a 54 year old male who presents to the clinic today for a follow up of left plantar foot pain and fascial tear. Relates that the pain has improved. He is using the CAM and the crutches. Relates that he would like to go back to work. Currently out until the 22nd. Still does get some pain.     Objective  Data Unavailable Data Unavailable Data Unavailable Data Unavailable Data Unavailable 0 lbs 0 oz  Pain noted today with palpation of the medial attachment of the plantar fascia on the calcaneus. This is decreased from the last visit. Some pain in the medial band of the PF at the distal aspect. No pain along the courses of the PT, peroneal, or Achilles tendons on the left. Pain noted with ankle ROM in plantar and dorsiflexion.    Assessment: Left plantar fascial tear. Pain improving.    Plan:   - Pt seen and evaluated  - Cont with CAM and crutches until the 22nd. Then can transition back into his regular shoes and workboot. Can start work on the 22nd.   - Pt to return to clinic in 2 months.      Again, thank you for allowing me to participate in the care of  your patient.        Sincerely,        Quan Pace DPM

## 2019-08-07 NOTE — PATIENT INSTRUCTIONS
Thanks for coming today.  Ortho/Sports Medicine Clinic  [address] [place] [address]    To schedule future appointments in Ortho Clinic, you may call [phone].    To schedule ordered imaging by your provider:   Call [facility] [phone]    To schedule an injection ordered by your provider:  Call [facility] Injection scheduling line: [phone]  [facility] available online at:  [email or web address]    Please call if any further questions or concerns ([phone]).  Clinic hours 8 am to 5 pm.    Return to clinic (call) if symptoms worsen or fail to improve.

## 2019-08-07 NOTE — PROGRESS NOTES
Chief Complaint:   Chief Complaint   Patient presents with     Left Foot - Follow Up          Allergies   Allergen Reactions     Artificial Sweetner (Informational Only) [Artificial Sweetner (Informational Only) ] GI Disturbance     ALL artificial sweetners cause severe diarrhea & flu symptoms     Hydromorphone Anaphylaxis     Ibuprofen GI Disturbance     Morphine [Fumaric Acid] Anaphylaxis     Hydrocodone-Acetaminophen Itching     Tramadol Hives     Trazodone Hives     Gabapentin      GI upset per pt     Keflex [Cephalexin] Diarrhea     Upset stomach     Codeine Phosphate Itching     Ketorolac Tromethamine Rash         Subjective: Adarsh is a 54 year old male who presents to the clinic today for a follow up of left plantar foot pain and fascial tear. Relates that the pain has improved. He is using the CAM and the crutches. Relates that he would like to go back to work. Currently out until the 22nd. Still does get some pain.     Objective  Data Unavailable Data Unavailable Data Unavailable Data Unavailable Data Unavailable 0 lbs 0 oz  Pain noted today with palpation of the medial attachment of the plantar fascia on the calcaneus. This is decreased from the last visit. Some pain in the medial band of the PF at the distal aspect. No pain along the courses of the PT, peroneal, or Achilles tendons on the left. Pain noted with ankle ROM in plantar and dorsiflexion.    Assessment: Left plantar fascial tear. Pain improving.    Plan:   - Pt seen and evaluated  - Cont with CAM and crutches until the 22nd. Then can transition back into his regular shoes and workboot. Can start work on the 22nd.   - Pt to return to clinic in 2 months.

## 2019-08-13 ENCOUNTER — OFFICE VISIT (OUTPATIENT)
Dept: NEUROLOGY | Facility: CLINIC | Age: 54
End: 2019-08-13
Payer: COMMERCIAL

## 2019-08-13 VITALS
DIASTOLIC BLOOD PRESSURE: 87 MMHG | BODY MASS INDEX: 41.71 KG/M2 | OXYGEN SATURATION: 97 % | SYSTOLIC BLOOD PRESSURE: 151 MMHG | HEART RATE: 79 BPM | WEIGHT: 298.9 LBS

## 2019-08-13 DIAGNOSIS — E11.42 DIABETIC POLYNEUROPATHY ASSOCIATED WITH TYPE 2 DIABETES MELLITUS (H): Primary | Chronic | ICD-10-CM

## 2019-08-13 PROCEDURE — 99213 OFFICE O/P EST LOW 20 MIN: CPT | Performed by: PSYCHIATRY & NEUROLOGY

## 2019-08-13 ASSESSMENT — PAIN SCALES - GENERAL: PAINLEVEL: SEVERE PAIN (6)

## 2019-08-13 ASSESSMENT — PATIENT HEALTH QUESTIONNAIRE - PHQ9: SUM OF ALL RESPONSES TO PHQ QUESTIONS 1-9: 12

## 2019-08-13 NOTE — PROGRESS NOTES
[facility]:  PHQ-9 Screening Note  SITUATION/BACKGROUND                                                    Adarsh Salvador is a 54 year old male who completed the PHQ-9 assessment for depression and Score is >9.    Onset of symptoms: waxing and waning  Trigger: chronic pain  Recent related events: chronic pain  Prior history of suicide attempt or self harm: No   Risk Factors: comorbid medical condition of chronic pain rt diabetic neuropathy  History of depression or mental illness: Yes  Medications reviewed: Yes     ASSESSMENT      A. Are any of the following present?      Suicidal thoughts with a plan and means to carry out the plan?    Intent to harm others    Altered mental status: confusion, delusional, psychotic NO - got to B   B. Are any of the following present?      Suicidal thoughts without a plan or means to carry out the plan    New onset of delusional ideas    Past inpatient admission for depression    New onset and recent change or addition of new medication NO - go to C   C. Are any of the following present?      Previous suicide attempts    Depression interfering with ability to work or function    Loss of appetite and eating poorly    Abrupt cessation of drugs (OTC or RX), alcohol or caffeine    Drug or alcohol abuse NO - go to D   D. Are several of the following present?      Difficulty concentrating    Difficulty sleeping    Reduced interest in sexual activity or impotency    Irregular or absent menstruation    No interest in activity    Change in interpersonal relationships    Increased use/abuse of alcohol or drugs    Pregnant or recent child birth    Recent major life change    History of depression NO - provide home care instructions         PLAN      Home Care Instructions:   If currently in counseling, call counselor for appointment  Contact friends or family for support  Increase exercise and enjoyable activities  East a well-balanced diet, drink plenty of fluids and rest as  needed  Alcohol and other recreational drugs can worsen depression.  If heavy use of drugs or alcohol, contact counselor or PCP to help reduce consumption.    Report the following to your PCP:   Suicidal thoughts without a plan or means to carry out the plan  Depression interferes with daily activities  Persistent inability to sleep    Seek emergency care immediately if any of the following occur:   Suicidal thoughts and plan and means to carry out the plan  Injury to self or others    BEHAVIORAL HEALTH TEAMS      [facility] - [place] Team    [facility] Pager: [phone]    [place]  - Behavioral Health Team    Pager number: [phone]    Referral to Behavioral Health   [facility] [place] [place] [[id number]}    RESOURCES      - 24/7 Crisis Hotlines: National Suicide Prevention Hotline  865-478-WCFR (0998)  - Non Emergent Transportation:  [place] ([place] location): 613.597.7179  - [facility] Pagers:  [place]: pager #: [phone]  - Crisis Hotlines by County:  [facility] Crisis Line: 135.259.9457, [place] Crisis Line: 168.913.9517, [place] Crisis Line: 666.284.9083, [place] COPE for Adults: 598.209.4661, [place] for Children: 728.739.2825, [place] for Adults: 662.980.7697, [place] for Children: 664.894.2215, [place] Crisis Line : 598.781.9558  - [facility] Crisis Response Team:  320-253-3555 (1-790.928.2326)  [facility] Crisis is available 24 hours a day. The team provides callers with a needs assessment and referrals to appropriate resources. If medically necessary, the Crisis Response Team assists individuals by traveling to their location to provide face-to-face interventions and assessments. Crisis services are available for adults and children in [facility], [place] and [facility]. Adult Crisis beds are also available if appropriate.  - Urgent Care for Adult Mental Health:  [phone]  (24 hours a day)  [address], [place] [address]  DROP  IN:  Monday - Friday: 8:00 am - 7:00 pm  Saturday: 11:00 am - 3:00 pm   Sunday and Holidays: Closed  - Walk-In Centers and Mobile Teams:  [facility] Walk-In Crisis Intervention: [phone]  [facility] (18+) Crisis Mobile Team: [phone]  [facility] Child (under 17) Crisis Mobile Team: [phone]  [facility] Walk-In & Mobile Teams: [phone]  - Walk-In Counseling Center:  [phone]  [address] [place] [address]  Hours:  M, W, F:  1:00-3:00PM      M-Th:  6:30-8:30PM  - [facility]:  [phone]  [address] [place] [address]  Hours:  M, W:      5:00-7:00PM   - Neighborhood House:  [phone]  [address] [place], [place]  Hours:  T, Th:      6:00-8:00PM  - Crisis Housing (operated by [employer]):  Rafaela Chester Residence: [address] [facility]  PH: [phone], Kamla Fletcher Residence: [address] [facility]  PH: [phone]  - Additional Crisis Hotlines:  [facility]: 773.766.3349, Crisis Connection: 502.448.6993, [facility] ([facility] Crisis Services): 227.921.8205, [facility] Social Service (S): [phone], Men's Crisis Line: 612-379-MENS (737-591-1838), National Suicide Prevention Hotline: 137-975-WZAQ (8061), [facility] Crisis Line: 843.391.3570, Suicide Hotline: 844.753.1641, [facility] (formerly First Call for Help): Dial 2-1-15 (639-691-1237)  - Domestic Violence, Rape and Sexual Abuse Hotlines:  [place] Crisis Line: 667.816.9657, [facility]: [facility] Helpline: 699.787.3444, Domestic Abuse Project (DAP): 1-803.310.3616*, [facility] Crisis Line: 623.558.9410, [place] [facility]attered [facility]: 1-680.942.5498*, [place] [facility]: 459.622.8952 (1-524.352.9366*), National Domestic Violence Hotline: 9-289-150-FARD, Rape/Sexual Abuse Center Crisis Line: 937.561.6259, Sexual Violence Center (SVC): 780.603.2695, [employer] 804.841.8672 (1-702.202.6057*)      PHQ-9 discussed  with patient by provider, no further intervention required.   Aleisha Patel RN        Copyright 2016 [facility]

## 2019-08-13 NOTE — NURSING NOTE
Adarsh Salvador's goals for this visit include:   Chief Complaint   Patient presents with     RECHECK     2 month f/u       He requests these members of his care team be copied on today's visit information: Yes    PCP: Bertha Romero    Referring Provider:  No referring provider defined for this encounter.    BP (!) 151/87 (BP Location: Left arm, Patient Position: Sitting, Cuff Size: Adult Large)   Pulse 79   Wt 135.6 kg (298 lb 14.4 oz)   SpO2 97%   BMI 41.71 kg/m      Do you need any medication refills at today's visit? No

## 2019-08-13 NOTE — PROGRESS NOTES
Visit Date:   08/13/2019      NEUROLOGY CLINIC FOLLOWUP      The patient is seen in followup with painful diabetic neuropathy.  I last saw the patient 2 months ago.  He has plantar fasciitis on the left foot and now has a boot on the left foot.  This has been for the last month or so.  He has been off work during this time.  With regard to the neuropathy pain, he is having more symptoms in the right foot and also he has meralgia paresthetica and has more symptoms in the right thigh.  His diabetes has been under mixed control, some days better than others.  He is well aware that he needs to be more active but with his left foot in a boot, it is quite difficult for him to get exercise.  He also has been struggling with depression.  The duloxetine has been helpful in this regard.  He is not suicidal.  The neuropathy pain he says is under about the best control that he can manage.        He is taking Duloxetine 60 mg in the morning and 30 mg in the afternoon, Baclofen 20 mg at night, and more recently, I have put him on alpha lipoic acid 200 mg 3 times a day.  He finds this regimen to be helpful.      PHYSICAL EXAMINATION:   GENERAL:  The patient is cooperative and in no distress.   VITAL SIGNS:  His blood pressure is 151/87.   NEUROLOGIC:  There is nothing to add on neurologic exam.      ASSESSMENT:   1.  Painful diabetic neuropathy.   2.  Depression.   3.  Bilateral meralgia paraesthetica.      DISCUSSION:  The patient is seen with the above problem list.  At this point, I am not going to make any changes in his medication regimen.  He will continue on duloxetine, baclofen and alpha lipoic acid.  He knows he needs to be active and get into a program of weight loss.  That also will be helpful for the symptoms of meralgia paresthetica.  He will be taking off the boot in the next week or so and hopefully the plantar fasciitis will be resolved.  I will see him in followup in 3 months or sooner if there are problems.          CHELSEA POZO MD             D: 2019   T: 2019   MT: BILL      Name:     LUTHER BLACKMAN   MRN:      [id number]        Account:      [id number]   :      1965           Visit Date:   2019      Document: [id number]

## 2019-08-13 NOTE — LETTER
8/13/2019         RE: Adarsh Salvador  [address] [address]        Dear Colleague,    Thank you for referring your patient, Adarsh Salvador, to the [facility]. Please see a copy of my visit note below.    [facility]:  PHQ-9 Screening Note  SITUATION/BACKGROUND                                                    Adarsh Salvador is a 54 year old male who completed the PHQ-9 assessment for depression and Score is >9.    Onset of symptoms: waxing and waning  Trigger: chronic pain  Recent related events: chronic pain  Prior history of suicide attempt or self harm: No   Risk Factors: comorbid medical condition of chronic pain rt diabetic neuropathy  History of depression or mental illness: Yes  Medications reviewed: Yes     ASSESSMENT      A. Are any of the following present?      Suicidal thoughts with a plan and means to carry out the plan?    Intent to harm others    Altered mental status: confusion, delusional, psychotic NO - got to B   B. Are any of the following present?      Suicidal thoughts without a plan or means to carry out the plan    New onset of delusional ideas    Past inpatient admission for depression    New onset and recent change or addition of new medication NO - go to C   C. Are any of the following present?      Previous suicide attempts    Depression interfering with ability to work or function    Loss of appetite and eating poorly    Abrupt cessation of drugs (OTC or RX), alcohol or caffeine    Drug or alcohol abuse NO - go to D   D. Are several of the following present?      Difficulty concentrating    Difficulty sleeping    Reduced interest in sexual activity or impotency    Irregular or absent menstruation    No interest in activity    Change in interpersonal relationships    Increased use/abuse of alcohol or drugs    Pregnant or recent child birth    Recent major life change    History of depression NO - provide home care instructions         PLAN       Home Care Instructions:   If currently in counseling, call counselor for appointment  Contact friends or family for support  Increase exercise and enjoyable activities  [place] a well-balanced diet, drink plenty of fluids and rest as needed  Alcohol and other recreational drugs can worsen depression.  If heavy use of drugs or alcohol, contact counselor or PCP to help reduce consumption.    Report the following to your PCP:   Suicidal thoughts without a plan or means to carry out the plan  Depression interferes with daily activities  Persistent inability to sleep    Seek emergency care immediately if any of the following occur:   Suicidal thoughts and plan and means to carry out the plan  Injury to self or others    BEHAVIORAL HEALTH TEAMS      [facility] [place] - Behavioral Health Team    [facility] Pager: [phone]    [place]  - Behavioral Health Team    Pager number: [phone]    Referral to Behavioral Health    [facility]AV[facility] / SP[facility]ITUAL [facility] [facility] [place]}    RESOURCES      - 24/7 Crisis Hotlines: National Suicide Prevention Hotline  017-132-JRVT (8953)  - Non Emergent Transportation:  [place] ([place] location): 507.528.3494  - [facility] Pagers:  [place]: pager #: [phone]  - Crisis Hotlines by County:  [place] Crisis Line: 954.136.1035, [place] Crisis Line: 631.517.4667, [place] Crisis Line: 860.415.3163, [place] COPE for Adults: 412.151.4480, [place] for Children: 500.999.9775, [place] for Adults: 705.821.2037, [place] for Children: 617.572.3690, [place] Crisis Line : 721.306.6576  - [facility] Crisis Response Team:  320-253-3555 (1-994.260.6519)  [facility] Crisis is available 24 hours a day. The team provides callers with a needs assessment and referrals to appropriate resources. If medically necessary, the Crisis Response Team assists individuals by traveling to their location to provide face-to-face interventions and assessments. Crisis services are available for adults and children  in [place] and [facility]. Adult Crisis beds are also available if appropriate.  - Urgent Care for Adult Mental Health:  [phone]  (24 hours a day)  [address], [place] [address]  DROP IN:  Monday - Friday: 8:00 am - 7:00 pm  Saturday: 11:00 am - 3:00 pm   Sunday and Holidays: Closed  - Walk-In Centers and Mobile Teams:  [facility] Walk-In Crisis Intervention: [phone]  [facility] COPE (18+) Crisis Mobile Team: [phone]  [facility] [place] Child (under 17) Crisis Mobile Team: [phone]  [facility] Walk-In & Mobile Teams: [phone]  - Walk-In Counseling Center:  [phone]  [address] [place] [address]  Hours:  M, W, F:  1:00-3:00PM      M-Th:  6:30-8:30PM  - [facility]:  [phone]  [address] [place] [address]  Hours:  M, W:      5:00-7:00PM   - Neighborhood House:  [phone]  [address] [place], [place]  Hours:  T, Th:      6:00-8:00PM  - Crisis Housing (operated by [employer]):  Rafaela Chester Residence: [address] [place] [address]  PH: [phone], Kamla Fletcher Residence: [address] [place] [address]  PH: [phone]  - Additional Crisis Hotlines:  [facility]: 388.655.4745, Crisis Connection: 504.964.1061, [facility] ([facility]): 845.710.9291, [facility] Social Service (S): 209.470.2598, Men's Crisis Line: 612-379-MENS (939-364-9981), National Suicide Prevention Hotline: 227-753-VMOO (6383), [facility] Crisis Line: 202.391.2884, Suicide Hotline: 554.783.5838, [facility] (formerly First Call for Help): Dial 2-1-2 (560-584-7184)  - Domestic Violence, Rape and Sexual Abuse Hotlines:  [place] Crisis Line: 595.652.7863, [facility]: [facility] Helpline: 270.472.6685, Domestic Abuse Project (DAP): 3-326-815-0609*, [facility] Crisis Line: 745.402.5151, [place] [facility]attered Women: 5-875-270-7513*, [facility] One: 912.657.9596  (1-925.412.8487*), National Domestic Violence Hotline: 6-231-045-FTZZ, Rape/Sexual Abuse Center Crisis Line: 295.163.8128, Sexual Violence Center (SV): 778.855.7277, [employer] [employer] 490.606.3760 (1-555.370.6197*)      PHQ-9 discussed with patient by provider, no further intervention required.   Aleisha Patel RN        Copyright 2016 [facility]    Visit Date:   08/13/2019      NEUROLOGY CLINIC FOLLOWUP      The patient is seen in followup with painful diabetic neuropathy.  I last saw the patient 2 months ago.  He has plantar fasciitis on the left foot and now has a boot on the left foot.  This has been for the last month or so.  He has been off work during this time.  With regard to the neuropathy pain, he is having more symptoms in the right foot and also he has meralgia paresthetica and has more symptoms in the right thigh.  His diabetes has been under mixed control, some days better than others.  He is well aware that he needs to be more active but with his left foot in a boot, it is quite difficult for him to get exercise.  He also has been struggling with depression.  The duloxetine has been helpful in this regard.  He is not suicidal.  The neuropathy pain he says is under about the best control that he can manage.        He is taking Duloxetine 60 mg in the morning and 30 mg in the afternoon, Baclofen 20 mg at night, and more recently, I have put him on alpha lipoic acid 200 mg 3 times a day.  He finds this regimen to be helpful.      PHYSICAL EXAMINATION:   GENERAL:  The patient is cooperative and in no distress.   VITAL SIGNS:  His blood pressure is 151/87.   NEUROLOGIC:  There is nothing to add on neurologic exam.      ASSESSMENT:   1.  Painful diabetic neuropathy.   2.  Depression.   3.  Bilateral meralgia paraesthetica.      DISCUSSION:  The patient is seen with the above problem list.  At this point, I am not going to make any changes in his medication regimen.  He will continue on  duloxetine, baclofen and alpha lipoic acid.  He knows he needs to be active and get into a program of weight loss.  That also will be helpful for the symptoms of meralgia paresthetica.  He will be taking off the boot in the next week or so and hopefully the plantar fasciitis will be resolved.  I will see him in followup in 3 months or sooner if there are problems.         CHELSEA MONTALVO MD             D: 2019   T: 2019   MT: BILL      Name:     LUTHER BLACKMAN   MRN:      [id number]        Account:      [id number]   :      1965           Visit Date:   2019      Document: [id number]        Again, thank you for allowing me to participate in the care of your patient.        Sincerely,        Chelsea Montalvo MD

## 2019-10-01 PROBLEM — M54.50 CHRONIC LEFT-SIDED LOW BACK PAIN: Status: RESOLVED | Noted: 2017-05-03 | Resolved: 2017-08-28

## 2019-10-03 ENCOUNTER — OFFICE VISIT (OUTPATIENT)
Dept: FAMILY MEDICINE | Facility: CLINIC | Age: 54
End: 2019-10-03
Payer: COMMERCIAL

## 2019-10-03 VITALS
WEIGHT: 295 LBS | OXYGEN SATURATION: 98 % | SYSTOLIC BLOOD PRESSURE: 139 MMHG | HEART RATE: 67 BPM | TEMPERATURE: 98.1 F | DIASTOLIC BLOOD PRESSURE: 84 MMHG | RESPIRATION RATE: 18 BRPM | BODY MASS INDEX: 41.16 KG/M2

## 2019-10-03 DIAGNOSIS — Z23 NEED FOR PROPHYLACTIC VACCINATION AND INOCULATION AGAINST INFLUENZA: ICD-10-CM

## 2019-10-03 DIAGNOSIS — J01.90 ACUTE SINUSITIS WITH SYMPTOMS > 10 DAYS: ICD-10-CM

## 2019-10-03 DIAGNOSIS — Z12.11 SPECIAL SCREENING FOR MALIGNANT NEOPLASMS, COLON: ICD-10-CM

## 2019-10-03 DIAGNOSIS — E78.5 HYPERLIPIDEMIA LDL GOAL <100: Chronic | ICD-10-CM

## 2019-10-03 DIAGNOSIS — J45.30 MILD PERSISTENT ASTHMA WITHOUT COMPLICATION: Chronic | ICD-10-CM

## 2019-10-03 DIAGNOSIS — I10 HYPERTENSION GOAL BP (BLOOD PRESSURE) < 140/90: Primary | ICD-10-CM

## 2019-10-03 DIAGNOSIS — E11.42 DIABETIC POLYNEUROPATHY ASSOCIATED WITH TYPE 2 DIABETES MELLITUS (H): Chronic | ICD-10-CM

## 2019-10-03 DIAGNOSIS — J45.901 MODERATE ASTHMA WITH EXACERBATION, UNSPECIFIED WHETHER PERSISTENT: ICD-10-CM

## 2019-10-03 DIAGNOSIS — J20.9 ACUTE BRONCHITIS WITH SYMPTOMS > 10 DAYS: ICD-10-CM

## 2019-10-03 DIAGNOSIS — J34.89 SINUS PRESSURE: ICD-10-CM

## 2019-10-03 DIAGNOSIS — E11.9 TYPE 2 DIABETES MELLITUS WITHOUT COMPLICATION, WITHOUT LONG-TERM CURRENT USE OF INSULIN (H): Chronic | ICD-10-CM

## 2019-10-03 LAB
ALBUMIN SERPL-MCNC: 4 G/DL (ref 3.4–5)
ALP SERPL-CCNC: 106 U/L (ref 40–150)
ALT SERPL W P-5'-P-CCNC: 26 U/L (ref 0–70)
ANION GAP SERPL CALCULATED.3IONS-SCNC: 3 MMOL/L (ref 3–14)
AST SERPL W P-5'-P-CCNC: 12 U/L (ref 0–45)
BILIRUB SERPL-MCNC: 0.4 MG/DL (ref 0.2–1.3)
BUN SERPL-MCNC: 14 MG/DL (ref 7–30)
CALCIUM SERPL-MCNC: 9 MG/DL (ref 8.5–10.1)
CHLORIDE SERPL-SCNC: 109 MMOL/L (ref 94–109)
CHOLEST SERPL-MCNC: 268 MG/DL
CO2 SERPL-SCNC: 28 MMOL/L (ref 20–32)
CREAT SERPL-MCNC: 0.92 MG/DL (ref 0.66–1.25)
CREAT UR-MCNC: 106 MG/DL
GFR SERPL CREATININE-BSD FRML MDRD: >90 ML/MIN/{1.73_M2}
GLUCOSE SERPL-MCNC: 144 MG/DL (ref 70–99)
HBA1C MFR BLD: 6.6 % (ref 0–5.6)
HDLC SERPL-MCNC: 37 MG/DL
LDLC SERPL CALC-MCNC: 167 MG/DL
MICROALBUMIN UR-MCNC: 18 MG/L
MICROALBUMIN/CREAT UR: 17.45 MG/G CR (ref 0–17)
NONHDLC SERPL-MCNC: 231 MG/DL
POTASSIUM SERPL-SCNC: 4.2 MMOL/L (ref 3.4–5.3)
PROT SERPL-MCNC: 7.5 G/DL (ref 6.8–8.8)
SODIUM SERPL-SCNC: 140 MMOL/L (ref 133–144)
TRIGL SERPL-MCNC: 321 MG/DL

## 2019-10-03 PROCEDURE — 36415 COLL VENOUS BLD VENIPUNCTURE: CPT | Performed by: PHYSICIAN ASSISTANT

## 2019-10-03 PROCEDURE — 80061 LIPID PANEL: CPT | Performed by: PHYSICIAN ASSISTANT

## 2019-10-03 PROCEDURE — 82043 UR ALBUMIN QUANTITATIVE: CPT | Performed by: PHYSICIAN ASSISTANT

## 2019-10-03 PROCEDURE — 83036 HEMOGLOBIN GLYCOSYLATED A1C: CPT | Performed by: PHYSICIAN ASSISTANT

## 2019-10-03 PROCEDURE — 80053 COMPREHEN METABOLIC PANEL: CPT | Performed by: PHYSICIAN ASSISTANT

## 2019-10-03 PROCEDURE — 99215 OFFICE O/P EST HI 40 MIN: CPT | Performed by: PHYSICIAN ASSISTANT

## 2019-10-03 RX ORDER — LISINOPRIL 20 MG/1
20 TABLET ORAL DAILY
Qty: 30 TABLET | Refills: 0 | Status: CANCELLED | OUTPATIENT
Start: 2019-10-03

## 2019-10-03 RX ORDER — MONTELUKAST SODIUM 10 MG/1
10 TABLET ORAL AT BEDTIME
Qty: 30 TABLET | Refills: 2 | Status: SHIPPED | OUTPATIENT
Start: 2019-10-03 | End: 2020-12-03

## 2019-10-03 RX ORDER — LOSARTAN POTASSIUM 50 MG/1
50 TABLET ORAL DAILY
Qty: 30 TABLET | Refills: 1 | Status: SHIPPED | OUTPATIENT
Start: 2019-10-03 | End: 2020-03-26

## 2019-10-03 RX ORDER — METHYLPREDNISOLONE 4 MG
TABLET, DOSE PACK ORAL
Qty: 21 TABLET | Refills: 0 | Status: SHIPPED | OUTPATIENT
Start: 2019-10-03 | End: 2019-11-12

## 2019-10-03 ASSESSMENT — ANXIETY QUESTIONNAIRES
GAD7 TOTAL SCORE: 0
1. FEELING NERVOUS, ANXIOUS, OR ON EDGE: NOT AT ALL
5. BEING SO RESTLESS THAT IT IS HARD TO SIT STILL: NOT AT ALL
IF YOU CHECKED OFF ANY PROBLEMS ON THIS QUESTIONNAIRE, HOW DIFFICULT HAVE THESE PROBLEMS MADE IT FOR YOU TO DO YOUR WORK, TAKE CARE OF THINGS AT HOME, OR GET ALONG WITH OTHER PEOPLE: NOT DIFFICULT AT ALL
3. WORRYING TOO MUCH ABOUT DIFFERENT THINGS: NOT AT ALL
2. NOT BEING ABLE TO STOP OR CONTROL WORRYING: NOT AT ALL
6. BECOMING EASILY ANNOYED OR IRRITABLE: NOT AT ALL
7. FEELING AFRAID AS IF SOMETHING AWFUL MIGHT HAPPEN: NOT AT ALL

## 2019-10-03 ASSESSMENT — PATIENT HEALTH QUESTIONNAIRE - PHQ9: 5. POOR APPETITE OR OVEREATING: NOT AT ALL

## 2019-10-03 NOTE — PROGRESS NOTES
Subjective     Adarsh Salvador is a 54 year old male who presents to clinic today for the following health issues:    HPI     Asthma Follow-Up    Was ACT completed today?    Yes    ACT Total Scores 10/3/2019   ACT TOTAL SCORE (Goal Greater than or Equal to 20) 10   In the past 12 months, how many times did you visit the emergency room for your asthma without being admitted to the hospital? 0   In the past 12 months, how many times were you hospitalized overnight because of your asthma? 0       How many days per week do you miss taking your asthma controller medication?  1    Please describe any recent triggers for your asthma: upper respiratory infections and cold air    Have you had any Emergency Room Visits, Urgent Care Visits, or Hospital Admissions since your last office visit?  No          How many servings of fruits and vegetables do you eat daily?  0-1    On average, how many sweetened beverages do you drink each day (soda, juice, sweet tea, etc)?   2  How many days per week do you miss taking your medication? 1    What makes it hard for you to take your medications?  remembering to take    ENT Symptoms             Symptoms: cc Present Absent Comment   Fever/Chills   x    Fatigue  x     Muscle Aches  x     Eye Irritation  x     Sneezing  x     Nasal Lobo/Drg  x     Sinus Pressure/Pain  x     Loss of smell   x    Dental pain  x     Sore Throat   x    Swollen Glands   x    Ear Pain/Fullness  x  Ear fullness feeling   Cough  x     Wheeze  x     Chest Pain   x    Shortness of breath  x     Rash   x    Other   x      Symptom duration:  x 1 week   Symptom severity:  moderate   Treatments tried:  OTC    Contacts:  none     Oxygen is 98 percent.   Patient is here for asthma exacerbation. He is overdue for diabetes recheck and wants to get that done also.   Patient has pulmonologist last seen in July. Has f/u scheduled the 24th of this month but this was cancelled and has to reschedule.  Has had had ongoing shortness of  breath and asthma symptoms.  Had normal PFTs. Morbid Obesity is also likely contributing to his shortness of breath.   They added advair at that time and mentioned also possibly adding Singulair in the future. He was told to ask about changing lisinopril to an arb but did not f/u with me for that. He is interested in changing this. He is also interested in trying Singulair.   CT done in July showed mild air trapping suggestive of asthma. No fibrosis seen. CT of sinuses showed no evidence of sinusitis or polyps.     advair has helped since starting on it.   Has been using advair as directed. No fevers or malaise since Tuesday but he felt sick Monday and Tuesday of this week. His shortness of breath/cough/sinuses however have not been improving.    Also using albuterol.     Prednisone has helped his flares in the past significantly.   Sinuses are congested still today per patient mostly maxillary/ethmoid region he points to.       2) diabetes-historically well controlled on oral medications. Is overdue for labs unfortunately. Will get today. He is fasting. Also h/o high cholesterol and htn. No chest pain, shortness of breath, edema, PND, or orthopnea. No dizziness or vision changes. No side effects from medications. Blood pressure has been stable on medication.  No chest pain. Does have neuropathy in feet.     Lab Results   Component Value Date    A1C 6.8 01/11/2019    A1C 6.5 08/17/2018    A1C 6.5 01/12/2018    A1C 6.2 10/04/2017    A1C 5.6 07/26/2016           Eye exams-due for one.           Patient Active Problem List   Diagnosis     GERD (gastroesophageal reflux disease)     Chronic RLQ pain     Constipation     Chronic maxillary sinusitis     Chronic rhinitis     Hypertriglyceridemia     Benign essential hypertension     Anemia in other chronic diseases classified elsewhere     Fatty liver     Irritable bowel syndrome with diarrhea     Right inguinal hernia     BMI 40.0-44.9, adult (H)     Mild persistent asthma  without complication     Type 2 diabetes mellitus without complication, without long-term current use of insulin (H)     Hyperlipidemia LDL goal <100     Diabetic polyneuropathy associated with type 2 diabetes mellitus (H)     CONNOR (generalized anxiety disorder)     Insomnia, unspecified type     Left foot pain     Past Surgical History:   Procedure Laterality Date     COLONOSCOPY  5/1/2012    Procedure:COLONOSCOPY; screening colonoscopy; Surgeon:KINGSLEY DOS SANTOS; Location:[facility]     COLONOSCOPY  4/21/2014    Procedure: COMBINED COLONOSCOPY, SINGLE BIOPSY/POLYPECTOMY BY BIOPSY;  Surgeon: Duane, William Charles, MD;  Location: [facility] OR     CYSTOSCOPY  05/01/2008    CYSTOSCOPY W/ URETERAL STENT PLACEMENT 05/01/2008      CYSTOSCOPY  09/26/2010    CYSTOSCOPY W/ URETERAL STENT PLACEMENT 09/26/2010 Left      CYSTOSCOPY  05/18/2012    CYSTOSCOPY, LEFT URETEROSCOPY AND STENT PLACEMENT left retrograde 05/18/2012      CYSTOSCOPY,+URETEROSCOPY  06/10/2008    URETEROSCOPY 06/10/2008 Right      HC BREATH HYDROGEN TEST  3/7/2014    Procedure: HYDROGEN BREATH TEST;  Surgeon: Darion Swift MD;  Location: [facility]     LITHOTRIPSY  09/30/2010    LITHOTRIPSY 09/30/2010 LEFT EXTRACORPOREAL SHOCK WAVE LITHOTRIPSY, FLEXIBLE CYSTOSCOPY, LEFT STENT REMOVAL       ORTHOPEDIC SURGERY  10/03/2007    KNEE ARTHROSCOPY 10/03/2007 RightX2       RELEASE CARPAL TUNNEL Right 1/26/2018    Procedure: RELEASE CARPAL TUNNEL;  Right carpal tunnel release;  Surgeon: Wiliam Saenz MD;  Location: [facility]     VASCULAR SURGERY  11/24/2008    Radiofrequency ablation left saphenous vein 11/24/2008 Done by Dr. Lozoya         Social History     Tobacco Use     Smoking status: Never Smoker     Smokeless tobacco: Current User     Types: Chew     Tobacco comment: Chew   Substance Use Topics     Alcohol use: Yes     Alcohol/week: 0.0 standard drinks     Comment: occasional     Family History   Problem Relation Age of Onset     Cancer Mother 52        lung,  smoked     Cancer - colorectal Father 53        unsure type, pt attributes to radiation exposure     Myocardial Infarction Father 45     Coronary Artery Disease Father      Myocardial Infarction Paternal Grandfather 35     Diabetes Other         nephew- type 1     Diabetes Maternal Aunt         1     Diabetes Maternal Aunt         2     Diabetes Paternal Uncle         1     Diabetes Paternal Uncle         2     Diabetes Paternal Uncle         3     Diabetes Paternal Uncle         4     Colon Cancer No family hx of          Current Outpatient Medications   Medication Sig Dispense Refill     ACCU-CHEK GUIDE test strip USE TO TEST BLOOD SUGAR 1 TIMES DAILY OR AS DIRECTED. 100 strip 3     albuterol (PROAIR HFA/PROVENTIL HFA/VENTOLIN HFA) 108 (90 Base) MCG/ACT inhaler Inhale 2 puffs into the lungs every 6 hours as needed for shortness of breath / dyspnea or wheezing 1 Inhaler 3     Alpha Lipoic Acid 200 MG CAPS Take 200 mg by mouth 3 times daily 90 capsule 3     amLODIPine (NORVASC) 10 MG tablet TAKE 1 TABLET (10 MG) BY MOUTH DAILY 90 tablet 2     amoxicillin-clavulanate (AUGMENTIN) 875-125 MG tablet Take 1 tablet by mouth 2 times daily 20 tablet 0     ASPIRIN PO Take 325 mg by mouth daily       aspirin-acetaminophen-caffeine (EXCEDRIN MIGRAINE) 250-250-65 MG tablet Take 1 tablet by mouth       atorvastatin (LIPITOR) 40 MG tablet Take 1 tablet (40 mg) by mouth daily 90 tablet 3     baclofen (LIORESAL) 10 MG tablet Take 1 tablet (10 mg) by mouth See Admin Instructions 1-2 po q hs. 60 tablet 1     blood glucose monitoring (ACCU-CHEK FASTCLIX) lancets Use to test blood sugar 1 times daily or as directed.  Ok to substitute alternative if insurance prefers. 102 each 11     calcium carbonate (TUMS) 500 MG chewable tablet Take 1 chew tab by mouth daily       cyclobenzaprine (FLEXERIL) 10 MG tablet Take 0.5-1 tablets (5-10 mg) by mouth 3 times daily as needed for muscle spasms 90 tablet 1     dicyclomine (BENTYL) 20 MG tablet  Take 20 mg by mouth every 6 hours       DULoxetine (CYMBALTA) 30 MG capsule Take 1 capsule (30 mg) by mouth daily Take with 60 mg capsule, total daily dose 90 mg. 90 capsule 3     DULoxetine (CYMBALTA) 60 MG EC capsule Take 1 capsule (60 mg) by mouth daily 90 capsule 3     EPINEPHrine (EPIPEN) 0.3 MG/0.3ML injection Inject 0.3 mLs (0.3 mg) into the muscle once as needed for anaphylaxis 2 each 2     EPINEPHrine (EPIPEN/ADRENACLICK/OR ANY BX GENERIC EQUIV) 0.3 MG/0.3ML injection 2-pack Inject 0.3 mLs into the muscle       fluticasone-salmeterol (ADVAIR) 500-50 MCG/DOSE inhaler Inhale 1 puff into the lungs 2 times daily 1 Inhaler 11     losartan (COZAAR) 50 MG tablet Take 1 tablet (50 mg) by mouth daily For blood pressure. Stop lisinopril. 30 tablet 1     metFORMIN (GLUCOPHAGE-XR) 500 MG 24 hr tablet Take 2 tablets (1,000 mg) by mouth 2 times daily (with meals) 360 tablet 0     methylPREDNISolone (MEDROL DOSEPAK) 4 MG tablet therapy pack Follow Package Directions 21 tablet 0     montelukast (SINGULAIR) 10 MG tablet Take 1 tablet (10 mg) by mouth At Bedtime For allergies/asthma 30 tablet 2     multivitamin, therapeutic (THERA-VIT) TABS Take 1 tablet by mouth daily       Naproxen Sodium (ALEVE PO) Take by mouth as needed        naproxen sodium (ANAPROX) 220 MG tablet Take 220 mg by mouth       order for DME Equipment being ordered: [facility] [place]  AIRES  . Walking boot select large, short pneumatic 1 Device 0     order for DME Equipment being ordered: Tall Aluminum Crutches, CRTL,  1 each 0     Allergies   Allergen Reactions     Artificial Sweetner (Informational Only) [Artificial Sweetner (Informational Only) ] GI Disturbance     ALL artificial sweetners cause severe diarrhea & flu symptoms     Hydromorphone Anaphylaxis     Ibuprofen GI Disturbance     Morphine [Fumaric Acid] Anaphylaxis     Hydrocodone-Acetaminophen Itching     Tramadol Hives     Trazodone Hives     Gabapentin      GI upset per pt     Keflex  [Cephalexin] Diarrhea     Upset stomach     Codeine Phosphate Itching     Ketorolac Tromethamine Rash         Reviewed and updated as needed this visit by Provider         Review of Systems   ROS COMP: Constitutional, HEENT, cardiovascular, pulmonary, GI, , musculoskeletal, neuro, skin, endocrine and psych systems are negative, except as otherwise noted.      Objective    /84   Pulse 67   Temp 98.1  F (36.7  C) (Oral)   Resp 18   Wt 133.8 kg (295 lb)   SpO2 98%   BMI 41.16 kg/m    Body mass index is 41.16 kg/m .  Physical Exam   GENERAL:  No acute distress.  Interacts appropriately.  Breathing without difficulty.  Alert.  HEENT:  Tympanic membranes intact without effusion or erythema.  Oral mucosa moist. Posterior pharynx has erythema.  Posterior pharynx has no exudate. no edema. Tender maxillary sinus isai to percussion  NECK:  Soft and supple.  without tenderness.  no lymphadenopathy.  Normal range of motion.    CARDIAC:   Regular rate and rhythm.  No murmurs, rubs, or gallops.   PULMONARY: Clear to auscultation bilaterally.  No  wheezes, crackles, or rhonchi.  Normal air exchange/breath sounds.  No use of accessory muscles.    PSYCH: Normal affect.  SKIN: No rashes.                Assessment & Plan     1. Hypertension goal BP (blood pressure) < 140/90  See below  - losartan (COZAAR) 50 MG tablet; Take 1 tablet (50 mg) by mouth daily For blood pressure. Stop lisinopril.  Dispense: 30 tablet; Refill: 1    2. Mild persistent asthma without complication  Worsened due to what sounds like viral URI    - montelukast (SINGULAIR) 10 MG tablet; Take 1 tablet (10 mg) by mouth At Bedtime For allergies/asthma  Dispense: 30 tablet; Refill: 2    3. Need for prophylactic vaccination and inoculation against influenza      4. Acute sinusitis with symptoms > 10 days  Take with food. Side effects discussed.  Call with worsening symptoms or if no improvement in 5 days.  Analgesics for pain with food as needed.    -  "amoxicillin-clavulanate (AUGMENTIN) 875-125 MG tablet; Take 1 tablet by mouth 2 times daily  Dispense: 20 tablet; Refill: 0    5. Acute bronchitis with symptoms > 10 days    - amoxicillin-clavulanate (AUGMENTIN) 875-125 MG tablet; Take 1 tablet by mouth 2 times daily  Dispense: 20 tablet; Refill: 0    6. Sinus pressure    - methylPREDNISolone (MEDROL DOSEPAK) 4 MG tablet therapy pack; Follow Package Directions  Dispense: 21 tablet; Refill: 0    7. Moderate asthma with exacerbation, unspecified whether persistent    - methylPREDNISolone (MEDROL DOSEPAK) 4 MG tablet therapy pack; Follow Package Directions  Dispense: 21 tablet; Refill: 0    8. Diabetic polyneuropathy associated with type 2 diabetes mellitus (H)      9. Type 2 diabetes mellitus without complication, without long-term current use of insulin (H)  Need labs  - Hemoglobin A1c  - Lipid panel reflex to direct LDL Fasting  - Albumin Random Urine Quantitative with Creat Ratio  - Comprehensive metabolic panel    10. Hyperlipidemia LDL goal <100      11. Special screening for malignant neoplasms, colon  Had polyp looks like due every 5 years so due now  - GASTROENTEROLOGY ADULT REF PROCEDURE ONLY [facility] [phone]; [facility]     Tobacco Cessation:   reports that he has never smoked. His smokeless tobacco use includes chew.        BMI:   Estimated body mass index is 41.16 kg/m  as calculated from the following:    Height as of 19: 1.803 m (5' 10.98\").    Weight as of this encounter: 133.8 kg (295 lb).   Weight management plan: Discussed healthy diet and exercise guidelines      Billin min spent face-to-face with patient. 20 min on history, 5 on exam, 15 on discussing diagnosis and treatment plan.     Patient Instructions   Stop lisinopril start losartan, we will see if this helps cough  Add singulair  Continue advair  Reschedule with lung specialist        Return in about 3 months (around 1/3/2020) for med recheck.    Bertha " Placido Romero PA-C  [facility]

## 2019-10-03 NOTE — PATIENT INSTRUCTIONS
Stop lisinopril start losartan, we will see if this helps cough  Add singulair  Continue advair  Reschedule with lung specialist

## 2019-10-04 DIAGNOSIS — E78.00 HIGH CHOLESTEROL: Primary | ICD-10-CM

## 2019-10-04 RX ORDER — ATORVASTATIN CALCIUM 40 MG/1
40 TABLET, FILM COATED ORAL DAILY
Qty: 90 TABLET | Refills: 3 | Status: SHIPPED | OUTPATIENT
Start: 2019-10-04 | End: 2020-10-20

## 2019-10-04 ASSESSMENT — ASTHMA QUESTIONNAIRES: ACT_TOTALSCORE: 10

## 2019-10-04 ASSESSMENT — ANXIETY QUESTIONNAIRES: GAD7 TOTAL SCORE: 0

## 2019-10-04 NOTE — RESULT ENCOUNTER NOTE
Pato Altamirano,       Your recent test results are attached, if you have any questions or concerns please feel free to contact me via e-mail or call [phone].  A1c is still to goal.  Recheck diabetes 6 months.  Cholesterol is still not to goal, we should change her cholesterol medication to something stronger I will send this to your pharmacy.  We should recheck your cholesterol in 6 months.  Kidney and liver function normal.       It was a pleasure to see you at your recent office visit.      Sincerely,  eBrtha Romero PA-C

## 2019-10-09 ENCOUNTER — OFFICE VISIT (OUTPATIENT)
Dept: PODIATRY | Facility: CLINIC | Age: 54
End: 2019-10-09
Payer: COMMERCIAL

## 2019-10-09 DIAGNOSIS — M72.2 PLANTAR FASCIITIS: ICD-10-CM

## 2019-10-09 DIAGNOSIS — M62.9 NONTRAUMATIC TEAR OF PLANTAR FASCIA: ICD-10-CM

## 2019-10-09 DIAGNOSIS — M79.672 LEFT FOOT PAIN: Primary | ICD-10-CM

## 2019-10-09 PROCEDURE — 99212 OFFICE O/P EST SF 10 MIN: CPT | Performed by: PODIATRIST

## 2019-10-09 NOTE — LETTER
10/9/2019         RE: Adarsh Salvador  [address] [address]        Dear Colleague,    Thank you for referring your patient, Adarsh Salvador, to the [facility]. Please see a copy of my visit note below.    Chief Complaint:   Chief Complaint   Patient presents with     Left Foot - Follow Up, Pain          Allergies   Allergen Reactions     Artificial Sweetner (Informational Only) [Artificial Sweetner (Informational Only) ] GI Disturbance     ALL artificial sweetners cause severe diarrhea & flu symptoms     Hydromorphone Anaphylaxis     Ibuprofen GI Disturbance     Morphine [Fumaric Acid] Anaphylaxis     Hydrocodone-Acetaminophen Itching     Tramadol Hives     Trazodone Hives     Gabapentin      GI upset per pt     Keflex [Cephalexin] Diarrhea     Upset stomach     Codeine Phosphate Itching     Ketorolac Tromethamine Rash         Subjective: Adarsh is a 54 year old male who presents to the clinic today for a follow up of left foot pain. Relates that he continues to have pain in the heel. Has exhausted conservative treatment. Has tried injections, NWB, CAM, casting, PT, PO steroids, night splint, and orthotics.     Objective  Pain noted today with palpation of the medial attachment of the plantar fascia on the calcaneus. This is decreased from the last visit. Some pain in the medial band of the PF at the distal aspect. No pain along the courses of the PT, peroneal, or Achilles tendons on the left. Pain noted with ankle ROM in plantar and dorsiflexion.     Assessment: Left plantar fascial tear. Failed conservative therapy.     Plan:   - Pt seen and evaluated  - Recommend that  He see Dr. Espinoza for a consult regarding Tenex. Appt was made for him .  - Pt to return to clinic in PRN.      Again, thank you for allowing me to participate in the care of your patient.        Sincerely,        Quan Pace DPM

## 2019-10-09 NOTE — PROGRESS NOTES
Chief Complaint:   Chief Complaint   Patient presents with     Left Foot - Follow Up, Pain          Allergies   Allergen Reactions     Artificial Sweetner (Informational Only) [Artificial Sweetner (Informational Only) ] GI Disturbance     ALL artificial sweetners cause severe diarrhea & flu symptoms     Hydromorphone Anaphylaxis     Ibuprofen GI Disturbance     Morphine [Fumaric Acid] Anaphylaxis     Hydrocodone-Acetaminophen Itching     Tramadol Hives     Trazodone Hives     Gabapentin      GI upset per pt     Keflex [Cephalexin] Diarrhea     Upset stomach     Codeine Phosphate Itching     Ketorolac Tromethamine Rash         Subjective: Adarsh is a 54 year old male who presents to the clinic today for a follow up of left foot pain. Relates that he continues to have pain in the heel. Has exhausted conservative treatment. Has tried injections, NWB, CAM, casting, PT, PO steroids, night splint, and orthotics.     Objective  Pain noted today with palpation of the medial attachment of the plantar fascia on the calcaneus. This is decreased from the last visit. Some pain in the medial band of the PF at the distal aspect. No pain along the courses of the PT, peroneal, or Achilles tendons on the left. Pain noted with ankle ROM in plantar and dorsiflexion.     Assessment: Left plantar fascial tear. Failed conservative therapy.     Plan:   - Pt seen and evaluated  - Recommend that  He see Dr. Espinoza for a consult regarding Tenex. Appt was made for him .  - Pt to return to clinic in PRN.

## 2019-10-09 NOTE — NURSING NOTE
Adarsh Salvador's chief complaint for this visit includes:  Chief Complaint   Patient presents with     Left Foot - Follow Up, Pain     PCP: Bertha Romero    Referring Provider:  Referred Self, MD  No address on file    There were no vitals taken for this visit.  Data Unavailable     Do you need any medication refills at today's visit? No    Priya Casas LPN

## 2019-10-09 NOTE — PATIENT INSTRUCTIONS
Thanks for coming today.  [facility]  [address] [place] [address]    To schedule future appointments in Ortho Clinic, you may call [phone].    To schedule ordered imaging by your provider:   Call [facility] [phone]    To schedule an injection ordered by your provider:  Call [facility] Injection scheduling line: [phone]  [facility] available online at:  [email or web address]    Please call if any further questions or concerns ([phone]).  Clinic hours 8 am to 5 pm.    Return to clinic (call) if symptoms worsen or fail to improve.

## 2019-10-21 ENCOUNTER — OFFICE VISIT (OUTPATIENT)
Dept: ORTHOPEDICS | Facility: CLINIC | Age: 54
End: 2019-10-21
Payer: COMMERCIAL

## 2019-10-21 VITALS — WEIGHT: 295 LBS | HEIGHT: 70 IN | BODY MASS INDEX: 42.23 KG/M2

## 2019-10-21 DIAGNOSIS — M72.2 PLANTAR FASCIITIS OF LEFT FOOT: ICD-10-CM

## 2019-10-21 DIAGNOSIS — M54.16 LUMBAR RADICULOPATHY: Primary | ICD-10-CM

## 2019-10-21 PROCEDURE — 99214 OFFICE O/P EST MOD 30 MIN: CPT | Performed by: FAMILY MEDICINE

## 2019-10-21 ASSESSMENT — MIFFLIN-ST. JEOR: SCORE: 2194.36

## 2019-10-21 NOTE — LETTER
10/21/2019         RE: Adarsh Salvador  [address] [facility] [id number]        Dear Colleague,    Thank you for referring your patient, Adarsh Salvador, to the [facility]. Please see a copy of my visit note below.    CHIEF COMPLAINT:  Consult (left foot pain, possible tenex )       HISTORY OF PRESENT ILLNESS  Mr. Salvador is a pleasant 54 year old year old male who presents to clinic today with severe, chronic left foot pain.  He is seen at the request of Dr. Pace.    Adarsh has a long history of left medial foot and heel pain.  His pain started about 9 months ago with no clear event.  He reports a intense pain in his left heel and medial foot region that is worse first thing in the morning, worsening yet as the day goes on.  He does have numbness and tingling in his foot, this is chronic in nature.  Whenever his foot pain is present he states that it shoots throughout his entire foot, mostly over the medial aspect.  Pain starts in his heel and ankle region and radiates to his midfoot and toe at times.  He has tried immobilization in a boot, a separate 2-week.  In a foot cast, physical therapy, analgesics, and has failed a corticosteroid injection.  The injection helped somewhat, temporarily.      Additional history: as documented    MEDICAL HISTORY  Patient Active Problem List   Diagnosis     GERD (gastroesophageal reflux disease)     Chronic RLQ pain     Constipation     Chronic maxillary sinusitis     Chronic rhinitis     Hypertriglyceridemia     Benign essential hypertension     Anemia in other chronic diseases classified elsewhere     Fatty liver     Irritable bowel syndrome with diarrhea     Right inguinal hernia     BMI 40.0-44.9, adult (H)     Mild persistent asthma without complication     Type 2 diabetes mellitus without complication, without long-term current use of insulin (H)     Hyperlipidemia LDL goal <100     Diabetic polyneuropathy associated with type 2 diabetes mellitus (H)     CONNOR  (generalized anxiety disorder)     Insomnia, unspecified type     Left foot pain       Current Outpatient Medications   Medication Sig Dispense Refill     ACCU-CHEK GUIDE test strip USE TO TEST BLOOD SUGAR 1 TIMES DAILY OR AS DIRECTED. 100 strip 3     albuterol (PROAIR HFA/PROVENTIL HFA/VENTOLIN HFA) 108 (90 Base) MCG/ACT inhaler Inhale 2 puffs into the lungs every 6 hours as needed for shortness of breath / dyspnea or wheezing 1 Inhaler 3     Alpha Lipoic Acid 200 MG CAPS Take 200 mg by mouth 3 times daily 90 capsule 3     amLODIPine (NORVASC) 10 MG tablet TAKE 1 TABLET (10 MG) BY MOUTH DAILY 90 tablet 2     amoxicillin-clavulanate (AUGMENTIN) 875-125 MG tablet Take 1 tablet by mouth 2 times daily 20 tablet 0     ASPIRIN PO Take 325 mg by mouth daily       aspirin-acetaminophen-caffeine (EXCEDRIN MIGRAINE) 250-250-65 MG tablet Take 1 tablet by mouth       atorvastatin (LIPITOR) 40 MG tablet Take 1 tablet (40 mg) by mouth daily Stop simvastatin 90 tablet 3     atorvastatin (LIPITOR) 40 MG tablet Take 1 tablet (40 mg) by mouth daily 90 tablet 3     baclofen (LIORESAL) 10 MG tablet Take 1 tablet (10 mg) by mouth See Admin Instructions 1-2 po q hs. 60 tablet 1     blood glucose monitoring (ACCU-CHEK FASTCLIX) lancets Use to test blood sugar 1 times daily or as directed.  Ok to substitute alternative if insurance prefers. 102 each 11     calcium carbonate (TUMS) 500 MG chewable tablet Take 1 chew tab by mouth daily       cyclobenzaprine (FLEXERIL) 10 MG tablet Take 0.5-1 tablets (5-10 mg) by mouth 3 times daily as needed for muscle spasms 90 tablet 1     dicyclomine (BENTYL) 20 MG tablet Take 20 mg by mouth every 6 hours       DULoxetine (CYMBALTA) 30 MG capsule Take 1 capsule (30 mg) by mouth daily Take with 60 mg capsule, total daily dose 90 mg. 90 capsule 3     DULoxetine (CYMBALTA) 60 MG EC capsule Take 1 capsule (60 mg) by mouth daily 90 capsule 3     EPINEPHrine (EPIPEN) 0.3 MG/0.3ML injection Inject 0.3 mLs (0.3  mg) into the muscle once as needed for anaphylaxis 2 each 2     EPINEPHrine (EPIPEN/ADRENACLICK/OR ANY BX GENERIC EQUIV) 0.3 MG/0.3ML injection 2-pack Inject 0.3 mLs into the muscle       fluticasone-salmeterol (ADVAIR) 500-50 MCG/DOSE inhaler Inhale 1 puff into the lungs 2 times daily 1 Inhaler 11     losartan (COZAAR) 50 MG tablet Take 1 tablet (50 mg) by mouth daily For blood pressure. Stop lisinopril. 30 tablet 1     metFORMIN (GLUCOPHAGE-XR) 500 MG 24 hr tablet Take 2 tablets (1,000 mg) by mouth 2 times daily (with meals) 360 tablet 0     methylPREDNISolone (MEDROL DOSEPAK) 4 MG tablet therapy pack Follow Package Directions 21 tablet 0     montelukast (SINGULAIR) 10 MG tablet Take 1 tablet (10 mg) by mouth At Bedtime For allergies/asthma 30 tablet 2     multivitamin, therapeutic (THERA-VIT) TABS Take 1 tablet by mouth daily       Naproxen Sodium (ALEVE PO) Take by mouth as needed        naproxen sodium (ANAPROX) 220 MG tablet Take 220 mg by mouth       order for DME Equipment being ordered: DME  AIR01ES-L  . Walking boot select large, short pneumatic 1 Device 0     order for DME Equipment being ordered: Tall Aluminum Crutches, CRTL,  1 each 0       Allergies   Allergen Reactions     Artificial Sweetner (Informational Only) [Artificial Sweetner (Informational Only) ] GI Disturbance     ALL artificial sweetners cause severe diarrhea & flu symptoms     Hydromorphone Anaphylaxis     Ibuprofen GI Disturbance     Morphine [Fumaric Acid] Anaphylaxis     Hydrocodone-Acetaminophen Itching     Tramadol Hives     Trazodone Hives     Gabapentin      GI upset per pt     Keflex [Cephalexin] Diarrhea     Upset stomach     Codeine Phosphate Itching     Ketorolac Tromethamine Rash       Family History   Problem Relation Age of Onset     Cancer Mother 52        lung, smoked     Cancer - colorectal Father 53        unsure type, pt attributes to radiation exposure     Myocardial Infarction Father 45     Coronary Artery  "Disease Father      Myocardial Infarction Paternal Grandfather 35     Diabetes Other         nephew- type 1     Diabetes Maternal Aunt         1     Diabetes Maternal Aunt         2     Diabetes Paternal Uncle         1     Diabetes Paternal Uncle         2     Diabetes Paternal Uncle         3     Diabetes Paternal Uncle         4     Colon Cancer No family hx of        Additional medical/Social/Surgical histories reviewed in [facility] and updated as appropriate.          PHYSICAL EXAM  Vitals:    10/21/19 0839   Weight: 133.8 kg (295 lb)   Height: 1.778 m (5' 10\")     General  - normal appearance, in no obvious distress  CV  - normal pulses at posterior tib and dorsalis pedis  Pulm  - normal respiratory pattern, non-labored  Musculoskeletal - left foot  - stance: normal gait without limp  - inspection: no swelling or effusion, normal bone and joint alignment, no obvious deformity  - palpation: tender at the medial calcaneal tubercle, central medial arch, mildly tender throughout calcaneus  - ROM: normal active and passive ROM of great and lesser toes, no pain with MT translation  - strength: 5/5 in all planes  Neuro  - medial foot numbness and tingling  Skin  - no ecchymosis, erythema, warmth, or induration, no obvious rash  Psych  - interactive, appropriate, normal mood and affect             ASSESSMENT & PLAN  Mr. Salvador is a 54 year old year old male who presents to clinic today with pain in his left foot.    I had a long discussion with Adarsh centering around his pain.  I do think that his most likely diagnosis is plantar fasciitis, although he does have a lumbar CT that reads:  Impression:  1.  Multilevel lumbar spondylosis. Most prominent at L4-5 with  moderate to severe left neuroforaminal stenosis with left L4 exiting  nerve roots impingement, to a lesser grade at L3-4 with moderate left  neuroforaminal stenosis. Overall not significantly changed from  10/12/2017.    His symptoms do fit the L4 dermatome on the " left  I do think it is important to rule out the possibility of his L4 nerve causing at least some degree of his pain.  I am referring him for a left L4 nerve root injection.  He can get this done at his earliest convenience.    If this injection does not help him at all with his pain I would consider performing percutaneous tenotomy with Tenex.  We had a long discussion centering around the procedure, risks, benefits, and aftercare.  Ideally I would order an MRI of his foot prior to performing percutaneous tenotomy, unfortunately this is not possible given his bladder implants.    Thank you for allowing me to participate in Adarsh's care.    45 minutes was spent in the room, 30 of which was spent on counseling and coordination of care.      Vinnie Espinoza DO, CAM  Primary Care Sports Medicine       This note was constructed using Dragon dictation software, please excuse any minor errors in spelling, grammar, or syntax.      Again, thank you for allowing me to participate in the care of your patient.        Sincerely,        Vinnie Espinoza DO

## 2019-10-21 NOTE — PROGRESS NOTES
CHIEF COMPLAINT:  Consult (left foot pain, possible tenex )       HISTORY OF PRESENT ILLNESS  Mr. Salvador is a pleasant 54 year old year old male who presents to clinic today with severe, chronic left foot pain.  He is seen at the request of Dr. Pace.    Adarsh has a long history of left medial foot and heel pain.  His pain started about 9 months ago with no clear event.  He reports a intense pain in his left heel and medial foot region that is worse first thing in the morning, worsening yet as the day goes on.  He does have numbness and tingling in his foot, this is chronic in nature.  Whenever his foot pain is present he states that it shoots throughout his entire foot, mostly over the medial aspect.  Pain starts in his heel and ankle region and radiates to his midfoot and toe at times.  He has tried immobilization in a boot, a separate 2-week.  In a foot cast, physical therapy, analgesics, and has failed a corticosteroid injection.  The injection helped somewhat, temporarily.      Additional history: as documented    MEDICAL HISTORY  Patient Active Problem List   Diagnosis     GERD (gastroesophageal reflux disease)     Chronic RLQ pain     Constipation     Chronic maxillary sinusitis     Chronic rhinitis     Hypertriglyceridemia     Benign essential hypertension     Anemia in other chronic diseases classified elsewhere     Fatty liver     Irritable bowel syndrome with diarrhea     Right inguinal hernia     BMI 40.0-44.9, adult (H)     Mild persistent asthma without complication     Type 2 diabetes mellitus without complication, without long-term current use of insulin (H)     Hyperlipidemia LDL goal <100     Diabetic polyneuropathy associated with type 2 diabetes mellitus (H)     CONNOR (generalized anxiety disorder)     Insomnia, unspecified type     Left foot pain       Current Outpatient Medications   Medication Sig Dispense Refill     ACCU-CHEK GUIDE test strip USE TO TEST BLOOD SUGAR 1 TIMES DAILY OR AS DIRECTED.  100 strip 3     albuterol (PROAIR HFA/PROVENTIL HFA/VENTOLIN HFA) 108 (90 Base) MCG/ACT inhaler Inhale 2 puffs into the lungs every 6 hours as needed for shortness of breath / dyspnea or wheezing 1 Inhaler 3     Alpha Lipoic Acid 200 MG CAPS Take 200 mg by mouth 3 times daily 90 capsule 3     amLODIPine (NORVASC) 10 MG tablet TAKE 1 TABLET (10 MG) BY MOUTH DAILY 90 tablet 2     amoxicillin-clavulanate (AUGMENTIN) 875-125 MG tablet Take 1 tablet by mouth 2 times daily 20 tablet 0     ASPIRIN PO Take 325 mg by mouth daily       aspirin-acetaminophen-caffeine (EXCEDRIN MIGRAINE) 250-250-65 MG tablet Take 1 tablet by mouth       atorvastatin (LIPITOR) 40 MG tablet Take 1 tablet (40 mg) by mouth daily Stop simvastatin 90 tablet 3     atorvastatin (LIPITOR) 40 MG tablet Take 1 tablet (40 mg) by mouth daily 90 tablet 3     baclofen (LIORESAL) 10 MG tablet Take 1 tablet (10 mg) by mouth See Admin Instructions 1-2 po q hs. 60 tablet 1     blood glucose monitoring (ACCU-CHEK FASTCLIX) lancets Use to test blood sugar 1 times daily or as directed.  Ok to substitute alternative if insurance prefers. 102 each 11     calcium carbonate (TUMS) 500 MG chewable tablet Take 1 chew tab by mouth daily       cyclobenzaprine (FLEXERIL) 10 MG tablet Take 0.5-1 tablets (5-10 mg) by mouth 3 times daily as needed for muscle spasms 90 tablet 1     dicyclomine (BENTYL) 20 MG tablet Take 20 mg by mouth every 6 hours       DULoxetine (CYMBALTA) 30 MG capsule Take 1 capsule (30 mg) by mouth daily Take with 60 mg capsule, total daily dose 90 mg. 90 capsule 3     DULoxetine (CYMBALTA) 60 MG EC capsule Take 1 capsule (60 mg) by mouth daily 90 capsule 3     EPINEPHrine (EPIPEN) 0.3 MG/0.3ML injection Inject 0.3 mLs (0.3 mg) into the muscle once as needed for anaphylaxis 2 each 2     EPINEPHrine (EPIPEN/ADRENACLICK/OR ANY BX GENERIC EQUIV) 0.3 MG/0.3ML injection 2-pack Inject 0.3 mLs into the muscle       fluticasone-salmeterol (ADVAIR) 500-50 MCG/DOSE  inhaler Inhale 1 puff into the lungs 2 times daily 1 Inhaler 11     losartan (COZAAR) 50 MG tablet Take 1 tablet (50 mg) by mouth daily For blood pressure. Stop lisinopril. 30 tablet 1     metFORMIN (GLUCOPHAGE-XR) 500 MG 24 hr tablet Take 2 tablets (1,000 mg) by mouth 2 times daily (with meals) 360 tablet 0     methylPREDNISolone (MEDROL DOSEPAK) 4 MG tablet therapy pack Follow Package Directions 21 tablet 0     montelukast (SINGULAIR) 10 MG tablet Take 1 tablet (10 mg) by mouth At Bedtime For allergies/asthma 30 tablet 2     multivitamin, therapeutic (THERA-VIT) TABS Take 1 tablet by mouth daily       Naproxen Sodium (ALEVE PO) Take by mouth as needed        naproxen sodium (ANAPROX) 220 MG tablet Take 220 mg by mouth       order for DME Equipment being ordered: DME  [id number]  . Walking boot select large, short pneumatic 1 Device 0     order for DME Equipment being ordered: Tall Aluminum Crutches, CRTL,  1 each 0       Allergies   Allergen Reactions     Artificial Sweetner (Informational Only) [Artificial Sweetner (Informational Only) ] GI Disturbance     ALL artificial sweetners cause severe diarrhea & flu symptoms     Hydromorphone Anaphylaxis     Ibuprofen GI Disturbance     Morphine [Fumaric Acid] Anaphylaxis     Hydrocodone-Acetaminophen Itching     Tramadol Hives     Trazodone Hives     Gabapentin      GI upset per pt     Keflex [Cephalexin] Diarrhea     Upset stomach     Codeine Phosphate Itching     Ketorolac Tromethamine Rash       Family History   Problem Relation Age of Onset     Cancer Mother 52        lung, smoked     Cancer - colorectal Father 53        unsure type, pt attributes to radiation exposure     Myocardial Infarction Father 45     Coronary Artery Disease Father      Myocardial Infarction Paternal Grandfather 35     Diabetes Other         nephew- type 1     Diabetes Maternal Aunt         1     Diabetes Maternal Aunt         2     Diabetes Paternal Uncle         1     Diabetes  "Paternal Uncle         2     Diabetes Paternal Uncle         3     Diabetes Paternal Uncle         4     Colon Cancer No family hx of        Additional medical/Social/Surgical histories reviewed in [facility] and updated as appropriate.          PHYSICAL EXAM  Vitals:    10/21/19 0839   Weight: 133.8 kg (295 lb)   Height: 1.778 m (5' 10\")     General  - normal appearance, in no obvious distress  CV  - normal pulses at posterior tib and dorsalis pedis  Pulm  - normal respiratory pattern, non-labored  Musculoskeletal - left foot  - stance: normal gait without limp  - inspection: no swelling or effusion, normal bone and joint alignment, no obvious deformity  - palpation: tender at the medial calcaneal tubercle, central medial arch, mildly tender throughout calcaneus  - ROM: normal active and passive ROM of great and lesser toes, no pain with MT translation  - strength: 5/5 in all planes  Neuro  - medial foot numbness and tingling  Skin  - no ecchymosis, erythema, warmth, or induration, no obvious rash  Psych  - interactive, appropriate, normal mood and affect             ASSESSMENT & PLAN  Mr. Salvador is a 54 year old year old male who presents to clinic today with pain in his left foot.    I had a long discussion with Adarsh centering around his pain.  I do think that his most likely diagnosis is plantar fasciitis, although he does have a lumbar CT that reads:  Impression:  1.  Multilevel lumbar spondylosis. Most prominent at L4-5 with  moderate to severe left neuroforaminal stenosis with left L4 exiting  nerve roots impingement, to a lesser grade at L3-4 with moderate left  neuroforaminal stenosis. Overall not significantly changed from  10/12/2017.    His symptoms do fit the L4 dermatome on the left  I do think it is important to rule out the possibility of his L4 nerve causing at least some degree of his pain.  I am referring him for a left L4 nerve root injection.  He can get this done at his earliest convenience.    If this " injection does not help him at all with his pain I would consider performing percutaneous tenotomy with Tenex.  We had a long discussion centering around the procedure, risks, benefits, and aftercare.  Ideally I would order an MRI of his foot prior to performing percutaneous tenotomy, unfortunately this is not possible given his bladder implants.    Thank you for allowing me to participate in Adarsh's care.    45 minutes was spent in the room, 30 of which was spent on counseling and coordination of care.      Vinnie Espinoza DO, CAM  Primary Care Sports Medicine       This note was constructed using Dragon dictation software, please excuse any minor errors in spelling, grammar, or syntax.     negative

## 2019-10-25 PROBLEM — M79.672 LEFT FOOT PAIN: Status: RESOLVED | Noted: 2019-06-28 | Resolved: 2019-10-25

## 2019-10-29 ENCOUNTER — OFFICE VISIT (OUTPATIENT)
Dept: FAMILY MEDICINE | Facility: CLINIC | Age: 54
End: 2019-10-29
Payer: COMMERCIAL

## 2019-10-29 VITALS
TEMPERATURE: 97.8 F | RESPIRATION RATE: 16 BRPM | DIASTOLIC BLOOD PRESSURE: 82 MMHG | WEIGHT: 294 LBS | SYSTOLIC BLOOD PRESSURE: 130 MMHG | HEART RATE: 78 BPM | OXYGEN SATURATION: 96 % | BODY MASS INDEX: 42.18 KG/M2

## 2019-10-29 DIAGNOSIS — E11.9 TYPE 2 DIABETES MELLITUS WITHOUT COMPLICATION, WITHOUT LONG-TERM CURRENT USE OF INSULIN (H): Chronic | ICD-10-CM

## 2019-10-29 DIAGNOSIS — L03.319 CELLULITIS AND ABSCESS OF TRUNK: Primary | ICD-10-CM

## 2019-10-29 DIAGNOSIS — L02.219 CELLULITIS AND ABSCESS OF TRUNK: Primary | ICD-10-CM

## 2019-10-29 PROCEDURE — 99214 OFFICE O/P EST MOD 30 MIN: CPT | Performed by: INTERNAL MEDICINE

## 2019-10-29 RX ORDER — SULFAMETHOXAZOLE/TRIMETHOPRIM 800-160 MG
1 TABLET ORAL 2 TIMES DAILY
Qty: 20 TABLET | Refills: 0 | Status: SHIPPED | OUTPATIENT
Start: 2019-10-29 | End: 2019-11-12

## 2019-10-29 NOTE — PROGRESS NOTES
SUBJECTIVE:  Adarsh Salvador is an 54 year old male who presents for bump on on abdomen.  Started a couple weeks ago as a red pimple and has increased in sized.  No drainage.  Has become tender.   Has h/o DM.  Sugars have been stable for him.  No fevers, chills, sweats.  No n/v/d.  No recent travel.  No meds used for the bump.  Hasn't had a bump like this before.      PMH:   has a past medical history of Anaphylactic reaction (8/14/2015), Anxiety, Depression, DM2 (diabetes mellitus, type 2) (H), HTN (hypertension), IBS (irritable bowel syndrome), Kidney stone (6/15/2011), and Neuropathy.  Patient Active Problem List   Diagnosis     GERD (gastroesophageal reflux disease)     Chronic RLQ pain     Constipation     Chronic maxillary sinusitis     Chronic rhinitis     Hypertriglyceridemia     Benign essential hypertension     Anemia in other chronic diseases classified elsewhere     Fatty liver     Irritable bowel syndrome with diarrhea     Right inguinal hernia     BMI 40.0-44.9, adult (H)     Mild persistent asthma without complication     Type 2 diabetes mellitus without complication, without long-term current use of insulin (H)     Hyperlipidemia LDL goal <100     Diabetic polyneuropathy associated with type 2 diabetes mellitus (H)     CONNOR (generalized anxiety disorder)     Insomnia, unspecified type     Social History     Socioeconomic History     Marital status: Single     Spouse name: None     Number of children: 0     Years of education: None     Highest education level: None   Occupational History     Occupation: - with [employer]     Employer: [employer]   Social Needs     Financial resource strain: None     Food insecurity:     Worry: None     Inability: None     Transportation needs:     Medical: None     Non-medical: None   Tobacco Use     Smoking status: Never Smoker     Smokeless tobacco: Current User     Types: Chew     Tobacco comment: Chew   Substance and Sexual Activity     Alcohol use: Yes      Alcohol/week: 0.0 standard drinks     Comment: occasional     Drug use: No     Sexual activity: Never   Lifestyle     Physical activity:     Days per week: None     Minutes per session: None     Stress: None   Relationships     Social connections:     Talks on phone: None     Gets together: None     Attends [employer] service: None     Active member of club or organization: None     Attends meetings of clubs or organizations: None     Relationship status: None     Intimate partner violence:     Fear of current or ex partner: None     Emotionally abused: None     Physically abused: None     Forced sexual activity: None   Other Topics Concern     Parent/sibling w/ CABG, MI or angioplasty before 65F 55M? Not Asked   Social History Narrative    Works at [employer], as a  (25+ years experience).       Family History   Problem Relation Age of Onset     Cancer Mother 52        lung, smoked     Cancer - colorectal Father 53        unsure type, pt attributes to radiation exposure     Myocardial Infarction Father 45     Coronary Artery Disease Father      Myocardial Infarction Paternal Grandfather 35     Diabetes Other         nephew- type 1     Diabetes Maternal Aunt         1     Diabetes Maternal Aunt         2     Diabetes Paternal Uncle         1     Diabetes Paternal Uncle         2     Diabetes Paternal Uncle         3     Diabetes Paternal Uncle         4     Colon Cancer No family hx of        ALLERGIES:  Artificial sweetner (informational only) [artificial sweetner (informational only) ]; Hydromorphone; Ibuprofen; Morphine [fumaric acid]; Hydrocodone-acetaminophen; Tramadol; Trazodone; Gabapentin; Keflex [cephalexin]; Codeine phosphate; and Ketorolac tromethamine    Current Outpatient Medications   Medication     ACCU-CHEK GUIDE test strip     albuterol (PROAIR HFA/PROVENTIL HFA/VENTOLIN HFA) 108 (90 Base) MCG/ACT inhaler     Alpha Lipoic Acid 200 MG CAPS     amLODIPine (NORVASC) 10 MG tablet      amoxicillin-clavulanate (AUGMENTIN) 875-125 MG tablet     ASPIRIN PO     aspirin-acetaminophen-caffeine (EXCEDRIN MIGRAINE) 250-250-65 MG tablet     atorvastatin (LIPITOR) 40 MG tablet     atorvastatin (LIPITOR) 40 MG tablet     baclofen (LIORESAL) 10 MG tablet     blood glucose monitoring (ACCU-CHEK FASTCLIX) lancets     calcium carbonate (TUMS) 500 MG chewable tablet     cyclobenzaprine (FLEXERIL) 10 MG tablet     dicyclomine (BENTYL) 20 MG tablet     DULoxetine (CYMBALTA) 30 MG capsule     DULoxetine (CYMBALTA) 60 MG EC capsule     EPINEPHrine (EPIPEN) 0.3 MG/0.3ML injection     EPINEPHrine (EPIPEN/ADRENACLICK/OR ANY BX GENERIC EQUIV) 0.3 MG/0.3ML injection 2-pack     fluticasone-salmeterol (ADVAIR) 500-50 MCG/DOSE inhaler     losartan (COZAAR) 50 MG tablet     metFORMIN (GLUCOPHAGE-XR) 500 MG 24 hr tablet     methylPREDNISolone (MEDROL DOSEPAK) 4 MG tablet therapy pack     montelukast (SINGULAIR) 10 MG tablet     multivitamin, therapeutic (THERA-VIT) TABS     Naproxen Sodium (ALEVE PO)     naproxen sodium (ANAPROX) 220 MG tablet     order for DME     order for DME     No current facility-administered medications for this visit.      Facility-Administered Medications Ordered in Other Visits   Medication     DOBUTamine 500 mg in dextrose 5% 250 mL (adult std conc) premix     metoprolol (LOPRESSOR) injection 5 mg         ROS:  ROS is done and is negative for general/constitutional, eye, ENT, Respiratory, cardiovascular, GI, , Skin, musculoskeletal except as noted elsewhere.  All other review of systems negative except as noted elsewhere.      OBJECTIVE:  /82   Pulse 78   Temp 97.8  F (36.6  C) (Oral)   Resp 16   Wt 133.4 kg (294 lb)   SpO2 96%   BMI 42.18 kg/m    GENERAL APPEARANCE: Alert, in no acute distress  EYES: normal  NOSE:normal  OROPHARYNX:normal  NECK:No adenopathy,masses or thyromegaly  RESP: normal and clear to auscultation  CV:regular rate and rhythm and no murmurs, clicks, or  gallops  ABDOMEN: Abdomen soft, non-tender. BS normal. No masses, organomegaly  SKIN: lower abdomen right of midline with 3cm area of moderate erythema which is mildly raised with minimal fluctuance, no drainage; the area is mildly warm to touch and moderately tender  MUSCULOSKELETAL:Musculoskeletal normal      RESULTS  Results for orders placed or performed in visit on 10/03/19   Hemoglobin A1c   Result Value Ref Range    Hemoglobin A1C 6.6 (H) 0 - 5.6 %   Lipid panel reflex to direct LDL Fasting   Result Value Ref Range    Cholesterol 268 (H) <200 mg/dL    Triglycerides 321 (H) <150 mg/dL    HDL Cholesterol 37 (L) >39 mg/dL    LDL Cholesterol Calculated 167 (H) <100 mg/dL    Non HDL Cholesterol 231 (H) <130 mg/dL   Albumin Random Urine Quantitative with Creat Ratio   Result Value Ref Range    Creatinine Urine 106 mg/dL    Albumin Urine mg/L 18 mg/L    Albumin Urine mg/g Cr 17.45 (H) 0 - 17 mg/g Cr   Comprehensive metabolic panel   Result Value Ref Range    Sodium 140 133 - 144 mmol/L    Potassium 4.2 3.4 - 5.3 mmol/L    Chloride 109 94 - 109 mmol/L    Carbon Dioxide 28 20 - 32 mmol/L    Anion Gap 3 3 - 14 mmol/L    Glucose 144 (H) 70 - 99 mg/dL    Urea Nitrogen 14 7 - 30 mg/dL    Creatinine 0.92 0.66 - 1.25 mg/dL    GFR Estimate >90 >60 mL/min/[1.73_m2]    GFR Estimate If Black >90 >60 mL/min/[1.73_m2]    Calcium 9.0 8.5 - 10.1 mg/dL    Bilirubin Total 0.4 0.2 - 1.3 mg/dL    Albumin 4.0 3.4 - 5.0 g/dL    Protein Total 7.5 6.8 - 8.8 g/dL    Alkaline Phosphatase 106 40 - 150 U/L    ALT 26 0 - 70 U/L    AST 12 0 - 45 U/L   .  No results found for this or any previous visit (from the past 48 hour(s)).    ASSESSMENT/PLAN:  ASSESSMENT / PLAN:  (L03.319,  L02.219) Cellulitis and abscess of trunk  (primary encounter diagnosis)  Comment: small abscess on abdomen.  His h/o DM increases risk of infection and complication.  Has h/o intolerance to keflex and has minimal fluctuance present, will tx with bactrim which should  cover mrsa  Plan: sulfamethoxazole-trimethoprim (BACTRIM         DS/SEPTRA DS) 800-160 MG tablet        Reviewed medication instructions and side effects. Follow up if experiences side effects.. I reviewed supportive care, otc meds to use if needed, expected course, and signs of concern.  Follow up as needed or if he does not improve within 1 week(s) or if worsens in any way.  Reviewed red flag symptoms and is to go to the ER if experiences any of these.    (E11.9) Type 2 diabetes mellitus without complication, without long-term current use of insulin (H)  Comment: has h/o, which increases risk of infection and complication of infection  Plan: continue current meds and routine f/u with pcp.  Advised to monitor sugars closely as can fluctuate with infection, and contact pcp if running above 250 or below 80.        See [facility] for orders, medications, letters, patient instructions    Bertha Otero M.D.

## 2019-11-07 ENCOUNTER — ANCILLARY PROCEDURE (OUTPATIENT)
Dept: GENERAL RADIOLOGY | Facility: CLINIC | Age: 54
End: 2019-11-07
Attending: FAMILY MEDICINE
Payer: COMMERCIAL

## 2019-11-07 DIAGNOSIS — M54.16 LUMBAR RADICULOPATHY: ICD-10-CM

## 2019-11-07 PROCEDURE — 62323 NJX INTERLAMINAR LMBR/SAC: CPT | Mod: 52 | Performed by: RADIOLOGY

## 2019-11-07 RX ORDER — IOPAMIDOL 408 MG/ML
10 INJECTION, SOLUTION INTRATHECAL ONCE
Status: ACTIVE | OUTPATIENT
Start: 2019-11-07

## 2019-11-07 RX ORDER — METHYLPREDNISOLONE ACETATE 80 MG/ML
80 INJECTION, SUSPENSION INTRA-ARTICULAR; INTRALESIONAL; INTRAMUSCULAR; SOFT TISSUE ONCE
Status: ACTIVE | OUTPATIENT
Start: 2019-11-07

## 2019-11-07 RX ORDER — BUPIVACAINE HYDROCHLORIDE 5 MG/ML
10 INJECTION, SOLUTION PERINEURAL ONCE
Status: ACTIVE | OUTPATIENT
Start: 2019-11-07

## 2019-11-07 RX ORDER — LIDOCAINE HYDROCHLORIDE 10 MG/ML
30 INJECTION, SOLUTION EPIDURAL; INFILTRATION; INTRACAUDAL; PERINEURAL ONCE
Status: COMPLETED | OUTPATIENT
Start: 2019-11-07 | End: 2019-11-07

## 2019-11-07 RX ADMIN — LIDOCAINE HYDROCHLORIDE 5 ML: 10 INJECTION, SOLUTION EPIDURAL; INFILTRATION; INTRACAUDAL; PERINEURAL at 10:13

## 2019-11-12 ENCOUNTER — OFFICE VISIT (OUTPATIENT)
Dept: NEUROLOGY | Facility: CLINIC | Age: 54
End: 2019-11-12
Payer: COMMERCIAL

## 2019-11-12 VITALS
SYSTOLIC BLOOD PRESSURE: 139 MMHG | HEIGHT: 70 IN | WEIGHT: 298.8 LBS | OXYGEN SATURATION: 98 % | BODY MASS INDEX: 42.78 KG/M2 | DIASTOLIC BLOOD PRESSURE: 87 MMHG | HEART RATE: 70 BPM

## 2019-11-12 DIAGNOSIS — G62.9 NEUROPATHY: Primary | ICD-10-CM

## 2019-11-12 DIAGNOSIS — E11.42 DIABETIC POLYNEUROPATHY ASSOCIATED WITH TYPE 2 DIABETES MELLITUS (H): ICD-10-CM

## 2019-11-12 PROCEDURE — 99214 OFFICE O/P EST MOD 30 MIN: CPT | Performed by: PSYCHIATRY & NEUROLOGY

## 2019-11-12 RX ORDER — DULOXETIN HYDROCHLORIDE 60 MG/1
60 CAPSULE, DELAYED RELEASE ORAL 2 TIMES DAILY
Qty: 180 CAPSULE | Refills: 3 | Status: SHIPPED | OUTPATIENT
Start: 2019-11-12 | End: 2020-12-03

## 2019-11-12 ASSESSMENT — PAIN SCALES - GENERAL: PAINLEVEL: MODERATE PAIN (5)

## 2019-11-12 ASSESSMENT — MIFFLIN-ST. JEOR: SCORE: 2211.6

## 2019-11-12 NOTE — LETTER
11/12/2019         RE: Adarsh Salvador  [address] [address]        Dear Colleague,    Thank you for referring your patient, Adarsh Salvador, to the [facility]. Please see a copy of my visit note below.    Visit Date:   11/12/2019      This patient is seen in followup with painful diabetic neuropathy.  He was last seen 3 months ago.  There has been no major change in his neurologic status.  He says if anything, he is a little worse.  He was wearing a boot on the left ankle for plantar fasciitis last summer.  He is now off the boot and back to work.  However, his foot continues to give him problems and there is concern about possible scar tissue in the foot.  He also has chronic low back issues with multilevel degenerative change and foraminal narrowing.  He reports that he is scheduled to get a steroid injection in the low back in the near future to try and address that issue.  He also has pain in the bilateral lateral thighs in the territory of the cutaneous nerve of the thigh.  It fits best with meralgia paraesthetica and he has had this for a couple of years.  It persists and if anything, is getting worse.  His feet get sore.  He tries to walk for exercise, but that is quite limited because of his foot and the neuropathy and other symptoms.  He has not been losing weight.  He is on duloxetine 90 mg daily, 60 in the morning and 30 at night.  He takes Flexeril occasionally.  He is on baclofen 10 mg 1-1/2 tablets at night for sleep.  He also takes alpha lipoic acid 200 three times a day. He does complain of clumsiness and imbalance and that is likely part of his neuropathy as well.      PHYSICAL EXAMINATION:   GENERAL:  The patient is cooperative and in no distress.   VITAL SIGNS:  His blood pressure is 139/87.   NEUROLOGIC:  He has some reduction in muscle bulk in the feet.  Strength is preserved in the legs and feet.  Muscle stretch reflexes are reduced at the knees and absent at the  ankles.  Pinprick sensation is fairly well preserved in the feet.  Temperature sense is reduced in the feet and lower legs and vibratory sense is reduced in the toes.  These findings are for the most part chronic and persisting.      ASSESSMENT:   1.  Painful diabetic neuropathy.   2.  Bilateral meralgia paresthetica.   3.  History of left plantar fasciitis.      DISCUSSION:  The patient is seen with the above problem list.  At this point, I am going to increase his dose of duloxetine to 120 mg a day, but that would be the maximum dose.  I am going to refer him to the [facility] to see if they have any recommendations for treatment.  He will be getting an injection in the low back in the near future and that might provide him some relief.  I will see him in followup in 3 months.              CHELSEA MONTALVO MD             D: 2019   T: 2019   MT: NURA      Name:     LUTHER BLACKMAN   MRN:      [id number]        Account:      [id number]   :      1965           Visit Date:   2019      Document: [id number]        Again, thank you for allowing me to participate in the care of your patient.        Sincerely,        Chelsea Montalvo MD

## 2019-11-12 NOTE — PROGRESS NOTES
Visit Date:   11/12/2019      This patient is seen in followup with painful diabetic neuropathy.  He was last seen 3 months ago.  There has been no major change in his neurologic status.  He says if anything, he is a little worse.  He was wearing a boot on the left ankle for plantar fasciitis last summer.  He is now off the boot and back to work.  However, his foot continues to give him problems and there is concern about possible scar tissue in the foot.  He also has chronic low back issues with multilevel degenerative change and foraminal narrowing.  He reports that he is scheduled to get a steroid injection in the low back in the near future to try and address that issue.  He also has pain in the bilateral lateral thighs in the territory of the cutaneous nerve of the thigh.  It fits best with meralgia paraesthetica and he has had this for a couple of years.  It persists and if anything, is getting worse.  His feet get sore.  He tries to walk for exercise, but that is quite limited because of his foot and the neuropathy and other symptoms.  He has not been losing weight.  He is on duloxetine 90 mg daily, 60 in the morning and 30 at night.  He takes Flexeril occasionally.  He is on baclofen 10 mg 1-1/2 tablets at night for sleep.  He also takes alpha lipoic acid 200 three times a day. He does complain of clumsiness and imbalance and that is likely part of his neuropathy as well.      PHYSICAL EXAMINATION:   GENERAL:  The patient is cooperative and in no distress.   VITAL SIGNS:  His blood pressure is 139/87.   NEUROLOGIC:  He has some reduction in muscle bulk in the feet.  Strength is preserved in the legs and feet.  Muscle stretch reflexes are reduced at the knees and absent at the ankles.  Pinprick sensation is fairly well preserved in the feet.  Temperature sense is reduced in the feet and lower legs and vibratory sense is reduced in the toes.  These findings are for the most part chronic and persisting.       ASSESSMENT:   1.  Painful diabetic neuropathy.   2.  Bilateral meralgia paresthetica.   3.  History of left plantar fasciitis.      DISCUSSION:  The patient is seen with the above problem list.  At this point, I am going to increase his dose of duloxetine to 120 mg a day, but that would be the maximum dose.  I am going to refer him to the [facility] to see if they have any recommendations for treatment.  He will be getting an injection in the low back in the near future and that might provide him some relief.  I will see him in followup in 3 months.              CHELSEA POZO MD             D: 2019   T: 2019   MT: NURA      Name:     LUTHER BLACKMAN   MRN:      [id number]        Account:      [id number]   :      1965           Visit Date:   2019      Document: [id number]

## 2019-11-12 NOTE — NURSING NOTE
"Adarsh Salvador's goals for this visit include: return  He requests these members of his care team be copied on today's visit information:     PCP: Bertha Romero    Referring Provider:  No referring provider defined for this encounter.    /87   Pulse 70   Ht 1.778 m (5' 10\")   Wt 135.5 kg (298 lb 12.8 oz)   SpO2 (!) 70%   BMI 42.87 kg/m      Do you need any medication refills at today's visit? n  "

## 2019-11-21 ENCOUNTER — ANCILLARY PROCEDURE (OUTPATIENT)
Dept: GENERAL RADIOLOGY | Facility: CLINIC | Age: 54
End: 2019-11-21
Attending: FAMILY MEDICINE
Payer: COMMERCIAL

## 2019-11-21 VITALS — OXYGEN SATURATION: 97 % | SYSTOLIC BLOOD PRESSURE: 146 MMHG | DIASTOLIC BLOOD PRESSURE: 89 MMHG | HEART RATE: 90 BPM

## 2019-11-21 DIAGNOSIS — M54.16 LUMBAR RADICULOPATHY: ICD-10-CM

## 2019-11-21 PROCEDURE — 62323 NJX INTERLAMINAR LMBR/SAC: CPT | Performed by: RADIOLOGY

## 2019-11-21 RX ORDER — IOPAMIDOL 408 MG/ML
10 INJECTION, SOLUTION INTRATHECAL ONCE
Status: COMPLETED | OUTPATIENT
Start: 2019-11-21 | End: 2019-11-21

## 2019-11-21 RX ORDER — LIDOCAINE HYDROCHLORIDE 10 MG/ML
5 INJECTION, SOLUTION EPIDURAL; INFILTRATION; INTRACAUDAL; PERINEURAL ONCE
Status: COMPLETED | OUTPATIENT
Start: 2019-11-21 | End: 2019-11-21

## 2019-11-21 RX ORDER — BUPIVACAINE HYDROCHLORIDE 5 MG/ML
5 INJECTION, SOLUTION EPIDURAL; INTRACAUDAL ONCE
Status: COMPLETED | OUTPATIENT
Start: 2019-11-21 | End: 2019-11-21

## 2019-11-21 RX ORDER — METHYLPREDNISOLONE ACETATE 80 MG/ML
80 INJECTION, SUSPENSION INTRA-ARTICULAR; INTRALESIONAL; INTRAMUSCULAR; SOFT TISSUE ONCE
Status: COMPLETED | OUTPATIENT
Start: 2019-11-21 | End: 2019-11-21

## 2019-11-21 RX ADMIN — METHYLPREDNISOLONE ACETATE 80 MG: 80 INJECTION, SUSPENSION INTRA-ARTICULAR; INTRALESIONAL; INTRAMUSCULAR; SOFT TISSUE at 08:08

## 2019-11-21 RX ADMIN — IOPAMIDOL 1 ML: 408 INJECTION, SOLUTION INTRATHECAL at 08:07

## 2019-11-21 RX ADMIN — LIDOCAINE HYDROCHLORIDE 5 ML: 10 INJECTION, SOLUTION EPIDURAL; INFILTRATION; INTRACAUDAL; PERINEURAL at 08:08

## 2019-11-21 RX ADMIN — BUPIVACAINE HYDROCHLORIDE 5 MG: 5 INJECTION, SOLUTION EPIDURAL; INTRACAUDAL at 08:06

## 2019-11-21 NOTE — PROGRESS NOTES
: Adarsh was seen in X-ray today for a lumbar epidural injection. Patient rated pain before procedure 4/10. After procedure patient rated pain 4/10. This pain level is (is/is not) acceptable to patient. Patient discharged home with Friend.      AFTER YOU GO HOME    ? DO relax; minimize your activity for 24 hours  ? You may resume normal activity tomorrow  ? You may remove the bandage in the evening or next morning  ? You may resume bathing the next day  ? Drink at least 4 extra glasses of fluid today if not on fluid restrictions  ? DO NOT drive or operate machinery at home or at work for at least 24 hours      VISIT THE EMERGENCY ROOM OR URGENT CARE IF:    ? There is redness or swelling at the injection site  ? There is discharge from the injection site  ? You develop a temperature of 101  F or greater      ADDITIONAL INSTRUCTIONS:     ? You may resume your Coumadin or other blood thinner at your regular dose today.  Follow up with your physician to have your INR rechecked if indicated.  ? If you gain no relief from the injection after two (2) weeks, follow-up with your provider for your options.        Contacts:    During business hours from 8 to 5 pm, you may call [phone] to reach a nurse advisor at [facility].  After hours, call [facility]  [phone].  Ask for the Radiologist on-call.  Someone is on-call 24 hrs/day.  [facility] Toll Free Number   [phone]

## 2019-12-12 ENCOUNTER — OFFICE VISIT (OUTPATIENT)
Dept: URGENT CARE | Facility: URGENT CARE | Age: 54
End: 2019-12-12
Payer: COMMERCIAL

## 2019-12-12 VITALS
OXYGEN SATURATION: 97 % | SYSTOLIC BLOOD PRESSURE: 156 MMHG | HEART RATE: 84 BPM | WEIGHT: 300 LBS | TEMPERATURE: 99 F | DIASTOLIC BLOOD PRESSURE: 94 MMHG | BODY MASS INDEX: 43.05 KG/M2

## 2019-12-12 DIAGNOSIS — J20.9 ACUTE BRONCHITIS WITH SYMPTOMS > 10 DAYS: Primary | ICD-10-CM

## 2019-12-12 PROCEDURE — 99213 OFFICE O/P EST LOW 20 MIN: CPT | Performed by: PHYSICIAN ASSISTANT

## 2019-12-12 RX ORDER — CODEINE PHOSPHATE AND GUAIFENESIN 10; 100 MG/5ML; MG/5ML
2 SOLUTION ORAL EVERY 4 HOURS PRN
Qty: 50 ML | Refills: 0 | Status: SHIPPED | OUTPATIENT
Start: 2019-12-12 | End: 2020-10-20

## 2019-12-12 RX ORDER — LEVOFLOXACIN 500 MG/1
500 TABLET, FILM COATED ORAL DAILY
Qty: 7 TABLET | Refills: 0 | Status: SHIPPED | OUTPATIENT
Start: 2019-12-12 | End: 2020-02-12

## 2019-12-12 ASSESSMENT — ENCOUNTER SYMPTOMS
SORE THROAT: 1
SHORTNESS OF BREATH: 1
HEADACHES: 1
EYES NEGATIVE: 1
MYALGIAS: 1
RHINORRHEA: 1
CHILLS: 1
CARDIOVASCULAR NEGATIVE: 1
COUGH: 1
GASTROINTESTINAL NEGATIVE: 1

## 2019-12-13 NOTE — PROGRESS NOTES
SUBJECTIVE:   Adarsh Salvador is a 54 year old male presenting with a chief complaint of   Chief Complaint   Patient presents with     Cough     cough x3 weeks        He is an established patient of [facility].  Patient has complaints of URI symptoms x 3 weeks.  ST and cough.  Took otc products with no improvement.  Cough keeps him up at night.      URI Adult    Onset of symptoms was 3 week(s) ago.  Course of illness is same.    Severity moderate  Current and Associated symptoms: cough - non-productive, shortness of breath, sore throat and body aches  Treatment measures tried include OTC Cough med.  Predisposing factors include None.        Review of Systems   Constitutional: Positive for chills.   HENT: Positive for congestion, rhinorrhea and sore throat.    Eyes: Negative.    Respiratory: Positive for cough and shortness of breath.    Cardiovascular: Negative.    Gastrointestinal: Negative.    Genitourinary: Negative.    Musculoskeletal: Positive for myalgias.   Skin: Negative.    Neurological: Positive for headaches.   All other systems reviewed and are negative.      Past Medical History:   Diagnosis Date     Anaphylactic reaction 8/14/2015     Anxiety      Depression      DM2 (diabetes mellitus, type 2) (H)      HTN (hypertension)      IBS (irritable bowel syndrome)      Kidney stone 6/15/2011    Pt believes these were Calcium stones     Neuropathy      Family History   Problem Relation Age of Onset     Cancer Mother 52        lung, smoked     Cancer - colorectal Father 53        unsure type, pt attributes to radiation exposure     Myocardial Infarction Father 45     Coronary Artery Disease Father      Myocardial Infarction Paternal Grandfather 35     Diabetes Other         nephew- type 1     Diabetes Maternal Aunt         1     Diabetes Maternal Aunt         2     Diabetes Paternal Uncle         1     Diabetes Paternal Uncle         2     Diabetes Paternal Uncle         3     Diabetes Paternal Uncle         4      Colon Cancer No family hx of      Current Outpatient Medications   Medication Sig Dispense Refill     ACCU-CHEK GUIDE test strip USE TO TEST BLOOD SUGAR 1 TIMES DAILY OR AS DIRECTED. 100 strip 3     albuterol (PROAIR HFA/PROVENTIL HFA/VENTOLIN HFA) 108 (90 Base) MCG/ACT inhaler Inhale 2 puffs into the lungs every 6 hours as needed for shortness of breath / dyspnea or wheezing 1 Inhaler 3     Alpha Lipoic Acid 200 MG CAPS Take 200 mg by mouth 3 times daily 90 capsule 3     ALPRAZolam (XANAX) 0.5 MG tablet Take one tablet about 30 minutes prior to the procedure. 1 tablet 0     amLODIPine (NORVASC) 10 MG tablet TAKE 1 TABLET (10 MG) BY MOUTH DAILY 90 tablet 2     ASPIRIN PO Take 325 mg by mouth daily       aspirin-acetaminophen-caffeine (EXCEDRIN MIGRAINE) 250-250-65 MG tablet Take 1 tablet by mouth       atorvastatin (LIPITOR) 40 MG tablet Take 1 tablet (40 mg) by mouth daily Stop simvastatin 90 tablet 3     baclofen (LIORESAL) 10 MG tablet Take 1 tablet (10 mg) by mouth See Admin Instructions 1-2 po q hs. 60 tablet 1     blood glucose monitoring (ACCU-CHEK FASTCLIX) lancets Use to test blood sugar 1 times daily or as directed.  Ok to substitute alternative if insurance prefers. 102 each 11     calcium carbonate (TUMS) 500 MG chewable tablet Take 1 chew tab by mouth daily       cyclobenzaprine (FLEXERIL) 10 MG tablet Take 0.5-1 tablets (5-10 mg) by mouth 3 times daily as needed for muscle spasms 90 tablet 1     dicyclomine (BENTYL) 20 MG tablet Take 20 mg by mouth every 6 hours       DULoxetine (CYMBALTA) 60 MG capsule Take 1 capsule (60 mg) by mouth 2 times daily 180 capsule 3     EPINEPHrine (EPIPEN) 0.3 MG/0.3ML injection Inject 0.3 mLs (0.3 mg) into the muscle once as needed for anaphylaxis 2 each 2     EPINEPHrine (EPIPEN/ADRENACLICK/OR ANY BX GENERIC EQUIV) 0.3 MG/0.3ML injection 2-pack Inject 0.3 mLs into the muscle       fluticasone-salmeterol (ADVAIR) 500-50 MCG/DOSE inhaler Inhale 1 puff into the lungs 2  times daily 1 Inhaler 11     guaiFENesin-codeine (ROBITUSSIN AC) 100-10 MG/5ML solution Take 10 mLs by mouth every 4 hours as needed for cough 50 mL 0     levofloxacin (LEVAQUIN) 500 MG tablet Take 1 tablet (500 mg) by mouth daily for 7 days 7 tablet 0     losartan (COZAAR) 50 MG tablet Take 1 tablet (50 mg) by mouth daily For blood pressure. Stop lisinopril. 30 tablet 1     metFORMIN (GLUCOPHAGE-XR) 500 MG 24 hr tablet Take 2 tablets (1,000 mg) by mouth 2 times daily (with meals) 360 tablet 0     montelukast (SINGULAIR) 10 MG tablet Take 1 tablet (10 mg) by mouth At Bedtime For allergies/asthma 30 tablet 2     multivitamin, therapeutic (THERA-VIT) TABS Take 1 tablet by mouth daily       Naproxen Sodium (ALEVE PO) Take by mouth as needed        naproxen sodium (ANAPROX) 220 MG tablet Take 220 mg by mouth       order for DME Equipment being ordered: DME  [id number]  . Walking boot select large, short pneumatic 1 Device 0     order for DME Equipment being ordered: Tall Aluminum Crutches, CRTL,  1 each 0     Social History     Tobacco Use     Smoking status: Never Smoker     Smokeless tobacco: Current User     Types: Chew     Tobacco comment: Chew   Substance Use Topics     Alcohol use: Yes     Alcohol/week: 0.0 standard drinks     Comment: occasional       OBJECTIVE  BP (!) 156/94   Pulse 84   Temp 99  F (37.2  C) (Oral)   Wt 136.1 kg (300 lb)   SpO2 97%   BMI 43.05 kg/m      Physical Exam  Vitals signs and nursing note reviewed.   Constitutional:       Appearance: Normal appearance. He is normal weight.   HENT:      Head: Normocephalic and atraumatic.      Right Ear: Tympanic membrane, ear canal and external ear normal.      Left Ear: Tympanic membrane, ear canal and external ear normal.      Nose: Nose normal.      Mouth/Throat:      Mouth: Mucous membranes are moist.      Pharynx: Oropharynx is clear. Posterior oropharyngeal erythema present.   Eyes:      Extraocular Movements: Extraocular movements  intact.      Conjunctiva/sclera: Conjunctivae normal.   Neck:      Musculoskeletal: Normal range of motion and neck supple. No neck rigidity or muscular tenderness.   Cardiovascular:      Rate and Rhythm: Normal rate and regular rhythm.      Pulses: Normal pulses.      Heart sounds: Normal heart sounds.   Pulmonary:      Effort: Pulmonary effort is normal. No respiratory distress.      Breath sounds: Normal breath sounds. No stridor. No wheezing, rhonchi or rales.   Chest:      Chest wall: No tenderness.   Lymphadenopathy:      Cervical: No cervical adenopathy.   Skin:     General: Skin is warm and dry.      Capillary Refill: Capillary refill takes less than 2 seconds.   Neurological:      General: No focal deficit present.      Mental Status: He is alert and oriented to person, place, and time.   Psychiatric:         Mood and Affect: Mood normal.         Behavior: Behavior normal.         Labs:  No results found for this or any previous visit (from the past 24 hour(s)).    X-Ray was not done.    ASSESSMENT:      ICD-10-CM    1. Acute bronchitis with symptoms > 10 days J20.9 levofloxacin (LEVAQUIN) 500 MG tablet     guaiFENesin-codeine (ROBITUSSIN AC) 100-10 MG/5ML solution        Medical Decision Making:    Differential Diagnosis:  URI Adult/Peds:  Bronchitis-viral, Pneumonia and Sinusitis    Serious Comorbid Conditions:  Adult:  Diabetes    PLAN:    URI Adult:  Levaquin, robitussin ac.  Patient has taken robitussin ac in past with benadryl and has been ok.  Discussed the risks and accepts.      Followup:    If not improving or if condition worsens, follow up with your Primary Care Provider, If not improving or if conditions worsens over the next 12-24 hours, go to the Emergency Department    Patient Instructions     Patient Education     Bronchitis, Antibiotic Treatment (Adult)    Bronchitis is an infection of the air passages (bronchial tubes) in your lungs. It often occurs when you have a cold. This illness is  contagious during the first few days and is spread through the air by coughing and sneezing, or by direct contact (touching the sick person and then touching your own eyes, nose, or mouth).  Symptoms of bronchitis include cough with mucus (phlegm) and low-grade fever. Bronchitis usually lasts 7 to 14 days. Mild cases can be treated with simple home remedies. More severe infection is treated with an antibiotic.  Home care  Follow these guidelines when caring for yourself at home:    If your symptoms are severe, rest at home for the first 2 to 3 days. When you go back to your usual activities, don't let yourself get too tired.    Don't smoke. Also stay away from secondhand smoke.    You may use over-the-counter medicines to control fever or pain, unless another medicine was prescribed. If you have chronic liver or kidney disease or have ever had a stomach ulcer or gastrointestinal bleeding, talk with your healthcare provider before using these medicines. Also talk to your provider if you are taking medicine to prevent blood clots. Aspirin should never be given to anyone younger than 18 who is ill with a viral infection or fever. It may cause severe liver or brain damage.    Your appetite may be low, so a light diet is fine. Stay well hydrated by drinking 6 to 8 glasses of fluids per day. This includes water, soft drinks, sports drinks, juices, tea, or soup. Extra fluids will help loosen mucus in your nose and lungs.    Over-the-counter cough, cold, and sore-throat medicines will not shorten the length of the illness, but they may be helpful to reduce your symptoms. Don't use decongestants if you have high blood pressure.    Finish all antibiotic medicine. Do this even if you are feeling better after only a few days.  Follow-up care  Follow up with your healthcare provider, or as advised. If you had an X-ray or ECG (electrocardiogram), a specialist will review it. You will be told of any new test results that may  affect your care.  If you are age 65 or older, if you smoke, or if you have a chronic lung disease or condition that affects your immune system, ask your healthcare provider about getting a pneumococcal vaccine and a yearly flu shot (influenza vaccine).  When to seek medical advice  Call your healthcare provider right away if any of these occur:    Fever of 100.4 F (38 C) or higher, or as directed by your healthcare provider    Coughing up more sputum    Weakness, drowsiness, headache, facial pain, ear pain, or a stiff neck  Call 911  Call 911 if any of these occur.    Coughing up blood    Weakness, drowsiness, headache, or stiff neck that get worse    Trouble breathing, wheezing, or pain with breathing  Date Last Reviewed: 6/1/2018 2000-2018 [email or web address]. [address] [place] [address], [place] [address]. All rights reserved. This information is not intended as a substitute for professional medical care. Always follow your healthcare professional's instructions.

## 2019-12-13 NOTE — PATIENT INSTRUCTIONS

## 2020-01-13 DIAGNOSIS — E11.9 TYPE 2 DIABETES MELLITUS WITHOUT COMPLICATION, WITHOUT LONG-TERM CURRENT USE OF INSULIN (H): ICD-10-CM

## 2020-01-13 DIAGNOSIS — I10 HYPERTENSION GOAL BP (BLOOD PRESSURE) < 140/90: ICD-10-CM

## 2020-01-13 NOTE — LETTER
January 14, 2020    Adarsh Salvador  [address] [facility] [address]    Dear Adarsh,       We recently received a refill request for amlodipine and metformin.  We have refilled this for a one time 30 day supply only because you are due for a:    Blood pressure and diabetes office visit      Please call at your earliest convenience so that there will not be a delay with your future refills.          Thank you,   Your [facility] Team/  [phone]

## 2020-01-14 RX ORDER — AMLODIPINE BESYLATE 10 MG/1
10 TABLET ORAL DAILY
Qty: 30 TABLET | Refills: 0 | Status: SHIPPED | OUTPATIENT
Start: 2020-01-14 | End: 2020-03-26

## 2020-01-14 RX ORDER — METFORMIN HCL 500 MG
1000 TABLET, EXTENDED RELEASE 24 HR ORAL 2 TIMES DAILY WITH MEALS
Qty: 120 TABLET | Refills: 0 | Status: SHIPPED | OUTPATIENT
Start: 2020-01-14 | End: 2020-03-26

## 2020-01-14 NOTE — TELEPHONE ENCOUNTER
Per OV note dated 10/3/2019 from  Bertha Romero PA-C is as follows:   Return in about 3 months (around 1/3/2020) for med recheck.    Refilled x 1 month per protocol.  Patient needs to be seen for further refills. Thank you. Keisha HUITRON I forgot message to pharmacy.     TC - Please send letter to pt needs to been seen for further refills  Thank you

## 2020-02-12 ENCOUNTER — OFFICE VISIT (OUTPATIENT)
Dept: NEUROLOGY | Facility: CLINIC | Age: 55
End: 2020-02-12
Payer: COMMERCIAL

## 2020-02-12 ENCOUNTER — TELEPHONE (OUTPATIENT)
Dept: NEUROLOGY | Facility: CLINIC | Age: 55
End: 2020-02-12

## 2020-02-12 VITALS
DIASTOLIC BLOOD PRESSURE: 82 MMHG | BODY MASS INDEX: 43.76 KG/M2 | OXYGEN SATURATION: 98 % | SYSTOLIC BLOOD PRESSURE: 143 MMHG | WEIGHT: 305 LBS | HEART RATE: 78 BPM

## 2020-02-12 DIAGNOSIS — G62.9 NEUROPATHY: Primary | ICD-10-CM

## 2020-02-12 LAB — VIT B12 SERPL-MCNC: 360 PG/ML (ref 193–986)

## 2020-02-12 PROCEDURE — 83921 ORGANIC ACID SINGLE QUANT: CPT | Performed by: PSYCHIATRY & NEUROLOGY

## 2020-02-12 PROCEDURE — 83655 ASSAY OF LEAD: CPT | Mod: 90 | Performed by: PSYCHIATRY & NEUROLOGY

## 2020-02-12 PROCEDURE — 36415 COLL VENOUS BLD VENIPUNCTURE: CPT | Performed by: PSYCHIATRY & NEUROLOGY

## 2020-02-12 PROCEDURE — 83825 ASSAY OF MERCURY: CPT | Mod: 90 | Performed by: PSYCHIATRY & NEUROLOGY

## 2020-02-12 PROCEDURE — 82607 VITAMIN B-12: CPT | Performed by: PSYCHIATRY & NEUROLOGY

## 2020-02-12 PROCEDURE — 99000 SPECIMEN HANDLING OFFICE-LAB: CPT | Performed by: PSYCHIATRY & NEUROLOGY

## 2020-02-12 PROCEDURE — 99214 OFFICE O/P EST MOD 30 MIN: CPT | Performed by: PSYCHIATRY & NEUROLOGY

## 2020-02-12 PROCEDURE — 82175 ASSAY OF ARSENIC: CPT | Mod: 90 | Performed by: PSYCHIATRY & NEUROLOGY

## 2020-02-12 RX ORDER — BACLOFEN 10 MG/1
10 TABLET ORAL SEE ADMIN INSTRUCTIONS
Qty: 90 TABLET | Refills: 1 | Status: SHIPPED | OUTPATIENT
Start: 2020-02-12 | End: 2022-08-18

## 2020-02-12 NOTE — NURSING NOTE
Adarsh Salvador's goals for this visit include: return  He requests these members of his care team be copied on today's visit information:     PCP: Bertha Romero    Referring Provider:  No referring provider defined for this encounter.    BP (!) 143/82   Pulse 78   Wt 138.3 kg (305 lb)   SpO2 98%   BMI 43.76 kg/m      Do you need any medication refills at today's visit? n

## 2020-02-12 NOTE — LETTER
2/12/2020         RE: Adarsh Salvador  [address] [address]        Dear Colleague,    Thank you for referring your patient, Adarsh Salvador, to the [facility]. Please see a copy of my visit note below.    Visit Date:   02/12/2020      HISTORY OF PRESENT ILLNESS:  This patient is seen in followup with neuropathy.  I last saw the patient 3 months ago.  He has painful diabetic neuropathy and meralgia paresthetica.  Things have not been going all that well.  Night is the worst.  His feet hurt.  His legs hurt.  They burn and ache.  If he gets up and walks, it does not help.  He also has been falling.  He trips over little things.  This happens up to a couple of times a week.  He especially worries about it because of his work place.  He works as a .      He is on a regimen of medication that includes Duloxetine 120 mg daily, cyclobenzaprine on a p.r.n. basis, Baclofen 10-20 mg at night.  He does think the baclofen is helpful.  He is also on alpha lipoic acid and he thinks that is helpful.      PHYSICAL EXAMINATION:   GENERAL:  The patient is cooperative and in no distress.   VITAL SIGNS:  His blood pressure is 143/82.   NEUROLOGIC:  The lower legs shows reduced pinprick, temperature, and vibratory sense in the toes and lower legs compared to the hands.  Muscle bulk is reduced in the extensor digitorum brevis bilaterally.  Knee reflexes are trace and ankle reflexes are absent.  He can walk on his heels and toes.  His Romberg sign is somewhat present.      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  The patient reports he is not exposed to heavy metals such as mercury or lead in the workplace or otherwise.      ASSESSMENT:   1.  Painful diabetic neuropathy, possibly with worsening.   2.  Bilateral meralgia paresthetica.      DISCUSSION:  The patient is seen with the above problem list.  At this point, I am going to recheck  labs including screen for heavy metals and a vitamin B12 level.  I am going to schedule him for another EMG to have it compared to the study from 2017 to see if there is any worsening.  I am going to increase his nighttime dose of baclofen to 20-30 mg.  I will see him in followup in 3 months.      This was a 25-minute appointment, more than half of which was spent in counseling and coordination of care.         CHELSEA MONTALVO MD             D: 2020   T: 2020   MT: BERTRAM      Name:     LUTHER BLACKMAN   MRN:      [id number]        Account:      [id number]   :      1965           Visit Date:   2020      Document: [id number]        Again, thank you for allowing me to participate in the care of your patient.        Sincerely,        Chelsea Montalvo MD

## 2020-02-12 NOTE — PROGRESS NOTES
Visit Date:   02/12/2020      HISTORY OF PRESENT ILLNESS:  This patient is seen in followup with neuropathy.  I last saw the patient 3 months ago.  He has painful diabetic neuropathy and meralgia paresthetica.  Things have not been going all that well.  Night is the worst.  His feet hurt.  His legs hurt.  They burn and ache.  If he gets up and walks, it does not help.  He also has been falling.  He trips over little things.  This happens up to a couple of times a week.  He especially worries about it because of his work place.  He works as a .      He is on a regimen of medication that includes Duloxetine 120 mg daily, cyclobenzaprine on a p.r.n. basis, Baclofen 10-20 mg at night.  He does think the baclofen is helpful.  He is also on alpha lipoic acid and he thinks that is helpful.      PHYSICAL EXAMINATION:   GENERAL:  The patient is cooperative and in no distress.   VITAL SIGNS:  His blood pressure is 143/82.   NEUROLOGIC:  The lower legs shows reduced pinprick, temperature, and vibratory sense in the toes and lower legs compared to the hands.  Muscle bulk is reduced in the extensor digitorum brevis bilaterally.  Knee reflexes are trace and ankle reflexes are absent.  He can walk on his heels and toes.  His Romberg sign is somewhat present.      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  The patient reports he is not exposed to heavy metals such as mercury or lead in the workplace or otherwise.      ASSESSMENT:   1.  Painful diabetic neuropathy, possibly with worsening.   2.  Bilateral meralgia paresthetica.      DISCUSSION:  The patient is seen with the above problem list.  At this point, I am going to recheck labs including screen for heavy metals and a vitamin B12 level.  I am going to schedule him for another EMG to have it compared to the study from 2017 to see if there is any worsening.  I am going to increase his nighttime dose of  baclofen to 20-30 mg.  I will see him in followup in 3 months.      This was a 25-minute appointment, more than half of which was spent in counseling and coordination of care.         CHELSEA POZO MD             D: 2020   T: 2020   MT: BERTRAM      Name:     LUTHER BLACKMAN   MRN:      [id number]        Account:      [id number]   :      1965           Visit Date:   2020      Document: [id number]

## 2020-02-12 NOTE — TELEPHONE ENCOUNTER
Faxed Pain Management Referral to [facility], fax number [phone], Clinic number [phone].    [facility]  Adult Med Spec/Surg Spec   [phone]

## 2020-02-12 NOTE — LETTER
"February 24, 2020      Adarsh Salvador  [address] [address]        Dear ,    We are writing to inform you of your test results.    Labs for heavy metals and B12 are normal.    Resulted Orders   Blood metal panel   Result Value Ref Range    Arsenic <10.0 0.0 - 12.0 ug/L      Comment:      (Note)  INTERPRETIVE INFORMATION: Arsenic, Blood  Elevated results may be due to skin or collection-related   contamination, including the use of a noncertified   metal-free collection/transport tube. If contamination   concerns exist due to elevated levels of blood arsenic,   confirmation with a second specimen collected in a   certified metal-free tube is recommended.   Potentially toxic ranges for blood arsenic: Greater than or   equal to 600 ug/L.  Blood arsenic is for the detection of recent exposure   poisoning only. Blood arsenic levels in healthy subjects   vary considerably with exposure to arsenic in the diet and   the environment. A 24-hour urine arsenic is useful for the   detection of chronic exposure.   Test developed and characteristics determined by [facility]. See Compliance Statement B: [email or web address]      Lead Venous Blood <2.0 0.0 - 4.9 ug/dL      Comment:      (Note)  INTERPRETIVE INFORMATION: Lead, Blood (Venous)  Elevated results may be due to skin or collection-related   contamination, including the use of a noncertified   lead-free tube. If contamination concerns exist due to   elevated levels of blood lead, confirmation with a second   specimen collected in a certified lead-free tube is   recommended.  Information sources for reference intervals and   interpretive comments include the \"CDC Response to the 2012   Advisory Committee on Childhood Lead Poisoning Prevention   Report\" and the \"Recommendations for Medical Management of   Adult Lead Exposure, Environmental Health Perspectives,   2007.\" Thresholds and time intervals for retesting, medical   evaluation, and response vary by " state and regulatory body.   Contact your State Department of Health and/or applicable   regulatory agency for specific guidance on medical   management recommendations.   Age            Concentration   Comment  All ages       5-9.9 ug/dL     Adverse health ef  fects are                                 possible, particularly in                                children under 6 years of                                age and pregnant women.                                Discuss health risks                                associated with continued                                lead exposure. For children                                and women who are or may                                become pregnant, reduce                                lead exposure.               All ages        10-19.9 ug/dL  Reduced lead exposure and                                increased biological                                monitoring are recommended.  All ages        20-69.9 ug/dL  Removal from lead exposure                                and prompt medical                                evaluation are recommended.                                Consider chelation therapy                                when concentrations exceed                                  50 ug/dL and symptoms of                                lead toxicity are present.  Less than 19     Greater than  Critical. Immediate medical  years of age     44.9 ug/dL    evaluation is recommended.                                Consider chelation therapy                                 when symptoms of lead                                toxicity are present.  Greater than 19  Greater than  Critical. Immediate medical  years of age     69.9 ug/dL    evaluation is recommended                                Consider chelation therapy                                when symptoms of lead                                 toxicity are present.  Test developed and  characteristics determined by [facility]. See Compliance Statement B: [employer]      Mercury <2.5 0.0 - 10.0 ug/L      Comment:      (Note)  INTERPRETIVE INFORMATION: Mercury, Blood  Elevated results may be due to skin or collection-related   contamination, including the use of a noncertified   metal-free collection/transport tube. If contamination   concerns exist due to elevated levels of blood mercury,   confirmation with a second specimen collected in a   certified metal-free tube is recommended.  Blood mercury levels predominantly reflect recent exposure   and are most useful in the diagnosis of acute poisoning as   blood mercury concentrations rise sharply and fall quickly   over several days after ingestion. Blood concentrations in   unexposed individuals rarely exceed 20 ug/L. The provided   reference interval relates to inorganic mercury   concentrations. Dietary and non-occupational exposure to   organic mercury forms may contribute to an elevated total   mercury result. Clinical presentation after toxic exposure   to organic mercury may include dysarthria, ataxia and   constricted vision fields with mercu  ry blood concentrations   from 20 to 50 ug/L.   Test developed and characteristics determined by [facility]. See Compliance Statement B: [employer]  Performed by [facility],  [address] [place] [id number] [phone]  www.[employer], Timmy Ye MD, Lab. Director     Vitamin B12   Result Value Ref Range    Vitamin B12 360 193 - 986 pg/mL   Methylmalonic Acid   Result Value Ref Range    Methylmalonic Acid 0.18 0.00 - 0.40 umol/L      Comment:      This test was developed and its performance characteristics determined by the   [facility].   It has not been cleared or approved by the FDA. The laboratory is regulated   under CLIA as qualified to perform high-complexity testing. This test is used   for  clinical purposes. It should not be regarded as investigational or for   research.         If you have any questions or concerns, please call the clinic at the number listed above.       Sincerely,        Wiliam Montalvo MD

## 2020-02-13 LAB
ARSENIC BLD-MCNC: <10 UG/L (ref 0–12)
LEAD BLDV-MCNC: <2 UG/DL (ref 0–4.9)
MERCURY BLD-MCNC: <2.5 UG/L (ref 0–10)

## 2020-02-20 LAB — METHYLMALONATE SERPL-SCNC: 0.18 UMOL/L (ref 0–0.4)

## 2020-02-24 ENCOUNTER — HEALTH MAINTENANCE LETTER (OUTPATIENT)
Age: 55
End: 2020-02-24

## 2020-02-29 ENCOUNTER — OFFICE VISIT (OUTPATIENT)
Dept: URGENT CARE | Facility: URGENT CARE | Age: 55
End: 2020-02-29
Payer: COMMERCIAL

## 2020-02-29 VITALS
HEART RATE: 78 BPM | WEIGHT: 300 LBS | DIASTOLIC BLOOD PRESSURE: 77 MMHG | TEMPERATURE: 98.6 F | SYSTOLIC BLOOD PRESSURE: 159 MMHG | BODY MASS INDEX: 43.05 KG/M2

## 2020-02-29 DIAGNOSIS — L50.9 HIVES: Primary | ICD-10-CM

## 2020-02-29 PROCEDURE — 99213 OFFICE O/P EST LOW 20 MIN: CPT | Performed by: NURSE PRACTITIONER

## 2020-02-29 RX ORDER — PREDNISONE 20 MG/1
TABLET ORAL
Qty: 20 TABLET | Refills: 0 | Status: SHIPPED | OUTPATIENT
Start: 2020-02-29 | End: 2020-10-20

## 2020-02-29 NOTE — PROGRESS NOTES
SUBJECTIVE:  Adarsh Salvador is a 54 year old male who presents to the clinic today for a rash.  Onset of rash was 4 day(s) ago.   Rash is sudden onset.  Location of the rash: widespread  Quality/symptoms of rash: itching   Symptoms are moderate and rash seems to be worsening.  Previous history of a similar rash? No  Recent exposure history: none known    Associated symptoms include: nothing.    Past Medical History:   Diagnosis Date     Anaphylactic reaction 8/14/2015     Anxiety      Depression      DM2 (diabetes mellitus, type 2) (H)      HTN (hypertension)      IBS (irritable bowel syndrome)      Kidney stone 6/15/2011    Pt believes these were Calcium stones     Neuropathy      Current Outpatient Medications   Medication Sig Dispense Refill     ACCU-CHEK GUIDE test strip USE TO TEST BLOOD SUGAR 1 TIMES DAILY OR AS DIRECTED. 100 strip 3     albuterol (PROAIR HFA/PROVENTIL HFA/VENTOLIN HFA) 108 (90 Base) MCG/ACT inhaler Inhale 2 puffs into the lungs every 6 hours as needed for shortness of breath / dyspnea or wheezing 1 Inhaler 3     Alpha Lipoic Acid 200 MG CAPS Take 200 mg by mouth 3 times daily 90 capsule 3     amLODIPine (NORVASC) 10 MG tablet Take 1 tablet (10 mg) by mouth daily 30 tablet 0     ASPIRIN PO Take 325 mg by mouth daily       aspirin-acetaminophen-caffeine (EXCEDRIN MIGRAINE) 250-250-65 MG tablet Take 1 tablet by mouth       atorvastatin (LIPITOR) 40 MG tablet Take 1 tablet (40 mg) by mouth daily Stop simvastatin 90 tablet 3     baclofen (LIORESAL) 10 MG tablet Take 1 tablet (10 mg) by mouth See Admin Instructions 2-3 po q hs. 90 tablet 1     blood glucose monitoring (ACCU-CHEK FASTCLIX) lancets Use to test blood sugar 1 times daily or as directed.  Ok to substitute alternative if insurance prefers. 102 each 11     calcium carbonate (TUMS) 500 MG chewable tablet Take 1 chew tab by mouth daily       cyclobenzaprine (FLEXERIL) 10 MG tablet Take 0.5-1 tablets (5-10 mg) by mouth 3 times daily as needed  for muscle spasms 90 tablet 1     dicyclomine (BENTYL) 20 MG tablet Take 20 mg by mouth every 6 hours       DULoxetine (CYMBALTA) 60 MG capsule Take 1 capsule (60 mg) by mouth 2 times daily 180 capsule 3     EPINEPHrine (EPIPEN) 0.3 MG/0.3ML injection Inject 0.3 mLs (0.3 mg) into the muscle once as needed for anaphylaxis 2 each 2     EPINEPHrine (EPIPEN/ADRENACLICK/OR ANY BX GENERIC EQUIV) 0.3 MG/0.3ML injection 2-pack Inject 0.3 mLs into the muscle       fluticasone-salmeterol (ADVAIR) 500-50 MCG/DOSE inhaler Inhale 1 puff into the lungs 2 times daily 1 Inhaler 11     guaiFENesin-codeine (ROBITUSSIN AC) 100-10 MG/5ML solution Take 10 mLs by mouth every 4 hours as needed for cough 50 mL 0     losartan (COZAAR) 50 MG tablet Take 1 tablet (50 mg) by mouth daily For blood pressure. Stop lisinopril. 30 tablet 1     metFORMIN (GLUCOPHAGE-XR) 500 MG 24 hr tablet Take 2 tablets (1,000 mg) by mouth 2 times daily (with meals) 120 tablet 0     montelukast (SINGULAIR) 10 MG tablet Take 1 tablet (10 mg) by mouth At Bedtime For allergies/asthma 30 tablet 2     multivitamin, therapeutic (THERA-VIT) TABS Take 1 tablet by mouth daily       Naproxen Sodium (ALEVE PO) Take by mouth as needed        naproxen sodium (ANAPROX) 220 MG tablet Take 220 mg by mouth       order for DME Equipment being ordered: [facility]  AIRES  . Walking boot select large, short pneumatic 1 Device 0     order for DME Equipment being ordered: Tall Aluminum Crutches, CRTL,  1 each 0     Social History     Tobacco Use     Smoking status: Never Smoker     Smokeless tobacco: Current User     Types: Chew     Tobacco comment: Chew   Substance Use Topics     Alcohol use: Yes     Alcohol/week: 0.0 standard drinks     Comment: occasional       ROS:  Review of systems negative except as stated above.    EXAM:   BP (!) 159/77   Pulse 78   Temp 98.6  F (37  C) (Oral)   Wt 136.1 kg (300 lb)   BMI 43.05 kg/m    SKIN: Rash description: hives widespread  GENERAL  APPEARANCE: Alert and no distress  EYES: EOMI,  PERRL, conjunctiva clear  NECK: supple, non-tender to palpation, no adenopathy noted  RESP: lungs clear to auscultation - no rales, rhonchi or wheezes  CV: regular rates and rhythm, normal S1 S2, no murmur noted    ASSESSMENT:  (L50.9) Hives  (primary encounter diagnosis)    Plan: predniSONE (DELTASONE) 20 MG tablet taper as directed  Patient education given  Home treat and follow-up with primary clinic if not improving    IRENE Sheridan CNP

## 2020-03-17 ENCOUNTER — TELEPHONE (OUTPATIENT)
Dept: FAMILY MEDICINE | Facility: CLINIC | Age: 55
End: 2020-03-17

## 2020-03-17 NOTE — TELEPHONE ENCOUNTER
Pt want to see Bertha Romero on Fri March 20 for bp check, and med refill. all the appt are on hold, unable to schedule. Please call Adarsh on Tues.

## 2020-03-17 NOTE — TELEPHONE ENCOUNTER
Does he have a blood pressure cuff at home? We could do evisit based off him home readings. Otherwise if normal at home he can wait to be seen. I can do refills or change dose depending on home readings. If he doenst have home readings, then I guess he has to come in.   He could purchase a blood pressure cuff at pharmacy if he doesn't have one.     Bertha Romero PA-C

## 2020-03-17 NOTE — TELEPHONE ENCOUNTER
Left message on answering machine for patient to call back.  [phone]  Thalia CROFTN, RN, CPN

## 2020-03-17 NOTE — TELEPHONE ENCOUNTER
Patient does not have a blood pressure cuff at home so does not have home readings  Patient is not out of med, but needing refills. Has 5 days worth   Per patient did check blood pressure about 2 weeks ago, was high, 175/100, but missed 1 day of medication    To provider to advise if needs to keep appointment, patient also asking if needs hgb a1c checked at this time as well      Thalia CROFTN, RN, CPN

## 2020-03-20 NOTE — TELEPHONE ENCOUNTER
Patient informed of provider response, needing appointment to discuss elevated blood pressure     Appointment next Thursday, 3/26. Patient does deny cough or fever at this time    Thalia CROFTN, RN, CPN

## 2020-03-20 NOTE — PROGRESS NOTES
Subjective     Adarsh Salvador is a 54 year old male who presents to clinic today for the following health issues:    HPI     Diabetes Follow-up    How often are you checking your blood sugar? One time daily  What time of day are you checking your blood sugars (select all that apply)?  Before and after meals  Have you had any blood sugars above 200?  Yes   Have you had any blood sugars below 70?  No    What symptoms do you notice when your blood sugar is low?  Shaky    What concerns do you have today about your diabetes? None     Do you have any of these symptoms? (Select all that apply)  No numbness or tingling in feet.  No redness, sores or blisters on feet.  No complaints of excessive thirst.  No reports of blurry vision.  No significant changes to weight.    Have you had a diabetic eye exam in the last 12 months? No                Hyperlipidemia Follow-Up      Are you regularly taking any medication or supplement to lower your cholesterol?   Yes- Atorvastatin    Are you having muscle aches or other side effects that you think could be caused by your cholesterol lowering medication?  No    Hypertension Follow-up      Do you check your blood pressure regularly outside of the clinic? Yes     Are you following a low salt diet? No    Are your blood pressures ever more than 140 on the top number (systolic) OR more   than 90 on the bottom number (diastolic), for example 140/90? YES    Change to losartan. Doing well on this. No chest pain, shortness of breath, edema, PND, or orthopnea. No dizziness or vision changes. No side effects from medications. Blood pressure has been stable on medication.    BP Readings from Last 2 Encounters:   03/26/20 126/80   02/29/20 (!) 159/77     Hemoglobin A1C (%)   Date Value   10/03/2019 6.6 (H)   01/11/2019 6.8 (H)     LDL Cholesterol Calculated (mg/dL)   Date Value   10/03/2019 167 (H)   01/12/2018 179 (H)         How many servings of fruits and vegetables do you eat daily?  0-1    On  average, how many sweetened beverages do you drink each day (Examples: soda, juice, sweet tea, etc.  Do NOT count diet or artificially sweetened beverages)?   1    How many days per week do you miss taking your medication? 0      SH: nonsmoker.   Has GF now.     Morbidly obese. Keeps gaining weight. Not interested in weight loss.       Following with neurology for  painful diabetic neuropathy and meralgia paresthetica.  they ordered another EMG and told to f/u 3 months.     {    Patient Active Problem List   Diagnosis     GERD (gastroesophageal reflux disease)     Chronic RLQ pain     Constipation     Chronic maxillary sinusitis     Chronic rhinitis     Hypertriglyceridemia     Benign essential hypertension     Anemia in other chronic diseases classified elsewhere     Fatty liver     Irritable bowel syndrome with diarrhea     Right inguinal hernia     Mild persistent asthma without complication     Type 2 diabetes mellitus with diabetic polyneuropathy, without long-term current use of insulin (H)     Hyperlipidemia LDL goal <100     CONNOR (generalized anxiety disorder)     Insomnia, unspecified type     Morbid obesity (H)     Past Surgical History:   Procedure Laterality Date     COLONOSCOPY  5/1/2012    Procedure:COLONOSCOPY; screening colonoscopy; Surgeon:KINGSLEY DOS SANTOS; Location:[facility] OR     COLONOSCOPY  4/21/2014    Procedure: COMBINED COLONOSCOPY, SINGLE BIOPSY/POLYPECTOMY BY BIOPSY;  Surgeon: Duane, William Charles, MD;  Location: [facility] OR     CYSTOSCOPY  05/01/2008    CYSTOSCOPY W/ URETERAL STENT PLACEMENT 05/01/2008      CYSTOSCOPY  09/26/2010    CYSTOSCOPY W/ URETERAL STENT PLACEMENT 09/26/2010 Left      CYSTOSCOPY  05/18/2012    CYSTOSCOPY, LEFT URETEROSCOPY AND STENT PLACEMENT left retrograde 05/18/2012      CYSTOSCOPY,+URETEROSCOPY  06/10/2008    URETEROSCOPY 06/10/2008 Right      HC BREATH HYDROGEN TEST  3/7/2014    Procedure: HYDROGEN BREATH TEST;  Surgeon: Darion Swift MD;  Location: [facility]      LITHOTRIPSY  09/30/2010    LITHOTRIPSY 09/30/2010 LEFT EXTRACORPOREAL SHOCK WAVE LITHOTRIPSY, FLEXIBLE CYSTOSCOPY, LEFT STENT REMOVAL       ORTHOPEDIC SURGERY  10/03/2007    KNEE ARTHROSCOPY 10/03/2007 RightX2       RELEASE CARPAL TUNNEL Right 1/26/2018    Procedure: RELEASE CARPAL TUNNEL;  Right carpal tunnel release;  Surgeon: Wiliam Saenz MD;  Location: [facility] OR     VASCULAR SURGERY  11/24/2008    Radiofrequency ablation left saphenous vein 11/24/2008 Done by Dr. Lozoya         Social History     Tobacco Use     Smoking status: Never Smoker     Smokeless tobacco: Current User     Types: Chew     Tobacco comment: Chew   Substance Use Topics     Alcohol use: Yes     Alcohol/week: 0.0 standard drinks     Comment: occasional     Family History   Problem Relation Age of Onset     Cancer Mother 52        lung, smoked     Cancer - colorectal Father 53        unsure type, pt attributes to radiation exposure     Myocardial Infarction Father 45     Coronary Artery Disease Father      Myocardial Infarction Paternal Grandfather 35     Diabetes Other         nephew- type 1     Diabetes Maternal Aunt         1     Diabetes Maternal Aunt         2     Diabetes Paternal Uncle         1     Diabetes Paternal Uncle         2     Diabetes Paternal Uncle         3     Diabetes Paternal Uncle         4     Colon Cancer No family hx of          Current Outpatient Medications   Medication Sig Dispense Refill     ACCU-CHEK GUIDE test strip USE TO TEST BLOOD SUGAR 1 TIMES DAILY OR AS DIRECTED. 100 strip 3     albuterol (PROAIR HFA/PROVENTIL HFA/VENTOLIN HFA) 108 (90 Base) MCG/ACT inhaler Inhale 2 puffs into the lungs every 6 hours as needed for shortness of breath / dyspnea or wheezing 1 Inhaler 3     Alpha Lipoic Acid 200 MG CAPS Take 200 mg by mouth 3 times daily 90 capsule 3     amLODIPine (NORVASC) 10 MG tablet Take 1 tablet (10 mg) by mouth daily For blood pressure 90 tablet 1     ASPIRIN PO Take 325 mg by mouth daily        aspirin-acetaminophen-caffeine (EXCEDRIN MIGRAINE) 250-250-65 MG tablet Take 1 tablet by mouth       atorvastatin (LIPITOR) 40 MG tablet Take 1 tablet (40 mg) by mouth daily Stop simvastatin 90 tablet 3     baclofen (LIORESAL) 10 MG tablet Take 1 tablet (10 mg) by mouth See Admin Instructions 2-3 po q hs. 90 tablet 1     blood glucose monitoring (ACCU-CHEK FASTCLIX) lancets Use to test blood sugar 1 times daily or as directed.  Ok to substitute alternative if insurance prefers. 102 each 11     calcium carbonate (TUMS) 500 MG chewable tablet Take 1 chew tab by mouth daily       cyclobenzaprine (FLEXERIL) 10 MG tablet Take 0.5-1 tablets (5-10 mg) by mouth 3 times daily as needed for muscle spasms 90 tablet 1     dicyclomine (BENTYL) 20 MG tablet Take 20 mg by mouth every 6 hours       DULoxetine (CYMBALTA) 60 MG capsule Take 1 capsule (60 mg) by mouth 2 times daily 180 capsule 3     EPINEPHrine (EPIPEN) 0.3 MG/0.3ML injection Inject 0.3 mLs (0.3 mg) into the muscle once as needed for anaphylaxis 2 each 2     EPINEPHrine (EPIPEN/ADRENACLICK/OR ANY BX GENERIC EQUIV) 0.3 MG/0.3ML injection 2-pack Inject 0.3 mLs into the muscle       fluticasone (FLONASE) 50 MCG/ACT nasal spray Spray 1 spray into both nostrils daily 18.2 mL 1     fluticasone-salmeterol (ADVAIR) 500-50 MCG/DOSE inhaler Inhale 1 puff into the lungs 2 times daily 1 Inhaler 11     guaiFENesin-codeine (ROBITUSSIN AC) 100-10 MG/5ML solution Take 10 mLs by mouth every 4 hours as needed for cough 50 mL 0     losartan (COZAAR) 50 MG tablet Take 1 tablet (50 mg) by mouth daily For blood pressure. 90 tablet 1     metFORMIN (GLUCOPHAGE-XR) 500 MG 24 hr tablet Take 2 tablets (1,000 mg) by mouth 2 times daily (with meals) 120 tablet 5     montelukast (SINGULAIR) 10 MG tablet Take 1 tablet (10 mg) by mouth At Bedtime For allergies/asthma 30 tablet 2     multivitamin, therapeutic (THERA-VIT) TABS Take 1 tablet by mouth daily       Naproxen Sodium (ALEVE PO) Take by mouth  "as needed        naproxen sodium (ANAPROX) 220 MG tablet Take 220 mg by mouth       order for DME Equipment being ordered: [facility] [place] [facility]  [id number]  . Walking boot select large, short pneumatic 1 Device 0     order for DME Equipment being ordered: Tall Aluminum Crutches, CRTL,  1 each 0     predniSONE (DELTASONE) 20 MG tablet Take 3 tabs by mouth daily x 3 days, then 2 tabs daily x 3 days, then 1 tab daily x 3 days, then 1/2 tab daily x 3 days. 20 tablet 0     Allergies   Allergen Reactions     Artificial Sweetner (Informational Only) [Artificial Sweetner (Informational Only) ] GI Disturbance     ALL artificial sweetners cause severe diarrhea & flu symptoms     Hydromorphone Anaphylaxis     Ibuprofen GI Disturbance     Morphine [Fumaric Acid] Anaphylaxis     Hydrocodone-Acetaminophen Itching     Tramadol Hives     Trazodone Hives     Gabapentin      GI upset per pt     Keflex [Cephalexin] Diarrhea     Upset stomach     Codeine Phosphate Itching     Ketorolac Tromethamine Rash         Reviewed and updated as needed this visit by Provider         Review of Systems   ROS COMP: Constitutional, HEENT, cardiovascular, pulmonary, GI, , musculoskeletal, neuro, skin, endocrine and psych systems are negative, except as otherwise noted.      Objective    /80   Pulse 86   Temp 97.6  F (36.4  C) (Tympanic)   Resp 16   Ht 1.727 m (5' 8\")   Wt 135.2 kg (298 lb)   SpO2 97%   BMI 45.31 kg/m    Body mass index is 45.31 kg/m .  Physical Exam   GENERAL: alert, no distress and obese  NECK: no adenopathy, no asymmetry, masses, or scars and thyroid normal to palpation  RESP: lungs clear to auscultation - no rales, rhonchi or wheezes  CV: regular rate and rhythm, normal S1 S2, no S3 or S4, no murmur, click or rub, no peripheral edema and peripheral pulses strong  MS: no gross musculoskeletal defects noted, no edema  NEURO: Normal strength and tone, mentation intact and speech normal  PSYCH: mentation appears normal, affect " "normal/bright            Assessment & Plan     1. Type 2 diabetes mellitus with diabetic polyneuropathy, without long-term current use of insulin (H)  Labs pending  overlal has done well on medications    - HEMOGLOBIN A1C  - metFORMIN (GLUCOPHAGE-XR) 500 MG 24 hr tablet; Take 2 tablets (1,000 mg) by mouth 2 times daily (with meals)  Dispense: 120 tablet; Refill: 5  - Basic metabolic panel  (Ca, Cl, CO2, Creat, Gluc, K, Na, BUN)  - Lipid panel reflex to direct LDL Fasting    2. Morbid obesity (H)  See hpi    3. Hypertension goal BP (blood pressure) < 140/90  stable  - amLODIPine (NORVASC) 10 MG tablet; Take 1 tablet (10 mg) by mouth daily For blood pressure  Dispense: 90 tablet; Refill: 1  - losartan (COZAAR) 50 MG tablet; Take 1 tablet (50 mg) by mouth daily For blood pressure.  Dispense: 90 tablet; Refill: 1    4. Seasonal allergic rhinitis due to other allergic trigger  R ear has some fluid behind it, he gets seasonal allergies, will use flonase   No infection  Patient mentioned this at end of visit  - fluticasone (FLONASE) 50 MCG/ACT nasal spray; Spray 1 spray into both nostrils daily  Dispense: 18.2 mL; Refill: 1     Tobacco Cessation:   reports that he has never smoked. His smokeless tobacco use includes chew.        BMI:   Estimated body mass index is 45.31 kg/m  as calculated from the following:    Height as of this encounter: 1.727 m (5' 8\").    Weight as of this encounter: 135.2 kg (298 lb).   Weight management plan: Discussed healthy diet and exercise guidelines          Return in about 6 months (around 9/26/2020) for diabetes recheck.    Bertha Romero PA-C  [facility]        "

## 2020-03-26 ENCOUNTER — OFFICE VISIT (OUTPATIENT)
Dept: FAMILY MEDICINE | Facility: CLINIC | Age: 55
End: 2020-03-26
Payer: COMMERCIAL

## 2020-03-26 VITALS
OXYGEN SATURATION: 97 % | HEART RATE: 86 BPM | SYSTOLIC BLOOD PRESSURE: 126 MMHG | DIASTOLIC BLOOD PRESSURE: 80 MMHG | TEMPERATURE: 97.6 F | BODY MASS INDEX: 45.16 KG/M2 | WEIGHT: 298 LBS | RESPIRATION RATE: 16 BRPM | HEIGHT: 68 IN

## 2020-03-26 DIAGNOSIS — E11.42 TYPE 2 DIABETES MELLITUS WITH DIABETIC POLYNEUROPATHY, WITHOUT LONG-TERM CURRENT USE OF INSULIN (H): Primary | ICD-10-CM

## 2020-03-26 DIAGNOSIS — I10 HYPERTENSION GOAL BP (BLOOD PRESSURE) < 140/90: ICD-10-CM

## 2020-03-26 DIAGNOSIS — E66.01 MORBID OBESITY (H): ICD-10-CM

## 2020-03-26 DIAGNOSIS — J30.89 SEASONAL ALLERGIC RHINITIS DUE TO OTHER ALLERGIC TRIGGER: ICD-10-CM

## 2020-03-26 PROBLEM — E11.9 TYPE 2 DIABETES MELLITUS WITHOUT COMPLICATION, WITHOUT LONG-TERM CURRENT USE OF INSULIN (H): Chronic | Status: RESOLVED | Noted: 2018-01-18 | Resolved: 2020-03-26

## 2020-03-26 LAB
ANION GAP SERPL CALCULATED.3IONS-SCNC: 3 MMOL/L (ref 3–14)
BUN SERPL-MCNC: 16 MG/DL (ref 7–30)
CALCIUM SERPL-MCNC: 8.9 MG/DL (ref 8.5–10.1)
CHLORIDE SERPL-SCNC: 108 MMOL/L (ref 94–109)
CHOLEST SERPL-MCNC: 295 MG/DL
CO2 SERPL-SCNC: 29 MMOL/L (ref 20–32)
CREAT SERPL-MCNC: 0.87 MG/DL (ref 0.66–1.25)
GFR SERPL CREATININE-BSD FRML MDRD: >90 ML/MIN/{1.73_M2}
GLUCOSE SERPL-MCNC: 188 MG/DL (ref 70–99)
HBA1C MFR BLD: 7.5 % (ref 0–5.6)
HDLC SERPL-MCNC: 32 MG/DL
LDLC SERPL CALC-MCNC: ABNORMAL MG/DL
LDLC SERPL DIRECT ASSAY-MCNC: 179 MG/DL
NONHDLC SERPL-MCNC: 263 MG/DL
POTASSIUM SERPL-SCNC: 4.1 MMOL/L (ref 3.4–5.3)
SODIUM SERPL-SCNC: 140 MMOL/L (ref 133–144)
TRIGL SERPL-MCNC: 494 MG/DL

## 2020-03-26 PROCEDURE — 99214 OFFICE O/P EST MOD 30 MIN: CPT | Performed by: PHYSICIAN ASSISTANT

## 2020-03-26 PROCEDURE — 80061 LIPID PANEL: CPT | Performed by: PHYSICIAN ASSISTANT

## 2020-03-26 PROCEDURE — 36415 COLL VENOUS BLD VENIPUNCTURE: CPT | Performed by: PHYSICIAN ASSISTANT

## 2020-03-26 PROCEDURE — 83036 HEMOGLOBIN GLYCOSYLATED A1C: CPT | Performed by: PHYSICIAN ASSISTANT

## 2020-03-26 PROCEDURE — 83721 ASSAY OF BLOOD LIPOPROTEIN: CPT | Mod: 59 | Performed by: PHYSICIAN ASSISTANT

## 2020-03-26 PROCEDURE — 80048 BASIC METABOLIC PNL TOTAL CA: CPT | Performed by: PHYSICIAN ASSISTANT

## 2020-03-26 RX ORDER — FLUTICASONE PROPIONATE 50 MCG
1 SPRAY, SUSPENSION (ML) NASAL DAILY
Qty: 18.2 ML | Refills: 1 | Status: SHIPPED | OUTPATIENT
Start: 2020-03-26 | End: 2022-01-24

## 2020-03-26 RX ORDER — METFORMIN HCL 500 MG
1000 TABLET, EXTENDED RELEASE 24 HR ORAL 2 TIMES DAILY WITH MEALS
Qty: 120 TABLET | Refills: 5 | Status: SHIPPED | OUTPATIENT
Start: 2020-03-26 | End: 2020-12-03

## 2020-03-26 RX ORDER — AMLODIPINE BESYLATE 10 MG/1
10 TABLET ORAL DAILY
Qty: 90 TABLET | Refills: 1 | Status: SHIPPED | OUTPATIENT
Start: 2020-03-26 | End: 2020-12-03

## 2020-03-26 RX ORDER — LOSARTAN POTASSIUM 50 MG/1
50 TABLET ORAL DAILY
Qty: 90 TABLET | Refills: 1 | Status: SHIPPED | OUTPATIENT
Start: 2020-03-26 | End: 2020-12-03

## 2020-03-26 ASSESSMENT — ANXIETY QUESTIONNAIRES
1. FEELING NERVOUS, ANXIOUS, OR ON EDGE: NOT AT ALL
IF YOU CHECKED OFF ANY PROBLEMS ON THIS QUESTIONNAIRE, HOW DIFFICULT HAVE THESE PROBLEMS MADE IT FOR YOU TO DO YOUR WORK, TAKE CARE OF THINGS AT HOME, OR GET ALONG WITH OTHER PEOPLE: NOT DIFFICULT AT ALL
7. FEELING AFRAID AS IF SOMETHING AWFUL MIGHT HAPPEN: NOT AT ALL
GAD7 TOTAL SCORE: 0
3. WORRYING TOO MUCH ABOUT DIFFERENT THINGS: NOT AT ALL
5. BEING SO RESTLESS THAT IT IS HARD TO SIT STILL: NOT AT ALL
6. BECOMING EASILY ANNOYED OR IRRITABLE: NOT AT ALL
2. NOT BEING ABLE TO STOP OR CONTROL WORRYING: NOT AT ALL

## 2020-03-26 ASSESSMENT — PATIENT HEALTH QUESTIONNAIRE - PHQ9
5. POOR APPETITE OR OVEREATING: NOT AT ALL
SUM OF ALL RESPONSES TO PHQ QUESTIONS 1-9: 0

## 2020-03-26 ASSESSMENT — MIFFLIN-ST. JEOR: SCORE: 2176.22

## 2020-03-27 ENCOUNTER — TELEPHONE (OUTPATIENT)
Dept: FAMILY MEDICINE | Facility: CLINIC | Age: 55
End: 2020-03-27

## 2020-03-27 ASSESSMENT — ANXIETY QUESTIONNAIRES: GAD7 TOTAL SCORE: 0

## 2020-03-27 ASSESSMENT — ASTHMA QUESTIONNAIRES: ACT_TOTALSCORE: 12

## 2020-03-27 NOTE — TELEPHONE ENCOUNTER
Bertha Romero PA-C sent to [facility]               PLEASE CALL PATIENT:   Dear Adarsh,       It was a pleasure to see you at your recent office visit.  Your test results are listed below.  a1c is higher at 7.5 . Sugars are not as well controlled. Cholesterol is worse also. We have two options 1) work on you losing weight with either modifying diet or exercising more or 2) adding another medication. Then we should recheck a1c in 3 months instead of 6. Kidney function was normal.   What does patient prefer for plan?         If you have any questions or concerns, please call the clinic at [phone].     Sincerely,   Bertha Romero PA-C

## 2020-03-27 NOTE — LETTER
April 1, 2020    Adarsh Salvador  [address]  [place] [address]          We tried to reach you by phone.    Dear Adarsh,       It was a pleasure to see you at your recent office visit.   A1c is higher at 7.5 . Sugars are not as well controlled. Cholesterol is worse also. We have two options 1) work on you losing weight with either modifying diet or exercising more or 2) adding another medication. Then we should recheck a1c in 3 months instead of 6. Kidney function was normal.   What does patient prefer for plan?         If you have any questions or concerns, please call the clinic at [phone].     Sincerely,   Bertha Romero PA-C                          - - -

## 2020-03-27 NOTE — RESULT ENCOUNTER NOTE
PLEASE CALL PATIENT:  Dear Adarsh,      It was a pleasure to see you at your recent office visit.  Your test results are listed below.  a1c is higher at 7.5 . Sugars are not as well controlled. Cholesterol is worse also. We have two options 1) work on you losing weight with either modifying diet or exercising more or 2) adding another medication. Then we should recheck a1c in 3 months instead of 6. Kidney function was normal.   What does patient prefer for plan?        If you have any questions or concerns, please call the clinic at [phone].    Sincerely,  Bertha Romero PA-C

## 2020-04-01 NOTE — TELEPHONE ENCOUNTER
Unable to reach by phone or "[email or web address]hart. Do you want letter sent?  Does it need to be certified?  Mary CROFTN, RN

## 2020-04-03 DIAGNOSIS — G62.9 NEUROPATHY: Primary | ICD-10-CM

## 2020-04-20 ENCOUNTER — NURSE TRIAGE (OUTPATIENT)
Dept: NURSING | Facility: CLINIC | Age: 55
End: 2020-04-20

## 2020-04-21 ENCOUNTER — OFFICE VISIT (OUTPATIENT)
Dept: URGENT CARE | Facility: URGENT CARE | Age: 55
End: 2020-04-21
Payer: COMMERCIAL

## 2020-04-21 VITALS
DIASTOLIC BLOOD PRESSURE: 62 MMHG | SYSTOLIC BLOOD PRESSURE: 122 MMHG | TEMPERATURE: 100.2 F | OXYGEN SATURATION: 96 % | HEART RATE: 105 BPM

## 2020-04-21 DIAGNOSIS — E11.42 TYPE 2 DIABETES MELLITUS WITH DIABETIC POLYNEUROPATHY, WITHOUT LONG-TERM CURRENT USE OF INSULIN (H): ICD-10-CM

## 2020-04-21 DIAGNOSIS — J45.30 MILD PERSISTENT ASTHMA WITHOUT COMPLICATION: Chronic | ICD-10-CM

## 2020-04-21 DIAGNOSIS — Z20.822 SUSPECTED COVID-19 VIRUS INFECTION: Primary | ICD-10-CM

## 2020-04-21 PROCEDURE — 99214 OFFICE O/P EST MOD 30 MIN: CPT | Performed by: INTERNAL MEDICINE

## 2020-04-21 NOTE — LETTER
April 21, 2020      Adarsh Salvador  [address] [address]        To Whom It May Concern:    Adarsh Salvador was seen in our clinic for symptoms consistent with covid-19. He will miss work through at least 4/27/2020, but may need to be out of work longer until he is free of symptoms for at least three days.  When his symptoms have improved, he may return to work without restrictions.      Sincerely,        Bertha Otero MD

## 2020-04-21 NOTE — PROGRESS NOTES
SUBJECTIVE:  Adarsh Salvador is an 54 year old male who presents for not feeling well.  Has felt hot and cold.  Has body aches.  No energy.  sxs started four days ago.  Feels tired.  Temp has been normal to 100 when he checks.  Tylenol helps some with sxs.  Some nasal congestion.  Has a deep cough which has started to cause some sore throat.  His sugars have been running higher than usual with these sxs.  In 200s when usually around 150s and 160s.  Niece and sister both have had similar sxs which started after him.  No recent travel.  No known exposures.  Ran out of his advair inhaler a couple weeks ago and hasn't refilled it.  Has used albuterol inhaler which helps cough some.  Cough is occasionally productive.  Feels some shortness of breath.  No skin rashes.  Some nausea.  No vomiting.  Has diarrhea up to 10 times a day.  No blood in stools.  Not eating as much as usual as has loss of appetite.      PMH:   has a past medical history of Anaphylactic reaction (8/14/2015), Anxiety, Depression, DM2 (diabetes mellitus, type 2) (H), HTN (hypertension), IBS (irritable bowel syndrome), Kidney stone (6/15/2011), and Neuropathy.  Patient Active Problem List   Diagnosis     GERD (gastroesophageal reflux disease)     Chronic RLQ pain     Constipation     Chronic maxillary sinusitis     Chronic rhinitis     Hypertriglyceridemia     Benign essential hypertension     Anemia in other chronic diseases classified elsewhere     Fatty liver     Irritable bowel syndrome with diarrhea     Right inguinal hernia     Mild persistent asthma without complication     Type 2 diabetes mellitus with diabetic polyneuropathy, without long-term current use of insulin (H)     Hyperlipidemia LDL goal <100     CONNOR (generalized anxiety disorder)     Insomnia, unspecified type     Morbid obesity (H)     Social History     Socioeconomic History     Marital status: Single     Spouse name: Not on file     Number of children: 0     Years of education: Not on  file     Highest education level: Not on file   Occupational History     Occupation: - with robotics     Employer: [employer]   Social Needs     Financial resource strain: Not on file     Food insecurity     Worry: Not on file     Inability: Not on file     Transportation needs     Medical: Not on file     Non-medical: Not on file   Tobacco Use     Smoking status: Never Smoker     Smokeless tobacco: Current User     Types: Chew     Tobacco comment: Chew   Substance and Sexual Activity     Alcohol use: Yes     Alcohol/week: 0.0 standard drinks     Comment: occasional     Drug use: No     Sexual activity: Never   Lifestyle     Physical activity     Days per week: Not on file     Minutes per session: Not on file     Stress: Not on file   Relationships     Social connections     Talks on phone: Not on file     Gets together: Not on file     Attends Voodoo service: Not on file     Active member of club or organization: Not on file     Attends meetings of clubs or organizations: Not on file     Relationship status: Not on file     Intimate partner violence     Fear of current or ex partner: Not on file     Emotionally abused: Not on file     Physically abused: Not on file     Forced sexual activity: Not on file   Other Topics Concern     Parent/sibling w/ CABG, MI or angioplasty before 65F 55M? Not Asked   Social History Narrative    Works at [email or web address], as a  (25+ years experience).       Family History   Problem Relation Age of Onset     Cancer Mother 52        lung, smoked     Cancer - colorectal Father 53        unsure type, pt attributes to radiation exposure     Myocardial Infarction Father 45     Coronary Artery Disease Father      Myocardial Infarction Paternal Grandfather 35     Diabetes Other         nephew- type 1     Diabetes Maternal Aunt         1     Diabetes Maternal Aunt         2     Diabetes Paternal Uncle         1     Diabetes Paternal Uncle         2     Diabetes Paternal  Uncle         3     Diabetes Paternal Uncle         4     Colon Cancer No family hx of        ALLERGIES:  Artificial sweetner (informational only) [artificial sweetner (informational only) ]; Hydromorphone; Ibuprofen; Morphine [fumaric acid]; Hydrocodone-acetaminophen; Tramadol; Trazodone; Gabapentin; Keflex [cephalexin]; Codeine phosphate; and Ketorolac tromethamine    Current Outpatient Medications   Medication     albuterol (PROAIR HFA/PROVENTIL HFA/VENTOLIN HFA) 108 (90 Base) MCG/ACT inhaler     amLODIPine (NORVASC) 10 MG tablet     atorvastatin (LIPITOR) 40 MG tablet     baclofen (LIORESAL) 10 MG tablet     blood glucose monitoring (ACCU-CHEK FASTCLIX) lancets     cyclobenzaprine (FLEXERIL) 10 MG tablet     DULoxetine (CYMBALTA) 60 MG capsule     fluticasone (FLONASE) 50 MCG/ACT nasal spray     losartan (COZAAR) 50 MG tablet     metFORMIN (GLUCOPHAGE-XR) 500 MG 24 hr tablet     montelukast (SINGULAIR) 10 MG tablet     ACCU-CHEK GUIDE test strip     Alpha Lipoic Acid 200 MG CAPS     ASPIRIN PO     aspirin-acetaminophen-caffeine (EXCEDRIN MIGRAINE) 250-250-65 MG tablet     calcium carbonate (TUMS) 500 MG chewable tablet     dicyclomine (BENTYL) 20 MG tablet     EPINEPHrine (EPIPEN) 0.3 MG/0.3ML injection     EPINEPHrine (EPIPEN/ADRENACLICK/OR ANY BX GENERIC EQUIV) 0.3 MG/0.3ML injection 2-pack     fluticasone-salmeterol (ADVAIR) 500-50 MCG/DOSE inhaler     guaiFENesin-codeine (ROBITUSSIN AC) 100-10 MG/5ML solution     multivitamin, therapeutic (THERA-VIT) TABS     Naproxen Sodium (ALEVE PO)     naproxen sodium (ANAPROX) 220 MG tablet     order for DME     order for DME     predniSONE (DELTASONE) 20 MG tablet     No current facility-administered medications for this visit.      Facility-Administered Medications Ordered in Other Visits   Medication     BUPivacaine (MARCAINE) injection 0.5%     DOBUTamine 500 mg in dextrose 5% 250 mL (adult std conc) premix     iopamidol (ISOVUE-M 200) solution 10 mL      methylPREDNISolone (DEPO-MEDROL) injection 80 mg     metoprolol (LOPRESSOR) injection 5 mg         ROS:   ROS is done and is negative for general/constitutional, eye, ENT, Respiratory, cardiovascular, GI, , Skin, musculoskeletal except as noted elsewhere.  All other review of systems negative except as noted elsewhere.      OBJECTIVE:  /62   Pulse 105   Temp 100.2  F (37.9  C)   SpO2 96%   GENERAL APPEARANCE: Alert, in no acute distress, appears tired.  EYES: normal  EARS: External ears normal. Canals clear. TM's normal.  NOSE:mild clear discharge  OROPHARYNX:normal  NECK:No adenopathy,masses or thyromegaly  RESP: normal and clear to auscultation  CV:regular rate and rhythm and no murmurs, clicks, or gallops  ABDOMEN: Abdomen soft, non-tender. BS normal. No masses, organomegaly  SKIN: no ulcers, lesions or rash  MUSCULOSKELETAL:Musculoskeletal normal      RESULTS  No results found for any visits on 04/21/20..  No results found for this or any previous visit (from the past 48 hour(s)).    ASSESSMENT/PLAN:    ASSESSMENT / PLAN:  (Z20.828) Suspected Covid-19 Virus Infection  (primary encounter diagnosis)  Comment: pt's sxs are c/w covid-19. At this time, testing is not available to this pt, so dx based on sxs.  Currently is symptomatic but stable with no resp distress or other signs of life threatening illness.  Pt does have co-morbidities which place him at risk for complications including DM and asthma.  Plan: pt is to refill his advair immediately and begin to use this.  Continue prn albuterol.  I reviewed supportive care, otc meds to use if needed, expected course, and signs of concern.  Follow up as needed or if he does not improve within 1 week(s) or if worsens in any way.  Reviewed red flag symptoms and is to go to the ER if experiences any of these.    (E11.42) Type 2 diabetes mellitus with diabetic polyneuropathy, without long-term current use of insulin (H)  Comment: has h/o.  Sugars have been  running higher than usual for him over past few days, which is likely due to illness  Plan: pt to continue to check sugars and if running over 300 or below 70, he is to contact his pcp. Continue current meds at this time.      (J45.30) Mild persistent asthma without complication  Comment: has h/o.  Currently no wheezing or prolongation of exp phase on exam.  He is to refill and start back on his advair, which he hasn't used the past two weeks.  Continue with prn albuterol  Plan: I reviewed supportive care, otc meds to use if needed, expected course, and signs of concern.  Follow up as needed or if he does not improve within 1 week(s) or if worsens in any way.  Reviewed red flag symptoms, including resp sxs,  and is to go to the ER if experiences any of these.      See [facility] for orders, medications, letters, patient instructions    Bertha Otero M.D.

## 2020-04-21 NOTE — TELEPHONE ENCOUNTER
Pt reporting elevated BS readings, most recent this evening is 212, states they have been high x 1 week.  He feels weak and fatigued.    Productive cough and congestion x 2 days, intermittent SOB and CP.  No SOB or CP at present time.  Pt has asthma, he is using his inhaler infrequently.      Disposition:  See a provider within 24 hours.  Per COVID-19 workflow, writer recommended a telephone visit tomorrow.  He verbalized understanding and had no further questions.     COVID 19 Nurse Triage Plan/Patient Instructions    Please be aware that novel coronavirus (COVID-19) may be circulating in the community. If you develop symptoms such as fever, cough, or SOB or if you have concerns about the presence of another infection including coronavirus (COVID-19), please contact your health care provider or visit [email or web address].     Patient to have scheduled Telephone Visit with a provider. Follow System Ambulatory Workflow for COVID 19.     The clinic staff will assist you to schedule an appointment to complete the Telephone Visit with a provider during normal clinic hours.       Call Back If: Your symptoms worsen before you are able to complete your Telephone Visit with a provider.    Anabell Peterson RN/FNA    Reason for Disposition    [1] Symptoms of high blood sugar (e.g., frequent urination, weak, weight loss) AND [2] not able to test blood glucose    [1] MILD asthma attack (e.g., no SOB at rest, mild SOB with walking, speaks normally in sentences, mild wheezing) AND [2]  persists > 24 hours on appropriate treatment    Additional Information    Negative: Unconscious or difficult to awaken    Negative: Acting confused (e.g., disoriented, slurred speech)    Negative: Very weak (e.g., can't stand)    Negative: Sounds like a life-threatening emergency to the triager    Negative: [1] Vomiting AND [2] signs of dehydration (e.g., very dry mouth, lightheaded, etc.)    Negative: [1] Blood glucose > 240 mg/dl (13 mmol/l) AND [2]  rapid breathing    Negative: Blood glucose > 500 mg/dl (27.5 mmol/l)    Negative: [1] Blood glucose > 240 mg/dl (13 mmol/l) AND [2] urine ketones moderate-large (or more than 1+)    Negative: [1] Blood glucose > 240 mg/dl (13 mmol/l) AND [2] blood ketones > 1.5 mmol/l    Negative: [1] Blood glucose > 240 mg/dl (13 mmol/l) AND [2] vomiting AND [3] unable to check for ketones (in blood or urine)    Negative: [1] New onset Diabetes suspected (e.g., frequent urination, weak, weight loss) AND [2] vomiting or rapid breathing    Negative: Vomiting lasts > 4 hours    Negative: Patient sounds very sick or weak to the triager    Negative: Fever > 100.5 F (38.1 C)    Negative: Blood glucose > 400 mg/dl (22 mmol/l)    Negative: [1] Blood glucose > 300 mg/dl (16.7 mmol/l) AND [2] two or more times in a row    Negative: Urine ketones moderate - large (or blood ketones > 1.5 mmol/l)    Negative: Caller has URGENT medication or insulin pump question and triager unable to answer question    Negative: Severe difficulty breathing (e.g., struggling for each breath, speaks in single words)    Negative: Bluish (or gray) lips or face now    Negative: [1] Difficulty breathing AND [2] exposure to flames, smoke, or fumes    Negative: [1] Stridor AND [2] difficulty breathing    Negative: Sounds like a life-threatening emergency to the triager    [1] Previous asthma attacks AND [2] this feels like asthma attack    Negative: Severe difficulty breathing (e.g., struggling for each breath, speak in single words, pulse > 120)    Negative: Bluish (or gray) lips or face now    Negative: Difficult to awaken or acting confused (e.g., disoriented, slurred speech)    Negative: Passed out (i.e., lost consciousness, collapsed and was not responding)    Negative: Wheezing started suddenly after medicine, an allergic food, or bee sting    Negative: Sounds like a life-threatening emergency to the triager    Negative: [1] SEVERE asthma attack (e.g., very SOB  at rest, speaks in single words, loud wheezes) AND [2] not resolved after 2 nebulizer or inhaler treatments    Negative: Peak flow rate less than 50% of baseline level (RED zone)    Negative: [1] Severe wheezing or coughing AND [2] doesn't have neb or inhaler available    Negative: Chest pain    Negative: [1] Hospitalized before with asthma AND [2] feels the same now    Negative: [1] MODERATE asthma attack (e.g., SOB at rest, speaks in phrases, audible wheezes) AND [2] not resolved after 2 nebulizer or inhaler treatments given 20 minutes apart    Negative: [1] Peak flow rate 50-80% of baseline level (YELLOW zone) AND [2] not resolved after 2 nebulizer or inhaler treatments given 20 minutes apart    Negative: Fever > 103 F (39.4 C)    Negative: [1] Fever > 101 F (38.3 C) AND [2] age > 60    Negative: Patient sounds very sick or weak to the triager    Negative: [1] Continuous (nonstop) coughing AND [2] keeps from working or sleeping AND [3] not improved after 2 nebulizer or inhaler treatments given 20 minutes apart    Negative: [1] Wheezing or coughing AND [2] hasn't used neb or inhaler twice AND [3] it's available    Negative: [1] Influenza prevalent in community (or household) AND [2] flu symptoms (e.g., cough WITH fever, etc) AND [3] onset < 48 hours ago    Negative: Asthma medicine (nebulizer or inhaler) is needed more frequently than q 4 hours to keep you comfortable    Protocols used: DIABETES - HIGH BLOOD SUGAR-A-AH, ASTHMA ATTACK-A-AH, COUGH - ACUTE [id number]

## 2020-04-23 ENCOUNTER — NURSE TRIAGE (OUTPATIENT)
Dept: NURSING | Facility: CLINIC | Age: 55
End: 2020-04-23

## 2020-04-23 NOTE — TELEPHONE ENCOUNTER
Patient calling with COVID-19.  Symptoms started last Friday.   MD has quarantine since Friday, chest pains, shortness of breath, cough.  Looking for advice on how to make the body aches.     Home care advice given per protocol.  Patent verbalizes understanding.     Siomara Duncan RN/[facility]    COVID 19 Nurse Triage Plan/Patient Instructions    Please be aware that novel coronavirus (COVID-19) may be circulating in the community. If you develop symptoms such as fever, cough, or SOB or if you have concerns about the presence of another infection including coronavirus (COVID-19), please contact your health care provider or visit [email or web address].     Disposition/Instructions    Patient to stay at home and follow home care protocol based instructions.     Thank you for limiting contact with others, wearing a simple mask to cover your cough, practice good hand hygiene habits and accessing our virtual services where possible to limit the spread of this virus.    For more information about COVID19 and options for caring for yourself at home, please visit the CDC website at https://www.cdc.gov/coronavirus/2019-ncov/about/steps-when-sick.html  For more options for care at [facility], please visit our website at [email or web address]    For more information, please use the [place] Department of Health COVID-19 Website: https://[email or web address]/diseases/coronavirus/index.html  [place] Department of Health ([facility]) COVID-19 Hotlines (Interpreters available):      Health questions: Phone Number: [phone] or [phone] and Hours: 7 a.m. to 7 p.m.    Schools and  questions: Phone Number: [phone] or [phone] and Hours 7 a.m. to 7 p.m.    Reason for Disposition    1] COVID-19 infection diagnosed or suspected AND [2] mild symptoms (fever, cough) AND [2] no trouble breathing or other complications    Additional Information    Negative: SEVERE  difficulty breathing (e.g., struggling for each breath, speaks in single words)    Negative: Difficult to awaken or acting confused (e.g., disoriented, slurred speech)    Negative: Bluish (or gray) lips or face now    Negative: Shock suspected (e.g., cold/pale/clammy skin, too weak to stand, low BP, rapid pulse)    Negative: Sounds like a life-threatening emergency to the triager    Negative: [1] COVID-19 suspected (e.g., cough, fever, shortness of breath) AND [2] public health department recommends testing    Negative: [1] COVID-19 exposure AND [2] no symptoms    Negative: COVID-19 and Breastfeeding, questions about    Negative: SEVERE or constant chest pain (Exception: mild central chest pain, present only when coughing)    Negative: MODERATE difficulty breathing (e.g., speaks in phrases, SOB even at rest, pulse 100-120)    Negative: Patient sounds very sick or weak to the triager    Negative: MILD difficulty breathing (e.g., minimal/no SOB at rest, SOB with walking, pulse <100)    Negative: Chest pain    Negative: Fever > 103 F (39.4 C)    Negative: [1] Fever > 101 F (38.3 C) AND [2] age > 60    Negative: [1] Fever > 100.0 F (37.8 C) AND [2] bedridden (e.g., nursing home patient, CVA, chronic illness, recovering from surgery)    Negative: HIGH RISK patient (e.g., age > 64 years, diabetes, heart or lung disease, weak immune system)    Negative: Fever present > 3 days (72 hours)    Negative: [1] Fever returns after gone for over 24 hours AND [2] symptoms worse or not improved    Negative: [1] Continuous (nonstop) coughing interferes with work or school AND [2] no improvement using cough treatment per protocol    Negative: Cough present > 3 weeks    Protocols used: CORONAVIRUS (COVID-19) DIAGNOSED OR [id number] 3.30.20

## 2020-05-04 ENCOUNTER — TELEPHONE (OUTPATIENT)
Dept: NEUROLOGY | Facility: CLINIC | Age: 55
End: 2020-05-04

## 2020-05-04 NOTE — TELEPHONE ENCOUNTER
I left a message for patient to call back and reschedule his Follow-up with Dr. Holt to after his EMG    Melanie Tolbert LPN

## 2020-05-04 NOTE — TELEPHONE ENCOUNTER
I called pt to reschedule his Follow-up with Dr. Holt to after his EMG, this was postponed due to COVID-19. I was unable to LM I will CB later.    Melanie Tolbert LPN

## 2020-05-08 NOTE — TELEPHONE ENCOUNTER
I called patient and left another message for patient to call back and reschedule Follow-up to after EMG.    Melanie Tolbert LPN

## 2020-06-08 ENCOUNTER — OFFICE VISIT (OUTPATIENT)
Dept: NEUROLOGY | Facility: CLINIC | Age: 55
End: 2020-06-08
Payer: COMMERCIAL

## 2020-06-08 DIAGNOSIS — G62.9 NEUROPATHY: ICD-10-CM

## 2020-06-08 PROCEDURE — 95885 MUSC TST DONE W/NERV TST LIM: CPT | Performed by: PSYCHIATRY & NEUROLOGY

## 2020-06-08 PROCEDURE — 95913 NRV CNDJ TEST 13/> STUDIES: CPT | Performed by: PSYCHIATRY & NEUROLOGY

## 2020-06-08 NOTE — PROGRESS NOTES
[facility] EMG Laboratory      Nerve Conduction & EMG Report          Patient:       Adarsh Salvador  Patient ID:    [id number]  Gender:        Male  YOB: 1965  Age:           55 Years 1 Months      History and Examination:  Adarsh Salvador is a 55 year old man with a previously characterized diabetic neuropathy. The patient reports sensory deficits and tripping over objects with his right foot. Examination demonstrates symmetric sensory loss in the distal lower limbs. He is referred for a followup study for comparison with previous evaluations performed in 2017.     Techniques:  Motor conduction studies were done with surface recording electrodes. Sensory conduction studies were performed with surface electrodes, unless indicated otherwise by (n), designating the use of subdermal recording electrodes. Temperature was monitored and recorded throughout the study. Upper extremities were maintained at a temperature of 32 degrees Centigrade or higher.  Lower extremities were maintained at a temperature of 31 degrees Centigrade or higher. EMG was done with a concentric needle electrode and was limited at the patient s request.     Results:  Sural sensory nerve action potentials were mildly attenuated bilaterally. The left superficial peroneal sensory nerve action potential was mildly attenuated. A right ulnar sensory conduction study demonstrated mild slowing of conduction. Right median and radial sensory conduction studies were normal. A right peroneal motor conduction study demonstrated mild slowing of conduction. A left peroneal motor conduction study demonstrated moderate attenuation of amplitude. There was no evidence of conduction slowing across the fibular head in either peroneal distribution. A right tibial motor conduction study demonstrated moderate attenuation of amplitude and absence of the response after stimulation in the popliteal fossa, which may reflect submaximal stimulation. A  left tibial motor conduction study demonstrated marked attenuation of amplitude and mild slowing of conduction. A right median motor conduction study demonstrated minimal prolongation of distal latency. A right ulnar motor conduction study demonstrated mild prolongation of distal latency and moderate slowing of conduction across the elbow. Peroneal F-response studies demonstrated A-waves with a single F-response on the left. Electromyography of peroneal-innervated musculature in the right lower limb was normal.     Interpretation:  This is an abnormal study, demonstrating electrophysiologic evidence of the followin. 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 Comparison with two studies performed in 2017 demonstrates minimal interval worsening of amplitude attenuation.   2. Mild right ulnar neuropathy at the elbow.       Vinnie Wall MD        Sensory NCS      Nerve / Sites Rec. Site Onset Peak Ref. NP Amp Ref. PP Amp Dist Jacob Ref. Temp     ms ms ms  V  V  V cm m/s m/s  C   R MEDIAN - Dig II Anti      Wrist Dig II 2.60 3.49  11.9 10.0 10.4 14 53.8 48.0 32.5   R ULNAR - Dig V Anti      Wrist Dig V 2.97 3.80  9.3 8.0 8.5 12.5 42.1 48.0 32.1   R RADIAL - Snuff      Forearm Snuff 1.93 2.50  16.4 15.0 21.2 12.5 64.9 48.0 32.5   L SURAL - Lat Mall      Calf Ankle 3.13 3.96  5.1 8.0 3.5 14 44.8 38.0 30.3      Calf Ankle 2.97 3.75  5.3 8.0 1.7 14 47.2     R SURAL - Lat Mall      Calf Ankle 3.59 4.38  5.4 8.0 5.1 14 39.0 38.0 31   L SUP PERONEAL      Lat Leg Martínez 3.23 4.43  3.0  0.98 12.5 38.7 38.0 30.6   R MEDIAN - Ulnar - Palmar      Median Wrist 1.61 2.08 2.40 45.8  59.3 8 49.5  32.5      Ulnar Wrist 1.67 2.29 2.40 11.5  25.4 8 48.0  32.6       Motor NCS      Nerve / Sites Rec. Site Lat Ref. Amp Ref. Rel Amp Dist Jacob Ref. Dur. Area Temp.     ms ms mV mV % cm m/s m/s ms %  C   R MEDIAN - APB      Wrist APB 4.53 4.40 6.3 5.0 100 8   5.21 100 32.5      Elbow APB 9.53  6.3  100 24 48.0 48.0 5.83  98.2 32.1   R ULNAR - ADM      Wrist ADM 3.65 3.50 11.3 5.0 100 8   5.05 100 32.7      B.Elbow ADM 8.13  10.7  94.5 23 51.3 48.0 5.42 97.2 32.9      A.Elbow ADM 10.42  10.2  90.5 9 39.3 48.0 5.68 95.7 32.6   R PERONEAL - EDB       Ankle EDB 5.57 6.00 2.8 2.5 100 8   7.08 100 31      FibHead EDB 14.48  3.1  109 33 37.1 38.0 9.11 89.1 31.1      Pop Fos EDB 17.45  3.0  106 10 33.7 38.0 9.01 90.1 31.1   L PERONEAL - EDB      Ankle AH 4.90 6.00 1.1 2.5 100 8   5.63 100 31.1      FibHead AH 15.16  0.7  69.2 39 38.0 38.0 9.11 87.6 31      Pop Fos AH 17.45  0.4  36.4 9 39.3  11.88 41.4 31   L TIBIAL - AH      Ankle AH 4.48 6.00 0.6 4.0 100 8   9.32 100 31.1      Pop Fos AH 18.59  0.6  95.6 47 33.3 38.0 11.09 102 31   R TIBIAL - AH      Ankle AH 5.31 6.00 0.9 4.0 100 8   7.34 100 31.1      (tchncl) Pop Fos AH NR  NR  NR   38.0 NR NR 31   R PERONEAL - Tib Ant      Fib Head Tib Ant 4.90  5.8  100    14.22 100 31.3      Knee Tib Ant 6.98  5.9  101 8.5 40.8  14.53 98.3 31.5   L PERONEAL - Tib Ant      Fib Head Tib Ant 4.64  4.5  100 15   6.41 100 31.5      Knee Tib Ant 5.89  4.3  96.6 8 64.0  7.08 98.1 31.3       F  Wave      Nerve Min F Lat Max F Lat Mean FLat Temp.    ms ms ms  C   R DEEP PERONEAL NR      L DEEP PERONEAL 50          EMG Summary Table     Spontaneous MUAP Recruitment    IA Fib PSW Fasc H.F. Amp Dur. PPP Pattern   R. TIB ANTERIOR N None None None None N N N N   R. PERON LONGUS N None None None None N N N N

## 2020-06-15 ENCOUNTER — TELEPHONE (OUTPATIENT)
Dept: NEUROLOGY | Facility: CLINIC | Age: 55
End: 2020-06-15

## 2020-06-15 NOTE — TELEPHONE ENCOUNTER
I attempted to call patient to book a follow-up appt to review his EMG results with Dr. Holt. Pt did not answer phone and VM was full.  I will attempt to call again later.    Melanie Tolbert LPN

## 2020-06-15 NOTE — TELEPHONE ENCOUNTER
----- Message from Wiliam Montalvo MD sent at 6/12/2020 12:18 PM CDT -----  Advise pt that EMG is slightly worse. Needs f/up appt. jf

## 2020-06-23 ENCOUNTER — OFFICE VISIT (OUTPATIENT)
Dept: NEUROLOGY | Facility: CLINIC | Age: 55
End: 2020-06-23
Payer: COMMERCIAL

## 2020-06-23 VITALS
HEART RATE: 75 BPM | SYSTOLIC BLOOD PRESSURE: 154 MMHG | DIASTOLIC BLOOD PRESSURE: 104 MMHG | WEIGHT: 285.9 LBS | TEMPERATURE: 99 F | OXYGEN SATURATION: 97 % | BODY MASS INDEX: 43.47 KG/M2 | RESPIRATION RATE: 12 BRPM

## 2020-06-23 DIAGNOSIS — M25.561 RIGHT KNEE PAIN: Primary | ICD-10-CM

## 2020-06-23 DIAGNOSIS — G62.9 NEUROPATHY: Primary | ICD-10-CM

## 2020-06-23 PROCEDURE — 99213 OFFICE O/P EST LOW 20 MIN: CPT | Performed by: PSYCHIATRY & NEUROLOGY

## 2020-06-23 NOTE — LETTER
6/23/2020         RE: Adarsh Salvador  [address] [id number]        Dear Colleague,    Thank you for referring your patient, Adarsh Salvador, to the [facility]. Please see a copy of my visit note below.    Visit Date:   06/23/2020      NEUROLOGY CONSULTATION      This patient is seen in followup with neuropathy.  I last saw the patient in February.  At that time, he was complaining of worsening foot pain.  There was a question if his neuropathy was progressing.  He does have diabetes.  He had morbid obesity.  I checked additional labs, including heavy metal screening.  These came back negative, including arsenic, mercury and lead.  He says his diabetes has been under better control.  His sugar measurements run about 120.  He changed his diet a couple of months ago.  He is on a program of weight loss.  He had gotten up to 300 pounds.  Now, he is down to 285.  He has eliminated Mountain Dew.  He is concentrating on fruits and vegetables.  His goal is to get down to 200 pounds.  His feet continue to bother him.  He takes Duloxetine 60 mg twice a day and baclofen 30 mg at night; it helps to a degree.      He did have a repeat electrodiagnostic test performed by Dr. Wall.  I have reviewed those results with him.  There is evidence of neuropathy and compared to a study of 2017, there has been a minimal degree of worsening.      PHYSICAL EXAMINATION:   VITAL SIGNS:  His blood pressure is 154/104.  His weight is 285.   NEUROLOGIC:  There is nothing to add on neurologic exam.      ASSESSMENT:  Painful neuropathy, likely related to diabetes.      DISCUSSION:  This patient is seen in followup with painful neuropathy.  in February, he thought the symptoms were worsening.  This was noted also on his electrodiagnostic testing.  In addition, he has bilateral meralgia paresthetica, though did not complain of it today.  He has taken strong measures to try and get his diabetes and weight under better control.   I think if he continues in these efforts, he is going to experience improvement in his symptoms.  I am not going to make any changes in his duloxetine or baclofen.  He should follow-up in clinic in 4 months, or sooner if there are problems.  He knows to call with questions or concerns.         CHELSEA MONTALVO MD             D: 2020   T: 2020   MT: CHRISSY      Name:     LUTHER BLACKMAN   MRN:      [id number]        Account:      [id number]   :      1965           Visit Date:   2020      Document: [id number]       Again, thank you for allowing me to participate in the care of your patient.        Sincerely,        Chelsea Montalvo MD

## 2020-06-23 NOTE — PROGRESS NOTES
Visit Date:   06/23/2020      NEUROLOGY CONSULTATION      This patient is seen in followup with neuropathy.  I last saw the patient in February.  At that time, he was complaining of worsening foot pain.  There was a question if his neuropathy was progressing.  He does have diabetes.  He had morbid obesity.  I checked additional labs, including heavy metal screening.  These came back negative, including arsenic, mercury and lead.  He says his diabetes has been under better control.  His sugar measurements run about 120.  He changed his diet a couple of months ago.  He is on a program of weight loss.  He had gotten up to 300 pounds.  Now, he is down to 285.  He has eliminated Mountain Dew.  He is concentrating on fruits and vegetables.  His goal is to get down to 200 pounds.  His feet continue to bother him.  He takes Duloxetine 60 mg twice a day and baclofen 30 mg at night; it helps to a degree.      He did have a repeat electrodiagnostic test performed by Dr. Wall.  I have reviewed those results with him.  There is evidence of neuropathy and compared to a study of 2017, there has been a minimal degree of worsening.      PHYSICAL EXAMINATION:   VITAL SIGNS:  His blood pressure is 154/104.  His weight is 285.   NEUROLOGIC:  There is nothing to add on neurologic exam.      ASSESSMENT:  Painful neuropathy, likely related to diabetes.      DISCUSSION:  This patient is seen in followup with painful neuropathy.  in February, he thought the symptoms were worsening.  This was noted also on his electrodiagnostic testing.  In addition, he has bilateral meralgia paresthetica, though did not complain of it today.  He has taken strong measures to try and get his diabetes and weight under better control.  I think if he continues in these efforts, he is going to experience improvement in his symptoms.  I am not going to make any changes in his duloxetine or baclofen.  He should follow-up in clinic in 4 months, or sooner if there  are problems.  He knows to call with questions or concerns.         CHELSEA POZO MD             D: 2020   T: 2020   MT: CHRISSY      Name:     LUTHER BLACKMAN   MRN:      [id number]        Account:      [id number]   :      1965           Visit Date:   2020      Document: [id number]

## 2020-06-29 ENCOUNTER — ANCILLARY PROCEDURE (OUTPATIENT)
Dept: GENERAL RADIOLOGY | Facility: CLINIC | Age: 55
End: 2020-06-29
Attending: FAMILY MEDICINE
Payer: COMMERCIAL

## 2020-06-29 ENCOUNTER — OFFICE VISIT (OUTPATIENT)
Dept: ORTHOPEDICS | Facility: CLINIC | Age: 55
End: 2020-06-29
Payer: COMMERCIAL

## 2020-06-29 VITALS — WEIGHT: 285 LBS | BODY MASS INDEX: 43.19 KG/M2 | HEIGHT: 68 IN

## 2020-06-29 DIAGNOSIS — M25.561 ACUTE PAIN OF RIGHT KNEE: ICD-10-CM

## 2020-06-29 DIAGNOSIS — F41.9 ANXIETY DUE TO INVASIVE PROCEDURE: ICD-10-CM

## 2020-06-29 DIAGNOSIS — M54.16 LUMBAR RADICULOPATHY: Primary | ICD-10-CM

## 2020-06-29 DIAGNOSIS — Z11.59 ENCOUNTER FOR SCREENING FOR OTHER VIRAL DISEASES: Primary | ICD-10-CM

## 2020-06-29 DIAGNOSIS — M25.561 RIGHT KNEE PAIN: ICD-10-CM

## 2020-06-29 PROCEDURE — 20610 DRAIN/INJ JOINT/BURSA W/O US: CPT | Mod: RT | Performed by: FAMILY MEDICINE

## 2020-06-29 PROCEDURE — 99214 OFFICE O/P EST MOD 30 MIN: CPT | Mod: 25 | Performed by: FAMILY MEDICINE

## 2020-06-29 PROCEDURE — 73564 X-RAY EXAM KNEE 4 OR MORE: CPT | Mod: RT | Performed by: RADIOLOGY

## 2020-06-29 RX ORDER — TRIAMCINOLONE ACETONIDE 40 MG/ML
40 INJECTION, SUSPENSION INTRA-ARTICULAR; INTRAMUSCULAR
Status: DISCONTINUED | OUTPATIENT
Start: 2020-06-29 | End: 2022-07-07

## 2020-06-29 RX ORDER — ALPRAZOLAM 0.5 MG
0.5 TABLET ORAL
Qty: 1 TABLET | Refills: 0 | Status: SHIPPED | OUTPATIENT
Start: 2020-06-29 | End: 2020-10-20

## 2020-06-29 RX ADMIN — TRIAMCINOLONE ACETONIDE 40 MG: 40 INJECTION, SUSPENSION INTRA-ARTICULAR; INTRAMUSCULAR at 13:54

## 2020-06-29 ASSESSMENT — MIFFLIN-ST. JEOR: SCORE: 2112.25

## 2020-06-29 NOTE — PROGRESS NOTES
CHIEF COMPLAINT:  RECHECK (right knee injury, patient triped, )       HISTORY OF PRESENT ILLNESS  Mr. Salvador is a pleasant 55 year old male who presents to clinic today with a right knee injury.  Adarsh was walking at his home a couple of months ago when his cat ran in front of him, forcing him to trip.  He fell awkwardly, twisting his knee in the process.  He did notice delayed swelling in his knee and had noticed pain in the anterior deep portion.  This pain has continued to some degree, he has been walking with a slight limp at times.  His pain gets worse as the day goes on, this is achy and sharp.  He does feel like his knee is going to give out at times.    Adarsh also has a return of left heel pain.  He saw me for this last year, I referred him for a lumbar injection and fortunately this completely helped with his heel pain.  He is interested in having this done again.    Additional history: as documented    MEDICAL HISTORY  Patient Active Problem List   Diagnosis     GERD (gastroesophageal reflux disease)     Chronic RLQ pain     Constipation     Chronic maxillary sinusitis     Chronic rhinitis     Hypertriglyceridemia     Benign essential hypertension     Anemia in other chronic diseases classified elsewhere     Fatty liver     Irritable bowel syndrome with diarrhea     Right inguinal hernia     Mild persistent asthma without complication     Type 2 diabetes mellitus with diabetic polyneuropathy, without long-term current use of insulin (H)     Hyperlipidemia LDL goal <100     CONNOR (generalized anxiety disorder)     Insomnia, unspecified type     Morbid obesity (H)     Neuropathy       Current Outpatient Medications   Medication Sig Dispense Refill     ALPRAZolam (XANAX) 0.5 MG tablet Take 1 tablet (0.5 mg) by mouth once as needed for anxiety (take 30-60 minutes prior to procedure, will need a ) 1 tablet 0     ACCU-CHEK GUIDE test strip USE TO TEST BLOOD SUGAR 1 TIMES DAILY OR AS DIRECTED. 100 strip 3      albuterol (PROAIR HFA/PROVENTIL HFA/VENTOLIN HFA) 108 (90 Base) MCG/ACT inhaler Inhale 2 puffs into the lungs every 6 hours as needed for shortness of breath / dyspnea or wheezing 1 Inhaler 3     Alpha Lipoic Acid 200 MG CAPS Take 200 mg by mouth 3 times daily 90 capsule 3     amLODIPine (NORVASC) 10 MG tablet Take 1 tablet (10 mg) by mouth daily For blood pressure 90 tablet 1     ASPIRIN PO Take 325 mg by mouth daily       aspirin-acetaminophen-caffeine (EXCEDRIN MIGRAINE) 250-250-65 MG tablet Take 1 tablet by mouth       atorvastatin (LIPITOR) 40 MG tablet Take 1 tablet (40 mg) by mouth daily Stop simvastatin 90 tablet 3     baclofen (LIORESAL) 10 MG tablet Take 1 tablet (10 mg) by mouth See Admin Instructions 2-3 po q hs. 90 tablet 1     blood glucose monitoring (ACCU-CHEK FASTCLIX) lancets Use to test blood sugar 1 times daily or as directed.  Ok to substitute alternative if insurance prefers. 102 each 11     calcium carbonate (TUMS) 500 MG chewable tablet Take 1 chew tab by mouth daily       cyclobenzaprine (FLEXERIL) 10 MG tablet Take 0.5-1 tablets (5-10 mg) by mouth 3 times daily as needed for muscle spasms 90 tablet 1     dicyclomine (BENTYL) 20 MG tablet Take 20 mg by mouth every 6 hours       DULoxetine (CYMBALTA) 60 MG capsule Take 1 capsule (60 mg) by mouth 2 times daily 180 capsule 3     EPINEPHrine (EPIPEN) 0.3 MG/0.3ML injection Inject 0.3 mLs (0.3 mg) into the muscle once as needed for anaphylaxis 2 each 2     EPINEPHrine (EPIPEN/ADRENACLICK/OR ANY BX GENERIC EQUIV) 0.3 MG/0.3ML injection 2-pack Inject 0.3 mLs into the muscle       fluticasone (FLONASE) 50 MCG/ACT nasal spray Spray 1 spray into both nostrils daily 18.2 mL 1     fluticasone-salmeterol (ADVAIR) 500-50 MCG/DOSE inhaler Inhale 1 puff into the lungs 2 times daily 1 Inhaler 11     guaiFENesin-codeine (ROBITUSSIN AC) 100-10 MG/5ML solution Take 10 mLs by mouth every 4 hours as needed for cough 50 mL 0     losartan (COZAAR) 50 MG tablet Take  1 tablet (50 mg) by mouth daily For blood pressure. 90 tablet 1     metFORMIN (GLUCOPHAGE-XR) 500 MG 24 hr tablet Take 2 tablets (1,000 mg) by mouth 2 times daily (with meals) 120 tablet 5     montelukast (SINGULAIR) 10 MG tablet Take 1 tablet (10 mg) by mouth At Bedtime For allergies/asthma 30 tablet 2     multivitamin, therapeutic (THERA-VIT) TABS Take 1 tablet by mouth daily       Naproxen Sodium (ALEVE PO) Take by mouth as needed        naproxen sodium (ANAPROX) 220 MG tablet Take 220 mg by mouth       order for DME Equipment being ordered: DME  [id number]  . Walking boot select large, short pneumatic 1 Device 0     order for DME Equipment being ordered: Tall Aluminum Crutches, CRTL,  1 each 0     predniSONE (DELTASONE) 20 MG tablet Take 3 tabs by mouth daily x 3 days, then 2 tabs daily x 3 days, then 1 tab daily x 3 days, then 1/2 tab daily x 3 days. 20 tablet 0       Allergies   Allergen Reactions     Artificial Sweetner (Informational Only) [Artificial Sweetner (Informational Only) ] GI Disturbance     ALL artificial sweetners cause severe diarrhea & flu symptoms     Hydromorphone Anaphylaxis     Ibuprofen GI Disturbance     Morphine [Fumaric Acid] Anaphylaxis     Hydrocodone-Acetaminophen Itching     Tramadol Hives     Trazodone Hives     Gabapentin      GI upset per pt     Keflex [Cephalexin] Diarrhea     Upset stomach     Codeine Phosphate Itching     Ketorolac Tromethamine Rash       Family History   Problem Relation Age of Onset     Cancer Mother 52        lung, smoked     Cancer - colorectal Father 53        unsure type, pt attributes to radiation exposure     Myocardial Infarction Father 45     Coronary Artery Disease Father      Myocardial Infarction Paternal Grandfather 35     Diabetes Other         nephew- type 1     Diabetes Maternal Aunt         1     Diabetes Maternal Aunt         2     Diabetes Paternal Uncle         1     Diabetes Paternal Uncle         2     Diabetes Paternal Uncle   "       3     Diabetes Paternal Uncle         4     Colon Cancer No family hx of        Additional medical/Social/Surgical histories [facility] in [facility] and updated as appropriate.        PHYSICAL EXAM  Vitals:    06/29/20 1024   Weight: 129.3 kg (285 lb)   Height: 1.727 m (5' 8\")     General  - normal appearance, in no obvious distress  HEENT  - conjunctivae not injected, moist mucous membranes  CV  - normal popliteal pulse  Pulm  - normal respiratory pattern, non-labored  Musculoskeletal - right knee  - stance: normal gait without limp   - inspection: trace effusion, no ecchymosis, normal muscle tone, normal bone and joint alignment, no obvious deformity  - palpation: lateral joint line tenderness, patella and patellar tendon non-tender, normal popliteal pulse  - special tests:  (-) Lachman  (+) Thessaly  (-) varus at 0 and 30 degrees flexion  (-) valgus at 0 and 30 degrees flexion  Neuro  - no sensory or motor deficit, grossly normal coordination, normal muscle tone  Skin  - no ecchymosis, erythema, warmth, or induration, no obvious rash  Psych  - interactive, appropriate, normal mood and affect               ASSESSMENT & PLAN  Mr. Salvador is a 55 year old male who presents to clinic today with a right knee injury.  He also is following up with left sided lumbar radiculopathy.    I do have some suspicion that Kalpana may have a meniscus injury or a intra-articular injury that is causing some frequent catching in his knee.    We did discuss the utility of advanced imaging, physical therapy, and a corticosteroid injection.  An MRI is not an option for him given that he has a bladder stimulator in place.  He did opt for a intra-articular injection today which we did perform (see procedure note).  If this does not help either his pain or his symptoms of his knee giving out I would consider bracing, formal physical therapy, or possibly a CT, if this may help.    I am happy that his pain was controlled by his lumbar injection, " "although it has returned.  I am referring him for a repeat injection.  We briefly revisited the role of injection based therapy for lumbar radiculopathy, he is familiar with this discussion.    If this injection does not help him in the coming weeks I may consider a repeat MRI of his lumbar spine.    It was a pleasure seeing Adarsh today.    Vinnie Epsinoza DO, [facility]  Primary Care Sports Medicine      This note was constructed using Dragon dictation software, please excuse any minor errors in spelling, grammar, or syntax.    Scribed by Joya Santana ATC for Dr. Espinoza on 6/29/20 at  10:20 AM, based on the providers statements to me.    [facility] Medicine FOLLOW-UP VISIT 6/29/2020    Adarsh Salvador's chief complaint for this visit includes:  Chief Complaint   Patient presents with     RECHECK     right knee injury, patient triped,      PCP: Bertha Romero    Referring Provider:  Vinnie Espinoza DO  [id number] [place] [address]    Ht 1.727 m (5' 8\")   Wt 129.3 kg (285 lb)   BMI 43.33 kg/m    Data Unavailable       Interval History:     Follow up reason: right knee     Pain: Worsening    Function: Worsening    Medical History:    Any recent changes to your medical history? No    Any new medication prescribed since last visit? No    Review of Systems:    Do you have fever, chills, weight loss? No    Do you have any vision problems? No    Do you have any chest pain or edema? No    Do you have any shortness of breath or wheezing?  No    Do you have stomach problems? No    Do you have any urinary track issues? No    Do you have any numbness or focal weakness? No    Do you have diabetes? No    Do you have problems with bleeding or clotting? No    Do you have an rashes or other skin lesions? No    Large Joint Injection/Arthocentesis: R knee joint    Date/Time: 6/29/2020 1:54 PM  Performed by: Vinnie Espinoza DO  Authorized by: Vinnie Espinoza DO     Indications:  Pain  Needle Size:  22 G  Guidance: " landmark guided    Location:  Knee      Medications:  40 mg triamcinolone 40 MG/ML  Outcome:  Tolerated well, no immediate complications  Procedure discussed: discussed risks, benefits, and alternatives    Consent Given by:  Patient  Timeout: timeout called immediately prior to procedure    Prep: patient was prepped and draped in usual sterile fashion       PROCEDURE    Knee Injection - Intraarticular    The patient was informed of the risks and the benefits of the procedure and a written consent was signed.    The patient s right knee was prepped with chlorhexidine in sterile fashion.   40 mg of triamcinolone suspension was drawn up into a 5 mL syringe with 4 mL of 1% lidocaine.  Injection was performed using substerile technique.  A 1.5-inch 22-gauge needle was used to enter the lateral aspect of the right knee.  Injection performed successfully without difficulty.  There were no complications. The patient tolerated the procedure well. There was negligible bleeding.   The patient was instructed to ice the knee upon leaving clinic and refrain from overuse over the next 3 days.   The patient was instructed to call or go to the emergency room with any unusual pain, swelling, redness, or if otherwise concerned.

## 2020-06-29 NOTE — LETTER
6/29/2020         RE: Adarsh Salvador  [address] [facility] [address]        Dear Colleague,    Thank you for referring your patient, Adarsh Salvador, to the [facility]. Please see a copy of my visit note below.    CHIEF COMPLAINT:  RECHECK (right knee injury, patient triped, )       HISTORY OF PRESENT ILLNESS  Mr. Salvador is a pleasant 55 year old male who presents to clinic today with a right knee injury.  Adarsh was walking at his home a couple of months ago when his cat ran in front of him, forcing him to trip.  He fell awkwardly, twisting his knee in the process.  He did notice delayed swelling in his knee and had noticed pain in the anterior deep portion.  This pain has continued to some degree, he has been walking with a slight limp at times.  His pain gets worse as the day goes on, this is achy and sharp.  He does feel like his knee is going to give out at times.    Adarsh also has a return of left heel pain.  He saw me for this last year, I referred him for a lumbar injection and fortunately this completely helped with his heel pain.  He is interested in having this done again.    Additional history: as documented    MEDICAL HISTORY  Patient Active Problem List   Diagnosis     GERD (gastroesophageal reflux disease)     Chronic RLQ pain     Constipation     Chronic maxillary sinusitis     Chronic rhinitis     Hypertriglyceridemia     Benign essential hypertension     Anemia in other chronic diseases classified elsewhere     Fatty liver     Irritable bowel syndrome with diarrhea     Right inguinal hernia     Mild persistent asthma without complication     Type 2 diabetes mellitus with diabetic polyneuropathy, without long-term current use of insulin (H)     Hyperlipidemia LDL goal <100     CONNOR (generalized anxiety disorder)     Insomnia, unspecified type     Morbid obesity (H)     Neuropathy       Current Outpatient Medications   Medication Sig Dispense Refill     ALPRAZolam (XANAX) 0.5 MG tablet Take  1 tablet (0.5 mg) by mouth once as needed for anxiety (take 30-60 minutes prior to procedure, will need a ) 1 tablet 0     ACCU-CHEK GUIDE test strip USE TO TEST BLOOD SUGAR 1 TIMES DAILY OR AS DIRECTED. 100 strip 3     albuterol (PROAIR HFA/PROVENTIL HFA/VENTOLIN HFA) 108 (90 Base) MCG/ACT inhaler Inhale 2 puffs into the lungs every 6 hours as needed for shortness of breath / dyspnea or wheezing 1 Inhaler 3     Alpha Lipoic Acid 200 MG CAPS Take 200 mg by mouth 3 times daily 90 capsule 3     amLODIPine (NORVASC) 10 MG tablet Take 1 tablet (10 mg) by mouth daily For blood pressure 90 tablet 1     ASPIRIN PO Take 325 mg by mouth daily       aspirin-acetaminophen-caffeine (EXCEDRIN MIGRAINE) 250-250-65 MG tablet Take 1 tablet by mouth       atorvastatin (LIPITOR) 40 MG tablet Take 1 tablet (40 mg) by mouth daily Stop simvastatin 90 tablet 3     baclofen (LIORESAL) 10 MG tablet Take 1 tablet (10 mg) by mouth See Admin Instructions 2-3 po q hs. 90 tablet 1     blood glucose monitoring (ACCU-CHEK FASTCLIX) lancets Use to test blood sugar 1 times daily or as directed.  Ok to substitute alternative if insurance prefers. 102 each 11     calcium carbonate (TUMS) 500 MG chewable tablet Take 1 chew tab by mouth daily       cyclobenzaprine (FLEXERIL) 10 MG tablet Take 0.5-1 tablets (5-10 mg) by mouth 3 times daily as needed for muscle spasms 90 tablet 1     dicyclomine (BENTYL) 20 MG tablet Take 20 mg by mouth every 6 hours       DULoxetine (CYMBALTA) 60 MG capsule Take 1 capsule (60 mg) by mouth 2 times daily 180 capsule 3     EPINEPHrine (EPIPEN) 0.3 MG/0.3ML injection Inject 0.3 mLs (0.3 mg) into the muscle once as needed for anaphylaxis 2 each 2     EPINEPHrine (EPIPEN/ADRENACLICK/OR ANY BX GENERIC EQUIV) 0.3 MG/0.3ML injection 2-pack Inject 0.3 mLs into the muscle       fluticasone (FLONASE) 50 MCG/ACT nasal spray Spray 1 spray into both nostrils daily 18.2 mL 1     fluticasone-salmeterol (ADVAIR) 500-50 MCG/DOSE  inhaler Inhale 1 puff into the lungs 2 times daily 1 Inhaler 11     guaiFENesin-codeine (ROBITUSSIN AC) 100-10 MG/5ML solution Take 10 mLs by mouth every 4 hours as needed for cough 50 mL 0     losartan (COZAAR) 50 MG tablet Take 1 tablet (50 mg) by mouth daily For blood pressure. 90 tablet 1     metFORMIN (GLUCOPHAGE-XR) 500 MG 24 hr tablet Take 2 tablets (1,000 mg) by mouth 2 times daily (with meals) 120 tablet 5     montelukast (SINGULAIR) 10 MG tablet Take 1 tablet (10 mg) by mouth At Bedtime For allergies/asthma 30 tablet 2     multivitamin, therapeutic (THERA-VIT) TABS Take 1 tablet by mouth daily       Naproxen Sodium (ALEVE PO) Take by mouth as needed        naproxen sodium (ANAPROX) 220 MG tablet Take 220 mg by mouth       order for DME Equipment being ordered: DME  [id number]  . Walking boot select large, short pneumatic 1 Device 0     order for DME Equipment being ordered: Tall Aluminum Crutches, CRTL,  1 each 0     predniSONE (DELTASONE) 20 MG tablet Take 3 tabs by mouth daily x 3 days, then 2 tabs daily x 3 days, then 1 tab daily x 3 days, then 1/2 tab daily x 3 days. 20 tablet 0       Allergies   Allergen Reactions     Artificial Sweetner (Informational Only) [Artificial Sweetner (Informational Only) ] GI Disturbance     ALL artificial sweetners cause severe diarrhea & flu symptoms     Hydromorphone Anaphylaxis     Ibuprofen GI Disturbance     Morphine [Fumaric Acid] Anaphylaxis     Hydrocodone-Acetaminophen Itching     Tramadol Hives     Trazodone Hives     Gabapentin      GI upset per pt     Keflex [Cephalexin] Diarrhea     Upset stomach     Codeine Phosphate Itching     Ketorolac Tromethamine Rash       Family History   Problem Relation Age of Onset     Cancer Mother 52        lung, smoked     Cancer - colorectal Father 53        unsure type, pt attributes to radiation exposure     Myocardial Infarction Father 45     Coronary Artery Disease Father      Myocardial Infarction Paternal  "Grandfather 35     Diabetes Other         nephew- type 1     Diabetes Maternal Aunt         1     Diabetes Maternal Aunt         2     Diabetes Paternal Uncle         1     Diabetes Paternal Uncle         2     Diabetes Paternal Uncle         3     Diabetes Paternal Uncle         4     Colon Cancer No family hx of        Additional medical/Social/Surgical histories reviewed in [facility] and updated as appropriate.        PHYSICAL EXAM  Vitals:    06/29/20 1024   Weight: 129.3 kg (285 lb)   Height: 1.727 m (5' 8\")     General  - normal appearance, in no obvious distress  HEENT  - conjunctivae not injected, moist mucous membranes  CV  - normal popliteal pulse  Pulm  - normal respiratory pattern, non-labored  Musculoskeletal - right knee  - stance: normal gait without limp   - inspection: trace effusion, no ecchymosis, normal muscle tone, normal bone and joint alignment, no obvious deformity  - palpation: lateral joint line tenderness, patella and patellar tendon non-tender, normal popliteal pulse  - special tests:  (-) Lachman  (+) Thessaly  (-) varus at 0 and 30 degrees flexion  (-) valgus at 0 and 30 degrees flexion  Neuro  - no sensory or motor deficit, grossly normal coordination, normal muscle tone  Skin  - no ecchymosis, erythema, warmth, or induration, no obvious rash  Psych  - interactive, appropriate, normal mood and affect               ASSESSMENT & PLAN  Mr. Salvador is a 55 year old male who presents to clinic today with a right knee injury.  He also is following up with left sided lumbar radiculopathy.    I do have some suspicion that Kalpana may have a meniscus injury or a intra-articular injury that is causing some frequent catching in his knee.    We did discuss the utility of advanced imaging, physical therapy, and a corticosteroid injection.  An MRI is not an option for him given that he has a bladder stimulator in place.  He did opt for a intra-articular injection today which we did perform (see procedure " "note).  If this does not help either his pain or his symptoms of his knee giving out I would consider bracing, formal physical therapy, or possibly a CT, if this may help.    I am happy that his pain was controlled by his lumbar injection, although it has returned.  I am referring him for a repeat injection.  We briefly revisited the role of injection based therapy for lumbar radiculopathy, he is familiar with this discussion.    If this injection does not help him in the coming weeks I may consider a repeat MRI of his lumbar spine.    It was a pleasure seeing Adarsh today.    Vinnie Espinoza DO, [facility]  Primary Care Sports Medicine      This note was constructed using Dragon dictation software, please excuse any minor errors in spelling, grammar, or syntax.    Scribed by Joya Santana ATC for Dr. Espinoza on 6/29/20 at  10:20 AM, based on the providers statements to me.    [facility] FOLLOW-UP VISIT 6/29/2020    Adarshney Salvador's chief complaint for this visit includes:  Chief Complaint   Patient presents with     RECHECK     right knee injury, patient triped,      PCP: Bertha Romero    Referring Provider:  Vinnie Espinoza DO  [id number] [place] [address]    Ht 1.727 m (5' 8\")   Wt 129.3 kg (285 lb)   BMI 43.33 kg/m    Data Unavailable       Interval History:     Follow up reason: right knee     Pain: Worsening    Function: Worsening    Medical History:    Any recent changes to your medical history? No    Any new medication prescribed since last visit? No    Review of Systems:    Do you have fever, chills, weight loss? No    Do you have any vision problems? No    Do you have any chest pain or edema? No    Do you have any shortness of breath or wheezing?  No    Do you have stomach problems? No    Do you have any urinary track issues? No    Do you have any numbness or focal weakness? No    Do you have diabetes? No    Do you have problems with bleeding or clotting? No    Do you have an " rashes or other skin lesions? No    Large Joint Injection/Arthocentesis: R knee joint    Date/Time: 6/29/2020 1:54 PM  Performed by: Vinnie Espinoza DO  Authorized by: Vinnie Espinoza DO     Indications:  Pain  Needle Size:  22 G  Guidance: landmark guided    Location:  Knee      Medications:  40 mg triamcinolone 40 MG/ML  Outcome:  Tolerated well, no immediate complications  Procedure discussed: discussed risks, benefits, and alternatives    Consent Given by:  Patient  Timeout: timeout called immediately prior to procedure    Prep: patient was prepped and draped in usual sterile fashion       PROCEDURE    Knee Injection - Intraarticular    The patient was informed of the risks and the benefits of the procedure and a written consent was signed.    The patient s right knee was prepped with chlorhexidine in sterile fashion.   40 mg of triamcinolone suspension was drawn up into a 5 mL syringe with 4 mL of 1% lidocaine.  Injection was performed using substerile technique.  A 1.5-inch 22-gauge needle was used to enter the lateral aspect of the right knee.  Injection performed successfully without difficulty.  There were no complications. The patient tolerated the procedure well. There was negligible bleeding.   The patient was instructed to ice the knee upon leaving clinic and refrain from overuse over the next 3 days.   The patient was instructed to call or go to the emergency room with any unusual pain, swelling, redness, or if otherwise concerned.                Again, thank you for allowing me to participate in the care of your patient.        Sincerely,        Vinnie Espinoza DO

## 2020-07-11 DIAGNOSIS — Z11.59 ENCOUNTER FOR SCREENING FOR OTHER VIRAL DISEASES: ICD-10-CM

## 2020-07-11 PROCEDURE — U0003 INFECTIOUS AGENT DETECTION BY NUCLEIC ACID (DNA OR RNA); SEVERE ACUTE RESPIRATORY SYNDROME CORONAVIRUS 2 (SARS-COV-2) (CORONAVIRUS DISEASE [COVID-19]), AMPLIFIED PROBE TECHNIQUE, MAKING USE OF HIGH THROUGHPUT TECHNOLOGIES AS DESCRIBED BY CMS-2020-01-R: HCPCS | Performed by: FAMILY MEDICINE

## 2020-07-11 NOTE — LETTER
July 12, 2020        Adarsh Salvador  [address] [facility] [address]    This letter provides a written record that you were tested for COVID-19 on 7/11/2020.     Your result was positive. This means you have COVID-19 (coronavirus).    This means that we found the virus that causes COVID-19 in your sample.    How can I protect others?If you have symptoms, stay home and away from others (self-isolate) until:You ve had no fever--and no medicine that reduces fever--for 3 full days (72 hours). And      Your other symptoms have gotten better. For example, your cough or breathing has improved. And     At least 10 days have passed since your symptoms started.    If you don t have symptoms: Stay home and away from others (self-isolate) until at least 10 days have passed since your first positive COVID-19 test.    During this time:    Stay in your own room, including for meals. Use your own bathroom if you can.    Stay away from others in your home. No hugging, kissing or shaking hands. No visitors.     Don t go to work, school or anywhere else.     Clean  high touch  surfaces often (doorknobs, counters, handles, etc.). Use a household cleaning spray or wipes. You ll find a full list on the EPA website at www.epa.gov/pesticide-registration/list-n-disinfectants-use-against-sars-cov-2.     Cover your mouth and nose with a mask, tissue or washcloth to avoid spreading germs.    Wash your hands and face often with soap and water.    Caregivers in these groups are at risk for severe illness due to COVID-19:  o People 65 years and older  o People who live in a nursing home or long-term care facility  o People with chronic disease (lung, heart, cancer, diabetes, kidney, liver, immunologic)  o People who have a weakened immune system, including those who:  - Are in cancer treatment  - Take medicine that weakens the immune system, such as corticosteroids  - Had a bone marrow or organ transplant  - Have an immune deficiency  - Have poorly  controlled HIV or AIDS  - Are obese (body mass index of 40 or higher)  - Smoke regularly    Caregivers should wear gloves while washing dishes, handling laundry and cleaning bedrooms and bathrooms.    Wash and dry laundry with special caution. Don t shake dirty laundry, and use the warmest water setting you can.    If you have a weakened immune system, ask your doctor about other actions you should take.    For more tips, go to www.cdc.gov/coronavirus/2019-ncov/downloads/10Things.pdf.    You should not go back to work until you meet the guidelines above for ending your home isolation. You should meet these along with any other guidelines that your employer has.    Employers: This document serves as formal notice of your employee s medical guidelines for going back to work. They must meet the above guidelines before going back to work in person.    How can I take care of myself?    1. Get lots of rest. Drink extra fluids (unless a doctor has told you not to).    2. Take Tylenol (acetaminophen) for fever or pain. If you have liver or kidney problems, ask your family doctor if it s okay to take Tylenol.     Take either:     650 mg (two 325 mg pills) every 4 to 6 hours, or     1,000 mg (two 500 mg pills) every 8 hours as needed.     Note: Don t take more than 3,000 mg in one day. Acetaminophen is found in many medicines (both prescribed and over-the-counter medicines). Read all labels to be sure you don t take too much.    For children, check the Tylenol bottle for the right dose (based on their age or weight).    3. If you have other health problems (like cancer, heart failure, an organ transplant or severe kidney disease): Call your specialty clinic if you don t feel better in the next 2 days.    4. Know when to call 911: Emergency warning signs include:    Trouble breathing or shortness of breath    Pain or pressure in the chest that doesn t go away    Feeling confused like you haven t felt before, or not being able  to wake up    Bluish-colored lips or face    5. Sign up for [employer]. We know it s scary to hear that you have COVID-19. We want to track your symptoms to make sure you re okay over the next 2 weeks. Please look for an email from [employer]--this is a free, online program that we ll use to keep in touch. To sign up, follow the link in the email. Learn more at [email or web address].      Where can I get more information?    [facility]: [email or web address]    Coronavirus Basics: www.[email or web address]./diseases/coronavirus/basics.html    What to Do If You re Sick: www.cdc.gov/coronavirus/2019-ncov/about/steps-when-sick.html    Ending Home Isolation: www.cdc.gov/coronavirus/2019-ncov/hcp/disposition-in-home-patients.html     Caring for Someone with COVID-19: www.cdc.gov/coronavirus/2019-ncov/if-you-are-sick/care-for-someone.html     [facility] clinical trials (COVID-19 research studies): [email or web address].[email or web address]/n-clinical-trials

## 2020-07-12 ENCOUNTER — TELEPHONE (OUTPATIENT)
Dept: URGENT CARE | Facility: URGENT CARE | Age: 55
End: 2020-07-12

## 2020-07-12 LAB
SARS-COV-2 PCR COMMENT: ABNORMAL
SARS-COV-2 RNA SPEC QL NAA+PROBE: NORMAL
SARS-COV-2 RNA SPEC QL NAA+PROBE: POSITIVE
SPECIMEN SOURCE: ABNORMAL
SPECIMEN SOURCE: NORMAL

## 2020-07-12 NOTE — TELEPHONE ENCOUNTER
Coronavirus (COVID-19) Notification    Reason for call  Notify of POSITIVE  COVID-19 lab result, assess symptoms,  review [facility] recommendations    Lab Result   Lab test for 2019-nCoV rRt-PCR or SARS-COV-2 PCR  Oropharyngeal AND/OR nasopharyngeal swabs were POSITIVE for 2019-nCoV RNA [OR] SARS-COV-2 RNA (COVID-19) RNA     We have been unable to reach Patient by phone at this time to notify of their Positive COVID-19 result.  Left voicemail message requesting a call back between 8 am to 6:30 p.m. to [phone] [facility] for results.   (Weekends, this line is available from 10A to 6:30P)     POSITIVE COVID-19 Letter sent.    [Name]  Sarah Schlatter RN

## 2020-07-13 ENCOUNTER — TELEPHONE (OUTPATIENT)
Dept: CARDIOLOGY | Facility: CLINIC | Age: 55
End: 2020-07-13

## 2020-07-13 NOTE — TELEPHONE ENCOUNTER
[2nd attempt]     Coronavirus (COVID-19) Notification    Reason for call  Notify of POSITIVE  COVID-19 lab result, assess symptoms,  review [facility] recommendations    Lab Result   Lab test for 2019-nCoV rRt-PCR or SARS-COV-2 PCR  Oropharyngeal AND/OR nasopharyngeal swabs were POSITIVE for 2019-nCoV RNA [OR] SARS-COV-2 RNA (COVID-19) RNA     We have been unable to reach Patient by phone at this time to notify of their Positive COVID-19 result.  Left voicemail message requesting a call back between 8 am to 6:30 p.m. to [phone] [facility] for results.   (Weekends, this line is available from 10A to 6:30P)     POSITIVE COVID-19 Letter sent.    [Name]  Tram Rodarte, LANGN, RN

## 2020-07-13 NOTE — TELEPHONE ENCOUNTER
Informed Pt of new positive COVID test and that lumbar injection for 7/14 would be cancelled. Pt stated understanding and will wait for a reschedule call. Pt needs to have a negative test to have injection and can not reschedule for a minimum of 14 days from cancelled injection date.

## 2020-08-13 ENCOUNTER — PATIENT OUTREACH (OUTPATIENT)
Dept: CARE COORDINATION | Facility: CLINIC | Age: 55
End: 2020-08-13

## 2020-08-13 ENCOUNTER — TRANSFERRED RECORDS (OUTPATIENT)
Dept: HEALTH INFORMATION MANAGEMENT | Facility: CLINIC | Age: 55
End: 2020-08-13

## 2020-08-13 DIAGNOSIS — N20.0 NEPHROLITHIASIS: ICD-10-CM

## 2020-08-13 DIAGNOSIS — R10.9 ACUTE RIGHT FLANK PAIN: Primary | ICD-10-CM

## 2020-08-13 LAB
CREAT SERPL-MCNC: 1.02 MG/DL (ref 0.72–1.25)
GFR SERPL CREATININE-BSD FRML MDRD: >60 ML/MIN/1.73M2
GLUCOSE SERPL-MCNC: 129 MG/DL (ref 65–100)
POTASSIUM SERPL-SCNC: 4.4 MMOL/L (ref 3.5–5)

## 2020-08-13 NOTE — LETTER
[place] CARE COORDINATION  [address] [facility] [address]    August 14, 2020    Adarsh Salvador  [address] [place] [address]      Dear Adarsh,    I am a clinic community health worker who works with Bertha Romero PA-C at [facility]. I have been trying to reach you recently to introduce Clinic Care Coordination and to see if there was anything I could assist you with.  Below is a description of clinic care coordination and how I can further assist you.      The clinic care coordination team is made up of a registered nurse,  and community health worker who understand the health care system. The goal of clinic care coordination is to help you manage your health and improve access to the health care system in the most efficient manner. The team can assist you in meeting your health care goals by providing education, coordinating services, strengthening the communication among your providers and supporting you with any resource needs.    Please feel free to contact me at [phone] with any questions or concerns. We are focused on providing you with the highest-quality healthcare experience possible and that all starts with you.     Sincerely,     Araceli DUMONT Community Health Worker  Clinic Care Coordination  [facility] : [place], [place] & [place]  Phone: [phone]

## 2020-08-13 NOTE — PROGRESS NOTES
Clinic Care Coordination Contact  [facility]/Voicemail       Clinical Data: CHW  Outreach  Outreach attempted x 1.  Left message on patient's voicemail with call back information and requested return call.    Plan: CHW will try to reach patient again in 1-2 business days.    Araceli DUMONT Community Health Worker  Clinic Care Coordination  [facility] : [place] [facility] [place]  Phone: [phone]      Reason for Referral: Care Transition: ED to outpatient

## 2020-08-14 NOTE — PROGRESS NOTES
Clinic Care Coordination Contact  [facility]/Voicemail       Clinical Data: CHW Outreach  Outreach attempted x 2. Left message on patient's voicemail with call back information and requested return call.    Plan: CHW will send care coordination introduction letter with care coordinator contact information and explanation of care coordination services via [employer].     CHW will do no further outreaches at this time.    Araceli DUMONT Community Health Worker  Clinic Care Coordination  [facility] : [place] & [place]  Phone: [phone]    Reason for Referral: Care Transition: ED to outpatient

## 2020-10-19 NOTE — PROGRESS NOTES
"[facility] Neurology Consultation    Adarsh Salvador MRN# [id number]   Age: 55 year old YOB: 1965     Requesting physician: Bertha Swartz     Reason for Consultation: neuropathy      History of Presenting Symptoms:   Adarsh Salvador is a 55 year old male who presents today for evaluation of neuropathy.     Patient previously followed with Dr Montalvo, he was last seen in 6/2020. Symptoms started about 6-7 years ago. He has symptoms of numbness/tingling in both feet. Symptoms are worse in the left leg, but the right leg is \"catching up\". His balance is \"not good\". He has to be careful to not trip over cables at work. He works as a . He has had numbness/tingling in the hands develop in the last year. Dexterity has decreased in the hands as well. He had carpal tunnel release surgery several years ago, which was helpful. He has takes Cymbalta 60 mg BID. Pain is not well controlled today. In addition patient notes bilateral pain in the thighs and pain going down the backs of the legs. He has spinal cord stimulator in place, which has helpful somewhat.     A1c was 7.5 in March 2020. He has had problems cutting back on weight. He previously dropped down to 285 pounds, but he is back up to 300 again today. He had repeat EMG performed in 6/2020 which showed interval worsening of neuropathy.       Past Medical History:     Patient Active Problem List   Diagnosis     GERD (gastroesophageal reflux disease)     Chronic RLQ pain     Constipation     Chronic maxillary sinusitis     Chronic rhinitis     Hypertriglyceridemia     Benign essential hypertension     Anemia in other chronic diseases classified elsewhere     Fatty liver     Irritable bowel syndrome with diarrhea     Right inguinal hernia     Mild persistent asthma without complication     Type 2 diabetes mellitus with diabetic polyneuropathy, without long-term current use of insulin (H)     Hyperlipidemia LDL goal <100     CONNOR " (generalized anxiety disorder)     Insomnia, unspecified type     Morbid obesity (H)     Neuropathy     Past Medical History:   Diagnosis Date     Anaphylactic reaction 8/14/2015     Anxiety      Depression      DM2 (diabetes mellitus, type 2) (H)      GERD (gastroesophageal reflux disease)      HTN (hypertension)      IBS (irritable bowel syndrome)      Kidney stone 6/15/2011    Pt believes these were Calcium stones     Neuropathy         Past Surgical History:     Past Surgical History:   Procedure Laterality Date     COLONOSCOPY  5/1/2012    Procedure:COLONOSCOPY; screening colonoscopy; Surgeon:KINGSLEY DOS SANTOS; Location:[facility] OR     COLONOSCOPY  4/21/2014    Procedure: COMBINED COLONOSCOPY, SINGLE BIOPSY/POLYPECTOMY BY BIOPSY;  Surgeon: Duane, William Charles, MD;  Location: [facility] OR     CYSTOSCOPY  05/01/2008    CYSTOSCOPY W/ URETERAL STENT PLACEMENT 05/01/2008      CYSTOSCOPY  09/26/2010    CYSTOSCOPY W/ URETERAL STENT PLACEMENT 09/26/2010 Left      CYSTOSCOPY  05/18/2012    CYSTOSCOPY, LEFT URETEROSCOPY AND STENT PLACEMENT left retrograde 05/18/2012      CYSTOSCOPY,+URETEROSCOPY  06/10/2008    URETEROSCOPY 06/10/2008 Right      HC BREATH HYDROGEN TEST  3/7/2014    Procedure: HYDROGEN BREATH TEST;  Surgeon: Darion Swift MD;  Location: [facility]     LITHOTRIPSY  09/30/2010    LITHOTRIPSY 09/30/2010 LEFT EXTRACORPOREAL SHOCK WAVE LITHOTRIPSY, FLEXIBLE CYSTOSCOPY, LEFT STENT REMOVAL       ORTHOPEDIC SURGERY  10/03/2007    KNEE ARTHROSCOPY 10/03/2007 RightX2       RELEASE CARPAL TUNNEL Right 1/26/2018    Procedure: RELEASE CARPAL TUNNEL;  Right carpal tunnel release;  Surgeon: Wiliam Saenz MD;  Location: [facility] OR     VASCULAR SURGERY  11/24/2008    Radiofrequency ablation left saphenous vein 11/24/2008 Done by Dr. Lozoya          Social History:     Social History     Tobacco Use     Smoking status: Never Smoker     Smokeless tobacco: Current User     Types: Chew     Tobacco comment: Chew   Substance Use  Topics     Alcohol use: Yes     Alcohol/week: 0.0 standard drinks     Comment: occasional, few drinks a month     Drug use: No        Family History:     Family History   Problem Relation Age of Onset     Cancer Mother 52        lung, smoked     Cancer - colorectal Father 53        unsure type, pt attributes to radiation exposure     Myocardial Infarction Father 45     Coronary Artery Disease Father      Myocardial Infarction Paternal Grandfather 35     Diabetes Other         nephew- type 1     Diabetes Maternal Aunt         1     Diabetes Maternal Aunt         2     Diabetes Paternal Uncle         1     Diabetes Paternal Uncle         2     Diabetes Paternal Uncle         3     Diabetes Paternal Uncle         4     Colon Cancer No family hx of         Medications:     Current Outpatient Medications   Medication Sig     ACCU-CHEK GUIDE test strip USE TO TEST BLOOD SUGAR 1 TIMES DAILY OR AS DIRECTED.     albuterol (PROAIR HFA/PROVENTIL HFA/VENTOLIN HFA) 108 (90 Base) MCG/ACT inhaler Inhale 2 puffs into the lungs every 6 hours as needed for shortness of breath / dyspnea or wheezing     Alpha Lipoic Acid 200 MG CAPS Take 200 mg by mouth 3 times daily     amLODIPine (NORVASC) 10 MG tablet Take 1 tablet (10 mg) by mouth daily For blood pressure     ASPIRIN PO Take 325 mg by mouth daily     aspirin-acetaminophen-caffeine (EXCEDRIN MIGRAINE) 250-250-65 MG tablet Take 1 tablet by mouth     baclofen (LIORESAL) 10 MG tablet Take 1 tablet (10 mg) by mouth See Admin Instructions 2-3 po q hs.     blood glucose monitoring (ACCU-CHEK FASTCLIX) lancets Use to test blood sugar 1 times daily or as directed.  Ok to substitute alternative if insurance prefers.     calcium carbonate (TUMS) 500 MG chewable tablet Take 1 chew tab by mouth daily     cyclobenzaprine (FLEXERIL) 10 MG tablet Take 0.5-1 tablets (5-10 mg) by mouth 3 times daily as needed for muscle spasms     DULoxetine (CYMBALTA) 60 MG capsule Take 1 capsule (60 mg) by mouth 2  times daily     EPINEPHrine (EPIPEN) 0.3 MG/0.3ML injection Inject 0.3 mLs (0.3 mg) into the muscle once as needed for anaphylaxis     EPINEPHrine (EPIPEN/ADRENACLICK/OR ANY BX GENERIC EQUIV) 0.3 MG/0.3ML injection 2-pack Inject 0.3 mLs into the muscle     fluticasone (FLONASE) 50 MCG/ACT nasal spray Spray 1 spray into both nostrils daily     fluticasone-salmeterol (ADVAIR) 500-50 MCG/DOSE inhaler Inhale 1 puff into the lungs 2 times daily     losartan (COZAAR) 50 MG tablet Take 1 tablet (50 mg) by mouth daily For blood pressure.     metFORMIN (GLUCOPHAGE-XR) 500 MG 24 hr tablet Take 2 tablets (1,000 mg) by mouth 2 times daily (with meals)     montelukast (SINGULAIR) 10 MG tablet Take 1 tablet (10 mg) by mouth At Bedtime For allergies/asthma     multivitamin, therapeutic (THERA-VIT) TABS Take 1 tablet by mouth daily     Naproxen Sodium (ALEVE PO) Take by mouth as needed      naproxen sodium (ANAPROX) 220 MG tablet Take 220 mg by mouth     Current Facility-Administered Medications   Medication     triamcinolone (KENALOG-40) injection 40 mg     Facility-Administered Medications Ordered in Other Visits   Medication     BUPivacaine (MARCAINE) injection 0.5%     DOBUTamine 500 mg in dextrose 5% 250 mL (adult std conc) premix     iopamidol (ISOVUE-M 200) solution 10 mL     methylPREDNISolone (DEPO-MEDROL) injection 80 mg     metoprolol (LOPRESSOR) injection 5 mg        Allergies:     Allergies   Allergen Reactions     Artificial Sweetner (Informational Only) [Artificial Sweetner (Informational Only) ] GI Disturbance     ALL artificial sweetners cause severe diarrhea & flu symptoms     Hydromorphone Anaphylaxis     Ibuprofen GI Disturbance     Morphine [Fumaric Acid] Anaphylaxis     Hydrocodone-Acetaminophen Itching     Tramadol Hives     Trazodone Hives     Gabapentin      GI upset per pt     Keflex [Cephalexin] Diarrhea     Upset stomach     Codeine Phosphate Itching     Ketorolac Tromethamine Rash        Review of  Systems:   A comprehensive 10 point review of systems (constitutional, ENT, cardiac, peripheral vascular, lymphatic, respiratory, GI, , Musculoskeletal, skin, Neurological) was performed and found to be negative except as described in this note.      Physical Exam:   Vitals: BP (!) 172/96 (BP Location: Left arm, Patient Position: Sitting, Cuff Size: Adult Regular)   Pulse 79   Wt 137.8 kg (303 lb 12.8 oz)   SpO2 99%   BMI 46.19 kg/m     General: Seated comfortably in no acute distress.  HEENT: Neck supple with normal range of motion. No paracervical muscle tenderness or tightness.  Optic discs sharp and vasculature normal on funduscopic exam.   Heart: Regular rate  Lungs: breathing comfortably  Extremities: no edema  Skin: No rashes  Neurologic:     Mental Status: Fully alert, attentive and oriented. Normal memory and fund of knowledge. Language normal, speech clear and fluent, no paraphasic errors.      Cranial Nerves: Visual fields intact. PERRL. EOMI with normal smooth pursuit. Facial sensation intact/symmetric. Facial movements symmetric. Hearing not formally tested but intact to conversation. Palate elevation symmetric, uvula midline. No dysarthria. Shoulder shrug strong bilaterally. Tongue protrusion midline.     Motor: No tremors or other abnormal movements observed. Muscle tone normal throughout. No pronator drift. Normal/symmetric rapid finger tapping. Strength 5/5 throughout upper and lower extremities.     Deep Tendon Reflexes: 1+/symmetric throughout upper and lower extremities, with exception of trace ankle jerks. Toes downgoing bilaterally.     Sensory: Decreased sensation to light touch and temperature in the feet compared to the hands. Vibration is moderately decreased in the great toes bilaterally, but otherwise intact. Intact/symmetric to proprioception throughout upper and lower extremities. Negative Romberg.      Coordination: Finger-nose-finger and heel-shin intact without dysmetria. Rapid  alternating movements intact/symmetric with normal speed and rhythm.     Gait: Mildly wide based, but otherwise normal, steady casual gait. Able to walk on toes, heels and tandem with mild to moderate difficulty.         Data: Pertinent prior to visit   Imaging:  None    Procedures:  EMG/NCS 2020  Interpretation:  This is an abnormal study, demonstrating electrophysiologic evidence of the followin. 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 Comparison with two studies performed in 2017 demonstrates minimal interval worsening of amplitude attenuation.   2. Mild right ulnar neuropathy at the elbow.     Laboratory:  A1c 7.5 (3/2020)  B12 360, Heavy metals screen negative (2020)  TSH normal (2018)  Immunofixation negative (2017)  SSA/SSB negative (2018)         Assessment and Plan:   Assessment:  Adarsh Salvador is a 55 year old male who presents today for evaluation of diabetic polyneuropathy. Patient has had mild worsening of symptoms over the last 6-7 years. He endorses significant pain in his feet today. He also notes some bilateral thigh pain, which previously has been thought to be due to meralgic paresthetica. In addition patient follows with a pain doctor and has a spinal cord stimulator, which has been helpful. Neurological exam shows distal sensory changes consistent with known peripheral neuropathy. Additional causes of peripheral neuropathy have previously been checked and were unrevealing as above. We discussed starting Lyrica 75 mg today with increase up to TID over several days. He is also on Cymbalta 60 mg BID. Physical therapy could be considered in the future if balance becomes more of an issue. Encouraged patient to continue with goal of losing weight, as this may help a lot of his pain.     Plan:  - Start Lyrica with titration up to 75 mg TID as directed  - Continue Cymbalta 60 mg BID  - Continue to monitor neuropathy symptoms over time    Follow up in Neurology clinic in  6 months or earlier as needed should new concerns arise.    Juan Luis Gray MD   of Neurology  [facility]

## 2020-10-20 ENCOUNTER — OFFICE VISIT (OUTPATIENT)
Dept: NEUROLOGY | Facility: CLINIC | Age: 55
End: 2020-10-20
Payer: COMMERCIAL

## 2020-10-20 VITALS
SYSTOLIC BLOOD PRESSURE: 172 MMHG | WEIGHT: 303.8 LBS | HEART RATE: 79 BPM | OXYGEN SATURATION: 99 % | BODY MASS INDEX: 46.19 KG/M2 | DIASTOLIC BLOOD PRESSURE: 96 MMHG

## 2020-10-20 DIAGNOSIS — E11.42 DIABETIC POLYNEUROPATHY ASSOCIATED WITH TYPE 2 DIABETES MELLITUS (H): Primary | ICD-10-CM

## 2020-10-20 PROCEDURE — 99214 OFFICE O/P EST MOD 30 MIN: CPT | Performed by: INTERNAL MEDICINE

## 2020-10-20 RX ORDER — PREGABALIN 75 MG/1
75 CAPSULE ORAL 3 TIMES DAILY
Qty: 90 CAPSULE | Refills: 3 | Status: SHIPPED | OUTPATIENT
Start: 2020-10-20 | End: 2021-04-13

## 2020-10-20 NOTE — LETTER
"    10/20/2020         RE: Adarsh Salvador  [address] [facility] [address]        Dear Colleague,    Thank you for referring your patient, Adarsh Salvador, to the [facility] [place]. Please see a copy of my visit note below.    [facility] Neurology Consultation    Adarsh Salvador MRN# [id number]   Age: 55 year old YOB: 1965     Requesting physician: Bertha Swartz     Reason for Consultation: neuropathy      History of Presenting Symptoms:   Adarsh Salvador is a 55 year old male who presents today for evaluation of neuropathy.     Patient previously followed with Dr Montalvo, he was last seen in 6/2020. Symptoms started about 6-7 years ago. He has symptoms of numbness/tingling in both feet. Symptoms are worse in the left leg, but the right leg is \"catching up\". His balance is \"not good\". He has to be careful to not trip over cables at work. He works as a . He has had numbness/tingling in the hands develop in the last year. Dexterity has decreased in the hands as well. He had carpal tunnel release surgery several years ago, which was helpful. He has takes Cymbalta 60 mg BID. Pain is not well controlled today. In addition patient notes bilateral pain in the thighs and pain going down the backs of the legs. He has spinal cord stimulator in place, which has helpful somewhat.     A1c was 7.5 in March 2020. He has had problems cutting back on weight. He previously dropped down to 285 pounds, but he is back up to 300 again today. He had repeat EMG performed in 6/2020 which showed interval worsening of neuropathy.       Past Medical History:     Patient Active Problem List   Diagnosis     GERD (gastroesophageal reflux disease)     Chronic RLQ pain     Constipation     Chronic maxillary sinusitis     Chronic rhinitis     Hypertriglyceridemia     Benign essential hypertension     Anemia in other chronic diseases classified elsewhere     Fatty liver     Irritable bowel " syndrome with diarrhea     Right inguinal hernia     Mild persistent asthma without complication     Type 2 diabetes mellitus with diabetic polyneuropathy, without long-term current use of insulin (H)     Hyperlipidemia LDL goal <100     CONNOR (generalized anxiety disorder)     Insomnia, unspecified type     Morbid obesity (H)     Neuropathy     Past Medical History:   Diagnosis Date     Anaphylactic reaction 8/14/2015     Anxiety      Depression      DM2 (diabetes mellitus, type 2) (H)      GERD (gastroesophageal reflux disease)      HTN (hypertension)      IBS (irritable bowel syndrome)      Kidney stone 6/15/2011    Pt believes these were Calcium stones     Neuropathy         Past Surgical History:     Past Surgical History:   Procedure Laterality Date     COLONOSCOPY  5/1/2012    Procedure:COLONOSCOPY; screening colonoscopy; Surgeon:KINGSLEY DOS SANTOS; Location: OR     COLONOSCOPY  4/21/2014    Procedure: COMBINED COLONOSCOPY, SINGLE BIOPSY/POLYPECTOMY BY BIOPSY;  Surgeon: Duane, William Charles, MD;  Location:  OR     CYSTOSCOPY  05/01/2008    CYSTOSCOPY W/ URETERAL STENT PLACEMENT 05/01/2008      CYSTOSCOPY  09/26/2010    CYSTOSCOPY W/ URETERAL STENT PLACEMENT 09/26/2010 Left      CYSTOSCOPY  05/18/2012    CYSTOSCOPY, LEFT URETEROSCOPY AND STENT PLACEMENT left retrograde 05/18/2012      CYSTOSCOPY,+URETEROSCOPY  06/10/2008    URETEROSCOPY 06/10/2008 Right      HC BREATH HYDROGEN TEST  3/7/2014    Procedure: HYDROGEN BREATH TEST;  Surgeon: Darion Swift MD;  Location: [facility]     LITHOTRIPSY  09/30/2010    LITHOTRIPSY 09/30/2010 LEFT EXTRACORPOREAL SHOCK WAVE LITHOTRIPSY, FLEXIBLE CYSTOSCOPY, LEFT STENT REMOVAL       ORTHOPEDIC SURGERY  10/03/2007    KNEE ARTHROSCOPY 10/03/2007 RightX2       RELEASE CARPAL TUNNEL Right 1/26/2018    Procedure: RELEASE CARPAL TUNNEL;  Right carpal tunnel release;  Surgeon: Wiliam Saenz MD;  Location:  OR     VASCULAR SURGERY  11/24/2008    Radiofrequency  ablation left saphenous vein 11/24/2008 Done by Dr. Lozoya          Social History:     Social History     Tobacco Use     Smoking status: Never Smoker     Smokeless tobacco: Current User     Types: Chew     Tobacco comment: Chew   Substance Use Topics     Alcohol use: Yes     Alcohol/week: 0.0 standard drinks     Comment: occasional, few drinks a month     Drug use: No        Family History:     Family History   Problem Relation Age of Onset     Cancer Mother 52        lung, smoked     Cancer - colorectal Father 53        unsure type, pt attributes to radiation exposure     Myocardial Infarction Father 45     Coronary Artery Disease Father      Myocardial Infarction Paternal Grandfather 35     Diabetes Other         nephew- type 1     Diabetes Maternal Aunt         1     Diabetes Maternal Aunt         2     Diabetes Paternal Uncle         1     Diabetes Paternal Uncle         2     Diabetes Paternal Uncle         3     Diabetes Paternal Uncle         4     Colon Cancer No family hx of         Medications:     Current Outpatient Medications   Medication Sig     ACCU-CHEK GUIDE test strip USE TO TEST BLOOD SUGAR 1 TIMES DAILY OR AS DIRECTED.     albuterol (PROAIR HFA/PROVENTIL HFA/VENTOLIN HFA) 108 (90 Base) MCG/ACT inhaler Inhale 2 puffs into the lungs every 6 hours as needed for shortness of breath / dyspnea or wheezing     Alpha Lipoic Acid 200 MG CAPS Take 200 mg by mouth 3 times daily     amLODIPine (NORVASC) 10 MG tablet Take 1 tablet (10 mg) by mouth daily For blood pressure     ASPIRIN PO Take 325 mg by mouth daily     aspirin-acetaminophen-caffeine (EXCEDRIN MIGRAINE) 250-250-65 MG tablet Take 1 tablet by mouth     baclofen (LIORESAL) 10 MG tablet Take 1 tablet (10 mg) by mouth See Admin Instructions 2-3 po q hs.     blood glucose monitoring (ACCU-CHEK FASTCLIX) lancets Use to test blood sugar 1 times daily or as directed.  Ok to substitute alternative if insurance prefers.     calcium carbonate (TUMS) 500  MG chewable tablet Take 1 chew tab by mouth daily     cyclobenzaprine (FLEXERIL) 10 MG tablet Take 0.5-1 tablets (5-10 mg) by mouth 3 times daily as needed for muscle spasms     DULoxetine (CYMBALTA) 60 MG capsule Take 1 capsule (60 mg) by mouth 2 times daily     EPINEPHrine (EPIPEN) 0.3 MG/0.3ML injection Inject 0.3 mLs (0.3 mg) into the muscle once as needed for anaphylaxis     EPINEPHrine (EPIPEN/ADRENACLICK/OR ANY BX GENERIC EQUIV) 0.3 MG/0.3ML injection 2-pack Inject 0.3 mLs into the muscle     fluticasone (FLONASE) 50 MCG/ACT nasal spray Spray 1 spray into both nostrils daily     fluticasone-salmeterol (ADVAIR) 500-50 MCG/DOSE inhaler Inhale 1 puff into the lungs 2 times daily     losartan (COZAAR) 50 MG tablet Take 1 tablet (50 mg) by mouth daily For blood pressure.     metFORMIN (GLUCOPHAGE-XR) 500 MG 24 hr tablet Take 2 tablets (1,000 mg) by mouth 2 times daily (with meals)     montelukast (SINGULAIR) 10 MG tablet Take 1 tablet (10 mg) by mouth At Bedtime For allergies/asthma     multivitamin, therapeutic (THERA-VIT) TABS Take 1 tablet by mouth daily     Naproxen Sodium (ALEVE PO) Take by mouth as needed      naproxen sodium (ANAPROX) 220 MG tablet Take 220 mg by mouth     Current Facility-Administered Medications   Medication     triamcinolone (KENALOG-40) injection 40 mg     Facility-Administered Medications Ordered in Other Visits   Medication     BUPivacaine (MARCAINE) injection 0.5%     DOBUTamine 500 mg in dextrose 5% 250 mL (adult std conc) premix     iopamidol (ISOVUE-M 200) solution 10 mL     methylPREDNISolone (DEPO-MEDROL) injection 80 mg     metoprolol (LOPRESSOR) injection 5 mg        Allergies:     Allergies   Allergen Reactions     Artificial Sweetner (Informational Only) [Artificial Sweetner (Informational Only) ] GI Disturbance     ALL artificial sweetners cause severe diarrhea & flu symptoms     Hydromorphone Anaphylaxis     Ibuprofen GI Disturbance     Morphine [Fumaric Acid] Anaphylaxis      Hydrocodone-Acetaminophen Itching     Tramadol Hives     Trazodone Hives     Gabapentin      GI upset per pt     Keflex [Cephalexin] Diarrhea     Upset stomach     Codeine Phosphate Itching     Ketorolac Tromethamine Rash        Review of Systems:   A comprehensive 10 point review of systems (constitutional, ENT, cardiac, peripheral vascular, lymphatic, respiratory, GI, , Musculoskeletal, skin, Neurological) was performed and found to be negative except as described in this note.      Physical Exam:   Vitals: BP (!) 172/96 (BP Location: Left arm, Patient Position: Sitting, Cuff Size: Adult Regular)   Pulse 79   Wt 137.8 kg (303 lb 12.8 oz)   SpO2 99%   BMI 46.19 kg/m     General: Seated comfortably in no acute distress.  HEENT: Neck supple with normal range of motion. No paracervical muscle tenderness or tightness.  Optic discs sharp and vasculature normal on funduscopic exam.   Heart: Regular rate  Lungs: breathing comfortably  Extremities: no edema  Skin: No rashes  Neurologic:     Mental Status: Fully alert, attentive and oriented. Normal memory and fund of knowledge. Language normal, speech clear and fluent, no paraphasic errors.      Cranial Nerves: Visual fields intact. PERRL. EOMI with normal smooth pursuit. Facial sensation intact/symmetric. Facial movements symmetric. Hearing not formally tested but intact to conversation. Palate elevation symmetric, uvula midline. No dysarthria. Shoulder shrug strong bilaterally. Tongue protrusion midline.     Motor: No tremors or other abnormal movements observed. Muscle tone normal throughout. No pronator drift. Normal/symmetric rapid finger tapping. Strength 5/5 throughout upper and lower extremities.     Deep Tendon Reflexes: 1+/symmetric throughout upper and lower extremities, with exception of trace ankle jerks. Toes downgoing bilaterally.     Sensory: Decreased sensation to light touch and temperature in the feet compared to the hands. Vibration is  moderately decreased in the great toes bilaterally, but otherwise intact. Intact/symmetric to proprioception throughout upper and lower extremities. Negative Romberg.      Coordination: Finger-nose-finger and heel-shin intact without dysmetria. Rapid alternating movements intact/symmetric with normal speed and rhythm.     Gait: Mildly wide based, but otherwise normal, steady casual gait. Able to walk on toes, heels and tandem with mild to moderate difficulty.         Data: Pertinent prior to visit   Imaging:  None    Procedures:  EMG/NCS 2020  Interpretation:  This is an abnormal study, demonstrating electrophysiologic evidence of the followin. 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 Comparison with two studies performed in 2017 demonstrates minimal interval worsening of amplitude attenuation.   2. Mild right ulnar neuropathy at the elbow.     Laboratory:  A1c 7.5 (3/2020)  B12 360, Heavy metals screen negative (2020)  TSH normal ()  Immunofixation negative ()  SSA/SSB negative (2018)         Assessment and Plan:   Assessment:  Adarsh Salvador is a 55 year old male who presents today for evaluation of diabetic polyneuropathy. Patient has had mild worsening of symptoms over the last 6-7 years. He endorses significant pain in his feet today. He also notes some bilateral thigh pain, which previously has been thought to be due to meralgic paresthetica. In addition patient follows with a pain doctor and has a spinal cord stimulator, which has been helpful. Neurological exam shows distal sensory changes consistent with known peripheral neuropathy. Additional causes of peripheral neuropathy have previously been checked and were unrevealing as above. We discussed starting Lyrica 75 mg today with increase up to TID over several days. He is also on Cymbalta 60 mg BID. Physical therapy could be considered in the future if balance becomes more of an issue. Encouraged patient to continue  with goal of losing weight, as this may help a lot of his pain.     Plan:  - Start Lyrica with titration up to 75 mg TID as directed  - Continue Cymbalta 60 mg BID  - Continue to monitor neuropathy symptoms over time    Follow up in Neurology clinic in 6 months or earlier as needed should new concerns arise.    Juan Luis Gray MD   of Neurology  [facility]            Again, thank you for allowing me to participate in the care of your patient.        Sincerely,        Juan Luis Gray MD

## 2020-10-20 NOTE — NURSING NOTE
Adarsh Salvador's goals for this visit include:   Chief Complaint   Patient presents with     RECHECK     4 month follow up polyneuropathy       He requests these members of his care team be copied on today's visit information:     PCP: Bertha Romero    Referring Provider:  Wiliam Montalvo MD  [facility]  [address] [place] [address]    BP (!) 172/96 (BP Location: Left arm, Patient Position: Sitting, Cuff Size: Adult Regular)   Pulse 79   Wt 137.8 kg (303 lb 12.8 oz)   SpO2 99%   BMI 46.19 kg/m      Do you need any medication refills at today's visit? Lala Clemente [facility]       done

## 2020-11-04 NOTE — LETTER
11/14/2018         RE: Adarsh Salvador  [address] [facility] [address]        Dear Colleague,    Thank you for referring your patient, Adarsh Salvador, to the [facility]. Please see a copy of my visit note below.    Chief Complaint:   Chief Complaint   Patient presents with     RECHECK     3 week follow up Left heel pain          Allergies   Allergen Reactions     Artificial Sweetner (Informational Only) [Artificial Sweetner (Informational Only) ] GI Disturbance     ALL artificial sweetners cause severe diarrhea & flu symptoms     Hydromorphone Anaphylaxis     Ibuprofen GI Disturbance     Morphine [Fumaric Acid] Anaphylaxis     Hydrocodone-Acetaminophen Itching     Tramadol Hives     Trazodone Hives     Gabapentin      GI upset per pt     Codeine Phosphate Itching     Ketorolac Tromethamine Rash         Subjective: Adarsh is a 53 year old male who presents to the clinic today for a follow up of left plantar fasciitis. He continues to wear the boot as directed. He relates that the pain has significantly decreased. However, he does have pain after taking the boot off and walking. Pain takes a couple of hours to kick in when the boot is off. Finger laceration is still healing. He did have the US performed.     Objective  Data Unavailable 70 18 132/82 Data Unavailable 0 lbs 0 oz  Impression:   1. Thickening of the left proximal plantar fascia at the calcaneal  insertion indicating plantar fasciitis. There is no evidence for  full-thickness tear.  2. Right plantar fascia is ultrasonographically unremarkable.     I have personally reviewed the examination and initial interpretation  and I agree with the findings.     KINGSLEY COREY MD    Some discomfort noted with palpation of the medial attachment of the plantar fascia on the calcaneus on the left foot. No pain medially or along the medial band of the plantar fascia. No edema or ecchymosis. No pain along the PT, peroneal, or Achilles tendons on the  left.    Assessment: left foot plantar fasciitis    Plan:   - Pt seen and evaluated  - Recommend waiting for steroid injections until finger has healed. Recommendations: Night splint to use nightly. Voltaren gel BID PRN. Use the CAM for another 2 weeks, then transition into a regular shoe.   - Pt to return to clinic in 1 month.       Again, thank you for allowing me to participate in the care of your patient.        Sincerely,        Quan Pace DPM     Sarecycline Counseling: Patient advised regarding possible photosensitivity and discoloration of the teeth, skin, lips, tongue and gums.  Patient instructed to avoid sunlight, if possible.  When exposed to sunlight, patients should wear protective clothing, sunglasses, and sunscreen.  The patient was instructed to call the office immediately if the following severe adverse effects occur:  hearing changes, easy bruising/bleeding, severe headache, or vision changes.  The patient verbalized understanding of the proper use and possible adverse effects of sarecycline.  All of the patient's questions and concerns were addressed.

## 2020-11-27 NOTE — PROGRESS NOTES
Subjective     Adarsh Salvador is a 55 year old male who presents to clinic today for the following health issues:    HPI     Diabetes Follow-up    How often are you checking your blood sugar? One time daily  What time of day are you checking your blood sugars (select all that apply)?  Before meals and After meals  Have you had any blood sugars above 200?  Yes   Have you had any blood sugars below 70?  No    What symptoms do you notice when your blood sugar is low?  None    What concerns do you have today about your diabetes? None and Blood sugar is often over 200     Do you have any of these symptoms? (Select all that apply)  Numbness in feet and Weight gain    Have you had a diabetic eye exam in the last 12 months? No      3-4 weeks sugars have been higher in 200s more.   Has had sinus infection symptoms for about 3 weeks.  Get more than once a year per patient. Lots of congestion and thick drainage from nostril. He would like antibiotic.       Sugars have been higher since being ill.           Hyperlipidemia Follow-Up      Are you regularly taking any medication or supplement to lower your cholesterol?   Yes- Lipitor    Are you having muscle aches or other side effects that you think could be caused by your cholesterol lowering medication?  No    Hypertension Follow-up      Do you check your blood pressure regularly outside of the clinic? sometimes     Are you following a low salt diet? Yes    Are your blood pressures ever more than 140 on the top number (systolic) OR more   than 90 on the bottom number (diastolic), for example 140/90? sometimes    BP Readings from Last 2 Encounters:   12/03/20 134/80   10/20/20 (!) 172/96     Hemoglobin A1C (%)   Date Value   03/26/2020 7.5 (H)   10/03/2019 6.6 (H)     LDL Cholesterol Calculated (mg/dL)   Date Value   03/26/2020     Cannot estimate LDL when triglyceride exceeds 400 mg/dL   10/03/2019 167 (H)     LDL Cholesterol Direct (mg/dL)   Date Value   03/26/2020 179 (H)          How many servings of fruits and vegetables do you eat daily?  0-1    On average, how many sweetened beverages do you drink each day (Examples: soda, juice, sweet tea, etc.  Do NOT count diet or artificially sweetened beverages)?   1  How many days per week do you miss taking your medication? 1    What makes it hard for you to take your medications?  remembering to take    Acute Illness  Acute illness concerns: Sinus  Onset/Duration: 3 weeks  Symptoms:  Fever: no  Chills/Sweats: YES- chills  Headache (location?): YES  Sinus Pressure: YES  Conjunctivitis:  no  Ear Pain: YES: both  Rhinorrhea: YES  Congestion: YES  Sore Throat: YES- occasionally   Cough: YES  Wheeze: no  Decreased Appetite: YES- little bit  Nausea: no  Vomiting: no  Diarrhea: no  Dysuria/Freq.: no  Dysuria or Hematuria: no  Fatigue/Achiness: YES- little bit of fatigue  Sick/Strep Exposure: no  Therapies tried and outcome: OTC medications      Neuropathy-wants note for work saw specialist and put on lyrica. He has to stand for many hours in one position at work and this really flares up his symptoms. He was given a note today see letters for workability.       Review of Systems   Constitutional, HEENT, cardiovascular, pulmonary, GI, , musculoskeletal, neuro, skin, endocrine and psych systems are negative, except as otherwise noted.      Objective    /80   Pulse 90   Temp 97.5  F (36.4  C) (Tympanic)   Wt 140.2 kg (309 lb)   SpO2 99%   BMI 46.98 kg/m    There is no height or weight on file to calculate BMI.  Physical Exam   GENERAL: alert, no distress and obese  RESP: lungs clear to auscultation - no rales, rhonchi or wheezes  CV: regular rate and rhythm, normal S1 S2, no S3 or S4, no murmur, click or rub, no peripheral edema and peripheral pulses strong  MS: no gross musculoskeletal defects noted, no edema  NEURO: Normal strength and tone, mentation intact and speech normal  PSYCH: mentation appears normal, affect  normal/bright    Results for orders placed or performed in visit on 12/03/20   HEMOGLOBIN A1C     Status: Abnormal   Result Value Ref Range    Hemoglobin A1C 7.4 (H) 0 - 5.6 %             Assessment & Plan     Type 2 diabetes mellitus with diabetic polyneuropathy, without long-term current use of insulin (H)  Stable but not quite to goal  Will have him f/u with diabetes nutritionist/weight loss would help numbers  - HEMOGLOBIN A1C  - Albumin Random Urine Quantitative with Creat Ratio  - metFORMIN (GLUCOPHAGE-XR) 500 MG 24 hr tablet; Take 2 tablets (1,000 mg) by mouth 2 times daily (with meals)  - atorvastatin (LIPITOR) 40 MG tablet; Take 1 tablet (40 mg) by mouth daily    Acute sinusitis with symptoms > 10 days  Take with food. Side effects discussed.  Call with worsening symptoms or if no improvement in 5 days.  Analgesics for pain with food as needed.    - amoxicillin-clavulanate (AUGMENTIN) 875-125 MG tablet; Take 1 tablet by mouth 2 times daily for 14 days With food.    Cough    - albuterol (PROAIR HFA/PROVENTIL HFA/VENTOLIN HFA) 108 (90 Base) MCG/ACT inhaler; Inhale 2 puffs into the lungs every 6 hours as needed for shortness of breath / dyspnea or wheezing    Hypertension goal BP (blood pressure) < 140/90  Stable not to goal weight loss will help this  - amLODIPine (NORVASC) 10 MG tablet; Take 1 tablet (10 mg) by mouth daily For blood pressure  - losartan (COZAAR) 50 MG tablet; Take 1 tablet (50 mg) by mouth daily For blood pressure.    Diabetic polyneuropathy associated with type 2 diabetes mellitus (H)    - DULoxetine (CYMBALTA) 60 MG capsule; Take 1 capsule (60 mg) by mouth 2 times daily    SOB (shortness of breath)    - fluticasone-salmeterol (ADVAIR) 500-50 MCG/DOSE inhaler; Inhale 1 puff into the lungs 2 times daily    Chronic pansinusitis    - fluticasone-salmeterol (ADVAIR) 500-50 MCG/DOSE inhaler; Inhale 1 puff into the lungs 2 times daily    Severe persistent asthma without complication    -  "fluticasone-salmeterol (ADVAIR) 500-50 MCG/DOSE inhaler; Inhale 1 puff into the lungs 2 times daily    Mild persistent asthma without complication    - montelukast (SINGULAIR) 10 MG tablet; Take 1 tablet (10 mg) by mouth At Bedtime For allergies/asthma       Neuropathy-on lyrica. Letter given see hpi.     BMI:   Estimated body mass index is 46.98 kg/m  as calculated from the following:    Height as of 6/29/20: 1.727 m (5' 8\").    Weight as of this encounter: 140.2 kg (309 lb).   Weight management plan: Patient referred to endocrine and/or weight management specialty    Recheck 3 months    No follow-ups on file.    Bertha Romero PA-C  [facility]      "

## 2020-12-03 ENCOUNTER — OFFICE VISIT (OUTPATIENT)
Dept: FAMILY MEDICINE | Facility: CLINIC | Age: 55
End: 2020-12-03
Payer: COMMERCIAL

## 2020-12-03 VITALS
HEART RATE: 90 BPM | BODY MASS INDEX: 46.98 KG/M2 | OXYGEN SATURATION: 99 % | WEIGHT: 309 LBS | DIASTOLIC BLOOD PRESSURE: 80 MMHG | TEMPERATURE: 97.5 F | SYSTOLIC BLOOD PRESSURE: 134 MMHG

## 2020-12-03 DIAGNOSIS — E11.42 DIABETIC POLYNEUROPATHY ASSOCIATED WITH TYPE 2 DIABETES MELLITUS (H): ICD-10-CM

## 2020-12-03 DIAGNOSIS — J32.4 CHRONIC PANSINUSITIS: ICD-10-CM

## 2020-12-03 DIAGNOSIS — G62.9 NEUROPATHY: ICD-10-CM

## 2020-12-03 DIAGNOSIS — J45.50 SEVERE PERSISTENT ASTHMA WITHOUT COMPLICATION (H): ICD-10-CM

## 2020-12-03 DIAGNOSIS — E11.9 TYPE 2 DIABETES MELLITUS WITHOUT COMPLICATION, WITHOUT LONG-TERM CURRENT USE OF INSULIN (H): ICD-10-CM

## 2020-12-03 DIAGNOSIS — J45.30 MILD PERSISTENT ASTHMA WITHOUT COMPLICATION: Chronic | ICD-10-CM

## 2020-12-03 DIAGNOSIS — E11.42 TYPE 2 DIABETES MELLITUS WITH DIABETIC POLYNEUROPATHY, WITHOUT LONG-TERM CURRENT USE OF INSULIN (H): Primary | ICD-10-CM

## 2020-12-03 DIAGNOSIS — R06.02 SOB (SHORTNESS OF BREATH): ICD-10-CM

## 2020-12-03 DIAGNOSIS — I10 HYPERTENSION GOAL BP (BLOOD PRESSURE) < 140/90: ICD-10-CM

## 2020-12-03 DIAGNOSIS — R05.9 COUGH: ICD-10-CM

## 2020-12-03 DIAGNOSIS — J01.90 ACUTE SINUSITIS WITH SYMPTOMS > 10 DAYS: ICD-10-CM

## 2020-12-03 LAB
CREAT UR-MCNC: 157 MG/DL
HBA1C MFR BLD: 7.4 % (ref 0–5.6)
MICROALBUMIN UR-MCNC: 171 MG/L
MICROALBUMIN/CREAT UR: 108.92 MG/G CR (ref 0–17)

## 2020-12-03 PROCEDURE — 36415 COLL VENOUS BLD VENIPUNCTURE: CPT | Performed by: PHYSICIAN ASSISTANT

## 2020-12-03 PROCEDURE — 82043 UR ALBUMIN QUANTITATIVE: CPT | Performed by: PHYSICIAN ASSISTANT

## 2020-12-03 PROCEDURE — 99214 OFFICE O/P EST MOD 30 MIN: CPT | Performed by: PHYSICIAN ASSISTANT

## 2020-12-03 PROCEDURE — 83036 HEMOGLOBIN GLYCOSYLATED A1C: CPT | Performed by: PHYSICIAN ASSISTANT

## 2020-12-03 RX ORDER — DULOXETIN HYDROCHLORIDE 60 MG/1
60 CAPSULE, DELAYED RELEASE ORAL 2 TIMES DAILY
Qty: 180 CAPSULE | Refills: 3 | Status: SHIPPED | OUTPATIENT
Start: 2020-12-03 | End: 2022-01-24

## 2020-12-03 RX ORDER — MONTELUKAST SODIUM 10 MG/1
10 TABLET ORAL AT BEDTIME
Qty: 30 TABLET | Refills: 2 | Status: SHIPPED | OUTPATIENT
Start: 2020-12-03 | End: 2022-01-24

## 2020-12-03 RX ORDER — ALBUTEROL SULFATE 90 UG/1
2 AEROSOL, METERED RESPIRATORY (INHALATION) EVERY 6 HOURS PRN
Qty: 1 INHALER | Refills: 3 | Status: SHIPPED | OUTPATIENT
Start: 2020-12-03 | End: 2022-01-24

## 2020-12-03 RX ORDER — LOSARTAN POTASSIUM 50 MG/1
50 TABLET ORAL DAILY
Qty: 90 TABLET | Refills: 1 | Status: SHIPPED | OUTPATIENT
Start: 2020-12-03 | End: 2022-01-24

## 2020-12-03 RX ORDER — BLOOD SUGAR DIAGNOSTIC
STRIP MISCELLANEOUS
Qty: 100 STRIP | Refills: 3 | Status: SHIPPED | OUTPATIENT
Start: 2020-12-03 | End: 2022-02-24

## 2020-12-03 RX ORDER — AMLODIPINE BESYLATE 10 MG/1
10 TABLET ORAL DAILY
Qty: 90 TABLET | Refills: 1 | Status: SHIPPED | OUTPATIENT
Start: 2020-12-03 | End: 2022-01-24

## 2020-12-03 RX ORDER — METFORMIN HCL 500 MG
1000 TABLET, EXTENDED RELEASE 24 HR ORAL 2 TIMES DAILY WITH MEALS
Qty: 120 TABLET | Refills: 5 | Status: SHIPPED | OUTPATIENT
Start: 2020-12-03 | End: 2022-01-24

## 2020-12-03 RX ORDER — ATORVASTATIN CALCIUM 40 MG/1
40 TABLET, FILM COATED ORAL DAILY
Qty: 90 TABLET | Refills: 3 | Status: SHIPPED | OUTPATIENT
Start: 2020-12-03 | End: 2022-01-24

## 2020-12-03 NOTE — RESULT ENCOUNTER NOTE
Pato Altamirano,       Your recent test results are attached, if you have any questions or concerns please feel free to contact me via e-mail or call [phone].  Your a1c improved actually. Let me know if sugars do not improve after you illness does.   To help also with sugars and weight loss, I have referred you to our nutrition department specializing in diabetes. Comment: Your provider has referred you to: FM: [facility] [phone]   [email or web address]  Please see me again in 3 months fasting so we can get cholesterol and a1c.          It was a pleasure to see you at your recent office visit.      Sincerely,  Bertha Romero PA-C

## 2020-12-03 NOTE — LETTER
[facility]  [address] [facility] [address]  Phone: [phone]    December 3, 2020        Adarsh Salvador  [address] [facility] [address]          To whom it may concern:    RE: Adarsh Salvador    Patient has neuropathy and due to this I recommend he be on his feet no more than 4 hours total per day.       Please contact me for questions or concerns.      Sincerely,        Bertha Romero PA-C   unk

## 2020-12-04 NOTE — RESULT ENCOUNTER NOTE
Pato Altamirano,       Your recent test results are attached, if you have any questions or concerns please feel free to contact me via e-mail or call [phone].  You have more protein in your urine.  This should improve as we get your blood pressure improved.       It was a pleasure to see you at your recent office visit.      Sincerely,  Bertha Romero PA-C

## 2021-01-29 ENCOUNTER — OFFICE VISIT (OUTPATIENT)
Dept: FAMILY MEDICINE | Facility: CLINIC | Age: 56
End: 2021-01-29
Payer: COMMERCIAL

## 2021-01-29 ENCOUNTER — ANCILLARY PROCEDURE (OUTPATIENT)
Dept: CT IMAGING | Facility: CLINIC | Age: 56
End: 2021-01-29
Attending: FAMILY MEDICINE
Payer: COMMERCIAL

## 2021-01-29 VITALS
HEART RATE: 77 BPM | DIASTOLIC BLOOD PRESSURE: 109 MMHG | SYSTOLIC BLOOD PRESSURE: 166 MMHG | HEIGHT: 71 IN | WEIGHT: 299 LBS | BODY MASS INDEX: 41.86 KG/M2 | TEMPERATURE: 97.1 F | OXYGEN SATURATION: 98 %

## 2021-01-29 DIAGNOSIS — N20.0 KIDNEY STONE: ICD-10-CM

## 2021-01-29 DIAGNOSIS — Z87.442 HISTORY OF KIDNEY STONES: ICD-10-CM

## 2021-01-29 DIAGNOSIS — R10.31 RIGHT INGUINAL PAIN: Primary | ICD-10-CM

## 2021-01-29 LAB
ALBUMIN UR-MCNC: NEGATIVE MG/DL
ANION GAP SERPL CALCULATED.3IONS-SCNC: 5 MMOL/L (ref 3–14)
APPEARANCE UR: CLEAR
BASOPHILS # BLD AUTO: 0 10E9/L (ref 0–0.2)
BASOPHILS NFR BLD AUTO: 0.4 %
BILIRUB UR QL STRIP: NEGATIVE
BUN SERPL-MCNC: 10 MG/DL (ref 7–30)
CALCIUM SERPL-MCNC: 9 MG/DL (ref 8.5–10.1)
CHLORIDE SERPL-SCNC: 107 MMOL/L (ref 94–109)
CO2 SERPL-SCNC: 28 MMOL/L (ref 20–32)
COLOR UR AUTO: YELLOW
CREAT SERPL-MCNC: 0.94 MG/DL (ref 0.66–1.25)
DIFFERENTIAL METHOD BLD: ABNORMAL
EOSINOPHIL # BLD AUTO: 0.2 10E9/L (ref 0–0.7)
EOSINOPHIL NFR BLD AUTO: 2.8 %
ERYTHROCYTE [DISTWIDTH] IN BLOOD BY AUTOMATED COUNT: 14 % (ref 10–15)
GFR SERPL CREATININE-BSD FRML MDRD: >90 ML/MIN/{1.73_M2}
GLUCOSE SERPL-MCNC: 215 MG/DL (ref 70–99)
GLUCOSE UR STRIP-MCNC: 250 MG/DL
HCT VFR BLD AUTO: 40.2 % (ref 40–53)
HGB BLD-MCNC: 13.1 G/DL (ref 13.3–17.7)
HGB UR QL STRIP: NEGATIVE
KETONES UR STRIP-MCNC: NEGATIVE MG/DL
LEUKOCYTE ESTERASE UR QL STRIP: NEGATIVE
LYMPHOCYTES # BLD AUTO: 1.9 10E9/L (ref 0.8–5.3)
LYMPHOCYTES NFR BLD AUTO: 33.5 %
MCH RBC QN AUTO: 28.5 PG (ref 26.5–33)
MCHC RBC AUTO-ENTMCNC: 32.6 G/DL (ref 31.5–36.5)
MCV RBC AUTO: 87 FL (ref 78–100)
MONOCYTES # BLD AUTO: 0.5 10E9/L (ref 0–1.3)
MONOCYTES NFR BLD AUTO: 9.2 %
NEUTROPHILS # BLD AUTO: 3.1 10E9/L (ref 1.6–8.3)
NEUTROPHILS NFR BLD AUTO: 54.1 %
NITRATE UR QL: NEGATIVE
PH UR STRIP: 5 PH (ref 5–7)
PLATELET # BLD AUTO: 211 10E9/L (ref 150–450)
POTASSIUM SERPL-SCNC: 3.9 MMOL/L (ref 3.4–5.3)
RBC # BLD AUTO: 4.6 10E12/L (ref 4.4–5.9)
RBC #/AREA URNS AUTO: ABNORMAL /HPF
SODIUM SERPL-SCNC: 140 MMOL/L (ref 133–144)
SOURCE: ABNORMAL
SP GR UR STRIP: >1.03 (ref 1–1.03)
UROBILINOGEN UR STRIP-ACNC: 0.2 EU/DL (ref 0.2–1)
WBC # BLD AUTO: 5.7 10E9/L (ref 4–11)
WBC #/AREA URNS AUTO: ABNORMAL /HPF

## 2021-01-29 PROCEDURE — 80048 BASIC METABOLIC PNL TOTAL CA: CPT | Performed by: FAMILY MEDICINE

## 2021-01-29 PROCEDURE — 99213 OFFICE O/P EST LOW 20 MIN: CPT | Performed by: FAMILY MEDICINE

## 2021-01-29 PROCEDURE — 85025 COMPLETE CBC W/AUTO DIFF WBC: CPT | Performed by: FAMILY MEDICINE

## 2021-01-29 PROCEDURE — 74176 CT ABD & PELVIS W/O CONTRAST: CPT | Mod: TC | Performed by: RADIOLOGY

## 2021-01-29 PROCEDURE — 81001 URINALYSIS AUTO W/SCOPE: CPT | Performed by: FAMILY MEDICINE

## 2021-01-29 PROCEDURE — 36415 COLL VENOUS BLD VENIPUNCTURE: CPT | Performed by: FAMILY MEDICINE

## 2021-01-29 RX ORDER — TAMSULOSIN HYDROCHLORIDE 0.4 MG/1
0.4 CAPSULE ORAL DAILY
Qty: 30 CAPSULE | Refills: 1 | Status: SHIPPED | OUTPATIENT
Start: 2021-01-29 | End: 2022-01-24

## 2021-01-29 ASSESSMENT — MIFFLIN-ST. JEOR: SCORE: 2223.39

## 2021-01-29 NOTE — PROGRESS NOTES
Assessment & Plan     Right inguinal pain   I had a long discussion with the patient about his condition , diagnosis treatment options. We discussed diffenetial diagnosis, and expected course.    I recommend the following :  1. Patient education: In depth discussion and education was provided about the assessment and implications of each of the below recommendations for management. Patient indicated readiness to learn, all questions were answered and understanding of material presented was confirmed.  2. Work-up: UA, BMP, CBC will obtain CT AP to r/o kidney stones giving his hx of recurrent stones   3. Therapy/equipment/braces: none   4. Medications: Flomax   5. Interventions: none   6. Referral / follow up with other providers: nne   7. Follow up:  depend on results     Red flags and waning signs discussed in details, advised patient to go to the ED if he develops any of these   Patient verbalized understanding and agreed on the plan of care. All questions answered.     - UA with Microscopic reflex to Culture  - CT Abdomen Pelvis w/o & w Contrast  - CBC with platelets and differential  - Basic metabolic panel  (Ca, Cl, CO2, Creat, Gluc, K, Na, BUN)    History of kidney stones    - UA with Microscopic reflex to Culture  - CT Abdomen Pelvis w/o & w Contrast  - CBC with platelets and differential  - Basic metabolic panel  (Ca, Cl, CO2, Creat, Gluc, K, Na, BUN)  - tamsulosin (FLOMAX) 0.4 MG capsule; Take 1 capsule (0.4 mg) by mouth daily                       Tobacco Cessation:   reports that he has never smoked. His smokeless tobacco use includes chew.            No follow-ups on file.    Giselle Peres MD  [facility]     Adarsh is a 55 year old who presents to clinic today for the following health issues     HPI       Concern - Flank pain  Onset: 2-3 weeks  Description: right side  Intensity: 8-9/10  Progression of Symptoms:  worsening  Accompanying Signs & Symptoms: side pain,  "pain radiates up back  Previous history of similar problem: has had pain like this only with kidney stones but doesn't think it's due to kidney stones today  Precipitating factors:        Worsened by: lifting, sitting, walking isn't so bad  Alleviating factors:        Improved by: nothing seems to help  Therapies tried and outcome: cold, heat, relaxation, nothing is helping      Patient is here concern of right side groin pain going up to the back. Started 2-3 weeks ago. Started mild and gradually has been getting worse. Tried tylenol, muscle relaxant did not work.  No fall or injury   No blood in the urine. No dysuria.   No fever.   He has long hx of kidney stones   He was seen in the ED on 08/13/2020 for the same pain had CT scan at that time CT of his abdomen/pelvis showed no significant findings that would account for his symptoms  He passed the stone 2 days later     Review of Systems   Constitutional, HEENT, cardiovascular, pulmonary, gi and gu systems are negative, except as otherwise noted.      Objective    BP (!) 166/109   Pulse 77   Temp 97.1  F (36.2  C) (Tympanic)   Ht 1.803 m (5' 11\")   Wt 135.6 kg (299 lb)   SpO2 98%   BMI 41.70 kg/m    Body mass index is 41.7 kg/m .  Physical Exam  Vitals signs and nursing note reviewed.   Constitutional:       General: He is not in acute distress.     Appearance: Normal appearance. He is obese. He is not ill-appearing, toxic-appearing or diaphoretic.   Abdominal:       Neurological:      Mental Status: He is alert.                        "

## 2021-01-30 ASSESSMENT — ASTHMA QUESTIONNAIRES: ACT_TOTALSCORE: 14

## 2021-02-01 ENCOUNTER — TELEPHONE (OUTPATIENT)
Dept: UROLOGY | Facility: CLINIC | Age: 56
End: 2021-02-01

## 2021-02-01 NOTE — TELEPHONE ENCOUNTER
Patient is calling to get an apppointment for urology for a kidney stone ASAP.  Please call to schedule.  Thank you  Caller informed that calls received after 3pm may not be returned same day.

## 2021-02-02 NOTE — TELEPHONE ENCOUNTER
Called patient however there was no answer. Left a message to return call to the urology clinic.    Nicolás Odonnell RN....2/2/2021 3:50 PM

## 2021-02-11 NOTE — TELEPHONE ENCOUNTER
Left message for patient to call clinic. Rain Rojas RN   [phone]  Will close encounter.   Rain Rojas RN

## 2021-02-17 ENCOUNTER — TRANSFERRED RECORDS (OUTPATIENT)
Dept: HEALTH INFORMATION MANAGEMENT | Facility: CLINIC | Age: 56
End: 2021-02-17

## 2021-04-12 NOTE — PROGRESS NOTES
[facility] Neurology Follow Up Visit    Adarsh Salvador MRN# [id number]   Age: 55 year old YOB: 1965     Brief history of symptoms: The patient was initially seen in neurologic consultation on 10/20/2020 for evaluation of neuropathy. Prior to that patient followed with Dr Montalvo. Please see the comprehensive neurologic consultation note from that date in the Epic records for details.     Interval history:   Balance has been worse. He has fallen 1-2 times per week. He trips over things. His knees go out when under him.     He has numbness in the bilateral thighs. This is worse compared to before.    He has continued numbness in the arms and hands.     Pain is significantly bothering him. It feels like a throbbing/tingling pain in the bottom of the feet. Symptoms are worse when he has been on his feet all day.     He has bad muscle cramps, especially in the calves.     His weight fluctuates. He is back up 10 pounds.     He tried Lyrica and felt too sleepy. He fell asleep at his job. He continues on Cymbalta 60 mg BID.       Past Medical History:     Patient Active Problem List   Diagnosis     GERD (gastroesophageal reflux disease)     Chronic RLQ pain     Constipation     Chronic maxillary sinusitis     Chronic rhinitis     Hypertriglyceridemia     Benign essential hypertension     Anemia in other chronic diseases classified elsewhere     Fatty liver     Irritable bowel syndrome with diarrhea     Right inguinal hernia     Mild persistent asthma without complication     Type 2 diabetes mellitus with diabetic polyneuropathy, without long-term current use of insulin (H)     Hyperlipidemia LDL goal <100     CONNOR (generalized anxiety disorder)     Insomnia, unspecified type     Morbid obesity (H)     Neuropathy     Past Medical History:   Diagnosis Date     Anaphylactic reaction 8/14/2015     Anxiety      Depression      DM2 (diabetes mellitus, type 2) (H)      GERD (gastroesophageal reflux disease)      HTN  (hypertension)      IBS (irritable bowel syndrome)      Kidney stone 6/15/2011    Pt believes these were Calcium stones     Neuropathy         Past Surgical History:     Past Surgical History:   Procedure Laterality Date     COLONOSCOPY  5/1/2012    Procedure:COLONOSCOPY; screening colonoscopy; Surgeon:KINGSLEY DOS SANTOS; Location:[facility] OR     COLONOSCOPY  4/21/2014    Procedure: COMBINED COLONOSCOPY, SINGLE BIOPSY/POLYPECTOMY BY BIOPSY;  Surgeon: Duane, William Charles, MD;  Location: [facility] OR     CYSTOSCOPY  05/01/2008    CYSTOSCOPY W/ URETERAL STENT PLACEMENT 05/01/2008      CYSTOSCOPY  09/26/2010    CYSTOSCOPY W/ URETERAL STENT PLACEMENT 09/26/2010 Left      CYSTOSCOPY  05/18/2012    CYSTOSCOPY, LEFT URETEROSCOPY AND STENT PLACEMENT left retrograde 05/18/2012      CYSTOSCOPY,+URETEROSCOPY  06/10/2008    URETEROSCOPY 06/10/2008 Right      HC BREATH HYDROGEN TEST  3/7/2014    Procedure: HYDROGEN BREATH TEST;  Surgeon: Darion Swift MD;  Location: [facility]     LITHOTRIPSY  09/30/2010    LITHOTRIPSY 09/30/2010 LEFT EXTRACORPOREAL SHOCK WAVE LITHOTRIPSY, FLEXIBLE CYSTOSCOPY, LEFT STENT REMOVAL       ORTHOPEDIC SURGERY  10/03/2007    KNEE ARTHROSCOPY 10/03/2007 RightX2       RELEASE CARPAL TUNNEL Right 1/26/2018    Procedure: RELEASE CARPAL TUNNEL;  Right carpal tunnel release;  Surgeon: Wiliam Saenz MD;  Location: [facility] OR     VASCULAR SURGERY  11/24/2008    Radiofrequency ablation left saphenous vein 11/24/2008 Done by Dr. Lozoya          Social History:     Social History     Tobacco Use     Smoking status: Never Smoker     Smokeless tobacco: Current User     Types: Chew     Tobacco comment: Chew   Substance Use Topics     Alcohol use: Yes     Alcohol/week: 0.0 standard drinks     Comment: occasional, few drinks a month     Drug use: No        Family History:     Family History   Problem Relation Age of Onset     Cancer Mother 52        lung, smoked     Cancer - colorectal Father 53        unsure type, pt  attributes to radiation exposure     Myocardial Infarction Father 45     Coronary Artery Disease Father      Myocardial Infarction Paternal Grandfather 35     Diabetes Other         nephew- type 1     Diabetes Maternal Aunt         1     Diabetes Maternal Aunt         2     Diabetes Paternal Uncle         1     Diabetes Paternal Uncle         2     Diabetes Paternal Uncle         3     Diabetes Paternal Uncle         4     Colon Cancer No family hx of         Medications:     Current Outpatient Medications   Medication Sig     albuterol (PROAIR HFA/PROVENTIL HFA/VENTOLIN HFA) 108 (90 Base) MCG/ACT inhaler Inhale 2 puffs into the lungs every 6 hours as needed for shortness of breath / dyspnea or wheezing (Patient taking differently: Inhale 2 puffs into the lungs every 6 hours as needed for shortness of breath / dyspnea or wheezing )     Alpha Lipoic Acid 200 MG CAPS Take 200 mg by mouth 3 times daily     amLODIPine (NORVASC) 10 MG tablet Take 1 tablet (10 mg) by mouth daily For blood pressure     ASPIRIN PO Take 325 mg by mouth daily     aspirin-acetaminophen-caffeine (EXCEDRIN MIGRAINE) 250-250-65 MG tablet Take 1 tablet by mouth     atorvastatin (LIPITOR) 40 MG tablet Take 1 tablet (40 mg) by mouth daily     baclofen (LIORESAL) 10 MG tablet Take 1 tablet (10 mg) by mouth See Admin Instructions 2-3 po q hs.     calcium carbonate (TUMS) 500 MG chewable tablet Take 1 chew tab by mouth daily     DULoxetine (CYMBALTA) 60 MG capsule Take 1 capsule (60 mg) by mouth 2 times daily     EPINEPHrine (EPIPEN) 0.3 MG/0.3ML injection Inject 0.3 mLs (0.3 mg) into the muscle once as needed for anaphylaxis     fluticasone (FLONASE) 50 MCG/ACT nasal spray Spray 1 spray into both nostrils daily     fluticasone-salmeterol (ADVAIR) 500-50 MCG/DOSE inhaler Inhale 1 puff into the lungs 2 times daily     losartan (COZAAR) 50 MG tablet Take 1 tablet (50 mg) by mouth daily For blood pressure.     metFORMIN (GLUCOPHAGE-XR) 500 MG 24 hr tablet  Take 2 tablets (1,000 mg) by mouth 2 times daily (with meals)     montelukast (SINGULAIR) 10 MG tablet Take 1 tablet (10 mg) by mouth At Bedtime For allergies/asthma     multivitamin, therapeutic (THERA-VIT) TABS Take 1 tablet by mouth daily     Naproxen Sodium (ALEVE PO) Take by mouth as needed      tamsulosin (FLOMAX) 0.4 MG capsule Take 1 capsule (0.4 mg) by mouth daily     ACCU-CHEK GUIDE test strip USE TO TEST BLOOD SUGAR 1 TIMES DAILY OR AS DIRECTED.     blood glucose monitoring (ACCU-CHEK FASTCLIX) lancets Use to test blood sugar 1 times daily or as directed.  Ok to substitute alternative if insurance prefers.     cyclobenzaprine (FLEXERIL) 10 MG tablet Take 0.5-1 tablets (5-10 mg) by mouth 3 times daily as needed for muscle spasms (Patient not taking: Reported on 4/13/2021)     EPINEPHrine (EPIPEN/ADRENACLICK/OR ANY BX GENERIC EQUIV) 0.3 MG/0.3ML injection 2-pack Inject 0.3 mLs into the muscle     naproxen sodium (ANAPROX) 220 MG tablet Take 220 mg by mouth     Current Facility-Administered Medications   Medication     triamcinolone (KENALOG-40) injection 40 mg     Facility-Administered Medications Ordered in Other Visits   Medication     BUPivacaine (MARCAINE) injection 0.5%     DOBUTamine 500 mg in dextrose 5% 250 mL (adult std conc) premix     iopamidol (ISOVUE-M 200) solution 10 mL     methylPREDNISolone (DEPO-MEDROL) injection 80 mg     metoprolol (LOPRESSOR) injection 5 mg        Allergies:     Allergies   Allergen Reactions     Artificial Sweetner (Informational Only) [Artificial Sweetner (Informational Only) ] GI Disturbance     ALL artificial sweetners cause severe diarrhea & flu symptoms     Hydromorphone Anaphylaxis     Ibuprofen GI Disturbance     Morphine [Fumaric Acid] Anaphylaxis     Hydrocodone-Acetaminophen Itching     Tramadol Hives     Trazodone Hives     Gabapentin      GI upset per pt     Keflex [Cephalexin] Diarrhea     Upset stomach     Codeine Phosphate Itching     Ketorolac  "Tromethamine Rash        Review of Systems:   As above     Physical Exam:   Vitals: BP (!) 172/90   Pulse 87   Ht 1.803 m (5' 11\")   Wt 134.6 kg (296 lb 11.2 oz)   BMI 41.38 kg/m     General: Seated comfortably in no acute distress.  Lungs: breathing comfortably  Extremities: no edema  Skin: No rashes  Neurologic:     Mental Status: Fully alert, attentive. Normal memory and fund of knowledge. Language normal, speech clear and fluent, no paraphasic errors.      Cranial Nerves: Visual fields intact. EOMI with normal smooth pursuit. Facial sensation intact/symmetric. Facial movements symmetric. Hearing not formally tested but intact to conversation. Palate elevation symmetric, uvula midline. No dysarthria. Shoulder shrug strong bilaterally. Tongue protrusion midline.     Motor: No tremors or other abnormal movements observed. Muscle tone normal throughout. Strength 5/5 throughout upper and lower extremities.     Deep Tendon Reflexes: 2+/symmetric throughout upper extremities, 1+ patella and trace ankle jerks. Toes downgoing bilaterally.     Sensory: Vibration is 3 seconds in left great toe, 4 seconds in right great toe, 8-10 seconds in bilateral ankles, and normal in the hands. Temperature is decreased in the feet compared to the hands. Sensation to light touch is bilateral in bilateral anterior thighs and bilateral feet. Negative Romberg.      Coordination: Finger-nose-finger and heel-shin intact without dysmetria.      Gait: Wide based steady casual gait. Able to walk on toes, heels and tandem with mild to moderate difficulty.       Data reviewed on previous visits    Procedures:  EMG/NCS 2020  Interpretation:  This is an abnormal study, demonstrating electrophysiologic evidence of the followin. 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 Comparison with two studies performed in 2017 demonstrates minimal interval worsening of amplitude attenuation.   2. Mild right ulnar neuropathy " at the elbow.     Laboratory:  A1c 7.5 (3/2020)  B12 360, Heavy metals screen negative (2/2020)  TSH normal (2018)  Immunofixation negative (2017)  SSA/SSB negative (2018)    Pertinent Investigations since last visit:   A1c 7.4 (12/2020)         Assessment and Plan:   Assessment:  Adarsh Salvador is a 55 year old male who presents today for follow-up of diabetic polyneuropathy. Patient has had mild worsening of symptoms over the since around 3692-1192. He reports continued balance and pain symptoms today. We discussed referral to physical therapy for balance. He is on Cymbalta for pain control. He didn't tolerate Lyrica. We discussed trial of low dose Nortriptyline at night. He has had some relief with prn Flexeril as well and prescription for this sent today. He has bilateral thigh numbness which is most typical of meralgic paresthetica. He is trying to los weight.     Plan:  - Continue Cymbalta 60 mg BID  - Flexeril 5-10 mg TID prn  - Start Nortriptyline 10 mg nightly; could consider further increases in the future  - PT referral  - Continue to work on weight loss    Follow up in Neurology clinic in 3 months or earlier as needed should new concerns arise.    Juan Luis Gray MD   of Neurology  [facility]    The total time of this encounter amounted to 33 minutes. This time included time spent with the patient, prep work, ordering tests, and performing post visit documentation.

## 2021-04-13 ENCOUNTER — OFFICE VISIT (OUTPATIENT)
Dept: NEUROLOGY | Facility: CLINIC | Age: 56
End: 2021-04-13
Payer: COMMERCIAL

## 2021-04-13 VITALS
BODY MASS INDEX: 41.54 KG/M2 | WEIGHT: 296.7 LBS | SYSTOLIC BLOOD PRESSURE: 172 MMHG | DIASTOLIC BLOOD PRESSURE: 90 MMHG | HEART RATE: 87 BPM | HEIGHT: 71 IN

## 2021-04-13 DIAGNOSIS — E11.42 DIABETIC POLYNEUROPATHY ASSOCIATED WITH TYPE 2 DIABETES MELLITUS (H): Primary | ICD-10-CM

## 2021-04-13 DIAGNOSIS — M62.830 BACK MUSCLE SPASM: ICD-10-CM

## 2021-04-13 PROCEDURE — 99214 OFFICE O/P EST MOD 30 MIN: CPT | Performed by: INTERNAL MEDICINE

## 2021-04-13 RX ORDER — NORTRIPTYLINE HCL 10 MG
10 CAPSULE ORAL AT BEDTIME
Qty: 30 CAPSULE | Refills: 5 | Status: SHIPPED | OUTPATIENT
Start: 2021-04-13 | End: 2021-07-13

## 2021-04-13 RX ORDER — CYCLOBENZAPRINE HCL 10 MG
5-10 TABLET ORAL 3 TIMES DAILY PRN
Qty: 90 TABLET | Refills: 3 | Status: SHIPPED | OUTPATIENT
Start: 2021-04-13 | End: 2022-01-24

## 2021-04-13 ASSESSMENT — PAIN SCALES - GENERAL: PAINLEVEL: MODERATE PAIN (4)

## 2021-04-13 ASSESSMENT — MIFFLIN-ST. JEOR: SCORE: 2212.95

## 2021-04-13 NOTE — NURSING NOTE
"Adarsh Salvador's goals for this visit include: return  He requests these members of his care team be copied on today's visit information:     PCP: Bertha oRmero    Referring Provider:  No referring provider defined for this encounter.    BP (!) 172/90   Pulse 87   Ht 1.803 m (5' 11\")   Wt 134.6 kg (296 lb 11.2 oz)   BMI 41.38 kg/m      Do you need any medication refills at today's visit? n  "

## 2021-04-13 NOTE — LETTER
4/13/2021         RE: Adarsh Salvador  [address] [facility] [address]        Dear Colleague,    Thank you for referring your patient, Adarsh Salvador, to the [facility] [place]. Please see a copy of my visit note below.    [facility] Neurology Follow Up Visit    Adarsh Salvador MRN# [id number]   Age: 55 year old YOB: 1965     Brief history of symptoms: The patient was initially seen in neurologic consultation on 10/20/2020 for evaluation of neuropathy. Prior to that patient followed with Dr Montalvo. Please see the comprehensive neurologic consultation note from that date in the Epic records for details.     Interval history:   Balance has been worse. He has fallen 1-2 times per week. He trips over things. His knees go out when under him.     He has numbness in the bilateral thighs. This is worse compared to before.    He has continued numbness in the arms and hands.     Pain is significantly bothering him. It feels like a throbbing/tingling pain in the bottom of the feet. Symptoms are worse when he has been on his feet all day.     He has bad muscle cramps, especially in the calves.     His weight fluctuates. He is back up 10 pounds.     He tried Lyrica and felt too sleepy. He fell asleep at his job. He continues on Cymbalta 60 mg BID.       Past Medical History:     Patient Active Problem List   Diagnosis     GERD (gastroesophageal reflux disease)     Chronic RLQ pain     Constipation     Chronic maxillary sinusitis     Chronic rhinitis     Hypertriglyceridemia     Benign essential hypertension     Anemia in other chronic diseases classified elsewhere     Fatty liver     Irritable bowel syndrome with diarrhea     Right inguinal hernia     Mild persistent asthma without complication     Type 2 diabetes mellitus with diabetic polyneuropathy, without long-term current use of insulin (H)     Hyperlipidemia LDL goal <100     CONNOR (generalized anxiety disorder)     Insomnia, unspecified type      Morbid obesity (H)     Neuropathy     Past Medical History:   Diagnosis Date     Anaphylactic reaction 8/14/2015     Anxiety      Depression      DM2 (diabetes mellitus, type 2) (H)      GERD (gastroesophageal reflux disease)      HTN (hypertension)      IBS (irritable bowel syndrome)      Kidney stone 6/15/2011    Pt believes these were Calcium stones     Neuropathy         Past Surgical History:     Past Surgical History:   Procedure Laterality Date     COLONOSCOPY  5/1/2012    Procedure:COLONOSCOPY; screening colonoscopy; Surgeon:KINGSLEY DOS SANTOS; Location:[facility] OR     COLONOSCOPY  4/21/2014    Procedure: COMBINED COLONOSCOPY, SINGLE BIOPSY/POLYPECTOMY BY BIOPSY;  Surgeon: Duane, William Charles, MD;  Location: [facility] OR     CYSTOSCOPY  05/01/2008    CYSTOSCOPY W/ URETERAL STENT PLACEMENT 05/01/2008      CYSTOSCOPY  09/26/2010    CYSTOSCOPY W/ URETERAL STENT PLACEMENT 09/26/2010 Left      CYSTOSCOPY  05/18/2012    CYSTOSCOPY, LEFT URETEROSCOPY AND STENT PLACEMENT left retrograde 05/18/2012      CYSTOSCOPY,+URETEROSCOPY  06/10/2008    URETEROSCOPY 06/10/2008 Right      HC BREATH HYDROGEN TEST  3/7/2014    Procedure: HYDROGEN BREATH TEST;  Surgeon: Darion Swift MD;  Location: [facility]     LITHOTRIPSY  09/30/2010    LITHOTRIPSY 09/30/2010 LEFT EXTRACORPOREAL SHOCK WAVE LITHOTRIPSY, FLEXIBLE CYSTOSCOPY, LEFT STENT REMOVAL       ORTHOPEDIC SURGERY  10/03/2007    KNEE ARTHROSCOPY 10/03/2007 RightX2       RELEASE CARPAL TUNNEL Right 1/26/2018    Procedure: RELEASE CARPAL TUNNEL;  Right carpal tunnel release;  Surgeon: Wiliam Saenz MD;  Location: [facility] OR     VASCULAR SURGERY  11/24/2008    Radiofrequency ablation left saphenous vein 11/24/2008 Done by Dr. Lozoya          Social History:     Social History     Tobacco Use     Smoking status: Never Smoker     Smokeless tobacco: Current User     Types: Chew     Tobacco comment: Chew   Substance Use Topics     Alcohol use: Yes     Alcohol/week: 0.0 standard drinks      Comment: occasional, few drinks a month     Drug use: No        Family History:     Family History   Problem Relation Age of Onset     Cancer Mother 52        lung, smoked     Cancer - colorectal Father 53        unsure type, pt attributes to radiation exposure     Myocardial Infarction Father 45     Coronary Artery Disease Father      Myocardial Infarction Paternal Grandfather 35     Diabetes Other         nephew- type 1     Diabetes Maternal Aunt         1     Diabetes Maternal Aunt         2     Diabetes Paternal Uncle         1     Diabetes Paternal Uncle         2     Diabetes Paternal Uncle         3     Diabetes Paternal Uncle         4     Colon Cancer No family hx of         Medications:     Current Outpatient Medications   Medication Sig     albuterol (PROAIR HFA/PROVENTIL HFA/VENTOLIN HFA) 108 (90 Base) MCG/ACT inhaler Inhale 2 puffs into the lungs every 6 hours as needed for shortness of breath / dyspnea or wheezing (Patient taking differently: Inhale 2 puffs into the lungs every 6 hours as needed for shortness of breath / dyspnea or wheezing )     Alpha Lipoic Acid 200 MG CAPS Take 200 mg by mouth 3 times daily     amLODIPine (NORVASC) 10 MG tablet Take 1 tablet (10 mg) by mouth daily For blood pressure     ASPIRIN PO Take 325 mg by mouth daily     aspirin-acetaminophen-caffeine (EXCEDRIN MIGRAINE) 250-250-65 MG tablet Take 1 tablet by mouth     atorvastatin (LIPITOR) 40 MG tablet Take 1 tablet (40 mg) by mouth daily     baclofen (LIORESAL) 10 MG tablet Take 1 tablet (10 mg) by mouth See Admin Instructions 2-3 po q hs.     calcium carbonate (TUMS) 500 MG chewable tablet Take 1 chew tab by mouth daily     DULoxetine (CYMBALTA) 60 MG capsule Take 1 capsule (60 mg) by mouth 2 times daily     EPINEPHrine (EPIPEN) 0.3 MG/0.3ML injection Inject 0.3 mLs (0.3 mg) into the muscle once as needed for anaphylaxis     fluticasone (FLONASE) 50 MCG/ACT nasal spray Spray 1 spray into both nostrils daily      fluticasone-salmeterol (ADVAIR) 500-50 MCG/DOSE inhaler Inhale 1 puff into the lungs 2 times daily     losartan (COZAAR) 50 MG tablet Take 1 tablet (50 mg) by mouth daily For blood pressure.     metFORMIN (GLUCOPHAGE-XR) 500 MG 24 hr tablet Take 2 tablets (1,000 mg) by mouth 2 times daily (with meals)     montelukast (SINGULAIR) 10 MG tablet Take 1 tablet (10 mg) by mouth At Bedtime For allergies/asthma     multivitamin, therapeutic (THERA-VIT) TABS Take 1 tablet by mouth daily     Naproxen Sodium (ALEVE PO) Take by mouth as needed      tamsulosin (FLOMAX) 0.4 MG capsule Take 1 capsule (0.4 mg) by mouth daily     ACCU-CHEK GUIDE test strip USE TO TEST BLOOD SUGAR 1 TIMES DAILY OR AS DIRECTED.     blood glucose monitoring (ACCU-CHEK FASTCLIX) lancets Use to test blood sugar 1 times daily or as directed.  Ok to substitute alternative if insurance prefers.     cyclobenzaprine (FLEXERIL) 10 MG tablet Take 0.5-1 tablets (5-10 mg) by mouth 3 times daily as needed for muscle spasms (Patient not taking: Reported on 4/13/2021)     EPINEPHrine (EPIPEN/ADRENACLICK/OR ANY BX GENERIC EQUIV) 0.3 MG/0.3ML injection 2-pack Inject 0.3 mLs into the muscle     naproxen sodium (ANAPROX) 220 MG tablet Take 220 mg by mouth     Current Facility-Administered Medications   Medication     triamcinolone (KENALOG-40) injection 40 mg     Facility-Administered Medications Ordered in Other Visits   Medication     BUPivacaine (MARCAINE) injection 0.5%     DOBUTamine 500 mg in dextrose 5% 250 mL (adult std conc) premix     iopamidol (ISOVUE-M 200) solution 10 mL     methylPREDNISolone (DEPO-MEDROL) injection 80 mg     metoprolol (LOPRESSOR) injection 5 mg        Allergies:     Allergies   Allergen Reactions     Artificial Sweetner (Informational Only) [Artificial Sweetner (Informational Only) ] GI Disturbance     ALL artificial sweetners cause severe diarrhea & flu symptoms     Hydromorphone Anaphylaxis     Ibuprofen GI Disturbance     Morphine  "[Fumaric Acid] Anaphylaxis     Hydrocodone-Acetaminophen Itching     Tramadol Hives     Trazodone Hives     Gabapentin      GI upset per pt     Keflex [Cephalexin] Diarrhea     Upset stomach     Codeine Phosphate Itching     Ketorolac Tromethamine Rash        Review of Systems:   As above     Physical Exam:   Vitals: BP (!) 172/90   Pulse 87   Ht 1.803 m (5' 11\")   Wt 134.6 kg (296 lb 11.2 oz)   BMI 41.38 kg/m     General: Seated comfortably in no acute distress.  Lungs: breathing comfortably  Extremities: no edema  Skin: No rashes  Neurologic:     Mental Status: Fully alert, attentive. Normal memory and fund of knowledge. Language normal, speech clear and fluent, no paraphasic errors.      Cranial Nerves: Visual fields intact. EOMI with normal smooth pursuit. Facial sensation intact/symmetric. Facial movements symmetric. Hearing not formally tested but intact to conversation. Palate elevation symmetric, uvula midline. No dysarthria. Shoulder shrug strong bilaterally. Tongue protrusion midline.     Motor: No tremors or other abnormal movements observed. Muscle tone normal throughout. Strength 5/5 throughout upper and lower extremities.     Deep Tendon Reflexes: 2+/symmetric throughout upper extremities, 1+ patella and trace ankle jerks. Toes downgoing bilaterally.     Sensory: Vibration is 3 seconds in left great toe, 4 seconds in right great toe, 8-10 seconds in bilateral ankles, and normal in the hands. Temperature is decreased in the feet compared to the hands. Sensation to light touch is bilateral in bilateral anterior thighs and bilateral feet. Negative Romberg.      Coordination: Finger-nose-finger and heel-shin intact without dysmetria.      Gait: Wide based steady casual gait. Able to walk on toes, heels and tandem with mild to moderate difficulty.       Data reviewed on previous visits    Procedures:  EMG/NCS 6/2020  Interpretation:  This is an abnormal study, demonstrating electrophysiologic evidence " of the followin. 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 Comparison with two studies performed in 2017 demonstrates minimal interval worsening of amplitude attenuation.   2. Mild right ulnar neuropathy at the elbow.     Laboratory:  A1c 7.5 (3/2020)  B12 360, Heavy metals screen negative (2020)  TSH normal (2018)  Immunofixation negative (2017)  SSA/SSB negative (2018)    Pertinent Investigations since last visit:   A1c 7.4 (2020)         Assessment and Plan:   Assessment:  Adarsh Salvador is a 55 year old male who presents today for follow-up of diabetic polyneuropathy. Patient has had mild worsening of symptoms over the since around 5865-0645. He reports continued balance and pain symptoms today. We discussed referral to physical therapy for balance. He is on Cymbalta for pain control. He didn't tolerate Lyrica. We discussed trial of low dose Nortriptyline at night. He has had some relief with prn Flexeril as well and prescription for this sent today. He has bilateral thigh numbness which is most typical of meralgic paresthetica. He is trying to los weight.     Plan:  - Continue Cymbalta 60 mg BID  - Flexeril 5-10 mg TID prn  - Start Nortriptyline 10 mg nightly; could consider further increases in the future  - PT referral  - Continue to work on weight loss    Follow up in Neurology clinic in 3 months or earlier as needed should new concerns arise.    Juan Luis Gray MD   of Neurology  [facility]    The total time of this encounter amounted to 33 minutes. This time included time spent with the patient, prep work, ordering tests, and performing post visit documentation.        Again, thank you for allowing me to participate in the care of your patient.        Sincerely,        Juan Luis Gray MD

## 2021-04-17 ENCOUNTER — HEALTH MAINTENANCE LETTER (OUTPATIENT)
Age: 56
End: 2021-04-17

## 2021-04-29 ENCOUNTER — NURSE TRIAGE (OUTPATIENT)
Dept: NURSING | Facility: CLINIC | Age: 56
End: 2021-04-29

## 2021-04-29 NOTE — TELEPHONE ENCOUNTER
Caller is complaining of high blood glucose is 351 and now it is  234 at 0112. Caller denies any signs or symptoms of hyperglycemia. Caller takes oral medication for hyperglycemia metformin. Caller denies any fever. Triage guidelines recommend to home care. Caller verbalized and understands directives.  COVID 19 Nurse Triage Plan/Patient Instructions    Please be aware that novel coronavirus (COVID-19) may be circulating in the community. If you develop symptoms such as fever, cough, or SOB or if you have concerns about the presence of another infection including coronavirus (COVID-19), please contact your health care provider or visit [email or web address].     Disposition/Instructions    Home care recommended. Follow home care protocol based instructions.    Thank you for taking steps to prevent the spread of this virus.  o Limit your contact with others.  o Wear a simple mask to cover your cough.  o Wash your hands well and often.    Resources    [facility]: About COVID-19: [email or web address]    CDC: What to Do If You're Sick: www.cdc.gov/coronavirus/2019-ncov/about/steps-when-sick.html    CDC: Ending Home Isolation: www.cdc.gov/coronavirus/2019-ncov/hcp/disposition-in-home-patients.html     CDC: Caring for Someone: www.cdc.gov/coronavirus/2019-ncov/if-you-are-sick/care-for-someone.html     [facility]: Interim Guidance for Hospital Discharge to Home: www.health.[facility].[place].us/diseases/coronavirus/hcp/hospdischarge.pdf    [facility] clinical trials (COVID-19 research studies): clinicalaffairs.[facility]/n-clinical-trials     Below are the COVID-19 hotlines at the [place] Department of [facility] ([facility]). Interpreters are available.   o For health questions: Call 191-665-8648 or 1-359.993.7996 (7 a.m. to 7 p.m.)  o For questions about schools and childcare: Call 198-573-6629 or 1-147.549.7493 (7 a.m. to 7 p.m.)                     Additional Information    Negative: Unconscious or  difficult to awaken    Negative: Acting confused (e.g., disoriented, slurred speech)    Negative: Very weak (e.g., can't stand)    Negative: Sounds like a life-threatening emergency to the triager    Negative: [1] Vomiting AND [2] signs of dehydration (e.g., very dry mouth, lightheaded, dark urine)    Negative: [1] Blood glucose > 240 mg/dL (13.3 mmol/L) AND [2] rapid breathing    Negative: Blood glucose > 500 mg/dL (27.8 mmol/L)    Negative: [1] Blood glucose > 240 mg/dL (13.3 mmol/L) AND [2] urine ketones moderate-large (or more than 1+)    Negative: [1] Blood glucose > 240 mg/dL (13.3 mmol/L) AND [2] blood ketones > 1.4 mmol/L    Negative: [1] Blood glucose > 240 mg/dL (13.3 mmol/L) AND [2] vomiting AND [3] unable to check for ketones (in blood or urine)    Negative: [1] New onset Diabetes suspected (e.g., frequent urination, weak, weight loss) AND [2] vomiting or rapid breathing    Negative: Vomiting lasts > 4 hours    Negative: Patient sounds very sick or weak to the triager    Negative: Fever > 100.5 F (38.1 C)    Negative: Blood glucose > 400 mg/dL (22.2 mmol/L)    Negative: [1] Blood glucose > 300 mg/dL (16.7 mmol/L) AND [2] two or more times in a row    Negative: Urine ketones moderate - large (or blood ketones > 1.4 mmol/L)    Negative: [1] Caller has URGENT medication or insulin pump question AND [2] triager unable to answer question    Negative: [1] Symptoms of high blood sugar (e.g., frequent urination, weak, weight loss) AND [2] not able to test blood glucose    Negative: New onset diabetes suspected (e.g., frequent urination, weakness, weight loss)    Negative: [1] Caller has NON-URGENT medication or insulin pump question AND [2] triager unable to answer question    Negative: [1] Blood glucose 240 - 300 mg/dL (13.3 - 16.7 mmol/L) AND [2] uses insulin (e.g., insulin-dependent, all people with type 1 diabetes)    [1] Blood glucose 240 - 300 mg/dL (13.3 - 16.7 mmol/L) AND [2] does not  use insulin (e.g.,  not insulin-dependent; most people with type 2 diabetes)    Protocols used: DIABETES - HIGH BLOOD SUGAR-A-AH

## 2021-05-20 ENCOUNTER — OFFICE VISIT (OUTPATIENT)
Dept: FAMILY MEDICINE | Facility: CLINIC | Age: 56
End: 2021-05-20
Payer: COMMERCIAL

## 2021-05-20 ENCOUNTER — ANCILLARY PROCEDURE (OUTPATIENT)
Dept: GENERAL RADIOLOGY | Facility: CLINIC | Age: 56
End: 2021-05-20
Attending: FAMILY MEDICINE
Payer: COMMERCIAL

## 2021-05-20 VITALS
WEIGHT: 293 LBS | HEART RATE: 88 BPM | SYSTOLIC BLOOD PRESSURE: 167 MMHG | HEIGHT: 71 IN | BODY MASS INDEX: 41.02 KG/M2 | TEMPERATURE: 98.5 F | DIASTOLIC BLOOD PRESSURE: 95 MMHG

## 2021-05-20 DIAGNOSIS — R05.9 COUGH: ICD-10-CM

## 2021-05-20 DIAGNOSIS — J01.10 ACUTE NON-RECURRENT FRONTAL SINUSITIS: Primary | ICD-10-CM

## 2021-05-20 PROCEDURE — 99214 OFFICE O/P EST MOD 30 MIN: CPT | Performed by: FAMILY MEDICINE

## 2021-05-20 PROCEDURE — 71046 X-RAY EXAM CHEST 2 VIEWS: CPT | Performed by: RADIOLOGY

## 2021-05-20 RX ORDER — BENZONATATE 200 MG/1
200 CAPSULE ORAL 3 TIMES DAILY PRN
Qty: 20 CAPSULE | Refills: 1 | Status: SHIPPED | OUTPATIENT
Start: 2021-05-20 | End: 2021-09-27

## 2021-05-20 ASSESSMENT — MIFFLIN-ST. JEOR: SCORE: 2191.17

## 2021-05-20 NOTE — LETTER
[facility]  [address] [address]  Phone: [phone]    May 20, 2021        Adarsh Salvador  [address] [facility] [address]          To whom it may concern:    RE: Adarsh Salvador    Patient was seen and treated today at our clinic  Please excuse him from work tonight.     Please contact me for questions or concerns.      Sincerely,        Li Ortiz MD

## 2021-05-20 NOTE — PROGRESS NOTES
"    Assessment & Plan     Acute non-recurrent frontal sinusitis  Has had these before, has lots of coughing  - benzonatate (TESSALON) 200 MG capsule; Take 1 capsule (200 mg) by mouth 3 times daily as needed for cough  - amoxicillin-clavulanate (AUGMENTIN) 875-125 MG tablet; Take 1 tablet by mouth 2 times daily  - XR Chest 2 Views    Cough  Has carcinogenic exposures at work. He would like xray, xray normal  - XR Chest 2 Views             Tobacco Cessation:   reports that he has never smoked. His smokeless tobacco use includes chew.          No follow-ups on file.    Li Ortiz MD  [facility]   Adarsh is a 56 year old who presents for the following health issues     HPI     Acute Illness  Acute illness concerns: sinus  Onset/Duration: 1.5 weeks   Symptoms:  Fever: no  Chills/Sweats: no  Headache (location?): YES- above eyes   Sinus Pressure: YES  Conjunctivitis:  no  Ear Pain: YES: bilateral  Rhinorrhea: YES  Congestion: no  Sore Throat: YES  Cough: YES  Wheeze: YES  Decreased Appetite: YES  Nausea: no  Vomiting: no  Diarrhea: no    Fatigue/Achiness: YES  Sick/Strep Exposure: history of covid     Therapies tried and outcome: cough syrup, excedrin migraine, cough drops    Had positive covid test and symptoms in 2020.   covid was much worse than this. Typically gets sinus infections this time of year      Review of Systems   Constitutional, HEENT, cardiovascular, pulmonary, gi and gu systems are negative, except as otherwise noted.      Objective    BP (!) 167/95   Pulse 88   Temp 98.5  F (36.9  C) (Oral)   Ht 1.803 m (5' 11\")   Wt 132.9 kg (293 lb)   BMI 40.87 kg/m    Body mass index is 40.87 kg/m .  Physical Exam   GENERAL: healthy, alert and no distress  EYES: Eyes grossly normal to inspection, PERRL and conjunctivae and sclerae normal  HENT: normal cephalic/atraumatic, ear canals and TM's normal, nose and mouth without ulcers or lesions, oropharynx clear, oral " mucous membranes moist and sinuses: maxillary, frontal tenderness on bilateral  NECK: no adenopathy, no asymmetry, masses, or scars and thyroid normal to palpation  RESP: lungs clear to auscultation - no rales, rhonchi or wheezes  CV: regular rate and rhythm, normal S1 S2, no S3 or S4, no murmur, click or rub, no peripheral edema and peripheral pulses strong    Results for orders placed or performed in visit on 05/20/21 (from the past 24 hour(s))   XR Chest 2 Views    Narrative    CHEST TWO VIEWS  5/20/2021 12:14 PM     HISTORY: Acute non-recurrent frontal sinusitis. Cough.    COMPARISON: None.    FINDINGS: Heart size and pulmonary vascularity are within normal  limits. The lungs are clear. No pneumothorax or pleural effusion.       Impression    IMPRESSION: No radiographic evidence of acute chest abnormality.     BETO GARVIN MD

## 2021-05-29 ENCOUNTER — RECORDS - HEALTHEAST (OUTPATIENT)
Dept: ADMINISTRATIVE | Facility: CLINIC | Age: 56
End: 2021-05-29

## 2021-06-06 ENCOUNTER — HEALTH MAINTENANCE LETTER (OUTPATIENT)
Age: 56
End: 2021-06-06

## 2021-06-30 ENCOUNTER — ANCILLARY PROCEDURE (OUTPATIENT)
Dept: GENERAL RADIOLOGY | Facility: CLINIC | Age: 56
End: 2021-06-30
Attending: FAMILY MEDICINE
Payer: COMMERCIAL

## 2021-06-30 ENCOUNTER — OFFICE VISIT (OUTPATIENT)
Dept: ORTHOPEDICS | Facility: CLINIC | Age: 56
End: 2021-06-30
Payer: COMMERCIAL

## 2021-06-30 VITALS — BODY MASS INDEX: 41.02 KG/M2 | WEIGHT: 293 LBS | HEIGHT: 71 IN

## 2021-06-30 DIAGNOSIS — M79.672 LEFT FOOT PAIN: ICD-10-CM

## 2021-06-30 DIAGNOSIS — M79.672 LEFT FOOT PAIN: Primary | ICD-10-CM

## 2021-06-30 PROCEDURE — 73630 X-RAY EXAM OF FOOT: CPT | Mod: LT | Performed by: RADIOLOGY

## 2021-06-30 PROCEDURE — 99213 OFFICE O/P EST LOW 20 MIN: CPT | Performed by: FAMILY MEDICINE

## 2021-06-30 ASSESSMENT — MIFFLIN-ST. JEOR: SCORE: 2191.17

## 2021-06-30 NOTE — LETTER
6/30/2021         RE: Adarsh Salvador  [address] [facility] [address] [address]        Dear Colleague,    Thank you for referring your patient, Adarsh Salvador, to the [facility] [place]. Please see a copy of my visit note below.      [facility]  SPORTS MEDICINE CLINIC VISIT     Jun 30, 2021        ASSESSMENT & PLAN    56-year-old with 2 weeks of left foot pain that is consistent with plantar fascial injury.  Possibly related to stepping on ceramic finger as he has suggested though this is consistent with classic plantar fascia injury.    Reviewed imaging and assessment with patient in detail  Discussed options for treatment which includes footwear modifications.  May consider dedicated period of rest in a walking boot.  If OTC for modifications are not helpful may consider a custom orthotic.  Provided with home exercises.  May consider follow-up with physical therapy.  Discussed use of OTC medications and the role for icing.      Lex Magallon MD  [facility]    -----  Chief Complaint   Patient presents with     Consult     left foot pain, stepped on an object 2 weeks ago, increaed pain and swelling        SUBJECTIVE  Adarsh Salvador is a/an 56 year old male who is seen as a self referral for evaluation of  left foot pain.     The patient is seen by themselves.    Onset: 2  week(s) ago. Patient describes injury as stepping on ceramic figurine.    Location of Pain: left bottom of his foot  Worsened by: Walking.  Seems to get worse as day goes on.  Better with: nothing  Treatments tried: no treatment tried to date  Associated symptoms: no distal numbness or tingling; denies swelling or warmth    Orthopedic/Surgical history: NO, does have prior history of plantar fasciitis.  Social History/Occupation:        REVIEW OF SYSTEMS:    Do you have fever, chills, weight loss? No    Do you have any vision problems? No    Do you have any chest pain or  "edema? No    Do you have any shortness of breath or wheezing?  No    Do you have stomach problems? No    Do you have any numbness or focal weakness? No    Do you have diabetes? No    Do you have problems with bleeding or clotting? No    Do you have an rashes or other skin lesions? No    OBJECTIVE:  Ht 1.803 m (5' 11\")   Wt 132.9 kg (293 lb)   BMI 40.87 kg/m       General: Alert, pleasant, no distress  Left foot: warm, well perfused, SILT throughout     Inspection: No skin lesion.  No swelling or ecchymosis.     ROM: Full ROM of the foot and ankle without discomfort     Strength: Intact in plantarflexion dorsiflexion inversion eversion without pain     Palpation: TTP over the plantar arch proximally near the attachment on the calcaneus.     Special Tests: No increase in pain with windlass test      RADIOLOGY:    3 view xrays of left foot performed and reviewed independently demonstrating no acute fracture or dislocation.  No foreign body.  No significant DJD. See EMR for formal radiology report.                      Again, thank you for allowing me to participate in the care of your patient.        Sincerely,        Lex Magallon MD    "

## 2021-06-30 NOTE — PROGRESS NOTES
[facility]  SPORTS MEDICINE CLINIC VISIT     Jun 30, 2021        ASSESSMENT & PLAN    56-year-old with 2 weeks of left foot pain that is consistent with plantar fascial injury.  Possibly related to stepping on ceramic finger as he has suggested though this is consistent with classic plantar fascia injury.    Reviewed imaging and assessment with patient in detail  Discussed options for treatment which includes footwear modifications.  May consider dedicated period of rest in a walking boot.  If OTC for modifications are not helpful may consider a custom orthotic.  Provided with home exercises.  May consider follow-up with physical therapy.  Discussed use of OTC medications and the role for icing.      Lex Magallon MD  [facility] SPORTS MEDICINE CLINIC [place]    -----  Chief Complaint   Patient presents with     Consult     left foot pain, stepped on an object 2 weeks ago, increaed pain and swelling        SUBJECTIVE  Adarsh Salvador is a/an 56 year old male who is seen as a self referral for evaluation of  left foot pain.     The patient is seen by themselves.    Onset: 2  week(s) ago. Patient describes injury as stepping on ceramic figurine.    Location of Pain: left bottom of his foot  Worsened by: Walking.  Seems to get worse as day goes on.  Better with: nothing  Treatments tried: no treatment tried to date  Associated symptoms: no distal numbness or tingling; denies swelling or warmth    Orthopedic/Surgical history: NO, does have prior history of plantar fasciitis.  Social History/Occupation:        REVIEW OF SYSTEMS:    Do you have fever, chills, weight loss? No    Do you have any vision problems? No    Do you have any chest pain or edema? No    Do you have any shortness of breath or wheezing?  No    Do you have stomach problems? No    Do you have any numbness or focal weakness? No    Do you have diabetes? No    Do you have problems with bleeding or clotting? No    Do you have an  "rashes or other skin lesions? No    OBJECTIVE:  Ht 1.803 m (5' 11\")   Wt 132.9 kg (293 lb)   BMI 40.87 kg/m       General: Alert, pleasant, no distress  Left foot: warm, well perfused, SILT throughout     Inspection: No skin lesion.  No swelling or ecchymosis.     ROM: Full ROM of the foot and ankle without discomfort     Strength: Intact in plantarflexion dorsiflexion inversion eversion without pain     Palpation: TTP over the plantar arch proximally near the attachment on the calcaneus.     Special Tests: No increase in pain with windlass test      RADIOLOGY:    3 view xrays of left foot performed and reviewed independently demonstrating no acute fracture or dislocation.  No foreign body.  No significant DJD. See EMR for formal radiology report.                  "

## 2021-06-30 NOTE — PATIENT INSTRUCTIONS
Thanks for coming today.  Ortho/Sports Medicine Clinic  [address] [place] [address]    To schedule future appointments in Ortho Clinic, you may call [phone].    To schedule ordered imaging by your Provider: Call [facility] at [phone]    Netology available online at:   [email or web address]    Please call if any further questions or concerns [phone] and ask for the Orthopedic Department. Clinic hours 8 am to 5 pm.    Return to clinic if symptoms worsen.

## 2021-06-30 NOTE — LETTER
June 30, 2021    RE:  Adarsh Salvador                              [address] [address]            To whom it may concern:    Adarsh Salvador is under my professional care for a medical condition. Please allow Adarsh to wear a walking boot at work with ambulating.     Please contact our office with any questions or concerns.         Sincerely,        Lex Magallon MD

## 2021-07-13 ENCOUNTER — OFFICE VISIT (OUTPATIENT)
Dept: NEUROLOGY | Facility: CLINIC | Age: 56
End: 2021-07-13
Payer: COMMERCIAL

## 2021-07-13 VITALS
BODY MASS INDEX: 40.21 KG/M2 | WEIGHT: 288.3 LBS | SYSTOLIC BLOOD PRESSURE: 154 MMHG | DIASTOLIC BLOOD PRESSURE: 87 MMHG | HEART RATE: 71 BPM

## 2021-07-13 DIAGNOSIS — E11.42 DIABETIC POLYNEUROPATHY ASSOCIATED WITH TYPE 2 DIABETES MELLITUS (H): ICD-10-CM

## 2021-07-13 PROCEDURE — 99214 OFFICE O/P EST MOD 30 MIN: CPT | Performed by: INTERNAL MEDICINE

## 2021-07-13 RX ORDER — NORTRIPTYLINE HCL 10 MG
20 CAPSULE ORAL AT BEDTIME
Qty: 60 CAPSULE | Refills: 5 | Status: SHIPPED | OUTPATIENT
Start: 2021-07-13 | End: 2022-01-24

## 2021-07-13 ASSESSMENT — PAIN SCALES - GENERAL: PAINLEVEL: MODERATE PAIN (4)

## 2021-07-13 NOTE — LETTER
7/13/2021         RE: Adarsh Salvador  [address] [address]        Dear Colleague,    Thank you for referring your patient, Adarsh Salvador, to the [facility] NEUROLOGY CLINIC [place]. Please see a copy of my visit note below.    [facility] Neurology Follow Up Visit    Adarsh Salvador MRN# [id number]   Age: 55 year old YOB: 1965     Brief history of symptoms: The patient was initially seen in neurologic consultation on 10/20/2020 for evaluation of neuropathy. Prior to that patient followed with Dr Montalvo. Please see the comprehensive neurologic consultation note from that date in the Epic records for details.     Interval history:   He started on nortriptyline 10 mg. This has helped with his sleep at time and pain at night. He still has some bothersome tingling during the day.     Balance continues to be an issue. He hasn't started physical therapy yet.     He has significant left foot pain and is currently wearing a boot after seeing sports medicine recently. Pain is possibly related to plantar fascitis.       Past Medical History:     Patient Active Problem List   Diagnosis     GERD (gastroesophageal reflux disease)     Chronic RLQ pain     Constipation     Chronic maxillary sinusitis     Chronic rhinitis     Hypertriglyceridemia     Benign essential hypertension     Anemia in other chronic diseases classified elsewhere     Fatty liver     Irritable bowel syndrome with diarrhea     Right inguinal hernia     Mild persistent asthma without complication     Type 2 diabetes mellitus with diabetic polyneuropathy, without long-term current use of insulin (H)     Hyperlipidemia LDL goal <100     CONNOR (generalized anxiety disorder)     Insomnia, unspecified type     Morbid obesity (H)     Neuropathy     Past Medical History:   Diagnosis Date     Anaphylactic reaction 8/14/2015     Anxiety      Depression      DM2 (diabetes mellitus, type 2) (H)      GERD (gastroesophageal reflux disease)      HTN  (hypertension)      IBS (irritable bowel syndrome)      Kidney stone 6/15/2011    Pt believes these were Calcium stones     Neuropathy         Past Surgical History:     Past Surgical History:   Procedure Laterality Date     COLONOSCOPY  5/1/2012    Procedure:COLONOSCOPY; screening colonoscopy; Surgeon:KINGSLEY DOS SANTOS; Location:[facility] OR     COLONOSCOPY  4/21/2014    Procedure: COMBINED COLONOSCOPY, SINGLE BIOPSY/POLYPECTOMY BY BIOPSY;  Surgeon: Duane, William Charles, MD;  Location: [facility] OR     CYSTOSCOPY  05/01/2008    CYSTOSCOPY W/ URETERAL STENT PLACEMENT 05/01/2008      CYSTOSCOPY  09/26/2010    CYSTOSCOPY W/ URETERAL STENT PLACEMENT 09/26/2010 Left      CYSTOSCOPY  05/18/2012    CYSTOSCOPY, LEFT URETEROSCOPY AND STENT PLACEMENT left retrograde 05/18/2012      CYSTOSCOPY,+URETEROSCOPY  06/10/2008    URETEROSCOPY 06/10/2008 Right      HC BREATH HYDROGEN TEST  3/7/2014    Procedure: HYDROGEN BREATH TEST;  Surgeon: Darion Swift MD;  Location: [facility]     LITHOTRIPSY  09/30/2010    LITHOTRIPSY 09/30/2010 LEFT EXTRACORPOREAL SHOCK WAVE LITHOTRIPSY, FLEXIBLE CYSTOSCOPY, LEFT STENT REMOVAL       ORTHOPEDIC SURGERY  10/03/2007    KNEE ARTHROSCOPY 10/03/2007 RightX2       RELEASE CARPAL TUNNEL Right 1/26/2018    Procedure: RELEASE CARPAL TUNNEL;  Right carpal tunnel release;  Surgeon: Wiliam Saenz MD;  Location: [facility] OR     VASCULAR SURGERY  11/24/2008    Radiofrequency ablation left saphenous vein 11/24/2008 Done by Dr. Lozoya          Social History:     Social History     Tobacco Use     Smoking status: Never Smoker     Smokeless tobacco: Current User     Types: Chew     Tobacco comment: Chew   Substance Use Topics     Alcohol use: Yes     Alcohol/week: 0.0 standard drinks     Comment: occasional, few drinks a month     Drug use: No        Family History:     Family History   Problem Relation Age of Onset     Cancer Mother 52        lung, smoked     Cancer - colorectal Father 53        unsure type, pt  attributes to radiation exposure     Myocardial Infarction Father 45     Coronary Artery Disease Father      Myocardial Infarction Paternal Grandfather 35     Diabetes Other         nephew- type 1     Diabetes Maternal Aunt         1     Diabetes Maternal Aunt         2     Diabetes Paternal Uncle         1     Diabetes Paternal Uncle         2     Diabetes Paternal Uncle         3     Diabetes Paternal Uncle         4     Colon Cancer No family hx of         Medications:     Current Outpatient Medications   Medication Sig     ACCU-CHEK GUIDE test strip USE TO TEST BLOOD SUGAR 1 TIMES DAILY OR AS DIRECTED.     albuterol (PROAIR HFA/PROVENTIL HFA/VENTOLIN HFA) 108 (90 Base) MCG/ACT inhaler Inhale 2 puffs into the lungs every 6 hours as needed for shortness of breath / dyspnea or wheezing (Patient taking differently: Inhale 2 puffs into the lungs every 6 hours as needed for shortness of breath / dyspnea or wheezing )     Alpha Lipoic Acid 200 MG CAPS Take 200 mg by mouth 3 times daily     amLODIPine (NORVASC) 10 MG tablet Take 1 tablet (10 mg) by mouth daily For blood pressure     amoxicillin-clavulanate (AUGMENTIN) 875-125 MG tablet Take 1 tablet by mouth 2 times daily     ASPIRIN PO Take 325 mg by mouth daily     aspirin-acetaminophen-caffeine (EXCEDRIN MIGRAINE) 250-250-65 MG tablet Take 1 tablet by mouth     atorvastatin (LIPITOR) 40 MG tablet Take 1 tablet (40 mg) by mouth daily     baclofen (LIORESAL) 10 MG tablet Take 1 tablet (10 mg) by mouth See Admin Instructions 2-3 po q hs.     benzonatate (TESSALON) 200 MG capsule Take 1 capsule (200 mg) by mouth 3 times daily as needed for cough     blood glucose monitoring (ACCU-CHEK FASTCLIX) lancets Use to test blood sugar 1 times daily or as directed.  Ok to substitute alternative if insurance prefers.     calcium carbonate (TUMS) 500 MG chewable tablet Take 1 chew tab by mouth daily     cyclobenzaprine (FLEXERIL) 10 MG tablet Take 0.5-1 tablets (5-10 mg) by mouth 3  times daily as needed for muscle spasms     DULoxetine (CYMBALTA) 60 MG capsule Take 1 capsule (60 mg) by mouth 2 times daily     EPINEPHrine (EPIPEN) 0.3 MG/0.3ML injection Inject 0.3 mLs (0.3 mg) into the muscle once as needed for anaphylaxis     EPINEPHrine (EPIPEN/ADRENACLICK/OR ANY BX GENERIC EQUIV) 0.3 MG/0.3ML injection 2-pack Inject 0.3 mLs into the muscle     fluticasone (FLONASE) 50 MCG/ACT nasal spray Spray 1 spray into both nostrils daily     fluticasone-salmeterol (ADVAIR) 500-50 MCG/DOSE inhaler Inhale 1 puff into the lungs 2 times daily     losartan (COZAAR) 50 MG tablet Take 1 tablet (50 mg) by mouth daily For blood pressure.     metFORMIN (GLUCOPHAGE-XR) 500 MG 24 hr tablet Take 2 tablets (1,000 mg) by mouth 2 times daily (with meals)     montelukast (SINGULAIR) 10 MG tablet Take 1 tablet (10 mg) by mouth At Bedtime For allergies/asthma     multivitamin, therapeutic (THERA-VIT) TABS Take 1 tablet by mouth daily     Naproxen Sodium (ALEVE PO) Take by mouth as needed      naproxen sodium (ANAPROX) 220 MG tablet Take 220 mg by mouth     nortriptyline (PAMELOR) 10 MG capsule Take 1 capsule (10 mg) by mouth At Bedtime     tamsulosin (FLOMAX) 0.4 MG capsule Take 1 capsule (0.4 mg) by mouth daily     Current Facility-Administered Medications   Medication     triamcinolone (KENALOG-40) injection 40 mg     Facility-Administered Medications Ordered in Other Visits   Medication     BUPivacaine (MARCAINE) injection 0.5%     DOBUTamine 500 mg in dextrose 5% 250 mL (adult std conc) premix     iopamidol (ISOVUE-M 200) solution 10 mL     methylPREDNISolone (DEPO-MEDROL) injection 80 mg     metoprolol (LOPRESSOR) injection 5 mg        Allergies:     Allergies   Allergen Reactions     Artificial Sweetner (Informational Only) [Artificial Sweetner (Informational Only) ] GI Disturbance     ALL artificial sweetners cause severe diarrhea & flu symptoms     Hydromorphone Anaphylaxis     Ibuprofen GI Disturbance      Morphine [Fumaric Acid] Anaphylaxis     Hydrocodone-Acetaminophen Itching     Tramadol Hives     Trazodone Hives     Gabapentin      GI upset per pt     Keflex [Cephalexin] Diarrhea     Upset stomach     Codeine Phosphate Itching     Ketorolac Tromethamine Rash        Review of Systems:   As above     Physical Exam:   Vitals: There were no vitals taken for this visit.   General: Seated comfortably in no acute distress.  Lungs: breathing comfortably  Extremities: no edema  Skin: No rashes  Neurologic:     Mental Status: Fully alert, attentive. Normal memory and fund of knowledge. Language normal, speech clear and fluent, no paraphasic errors.      Cranial Nerves: Visual fields intact. EOMI with normal smooth pursuit. Facial sensation intact/symmetric. Facial movements symmetric. Hearing not formally tested but intact to conversation. Palate elevation symmetric, uvula midline. No dysarthria. Shoulder shrug strong bilaterally. Tongue protrusion midline.     Motor: No tremors or other abnormal movements observed. Muscle tone normal throughout. Strength 5/5 throughout upper and lower extremities.     Deep Tendon Reflexes: 2+/symmetric throughout upper extremities, 1+ patella and trace ankle jerks. Toes downgoing bilaterally.     Sensory: Has boot on left foot. Vibration is 8 seconds in the right great toe, and normal in the hands. Temperature is decreased in the right foot compared to the hands.      Coordination: Finger-nose-finger without dysmetria.      Gait: Mildly wide based steady casual gait.      Data reviewed on previous visits    Procedures:  EMG/NCS 2020  Interpretation:  This is an abnormal study, demonstrating electrophysiologic evidence of the followin. 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 Comparison with two studies performed in 2017 demonstrates minimal interval worsening of amplitude attenuation.   2. Mild right ulnar neuropathy at the elbow.     Laboratory:  A1c 7.5  (3/2020)  B12 360, Heavy metals screen negative (2/2020)  TSH normal (2018)  Immunofixation negative (2017)  SSA/SSB negative (2018)  A1c 7.4 (12/2020)    Pertinent Investigations since last visit:   None         Assessment and Plan:   Assessment:  Adarsh Salvador is a 55 year old male who presents today for follow-up of diabetic polyneuropathy. Patient has had mild worsening of symptoms since around 8734-2456. He reports continued balance and pain symptoms today. He hasn't started physical therapy yet, but is planning on doing it. He is on Cymbalta and Nortriptyline for pain control. We discussed increasing Nortriptyline dosage as below. He didn't tolerate Lyrica. He has had some relief with prn Flexeril. He has bilateral thigh numbness which is most typical of meralgic paresthetica.     Plan:  - Continue Cymbalta 60 mg BID  - Flexeril 5-10 mg TID prn  - Increase Nortriptyline 20 mg nightly  - Start PT when able    Follow up in Neurology clinic in 3 months or earlier as needed should new concerns arise.    Juan Luis Gray MD   of Neurology  [facility]        Again, thank you for allowing me to participate in the care of your patient.        Sincerely,        Juan Luis Gray MD

## 2021-07-13 NOTE — PROGRESS NOTES
[facility] Neurology Follow Up Visit    Adarsh Salvador MRN# [id number]   Age: 55 year old YOB: 1965     Brief history of symptoms: The patient was initially seen in neurologic consultation on 10/20/2020 for evaluation of neuropathy. Prior to that patient followed with Dr Montalvo. Please see the comprehensive neurologic consultation note from that date in the Epic records for details.     Interval history:   He started on nortriptyline 10 mg. This has helped with his sleep at time and pain at night. He still has some bothersome tingling during the day.     Balance continues to be an issue. He hasn't started physical therapy yet.     He has significant left foot pain and is currently wearing a boot after seeing sports medicine recently. Pain is possibly related to plantar fascitis.       Past Medical History:     Patient Active Problem List   Diagnosis     GERD (gastroesophageal reflux disease)     Chronic RLQ pain     Constipation     Chronic maxillary sinusitis     Chronic rhinitis     Hypertriglyceridemia     Benign essential hypertension     Anemia in other chronic diseases classified elsewhere     Fatty liver     Irritable bowel syndrome with diarrhea     Right inguinal hernia     Mild persistent asthma without complication     Type 2 diabetes mellitus with diabetic polyneuropathy, without long-term current use of insulin (H)     Hyperlipidemia LDL goal <100     CONNOR (generalized anxiety disorder)     Insomnia, unspecified type     Morbid obesity (H)     Neuropathy     Past Medical History:   Diagnosis Date     Anaphylactic reaction 8/14/2015     Anxiety      Depression      DM2 (diabetes mellitus, type 2) (H)      GERD (gastroesophageal reflux disease)      HTN (hypertension)      IBS (irritable bowel syndrome)      Kidney stone 6/15/2011    Pt believes these were Calcium stones     Neuropathy         Past Surgical History:     Past Surgical History:   Procedure Laterality Date     COLONOSCOPY  5/1/2012     Procedure:COLONOSCOPY; screening colonoscopy; Surgeon:KINGSLEY DOS SANTOS; [facility]     COLONOSCOPY  4/21/2014    Procedure: COMBINED COLONOSCOPY, SINGLE BIOPSY/POLYPECTOMY BY BIOPSY;  Surgeon: Duane, William Charles, MD;  Location: [facility]     CYSTOSCOPY  05/01/2008    CYSTOSCOPY W/ URETERAL STENT PLACEMENT 05/01/2008      CYSTOSCOPY  09/26/2010    CYSTOSCOPY W/ URETERAL STENT PLACEMENT 09/26/2010 Left      CYSTOSCOPY  05/18/2012    CYSTOSCOPY, LEFT URETEROSCOPY AND STENT PLACEMENT left retrograde 05/18/2012      CYSTOSCOPY,+URETEROSCOPY  06/10/2008    URETEROSCOPY 06/10/2008 Right      HC BREATH HYDROGEN TEST  3/7/2014    Procedure: HYDROGEN BREATH TEST;  Surgeon: Darion Swift MD;  Location: [facility]     LITHOTRIPSY  09/30/2010    LITHOTRIPSY 09/30/2010 LEFT EXTRACORPOREAL SHOCK WAVE LITHOTRIPSY, FLEXIBLE CYSTOSCOPY, LEFT STENT REMOVAL       ORTHOPEDIC SURGERY  10/03/2007    KNEE ARTHROSCOPY 10/03/2007 RightX2       RELEASE CARPAL TUNNEL Right 1/26/2018    Procedure: RELEASE CARPAL TUNNEL;  Right carpal tunnel release;  Surgeon: Wiliam Saenz MD;  [facility]: [facility]     VASCULAR SURGERY  11/24/2008    Radiofrequency ablation left saphenous vein 11/24/2008 Done by Dr. Lozoya          Social History:     Social History     Tobacco Use     Smoking status: Never Smoker     Smokeless tobacco: Current User     Types: Chew     Tobacco comment: Chew   Substance Use Topics     Alcohol use: Yes     Alcohol/week: 0.0 standard drinks     Comment: occasional, few drinks a month     Drug use: No        Family History:     Family History   Problem Relation Age of Onset     Cancer Mother 52        lung, smoked     Cancer - colorectal Father 53        unsure type, pt attributes to radiation exposure     Myocardial Infarction Father 45     Coronary Artery Disease Father      Myocardial Infarction Paternal Grandfather 35     Diabetes Other         nephew- type 1     Diabetes Maternal Aunt         1     Diabetes Maternal  Aunt         2     Diabetes Paternal Uncle         1     Diabetes Paternal Uncle         2     Diabetes Paternal Uncle         3     Diabetes Paternal Uncle         4     Colon Cancer No family hx of         Medications:     Current Outpatient Medications   Medication Sig     ACCU-CHEK GUIDE test strip USE TO TEST BLOOD SUGAR 1 TIMES DAILY OR AS DIRECTED.     albuterol (PROAIR HFA/PROVENTIL HFA/VENTOLIN HFA) 108 (90 Base) MCG/ACT inhaler Inhale 2 puffs into the lungs every 6 hours as needed for shortness of breath / dyspnea or wheezing (Patient taking differently: Inhale 2 puffs into the lungs every 6 hours as needed for shortness of breath / dyspnea or wheezing )     Alpha Lipoic Acid 200 MG CAPS Take 200 mg by mouth 3 times daily     amLODIPine (NORVASC) 10 MG tablet Take 1 tablet (10 mg) by mouth daily For blood pressure     amoxicillin-clavulanate (AUGMENTIN) 875-125 MG tablet Take 1 tablet by mouth 2 times daily     ASPIRIN PO Take 325 mg by mouth daily     aspirin-acetaminophen-caffeine (EXCEDRIN MIGRAINE) 250-250-65 MG tablet Take 1 tablet by mouth     atorvastatin (LIPITOR) 40 MG tablet Take 1 tablet (40 mg) by mouth daily     baclofen (LIORESAL) 10 MG tablet Take 1 tablet (10 mg) by mouth See Admin Instructions 2-3 po q hs.     benzonatate (TESSALON) 200 MG capsule Take 1 capsule (200 mg) by mouth 3 times daily as needed for cough     blood glucose monitoring (ACCU-CHEK FASTCLIX) lancets Use to test blood sugar 1 times daily or as directed.  Ok to substitute alternative if insurance prefers.     calcium carbonate (TUMS) 500 MG chewable tablet Take 1 chew tab by mouth daily     cyclobenzaprine (FLEXERIL) 10 MG tablet Take 0.5-1 tablets (5-10 mg) by mouth 3 times daily as needed for muscle spasms     DULoxetine (CYMBALTA) 60 MG capsule Take 1 capsule (60 mg) by mouth 2 times daily     EPINEPHrine (EPIPEN) 0.3 MG/0.3ML injection Inject 0.3 mLs (0.3 mg) into the muscle once as needed for anaphylaxis      EPINEPHrine (EPIPEN/ADRENACLICK/OR ANY BX GENERIC EQUIV) 0.3 MG/0.3ML injection 2-pack Inject 0.3 mLs into the muscle     fluticasone (FLONASE) 50 MCG/ACT nasal spray Spray 1 spray into both nostrils daily     fluticasone-salmeterol (ADVAIR) 500-50 MCG/DOSE inhaler Inhale 1 puff into the lungs 2 times daily     losartan (COZAAR) 50 MG tablet Take 1 tablet (50 mg) by mouth daily For blood pressure.     metFORMIN (GLUCOPHAGE-XR) 500 MG 24 hr tablet Take 2 tablets (1,000 mg) by mouth 2 times daily (with meals)     montelukast (SINGULAIR) 10 MG tablet Take 1 tablet (10 mg) by mouth At Bedtime For allergies/asthma     multivitamin, therapeutic (THERA-VIT) TABS Take 1 tablet by mouth daily     Naproxen Sodium (ALEVE PO) Take by mouth as needed      naproxen sodium (ANAPROX) 220 MG tablet Take 220 mg by mouth     nortriptyline (PAMELOR) 10 MG capsule Take 1 capsule (10 mg) by mouth At Bedtime     tamsulosin (FLOMAX) 0.4 MG capsule Take 1 capsule (0.4 mg) by mouth daily     Current Facility-Administered Medications   Medication     triamcinolone (KENALOG-40) injection 40 mg     Facility-Administered Medications Ordered in Other Visits   Medication     BUPivacaine (MARCAINE) injection 0.5%     DOBUTamine 500 mg in dextrose 5% 250 mL (adult std conc) premix     iopamidol (ISOVUE-M 200) solution 10 mL     methylPREDNISolone (DEPO-MEDROL) injection 80 mg     metoprolol (LOPRESSOR) injection 5 mg        Allergies:     Allergies   Allergen Reactions     Artificial Sweetner (Informational Only) [Artificial Sweetner (Informational Only) ] GI Disturbance     ALL artificial sweetners cause severe diarrhea & flu symptoms     Hydromorphone Anaphylaxis     Ibuprofen GI Disturbance     Morphine [Fumaric Acid] Anaphylaxis     Hydrocodone-Acetaminophen Itching     Tramadol Hives     Trazodone Hives     Gabapentin      GI upset per pt     Keflex [Cephalexin] Diarrhea     Upset stomach     Codeine Phosphate Itching     Ketorolac Tromethamine  Rash        Review of Systems:   As above     Physical Exam:   Vitals: There were no vitals taken for this visit.   General: Seated comfortably in no acute distress.  Lungs: breathing comfortably  Extremities: no edema  Skin: No rashes  Neurologic:     Mental Status: Fully alert, attentive. Normal memory and fund of knowledge. Language normal, speech clear and fluent, no paraphasic errors.      Cranial Nerves: Visual fields intact. EOMI with normal smooth pursuit. Facial sensation intact/symmetric. Facial movements symmetric. Hearing not formally tested but intact to conversation. Palate elevation symmetric, uvula midline. No dysarthria. Shoulder shrug strong bilaterally. Tongue protrusion midline.     Motor: No tremors or other abnormal movements observed. Muscle tone normal throughout. Strength 5/5 throughout upper and lower extremities.     Deep Tendon Reflexes: 2+/symmetric throughout upper extremities, 1+ patella and trace ankle jerks. Toes downgoing bilaterally.     Sensory: Has boot on left foot. Vibration is 8 seconds in the right great toe, and normal in the hands. Temperature is decreased in the right foot compared to the hands.      Coordination: Finger-nose-finger without dysmetria.      Gait: Mildly wide based steady casual gait.      Data reviewed on previous visits    Procedures:  EMG/NCS 2020  Interpretation:  This is an abnormal study, demonstrating electrophysiologic evidence of the followin. 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 Comparison with two studies performed in 2017 demonstrates minimal interval worsening of amplitude attenuation.   2. Mild right ulnar neuropathy at the elbow.     Laboratory:  A1c 7.5 (3/2020)  B12 360, Heavy metals screen negative (2020)  TSH normal ()  Immunofixation negative ()  SSA/SSB negative (2018)  A1c 7.4 (2020)    Pertinent Investigations since last visit:   None         Assessment and Plan:   Assessment:  Adarsh EDWARDS  [place] is a 55 year old male who presents today for follow-up of diabetic polyneuropathy. Patient has had mild worsening of symptoms since around 6465-6307. He reports continued balance and pain symptoms today. He hasn't started physical therapy yet, but is planning on doing it. He is on Cymbalta and Nortriptyline for pain control. We discussed increasing Nortriptyline dosage as below. He didn't tolerate Lyrica. He has had some relief with prn Flexeril. He has bilateral thigh numbness which is most typical of meralgic paresthetica.     Plan:  - Continue Cymbalta 60 mg BID  - Flexeril 5-10 mg TID prn  - Increase Nortriptyline 20 mg nightly  - Start PT when able    Follow up in Neurology clinic in 3 months or earlier as needed should new concerns arise.    Juan Luis Gray MD   of Neurology  [facility]

## 2021-07-27 ENCOUNTER — OFFICE VISIT (OUTPATIENT)
Dept: PODIATRY | Facility: CLINIC | Age: 56
End: 2021-07-27
Payer: COMMERCIAL

## 2021-07-27 VITALS — SYSTOLIC BLOOD PRESSURE: 161 MMHG | HEART RATE: 85 BPM | OXYGEN SATURATION: 98 % | DIASTOLIC BLOOD PRESSURE: 90 MMHG

## 2021-07-27 DIAGNOSIS — M72.2 PLANTAR FASCIITIS: Primary | ICD-10-CM

## 2021-07-27 PROCEDURE — 99203 OFFICE O/P NEW LOW 30 MIN: CPT | Performed by: PODIATRIST

## 2021-07-27 NOTE — LETTER
7/27/2021         RE: Adarsh Salvador  [address] [address]        Dear Colleague,    Thank you for referring your patient, Adarsh Salvador, to the [facility]. Please see a copy of my visit note below.    Past Medical History:   Diagnosis Date     Anaphylactic reaction 8/14/2015     Anxiety      Depression      DM2 (diabetes mellitus, type 2) (H)      GERD (gastroesophageal reflux disease)      HTN (hypertension)      IBS (irritable bowel syndrome)      Kidney stone 6/15/2011    Pt believes these were Calcium stones     Neuropathy      Patient Active Problem List   Diagnosis     GERD (gastroesophageal reflux disease)     Chronic RLQ pain     Constipation     Chronic maxillary sinusitis     Chronic rhinitis     Hypertriglyceridemia     Benign essential hypertension     Anemia in other chronic diseases classified elsewhere     Fatty liver     Irritable bowel syndrome with diarrhea     Right inguinal hernia     Mild persistent asthma without complication     Type 2 diabetes mellitus with diabetic polyneuropathy, without long-term current use of insulin (H)     Hyperlipidemia LDL goal <100     CONNOR (generalized anxiety disorder)     Insomnia, unspecified type     Morbid obesity (H)     Neuropathy     Past Surgical History:   Procedure Laterality Date     COLONOSCOPY  5/1/2012    Procedure:COLONOSCOPY; screening colonoscopy; Surgeon:KINGSLEY DOS SANTOS; Location:[facility]     COLONOSCOPY  4/21/2014    Procedure: COMBINED COLONOSCOPY, SINGLE BIOPSY/POLYPECTOMY BY BIOPSY;  Surgeon: Duane, William Charles, MD;  Location: [facility]     CYSTOSCOPY  05/01/2008    CYSTOSCOPY W/ URETERAL STENT PLACEMENT 05/01/2008      CYSTOSCOPY  09/26/2010    CYSTOSCOPY W/ URETERAL STENT PLACEMENT 09/26/2010 Left      CYSTOSCOPY  05/18/2012    CYSTOSCOPY, LEFT URETEROSCOPY AND STENT PLACEMENT left retrograde 05/18/2012      CYSTOSCOPY,+URETEROSCOPY  06/10/2008    URETEROSCOPY 06/10/2008 Right      HC BREATH HYDROGEN TEST   3/7/2014    Procedure: HYDROGEN BREATH TEST;  Surgeon: Darion Swift MD;  Location: [facility]     LITHOTRIPSY  09/30/2010    LITHOTRIPSY 09/30/2010 LEFT EXTRACORPOREAL SHOCK WAVE LITHOTRIPSY, FLEXIBLE CYSTOSCOPY, LEFT STENT REMOVAL       ORTHOPEDIC SURGERY  10/03/2007    KNEE ARTHROSCOPY 10/03/2007 RightX2       RELEASE CARPAL TUNNEL Right 1/26/2018    Procedure: RELEASE CARPAL TUNNEL;  Right carpal tunnel release;  Surgeon: Wiliam Saenz MD;  Location: [facility]     VASCULAR SURGERY  11/24/2008    Radiofrequency ablation left saphenous vein 11/24/2008 Done by Dr. Lozoya       Social History     Socioeconomic History     Marital status: Single     Spouse name: Not on file     Number of children: 0     Years of education: Not on file     Highest education level: Not on file   Occupational History     Occupation: - with robotics     Employer: [employer]   Tobacco Use     Smoking status: Never Smoker     Smokeless tobacco: Current User     Types: Chew     Tobacco comment: Chew   Substance and Sexual Activity     Alcohol use: Yes     Alcohol/week: 0.0 standard drinks     Comment: occasional, few drinks a month     Drug use: No     Sexual activity: Never   Other Topics Concern     Parent/sibling w/ CABG, MI or angioplasty before 65F 55M? Not Asked   Social History Narrative    Works at [employer], as a  (25+ years experience).       Social Determinants of Health     Financial Resource Strain:      Difficulty of Paying Living Expenses:    Food Insecurity:      Worried About Running Out of Food in the Last Year:      Ran Out of Food in the Last Year:    Transportation Needs:      Lack of Transportation (Medical):      Lack of Transportation (Non-Medical):    Physical Activity:      Days of Exercise per Week:      Minutes of Exercise per Session:    Stress:      Feeling of Stress :    Social Connections:      Frequency of Communication with Friends and Family:      Frequency of Social  Gatherings with Friends and Family:      Attends Zoroastrianism Services:      Active Member of Clubs or Organizations:      Attends Club or Organization Meetings:      Marital Status:    Intimate Partner Violence:      Fear of Current or Ex-Partner:      Emotionally Abused:      Physically Abused:      Sexually Abused:      Family History   Problem Relation Age of Onset     Cancer Mother 52        lung, smoked     Cancer - colorectal Father 53        unsure type, pt attributes to radiation exposure     Myocardial Infarction Father 45     Coronary Artery Disease Father      Myocardial Infarction Paternal Grandfather 35     Diabetes Other         nephew- type 1     Diabetes Maternal Aunt         1     Diabetes Maternal Aunt         2     Diabetes Paternal Uncle         1     Diabetes Paternal Uncle         2     Diabetes Paternal Uncle         3     Diabetes Paternal Uncle         4     Colon Cancer No family hx of      SUBJECTIVE FINDINGS:  A 56-year-old male presents for foot pain bilaterally.  Relates it hurts on the bottom of the foot, left greater than right.  He relates it is swollen at times.  It was better and then he had to crawl over some rocks this weekend and it got worse.  He relates he had x-rays at least a couple years ago.  He had cortisone shots in both of them that did not help.  He has had orthotics, but he lost those about a year ago so he has not been wearing them.  Those did help.  He has seen Dr. Magallon.  I reviewed Dr. Magallon's 06/31/2021 note.    OBJECTIVE FINDINGS:  DP and PT are 2/4 bilaterally.  He has dorsally contracted digits 1 through 5 bilaterally.  He has pain on palpation of the central band of the plantar fascia bilaterally.  There is no erythema, no drainage, no odor, no calor bilaterally.  I reviewed his 06/30/2021 x-rays and there are no gross tendon voids bilaterally.  No erythema, no drainage, no odor, no calor.    ASSESSMENT AND PLAN:  Plantar fasciitis bilaterally.  Diagnosis and  treatment options discussed with him.  He is advised on stretching and icing.  The patient is casted for custom foot orthotics.  He is given the phone number and address [facility] to [facility] those up.  He relates he does have peripheral neuropathy.  Return to clinic and see me in about 2 months.      He relates he does have neuropathy as well.      Again, thank you for allowing me to participate in the care of your patient.        Sincerely,        Yonatan Bennett DPM

## 2021-07-27 NOTE — PROGRESS NOTES
Past Medical History:   Diagnosis Date     Anaphylactic reaction 8/14/2015     Anxiety      Depression      DM2 (diabetes mellitus, type 2) (H)      GERD (gastroesophageal reflux disease)      HTN (hypertension)      IBS (irritable bowel syndrome)      Kidney stone 6/15/2011    Pt believes these were Calcium stones     Neuropathy      Patient Active Problem List   Diagnosis     GERD (gastroesophageal reflux disease)     Chronic RLQ pain     Constipation     Chronic maxillary sinusitis     Chronic rhinitis     Hypertriglyceridemia     Benign essential hypertension     Anemia in other chronic diseases classified elsewhere     Fatty liver     Irritable bowel syndrome with diarrhea     Right inguinal hernia     Mild persistent asthma without complication     Type 2 diabetes mellitus with diabetic polyneuropathy, without long-term current use of insulin (H)     Hyperlipidemia LDL goal <100     CONNOR (generalized anxiety disorder)     Insomnia, unspecified type     Morbid obesity (H)     Neuropathy     Past Surgical History:   Procedure Laterality Date     COLONOSCOPY  5/1/2012    Procedure:COLONOSCOPY; screening colonoscopy; Surgeon:KINGSLEY DOS SANTOS; [facility]     COLONOSCOPY  4/21/2014    Procedure: COMBINED COLONOSCOPY, SINGLE BIOPSY/POLYPECTOMY BY BIOPSY;  Surgeon: Duane, William Charles, MD;  Location: [facility]     CYSTOSCOPY  05/01/2008    CYSTOSCOPY W/ URETERAL STENT PLACEMENT 05/01/2008      CYSTOSCOPY  09/26/2010    CYSTOSCOPY W/ URETERAL STENT PLACEMENT 09/26/2010 Left      CYSTOSCOPY  05/18/2012    CYSTOSCOPY, LEFT URETEROSCOPY AND STENT PLACEMENT left retrograde 05/18/2012      CYSTOSCOPY,+URETEROSCOPY  06/10/2008    URETEROSCOPY 06/10/2008 Right      HC BREATH HYDROGEN TEST  3/7/2014    Procedure: HYDROGEN BREATH TEST;  Surgeon: Darion Swift MD;  Location: [facility]     LITHOTRIPSY  09/30/2010    LITHOTRIPSY 09/30/2010 LEFT EXTRACORPOREAL SHOCK WAVE LITHOTRIPSY, FLEXIBLE CYSTOSCOPY, LEFT STENT REMOVAL        ORTHOPEDIC SURGERY  10/03/2007    KNEE ARTHROSCOPY 10/03/2007 RightX2       RELEASE CARPAL TUNNEL Right 1/26/2018    Procedure: RELEASE CARPAL TUNNEL;  Right carpal tunnel release;  Surgeon: Wiliam Saenz MD;  Location: [facility]     VASCULAR SURGERY  11/24/2008    Radiofrequency ablation left saphenous vein 11/24/2008 Done by Dr. Lozoya       Social History     Socioeconomic History     Marital status: Single     Spouse name: Not on file     Number of children: 0     Years of education: Not on file     Highest education level: Not on file   Occupational History     Occupation: - with robotics     Employer: [employer]   Tobacco Use     Smoking status: Never Smoker     Smokeless tobacco: Current User     Types: Chew     Tobacco comment: Chew   Substance and Sexual Activity     Alcohol use: Yes     Alcohol/week: 0.0 standard drinks     Comment: occasional, few drinks a month     Drug use: No     Sexual activity: Never   Other Topics Concern     Parent/sibling w/ CABG, MI or angioplasty before 65F 55M? Not Asked   Social History Narrative    Works at [employer], as a  (25+ years experience).       Social Determinants of Health     Financial Resource Strain:      Difficulty of Paying Living Expenses:    Food Insecurity:      Worried About Running Out of Food in the Last Year:      Ran Out of Food in the Last Year:    Transportation Needs:      Lack of Transportation (Medical):      Lack of Transportation (Non-Medical):    Physical Activity:      Days of Exercise per Week:      Minutes of Exercise per Session:    Stress:      Feeling of Stress :    Social Connections:      Frequency of Communication with Friends and Family:      Frequency of Social Gatherings with Friends and Family:      Attends Zoroastrian Services:      Active Member of Clubs or Organizations:      Attends Club or Organization Meetings:      Marital Status:    Intimate Partner Violence:      Fear of Current or  Ex-Partner:      Emotionally Abused:      Physically Abused:      Sexually Abused:      Family History   Problem Relation Age of Onset     Cancer Mother 52        lung, smoked     Cancer - colorectal Father 53        unsure type, pt attributes to radiation exposure     Myocardial Infarction Father 45     Coronary Artery Disease Father      Myocardial Infarction Paternal Grandfather 35     Diabetes Other         nephew- type 1     Diabetes Maternal Aunt         1     Diabetes Maternal Aunt         2     Diabetes Paternal Uncle         1     Diabetes Paternal Uncle         2     Diabetes Paternal Uncle         3     Diabetes Paternal Uncle         4     Colon Cancer No family hx of      SUBJECTIVE FINDINGS:  A 56-year-old male presents for foot pain bilaterally.  Relates it hurts on the bottom of the foot, left greater than right.  He relates it is swollen at times.  It was better and then he had to crawl over some rocks this weekend and it got worse.  He relates he had x-rays at least a couple years ago.  He had cortisone shots in both of them that did not help.  He has had orthotics, but he lost those about a year ago so he has not been wearing them.  Those did help.  He has seen Dr. Magallon.  I reviewed Dr. Magallon's 06/31/2021 note.    OBJECTIVE FINDINGS:  DP and PT are 2/4 bilaterally.  He has dorsally contracted digits 1 through 5 bilaterally.  He has pain on palpation of the central band of the plantar fascia bilaterally.  There is no erythema, no drainage, no odor, no calor bilaterally.  I reviewed his 06/30/2021 x-rays and there are no gross tendon voids bilaterally.  No erythema, no drainage, no odor, no calor.    ASSESSMENT AND PLAN:  Plantar fasciitis bilaterally.  Diagnosis and treatment options discussed with him.  He is advised on stretching and icing.  The patient is casted for custom foot orthotics.  He is given the phone number and address to [facility] to pick those up.  He relates he  does have peripheral neuropathy.  Return to clinic and see me in about 2 months.      He relates he does have neuropathy as well.

## 2021-07-27 NOTE — NURSING NOTE
Adarsh Salvador's chief complaint for this visit includes:  Chief Complaint   Patient presents with     New Patient     casting for insoles - left foot pain and swelling     PCP: Bertha Romero    Referring Provider:  No referring provider defined for this encounter.    BP (!) 161/90 (BP Location: Left arm, Patient Position: Sitting, Cuff Size: Adult Regular)   Pulse 85   SpO2 98%   Data Unavailable     Do you need any medication refills at today's visit? No    Lindsey Sol CMA

## 2021-07-27 NOTE — PATIENT INSTRUCTIONS
Thanks for coming today.  [facility]  [address] [place] [address]    To schedule future appointments in Ortho Clinic, you may call [phone].    To schedule ordered imaging by your provider:   Call Central Imaging [phone]    To schedule an injection ordered by your provider:  Call Central Imaging Injection scheduling line: [phone]  [employer] available online at:  [email or web address]    Please call if any further questions or concerns ([phone]).  Clinic hours 8 am to 5 pm.    Return to clinic (call) if symptoms worsen or fail to improve.

## 2021-09-11 ENCOUNTER — TRANSFERRED RECORDS (OUTPATIENT)
Dept: HEALTH INFORMATION MANAGEMENT | Facility: CLINIC | Age: 56
End: 2021-09-11

## 2021-09-11 ENCOUNTER — NURSE TRIAGE (OUTPATIENT)
Dept: NURSING | Facility: CLINIC | Age: 56
End: 2021-09-11

## 2021-09-11 NOTE — TELEPHONE ENCOUNTER
C/O left leg pain symptoms are:    Pain started in the middle of the night 9/11    Had a dime size lump on the top of his left foot    Rates pain between moderate-severe    Left leg feels warmer than right'    Pain starts in his f-middle if his foot goes up into his mid leg on the inside    Does have diabetic neuropathy states this feels different.orried he may have a blood clot  Advised to see provider today.      No recent trauma t left foot..      Kaia Shepard RN, MA  [facility] Triage Nurse Advisor      Reason for Disposition    [1] SEVERE pain (e.g., excruciating, unable to do any normal activities) AND [2] not improved after 2 hours of pain medicine    Additional Information    Negative: [1] Red area or streak AND [2] fever    Negative: [1] Swollen foot AND [2] fever    Negative: Patient sounds very sick or weak to the triager    Negative: Entire foot is cool or blue in comparison to other foot    Negative: Purple or black skin on foot or toe    Protocols used: FOOT PAIN-A-AH

## 2021-09-12 ENCOUNTER — TRANSFERRED RECORDS (OUTPATIENT)
Dept: HEALTH INFORMATION MANAGEMENT | Facility: CLINIC | Age: 56
End: 2021-09-12

## 2021-09-26 ENCOUNTER — HEALTH MAINTENANCE LETTER (OUTPATIENT)
Age: 56
End: 2021-09-26

## 2021-09-27 ENCOUNTER — ANTICOAGULATION THERAPY VISIT (OUTPATIENT)
Dept: ANTICOAGULATION | Facility: CLINIC | Age: 56
End: 2021-09-27

## 2021-09-27 ENCOUNTER — OFFICE VISIT (OUTPATIENT)
Dept: FAMILY MEDICINE | Facility: CLINIC | Age: 56
End: 2021-09-27
Payer: COMMERCIAL

## 2021-09-27 VITALS
OXYGEN SATURATION: 99 % | HEIGHT: 71 IN | SYSTOLIC BLOOD PRESSURE: 138 MMHG | TEMPERATURE: 97.2 F | WEIGHT: 286 LBS | DIASTOLIC BLOOD PRESSURE: 86 MMHG | HEART RATE: 62 BPM | BODY MASS INDEX: 40.04 KG/M2

## 2021-09-27 DIAGNOSIS — Z23 NEED FOR PROPHYLACTIC VACCINATION AND INOCULATION AGAINST INFLUENZA: Primary | ICD-10-CM

## 2021-09-27 DIAGNOSIS — I82.409 RECURRENT DEEP VEIN THROMBOSIS (DVT) (H): Primary | ICD-10-CM

## 2021-09-27 DIAGNOSIS — I82.409 RECURRENT DEEP VEIN THROMBOSIS (DVT) (H): ICD-10-CM

## 2021-09-27 LAB — INR BLD: 1 (ref 0.9–1.1)

## 2021-09-27 PROCEDURE — 90471 IMMUNIZATION ADMIN: CPT | Performed by: NURSE PRACTITIONER

## 2021-09-27 PROCEDURE — 36415 COLL VENOUS BLD VENIPUNCTURE: CPT | Performed by: NURSE PRACTITIONER

## 2021-09-27 PROCEDURE — 85610 PROTHROMBIN TIME: CPT | Performed by: NURSE PRACTITIONER

## 2021-09-27 PROCEDURE — 99215 OFFICE O/P EST HI 40 MIN: CPT | Mod: 25 | Performed by: NURSE PRACTITIONER

## 2021-09-27 PROCEDURE — 90682 RIV4 VACC RECOMBINANT DNA IM: CPT | Performed by: NURSE PRACTITIONER

## 2021-09-27 RX ORDER — WARFARIN SODIUM 5 MG/1
5 TABLET ORAL DAILY
COMMUNITY
Start: 2021-09-12 | End: 2021-09-27

## 2021-09-27 RX ORDER — WARFARIN SODIUM 5 MG/1
5 TABLET ORAL DAILY
Qty: 30 TABLET | Refills: 2 | Status: SHIPPED | OUTPATIENT
Start: 2021-09-27 | End: 2021-10-12

## 2021-09-27 ASSESSMENT — MIFFLIN-ST. JEOR: SCORE: 2159.42

## 2021-09-27 NOTE — PATIENT INSTRUCTIONS
"We will check your INR today (\"coumadin level\").  If your INR is at goal, then we can be done with the lovenox injections.   The INR nurse will call you.  I sent a refill on your coumadin- the current dose says 5 mg daily, but this may changed based on your INR level.     Referral to hematologist (blood specialist) placed.        "

## 2021-09-27 NOTE — PROGRESS NOTES
New start warfarin on 9/12/21 in Ed and seen by provider in [facility] today. INR 1.0. will need to resume lovenox and rx was sent to pharmacy by provider.  Dose for Lovenox also adjusted .   Left message for patient to call me back to discuss results and set up lab apts. Oly Bustillo RN

## 2021-09-27 NOTE — PROGRESS NOTES
ANTICOAGULATION MANAGEMENT     Adarsh Salvador 56 year old male is on warfarin with subtherapeutic INR result. (Goal INR 2.0-3.0)    Recent labs: (last 7 days)     09/27/21  0831   INR 1.0       ASSESSMENT     Source(s): Chart Review and Patient/Caregiver Call       Warfarin doses taken: Warfarin taken as instructed    Diet: No new diet changes identified    New illness, injury, or hospitalization: No    Medication/supplement changes: None noted    Signs or symptoms of bleeding or clotting: Yes: new DVT of left calf vein 2 wks ago seen in ED    Previous INR: none    Additional findings: New start on day 15 of warfarin     Previous DVT years ago and was on warfarin for short time. Reports had a hard time getting through to clinic to set up follow up apt and ran out of warfarin yesterday. Currently on his way to work. Informed of lovenox and warfarin rx sent to pharmacy and he will  and start today. Did lovenox for 5 days after seen in ED. He is comfortable with giving injections.     Will send new patient packet in mail.    Lab apts scheduled.      PLAN     Recommended plan for no diet, medication or health factor changes affecting INR     Dosing Instructions: Booster dose then Increase your warfarin dose (50% change) with next INR in 2 days       Summary  As of 9/27/2021    Full warfarin instructions:  7.5 mg every day   Next INR check:  9/29/2021             Telephone call with Adarsh who verbalizes understanding and agrees to plan    Lab visit scheduled    Education provided: Please call back if any changes to your diet, medications or how you've been taking warfarin, Goal range and significance of current result, Lovenox/Heparin education provided: role of enoxaparin/heparin in bridge therapy and prescribed dose and frequency  and Contact [phone]  with any changes, questions or concerns.     Plan made with [facility] Pharmacist Siomara Bustillo, VEGA  Anticoagulation  Clinic  9/27/2021    _______________________________________________________________________     Anticoagulation Episode Summary     Current INR goal:  2.0-3.0   TTR:  --   Target end date:  Indefinite   Send INR reminders to:  [facility]    Indications    Recurrent deep vein thrombosis (DVT) (H) [I82.409]           Comments:           Anticoagulation Care Providers     Provider Role Specialty Phone number    Daisy Steward, CNP Referring  [phone]

## 2021-09-27 NOTE — PROGRESS NOTES
"    Assessment & Plan     Recurrent deep vein thrombosis (DVT) (H)  Newly diagnosed LLE DVT.  He reports history of a blood clot in his upper extremity many years ago- I am not sure if it was a DVT or superficial phlebitis as I cannot find any documentation of it.  He reports he was on Lovenox and warfarin but only briefly.   Continue warfarin- dosing per INR clinic.    INR only 1.0 today (he reports compliance) so he will need to continue Lovenox dosed as 135 mg BID based on weight today. ER only gave him 80 mg BID because his weight was entered as 190 there.  He is actually closer to 290- this is consistent with previous weights here and his body habitus.    Referral to hem/onc due to possible recurrent DVT and family history of clots in mother and maternal cousin.     - Oncology/Hematology Adult Referral; Future  - Anticoagulation Clinic Referral  - warfarin ANTICOAGULANT (COUMADIN) 5 MG tablet; Take 1 tablet (5 mg) by mouth daily  - INR point of care  - enoxaparin ANTICOAGULANT (LOVENOX) 150 MG/ML syringe; Inject 0.9 mLs (135 mg) Subcutaneous every 12 hours for 7 days    Need for prophylactic vaccination and inoculation against influenza  Flu shot given today.       He is over due for a diabetes follow-up.  Asked him to return in near future.      Review of prior external note(s) from - CareEverywhere information from [facility] reviewed  Review of the result(s) of each unique test - venous ultrasound, INR  Ordering of each unique test  Prescription drug management  43 minutes spent on the date of the encounter doing chart review, review of outside records, review of test results, patient visit and documentation        Tobacco Cessation:   reports that he has never smoked. His smokeless tobacco use includes chew.      BMI:   Estimated body mass index is 39.89 kg/m  as calculated from the following:    Height as of this encounter: 1.803 m (5' 11\").    Weight as of this encounter: 129.7 kg (286 lb).       Return in " "about 4 weeks (around 10/25/2021) for Diabetic check, Lab Work.    Daisy Steward, CNP  [facility]   Adarsh is a 56 year old who presents for the following health issues     HPI     ED/UC Followup:    Facility:  [facility]  Date of visit: 2021  Reason for visit: left ankle pain- diagnosed with acute DVT of LLE  Current Status: improved     Patient was diagnosed with an acute DVT in his LLE.  He presented to the ED with left ankle pain shooting up into his leg.  He usually has a hard time feeling pain in his legs due to diabetic neuropathy.  His d-dimer was elevated so an ultrasound was checked- this confirmed acute DVT in his left calf.  He was placed on Lovenox 80 mg BID and warfarin 5 mg.  He was told to follow-up in primary care.    Patient reports a history of a blood clot in his arm many years ago.  He states he was on Lovenox and warfarin at that time, but only for 2-3 weeks.    Patient reports that his mom has also had blood clots.  He states a maternal cousin  from blood clots.  He has never been worked up for coagulation disorders and is not sure about his family.   Patient denies any recent surgeries.  No prolonged bedrest.  No cancer.    He states his insurance doesn't not cover other anticoagulants well and prefers to be on warfarin because it is affordable.       Venous US (per Care Everywhere).   LEFT: There is occlusive DVT in the posterior tibial vein of the mid and distal calf. The remainder of the deep venous system is widely patent. No superficial thrombophlebitis. Complex Baker's cyst measuring 5.3 x 2.1 x 3.5 cm.     Review of Systems   Constitutional, HEENT, cardiovascular, pulmonary, gi and gu systems are negative, except as otherwise noted.      Objective    /86   Pulse 62   Temp 97.2  F (36.2  C)   Ht 1.803 m (5' 11\")   Wt 129.7 kg (286 lb)   SpO2 99%   BMI 39.89 kg/m    Body mass index is 39.89 kg/m .  Physical Exam   GENERAL: healthy, " alert and no distress  EYES: Eyes grossly normal to inspection, PERRL and conjunctivae and sclerae normal  HENT: ear canals and TM's normal, nose and mouth without ulcers or lesions  NECK: no adenopathy, no asymmetry, masses, or scars and thyroid normal to palpation  RESP: lungs clear to auscultation - no rales, rhonchi or wheezes  CV: regular rate and rhythm, normal S1 S2, no S3 or S4, no murmur, click or rub, no peripheral edema and peripheral pulses strong  MS: no gross musculoskeletal defects noted, no edema  SKIN: no suspicious lesions or rashes  NEURO: Normal strength and tone, mentation intact and speech normal  PSYCH: mentation appears normal, affect normal/bright    Labs and imaging reviewed in Care Everywhere.     INR   Date Value Ref Range Status   09/27/2021 1.0 0.9 - 1.1 Final

## 2021-09-27 NOTE — PROGRESS NOTES
Chart reviewed with [facility] RN at time of encounter.    Agree with dosing as proposed,10m today ans 7.5mg ROW with recheck Wednesday to assess for rapid rise.    Siomara Montaño, PharmD BCACP  Anticoagulation Clinical Pharmacist

## 2021-09-29 ENCOUNTER — TELEPHONE (OUTPATIENT)
Dept: FAMILY MEDICINE | Facility: CLINIC | Age: 56
End: 2021-09-29

## 2021-09-29 ENCOUNTER — ANTICOAGULATION THERAPY VISIT (OUTPATIENT)
Dept: ANTICOAGULATION | Facility: CLINIC | Age: 56
End: 2021-09-29

## 2021-09-29 ENCOUNTER — LAB (OUTPATIENT)
Dept: LAB | Facility: CLINIC | Age: 56
End: 2021-09-29

## 2021-09-29 DIAGNOSIS — I82.409 RECURRENT DEEP VEIN THROMBOSIS (DVT) (H): Primary | ICD-10-CM

## 2021-09-29 DIAGNOSIS — I82.409 RECURRENT DEEP VEIN THROMBOSIS (DVT) (H): ICD-10-CM

## 2021-09-29 LAB — INR BLD: 1 (ref 0.9–1.1)

## 2021-09-29 PROCEDURE — 36416 COLLJ CAPILLARY BLOOD SPEC: CPT

## 2021-09-29 PROCEDURE — 85610 PROTHROMBIN TIME: CPT

## 2021-09-29 NOTE — TELEPHONE ENCOUNTER
Patient is returning a call regarding his INR, please call patient back at [phone].  Rosario Redman   [facility]  Central Scheduler

## 2021-09-29 NOTE — PROGRESS NOTES
Chart reviewed with [facility] RN at time of encounter.    Advise aggressive dose increase ~23% and boost today to 15mg.    Siomara Montaño, PharmD BCACP  Anticoagulation Clinical Pharmacist

## 2021-09-29 NOTE — PROGRESS NOTES
ANTICOAGULATION MANAGEMENT     Adarsh Salvador 56 year old male is on warfarin with subtherapeutic INR result. (Goal INR 2.0-3.0)    Recent labs: (last 7 days)     09/29/21  0912   INR 1.0       ASSESSMENT     Source(s): Chart Review and Patient/Caregiver Call       Warfarin doses taken: Warfarin taken as instructed    Diet: No new diet changes identified    New illness, injury, or hospitalization: No    Medication/supplement changes: None noted    Signs or symptoms of bleeding or clotting: No    Previous INR: Subtherapeutic    Additional findings: New start on day 18 of warfarin     Bridging with lovenox.     Lost his job today so is concerned about cost of medications and visits.    Advised to read warfarin booklet when he gets in mail.      PLAN     Recommended plan for no diet, medication or health factor changes affecting INR     Dosing Instructions: Booster dose then Increase your warfarin dose (23% change) with next INR in 2 days       Summary  As of 9/29/2021    Full warfarin instructions:  7.5 mg every day   Next INR check:  10/1/2021             Telephone call with Adarsh who verbalizes understanding and agrees to plan    Lab visit scheduled    Education provided: Please call back if any changes to your diet, medications or how you've been taking warfarin, Importance of consistent vitamin K intake, Impact of vitamin K foods on INR, Goal range and significance of current result and Contact [phone]  with any changes, questions or concerns.     Plan made with Siomara TALAVERA  [facility]    Oly Bustillo, RN  Anticoagulation Clinic  9/29/2021    _______________________________________________________________________     Anticoagulation Episode Summary     Current INR goal:  2.0-3.0   TTR:  --   Target end date:  Indefinite   Send INR reminders to:  [facility]    Indications    Recurrent deep vein thrombosis (DVT) (H) [I82.409]           Comments:           Anticoagulation Care Providers     Provider Role  Specialty Phone number    Daisy Steward, CNP Referring  [phone]        Anticoagulation Management    Unable to reach Adarsh today X 3.    Today's INR result of 1.0 is subtherapeutic (goal INR of 2.0-3.0).  Result received from: Clinic Lab    Follow up required to confirm warfarin dose taken and assess for changes    No instructions provided. Unable to leave voicemail. mailbox full.      Anticoagulation clinic to follow up    Oly Bustillo RN

## 2021-10-01 ENCOUNTER — LAB (OUTPATIENT)
Dept: LAB | Facility: CLINIC | Age: 56
End: 2021-10-01

## 2021-10-01 ENCOUNTER — ANTICOAGULATION THERAPY VISIT (OUTPATIENT)
Dept: ANTICOAGULATION | Facility: CLINIC | Age: 56
End: 2021-10-01

## 2021-10-01 DIAGNOSIS — I82.409 RECURRENT DEEP VEIN THROMBOSIS (DVT) (H): Primary | ICD-10-CM

## 2021-10-01 DIAGNOSIS — I82.409 RECURRENT DEEP VEIN THROMBOSIS (DVT) (H): ICD-10-CM

## 2021-10-01 LAB — INR BLD: 1.3 (ref 0.9–1.1)

## 2021-10-01 PROCEDURE — 85610 PROTHROMBIN TIME: CPT

## 2021-10-01 PROCEDURE — 36416 COLLJ CAPILLARY BLOOD SPEC: CPT

## 2021-10-01 NOTE — PROGRESS NOTES
ANTICOAGULATION MANAGEMENT     Adarsh Salvador 56 year old male is on warfarin with subtherapeutic INR result. (Goal INR 2.0-3.0)    Recent labs: (last 7 days)     10/01/21  1051   INR 1.3*       ASSESSMENT     Source(s): Chart Review and Patient/Caregiver Call       Warfarin doses taken: Warfarin taken as instructed    Diet: No new diet changes identified    New illness, injury, or hospitalization: No    Medication/supplement changes: None noted    Signs or symptoms of bleeding or clotting: No    Previous INR: Subtherapeutic    Additional findings: None     PLAN     Recommended plan for no diet, medication or health factor changes affecting INR     Dosing Instructions:  Increase your warfarin dose (23.1% change) with next INR in 4 days       Summary  As of 10/1/2021    Full warfarin instructions:  15 mg every Fri; 10 mg all other days   Next INR check:  10/5/2021             Telephone call with Adarsh who verbalizes understanding and agrees to plan    Lab visit scheduled    Education provided: None required    Plan made per [facility] anticoagulation protocol    Beatris Freedman RN  Anticoagulation Clinic  10/1/2021    _______________________________________________________________________     Anticoagulation Episode Summary     Current INR goal:  2.0-3.0   TTR:  --   Target end date:  Indefinite   Send INR reminders to:  [facility]    Indications    Recurrent deep vein thrombosis (DVT) (H) [I82.409]           Comments:           Anticoagulation Care Providers     Provider Role Specialty Phone number    Daisy Steward, CNP Referring  [phone]

## 2021-10-05 ENCOUNTER — TELEPHONE (OUTPATIENT)
Dept: HEMATOLOGY | Facility: CLINIC | Age: 56
End: 2021-10-05

## 2021-10-05 ENCOUNTER — ANTICOAGULATION THERAPY VISIT (OUTPATIENT)
Dept: ANTICOAGULATION | Facility: CLINIC | Age: 56
End: 2021-10-05

## 2021-10-05 ENCOUNTER — LAB (OUTPATIENT)
Dept: LAB | Facility: CLINIC | Age: 56
End: 2021-10-05

## 2021-10-05 DIAGNOSIS — I82.409 RECURRENT DEEP VEIN THROMBOSIS (DVT) (H): ICD-10-CM

## 2021-10-05 DIAGNOSIS — I82.409 RECURRENT DEEP VEIN THROMBOSIS (DVT) (H): Primary | ICD-10-CM

## 2021-10-05 LAB — INR BLD: 1.5 (ref 0.9–1.1)

## 2021-10-05 PROCEDURE — 85610 PROTHROMBIN TIME: CPT

## 2021-10-05 PROCEDURE — 36416 COLLJ CAPILLARY BLOOD SPEC: CPT

## 2021-10-05 NOTE — TELEPHONE ENCOUNTER
[facility]  Brief Social Work Note    Patient had a recent DVT and has been scheduled for evaluation on 12/28 at 9am.  Writer received request from nursing to reach out to patient regarding insurance resources.    Call placed to patient but voicemail box is full.    Will send [employer] message requesting call back or will try again later.    ALEJA Starkey, FREDRICK, ACM  Clinical   [facility]  Phone: [phone]    10/7: Second outreach attempt made via phone.  Was able to leave a voicemail and requested a return call to discuss insurance resources.  Bertha/FREDRICK

## 2021-10-05 NOTE — PROGRESS NOTES
Anticoagulation Management    Unable to reach Adarsh today.    Today's INR result of 1.5 is subtherapeutic (goal INR of 2.0-3.0).  Result received from: Clinic Lab    Follow up required to confirm warfarin dose taken and assess for changes    Left message to take a booster dose of warfarin,  20 mg tonight.       Tentative plan - maintenance plan increased to 10mg every Monday & Friday, 15mg all other days of the week with a booster today of 20mg. Plan made with [facility] but need to complete patient assessment.       Anticoagulation clinic to follow up    Guanakito Welsh RN

## 2021-10-05 NOTE — PROGRESS NOTES
ANTICOAGULATION MANAGEMENT     Adarsh Salvador 56 year old male is on warfarin with therapeutic INR result. (Goal INR 2.0-3.0)    Recent labs: (last 7 days)     10/05/21  1007   INR 1.5*       ASSESSMENT     Source(s): Chart Review and Patient/Caregiver Call       Warfarin doses taken: Warfarin taken as instructed    Diet: No new diet changes identified    New illness, injury, or hospitalization: No    Medication/supplement changes: None noted    Signs or symptoms of bleeding or clotting: No    Previous INR: Subtherapeutic    Additional findings: Currently not bridging on Lovenox, he ran out on Sunday, reports that he is unable to afford any more due to losing job.      PLAN     Recommended plan for no diet, medication or health factor changes affecting INR     Dosing Instructions:  Increase your warfarin dose (18.8% change) with next INR in 3 days       Summary  As of 10/5/2021    Full warfarin instructions:  10/5: 20 mg; Otherwise 10 mg every Mon, Fri; 15 mg all other days   Next INR check:  10/8/2021             Telephone call with Adarsh who verbalizes understanding and agrees to plan    Patient had lab appointment already scheduled for next INR recheck     Education provided: Goal range and significance of current result, Importance of therapeutic range, Monitoring for bleeding signs and symptoms, When to seek medical attention/emergency care and Lovenox/Heparin education provided: role of enoxaparin/heparin in bridge therapy     Plan made with [facility] Pharmacist Siomara Welsh, RN  Anticoagulation Clinic  10/5/2021    _______________________________________________________________________     Anticoagulation Episode Summary     Current INR goal:  2.0-3.0   TTR:  --   Target end date:  Indefinite   Send INR reminders to:  [facility]    Indications    Recurrent deep vein thrombosis (DVT) (H) [I82.409]           Comments:           Anticoagulation Care Providers     Provider Role Specialty Phone number     Daisy Steward, CNP Referring  [phone]

## 2021-10-12 ENCOUNTER — LAB (OUTPATIENT)
Dept: LAB | Facility: CLINIC | Age: 56
End: 2021-10-12

## 2021-10-12 ENCOUNTER — ANTICOAGULATION THERAPY VISIT (OUTPATIENT)
Dept: ANTICOAGULATION | Facility: CLINIC | Age: 56
End: 2021-10-12

## 2021-10-12 DIAGNOSIS — I82.409 RECURRENT DEEP VEIN THROMBOSIS (DVT) (H): Primary | ICD-10-CM

## 2021-10-12 DIAGNOSIS — I82.409 RECURRENT DEEP VEIN THROMBOSIS (DVT) (H): ICD-10-CM

## 2021-10-12 LAB — INR BLD: 3.1 (ref 0.9–1.1)

## 2021-10-12 PROCEDURE — 36416 COLLJ CAPILLARY BLOOD SPEC: CPT

## 2021-10-12 PROCEDURE — 85610 PROTHROMBIN TIME: CPT

## 2021-10-12 RX ORDER — WARFARIN SODIUM 5 MG/1
TABLET ORAL
Qty: 228 TABLET | Refills: 0 | Status: SHIPPED | OUTPATIENT
Start: 2021-10-12 | End: 2021-11-12

## 2021-10-12 NOTE — PROGRESS NOTES
ANTICOAGULATION MANAGEMENT     Adarsh Salvador 56 year old male is on warfarin with supratherapeutic INR result. (Goal INR 2.0-3.0)    Recent labs: (last 7 days)     10/12/21  1012   INR 3.1*       ASSESSMENT     Source(s): Chart Review and Patient/Caregiver Call       Warfarin doses taken: Warfarin taken as instructed    Diet: No new diet changes identified    New illness, injury, or hospitalization: No    Medication/supplement changes: None noted    Signs or symptoms of bleeding or clotting: No    Previous INR: Subtherapeutic    Additional findings: None has a new part time job. Reports read warfarin booklet and has no further questions,     PLAN     Recommended plan for no diet, medication or health factor changes affecting INR     Dosing Instructions: Continue your current warfarin dose with next INR in 6 days       Summary  As of 10/12/2021    Full warfarin instructions:  10 mg every Mon, Fri; 15 mg all other days   Next INR check:  10/18/2021             Telephone call with Adarsh who verbalizes understanding and agrees to plan    Lab visit scheduled    Education provided: Please call back if any changes to your diet, medications or how you've been taking warfarin, Importance of consistent vitamin K intake, Monitoring for bleeding signs and symptoms, Monitoring for clotting signs and symptoms, Importance of notifying clinic for changes in medications; a sooner lab recheck maybe needed. and Contact [phone]  with any changes, questions or concerns.     Plan made per [facility] anticoagulation protocol    Oly Bustillo RN  Anticoagulation Clinic  10/12/2021    _______________________________________________________________________     Anticoagulation Episode Summary     Current INR goal:  2.0-3.0   TTR:  80.7 % (5 d)   Target end date:  Indefinite   Send INR reminders to:  [facility]    Indications    Recurrent deep vein thrombosis (DVT) (H) [I82.409]           Comments:           Anticoagulation Care Providers      Provider Role Specialty Phone number    Dasiy Steward, CNP Referring  [phone]

## 2021-10-12 NOTE — PROGRESS NOTES
Anticoagulation Management    Unable to reach Adarsh today.    Today's INR result of Adarsh is supratherapeutic (goal INR of 2.0-3.0).  Result received from: Clinic Lab    Follow up required to discuss out of range INR     left msg to call back nurse before 5 pm      Anticoagulation clinic to follow up    Oly Bustillo RN

## 2021-10-18 ENCOUNTER — ANTICOAGULATION THERAPY VISIT (OUTPATIENT)
Dept: ANTICOAGULATION | Facility: CLINIC | Age: 56
End: 2021-10-18

## 2021-10-18 ENCOUNTER — LAB (OUTPATIENT)
Dept: LAB | Facility: CLINIC | Age: 56
End: 2021-10-18

## 2021-10-18 ENCOUNTER — TELEPHONE (OUTPATIENT)
Dept: FAMILY MEDICINE | Facility: CLINIC | Age: 56
End: 2021-10-18

## 2021-10-18 DIAGNOSIS — I82.409 RECURRENT DEEP VEIN THROMBOSIS (DVT) (H): ICD-10-CM

## 2021-10-18 DIAGNOSIS — I82.409 RECURRENT DEEP VEIN THROMBOSIS (DVT) (H): Primary | ICD-10-CM

## 2021-10-18 LAB — INR BLD: 3.5 (ref 0.9–1.1)

## 2021-10-18 PROCEDURE — 85610 PROTHROMBIN TIME: CPT

## 2021-10-18 PROCEDURE — 36416 COLLJ CAPILLARY BLOOD SPEC: CPT

## 2021-10-18 NOTE — PROGRESS NOTES
ANTICOAGULATION MANAGEMENT     Adarsh Salvador 56 year old male is on warfarin with supratherapeutic INR result. (Goal INR 2.0-3.0)    Recent labs: (last 7 days)     10/18/21  0911   INR 3.5*       ASSESSMENT     Source(s): Chart Review and Patient/Caregiver Call       Warfarin doses taken: Warfarin taken as instructed    Diet: No new diet changes identified    New illness, injury, or hospitalization: No    Medication/supplement changes: None noted    Signs or symptoms of bleeding or clotting: No    Previous INR: Supratherapeutic    Additional findings: None took warfarin today. Will do partial hold tomorrow and weekly dose decrease     PLAN     Recommended plan for no diet, medication or health factor changes affecting INR     Dosing Instructions: Partial hold then Decrease your warfarin dose (5.3% change) with next INR in 1 week       Summary  As of 10/18/2021    Full warfarin instructions:  10/19: 5 mg; Otherwise 10 mg every Tue, Thu, Sat; 15 mg all other days   Next INR check:  10/26/2021             Telephone call with Adarsh who verbalizes understanding and agrees to plan    Lab visit scheduled    Education provided: Please call back if any changes to your diet, medications or how you've been taking warfarin and Contact [phone]  with any changes, questions or concerns.     Plan made per [facility] anticoagulation protocol    Oly Bustillo RN  Anticoagulation Clinic  10/18/2021    _______________________________________________________________________     Anticoagulation Episode Summary     Current INR goal:  2.0-3.0   TTR:  38.5 % (1.6 wk)   Target end date:  Indefinite   Send INR reminders to:  [facility]    Indications    Recurrent deep vein thrombosis (DVT) (H) [I82.409]           Comments:           Anticoagulation Care Providers     Provider Role Specialty Phone number    Daisy Steward CNP Referring  [phone]

## 2021-10-18 NOTE — PROGRESS NOTES
Anticoagulation Management    Unable to reach Adarsh today.    Today's INR result of 3.5 is supratherapeutic (goal INR of 2.0-3.0).  Result received from: Clinic Lab    Follow up required to discuss out of range INR     left message to call back.  left message to take reduced dose of  5 mg today if not already take. Call back before 5 pm  Tentative plan if no factors, partial hold today and reduce weekly dose with recheck in 1-2 wks.       Anticoagulation clinic to follow up    Oly Bustillo RN

## 2021-10-26 ENCOUNTER — LAB (OUTPATIENT)
Dept: LAB | Facility: CLINIC | Age: 56
End: 2021-10-26

## 2021-10-26 ENCOUNTER — ANTICOAGULATION THERAPY VISIT (OUTPATIENT)
Dept: ANTICOAGULATION | Facility: CLINIC | Age: 56
End: 2021-10-26

## 2021-10-26 DIAGNOSIS — I82.409 RECURRENT DEEP VEIN THROMBOSIS (DVT) (H): Primary | ICD-10-CM

## 2021-10-26 DIAGNOSIS — I82.409 RECURRENT DEEP VEIN THROMBOSIS (DVT) (H): ICD-10-CM

## 2021-10-26 LAB — INR BLD: 3.1 (ref 0.9–1.1)

## 2021-10-26 PROCEDURE — 85610 PROTHROMBIN TIME: CPT

## 2021-10-26 PROCEDURE — 36416 COLLJ CAPILLARY BLOOD SPEC: CPT

## 2021-10-26 NOTE — PROGRESS NOTES
ANTICOAGULATION MANAGEMENT     Adarsh Salvador 56 year old male is on warfarin with supratherapeutic INR result. (Goal INR 2.0-3.0)    Recent labs: (last 7 days)     10/26/21  0857   INR 3.1*       ASSESSMENT     Source(s): Chart Review and Patient/Caregiver Call       Warfarin doses taken: Warfarin taken as instructed    Diet: No new diet changes identified    New illness, injury, or hospitalization: No    Medication/supplement changes: None noted    Signs or symptoms of bleeding or clotting: No    Previous INR: Supratherapeutic    Additional findings: None     PLAN     Recommended plan for no diet, medication or health factor changes affecting INR     Dosing Instructions:  Decrease your warfarin dose (5.6% change) with next INR in 1 week       Summary  As of 10/26/2021    Full warfarin instructions:  15 mg every Mon, Wed, Fri; 10 mg all other days   Next INR check:  11/3/2021             Telephone call with Adarsh who verbalizes understanding and agrees to plan    Lab visit scheduled    Education provided: Please call back if any changes to your diet, medications or how you've been taking warfarin and Contact [phone]  with any changes, questions or concerns.     Plan made per [facility] anticoagulation protocol    Oly Bustillo RN  Anticoagulation Clinic  10/26/2021    _______________________________________________________________________     Anticoagulation Episode Summary     Current INR goal:  2.0-3.0   TTR:  22.6 % (2.7 wk)   Target end date:  Indefinite   Send INR reminders to:  [facility]    Indications    Recurrent deep vein thrombosis (DVT) (H) [I82.409]           Comments:           Anticoagulation Care Providers     Provider Role Specialty Phone number    Daisy Steward, CNP Referring  [phone]

## 2021-11-03 ENCOUNTER — ANTICOAGULATION THERAPY VISIT (OUTPATIENT)
Dept: ANTICOAGULATION | Facility: CLINIC | Age: 56
End: 2021-11-03

## 2021-11-03 ENCOUNTER — LAB (OUTPATIENT)
Dept: LAB | Facility: CLINIC | Age: 56
End: 2021-11-03

## 2021-11-03 DIAGNOSIS — I82.409 RECURRENT DEEP VEIN THROMBOSIS (DVT) (H): ICD-10-CM

## 2021-11-03 DIAGNOSIS — I82.409 RECURRENT DEEP VEIN THROMBOSIS (DVT) (H): Primary | ICD-10-CM

## 2021-11-03 LAB — INR BLD: 4.4 (ref 0.9–1.1)

## 2021-11-03 PROCEDURE — 36416 COLLJ CAPILLARY BLOOD SPEC: CPT

## 2021-11-03 PROCEDURE — 85610 PROTHROMBIN TIME: CPT

## 2021-11-03 NOTE — PROGRESS NOTES
Anticoagulation Management    Unable to reach Adarsh today.    Today's INR result of 4.4 is supratherapeutic (goal INR of 2.0-3.0).  Result received from: Clinic Lab    Follow up required to discuss out of range INR     No instructions provided. Unable to leave voicemail.      Anticoagulation clinic to follow up    Oly Bustillo RN

## 2021-11-03 NOTE — PROGRESS NOTES
ANTICOAGULATION MANAGEMENT     Adarsh Salvador 56 year old male is on warfarin with supratherapeutic INR result. (Goal INR 2.0-3.0)    Recent labs: (last 7 days)     11/03/21  0907   INR 4.4*       ASSESSMENT     Source(s): Chart Review and Patient/Caregiver Call       Warfarin doses taken: Warfarin taken as instructed    Diet: has been off green vegetables since started warfarin this is not a recent change. States does no drink any alcohol    New illness, injury, or hospitalization: No    Medication/supplement changes: None noted    Signs or symptoms of bleeding or clotting: Yes: a few nose bleeds    Previous INR: Supratherapeutic    Additional findings: None he will start to add back some green vegetables in diet. Took warfarin dose today     PLAN     Recommended plan for no diet, medication or health factor changes affecting INR     Dosing Instructions: Hold dose then Decrease your warfarin dose (11% change) with next INR in 1 week       Summary  As of 11/3/2021    Full warfarin instructions:  11/4: Hold; Otherwise 15 mg every Mon; 10 mg all other days   Next INR check:  11/10/2021             Telephone call with Adarsh who verbalizes understanding and agrees to plan    Lab visit scheduled    Education provided: Please call back if any changes to your diet, medications or how you've been taking warfarin and Contact [phone]  with any changes, questions or concerns.     Plan made per [facility] anticoagulation protocol    Oly Bustillo RN  Anticoagulation Clinic  11/3/2021    _______________________________________________________________________     Anticoagulation Episode Summary     Current INR goal:  2.0-3.0   TTR:  16.0 % (3.9 wk)   Target end date:  Indefinite   Send INR reminders to:  [facility]    Indications    Recurrent deep vein thrombosis (DVT) (H) [I82.409]           Comments:           Anticoagulation Care Providers     Provider Role Specialty Phone number    Daisy Steward, CNP Referring   [phone]

## 2021-11-10 ENCOUNTER — LAB (OUTPATIENT)
Dept: LAB | Facility: CLINIC | Age: 56
End: 2021-11-10

## 2021-11-10 ENCOUNTER — ANTICOAGULATION THERAPY VISIT (OUTPATIENT)
Dept: ANTICOAGULATION | Facility: CLINIC | Age: 56
End: 2021-11-10

## 2021-11-10 DIAGNOSIS — I82.409 RECURRENT DEEP VEIN THROMBOSIS (DVT) (H): ICD-10-CM

## 2021-11-10 DIAGNOSIS — I82.409 RECURRENT DEEP VEIN THROMBOSIS (DVT) (H): Primary | ICD-10-CM

## 2021-11-10 LAB — INR BLD: 3.2 (ref 0.9–1.1)

## 2021-11-10 PROCEDURE — 36416 COLLJ CAPILLARY BLOOD SPEC: CPT

## 2021-11-10 PROCEDURE — 85610 PROTHROMBIN TIME: CPT

## 2021-11-10 NOTE — PROGRESS NOTES
Anticoagulation Management    Unable to reach Adarsh today.    Today's INR result of 3.2 is supratherapeutic (goal INR of 2.0-3.0).  Result received from: Clinic Lab    Follow up required to discuss out of range INR     No instructions provided. Unable to leave voicemail.  Voicemail is full and patient has not read any my chart messages.      Anticoagulation clinic to follow up    Cheyenne George RN

## 2021-11-12 RX ORDER — WARFARIN SODIUM 5 MG/1
TABLET ORAL
Qty: 228 TABLET | Refills: 0
Start: 2021-11-12 | End: 2022-01-25

## 2021-11-12 NOTE — PROGRESS NOTES
Anticoagulation Management    Unable to reach Adarsh today.    11/10  INR result of 3.2 is supratherapeutic (goal INR of 2.0-3.0).  Result received from: Clinic Lab    Follow up required to confirm warfarin dose taken and assess for changes    No instructions provided. Unable to leave voicemail.   Mailbox full and unable to leave msg      Anticoagulation clinic to follow up    Oly Bustillo RN

## 2021-11-12 NOTE — PROGRESS NOTES
ANTICOAGULATION MANAGEMENT     Adarsh Salvador 56 year old male is on warfarin with supratherapeutic INR result. (Goal INR 2.0-3.0)    Recent labs: (last 7 days)     11/10/21  0911   INR 3.2*       ASSESSMENT     Source(s): Chart Review and Patient/Caregiver Call       Warfarin doses taken: Warfarin taken as instructed    Diet: No new diet changes identified a little less salad than usual and this will probably continue    New illness, injury, or hospitalization: Yes: reports has head cold and sinus issues.Taking some sudafed    Medication/supplement changes: None noted    Signs or symptoms of bleeding or clotting: Yes: bruising a little more than usual    Previous INR: Supratherapeutic    Additional findings: very busy with work and trouble hearing phone     PLAN     Recommended plan for temporary change(s) and ongoing change(s) affecting INR     Dosing Instructions:  Decrease your warfarin dose (6.7% change) with next INR in 1 week       Summary  As of 11/10/2021    Full warfarin instructions:  10 mg every day   Next INR check:  11/19/2021             Telephone call with Adarsh who verbalizes understanding and agrees to plan    Lab visit scheduled    Education provided: Please call back if any changes to your diet, medications or how you've been taking warfarin and Contact [phone]  with any changes, questions or concerns.     Plan made per [facility] anticoagulation protocol    Oly Bustillo RN  Anticoagulation Clinic  11/12/2021    _______________________________________________________________________     Anticoagulation Episode Summary     Current INR goal:  2.0-3.0   TTR:  12.7 % (1.1 mo)   Target end date:  Indefinite   Send INR reminders to:  [facility]    Indications    Recurrent deep vein thrombosis (DVT) (H) [I82.409]           Comments:           Anticoagulation Care Providers     Provider Role Specialty Phone number    Daisy Steward CNP Referring  [phone]

## 2021-11-19 ENCOUNTER — LAB (OUTPATIENT)
Dept: LAB | Facility: CLINIC | Age: 56
End: 2021-11-19

## 2021-11-19 ENCOUNTER — ANTICOAGULATION THERAPY VISIT (OUTPATIENT)
Dept: ANTICOAGULATION | Facility: CLINIC | Age: 56
End: 2021-11-19

## 2021-11-19 DIAGNOSIS — I82.409 RECURRENT DEEP VEIN THROMBOSIS (DVT) (H): ICD-10-CM

## 2021-11-19 DIAGNOSIS — I82.409 RECURRENT DEEP VEIN THROMBOSIS (DVT) (H): Primary | ICD-10-CM

## 2021-11-19 LAB — INR BLD: 2.7 (ref 0.9–1.1)

## 2021-11-19 PROCEDURE — 85610 PROTHROMBIN TIME: CPT

## 2021-11-19 PROCEDURE — 36415 COLL VENOUS BLD VENIPUNCTURE: CPT

## 2021-11-19 NOTE — PROGRESS NOTES
ANTICOAGULATION MANAGEMENT     Adarsh Salvador 56 year old male is on warfarin with therapeutic INR result. (Goal INR 2.0-3.0)    Recent labs: (last 7 days)     11/19/21  0910   INR 2.7*       ASSESSMENT     Source(s): Chart Review and Patient/Caregiver Call       Warfarin doses taken: Warfarin taken as instructed    Diet: No new diet changes identified    New illness, injury, or hospitalization: No    Medication/supplement changes: None noted    Signs or symptoms of bleeding or clotting: No    Previous INR: Supratherapeutic    Additional findings: None     PLAN     Recommended plan for no diet, medication or health factor changes affecting INR     Dosing Instructions: Continue your current warfarin dose with next INR in 2 weeks       Summary  As of 11/19/2021    Full warfarin instructions:  10 mg every day   Next INR check:  11/26/2021             Telephone call with Adarsh who verbalizes understanding and agrees to plan    Lab visit scheduled    Education provided: None required    Plan made per [facility] anticoagulation protocol    Thalia Chau RN  Anticoagulation Clinic  11/19/2021    _______________________________________________________________________     Anticoagulation Episode Summary     Current INR goal:  2.0-3.0   TTR:  22.5 % (1.4 mo)   Target end date:  Indefinite   Send INR reminders to:  [facility]    Indications    Recurrent deep vein thrombosis (DVT) (H) [I82.409]           Comments:           Anticoagulation Care Providers     Provider Role Specialty Phone number    Daisy Steward, CNP Referring  [phone]

## 2021-11-26 ENCOUNTER — ANTICOAGULATION THERAPY VISIT (OUTPATIENT)
Dept: ANTICOAGULATION | Facility: CLINIC | Age: 56
End: 2021-11-26

## 2021-11-26 ENCOUNTER — LAB (OUTPATIENT)
Dept: LAB | Facility: CLINIC | Age: 56
End: 2021-11-26

## 2021-11-26 DIAGNOSIS — I82.409 RECURRENT DEEP VEIN THROMBOSIS (DVT) (H): ICD-10-CM

## 2021-11-26 DIAGNOSIS — I82.409 RECURRENT DEEP VEIN THROMBOSIS (DVT) (H): Primary | ICD-10-CM

## 2021-11-26 LAB — INR BLD: 1.7 (ref 0.9–1.1)

## 2021-11-26 PROCEDURE — 36416 COLLJ CAPILLARY BLOOD SPEC: CPT

## 2021-11-26 PROCEDURE — 85610 PROTHROMBIN TIME: CPT

## 2021-11-26 NOTE — PROGRESS NOTES
ANTICOAGULATION MANAGEMENT     Adarsh Salvador 56 year old male is on warfarin with subtherapeutic INR result. (Goal INR 2.0-3.0)    Recent labs: (last 7 days)     11/26/21  0906   INR 1.7*       ASSESSMENT     Source(s): Chart Review and Patient/Caregiver Call       Warfarin doses taken: Warfarin taken as instructed    Diet: had only 2 salads in past week    New illness, injury, or hospitalization: No    Medication/supplement changes: None noted    Signs or symptoms of bleeding or clotting: No    Previous INR: Therapeutic last visit; previously outside of goal range    Additional findings: None     PLAN     Recommended plan for no diet, medication or health factor changes affecting INR     Dosing Instructions:  Increase your warfarin dose (7.1% change) with next INR in 1 week       Summary  As of 11/26/2021    Full warfarin instructions:  15 mg every Fri; 10 mg all other days   Next INR check:  12/3/2021             Telephone call with Adarsh who verbalizes understanding and agrees to plan    Lab visit scheduled    Education provided: Please call back if any changes to your diet, medications or how you've been taking warfarin and Contact [phone]  with any changes, questions or concerns.     Plan made per [facility]agulation protocol    Oly Bustillo RN  Anticoagulation Clinic  11/26/2021    _______________________________________________________________________     Anticoagulation Episode Summary     Current INR goal:  2.0-3.0   TTR:  29.1 % (1.7 mo)   Target end date:  Indefinite   Send INR reminders to:  [facility]    Indications    Recurrent deep vein thrombosis (DVT) (H) [I82.409]           Comments:           Anticoagulation Care Providers     Provider Role Specialty Phone number    Daisy Steward CNP Referring  [phone]

## 2021-12-03 ENCOUNTER — LAB (OUTPATIENT)
Dept: LAB | Facility: CLINIC | Age: 56
End: 2021-12-03

## 2021-12-03 ENCOUNTER — ANTICOAGULATION THERAPY VISIT (OUTPATIENT)
Dept: ANTICOAGULATION | Facility: CLINIC | Age: 56
End: 2021-12-03

## 2021-12-03 DIAGNOSIS — I82.409 RECURRENT DEEP VEIN THROMBOSIS (DVT) (H): Primary | ICD-10-CM

## 2021-12-03 DIAGNOSIS — I82.409 RECURRENT DEEP VEIN THROMBOSIS (DVT) (H): ICD-10-CM

## 2021-12-03 LAB — INR BLD: 1.8 (ref 0.9–1.1)

## 2021-12-03 PROCEDURE — 36416 COLLJ CAPILLARY BLOOD SPEC: CPT

## 2021-12-03 PROCEDURE — 85610 PROTHROMBIN TIME: CPT

## 2021-12-03 NOTE — PROGRESS NOTES
ANTICOAGULATION MANAGEMENT     Adarsh Salvador 56 year old male is on warfarin with subtherapeutic INR result. (Goal INR 2.0-3.0)    Recent labs: (last 7 days)     12/03/21  0945   INR 1.8*       ASSESSMENT     Source(s): Chart Review and Patient/Caregiver Call       Warfarin doses taken: Warfarin taken as instructed    Diet: No new diet changes identified    New illness, injury, or hospitalization: No    Medication/supplement changes: None noted    Signs or symptoms of bleeding or clotting: No    Previous INR: Subtherapeutic    Additional findings: None     PLAN     Recommended plan for no diet, medication or health factor changes affecting INR     Dosing Instructions:  Increase your warfarin dose (6.7% change) with next INR in 1 week       Summary  As of 12/3/2021    Full warfarin instructions:  15 mg every Mon, Fri; 10 mg all other days   Next INR check:  12/10/2021             Telephone call with Adarsh who verbalizes understanding and agrees to plan    Lab visit scheduled    Education provided: Goal range and significance of current result    Plan made per [facility] anticoagulation protocol    Rubi Nathan RN  Anticoagulation Clinic  12/3/2021    _______________________________________________________________________     Anticoagulation Episode Summary     Current INR goal:  2.0-3.0   TTR:  25.6 % (1.9 mo)   Target end date:  Indefinite   Send INR reminders to:  [facility]    Indications    Recurrent deep vein thrombosis (DVT) (H) [I82.409]           Comments:           Anticoagulation Care Providers     Provider Role Specialty Phone number    Daisy Steward, CNP Referring  [phone]

## 2021-12-10 ENCOUNTER — ANTICOAGULATION THERAPY VISIT (OUTPATIENT)
Dept: ANTICOAGULATION | Facility: CLINIC | Age: 56
End: 2021-12-10

## 2021-12-10 ENCOUNTER — LAB (OUTPATIENT)
Dept: LAB | Facility: CLINIC | Age: 56
End: 2021-12-10

## 2021-12-10 DIAGNOSIS — I82.409 RECURRENT DEEP VEIN THROMBOSIS (DVT) (H): Primary | ICD-10-CM

## 2021-12-10 DIAGNOSIS — I82.409 RECURRENT DEEP VEIN THROMBOSIS (DVT) (H): ICD-10-CM

## 2021-12-10 LAB — INR BLD: 1.6 (ref 0.9–1.1)

## 2021-12-10 PROCEDURE — 85610 PROTHROMBIN TIME: CPT

## 2021-12-10 PROCEDURE — 36416 COLLJ CAPILLARY BLOOD SPEC: CPT

## 2021-12-10 NOTE — PROGRESS NOTES
ANTICOAGULATION MANAGEMENT     Adarsh Salvador 56 year old male is on warfarin with subtherapeutic INR result. (Goal INR 2.0-3.0)    Recent labs: (last 7 days)     12/10/21  0822   INR 1.6*       ASSESSMENT     Source(s): Chart Review and Patient/Caregiver Call       Warfarin doses taken: Warfarin taken as instructed    Diet: only 2 salads last week    New illness, injury, or hospitalization: Yes: some minor cold symptoms and uses robitussin    Medication/supplement changes: None noted    Signs or symptoms of bleeding or clotting: No    Previous INR: Subtherapeutic    Additional findings: None     PLAN     Recommended plan for no diet, medication or health factor changes affecting INR     Dosing Instructions:  Increase your warfarin dose (12.5% change) with next INR in 1 week       Summary  As of 12/10/2021    Full warfarin instructions:  10 mg every Sun, Tue, Thu; 15 mg all other days   Next INR check:  12/17/2021             Telephone call with Adarsh who verbalizes understanding and agrees to plan    Lab visit scheduled    Education provided: Please call back if any changes to your diet, medications or how you've been taking warfarin and Contact [phone]  with any changes, questions or concerns.     Plan made per [facility] anticoagulation protocol    Oly Bustillo RN  Anticoagulation Clinic  12/10/2021    _______________________________________________________________________     Anticoagulation Episode Summary     Current INR goal:  2.0-3.0   TTR:  22.8 % (2.1 mo)   Target end date:  Indefinite   Send INR reminders to:  [facility]    Indications    Recurrent deep vein thrombosis (DVT) (H) [I82.409]           Comments:           Anticoagulation Care Providers     Provider Role Specialty Phone number    Daisy Steward, CNP Referring  [phone]

## 2021-12-17 ENCOUNTER — LAB (OUTPATIENT)
Dept: LAB | Facility: CLINIC | Age: 56
End: 2021-12-17

## 2021-12-17 ENCOUNTER — ANTICOAGULATION THERAPY VISIT (OUTPATIENT)
Dept: ANTICOAGULATION | Facility: CLINIC | Age: 56
End: 2021-12-17

## 2021-12-17 DIAGNOSIS — I82.409 RECURRENT DEEP VEIN THROMBOSIS (DVT) (H): ICD-10-CM

## 2021-12-17 DIAGNOSIS — I82.409 RECURRENT DEEP VEIN THROMBOSIS (DVT) (H): Primary | ICD-10-CM

## 2021-12-17 LAB — INR BLD: 1.7 (ref 0.9–1.1)

## 2021-12-17 PROCEDURE — 36416 COLLJ CAPILLARY BLOOD SPEC: CPT

## 2021-12-17 PROCEDURE — 85610 PROTHROMBIN TIME: CPT

## 2021-12-17 NOTE — PROGRESS NOTES
ANTICOAGULATION MANAGEMENT     Adarsh Salvador 56 year old male is on warfarin with subtherapeutic INR result. (Goal INR 2.0-3.0)    Recent labs: (last 7 days)     12/17/21  0902   INR 1.7*       ASSESSMENT     Source(s): Chart Review and Patient/Caregiver Call       Warfarin doses taken: Warfarin taken as instructed    Diet: No new diet changes identified    New illness, injury, or hospitalization: Yes: recent cold and sinus symptoms and taking robitussin only and sometimes nyquil to sleep    Medication/supplement changes: robitussin and nyquil prn    Signs or symptoms of bleeding or clotting: No    Previous INR: Subtherapeutic    Additional findings: None     PLAN     Recommended plan for no diet, medication or health factor changes affecting INR     Dosing Instructions:  Increase your warfarin dose (16.7% change) with next INR in 1 week       Summary  As of 12/17/2021    Full warfarin instructions:  15 mg every day   Next INR check:  12/24/2021             Detailed voice message left for Adarsh with dosing instructions and follow up date.     Lab visit scheduled    Education provided: Please call back if any changes to your diet, medications or how you've been taking warfarin and Contact [phone]  with any changes, questions or concerns.     Plan made with [facility] Pharmacist Siomara Bustillo, RN  Anticoagulation Clinic  12/17/2021    _______________________________________________________________________     Anticoagulation Episode Summary     Current INR goal:  2.0-3.0   TTR:  20.6 % (2.4 mo)   Target end date:  Indefinite   Send INR reminders to:  [facility]    Indications    Recurrent deep vein thrombosis (DVT) (H) [I82.409]           Comments:           Anticoagulation Care Providers     Provider Role Specialty Phone number    Daisy Steward, CNP Referring  [phone]

## 2021-12-17 NOTE — PROGRESS NOTES
Chart reviewed with [facility] RN at time of encounter.  May be delayed in warfarin handling.  Increase warfarin ~16% to 15mg daily, and recheck INR in 7-10 days.  If INR climbs out of range high, will need to back down and give ~7 days to see effects.     Siomara Montaño, PharmD BCACP  Anticoagulation Clinical Pharmacist

## 2021-12-23 NOTE — PROGRESS NOTES
"    [facility]  [address] [place] [id number]  Main: [phone], Fax: [phone]    Patient seen at: [facility] at [address] [place] [address]    Outpatient Visit Note:    Patient: Adarsh Salvador  MRN: [id number]  : 1965  KOKO: 2021    Reason:  History of right upper extremity superficial thrombophlebitis of the right basilic vein in 2008. Family history of DVT. Recent finding of distal left leg DVT on 2021.    HPI:  Adarsh is a 56 year old male with a history of diabetes with lower extremity neuropathy, who also has a history of IV catheter provoked right upper extremity superficial thrombophlebitis back in 2008, who reportedly has a family history of DVT, who recently found to have left distal lower extremity DVT on 2021, referred by his primary care provider, Daisy Steward CNP, for consultation.     Dated back to 2008, he was in the emergency department for abdominal pain and underwent CT abd/pelvis with contrast at the time. An IV was inserted to the right anterior forearm for the infusion of contrast. Then on 2008, he started to have red, warm, pain over the area of his previous IV insertion site on the right upper extremity and gradually worsening. He eventually presented to the emergency department at [facility] for evaluation on 1/3/2008 when he was found to have a superficial thrombophlebitis over the right basilic vein. He was placed on Bactrim, enoxaparin and then warfarin. From what I can tell, he was treated with anticoagulation until \"pain/induration resolved\" according to a note dated back to 2008 from Dr. Win at the time.     In 2008, from what I can tell, he underwent left greater saphenous vein closure by Dr. Lozoya at [place]. Ultrasound on 2008 showed successful GSV closure.     Then back on 2021, he presented to the emergency " "department at [facility] with left ankle pain and swelling. At the time, his D-Dimer was elevated at 0.91. Subsequently ultrasound of the left leg showed an occlusive DVT in the posterior tibial vein of the mid and distal calf as well as a baker's cyst measuring 5.3 x 2.1 x 3.5 cm. He was placed on warfarin with enoxaparin bridging (which was the cheapest option of anticoagulation therapy as he does not have health insurance) and was discharged home.     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 However, he has been having worsening neuropathy to the point where he is no longer able to feel with his hands, which creates a job hazard and thus he has since quit his job.     Reportedly, his mother and his maternal cousin have a history of \"blood clots\" without any details. Adarsh has never undergone any inherit thrombophilia workup.     ROS:  Adarsh reports that his left leg swelling has resolved but continue to have symptoms of neuropathy. He denies any bleeding complications. Denies any epistaxis. No oral mucosal bleeding. Denies any hematuria or blood in stools. Denies any abdominal pain. No shortness of breath. No chest pain. 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     Medication:  Current Outpatient Medications   Medication     ACCU-CHEK GUIDE test strip     albuterol (PROAIR HFA/PROVENTIL HFA/VENTOLIN HFA) 108 (90 Base) MCG/ACT inhaler     Alpha Lipoic Acid 200 MG CAPS     amLODIPine (NORVASC) 10 MG tablet     ASPIRIN PO     aspirin-acetaminophen-caffeine (EXCEDRIN MIGRAINE) 250-250-65 MG tablet     atorvastatin (LIPITOR) 40 MG tablet     baclofen (LIORESAL) 10 MG tablet     blood glucose monitoring (ACCU-CHEK FASTCLIX) lancets     calcium carbonate (TUMS) 500 MG chewable tablet     cyclobenzaprine " (FLEXERIL) 10 MG tablet     DULoxetine (CYMBALTA) 60 MG capsule     EPINEPHrine (EPIPEN) 0.3 MG/0.3ML injection     fluticasone (FLONASE) 50 MCG/ACT nasal spray     fluticasone-salmeterol (ADVAIR) 500-50 MCG/DOSE inhaler     losartan (COZAAR) 50 MG tablet     metFORMIN (GLUCOPHAGE-XR) 500 MG 24 hr tablet     montelukast (SINGULAIR) 10 MG tablet     multivitamin, therapeutic (THERA-VIT) TABS     naproxen sodium (ANAPROX) 220 MG tablet     nortriptyline (PAMELOR) 10 MG capsule     tamsulosin (FLOMAX) 0.4 MG capsule     warfarin ANTICOAGULANT (COUMADIN) 5 MG tablet     Current Facility-Administered Medications   Medication     triamcinolone (KENALOG-40) injection 40 mg     Facility-Administered Medications Ordered in Other Visits   Medication     BUPivacaine (MARCAINE) injection 0.5%     DOBUTamine 500 mg in dextrose 5% 250 mL (adult std conc) premix     iopamidol (ISOVUE-M 200) solution 10 mL     methylPREDNISolone (DEPO-MEDROL) injection 80 mg     metoprolol (LOPRESSOR) injection 5 mg     Allergies:     Allergies   Allergen Reactions     Artificial Sweetner (Informational Only) [Artificial Sweetner (Informational Only) ] GI Disturbance     ALL artificial sweetners cause severe diarrhea & flu symptoms     Hydromorphone Anaphylaxis     Ibuprofen GI Disturbance     Morphine Sulfate Concentrate Anaphylaxis     Morphine [Fumaric Acid] Anaphylaxis     Hydrocodone-Acetaminophen Itching     Tramadol Hives     Trazodone Hives     Gabapentin      GI upset per pt     Keflex [Cephalexin] Diarrhea     Upset stomach     Codeine Phosphate Itching     Ketorolac Tromethamine Rash     PmHx:  Past Medical History:   Diagnosis Date     Anaphylactic reaction 8/14/2015     Anxiety      Depression      DM2 (diabetes mellitus, type 2) (H)      GERD (gastroesophageal reflux disease)      HTN (hypertension)      IBS (irritable bowel syndrome)      Kidney stone 6/15/2011    Pt believes these were Calcium stones     Neuropathy      Social  "History and Family History:  Deferred.    Objective:  Pleasant in no acute distress.  Vitals: BP (!) 166/107 (BP Location: Right arm, Patient Position: Sitting, Cuff Size: Adult Large)   Pulse 73   Temp 98.4  F (36.9  C) (Oral)   Resp 16   Ht 1.803 m (5' 11\")   Wt 131.6 kg (290 lb 3.2 oz)   SpO2 97%   BMI 40.47 kg/m    Exam:  Complete exam is not performed today.     Labs:  Component      Latest Ref Rng & Units 9/27/2021 9/29/2021 10/1/2021 10/5/2021   INR Point of Care      0.9 - 1.1 1.0 1.0 1.3 (H) 1.5 (H)     Component      Latest Ref Rng & Units 10/12/2021 10/18/2021 10/26/2021 11/3/2021   INR Point of Care      0.9 - 1.1 3.1 (H) 3.5 (H) 3.1 (H) 4.4 (H)     Component      Latest Ref Rng & Units 11/10/2021 11/19/2021 11/26/2021 12/3/2021   INR Point of Care      0.9 - 1.1 3.2 (H) 2.7 (H) 1.7 (H) 1.8 (H)     Component      Latest Ref Rng & Units 12/10/2021 12/17/2021   INR Point of Care      0.9 - 1.1 1.6 (H) 1.7 (H)       Imaging:  Ultrasound done back in 2008 and recently in Sept 2021 were all reviewed by this writer and are as described in my HPI section above.     Assessment:  In summary, Adarsh is a 56 year old male with a history of peripheral IV catheter provoked superficial thrombophlebitis of the right upper extremity in 2008, who recently is found to have a minimally provoked left distal lower extremity DVT back in Sept 2021, referred to this clinic for consultation in regard to anticoagulation therapy management and discussion of ultimate duration.     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 Additionally, he has neuropathy of both upper and lower extremities. Thus his left leg DVT was minimally provoked at best. Fortunately, this DVT confined to the distal portion of his left leg. Currently he is no longer working as a .     Diagnosis:  1. Minimally provoked left distal lower extremity DVT in Sept 2021.  2. Remote " history of peripheral IV catheter provoked superficial thrombophlebitis of the right upper extremity in 2008.  3. S/P left leg greater saphenous vein closure for venous insufficiency in 2011.     Plan:  I have a long discussion with Adarsh today in regard to our plan and recommendation.     I have taken some time today to educate Adarsh about DVT and pulmonary embolism.     Considering that this left leg DVT in Sept 2021 was a minimally provoked event and that it only confined to the distal lower extremity and that he is no longer working as a , I felt that his risk of recurrent venous thromboembolic events is low. I will repeat a left leg ultrasound in the next 1-2 weeks now that he has completed 3 months of anticoagulation therapy, to re-evaluate his thrombus. If it has resolved, I will likely stop his anticoagulation therapy. If he has residual thrombus, then I will have him continue anticoagulation therapy for another 3 months and then repeat another ultrasound and likely stop his anticoagulation therapy at that point.     Once he has stopped anticoagulation therapy, I do recommend that this patient should receive aggressive mechanical DVT/PE and/or pharmacological DVT/PE prophylaxis in the future if she should be in situation for increase risk of venous thromboembolism. These situations include but not limited to: 1) Prolong immobility or hospitalization of >24 hours; 2) Surgery, especially orthopedic type surgery; 3) Traumatic injury etc....     Patient is in agreement with this plan.     55 minutes spent on the date of the encounter doing chart review, history and exam, documentation and further activities per the note.    Time IN: 09:45am  Time OUT: 10:11am         Long Menendez PA-C, MPAS  Physician Assistant  [facility].

## 2021-12-24 ENCOUNTER — ANTICOAGULATION THERAPY VISIT (OUTPATIENT)
Dept: ANTICOAGULATION | Facility: CLINIC | Age: 56
End: 2021-12-24

## 2021-12-24 ENCOUNTER — LAB (OUTPATIENT)
Dept: LAB | Facility: CLINIC | Age: 56
End: 2021-12-24

## 2021-12-24 DIAGNOSIS — I82.409 RECURRENT DEEP VEIN THROMBOSIS (DVT) (H): ICD-10-CM

## 2021-12-24 DIAGNOSIS — I82.409 RECURRENT DEEP VEIN THROMBOSIS (DVT) (H): Primary | ICD-10-CM

## 2021-12-24 LAB — INR BLD: 3.3 (ref 0.9–1.1)

## 2021-12-24 PROCEDURE — 85610 PROTHROMBIN TIME: CPT

## 2021-12-24 PROCEDURE — 36416 COLLJ CAPILLARY BLOOD SPEC: CPT

## 2021-12-24 NOTE — PROGRESS NOTES
ANTICOAGULATION MANAGEMENT     Adarsh Salvador 56 year old male is on warfarin with supratherapeutic INR result. (Goal INR 2.0-3.0)    Recent labs: (last 7 days)     12/24/21  0912   INR 3.3*       ASSESSMENT     Source(s): Chart Review and Patient/Caregiver Call       Warfarin doses taken: Warfarin taken as instructed    Diet: No new diet changes identified, not much greens this week    New illness, injury, or hospitalization: No    Medication/supplement changes: None noted    Signs or symptoms of bleeding or clotting: No    Previous INR: Subtherapeutic    Additional findings: None     PLAN     Recommended plan for no diet, medication or health factor changes affecting INR     Dosing Instructions: Partial hold then Decrease your warfarin dose (4.8% change) with next INR in 5 days       Summary  As of 12/24/2021    Full warfarin instructions:  12/24: 10 mg; Otherwise 12.5 mg every Tue, Sat; 15 mg all other days   Next INR check:               Telephone call with Adarsh who verbalizes understanding and agrees to plan    Check at provider office visit    Education provided: Goal range and significance of current result    Plan made per [facility] anticoagulation protocol    Rubi Nathan RN  Anticoagulation Clinic  12/24/2021    _______________________________________________________________________     Anticoagulation Episode Summary     Current INR goal:  2.0-3.0   TTR:  24.3 % (2.6 mo)   Target end date:  Indefinite   Send INR reminders to:  [facility]    Indications    Recurrent deep vein thrombosis (DVT) (H) [I82.409]           Comments:           Anticoagulation Care Providers     Provider Role Specialty Phone number    Daisy Steward, CNP Referring  [phone]

## 2021-12-24 NOTE — PROGRESS NOTES
Chart reviewed with [facility] RN at time of encounter.    Advise 10mg today to slow rise down and  ~5% dose decrease, and INR check 12/28/22021.  Patient dose seem to have delayed warfarin handing, so INR may continue to climb out of range with recheck so soon before correcting, may not see full effect until at least end of next week.    [facility] be seeing [facility] which should give further insight into next steps in treatment with potentially additional testing.      Siomara Montaño, PharmD BCACP  Anticoagulation Clinical Pharmacist

## 2021-12-28 ENCOUNTER — OFFICE VISIT (OUTPATIENT)
Dept: HEMATOLOGY | Facility: CLINIC | Age: 56
End: 2021-12-28
Attending: NURSE PRACTITIONER

## 2021-12-28 VITALS
WEIGHT: 290.2 LBS | RESPIRATION RATE: 16 BRPM | BODY MASS INDEX: 40.63 KG/M2 | HEART RATE: 73 BPM | TEMPERATURE: 98.4 F | DIASTOLIC BLOOD PRESSURE: 107 MMHG | OXYGEN SATURATION: 97 % | SYSTOLIC BLOOD PRESSURE: 166 MMHG | HEIGHT: 71 IN

## 2021-12-28 DIAGNOSIS — I82.409 RECURRENT DEEP VEIN THROMBOSIS (DVT) (H): ICD-10-CM

## 2021-12-28 DIAGNOSIS — Z86.718 PERSONAL HISTORY OF DVT (DEEP VEIN THROMBOSIS): Primary | ICD-10-CM

## 2021-12-28 PROCEDURE — 99204 OFFICE O/P NEW MOD 45 MIN: CPT | Performed by: PHYSICIAN ASSISTANT

## 2021-12-28 PROCEDURE — G0463 HOSPITAL OUTPT CLINIC VISIT: HCPCS

## 2021-12-28 ASSESSMENT — PAIN SCALES - GENERAL: PAINLEVEL: SEVERE PAIN (6)

## 2021-12-28 ASSESSMENT — MIFFLIN-ST. JEOR: SCORE: 2178.47

## 2021-12-28 NOTE — LETTER
"      [facility]  [address] [place] [id number]  Main: [phone], Fax: [phone]    Patient seen at: [facility] at [address] [place] [address]    Outpatient Visit Note:    Patient: Adarsh Salvador  MRN: [id number]  : 1965  KOKO: 2021    Reason:  History of right upper extremity superficial thrombophlebitis of the right basilic vein in 2008. Family history of DVT. Recent finding of distal left leg DVT on 2021.    HPI:  Adarsh is a 56 year old male with a history of diabetes with lower extremity neuropathy, who also has a history of IV catheter provoked right upper extremity superficial thrombophlebitis back in 2008, who reportedly has a family history of DVT, who recently found to have left distal lower extremity DVT on 2021, referred by his primary care provider, Daisy Steward CNP, for consultation.     Dated back to 2008, he was in the emergency department for abdominal pain and underwent CT abd/pelvis with contrast at the time. An IV was inserted to the right anterior forearm for the infusion of contrast. Then on 2008, he started to have red, warm, pain over the area of his previous IV insertion site on the right upper extremity and gradually worsening. He eventually presented to the emergency department at [facility] for evaluation on 1/3/2008 when he was found to have a superficial thrombophlebitis over the right basilic vein. He was placed on Bactrim, enoxaparin and then warfarin. From what I can tell, he was treated with anticoagulation until \"pain/induration resolved\" according to a note dated back to 2008 from Dr. Win at the time.     In 2008, from what I can tell, he underwent left greater saphenous vein closure by Dr. Lozoya at [facility]. Ultrasound on 2008 showed successful GSV closure.     Then back on 2021, he presented to the emergency " "department at [facility] with left ankle pain and swelling. At the time, his D-Dimer was elevated at 0.91. Subsequently ultrasound of the left leg showed an occlusive DVT in the posterior tibial vein of the mid and distal calf as well as a baker's cyst measuring 5.3 x 2.1 x 3.5 cm. He was placed on warfarin with enoxaparin bridging (which was the cheapest option of anticoagulation therapy as he does not have health insurance) and was discharged home.     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 However, he has been having worsening neuropathy to the point where he is no longer able to feel with his hands, which creates a job hazard and thus he has since quit his job.     Reportedly, his mother and his maternal cousin have a history of \"blood clots\" without any details. Adarsh has never undergone any inherit thrombophilia workup.     ROS:  Adarsh reports that his left leg swelling has resolved but continue to have symptoms of neuropathy. He denies any bleeding complications. Denies any epistaxis. No oral mucosal bleeding. Denies any hematuria or blood in stools. Denies any abdominal pain. No shortness of breath. No chest pain. 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     Medication:  Current Outpatient Medications   Medication     ACCU-CHEK GUIDE test strip     albuterol (PROAIR HFA/PROVENTIL HFA/VENTOLIN HFA) 108 (90 Base) MCG/ACT inhaler     Alpha Lipoic Acid 200 MG CAPS     amLODIPine (NORVASC) 10 MG tablet     ASPIRIN PO     aspirin-acetaminophen-caffeine (EXCEDRIN MIGRAINE) 250-250-65 MG tablet     atorvastatin (LIPITOR) 40 MG tablet     baclofen (LIORESAL) 10 MG tablet     blood glucose monitoring (ACCU-CHEK FASTCLIX) lancets     calcium carbonate (TUMS) 500 MG chewable tablet     cyclobenzaprine " (FLEXERIL) 10 MG tablet     DULoxetine (CYMBALTA) 60 MG capsule     EPINEPHrine (EPIPEN) 0.3 MG/0.3ML injection     fluticasone (FLONASE) 50 MCG/ACT nasal spray     fluticasone-salmeterol (ADVAIR) 500-50 MCG/DOSE inhaler     losartan (COZAAR) 50 MG tablet     metFORMIN (GLUCOPHAGE-XR) 500 MG 24 hr tablet     montelukast (SINGULAIR) 10 MG tablet     multivitamin, therapeutic (THERA-VIT) TABS     naproxen sodium (ANAPROX) 220 MG tablet     nortriptyline (PAMELOR) 10 MG capsule     tamsulosin (FLOMAX) 0.4 MG capsule     warfarin ANTICOAGULANT (COUMADIN) 5 MG tablet     Current Facility-Administered Medications   Medication     triamcinolone (KENALOG-40) injection 40 mg     Facility-Administered Medications Ordered in Other Visits   Medication     BUPivacaine (MARCAINE) injection 0.5%     DOBUTamine 500 mg in dextrose 5% 250 mL (adult std conc) premix     iopamidol (ISOVUE-M 200) solution 10 mL     methylPREDNISolone (DEPO-MEDROL) injection 80 mg     metoprolol (LOPRESSOR) injection 5 mg     Allergies:     Allergies   Allergen Reactions     Artificial Sweetner (Informational Only) [Artificial Sweetner (Informational Only) ] GI Disturbance     ALL artificial sweetners cause severe diarrhea & flu symptoms     Hydromorphone Anaphylaxis     Ibuprofen GI Disturbance     Morphine Sulfate Concentrate Anaphylaxis     Morphine [Fumaric Acid] Anaphylaxis     Hydrocodone-Acetaminophen Itching     Tramadol Hives     Trazodone Hives     Gabapentin      GI upset per pt     Keflex [Cephalexin] Diarrhea     Upset stomach     Codeine Phosphate Itching     Ketorolac Tromethamine Rash     PmHx:  Past Medical History:   Diagnosis Date     Anaphylactic reaction 8/14/2015     Anxiety      Depression      DM2 (diabetes mellitus, type 2) (H)      GERD (gastroesophageal reflux disease)      HTN (hypertension)      IBS (irritable bowel syndrome)      Kidney stone 6/15/2011    Pt believes these were Calcium stones     Neuropathy      Social  "History and Family History:  Deferred.    Objective:  Pleasant in no acute distress.  Vitals: BP (!) 166/107 (BP Location: Right arm, Patient Position: Sitting, Cuff Size: Adult Large)   Pulse 73   Temp 98.4  F (36.9  C) (Oral)   Resp 16   Ht 1.803 m (5' 11\")   Wt 131.6 kg (290 lb 3.2 oz)   SpO2 97%   BMI 40.47 kg/m    Exam:  Complete exam is not performed today.     Labs:  Component      Latest Ref Rng & Units 9/27/2021 9/29/2021 10/1/2021 10/5/2021   INR Point of Care      0.9 - 1.1 1.0 1.0 1.3 (H) 1.5 (H)     Component      Latest Ref Rng & Units 10/12/2021 10/18/2021 10/26/2021 11/3/2021   INR Point of Care      0.9 - 1.1 3.1 (H) 3.5 (H) 3.1 (H) 4.4 (H)     Component      Latest Ref Rng & Units 11/10/2021 11/19/2021 11/26/2021 12/3/2021   INR Point of Care      0.9 - 1.1 3.2 (H) 2.7 (H) 1.7 (H) 1.8 (H)     Component      Latest Ref Rng & Units 12/10/2021 12/17/2021   INR Point of Care      0.9 - 1.1 1.6 (H) 1.7 (H)       Imaging:  Ultrasound done back in 2008 and recently in Sept 2021 were all reviewed by this writer and are as described in my HPI section above.     Assessment:  In summary, Adarsh is a 56 year old male with a history of peripheral IV catheter provoked superficial thrombophlebitis of the right upper extremity in 2008, who recently is found to have a minimally provoked left distal lower extremity DVT back in Sept 2021, referred to this clinic for consultation in regard to anticoagulation therapy management and discussion of ultimate duration.     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 Additionally, he has neuropathy of both upper and lower extremities. Thus his left leg DVT was minimally provoked at best. Fortunately, this DVT confined to the distal portion of his left leg. Currently he is no longer working as a .     Diagnosis:  1. Minimally provoked left distal lower extremity DVT in Sept 2021.  2. Remote " history of peripheral IV catheter provoked superficial thrombophlebitis of the right upper extremity in 2008.  3. S/P left leg greater saphenous vein closure for venous insufficiency in 2011.     Plan:  I have a long discussion with Adarsh today in regard to our plan and recommendation.     I have taken some time today to educate Adarsh about DVT and pulmonary embolism.     Considering that this left leg DVT in Sept 2021 was a minimally provoked event and that it only confined to the distal lower extremity and that he is no longer working as a , I felt that his risk of recurrent venous thromboembolic events is low. I will repeat a left leg ultrasound in the next 1-2 weeks now that he has completed 3 months of anticoagulation therapy, to re-evaluate his thrombus. If it has resolved, I will likely stop his anticoagulation therapy. If he has residual thrombus, then I will have him continue anticoagulation therapy for another 3 months and then repeat another ultrasound and likely stop his anticoagulation therapy at that point.     Once he has stopped anticoagulation therapy, I do recommend that this patient should receive aggressive mechanical DVT/PE and/or pharmacological DVT/PE prophylaxis in the future if she should be in situation for increase risk of venous thromboembolism. These situations include but not limited to: 1) Prolong immobility or hospitalization of >24 hours; 2) Surgery, especially orthopedic type surgery; 3) Traumatic injury etc....     Patient is in agreement with this plan.     55 minutes spent on the date of the encounter doing chart review, history and exam, documentation and further activities per the note.    Time IN: 09:45am  Time OUT: 10:11am         Long Menendez PA-C, MPAS  Physician Assistant  [facility].             Allergies, medications, and vitals reviewed and recorded.     Carri House

## 2021-12-29 ENCOUNTER — TELEPHONE (OUTPATIENT)
Dept: FAMILY MEDICINE | Facility: CLINIC | Age: 56
End: 2021-12-29

## 2021-12-29 NOTE — TELEPHONE ENCOUNTER
ANTICOAGULATION     Adarsh Salvador is overdue for INR check.      Spoke with Adarsh and scheduled lab appointment on 12/30/21    Oly Bustillo RN     Paged Dr. Selby for orders.

## 2021-12-30 ENCOUNTER — ANTICOAGULATION THERAPY VISIT (OUTPATIENT)
Dept: ANTICOAGULATION | Facility: CLINIC | Age: 56
End: 2021-12-30

## 2021-12-30 ENCOUNTER — LAB (OUTPATIENT)
Dept: LAB | Facility: CLINIC | Age: 56
End: 2021-12-30

## 2021-12-30 ENCOUNTER — ANCILLARY PROCEDURE (OUTPATIENT)
Dept: ULTRASOUND IMAGING | Facility: CLINIC | Age: 56
End: 2021-12-30
Attending: PHYSICIAN ASSISTANT

## 2021-12-30 ENCOUNTER — TELEPHONE (OUTPATIENT)
Dept: HEMATOLOGY | Facility: CLINIC | Age: 56
End: 2021-12-30

## 2021-12-30 DIAGNOSIS — Z86.718 PERSONAL HISTORY OF DVT (DEEP VEIN THROMBOSIS): ICD-10-CM

## 2021-12-30 DIAGNOSIS — I82.409 RECURRENT DEEP VEIN THROMBOSIS (DVT) (H): ICD-10-CM

## 2021-12-30 DIAGNOSIS — I82.409 RECURRENT DEEP VEIN THROMBOSIS (DVT) (H): Primary | ICD-10-CM

## 2021-12-30 LAB — INR BLD: 2.5 (ref 0.9–1.1)

## 2021-12-30 PROCEDURE — 85610 PROTHROMBIN TIME: CPT

## 2021-12-30 PROCEDURE — 93971 EXTREMITY STUDY: CPT | Mod: LT | Performed by: RADIOLOGY

## 2021-12-30 PROCEDURE — 36416 COLLJ CAPILLARY BLOOD SPEC: CPT

## 2021-12-30 NOTE — TELEPHONE ENCOUNTER
Noted below and I spoke with patient.  Discharged from [facility] on other encounter. Oly Bustillo RN

## 2021-12-30 NOTE — PROGRESS NOTES
INR done today is therapeutic.  Had U/S of lower leg today for follow up of DVT and clot is resolved.  He was told today per hematology he can stop the warfarin.    ANTICOAGULATION  MANAGEMENT    Adarsh Salvador is being discharged from the [facility] ([facility]).    Reason for discharge: warfarin therapy completed    Anticoagulation episode resolved, [facility] referral closed and INR Standing order discontinued    If patient needs warfarin management in the future, please send a new referral    Oly Bustillo RN

## 2021-12-30 NOTE — TELEPHONE ENCOUNTER
[facility]  [facility]  [facility] [address], [address], [place] [address]  Main: [phone], Fax: [phone]    Telephone Note:    Patient: Adarsh Salvador  MRN: [id number]  : 1965  Date of this note written: 2021    This writer called the patient on 2021 at 12:30pm and communicated the ultrasound report to him that was done this morning.     1.  No evidence left lower extremity deep venous thrombosis.    Spoke with patient and instructed him to stop / discontinue warfarin as planned per my clinic visit note on 2021.    No routine follow up with me is necessary.       Long Menendez PA-C, MPAS  Physician Assistant  [facility].

## 2022-01-02 ENCOUNTER — ANCILLARY PROCEDURE (OUTPATIENT)
Dept: GENERAL RADIOLOGY | Facility: CLINIC | Age: 57
End: 2022-01-02
Attending: NURSE PRACTITIONER
Payer: MEDICAID

## 2022-01-02 ENCOUNTER — OFFICE VISIT (OUTPATIENT)
Dept: URGENT CARE | Facility: URGENT CARE | Age: 57
End: 2022-01-02
Payer: MEDICAID

## 2022-01-02 VITALS
BODY MASS INDEX: 39.39 KG/M2 | HEART RATE: 117 BPM | SYSTOLIC BLOOD PRESSURE: 143 MMHG | TEMPERATURE: 97.5 F | OXYGEN SATURATION: 97 % | WEIGHT: 282.4 LBS | DIASTOLIC BLOOD PRESSURE: 92 MMHG

## 2022-01-02 DIAGNOSIS — R07.0 THROAT PAIN: ICD-10-CM

## 2022-01-02 DIAGNOSIS — R53.81 MALAISE: ICD-10-CM

## 2022-01-02 DIAGNOSIS — J01.00 ACUTE NON-RECURRENT MAXILLARY SINUSITIS: ICD-10-CM

## 2022-01-02 DIAGNOSIS — R05.9 COUGH: Primary | ICD-10-CM

## 2022-01-02 PROBLEM — F17.200 NICOTINE DEPENDENCE: Status: ACTIVE | Noted: 2021-02-15

## 2022-01-02 PROBLEM — E78.5 DYSLIPIDEMIA: Status: ACTIVE | Noted: 2021-02-15

## 2022-01-02 PROBLEM — E55.9 VITAMIN D DEFICIENCY: Status: ACTIVE | Noted: 2021-02-15

## 2022-01-02 PROBLEM — R07.2 PRECORDIAL PAIN: Status: ACTIVE | Noted: 2021-02-15

## 2022-01-02 PROBLEM — M23.90 INTERNAL DERANGEMENT OF KNEE: Status: ACTIVE | Noted: 2021-02-15

## 2022-01-02 PROBLEM — R79.82 ELEVATED C-REACTIVE PROTEIN: Status: ACTIVE | Noted: 2021-02-15

## 2022-01-02 PROBLEM — K21.9 GASTRIC REFLUX: Status: ACTIVE | Noted: 2021-02-15

## 2022-01-02 PROBLEM — I83.90 VARICOSE VEINS OF LOWER EXTREMITY: Status: ACTIVE | Noted: 2021-02-15

## 2022-01-02 LAB
BASOPHILS # BLD AUTO: 0 10E3/UL (ref 0–0.2)
BASOPHILS NFR BLD AUTO: 0 %
DEPRECATED S PYO AG THROAT QL EIA: NEGATIVE
EOSINOPHIL # BLD AUTO: 0.1 10E3/UL (ref 0–0.7)
EOSINOPHIL NFR BLD AUTO: 1 %
ERYTHROCYTE [DISTWIDTH] IN BLOOD BY AUTOMATED COUNT: 14.3 % (ref 10–15)
FLUAV AG SPEC QL IA: NEGATIVE
FLUBV AG SPEC QL IA: NEGATIVE
GROUP A STREP BY PCR: NOT DETECTED
HCT VFR BLD AUTO: 41.9 % (ref 40–53)
HGB BLD-MCNC: 13.9 G/DL (ref 13.3–17.7)
LYMPHOCYTES # BLD AUTO: 1.7 10E3/UL (ref 0.8–5.3)
LYMPHOCYTES NFR BLD AUTO: 24 %
MCH RBC QN AUTO: 29 PG (ref 26.5–33)
MCHC RBC AUTO-ENTMCNC: 33.2 G/DL (ref 31.5–36.5)
MCV RBC AUTO: 87 FL (ref 78–100)
MONOCYTES # BLD AUTO: 1.1 10E3/UL (ref 0–1.3)
MONOCYTES NFR BLD AUTO: 15 %
NEUTROPHILS # BLD AUTO: 4.2 10E3/UL (ref 1.6–8.3)
NEUTROPHILS NFR BLD AUTO: 59 %
PLATELET # BLD AUTO: 181 10E3/UL (ref 150–450)
RBC # BLD AUTO: 4.8 10E6/UL (ref 4.4–5.9)
WBC # BLD AUTO: 7.1 10E3/UL (ref 4–11)

## 2022-01-02 PROCEDURE — 85025 COMPLETE CBC W/AUTO DIFF WBC: CPT | Performed by: NURSE PRACTITIONER

## 2022-01-02 PROCEDURE — 99213 OFFICE O/P EST LOW 20 MIN: CPT | Performed by: NURSE PRACTITIONER

## 2022-01-02 PROCEDURE — 87804 INFLUENZA ASSAY W/OPTIC: CPT | Performed by: NURSE PRACTITIONER

## 2022-01-02 PROCEDURE — U0005 INFEC AGEN DETEC AMPLI PROBE: HCPCS | Performed by: NURSE PRACTITIONER

## 2022-01-02 PROCEDURE — 36415 COLL VENOUS BLD VENIPUNCTURE: CPT | Performed by: NURSE PRACTITIONER

## 2022-01-02 PROCEDURE — 87651 STREP A DNA AMP PROBE: CPT | Performed by: NURSE PRACTITIONER

## 2022-01-02 PROCEDURE — U0003 INFECTIOUS AGENT DETECTION BY NUCLEIC ACID (DNA OR RNA); SEVERE ACUTE RESPIRATORY SYNDROME CORONAVIRUS 2 (SARS-COV-2) (CORONAVIRUS DISEASE [COVID-19]), AMPLIFIED PROBE TECHNIQUE, MAKING USE OF HIGH THROUGHPUT TECHNOLOGIES AS DESCRIBED BY CMS-2020-01-R: HCPCS | Performed by: NURSE PRACTITIONER

## 2022-01-02 PROCEDURE — 71046 X-RAY EXAM CHEST 2 VIEWS: CPT | Performed by: RADIOLOGY

## 2022-01-02 NOTE — PATIENT INSTRUCTIONS
"Discharge Instructions for COVID-19 Patients  You have--or may have--COVID-19. Please follow the instructions listed below.   If you have a weakened immune system, discuss with your doctor any other actions you need to take.  How can I protect others?  If you have symptoms (fever, cough, body aches or trouble breathing):    Stay home and away from others (self-isolate) until:  ? Your other symptoms have resolved (gotten better). And   ? You've had no fever--and no medicine that reduces fever--for 1 full day (24 hours). And   ? At least 10 days have passed since your symptoms started. (You may need to wait 20 days. Follow the advice of your care team.)  If you don't show symptoms, but testing showed that you have COVID-19:    Stay home and away from others (self-isolate) until at least 10 days have passed since the date of your first positive COVID-19 test.  During this time    Stay in your own room, even for meals. Use your own bathroom if you can.    Stay away from others in your home. No hugging, kissing or shaking hands. No visitors.    Don't go to work, school or anywhere else.    Clean \"high touch\" surfaces often (doorknobs, counters, handles). Use household cleaning spray or wipes.    You'll find a full list of  on the EPA website: www.epa.gov/pesticide-registration/list-n-disinfectants-use-against-sars-cov-2.    Cover your mouth and nose with a mask or other face covering to avoid spreading germs.    Wash your hands and face often. Use soap and water.    Caregivers in these groups are at risk for severe illness due to COVID-19:  ? People 65 years and older  ? People who live in a nursing home or long-term care facility  ? People with chronic disease (lung, heart, cancer, diabetes, kidney, liver, immunologic)  ? People who have a weakened immune system, including those who:    Are in cancer treatment    Take medicine that weakens the immune system, such as corticosteroids    Had a bone marrow or organ " transplant    Have an immune deficiency    Have poorly controlled HIV or AIDS    Are obese (body mass index of 40 or higher)    Smoke regularly    Caregivers should wear gloves while washing dishes, handling laundry and cleaning bedrooms and bathrooms.    Use caution when washing and drying laundry: Don't shake dirty laundry and use the warmest water setting that you can.    For more tips on managing your health at home, go to www.cdc.gov/coronavirus/2019-ncov/downloads/10Things.pdf.  How can I take care of myself at home?  1. Get lots of rest. Drink extra fluids (unless a doctor has told you not to).  2. Take Tylenol (acetaminophen) for fever or pain. If you have liver or kidney problems, ask your family doctor if it's okay to take Tylenol.   Adults can take either:   ? 650 mg (two 325 mg pills) every 4 to 6 hours, or   ? 1,000 mg (two 500 mg pills) every 8 hours as needed.  ? Note: Don't take more than 3,000 mg in one day. Acetaminophen is found in many medicines (both prescribed and over-the-counter medicines). Read all labels to be sure you don't take too much.   For children, check the Tylenol bottle for the right dose. The dose is based on the child's age or weight.  3. If you have other health problems (like cancer, heart failure, an organ transplant or severe kidney disease): Call your specialty clinic if you don't feel better in the next 2 days.  4. Know when to call 911. Emergency warning signs include:  ? Trouble breathing or shortness of breath  ? Pain or pressure in the chest that doesn't go away  ? Feeling confused like you haven't felt before, or not being able to wake up  ? Bluish-colored lips or face  5. Your doctor may have prescribed a blood thinner medicine. Follow their instructions.  Where can I get more information?     [facility] - About COVID-19:   [email or web address]    CDC - What to Do If You're Sick:  www.cdc.gov/coronavirus/2019-ncov/about/steps-when-sick.html    CDC - Ending Home Isolation: www.cdc.gov/coronavirus/2019-ncov/hcp/disposition-in-home-patients.html    CDC - Caring for Someone: www.cdc.gov/coronavirus/2019-ncov/if-you-are-sick/care-for-someone.html    [facility] - Interim Guidance for Hospital Discharge to Home: www.health.[facility].[place]./diseases/coronavirus/hcp/hospdischarge.pdf    Below are the COVID-19 hotlines at the [place] Department of Health ([facility]). Interpreters are available.  ? For health questions: Call 195-588-3083 or 1-859.227.3460 (7 a.m. to 7 p.m.)  ? For questions about schools and childcare: Call 242-383-4729 or 1-658.554.3280 (7 a.m. to 7 p.m.)    For informational purposes only. Not to replace the advice of your health care provider. Clinically reviewed by Dr. Lex Watson.   Copyright   2020 [facility]. All rights reserved. [employer] 702566 - REV 01/05/21.

## 2022-01-02 NOTE — PROGRESS NOTES
Assessment & Plan     Cough  Home care instructions were reviewed with the patient. The risks, benefits and treatment options of prescribed medications or other treatments have been discussed with the patient. The patient verbalized their understanding and should call or follow up if no improvement or if they develop further problems.    - CBC with platelets and differential  - Symptomatic; Yes; 12/31/2021 COVID-19 Virus (Coronavirus) by PCR Oropharynx  - Influenza A/B antigen  - XR Chest 2 Views    Malaise    - CBC with platelets and differential  - Symptomatic; Yes; 12/31/2021 COVID-19 Virus (Coronavirus) by PCR Oropharynx  - Influenza A/B antigen    Acute non-recurrent maxillary sinusitis      Throat pain    - Streptococcus A Rapid Screen w/Reflex to PCR - Clinic Collect  - Group A Streptococcus PCR Throat Swab    Negative rapid strep pending culture    We will get further work-up for influenza with CBC influenza swab and chest x-ray.    Discussed recommendation for self quarantine pending Covid results.  Will increase humidity saline nasal spray for sinusitis Tylenol as needed for fever and malaise.  Robitussin OTC for cough.  Patient verbalized understanding will return to clinic if symptoms do not improve.    Patient Instructions   Discharge Instructions for COVID-19 Patients  You have--or may have--COVID-19. Please follow the instructions listed below.   If you have a weakened immune system, discuss with your doctor any other actions you need to take.  How can I protect others?  If you have symptoms (fever, cough, body aches or trouble breathing):    Stay home and away from others (self-isolate) until:  ? Your other symptoms have resolved (gotten better). And   ? You've had no fever--and no medicine that reduces fever--for 1 full day (24 hours). And   ? At least 10 days have passed since your symptoms started. (You may need to wait 20 days. Follow the advice of your care team.)  If you don't show symptoms,  "but testing showed that you have COVID-19:    Stay home and away from others (self-isolate) until at least 10 days have passed since the date of your first positive COVID-19 test.  During this time    Stay in your own room, even for meals. Use your own bathroom if you can.    Stay away from others in your home. No hugging, kissing or shaking hands. No visitors.    Don't go to work, school or anywhere else.    Clean \"high touch\" surfaces often (doorknobs, counters, handles). Use household cleaning spray or wipes.    You'll find a full list of  on the EPA website: www.epa.gov/pesticide-registration/list-n-disinfectants-use-against-sars-cov-2.    Cover your mouth and nose with a mask or other face covering to avoid spreading germs.    Wash your hands and face often. Use soap and water.    Caregivers in these groups are at risk for severe illness due to COVID-19:  ? People 65 years and older  ? People who live in a nursing home or long-term care facility  ? People with chronic disease (lung, heart, cancer, diabetes, kidney, liver, immunologic)  ? People who have a weakened immune system, including those who:    Are in cancer treatment    Take medicine that weakens the immune system, such as corticosteroids    Had a bone marrow or organ transplant    Have an immune deficiency    Have poorly controlled HIV or AIDS    Are obese (body mass index of 40 or higher)    Smoke regularly    Caregivers should wear gloves while washing dishes, handling laundry and cleaning bedrooms and bathrooms.    Use caution when washing and drying laundry: Don't shake dirty laundry and use the warmest water setting that you can.    For more tips on managing your health at home, go to www.cdc.gov/coronavirus/2019-ncov/downloads/10Things.pdf.  How can I take care of myself at home?  1. Get lots of rest. Drink extra fluids (unless a doctor has told you not to).  2. Take Tylenol (acetaminophen) for fever or pain. If you have liver or kidney " problems, ask your family doctor if it's okay to take Tylenol.   Adults can take either:   ? 650 mg (two 325 mg pills) every 4 to 6 hours, or   ? 1,000 mg (two 500 mg pills) every 8 hours as needed.  ? Note: Don't take more than 3,000 mg in one day. Acetaminophen is found in many medicines (both prescribed and over-the-counter medicines). Read all labels to be sure you don't take too much.   For children, check the Tylenol bottle for the right dose. The dose is based on the child's age or weight.  3. If you have other health problems (like cancer, heart failure, an organ transplant or severe kidney disease): Call your specialty clinic if you don't feel better in the next 2 days.  4. Know when to call 911. Emergency warning signs include:  ? Trouble breathing or shortness of breath  ? Pain or pressure in the chest that doesn't go away  ? Feeling confused like you haven't felt before, or not being able to wake up  ? Bluish-colored lips or face  5. Your doctor may have prescribed a blood thinner medicine. Follow their instructions.  Where can I get more information?    [facility] - About COVID-19:   [email or web address]    CDC - What to Do If You're Sick: www.cdc.gov/coronavirus/2019-ncov/about/steps-when-sick.html    CDC - Ending Home Isolation: www.cdc.gov/coronavirus/2019-ncov/hcp/disposition-in-home-patients.html    CDC - Caring for Someone: www.cdc.gov/coronavirus/2019-ncov/if-you-are-sick/care-for-someone.html    [facility] - Interim Guidance for Hospital Discharge to Home: www.health.[facility].mn.us/diseases/coronavirus/hcp/hospdischarge.pdf    Below are the COVID-19 hotlines at the [place] Department of Health ([facility]). Interpreters are available.  ? For health questions: Call 741-992-3612 or 1-373.112.2277 (7 a.m. to 7 p.m.)  ? For questions about schools and childcare: Call 614-472-8346 or 1-872.411.5947 (7 a.m. to 7 p.m.)    For informational purposes only. Not to replace the advice of your  health care provider. Clinically reviewed by Dr. Lex Watson.   Copyright   2020 [facility]. All rights reserved. [employer] [id number] - REV 01/05/21.          No follow-ups on file.    IRENE Perdomo CNP  [facility]]   Adarsh is a 56 year old who presents for the following health issues     HPI     Acute Illness  Acute illness concerns: body aches, cough, sinusitis, throat pain  Onset/Duration: 3 days ago  Symptoms:  Fever: no  Chills/Sweats: no  Headache (location?): YES  Sinus Pressure: YES  Conjunctivitis:  YES  Ear Pain: YES: bilateral  Rhinorrhea: YES  Congestion: YES  Sore Throat: YES  Cough: YES-non-productive  Wheeze: no  Decreased Appetite: no  Nausea: no  Vomiting: no  Diarrhea: no  Dysuria/Freq.: no  Dysuria or Hematuria: no  Fatigue/Achiness: YES  Sick/Strep Exposure: none recently   Therapies tried and outcome: tylenol, Nyquil,         Review of Systems   Constitutional, HEENT, cardiovascular, pulmonary, GI, , musculoskeletal, neuro, skin, endocrine and psych systems are negative, except as otherwise noted.      Objective    BP (!) 143/92   Pulse 117   Temp 97.5  F (36.4  C) (Tympanic)   Wt 128.1 kg (282 lb 6.4 oz)   SpO2 97%   BMI 39.39 kg/m    Body mass index is 39.39 kg/m .  Physical Exam   GENERAL: healthy, alert and no distress  EYES: Eyes grossly normal to inspection, PERRL and conjunctivae and sclerae normal  HENT: ear canals and TM's normal, nose and mouth without ulcers or lesions  NECK: no adenopathy, no asymmetry, masses, or scars and thyroid normal to palpation  RESP: decreased breath sounds L lower posterior  CV: regular rate and rhythm, normal S1 S2, no S3 or S4, no murmur, click or rub, no peripheral edema and peripheral pulses strong  ABDOMEN: soft, nontender, no hepatosplenomegaly, no masses and bowel sounds normal  MS: no gross musculoskeletal defects noted, no edema  SKIN: no suspicious lesions or rashes  NEURO: Normal  strength and tone, mentation intact and speech normal  PSYCH: mentation appears normal, affect normal/bright

## 2022-01-03 LAB — SARS-COV-2 RNA RESP QL NAA+PROBE: POSITIVE

## 2022-01-03 NOTE — RESULT ENCOUNTER NOTE
Attempted to call patient to review labs.  Please advise Adarsh Salvador,  1965, that his lab results were normal complete blood count negative strep and influenza positive for Covid 19 please have patient continue to quarantine and follow guidelines given to him yesterday on his after visit summary  [phone] (home)   Thank you  Christa Bosch CNP

## 2022-01-03 NOTE — RESULT ENCOUNTER NOTE
Please advise Adarsh Salvador,  1965, that his lab results were negative for influenza and strep. Chest xray per radiology read clear lungs no indication for infection. Complete blood count negative.  Pending Covid test.   [phone] (home)   Thank you  Christa Bosch CNP

## 2022-01-15 LAB — INR (EXTERNAL): 1 (ref 2–3)

## 2022-01-16 ENCOUNTER — HEALTH MAINTENANCE LETTER (OUTPATIENT)
Age: 57
End: 2022-01-16

## 2022-01-17 LAB — INR (EXTERNAL): 1.1 (ref 2–3)

## 2022-01-18 ENCOUNTER — TELEPHONE (OUTPATIENT)
Dept: FAMILY MEDICINE | Facility: CLINIC | Age: 57
End: 2022-01-18
Payer: COMMERCIAL

## 2022-01-18 NOTE — TELEPHONE ENCOUNTER
D/c'd from hospital yesterday for DVT and PE, very sob, extremely lightheaded today. If goes upstairs, has to lay down to catch his breath.  Per patient, he is feeling worse since home from the hospital    Patient instructed to go to ED now, do not drive, get someone to take him or call 911      Thalia CROFTN, RN

## 2022-01-18 NOTE — PROGRESS NOTES
Assessment & Plan     Type 2 diabetes mellitus with diabetic polyneuropathy, without long-term current use of insulin (H)  Worsening  I will f/u with labs likely will need ozempic, trulicity, or insulin    - Lipid panel reflex to direct LDL Non-fasting; Future  - Albumin Random Urine Quantitative with Creat Ratio; Future  - Hemoglobin A1c; Future  - INR; Future  - atorvastatin (LIPITOR) 40 MG tablet; Take 1 tablet (40 mg) by mouth daily  - metFORMIN (GLUCOPHAGE-XR) 500 MG 24 hr tablet; Take 2 tablets (1,000 mg) by mouth 2 times daily (with meals)  - Lipid panel reflex to direct LDL Non-fasting  - Albumin Random Urine Quantitative with Creat Ratio  - Hemoglobin A1c    Pulmonary embolism, other, unspecified chronicity, unspecified whether acute cor pulmonale present (H)  Continue on coumadin  inr routed to inr team  Go back to hematology patient is upset and wondering why he keeps clotting. He will need to stay on anticoag lifelong we discussed.   - Adult Oncology/Hematology Referral; Future  - INR point of care, Interfaced Result; Future  - INR point of care, Interfaced Result    Recurrent deep vein thrombosis (DVT) (H)    - Adult Oncology/Hematology Referral; Future    Diabetic polyneuropathy associated with type 2 diabetes mellitus (H)    - DULoxetine (CYMBALTA) 60 MG capsule; Take 1 capsule (60 mg) by mouth 2 times daily  - nortriptyline (PAMELOR) 10 MG capsule; Take 2 capsules (20 mg) by mouth At Bedtime    Screen for colon cancer    - Adult Gastro Ref - Procedure Only; Future    Screening for hyperlipidemia      Cough    - albuterol (PROAIR HFA/PROVENTIL HFA/VENTOLIN HFA) 108 (90 Base) MCG/ACT inhaler; Inhale 2 puffs into the lungs every 6 hours as needed for shortness of breath / dyspnea or wheezing    Hypertension goal BP (blood pressure) < 140/90    - amLODIPine (NORVASC) 10 MG tablet; Take 1 tablet (10 mg) by mouth daily For blood pressure  - losartan (COZAAR) 50 MG tablet; Take 1 tablet (50 mg) by mouth  daily For blood pressure.    Back muscle spasm  To goal  - cyclobenzaprine (FLEXERIL) 10 MG tablet; Take 0.5-1 tablets (5-10 mg) by mouth 3 times daily as needed for muscle spasms    Seasonal allergic rhinitis due to other allergic trigger  stabl  - fluticasone (FLONASE) 50 MCG/ACT nasal spray; Spray 1 spray into both nostrils daily    SOB (shortness of breath)    - fluticasone-salmeterol (ADVAIR) 500-50 MCG/DOSE inhaler; Inhale 1 puff into the lungs 2 times daily    Chronic pansinusitis    - fluticasone-salmeterol (ADVAIR) 500-50 MCG/DOSE inhaler; Inhale 1 puff into the lungs 2 times daily    Severe persistent asthma without complication    - fluticasone-salmeterol (ADVAIR) 500-50 MCG/DOSE inhaler; Inhale 1 puff into the lungs 2 times daily    Mild persistent asthma without complication    - montelukast (SINGULAIR) 10 MG tablet; Take 1 tablet (10 mg) by mouth At Bedtime For allergies/asthma    History of kidney stones    - tamsulosin (FLOMAX) 0.4 MG capsule; Take 1 capsule (0.4 mg) by mouth daily         Patient Instructions   Schedule with bleeding/clotting disorder clinic, ask for a new provider than you saw before per your request    I will follow up with labs and plan from there            Return in about 2 weeks (around 2/7/2022) for with my chart message with your blood sugars from home.    Bertha Romero PA-C  [facility]   Adarsh is a 56 year old who presents for the following health issues  accompanied by his Self.    HPI               Hospital Follow-up Visit:    Hospital/Nursing Home/IP Rehab Facility: [facility]  Date of Admission: 01/15/2022  Date of Discharge: 01/17/2022  Reason(s) for Admission: Blood clot      Was your hospitalization related to COVID-19? No   Problems taking medications regularly:  None  Medication changes since discharge: None  Problems adhering to non-medication therapy:  None    Summary of hospitalization:  CareEverywhere  "information obtained and reviewed  Diagnostic Tests/Treatments reviewed.  Follow up needed: inr follow up, labs  Other Healthcare Providers Involved in Patient s Care:         None  Update since discharge: improved.  Post Discharge Medication Reconciliation: discharge medications reconciled, continue medications without change.  Plan of care communicated with patient          His edema is pretty good in legs. No shortness of breath/chest pain anymore. Vitals are good.       Patient was seen on 1-15 for acute dvt of left tibial vein and PE with chest pain. Back to emergency room on 1-18 with shortness of breath and chest pain.   Care everywhere notes reviewed. Please see them for more details.     From care everywhere:  Impression and Plan:   \"Adarsh Salvador is a 56 y.o. male with a past medical history of morbid obesity, bilateral distal PEs, who presents with chest pain and shortness of breath. Due to his report of severe worsening symptoms I did order a broad repeat evaluation which actually shows improvement of his pulmonary emboli and his vital signs normalized over time and monitoring. Mental status was incredibly more calm and less anxious. He had no dysrhythmias witnessed here. He had troponin with repeat several hours later both negative not suggestive of any detectable myocardial infarction. Unstable angina considered however per chart review this is more consistent with atypical chest pain. He is already prescribed to him appropriate anticoagulation medications and he will be continuing these as an out patient. He has a follow up appointment on Monday. On multiple reassessments here he was asymptomatic with appropriate vitals and will be discharged safely home. Return sooner and emphasized prior to discharge any return of any shortness of breath at rest, fainting, fever.     Diagnosis:   ENCOUNTER DIAGNOSES   ICD-10-CM   1. Chest pain, unspecified type R07.9   2. Shortness of breath R06.02     Carri GODDARD " "Александр, am serving as a scribe to document services personally performed by Dario Palacios DO, based on my observations and the provider's statements to me.\"    1/18/2022  [facility] EMERGENCY ROOM         Diabetes- on metformin.  Had pretty good control historically. But last a1c was 2020. Rx losartan and statin. Also on amlodipine for blood pressure.      Sugars have been very high the past two months. He did not follow up or tell me this.     Seeing neurology, will be trying to get disability thorough them. Neuropathy in hands, hard to stand for a long time. Not working anymore due to all his conditions per patient.         Lab Results   Component Value Date    A1C 7.4 12/03/2020    A1C 7.5 03/26/2020    A1C 6.6 10/03/2019    A1C 6.8 01/11/2019    A1C 6.5 08/17/2018         Review of Systems   Constitutional, HEENT, cardiovascular, pulmonary, GI, , musculoskeletal, neuro, skin, endocrine and psych systems are negative, except as otherwise noted.      Objective    /81   Pulse 83   Temp 97.7  F (36.5  C) (Tympanic)   Resp 16   Ht 1.803 m (5' 11\")   Wt 127 kg (280 lb)   SpO2 98%   BMI 39.05 kg/m    Body mass index is 39.05 kg/m .  Physical Exam   GENERAL: alert, no distress and obese  RESP: lungs clear to auscultation - no rales, rhonchi or wheezes  CV: regular rate and rhythm, normal S1 S2, no S3 or S4, no murmur, click or rub, no peripheral edema and peripheral pulses strong  MS: no gross musculoskeletal defects noted, no edema  SKIN: no suspicious lesions or rashes  NEURO: Normal strength and tone, mentation intact and speech normal  PSYCH: mentation appears normal, affect normal/bright            "

## 2022-01-24 ENCOUNTER — TELEPHONE (OUTPATIENT)
Dept: FAMILY MEDICINE | Facility: CLINIC | Age: 57
End: 2022-01-24

## 2022-01-24 ENCOUNTER — OFFICE VISIT (OUTPATIENT)
Dept: FAMILY MEDICINE | Facility: CLINIC | Age: 57
End: 2022-01-24
Payer: MEDICAID

## 2022-01-24 VITALS
OXYGEN SATURATION: 98 % | HEIGHT: 71 IN | RESPIRATION RATE: 16 BRPM | WEIGHT: 280 LBS | HEART RATE: 83 BPM | DIASTOLIC BLOOD PRESSURE: 81 MMHG | BODY MASS INDEX: 39.2 KG/M2 | TEMPERATURE: 97.7 F | SYSTOLIC BLOOD PRESSURE: 132 MMHG

## 2022-01-24 DIAGNOSIS — R06.02 SOB (SHORTNESS OF BREATH): ICD-10-CM

## 2022-01-24 DIAGNOSIS — R05.9 COUGH: ICD-10-CM

## 2022-01-24 DIAGNOSIS — I10 HYPERTENSION GOAL BP (BLOOD PRESSURE) < 140/90: ICD-10-CM

## 2022-01-24 DIAGNOSIS — J30.89 SEASONAL ALLERGIC RHINITIS DUE TO OTHER ALLERGIC TRIGGER: ICD-10-CM

## 2022-01-24 DIAGNOSIS — M62.830 BACK MUSCLE SPASM: ICD-10-CM

## 2022-01-24 DIAGNOSIS — Z13.220 SCREENING FOR HYPERLIPIDEMIA: ICD-10-CM

## 2022-01-24 DIAGNOSIS — Z12.11 SCREEN FOR COLON CANCER: ICD-10-CM

## 2022-01-24 DIAGNOSIS — J32.4 CHRONIC PANSINUSITIS: ICD-10-CM

## 2022-01-24 DIAGNOSIS — I82.409 RECURRENT DEEP VEIN THROMBOSIS (DVT) (H): ICD-10-CM

## 2022-01-24 DIAGNOSIS — E11.42 DIABETIC POLYNEUROPATHY ASSOCIATED WITH TYPE 2 DIABETES MELLITUS (H): ICD-10-CM

## 2022-01-24 DIAGNOSIS — Z87.442 HISTORY OF KIDNEY STONES: ICD-10-CM

## 2022-01-24 DIAGNOSIS — E11.42 TYPE 2 DIABETES MELLITUS WITH DIABETIC POLYNEUROPATHY, WITHOUT LONG-TERM CURRENT USE OF INSULIN (H): Primary | ICD-10-CM

## 2022-01-24 DIAGNOSIS — I26.99 PULMONARY EMBOLISM, OTHER, UNSPECIFIED CHRONICITY, UNSPECIFIED WHETHER ACUTE COR PULMONALE PRESENT (H): ICD-10-CM

## 2022-01-24 DIAGNOSIS — J45.50 SEVERE PERSISTENT ASTHMA WITHOUT COMPLICATION (H): ICD-10-CM

## 2022-01-24 DIAGNOSIS — J45.30 MILD PERSISTENT ASTHMA WITHOUT COMPLICATION: Chronic | ICD-10-CM

## 2022-01-24 LAB
CHOLEST SERPL-MCNC: 325 MG/DL
CREAT UR-MCNC: 181 MG/DL
FASTING STATUS PATIENT QL REPORTED: NO
HBA1C MFR BLD: 11.2 % (ref 0–5.6)
HDLC SERPL-MCNC: 33 MG/DL
INR BLD: 1.6 (ref 0.9–1.1)
LDLC SERPL CALC-MCNC: 172 MG/DL
LDLC SERPL CALC-MCNC: ABNORMAL MG/DL
MICROALBUMIN UR-MCNC: 97 MG/L
MICROALBUMIN/CREAT UR: 53.59 MG/G CR (ref 0–17)
NONHDLC SERPL-MCNC: 292 MG/DL
TRIGL SERPL-MCNC: 699 MG/DL

## 2022-01-24 PROCEDURE — 36415 COLL VENOUS BLD VENIPUNCTURE: CPT | Performed by: PHYSICIAN ASSISTANT

## 2022-01-24 PROCEDURE — 80061 LIPID PANEL: CPT | Performed by: PHYSICIAN ASSISTANT

## 2022-01-24 PROCEDURE — 85610 PROTHROMBIN TIME: CPT | Performed by: PHYSICIAN ASSISTANT

## 2022-01-24 PROCEDURE — 82043 UR ALBUMIN QUANTITATIVE: CPT | Performed by: PHYSICIAN ASSISTANT

## 2022-01-24 PROCEDURE — 36416 COLLJ CAPILLARY BLOOD SPEC: CPT | Performed by: PHYSICIAN ASSISTANT

## 2022-01-24 PROCEDURE — 83036 HEMOGLOBIN GLYCOSYLATED A1C: CPT | Performed by: PHYSICIAN ASSISTANT

## 2022-01-24 PROCEDURE — 99495 TRANSJ CARE MGMT MOD F2F 14D: CPT | Performed by: PHYSICIAN ASSISTANT

## 2022-01-24 PROCEDURE — 83721 ASSAY OF BLOOD LIPOPROTEIN: CPT | Mod: 59 | Performed by: PHYSICIAN ASSISTANT

## 2022-01-24 RX ORDER — TAMSULOSIN HYDROCHLORIDE 0.4 MG/1
0.4 CAPSULE ORAL DAILY
Qty: 30 CAPSULE | Refills: 1 | Status: SHIPPED | OUTPATIENT
Start: 2022-01-24 | End: 2022-04-12

## 2022-01-24 RX ORDER — AMLODIPINE BESYLATE 10 MG/1
10 TABLET ORAL DAILY
Qty: 90 TABLET | Refills: 1 | Status: SHIPPED | OUTPATIENT
Start: 2022-01-24 | End: 2022-08-05

## 2022-01-24 RX ORDER — LOSARTAN POTASSIUM 50 MG/1
50 TABLET ORAL DAILY
Qty: 90 TABLET | Refills: 1 | Status: SHIPPED | OUTPATIENT
Start: 2022-01-24 | End: 2022-06-07

## 2022-01-24 RX ORDER — ALBUTEROL SULFATE 90 UG/1
2 AEROSOL, METERED RESPIRATORY (INHALATION) EVERY 6 HOURS PRN
Qty: 6.7 G | Refills: 3 | Status: SHIPPED | OUTPATIENT
Start: 2022-01-24 | End: 2022-12-22

## 2022-01-24 RX ORDER — DULOXETIN HYDROCHLORIDE 60 MG/1
60 CAPSULE, DELAYED RELEASE ORAL 2 TIMES DAILY
Qty: 180 CAPSULE | Refills: 3 | Status: SHIPPED | OUTPATIENT
Start: 2022-01-24 | End: 2022-12-22

## 2022-01-24 RX ORDER — CYCLOBENZAPRINE HCL 10 MG
5-10 TABLET ORAL 3 TIMES DAILY PRN
Qty: 90 TABLET | Refills: 3 | Status: SHIPPED | OUTPATIENT
Start: 2022-01-24 | End: 2023-04-10

## 2022-01-24 RX ORDER — FLUTICASONE PROPIONATE 50 MCG
1 SPRAY, SUSPENSION (ML) NASAL DAILY
Qty: 18.2 ML | Refills: 1 | Status: SHIPPED | OUTPATIENT
Start: 2022-01-24 | End: 2022-04-15

## 2022-01-24 RX ORDER — NORTRIPTYLINE HCL 10 MG
20 CAPSULE ORAL AT BEDTIME
Qty: 60 CAPSULE | Refills: 5 | Status: SHIPPED | OUTPATIENT
Start: 2022-01-24 | End: 2022-12-22

## 2022-01-24 RX ORDER — MONTELUKAST SODIUM 10 MG/1
10 TABLET ORAL AT BEDTIME
Qty: 30 TABLET | Refills: 2 | Status: SHIPPED | OUTPATIENT
Start: 2022-01-24 | End: 2022-04-15

## 2022-01-24 RX ORDER — ATORVASTATIN CALCIUM 40 MG/1
40 TABLET, FILM COATED ORAL DAILY
Qty: 90 TABLET | Refills: 3 | Status: SHIPPED | OUTPATIENT
Start: 2022-01-24 | End: 2022-12-22

## 2022-01-24 RX ORDER — METFORMIN HCL 500 MG
1000 TABLET, EXTENDED RELEASE 24 HR ORAL 2 TIMES DAILY WITH MEALS
Qty: 120 TABLET | Refills: 5 | Status: SHIPPED | OUTPATIENT
Start: 2022-01-24 | End: 2022-12-22

## 2022-01-24 ASSESSMENT — MIFFLIN-ST. JEOR: SCORE: 2132.2

## 2022-01-24 ASSESSMENT — ASTHMA QUESTIONNAIRES: ACT_TOTALSCORE: 25

## 2022-01-24 NOTE — RESULT ENCOUNTER NOTE
PLEASE CALL PATIENT:  Dear Adarsh,      It was a pleasure to see you at your recent office visit.  Your test results are listed below.  INR is not to goal it is only 1.6. Did Oly call you with instructions yet? I did reach out to her. We will need to increase your coumadin a little bit and have you follow with her.     A1c is very poor. I sent over trulicity which is once weekly and helps with weight loss. If not covered we will use insulin. Let me know right away if not covered. If it's covered then see me in 3 weeks please so we can taper up the dosage as tolerated. Cholesterol is too high we will discuss that at next visit. Triglycerides likely higher due to your high sugars. If you get LUQ pain go to the emergency room you could have pancreatitis from that (rare but possible).         If you have any questions or concerns, please call the clinic at [phone].    Sincerely,  Bertha Romero PA-C

## 2022-01-24 NOTE — TELEPHONE ENCOUNTER
Patient/parent is informed of MD note below, as it is written. Verbalized good understanding.  Next 5 appointments (look out 90 days)    Feb 14, 2022  9:00 AM  (Arrive by 8:40 AM)  Provider Visit with Bertha Romero PA-C  [facility] ([facility] ) [address] [facility] [address]  [phone]        Zita Lee RN

## 2022-01-24 NOTE — TELEPHONE ENCOUNTER
"Patient called back to report insurance does not cover Trulicity.   Please advise on alternative plan?  Patient has never used insulin before.   \"I am good with instruction\".   Zita Lee RN     "

## 2022-01-24 NOTE — TELEPHONE ENCOUNTER
----- Message from Bertha Romero PA-C sent at 1/24/2022  2:58 PM CST -----  PLEASE CALL PATIENT:  Dear Adarsh,      It was a pleasure to see you at your recent office visit.  Your test results are listed below.  INR is not to goal it is only 1.6. Did Oly call you with instructions yet? I did reach out to her. We will need to increase your coumadin a little bit and have you follow with her.     A1c is very poor. I sent over trulicity which is once weekly and helps with weight loss. If not covered we will use insulin. Let me know right away if not covered. If it's covered then see me in 3 weeks please so we can taper up the dosage as tolerated. Cholesterol is too high we will discuss that at next visit. Triglycerides likely higher due to your high sugars. If you get LUQ pain go to the emergency room you could have pancreatitis from that (rare but possible).         If you have any questions or concerns, please call the clinic at [phone].    Sincerely,  Bertha Romero PA-C

## 2022-01-24 NOTE — PATIENT INSTRUCTIONS
Schedule with bleeding/clotting disorder clinic, ask for a new provider than you saw before per your request    I will follow up with labs and plan from there

## 2022-01-24 NOTE — Clinical Note
Hey, could you manage his INR? Or let me know anyway sorry. They took him off his coumadin (hematology) and he clotted again and had PE. He isnt happy. Im sending him back to a new hematologist. Thank you! Bertha

## 2022-01-25 ENCOUNTER — TELEPHONE (OUTPATIENT)
Dept: FAMILY MEDICINE | Facility: CLINIC | Age: 57
End: 2022-01-25
Payer: COMMERCIAL

## 2022-01-25 ENCOUNTER — ANTICOAGULATION THERAPY VISIT (OUTPATIENT)
Dept: ANTICOAGULATION | Facility: CLINIC | Age: 57
End: 2022-01-25
Payer: COMMERCIAL

## 2022-01-25 DIAGNOSIS — I82.409 RECURRENT DEEP VEIN THROMBOSIS (DVT) (H): Primary | ICD-10-CM

## 2022-01-25 RX ORDER — WARFARIN SODIUM 5 MG/1
TABLET ORAL
Qty: 228 TABLET | Refills: 0 | Status: SHIPPED | OUTPATIENT
Start: 2022-01-25 | End: 2022-04-13

## 2022-01-25 NOTE — TELEPHONE ENCOUNTER
Patient/parent is informed of MD note below, as it is written. Verbalized good understanding.  Patient wanted to speak with Anticoagulation team,   The patient was warm transferred to central scheduling and they were asked to assist him in getting to care team.   Zita Lee RN

## 2022-01-25 NOTE — TELEPHONE ENCOUNTER
Spoke to patient lovenox RX sent to [facility] in [place] per patient request. See danita encounter for details.  Danita Segovia Rn  [facility]

## 2022-01-25 NOTE — TELEPHONE ENCOUNTER
Called patient back unable to reach, vm left to call us back for any question.  Valarie Segovia Rn  [facility]

## 2022-01-25 NOTE — RESULT ENCOUNTER NOTE
Pato Altamirano,       Your recent test results are attached, if you have any questions or concerns please feel free to contact me via e-mail or call [phone].  Less protein in urine which is good. INR not to goal, has our INR nurse called you yet? If not please let me know today. I did place a new referral to them in case you need it.        It was a pleasure to see you at your recent office visit.      Sincerely,  Bertha Romero PA-C

## 2022-01-25 NOTE — TELEPHONE ENCOUNTER
Sent over insulin. Keep f/u the same as previously directed. Have pharmacy instruct how to inject.    Bertha Romero PA-C

## 2022-01-25 NOTE — PROGRESS NOTES
ANTICOAGULATION MANAGEMENT     Adarsh Salvador 56 year old male is on warfarin with subtherapeutic INR result. (Goal INR 2.0-3.0)    Recent labs: (last 7 days)      01/24/22  0829   INR 1.6*       ASSESSMENT     Source(s): Chart Review       Warfarin doses taken: taken as directed    Diet: No new diet changes identified    New illness, injury, or hospitalization: Yes: recent admit for covid and post covid DVT    Medication/supplement changes: lovenox injections    Signs or symptoms of bleeding or clotting: No    Previous INR: Subtherapeutic Day 9 of new start up.    Additional findings: had been on warfarin 3 months for dvt was dcd off at end of dec developed covid and post covid DVT in left lung     PLAN     Recommended plan for no diet, medication or health factor changes affecting INR     Dosing Instructions: Booster dose then Increase your warfarin dose (10% change) with next INR in 3 days   Take 15 mg 1/25 then 12.5 mg monday wed Friday and 10 mg all other days.        Telephone call with Adarsh who verbalizes understanding and agrees to plan    Lab visit scheduled    Education provided: Please call back if any changes to your diet, medications or how you've been taking warfarin, Importance of consistent vitamin K intake, Impact of vitamin K foods on INR, Vitamin K content of foods, Potential interaction between warfarin and alcohol, Goal range and significance of current result, Importance of therapeutic range and Monitoring for clotting signs and symptoms    Plan made with [facility] Pharmacist Siomara Suero RN  Anticoagulation Clinic  1/25/2022    _______________________________________________________________________     Anticoagulation Episode Summary     Current INR goal:  2.0-3.0   TTR:  --   Target end date:  Indefinite   Send INR reminders to:  [facility]    Indications    Recurrent deep vein thrombosis (DVT) (H) [I82.409]           Comments:           Anticoagulation Care Providers      Provider Role Specialty Phone number    Bertha Romero PA-C Referring Family Medicine [phone]

## 2022-01-25 NOTE — TELEPHONE ENCOUNTER
Pt called back [facility]. He reports he ran out of lovenox over the weekend. Pt had been taking 120mg BID while bridging to warfarin. His INR yesterday was 1.6.

## 2022-01-25 NOTE — TELEPHONE ENCOUNTER
Reason for Call:  Other call back    Detailed comments: Has a few questions in regards to a referral. Please call the patient back.     Phone Number Patient can be reached at: Home number on file [phone] (home)    Best Time: anytime    Can we leave a detailed message on this number? YES    Call taken on 1/25/2022 at 10:17 AM by Carolina Nina

## 2022-01-26 ENCOUNTER — TELEPHONE (OUTPATIENT)
Dept: FAMILY MEDICINE | Facility: CLINIC | Age: 57
End: 2022-01-26
Payer: COMMERCIAL

## 2022-01-26 ENCOUNTER — DOCUMENTATION ONLY (OUTPATIENT)
Dept: LAB | Facility: CLINIC | Age: 57
End: 2022-01-26
Payer: COMMERCIAL

## 2022-01-26 DIAGNOSIS — I82.409 RECURRENT DEEP VEIN THROMBOSIS (DVT) (H): Primary | ICD-10-CM

## 2022-01-26 DIAGNOSIS — E11.42 TYPE 2 DIABETES MELLITUS WITH DIABETIC POLYNEUROPATHY, WITHOUT LONG-TERM CURRENT USE OF INSULIN (H): Primary | ICD-10-CM

## 2022-01-26 NOTE — PROGRESS NOTES
Hi, Patient is coming for his INR tomorrow 01/27/2021 with no orders. Can you please place order for appointment.    Thank You Briana PINK

## 2022-01-26 NOTE — TELEPHONE ENCOUNTER
Prior Authorization Retail Medication Request    Medication/Dose: insulin degludec (TRESIBA) 100 UNIT/ML pen  ICD code (if different than what is on RX):  Type 2 diabetes mellitus with diabetic polyneuropathy, without long-term current use of insulin (H) [E11.42]  - Primary   Previously Tried and Failed:    Rationale:      Insurance Name:    Insurance ID:        Pharmacy Information (if different than what is on RX)  Name:    Phone:       Infectious Disease

## 2022-01-27 ENCOUNTER — LAB (OUTPATIENT)
Dept: LAB | Facility: CLINIC | Age: 57
End: 2022-01-27
Payer: MEDICAID

## 2022-01-27 ENCOUNTER — ANTICOAGULATION THERAPY VISIT (OUTPATIENT)
Dept: ANTICOAGULATION | Facility: CLINIC | Age: 57
End: 2022-01-27

## 2022-01-27 DIAGNOSIS — I82.409 RECURRENT DEEP VEIN THROMBOSIS (DVT) (H): Primary | ICD-10-CM

## 2022-01-27 LAB — INR BLD: 2.3 (ref 0.9–1.1)

## 2022-01-27 PROCEDURE — 36416 COLLJ CAPILLARY BLOOD SPEC: CPT

## 2022-01-27 PROCEDURE — 85610 PROTHROMBIN TIME: CPT

## 2022-01-27 NOTE — PROGRESS NOTES
ANTICOAGULATION MANAGEMENT     Adarsh Salvador 56 year old male is on warfarin with therapeutic INR result. (Goal INR 2.0-3.0)    Recent labs: (last 7 days)     01/27/22  0934   INR 2.3*       ASSESSMENT     Source(s): Chart Review and Patient/Caregiver Call       Warfarin doses taken: Warfarin taken as instructed    Diet: No new diet changes identified overall has not been eating well in past weeks.     New illness, injury, or hospitalization: No    Medication/supplement changes: None noted    Signs or symptoms of bleeding or clotting: Yes: some blood noted when passes stool    Previous INR: Subtherapeutic    Additional findings: Bridging with Enoxaparin until INR >= 2.3 or INR >= 2.0 x 2 ([facility] protocol goal INR 2-3) on day 13 of restarting warfarin with recurrent DVT. Now back on dose that was therapeutic when previously on it.      PLAN     Recommended plan for no diet, medication or health factor changes affecting INR     Dosing Instructions: Continue your current warfarin dose with next INR in 3 days       Summary  As of 1/27/2022    Full warfarin instructions:  12.5 mg every Mon, Wed, Fri; 10 mg all other days   Next INR check:  1/31/2022             Telephone call with Adarsh who verbalizes understanding and agrees to plan    Lab visit scheduled    Education provided: Please call back if any changes to your diet, medications or how you've been taking warfarin and Contact [phone]  with any changes, questions or concerns.     Plan made per [facility] anticoagulation protocol    Oly Bustillo RN  Anticoagulation Clinic  1/27/2022    _______________________________________________________________________     Anticoagulation Episode Summary     Current INR goal:  2.0-3.0   TTR:  --   Target end date:  Indefinite   Send INR reminders to:  [facility]    Indications    Recurrent deep vein thrombosis (DVT) (H) [I82.409]           Comments:           Anticoagulation Care Providers     Provider Role Specialty Phone  number    Bertha Romero PA-C [facility] [phone]

## 2022-01-28 ENCOUNTER — TELEPHONE (OUTPATIENT)
Dept: HEMATOLOGY | Facility: CLINIC | Age: 57
End: 2022-01-28
Payer: COMMERCIAL

## 2022-01-31 ENCOUNTER — LAB (OUTPATIENT)
Dept: LAB | Facility: CLINIC | Age: 57
End: 2022-01-31
Payer: MEDICAID

## 2022-01-31 ENCOUNTER — ANTICOAGULATION THERAPY VISIT (OUTPATIENT)
Dept: ANTICOAGULATION | Facility: CLINIC | Age: 57
End: 2022-01-31

## 2022-01-31 DIAGNOSIS — I82.409 RECURRENT DEEP VEIN THROMBOSIS (DVT) (H): ICD-10-CM

## 2022-01-31 DIAGNOSIS — I82.409 RECURRENT DEEP VEIN THROMBOSIS (DVT) (H): Primary | ICD-10-CM

## 2022-01-31 LAB — INR BLD: 2.5 (ref 0.9–1.1)

## 2022-01-31 PROCEDURE — 36415 COLL VENOUS BLD VENIPUNCTURE: CPT

## 2022-01-31 PROCEDURE — 85610 PROTHROMBIN TIME: CPT

## 2022-01-31 NOTE — PROGRESS NOTES
ANTICOAGULATION MANAGEMENT     Adarsh Salvador 56 year old male is on warfarin with therapeutic INR result. (Goal INR 2.0-3.0)    Recent labs: (last 7 days)     01/31/22  0845   INR 2.5*       ASSESSMENT     Source(s): Chart Review and Patient/Caregiver Call       Warfarin doses taken: Warfarin taken as instructed    Diet: No new diet changes identified    New illness, injury, or hospitalization: No    Medication/supplement changes: None noted    Signs or symptoms of bleeding or clotting: No    Previous INR: Therapeutic last visit; previously outside of goal range    Additional findings: None     PLAN     Recommended plan for no diet, medication or health factor changes affecting INR     Dosing Instructions: Continue your current warfarin dose with next INR in 4 days       Summary  As of 1/31/2022    Full warfarin instructions:  12.5 mg every Mon, Wed, Fri; 10 mg all other days   Next INR check:  2/4/2022             Telephone call with Adarsh who verbalizes understanding and agrees to plan    Lab visit scheduled    Education provided: Please call back if any changes to your diet, medications or how you've been taking warfarin and Contact [phone]  with any changes, questions or concerns.     Plan made per [facility] anticoagulation protocol    Oly Bustillo, RN  Anticoagulation Clinic  1/31/2022    _______________________________________________________________________     Anticoagulation Episode Summary     Current INR goal:  2.0-3.0   TTR:  --   Target end date:  Indefinite   Send INR reminders to:  [facility]    Indications    Recurrent deep vein thrombosis (DVT) (H) [I82.409]           Comments:           Anticoagulation Care Providers     Provider Role Specialty Phone number    Bertha Romero PA-C Referring Family Medicine [phone]

## 2022-01-31 NOTE — TELEPHONE ENCOUNTER
Central Prior Authorization Team   Phone: [phone]      PA Initiation    Medication: insulin degludec (TRESIBA) 100 UNIT/ML pen - INITIATED  Insurance Company: [place] Medicaid ([facility]) - Phone [phone] Fax [phone]  Pharmacy Filling the Rx: [id number] - [place] - [address]  Filling Pharmacy Phone: [phone]  Filling Pharmacy Fax: [phone]  Start Date: 1/31/2022

## 2022-02-01 ENCOUNTER — HOSPITAL ENCOUNTER (OUTPATIENT)
Facility: AMBULATORY SURGERY CENTER | Age: 57
End: 2022-02-01
Attending: SURGERY | Admitting: SURGERY
Payer: MEDICAID

## 2022-02-01 ENCOUNTER — TELEPHONE (OUTPATIENT)
Dept: GASTROENTEROLOGY | Facility: CLINIC | Age: 57
End: 2022-02-01
Payer: COMMERCIAL

## 2022-02-01 DIAGNOSIS — Z12.11 SCREEN FOR COLON CANCER: Primary | ICD-10-CM

## 2022-02-01 NOTE — TELEPHONE ENCOUNTER
Screening Questions  Blue=prep questions Red=location Green=sedation   1. Are you active on mychart? Y    2. What insurance is in the chart? MEDICAID [place]      3.  Ordering/Referring Provider: Bertha Romero PA-C    4. BMI 39.0, If greater than 40 review exclusion criteria also will need EXTENDED PREP    5.  Respiratory Screening (If yes to any of the following HOSPITAL setting only):     Do you use daily home oxygen? N  Do you have mod to severe Obstructive Sleep Apnea? N (can be seen at [facility] or hospital setting)    Do you have Pulmonary Hypertension? N   Do you have UNCONTROLLED asthma? N    6. Have you had a heart or lung transplant? N  (If yes, please review exclusion criteria)    7. Are you currently on dialysis? N  (If yes, schedule in HOSPITAL setting only)(If yes, please send Golytely prep)    8. Do you have chronic kidney disease? N (If yes, please send Golytely prep)    9. Have you had a stroke or Transient ischemic attack (TIA) within 6 months? N (If yes, do not schedule at [facility])    10. In the past 6 months, have you had any heart related issues including cardiomyopathy or heart attack? N (If yes, please review exclusion criteria)           If yes, did it require cardiac stenting or other implantable device?N  (If yes, please review exclusion criteria)      11. Do you have any implantable devices in your body (pacemaker, defib, LVAD)? N (If yes, schedule at [facility])    12. Do you take nitroglycerin? If yes, how often? N (if yes, schedule at HOSPITAL setting)    13. Are you currently taking any blood thinners?Y (If yes- inform patient to follow up with PCP or provider for follow up instructions)     14. Are you a diabetic? Y (If yes, please send Golytely prep)    15. (Females) Are you currently pregnant? NA  If yes, how many weeks?      16. Are you taking any prescription pain medications on a routine schedule? N If yes, MAC sedation and patient will need EXTENDED PREP.    17. Do you have any chemical  dependencies such as alcohol, street drugs, or methadone? N If yes, MAC sedation     18. Do you have any history of post-traumatic stress syndrome, severe anxiety or history of psychosis? N  If yes, MAC sedation.     19. Do you transfer independently? Y    20.  Do you have any issues with constipation? N   If yes, pt will need EXTENDED PREP     21. Preferred Pharmacy for Pre Prescription [facility]     Scheduling Details    Which Colonoscopy Prep was Sent?: GO SHANI  Type of Procedure Scheduled: COLON  Surgeon: SUZI  Date of Procedure: 2/14/2022  Location:   Caller (Please ask for phone number if not scheduled by patient): LUTHER      Sedation Type: CS  Conscious Sedation- Needs  for 6 hours after the procedure  MAC/General-Needs  for 24 hours after procedure    Pre-op Required at [facility], [place], [place] and OR for MAC sedation: N  (if yes advise patient they will need a pre-op prior to procedure)      Informed patient they will need an adult  Y  Cannot take any type of public or medical transportation alone    Pre-Procedure Covid test to be completed at [facility] or Externally: Y    Confirmed Nurse will call to complete assessment Y    Additional comments:  (DE LEATHA'S PATIENTS NEED EXTENDED PREP)

## 2022-02-02 NOTE — TELEPHONE ENCOUNTER
Central Prior Authorization Team   Phone: [phone]      PRIOR AUTHORIZATION DENIED    Medication: insulin degludec (TRESIBA) 100 UNIT/ML pen - DENIED    Denial Date: 2/1/2022    Denial Rational:       Appeal Information:

## 2022-02-04 ENCOUNTER — LAB (OUTPATIENT)
Dept: LAB | Facility: CLINIC | Age: 57
End: 2022-02-04
Payer: MEDICAID

## 2022-02-04 ENCOUNTER — ANTICOAGULATION THERAPY VISIT (OUTPATIENT)
Dept: ANTICOAGULATION | Facility: CLINIC | Age: 57
End: 2022-02-04

## 2022-02-04 ENCOUNTER — DOCUMENTATION ONLY (OUTPATIENT)
Dept: ANTICOAGULATION | Facility: CLINIC | Age: 57
End: 2022-02-04

## 2022-02-04 DIAGNOSIS — I82.409 RECURRENT DEEP VEIN THROMBOSIS (DVT) (H): ICD-10-CM

## 2022-02-04 DIAGNOSIS — I82.409 RECURRENT DEEP VEIN THROMBOSIS (DVT) (H): Primary | ICD-10-CM

## 2022-02-04 LAB — INR BLD: 2.7 (ref 0.9–1.1)

## 2022-02-04 PROCEDURE — 85610 PROTHROMBIN TIME: CPT

## 2022-02-04 PROCEDURE — 36415 COLL VENOUS BLD VENIPUNCTURE: CPT

## 2022-02-04 RX ORDER — BISACODYL 5 MG/1
20 TABLET, DELAYED RELEASE ORAL SEE ADMIN INSTRUCTIONS
Qty: 4 TABLET | Refills: 0 | Status: SHIPPED | OUTPATIENT
Start: 2022-02-04 | End: 2022-02-08 | Stop reason: HOSPADM

## 2022-02-04 RX ORDER — INSULIN GLARGINE 100 [IU]/ML
24 INJECTION, SOLUTION SUBCUTANEOUS DAILY
Qty: 8 ML | Refills: 3 | Status: SHIPPED | OUTPATIENT
Start: 2022-02-04 | End: 2022-02-24

## 2022-02-04 NOTE — TELEPHONE ENCOUNTER
I spoke to the pharmacy and informed them that the PA is denied.  I read the providers note as written.  The pharmacy said they will notify the patient.  Jamila Young,  [facility]

## 2022-02-04 NOTE — PROGRESS NOTES
MARYANN-PROCEDURAL ANTICOAGULATION  MANAGEMENT    ASSESSMENT     Warfarin interruption plan for colonoscopy on 2022.    Indication for Anticoagulation: Recurrent DVT      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 stopping warfarin     Bilateral PE 2022    DVT 2021    Thrombophlebitis       Maryann-Procedure Risk stratification for thromboembolism: high (2018 American Society of Hematology guideline)    VTE: 2018 American Society of Hematology recommends against bridging in low and moderate risk VTE patients holding warfarin     Patient is within 90 days of acute VTE event, and anticoagulation therapy should not be interrupted unless necessary.  Advise rescheduling colonoscopy until after  unless immediate concerns for health     RECOMMENDATION       Pre-Procedure:  o Hold warfarin for 5 days, until after procedure startin2022   o Enoxaparin (Lovenox) 120 mg subq Q 12 hrs (1 mg/kg Q 12 hrs for CrCl >= 60 ml/min and BMI <= 40 kg/m2)   - Start enoxaparin: 2022 AM  - Last dose of enoxaparin prior to procedure: 2022 AM  (~24 hours prior to procedure)      Post-Procedure:  o Resume warfarin dose if okay with provider doing procedure on night of procedure, 2022 PM: 25mg  o Resume  enoxaparin (Lovenox) ~ 24 hrs post procedure when okay with provider doing procedure. Continue until INR >= 2.3 or INR >= 2.0 x 2 ([facility] protocol goal INR 2-3)  o Recheck INR ~5 days after resuming warfarin   ?         Plan routed to referring provider for approval  ?   Siomara Montaño [facility]    SUBJECTIVE/OBJECTIVE     Adarsh Salvador, a 56 year old male    Goal INR Range: 2.0-3.0     Patient bridged in past: Yes: 2022    Pertinent History: acute DVT/PE    Wt Readings from Last 3 Encounters:   22 127 kg (280 lb)   22 128.1 kg (282 lb 6.4 oz)   21 131.6 kg (290 lb 3.2 oz)      Ideal body weight: 75.3 kg (166 lb 0.1 oz)  Adjusted ideal body weight: 96 kg (211 lb 9.7 oz)     Estimated body  "mass index is 39.05 kg/m  as calculated from the following:    Height as of 1/24/22: 1.803 m (5' 11\").    Weight as of 1/24/22: 127 kg (280 lb).    Lab Results   Component Value Date    INR 2.7 (H) 02/04/2022    INR 2.5 (H) 01/31/2022    INR 2.3 (H) 01/27/2022     Lab Results   Component Value Date    HGB 13.9 01/02/2022    HGB 13.1 01/29/2021    HCT 41.9 01/02/2022    HCT 40.2 01/29/2021     01/02/2022     01/29/2021     Lab Results   Component Value Date    CR 0.94 01/29/2021    CR 1.02 08/13/2020    CR 0.87 03/26/2020     CrCl cannot be calculated (Patient's most recent lab result is older than the maximum 30 days allowed.).    "

## 2022-02-04 NOTE — PROGRESS NOTES
See TE for procedure plan.    Siomara Montaño, PharmD BCACP  Anticoagulation Clinical Pharmacist

## 2022-02-04 NOTE — TELEPHONE ENCOUNTER
Changed to basaglar. Notify patient. If insurance doesn't cover that have patient call to ask what is covered. Usually they cover one or the other.       Bertha Romero PA-C

## 2022-02-04 NOTE — PROGRESS NOTES
ANTICOAGULATION MANAGEMENT     Adarsh Salvador 56 year old male is on warfarin with therapeutic INR result. (Goal INR 2.0-3.0)    Recent labs: (last 7 days)     02/04/22  0943   INR 2.7*       ASSESSMENT     Source(s): Chart Review       Warfarin doses taken: Warfarin taken as instructed    Diet: No new diet changes identified    New illness, injury, or hospitalization: No    Medication/supplement changes: None noted    Signs or symptoms of bleeding or clotting: No    Previous INR: Therapeutic last 2(+) visits    Additional findings: having colonoscopy 2/14 will send to [facility] for bridge plan     PLAN     Recommended plan for no diet, medication or health factor changes affecting INR     Dosing Instructions: continue with dosing and recheck inr in 4 days    Summary  As of 2/4/2022    Full warfarin instructions:  12.5 mg every Mon, Wed, Fri; 10 mg all other days   Next INR check:               Telephone call with Adarsh who verbalizes understanding and agrees to plan    Lab visit scheduled    Education provided: Please call back if any changes to your diet, medications or how you've been taking warfarin    Plan made per [facility] anticoagulation protocol    Joya Suero, RN  Anticoagulation Clinic  2/4/2022    _______________________________________________________________________     Anticoagulation Episode Summary     Current INR goal:  2.0-3.0   TTR:  --   Target end date:  Indefinite   Send INR reminders to:  [facility]    Indications    Recurrent deep vein thrombosis (DVT) (H) [I82.409]           Comments:           Anticoagulation Care Providers     Provider Role Specialty Phone number    Bertha Romero PA-C Referring Family Medicine [phone]

## 2022-02-04 NOTE — LETTER
February 7, 2022      Adarsh Salvador  [id number]   [place] [address]        Dear ,    We are writing to inform you of your test results.    {results letter list:[id number]}    No results found from the In Basket message.    If you have any questions or concerns, please call the clinic at the number listed above.       Sincerely,

## 2022-02-07 ENCOUNTER — TELEPHONE (OUTPATIENT)
Dept: FAMILY MEDICINE | Facility: CLINIC | Age: 57
End: 2022-02-07
Payer: COMMERCIAL

## 2022-02-07 NOTE — PROGRESS NOTES
Attempted to call patient and received VM. Unable to leave a message, VM is full.  Mychart message sent to patient with dosing instructions.      Lovenox was sent to the pharmacy on 1/25/22.    Janeth Cool RN  [facility] Anticoagulation [facility]

## 2022-02-07 NOTE — PROGRESS NOTES
Yes, that does change it. If he is having a diagnostic colonoscopy then he should get it. Did they advise him on what to do with his coagulation yet for it?    Bertha Romero PA-C          Please call patient, looks like they recommend he wait and reschedule colonoscopy for after April 18th due to his recent clots so we can continue coumadin.     Please let him know to reschedule.     Bertha Romero PA-C

## 2022-02-07 NOTE — PROGRESS NOTES
I called pt and advised him of the message. He said he is having weekly blood in his stools and that is why they are doing the colonoscopy.  Does this change your recommendation?  Mary CROFTN, RN

## 2022-02-07 NOTE — PROGRESS NOTES
If you look below, the hematology PA outlines how he should bridge. Ideally his INR nurse could bridge him. Please send this to his INR nurse.   Keep the colonoscopy, but he will have to bridge off coumadin.     Bertha Romero PA-C

## 2022-02-07 NOTE — PROGRESS NOTES
Routing to [facility], patient is having active symptoms.    Janeth Cool RN  [facility] Anticoagulation Clinic

## 2022-02-07 NOTE — PROGRESS NOTES
Patient calling again to see what he should do.   Looks like this message was routed to anticoagulation, then Siomara Montaño RP, then Long Menendez PA-C, then Bertha Romero PA-C.  No direction for RN to take so routing to PCP.     Patricia CROFTN, RN

## 2022-02-07 NOTE — PROGRESS NOTES
Since it is diagnostic  anticoag can you help with plan for this or who do we contact.  Mary Majano BSN, RN

## 2022-02-08 ENCOUNTER — ANTICOAGULATION THERAPY VISIT (OUTPATIENT)
Dept: ANTICOAGULATION | Facility: CLINIC | Age: 57
End: 2022-02-08

## 2022-02-08 ENCOUNTER — LAB (OUTPATIENT)
Dept: LAB | Facility: CLINIC | Age: 57
End: 2022-02-08
Payer: MEDICAID

## 2022-02-08 DIAGNOSIS — I82.409 RECURRENT DEEP VEIN THROMBOSIS (DVT) (H): Primary | ICD-10-CM

## 2022-02-08 DIAGNOSIS — I82.409 RECURRENT DEEP VEIN THROMBOSIS (DVT) (H): ICD-10-CM

## 2022-02-08 LAB — INR BLD: 2 (ref 0.9–1.1)

## 2022-02-08 PROCEDURE — 36415 COLL VENOUS BLD VENIPUNCTURE: CPT

## 2022-02-08 PROCEDURE — 85610 PROTHROMBIN TIME: CPT

## 2022-02-08 NOTE — PROGRESS NOTES
ANTICOAGULATION MANAGEMENT     Adarsh Salvador 56 year old male is on warfarin with therapeutic INR result. (Goal INR 2.0-3.0)    Recent labs: (last 7 days)     02/08/22  0814   INR 2.0*       ASSESSMENT     Source(s): Chart Review and Patient/Caregiver Call       Warfarin doses taken: Warfarin taken as instructed    Diet: No new diet changes identified    New illness, injury, or hospitalization: No    Medication/supplement changes: None noted    Signs or symptoms of bleeding or clotting: No    Previous INR: Therapeutic last 2(+) visits    Additional findings: Upcoming surgery/procedure colonoscopy on 2/14 hold plan discussed with patient.  He wants to call back on Monday 2/14 and discuss the plan after the procedure. He feels it was to much information to remember all at once.  Given the [facility] number to call.  Does not have access to his [employer] account.      PLAN     Recommended plan for no diet, medication or health factor changes affecting INR     Dosing Instructions: Continue your current warfarin dose with next INR in 10 days       Summary  As of 2/8/2022    Full warfarin instructions:  2/9: Hold; 2/10: Hold; 2/11: Hold; 2/12: Hold; 2/13: Hold; 2/14: 25 mg; Otherwise 12.5 mg every Mon, Wed, Fri; 10 mg all other days   Next INR check:  2/18/2022             Telephone call with Adarsh who verbalizes understanding and agrees to plan    Lab visit scheduled    Education provided: Lovenox/Heparin education provided: role of enoxaparin/heparin in bridge therapy, prescribed dose and frequency, monitoring for signs and symptoms of bruising and bleeding and monitoring for signs and symptoms of clotting  and Contact [phone]  with any changes, questions or concerns.     Plan made per [facility] anticoagulation protocol    Janeth Cool, RN  Anticoagulation Clinic  2/8/2022    _______________________________________________________________________     Anticoagulation Episode Summary     Current INR goal:  2.0-3.0   TTR:  100.0  % (4 d)   Target end date:  Indefinite   Send INR reminders to:  [facility]    Indications    Recurrent deep vein thrombosis (DVT) (H) [I82.409]           Comments:           Anticoagulation Care Providers     Provider Role Specialty Phone number    Bertha Romero PA-C Referring [facility] [phone]

## 2022-02-08 NOTE — PROGRESS NOTES
Anticoagulation Management    Unable to reach Adarsh today.    Today's INR result of 2.0 is therapeutic (goal INR of 2.0-3.0).  Result received from: Clinic Lab    Follow up required to confirm warfarin dose taken and assess for changes    No instructions provided. Unable to leave voicemail. VM is full.  Did sent SMS notification.      Anticoagulation clinic to follow up    Janeth Cool RN                Pre-Procedure:

## 2022-02-08 NOTE — PROGRESS NOTES
Assessment & Plan     Type 2 diabetes mellitus with diabetic polyneuropathy, without long-term current use of insulin (H)  Worsening  See hpi    Open displaced fracture of phalanx of right middle finger, unspecified phalanx, initial encounter  Placed in splint after bandaging  Comminuted fracture needs ortho asap  Given antibiotics for concern of infection or developing one  Healing will be an issue with diabetes and coumadin also makes more complicated  Too late to suture, defer any procedure to ortho. If bleeding increase, go to emergency room.   - Orthopedic  Referral; Future    Recurrent deep vein thrombosis (DVT) (H)  continue to follow  - INR    Laceration of right middle finger without foreign body with damage to nail, initial encounter  As above  - amoxicillin-clavulanate (AUGMENTIN) 875-125 MG tablet; Take 1 tablet by mouth 2 times daily for 7 days  - XR Finger Right G/E 2 Views; Future    Need for vaccination    - TDAP VACCINE (Adacel, Boostrix)  [[id number]]    F/u 1 day after I hear back about insulin, then 2 weeks after that for insulin control    Billin min spent on patient today including chart review, history, exam, and explaining treatment plan and follow-up.       Bertha Romero PA-C  [facility]    Soren Altamirano is a 56 year old who presents for the following health issues  accompanied by his Self.    HPI     Diabetes Follow-up    How often are you checking your blood sugar? Two times daily  Blood sugar testing frequency justification:  High blood sugars  What time of day are you checking your blood sugars (select all that apply)?  Before meals  Have you had any blood sugars above 200?  All the time  Have you had any blood sugars below 70?  No    What symptoms do you notice when your blood sugar is low?  Lethargy    What concerns do you have today about your diabetes? None and Blood sugar is often over 200     Do you have any of these symptoms?  "(Select all that apply)  Burning in feet    Have you had a diabetic eye exam in the last 12 months? No        Has colonoscopy scheduled. *for rectal bleeding.   Needs to be bridged due to recent dvt/pe.   Will be in hospital due to high sugars.   Insulin? He did not fill. Sugars have been very high. His insurance would not answer him per patient he tried calling. I placed telephone encounter to have nurse call pharmacy and see if they have recommendations. glargine should be covered the way I wrote it.   If not I will get diabetic ed involved and we may have to do the super cheap but not as effective insulin mixed.   Trulicity, ozempic not covered.     Lab Results   Component Value Date    A1C 11.2 01/24/2022    A1C 7.4 12/03/2020    A1C 7.5 03/26/2020    A1C 6.6 10/03/2019    A1C 6.8 01/11/2019    A1C 6.5 08/17/2018       NEW COMPLAINT:   Saturday table saw laceration. Was not close to care so did not have anyone look at. Mostly stopped bleeding. On coumadin for pe/dvt. He is concerned. Has lots of pain.   No fevers.        Due for tetanus update. He got that done today.       BP Readings from Last 2 Encounters:   01/24/22 132/81   01/02/22 (!) 143/92     Hemoglobin A1C POCT (%)   Date Value   12/03/2020 7.4 (H)   03/26/2020 7.5 (H)     Hemoglobin A1C (%)   Date Value   01/24/2022 11.2 (H)     LDL Cholesterol Calculated   Date Value   01/24/2022      Comment:     Cannot estimate LDL when triglyceride exceeds 400 mg/dL   03/26/2020     Cannot estimate LDL when triglyceride exceeds 400 mg/dL mg/dL   10/03/2019 167 mg/dL (H)     LDL Cholesterol Direct (mg/dL)   Date Value   01/24/2022 172 (H)   03/26/2020 179 (H)           Review of Systems   Constitutional, HEENT, cardiovascular, pulmonary, GI, , musculoskeletal, neuro, skin, endocrine and psych systems are negative, except as otherwise noted.      Objective    /82   Pulse 85   Temp (!) 96.4  F (35.8  C)   Ht 1.803 m (5' 11\")   Wt 126.1 kg (278 lb)   " SpO2 98%   BMI 38.77 kg/m    There is no height or weight on file to calculate BMI.  Physical Exam   GENERAL: alert, no distress and obese  RESP: lungs clear to auscultation - no rales, rhonchi or wheezes  CV: regular rate and rhythm, normal S1 S2, no S3 or S4, no murmur, click or rub, no peripheral edema and peripheral pulses strong  MS and skin: right middle finger-swollen and tender. Can't bend distal pip very well. Large laceration that is irregular on distal dorsal phalanx involving the nailbed. Small amount of odor and pus present. Concern for developing infection.   NEURO: Normal strength and tone, mentation intact and speech normal  PSYCH: mentation appears normal, affect normal/bright    Results for orders placed or performed in visit on 02/14/22   XR Finger Right G/E 2 Views     Status: None    Narrative    XR RIGHT FINGER TWO OR MORE VIEWS  2/14/2022 9:46 AM     INDICATION: Right finger injury and pain.   COMPARISON: None.      Impression    IMPRESSION:  1.  Comminuted fracture of the right third finger distal phalanx tuft.  2.  Soft tissue injury and irregularity in the distal aspect of the  fifth finger, with some bone fragments in this region.  3.  No joint malalignment.    HERBERTH NIEVES MD         SYSTEM ID:  [id number]

## 2022-02-08 NOTE — PROGRESS NOTES
Spoke with patient and hold plan discussed.  He is requesting to call back on Monday, 2/14 after his procedure to discuss dosing/lovenox for the week.  He is overwhelmed with the information and feels better calling back on Monday.  Patient to be transferred to [facility] and can discuss warfarin/lovenox dosing for the week at that time.    Janeth Cool RN  [facility]

## 2022-02-09 NOTE — PROGRESS NOTES
Spoke with patient and his colonscopy has been cancelled and will be done in April. Patient to continue with current warfarin dosing and recheck his INR on 2/15.     Janeth Cool RN  [facility]

## 2022-02-10 ENCOUNTER — TELEPHONE (OUTPATIENT)
Dept: GASTROENTEROLOGY | Facility: CLINIC | Age: 57
End: 2022-02-10
Payer: COMMERCIAL

## 2022-02-10 NOTE — TELEPHONE ENCOUNTER
Attempted to contact the pt to reschedule his procedure. Unable to leave a voicemail due to the mailbox being full.      Lola Briones RN  P Endoscopy Scheduling [facility],   Patient was reviewed by anesthesia and needs to be scheduled at the hospital due to HgbA1C 11.2 and recent DVT/PE.  Can you please make sure he gets scheduled at the hospital for colonoscopy.

## 2022-02-14 ENCOUNTER — TELEPHONE (OUTPATIENT)
Dept: FAMILY MEDICINE | Facility: CLINIC | Age: 57
End: 2022-02-14

## 2022-02-14 ENCOUNTER — ANTICOAGULATION THERAPY VISIT (OUTPATIENT)
Dept: ANTICOAGULATION | Facility: CLINIC | Age: 57
End: 2022-02-14

## 2022-02-14 ENCOUNTER — ANCILLARY PROCEDURE (OUTPATIENT)
Dept: GENERAL RADIOLOGY | Facility: CLINIC | Age: 57
End: 2022-02-14
Attending: PHYSICIAN ASSISTANT
Payer: MEDICAID

## 2022-02-14 ENCOUNTER — OFFICE VISIT (OUTPATIENT)
Dept: FAMILY MEDICINE | Facility: CLINIC | Age: 57
End: 2022-02-14
Payer: MEDICAID

## 2022-02-14 ENCOUNTER — TELEPHONE (OUTPATIENT)
Dept: ORTHOPEDICS | Facility: CLINIC | Age: 57
End: 2022-02-14

## 2022-02-14 ENCOUNTER — OFFICE VISIT (OUTPATIENT)
Dept: ORTHOPEDICS | Facility: CLINIC | Age: 57
End: 2022-02-14
Payer: MEDICAID

## 2022-02-14 VITALS
DIASTOLIC BLOOD PRESSURE: 102 MMHG | WEIGHT: 278 LBS | HEART RATE: 92 BPM | OXYGEN SATURATION: 97 % | BODY MASS INDEX: 38.77 KG/M2 | SYSTOLIC BLOOD PRESSURE: 164 MMHG

## 2022-02-14 VITALS
HEART RATE: 85 BPM | TEMPERATURE: 96.4 F | BODY MASS INDEX: 38.92 KG/M2 | WEIGHT: 278 LBS | HEIGHT: 71 IN | DIASTOLIC BLOOD PRESSURE: 82 MMHG | OXYGEN SATURATION: 98 % | SYSTOLIC BLOOD PRESSURE: 137 MMHG

## 2022-02-14 DIAGNOSIS — S62.602B: ICD-10-CM

## 2022-02-14 DIAGNOSIS — S61.312A LACERATION OF RIGHT MIDDLE FINGER WITHOUT FOREIGN BODY WITH DAMAGE TO NAIL, INITIAL ENCOUNTER: ICD-10-CM

## 2022-02-14 DIAGNOSIS — Z79.4 TYPE 2 DIABETES MELLITUS WITH HYPERGLYCEMIA, WITH LONG-TERM CURRENT USE OF INSULIN (H): Primary | ICD-10-CM

## 2022-02-14 DIAGNOSIS — E11.65 TYPE 2 DIABETES MELLITUS WITH HYPERGLYCEMIA, WITH LONG-TERM CURRENT USE OF INSULIN (H): Primary | ICD-10-CM

## 2022-02-14 DIAGNOSIS — I82.409 RECURRENT DEEP VEIN THROMBOSIS (DVT) (H): ICD-10-CM

## 2022-02-14 DIAGNOSIS — I82.409 RECURRENT DEEP VEIN THROMBOSIS (DVT) (H): Primary | ICD-10-CM

## 2022-02-14 DIAGNOSIS — E11.42 TYPE 2 DIABETES MELLITUS WITH DIABETIC POLYNEUROPATHY, WITHOUT LONG-TERM CURRENT USE OF INSULIN (H): Primary | ICD-10-CM

## 2022-02-14 DIAGNOSIS — Z23 NEED FOR VACCINATION: ICD-10-CM

## 2022-02-14 DIAGNOSIS — S61.219A LACERATION OF FINGER, LEFT, INITIAL ENCOUNTER: Primary | ICD-10-CM

## 2022-02-14 LAB — INR PPP: 1.85 (ref 0.85–1.15)

## 2022-02-14 PROCEDURE — 90471 IMMUNIZATION ADMIN: CPT | Performed by: PHYSICIAN ASSISTANT

## 2022-02-14 PROCEDURE — 36415 COLL VENOUS BLD VENIPUNCTURE: CPT | Performed by: PHYSICIAN ASSISTANT

## 2022-02-14 PROCEDURE — 99215 OFFICE O/P EST HI 40 MIN: CPT | Mod: 25 | Performed by: PHYSICIAN ASSISTANT

## 2022-02-14 PROCEDURE — 99243 OFF/OP CNSLTJ NEW/EST LOW 30: CPT | Performed by: ORTHOPAEDIC SURGERY

## 2022-02-14 PROCEDURE — 73140 X-RAY EXAM OF FINGER(S): CPT | Mod: RT | Performed by: RADIOLOGY

## 2022-02-14 PROCEDURE — 90715 TDAP VACCINE 7 YRS/> IM: CPT | Performed by: PHYSICIAN ASSISTANT

## 2022-02-14 PROCEDURE — 85610 PROTHROMBIN TIME: CPT | Performed by: PHYSICIAN ASSISTANT

## 2022-02-14 ASSESSMENT — PAIN SCALES - GENERAL: PAINLEVEL: SEVERE PAIN (7)

## 2022-02-14 ASSESSMENT — MIFFLIN-ST. JEOR: SCORE: 2123.13

## 2022-02-14 NOTE — PROGRESS NOTES
Colonoscopy has been canceled and will be rescheduled in April.    Janeth Cool RN  [facility]

## 2022-02-14 NOTE — TELEPHONE ENCOUNTER
Prior Authorization Retail Medication Request    Medication/Dose: dulaglutide (TRULICITY) 0.75 MG/0.5ML pen  ICD code (if different than what is on RX):  Type 2 diabetes mellitus with diabetic polyneuropathy, without long-term current use of insulin (H) [E11.42]  - Primary   Previously Tried and Failed:    Rationale:      Insurance Name:    Insurance ID:        Pharmacy Information (if different than what is on RX)  Name:    Phone:

## 2022-02-14 NOTE — TELEPHONE ENCOUNTER
Pharmacist states insurance is asking for prior auth's for both basagalar and Trulicity. Phone number for prior auth is [phone]    To provider as fyi and   To prior auth team, can you do this high priority.  Mary Majano BSN, RN

## 2022-02-14 NOTE — PROGRESS NOTES
ANTICOAGULATION MANAGEMENT     Adarsh Salvador 56 year old male is on warfarin with subtherapeutic INR result. (Goal INR 2.0-3.0)    Recent labs: (last 7 days)     02/14/22  0935   INR 1.85*       ASSESSMENT     Source(s): Chart Review and Patient/Caregiver Call       Warfarin doses taken: Warfarin taken as instructed    Diet: state that he had some brussel sprouts over the weekend, otherwise no changes    New illness, injury, or hospitalization: No    Medication/supplement changes: Augmentiin 7 day course (dates: 2/14-2/21) may increase risk of bleeding, but not expected to affect INR    Signs or symptoms of bleeding or clotting: No    Previous INR: Therapeutic last 2(+) visits    Additional findings: None     PLAN     Recommended plan for temporary change(s) affecting INR     Dosing Instructions: Booster dose then continue your current warfarin dose with next INR in 4 days       Summary  As of 2/14/2022    Full warfarin instructions:  12.5 mg every Mon, Wed, Fri; 10 mg all other days   Next INR check:  2/18/2022             Telephone call with Adarsh who verbalizes understanding and agrees to plan    Lab visit scheduled    Education provided: Goal range and significance of current result, Potential interaction between warfarin and Augmentin , Monitoring for bleeding signs and symptoms and Monitoring for clotting signs and symptoms    Plan made per [facility] anticoagulation protocol    Janeth Cool, RN  Anticoagulation Clinic  2/14/2022    _______________________________________________________________________     Anticoagulation Episode Summary     Current INR goal:  2.0-3.0   TTR:  41.8 % (1.4 wk)   Target end date:  Indefinite   Send INR reminders to:  [facility]    Indications    Recurrent deep vein thrombosis (DVT) (H) [I82.409]           Comments:           Anticoagulation Care Providers     Provider Role Specialty Phone number    Bertha Romero PA-C Referring Family Medicine [phone]

## 2022-02-14 NOTE — LETTER
2/14/2022         RE: Adarsh Salvador  [address]  [place] [address]        Dear Colleague,    Thank you for referring your patient, Adarsh Salvador, to the [facility]. Please see a copy of my visit note below.    Chief Complaint:   Chief Complaint   Patient presents with     Finger     Pt was cutting with his table saw and he lost his  and the blade got his Right third digit. XR showsfx of distal phalan tuft and soft tissue injury. DOI 02/12/22        HPI: Adarsh Salvador is a 56 year old male , right -hand dominant, who presents for evaluation and management of a right  long (middle) finger injury. He injured his hand 2/12/2022, using a table saw and cutting wood, lost his  and ran into the saw blade with the right long(middle) finger. Didn't seek treatment until earlier this morning, noting to have a comminuted, open tuft fracture. Given a prescription for augmentin.  Referred for surgical evaluation. Pain 7/10.    It has been 2 days since the initial injury.       He reports having mild pain/discomfort around the injury site. He denies numbness or tingling. He denies any other injuries to his upper extremity.       Patient is diabetic, A1C=11.2 on 1/24/2022; with neuropathy.    Patient is anticoagulated for acute DVT, on warfarin.    Patient with positive covid-19 test 1/2/2022 (6 weeks ago).      Symptoms: pain, swelling.  Location: right long finger tip.  Pain severity: 7/10  Pain quality: sharp and throbbing  Frequency of symptoms: are constant.  Aggravating factors: bumping the finger, pressure.  Relieving factors: rest.    Previous treatment: splint, bandage.      Past medical history:  has a past medical history of Anaphylactic reaction (8/14/2015), Anxiety, Depression, DM2 (diabetes mellitus, type 2) (H), GERD (gastroesophageal reflux disease), HTN (hypertension), IBS (irritable bowel syndrome), Kidney stone (6/15/2011), and Neuropathy.   Patient Active Problem List    Diagnosis     GERD (gastroesophageal reflux disease)     Chronic RLQ pain     Constipation     Chronic maxillary sinusitis     Chronic rhinitis     Hypertriglyceridemia     Benign essential hypertension     Anemia in other chronic diseases classified elsewhere     Fatty liver     Irritable bowel syndrome with diarrhea     Right inguinal hernia     Mild persistent asthma without complication     Type 2 diabetes mellitus with diabetic polyneuropathy, without long-term current use of insulin (H)     Hyperlipidemia LDL goal <100     CONNOR (generalized anxiety disorder)     Insomnia, unspecified type     Morbid obesity (H)     Neuropathy     Recurrent deep vein thrombosis (DVT) (H)     Precordial pain     Dyslipidemia     Elevated C-reactive protein     Gastric reflux     Internal derangement of knee     Nicotine dependence     Varicose veins of lower extremity     Vitamin D deficiency       Past surgical history:  has a past surgical history that includes Colonoscopy (5/1/2012); BREATH HYDROGEN TEST (3/7/2014); orthopedic surgery (10/03/2007); Colonoscopy (4/21/2014); Release carpal tunnel (Right, 1/26/2018); cystoscopy (05/01/2008); cystoscopy,+ureteroscopy (06/10/2008); vascular surgery (11/24/2008); cystoscopy (09/26/2010); lithotripsy (09/30/2010); and cystoscopy (05/18/2012).     Medications:     Current Outpatient Medications:      ACCU-CHEK GUIDE test strip, USE TO TEST BLOOD SUGAR 1 TIMES DAILY OR AS DIRECTED., Disp: 100 strip, Rfl: 3     albuterol (PROAIR HFA/PROVENTIL HFA/VENTOLIN HFA) 108 (90 Base) MCG/ACT inhaler, Inhale 2 puffs into the lungs every 6 hours as needed for shortness of breath / dyspnea or wheezing, Disp: 6.7 g, Rfl: 3     Alpha Lipoic Acid 200 MG CAPS, Take 200 mg by mouth 3 times daily, Disp: 90 capsule, Rfl: 3     amLODIPine (NORVASC) 10 MG tablet, Take 1 tablet (10 mg) by mouth daily For blood pressure, Disp: 90 tablet, Rfl: 1     amoxicillin-clavulanate (AUGMENTIN) 875-125 MG tablet, Take  1 tablet by mouth 2 times daily for 7 days, Disp: 14 tablet, Rfl: 0     ASPIRIN PO, Take 325 mg by mouth daily , Disp: , Rfl:      aspirin-acetaminophen-caffeine (EXCEDRIN MIGRAINE) 250-250-65 MG tablet, Take 1 tablet by mouth, Disp: , Rfl:      atorvastatin (LIPITOR) 40 MG tablet, Take 1 tablet (40 mg) by mouth daily, Disp: 90 tablet, Rfl: 3     baclofen (LIORESAL) 10 MG tablet, Take 1 tablet (10 mg) by mouth See Admin Instructions 2-3 po q hs., Disp: 90 tablet, Rfl: 1     blood glucose monitoring (ACCU-CHEK FASTCLIX) lancets, Use to test blood sugar 1 times daily or as directed.  Ok to substitute alternative if insurance prefers., Disp: 102 each, Rfl: 11     calcium carbonate (TUMS) 500 MG chewable tablet, Take 1 chew tab by mouth daily, Disp: , Rfl:      cyclobenzaprine (FLEXERIL) 10 MG tablet, Take 0.5-1 tablets (5-10 mg) by mouth 3 times daily as needed for muscle spasms, Disp: 90 tablet, Rfl: 3     dulaglutide (TRULICITY) 0.75 MG/0.5ML pen, Inject 0.75 mg Subcutaneous every 7 days Call with sugars in a week, Disp: 2 mL, Rfl: 2     DULoxetine (CYMBALTA) 60 MG capsule, Take 1 capsule (60 mg) by mouth 2 times daily, Disp: 180 capsule, Rfl: 3     enoxaparin ANTICOAGULANT (LOVENOX) 120 MG/0.8ML syringe, Inject 0.8 mLs (120 mg) Subcutaneous every 12 hours, Disp: 10 mL, Rfl: 1     EPINEPHrine (EPIPEN) 0.3 MG/0.3ML injection, Inject 0.3 mLs (0.3 mg) into the muscle once as needed for anaphylaxis, Disp: 2 each, Rfl: 2     fluticasone (FLONASE) 50 MCG/ACT nasal spray, Spray 1 spray into both nostrils daily, Disp: 18.2 mL, Rfl: 1     fluticasone-salmeterol (ADVAIR) 500-50 MCG/DOSE inhaler, Inhale 1 puff into the lungs 2 times daily, Disp: 1 each, Rfl: 11     insulin glargine (BASAGLAR KWIKPEN) 100 UNIT/ML pen, Inject 24 Units Subcutaneous daily, Disp: 8 mL, Rfl: 3     insulin pen needle (32G X 4 MM) 32G X 4 MM miscellaneous, Use 2 pen needles daily or as directed., Disp: 200 each, Rfl: 1     losartan (COZAAR) 50 MG  tablet, Take 1 tablet (50 mg) by mouth daily For blood pressure., Disp: 90 tablet, Rfl: 1     metFORMIN (GLUCOPHAGE-XR) 500 MG 24 hr tablet, Take 2 tablets (1,000 mg) by mouth 2 times daily (with meals), Disp: 120 tablet, Rfl: 5     montelukast (SINGULAIR) 10 MG tablet, Take 1 tablet (10 mg) by mouth At Bedtime For allergies/asthma, Disp: 30 tablet, Rfl: 2     multivitamin, therapeutic (THERA-VIT) TABS, Take 1 tablet by mouth daily, Disp: , Rfl:      naproxen sodium (ANAPROX) 220 MG tablet, Take 220 mg by mouth, Disp: , Rfl:      nortriptyline (PAMELOR) 10 MG capsule, Take 2 capsules (20 mg) by mouth At Bedtime, Disp: 60 capsule, Rfl: 5     tamsulosin (FLOMAX) 0.4 MG capsule, Take 1 capsule (0.4 mg) by mouth daily, Disp: 30 capsule, Rfl: 1     warfarin ANTICOAGULANT (COUMADIN) 5 MG tablet, Take daily as directed. Current dose is 12.5 mg Monday Wednesday and Friday and 10 mg all other days unless directed otherwise by [facility], Disp: 228 tablet, Rfl: 0    Current Facility-Administered Medications:      triamcinolone (KENALOG-40) injection 40 mg, 40 mg, , , Vinnie Espinoza DO, 40 mg at 06/29/20 1354    Facility-Administered Medications Ordered in Other Visits:      BUPivacaine (MARCAINE) injection 0.5%, 10 mL, Intradermal, Once, Vinnie Espinoza DO     DOBUTamine 500 mg in dextrose 5% 250 mL (adult std conc) premix, 2.5-20 mcg/kg/min, Intravenous, Continuous, Navarro Butcher MD, Stopped at 04/25/19 1559     iopamidol (ISOVUE-M 200) solution 10 mL, 10 mL, Intravenous, Once, Vinnie Espinoza DO     methylPREDNISolone (DEPO-MEDROL) injection 80 mg, 80 mg, Intramuscular, Once, Vinnie Espinoza DO     metoprolol (LOPRESSOR) injection 5 mg, 5 mg, Intravenous, Q5 Min PRN, Navarro Butcher MD, 2 mg at 04/25/19 1559      Allergies:     Allergies   Allergen Reactions     Artificial Sweetner (Informational Only) [Artificial Sweetner (Informational Only) ] GI Disturbance     ALL artificial sweetners cause severe  diarrhea & flu symptoms     Hydromorphone Anaphylaxis     Ibuprofen GI Disturbance     Morphine Sulfate Concentrate Anaphylaxis     Morphine [Fumaric Acid] Anaphylaxis     Hydrocodone-Acetaminophen Itching     Tramadol Hives     Trazodone Hives     Gabapentin      GI upset per pt     Keflex [Cephalexin] Diarrhea     Upset stomach     Codeine Phosphate Itching     Ketorolac Tromethamine Rash        Family History: family history includes Cancer (age of onset: 52) in his mother; Cancer - colorectal (age of onset: 53) in his father; Coronary Artery Disease in his father; Diabetes in his maternal aunt, maternal aunt, paternal uncle, paternal uncle, paternal uncle, paternal uncle, and another family member; Myocardial Infarction (age of onset: 35) in his paternal grandfather; Myocardial Infarction (age of onset: 45) in his father.     Social History:  reports that he has never smoked. His smokeless tobacco use includes chew. He reports current alcohol use. He reports that he does not use drugs.    Review of Systems:  ROS: 10 point ROS neg other than the symptoms noted above in the HPI and past medical history.    Physical Exam  GENERAL APPEARANCE: healthy, alert, no distress.   SKIN: no suspicious lesions or rashes  NEURO: Normal strength and tone, mentation intact and speech normal  PSYCH:  mentation appears normal and affect normal. Not anxious.  RESPIRATORY: No increased work of breathing.    BP (!) 164/102   Pulse 92   Wt 126.1 kg (278 lb)   SpO2 97%   BMI 38.77 kg/m       right HAND EXAM:    The splint was removed.  Irregular laceration across the tip of the long finger. No significant skin or tissue loss noted.  Laceration extends at the end of the nail plate/bed.  There is mild swelling in the long (middle) finger tip.  There is moderate tenderness in the tip of the finger.  There is slight  ecchymosis.  There is no erythema of the surrounding skin.  There is mild maceration of the skin.  There is no active  drainage or bleeding.  Range of motion: PIP is within normal limits, DIP is decreased , and (any)movements are painful.  Decreased sensation to the tip but appears intact to light touch.  Brisk capillary refill to all fingers.   Palpable radial pulse, 2+  Intact epl fpl fdp fds edc wrist flexion/extenion biceps triceps deltoid.    X-rays:  3 views right long (middle) finger from 2/14/2022 were reviewed personally in clinic today. On my review of the Xrays,   1.  Comminuted fracture of the right third finger distal phalanx tuft.  2.  Soft tissue injury and irregularity in the distal aspect of the  fifth finger, with some bone fragments in this region.  3.  No joint malalignment..    Impression:  55yo RHD male with right long finger saw laceration, open tuft fracture.    Plan:    * overall the wound doesn't look that bad, surprising.  * there is no obvious signs of infection, no active purulence noted.  * I discussed this will likely need I+D, unclear of foreign body, etc from saw blade/wood/etc; discussed we could numb up the finger today and clean it up a bit. Patient declines.  * at this time, will clean with betadine today and have him do daily to twice daily wet to dry dressings with betadine, warm soapy soaks and reassess  * some supplies given today  * continue with antibiotics  * advised patient to call if any worsening symptoms or present to the ED  * at risk for infection given uncontrolled diabetes.  * I+D would be an outpatient procedure, but did discuss we could do in the office as well with a digital block. Declines today.    Edwin Ch M.D., M.S.  Dept. of Orthopaedic Surgery  [facility]      Again, thank you for allowing me to participate in the care of your patient.        Sincerely,        Edwin Ch MD

## 2022-02-14 NOTE — PROGRESS NOTES
Anticoagulation Management    Unable to reach Adarsh today.    Today's INR result of 1.85 is subtherapeutic (goal INR of 2.0-3.0).  Result received from: Clinic Lab    Follow up required to confirm warfarin dose taken and assess for changes    No instructions provided. Unable to leave voicemail. VM is full.       Anticoagulation clinic to follow up    Janeth Cool RN

## 2022-02-14 NOTE — TELEPHONE ENCOUNTER
Prior Authorization Retail Medication Request    Medication/Dose: insulin glargine (BASAGLAR KWIKPEN) 100 UNIT/ML pen  ICD code (if different than what is on RX):  Type 2 diabetes mellitus with diabetic polyneuropathy, without long-term current use of insulin (H) [E11.42]  - Primary   Previously Tried and Failed:    Rationale:      Insurance Name:    Insurance ID:        Pharmacy Information (if different than what is on RX)  Name:    Phone:

## 2022-02-14 NOTE — TELEPHONE ENCOUNTER
Tried to call and leave VM (but mailbox full) regarding to be seen in 2 days for local I+D in clinic with local digital block. Will keep trying.    Edwin Ch M.D., M.S.  Dept. of Orthopaedic Surgery  [facility]

## 2022-02-14 NOTE — TELEPHONE ENCOUNTER
Central Prior Authorization Team   Phone: [phone]      PA NOT NEEDED    Medication: insulin glargine (BASAGLAR KWIKPEN) 100 UNIT/ML pen PA NOT NEEDED  Insurance Company:    Pharmacy Filling the Rx: [id number] - [place], [place] - [address]  Filling Pharmacy Phone: [phone]  Filling Pharmacy Fax:    Start Date: 2/14/2022    Called pharmacy to see if they tried running Semglee or Lantus, they did not.  The pharmacy was able to get a paid claim on lantus. **Instructed pharmacy to notify patient when script is ready to /ship.**

## 2022-02-14 NOTE — TELEPHONE ENCOUNTER
[facility] [place],       First insulin I rX WAS NOT covered. Then I tried glarine longacting or equivalent substitute and patient reports that was not covered. Please clarify with pharmacy and ask if they have ideas of what I could prescribe TODAY and get covered as it is very important.      If they do not know, patient had a hard time getting through to insurance to see what is covered. How do we help? Care coordination?     Bertha Romero PA-C

## 2022-02-14 NOTE — PROGRESS NOTES
Chief Complaint:   Chief Complaint   Patient presents with     Finger     Pt was cutting with his table saw and he lost his  and the blade got his Right third digit. XR showsfx of distal phalan tuft and soft tissue injury. DOI 02/12/22        HPI: Adarsh Salvador is a 56 year old male , right -hand dominant, who presents for evaluation and management of a right  long (middle) finger injury. He injured his hand 2/12/2022, using a table saw and cutting wood, lost his  and ran into the saw blade with the right long(middle) finger. Didn't seek treatment until earlier this morning, noting to have a comminuted, open tuft fracture. Given a prescription for augmentin.  Referred for surgical evaluation. Pain 7/10.    It has been 2 days since the initial injury.       He reports having mild pain/discomfort around the injury site. He denies numbness or tingling. He denies any other injuries to his upper extremity.       Patient is diabetic, A1C=11.2 on 1/24/2022; with neuropathy.    Patient is anticoagulated for acute DVT, on warfarin.    Patient with positive covid-19 test 1/2/2022 (6 weeks ago).      Symptoms: pain, swelling.  Location: right long finger tip.  Pain severity: 7/10  Pain quality: sharp and throbbing  Frequency of symptoms: are constant.  Aggravating factors: bumping the finger, pressure.  Relieving factors: rest.    Previous treatment: splint, bandage.      Past medical history:  has a past medical history of Anaphylactic reaction (8/14/2015), Anxiety, Depression, DM2 (diabetes mellitus, type 2) (H), GERD (gastroesophageal reflux disease), HTN (hypertension), IBS (irritable bowel syndrome), Kidney stone (6/15/2011), and Neuropathy.   Patient Active Problem List   Diagnosis     GERD (gastroesophageal reflux disease)     Chronic RLQ pain     Constipation     Chronic maxillary sinusitis     Chronic rhinitis     Hypertriglyceridemia     Benign essential hypertension     Anemia in other chronic diseases  classified elsewhere     Fatty liver     Irritable bowel syndrome with diarrhea     Right inguinal hernia     Mild persistent asthma without complication     Type 2 diabetes mellitus with diabetic polyneuropathy, without long-term current use of insulin (H)     Hyperlipidemia LDL goal <100     CONNOR (generalized anxiety disorder)     Insomnia, unspecified type     Morbid obesity (H)     Neuropathy     Recurrent deep vein thrombosis (DVT) (H)     Precordial pain     Dyslipidemia     Elevated C-reactive protein     Gastric reflux     Internal derangement of knee     Nicotine dependence     Varicose veins of lower extremity     Vitamin D deficiency       Past surgical history:  has a past surgical history that includes Colonoscopy (5/1/2012); BREATH HYDROGEN TEST (3/7/2014); orthopedic surgery (10/03/2007); Colonoscopy (4/21/2014); Release carpal tunnel (Right, 1/26/2018); cystoscopy (05/01/2008); cystoscopy,+ureteroscopy (06/10/2008); vascular surgery (11/24/2008); cystoscopy (09/26/2010); lithotripsy (09/30/2010); and cystoscopy (05/18/2012).     Medications:     Current Outpatient Medications:      ACCU-CHEK GUIDE test strip, USE TO TEST BLOOD SUGAR 1 TIMES DAILY OR AS DIRECTED., Disp: 100 strip, Rfl: 3     albuterol (PROAIR HFA/PROVENTIL HFA/VENTOLIN HFA) 108 (90 Base) MCG/ACT inhaler, Inhale 2 puffs into the lungs every 6 hours as needed for shortness of breath / dyspnea or wheezing, Disp: 6.7 g, Rfl: 3     Alpha Lipoic Acid 200 MG CAPS, Take 200 mg by mouth 3 times daily, Disp: 90 capsule, Rfl: 3     amLODIPine (NORVASC) 10 MG tablet, Take 1 tablet (10 mg) by mouth daily For blood pressure, Disp: 90 tablet, Rfl: 1     amoxicillin-clavulanate (AUGMENTIN) 875-125 MG tablet, Take 1 tablet by mouth 2 times daily for 7 days, Disp: 14 tablet, Rfl: 0     ASPIRIN PO, Take 325 mg by mouth daily , Disp: , Rfl:      aspirin-acetaminophen-caffeine (EXCEDRIN MIGRAINE) 250-250-65 MG tablet, Take 1 tablet by mouth, Disp: , Rfl:       atorvastatin (LIPITOR) 40 MG tablet, Take 1 tablet (40 mg) by mouth daily, Disp: 90 tablet, Rfl: 3     baclofen (LIORESAL) 10 MG tablet, Take 1 tablet (10 mg) by mouth See Admin Instructions 2-3 po q hs., Disp: 90 tablet, Rfl: 1     blood glucose monitoring (ACCU-CHEK FASTCLIX) lancets, Use to test blood sugar 1 times daily or as directed.  Ok to substitute alternative if insurance prefers., Disp: 102 each, Rfl: 11     calcium carbonate (TUMS) 500 MG chewable tablet, Take 1 chew tab by mouth daily, Disp: , Rfl:      cyclobenzaprine (FLEXERIL) 10 MG tablet, Take 0.5-1 tablets (5-10 mg) by mouth 3 times daily as needed for muscle spasms, Disp: 90 tablet, Rfl: 3     dulaglutide (TRULICITY) 0.75 MG/0.5ML pen, Inject 0.75 mg Subcutaneous every 7 days Call with sugars in a week, Disp: 2 mL, Rfl: 2     DULoxetine (CYMBALTA) 60 MG capsule, Take 1 capsule (60 mg) by mouth 2 times daily, Disp: 180 capsule, Rfl: 3     enoxaparin ANTICOAGULANT (LOVENOX) 120 MG/0.8ML syringe, Inject 0.8 mLs (120 mg) Subcutaneous every 12 hours, Disp: 10 mL, Rfl: 1     EPINEPHrine (EPIPEN) 0.3 MG/0.3ML injection, Inject 0.3 mLs (0.3 mg) into the muscle once as needed for anaphylaxis, Disp: 2 each, Rfl: 2     fluticasone (FLONASE) 50 MCG/ACT nasal spray, Spray 1 spray into both nostrils daily, Disp: 18.2 mL, Rfl: 1     fluticasone-salmeterol (ADVAIR) 500-50 MCG/DOSE inhaler, Inhale 1 puff into the lungs 2 times daily, Disp: 1 each, Rfl: 11     insulin glargine (BASAGLAR KWIKPEN) 100 UNIT/ML pen, Inject 24 Units Subcutaneous daily, Disp: 8 mL, Rfl: 3     insulin pen needle (32G X 4 MM) 32G X 4 MM miscellaneous, Use 2 pen needles daily or as directed., Disp: 200 each, Rfl: 1     losartan (COZAAR) 50 MG tablet, Take 1 tablet (50 mg) by mouth daily For blood pressure., Disp: 90 tablet, Rfl: 1     metFORMIN (GLUCOPHAGE-XR) 500 MG 24 hr tablet, Take 2 tablets (1,000 mg) by mouth 2 times daily (with meals), Disp: 120 tablet, Rfl: 5     montelukast  (SINGULAIR) 10 MG tablet, Take 1 tablet (10 mg) by mouth At Bedtime For allergies/asthma, Disp: 30 tablet, Rfl: 2     multivitamin, therapeutic (THERA-VIT) TABS, Take 1 tablet by mouth daily, Disp: , Rfl:      naproxen sodium (ANAPROX) 220 MG tablet, Take 220 mg by mouth, Disp: , Rfl:      nortriptyline (PAMELOR) 10 MG capsule, Take 2 capsules (20 mg) by mouth At Bedtime, Disp: 60 capsule, Rfl: 5     tamsulosin (FLOMAX) 0.4 MG capsule, Take 1 capsule (0.4 mg) by mouth daily, Disp: 30 capsule, Rfl: 1     warfarin ANTICOAGULANT (COUMADIN) 5 MG tablet, Take daily as directed. Current dose is 12.5 mg Monday Wednesday and Friday and 10 mg all other days unless directed otherwise by [facility], Disp: 228 tablet, Rfl: 0    Current Facility-Administered Medications:      triamcinolone (KENALOG-40) injection 40 mg, 40 mg, , , Vinnie Espinoza DO, 40 mg at 06/29/20 1354    Facility-Administered Medications Ordered in Other Visits:      BUPivacaine (MARCAINE) injection 0.5%, 10 mL, Intradermal, Once, Vinnie Espinoza DO     DOBUTamine 500 mg in dextrose 5% 250 mL (adult std conc) premix, 2.5-20 mcg/kg/min, Intravenous, Continuous, Navarro Butcher MD, Stopped at 04/25/19 1559     iopamidol (ISOVUE-M 200) solution 10 mL, 10 mL, Intravenous, Once, Vinnie Espinoza DO     methylPREDNISolone (DEPO-MEDROL) injection 80 mg, 80 mg, Intramuscular, Once, Vinnie Espinoza DO     metoprolol (LOPRESSOR) injection 5 mg, 5 mg, Intravenous, Q5 Min PRN, Navarro Butcher MD, 2 mg at 04/25/19 1559      Allergies:     Allergies   Allergen Reactions     Artificial Sweetner (Informational Only) [Artificial Sweetner (Informational Only) ] GI Disturbance     ALL artificial sweetners cause severe diarrhea & flu symptoms     Hydromorphone Anaphylaxis     Ibuprofen GI Disturbance     Morphine Sulfate Concentrate Anaphylaxis     Morphine [Fumaric Acid] Anaphylaxis     Hydrocodone-Acetaminophen Itching     Tramadol Hives     Trazodone Hives      Gabapentin      GI upset per pt     Keflex [Cephalexin] Diarrhea     Upset stomach     Codeine Phosphate Itching     Ketorolac Tromethamine Rash        Family History: family history includes Cancer (age of onset: 52) in his mother; Cancer - colorectal (age of onset: 53) in his father; Coronary Artery Disease in his father; Diabetes in his maternal aunt, maternal aunt, paternal uncle, paternal uncle, paternal uncle, paternal uncle, and another family member; Myocardial Infarction (age of onset: 35) in his paternal grandfather; Myocardial Infarction (age of onset: 45) in his father.     Social History:  reports that he has never smoked. His smokeless tobacco use includes chew. He reports current alcohol use. He reports that he does not use drugs.    Review of Systems:  ROS: 10 point ROS neg other than the symptoms noted above in the HPI and past medical history.    Physical Exam  GENERAL APPEARANCE: healthy, alert, no distress.   SKIN: no suspicious lesions or rashes  NEURO: Normal strength and tone, mentation intact and speech normal  PSYCH:  mentation appears normal and affect normal. Not anxious.  RESPIRATORY: No increased work of breathing.    BP (!) 164/102   Pulse 92   Wt 126.1 kg (278 lb)   SpO2 97%   BMI 38.77 kg/m       right HAND EXAM:    The splint was removed.  Irregular laceration across the tip of the long finger. No significant skin or tissue loss noted.  Laceration extends at the end of the nail plate/bed.  There is mild swelling in the long (middle) finger tip.  There is moderate tenderness in the tip of the finger.  There is slight  ecchymosis.  There is no erythema of the surrounding skin.  There is mild maceration of the skin.  There is no active drainage or bleeding.  Range of motion: PIP is within normal limits, DIP is decreased , and (any)movements are painful.  Decreased sensation to the tip but appears intact to light touch.  Brisk capillary refill to all fingers.   Palpable radial  pulse, 2+  Intact epl fpl fdp fds edc wrist flexion/extenion biceps triceps deltoid.    X-rays:  3 views right long (middle) finger from 2/14/2022 were reviewed personally in clinic today. On my review of the Xrays,   1.  Comminuted fracture of the right third finger distal phalanx tuft.  2.  Soft tissue injury and irregularity in the distal aspect of the  fifth finger, with some bone fragments in this region.  3.  No joint malalignment..    Impression:  55yo RHD male with right long finger saw laceration, open tuft fracture.    Plan:    * overall the wound doesn't look that bad, surprising.  * there is no obvious signs of infection, no active purulence noted.  * I discussed this will likely need I+D, unclear of foreign body, etc from saw blade/wood/etc; discussed we could numb up the finger today and clean it up a bit. Patient declines.  * at this time, will clean with betadine today and have him do daily to twice daily wet to dry dressings with betadine, warm soapy soaks and reassess  * some supplies given today  * continue with antibiotics  * advised patient to call if any worsening symptoms or present to the ED  * at risk for infection given uncontrolled diabetes.  * I+D would be an outpatient procedure, but did discuss we could do in the office as well with a digital block. Declines today.    Edwin Ch M.D., M.S.  Dept. of Orthopaedic Surgery  [facility]

## 2022-02-18 ENCOUNTER — LAB (OUTPATIENT)
Dept: LAB | Facility: CLINIC | Age: 57
End: 2022-02-18
Payer: MEDICAID

## 2022-02-18 ENCOUNTER — ANTICOAGULATION THERAPY VISIT (OUTPATIENT)
Dept: ANTICOAGULATION | Facility: CLINIC | Age: 57
End: 2022-02-18

## 2022-02-18 DIAGNOSIS — I82.409 RECURRENT DEEP VEIN THROMBOSIS (DVT) (H): ICD-10-CM

## 2022-02-18 DIAGNOSIS — I82.409 RECURRENT DEEP VEIN THROMBOSIS (DVT) (H): Primary | ICD-10-CM

## 2022-02-18 LAB — INR BLD: 1.9 (ref 0.9–1.1)

## 2022-02-18 PROCEDURE — 85610 PROTHROMBIN TIME: CPT

## 2022-02-18 PROCEDURE — 36416 COLLJ CAPILLARY BLOOD SPEC: CPT

## 2022-02-18 NOTE — PROGRESS NOTES
ANTICOAGULATION MANAGEMENT     Adarsh Salvador 56 year old male is on warfarin with subtherapeutic INR result. (Goal INR 2.0-3.0)    Recent labs: (last 7 days)     02/18/22  0835   INR 1.9*       ASSESSMENT     Source(s): Chart Review       Warfarin doses taken: Warfarin taken as instructed    Diet: No new diet changes identified    New illness, injury, or hospitalization: Yes: cut finger with saw    Medication/supplement changes: on agumentin for finger laceration    Signs or symptoms of bleeding or clotting: No    Previous INR: Subtherapeutic    Additional findings: None     PLAN     Recommended plan for no diet, medication or health factor changes affecting INR     Dosing Instructions:  Increase your warfarin dose (3.2% change) with next INR in 1 week       Summary  As of 2/18/2022    Full warfarin instructions:  10 mg every Sun, Tue, Thu; 12.5 mg all other days   Next INR check:               Telephone call with Adarsh who verbalizes understanding and agrees to plan    Lab visit scheduled    Education provided: Please call back if any changes to your diet, medications or how you've been taking warfarin    Plan made per [facility] anticoagulation protocol    Joya Suero, RN  Anticoagulation Clinic  2/18/2022    _______________________________________________________________________     Anticoagulation Episode Summary     Current INR goal:  2.0-3.0   TTR:  30.2 % (2 wk)   Target end date:  Indefinite   Send INR reminders to:  [facility]    Indications    Recurrent deep vein thrombosis (DVT) (H) [I82.409]           Comments:           Anticoagulation Care Providers     Provider Role Specialty Phone number    Bertha Romero PA-C Referring Family Medicine [phone]

## 2022-02-21 ENCOUNTER — TELEPHONE (OUTPATIENT)
Dept: ORTHOPEDICS | Facility: CLINIC | Age: 57
End: 2022-02-21

## 2022-02-21 ENCOUNTER — OFFICE VISIT (OUTPATIENT)
Dept: FAMILY MEDICINE | Facility: CLINIC | Age: 57
End: 2022-02-21
Payer: MEDICAID

## 2022-02-21 ENCOUNTER — OFFICE VISIT (OUTPATIENT)
Dept: ORTHOPEDICS | Facility: CLINIC | Age: 57
End: 2022-02-21
Payer: MEDICAID

## 2022-02-21 ENCOUNTER — TELEPHONE (OUTPATIENT)
Dept: FAMILY MEDICINE | Facility: CLINIC | Age: 57
End: 2022-02-21

## 2022-02-21 VITALS
TEMPERATURE: 98 F | WEIGHT: 278.2 LBS | OXYGEN SATURATION: 98 % | SYSTOLIC BLOOD PRESSURE: 133 MMHG | DIASTOLIC BLOOD PRESSURE: 82 MMHG | HEART RATE: 96 BPM | HEIGHT: 70 IN | BODY MASS INDEX: 39.83 KG/M2

## 2022-02-21 VITALS
HEART RATE: 90 BPM | DIASTOLIC BLOOD PRESSURE: 91 MMHG | SYSTOLIC BLOOD PRESSURE: 143 MMHG | BODY MASS INDEX: 39.19 KG/M2 | RESPIRATION RATE: 16 BRPM | WEIGHT: 281 LBS | OXYGEN SATURATION: 100 %

## 2022-02-21 DIAGNOSIS — Z01.818 PREOP GENERAL PHYSICAL EXAM: Primary | ICD-10-CM

## 2022-02-21 DIAGNOSIS — S62.602B: ICD-10-CM

## 2022-02-21 DIAGNOSIS — I82.409 RECURRENT DEEP VEIN THROMBOSIS (DVT) (H): ICD-10-CM

## 2022-02-21 DIAGNOSIS — S61.322D: Primary | ICD-10-CM

## 2022-02-21 LAB — INR BLD: 1.8 (ref 0.9–1.1)

## 2022-02-21 PROCEDURE — 99213 OFFICE O/P EST LOW 20 MIN: CPT | Performed by: ORTHOPAEDIC SURGERY

## 2022-02-21 PROCEDURE — 85610 PROTHROMBIN TIME: CPT | Performed by: FAMILY MEDICINE

## 2022-02-21 PROCEDURE — 99214 OFFICE O/P EST MOD 30 MIN: CPT | Performed by: FAMILY MEDICINE

## 2022-02-21 PROCEDURE — 36415 COLL VENOUS BLD VENIPUNCTURE: CPT | Performed by: FAMILY MEDICINE

## 2022-02-21 PROCEDURE — 80048 BASIC METABOLIC PNL TOTAL CA: CPT | Performed by: FAMILY MEDICINE

## 2022-02-21 ASSESSMENT — PAIN SCALES - GENERAL: PAINLEVEL: MODERATE PAIN (5)

## 2022-02-21 NOTE — TELEPHONE ENCOUNTER
Anticoagulation    Adarsh Salvador on warfarin for recurrent VTE with recent recurrence 1/15/22 (DVT) & 1/18/22 (PE) with planned I& D tomorrow 2/22/22.  Adarsh called [facility] to see if warfarin dose should be held today.    Chart reviewed ortho surgeon aware Adarsh is still on blood thinner and did not request hold, but aware procedure may have more bleeding see OV note today 2/21.    Lab Results   Component Value Date    INR 1.9 02/18/2022    INR 1.85 02/14/2022    INR 2.0 02/08/2022     Recommendation:    Recommend INR with pre-op this evening for final determination/discussion at pre-op about held dose for 2/22 I&D.  Suggest if INR remains on low end of goal range to continue warfarin without hold in light of recent clotting events and minimal time for hold to have significant onset.    Dalila Hassan, [facility], located within [facility]

## 2022-02-21 NOTE — PROGRESS NOTES
Chief Complaint:   Chief Complaint   Patient presents with     Follow Up      followup right middle finger injury. DOI: 2/12/22 finger still painful and bleeding.Has been changing the dressing on the finger twice daily.        HPI: Adarsh Salvador is a 56 year old male , right -hand dominant, who presents for followup evaluation and management of a right  long (middle) finger injury. He is now 9 days from injury. Returns today for wound check, stating finger is still painful, bleeding at times. He's changed to dressing twice daily. 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      He injured his hand 2/12/2022, using a table saw and cutting wood, lost his  and ran into the saw blade with the right long(middle) finger. Didn't seek treatment until 2 days after injury, noting to have a comminuted, open tuft fracture. Given a prescription for augmentin.  Referred for surgical evaluation.           He reports having mild-moderate  pain/discomfort around the injury site. He denies numbness or tingling. He denies any other injuries to his upper extremity.     Notes numerous pain medications allergies, but states oxycodone, hydrocodone ok if he takes with benadryl to combat the itching.      Patient is diabetic, A1C=11.2 on 1/24/2022; with neuropathy.    Patient is anticoagulated for acute DVT, on warfarin.    Patient with positive covid-19 test 1/2/2022 (6 weeks ago).      Symptoms: pain, swelling.  Location: right long finger tip.  Pain severity: 5/10  Pain quality: sharp and throbbing  Frequency of symptoms: are constant.  Aggravating factors: bumping the finger, pressure.  Relieving factors: rest.    Previous treatment: splint, bandage.      Past medical history:  has a past medical history of Anaphylactic reaction (8/14/2015), Anxiety, Depression, DM2 (diabetes mellitus, type 2) (H), GERD (gastroesophageal reflux disease), HTN (hypertension), IBS (irritable bowel syndrome), Kidney stone (6/15/2011), and  Neuropathy.   Patient Active Problem List   Diagnosis     GERD (gastroesophageal reflux disease)     Chronic RLQ pain     Constipation     Chronic maxillary sinusitis     Chronic rhinitis     Hypertriglyceridemia     Benign essential hypertension     Anemia in other chronic diseases classified elsewhere     Fatty liver     Irritable bowel syndrome with diarrhea     Right inguinal hernia     Mild persistent asthma without complication     Type 2 diabetes mellitus with diabetic polyneuropathy, without long-term current use of insulin (H)     Hyperlipidemia LDL goal <100     CONNOR (generalized anxiety disorder)     Insomnia, unspecified type     Morbid obesity (H)     Neuropathy     Recurrent deep vein thrombosis (DVT) (H)     Precordial pain     Dyslipidemia     Elevated C-reactive protein     Gastric reflux     Internal derangement of knee     Nicotine dependence     Varicose veins of lower extremity     Vitamin D deficiency       Past surgical history:  has a past surgical history that includes Colonoscopy (5/1/2012); BREATH HYDROGEN TEST (3/7/2014); orthopedic surgery (10/03/2007); Colonoscopy (4/21/2014); Release carpal tunnel (Right, 1/26/2018); cystoscopy (05/01/2008); cystoscopy,+ureteroscopy (06/10/2008); vascular surgery (11/24/2008); cystoscopy (09/26/2010); lithotripsy (09/30/2010); and cystoscopy (05/18/2012).     Medications:     Current Outpatient Medications:      ACCU-CHEK GUIDE test strip, USE TO TEST BLOOD SUGAR 1 TIMES DAILY OR AS DIRECTED., Disp: 100 strip, Rfl: 3     albuterol (PROAIR HFA/PROVENTIL HFA/VENTOLIN HFA) 108 (90 Base) MCG/ACT inhaler, Inhale 2 puffs into the lungs every 6 hours as needed for shortness of breath / dyspnea or wheezing, Disp: 6.7 g, Rfl: 3     Alpha Lipoic Acid 200 MG CAPS, Take 200 mg by mouth 3 times daily, Disp: 90 capsule, Rfl: 3     amLODIPine (NORVASC) 10 MG tablet, Take 1 tablet (10 mg) by mouth daily For blood pressure, Disp: 90 tablet, Rfl: 1      amoxicillin-clavulanate (AUGMENTIN) 875-125 MG tablet, Take 1 tablet by mouth 2 times daily for 7 days, Disp: 14 tablet, Rfl: 0     ASPIRIN PO, Take 325 mg by mouth daily , Disp: , Rfl:      aspirin-acetaminophen-caffeine (EXCEDRIN MIGRAINE) 250-250-65 MG tablet, Take 1 tablet by mouth, Disp: , Rfl:      atorvastatin (LIPITOR) 40 MG tablet, Take 1 tablet (40 mg) by mouth daily, Disp: 90 tablet, Rfl: 3     baclofen (LIORESAL) 10 MG tablet, Take 1 tablet (10 mg) by mouth See Admin Instructions 2-3 po q hs., Disp: 90 tablet, Rfl: 1     blood glucose monitoring (ACCU-CHEK FASTCLIX) lancets, Use to test blood sugar 1 times daily or as directed.  Ok to substitute alternative if insurance prefers., Disp: 102 each, Rfl: 11     calcium carbonate (TUMS) 500 MG chewable tablet, Take 1 chew tab by mouth daily, Disp: , Rfl:      cyclobenzaprine (FLEXERIL) 10 MG tablet, Take 0.5-1 tablets (5-10 mg) by mouth 3 times daily as needed for muscle spasms, Disp: 90 tablet, Rfl: 3     dulaglutide (TRULICITY) 0.75 MG/0.5ML pen, Inject 0.75 mg Subcutaneous every 7 days Call with sugars in a week, Disp: 2 mL, Rfl: 2     DULoxetine (CYMBALTA) 60 MG capsule, Take 1 capsule (60 mg) by mouth 2 times daily, Disp: 180 capsule, Rfl: 3     enoxaparin ANTICOAGULANT (LOVENOX) 120 MG/0.8ML syringe, Inject 0.8 mLs (120 mg) Subcutaneous every 12 hours, Disp: 10 mL, Rfl: 1     EPINEPHrine (EPIPEN) 0.3 MG/0.3ML injection, Inject 0.3 mLs (0.3 mg) into the muscle once as needed for anaphylaxis, Disp: 2 each, Rfl: 2     fluticasone (FLONASE) 50 MCG/ACT nasal spray, Spray 1 spray into both nostrils daily, Disp: 18.2 mL, Rfl: 1     fluticasone-salmeterol (ADVAIR) 500-50 MCG/DOSE inhaler, Inhale 1 puff into the lungs 2 times daily, Disp: 1 each, Rfl: 11     insulin glargine (BASAGLAR KWIKPEN) 100 UNIT/ML pen, Inject 24 Units Subcutaneous daily, Disp: 8 mL, Rfl: 3     insulin pen needle (32G X 4 MM) 32G X 4 MM miscellaneous, Use 2 pen needles daily or as  directed., Disp: 200 each, Rfl: 1     losartan (COZAAR) 50 MG tablet, Take 1 tablet (50 mg) by mouth daily For blood pressure., Disp: 90 tablet, Rfl: 1     metFORMIN (GLUCOPHAGE-XR) 500 MG 24 hr tablet, Take 2 tablets (1,000 mg) by mouth 2 times daily (with meals), Disp: 120 tablet, Rfl: 5     montelukast (SINGULAIR) 10 MG tablet, Take 1 tablet (10 mg) by mouth At Bedtime For allergies/asthma, Disp: 30 tablet, Rfl: 2     multivitamin, therapeutic (THERA-VIT) TABS, Take 1 tablet by mouth daily, Disp: , Rfl:      naproxen sodium (ANAPROX) 220 MG tablet, Take 220 mg by mouth, Disp: , Rfl:      nortriptyline (PAMELOR) 10 MG capsule, Take 2 capsules (20 mg) by mouth At Bedtime, Disp: 60 capsule, Rfl: 5     tamsulosin (FLOMAX) 0.4 MG capsule, Take 1 capsule (0.4 mg) by mouth daily, Disp: 30 capsule, Rfl: 1     warfarin ANTICOAGULANT (COUMADIN) 5 MG tablet, Take daily as directed. Current dose is 12.5 mg Monday Wednesday and Friday and 10 mg all other days unless directed otherwise by [facility], Disp: 228 tablet, Rfl: 0    Current Facility-Administered Medications:      triamcinolone (KENALOG-40) injection 40 mg, 40 mg, , , Vinnie Espinoza DO, 40 mg at 06/29/20 1354    Facility-Administered Medications Ordered in Other Visits:      BUPivacaine (MARCAINE) injection 0.5%, 10 mL, Intradermal, Once, Vinnie Espinoza DO     DOBUTamine 500 mg in dextrose 5% 250 mL (adult std conc) premix, 2.5-20 mcg/kg/min, Intravenous, Continuous, Navarro Butcher MD, Stopped at 04/25/19 1439     iopamidol (ISOVUE-M 200) solution 10 mL, 10 mL, Intravenous, Once, Vinnie Espinoza DO     methylPREDNISolone (DEPO-MEDROL) injection 80 mg, 80 mg, Intramuscular, Once, Vinnie Espinoza DO     metoprolol (LOPRESSOR) injection 5 mg, 5 mg, Intravenous, Q5 Min PRN, Navarro Butcher MD, 2 mg at 04/25/19 5703      Allergies:     Allergies   Allergen Reactions     Artificial Sweetner (Informational Only) [Artificial Sweetner (Informational  Only) ] GI Disturbance     ALL artificial sweetners cause severe diarrhea & flu symptoms     Hydromorphone Anaphylaxis     Ibuprofen GI Disturbance     Morphine Sulfate Concentrate Anaphylaxis     Morphine [Fumaric Acid] Anaphylaxis     Hydrocodone-Acetaminophen Itching     Tramadol Hives     Trazodone Hives     Gabapentin      GI upset per pt     Keflex [Cephalexin] Diarrhea     Upset stomach     Codeine Phosphate Itching     Ketorolac Tromethamine Rash        Family History: family history includes Cancer (age of onset: 52) in his mother; Cancer - colorectal (age of onset: 53) in his father; Coronary Artery Disease in his father; Diabetes in his maternal aunt, maternal aunt, paternal uncle, paternal uncle, paternal uncle, paternal uncle, and another family member; Myocardial Infarction (age of onset: 35) in his paternal grandfather; Myocardial Infarction (age of onset: 45) in his father.     Social History:  reports that he has never smoked. His smokeless tobacco use includes chew. He reports current alcohol use. He reports that he does not use drugs.    Review of Systems:   Denies numbness, tingling, parasthesias.   Denies headaches.   Denies fevers, chills, night sweats   Denies chest pain.   Denies shortness of breath.   Denies any skin problems, abrasions, rashes, irritation.      Physical Exam  GENERAL APPEARANCE: healthy, alert, no distress.   SKIN: no suspicious lesions or rashes  NEURO: Normal strength and tone, mentation intact and speech normal  PSYCH:  mentation appears normal and affect normal. Not anxious.  RESPIRATORY: No increased work of breathing.    BP (!) 143/91   Pulse 90   Resp 16   Wt 127.5 kg (281 lb)   SpO2 100%   BMI 39.19 kg/m       right HAND EXAM:    The dirty splint was removed.  Irregular laceration across the tip of the long finger. No significant skin or tissue loss noted.  Laceration extends at the end of the nail plate/bed.  There is mild swelling in the long (middle) finger  tip.  There is mild tenderness in the tip of the finger.  There is slight  ecchymosis.  There is no erythema of the surrounding skin.  There is mild maceration of the skin.  There is no active drainage or bleeding.  Range of motion: PIP is within normal limits, DIP is decreased , and (any)movements are painful.  Decreased sensation to the tip but appears intact to light touch.  Brisk capillary refill to all fingers.   Palpable radial pulse, 2+  Intact epl fpl fdp fds edc wrist flexion/extenion biceps triceps deltoid.    X-rays: no new images today.  3 views right long (middle) finger from 2/14/2022 were reviewed personally in clinic today. On my review of the Xrays,   1.  Comminuted fracture of the right third finger distal phalanx tuft.  2.  Soft tissue injury and irregularity in the distal aspect of the fifth finger, with some bone fragments in this region.  3.  No joint malalignment..      Impression:  57yo RHD male with right long finger saw laceration, open tuft fracture, nail injury.    Plan:    * overall the wound doesn't look that bad, probably a little better than last week.  * there is no obvious signs of infection, no active purulence noted.  * I discussed this will likely need I+D, unclear of foreign body, etc from saw blade/wood/etc; discussed we could numb up the finger today and clean it up a bit. Patient would like to do in the OR with some sedation.  * at this time, will clean with betadine today and have him do daily to twice daily wet to dry dressings with betadine, warm soapy soaks  * will plan right long finger I+D tomorrow, [facility], tentatively ~4pm (possibly earlier)  * needs H+P from primary care provider today.  * reassess 1 week postoperative for wound check  * given blood thinners, there will be more bleeding, as previously discussed.  * continue with antibiotics  * advised patient to call if any worsening symptoms or present to the ED  * at risk for infection given  uncontrolled diabetes.  * documented covid positive 1/2/2022, so shouldn't need preop covid testing at [facility].  * plan oxycodone postoperative as patient states tolerated in the past and uses benadryl for the itching.    Edwin Ch M.D., M.S.  Dept. of Orthopaedic Surgery  [facility]

## 2022-02-21 NOTE — TELEPHONE ENCOUNTER
Prior authorization has been denied.  Please see rational.  Would you like to appeal?  Michelle Caballero

## 2022-02-21 NOTE — TELEPHONE ENCOUNTER
Reason for Call:  Other prescription    Detailed comments: Pt is having minor surgery tomorrow, 02/22/22 and is calling asking for dosing instructions.    Phone Number Patient can be reached at: Home number on file [phone] (home)    Best Time: any    Can we leave a detailed message on this number? YES    Call taken on 2/21/2022 at 9:21 AM by Araceli Carey

## 2022-02-21 NOTE — TELEPHONE ENCOUNTER
This PA is denied.  See Denial Rational.  Please advise and route back to TC.  Thank you.  Jamila Young,  [facility] [place]    PA has been denied, please see rational.  If an appeal is desired please let the PA team know when a letter of medical necessity has been written and placed in the patient's chart.   If done with encounter, please notify the patient and close the encounter.     Thank you,   [facility] PA Team

## 2022-02-21 NOTE — LETTER
2/21/2022         RE: Adarsh Salvador  [address]  [place] [address]        Dear Colleague,    Thank you for referring your patient, Adarsh Salvador, to the [facility]. Please see a copy of my visit note below.    Chief Complaint:   Chief Complaint   Patient presents with     Follow Up      followup right middle finger injury. DOI: 2/12/22 finger still painful and bleeding.Has been changing the dressing on the finger twice daily.        HPI: Adarsh Salvador is a 56 year old male , right -hand dominant, who presents for followup evaluation and management of a right  long (middle) finger injury. He is now 9 days from injury. Returns today for wound check, stating finger is still painful, bleeding at times. He's changed to dressing twice daily. 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      He injured his hand 2/12/2022, using a table saw and cutting wood, lost his  and ran into the saw blade with the right long(middle) finger. Didn't seek treatment until 2 days after injury, noting to have a comminuted, open tuft fracture. Given a prescription for augmentin.  Referred for surgical evaluation.           He reports having mild-moderate  pain/discomfort around the injury site. He denies numbness or tingling. He denies any other injuries to his upper extremity.     Notes numerous pain medications allergies, but states oxycodone, hydrocodone ok if he takes with benadryl to combat the itching.      Patient is diabetic, A1C=11.2 on 1/24/2022; with neuropathy.    Patient is anticoagulated for acute DVT, on warfarin.    Patient with positive covid-19 test 1/2/2022 (6 weeks ago).      Symptoms: pain, swelling.  Location: right long finger tip.  Pain severity: 5/10  Pain quality: sharp and throbbing  Frequency of symptoms: are constant.  Aggravating factors: bumping the finger, pressure.  Relieving factors: rest.    Previous treatment: splint, bandage.      Past medical history:  has a  past medical history of Anaphylactic reaction (8/14/2015), Anxiety, Depression, DM2 (diabetes mellitus, type 2) (H), GERD (gastroesophageal reflux disease), HTN (hypertension), IBS (irritable bowel syndrome), Kidney stone (6/15/2011), and Neuropathy.   Patient Active Problem List   Diagnosis     GERD (gastroesophageal reflux disease)     Chronic RLQ pain     Constipation     Chronic maxillary sinusitis     Chronic rhinitis     Hypertriglyceridemia     Benign essential hypertension     Anemia in other chronic diseases classified elsewhere     Fatty liver     Irritable bowel syndrome with diarrhea     Right inguinal hernia     Mild persistent asthma without complication     Type 2 diabetes mellitus with diabetic polyneuropathy, without long-term current use of insulin (H)     Hyperlipidemia LDL goal <100     CONNOR (generalized anxiety disorder)     Insomnia, unspecified type     Morbid obesity (H)     Neuropathy     Recurrent deep vein thrombosis (DVT) (H)     Precordial pain     Dyslipidemia     Elevated C-reactive protein     Gastric reflux     Internal derangement of knee     Nicotine dependence     Varicose veins of lower extremity     Vitamin D deficiency       Past surgical history:  has a past surgical history that includes Colonoscopy (5/1/2012); BREATH HYDROGEN TEST (3/7/2014); orthopedic surgery (10/03/2007); Colonoscopy (4/21/2014); Release carpal tunnel (Right, 1/26/2018); cystoscopy (05/01/2008); cystoscopy,+ureteroscopy (06/10/2008); vascular surgery (11/24/2008); cystoscopy (09/26/2010); lithotripsy (09/30/2010); and cystoscopy (05/18/2012).     Medications:     Current Outpatient Medications:      ACCU-CHEK GUIDE test strip, USE TO TEST BLOOD SUGAR 1 TIMES DAILY OR AS DIRECTED., Disp: 100 strip, Rfl: 3     albuterol (PROAIR HFA/PROVENTIL HFA/VENTOLIN HFA) 108 (90 Base) MCG/ACT inhaler, Inhale 2 puffs into the lungs every 6 hours as needed for shortness of breath / dyspnea or wheezing, Disp: 6.7 g, Rfl:  3     Alpha Lipoic Acid 200 MG CAPS, Take 200 mg by mouth 3 times daily, Disp: 90 capsule, Rfl: 3     amLODIPine (NORVASC) 10 MG tablet, Take 1 tablet (10 mg) by mouth daily For blood pressure, Disp: 90 tablet, Rfl: 1     amoxicillin-clavulanate (AUGMENTIN) 875-125 MG tablet, Take 1 tablet by mouth 2 times daily for 7 days, Disp: 14 tablet, Rfl: 0     ASPIRIN PO, Take 325 mg by mouth daily , Disp: , Rfl:      aspirin-acetaminophen-caffeine (EXCEDRIN MIGRAINE) 250-250-65 MG tablet, Take 1 tablet by mouth, Disp: , Rfl:      atorvastatin (LIPITOR) 40 MG tablet, Take 1 tablet (40 mg) by mouth daily, Disp: 90 tablet, Rfl: 3     baclofen (LIORESAL) 10 MG tablet, Take 1 tablet (10 mg) by mouth See Admin Instructions 2-3 po q hs., Disp: 90 tablet, Rfl: 1     blood glucose monitoring (ACCU-CHEK FASTCLIX) lancets, Use to test blood sugar 1 times daily or as directed.  Ok to substitute alternative if insurance prefers., Disp: 102 each, Rfl: 11     calcium carbonate (TUMS) 500 MG chewable tablet, Take 1 chew tab by mouth daily, Disp: , Rfl:      cyclobenzaprine (FLEXERIL) 10 MG tablet, Take 0.5-1 tablets (5-10 mg) by mouth 3 times daily as needed for muscle spasms, Disp: 90 tablet, Rfl: 3     dulaglutide (TRULICITY) 0.75 MG/0.5ML pen, Inject 0.75 mg Subcutaneous every 7 days Call with sugars in a week, Disp: 2 mL, Rfl: 2     DULoxetine (CYMBALTA) 60 MG capsule, Take 1 capsule (60 mg) by mouth 2 times daily, Disp: 180 capsule, Rfl: 3     enoxaparin ANTICOAGULANT (LOVENOX) 120 MG/0.8ML syringe, Inject 0.8 mLs (120 mg) Subcutaneous every 12 hours, Disp: 10 mL, Rfl: 1     EPINEPHrine (EPIPEN) 0.3 MG/0.3ML injection, Inject 0.3 mLs (0.3 mg) into the muscle once as needed for anaphylaxis, Disp: 2 each, Rfl: 2     fluticasone (FLONASE) 50 MCG/ACT nasal spray, Spray 1 spray into both nostrils daily, Disp: 18.2 mL, Rfl: 1     fluticasone-salmeterol (ADVAIR) 500-50 MCG/DOSE inhaler, Inhale 1 puff into the lungs 2 times daily, Disp: 1  each, Rfl: 11     insulin glargine (BASAGLAR KWIKPEN) 100 UNIT/ML pen, Inject 24 Units Subcutaneous daily, Disp: 8 mL, Rfl: 3     insulin pen needle (32G X 4 MM) 32G X 4 MM miscellaneous, Use 2 pen needles daily or as directed., Disp: 200 each, Rfl: 1     losartan (COZAAR) 50 MG tablet, Take 1 tablet (50 mg) by mouth daily For blood pressure., Disp: 90 tablet, Rfl: 1     metFORMIN (GLUCOPHAGE-XR) 500 MG 24 hr tablet, Take 2 tablets (1,000 mg) by mouth 2 times daily (with meals), Disp: 120 tablet, Rfl: 5     montelukast (SINGULAIR) 10 MG tablet, Take 1 tablet (10 mg) by mouth At Bedtime For allergies/asthma, Disp: 30 tablet, Rfl: 2     multivitamin, therapeutic (THERA-VIT) TABS, Take 1 tablet by mouth daily, Disp: , Rfl:      naproxen sodium (ANAPROX) 220 MG tablet, Take 220 mg by mouth, Disp: , Rfl:      nortriptyline (PAMELOR) 10 MG capsule, Take 2 capsules (20 mg) by mouth At Bedtime, Disp: 60 capsule, Rfl: 5     tamsulosin (FLOMAX) 0.4 MG capsule, Take 1 capsule (0.4 mg) by mouth daily, Disp: 30 capsule, Rfl: 1     warfarin ANTICOAGULANT (COUMADIN) 5 MG tablet, Take daily as directed. Current dose is 12.5 mg Monday Wednesday and Friday and 10 mg all other days unless directed otherwise by [facility], Disp: 228 tablet, Rfl: 0    Current Facility-Administered Medications:      triamcinolone (KENALOG-40) injection 40 mg, 40 mg, , , Vinnie Espinoza DO, 40 mg at 06/29/20 1354    Facility-Administered Medications Ordered in Other Visits:      BUPivacaine (MARCAINE) injection 0.5%, 10 mL, Intradermal, Once, Vinnie Espinoza DO     DOBUTamine 500 mg in dextrose 5% 250 mL (adult std conc) premix, 2.5-20 mcg/kg/min, Intravenous, Continuous, Navarro Butcher MD, Stopped at 04/25/19 0459     iopamidol (ISOVUE-M 200) solution 10 mL, 10 mL, Intravenous, Once, Vinnie Espinoza, DO     methylPREDNISolone (DEPO-MEDROL) injection 80 mg, 80 mg, Intramuscular, Once, Vinnie Espinoza,      metoprolol (LOPRESSOR) injection 5 mg,  5 mg, Intravenous, Q5 Min PRN, Navarro Butcher MD, 2 mg at 04/25/19 9906      Allergies:     Allergies   Allergen Reactions     Artificial Sweetner (Informational Only) [Artificial Sweetner (Informational Only) ] GI Disturbance     ALL artificial sweetners cause severe diarrhea & flu symptoms     Hydromorphone Anaphylaxis     Ibuprofen GI Disturbance     Morphine Sulfate Concentrate Anaphylaxis     Morphine [Fumaric Acid] Anaphylaxis     Hydrocodone-Acetaminophen Itching     Tramadol Hives     Trazodone Hives     Gabapentin      GI upset per pt     Keflex [Cephalexin] Diarrhea     Upset stomach     Codeine Phosphate Itching     Ketorolac Tromethamine Rash        Family History: family history includes Cancer (age of onset: 52) in his mother; Cancer - colorectal (age of onset: 53) in his father; Coronary Artery Disease in his father; Diabetes in his maternal aunt, maternal aunt, paternal uncle, paternal uncle, paternal uncle, paternal uncle, and another family member; Myocardial Infarction (age of onset: 35) in his paternal grandfather; Myocardial Infarction (age of onset: 45) in his father.     Social History:  reports that he has never smoked. His smokeless tobacco use includes chew. He reports current alcohol use. He reports that he does not use drugs.    Review of Systems:   Denies numbness, tingling, parasthesias.   Denies headaches.   Denies fevers, chills, night sweats   Denies chest pain.   Denies shortness of breath.   Denies any skin problems, abrasions, rashes, irritation.      Physical Exam  GENERAL APPEARANCE: healthy, alert, no distress.   SKIN: no suspicious lesions or rashes  NEURO: Normal strength and tone, mentation intact and speech normal  PSYCH:  mentation appears normal and affect normal. Not anxious.  RESPIRATORY: No increased work of breathing.    BP (!) 143/91   Pulse 90   Resp 16   Wt 127.5 kg (281 lb)   SpO2 100%   BMI 39.19 kg/m       right HAND EXAM:    The dirty splint was  removed.  Irregular laceration across the tip of the long finger. No significant skin or tissue loss noted.  Laceration extends at the end of the nail plate/bed.  There is mild swelling in the long (middle) finger tip.  There is mild tenderness in the tip of the finger.  There is slight  ecchymosis.  There is no erythema of the surrounding skin.  There is mild maceration of the skin.  There is no active drainage or bleeding.  Range of motion: PIP is within normal limits, DIP is decreased , and (any)movements are painful.  Decreased sensation to the tip but appears intact to light touch.  Brisk capillary refill to all fingers.   Palpable radial pulse, 2+  Intact epl fpl fdp fds edc wrist flexion/extenion biceps triceps deltoid.    X-rays: no new images today.  3 views right long (middle) finger from 2/14/2022 were reviewed personally in clinic today. On my review of the Xrays,   1.  Comminuted fracture of the right third finger distal phalanx tuft.  2.  Soft tissue injury and irregularity in the distal aspect of the fifth finger, with some bone fragments in this region.  3.  No joint malalignment..      Impression:  55yo RHD male with right long finger saw laceration, open tuft fracture, nail injury.    Plan:    * overall the wound doesn't look that bad, probably a little better than last week.  * there is no obvious signs of infection, no active purulence noted.  * I discussed this will likely need I+D, unclear of foreign body, etc from saw blade/wood/etc; discussed we could numb up the finger today and clean it up a bit. Patient would like to do in the OR with some sedation.  * at this time, will clean with betadine today and have him do daily to twice daily wet to dry dressings with betadine, warm soapy soaks  * will plan right long finger I+D tomorrow, [facility], tentatively ~4pm (possibly earlier)  * needs H+P from primary care provider today.  * reassess 1 week postoperative for wound check  * given  blood thinners, there will be more bleeding, as previously discussed.  * continue with antibiotics  * advised patient to call if any worsening symptoms or present to the ED  * at risk for infection given uncontrolled diabetes.  * documented covid positive 1/2/2022, so shouldn't need preop covid testing at [facility].  * plan oxycodone postoperative as patient states tolerated in the past and uses benadryl for the itching.    Edwin Ch M.D., M.S.  Dept. of Orthopaedic Surgery  [facility]      Again, thank you for allowing me to participate in the care of your patient.        Sincerely,        Edwin Ch MD

## 2022-02-21 NOTE — TELEPHONE ENCOUNTER
Type of surgery: right long finger I&D  CPT 30499, 87709   Laceration of right middle finger with foreign body and damage to nail, subsequent encounter S61.322D    Location of surgery: [facility]  Date and time of surgery: 2-22-22  3:30pm  Surgeon: Dr Ch  Pre-Op Appt Date: 2-21-22  Post-Op Appt Date: TBD   Packet sent out: No  Pre-cert/Authorization completed: No prior auth required per Medicaid online list.     Date: 2-21-22    Macrina Chamberlain  Prior Authorization Dept  [phone]

## 2022-02-21 NOTE — TELEPHONE ENCOUNTER
Central Prior Authorization Team   Phone: [phone]      PA Initiation    Medication: dulaglutide (TRULICITY) 0.75 MG/0.5ML pen-Initiated  Insurance Company: [place] Medicaid ([facility]) - Phone [phone] Fax [phone]  Pharmacy Filling the Rx: [id number] - [place] - [address]  Filling Pharmacy Phone: [phone]  Filling Pharmacy Fax:    Start Date: 2/21/2022

## 2022-02-21 NOTE — TELEPHONE ENCOUNTER
"Patient is having an I+D of right middle finger injury tomorrow in the OR so he can have sedation. Per Ortho notes today, they are aware he is on warfarin and \"given blood thinners, there will be more bleeding, as previously discussed.\"    No direct mention of needing to hold warfarin but patient would like Anticoagulation clinic team to review this and advise if he should hold his warfarin today as he has not taken it yet.    Will route to [facility] to review and advise and [facility] to call pt back.    Thalia Acharya, RN, BSN, PHN    "

## 2022-02-21 NOTE — TELEPHONE ENCOUNTER
I spoke to the patient and I gave him the phone number for Radha Swanson, Diabetic Ed [phone].  The patient said he was given Lantus and it is working well for him.    I spoke to the pharmacy and informed them that the PA is denied.   Jamila Young,  [facility]

## 2022-02-21 NOTE — TELEPHONE ENCOUNTER
Patient has been informed to get INR during pre op today (he cannot go before). The provider today may have to advise on dosing if [facility] is closed. Below notes indicate if below range or at low end of range, to take warfarin.    Thalia cAharya RN, BSN, PHN

## 2022-02-21 NOTE — PROGRESS NOTES
[facility]  [address] [facility] [address]  Phone: [phone]  Primary Provider: Bertha Romero  Pre-op Performing Provider: FELICITAS LABOY      PREOPERATIVE EVALUATION:  Today's date: 2/21/2022    Adarsh Salvador is a 56 year old male who presents for a preoperative evaluation.    Surgical Information:Surgery/Procedure:  RIGHT LONG FINGER IRRIGATION AND DEBRIDEMENT  Surgery Location: [facility]  Surgeon: Dima  Surgery Date: 2/22/2022  Time of Surgery: 3:30 pm  Where patient plans to recover: At home with family  Fax number for surgical facility:     Type of Anesthesia Anticipated: to be determined    Assessment & Plan     The proposed surgical procedure is considered LOW risk.    Preop general physical exam  There is no complication from the surgery    Open displaced fracture of phalanx of right middle finger, unspecified phalanx, initial encounter      Recurrent deep vein thrombosis (DVT) (H)    Patient is on warfarin therapy.  Patient tells me he held his warfarin dose today. He take 10 mg (Tueday ,Thursday, Sunday )   And 12.5 ( Monday, Wednesday , Friday and Saturday )   Okay to hold warfarin today.  Will check INR today, further recommendation based on INR result.  Follows with INR clinic       Risks and Recommendations:  The patient has the following additional risks and recommendations for perioperative complications:  Diabetes:  - Patient is on insulin therapy; diabetic NPO guidelines provided and discussed.    Medication Instructions:   - warfarin: Bridging therapy will be coordinated by INR clinic   - ACE/ARB: HOLD due to exceptional risk of hypotension during surgery.    - Calcium Channel Blockers: May be continued on the day of surgery.   - Long acting insulin (e.g. glargine, detemir): Take 80% of the usual evening or morning dose before surgery.   - metformin: HOLD day of surgery.   - pregabalin, gabapentin: Continue without modification.   -  SSRIs, SNRIs, TCAs, Antipsychotics: Continue without modification.    - leukotriene Inhibitors: Continue without modifcation.   - LABA, inhaled corticosteroid, long-acting anticholinergics: Continue without modification.    RECOMMENDATION:  APPROVAL GIVEN to proceed with proposed procedure, without further diagnostic evaluation.                      Subjective     HPI related to upcoming procedure:  Patient is here for preop evaluation for upcoming procedure.  He is scheduled for right middle finger irrigation and debridement.  He reports he is doing well.  No current medical concerns today he is active.  Diabetes is not well controlled, with last A1c 11.2 he was recently started on insulin with improvement of his blood sugar.  He was tested positive for COVID-19 on 01/02/2022, this was followed by pulmonary embolism on 01/15/2022.    Preop Questions 2/21/2022   1. Have you ever had a heart attack or stroke? No   2. Have you ever had surgery on your heart or blood vessels, such as a stent placement, a coronary artery bypass, or surgery on an artery in your head, neck, heart, or legs? No   3. Do you have chest pain with activity? No   4. Do you have a history of  heart failure? No   5. Do you currently have a cold, bronchitis or symptoms of other infection? No   6. Do you have a cough, shortness of breath, or wheezing? No    7. Do you or anyone in your family have previous history of blood clots? YES , hx of recurrent DVT on Warfarin    8. Do you or does anyone in your family have a serious bleeding problem such as prolonged bleeding following surgeries or cuts? No   9. Have you ever had problems with anemia or been told to take iron pills? No   10. Have you had any abnormal blood loss such as black, tarry or bloody stools? No   11. Have you ever had a blood transfusion? No   12. Are you willing to have a blood transfusion if it is medically needed before, during, or after your surgery? Yes   13. Have you or any of  your relatives ever had problems with anesthesia? No   14. Do you have sleep apnea, excessive snoring or daytime drowsiness? No   15. Do you have any artifical heart valves or other implanted medical devices like a pacemaker, defibrillator, or continuous glucose monitor? No   16. Do you have artificial joints? No   17. Are you allergic to latex? No     Health Care Directive:  Patient does not have a Health Care Directive or Living Will: Discussed advance care planning with patient; however, patient declined at this time.    Preoperative Review of :   reviewed - no record of controlled substances prescribed.      Status of Chronic Conditions:  See problem list for active medical problems.  Problems all longstanding and stable, except as noted/documented.  See ROS for pertinent symptoms related to these conditions.    ASTHMA - Patient has a longstanding history of moderate-severe Asthma . Patient has been doing well overall noting NO SYMPTOMS and continues on medication regimen consisting of  without adverse reactions or side effects.     DIABETES - Patient has a longstanding history of DiabetesType Type II . Patient is being treated with oral agents and insulin injections and denies significant side effects. Control has been poor. Complicating factors include but are not limited to: hypertension, hyperlipidemia and neuropathy.   Metoformin 1000 mg BID  Lantus 24 units in the morning     Lab Results   Component Value Date    A1C 11.2 01/24/2022    A1C 7.4 12/03/2020    A1C 7.5 03/26/2020    A1C 6.6 10/03/2019    A1C 6.8 01/11/2019    A1C 6.5 08/17/2018       HYPERLIPIDEMIA - Patient has a long history of significant Hyperlipidemia requiring medication for treatment with recent fair control. Patient reports no problems or side effects with the medication.     HYPERTENSION - Patient has longstanding history of HTN , currently denies any symptoms referable to elevated blood pressure. Specifically denies chest pain,  palpitations, dyspnea, orthopnea, PND or peripheral edema. Blood pressure readings have been in normal range. Current medication regimen is as listed below. Patient denies any side effects of medication.     Recurrent DVT:  On chronic Warfarin therapy , follow up with INR clinic   He was told by INR nurse to hold today and check INR   He take 10 mg (Tueday ,Thursday, Sunday )   And 12.5 ( Monday, Wednesday , Friday and Saturday )    Review of Systems  CONSTITUTIONAL: NEGATIVE for fever, chills, change in weight  INTEGUMENTARY/SKIN: NEGATIVE for worrisome rashes, moles or lesions  EYES: NEGATIVE for vision changes or irritation  ENT/MOUTH: NEGATIVE for ear, mouth and throat problems  RESP: NEGATIVE for significant cough or SOB  CV: NEGATIVE for chest pain, palpitations or peripheral edema  GI: NEGATIVE for nausea, abdominal pain, heartburn, or change in bowel habits  : NEGATIVE for frequency, dysuria, or hematuria  MUSCULOSKELETAL: NEGATIVE for significant arthralgias or myalgia  NEURO: NEGATIVE for weakness, dizziness or paresthesias  ENDOCRINE: NEGATIVE for temperature intolerance, skin/hair changes  HEME: NEGATIVE for bleeding problems  PSYCHIATRIC: NEGATIVE for changes in mood or affect    Patient Active Problem List    Diagnosis Date Noted     Recurrent deep vein thrombosis (DVT) (H) 09/27/2021     Priority: Medium     Precordial pain 02/15/2021     Priority: Medium     Dyslipidemia 02/15/2021     Priority: Medium     Elevated C-reactive protein 02/15/2021     Priority: Medium     Gastric reflux 02/15/2021     Priority: Medium     Internal derangement of knee 02/15/2021     Priority: Medium     Nicotine dependence 02/15/2021     Priority: Medium     Varicose veins of lower extremity 02/15/2021     Priority: Medium     Vitamin D deficiency 02/15/2021     Priority: Medium     Neuropathy 06/23/2020     Priority: Medium     Morbid obesity (H) 03/26/2020     Priority: Medium     Insomnia, unspecified type  05/09/2019     Priority: Medium     CONNOR (generalized anxiety disorder) 05/08/2019     Priority: Medium     Hyperlipidemia LDL goal <100 08/17/2018     Priority: Medium     Type 2 diabetes mellitus with diabetic polyneuropathy, without long-term current use of insulin (H) 01/18/2018     Priority: Medium     Mild persistent asthma without complication 01/12/2018     Priority: Medium     Right inguinal hernia 06/14/2016     Priority: Medium     Irritable bowel syndrome with diarrhea 03/21/2016     Priority: Medium     Fatty liver 02/22/2016     Priority: Medium     Benign essential hypertension 12/15/2015     Priority: Medium     Anemia in other chronic diseases classified elsewhere 12/15/2015     Priority: Medium     Hypertriglyceridemia 07/12/2013     Priority: Medium     Chronic maxillary sinusitis 09/18/2012     Priority: Medium     Believes this is secondary to exposure to toxic fumes at work- he is a .  He regularly wears a mask ventilator and believes this helps.  Has only tried intermittent nasal washes, and OTC medications.  Not ever tried steroid nasal sprays or other controller medications.         Chronic rhinitis 09/18/2012     Priority: Medium     Constipation 04/24/2012     Priority: Medium     GERD (gastroesophageal reflux disease) 06/15/2011     Priority: Medium     Chronic RLQ pain 06/15/2011     Priority: Medium      Past Medical History:   Diagnosis Date     Anaphylactic reaction 8/14/2015     Anxiety      Depression      DM2 (diabetes mellitus, type 2) (H)      GERD (gastroesophageal reflux disease)      HTN (hypertension)      IBS (irritable bowel syndrome)      Kidney stone 6/15/2011    Pt believes these were Calcium stones     Neuropathy      Past Surgical History:   Procedure Laterality Date     COLONOSCOPY  5/1/2012    Procedure:COLONOSCOPY; screening colonoscopy; Surgeon:KINGSLEY DOS SANTOS; Location: OR     COLONOSCOPY  4/21/2014    Procedure: COMBINED COLONOSCOPY, SINGLE  BIOPSY/POLYPECTOMY BY BIOPSY;  Surgeon: Duane, William Charles, MD;  Location: [facility] OR     CYSTOSCOPY  05/01/2008    CYSTOSCOPY W/ URETERAL STENT PLACEMENT 05/01/2008      CYSTOSCOPY  09/26/2010    CYSTOSCOPY W/ URETERAL STENT PLACEMENT 09/26/2010 Left      CYSTOSCOPY  05/18/2012    CYSTOSCOPY, LEFT URETEROSCOPY AND STENT PLACEMENT left retrograde 05/18/2012      CYSTOSCOPY,+URETEROSCOPY  06/10/2008    URETEROSCOPY 06/10/2008 Right      HC BREATH HYDROGEN TEST  3/7/2014    Procedure: HYDROGEN BREATH TEST;  Surgeon: Darion Swift MD;  Location: [facility]     LITHOTRIPSY  09/30/2010    LITHOTRIPSY 09/30/2010 LEFT EXTRACORPOREAL SHOCK WAVE LITHOTRIPSY, FLEXIBLE CYSTOSCOPY, LEFT STENT REMOVAL       ORTHOPEDIC SURGERY  10/03/2007    KNEE ARTHROSCOPY 10/03/2007 RightX2       RELEASE CARPAL TUNNEL Right 1/26/2018    Procedure: RELEASE CARPAL TUNNEL;  Right carpal tunnel release;  Surgeon: Wiliam Saenz MD;  Location: [facility] OR     VASCULAR SURGERY  11/24/2008    Radiofrequency ablation left saphenous vein 11/24/2008 Done by Dr. Lozoya       Current Outpatient Medications   Medication Sig Dispense Refill     ACCU-CHEK GUIDE test strip USE TO TEST BLOOD SUGAR 1 TIMES DAILY OR AS DIRECTED. 100 strip 3     albuterol (PROAIR HFA/PROVENTIL HFA/VENTOLIN HFA) 108 (90 Base) MCG/ACT inhaler Inhale 2 puffs into the lungs every 6 hours as needed for shortness of breath / dyspnea or wheezing 6.7 g 3     Alpha Lipoic Acid 200 MG CAPS Take 200 mg by mouth 3 times daily 90 capsule 3     amLODIPine (NORVASC) 10 MG tablet Take 1 tablet (10 mg) by mouth daily For blood pressure 90 tablet 1     amoxicillin-clavulanate (AUGMENTIN) 875-125 MG tablet Take 1 tablet by mouth 2 times daily for 7 days 14 tablet 0     ASPIRIN PO Take 325 mg by mouth daily        aspirin-acetaminophen-caffeine (EXCEDRIN MIGRAINE) 250-250-65 MG tablet Take 1 tablet by mouth       atorvastatin (LIPITOR) 40 MG tablet Take 1 tablet (40 mg) by mouth daily 90 tablet 3      baclofen (LIORESAL) 10 MG tablet Take 1 tablet (10 mg) by mouth See Admin Instructions 2-3 po q hs. 90 tablet 1     blood glucose monitoring (ACCU-CHEK FASTCLIX) lancets Use to test blood sugar 1 times daily or as directed.  Ok to substitute alternative if insurance prefers. 102 each 11     calcium carbonate (TUMS) 500 MG chewable tablet Take 1 chew tab by mouth daily       cyclobenzaprine (FLEXERIL) 10 MG tablet Take 0.5-1 tablets (5-10 mg) by mouth 3 times daily as needed for muscle spasms 90 tablet 3     dulaglutide (TRULICITY) 0.75 MG/0.5ML pen Inject 0.75 mg Subcutaneous every 7 days Call with sugars in a week 2 mL 2     DULoxetine (CYMBALTA) 60 MG capsule Take 1 capsule (60 mg) by mouth 2 times daily 180 capsule 3     enoxaparin ANTICOAGULANT (LOVENOX) 120 MG/0.8ML syringe Inject 0.8 mLs (120 mg) Subcutaneous every 12 hours 10 mL 1     EPINEPHrine (EPIPEN) 0.3 MG/0.3ML injection Inject 0.3 mLs (0.3 mg) into the muscle once as needed for anaphylaxis 2 each 2     fluticasone (FLONASE) 50 MCG/ACT nasal spray Spray 1 spray into both nostrils daily 18.2 mL 1     fluticasone-salmeterol (ADVAIR) 500-50 MCG/DOSE inhaler Inhale 1 puff into the lungs 2 times daily 1 each 11     insulin glargine (BASAGLAR KWIKPEN) 100 UNIT/ML pen Inject 24 Units Subcutaneous daily 8 mL 3     insulin pen needle (32G X 4 MM) 32G X 4 MM miscellaneous Use 2 pen needles daily or as directed. 200 each 1     losartan (COZAAR) 50 MG tablet Take 1 tablet (50 mg) by mouth daily For blood pressure. 90 tablet 1     metFORMIN (GLUCOPHAGE-XR) 500 MG 24 hr tablet Take 2 tablets (1,000 mg) by mouth 2 times daily (with meals) 120 tablet 5     montelukast (SINGULAIR) 10 MG tablet Take 1 tablet (10 mg) by mouth At Bedtime For allergies/asthma 30 tablet 2     multivitamin, therapeutic (THERA-VIT) TABS Take 1 tablet by mouth daily       naproxen sodium (ANAPROX) 220 MG tablet Take 220 mg by mouth       nortriptyline (PAMELOR) 10 MG capsule Take 2  capsules (20 mg) by mouth At Bedtime 60 capsule 5     tamsulosin (FLOMAX) 0.4 MG capsule Take 1 capsule (0.4 mg) by mouth daily 30 capsule 1     warfarin ANTICOAGULANT (COUMADIN) 5 MG tablet Take daily as directed. Current dose is 12.5 mg Monday Wednesday and Friday and 10 mg all other days unless directed otherwise by  tablet 0       Allergies   Allergen Reactions     Artificial Sweetner (Informational Only) [Artificial Sweetner (Informational Only) ] GI Disturbance     ALL artificial sweetners cause severe diarrhea & flu symptoms     Hydromorphone Anaphylaxis     Ibuprofen GI Disturbance     Morphine Sulfate Concentrate Anaphylaxis     Morphine [Fumaric Acid] Anaphylaxis     Hydrocodone-Acetaminophen Itching     Tramadol Hives     Trazodone Hives     Gabapentin      GI upset per pt     Keflex [Cephalexin] Diarrhea     Upset stomach     Codeine Phosphate Itching     Ketorolac Tromethamine Rash        Social History     Tobacco Use     Smoking status: Never Smoker     Smokeless tobacco: Current User     Types: Chew     Tobacco comment: Chew   Substance Use Topics     Alcohol use: Yes     Alcohol/week: 0.0 standard drinks     Comment: occasional, few drinks a month     Family History   Problem Relation Age of Onset     Cancer Mother 52        lung, smoked     Cancer - colorectal Father 53        unsure type, pt attributes to radiation exposure     Myocardial Infarction Father 45     Coronary Artery Disease Father      Myocardial Infarction Paternal Grandfather 35     Diabetes Other         nephew- type 1     Diabetes Maternal Aunt         1     Diabetes Maternal Aunt         2     Diabetes Paternal Uncle         1     Diabetes Paternal Uncle         2     Diabetes Paternal Uncle         3     Diabetes Paternal Uncle         4     Colon Cancer No family hx of      History   Drug Use No         Objective     There were no vitals taken for this visit.    Physical Exam    GENERAL APPEARANCE: healthy, alert and no  distress     EYES: EOMI,  PERRL     HENT: ear canals and TM's normal and nose and mouth without ulcers or lesions     NECK: no adenopathy, no asymmetry, masses, or scars and thyroid normal to palpation     RESP: lungs clear to auscultation - no rales, rhonchi or wheezes     CV: regular rates and rhythm, normal S1 S2, no S3 or S4 and no murmur, click or rub     ABDOMEN:  soft, nontender, no HSM or masses and bowel sounds normal     MS: extremities normal- no gross deformities noted, no evidence of inflammation in joints, FROM in all extremities.     SKIN: no suspicious lesions or rashes     NEURO: Normal strength and tone, sensory exam grossly normal, mentation intact and speech normal     PSYCH: mentation appears normal. and affect normal/bright     LYMPHATICS: No cervical adenopathy    Recent Labs   Lab Test 02/18/22  0835 02/14/22  0935 01/24/22  0829 01/24/22  0812 01/15/22  0000 01/02/22  1500 09/27/21  0831 01/29/21  0953 12/03/20  1038 08/13/20  0000 03/26/20  1048   HGB  --   --   --   --   --  13.9  --  13.1*  --   --   --    PLT  --   --   --   --   --  181  --  211  --   --   --    INR 1.9* 1.85*   < >  --    < >  --    < >  --   --   --   --    NA  --   --   --   --   --   --   --  140  --   --  140   POTASSIUM  --   --   --   --   --   --   --  3.9  --  4.4 4.1   CR  --   --   --   --   --   --   --  0.94  --  1.02 0.87   A1C  --   --   --  11.2*  --   --   --   --  7.4*  --  7.5*    < > = values in this interval not displayed.        Diagnostics:  Labs pending at this time.  Results will be reviewed when available.   No EKG required, no history of coronary heart disease, significant arrhythmia, peripheral arterial disease or other structural heart disease.    Revised Cardiac Risk Index (RCRI):  The patient has the following serious cardiovascular risks for perioperative complications:   - Diabetes Mellitus (on Insulin) = 1 point     RCRI Interpretation: 1 point: Class II (low risk - 0.9% complication  rate)           Signed Electronically by: Giselle Peres MD  Copy of this evaluation report is provided to requesting physician.

## 2022-02-21 NOTE — TELEPHONE ENCOUNTER
PRIOR AUTHORIZATION DENIED    Medication: dulaglutide (TRULICITY) 0.75 MG/0.5ML pen-DENIED    Denial Date: 2/21/2022    Denial Rational: Criteria not met            Appeal Information:

## 2022-02-22 ENCOUNTER — ANTICOAGULATION THERAPY VISIT (OUTPATIENT)
Dept: ANTICOAGULATION | Facility: CLINIC | Age: 57
End: 2022-02-22
Payer: COMMERCIAL

## 2022-02-22 DIAGNOSIS — I82.409 RECURRENT DEEP VEIN THROMBOSIS (DVT) (H): Primary | ICD-10-CM

## 2022-02-22 LAB
ANION GAP SERPL CALCULATED.3IONS-SCNC: 6 MMOL/L (ref 3–14)
BUN SERPL-MCNC: 16 MG/DL (ref 7–30)
CALCIUM SERPL-MCNC: 9.3 MG/DL (ref 8.5–10.1)
CHLORIDE BLD-SCNC: 110 MMOL/L (ref 94–109)
CO2 SERPL-SCNC: 26 MMOL/L (ref 20–32)
CREAT SERPL-MCNC: 0.88 MG/DL (ref 0.66–1.25)
GFR SERPL CREATININE-BSD FRML MDRD: >90 ML/MIN/1.73M2
GLUCOSE BLD-MCNC: 200 MG/DL (ref 70–99)
POTASSIUM BLD-SCNC: 4 MMOL/L (ref 3.4–5.3)
SODIUM SERPL-SCNC: 142 MMOL/L (ref 133–144)

## 2022-02-22 NOTE — PROGRESS NOTES
Anticoagulation Management    Unable to reach Adarsh today x2 last try at 5pm had I&d of finger today was supposed to take warfain last night He did not get that message likely due to late INR draw in clinic.  Has INR apt 2/23  Yesterdays INR result of 1.8 is subtherapeutic (goal INR of 2.0-3.0).  Result received from: Clinic Lab    Follow up required to He skipped yesterday dose per note and is having I&D today Unable to leave message vm is full.     No instructions provided. Unable to leave voicemail.      Anticoagulation clinic to follow up    Joya Suero RN

## 2022-02-22 NOTE — TELEPHONE ENCOUNTER
Unable to Reach today or leave [facility] for I&D scheduled today.  Valarie Segovia Rn  [facility]     [ Start Thermo CO sample ]

## 2022-02-23 NOTE — PROGRESS NOTES
ANTICOAGULATION MANAGEMENT     Adarsh Salvador 56 year old male is on warfarin with subtherapeutic INR result. (Goal INR 2.0-3.0)    Recent labs: (last 7 days)     02/21/22  1811   INR 1.8*       ASSESSMENT     Source(s): Chart Review and Patient/Caregiver Call       Warfarin doses taken: Less warfarin taken than planned which may be affecting INR    Diet: No new diet changes identified    New illness, injury, or hospitalization: Had I&D on finger yesterday.    Medication/supplement changes: None noted    Signs or symptoms of bleeding or clotting: No    Previous INR: Subtherapeutic    Additional findings: None     PLAN     Recommended plan for no diet, medication or health factor changes affecting INR     Dosing Instructions: Continue your current warfarin dose with next INR in 1 week       Summary  As of 2/22/2022    Full warfarin instructions:  2/22: Hold; Otherwise 10 mg every Sun, Tue, Thu; 12.5 mg all other days   Next INR check:  2/28/2022             Telephone call with Adarsh who verbalizes understanding and agrees to plan    Lab visit scheduled    Education provided: Goal range and significance of current result    Plan made per [facility] anticoagulation protocol    Rubi Nathan RN  Anticoagulation Clinic  2/23/2022    _______________________________________________________________________     Anticoagulation Episode Summary     Current INR goal:  2.0-3.0   TTR:  24.5 % (2.4 wk)   Target end date:  Indefinite   Send INR reminders to:  MANOJ ANDOVER    Indications    Recurrent deep vein thrombosis (DVT) (H) [I82.409]           Comments:           Anticoagulation Care Providers     Provider Role Specialty Phone number    Bertha Romero PA-C Referring Family Medicine [phone]

## 2022-02-23 NOTE — PROGRESS NOTES
Anticoagulation Management    [facility] to reach Adarsh today.    [facility]      Anticoagulation clinic to follow up    Rubi Nathan RN

## 2022-02-24 ENCOUNTER — VIRTUAL VISIT (OUTPATIENT)
Dept: EDUCATION SERVICES | Facility: OTHER | Age: 57
End: 2022-02-24
Attending: PHYSICIAN ASSISTANT
Payer: MEDICAID

## 2022-02-24 DIAGNOSIS — E11.42 TYPE 2 DIABETES MELLITUS WITH DIABETIC POLYNEUROPATHY, WITHOUT LONG-TERM CURRENT USE OF INSULIN (H): ICD-10-CM

## 2022-02-24 DIAGNOSIS — E11.9 TYPE 2 DIABETES MELLITUS WITHOUT COMPLICATION, WITHOUT LONG-TERM CURRENT USE OF INSULIN (H): ICD-10-CM

## 2022-02-24 DIAGNOSIS — E11.65 TYPE 2 DIABETES MELLITUS WITH HYPERGLYCEMIA, WITH LONG-TERM CURRENT USE OF INSULIN (H): ICD-10-CM

## 2022-02-24 DIAGNOSIS — Z79.4 TYPE 2 DIABETES MELLITUS WITH HYPERGLYCEMIA, WITH LONG-TERM CURRENT USE OF INSULIN (H): ICD-10-CM

## 2022-02-24 PROCEDURE — G0108 DIAB MANAGE TRN  PER INDIV: HCPCS | Performed by: DIETITIAN, REGISTERED

## 2022-02-24 RX ORDER — INSULIN GLARGINE 100 [IU]/ML
27 INJECTION, SOLUTION SUBCUTANEOUS DAILY
Qty: 15 ML | Refills: 3 | Status: SHIPPED | OUTPATIENT
Start: 2022-02-24 | End: 2022-04-01

## 2022-02-24 RX ORDER — BLOOD SUGAR DIAGNOSTIC
STRIP MISCELLANEOUS
Qty: 300 STRIP | Refills: 3 | Status: SHIPPED | OUTPATIENT
Start: 2022-02-24 | End: 2022-12-22

## 2022-02-24 NOTE — PATIENT INSTRUCTIONS
1).  Test blood sugar up to 3 times per day.  Fasting and before other meals and 2 hours after start of meal are good times to check.  Occasional bed time    2).  Increase glargine/basaglar insulin to 27 units daily starting tomorrow morning.  Then evaluate your fasting blood sugar (first blood sugar before eating) every 3 days and if consistently above 130 increase insulin 1-2 units.  If you get up to 40 units daily, I want you to stop increasing and reach out on my chart    3).  I will email you handout on treating low blood sugar

## 2022-02-24 NOTE — LETTER
"    2/24/2022         RE: Adarsh Savlador  [id number]   [place] [address]        Dear Colleague,    Thank you for referring your patient, Adarsh Salvador, to the [facility]. Please see a copy of my visit note below.    Diabetes Self-Management Education & Support    Presents for: Individual review    SUBJECTIVE/OBJECTIVE:  Presents for: Individual review  Accompanied by: Self  Diabetes education in the past 24mo: No  Focus of Visit: Monitoring, Taking Medication, Healthy Eating  Diabetes type: Type 2  Date of diagnosis:  (7 years ago)  Disease course: Other (coming down gradually)  How confident are you filling out medical forms by yourself:: Not Assessed  Diabetes management related comments/concerns: \"I would love to know how to get my blood sugars under better control.\"  Difficulty affording diabetes medication?: Sometimes  Difficulty affording diabetes testing supplies?: No  Other concerns:: None  Cultural Influences/Ethnic Background:  American      Diabetes Symptoms & Complications:  Fatigue: Yes (recently had sugery)  Neuropathy: Yes  Polydipsia: Yes  Polyphagia: Sometimes (Says when working ate three meals per day and not as active right now)  Polyuria: Yes  Visual change: Yes  Slow healing wounds:  (just got surgery on finger)  Symptom course: Stable  Weight trend: Decreasing (trying to lose weight; says late October was 325# down to 275#)  Complications assessed today?: Yes  Peripheral neuropathy: Yes    Patient Problem List and Family Medical History reviewed for relevant medical history, current medical status, and diabetes risk factors.    Vitals:  There were no vitals taken for this visit.  Estimated body mass index is 39.92 kg/m  as calculated from the following:    Height as of 2/21/22: 1.778 m (5' 10\").    Weight as of 2/21/22: 126.2 kg (278 lb 3.2 oz).   Last 3 BP:   BP Readings from Last 3 Encounters:   02/21/22 133/82   02/21/22 (!) 143/91   02/14/22 (!) 164/102       History "   Smoking Status     Never Smoker   Smokeless Tobacco     Current User     Types: Chew     Comment: Chew       Labs:  Lab Results   Component Value Date    A1C 11.2 01/24/2022    A1C 7.4 12/03/2020     Lab Results   Component Value Date     02/21/2022     01/29/2021     Lab Results   Component Value Date    LDL  01/24/2022      Comment:      Cannot estimate LDL when triglyceride exceeds 400 mg/dL     01/24/2022    LDL  03/26/2020     Cannot estimate LDL when triglyceride exceeds 400 mg/dL     03/26/2020     HDL Cholesterol   Date Value Ref Range Status   03/26/2020 32 (L) >39 mg/dL Final     Direct Measure HDL   Date Value Ref Range Status   01/24/2022 33 (L) >=40 mg/dL Final   ]  GFR Estimate   Date Value Ref Range Status   02/21/2022 >90 >60 mL/min/1.73m2 Final     Comment:     Effective December 21, 2021 eGFRcr in adults is calculated using the 2021 CKD-EPI creatinine equation which includes age and gender (Neha et al., NEJM, DOI: 10.1056/IPHSwu3191000)   01/29/2021 >90 >60 mL/min/[1.73_m2] Final     Comment:     Non  GFR Calc  Starting 12/18/2018, serum creatinine based estimated GFR (eGFR) will be   calculated using the Chronic Kidney Disease Epidemiology Collaboration   (CKD-EPI) equation.       GFR Estimate If Black   Date Value Ref Range Status   01/29/2021 >90 >60 mL/min/[1.73_m2] Final     Comment:      GFR Calc  Starting 12/18/2018, serum creatinine based estimated GFR (eGFR) will be   calculated using the Chronic Kidney Disease Epidemiology Collaboration   (CKD-EPI) equation.       Lab Results   Component Value Date    CR 0.88 02/21/2022    CR 0.94 01/29/2021     No results found for: MICROALBUMIN    Healthy Eating:  Healthy Eating Assessed Today: Yes  Cultural/Restorationism diet restrictions?: No  Meal planning/habits: Smaller portions  Meals include: Breakfast, Dinner  Breakfast: eggs, schultz, couple pieces of toast.  Maybe a glass of  milk  Dinner: meat and potatoes  Beverages: Water, Tea, Coffee, Other (carbonated water)  Has patient met with a dietitian in the past?: Yes    Being Active:  Being Active Assessed Today: Yes  Exercise:: Currently not exercising  Barrier to exercise: Physical limitation (pain from surgery)    Monitoring:  Monitoring Assessed Today: Yes  Did patient bring glucose meter to appointment? : Yes  Blood Glucose Meter: Accu-chek  Times checking blood sugar at home (number): 1  Times checking blood sugar at home (per): Day  Blood glucose trend: Decreasing    Not on trulicity due to cost  24 units nightly (0900 in the morning) Lantus (has been on for a week)    Checking blood sugar 1x per day - would like to check more    Date Breakfast  Lunch  Dinner  Bedtime    Before After Before After Before After    2/24 173         2/23 191         2/22 129         2/21 217         2/20 172         2/19 151      163   2/18              Date Breakfast  Lunch  Dinner  Bedtime    Before After Before After Before After    2/17 2/16 156         2/15 185         2/14 201         2/13 2/12 210   315* couple hours after eating      2/11 221 261              Taking Medications:  Diabetes Medication(s)     Biguanides       metFORMIN (GLUCOPHAGE-XR) 500 MG 24 hr tablet    Take 2 tablets (1,000 mg) by mouth 2 times daily (with meals)    Insulin       insulin glargine (BASAGLAR KWIKPEN) 100 UNIT/ML pen    Inject 27 Units Subcutaneous daily Max Total Daily Dose 40 units per day          Taking Medication Assessed Today: Yes  Current Treatments: Diet, Insulin Injections, Oral Medication (taken by mouth)  Dose schedule: Pre-breakfast  Given by: Patient  Injection/Infusion sites: Abdomen (Says wondering if can inject some place else other than abdomen)  Problems taking diabetes medications regularly?: No  Diabetes medication side effects?: No    Problem Solving:  Problem Solving Assessed Today: Yes  Is the patient at risk for  hypoglycemia?: Yes  Hypoglycemia Frequency: Never              Reducing Risks:  Reducing Risks Assessed Today: No  CAD Risks: Diabetes Mellitus, Obesity, Male sex    Healthy Coping:  Emotional response to diabetes: Ready to learn, Acceptance  Stage of change: ACTION (Actively working towards change)  Patient Activation Measure Survey Score:  DEMETRIUS Score (Last Two) 4/9/2019 1/24/2022   DEMETRIUS Raw Score 40 40   Activation Score 100 100   DEMETRIUS Level 4 4       Diabetes knowledge and skills assessment:   Patient is knowledgeable in diabetes management concepts related to: Monitoring and Taking Medication    Patient needs further education on the following diabetes management concepts: Healthy Eating and Monitoring    Based on learning assessment above, most appropriate setting for further diabetes education would be: Individual setting.      INTERVENTIONS:    Education provided today on:  AADE Self-Care Behaviors:  Monitoring: individual blood glucose targets and frequency of monitoring  Taking Medication: proper site selection and rotation for injections  Problem Solving: low blood glucose - causes, signs/symptoms, treatment and prevention    Opportunities for ongoing education and support in diabetes-self management were discussed.    Pt verbalized understanding of concepts discussed and recommendations provided today.       Education Materials Provided:  Hypoglycemia Signs and Symptoms      ASSESSMENT:  Pt is new to insulin.  Pt mentions his A1C has increased since he stopped working and not on as regular of a meal schedule or as active.  Trulicity is too expensive.  Pt remains on 24 units of glargine in the morning.  Adjust to 27 units per protocol and gave pt titration schedule until consistently 130 or less in the morning.  Discussed other places to inject insulin as pt concerned about also having to inject lovenox in abdomen.  Reviewed rule of 15.  Asked pt to vary his testing times.  Follow up in a little over a  month.    Goals Addressed as of 2/24/2022 at 1:09 PM        Monitoring (pt-stated)     Added 2/24/22 by Amanda Hussein RD      Aim to check blood sugar 2-3 times per day            Patient's most recent   Lab Results   Component Value Date    A1C 11.2 01/24/2022    A1C 7.4 12/03/2020    is not meeting goal of <7.0    PLAN  See Patient Instructions for co-developed, patient-stated behavior change goals.  AVS printed and provided to patient today. See Follow-Up section for recommended follow-up.      Time Spent: 50 minutes  Encounter Type: Individual    Any diabetes medication dose changes were made via the CDE Protocol and Collaborative Practice Agreement with the patient's referring provider. A copy of this encounter was shared with the provider.    Amanda Hussein RD [facility]  [phone]

## 2022-02-24 NOTE — PROGRESS NOTES
"Diabetes Self-Management Education & Support    Presents for: Individual review    SUBJECTIVE/OBJECTIVE:  Presents for: Individual review  Accompanied by: Self  Diabetes education in the past 24mo: No  Focus of Visit: Monitoring, Taking Medication, Healthy Eating  Diabetes type: Type 2  Date of diagnosis:  (7 years ago)  Disease course: Other (coming down gradually)  How confident are you filling out medical forms by yourself:: Not Assessed  Diabetes management related comments/concerns: \"I would love to know how to get my blood sugars under better control.\"  Difficulty affording diabetes medication?: Sometimes  Difficulty affording diabetes testing supplies?: No  Other concerns:: None  Cultural Influences/Ethnic Background:  American      Diabetes Symptoms & Complications:  Fatigue: Yes (recently had sugery)  Neuropathy: Yes  Polydipsia: Yes  Polyphagia: Sometimes (Says when working ate three meals per day and not as active right now)  Polyuria: Yes  Visual change: Yes  Slow healing wounds:  (just got surgery on finger)  Symptom course: Stable  Weight trend: Decreasing (trying to lose weight; says late October was 325# down to 275#)  Complications assessed today?: Yes  Peripheral neuropathy: Yes    Patient Problem List and Family Medical History reviewed for relevant medical history, current medical status, and diabetes risk factors.    Vitals:  There were no vitals taken for this visit.  Estimated body mass index is 39.92 kg/m  as calculated from the following:    Height as of 2/21/22: 1.778 m (5' 10\").    Weight as of 2/21/22: 126.2 kg (278 lb 3.2 oz).   Last 3 BP:   BP Readings from Last 3 Encounters:   02/21/22 133/82   02/21/22 (!) 143/91   02/14/22 (!) 164/102       History   Smoking Status     Never Smoker   Smokeless Tobacco     Current User     Types: Chew     Comment: Chew       Labs:  Lab Results   Component Value Date    A1C 11.2 01/24/2022    A1C 7.4 12/03/2020     Lab Results   Component Value Date    "  02/21/2022     01/29/2021     Lab Results   Component Value Date    LDL  01/24/2022      Comment:      Cannot estimate LDL when triglyceride exceeds 400 mg/dL     01/24/2022    LDL  03/26/2020     Cannot estimate LDL when triglyceride exceeds 400 mg/dL     03/26/2020     HDL Cholesterol   Date Value Ref Range Status   03/26/2020 32 (L) >39 mg/dL Final     Direct Measure HDL   Date Value Ref Range Status   01/24/2022 33 (L) >=40 mg/dL Final   ]  GFR Estimate   Date Value Ref Range Status   02/21/2022 >90 >60 mL/min/1.73m2 Final     Comment:     Effective December 21, 2021 eGFRcr in adults is calculated using the 2021 CKD-EPI creatinine equation which includes age and gender (Neha et al., NEJM, DOI: 10.1056/[id number])   01/29/2021 >90 >60 mL/min/[1.73_m2] Final     Comment:     Non  GFR Calc  Starting 12/18/2018, serum creatinine based estimated GFR (eGFR) will be   calculated using the Chronic Kidney Disease Epidemiology Collaboration   (CKD-EPI) equation.       GFR Estimate If Black   Date Value Ref Range Status   01/29/2021 >90 >60 mL/min/[1.73_m2] Final     Comment:      GFR Calc  Starting 12/18/2018, serum creatinine based estimated GFR (eGFR) will be   calculated using the Chronic Kidney Disease Epidemiology Collaboration   (CKD-EPI) equation.       Lab Results   Component Value Date    CR 0.88 02/21/2022    CR 0.94 01/29/2021     No results found for: MICROALBUMIN    Healthy Eating:  Healthy Eating Assessed Today: Yes  Cultural/Hoahaoism diet restrictions?: No  Meal planning/habits: Smaller portions  Meals include: Breakfast, Dinner  Breakfast: eggs, schultz, couple pieces of toast.  Maybe a glass of milk  Dinner: meat and potatoes  Beverages: Water, Tea, Coffee, Other (carbonated water)  Has patient met with a dietitian in the past?: Yes    Being Active:  Being Active Assessed Today: Yes  Exercise:: Currently not exercising  Barrier to exercise:  Physical limitation (pain from surgery)    Monitoring:  Monitoring Assessed Today: Yes  Did patient bring glucose meter to appointment? : Yes  Blood Glucose Meter: Accu-chek  Times checking blood sugar at home (number): 1  Times checking blood sugar at home (per): Day  Blood glucose trend: Decreasing    Not on trulicity due to cost  24 units nightly (0900 in the morning) Lantus (has been on for a week)    Checking blood sugar 1x per day - would like to check more    Date Breakfast  Lunch  Dinner  Bedtime    Before After Before After Before After    2/24 173         2/23 191         2/22 129         2/21 217         2/20 172         2/19 151      163   2/18              Date Breakfast  Lunch  Dinner  Bedtime    Before After Before After Before After    2/17 2/16 156         2/15 185         2/14 201         2/13 2/12 210   315* couple hours after eating      2/11 221 261              Taking Medications:  Diabetes Medication(s)     Biguanides       metFORMIN (GLUCOPHAGE-XR) 500 MG 24 hr tablet    Take 2 tablets (1,000 mg) by mouth 2 times daily (with meals)    Insulin       insulin glargine (BASAGLAR KWIKPEN) 100 UNIT/ML pen    Inject 27 Units Subcutaneous daily Max Total Daily Dose 40 units per day          Taking Medication Assessed Today: Yes  Current Treatments: Diet, Insulin Injections, Oral Medication (taken by mouth)  Dose schedule: Pre-breakfast  Given by: Patient  Injection/Infusion sites: Abdomen (Says wondering if can inject some place else other than abdomen)  Problems taking diabetes medications regularly?: No  Diabetes medication side effects?: No    Problem Solving:  Problem Solving Assessed Today: Yes  Is the patient at risk for hypoglycemia?: Yes  Hypoglycemia Frequency: Never              Reducing Risks:  Reducing Risks Assessed Today: No  CAD Risks: Diabetes Mellitus, Obesity, Male sex    Healthy Coping:  Emotional response to diabetes: Ready to learn, Acceptance  Stage of change:  ACTION (Actively working towards change)  Patient Activation Measure Survey Score:  DEMETRIUS Score (Last Two) 4/9/2019 1/24/2022   DEMETRIUS Raw Score 40 40   Activation Score 100 100   DEMETRIUS Level 4 4       Diabetes knowledge and skills assessment:   Patient is knowledgeable in diabetes management concepts related to: Monitoring and Taking Medication    Patient needs further education on the following diabetes management concepts: Healthy Eating and Monitoring    Based on learning assessment above, most appropriate setting for further diabetes education would be: Individual setting.      INTERVENTIONS:    Education provided today on:  AADE Self-Care Behaviors:  Monitoring: individual blood glucose targets and frequency of monitoring  Taking Medication: proper site selection and rotation for injections  Problem Solving: low blood glucose - causes, signs/symptoms, treatment and prevention    Opportunities for ongoing education and support in diabetes-self management were discussed.    Pt verbalized understanding of concepts discussed and recommendations provided today.       Education Materials Provided:  Hypoglycemia Signs and Symptoms      ASSESSMENT:  Pt is new to insulin.  Pt mentions his A1C has increased since he stopped working and not on as regular of a meal schedule or as active.  Trulicity is too expensive.  Pt remains on 24 units of glargine in the morning.  Adjust to 27 units per protocol and gave pt titration schedule until consistently 130 or less in the morning.  Discussed other places to inject insulin as pt concerned about also having to inject lovenox in abdomen.  Reviewed rule of 15.  Asked pt to vary his testing times.  Follow up in a little over a month.    Goals Addressed as of 2/24/2022 at 1:09 PM        Monitoring (pt-stated)     Added 2/24/22 by Amanda Hussein RD      Aim to check blood sugar 2-3 times per day            Patient's most recent   Lab Results   Component Value Date    A1C 11.2 01/24/2022     A1C 7.4 12/03/2020    is not meeting goal of <7.0    PLAN  See Patient Instructions for co-developed, patient-stated behavior change goals.  AVS printed and provided to patient today. See Follow-Up section for recommended follow-up.      Time Spent: 50 minutes  Encounter Type: Individual    Any diabetes medication dose changes were made via the CDE Protocol and Collaborative Practice Agreement with the patient's referring provider. A copy of this encounter was shared with the provider.    Amanda Hussein RD [facility]  [phone]

## 2022-02-28 ENCOUNTER — OFFICE VISIT (OUTPATIENT)
Dept: ORTHOPEDICS | Facility: CLINIC | Age: 57
End: 2022-02-28
Payer: MEDICAID

## 2022-02-28 VITALS
HEIGHT: 70 IN | BODY MASS INDEX: 40.11 KG/M2 | SYSTOLIC BLOOD PRESSURE: 140 MMHG | DIASTOLIC BLOOD PRESSURE: 100 MMHG | WEIGHT: 280.2 LBS | HEART RATE: 94 BPM

## 2022-02-28 DIAGNOSIS — S61.312D: Primary | ICD-10-CM

## 2022-02-28 PROCEDURE — 99024 POSTOP FOLLOW-UP VISIT: CPT | Performed by: ORTHOPAEDIC SURGERY

## 2022-02-28 ASSESSMENT — PAIN SCALES - GENERAL: PAINLEVEL: MODERATE PAIN (5)

## 2022-02-28 NOTE — LETTER
2/28/2022         RE: Adarsh Salvador  [address]  [place] [address]        Dear Colleague,    Thank you for referring your patient, Adarsh Salvador, to the [facility]. Please see a copy of my visit note below.    CHIEF COMPLAINT:   Chief Complaint   Patient presents with     Right Middle Finger - Surgical Followup     I&D. DOS 2/22/22, 6 day post op. Patient notes his finger was sore for a few days but it is getting less and less. He has not taken the bandage off.          SURGICAL PROCEDURE: right long finger I+D ([facility])  DATE OF PROCEDURE: 2/22/2022      HISTORY OF PRESENT ILLNESS    Adarsh Salvador is a 56 year old male seen for postoperative follow-up of a right long finger I+D that occurred 1 weeks ago. Since surgery, pain has been improving, still sore. Denies fevers, chills, night sweats. No wound problems. Compliant with weight bearing restrictions and elevation. There have been no issues since surgery. States the finger feels better today than it did prior to surgery.      Pain 5/10.    He injured his hand 2/12/2022, using a table saw and cutting wood, lost his  and ran into the saw blade with the right long(middle) finger. Didn't seek treatment until 2 days after injury, noting to have a comminuted, open tuft fracture. Given a prescription for augmentin.     Patient is diabetic, A1C=11.2 on 1/24/2022; with neuropathy.     Patient is anticoagulated for acute DVT, on warfarin.     Patient with positive covid-19 test 1/2/2022, documented within the [employer] system.    Other PMH:  has a past medical history of Anaphylactic reaction (8/14/2015), Anxiety, Depression, DM2 (diabetes mellitus, type 2) (H), GERD (gastroesophageal reflux disease), HTN (hypertension), IBS (irritable bowel syndrome), Kidney stone (6/15/2011), and Neuropathy.  Patient Active Problem List   Diagnosis     GERD (gastroesophageal reflux disease)     Chronic RLQ pain     Constipation     Chronic  maxillary sinusitis     Chronic rhinitis     Hypertriglyceridemia     Benign essential hypertension     Anemia in other chronic diseases classified elsewhere     Fatty liver     Irritable bowel syndrome with diarrhea     Right inguinal hernia     Mild persistent asthma without complication     Type 2 diabetes mellitus with diabetic polyneuropathy, without long-term current use of insulin (H)     Hyperlipidemia LDL goal <100     CONNOR (generalized anxiety disorder)     Insomnia, unspecified type     Morbid obesity (H)     Neuropathy     Recurrent deep vein thrombosis (DVT) (H)     Precordial pain     Dyslipidemia     Elevated C-reactive protein     Gastric reflux     Internal derangement of knee     Nicotine dependence     Varicose veins of lower extremity     Vitamin D deficiency       Past surgical History:  has a past surgical history that includes Colonoscopy (5/1/2012); BREATH HYDROGEN TEST (3/7/2014); orthopedic surgery (10/03/2007); Colonoscopy (4/21/2014); Release carpal tunnel (Right, 1/26/2018); cystoscopy (05/01/2008); cystoscopy,+ureteroscopy (06/10/2008); vascular surgery (11/24/2008); cystoscopy (09/26/2010); lithotripsy (09/30/2010); and cystoscopy (05/18/2012).    Medications:   Current Outpatient Medications:      albuterol (PROAIR HFA/PROVENTIL HFA/VENTOLIN HFA) 108 (90 Base) MCG/ACT inhaler, Inhale 2 puffs into the lungs every 6 hours as needed for shortness of breath / dyspnea or wheezing, Disp: 6.7 g, Rfl: 3     Alpha Lipoic Acid 200 MG CAPS, Take 200 mg by mouth 3 times daily, Disp: 90 capsule, Rfl: 3     amLODIPine (NORVASC) 10 MG tablet, Take 1 tablet (10 mg) by mouth daily For blood pressure, Disp: 90 tablet, Rfl: 1     ASPIRIN PO, Take 325 mg by mouth daily , Disp: , Rfl:      aspirin-acetaminophen-caffeine (EXCEDRIN MIGRAINE) 250-250-65 MG tablet, Take 1 tablet by mouth, Disp: , Rfl:      atorvastatin (LIPITOR) 40 MG tablet, Take 1 tablet (40 mg) by mouth daily, Disp: 90 tablet, Rfl: 3      baclofen (LIORESAL) 10 MG tablet, Take 1 tablet (10 mg) by mouth See Admin Instructions 2-3 po q hs., Disp: 90 tablet, Rfl: 1     blood glucose (ACCU-CHEK GUIDE) test strip, Use to test blood sugar 3 times daily or as directed., Disp: 300 strip, Rfl: 3     blood glucose monitoring (ACCU-CHEK FASTCLIX) lancets, Use to test blood sugar 1 times daily or as directed.  Ok to substitute alternative if insurance prefers., Disp: 102 each, Rfl: 11     calcium carbonate (TUMS) 500 MG chewable tablet, Take 1 chew tab by mouth daily, Disp: , Rfl:      cyclobenzaprine (FLEXERIL) 10 MG tablet, Take 0.5-1 tablets (5-10 mg) by mouth 3 times daily as needed for muscle spasms, Disp: 90 tablet, Rfl: 3     DULoxetine (CYMBALTA) 60 MG capsule, Take 1 capsule (60 mg) by mouth 2 times daily, Disp: 180 capsule, Rfl: 3     enoxaparin ANTICOAGULANT (LOVENOX) 120 MG/0.8ML syringe, Inject 0.8 mLs (120 mg) Subcutaneous every 12 hours (Patient not taking: Reported on 2/21/2022), Disp: 10 mL, Rfl: 1     EPINEPHrine (EPIPEN) 0.3 MG/0.3ML injection, Inject 0.3 mLs (0.3 mg) into the muscle once as needed for anaphylaxis, Disp: 2 each, Rfl: 2     fluticasone (FLONASE) 50 MCG/ACT nasal spray, Spray 1 spray into both nostrils daily, Disp: 18.2 mL, Rfl: 1     fluticasone-salmeterol (ADVAIR) 500-50 MCG/DOSE inhaler, Inhale 1 puff into the lungs 2 times daily, Disp: 1 each, Rfl: 11     insulin glargine (BASAGLAR KWIKPEN) 100 UNIT/ML pen, Inject 27 Units Subcutaneous daily Max Total Daily Dose 40 units per day, Disp: 15 mL, Rfl: 3     insulin pen needle (32G X 4 MM) 32G X 4 MM miscellaneous, Use 2 pen needles daily or as directed., Disp: 200 each, Rfl: 1     losartan (COZAAR) 50 MG tablet, Take 1 tablet (50 mg) by mouth daily For blood pressure., Disp: 90 tablet, Rfl: 1     metFORMIN (GLUCOPHAGE-XR) 500 MG 24 hr tablet, Take 2 tablets (1,000 mg) by mouth 2 times daily (with meals), Disp: 120 tablet, Rfl: 5     montelukast (SINGULAIR) 10 MG tablet, Take 1  tablet (10 mg) by mouth At Bedtime For allergies/asthma, Disp: 30 tablet, Rfl: 2     multivitamin, therapeutic (THERA-VIT) TABS, Take 1 tablet by mouth daily, Disp: , Rfl:      naproxen sodium (ANAPROX) 220 MG tablet, Take 220 mg by mouth, Disp: , Rfl:      nortriptyline (PAMELOR) 10 MG capsule, Take 2 capsules (20 mg) by mouth At Bedtime, Disp: 60 capsule, Rfl: 5     tamsulosin (FLOMAX) 0.4 MG capsule, Take 1 capsule (0.4 mg) by mouth daily, Disp: 30 capsule, Rfl: 1     warfarin ANTICOAGULANT (COUMADIN) 5 MG tablet, Take daily as directed. Current dose is 12.5 mg Monday Wednesday and Friday and 10 mg all other days unless directed otherwise by [facility], Disp: 228 tablet, Rfl: 0    Current Facility-Administered Medications:      triamcinolone (KENALOG-40) injection 40 mg, 40 mg, , , Vinnie Espinoza DO, 40 mg at 06/29/20 1354    Facility-Administered Medications Ordered in Other Visits:      BUPivacaine (MARCAINE) injection 0.5%, 10 mL, Intradermal, Once, Vinnie Espinoza DO     DOBUTamine 500 mg in dextrose 5% 250 mL (adult std conc) premix, 2.5-20 mcg/kg/min, Intravenous, Continuous, Navarro Butcher MD, Stopped at 04/25/19 1559     iopamidol (ISOVUE-M 200) solution 10 mL, 10 mL, Intravenous, Once, Vinnie Espinoza DO     methylPREDNISolone (DEPO-MEDROL) injection 80 mg, 80 mg, Intramuscular, Once, Vinnie Espinoza DO     metoprolol (LOPRESSOR) injection 5 mg, 5 mg, Intravenous, Q5 Min PRN, Navarro Butcher MD, 2 mg at 04/25/19 9383    Allergies:   Allergies   Allergen Reactions     Artificial Sweetner (Informational Only) [Artificial Sweetner (Informational Only) ] GI Disturbance     ALL artificial sweetners cause severe diarrhea & flu symptoms     Hydromorphone Anaphylaxis     Ibuprofen GI Disturbance     Morphine Sulfate Concentrate Anaphylaxis     Morphine [Fumaric Acid] Anaphylaxis     Hydrocodone-Acetaminophen Itching     Tramadol Hives     Trazodone Hives     Gabapentin      GI upset per pt  "    Keflex [Cephalexin] Diarrhea     Upset stomach     Codeine Phosphate Itching     Ketorolac Tromethamine Rash       REVIEW OF SYSTEMS:  CONSTITUTIONAL:NEGATIVE for fever, chills, change in weight  INTEGUMENTARY/SKIN: NEGATIVE for worrisome rashes, moles or lesions  MUSCULOSKELETAL:See HPI above  NEURO: NEGATIVE for weakness, dizziness or paresthesias      PHYSICAL EXAM:  BP (!) 140/100   Pulse 94   Ht 1.778 m (5' 10\")   Wt 127.1 kg (280 lb 3.2 oz)   BMI 40.20 kg/m     GENERAL APPEARANCE: healthy, alert, no distress   SKIN: no suspicious lesions or rashes  NEURO: Normal strength and tone, mentation intact and speech normal  PSYCH:  mentation appears normal and affect normal  RESPIRATORY: No increased work of breathing.      right UPPER EXTREMITY:  Wound / Incision: healing wound with sutures to the tip of the right long finger  Inspection: swelling, ecchymosis; no active drainage or erythema.  Palpation: tender to palpation.  There is no maceration of the skin.  There is no gross deformity in the area.      X-RAY:  none indicated        Impression: 56 year old male  1 weeks  postoperative I+D right long finger, doing well.    Plan:   * wound looks good. Too soon to remove sutures today.  Weight bearing status: non weight bearing.   Keep wound clean and dry. No woodwork.  Ok for daily dry dressing changes, betadine swab cleansing.  Pain control: over the counter as needed.  Immobilization: splint   Elevation of affected extremity  Images: none  Return to clinic in 1 weeks, or sooner as needed. Plan suture removal  Continue antibiotics until gone.      Edwin Ch M.D., M.S.  Dept. of Orthopaedic Surgery  [facility]      Again, thank you for allowing me to participate in the care of your patient.        Sincerely,        Edwin Ch MD    "

## 2022-02-28 NOTE — PROGRESS NOTES
CHIEF COMPLAINT:   Chief Complaint   Patient presents with     Right Middle Finger - Surgical Followup     I&D. DOS 2/22/22, 6 day post op. Patient notes his finger was sore for a few days but it is getting less and less. He has not taken the bandage off.          SURGICAL PROCEDURE: right long finger I+D ([facility])  DATE OF PROCEDURE: 2/22/2022      HISTORY OF PRESENT ILLNESS    Adarsh Salvador is a 56 year old male seen for postoperative follow-up of a right long finger I+D that occurred 1 weeks ago. Since surgery, pain has been improving, still sore. Denies fevers, chills, night sweats. No wound problems. Compliant with weight bearing restrictions and elevation. There have been no issues since surgery. States the finger feels better today than it did prior to surgery.      Pain 5/10.    He injured his hand 2/12/2022, using a table saw and cutting wood, lost his  and ran into the saw blade with the right long(middle) finger. Didn't seek treatment until 2 days after injury, noting to have a comminuted, open tuft fracture. Given a prescription for augmentin.     Patient is diabetic, A1C=11.2 on 1/24/2022; with neuropathy.     Patient is anticoagulated for acute DVT, on warfarin.     Patient with positive covid-19 test 1/2/2022, documented within the [employer] system.    Other PMH:  has a past medical history of Anaphylactic reaction (8/14/2015), Anxiety, Depression, DM2 (diabetes mellitus, type 2) (H), GERD (gastroesophageal reflux disease), HTN (hypertension), IBS (irritable bowel syndrome), Kidney stone (6/15/2011), and Neuropathy.  Patient Active Problem List   Diagnosis     GERD (gastroesophageal reflux disease)     Chronic RLQ pain     Constipation     Chronic maxillary sinusitis     Chronic rhinitis     Hypertriglyceridemia     Benign essential hypertension     Anemia in other chronic diseases classified elsewhere     Fatty liver     Irritable bowel syndrome with diarrhea     Right inguinal hernia      Mild persistent asthma without complication     Type 2 diabetes mellitus with diabetic polyneuropathy, without long-term current use of insulin (H)     Hyperlipidemia LDL goal <100     CONNOR (generalized anxiety disorder)     Insomnia, unspecified type     Morbid obesity (H)     Neuropathy     Recurrent deep vein thrombosis (DVT) (H)     Precordial pain     Dyslipidemia     Elevated C-reactive protein     Gastric reflux     Internal derangement of knee     Nicotine dependence     Varicose veins of lower extremity     Vitamin D deficiency       Past surgical History:  has a past surgical history that includes Colonoscopy (5/1/2012); BREATH HYDROGEN TEST (3/7/2014); orthopedic surgery (10/03/2007); Colonoscopy (4/21/2014); Release carpal tunnel (Right, 1/26/2018); cystoscopy (05/01/2008); cystoscopy,+ureteroscopy (06/10/2008); vascular surgery (11/24/2008); cystoscopy (09/26/2010); lithotripsy (09/30/2010); and cystoscopy (05/18/2012).    Medications:   Current Outpatient Medications:      albuterol (PROAIR HFA/PROVENTIL HFA/VENTOLIN HFA) 108 (90 Base) MCG/ACT inhaler, Inhale 2 puffs into the lungs every 6 hours as needed for shortness of breath / dyspnea or wheezing, Disp: 6.7 g, Rfl: 3     Alpha Lipoic Acid 200 MG CAPS, Take 200 mg by mouth 3 times daily, Disp: 90 capsule, Rfl: 3     amLODIPine (NORVASC) 10 MG tablet, Take 1 tablet (10 mg) by mouth daily For blood pressure, Disp: 90 tablet, Rfl: 1     ASPIRIN PO, Take 325 mg by mouth daily , Disp: , Rfl:      aspirin-acetaminophen-caffeine (EXCEDRIN MIGRAINE) 250-250-65 MG tablet, Take 1 tablet by mouth, Disp: , Rfl:      atorvastatin (LIPITOR) 40 MG tablet, Take 1 tablet (40 mg) by mouth daily, Disp: 90 tablet, Rfl: 3     baclofen (LIORESAL) 10 MG tablet, Take 1 tablet (10 mg) by mouth See Admin Instructions 2-3 po q hs., Disp: 90 tablet, Rfl: 1     blood glucose (ACCU-CHEK GUIDE) test strip, Use to test blood sugar 3 times daily or as directed., Disp: 300 strip,  Rfl: 3     blood glucose monitoring (ACCU-CHEK FASTCLIX) lancets, Use to test blood sugar 1 times daily or as directed.  Ok to substitute alternative if insurance prefers., Disp: 102 each, Rfl: 11     calcium carbonate (TUMS) 500 MG chewable tablet, Take 1 chew tab by mouth daily, Disp: , Rfl:      cyclobenzaprine (FLEXERIL) 10 MG tablet, Take 0.5-1 tablets (5-10 mg) by mouth 3 times daily as needed for muscle spasms, Disp: 90 tablet, Rfl: 3     DULoxetine (CYMBALTA) 60 MG capsule, Take 1 capsule (60 mg) by mouth 2 times daily, Disp: 180 capsule, Rfl: 3     enoxaparin ANTICOAGULANT (LOVENOX) 120 MG/0.8ML syringe, Inject 0.8 mLs (120 mg) Subcutaneous every 12 hours (Patient not taking: Reported on 2/21/2022), Disp: 10 mL, Rfl: 1     EPINEPHrine (EPIPEN) 0.3 MG/0.3ML injection, Inject 0.3 mLs (0.3 mg) into the muscle once as needed for anaphylaxis, Disp: 2 each, Rfl: 2     fluticasone (FLONASE) 50 MCG/ACT nasal spray, Spray 1 spray into both nostrils daily, Disp: 18.2 mL, Rfl: 1     fluticasone-salmeterol (ADVAIR) 500-50 MCG/DOSE inhaler, Inhale 1 puff into the lungs 2 times daily, Disp: 1 each, Rfl: 11     insulin glargine (BASAGLAR KWIKPEN) 100 UNIT/ML pen, Inject 27 Units Subcutaneous daily Max Total Daily Dose 40 units per day, Disp: 15 mL, Rfl: 3     insulin pen needle (32G X 4 MM) 32G X 4 MM miscellaneous, Use 2 pen needles daily or as directed., Disp: 200 each, Rfl: 1     losartan (COZAAR) 50 MG tablet, Take 1 tablet (50 mg) by mouth daily For blood pressure., Disp: 90 tablet, Rfl: 1     metFORMIN (GLUCOPHAGE-XR) 500 MG 24 hr tablet, Take 2 tablets (1,000 mg) by mouth 2 times daily (with meals), Disp: 120 tablet, Rfl: 5     montelukast (SINGULAIR) 10 MG tablet, Take 1 tablet (10 mg) by mouth At Bedtime For allergies/asthma, Disp: 30 tablet, Rfl: 2     multivitamin, therapeutic (THERA-VIT) TABS, Take 1 tablet by mouth daily, Disp: , Rfl:      naproxen sodium (ANAPROX) 220 MG tablet, Take 220 mg by mouth, Disp: ,  Rfl:      nortriptyline (PAMELOR) 10 MG capsule, Take 2 capsules (20 mg) by mouth At Bedtime, Disp: 60 capsule, Rfl: 5     tamsulosin (FLOMAX) 0.4 MG capsule, Take 1 capsule (0.4 mg) by mouth daily, Disp: 30 capsule, Rfl: 1     warfarin ANTICOAGULANT (COUMADIN) 5 MG tablet, Take daily as directed. Current dose is 12.5 mg Monday Wednesday and Friday and 10 mg all other days unless directed otherwise by [facility], Disp: 228 tablet, Rfl: 0    Current Facility-Administered Medications:      triamcinolone (KENALOG-40) injection 40 mg, 40 mg, , , Vinnie Espinoza, DO, 40 mg at 06/29/20 1354    Facility-Administered Medications Ordered in Other Visits:      BUPivacaine (MARCAINE) injection 0.5%, 10 mL, Intradermal, Once, Vinnie Espinoza,      DOBUTamine 500 mg in dextrose 5% 250 mL (adult std conc) premix, 2.5-20 mcg/kg/min, Intravenous, Continuous, Navarro Butcher MD, Stopped at 04/25/19 1559     iopamidol (ISOVUE-M 200) solution 10 mL, 10 mL, Intravenous, Once, Vinnie Espinoza DO     methylPREDNISolone (DEPO-MEDROL) injection 80 mg, 80 mg, Intramuscular, Once, Vinnie Espinoza DO     metoprolol (LOPRESSOR) injection 5 mg, 5 mg, Intravenous, Q5 Min PRN, Navarro Butcher MD, 2 mg at 04/25/19 1559    Allergies:   Allergies   Allergen Reactions     Artificial Sweetner (Informational Only) [Artificial Sweetner (Informational Only) ] GI Disturbance     ALL artificial sweetners cause severe diarrhea & flu symptoms     Hydromorphone Anaphylaxis     Ibuprofen GI Disturbance     Morphine Sulfate Concentrate Anaphylaxis     Morphine [Fumaric Acid] Anaphylaxis     Hydrocodone-Acetaminophen Itching     Tramadol Hives     Trazodone Hives     Gabapentin      GI upset per pt     Keflex [Cephalexin] Diarrhea     Upset stomach     Codeine Phosphate Itching     Ketorolac Tromethamine Rash       REVIEW OF SYSTEMS:  CONSTITUTIONAL:NEGATIVE for fever, chills, change in weight  INTEGUMENTARY/SKIN: NEGATIVE for worrisome  "rashes, moles or lesions  MUSCULOSKELETAL:See HPI above  NEURO: NEGATIVE for weakness, dizziness or paresthesias      PHYSICAL EXAM:  BP (!) 140/100   Pulse 94   Ht 1.778 m (5' 10\")   Wt 127.1 kg (280 lb 3.2 oz)   BMI 40.20 kg/m     GENERAL APPEARANCE: healthy, alert, no distress   SKIN: no suspicious lesions or rashes  NEURO: Normal strength and tone, mentation intact and speech normal  PSYCH:  mentation appears normal and affect normal  RESPIRATORY: No increased work of breathing.      right UPPER EXTREMITY:  Wound / Incision: healing wound with sutures to the tip of the right long finger  Inspection: swelling, ecchymosis; no active drainage or erythema.  Palpation: tender to palpation.  There is no maceration of the skin.  There is no gross deformity in the area.      X-RAY:  none indicated        Impression: 56 year old male  1 weeks  postoperative I+D right long finger, doing well.    Plan:   * wound looks good. Too soon to remove sutures today.  Weight bearing status: non weight bearing.   Keep wound clean and dry. No woodwork.  Ok for daily dry dressing changes, betadine swab cleansing.  Pain control: over the counter as needed.  Immobilization: splint   Elevation of affected extremity  Images: none  Return to clinic in 1 weeks, or sooner as needed. Plan suture removal  Continue antibiotics until gone.      Edwin Ch M.D., M.S.  Dept. of Orthopaedic Surgery  [facility]  "

## 2022-03-02 ENCOUNTER — TELEPHONE (OUTPATIENT)
Dept: FAMILY MEDICINE | Facility: CLINIC | Age: 57
End: 2022-03-02

## 2022-03-02 ENCOUNTER — ANTICOAGULATION THERAPY VISIT (OUTPATIENT)
Dept: ANTICOAGULATION | Facility: CLINIC | Age: 57
End: 2022-03-02

## 2022-03-02 ENCOUNTER — LAB (OUTPATIENT)
Dept: LAB | Facility: CLINIC | Age: 57
End: 2022-03-02
Payer: COMMERCIAL

## 2022-03-02 DIAGNOSIS — I82.409 RECURRENT DEEP VEIN THROMBOSIS (DVT) (H): ICD-10-CM

## 2022-03-02 DIAGNOSIS — I82.409 RECURRENT DEEP VEIN THROMBOSIS (DVT) (H): Primary | ICD-10-CM

## 2022-03-02 LAB — INR BLD: 1.5 (ref 0.9–1.1)

## 2022-03-02 PROCEDURE — 36416 COLLJ CAPILLARY BLOOD SPEC: CPT

## 2022-03-02 PROCEDURE — 85610 PROTHROMBIN TIME: CPT

## 2022-03-02 NOTE — PROGRESS NOTES
ANTICOAGULATION MANAGEMENT     Adarsh Salvador 56 year old male is on warfarin with subtherapeutic INR result. (Goal INR 2.0-3.0)    Recent labs: (last 7 days)     03/02/22  0920   INR 1.5*       ASSESSMENT       Source(s): Chart Review and Patient/Caregiver Call       Warfarin doses taken: Warfarin taken as instructed    Diet: No new diet changes identified    New illness, injury, or hospitalization: Yes: recent surgery for finger laceration. Warfarin was held 2 days    Medication/supplement changes: None noted    Signs or symptoms of bleeding or clotting: No    Previous INR: Subtherapeutic    Additional findings: None       PLAN     Recommended plan for temporary change(s) affecting INR     Dosing Instructions: Booster dose then continue your current warfarin dose with next INR in 2 days       Summary  As of 3/2/2022    Full warfarin instructions:  3/2: 20 mg; Otherwise 10 mg every Sun, Tue, Thu; 12.5 mg all other days   Next INR check:  3/4/2022             Telephone call with Adarsh who verbalizes understanding and agrees to plan    Lab visit scheduled    Education provided: Please call back if any changes to your diet, medications or how you've been taking warfarin and Contact [phone]  with any changes, questions or concerns.     Plan made per [facility] anticoagulation protocol    Oly Bustillo RN  Anticoagulation Clinic  3/2/2022    _______________________________________________________________________     Anticoagulation Episode Summary     Current INR goal:  2.0-3.0   TTR:  16.5 % (3.7 wk)   Target end date:  Indefinite   Send INR reminders to:  [facility]    Indications    Recurrent deep vein thrombosis (DVT) (H) [I82.409]           Comments:           Anticoagulation Care Providers     Provider Role Specialty Phone number    Bertha Romero PA-C Referring Family Medicine [phone]

## 2022-03-02 NOTE — TELEPHONE ENCOUNTER
Reason for call:  INR   Who is calling? Patient    Phone number: [phone]    Fax number: N/A     Name of caller: Adarsh Salvador    INR Value: 1.5    Are there any other concerns: Yes: Patient states he just wants to know what he should do.  Route this INR message to [facility] # [id number]    Phone number to reach patient:  Cell number on file:    Telephone Information:   Mobile [phone]       Best Time:  Anytime    Can we leave a detailed message on this number?  YES    Travel screening: Not Applicable

## 2022-03-02 NOTE — PROGRESS NOTES
CHIEF COMPLAINT:   No chief complaint on file.        SURGICAL PROCEDURE: right long finger I+D ([facility])  DATE OF PROCEDURE: 2/22/2022      HISTORY OF PRESENT ILLNESS    Adarsh Salvador is a 56 year old male seen for postoperative follow-up of a right long finger I+D that occurred 2 weeks ago. Since surgery, pain has been improving, still sore. Denies fevers, chills, night sweats. No wound problems. Compliant with weight bearing restrictions and elevation. There have been no issues since surgery. Has been keeping wound clean and dry.      He injured his hand 2/12/2022, using a table saw and cutting wood, lost his  and ran into the saw blade with the right long(middle) finger. Didn't seek treatment until 2 days after injury, noting to have a comminuted, open tuft fracture. Given a prescription for augmentin.     Patient is diabetic, A1C=11.2 on 1/24/2022; with neuropathy.     Patient is anticoagulated for acute DVT, on warfarin.     Patient with positive covid-19 test 1/2/2022, documented within the [facility] system.    Other PMH:  has a past medical history of Anaphylactic reaction (8/14/2015), Anxiety, Depression, DM2 (diabetes mellitus, type 2) (H), GERD (gastroesophageal reflux disease), HTN (hypertension), IBS (irritable bowel syndrome), Kidney stone (6/15/2011), and Neuropathy.  Patient Active Problem List   Diagnosis     GERD (gastroesophageal reflux disease)     Chronic RLQ pain     Constipation     Chronic maxillary sinusitis     Chronic rhinitis     Hypertriglyceridemia     Benign essential hypertension     Anemia in other chronic diseases classified elsewhere     Fatty liver     Irritable bowel syndrome with diarrhea     Right inguinal hernia     Mild persistent asthma without complication     Type 2 diabetes mellitus with diabetic polyneuropathy, without long-term current use of insulin (H)     Hyperlipidemia LDL goal <100     CONNOR (generalized anxiety disorder)     Insomnia, unspecified type      Morbid obesity (H)     Neuropathy     Recurrent deep vein thrombosis (DVT) (H)     Precordial pain     Dyslipidemia     Elevated C-reactive protein     Gastric reflux     Internal derangement of knee     Nicotine dependence     Varicose veins of lower extremity     Vitamin D deficiency       Past surgical History:  has a past surgical history that includes Colonoscopy (5/1/2012); BREATH HYDROGEN TEST (3/7/2014); orthopedic surgery (10/03/2007); Colonoscopy (4/21/2014); Release carpal tunnel (Right, 1/26/2018); cystoscopy (05/01/2008); cystoscopy,+ureteroscopy (06/10/2008); vascular surgery (11/24/2008); cystoscopy (09/26/2010); lithotripsy (09/30/2010); and cystoscopy (05/18/2012).    Medications:   Current Outpatient Medications:      albuterol (PROAIR HFA/PROVENTIL HFA/VENTOLIN HFA) 108 (90 Base) MCG/ACT inhaler, Inhale 2 puffs into the lungs every 6 hours as needed for shortness of breath / dyspnea or wheezing, Disp: 6.7 g, Rfl: 3     Alpha Lipoic Acid 200 MG CAPS, Take 200 mg by mouth 3 times daily, Disp: 90 capsule, Rfl: 3     amLODIPine (NORVASC) 10 MG tablet, Take 1 tablet (10 mg) by mouth daily For blood pressure, Disp: 90 tablet, Rfl: 1     ASPIRIN PO, Take 325 mg by mouth daily , Disp: , Rfl:      aspirin-acetaminophen-caffeine (EXCEDRIN MIGRAINE) 250-250-65 MG tablet, Take 1 tablet by mouth, Disp: , Rfl:      atorvastatin (LIPITOR) 40 MG tablet, Take 1 tablet (40 mg) by mouth daily, Disp: 90 tablet, Rfl: 3     baclofen (LIORESAL) 10 MG tablet, Take 1 tablet (10 mg) by mouth See Admin Instructions 2-3 po q hs., Disp: 90 tablet, Rfl: 1     blood glucose (ACCU-CHEK GUIDE) test strip, Use to test blood sugar 3 times daily or as directed., Disp: 300 strip, Rfl: 3     blood glucose monitoring (ACCU-CHEK FASTCLIX) lancets, Use to test blood sugar 1 times daily or as directed.  Ok to substitute alternative if insurance prefers., Disp: 102 each, Rfl: 11     calcium carbonate (TUMS) 500 MG chewable tablet, Take  1 chew tab by mouth daily, Disp: , Rfl:      cyclobenzaprine (FLEXERIL) 10 MG tablet, Take 0.5-1 tablets (5-10 mg) by mouth 3 times daily as needed for muscle spasms, Disp: 90 tablet, Rfl: 3     DULoxetine (CYMBALTA) 60 MG capsule, Take 1 capsule (60 mg) by mouth 2 times daily, Disp: 180 capsule, Rfl: 3     enoxaparin ANTICOAGULANT (LOVENOX) 120 MG/0.8ML syringe, Inject 0.8 mLs (120 mg) Subcutaneous every 12 hours, Disp: 10 mL, Rfl: 1     EPINEPHrine (EPIPEN) 0.3 MG/0.3ML injection, Inject 0.3 mLs (0.3 mg) into the muscle once as needed for anaphylaxis, Disp: 2 each, Rfl: 2     fluticasone (FLONASE) 50 MCG/ACT nasal spray, Spray 1 spray into both nostrils daily, Disp: 18.2 mL, Rfl: 1     fluticasone-salmeterol (ADVAIR) 500-50 MCG/DOSE inhaler, Inhale 1 puff into the lungs 2 times daily, Disp: 1 each, Rfl: 11     insulin glargine (BASAGLAR KWIKPEN) 100 UNIT/ML pen, Inject 27 Units Subcutaneous daily Max Total Daily Dose 40 units per day, Disp: 15 mL, Rfl: 3     insulin pen needle (32G X 4 MM) 32G X 4 MM miscellaneous, Use 2 pen needles daily or as directed., Disp: 200 each, Rfl: 1     losartan (COZAAR) 50 MG tablet, Take 1 tablet (50 mg) by mouth daily For blood pressure., Disp: 90 tablet, Rfl: 1     metFORMIN (GLUCOPHAGE-XR) 500 MG 24 hr tablet, Take 2 tablets (1,000 mg) by mouth 2 times daily (with meals), Disp: 120 tablet, Rfl: 5     montelukast (SINGULAIR) 10 MG tablet, Take 1 tablet (10 mg) by mouth At Bedtime For allergies/asthma, Disp: 30 tablet, Rfl: 2     multivitamin, therapeutic (THERA-VIT) TABS, Take 1 tablet by mouth daily, Disp: , Rfl:      naproxen sodium (ANAPROX) 220 MG tablet, Take 220 mg by mouth, Disp: , Rfl:      nortriptyline (PAMELOR) 10 MG capsule, Take 2 capsules (20 mg) by mouth At Bedtime, Disp: 60 capsule, Rfl: 5     tamsulosin (FLOMAX) 0.4 MG capsule, Take 1 capsule (0.4 mg) by mouth daily, Disp: 30 capsule, Rfl: 1     warfarin ANTICOAGULANT (COUMADIN) 5 MG tablet, Take daily as directed.  Current dose is 12.5 mg Monday Wednesday and Friday and 10 mg all other days unless directed otherwise by [facility], Disp: 228 tablet, Rfl: 0    Current Facility-Administered Medications:      triamcinolone (KENALOG-40) injection 40 mg, 40 mg, , , Vinnie Espinoza, DO, 40 mg at 06/29/20 1354    Facility-Administered Medications Ordered in Other Visits:      BUPivacaine (MARCAINE) injection 0.5%, 10 mL, Intradermal, Once, Vinnie Espinoza,      DOBUTamine 500 mg in dextrose 5% 250 mL (adult std conc) premix, 2.5-20 mcg/kg/min, Intravenous, Continuous, Navarro Butcher MD, Stopped at 04/25/19 1559     iopamidol (ISOVUE-M 200) solution 10 mL, 10 mL, Intravenous, Once, Vinnie Espinoza,      methylPREDNISolone (DEPO-MEDROL) injection 80 mg, 80 mg, Intramuscular, Once, Vinnie Espinoza,      metoprolol (LOPRESSOR) injection 5 mg, 5 mg, Intravenous, Q5 Min PRN, Navarro Butcher MD, 2 mg at 04/25/19 1559    Allergies:   Allergies   Allergen Reactions     Artificial Sweetner (Informational Only) [Artificial Sweetner (Informational Only) ] GI Disturbance     ALL artificial sweetners cause severe diarrhea & flu symptoms     Hydromorphone Anaphylaxis     Ibuprofen GI Disturbance     Morphine Sulfate Concentrate Anaphylaxis     Morphine [Fumaric Acid] Anaphylaxis     Hydrocodone-Acetaminophen Itching     Tramadol Hives     Trazodone Hives     Gabapentin      GI upset per pt     Keflex [Cephalexin] Diarrhea     Upset stomach     Codeine Phosphate Itching     Ketorolac Tromethamine Rash       REVIEW OF SYSTEMS:  CONSTITUTIONAL:NEGATIVE for fever, chills, change in weight  INTEGUMENTARY/SKIN: NEGATIVE for worrisome rashes, moles or lesions  MUSCULOSKELETAL:See HPI above  NEURO: NEGATIVE for weakness, dizziness or paresthesias      PHYSICAL EXAM:  /82   Pulse 77   Temp 98.1  F (36.7  C)   SpO2 98%    GENERAL APPEARANCE: healthy, alert, no distress       right UPPER EXTREMITY:  Wound / Incision: healing  wound with sutures to the tip of the right long finger  Inspection: mild swelling, resolving ecchymosis; no active drainage or erythema.  Palpation: tender to palpation, very mild.  There is no maceration of the skin.  There is no gross deformity in the area.      X-RAY:  none indicated        Impression: 56 year old male 2 weeks  postoperative I+D right long finger, doing well.    Plan:   * wound looks good.  * suture removal today.  Weight bearing status: non weight bearing.   Keep wound clean and dry. No woodwork.  Ok for daily dry dressing changes, betadine swab cleansing, washing/shower.  Plan soap water soaks in a week or so, 10minutes at a time.  Pain control: over the counter as needed.  Immobilization: splint.  Elevation of affected extremity  Images: none  Return to clinic in 2 weeks, or sooner as needed, for wound check.      Edwin Ch M.D., M.S.  Dept. of Orthopaedic Surgery  [facility]

## 2022-03-04 ENCOUNTER — ANTICOAGULATION THERAPY VISIT (OUTPATIENT)
Dept: ANTICOAGULATION | Facility: CLINIC | Age: 57
End: 2022-03-04

## 2022-03-04 ENCOUNTER — LAB (OUTPATIENT)
Dept: LAB | Facility: CLINIC | Age: 57
End: 2022-03-04
Payer: COMMERCIAL

## 2022-03-04 DIAGNOSIS — I82.409 RECURRENT DEEP VEIN THROMBOSIS (DVT) (H): ICD-10-CM

## 2022-03-04 DIAGNOSIS — I82.409 RECURRENT DEEP VEIN THROMBOSIS (DVT) (H): Primary | ICD-10-CM

## 2022-03-04 LAB — INR BLD: 2 (ref 0.9–1.1)

## 2022-03-04 PROCEDURE — 85610 PROTHROMBIN TIME: CPT

## 2022-03-04 PROCEDURE — 36416 COLLJ CAPILLARY BLOOD SPEC: CPT

## 2022-03-04 NOTE — PROGRESS NOTES
ANTICOAGULATION MANAGEMENT     Adarsh Salvador 56 year old male is on warfarin with therapeutic INR result. (Goal INR 2.0-3.0)    Recent labs: (last 7 days)     03/04/22  0839   INR 2.0*       ASSESSMENT       Source(s): Chart Review and Patient/Caregiver Call       Warfarin doses taken: Warfarin taken as instructed    Diet: No new diet changes identified    New illness, injury, or hospitalization: No    Medication/supplement changes: None noted    Signs or symptoms of bleeding or clotting: No    Previous INR: Subtherapeutic    Additional findings: None       PLAN     Recommended plan for no diet, medication or health factor changes affecting INR     Dosing Instructions: Continue your current warfarin dose with next INR in 1 week       Summary  As of 3/4/2022    Full warfarin instructions:  10 mg every Sun, Tue, Thu; 12.5 mg all other days   Next INR check:  3/11/2022             Telephone call with Adarsh who verbalizes understanding and agrees to plan    Lab visit scheduled    Education provided: Goal range and significance of current result    Plan made per [facility] anticoagulation protocol    Rubi Nathan RN  Anticoagulation Clinic  3/4/2022    _______________________________________________________________________     Anticoagulation Episode Summary     Current INR goal:  2.0-3.0   TTR:  15.3 % (4 wk)   Target end date:  Indefinite   Send INR reminders to:  [facility]    Indications    Recurrent deep vein thrombosis (DVT) (H) [I82.409]           Comments:           Anticoagulation Care Providers     Provider Role Specialty Phone number    Bertha Romero PA-C Referring Family Medicine [phone]

## 2022-03-04 NOTE — PROGRESS NOTES
ANTICOAGULATION MANAGEMENT     Adarsh Salvador 56 year old male is on warfarin with therapeutic INR result. (Goal INR 2.0-3.0)    Recent labs: (last 7 days)     03/04/22  0839   INR 2.0*       ASSESSMENT       Source(s): Chart Review    Previous INR was Subtherapeutic    Medication, diet, health changes since last INR chart reviewed; none identified           PLAN     Unable to reach Adarsh today.    LMTCB    Follow up required to confirm warfarin dose taken and assess for changes    Rubi Nathan RN  Anticoagulation Clinic  3/4/2022

## 2022-03-07 ENCOUNTER — OFFICE VISIT (OUTPATIENT)
Dept: ORTHOPEDICS | Facility: CLINIC | Age: 57
End: 2022-03-07
Payer: MEDICAID

## 2022-03-07 VITALS
SYSTOLIC BLOOD PRESSURE: 129 MMHG | DIASTOLIC BLOOD PRESSURE: 82 MMHG | TEMPERATURE: 98.1 F | OXYGEN SATURATION: 98 % | HEART RATE: 77 BPM

## 2022-03-07 DIAGNOSIS — S61.312D: Primary | ICD-10-CM

## 2022-03-07 PROCEDURE — 99024 POSTOP FOLLOW-UP VISIT: CPT | Performed by: ORTHOPAEDIC SURGERY

## 2022-03-07 NOTE — LETTER
3/7/2022         RE: Adarsh Salvador  [address]  [place] [address]        Dear Colleague,    Thank you for referring your patient, Adarsh Salvador, to the [facility]. Please see a copy of my visit note below.    CHIEF COMPLAINT:   No chief complaint on file.        SURGICAL PROCEDURE: right long finger I+D ([facility])  DATE OF PROCEDURE: 2/22/2022      HISTORY OF PRESENT ILLNESS    Adarsh Salvador is a 56 year old male seen for postoperative follow-up of a right long finger I+D that occurred 2 weeks ago. Since surgery, pain has been improving, still sore. Denies fevers, chills, night sweats. No wound problems. Compliant with weight bearing restrictions and elevation. There have been no issues since surgery. Has been keeping wound clean and dry.      He injured his hand 2/12/2022, using a table saw and cutting wood, lost his  and ran into the saw blade with the right long(middle) finger. Didn't seek treatment until 2 days after injury, noting to have a comminuted, open tuft fracture. Given a prescription for augmentin.     Patient is diabetic, A1C=11.2 on 1/24/2022; with neuropathy.     Patient is anticoagulated for acute DVT, on warfarin.     Patient with positive covid-19 test 1/2/2022, documented within the [facility] system.    Other PMH:  has a past medical history of Anaphylactic reaction (8/14/2015), Anxiety, Depression, DM2 (diabetes mellitus, type 2) (H), GERD (gastroesophageal reflux disease), HTN (hypertension), IBS (irritable bowel syndrome), Kidney stone (6/15/2011), and Neuropathy.  Patient Active Problem List   Diagnosis     GERD (gastroesophageal reflux disease)     Chronic RLQ pain     Constipation     Chronic maxillary sinusitis     Chronic rhinitis     Hypertriglyceridemia     Benign essential hypertension     Anemia in other chronic diseases classified elsewhere     Fatty liver     Irritable bowel syndrome with diarrhea     Right inguinal hernia     Mild  persistent asthma without complication     Type 2 diabetes mellitus with diabetic polyneuropathy, without long-term current use of insulin (H)     Hyperlipidemia LDL goal <100     CONNOR (generalized anxiety disorder)     Insomnia, unspecified type     Morbid obesity (H)     Neuropathy     Recurrent deep vein thrombosis (DVT) (H)     Precordial pain     Dyslipidemia     Elevated C-reactive protein     Gastric reflux     Internal derangement of knee     Nicotine dependence     Varicose veins of lower extremity     Vitamin D deficiency       Past surgical History:  has a past surgical history that includes Colonoscopy (5/1/2012); BREATH HYDROGEN TEST (3/7/2014); orthopedic surgery (10/03/2007); Colonoscopy (4/21/2014); Release carpal tunnel (Right, 1/26/2018); cystoscopy (05/01/2008); cystoscopy,+ureteroscopy (06/10/2008); vascular surgery (11/24/2008); cystoscopy (09/26/2010); lithotripsy (09/30/2010); and cystoscopy (05/18/2012).    Medications:   Current Outpatient Medications:      albuterol (PROAIR HFA/PROVENTIL HFA/VENTOLIN HFA) 108 (90 Base) MCG/ACT inhaler, Inhale 2 puffs into the lungs every 6 hours as needed for shortness of breath / dyspnea or wheezing, Disp: 6.7 g, Rfl: 3     Alpha Lipoic Acid 200 MG CAPS, Take 200 mg by mouth 3 times daily, Disp: 90 capsule, Rfl: 3     amLODIPine (NORVASC) 10 MG tablet, Take 1 tablet (10 mg) by mouth daily For blood pressure, Disp: 90 tablet, Rfl: 1     ASPIRIN PO, Take 325 mg by mouth daily , Disp: , Rfl:      aspirin-acetaminophen-caffeine (EXCEDRIN MIGRAINE) 250-250-65 MG tablet, Take 1 tablet by mouth, Disp: , Rfl:      atorvastatin (LIPITOR) 40 MG tablet, Take 1 tablet (40 mg) by mouth daily, Disp: 90 tablet, Rfl: 3     baclofen (LIORESAL) 10 MG tablet, Take 1 tablet (10 mg) by mouth See Admin Instructions 2-3 po q hs., Disp: 90 tablet, Rfl: 1     blood glucose (ACCU-CHEK GUIDE) test strip, Use to test blood sugar 3 times daily or as directed., Disp: 300 strip, Rfl:  3     blood glucose monitoring (ACCU-CHEK FASTCLIX) lancets, Use to test blood sugar 1 times daily or as directed.  Ok to substitute alternative if insurance prefers., Disp: 102 each, Rfl: 11     calcium carbonate (TUMS) 500 MG chewable tablet, Take 1 chew tab by mouth daily, Disp: , Rfl:      cyclobenzaprine (FLEXERIL) 10 MG tablet, Take 0.5-1 tablets (5-10 mg) by mouth 3 times daily as needed for muscle spasms, Disp: 90 tablet, Rfl: 3     DULoxetine (CYMBALTA) 60 MG capsule, Take 1 capsule (60 mg) by mouth 2 times daily, Disp: 180 capsule, Rfl: 3     enoxaparin ANTICOAGULANT (LOVENOX) 120 MG/0.8ML syringe, Inject 0.8 mLs (120 mg) Subcutaneous every 12 hours, Disp: 10 mL, Rfl: 1     EPINEPHrine (EPIPEN) 0.3 MG/0.3ML injection, Inject 0.3 mLs (0.3 mg) into the muscle once as needed for anaphylaxis, Disp: 2 each, Rfl: 2     fluticasone (FLONASE) 50 MCG/ACT nasal spray, Spray 1 spray into both nostrils daily, Disp: 18.2 mL, Rfl: 1     fluticasone-salmeterol (ADVAIR) 500-50 MCG/DOSE inhaler, Inhale 1 puff into the lungs 2 times daily, Disp: 1 each, Rfl: 11     insulin glargine (BASAGLAR KWIKPEN) 100 UNIT/ML pen, Inject 27 Units Subcutaneous daily Max Total Daily Dose 40 units per day, Disp: 15 mL, Rfl: 3     insulin pen needle (32G X 4 MM) 32G X 4 MM miscellaneous, Use 2 pen needles daily or as directed., Disp: 200 each, Rfl: 1     losartan (COZAAR) 50 MG tablet, Take 1 tablet (50 mg) by mouth daily For blood pressure., Disp: 90 tablet, Rfl: 1     metFORMIN (GLUCOPHAGE-XR) 500 MG 24 hr tablet, Take 2 tablets (1,000 mg) by mouth 2 times daily (with meals), Disp: 120 tablet, Rfl: 5     montelukast (SINGULAIR) 10 MG tablet, Take 1 tablet (10 mg) by mouth At Bedtime For allergies/asthma, Disp: 30 tablet, Rfl: 2     multivitamin, therapeutic (THERA-VIT) TABS, Take 1 tablet by mouth daily, Disp: , Rfl:      naproxen sodium (ANAPROX) 220 MG tablet, Take 220 mg by mouth, Disp: , Rfl:      nortriptyline (PAMELOR) 10 MG capsule,  Take 2 capsules (20 mg) by mouth At Bedtime, Disp: 60 capsule, Rfl: 5     tamsulosin (FLOMAX) 0.4 MG capsule, Take 1 capsule (0.4 mg) by mouth daily, Disp: 30 capsule, Rfl: 1     warfarin ANTICOAGULANT (COUMADIN) 5 MG tablet, Take daily as directed. Current dose is 12.5 mg Monday Wednesday and Friday and 10 mg all other days unless directed otherwise by [facility], Disp: 228 tablet, Rfl: 0    Current Facility-Administered Medications:      triamcinolone (KENALOG-40) injection 40 mg, 40 mg, , , Vinnie Espinoza, , 40 mg at 06/29/20 1354    Facility-Administered Medications Ordered in Other Visits:      BUPivacaine (MARCAINE) injection 0.5%, 10 mL, Intradermal, Once, Vinnie Espinoza DO     DOBUTamine 500 mg in dextrose 5% 250 mL (adult std conc) premix, 2.5-20 mcg/kg/min, Intravenous, Continuous, Navarro Butcher MD, Stopped at 04/25/19 1559     iopamidol (ISOVUE-M 200) solution 10 mL, 10 mL, Intravenous, Once, Vinnie Espinoza DO     methylPREDNISolone (DEPO-MEDROL) injection 80 mg, 80 mg, Intramuscular, Once, Vinnie Espinoza DO     metoprolol (LOPRESSOR) injection 5 mg, 5 mg, Intravenous, Q5 Min PRN, Navarro Butcher MD, 2 mg at 04/25/19 1559    Allergies:   Allergies   Allergen Reactions     Artificial Sweetner (Informational Only) [Artificial Sweetner (Informational Only) ] GI Disturbance     ALL artificial sweetners cause severe diarrhea & flu symptoms     Hydromorphone Anaphylaxis     Ibuprofen GI Disturbance     Morphine Sulfate Concentrate Anaphylaxis     Morphine [Fumaric Acid] Anaphylaxis     Hydrocodone-Acetaminophen Itching     Tramadol Hives     Trazodone Hives     Gabapentin      GI upset per pt     Keflex [Cephalexin] Diarrhea     Upset stomach     Codeine Phosphate Itching     Ketorolac Tromethamine Rash       REVIEW OF SYSTEMS:  CONSTITUTIONAL:NEGATIVE for fever, chills, change in weight  INTEGUMENTARY/SKIN: NEGATIVE for worrisome rashes, moles or lesions  MUSCULOSKELETAL:See HPI  above  NEURO: NEGATIVE for weakness, dizziness or paresthesias      PHYSICAL EXAM:  /82   Pulse 77   Temp 98.1  F (36.7  C)   SpO2 98%    GENERAL APPEARANCE: healthy, alert, no distress       right UPPER EXTREMITY:  Wound / Incision: healing wound with sutures to the tip of the right long finger  Inspection: mild swelling, resolving ecchymosis; no active drainage or erythema.  Palpation: tender to palpation, very mild.  There is no maceration of the skin.  There is no gross deformity in the area.      X-RAY:  none indicated        Impression: 56 year old male 2 weeks  postoperative I+D right long finger, doing well.    Plan:   * wound looks good.  * suture removal today.  Weight bearing status: non weight bearing.   Keep wound clean and dry. No woodwork.  Ok for daily dry dressing changes, betadine swab cleansing, washing/shower.  Plan soap water soaks in a week or so, 10minutes at a time.  Pain control: over the counter as needed.  Immobilization: splint.  Elevation of affected extremity  Images: none  Return to clinic in 2 weeks, or sooner as needed, for wound check.      Edwin Ch M.D., M.S.  Dept. of Orthopaedic Surgery  [facility]      Again, thank you for allowing me to participate in the care of your patient.        Sincerely,        Edwin Ch MD

## 2022-03-10 ENCOUNTER — TELEPHONE (OUTPATIENT)
Dept: FAMILY MEDICINE | Facility: CLINIC | Age: 57
End: 2022-03-10
Payer: COMMERCIAL

## 2022-03-10 NOTE — TELEPHONE ENCOUNTER
Prior Authorization Retail Medication Request    Medication/Dose: insulin glargine (BASAGLAR KWIKPEN) 100 UNIT/ML pen  ICD code (if different than what is on RX):    Type 2 diabetes mellitus with hyperglycemia, with long-term current use of insulin (H) [E11.65, Z79.4]       Type 2 diabetes mellitus without complication, without long-term current use of insulin (H) [E11.9]       Type 2 diabetes mellitus with diabetic polyneuropathy, without long-term current use of insulin (H) [E11.42]           Previously Tried and Failed:    Rationale:      Insurance Name:  Not provided  Insurance ID:  Not provided      Pharmacy Information (if different than what is on RX)  Name:  [facility]  Phone:  [phone]

## 2022-03-11 ENCOUNTER — ANTICOAGULATION THERAPY VISIT (OUTPATIENT)
Dept: ANTICOAGULATION | Facility: CLINIC | Age: 57
End: 2022-03-11

## 2022-03-11 ENCOUNTER — LAB (OUTPATIENT)
Dept: LAB | Facility: CLINIC | Age: 57
End: 2022-03-11
Payer: COMMERCIAL

## 2022-03-11 DIAGNOSIS — I82.409 RECURRENT DEEP VEIN THROMBOSIS (DVT) (H): ICD-10-CM

## 2022-03-11 DIAGNOSIS — I82.409 RECURRENT DEEP VEIN THROMBOSIS (DVT) (H): Primary | ICD-10-CM

## 2022-03-11 LAB — INR BLD: 1.7 (ref 0.9–1.1)

## 2022-03-11 PROCEDURE — 36416 COLLJ CAPILLARY BLOOD SPEC: CPT

## 2022-03-11 PROCEDURE — 85610 PROTHROMBIN TIME: CPT

## 2022-03-11 NOTE — PROGRESS NOTES
ANTICOAGULATION MANAGEMENT     Adarsh Salvador 56 year old male is on warfarin with subtherapeutic INR result. (Goal INR 2.0-3.0)    Recent labs: (last 7 days)     03/11/22  0913   INR 1.7*       ASSESSMENT       Source(s): Chart Review and Patient/Caregiver Call       Warfarin doses taken: Warfarin taken as instructed    Diet: No new diet changes identified    New illness, injury, or hospitalization: No    Medication/supplement changes: None noted    Signs or symptoms of bleeding or clotting: No    Previous INR: Therapeutic last visit; previously outside of goal range    Additional findings: None       PLAN     Recommended plan for no diet, medication or health factor changes affecting INR     Dosing Instructions: Booster dose then Increase your warfarin dose (9% change) with next INR in 1 week       Summary  As of 3/11/2022    Full warfarin instructions:  3/11: 15 mg; Otherwise 12.5 mg every day   Next INR check:  3/18/2022             Telephone call with Adarsh who verbalizes understanding and agrees to plan    Lab visit scheduled    Education provided: Please call back if any changes to your diet, medications or how you've been taking warfarin and Contact [phone]  with any changes, questions or concerns.     Plan made per [facility] anticoagulation protocol    Oly Bustillo, RN  Anticoagulation Clinic  3/11/2022    _______________________________________________________________________     Anticoagulation Episode Summary     Current INR goal:  2.0-3.0   TTR:  12.3 % (1.2 mo)   Target end date:  Indefinite   Send INR reminders to:  [facility]    Indications    Recurrent deep vein thrombosis (DVT) (H) [I82.409]           Comments:           Anticoagulation Care Providers     Provider Role Specialty Phone number    Bertha Romero PA-C Referring Family Medicine [phone]

## 2022-03-16 NOTE — TELEPHONE ENCOUNTER
Prior Authorization Not Needed per Insurance    Medication: insulin glargine (BASAGLAR KWIKPEN) 100 UNIT/ML pen  Insurance Company: [employer] - Phone [phone] Fax [phone]  Expected CoPay:      Pharmacy Filling the Rx: [id number] - [place], [place] - [address]  Pharmacy Notified: Yes  Patient Notified: Yes    Per Rx pharmacy may substitute Lantus or Semglee if Basaglar is not preferred.     Called pharmacy, they substituted Lantus already which is covered.  Paid claim, no further action needed. Pharmacy will let patient know when its ready for .

## 2022-03-17 ENCOUNTER — OFFICE VISIT (OUTPATIENT)
Dept: NEUROLOGY | Facility: CLINIC | Age: 57
End: 2022-03-17
Payer: COMMERCIAL

## 2022-03-17 VITALS
SYSTOLIC BLOOD PRESSURE: 129 MMHG | BODY MASS INDEX: 40.56 KG/M2 | DIASTOLIC BLOOD PRESSURE: 86 MMHG | HEART RATE: 85 BPM | WEIGHT: 282.7 LBS

## 2022-03-17 DIAGNOSIS — E11.42 DIABETIC POLYNEUROPATHY ASSOCIATED WITH TYPE 2 DIABETES MELLITUS (H): Primary | ICD-10-CM

## 2022-03-17 DIAGNOSIS — M79.605 BILATERAL LEG PAIN: ICD-10-CM

## 2022-03-17 DIAGNOSIS — M79.604 BILATERAL LEG PAIN: ICD-10-CM

## 2022-03-17 PROCEDURE — 99215 OFFICE O/P EST HI 40 MIN: CPT | Performed by: INTERNAL MEDICINE

## 2022-03-17 ASSESSMENT — PAIN SCALES - GENERAL: PAINLEVEL: NO PAIN (0)

## 2022-03-17 NOTE — PATIENT INSTRUCTIONS
Things that can help with cramps: Magnesium supplement, B-complex vitamin, heat, stretching the legs before bed can help, making sure you're well hydrated as well

## 2022-03-17 NOTE — PROGRESS NOTES
[facility] Neurology Follow Up Visit    Adarsh Salvador MRN# [id number]   Age: 55 year old YOB: 1965     Brief history of symptoms: The patient was initially seen in neurologic consultation on 10/20/2020 for evaluation of neuropathy. Prior to that patient followed with Dr Montalvo. Please see the comprehensive neurologic consultation note from that date in the Epic records for details.     Interval history:   Symptoms are overall worse compared to last visit. His primary complaint is worsening of numbness/tingling/achying in the bilateral feet. He also reports low back pain. Sometimes the pain radiates down the legs. All of these symptoms are worse when he is on his feet for too long.     Nortriptyline was increased to 20 mg at last visit. This resulted in mild benefit.     He continues to have bilateral thigh pain and numbness, but it is not as bothersome as back and feet pain.     He was laid off of his job because of inability to stand for prolonged period of time. He is currently applying for disability.     He has sacral nerve stimulator in place so cannot get an MRI. This was placed in 2015 for fecal incontinence and has been helpful.    His diabetes regimen was changed recently and his sugars have been better controlled lately.     He did physical therapy after last visit and still practices balance exercises.       Past Medical History:     Patient Active Problem List   Diagnosis     GERD (gastroesophageal reflux disease)     Chronic RLQ pain     Constipation     Chronic maxillary sinusitis     Chronic rhinitis     Hypertriglyceridemia     Benign essential hypertension     Anemia in other chronic diseases classified elsewhere     Fatty liver     Irritable bowel syndrome with diarrhea     Right inguinal hernia     Mild persistent asthma without complication     Type 2 diabetes mellitus with diabetic polyneuropathy, without long-term current use of insulin (H)     Hyperlipidemia LDL goal <100     CONNOR  (generalized anxiety disorder)     Insomnia, unspecified type     Morbid obesity (H)     Neuropathy     Recurrent deep vein thrombosis (DVT) (H)     Precordial pain     Dyslipidemia     Elevated C-reactive protein     Gastric reflux     Internal derangement of knee     Nicotine dependence     Varicose veins of lower extremity     Vitamin D deficiency     Past Medical History:   Diagnosis Date     Anaphylactic reaction 8/14/2015     Anxiety      Depression      DM2 (diabetes mellitus, type 2) (H)      GERD (gastroesophageal reflux disease)      HTN (hypertension)      IBS (irritable bowel syndrome)      Kidney stone 6/15/2011    Pt believes these were Calcium stones     Neuropathy         Past Surgical History:     Past Surgical History:   Procedure Laterality Date     COLONOSCOPY  5/1/2012    Procedure:COLONOSCOPY; screening colonoscopy; Surgeon:KINGSLEY DOS SANTOS; Location: OR     COLONOSCOPY  4/21/2014    Procedure: COMBINED COLONOSCOPY, SINGLE BIOPSY/POLYPECTOMY BY BIOPSY;  Surgeon: Duane, William Charles, MD;  Location:  OR     CYSTOSCOPY  05/01/2008    CYSTOSCOPY W/ URETERAL STENT PLACEMENT 05/01/2008      CYSTOSCOPY  09/26/2010    CYSTOSCOPY W/ URETERAL STENT PLACEMENT 09/26/2010 Left      CYSTOSCOPY  05/18/2012    CYSTOSCOPY, LEFT URETEROSCOPY AND STENT PLACEMENT left retrograde 05/18/2012      CYSTOSCOPY,+URETEROSCOPY  06/10/2008    URETEROSCOPY 06/10/2008 Right      HC BREATH HYDROGEN TEST  3/7/2014    Procedure: HYDROGEN BREATH TEST;  Surgeon: Darion Swift MD;  Location: [facility]     LITHOTRIPSY  09/30/2010    LITHOTRIPSY 09/30/2010 LEFT EXTRACORPOREAL SHOCK WAVE LITHOTRIPSY, FLEXIBLE CYSTOSCOPY, LEFT STENT REMOVAL       ORTHOPEDIC SURGERY  10/03/2007    KNEE ARTHROSCOPY 10/03/2007 RightX2       RELEASE CARPAL TUNNEL Right 1/26/2018    Procedure: RELEASE CARPAL TUNNEL;  Right carpal tunnel release;  Surgeon: Wiliam Saenz MD;  Location:  OR     VASCULAR SURGERY  11/24/2008     Radiofrequency ablation left saphenous vein 11/24/2008 Done by Dr. Lozoya          Social History:     Social History     Tobacco Use     Smoking status: Never Smoker     Smokeless tobacco: Current User     Types: Chew     Tobacco comment: Chew   Substance Use Topics     Alcohol use: Not Currently     Alcohol/week: 0.0 standard drinks     Comment: occasional, few drinks a month     Drug use: No        Family History:     Family History   Problem Relation Age of Onset     Cancer Mother 52        lung, smoked     Cancer - colorectal Father 53        unsure type, pt attributes to radiation exposure     Myocardial Infarction Father 45     Coronary Artery Disease Father      Myocardial Infarction Paternal Grandfather 35     Diabetes Other         nephew- type 1     Diabetes Maternal Aunt         1     Diabetes Maternal Aunt         2     Diabetes Paternal Uncle         1     Diabetes Paternal Uncle         2     Diabetes Paternal Uncle         3     Diabetes Paternal Uncle         4     Colon Cancer No family hx of         Medications:     Current Outpatient Medications   Medication Sig     albuterol (PROAIR HFA/PROVENTIL HFA/VENTOLIN HFA) 108 (90 Base) MCG/ACT inhaler Inhale 2 puffs into the lungs every 6 hours as needed for shortness of breath / dyspnea or wheezing     Alpha Lipoic Acid 200 MG CAPS Take 200 mg by mouth 3 times daily     amLODIPine (NORVASC) 10 MG tablet Take 1 tablet (10 mg) by mouth daily For blood pressure     ASPIRIN PO Take 325 mg by mouth daily      aspirin-acetaminophen-caffeine (EXCEDRIN MIGRAINE) 250-250-65 MG tablet Take 1 tablet by mouth     atorvastatin (LIPITOR) 40 MG tablet Take 1 tablet (40 mg) by mouth daily     baclofen (LIORESAL) 10 MG tablet Take 1 tablet (10 mg) by mouth See Admin Instructions 2-3 po q hs.     blood glucose (ACCU-CHEK GUIDE) test strip Use to test blood sugar 3 times daily or as directed.     blood glucose monitoring (ACCU-CHEK FASTCLIX) lancets Use to test blood  sugar 1 times daily or as directed.  Ok to substitute alternative if insurance prefers.     calcium carbonate (TUMS) 500 MG chewable tablet Take 1 chew tab by mouth daily     cyclobenzaprine (FLEXERIL) 10 MG tablet Take 0.5-1 tablets (5-10 mg) by mouth 3 times daily as needed for muscle spasms     DULoxetine (CYMBALTA) 60 MG capsule Take 1 capsule (60 mg) by mouth 2 times daily     enoxaparin ANTICOAGULANT (LOVENOX) 120 MG/0.8ML syringe Inject 0.8 mLs (120 mg) Subcutaneous every 12 hours     EPINEPHrine (EPIPEN) 0.3 MG/0.3ML injection Inject 0.3 mLs (0.3 mg) into the muscle once as needed for anaphylaxis     fluticasone (FLONASE) 50 MCG/ACT nasal spray Spray 1 spray into both nostrils daily     fluticasone-salmeterol (ADVAIR) 500-50 MCG/DOSE inhaler Inhale 1 puff into the lungs 2 times daily     insulin glargine (BASAGLAR KWIKPEN) 100 UNIT/ML pen Inject 27 Units Subcutaneous daily Max Total Daily Dose 40 units per day     insulin pen needle (32G X 4 MM) 32G X 4 MM miscellaneous Use 2 pen needles daily or as directed.     losartan (COZAAR) 50 MG tablet Take 1 tablet (50 mg) by mouth daily For blood pressure.     metFORMIN (GLUCOPHAGE-XR) 500 MG 24 hr tablet Take 2 tablets (1,000 mg) by mouth 2 times daily (with meals)     montelukast (SINGULAIR) 10 MG tablet Take 1 tablet (10 mg) by mouth At Bedtime For allergies/asthma     multivitamin, therapeutic (THERA-VIT) TABS Take 1 tablet by mouth daily     naproxen sodium (ANAPROX) 220 MG tablet Take 220 mg by mouth     nortriptyline (PAMELOR) 10 MG capsule Take 2 capsules (20 mg) by mouth At Bedtime     tamsulosin (FLOMAX) 0.4 MG capsule Take 1 capsule (0.4 mg) by mouth daily     warfarin ANTICOAGULANT (COUMADIN) 5 MG tablet Take daily as directed. Current dose is 12.5 mg Monday Wednesday and Friday and 10 mg all other days unless directed otherwise by [facility]     Current Facility-Administered Medications   Medication     triamcinolone (KENALOG-40) injection 40 mg      Facility-Administered Medications Ordered in Other Visits   Medication     BUPivacaine (MARCAINE) injection 0.5%     DOBUTamine 500 mg in dextrose 5% 250 mL (adult std conc) premix     iopamidol (ISOVUE-M 200) solution 10 mL     methylPREDNISolone (DEPO-MEDROL) injection 80 mg     metoprolol (LOPRESSOR) injection 5 mg        Allergies:     Allergies   Allergen Reactions     Artificial Sweetner (Informational Only) [Artificial Sweetner (Informational Only) ] GI Disturbance     ALL artificial sweetners cause severe diarrhea & flu symptoms     Hydromorphone Anaphylaxis     Ibuprofen GI Disturbance     Morphine Sulfate Concentrate Anaphylaxis     Morphine [Fumaric Acid] Anaphylaxis     Hydrocodone-Acetaminophen Itching     Tramadol Hives     Trazodone Hives     Gabapentin      GI upset per pt     Keflex [Cephalexin] Diarrhea     Upset stomach     Codeine Phosphate Itching     Ketorolac Tromethamine Rash        Review of Systems:   As above     Physical Exam:   Vitals: /86 (BP Location: Right arm, Patient Position: Sitting, Cuff Size: Adult Large)   Pulse 85   Wt 128.2 kg (282 lb 11.2 oz)   BMI 40.56 kg/m     General: Seated comfortably in no acute distress.  Lungs: breathing comfortably  Extremities: no edema  Skin: No rashes  Neurologic:     Mental Status: Fully alert, attentive. Normal memory and fund of knowledge. Language normal, speech clear and fluent, no paraphasic errors.      Cranial Nerves: No dysarthria.      Motor: No tremors or other abnormal movements observed. Muscle tone normal throughout. Strength 5/5 throughout upper and lower extremities. Finger taps symmetric and normal speed.      Deep Tendon Reflexes: 2+/symmetric throughout upper extremities, 1+ patella and trace ankle jerks. Toes downgoing bilaterally.     Sensory: Vibration is 4 seconds in the bilateral great toes. Temperature and pinprick are decreased in the feet compared to the hands. Pinprick is decreased in the distal finger  tips. Light touch is decreased below the lower shins.      Coordination: Finger-nose-finger without dysmetria. Hand alternating movements normal.      Gait: Mildly wide based steady casual gait. Able to walk on heels and toes. Mild difficulty with tandem gait.     Data reviewed on previous visits    Procedures:  EMG/NCS 2020  Interpretation:  This is an abnormal study, demonstrating electrophysiologic evidence of the followin. 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 Comparison with two studies performed in 2017 demonstrates minimal interval worsening of amplitude attenuation.   2. Mild right ulnar neuropathy at the elbow.     Laboratory:  A1c 7.5 (3/2020)  B12 360, Heavy metals screen negative (2020)  TSH normal ()  Immunofixation negative (2017)  SSA/SSB negative (2018)  A1c 7.4 (2020)    Pertinent Investigations since last visit:   A1c 11.2 (2022)    CT lumbar spine   Impression:  1.  Multilevel lumbar spondylosis. Most prominent at L4-5 with  moderate to severe left neuroforaminal stenosis with left L4 exiting  nerve roots impingement, to a lesser grade at L3-4 with moderate left  neuroforaminal stenosis. Overall not significantly changed from  10/12/2017.  2.  Bilateral nonobstructing nephrolithiasis.         Assessment and Plan:   Assessment:  Adarsh Salvador is a 55 year old male who presents today for follow-up of diabetic polyneuropathy. Patient has had mild worsening of symptoms since around 1148-2219. He reports continued balance and pain symptoms today. He practices physical therapy exercises for balance. Since last visit pain in the feet and pain in the low back has worsened. We discussed repeating EMG to look for progression of neuropathy and lumbosacral radiculopathy. Pain clinic referral also placed for assistance in pain management.     Plan:  - Continue Cymbalta 60 mg BID  - Flexeril 5-10 mg TID prn  - Continue Nortriptyline 20 mg nightly  - Continue PT  exercises  - Pain clinic referral  - EMG bilateral lower extremities  - Consider repeat CT lumbar pending EMG    Follow up in Neurology clinic pending above    Juan Luis Gray MD   of Neurology  [facility]    The total time of this encounter today amounted to 45 minutes. This time included time spent with the patient, prep work, ordering tests, and performing post visit documentation.

## 2022-03-17 NOTE — TELEPHONE ENCOUNTER
RECORDS RECEIVED FROM: Internal   REASON FOR VISIT: Diabetic polyneuropathy associated with type 2 diabetes mellitus (H) [E11.42]  Bilateral leg pain [M79.604, M79.605]   Date of Appt: 05/05/2022   NOTES (FOR ALL VISITS) STATUS DETAILS   OFFICE NOTE from referring provider Internal 03/17/2022 Dr Gray [facility]   OFFICE NOTE from other specialist N/A    DISCHARGE SUMMARY from hospital N/A    DISCHARGE REPORT from the ER N/A    OPERATIVE REPORT N/A    MEDICATION LIST N/A    IMAGING  (FOR ALL VISITS)     EMG Internal 06/08/2020    EEG N/A    LUMBAR PUNCTURE N/A    RADHA SCAN N/A    ULTRASOUND (CAROTID BILAT) *VASCULAR* Internal 12/30/2021  LFT   MRI (HEAD, NECK, SPINE) N/A    CT (HEAD, NECK, SPINE) N/A

## 2022-03-17 NOTE — LETTER
3/17/2022         RE: Adarsh Salvador  [address]  [facility] [address]        Dear Colleague,    Thank you for referring your patient, Adarsh Salvador, to the [facility] [place]. Please see a copy of my visit note below.    [facility] Neurology Follow Up Visit    Adarsh Salvador MRN# [id number]   Age: 55 year old YOB: 1965     Brief history of symptoms: The patient was initially seen in neurologic consultation on 10/20/2020 for evaluation of neuropathy. Prior to that patient followed with Dr Montalvo. Please see the comprehensive neurologic consultation note from that date in the Epic records for details.     Interval history:   Symptoms are overall worse compared to last visit. His primary complaint is worsening of numbness/tingling/achying in the bilateral feet. He also reports low back pain. Sometimes the pain radiates down the legs. All of these symptoms are worse when he is on his feet for too long.     Nortriptyline was increased to 20 mg at last visit. This resulted in mild benefit.     He continues to have bilateral thigh pain and numbness, but it is not as bothersome as back and feet pain.     He was laid off of his job because of inability to stand for prolonged period of time. He is currently applying for disability.     He has sacral nerve stimulator in place so cannot get an MRI. This was placed in 2015 for fecal incontinence and has been helpful.    His diabetes regimen was changed recently and his sugars have been better controlled lately.     He did physical therapy after last visit and still practices balance exercises.       Past Medical History:     Patient Active Problem List   Diagnosis     GERD (gastroesophageal reflux disease)     Chronic RLQ pain     Constipation     Chronic maxillary sinusitis     Chronic rhinitis     Hypertriglyceridemia     Benign essential hypertension     Anemia in other chronic diseases classified elsewhere     Fatty liver     Irritable bowel  syndrome with diarrhea     Right inguinal hernia     Mild persistent asthma without complication     Type 2 diabetes mellitus with diabetic polyneuropathy, without long-term current use of insulin (H)     Hyperlipidemia LDL goal <100     CONNOR (generalized anxiety disorder)     Insomnia, unspecified type     Morbid obesity (H)     Neuropathy     Recurrent deep vein thrombosis (DVT) (H)     Precordial pain     Dyslipidemia     Elevated C-reactive protein     Gastric reflux     Internal derangement of knee     Nicotine dependence     Varicose veins of lower extremity     Vitamin D deficiency     Past Medical History:   Diagnosis Date     Anaphylactic reaction 8/14/2015     Anxiety      Depression      DM2 (diabetes mellitus, type 2) (H)      GERD (gastroesophageal reflux disease)      HTN (hypertension)      IBS (irritable bowel syndrome)      Kidney stone 6/15/2011    Pt believes these were Calcium stones     Neuropathy         Past Surgical History:     Past Surgical History:   Procedure Laterality Date     COLONOSCOPY  5/1/2012    Procedure:COLONOSCOPY; screening colonoscopy; Surgeon:KINGSLEY DOS SANTOS; Location:[facility] OR     COLONOSCOPY  4/21/2014    Procedure: COMBINED COLONOSCOPY, SINGLE BIOPSY/POLYPECTOMY BY BIOPSY;  Surgeon: Duane, William Charles, MD;  Location: [facility] OR     CYSTOSCOPY  05/01/2008    CYSTOSCOPY W/ URETERAL STENT PLACEMENT 05/01/2008      CYSTOSCOPY  09/26/2010    CYSTOSCOPY W/ URETERAL STENT PLACEMENT 09/26/2010 Left      CYSTOSCOPY  05/18/2012    CYSTOSCOPY, LEFT URETEROSCOPY AND STENT PLACEMENT left retrograde 05/18/2012      CYSTOSCOPY,+URETEROSCOPY  06/10/2008    URETEROSCOPY 06/10/2008 Right      HC BREATH HYDROGEN TEST  3/7/2014    Procedure: HYDROGEN BREATH TEST;  Surgeon: Darion Swift MD;  Location: [facility]     LITHOTRIPSY  09/30/2010    LITHOTRIPSY 09/30/2010 LEFT EXTRACORPOREAL SHOCK WAVE LITHOTRIPSY, FLEXIBLE CYSTOSCOPY, LEFT STENT REMOVAL       ORTHOPEDIC SURGERY  10/03/2007    KNEE  ARTHROSCOPY 10/03/2007 RightX2       RELEASE CARPAL TUNNEL Right 1/26/2018    Procedure: RELEASE CARPAL TUNNEL;  Right carpal tunnel release;  Surgeon: Wiliam Saenz MD;  Location: [facility] OR     VASCULAR SURGERY  11/24/2008    Radiofrequency ablation left saphenous vein 11/24/2008 Done by Dr. Lozoya          Social History:     Social History     Tobacco Use     Smoking status: Never Smoker     Smokeless tobacco: Current User     Types: Chew     Tobacco comment: Chew   Substance Use Topics     Alcohol use: Not Currently     Alcohol/week: 0.0 standard drinks     Comment: occasional, few drinks a month     Drug use: No        Family History:     Family History   Problem Relation Age of Onset     Cancer Mother 52        lung, smoked     Cancer - colorectal Father 53        unsure type, pt attributes to radiation exposure     Myocardial Infarction Father 45     Coronary Artery Disease Father      Myocardial Infarction Paternal Grandfather 35     Diabetes Other         nephew- type 1     Diabetes Maternal Aunt         1     Diabetes Maternal Aunt         2     Diabetes Paternal Uncle         1     Diabetes Paternal Uncle         2     Diabetes Paternal Uncle         3     Diabetes Paternal Uncle         4     Colon Cancer No family hx of         Medications:     Current Outpatient Medications   Medication Sig     albuterol (PROAIR HFA/PROVENTIL HFA/VENTOLIN HFA) 108 (90 Base) MCG/ACT inhaler Inhale 2 puffs into the lungs every 6 hours as needed for shortness of breath / dyspnea or wheezing     Alpha Lipoic Acid 200 MG CAPS Take 200 mg by mouth 3 times daily     amLODIPine (NORVASC) 10 MG tablet Take 1 tablet (10 mg) by mouth daily For blood pressure     ASPIRIN PO Take 325 mg by mouth daily      aspirin-acetaminophen-caffeine (EXCEDRIN MIGRAINE) 250-250-65 MG tablet Take 1 tablet by mouth     atorvastatin (LIPITOR) 40 MG tablet Take 1 tablet (40 mg) by mouth daily     baclofen (LIORESAL) 10 MG tablet Take 1 tablet  (10 mg) by mouth See Admin Instructions 2-3 po q hs.     blood glucose (ACCU-CHEK GUIDE) test strip Use to test blood sugar 3 times daily or as directed.     blood glucose monitoring (ACCU-CHEK FASTCLIX) lancets Use to test blood sugar 1 times daily or as directed.  Ok to substitute alternative if insurance prefers.     calcium carbonate (TUMS) 500 MG chewable tablet Take 1 chew tab by mouth daily     cyclobenzaprine (FLEXERIL) 10 MG tablet Take 0.5-1 tablets (5-10 mg) by mouth 3 times daily as needed for muscle spasms     DULoxetine (CYMBALTA) 60 MG capsule Take 1 capsule (60 mg) by mouth 2 times daily     enoxaparin ANTICOAGULANT (LOVENOX) 120 MG/0.8ML syringe Inject 0.8 mLs (120 mg) Subcutaneous every 12 hours     EPINEPHrine (EPIPEN) 0.3 MG/0.3ML injection Inject 0.3 mLs (0.3 mg) into the muscle once as needed for anaphylaxis     fluticasone (FLONASE) 50 MCG/ACT nasal spray Spray 1 spray into both nostrils daily     fluticasone-salmeterol (ADVAIR) 500-50 MCG/DOSE inhaler Inhale 1 puff into the lungs 2 times daily     insulin glargine (BASAGLAR KWIKPEN) 100 UNIT/ML pen Inject 27 Units Subcutaneous daily Max Total Daily Dose 40 units per day     insulin pen needle (32G X 4 MM) 32G X 4 MM miscellaneous Use 2 pen needles daily or as directed.     losartan (COZAAR) 50 MG tablet Take 1 tablet (50 mg) by mouth daily For blood pressure.     metFORMIN (GLUCOPHAGE-XR) 500 MG 24 hr tablet Take 2 tablets (1,000 mg) by mouth 2 times daily (with meals)     montelukast (SINGULAIR) 10 MG tablet Take 1 tablet (10 mg) by mouth At Bedtime For allergies/asthma     multivitamin, therapeutic (THERA-VIT) TABS Take 1 tablet by mouth daily     naproxen sodium (ANAPROX) 220 MG tablet Take 220 mg by mouth     nortriptyline (PAMELOR) 10 MG capsule Take 2 capsules (20 mg) by mouth At Bedtime     tamsulosin (FLOMAX) 0.4 MG capsule Take 1 capsule (0.4 mg) by mouth daily     warfarin ANTICOAGULANT (COUMADIN) 5 MG tablet Take daily as directed.  Current dose is 12.5 mg Monday Wednesday and Friday and 10 mg all other days unless directed otherwise by [facility]     Current Facility-Administered Medications   Medication     triamcinolone (KENALOG-40) injection 40 mg     Facility-Administered Medications Ordered in Other Visits   Medication     BUPivacaine (MARCAINE) injection 0.5%     DOBUTamine 500 mg in dextrose 5% 250 mL (adult std conc) premix     iopamidol (ISOVUE-M 200) solution 10 mL     methylPREDNISolone (DEPO-MEDROL) injection 80 mg     metoprolol (LOPRESSOR) injection 5 mg        Allergies:     Allergies   Allergen Reactions     Artificial Sweetner (Informational Only) [Artificial Sweetner (Informational Only) ] GI Disturbance     ALL artificial sweetners cause severe diarrhea & flu symptoms     Hydromorphone Anaphylaxis     Ibuprofen GI Disturbance     Morphine Sulfate Concentrate Anaphylaxis     Morphine [Fumaric Acid] Anaphylaxis     Hydrocodone-Acetaminophen Itching     Tramadol Hives     Trazodone Hives     Gabapentin      GI upset per pt     Keflex [Cephalexin] Diarrhea     Upset stomach     Codeine Phosphate Itching     Ketorolac Tromethamine Rash        Review of Systems:   As above     Physical Exam:   Vitals: /86 (BP Location: Right arm, Patient Position: Sitting, Cuff Size: Adult Large)   Pulse 85   Wt 128.2 kg (282 lb 11.2 oz)   BMI 40.56 kg/m     General: Seated comfortably in no acute distress.  Lungs: breathing comfortably  Extremities: no edema  Skin: No rashes  Neurologic:     Mental Status: Fully alert, attentive. Normal memory and fund of knowledge. Language normal, speech clear and fluent, no paraphasic errors.      Cranial Nerves: No dysarthria.      Motor: No tremors or other abnormal movements observed. Muscle tone normal throughout. Strength 5/5 throughout upper and lower extremities. Finger taps symmetric and normal speed.      Deep Tendon Reflexes: 2+/symmetric throughout upper extremities, 1+ patella and trace ankle  jerks. Toes downgoing bilaterally.     Sensory: Vibration is 4 seconds in the bilateral great toes. Temperature and pinprick are decreased in the feet compared to the hands. Pinprick is decreased in the distal finger tips. Light touch is decreased below the lower shins.      Coordination: Finger-nose-finger without dysmetria. Hand alternating movements normal.      Gait: Mildly wide based steady casual gait. Able to walk on heels and toes. Mild difficulty with tandem gait.     Data reviewed on previous visits    Procedures:  EMG/NCS 2020  Interpretation:  This is an abnormal study, demonstrating electrophysiologic evidence of the followin. 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 Comparison with two studies performed in 2017 demonstrates minimal interval worsening of amplitude attenuation.   2. Mild right ulnar neuropathy at the elbow.     Laboratory:  A1c 7.5 (3/2020)  B12 360, Heavy metals screen negative (2020)  TSH normal ()  Immunofixation negative ()  SSA/SSB negative ()  A1c 7.4 (2020)    Pertinent Investigations since last visit:   A1c 11.2 (2022)    CT lumbar spine   Impression:  1.  Multilevel lumbar spondylosis. Most prominent at L4-5 with  moderate to severe left neuroforaminal stenosis with left L4 exiting  nerve roots impingement, to a lesser grade at L3-4 with moderate left  neuroforaminal stenosis. Overall not significantly changed from  10/12/2017.  2.  Bilateral nonobstructing nephrolithiasis.         Assessment and Plan:   Assessment:  Adarsh Salvador is a 55 year old male who presents today for follow-up of diabetic polyneuropathy. Patient has had mild worsening of symptoms since around 6160-1551. He reports continued balance and pain symptoms today. He practices physical therapy exercises for balance. Since last visit pain in the feet and pain in the low back has worsened. We discussed repeating EMG to look for progression of neuropathy and  lumbosacral radiculopathy. Pain clinic referral also placed for assistance in pain management.     Plan:  - Continue Cymbalta 60 mg BID  - Flexeril 5-10 mg TID prn  - Continue Nortriptyline 20 mg nightly  - Continue PT exercises  - Pain clinic referral  - EMG bilateral lower extremities  - Consider repeat CT lumbar pending EMG    Follow up in Neurology clinic pending above    Juan Luis Gray MD   of Neurology  [facility]    The total time of this encounter today amounted to 45 minutes. This time included time spent with the patient, prep work, ordering tests, and performing post visit documentation.        Again, thank you for allowing me to participate in the care of your patient.        Sincerely,        Juan Luis Gray MD

## 2022-03-18 ENCOUNTER — ANTICOAGULATION THERAPY VISIT (OUTPATIENT)
Dept: ANTICOAGULATION | Facility: CLINIC | Age: 57
End: 2022-03-18

## 2022-03-18 ENCOUNTER — LAB (OUTPATIENT)
Dept: LAB | Facility: CLINIC | Age: 57
End: 2022-03-18
Payer: COMMERCIAL

## 2022-03-18 DIAGNOSIS — I82.409 RECURRENT DEEP VEIN THROMBOSIS (DVT) (H): ICD-10-CM

## 2022-03-18 LAB — INR BLD: 1.8 (ref 0.9–1.1)

## 2022-03-18 PROCEDURE — 85610 PROTHROMBIN TIME: CPT

## 2022-03-18 PROCEDURE — 36415 COLL VENOUS BLD VENIPUNCTURE: CPT

## 2022-03-18 NOTE — PROGRESS NOTES
Assessment & Plan     Type 2 diabetes mellitus with diabetic polyneuropathy, without long-term current use of insulin (H)  Improved with insulin   Recheck as below  - Hemoglobin A1c; Future    Morbid obesity (H)  Needs improvement    Globus syndrome  Suspect GERD or other esophageal cause  Exam benign  Start Prilosec 40 gm continue lifestyle changes to help   Weight loss would help  Schedule with GI in case not improving or if worsening may need studies again  Also with h/o chewing and longevity of symptoms would like GI consult  See patient instructions below for more plan.    - omeprazole (PRILOSEC) 40 MG DR capsule; Take 1 capsule (40 mg) by mouth daily Take for at least two months  - Adult Gastro Ref - Consult Only; Future        Patient Instructions   Lifestyle recommendations:  Being overweight or obese puts you are risk of major health problems including but not limited to: heart disease/heart attack, stroke, high cholesterol, high blood pressure, and diabetes.  This is why it is important to be at a healthy weight for your height.     Exercise 30 minutes 3-5 times a week, if you can only do 10 minutes 3 times a week that is still shown to have great benefit!  Brisk walking even counts for this.  Consider free youtube videos for exercise that fits your needs and lifestyle.     Monitor your caffeine and soda intake, try to minimize these beverages    Drink plenty of water (about 70-80 ounces a day)    Try to eat a vegetable and fruit  with lunch and dinner.  Have a breakfast that contains protein such as eggs or oatmeal.  Decrease your white bread, pasta, and sweets intake.  Increase lean proteins like chicken or pork. Try to eat out 1-2 times a week or less.  Monitor your portion sizes, try using smaller plates if needed.  Eat slowly, this gives you time to be aware that your body is full.   Let me know at any time if you would like a referral to a nutritionist!              Return in about 6 weeks (around  5/8/2022) for Lab Work, diabetes recheck.    ARIANE Ortiz [facility]    Soren Altamirano is a 56 year old who presents for the following health issues  accompanied by his Self.    History of Present Illness       Reason for visit:  Lump in throat  Symptom onset:  More than a month  Symptoms include:  Hard time sowing  Symptom intensity:  Moderate  Symptom progression:  Worsening  Had these symptoms before:  Yes  Has tried/received treatment for these symptoms:  Yes  Previous treatment was successful:  No  What makes it worse:  Diet pop  What makes it better:  Nothing    He eats 2-3 servings of fruits and vegetables daily.He consumes 1 sweetened beverage(s) daily.He exercises with enough effort to increase his heart rate 9 or less minutes per day.  He exercises with enough effort to increase his heart rate 3 or less days per week.   He is taking medications regularly.     Here for globus sensation.   Years he has had these symptoms. Pop makes it worse. Spicy foods make it worse. Feels like it is right in the throat. Hard to swallow only if throat is dry. Can feel it when he lies down also.   Has had upper endoscopy previously years ago for this. Treated with prilosec 2013. Doesn't remember if it helped.    Had swallow studies previously in [place].   He has now cut back on spicy foods and quit soda.   Not much alcohol. Chews tobacco.  From my last note:  Had colonoscopy scheduled. for rectal bleeding.   Needs to be bridged due to recent dvt/pe.   Trulicity, ozempic not covered.   106-130s with insulin.  Working well now.       Colonoscopy rescheduled for 4-21-22.    bmi 41.     Review of Systems   Constitutional, HEENT, cardiovascular, pulmonary, GI, , musculoskeletal, neuro, skin, endocrine and psych systems are negative, except as otherwise noted.      Objective    /85   Pulse 84   Temp 97.9  F (36.6  C) (Tympanic)   Resp 16   Wt 130.2 kg (287 lb)   SpO2 98%    BMI 41.18 kg/m    There is no height or weight on file to calculate BMI.  Physical Exam   GENERAL: alert, no distress and obese  NECK: no adenopathy, no asymmetry, masses, or scars and thyroid normal to palpation  RESP: lungs clear to auscultation - no rales, rhonchi or wheezes  CV: regular rate and rhythm, normal S1 S2, no S3 or S4, no murmur, click or rub, no peripheral edema and peripheral pulses strong  MS: no gross musculoskeletal defects noted, no edema  NEURO: Normal strength and tone, mentation intact and speech normal  PSYCH: mentation appears normal, affect normal/bright

## 2022-03-18 NOTE — PROGRESS NOTES
ANTICOAGULATION MANAGEMENT     Adarsh Salvador 56 year old male is on warfarin with subtherapeutic INR result. (Goal INR 2.0-3.0)    Recent labs: (last 7 days)     03/18/22  0857   INR 1.8*       ASSESSMENT       Source(s): Chart Review       Warfarin doses taken: Warfarin taken as instructed    Diet: No new diet changes identified    New illness, injury, or hospitalization: No    Medication/supplement changes: None noted    Signs or symptoms of bleeding or clotting: No    Previous INR: Subtherapeutic    Additional findings: None       PLAN     Recommended plan for no diet, medication or health factor changes affecting INR     Dosing Instructions: Booster dose then Increase your warfarin dose (5.6% change) with next INR in 1 week       Summary  As of 3/18/2022    Full warfarin instructions:  3/18: 15 mg; Otherwise 15 mg every Tue, Thu; 12.5 mg all other days   Next INR check:  4/1/2022             Telephone call with Adarsh who verbalizes understanding and agrees to plan    Lab visit scheduled    Education provided: Please call back if any changes to your diet, medications or how you've been taking warfarin    Plan made per [facility] anticoagulation protocol    Joya Suero, RN  Anticoagulation Clinic  3/18/2022    _______________________________________________________________________     Anticoagulation Episode Summary     Current INR goal:  2.0-3.0   TTR:  10.2 % (1.4 mo)   Target end date:  Indefinite   Send INR reminders to:  MANOJ MARTÍNEZ    Indications    Recurrent deep vein thrombosis (DVT) (H) [I82.409]           Comments:           Anticoagulation Care Providers     Provider Role Specialty Phone number    Bertha Romero PA-C Referring Family Medicine [phone]

## 2022-03-24 ENCOUNTER — OFFICE VISIT (OUTPATIENT)
Dept: FAMILY MEDICINE | Facility: CLINIC | Age: 57
End: 2022-03-24
Payer: COMMERCIAL

## 2022-03-24 VITALS
TEMPERATURE: 97.9 F | OXYGEN SATURATION: 98 % | DIASTOLIC BLOOD PRESSURE: 85 MMHG | RESPIRATION RATE: 16 BRPM | BODY MASS INDEX: 41.18 KG/M2 | WEIGHT: 287 LBS | SYSTOLIC BLOOD PRESSURE: 133 MMHG | HEART RATE: 84 BPM

## 2022-03-24 DIAGNOSIS — R09.A2 GLOBUS SYNDROME: ICD-10-CM

## 2022-03-24 DIAGNOSIS — E11.42 TYPE 2 DIABETES MELLITUS WITH DIABETIC POLYNEUROPATHY, WITHOUT LONG-TERM CURRENT USE OF INSULIN (H): Primary | ICD-10-CM

## 2022-03-24 DIAGNOSIS — E66.01 MORBID OBESITY (H): ICD-10-CM

## 2022-03-24 PROCEDURE — 99214 OFFICE O/P EST MOD 30 MIN: CPT | Performed by: PHYSICIAN ASSISTANT

## 2022-03-24 RX ORDER — OMEPRAZOLE 40 MG/1
40 CAPSULE, DELAYED RELEASE ORAL DAILY
Qty: 90 CAPSULE | Refills: 1 | Status: SHIPPED | OUTPATIENT
Start: 2022-03-24 | End: 2022-08-05

## 2022-03-25 ENCOUNTER — LAB (OUTPATIENT)
Dept: LAB | Facility: CLINIC | Age: 57
End: 2022-03-25
Payer: COMMERCIAL

## 2022-03-25 ENCOUNTER — ANTICOAGULATION THERAPY VISIT (OUTPATIENT)
Dept: ANTICOAGULATION | Facility: CLINIC | Age: 57
End: 2022-03-25

## 2022-03-25 DIAGNOSIS — I82.409 RECURRENT DEEP VEIN THROMBOSIS (DVT) (H): ICD-10-CM

## 2022-03-25 DIAGNOSIS — I82.409 RECURRENT DEEP VEIN THROMBOSIS (DVT) (H): Primary | ICD-10-CM

## 2022-03-25 LAB — INR BLD: 2.7 (ref 0.9–1.1)

## 2022-03-25 PROCEDURE — 36416 COLLJ CAPILLARY BLOOD SPEC: CPT

## 2022-03-25 PROCEDURE — 85610 PROTHROMBIN TIME: CPT

## 2022-03-25 NOTE — PROGRESS NOTES
ANTICOAGULATION MANAGEMENT     Adarsh Salvador 56 year old male is on warfarin with therapeutic INR result. (Goal INR 2.0-3.0)    Recent labs: (last 7 days)     03/25/22  0909   INR 2.7*       ASSESSMENT       Source(s): Chart Review and Patient/Caregiver Call       Warfarin doses taken: Warfarin taken as instructed    Diet: No new diet changes identified    New illness, injury, or hospitalization: No    Medication/supplement changes: None noted    Signs or symptoms of bleeding or clotting: Yes: bruising a little more but is more active    Previous INR: Subtherapeutic    Additional findings: None       PLAN     Recommended plan for no diet, medication or health factor changes affecting INR     Dosing Instructions: Continue your current warfarin dose with next INR in 1 week       Summary  As of 3/25/2022    Full warfarin instructions:  15 mg every Tue, Thu; 12.5 mg all other days   Next INR check:  4/1/2022             Telephone call with Adarsh who verbalizes understanding and agrees to plan    Lab visit scheduled    Education provided: Please call back if any changes to your diet, medications or how you've been taking warfarin and Contact [phone]  with any changes, questions or concerns.     Plan made per [facility] anticoagulation protocol    Oly Bustillo RN  Anticoagulation Clinic  3/25/2022    _______________________________________________________________________     Anticoagulation Episode Summary     Current INR goal:  2.0-3.0   TTR:  19.8 % (1.6 mo)   Target end date:  Indefinite   Send INR reminders to:  [facility]    Indications    Recurrent deep vein thrombosis (DVT) (H) [I82.409]           Comments:           Anticoagulation Care Providers     Provider Role Specialty Phone number    Bertha Romero PA-C Referring Family Medicine [phone]

## 2022-04-01 ENCOUNTER — ANTICOAGULATION THERAPY VISIT (OUTPATIENT)
Dept: ANTICOAGULATION | Facility: CLINIC | Age: 57
End: 2022-04-01

## 2022-04-01 ENCOUNTER — LAB (OUTPATIENT)
Dept: LAB | Facility: CLINIC | Age: 57
End: 2022-04-01

## 2022-04-01 ENCOUNTER — VIRTUAL VISIT (OUTPATIENT)
Dept: EDUCATION SERVICES | Facility: OTHER | Age: 57
End: 2022-04-01
Payer: COMMERCIAL

## 2022-04-01 DIAGNOSIS — I82.409 RECURRENT DEEP VEIN THROMBOSIS (DVT) (H): ICD-10-CM

## 2022-04-01 DIAGNOSIS — E11.42 TYPE 2 DIABETES MELLITUS WITH DIABETIC POLYNEUROPATHY, WITHOUT LONG-TERM CURRENT USE OF INSULIN (H): ICD-10-CM

## 2022-04-01 DIAGNOSIS — Z79.4 TYPE 2 DIABETES MELLITUS WITH HYPERGLYCEMIA, WITH LONG-TERM CURRENT USE OF INSULIN (H): ICD-10-CM

## 2022-04-01 DIAGNOSIS — E11.9 TYPE 2 DIABETES MELLITUS WITHOUT COMPLICATION, WITHOUT LONG-TERM CURRENT USE OF INSULIN (H): ICD-10-CM

## 2022-04-01 DIAGNOSIS — I82.409 RECURRENT DEEP VEIN THROMBOSIS (DVT) (H): Primary | ICD-10-CM

## 2022-04-01 DIAGNOSIS — E11.65 TYPE 2 DIABETES MELLITUS WITH HYPERGLYCEMIA, WITH LONG-TERM CURRENT USE OF INSULIN (H): ICD-10-CM

## 2022-04-01 LAB — INR BLD: 2.6 (ref 0.9–1.1)

## 2022-04-01 PROCEDURE — 36416 COLLJ CAPILLARY BLOOD SPEC: CPT

## 2022-04-01 PROCEDURE — 98966 PH1 ASSMT&MGMT NQHP 5-10: CPT | Performed by: DIETITIAN, REGISTERED

## 2022-04-01 PROCEDURE — 85610 PROTHROMBIN TIME: CPT

## 2022-04-01 RX ORDER — INSULIN GLARGINE 100 [IU]/ML
36 INJECTION, SOLUTION SUBCUTANEOUS DAILY
Qty: 15 ML | Refills: 3 | Status: SHIPPED | OUTPATIENT
Start: 2022-04-01 | End: 2022-10-18

## 2022-04-01 NOTE — LETTER
[facility] ANTICOAGULATION CLINIC  [address] [facility] [address]  [phone]            April 1, 2022    Adarsh Salvador                                                                                                                     [address] [place] [id number]          Dear Adarsh,  Here is your instructions for warfarin and Lovenox (enoxaparin) in regards to your upcoming colonoscopy:    Last warfarin dose: 4/15/22    4/16, NO warfarin    4/17, NO warfarin    4/18, NO warfarin, begin enoxaparin injections into the abdomen every 12 hours (AM and PM)    4/19, NO warfarin, enoxaparin injection into the abdomen every 12 hours (AM and PM)    4/20, NO warfarin, enoxaparin injection into the abdomen AM only (no enoxaparin 24 hours prior to surgery)    4/21, DAY OF PROCEDURE, NO enoxaparin. Restart warfarin 25mg (5 tabs) in the evening, unless instructed otherwise by the physician.    4/22, Restart enoxaparin injections into the abdomen every 12 hours (AM and PM), unless instructed otherwise by the physician, and 12.5mg (2.5 tabs) warfarin in the evening.     4/23, Enoxaparin injection into the abdomen every 12 hours (AM and PM) and 12.5mg (2.5 tabs) warfarin in the evening.    4/24, Enoxaparin injection into the abdomen every 12 hours (AM and PM) and 12.5mg (2.5 tabs) warfarin in the evening.    4/25, Enoxaparin injection into the abdomen in the AM. Recheck INR at the  Lab for further dosing instructions.     The Enoxaparin has been sent to your pharmacy.  The pharmacist will be able to review with you how to administer the medication.      If you have any questions please call the Anticoagulation Clinic at [phone].    Sincerely,     Rubi Nathan, Anticoagulation RN

## 2022-04-01 NOTE — PROGRESS NOTES
ANTICOAGULATION MANAGEMENT     Adarsh Salvador 56 year old male is on warfarin with therapeutic INR result. (Goal INR 2.0-3.0)    Recent labs: (last 7 days)     04/01/22  1109   INR 2.6*       ASSESSMENT       Source(s): Chart Review and Patient/Caregiver Call       Warfarin doses taken: Warfarin taken as instructed    Diet: No new diet changes identified    New illness, injury, or hospitalization: No    Medication/supplement changes: None noted    Signs or symptoms of bleeding or clotting: No    Previous INR: Therapeutic last visit; previously outside of goal range    Additional findings: Upcoming surgery/procedure Colonoscopy 4/21, instructions mailed and sent via [employer]. Patient wants us to go over them with him at next INR appointment on 4/15       PLAN     Recommended plan for no diet, medication or health factor changes affecting INR     Dosing Instructions: continue your current warfarin dose with next INR in 2 weeks       Summary  As of 4/1/2022    Full warfarin instructions:  4/16: Hold; 4/17: Hold; 4/18: Hold; 4/19: Hold; 4/20: Hold; 4/21: 25 mg; Otherwise 15 mg every Tue, Thu; 12.5 mg all other days   Next INR check:  4/15/2022             Telephone call with Adarsh who verbalizes understanding and agrees to plan    Lab visit scheduled    Education provided: Goal range and significance of current result    Plan made with [facility] Pharmacist Siomara Nathan, VEGA  Anticoagulation Clinic  4/1/2022    _______________________________________________________________________     Anticoagulation Episode Summary     Current INR goal:  2.0-3.0   TTR:  29.9 % (1.9 mo)   Target end date:  Indefinite   Send INR reminders to:  [facility]    Indications    Recurrent deep vein thrombosis (DVT) (H) [I82.409]           Comments:           Anticoagulation Care Providers     Provider Role Specialty Phone number    Bertha Romero PA-C Referring Family Medicine [phone]

## 2022-04-01 NOTE — PROGRESS NOTES
Diabetes Education Follow-up    Type of Service: Telephone Visit    Originating Location (Patient Location): Home  Distant Location (Provider Location): Home  Mode of Communication:  Telephone      How would patient like to obtain AVS? Jacqueline      Subjective/Objective:    Adarsh Salvador sent in blood glucose log for review. Last date of communication was: 22.    Diabetes is being managed with Lifestyle (diet/activity), Diabetes Medications   Diabetes Medication(s)     Biguanides       metFORMIN (GLUCOPHAGE-XR) 500 MG 24 hr tablet    Take 2 tablets (1,000 mg) by mouth 2 times daily (with meals)    Insulin       insulin glargine (BASAGLAR KWIKPEN) 100 UNIT/ML pen    Inject 36 Units Subcutaneous daily Max Total Daily Dose 45 units per day          BG/Food Lo units (insulin)  BGs 110-130  (fasting, right before lunch, before dinner)    Happy where numbers are  Diet consistent with three meals per day.  Eating as much vegetables as can (watches when vitamin K)      Assessment:    Pt does not have his blood glucose meter with him.  Says he is checking 3 times per day before meals and blood sugars average 110-130.  Taking 36 units basaglar and has been consistent at this dose for 2 weeks.  Continues on metformin XR as well.  Pt has also been working on eating consistently, he has no other questions.  Visit short as pt at work.  Plan for pt to check A1C again and make follow up as needed.  Keep diabetes medications the same for now.    Plan/Response:  No changes      Any diabetes medication dose changes were made via the CDE Protocol and Collaborative Practice Agreement with the patient's referring provider. A copy of this encounter was shared with the provider.    Amanda Hussein RD [facility]  [phone]    7 minutes

## 2022-04-01 NOTE — LETTER
2022         RE: Adarsh Salvador  [address]  [place] [address]        Dear Colleague,    Thank you for referring your patient, Adarsh Salvador, to the [facility]. Please see a copy of my visit note below.      Diabetes Education Follow-up    Subjective/Objective:    Adarsh Salvador sent in blood glucose log for review. Last date of communication was: 22.    Diabetes is being managed with Lifestyle (diet/activity), Diabetes Medications   Diabetes Medication(s)     Biguanides       metFORMIN (GLUCOPHAGE-XR) 500 MG 24 hr tablet    Take 2 tablets (1,000 mg) by mouth 2 times daily (with meals)    Insulin       insulin glargine (BASAGLAR KWIKPEN) 100 UNIT/ML pen    Inject 36 Units Subcutaneous daily Max Total Daily Dose 45 units per day          BG/Food Lo units (insulin)  BGs 110-130  (fasting, right before lunch, before dinner)    Happy where numbers are  Diet consistent with three meals per day.  Eating as much vegetables as can (watches when vitamin K)      Assessment:    Pt does not have his blood glucose meter with him.  Says he is checking 3 times per day before meals and blood sugars average 110-130.  Taking 36 units basaglar and has been consistent at this dose for 2 weeks.  Continues on metformin XR as well.  Pt has also been working on eating consistently, he has no other questions.  Visit short as pt at work.  Plan for pt to check A1C again and make follow up as needed.  Keep diabetes medications the same for now.    Plan/Response:  No changes      Any diabetes medication dose changes were made via the CDE Protocol and Collaborative Practice Agreement with the patient's referring provider. A copy of this encounter was shared with the provider.    COREY Gregory [facility]  [phone]    7 minutes

## 2022-04-13 ENCOUNTER — OFFICE VISIT (OUTPATIENT)
Dept: URGENT CARE | Facility: URGENT CARE | Age: 57
End: 2022-04-13
Payer: COMMERCIAL

## 2022-04-13 ENCOUNTER — TELEPHONE (OUTPATIENT)
Dept: GASTROENTEROLOGY | Facility: CLINIC | Age: 57
End: 2022-04-13

## 2022-04-13 VITALS
HEART RATE: 113 BPM | OXYGEN SATURATION: 99 % | RESPIRATION RATE: 18 BRPM | SYSTOLIC BLOOD PRESSURE: 145 MMHG | TEMPERATURE: 99.2 F | WEIGHT: 290 LBS | BODY MASS INDEX: 41.61 KG/M2 | DIASTOLIC BLOOD PRESSURE: 80 MMHG

## 2022-04-13 DIAGNOSIS — Z12.11 ENCOUNTER FOR SCREENING COLONOSCOPY: Primary | ICD-10-CM

## 2022-04-13 DIAGNOSIS — J01.90 ACUTE SINUSITIS WITH SYMPTOMS > 10 DAYS: ICD-10-CM

## 2022-04-13 DIAGNOSIS — E11.42 TYPE 2 DIABETES MELLITUS WITH DIABETIC POLYNEUROPATHY, WITHOUT LONG-TERM CURRENT USE OF INSULIN (H): ICD-10-CM

## 2022-04-13 DIAGNOSIS — I82.409 RECURRENT DEEP VEIN THROMBOSIS (DVT) (H): ICD-10-CM

## 2022-04-13 DIAGNOSIS — J32.0 CHRONIC MAXILLARY SINUSITIS: Chronic | ICD-10-CM

## 2022-04-13 DIAGNOSIS — Z79.01 CHRONIC ANTICOAGULATION: ICD-10-CM

## 2022-04-13 DIAGNOSIS — R05.9 COUGH: Primary | ICD-10-CM

## 2022-04-13 LAB — SARS-COV-2 RNA RESP QL NAA+PROBE: NEGATIVE

## 2022-04-13 PROCEDURE — 99214 OFFICE O/P EST MOD 30 MIN: CPT | Mod: CS | Performed by: FAMILY MEDICINE

## 2022-04-13 PROCEDURE — U0005 INFEC AGEN DETEC AMPLI PROBE: HCPCS | Performed by: FAMILY MEDICINE

## 2022-04-13 PROCEDURE — U0003 INFECTIOUS AGENT DETECTION BY NUCLEIC ACID (DNA OR RNA); SEVERE ACUTE RESPIRATORY SYNDROME CORONAVIRUS 2 (SARS-COV-2) (CORONAVIRUS DISEASE [COVID-19]), AMPLIFIED PROBE TECHNIQUE, MAKING USE OF HIGH THROUGHPUT TECHNOLOGIES AS DESCRIBED BY CMS-2020-01-R: HCPCS | Performed by: FAMILY MEDICINE

## 2022-04-13 RX ORDER — WARFARIN SODIUM 5 MG/1
TABLET ORAL
Qty: 225 TABLET | Refills: 1 | Status: SHIPPED | OUTPATIENT
Start: 2022-04-13 | End: 2022-12-22

## 2022-04-13 RX ORDER — BISACODYL 5 MG/1
TABLET, DELAYED RELEASE ORAL
Qty: 2 TABLET | Refills: 0 | Status: SHIPPED | OUTPATIENT
Start: 2022-04-13 | End: 2022-07-07

## 2022-04-13 RX ORDER — BENZONATATE 200 MG/1
200 CAPSULE ORAL 3 TIMES DAILY PRN
Qty: 45 CAPSULE | Refills: 0 | Status: SHIPPED | OUTPATIENT
Start: 2022-04-13 | End: 2022-07-07

## 2022-04-13 NOTE — TELEPHONE ENCOUNTER
Attempted to contact patient regarding upcoming colonoscopy procedure on 4.21.2022 for pre assessment questions. No answer.     Unable to leave a VM as pt's mailbox is full.  [employer] message sent.    Casie Perdomo RN

## 2022-04-13 NOTE — TELEPHONE ENCOUNTER
Patient scheduled for colonoscopy on 4/21/22.     Covid test scheduled: 4/18/22    Arrival time: 0730    Facility location: [facility]     Sedation type: CS     Indication for procedure: screening     Anticoagulations? Coumadin (Warfarin) Holding interval of 5 days. Staff message sent to anticoagulation pool. Upon review it appears that patient has a holding plan 4/16 and bridging.     Bowel prep recommendation: Extended d/t BMI 40.2.     Extended prep sent to [id number] - [place], [place] - [address] [facility]. Prep instructions sent via [employer].     Pre visit planning completed.    Cheyenne Loredo RN

## 2022-04-13 NOTE — PROGRESS NOTES
Chief complaint: cough    3 days ago  Started with cough sore throat sinus congestion  Frontal sinus     BP elevated today but asymptomatic. No headache no blurring of vision no chest pain no shortness of breath no dizziness no unsteadiness no numbness no weakness  Colds, sinus congestion, facial pain  Cough Yes  Greenish discharge  Fever No  Progressively getting worse: YES  Thought was getting better then started getting worse: YES  Getting better:  No  Exposure to pertussis or pertussis like symptoms: No    Problem list and histories reviewed & adjusted, as indicated.  Additional history: as documented    Problem list, Medication list, Allergies, and Medical/Social/Surgical histories reviewed in [facility] and updated as appropriate.    ROS:  Constitutional, HEENT, cardiovascular, pulmonary, gi and gu systems are negative, except as otherwise noted.    OBJECTIVE:                                                    BP (!) 145/80   Pulse 113   Temp 99.2  F (37.3  C)   Resp 18   Wt 131.5 kg (290 lb)   SpO2 99%   BMI 41.61 kg/m    Body mass index is 41.61 kg/m .  GENERAL: healthy, alert and no distress  EYES: Eyes grossly normal to inspection, PERRL and conjunctivae and sclerae normal  HENT: ear canals and TM's normal, nose and mouth without ulcers or lesions  Sinuses: turbinates erythematous congested  NECK: no adenopathy, no asymmetry, masses, or scars and thyroid normal to palpation  RESP: lungs clear to auscultation - no rales, rhonchi or wheezes   CV: regular rate and rhythm, normal S1 S2, no S3 or S4, no murmur, click or rub, no peripheral edema and peripheral pulses strong  MS: no gross musculoskeletal defects noted, no edema  SKIN: no suspicious lesions or rashes  NEURO: Normal strength and tone, mentation intact and speech normal  PSYCH: mentation appears normal, affect normal/bright    Diagnostic Test Results:  No results found for this or any previous visit (from the past 24 hour(s)).     ASSESSMENT/PLAN:                                                         ICD-10-CM    1. Cough  R05.9 Symptomatic; Yes; 4/10/2022 COVID-19 Virus (Coronavirus) by PCR Nose     benzonatate (TESSALON) 200 MG capsule   2. Chronic maxillary sinusitis  J32.0 amoxicillin-clavulanate (AUGMENTIN) 875-125 MG tablet   3. Type 2 diabetes mellitus with diabetic polyneuropathy, without long-term current use of insulin (H)  E11.42    4. Acute sinusitis with symptoms > 10 days  J01.90 amoxicillin-clavulanate (AUGMENTIN) 875-125 MG tablet   5. Chronic anticoagulation  Z79.01      Rule out covid  Prescribed with augmentin  Side effects discussed warned about GI side effects   Acute on chronic sinus infection   Aware can increase his INR - patient plans to discuss with INR nurse  Recommend follow up with primary care provider if no relief  sooner if worse  Adverse reactions of medications discussed.  Over the counter medications discussed.   Aware to come back in if with worsening symptoms or if no relief despite treatment plan  Patient voiced understanding and had no further questions.     If with any symptoms of chest pain or shortness of breath, lightheadedness, palpitations, feeling like passing out or change and worsening in the quality of your symptoms, please proceed to ER. Recommend follow up with PCP in a few days for re-evaluation.     BP not at goal - he will monitor and follow up with primary care provider     MD Vashti Bah MD  [facility]

## 2022-04-13 NOTE — LETTER
April 14, 2022      Adarsh Salvador  [address]   [place] [address]        Dear ,    We are writing to inform you of your test results. We have attempted to reach you by phone, but your mailbox is full.      Your recent Covid test was negative.    Resulted Orders   Symptomatic; Yes; 4/10/2022 COVID-19 Virus (Coronavirus) by PCR Nose   Result Value Ref Range    SARS CoV2 PCR Negative Negative, Testing sent to reference lab. Results will be returned via unsolicited result      Comment:      NEGATIVE: SARS-CoV-2 (COVID-19) RNA not detected, presumed negative.    Narrative    Testing was performed using the Xpert Xpress SARS-CoV-2 Assay on the  Cepheid Gene-Xpert Instrument Systems. Additional information about  this Emergency Use Authorization (EUA) assay can be found via the Lab  Guide. This test should be ordered for the detection of SARS-CoV-2 in  individuals who meet SARS-CoV-2 clinical and/or epidemiological  criteria. Test performance is unknown in asymptomatic patients. This  test is for in vitro diagnostic use under the FDA EUA for  laboratories certified under CLIA to perform high complexity testing.  This test has not been FDA cleared or approved. A negative result  does not rule out the presence of PCR inhibitors in the specimen or  target RNA in concentration below the limit of detection for the  assay. The possibility of a false negative should be considered if  the patient's recent exposure or clinical presentation suggests  COVID-19. This test was validated by the [facility]. This laboratory is certified under  the Clinical Laboratory Improvement Amendments of 1988 (CLIA-88) as  qualified to perform high complexity laboratory testing.         If you have any questions or concerns, please call the clinic at the number listed above.       Sincerely,      Vashti Davis MD

## 2022-04-15 ENCOUNTER — LAB (OUTPATIENT)
Dept: LAB | Facility: CLINIC | Age: 57
End: 2022-04-15
Payer: COMMERCIAL

## 2022-04-15 ENCOUNTER — ANTICOAGULATION THERAPY VISIT (OUTPATIENT)
Dept: ANTICOAGULATION | Facility: CLINIC | Age: 57
End: 2022-04-15

## 2022-04-15 DIAGNOSIS — I82.409 RECURRENT DEEP VEIN THROMBOSIS (DVT) (H): Primary | ICD-10-CM

## 2022-04-15 DIAGNOSIS — I82.409 RECURRENT DEEP VEIN THROMBOSIS (DVT) (H): ICD-10-CM

## 2022-04-15 LAB — INR BLD: 2.9 (ref 0.9–1.1)

## 2022-04-15 PROCEDURE — 36415 COLL VENOUS BLD VENIPUNCTURE: CPT

## 2022-04-15 PROCEDURE — 85610 PROTHROMBIN TIME: CPT

## 2022-04-15 RX ORDER — ENOXAPARIN SODIUM 150 MG/ML
120 INJECTION SUBCUTANEOUS EVERY 12 HOURS
Qty: 16 ML | Refills: 1 | Status: SHIPPED | OUTPATIENT
Start: 2022-04-15 | End: 2022-06-07

## 2022-04-15 NOTE — PROGRESS NOTES
ANTICOAGULATION MANAGEMENT     Adarsh Salvador 56 year old male is on warfarin with therapeutic INR result. (Goal INR 2.0-3.0)    Recent labs: (last 7 days)     04/15/22  0757   INR 2.9*       ASSESSMENT       Source(s): Chart Review and Patient/Caregiver Call       Warfarin doses taken: Warfarin taken as instructed    Diet: No new diet changes identified    New illness, injury, or hospitalization: No    Medication/supplement changes: None noted    Signs or symptoms of bleeding or clotting: No    Previous INR: Therapeutic last 2(+) visits    Additional findings: colonoscopy on the 20th  Hold plan reviewed with patient instructions mailed to home lovenox rx sent Bridging with Enoxaparin until INR >= 2.3 or INR >= 2.0 x 2 ([facility] protocol goal INR 2-3)       PLAN     Recommended plan for no diet, medication or health factor changes affecting INR     Dosing Instructions: continue your current warfarin dose with next INR in 1 week       Summary  As of 4/15/2022    Full warfarin instructions:  4/16: Hold; 4/17: Hold; 4/18: Hold; 4/19: Hold; 4/20: Hold; 4/21: 25 mg; Otherwise 15 mg every Tue, Thu; 12.5 mg all other days   Next INR check:               Telephone call with Adarsh who verbalizes understanding and agrees to plan    Lab visit scheduled    Education provided: Please call back if any changes to your diet, medications or how you've been taking warfarin    Plan made per [facility] anticoagulation protocol    Joya Suero, RN  Anticoagulation Clinic  4/15/2022    _______________________________________________________________________     Anticoagulation Episode Summary     Current INR goal:  2.0-3.0   TTR:  43.7 % (2.3 mo)   Target end date:  Indefinite   Send INR reminders to:  [facility]    Indications    Recurrent deep vein thrombosis (DVT) (H) [I82.409]           Comments:           Anticoagulation Care Providers     Provider Role Specialty Phone number    Bertha Romero PA-C Referring Family Medicine  [phone]

## 2022-04-18 ENCOUNTER — LAB (OUTPATIENT)
Dept: LAB | Facility: CLINIC | Age: 57
End: 2022-04-18
Payer: COMMERCIAL

## 2022-04-18 DIAGNOSIS — Z20.822 COVID-19 RULED OUT: Primary | ICD-10-CM

## 2022-04-18 DIAGNOSIS — Z20.822 COVID-19 RULED OUT: ICD-10-CM

## 2022-04-18 PROCEDURE — U0005 INFEC AGEN DETEC AMPLI PROBE: HCPCS

## 2022-04-18 PROCEDURE — U0003 INFECTIOUS AGENT DETECTION BY NUCLEIC ACID (DNA OR RNA); SEVERE ACUTE RESPIRATORY SYNDROME CORONAVIRUS 2 (SARS-COV-2) (CORONAVIRUS DISEASE [COVID-19]), AMPLIFIED PROBE TECHNIQUE, MAKING USE OF HIGH THROUGHPUT TECHNOLOGIES AS DESCRIBED BY CMS-2020-01-R: HCPCS

## 2022-04-18 NOTE — TELEPHONE ENCOUNTER
Second attempt for pre-assessment prior to upcoming colonoscopy.    No answer.  Unable to leave a voicemail.  Pt's mailbox is full.    Casie Perdomo RN

## 2022-04-19 LAB — SARS-COV-2 RNA RESP QL NAA+PROBE: NEGATIVE

## 2022-04-20 NOTE — TELEPHONE ENCOUNTER
Third attempt for pre-assessment prior to upcoming colonoscopy.    No answer.  Unable to leave a VM as pt's mailbox is full.  [employer] message sent.    COVID test 4.18.2022-negative    Casie Perdomo RN

## 2022-04-21 ENCOUNTER — HOSPITAL ENCOUNTER (OUTPATIENT)
Facility: CLINIC | Age: 57
Discharge: HOME | End: 2022-04-21
Attending: INTERNAL MEDICINE | Admitting: INTERNAL MEDICINE
Payer: COMMERCIAL

## 2022-04-21 ENCOUNTER — DOCUMENTATION ONLY (OUTPATIENT)
Dept: ANTICOAGULATION | Facility: CLINIC | Age: 57
End: 2022-04-21

## 2022-04-21 ENCOUNTER — ANTICOAGULATION THERAPY VISIT (OUTPATIENT)
Dept: ANTICOAGULATION | Facility: CLINIC | Age: 57
End: 2022-04-21

## 2022-04-21 VITALS
DIASTOLIC BLOOD PRESSURE: 77 MMHG | WEIGHT: 289.46 LBS | RESPIRATION RATE: 16 BRPM | TEMPERATURE: 98.2 F | HEIGHT: 71 IN | SYSTOLIC BLOOD PRESSURE: 129 MMHG | BODY MASS INDEX: 40.52 KG/M2 | HEART RATE: 75 BPM | OXYGEN SATURATION: 96 %

## 2022-04-21 DIAGNOSIS — I82.409 RECURRENT DEEP VEIN THROMBOSIS (DVT) (H): Primary | ICD-10-CM

## 2022-04-21 DIAGNOSIS — Z53.9 ERRONEOUS ENCOUNTER--DISREGARD: ICD-10-CM

## 2022-04-21 LAB
COLONOSCOPY: NORMAL
GLUCOSE BLDC GLUCOMTR-MCNC: 132 MG/DL (ref 70–99)

## 2022-04-21 PROCEDURE — 45380 COLONOSCOPY AND BIOPSY: CPT | Performed by: INTERNAL MEDICINE

## 2022-04-21 PROCEDURE — G0500 MOD SEDAT ENDO SERVICE >5YRS: HCPCS | Performed by: INTERNAL MEDICINE

## 2022-04-21 PROCEDURE — 45385 COLONOSCOPY W/LESION REMOVAL: CPT | Mod: PT | Performed by: INTERNAL MEDICINE

## 2022-04-21 PROCEDURE — 99153 MOD SED SAME PHYS/QHP EA: CPT | Performed by: INTERNAL MEDICINE

## 2022-04-21 PROCEDURE — 250N000011 HC RX IP 250 OP 636: Performed by: INTERNAL MEDICINE

## 2022-04-21 PROCEDURE — 88305 TISSUE EXAM BY PATHOLOGIST: CPT | Mod: TC | Performed by: INTERNAL MEDICINE

## 2022-04-21 PROCEDURE — 82962 GLUCOSE BLOOD TEST: CPT

## 2022-04-21 PROCEDURE — 88305 TISSUE EXAM BY PATHOLOGIST: CPT | Mod: 26 | Performed by: PATHOLOGY

## 2022-04-21 PROCEDURE — 250N000013 HC RX MED GY IP 250 OP 250 PS 637: Performed by: INTERNAL MEDICINE

## 2022-04-21 RX ORDER — LIDOCAINE 40 MG/G
CREAM TOPICAL
Status: DISCONTINUED | OUTPATIENT
Start: 2022-04-21 | End: 2022-04-21 | Stop reason: HOSPADM

## 2022-04-21 RX ORDER — PROCHLORPERAZINE MALEATE 10 MG
10 TABLET ORAL EVERY 6 HOURS PRN
Status: CANCELLED | OUTPATIENT
Start: 2022-04-21

## 2022-04-21 RX ORDER — NALOXONE HYDROCHLORIDE 0.4 MG/ML
0.4 INJECTION, SOLUTION INTRAMUSCULAR; INTRAVENOUS; SUBCUTANEOUS
Status: CANCELLED | OUTPATIENT
Start: 2022-04-21

## 2022-04-21 RX ORDER — FLUMAZENIL 0.1 MG/ML
0.2 INJECTION, SOLUTION INTRAVENOUS
Status: CANCELLED | OUTPATIENT
Start: 2022-04-21 | End: 2022-04-21

## 2022-04-21 RX ORDER — NALOXONE HYDROCHLORIDE 0.4 MG/ML
0.2 INJECTION, SOLUTION INTRAMUSCULAR; INTRAVENOUS; SUBCUTANEOUS
Status: CANCELLED | OUTPATIENT
Start: 2022-04-21

## 2022-04-21 RX ORDER — ONDANSETRON 2 MG/ML
4 INJECTION INTRAMUSCULAR; INTRAVENOUS
Status: DISCONTINUED | OUTPATIENT
Start: 2022-04-21 | End: 2022-04-21 | Stop reason: HOSPADM

## 2022-04-21 RX ORDER — ONDANSETRON 2 MG/ML
4 INJECTION INTRAMUSCULAR; INTRAVENOUS EVERY 6 HOURS PRN
Status: CANCELLED | OUTPATIENT
Start: 2022-04-21

## 2022-04-21 RX ORDER — SIMETHICONE 40MG/0.6ML
SUSPENSION, DROPS(FINAL DOSAGE FORM)(ML) ORAL PRN
Status: COMPLETED | OUTPATIENT
Start: 2022-04-21 | End: 2022-04-21

## 2022-04-21 RX ORDER — ONDANSETRON 4 MG/1
4 TABLET, ORALLY DISINTEGRATING ORAL EVERY 6 HOURS PRN
Status: CANCELLED | OUTPATIENT
Start: 2022-04-21

## 2022-04-21 RX ORDER — FENTANYL CITRATE 50 UG/ML
INJECTION, SOLUTION INTRAMUSCULAR; INTRAVENOUS PRN
Status: COMPLETED | OUTPATIENT
Start: 2022-04-21 | End: 2022-04-21

## 2022-04-21 RX ADMIN — SIMETHICONE 133 MG: 63.3; 3.7 SOLUTION/ DROPS ORAL at 09:04

## 2022-04-21 RX ADMIN — FENTANYL CITRATE 25 MCG: 50 INJECTION, SOLUTION INTRAMUSCULAR; INTRAVENOUS at 08:58

## 2022-04-21 RX ADMIN — MIDAZOLAM 1 MG: 1 INJECTION INTRAMUSCULAR; INTRAVENOUS at 09:04

## 2022-04-21 RX ADMIN — FENTANYL CITRATE 100 MCG: 50 INJECTION, SOLUTION INTRAMUSCULAR; INTRAVENOUS at 08:52

## 2022-04-21 RX ADMIN — MIDAZOLAM 1 MG: 1 INJECTION INTRAMUSCULAR; INTRAVENOUS at 08:56

## 2022-04-21 RX ADMIN — FENTANYL CITRATE 25 MCG: 50 INJECTION, SOLUTION INTRAMUSCULAR; INTRAVENOUS at 08:56

## 2022-04-21 RX ADMIN — MIDAZOLAM 1 MG: 1 INJECTION INTRAMUSCULAR; INTRAVENOUS at 08:58

## 2022-04-21 RX ADMIN — MIDAZOLAM 2 MG: 1 INJECTION INTRAMUSCULAR; INTRAVENOUS at 08:52

## 2022-04-21 NOTE — PROGRESS NOTES
ANTICOAGULATION  MANAGEMENT: Discharge Review    Adarsh Salvador chart reviewed for anticoagulation continuity of care    Outpatient surgery/procedure on 4/21/22 for colonoscopy.    Discharge disposition: Home    Results:    Recent labs: (last 7 days)     04/15/22  0757   INR 2.9*     Anticoagulation inpatient management:     not applicable     Anticoagulation discharge instructions:     Warfarin dosing: home regimen continued   Bridging: bridging with enoxaparin (Lovenox)   INR goal change: No      Medication changes affecting anticoagulation: No    Additional factors affecting anticoagulation: No    Plan     No adjustment to anticoagulation plan needed    Patient not contacted - pt received post procedure plan instructions prior to the procedure. Follow INR previously scheduled for 4/26/22 remains appropriate.     No adjustment to Anticoagulation Calendar was required    Guanakito Welsh RN

## 2022-04-21 NOTE — H&P
Adarsh EDWARDS [facility]  [id number]  male  56 year old      Reason for procedure/surgery: surveillance    Patient Active Problem List   Diagnosis     GERD (gastroesophageal reflux disease)     Chronic RLQ pain     Constipation     Chronic maxillary sinusitis     Chronic rhinitis     Hypertriglyceridemia     Benign essential hypertension     Anemia in other chronic diseases classified elsewhere     Fatty liver     Irritable bowel syndrome with diarrhea     Right inguinal hernia     Mild persistent asthma without complication     Type 2 diabetes mellitus with diabetic polyneuropathy, without long-term current use of insulin (H)     Hyperlipidemia LDL goal <100     CONNOR (generalized anxiety disorder)     Insomnia, unspecified type     Morbid obesity (H)     Neuropathy     Recurrent deep vein thrombosis (DVT) (H)     Precordial pain     Dyslipidemia     Elevated C-reactive protein     Gastric reflux     Internal derangement of knee     Nicotine dependence     Varicose veins of lower extremity     Vitamin D deficiency       Past Surgical History:    Past Surgical History:   Procedure Laterality Date     COLONOSCOPY  5/1/2012    Procedure:COLONOSCOPY; screening colonoscopy; Surgeon:KINGSLEY DOS SANTOS; Location:[facility] OR     COLONOSCOPY  4/21/2014    Procedure: COMBINED COLONOSCOPY, SINGLE BIOPSY/POLYPECTOMY BY BIOPSY;  Surgeon: Duane, William Charles, MD;  Location: [facility] OR     CYSTOSCOPY  05/01/2008    CYSTOSCOPY W/ URETERAL STENT PLACEMENT 05/01/2008      CYSTOSCOPY  09/26/2010    CYSTOSCOPY W/ URETERAL STENT PLACEMENT 09/26/2010 Left      CYSTOSCOPY  05/18/2012    CYSTOSCOPY, LEFT URETEROSCOPY AND STENT PLACEMENT left retrograde 05/18/2012      CYSTOSCOPY,+URETEROSCOPY  06/10/2008    URETEROSCOPY 06/10/2008 Right      HC BREATH HYDROGEN TEST  3/7/2014    Procedure: HYDROGEN BREATH TEST;  Surgeon: Darion Swift MD;  Location: [facility]     LITHOTRIPSY  09/30/2010    LITHOTRIPSY 09/30/2010 LEFT EXTRACORPOREAL SHOCK WAVE LITHOTRIPSY,  FLEXIBLE CYSTOSCOPY, LEFT STENT REMOVAL       ORTHOPEDIC SURGERY  10/03/2007    KNEE ARTHROSCOPY 10/03/2007 RightX2       RELEASE CARPAL TUNNEL Right 1/26/2018    Procedure: RELEASE CARPAL TUNNEL;  Right carpal tunnel release;  Surgeon: Wiliam Saenz MD;  Location: [facility] OR     VASCULAR SURGERY  11/24/2008    Radiofrequency ablation left saphenous vein 11/24/2008 Done by Dr. Lozoya         Past Medical History:   Past Medical History:   Diagnosis Date     Anaphylactic reaction 8/14/2015     Anxiety      Depression      DM2 (diabetes mellitus, type 2) (H)      GERD (gastroesophageal reflux disease)      HTN (hypertension)      IBS (irritable bowel syndrome)      Kidney stone 6/15/2011    Pt believes these were Calcium stones     Neuropathy        Social History:   Social History     Tobacco Use     Smoking status: Never Smoker     Smokeless tobacco: Current User     Types: Chew     Tobacco comment: Chew   Substance Use Topics     Alcohol use: Not Currently     Alcohol/week: 0.0 standard drinks     Comment: occasional, few drinks a month       Family History:   Family History   Problem Relation Age of Onset     Cancer Mother 52        lung, smoked     Cancer - colorectal Father 53        unsure type, pt attributes to radiation exposure     Myocardial Infarction Father 45     Coronary Artery Disease Father      Myocardial Infarction Paternal Grandfather 35     Diabetes Other         nephew- type 1     Diabetes Maternal Aunt         1     Diabetes Maternal Aunt         2     Diabetes Paternal Uncle         1     Diabetes Paternal Uncle         2     Diabetes Paternal Uncle         3     Diabetes Paternal Uncle         4     Colon Cancer No family hx of        Allergies:   Allergies   Allergen Reactions     Artificial Sweetner (Informational Only) [Artificial Sweetner (Informational Only) ] GI Disturbance     ALL artificial sweetners cause severe diarrhea & flu symptoms     Hydromorphone Anaphylaxis     Ibuprofen GI  "Disturbance     Morphine Sulfate Concentrate Anaphylaxis     Morphine [Fumaric Acid] Anaphylaxis     Hydrocodone-Acetaminophen Itching     Tramadol Hives     Trazodone Hives     Gabapentin      GI upset per pt     Keflex [Cephalexin] Diarrhea     Upset stomach     Codeine Phosphate Itching     Ketorolac Tromethamine Rash       Active Medications:   No current outpatient medications on file.       Systemic Review:   CONSTITUTIONAL: NEGATIVE for fever, chills, change in weight  ENT/MOUTH: NEGATIVE for ear, mouth and throat problems  RESP: NEGATIVE for significant cough or SOB  CV: NEGATIVE for chest pain, palpitations or peripheral edema    Physical Examination:   Vital Signs: BP (!) 144/80   Pulse 86   Temp 98.7  F (37.1  C) (Oral)   Resp 20   Ht 1.803 m (5' 11\")   Wt 131.3 kg (289 lb 7.4 oz)   SpO2 97%   BMI 40.37 kg/m    GENERAL: healthy, alert and no distress  NECK: no adenopathy, no asymmetry, masses, or scars  RESP: lungs clear to auscultation - no rales, rhonchi or wheezes  CV: regular rate and rhythm, normal S1 S2, no S3 or S4, no murmur, click or rub, no peripheral edema and peripheral pulses strong  ABDOMEN: soft, nontender, no hepatosplenomegaly, no masses and bowel sounds normal  MS: no gross musculoskeletal defects noted, no edema    Plan: Appropriate to proceed as scheduled.      Luiz Calvert MD  4/21/2022    PCP:  Bertha Romero    "

## 2022-04-21 NOTE — DISCHARGE INSTRUCTIONS
Colonoscopy  Colonoscopy is a test to view the inside of your lower digestive tract (colon and rectum). Sometimes it can show the last part of the small intestine (ileum). During the test, small pieces of tissue may be removed for testing. This is called a biopsy. Small growths, such as polyps, may also be removed.       A camera attached to a flexible tube with a viewing lens is used to take video pictures.   Why is colonoscopy done?  The test is done to help look for colon cancer. And it can help find the source of abdominal pain, bleeding, and changes in bowel habits. It may be needed once a year to every 10 years, depending on factors such as your:   Age  Health history  Family health history  Symptoms  Results from any prior colonoscopy  Risks and possible complications  These include:  Bleeding               A puncture or tear in the colon   Risks of anesthesia  A cancer lesion not being seen or fully removed  Getting ready   To prepare for the test:  Talk with your healthcare provider about the risks of the test (see below). Also ask your healthcare provider about alternatives to the test.  Tell your healthcare provider about any medicines and supplements you take. Also tell him or her about any health conditions you may have.  Make sure your rectum and colon are empty for the test. Follow the diet and bowel prep instructions exactly. If you don t, the test may need to be rescheduled.  Plan for a friend or family member to drive you home after the test.    You may discuss the results with your doctor right away or at a future visit.   During the test   The test is usually done in the hospital on an outpatient basis or at an outpatient clinic. This means you go home the same day. The procedure takes about 30 minutes. During that time:   You are given relaxing (sedating) medicine through an IV line. You may be drowsy, or fully asleep.  The healthcare provider will first give you a physical exam to check for  anal and rectal problems.  Then the anus is lubricated and the scope inserted.  If you are awake, you may have a feeling similar to needing to have a bowel movement. You may also feel pressure as air is pumped into the colon. It s OK to pass gas during the procedure.  Biopsy, polyp removal, or other treatments may be done during the test.     Colonoscopy provides an inside view of the entire colon.   After the test   You may have gas right after the test. It can help to try to pass it to help prevent later bloating. Your healthcare provider may discuss the results with you right away. Or you may need to schedule a follow-up visit to talk about the results. After the test, you can go back to your normal eating and other activities. You may be tired from the sedation and need to rest for a few hours. Discuss your medicines with your provider to understand if they can be restarted right away.   When to call your healthcare provider  Call your healthcare provider if you have any of the following after the procedure:   Pain in your belly  Fever of 100.4 F (38 C) or higher, or as directed by your provider  Rectal bleeding  Nausea or vomiting  [employer] last reviewed this educational content on 6/1/2019 2000-2021 [employer]. All rights reserved. This information is not intended as a substitute for professional medical care. Always follow your healthcare professional's instructions.

## 2022-04-22 LAB
PATH REPORT.COMMENTS IMP SPEC: NORMAL
PATH REPORT.COMMENTS IMP SPEC: NORMAL
PATH REPORT.FINAL DX SPEC: NORMAL
PATH REPORT.GROSS SPEC: NORMAL
PATH REPORT.MICROSCOPIC SPEC OTHER STN: NORMAL
PATH REPORT.RELEVANT HX SPEC: NORMAL
PHOTO IMAGE: NORMAL

## 2022-04-25 NOTE — PROGRESS NOTES
CC -   Follow up of: Weight gain, fatigue/tired    Denies tiredness, sleeping a lot better. Medication side effect: none. Exercise: has not started walking due to weather yet, but planning to. BGL has been fairly steady. BACKGROUND -     First visit: 1/24/2022     Obesity (all weight in lbs)  Began more in the last 1 year  Initial BMI 44.49, Wt 245.2  HS Grad wt 130   Lowest   wt 160 (when first dxed with DM 20 years ago)   Highest  wt 245. 2  Pattern of wt gain: gradual and then rapidly in the last 1 yr  Wt change past yr: +15 lbs  Most wt lost: 45 lbs  Other diets attempted: tried some but could not stay with any    Desire to lose weight: 9/10  Problem posed by appetite: 6/10    Initial Diet:    Number of meals per day - 3    Number of snacks per day - 2-3    Meal volume - 12\" plate, occasional seconds    Fast food/convenience store - 2x/week    Restaurants (not fast food) - 0-1x/week   Sweets - 6d/week (muffin, cookie, candy if sugar goes low, jello)   Chips - 2-3d/week   Crackers/pretzels - 5d/week   Nuts - 1d/week   Peanut Butter - 0d/week   Popcorn - 0-1d/week   Dried fruit - 0d/week   Whole fruit - 5d/week (an apple a day, smt banana)   Breakfast cereal - 2d/week (honeybunches currently - whole milk)   Granola/Protein/Energy bar - 0-1d/week   Sugar sweetened beverages - orange juice when sugar is low, coffee 3 cups(2 tbp creamer - 70 Ced), tea with 1 tsp sugar and milk   Protein - No supplements   Fiber - No supplements     Exercise:    Gym membership - no    Walking - no    Running - no    Resistance - no    Aerobic class - no        Sleep: Bedtime: 10->11:30pm, wake up time: 5:30->6am - wakes up tired, daytime naps: no    Weight scale at home: yes, takes weight: <1/wk  Food scale: no    Follow up 4/25/22:  1. Fatigue: Having rough week with depression. Not really focussed on weight reduction plan. 2. Diet: Does not eat breakfast. Protein shakes for lunch. Drinking water mostly.  5 fiber gummies Discussed results with patient in office.  Bertha Romero PA-C   daily. BM regular. 3. Exercise: Walking with dog about 1/2 hr daily. Planning to join gym. 4. Sleep: Not sleeping very well, feels her hot flashes have increased lately. Sleep still the same otherwise. 5. Medications: Stopped taking topiramate as she was having tingling in her finger. ______________________    [facility] -  Past Medical History:   Diagnosis Date    Depression     DM type 1 (diabetes mellitus, type 1) ([facility])     On insulin Pump     Fatigue     Hyperlipidemia     Insulin pump in place     Obesity due to excess calories without serious comorbidity     Vitamin D deficiency    Type 1 DM diagnosed 20 years ago       Hysterectomy 11/2020,     Prior to Admission medications    Medication Sig Start Date End Date Taking? Authorizing Provider   topiramate (TOPAMAX) 25 MG tablet Take 1 tablet by mouth 2 times daily 2/21/22   Marilee Liang MD   DULoxetine (CYMBALTA) 60 MG extended release capsule TAKE 1 CAPSULE BY MOUTH DAILY 11/8/21   Historical Provider, MD   insulin aspart (NOVOLOG) 100 UNIT/ML injection vial USE VIA INSULIN PUMP.  MAX  UNITS PER DAY 12/14/21   Evans Parra MD   Continuous Blood Gluc  ([employer]) TIKI To use with G6 sensor 9/25/20   Laurie Alcala MD   Continuous Blood Gluc Sensor (DEXCOM G6 SENSOR) MISC Change every 10 days 9/25/20   Laurie Alcala MD   Continuous Blood Gluc Transmit (DEXCOM G6 TRANSMITTER) MISC To use with sensors 9/25/20   Laurie Alcala MD   Continuous Blood Gluc Transmit (DEXCOM G6 TRANSMITTER) MISC To change every 3 months 9/1/20   Laurie Alcala MD   Continuous Blood Gluc Sensor (DEXCOM G6 SENSOR) MISC To change every 10 days 9/1/20   Laurie Alcala MD   Blood Glucose Monitoring Suppl ([address]) w/Device KIT Use to test blood sugar as directed 5/6/20   Laurie Alcala MD   blood glucose test strips (ONETOUCH VERIO) strip Use to test blood sugar 4 times a day 5/6/20   [facility] Dorcas Stauffer MD   blood glucose test strips (ONE TOUCH ULTRA TEST) strip 1 each by In Vitro route 4 times daily As needed. 5/1/20   [address]usto MD   CONTOUR NEXT TEST strip Use to test blood sugar 4-6 times a day with medtronic insulin pump 3/11/20   [address] Frausto MD   lisinopril (PRINIVIL;ZESTRIL) 5 MG tablet Take 5 mg by mouth daily    Historical Provider, MD   meclizine (ANTIVERT) 25 MG tablet Take 25 mg by mouth as needed  12/13/17   Historical Provider, MD   naproxen (NAPROSYN) 500 MG tablet Take 500 mg by mouth as needed  10/17/16   Historical Provider, MD   simvastatin (ZOCOR) 20 MG tablet Take 1 tablet by mouth nightly 1/3/19   [address] Frausto MD   Lisinopril 10 mg daily. MVI daily. Not taking Naproxen or meclizine. Allergies: Allergies   Allergen Reactions    Tramadol      Other reaction(s): Other See Comments  Syncope       Family history: DM: no, Heart disease: grandfather(maternal), father (from smoking). Social history: smoking: never ; Alcohol: occasional , work: accounting, desk job. ROS -  Card - no CP, some SHARMA on climbing a flight of stairs. GI - no N/V/D/C    PE -  Gen : BP (!) 142/68 (Site: Left Upper Arm, Position: Sitting, Cuff Size: Thigh)   Pulse 77   Temp 97.5 °F (36.4 °C) (Temporal)   Ht 5' 2.25\" (1.581 m)   Wt 221 lb 12.8 oz (100.6 kg)   LMP 12/20/2018   BMI 40.24 kg/m²     WN, WD, NAD  Lung: Nml resp effort, CTA b/l  Heart: s1, s2 RRR, no M/R/G, no pedal edema noted  Psych: Normal mood   Full affect  Neuro: Moves all ext well  ______________________    HISTORY & ASSESSMENT/PLAN -     Problem 1  - Fatigue   HPI   - Doing much better, denies feeling tired currently. Feels weight reduction has helped  Assessment  - controlled, weight gain likely primary contributor.    Plan   - Continue weight reduction per plan below    Problem 2  - Obesity   HPI   - See above Background for description    Weight  Date    245.2  1/24/22    232.6 lbs 2/21/22    221.8  4/25/22    Total weight change to date: -23.4 lbs  Average daily energy variance:   1/24/22 - 2/21/22: -12.6 lbs (16731 Ced)/28 days = -1575 Ced/day deficit. 2/21/22 - 4/25/22: -10.8 lbs (92800 Ced)/62 days = -610 Ced/day deficit. DEN = 1986 Ced/d    Assessment  - improving. Plan   - She is doing very well with calorie counting and low calorie diet. Talked about protein, water, fiber intake. She does not feel she needs an appetite suppressant. Planned for follow up in 2 months. Patient Instructions   Protein: >=65 gm/day. Fiber: >=25 gm/day. Water: >=96 oz/day. Follow up in 2 months. Problem 3  - Hypertension   HPI   - Ongoing, SBP in 140s, patient asymptomatic. Assessment  - Uncontrolled - mildly elevated. Plan   - continue current, watch BP, weight reduction can help, and she will follow up with her PCP. Problem 4  - Dyslipidemia  HPI   - Has been on Simvastatin, tolerating medication, last LDL 80 (7/2021)  Assessment  - Controlled  Plan   - continue medications and monitor per PCP. Problem 5  - Type 1 DM on insulin pump  HPI  - diagnosed 20 years ago (at 26-35 yrs of age), diagnosed as type 1 per patient, on insulin pump  Assessment - controlled, hba1c has been in 6  Plan  - Management per Dr. Pierre Wilson, on insulin pump. Patient states her BGL has been steady, no hypoglycemic episodes on current 1200 Ced/day diet plan, will continue to watch BGL. Insulin pump works with glucometer, and she has not had hypoglycemic episodes. Total time spent on encounter: 24 min. Gurwinder Fine MD  Internal Medicine/Obesity Medicine  4/25/2022. PCP: Dr. Nathalie Bro ([place]).

## 2022-04-26 ENCOUNTER — LAB (OUTPATIENT)
Dept: LAB | Facility: CLINIC | Age: 57
End: 2022-04-26
Payer: COMMERCIAL

## 2022-04-26 ENCOUNTER — ANTICOAGULATION THERAPY VISIT (OUTPATIENT)
Dept: ANTICOAGULATION | Facility: CLINIC | Age: 57
End: 2022-04-26

## 2022-04-26 DIAGNOSIS — I82.409 RECURRENT DEEP VEIN THROMBOSIS (DVT) (H): Primary | ICD-10-CM

## 2022-04-26 DIAGNOSIS — I82.409 RECURRENT DEEP VEIN THROMBOSIS (DVT) (H): ICD-10-CM

## 2022-04-26 LAB — INR BLD: 1.4 (ref 0.9–1.1)

## 2022-04-26 PROCEDURE — 36415 COLL VENOUS BLD VENIPUNCTURE: CPT

## 2022-04-26 PROCEDURE — 85610 PROTHROMBIN TIME: CPT

## 2022-04-26 NOTE — PROGRESS NOTES
ANTICOAGULATION MANAGEMENT     Adarsh Salvador 56 year old male is on warfarin with subtherapeutic INR result. (Goal INR 2.0-3.0)    Recent labs: (last 7 days)     04/26/22  0728   INR 1.4*       ASSESSMENT       Source(s): Chart Review and Patient/Caregiver Call       Warfarin doses taken: Warfarin taken as instructed    Diet: No new diet changes identified    New illness, injury, or hospitalization: No    Medication/supplement changes: None noted using lovenox post colonoscopy    Signs or symptoms of bleeding or clotting: No    Previous INR: Therapeutic last 2(+) visits    Additional findings: Bridging with Enoxaparin until INR >= 2.3 or INR >= 2.0 x 2 ([facility] protocol goal INR 2-3)       PLAN     Recommended plan for no diet, medication or health factor changes affecting INR     Dosing Instructions: booster dose then continue your current warfarin dose with next INR in 3 days    Keep bridging with lovenox    Summary  As of 4/26/2022    Full warfarin instructions:  4/26: 20 mg; Otherwise 15 mg every Tue, Thu; 12.5 mg all other days   Next INR check:  4/29/2022             Telephone call with Adarsh who verbalizes understanding and agrees to plan    Lab visit scheduled    Education provided: Please call back if any changes to your diet, medications or how you've been taking warfarin    Plan made per [facility] anticoagulation protocol    Joya Suero RN  Anticoagulation Clinic  4/26/2022    _______________________________________________________________________     Anticoagulation Episode Summary     Current INR goal:  2.0-3.0   TTR:  45.9 % (2.7 mo)   Target end date:  Indefinite   Send INR reminders to:  [facility]    Indications    Recurrent deep vein thrombosis (DVT) (H) [I82.409]           Comments:           Anticoagulation Care Providers     Provider Role Specialty Phone number    Bertha Romero PA-C Referring Family Medicine [phone]

## 2022-04-29 ENCOUNTER — LAB (OUTPATIENT)
Dept: LAB | Facility: CLINIC | Age: 57
End: 2022-04-29
Payer: COMMERCIAL

## 2022-04-29 ENCOUNTER — ANTICOAGULATION THERAPY VISIT (OUTPATIENT)
Dept: ANTICOAGULATION | Facility: CLINIC | Age: 57
End: 2022-04-29

## 2022-04-29 DIAGNOSIS — I82.409 RECURRENT DEEP VEIN THROMBOSIS (DVT) (H): Primary | ICD-10-CM

## 2022-04-29 DIAGNOSIS — I82.409 RECURRENT DEEP VEIN THROMBOSIS (DVT) (H): ICD-10-CM

## 2022-04-29 LAB — INR BLD: 1.7 (ref 0.9–1.1)

## 2022-04-29 PROCEDURE — 36415 COLL VENOUS BLD VENIPUNCTURE: CPT

## 2022-04-29 RX ORDER — ENOXAPARIN SODIUM 150 MG/ML
120 INJECTION SUBCUTANEOUS EVERY 12 HOURS
Qty: 10 ML | Refills: 1 | Status: SHIPPED | OUTPATIENT
Start: 2022-04-29 | End: 2022-06-07

## 2022-04-29 NOTE — PROGRESS NOTES
ANTICOAGULATION MANAGEMENT     Adarsh Salvador 56 year old male is on warfarin with subtherapeutic INR result. (Goal INR 2.0-3.0)    Recent labs: (last 7 days)     04/29/22  0801   INR 1.7*       ASSESSMENT       Source(s): Chart Review       Warfarin doses taken: Warfarin taken as instructed    Diet: No new diet changes identified    New illness, injury, or hospitalization: No    Medication/supplement changes: None noted    Signs or symptoms of bleeding or clotting: No does note has a lump mid sternum and busing around it painful to touch has no recollection of being hit or bumping it. He is instructed to follow up with physician today and agrees to go to urgent care.    Previous INR: Subtherapeutic    Additional findings: Bridging with Enoxaparin until INR >= 2.3 or INR >= 2.0 x 2 ([facility] protocol goal INR 2-3)       PLAN     Recommended plan for no diet, medication or health factor changes affecting INR     Dosing Instructions: booster dose then Increase your warfarin dose (5% change) with next INR in 3 days   Stay on lovenox as well new RX sent    Summary  As of 4/29/2022    Full warfarin instructions:  4/29: 15 mg; Otherwise 12.5 mg every Mon, Wed, Fri; 15 mg all other days   Next INR check:  5/2/2022             Telephone call with Adarsh who verbalizes understanding and agrees to plan    Lab visit scheduled    Education provided: Please call back if any changes to your diet, medications or how you've been taking warfarin    Plan made per [facility] anticoagulation protocol    Joya Suero RN  Anticoagulation Clinic  4/29/2022    _______________________________________________________________________     Anticoagulation Episode Summary     Current INR goal:  2.0-3.0   TTR:  44.3 % (2.8 mo)   Target end date:  Indefinite   Send INR reminders to:  [facility]    Indications    Recurrent deep vein thrombosis (DVT) (H) [I82.409]           Comments:           Anticoagulation Care Providers     Provider Role Specialty  Phone number    Bertha Romero PA-C [facility] Medicine [phone]

## 2022-05-02 ENCOUNTER — ANTICOAGULATION THERAPY VISIT (OUTPATIENT)
Dept: ANTICOAGULATION | Facility: CLINIC | Age: 57
End: 2022-05-02

## 2022-05-02 ENCOUNTER — LAB (OUTPATIENT)
Dept: LAB | Facility: CLINIC | Age: 57
End: 2022-05-02
Payer: COMMERCIAL

## 2022-05-02 DIAGNOSIS — I82.409 RECURRENT DEEP VEIN THROMBOSIS (DVT) (H): Primary | ICD-10-CM

## 2022-05-02 DIAGNOSIS — I82.409 RECURRENT DEEP VEIN THROMBOSIS (DVT) (H): ICD-10-CM

## 2022-05-02 LAB — INR BLD: 2.6 (ref 0.9–1.1)

## 2022-05-02 PROCEDURE — 85610 PROTHROMBIN TIME: CPT

## 2022-05-02 PROCEDURE — 36416 COLLJ CAPILLARY BLOOD SPEC: CPT

## 2022-05-02 NOTE — PROGRESS NOTES
ANTICOAGULATION MANAGEMENT     Adarsh Salvador 57 year old male is on warfarin with therapeutic INR result. (Goal INR 2.0-3.0)    Recent labs: (last 7 days)     05/02/22  0815   INR 2.6*       ASSESSMENT       Source(s): Chart Review and Patient/Caregiver Call       Warfarin doses taken: Warfarin taken as instructed    Diet: No new diet changes identified    New illness, injury, or hospitalization: No    Medication/supplement changes: None noted    Signs or symptoms of bleeding or clotting: No    Previous INR: Subtherapeutic    Additional findings: had held warfarin recently for a colonoscopy, advised to stop lovenox injections now that INR is 2.6       PLAN     Recommended plan for no diet, medication or health factor changes affecting INR     Dosing Instructions: continue your current warfarin dose with next INR in 2 weeks       Summary  As of 5/2/2022    Full warfarin instructions:  12.5 mg every Mon, Wed, Fri; 15 mg all other days   Next INR check:  5/16/2022             Telephone call with Adarsh who verbalizes understanding and agrees to plan    Lab visit scheduled    Education provided: Monitoring for bleeding signs and symptoms and Monitoring for clotting signs and symptoms    Plan made per [facility] anticoagulation protocol    Janeth Cool, RN  Anticoagulation Clinic  5/2/2022    _______________________________________________________________________     Anticoagulation Episode Summary     Current INR goal:  2.0-3.0   TTR:  45.0 % (2.9 mo)   Target end date:  Indefinite   Send INR reminders to:  [facility]    Indications    Recurrent deep vein thrombosis (DVT) (H) [I82.409]           Comments:           Anticoagulation Care Providers     Provider Role Specialty Phone number    Bertha Romero PA-C Referring Family Medicine [phone]

## 2022-05-04 ENCOUNTER — OFFICE VISIT (OUTPATIENT)
Dept: URGENT CARE | Facility: URGENT CARE | Age: 57
End: 2022-05-04
Payer: COMMERCIAL

## 2022-05-04 VITALS
HEART RATE: 69 BPM | OXYGEN SATURATION: 98 % | DIASTOLIC BLOOD PRESSURE: 83 MMHG | TEMPERATURE: 98 F | SYSTOLIC BLOOD PRESSURE: 134 MMHG | WEIGHT: 289.6 LBS | BODY MASS INDEX: 40.39 KG/M2

## 2022-05-04 DIAGNOSIS — R58 ECCHYMOSIS: Primary | ICD-10-CM

## 2022-05-04 DIAGNOSIS — R31.0 GROSS HEMATURIA: ICD-10-CM

## 2022-05-04 DIAGNOSIS — D64.9 ANEMIA, UNSPECIFIED TYPE: ICD-10-CM

## 2022-05-04 LAB
ALBUMIN UR-MCNC: NEGATIVE MG/DL
APPEARANCE UR: ABNORMAL
BACTERIA #/AREA URNS HPF: ABNORMAL /HPF
BASOPHILS # BLD AUTO: 0 10E3/UL (ref 0–0.2)
BASOPHILS NFR BLD AUTO: 0 %
BILIRUB UR QL STRIP: NEGATIVE
COLOR UR AUTO: YELLOW
EOSINOPHIL # BLD AUTO: 0.2 10E3/UL (ref 0–0.7)
EOSINOPHIL NFR BLD AUTO: 2 %
ERYTHROCYTE [DISTWIDTH] IN BLOOD BY AUTOMATED COUNT: 14.6 % (ref 10–15)
GLUCOSE UR STRIP-MCNC: NEGATIVE MG/DL
HCT VFR BLD AUTO: 31.9 % (ref 40–53)
HGB BLD-MCNC: 10.2 G/DL (ref 13.3–17.7)
HGB UR QL STRIP: ABNORMAL
INR BLD: 2.4 (ref 0.9–1.1)
KETONES UR STRIP-MCNC: ABNORMAL MG/DL
LEUKOCYTE ESTERASE UR QL STRIP: NEGATIVE
LYMPHOCYTES # BLD AUTO: 1.8 10E3/UL (ref 0.8–5.3)
LYMPHOCYTES NFR BLD AUTO: 25 %
MCH RBC QN AUTO: 28.7 PG (ref 26.5–33)
MCHC RBC AUTO-ENTMCNC: 32 G/DL (ref 31.5–36.5)
MCV RBC AUTO: 90 FL (ref 78–100)
MONOCYTES # BLD AUTO: 0.7 10E3/UL (ref 0–1.3)
MONOCYTES NFR BLD AUTO: 9 %
MUCOUS THREADS #/AREA URNS LPF: PRESENT /LPF
NEUTROPHILS # BLD AUTO: 4.5 10E3/UL (ref 1.6–8.3)
NEUTROPHILS NFR BLD AUTO: 63 %
NITRATE UR QL: NEGATIVE
PH UR STRIP: 5.5 [PH] (ref 5–7)
PLATELET # BLD AUTO: 242 10E3/UL (ref 150–450)
RBC # BLD AUTO: 3.55 10E6/UL (ref 4.4–5.9)
RBC #/AREA URNS AUTO: ABNORMAL /HPF
SP GR UR STRIP: >=1.03 (ref 1–1.03)
UROBILINOGEN UR STRIP-ACNC: 0.2 E.U./DL
WBC # BLD AUTO: 7.1 10E3/UL (ref 4–11)
WBC #/AREA URNS AUTO: ABNORMAL /HPF

## 2022-05-04 PROCEDURE — 85025 COMPLETE CBC W/AUTO DIFF WBC: CPT | Performed by: FAMILY MEDICINE

## 2022-05-04 PROCEDURE — 99214 OFFICE O/P EST MOD 30 MIN: CPT | Performed by: FAMILY MEDICINE

## 2022-05-04 PROCEDURE — 36415 COLL VENOUS BLD VENIPUNCTURE: CPT | Performed by: FAMILY MEDICINE

## 2022-05-04 PROCEDURE — 81001 URINALYSIS AUTO W/SCOPE: CPT | Performed by: FAMILY MEDICINE

## 2022-05-04 PROCEDURE — 85610 PROTHROMBIN TIME: CPT | Performed by: FAMILY MEDICINE

## 2022-05-04 NOTE — PROGRESS NOTES
Chief complaint: noticed lumps     Right breast lump 5 days ago initially felt a lump pimple and now also has gotten bigger   Not painflu     Noticed a lump on his left arm initially was just a size of a pea 3 days ago and now gotten bigger  This one is very painful     Has recurrent DVT and on blood thinner  Last week was on both the lovenox and the warfarin up until 2 days ago INR was finally normal     Don't remember any trauma    Patient did work on a vehicle 3 days ago before arm lump got bigger       Allergies   Allergen Reactions     Artificial Sweetner (Informational Only) [Artificial Sweetner (Informational Only) ] GI Disturbance     ALL artificial sweetners cause severe diarrhea & flu symptoms     Hydromorphone Anaphylaxis     Ibuprofen GI Disturbance     Morphine Sulfate Concentrate Anaphylaxis     Morphine [Fumaric Acid] Anaphylaxis     Hydrocodone-Acetaminophen Itching     Tramadol Hives     Trazodone Hives     Gabapentin      GI upset per pt     Keflex [Cephalexin] Diarrhea     Upset stomach     Codeine Phosphate Itching     Ketorolac Tromethamine Rash       Past Medical History:   Diagnosis Date     Anaphylactic reaction 8/14/2015     Anxiety      Depression      DM2 (diabetes mellitus, type 2) (H)      GERD (gastroesophageal reflux disease)      HTN (hypertension)      IBS (irritable bowel syndrome)      Kidney stone 6/15/2011    Pt believes these were Calcium stones     Neuropathy        albuterol (PROAIR HFA/PROVENTIL HFA/VENTOLIN HFA) 108 (90 Base) MCG/ACT inhaler, Inhale 2 puffs into the lungs every 6 hours as needed for shortness of breath / dyspnea or wheezing  Alpha Lipoic Acid 200 MG CAPS, Take 200 mg by mouth 3 times daily  amLODIPine (NORVASC) 10 MG tablet, Take 1 tablet (10 mg) by mouth daily For blood pressure  ASPIRIN PO, Take 325 mg by mouth daily   aspirin-acetaminophen-caffeine (EXCEDRIN MIGRAINE) 250-250-65 MG tablet, Take 1 tablet by mouth  atorvastatin (LIPITOR) 40 MG tablet, Take  1 tablet (40 mg) by mouth daily  baclofen (LIORESAL) 10 MG tablet, Take 1 tablet (10 mg) by mouth See Admin Instructions 2-3 po q hs.  benzonatate (TESSALON) 200 MG capsule, Take 1 capsule (200 mg) by mouth 3 times daily as needed for cough  bisacodyl (DULCOLAX) 5 MG EC tablet, Take as directed. One day prior to exam at 10:00am take 2 tablets  blood glucose (ACCU-CHEK GUIDE) test strip, Use to test blood sugar 3 times daily or as directed.  blood glucose monitoring (ACCU-CHEK FASTCLIX) lancets, Use to test blood sugar 1 times daily or as directed.  Ok to substitute alternative if insurance prefers.  calcium carbonate (TUMS) 500 MG chewable tablet, Take 1 chew tab by mouth daily  cyclobenzaprine (FLEXERIL) 10 MG tablet, Take 0.5-1 tablets (5-10 mg) by mouth 3 times daily as needed for muscle spasms  DULoxetine (CYMBALTA) 60 MG capsule, Take 1 capsule (60 mg) by mouth 2 times daily  enoxaparin ANTICOAGULANT (LOVENOX) 120 MG/0.8ML syringe, Inject 0.8 mLs (120 mg) Subcutaneous every 12 hours  enoxaparin ANTICOAGULANT (LOVENOX) 120 MG/0.8ML syringe, Inject 0.8 mLs (120 mg) Subcutaneous every 12 hours  EPINEPHrine (EPIPEN) 0.3 MG/0.3ML injection, Inject 0.3 mLs (0.3 mg) into the muscle once as needed for anaphylaxis  fluticasone (FLONASE) 50 MCG/ACT nasal spray, INSTILL 1 SPRAY INTO BOTH NOSTRILS DAILY  fluticasone-salmeterol (ADVAIR) 500-50 MCG/DOSE inhaler, Inhale 1 puff into the lungs 2 times daily  insulin glargine (BASAGLAR KWIKPEN) 100 UNIT/ML pen, Inject 36 Units Subcutaneous daily Max Total Daily Dose 45 units per day  insulin pen needle (32G X 4 MM) 32G X 4 MM miscellaneous, Use 2 pen needles daily or as directed.  losartan (COZAAR) 50 MG tablet, Take 1 tablet (50 mg) by mouth daily For blood pressure.  magnesium citrate solution, Take as directed. Two days prior to exam drink 10oz bottle of magnesium citrate at 4:00pm  metFORMIN (GLUCOPHAGE-XR) 500 MG 24 hr tablet, Take 2 tablets (1,000 mg) by mouth 2 times  daily (with meals)  montelukast (SINGULAIR) 10 MG tablet, TAKE 1 TABLET (10 MG) BY MOUTH AT BEDTIME FOR ALLERGIES/ASTHMA  multivitamin, therapeutic (THERA-VIT) TABS, Take 1 tablet by mouth daily  naproxen sodium (ANAPROX) 220 MG tablet, Take 220 mg by mouth  nortriptyline (PAMELOR) 10 MG capsule, Take 2 capsules (20 mg) by mouth At Bedtime  omeprazole (PRILOSEC) 40 MG DR capsule, Take 1 capsule (40 mg) by mouth daily Take for at least two months  polyethylene glycol (GOLYTELY) 236 g suspension, Take as directed. One day before your exam fill the first container with water. Cover and shake until mixed well. At 3:00pm drink one 8oz glass every 10-15 minutes until half of the first container is empty. Store the remainder in the refrigerator. At 8:00pm drink the second half of the first container until it is gone. Before you go to bed mix the second container with water and put in refrigerator. Six hours before your check in time drink one 8oz glass every 10-15 minutes until half of container is empty. Discard the remainder of solution.  tamsulosin (FLOMAX) 0.4 MG capsule, TAKE 1 CAPSULE BY MOUTH EVERY DAY  warfarin ANTICOAGULANT (COUMADIN) 5 MG tablet, Take 15 mg Tues, Thurs and 12.5 mg all other days, or as directed by the Coumadin clinic    BUPivacaine (MARCAINE) injection 0.5%  DOBUTamine 500 mg in dextrose 5% 250 mL (adult std conc) premix  iopamidol (ISOVUE-M 200) solution 10 mL  methylPREDNISolone (DEPO-MEDROL) injection 80 mg  metoprolol (LOPRESSOR) injection 5 mg  triamcinolone (KENALOG-40) injection 40 mg        Social History     Tobacco Use     Smoking status: Never Smoker     Smokeless tobacco: Current User     Types: Chew     Tobacco comment: Chew   Substance Use Topics     Alcohol use: Not Currently     Alcohol/week: 0.0 standard drinks     Comment: occasional, few drinks a month     Drug use: No       ROS:  review of systems negative except for noted above.       OBJECTIVE:  /83   Pulse 69   Temp  98  F (36.7  C)   Wt 131.4 kg (289 lb 9.6 oz)   SpO2 98%   BMI 40.39 kg/m     General:   awake, alert, and cooperative.  NAD.   Head: Normocephalic, atraumatic.  Eyes: Conjunctiva clear,   Heart: Regular rate and rhythm. No murmur.  Lungs: Chest is clear; no wheezes or rales.   Abdomen: soft non-tender.  Neuro: Alert and oriented - normal speech.  MS: Using extremities freely  PSYCH:  Normal affect, normal speech  SKIN:   Large abdominal wall ecchymosis on injection sites of enoxaparin    Right anterior chest wall bruise dime sized not tender  Left upper arm large ecchymosis with central clearing about 10 cm     Diagnostic Test Results:  Results for orders placed or performed in visit on 05/04/22 (from the past 24 hour(s))   CBC with platelets and differential    Narrative    The following orders were created for panel order CBC with platelets and differential.  Procedure                               Abnormality         Status                     ---------                               -----------         ------                     CBC with platelets and d...[[id number]]  Abnormal            Final result                 Please view results for these tests on the individual orders.   INR point of care   Result Value Ref Range    INR 2.4 (H) 0.9 - 1.1    Narrative    This test is intended for monitoring Coumadin therapy. Results are not accurate in patients with prolonged INR due to factor deficiency.   CBC with platelets and differential   Result Value Ref Range    WBC Count 7.1 4.0 - 11.0 10e3/uL    RBC Count 3.55 (L) 4.40 - 5.90 10e6/uL    Hemoglobin 10.2 (L) 13.3 - 17.7 g/dL    Hematocrit 31.9 (L) 40.0 - 53.0 %    MCV 90 78 - 100 fL    MCH 28.7 26.5 - 33.0 pg    MCHC 32.0 31.5 - 36.5 g/dL    RDW 14.6 10.0 - 15.0 %    Platelet Count 242 150 - 450 10e3/uL    % Neutrophils 63 %    % Lymphocytes 25 %    % Monocytes 9 %    % Eosinophils 2 %    % Basophils 0 %    Absolute Neutrophils 4.5 1.6 - 8.3 10e3/uL    Absolute  Lymphocytes 1.8 0.8 - 5.3 10e3/uL    Absolute Monocytes 0.7 0.0 - 1.3 10e3/uL    Absolute Eosinophils 0.2 0.0 - 0.7 10e3/uL    Absolute Basophils 0.0 0.0 - 0.2 10e3/uL   UA Macro with Reflex to Micro and Culture - lab collect    Specimen: Urine, Midstream   Result Value Ref Range    Color Urine Yellow Colorless, Straw, Light Yellow, Yellow    Appearance Urine Slightly Cloudy (A) Clear    Glucose Urine Negative Negative mg/dL    Bilirubin Urine Negative Negative    Ketones Urine Trace (A) Negative mg/dL    Specific Gravity Urine >=1.030 1.003 - 1.035    Blood Urine Moderate (A) Negative    pH Urine 5.5 5.0 - 7.0    Protein Albumin Urine Negative Negative mg/dL    Urobilinogen Urine 0.2 0.2, 1.0 E.U./dL    Nitrite Urine Negative Negative    Leukocyte Esterase Urine Negative Negative   Urine Microscopic   Result Value Ref Range    Bacteria Urine Few (A) None Seen /HPF    RBC Urine 10-25 (A) 0-2 /HPF /HPF    WBC Urine 0-5 0-5 /HPF /HPF    Mucus Urine Present (A) None Seen /LPF    Narrative    Urine Culture not indicated         ASSESSMENT:    ICD-10-CM    1. Ecchymosis  R58 CBC with platelets and differential     INR point of care     CBC with platelets and differential     INR point of care   2. Anemia, unspecified type  D64.9    3. Gross hematuria  R31.0 UA Macro with Reflex to Micro and Culture - lab collect     Adult Urology Referral     UA Macro with Reflex to Micro and Culture - lab collect     Urine Microscopic       PLAN:   Large areas of ecchymosis -occurred when patient was on both enoxaparin and warfarin (bridging)   During this time patient also had hematuria gross   Denies any hematemesis hematochezia or melena    Obsevation , expect no additional large ecchymosis now that he is just on warfarin  If continued bleeding concerns recommend ER and patient voiced understanding  Alarm signs or symptoms discussed, if present recommend go to ER     Anemia - possibly from the bleeding - recommend follow up with  primary care provider for recheck  Hematuria - patient chews tobacco - referred to Urology for follow up  Patient vocied understanding      Advised about symptoms which might herald more serious problems.        Vashti Davis MD

## 2022-05-05 ENCOUNTER — PRE VISIT (OUTPATIENT)
Dept: PALLIATIVE MEDICINE | Facility: CLINIC | Age: 57
End: 2022-05-05

## 2022-05-05 ENCOUNTER — ANTICOAGULATION THERAPY VISIT (OUTPATIENT)
Dept: ANTICOAGULATION | Facility: CLINIC | Age: 57
End: 2022-05-05

## 2022-05-05 ENCOUNTER — OFFICE VISIT (OUTPATIENT)
Dept: PALLIATIVE MEDICINE | Facility: CLINIC | Age: 57
End: 2022-05-05
Attending: INTERNAL MEDICINE
Payer: COMMERCIAL

## 2022-05-05 VITALS
BODY MASS INDEX: 40.31 KG/M2 | HEART RATE: 108 BPM | DIASTOLIC BLOOD PRESSURE: 82 MMHG | SYSTOLIC BLOOD PRESSURE: 165 MMHG | WEIGHT: 289 LBS

## 2022-05-05 DIAGNOSIS — M79.604 BILATERAL LEG PAIN: ICD-10-CM

## 2022-05-05 DIAGNOSIS — M79.605 BILATERAL LEG PAIN: ICD-10-CM

## 2022-05-05 DIAGNOSIS — E11.42 DIABETIC POLYNEUROPATHY ASSOCIATED WITH TYPE 2 DIABETES MELLITUS (H): ICD-10-CM

## 2022-05-05 DIAGNOSIS — I82.409 RECURRENT DEEP VEIN THROMBOSIS (DVT) (H): Primary | ICD-10-CM

## 2022-05-05 PROCEDURE — 99204 OFFICE O/P NEW MOD 45 MIN: CPT | Mod: 24

## 2022-05-05 RX ORDER — PREGABALIN 25 MG/1
25 CAPSULE ORAL 2 TIMES DAILY
Qty: 60 CAPSULE | Refills: 1 | Status: SHIPPED | OUTPATIENT
Start: 2022-05-05 | End: 2022-06-09

## 2022-05-05 ASSESSMENT — PAIN SCALES - GENERAL: PAINLEVEL: SEVERE PAIN (7)

## 2022-05-05 NOTE — PROGRESS NOTES
"Adarsh Salvador is a 57 year old male with a past medical history significant for diabetes, IBS, hypertension, asthma who presents with a chief complaint of \"pain in my legs\".  The pain has been present for several years.  The pain is described as dull and achy.  At the end of the day it is sharp and nasty. Patient has a pelvic stimulator.  He has never been seen in a Pain Clinic. He reports weakness in his legs and he works with a cane.  The pain is said to start at the hip on the left and the pain radiates.  He states that the pain keeps him awake at night .  He also has spasms at night.    The pain has been present for 5 years .    The pain is Severe Pain (7) in severity.    The pain is described as constant.   The pain is alleviated by nothing.    It is exacerbated by standing.    Modalities that have been utilized in the past which were helpful include Pamelor, baclofen and topical gels,Cymbalta.    Things that were not helpful, but tried ,include gabapentin, .    The patient has never tried Lyrica.      Current Outpatient Medications   Medication     albuterol (PROAIR HFA/PROVENTIL HFA/VENTOLIN HFA) 108 (90 Base) MCG/ACT inhaler     Alpha Lipoic Acid 200 MG CAPS     amLODIPine (NORVASC) 10 MG tablet     ASPIRIN PO     aspirin-acetaminophen-caffeine (EXCEDRIN MIGRAINE) 250-250-65 MG tablet     atorvastatin (LIPITOR) 40 MG tablet     baclofen (LIORESAL) 10 MG tablet     benzonatate (TESSALON) 200 MG capsule     bisacodyl (DULCOLAX) 5 MG EC tablet     calcium carbonate (TUMS) 500 MG chewable tablet     cyclobenzaprine (FLEXERIL) 10 MG tablet     DULoxetine (CYMBALTA) 60 MG capsule     enoxaparin ANTICOAGULANT (LOVENOX) 120 MG/0.8ML syringe     EPINEPHrine (EPIPEN) 0.3 MG/0.3ML injection     fluticasone (FLONASE) 50 MCG/ACT nasal spray     fluticasone-salmeterol (ADVAIR) 500-50 MCG/DOSE inhaler     insulin glargine (BASAGLAR KWIKPEN) 100 UNIT/ML pen     losartan (COZAAR) 50 MG tablet     magnesium citrate solution     " metFORMIN (GLUCOPHAGE-XR) 500 MG 24 hr tablet     montelukast (SINGULAIR) 10 MG tablet     multivitamin, therapeutic (THERA-VIT) TABS     naproxen sodium (ANAPROX) 220 MG tablet     nortriptyline (PAMELOR) 10 MG capsule     omeprazole (PRILOSEC) 40 MG DR capsule     polyethylene glycol (GOLYTELY) 236 g suspension     tamsulosin (FLOMAX) 0.4 MG capsule     warfarin ANTICOAGULANT (COUMADIN) 5 MG tablet     blood glucose (ACCU-CHEK GUIDE) test strip     blood glucose monitoring (ACCU-CHEK FASTCLIX) lancets     enoxaparin ANTICOAGULANT (LOVENOX) 120 MG/0.8ML syringe     insulin pen needle (32G X 4 MM) 32G X 4 MM miscellaneous     Current Facility-Administered Medications   Medication     triamcinolone (KENALOG-40) injection 40 mg     Facility-Administered Medications Ordered in Other Visits   Medication     BUPivacaine (MARCAINE) injection 0.5%     DOBUTamine 500 mg in dextrose 5% 250 mL (adult std conc) premix     iopamidol (ISOVUE-M 200) solution 10 mL     methylPREDNISolone (DEPO-MEDROL) injection 80 mg     metoprolol (LOPRESSOR) injection 5 mg     Allergies   Allergen Reactions     Artificial Sweetner (Informational Only) [Artificial Sweetner (Informational Only) ] GI Disturbance     ALL artificial sweetners cause severe diarrhea & flu symptoms     Hydromorphone Anaphylaxis     Ibuprofen GI Disturbance     Morphine Sulfate Concentrate Anaphylaxis     Morphine [Fumaric Acid] Anaphylaxis     Hydrocodone-Acetaminophen Itching     Tramadol Hives     Trazodone Hives     Gabapentin      GI upset per pt     Keflex [Cephalexin] Diarrhea     Upset stomach     Codeine Phosphate Itching     Ketorolac Tromethamine Rash      Past Medical History:   Diagnosis Date     Anaphylactic reaction 8/14/2015     Anxiety      Depression      DM2 (diabetes mellitus, type 2) (H)      GERD (gastroesophageal reflux disease)      HTN (hypertension)      IBS (irritable bowel syndrome)      Kidney stone 6/15/2011    Pt believes these were Calcium  stones     Neuropathy      Past Surgical History:   Procedure Laterality Date     COLONOSCOPY  5/1/2012    Procedure:COLONOSCOPY; screening colonoscopy; Surgeon:KINGSLEY DOS SANTOS; Location:[facility]     COLONOSCOPY  4/21/2014    Procedure: COMBINED COLONOSCOPY, SINGLE BIOPSY/POLYPECTOMY BY BIOPSY;  Surgeon: Duane, William Charles, MD;  Location: [facility]     COLONOSCOPY N/A 4/21/2022    Procedure: COLONOSCOPY, WITH POLYPECTOMY AND BIOPSY;  Surgeon: Luiz Calvert MD;  Location: [facility]     CYSTOSCOPY  05/01/2008    CYSTOSCOPY W/ URETERAL STENT PLACEMENT 05/01/2008      CYSTOSCOPY  09/26/2010    CYSTOSCOPY W/ URETERAL STENT PLACEMENT 09/26/2010 Left      CYSTOSCOPY  05/18/2012    CYSTOSCOPY, LEFT URETEROSCOPY AND STENT PLACEMENT left retrograde 05/18/2012      CYSTOSCOPY,+URETEROSCOPY  06/10/2008    URETEROSCOPY 06/10/2008 Right      HC BREATH HYDROGEN TEST  3/7/2014    Procedure: HYDROGEN BREATH TEST;  Surgeon: Darion Swift MD;  Location: [facility]     LITHOTRIPSY  09/30/2010    LITHOTRIPSY 09/30/2010 LEFT EXTRACORPOREAL SHOCK WAVE LITHOTRIPSY, FLEXIBLE CYSTOSCOPY, LEFT STENT REMOVAL       ORTHOPEDIC SURGERY  10/03/2007    KNEE ARTHROSCOPY 10/03/2007 RightX2       RELEASE CARPAL TUNNEL Right 1/26/2018    Procedure: RELEASE CARPAL TUNNEL;  Right carpal tunnel release;  Surgeon: Wiliam Saenz MD;  Location: [facility]     VASCULAR SURGERY  11/24/2008    Radiofrequency ablation left saphenous vein 11/24/2008 Done by Dr. Lozoya       Family History   Problem Relation Age of Onset     Cancer Mother 52        lung, smoked     Cancer - colorectal Father 53        unsure type, pt attributes to radiation exposure     Myocardial Infarction Father 45     Coronary Artery Disease Father      Myocardial Infarction Paternal Grandfather 35     Diabetes Other         nephew- type 1     Diabetes Maternal Aunt         1     Diabetes Maternal Aunt         2     Diabetes Paternal Uncle         1     Diabetes Paternal Uncle          2     Diabetes Paternal Uncle         3     Diabetes Paternal Uncle         4     Colon Cancer No family hx of      Social History     Socioeconomic History     Marital status: Single     Number of children: 0   Occupational History     Occupation: - with robotics     Employer: [employer]   Tobacco Use     Smoking status: Never Smoker     Smokeless tobacco: Current User     Types: Chew     Tobacco comment: Chew   Substance and Sexual Activity     Alcohol use: Not Currently     Alcohol/week: 0.0 standard drinks     Comment: occasional, few drinks a month     Drug use: No     Sexual activity: Never   Social History Narrative    Works at [employer], as a  (25+ years experience).        ROS: 10 point ROS neg other than the symptoms noted above in the HPI.  Physical Exam  Vitals and nursing note reviewed.   Constitutional:       Appearance: Normal appearance.   Cardiovascular:      Rate and Rhythm: Normal rate and regular rhythm.      Pulses: Normal pulses.      Heart sounds: Normal heart sounds.   Musculoskeletal:         General: Tenderness present. No swelling, deformity or signs of injury.      Lumbar back: Spasms, tenderness and bony tenderness present. No swelling, edema, deformity, signs of trauma or lacerations. Decreased range of motion. Positive right straight leg raise test and positive left straight leg raise test. No scoliosis.      Right lower leg: No edema.      Left lower leg: No edema.   Neurological:      Mental Status: He is alert and oriented to person, place, and time.      Motor: Weakness present. No tremor, atrophy, abnormal muscle tone, seizure activity or pronator drift.      Coordination: Romberg sign negative. Coordination normal. Finger-Nose-Finger Test normal.      Gait: Gait abnormal. Tandem walk normal.      Deep Tendon Reflexes: Reflexes are normal and symmetric. Reflexes normal.         A/P:Patient is a 58 y/o man with multiple medical problems and a chief  complaint of bilateral leg pain (left greater than right). Etiology is unclear    Plan:  CT  Pregablin 25 mg bid

## 2022-05-05 NOTE — PATIENT INSTRUCTIONS
Medication Changes:    Start taking pregablin    For refills of opioid medications - You MUST call (Or MyChart) the clinic DIRECTLY and at least 7 days before you run out of your medication.    Please provide the clinic with a minium of 1 week notice, on all other prescription refills.       Imaging:    CT scan    Procedures:    Next time, Dr. Hahn will speak to  you about an epidural steroid injection.      Your pre-procedure instructions are below, please call our clinic if you have any questions.      Follow up:      Follow up in 6 weeks       To speak with a nurse, schedule/reschedule/cancel a clinic appointment, or request a medication refill call: [phone].    You can also reach us by [employer]: [email or web address]

## 2022-05-05 NOTE — PROGRESS NOTES
"ANTICOAGULATION MANAGEMENT     Adarsh Salvador 57 year old male is on warfarin with therapeutic INR result. (Goal INR 2.0-3.0)    Recent labs: (last 7 days)     05/04/22  1802   INR 2.4*       ASSESSMENT       Source(s): Chart Review and Patient/Caregiver Call       Warfarin doses taken: Warfarin taken as instructed    Diet: No new diet changes identified    New illness, injury, or hospitalization: Yes: was in Urgent Care yesterdayfor blood in urine. Has bruising on back stomach and legs, states now today has additional bruising on back (quarter size bruising) and new bruise on arm that he states is about 4\"x9\" States he made appt to follow up with urology, unable to get appt this month.     Medication/supplement changes: None noted    Signs or symptoms of bleeding or clotting: Yes: states has blood in urine, denies blood in stool. Has new bruising.    Previous INR: Therapeutic last visit; previously outside of goal range    Additional findings: Advised to notify provider and be evaluated at the ER, as symptoms not improving.       PLAN     Recommended plan for ongoing change(s) affecting INR     Dosing Instructions: continue your current warfarin dose with next INR in 2 weeks       Summary  As of 5/5/2022    Full warfarin instructions:  12.5 mg every Mon, Wed, Fri; 15 mg all other days   Next INR check:  5/16/2022             Telephone call with Adarsh who verbalizes understanding and agrees to plan, who agrees to plan and repeated back plan correctly and will route encounter to pcp    Lab visit scheduled    Education provided: Monitoring for bleeding signs and symptoms, Monitoring for clotting signs and symptoms, When to seek medical attention/emergency care, Importance of notifying clinic for diarrhea, nausea/vomiting, reduced intake, and/or illness; a sooner lab recheck maybe needed. and Contact [phone]  with any changes, questions or concerns.     Plan made per [facility] anticoagulation protocol    Argentina Antonio, " RN  Anticoagulation Clinic  5/5/2022    _______________________________________________________________________     Anticoagulation Episode Summary     Current INR goal:  2.0-3.0   TTR:  46.5 % (3 mo)   Target end date:  Indefinite   Send INR reminders to:  [facility]    Indications    Recurrent deep vein thrombosis (DVT) (H) [I82.409]           Comments:           Anticoagulation Care Providers     Provider Role Specialty Phone number    Bertha Romero PA-C Referring Family Medicine [phone]

## 2022-05-08 ENCOUNTER — HEALTH MAINTENANCE LETTER (OUTPATIENT)
Age: 57
End: 2022-05-08

## 2022-05-09 ENCOUNTER — ANCILLARY PROCEDURE (OUTPATIENT)
Dept: CT IMAGING | Facility: CLINIC | Age: 57
End: 2022-05-09
Payer: COMMERCIAL

## 2022-05-09 DIAGNOSIS — M79.604 BILATERAL LEG PAIN: ICD-10-CM

## 2022-05-09 DIAGNOSIS — M79.605 BILATERAL LEG PAIN: ICD-10-CM

## 2022-05-09 DIAGNOSIS — E11.42 DIABETIC POLYNEUROPATHY ASSOCIATED WITH TYPE 2 DIABETES MELLITUS (H): ICD-10-CM

## 2022-05-09 PROCEDURE — 72131 CT LUMBAR SPINE W/O DYE: CPT | Performed by: RADIOLOGY

## 2022-05-16 ENCOUNTER — LAB (OUTPATIENT)
Dept: LAB | Facility: CLINIC | Age: 57
End: 2022-05-16
Payer: COMMERCIAL

## 2022-05-16 ENCOUNTER — ANTICOAGULATION THERAPY VISIT (OUTPATIENT)
Dept: ANTICOAGULATION | Facility: CLINIC | Age: 57
End: 2022-05-16

## 2022-05-16 DIAGNOSIS — I82.409 RECURRENT DEEP VEIN THROMBOSIS (DVT) (H): ICD-10-CM

## 2022-05-16 DIAGNOSIS — I82.409 RECURRENT DEEP VEIN THROMBOSIS (DVT) (H): Primary | ICD-10-CM

## 2022-05-16 LAB — INR BLD: 2.3 (ref 0.9–1.1)

## 2022-05-16 PROCEDURE — 36416 COLLJ CAPILLARY BLOOD SPEC: CPT

## 2022-05-16 PROCEDURE — 85610 PROTHROMBIN TIME: CPT

## 2022-05-16 NOTE — PROGRESS NOTES
ANTICOAGULATION MANAGEMENT     Adarsh Salvador 57 year old male is on warfarin with therapeutic INR result. (Goal INR 2.0-3.0)    Recent labs: (last 7 days)     05/16/22  0815   INR 2.3*       ASSESSMENT       Source(s): Chart Review    Previous INR was Therapeutic last 2(+) visits    Medication, diet, health changes since last INR chart reviewed; none identified           PLAN     Unable to reach Adarsh  today.    Left message to continue current dose of warfarin 12.5 mg tonight. Request call back for assessment.    Follow up required to assess for changes     Joya Suero, RN  Anticoagulation Clinic  5/16/2022

## 2022-05-24 NOTE — PROGRESS NOTES
ANTICOAGULATION MANAGEMENT     Adarsh Salvador 57 year old male is on warfarin with therapeutic INR result. (Goal INR 2.0-3.0)    No results for input(s): INR in the last 168 hours.    ASSESSMENT       Source(s): Chart Review and Patient/Caregiver Call       Warfarin doses taken: Warfarin taken as instructed    Diet: No new diet changes identified    New illness, injury, or hospitalization: No    Medication/supplement changes: vitamin D3 and magnesium     Signs or symptoms of bleeding or clotting:occasional bloody nose    Previous INR: Therapeutic last 2(+) visits    Additional findings: None     PLAN     Recommended plan for no diet, medication or health factor changes affecting INR     Dosing Instructions: continue your current warfarin dose with next INR in 4 weeks       Summary  As of 5/16/2022    Full warfarin instructions:  12.5 mg every Mon, Wed, Fri; 15 mg all other days   Next INR check:  6/13/2022             Telephone call with Adarsh who verbalizes understanding and agrees to plan    Lab visit scheduled    Education provided: None required    Plan made per [facility] anticoagulation protocol    Thalia Chau, RN  Anticoagulation Clinic  5/24/2022    _______________________________________________________________________     Anticoagulation Episode Summary     Current INR goal:  2.0-3.0   TTR:  52.6 % (3.4 mo)   Target end date:  Indefinite   Send INR reminders to:  [facility]    Indications    Recurrent deep vein thrombosis (DVT) (H) [I82.409]           Comments:           Anticoagulation Care Providers     Provider Role Specialty Phone number    Bertha Romero PA-C Referring Family Medicine [phone]

## 2022-05-26 ENCOUNTER — OFFICE VISIT (OUTPATIENT)
Dept: NEUROLOGY | Facility: CLINIC | Age: 57
End: 2022-05-26
Attending: INTERNAL MEDICINE
Payer: COMMERCIAL

## 2022-05-26 DIAGNOSIS — M79.604 BILATERAL LEG PAIN: ICD-10-CM

## 2022-05-26 DIAGNOSIS — M79.605 BILATERAL LEG PAIN: ICD-10-CM

## 2022-05-26 DIAGNOSIS — E11.42 DIABETIC POLYNEUROPATHY ASSOCIATED WITH TYPE 2 DIABETES MELLITUS (H): ICD-10-CM

## 2022-05-26 PROCEDURE — 95886 MUSC TEST DONE W/N TEST COMP: CPT | Performed by: INTERNAL MEDICINE

## 2022-05-26 PROCEDURE — 95885 MUSC TST DONE W/NERV TST LIM: CPT | Mod: 59 | Performed by: INTERNAL MEDICINE

## 2022-05-26 PROCEDURE — 95911 NRV CNDJ TEST 9-10 STUDIES: CPT | Performed by: INTERNAL MEDICINE

## 2022-05-26 NOTE — LETTER
5/26/2022         RE: Adarsh Salvador  [address] [facility] [address]        Dear Colleague,    Thank you for referring your patient, Adarsh Salvador, to the [facility] [place]. Please see a copy of my visit note below.        [facility]   EMG Laboratory      Nerve Conduction & EMG Report          Patient:       Adarsh Salvador  Patient ID:    [id number]  Gender:        Male  YOB: 1965  Age:           57 Years 0 Months      History and Examination:  Patient is a 56 y/o male with diabetic polyneuropathy who presents with worsening numbness/tingling and leg pains. EMG ordered to evaluate for worsening polyneuropathy and lumbosacral radiculopathy.     Techniques:  Motor conduction studies were done with surface recording electrodes. Sensory conduction studies were performed with surface electrodes, unless indicated otherwise by (n), designating the use of subdermal recording electrodes. Temperature was monitored and recorded throughout the study. Upper extremities were maintained at a temperature of 32 degrees Centigrade or higher.  Lower extremities were maintained at a temperature of 31 degrees Centigrade or higher. EMG was done with a concentric needle electrode.     Results:  Sensory NCS  - Bilateral sural and left radial sensory nerve action potentials (SNAPs) showed decreased amplitude  - Left median SNAP showed mild decrease in conduction velocity  - Left ulnar SNAP was normal     Motor NCS  - Left median-APB compound motor action potential (CMAP) showed prolonged distal latency and mild reduction in conduction velocity  - Left ulnar-ADM CMAP was normal  - Left peroneal-EDB CMAP showed mild prolongation in distal latency, reduced amplitude and conduction velocity  - Left tibial-AH CMAP showed reduced amplitude and conduction velocity    Needle EMG of selected proximal and distal limb muscles of the bilateral lower extremities was performed as tabulated below. No  abnormal spontaneous activity was observed in the sampled muscles. Motor unit potential morphology and recruitment patterns were normal.       Interpretation:  This is an abnormal study, demonstrating electrophysiologic evidence of the following:  - Moderately severe length dependent sensorimotor axonal polyneuropathy. In comparison to June 2020 study, there has been mild interval worsening.   - Moderately severe left median mononeuropathy at the wrist (carpal tunnel syndrome).    Comment: There is no evidence of lumbosacral polyradiculopathy as clinically questioned. 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 Nerve blocks of the lateral femoral cutaneous nerves could be considered at follow-up in pain clinic.     Juan Luis Gray MD   of Neurology  [facility]          Sensory NCS      Nerve / Sites Rec. Site Onset Peak NP Amp Ref. PP Amp Dist Jacob Ref. Temp     ms ms  V  V  V cm m/s m/s  C   L MEDIAN - Dig II Anti      Wrist Dig II 3.23 4.06 12.8 10.0 19.4 14 43.4 48.0 32.5   L ULNAR - Dig V Anti      Wrist Dig V 2.60 3.49 13.0 8.0 20.3 12.5 48.0 48.0 32.2   L RADIAL - Snuff      Forearm Snuff 2.29 2.86 12.0 15.0 5.7 12.5 54.5 48.0 32   L SURAL - Lat Mall      Calf Ankle 2.97 4.27 4.4 8.0 2.3 14 47.2 38.0 32   R SURAL - Lat Mall      Calf Ankle 3.33 4.27 4.0 8.0 3.0 14 42.0 38.0 31       Motor NCS      Nerve / Sites Rec. Site Lat Ref. Amp Ref. Rel Amp Dist Jacob Ref. Dur. Area Temp.     ms ms mV mV % cm m/s m/s ms %  C   L MEDIAN - APB      Wrist APB 4.58 4.40 6.3 5.0 100 8   5.78 100 33.4      Elbow APB 9.79  6.1  98 24 46.1 48.0 6.25 439 33.3   L ULNAR - ADM      Wrist ADM 3.28 3.50 8.3 5.0 100 8   5.99 100 32.3      B.Elbow ADM 7.97  8.1  98.4   48.0 6.09 97.1 32.2      A.Elbow ADM 10.00  8.3  101   48.0 6.09 96.8 32.1   L DEEP PERONEAL - EDB      Ankle EDB 6.30 6.00 1.1 2.5 100 8   4.74 100 32.1      FibHead EDB 16.35  0.8  79.9 36.7 36.5 38.0 6.93 85.6  32.2      Pop Fos EDB 18.02  0.9  83.9 6.2 37.2 38.0 6.82 87.6 32.2   L TIBIAL - AH      Ankle AH 4.74 6.00 1.2 4.0 100 8   7.40 100 32.4      Pop Fos AH 17.76  0.6  50.5 44 33.8 38.0 9.79 80.9 32.5       EMG Summary Table     Spontaneous MUAP Recruitment    IA Fib PSW Fasc H.F. Amp Dur. PPP Pattern   L. TIB ANTERIOR N None None None None N N N N   L. GASTROCN (MED) N None None None None N N N N   L. VAST MEDIALIS N None None None None N N N N   L. ILIOPSOAS N None None None None N N N N   R. TIB ANTERIOR N None None None None N N N N   R. GASTROCN (MED) N None None None None N N N    R. VAST MEDIALIS N None None None None N N N N   L. L4 PSP N None None None None N N N N                                Again, thank you for allowing me to participate in the care of your patient.        Sincerely,        Juan Luis Gray MD

## 2022-05-26 NOTE — PROGRESS NOTES
[facility]   EMG Laboratory      Nerve Conduction & EMG Report          Patient:       Adarsh Salvador  Patient ID:    [id number]  Gender:        Male  YOB: 1965  Age:           57 Years 0 Months      History and Examination:  Patient is a 56 y/o male with diabetic polyneuropathy who presents with worsening numbness/tingling and leg pains. EMG ordered to evaluate for worsening polyneuropathy and lumbosacral radiculopathy.     Techniques:  Motor conduction studies were done with surface recording electrodes. Sensory conduction studies were performed with surface electrodes, unless indicated otherwise by (n), designating the use of subdermal recording electrodes. Temperature was monitored and recorded throughout the study. Upper extremities were maintained at a temperature of 32 degrees Centigrade or higher.  Lower extremities were maintained at a temperature of 31 degrees Centigrade or higher. EMG was done with a concentric needle electrode.     Results:  Sensory NCS  - Bilateral sural and left radial sensory nerve action potentials (SNAPs) showed decreased amplitude  - Left median SNAP showed mild decrease in conduction velocity  - Left ulnar SNAP was normal     Motor NCS  - Left median-APB compound motor action potential (CMAP) showed prolonged distal latency and mild reduction in conduction velocity  - Left ulnar-ADM CMAP was normal  - Left peroneal-EDB CMAP showed mild prolongation in distal latency, reduced amplitude and conduction velocity  - Left tibial-AH CMAP showed reduced amplitude and conduction velocity    Needle EMG of selected proximal and distal limb muscles of the bilateral lower extremities was performed as tabulated below. No abnormal spontaneous activity was observed in the sampled muscles. Motor unit potential morphology and recruitment patterns were normal.       Interpretation:  This is an abnormal study, demonstrating electrophysiologic evidence of the following:  -  Moderately severe length dependent sensorimotor axonal polyneuropathy. In comparison to June 2020 study, there has been mild interval worsening.   - Moderately severe left median mononeuropathy at the wrist (carpal tunnel syndrome).    Comment: There is no evidence of lumbosacral polyradiculopathy as clinically questioned. 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 Nerve blocks of the lateral femoral cutaneous nerves could be considered at follow-up in pain clinic.     Juan Luis Gray MD   of Neurology  [facility]          Sensory NCS      Nerve / Sites Rec. Site Onset Peak NP Amp Ref. PP Amp Dist Jacob Ref. Temp     ms ms  V  V  V cm m/s m/s  C   L MEDIAN - Dig II Anti      Wrist Dig II 3.23 4.06 12.8 10.0 19.4 14 43.4 48.0 32.5   L ULNAR - Dig V Anti      Wrist Dig V 2.60 3.49 13.0 8.0 20.3 12.5 48.0 48.0 32.2   L RADIAL - Snuff      Forearm Snuff 2.29 2.86 12.0 15.0 5.7 12.5 54.5 48.0 32   L SURAL - Lat Mall      Calf Ankle 2.97 4.27 4.4 8.0 2.3 14 47.2 38.0 32   R SURAL - Lat Mall      Calf Ankle 3.33 4.27 4.0 8.0 3.0 14 42.0 38.0 31       Motor NCS      Nerve / Sites Rec. Site Lat Ref. Amp Ref. Rel Amp Dist Jacob Ref. Dur. Area Temp.     ms ms mV mV % cm m/s m/s ms %  C   L MEDIAN - APB      Wrist APB 4.58 4.40 6.3 5.0 100 8   5.78 100 33.4      Elbow APB 9.79  6.1  98 24 46.1 48.0 6.25 439 33.3   L ULNAR - ADM      Wrist ADM 3.28 3.50 8.3 5.0 100 8   5.99 100 32.3      B.Elbow ADM 7.97  8.1  98.4   48.0 6.09 97.1 32.2      A.Elbow ADM 10.00  8.3  101   48.0 6.09 96.8 32.1   L DEEP PERONEAL - EDB      Ankle EDB 6.30 6.00 1.1 2.5 100 8   4.74 100 32.1      FibHead EDB 16.35  0.8  79.9 36.7 36.5 38.0 6.93 85.6 32.2      Pop Fos EDB 18.02  0.9  83.9 6.2 37.2 38.0 6.82 87.6 32.2   L TIBIAL - AH      Ankle AH 4.74 6.00 1.2 4.0 100 8   7.40 100 32.4      Pop Fos AH 17.76  0.6  50.5 44 33.8 38.0 9.79 80.9 32.5       EMG Summary Table     Spontaneous MUAP  Recruitment    IA Fib PSW Fasc H.F. Amp Dur. PPP Pattern   L. TIB ANTERIOR N None None None None N N N N   L. GASTROCN (MED) N None None None None N N N N   L. VAST MEDIALIS N None None None None N N N N   L. ILIOPSOAS N None None None None N N N N   R. TIB ANTERIOR N None None None None N N N N   R. GASTROCN (MED) N None None None None N N N    R. VAST MEDIALIS N None None None None N N N N   L. L4 PSP N None None None None N N N N

## 2022-06-07 ENCOUNTER — OFFICE VISIT (OUTPATIENT)
Dept: FAMILY MEDICINE | Facility: CLINIC | Age: 57
End: 2022-06-07
Payer: COMMERCIAL

## 2022-06-07 VITALS
SYSTOLIC BLOOD PRESSURE: 136 MMHG | OXYGEN SATURATION: 99 % | WEIGHT: 292 LBS | DIASTOLIC BLOOD PRESSURE: 81 MMHG | HEART RATE: 97 BPM | TEMPERATURE: 97.8 F | BODY MASS INDEX: 40.73 KG/M2 | RESPIRATION RATE: 16 BRPM

## 2022-06-07 DIAGNOSIS — E11.42 TYPE 2 DIABETES MELLITUS WITH DIABETIC POLYNEUROPATHY, WITHOUT LONG-TERM CURRENT USE OF INSULIN (H): Primary | ICD-10-CM

## 2022-06-07 DIAGNOSIS — R86.8 LOW VOLUME OF EJACULATED SEMEN: ICD-10-CM

## 2022-06-07 DIAGNOSIS — N20.0 CALCULUS OF KIDNEY: ICD-10-CM

## 2022-06-07 DIAGNOSIS — R10.31 RLQ ABDOMINAL PAIN: ICD-10-CM

## 2022-06-07 DIAGNOSIS — R31.9 HEMATURIA, UNSPECIFIED TYPE: ICD-10-CM

## 2022-06-07 DIAGNOSIS — Z12.5 SCREENING FOR PROSTATE CANCER: ICD-10-CM

## 2022-06-07 DIAGNOSIS — I10 HYPERTENSION, UNSPECIFIED TYPE: ICD-10-CM

## 2022-06-07 DIAGNOSIS — Z11.59 NEED FOR HEPATITIS C SCREENING TEST: ICD-10-CM

## 2022-06-07 LAB
ALBUMIN UR-MCNC: NEGATIVE MG/DL
ANION GAP SERPL CALCULATED.3IONS-SCNC: 8 MMOL/L (ref 3–14)
APPEARANCE UR: CLEAR
BILIRUB UR QL STRIP: NEGATIVE
BUN SERPL-MCNC: 15 MG/DL (ref 7–30)
CALCIUM SERPL-MCNC: 9.5 MG/DL (ref 8.5–10.1)
CHLORIDE BLD-SCNC: 105 MMOL/L (ref 94–109)
CO2 SERPL-SCNC: 25 MMOL/L (ref 20–32)
COLOR UR AUTO: YELLOW
CREAT SERPL-MCNC: 0.91 MG/DL (ref 0.66–1.25)
GFR SERPL CREATININE-BSD FRML MDRD: >90 ML/MIN/1.73M2
GLUCOSE BLD-MCNC: 227 MG/DL (ref 70–99)
GLUCOSE UR STRIP-MCNC: NEGATIVE MG/DL
HBA1C MFR BLD: 7 % (ref 0–5.6)
HGB UR QL STRIP: ABNORMAL
KETONES UR STRIP-MCNC: NEGATIVE MG/DL
LEUKOCYTE ESTERASE UR QL STRIP: NEGATIVE
MUCOUS THREADS #/AREA URNS LPF: PRESENT /LPF
NITRATE UR QL: NEGATIVE
PH UR STRIP: 5 [PH] (ref 5–7)
POTASSIUM BLD-SCNC: 4.3 MMOL/L (ref 3.4–5.3)
PSA SERPL-MCNC: 0.26 UG/L (ref 0–4)
RBC #/AREA URNS AUTO: ABNORMAL /HPF
SODIUM SERPL-SCNC: 138 MMOL/L (ref 133–144)
SP GR UR STRIP: 1.02 (ref 1–1.03)
UROBILINOGEN UR STRIP-ACNC: 0.2 E.U./DL
WBC #/AREA URNS AUTO: ABNORMAL /HPF

## 2022-06-07 PROCEDURE — 80048 BASIC METABOLIC PNL TOTAL CA: CPT | Performed by: PHYSICIAN ASSISTANT

## 2022-06-07 PROCEDURE — G0103 PSA SCREENING: HCPCS | Performed by: PHYSICIAN ASSISTANT

## 2022-06-07 PROCEDURE — 36415 COLL VENOUS BLD VENIPUNCTURE: CPT | Performed by: PHYSICIAN ASSISTANT

## 2022-06-07 PROCEDURE — 83036 HEMOGLOBIN GLYCOSYLATED A1C: CPT | Performed by: PHYSICIAN ASSISTANT

## 2022-06-07 PROCEDURE — 99215 OFFICE O/P EST HI 40 MIN: CPT | Performed by: PHYSICIAN ASSISTANT

## 2022-06-07 PROCEDURE — 86803 HEPATITIS C AB TEST: CPT | Performed by: PHYSICIAN ASSISTANT

## 2022-06-07 PROCEDURE — 81001 URINALYSIS AUTO W/SCOPE: CPT | Performed by: PHYSICIAN ASSISTANT

## 2022-06-07 RX ORDER — CHLORTHALIDONE 25 MG/1
25 TABLET ORAL DAILY
Qty: 30 TABLET | Refills: 3 | Status: SHIPPED | OUTPATIENT
Start: 2022-06-07 | End: 2022-08-05

## 2022-06-07 RX ORDER — LOSARTAN POTASSIUM 25 MG/1
25 TABLET ORAL DAILY
Qty: 90 TABLET | Refills: 0 | COMMUNITY
Start: 2022-06-07 | End: 2022-12-22

## 2022-06-07 NOTE — RESULT ENCOUNTER NOTE
Pato Altamirano,       Your recent test results are attached, if you have any questions or concerns please feel free to contact me via e-mail or call [phone].  A1c is a lot better at 7.0.  Continue current meds and recheck 3 months diabetic recheck.  You have some blood in your urine which can be seen with kidney stones or other causes, please discuss with your urologist.  Still waiting on other labs.       It was a pleasure to see you at your recent office visit.      Sincerely,  Bertha Romero PA-C

## 2022-06-07 NOTE — PROGRESS NOTES
Assessment & Plan     Type 2 diabetes mellitus with diabetic polyneuropathy, without long-term current use of insulin (H)  Improved greatly  Continue current meds  Recheck 3 months  - Hemoglobin A1c; Future  - Basic metabolic panel  (Ca, Cl, CO2, Creat, Gluc, K, Na, BUN); Future  - Hemoglobin A1c  - Basic metabolic panel  (Ca, Cl, CO2, Creat, Gluc, K, Na, BUN)    Low volume of ejaculated semen  Needs urology referral for this unsure of cause  Also has blood in urine, suspect related to kidney stones but will have see urology as well for this    - UA with Microscopic reflex to Culture - lab collect; Future  - Adult Urology Referral; Future  - UA with Microscopic reflex to Culture - lab collect  - Urine Microscopic    RLQ abdominal pain  Chronic  Recent ct not concerning  To er if severe worsening pain or fevers    - Adult General Surg Referral; Future    Hematuria, unspecified type      Calculus of kidney  H/o of this er thought maybe he passed one recently, patient unsure as he still has pain    Hypertension, unspecified type  Orthostatic Dizzy with losartan, will lower that dose see below    - chlorthalidone (HYGROTON) 25 MG tablet; Take 1 tablet (25 mg) by mouth daily Add on for blood pressure    Need for hepatitis C screening test    - Hepatitis C Screen Reflex to HCV RNA Quant and Genotype; Future  - Hepatitis C Screen Reflex to HCV RNA Quant and Genotype    Screening for prostate cancer    - PSA, screen; Future  - PSA, screen    Chart documentation performed partially with Dragon Voice recognition Software. Although reviewed after completion, some word and grammatical error may remain.  Billin min spent on patient today including chart review, history, exam, and explaining treatment plan and follow-up.     There are no Patient Instructions on file for this visit.      Return in about 3 months (around 2022) for diabetes recheck.    Bertha Romero PA-C  [facility]  "[place]    Subjective   Adarsh is a 57 year old who presents for the following health issues  accompanied by his Self.    History of Present Illness       Reason for visit:  Prostate issues    He eats 0-1 servings of fruits and vegetables daily.He consumes 2 sweetened beverage(s) daily.He exercises with enough effort to increase his heart rate 60 or more minutes per day.  He exercises with enough effort to increase his heart rate 4 days per week.   He is taking medications regularly.     Patient is here for no ejaculation happening for months. He is actually very overdue for diabetes check so we will do that today as well.   He has chronic RLQ pain that no one has figured out. Could be muscle pain. See er notes.       Lab Results   Component Value Date    A1C 11.2 01/24/2022    A1C 7.4 12/03/2020    A1C 7.5 03/26/2020    A1C 6.6 10/03/2019    A1C 6.8 01/11/2019    A1C 6.5 08/17/2018      was just in [facility]. Notes copied as below:  Disposition:  The patient was discharged to home.    \"Impression and Plan:   Adarsh Salvador is a 57 y.o. male presenting with right-sided inguinal pain which came on after lifting a very heavy object at work. He has a history of kidney stones as well as an inguinal hernia and mesh on that side. His physical exam is normal. His CT is without evidence of acute or explanatory findings. Because his discomfort radiates into the testicle we did perform ultrasonography which was also normal. His urinalysis is without evidence of kidney stones or infection. His basic labs are without evidence of significant hematologic or metabolic derangement. His belly labs are without evidence of hepatobiliary dysfunction, hepatitis, obstruction or pancreatitis. I discussed with him my suspicion that he may have passed a kidney stone given the nature and location of his discomfort although this is entirely conjecture based on findings of his CT and urine without evidence to support this conclusion. It is also possible " "that he simply strained a muscle in the area which would explain why the pain started after lifting. Nevertheless I do not have a better explanation for his discomfort and there is no evidence to suggest failure or other problem related to his earlier surgery. He was given strict ED return precautions focused on currently occult intra-abdominal or pelvic pathology. He was also instructed to follow-up with a primary provider for reassessment and to discuss any ongoing problems.    Diagnosis:     ENCOUNTER DIAGNOSES   ICD-10-CM   1. Flank pain R10.9   2. Inguinal pain, unspecified laterality R10.30     I, Xavier Freire am serving as a scribe to document services personally performed by Scottie Meyers MD, based on my observations and the provider's statements to me.    6/3/2022  [facility] EMERGENCY ROOM\"    Still has pain RLQ and into testicle area. Exam in er was benign for scrotum area.   Produces no semen per patient for a few months.   Sugars are between 125-200.   No real urinary symptoms.       Taking 40 units of insulin currently. Once a day. Last a1c was very poor so we added insulin.       Review of Systems   Constitutional, HEENT, cardiovascular, pulmonary, GI, , musculoskeletal, neuro, skin, endocrine and psych systems are negative, except as otherwise noted.      Objective    /81   Pulse 97   Temp 97.8  F (36.6  C) (Tympanic)   Resp 16   Wt 132.5 kg (292 lb)   SpO2 99%   BMI 40.73 kg/m    Body mass index is 40.73 kg/m .  Physical Exam   GENERAL: alert, no distress and obese  NECK: no adenopathy, no asymmetry, masses, or scars and thyroid normal to palpation  RESP: lungs clear to auscultation - no rales, rhonchi or wheezes  CV: regular rate and rhythm, normal S1 S2, no S3 or S4, no murmur, click or rub, no peripheral edema and peripheral pulses strong  ABDOMEN: soft, nontender, no hepatosplenomegaly, no masses and bowel sounds normal  MS: no gross musculoskeletal defects noted, no " edema  NEURO: Normal strength and tone, mentation intact and speech normal  PSYCH: mentation appears normal, affect normal/bright    Results for orders placed or performed in visit on 06/07/22   Hemoglobin A1c     Status: Abnormal   Result Value Ref Range    Hemoglobin A1C 7.0 (H) 0.0 - 5.6 %   UA with Microscopic reflex to Culture - lab collect     Status: Abnormal    Specimen: Urine, Clean Catch   Result Value Ref Range    Color Urine Yellow Colorless, Straw, Light Yellow, Yellow    Appearance Urine Clear Clear    Glucose Urine Negative Negative mg/dL    Bilirubin Urine Negative Negative    Ketones Urine Negative Negative mg/dL    Specific Gravity Urine 1.020 1.003 - 1.035    Blood Urine Moderate (A) Negative    pH Urine 5.0 5.0 - 7.0    Protein Albumin Urine Negative Negative mg/dL    Urobilinogen Urine 0.2 0.2, 1.0 E.U./dL    Nitrite Urine Negative Negative    Leukocyte Esterase Urine Negative Negative   Urine Microscopic     Status: Abnormal   Result Value Ref Range    RBC Urine 25-50 (A) 0-2 /HPF /HPF    WBC Urine 0-5 0-5 /HPF /HPF    Mucus Urine Present (A) None Seen /LPF    Narrative    Urine Culture not indicated

## 2022-06-08 LAB — HCV AB SERPL QL IA: NONREACTIVE

## 2022-06-09 ENCOUNTER — OFFICE VISIT (OUTPATIENT)
Dept: PALLIATIVE MEDICINE | Facility: CLINIC | Age: 57
End: 2022-06-09
Payer: COMMERCIAL

## 2022-06-09 ENCOUNTER — TELEPHONE (OUTPATIENT)
Dept: PALLIATIVE MEDICINE | Facility: CLINIC | Age: 57
End: 2022-06-09

## 2022-06-09 VITALS
SYSTOLIC BLOOD PRESSURE: 139 MMHG | BODY MASS INDEX: 40.45 KG/M2 | DIASTOLIC BLOOD PRESSURE: 84 MMHG | HEART RATE: 91 BPM | WEIGHT: 290 LBS

## 2022-06-09 DIAGNOSIS — E11.42 DIABETIC POLYNEUROPATHY ASSOCIATED WITH TYPE 2 DIABETES MELLITUS (H): ICD-10-CM

## 2022-06-09 DIAGNOSIS — I82.409 RECURRENT DEEP VEIN THROMBOSIS (DVT) (H): Primary | ICD-10-CM

## 2022-06-09 DIAGNOSIS — M79.605 BILATERAL LEG PAIN: ICD-10-CM

## 2022-06-09 DIAGNOSIS — M79.604 BILATERAL LEG PAIN: ICD-10-CM

## 2022-06-09 PROCEDURE — 99214 OFFICE O/P EST MOD 30 MIN: CPT

## 2022-06-09 RX ORDER — DIAZEPAM 5 MG
5 TABLET ORAL
Qty: 1 TABLET | Refills: 1 | Status: SHIPPED | OUTPATIENT
Start: 2022-06-09 | End: 2022-12-22

## 2022-06-09 RX ORDER — PREGABALIN 50 MG/1
50 CAPSULE ORAL 2 TIMES DAILY
Qty: 60 CAPSULE | Refills: 1 | Status: SHIPPED | OUTPATIENT
Start: 2022-06-09 | End: 2022-12-22

## 2022-06-09 ASSESSMENT — PAIN SCALES - GENERAL: PAINLEVEL: MODERATE PAIN (4)

## 2022-06-09 NOTE — TELEPHONE ENCOUNTER
Routing to [facility] for hold/bridge recommendations. Once orders are obtained, [facility] to notify patient and  pain clinic ([phone]).

## 2022-06-09 NOTE — RESULT ENCOUNTER NOTE
Pato Altamirano,       Your recent test results are attached, if you have any questions or concerns please feel free to contact me via e-mail or call [phone].  Hepatitis C negative.  Prostate cancer screening normal. Sodium and potassium normal.   Creatinine and GFR normal, which means kidney function is normal.  A1c is greatly improved to 7.0.  I would continue your current medications and recheck in 3 months.           It was a pleasure to see you at your recent office visit.      Sincerely,  Bertha Romero PA-C

## 2022-06-09 NOTE — TELEPHONE ENCOUNTER
Writer reached out to anticoagulation clinic to discuss pt's coumadin dosing prior to pt's JANUARY in July with Dr. Hahn. Writer spoke with Adarsh from [facility] who will reach out to pharmacist and figure out a plan. They will contact pt with instructions prior to 22 JANUARY and let writer/clinic know when this has been determined.     [facility] [phone]  [facility] direct number: [phone]       JEMMA Barrett  Neurology/Neurosurgery/PM&R

## 2022-06-09 NOTE — PATIENT INSTRUCTIONS
Medication Changes:    Valium for before the procedure    For refills of opioid medications - You MUST call (Or MyChart) the clinic DIRECTLY and at least 7 days before you run out of your medication.    Please provide the clinic with a minium of 1 week notice, on all other prescription refills.     Procedures:    Epidural steroid injection on July 7th (pending coumadin treatment plan)    Call to schedule your procedure at the [facility]: [phone]      Your pre-procedure instructions are below, please call our clinic if you have any questions.    Treatment planning:    We will reach out to your coumadin clinic to figure out a plan for when you are getting the injection      Follow up:           To speak with a nurse, schedule/reschedule/cancel a clinic appointment, or request a medication refill call: [phone].    You can also reach us by [employer]: [email or web address]

## 2022-06-13 ENCOUNTER — ANTICOAGULATION THERAPY VISIT (OUTPATIENT)
Dept: ANTICOAGULATION | Facility: CLINIC | Age: 57
End: 2022-06-13

## 2022-06-13 ENCOUNTER — LAB (OUTPATIENT)
Dept: LAB | Facility: CLINIC | Age: 57
End: 2022-06-13
Payer: COMMERCIAL

## 2022-06-13 DIAGNOSIS — I82.409 RECURRENT DEEP VEIN THROMBOSIS (DVT) (H): Primary | ICD-10-CM

## 2022-06-13 DIAGNOSIS — I82.409 RECURRENT DEEP VEIN THROMBOSIS (DVT) (H): ICD-10-CM

## 2022-06-13 LAB — INR BLD: 1.6 (ref 0.9–1.1)

## 2022-06-13 PROCEDURE — 85610 PROTHROMBIN TIME: CPT

## 2022-06-13 PROCEDURE — 36416 COLLJ CAPILLARY BLOOD SPEC: CPT

## 2022-06-13 NOTE — PROGRESS NOTES
ANTICOAGULATION MANAGEMENT     Adarsh Salvador 57 year old male is on warfarin with subtherapeutic INR result. (Goal INR 2.0-3.0)    Recent labs: (last 7 days)     06/13/22  0714   INR 1.6*       ASSESSMENT       Source(s): Chart Review and Patient/Caregiver Call       Warfarin doses taken: Warfarin taken as instructed    Diet: No new diet changes identified    New illness, injury, or hospitalization: Yes: Bad headaches in the last week    Medication/supplement changes: None noted    Signs or symptoms of bleeding or clotting: Yes:excedrin migraine    Previous INR: Therapeutic last 2(+) visits    Additional findings: will have spinal injection in July.,       PLAN     Recommended plan for temporary change(s) affecting INR     Dosing Instructions: booster dose then continue your current warfarin dose with next INR in 2 weeks       Summary  As of 6/13/2022    Full warfarin instructions:  12.5 mg every Mon, Wed, Fri; 15 mg all other days   Next INR check:  6/23/2022             Telephone call with Adarsh who verbalizes understanding and agrees to plan and who agrees to plan and repeated back plan correctly    Lab visit scheduled    Education provided: Goal range and significance of current result, Importance of therapeutic range and Contact [phone]  with any changes, questions or concerns.     Plan made per [facility] anticoagulation protocol    Argentina Antonio RN  Anticoagulation Clinic  6/13/2022    _______________________________________________________________________     Anticoagulation Episode Summary     Current INR goal:  2.0-3.0   TTR:  50.5 % (4.3 mo)   Target end date:  Indefinite   Send INR reminders to:  [facility] [place]    Indications    Recurrent deep vein thrombosis (DVT) (H) [I82.409]           Comments:           Anticoagulation Care Providers     Provider Role Specialty Phone number    Bertha Romero PA-C Referring Family Medicine [phone]

## 2022-06-20 NOTE — PROGRESS NOTES
"Adarsh Salvador is a 57 year old male with a past medical history significant for diabetes, IBS, hypertension, asthma who presents with a chief complaint of \"pain in my legs\".  The pain has been present for several years.  The pain is described as dull and achy.  At the end of the day it is sharp and nasty. Patient has a pelvic stimulator.  He has never been seen in a Pain Clinic. He reports weakness in his legs and he works with a cane.  The pain is said to start at the hip on the left and the pain radiates.  He states that the pain keeps him awake at night .  He also has spasms at night.  The patient states that the pain has not changed significantly since the first visit.  He has not experienced any side effects with the pregabalin  Current Outpatient Medications   Medication     albuterol (PROAIR HFA/PROVENTIL HFA/VENTOLIN HFA) 108 (90 Base) MCG/ACT inhaler     Alpha Lipoic Acid 200 MG CAPS     amLODIPine (NORVASC) 10 MG tablet     ASPIRIN PO     aspirin-acetaminophen-caffeine (EXCEDRIN MIGRAINE) 250-250-65 MG tablet     atorvastatin (LIPITOR) 40 MG tablet     baclofen (LIORESAL) 10 MG tablet     benzonatate (TESSALON) 200 MG capsule     bisacodyl (DULCOLAX) 5 MG EC tablet     calcium carbonate (TUMS) 500 MG chewable tablet     chlorthalidone (HYGROTON) 25 MG tablet     cyclobenzaprine (FLEXERIL) 10 MG tablet     diazepam (VALIUM) 5 MG tablet     DULoxetine (CYMBALTA) 60 MG capsule     EPINEPHrine (EPIPEN) 0.3 MG/0.3ML injection     fluticasone (FLONASE) 50 MCG/ACT nasal spray     fluticasone-salmeterol (ADVAIR) 500-50 MCG/DOSE inhaler     insulin glargine (BASAGLAR KWIKPEN) 100 UNIT/ML pen     losartan (COZAAR) 25 MG tablet     magnesium citrate solution     metFORMIN (GLUCOPHAGE-XR) 500 MG 24 hr tablet     montelukast (SINGULAIR) 10 MG tablet     multivitamin, therapeutic (THERA-VIT) TABS     naproxen sodium (ANAPROX) 220 MG tablet     nortriptyline (PAMELOR) 10 MG capsule     omeprazole (PRILOSEC) 40 MG DR capsule "     polyethylene glycol (GOLYTELY) 236 g suspension     pregabalin (LYRICA) 50 MG capsule     tamsulosin (FLOMAX) 0.4 MG capsule     warfarin ANTICOAGULANT (COUMADIN) 5 MG tablet     blood glucose (ACCU-CHEK GUIDE) test strip     blood glucose monitoring (ACCU-CHEK FASTCLIX) lancets     insulin pen needle (32G X 4 MM) 32G X 4 MM miscellaneous     Current Facility-Administered Medications   Medication     triamcinolone (KENALOG-40) injection 40 mg     Facility-Administered Medications Ordered in Other Visits   Medication     BUPivacaine (MARCAINE) injection 0.5%     DOBUTamine 500 mg in dextrose 5% 250 mL (adult std conc) premix     iopamidol (ISOVUE-M 200) solution 10 mL     methylPREDNISolone (DEPO-MEDROL) injection 80 mg     metoprolol (LOPRESSOR) injection 5 mg     Allergies   Allergen Reactions     Artificial Sweetner (Informational Only) [Artificial Sweetner (Informational Only) ] GI Disturbance     ALL artificial sweetners cause severe diarrhea & flu symptoms     Hydromorphone Anaphylaxis     Ibuprofen GI Disturbance     Morphine Sulfate Concentrate Anaphylaxis     Morphine [Fumaric Acid] Anaphylaxis     Hydrocodone-Acetaminophen Itching     Tramadol Hives     Trazodone Hives     Gabapentin      GI upset per pt     Keflex [Cephalexin] Diarrhea     Upset stomach     Codeine Phosphate Itching     Ketorolac Tromethamine Rash     Past Medical History:   Diagnosis Date     Anaphylactic reaction 8/14/2015     Anxiety      Depression      DM2 (diabetes mellitus, type 2) (H)      GERD (gastroesophageal reflux disease)      HTN (hypertension)      IBS (irritable bowel syndrome)      Kidney stone 6/15/2011    Pt believes these were Calcium stones     Neuropathy      Past Surgical History:   Procedure Laterality Date     COLONOSCOPY  5/1/2012    Procedure:COLONOSCOPY; screening colonoscopy; Surgeon:KINGSLEY DOS SANTOS; Location:[facility]     COLONOSCOPY  4/21/2014    Procedure: COMBINED COLONOSCOPY, SINGLE BIOPSY/POLYPECTOMY  BY BIOPSY;  Surgeon: Duane, William Charles, MD;  Location: [facility]     COLONOSCOPY N/A 4/21/2022    Procedure: COLONOSCOPY, WITH POLYPECTOMY AND BIOPSY;  Surgeon: Luiz Calvert MD;  Location: [facility]     CYSTOSCOPY  05/01/2008    CYSTOSCOPY W/ URETERAL STENT PLACEMENT 05/01/2008      CYSTOSCOPY  09/26/2010    CYSTOSCOPY W/ URETERAL STENT PLACEMENT 09/26/2010 Left      CYSTOSCOPY  05/18/2012    CYSTOSCOPY, LEFT URETEROSCOPY AND STENT PLACEMENT left retrograde 05/18/2012      CYSTOSCOPY,+URETEROSCOPY  06/10/2008    URETEROSCOPY 06/10/2008 Right      HC BREATH HYDROGEN TEST  3/7/2014    Procedure: HYDROGEN BREATH TEST;  Surgeon: Darion Swift MD;  Location: [facility]     LITHOTRIPSY  09/30/2010    LITHOTRIPSY 09/30/2010 LEFT EXTRACORPOREAL SHOCK WAVE LITHOTRIPSY, FLEXIBLE CYSTOSCOPY, LEFT STENT REMOVAL       ORTHOPEDIC SURGERY  10/03/2007    KNEE ARTHROSCOPY 10/03/2007 RightX2       RELEASE CARPAL TUNNEL Right 1/26/2018    Procedure: RELEASE CARPAL TUNNEL;  Right carpal tunnel release;  Surgeon: Wiliam Saenz MD;  Location: [facility]     VASCULAR SURGERY  11/24/2008    Radiofrequency ablation left saphenous vein 11/24/2008 Done by Dr. Lozoya       Family History   Problem Relation Age of Onset     Cancer Mother 52        lung, smoked     Cancer - colorectal Father 53        unsure type, pt attributes to radiation exposure     Myocardial Infarction Father 45     Coronary Artery Disease Father      Myocardial Infarction Paternal Grandfather 35     Diabetes Other         nephew- type 1     Diabetes Maternal Aunt         1     Diabetes Maternal Aunt         2     Diabetes Paternal Uncle         1     Diabetes Paternal Uncle         2     Diabetes Paternal Uncle         3     Diabetes Paternal Uncle         4     Colon Cancer No family hx of      Social History     Socioeconomic History     Marital status: Single     Spouse name: Not on file     Number of children: 0     Years of education: Not on file      Highest education level: Not on file   Occupational History     Occupation: - with robotics     Employer: [employer]   Tobacco Use     Smoking status: Never Smoker     Smokeless tobacco: Current User     Types: Chew     Tobacco comment: Chew   Substance and Sexual Activity     Alcohol use: Not Currently     Alcohol/week: 0.0 standard drinks     Comment: occasional, few drinks a month     Drug use: No     Sexual activity: Never   Other Topics Concern     Parent/sibling w/ CABG, MI or angioplasty before 65F 55M? Not Asked   Social History Narrative    Works at [employer], as a  (25+ years experience).       Social Determinants of Health     Financial Resource Strain: Not on file   Food Insecurity: Not on file   Transportation Needs: Not on file   Physical Activity: Not on file   Stress: Not on file   Social Connections: Not on file   Intimate Partner Violence: Not on file   Housing Stability: Not on file      ROS: 10 point ROS neg other than the symptoms noted above in the HPI.  Physical Exam  Vitals and nursing note reviewed.   Constitutional:       Appearance: Normal appearance.   Musculoskeletal:         General: Tenderness present. No swelling, deformity or signs of injury.      Lumbar back: Spasms, tenderness and bony tenderness present. No swelling, edema, deformity, signs of trauma or lacerations. Decreased range of motion. Positive right straight leg raise test and positive left straight leg raise test. No scoliosis.      Right lower leg: No edema.      Left lower leg: No edema.   Neurological:      Mental Status: He is alert.      Motor: No weakness, atrophy or seizure activity.      Deep Tendon Reflexes: Reflexes are normal and symmetric.       A/P: Patient is a 56 y/o man with LBP and radicular symptoms who presents for f/u.  He has not noticed much improvement in his symptoms with pregabalin.  I would like to trial an epidural steroid injection.  Patient takes coumadin, and I would  like to confirm that it is safe for him to be off of the coumadin for an injection.  In the meantime, I will increase the dose of pregabalin to 50 mg bid

## 2022-06-21 NOTE — TELEPHONE ENCOUNTER
DIAGNOSIS: right knee pain    APPOINTMENT DATE: 06/29/2022   NOTES STATUS DETAILS   OFFICE NOTE from referring provider N/A    OFFICE NOTE from other specialist Internal 06/29/2020 Dr Espinoza [facility]    DISCHARGE SUMMARY from hospital N/A    DISCHARGE REPORT from the ER N/A    OPERATIVE REPORT N/A    EMG report N/A    MEDICATION LIST N/A    MRI N/A    DEXA (osteoporosis/bone health) N/A    CT SCAN N/A    XRAYS (IMAGES & REPORTS) Internal 06/29/2020 [facility]

## 2022-06-22 NOTE — TELEPHONE ENCOUNTER
JOIE-PROCEDURAL ANTICOAGULATION  MANAGEMENT    ASSESSMENT     Warfarin interruption plan for JANUARY on 2022.    Indication for Anticoagulation: PE and Recurrent DVT      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 stopping warfarin 2021    Bilateral PE 2022    DVT 2021    History of thrombophlebitis       Joie-Procedure Risk stratification for thromboembolism: moderate (2018 American Society of Hematology guideline)    VTE: 2018 American Society of Hematology recommends against bridging in low and moderate risk VTE patients holding warfarin     VTE: 2016 Anticoagulation Forum clinical guidance recommends no bridge therapy for most VTE patients interrupting warfarin with possible exceptions for VTE within previous month, prior hx recurrent VTE during anticoagulation therapy interruption, or undergoing a procedure with high for VTE  (joint replacement surgery or major abdominal cancer resection)     RECOMMENDATION       Pre-Procedure:  o Hold warfarin for 5 days, until after procedure startin2022   o Enoxaparin (Lovenox) 100 mg subq Q 12 hrs (0.75 mg/kg Q 12 hrs for BMI >= 40 kg/m2 per [facility] P&T approved dose adjustment protocol)   - Start enoxaparin: 2022  - Last dose of enoxaparin prior to procedure: 2022 AM  (~24 hours prior to procedure)      Post-Procedure:  o Resume warfarin dose if okay with provider doing procedure on night of procedure, 2022 PM: 25mg & 2022 20mg  o Resume enoxaparin (Lovenox) ~ 24-48 hrs post procedure when okay with provider doing procedure. Continue until INR >= 2.3 or INR >= 2.0 x 2 (ACC protocol goal INR 2-3)  o Recheck INR ~5 days after resuming warfarin   ?       Plan routed to referring provider for approval  ?   Siomara Montaño RPH    SUBJECTIVE/OBJECTIVE     Adarsh Salvador, a 57 year old male    Goal INR Range: 2.0-3.0     Patient bridged in past: Yes: 2022 with colonoscopy    Pertinent History: N/A    Wt Readings from Last 3  "Encounters:   06/09/22 131.5 kg (290 lb)   06/07/22 132.5 kg (292 lb)   05/05/22 131.1 kg (289 lb)      Ideal body weight: 75.3 kg (166 lb 0.1 oz)  Adjusted ideal body weight: 97.8 kg (215 lb 9.7 oz)     Estimated body mass index is 40.45 kg/m  as calculated from the following:    Height as of 4/21/22: 1.803 m (5' 11\").    Weight as of an earlier encounter on 6/9/22: 131.5 kg (290 lb).    Lab Results   Component Value Date    INR 1.6 (H) 06/13/2022    INR 2.3 (H) 05/16/2022    INR 2.4 (H) 05/04/2022     Lab Results   Component Value Date    HGB 10.2 05/04/2022    HGB 13.1 01/29/2021    HCT 31.9 05/04/2022    HCT 40.2 01/29/2021     05/04/2022     01/29/2021     Lab Results   Component Value Date    CR 0.91 06/07/2022    CR 0.88 02/21/2022    CR 0.94 01/29/2021     Estimated Creatinine Clearance: 126.9 mL/min (based on SCr of 0.91 mg/dL).  "

## 2022-06-23 ENCOUNTER — OFFICE VISIT (OUTPATIENT)
Dept: UROLOGY | Facility: CLINIC | Age: 57
End: 2022-06-23
Attending: FAMILY MEDICINE
Payer: COMMERCIAL

## 2022-06-23 DIAGNOSIS — R31.0 GROSS HEMATURIA: Primary | ICD-10-CM

## 2022-06-23 DIAGNOSIS — R39.9 LOWER URINARY TRACT SYMPTOMS (LUTS): ICD-10-CM

## 2022-06-23 DIAGNOSIS — R86.8 LOW VOLUME OF EJACULATED SEMEN: ICD-10-CM

## 2022-06-23 DIAGNOSIS — N20.0 BILATERAL NEPHROLITHIASIS: ICD-10-CM

## 2022-06-23 PROCEDURE — 99204 OFFICE O/P NEW MOD 45 MIN: CPT | Performed by: UROLOGY

## 2022-06-23 ASSESSMENT — PATIENT HEALTH QUESTIONNAIRE - PHQ9: SUM OF ALL RESPONSES TO PHQ QUESTIONS 1-9: 7

## 2022-06-23 NOTE — PROGRESS NOTES
Urology Consult History and Physical  CATHI BRUNO   Name: Adarsh Salvador    MRN: [id number]   YOB: 1965       We were asked to see Adarsh Salvador at the request of Dr. Davis for evaluation and treatment of gross hematuria and history of kidney stones.        Chief Complaint:   Gross hematuria and history of kidney stones    History is obtained from the patient            History of Present Illness:   Adarsh Salvador is a 57 year old male who is being seen for evaluation of     Long history of kidney stones  Previously saw Dr. Gracia with numerous Ureteroscopy     He recently developed intermittent gross hematuria about 1 month ago    He does have RLQ abdominal pain  This is constant and unwavering  This started after a right inguinal hernia repair    Started on tamsulosin 0.4mg (Flomax) 9 months ago   This has helped his urinary issues improve    He is on anti-coagulation due to DVT and PE around 1/1/2022           Past Medical History:     Past Medical History:   Diagnosis Date     Anaphylactic reaction 8/14/2015     Anxiety      Depression      DM2 (diabetes mellitus, type 2) (H)      GERD (gastroesophageal reflux disease)      HTN (hypertension)      IBS (irritable bowel syndrome)      Kidney stone 6/15/2011    Pt believes these were Calcium stones     Neuropathy             Past Surgical History:     Past Surgical History:   Procedure Laterality Date     COLONOSCOPY  5/1/2012    Procedure:COLONOSCOPY; screening colonoscopy; Surgeon:KINGSLEY DOS SANTOS; Location: OR     COLONOSCOPY  4/21/2014    Procedure: COMBINED COLONOSCOPY, SINGLE BIOPSY/POLYPECTOMY BY BIOPSY;  Surgeon: Duane, William Charles, MD;  Location:  OR     COLONOSCOPY N/A 4/21/2022    Procedure: COLONOSCOPY, WITH POLYPECTOMY AND BIOPSY;  Surgeon: Luiz Calvert MD;  Location:  GI     CYSTOSCOPY  05/01/2008    CYSTOSCOPY W/ URETERAL STENT PLACEMENT 05/01/2008      CYSTOSCOPY  09/26/2010    CYSTOSCOPY W/ URETERAL STENT PLACEMENT 09/26/2010  Left      CYSTOSCOPY  05/18/2012    CYSTOSCOPY, LEFT URETEROSCOPY AND STENT PLACEMENT left retrograde 05/18/2012      CYSTOSCOPY,+URETEROSCOPY  06/10/2008    URETEROSCOPY 06/10/2008 Right      HC BREATH HYDROGEN TEST  3/7/2014    Procedure: HYDROGEN BREATH TEST;  Surgeon: Darion Swift MD;  Location: [facility]     LITHOTRIPSY  09/30/2010    LITHOTRIPSY 09/30/2010 LEFT EXTRACORPOREAL SHOCK WAVE LITHOTRIPSY, FLEXIBLE CYSTOSCOPY, LEFT STENT REMOVAL       ORTHOPEDIC SURGERY  10/03/2007    KNEE ARTHROSCOPY 10/03/2007 RightX2       RELEASE CARPAL TUNNEL Right 1/26/2018    Procedure: RELEASE CARPAL TUNNEL;  Right carpal tunnel release;  Surgeon: Wiliam Saenz MD;  Location: [facility]     VASCULAR SURGERY  11/24/2008    Radiofrequency ablation left saphenous vein 11/24/2008 Done by Dr. Lozoya              Social History:     Social History     Tobacco Use     Smoking status: Never Smoker     Smokeless tobacco: Current User     Types: Chew     Tobacco comment: Chew   Substance Use Topics     Alcohol use: Not Currently     Alcohol/week: 0.0 standard drinks     Comment: occasional, few drinks a month       History   Smoking Status     Never Smoker   Smokeless Tobacco     Current User     Types: Chew     Comment: Chew            Family History:     Family History   Problem Relation Age of Onset     Cancer Mother 52        lung, smoked     Cancer - colorectal Father 53        unsure type, pt attributes to radiation exposure     Myocardial Infarction Father 45     Coronary Artery Disease Father      Myocardial Infarction Paternal Grandfather 35     Diabetes Other         nephew- type 1     Diabetes Maternal Aunt         1     Diabetes Maternal Aunt         2     Diabetes Paternal Uncle         1     Diabetes Paternal Uncle         2     Diabetes Paternal Uncle         3     Diabetes Paternal Uncle         4     Colon Cancer No family hx of               Allergies:     Allergies   Allergen Reactions     Artificial Sweetner  (Informational Only) [Artificial Sweetner (Informational Only) ] GI Disturbance     ALL artificial sweetners cause severe diarrhea & flu symptoms     Hydromorphone Anaphylaxis     Ibuprofen GI Disturbance     Morphine Sulfate Concentrate Anaphylaxis     Morphine [Fumaric Acid] Anaphylaxis     Hydrocodone-Acetaminophen Itching     Tramadol Hives     Trazodone Hives     Gabapentin      GI upset per pt     Keflex [Cephalexin] Diarrhea     Upset stomach     Codeine Phosphate Itching     Ketorolac Tromethamine Rash            Medications:     Current Outpatient Medications   Medication Sig     albuterol (PROAIR HFA/PROVENTIL HFA/VENTOLIN HFA) 108 (90 Base) MCG/ACT inhaler Inhale 2 puffs into the lungs every 6 hours as needed for shortness of breath / dyspnea or wheezing     Alpha Lipoic Acid 200 MG CAPS Take 200 mg by mouth 3 times daily     amLODIPine (NORVASC) 10 MG tablet Take 1 tablet (10 mg) by mouth daily For blood pressure     ASPIRIN PO Take 325 mg by mouth daily      aspirin-acetaminophen-caffeine (EXCEDRIN MIGRAINE) 250-250-65 MG tablet Take 1 tablet by mouth     atorvastatin (LIPITOR) 40 MG tablet Take 1 tablet (40 mg) by mouth daily     baclofen (LIORESAL) 10 MG tablet Take 1 tablet (10 mg) by mouth See Admin Instructions 2-3 po q hs.     benzonatate (TESSALON) 200 MG capsule Take 1 capsule (200 mg) by mouth 3 times daily as needed for cough     bisacodyl (DULCOLAX) 5 MG EC tablet Take as directed. One day prior to exam at 10:00am take 2 tablets     blood glucose (ACCU-CHEK GUIDE) test strip Use to test blood sugar 3 times daily or as directed.     blood glucose monitoring (ACCU-CHEK FASTCLIX) lancets Use to test blood sugar 1 times daily or as directed.  Ok to substitute alternative if insurance prefers.     calcium carbonate (TUMS) 500 MG chewable tablet Take 1 chew tab by mouth daily     chlorthalidone (HYGROTON) 25 MG tablet Take 1 tablet (25 mg) by mouth daily Add on for blood pressure      cyclobenzaprine (FLEXERIL) 10 MG tablet Take 0.5-1 tablets (5-10 mg) by mouth 3 times daily as needed for muscle spasms     diazepam (VALIUM) 5 MG tablet Take 1 tablet (5 mg) by mouth every 5 minutes prior to surgery     DULoxetine (CYMBALTA) 60 MG capsule Take 1 capsule (60 mg) by mouth 2 times daily     EPINEPHrine (EPIPEN) 0.3 MG/0.3ML injection Inject 0.3 mLs (0.3 mg) into the muscle once as needed for anaphylaxis     fluticasone (FLONASE) 50 MCG/ACT nasal spray INSTILL 1 SPRAY INTO BOTH NOSTRILS DAILY     fluticasone-salmeterol (ADVAIR) 500-50 MCG/DOSE inhaler Inhale 1 puff into the lungs 2 times daily     insulin glargine (BASAGLAR KWIKPEN) 100 UNIT/ML pen Inject 36 Units Subcutaneous daily Max Total Daily Dose 45 units per day     insulin pen needle (32G X 4 MM) 32G X 4 MM miscellaneous Use 2 pen needles daily or as directed.     losartan (COZAAR) 25 MG tablet Take 1 tablet (25 mg) by mouth daily Note decrease in dose     magnesium citrate solution Take as directed. Two days prior to exam drink 10oz bottle of magnesium citrate at 4:00pm     metFORMIN (GLUCOPHAGE-XR) 500 MG 24 hr tablet Take 2 tablets (1,000 mg) by mouth 2 times daily (with meals)     montelukast (SINGULAIR) 10 MG tablet TAKE 1 TABLET (10 MG) BY MOUTH AT BEDTIME FOR ALLERGIES/ASTHMA     multivitamin, therapeutic (THERA-VIT) TABS Take 1 tablet by mouth daily     naproxen sodium (ANAPROX) 220 MG tablet Take 220 mg by mouth     nortriptyline (PAMELOR) 10 MG capsule Take 2 capsules (20 mg) by mouth At Bedtime     omeprazole (PRILOSEC) 40 MG DR capsule Take 1 capsule (40 mg) by mouth daily Take for at least two months     polyethylene glycol (GOLYTELY) 236 g suspension Take as directed. One day before your exam fill the first container with water. Cover and shake until mixed well. At 3:00pm drink one 8oz glass every 10-15 minutes until half of the first container is empty. Store the remainder in the refrigerator. At 8:00pm drink the second half of  the first container until it is gone. Before you go to bed mix the second container with water and put in refrigerator. Six hours before your check in time drink one 8oz glass every 10-15 minutes until half of container is empty. Discard the remainder of solution.     pregabalin (LYRICA) 50 MG capsule Take 1 capsule (50 mg) by mouth 2 times daily     tamsulosin (FLOMAX) 0.4 MG capsule TAKE 1 CAPSULE BY MOUTH EVERY DAY     warfarin ANTICOAGULANT (COUMADIN) 5 MG tablet Take 15 mg Tues, Thurs and 12.5 mg all other days, or as directed by the Coumadin clinic     Current Facility-Administered Medications   Medication     triamcinolone (KENALOG-40) injection 40 mg     Facility-Administered Medications Ordered in Other Visits   Medication     BUPivacaine (MARCAINE) injection 0.5%     DOBUTamine 500 mg in dextrose 5% 250 mL (adult std conc) premix     iopamidol (ISOVUE-M 200) solution 10 mL     methylPREDNISolone (DEPO-MEDROL) injection 80 mg     metoprolol (LOPRESSOR) injection 5 mg             Review of Systems:     Skin: negative  Eyes: negative  Ears/Nose/Throat: negative  Respiratory: No shortness of breath, dyspnea on exertion, cough, or hemoptysis  Cardiovascular: negative  Gastrointestinal: as above  Genitourinary: as above  Musculoskeletal: negative  Neurologic: negative  Psychiatric: negative  Hematologic/Lymphatic/Immunologic: as above  Endocrine: as above          Physical Exam:   No data found.  There is no height or weight on file to calculate BMI.     General: age-appropriate appearing male in NAD  HEENT: Head AT/NC, EOMI, CN Grossly intact  Lungs: no respiratory distress, or pursed lip breathing  Heart: No obvious jugular venous distension present  Back: no bony midline tenderness, no CVAT bilaterally.  Abdomen: soft, obesely-distended, non-tender. No organomegaly  Lymph: no palpable inguinal lymphadenopathy.  LE: no edema.   Musculoskeltal: extremities normal, no peripheral edema  Skin: no suspicious  lesions or rashes  Neuro: Alert, oriented, speech and mentation normal;  moving all 4 extremities equally.  Psych: affect and mood normal          Data:   All laboratory data reviewed:    UA RESULTS:  Recent Labs   Lab Test 06/07/22  1124   COLOR Yellow   APPEARANCE Clear   URINEGLC Negative   URINEBILI Negative   URINEKETONE Negative   SG 1.020   UBLD Moderate*   URINEPH 5.0   PROTEIN Negative   UROBILINOGEN 0.2   NITRITE Negative   LEUKEST Negative   RBCU 25-50*   WBCU 0-5      Prostate Specific Antigen Screen   Date Value Ref Range Status   06/07/2022 0.26 0.00 - 4.00 ug/L Final      Lab Results   Component Value Date    CR 0.91 06/07/2022    CR 0.94 01/29/2021      IMAGING:  All pertinent imaging reviewed:    All imaging studies reviewed by me.  I personally reviewed these imaging films.  A formal report from radiology will follow.    CT ABD/PEL 1/29/2021:  FINDINGS:   LOWER CHEST: The visualized lung bases are clear.     HEPATOBILIARY: Hepatic steatosis. No calcified gallstones or biliary  ductal dilatation.     PANCREAS: Normal.     SPLEEN: Normal.     ADRENAL GLANDS: Normal.     KIDNEYS/BLADDER: Multiple bilateral nonobstructing renal calculi are  new since 2014. The largest calculus in the right kidney measures 6 mm  and the left kidney measures 5 mm. No hydronephrosis or hydroureter.  No perinephric stranding. No calculi in the ureters or urinary  bladder.     BOWEL: Normal caliber loops of small bowel and colon. No inflammatory  changes. Normal appendix.     LYMPH NODES: Right groin soft tissue thickening (series 3, image 208)  is likely related to inguinal hernia repair. No lymphadenopathy.     VASCULATURE: No abdominal aortic aneurysm.     PELVIC ORGANS: No pelvic masses.     OTHER: No free fluid.     MUSCULOSKELETAL: Right sacral nerve simulator. No suspicious lesions  in the bones.                                                             IMPRESSION:   1.  Bilateral nonobstructing renal calculi  measuring up to 6 mm on the  right and 5 mm on the left. No hydronephrosis or perinephric  stranding.  2.  Hepatic steatosis.  3.  Soft tissue thickening in the right inguinal canal presumably  secondary to prior hernia repair. Correlate with clinical history.         Impression and Plan:   Impression:   57-year-old man with complex medical history including diabetes, DVT with PE now on chronic anticoagulation, morbid obesity, bilateral kidney stones, LUTS, chronic abdominal pain, with recent gross hematuria      Plan:   Gross hematuria  - We discussed that gross materia does warrant a hematuria work-up  - He did have a recent noncontrast CT scan which demonstrated nonobstructive bilateral kidney stones  - We discussed the need to proceed with a CT urogram, return for office cystoscopy, and urine cytology  - he is on blood thinners due to prior DVT with PE, however cannot attribute his gross hematuria just to this    LUTS  - He notes that his urination has improved with tamsulosin over the last 3 months  - I would recommend he continue on this medication    Bilateral nephrolithiasis  - We will assess treatment options for his kidney stones and discussed these following his CT scan  - I reviewed her prior CT scan from a year ago as well as outside imaging from 6/3/2022 with report available     Thank you for the kind consultation.    Time spent: 20 minutes spent on the date of the encounter doing chart review, history and exam, documentation and further activities as noted above.    Guzman Casanova MD   Urology  [facility]  [facility] Phone: [phone]  [facility] Phone: [phone]

## 2022-06-23 NOTE — PROGRESS NOTES
Adarsh Salvador's goals for this visit include:   Chief Complaint   Patient presents with     New Patient     Gross hematuria        He requests these members of his care team be copied on today's visit information:     PCP: Bertha Romero    Referring Provider:  Vashti Davis MD  [address] [place] [address]    There were no vitals taken for this visit.    Do you need any medication refills at today's visit?     Hillary Rincon LPN on 6/23/2022 at 12:40 PM

## 2022-06-24 ENCOUNTER — TELEPHONE (OUTPATIENT)
Dept: UROLOGY | Facility: CLINIC | Age: 57
End: 2022-06-24

## 2022-06-24 NOTE — TELEPHONE ENCOUNTER
Follow up for cystoscopy    Tari padilla Procedure   Dermatology, General and Plastic Surgery, Urology Specialties   [facility] [address] [address]

## 2022-06-27 ENCOUNTER — LAB (OUTPATIENT)
Dept: LAB | Facility: CLINIC | Age: 57
End: 2022-06-27
Payer: COMMERCIAL

## 2022-06-27 ENCOUNTER — ANTICOAGULATION THERAPY VISIT (OUTPATIENT)
Dept: ANTICOAGULATION | Facility: CLINIC | Age: 57
End: 2022-06-27

## 2022-06-27 DIAGNOSIS — M25.561 ACUTE PAIN OF RIGHT KNEE: Primary | ICD-10-CM

## 2022-06-27 DIAGNOSIS — I82.409 RECURRENT DEEP VEIN THROMBOSIS (DVT) (H): Primary | ICD-10-CM

## 2022-06-27 DIAGNOSIS — I82.409 RECURRENT DEEP VEIN THROMBOSIS (DVT) (H): ICD-10-CM

## 2022-06-27 LAB — INR BLD: 1.9 (ref 0.9–1.1)

## 2022-06-27 PROCEDURE — 36416 COLLJ CAPILLARY BLOOD SPEC: CPT

## 2022-06-27 PROCEDURE — 85610 PROTHROMBIN TIME: CPT

## 2022-06-27 RX ORDER — ENOXAPARIN SODIUM 100 MG/ML
100 INJECTION SUBCUTANEOUS EVERY 12 HOURS
Qty: 16 ML | Refills: 1 | Status: SHIPPED | OUTPATIENT
Start: 2022-06-27 | End: 2022-08-10

## 2022-06-27 NOTE — PROGRESS NOTES
Pre-Procedure:  ? Hold warfarin for 5 days, until after procedure startin2022   ? Enoxaparin (Lovenox) 100 mg subq Q 12 hrs (0.75 mg/kg Q 12 hrs for BMI >= 40 kg/m2 per [facility] P&T approved dose adjustment protocol)     Start enoxaparin: 2022    Last dose of enoxaparin prior to procedure: 2022 AM  (~24 hours prior to procedure)       Post-Procedure:  ? Resume warfarin dose if okay with provider doing procedure on night of procedure, 2022 PM: 25mg & 2022 20mg  ? Resume enoxaparin (Lovenox) ~ 24-48 hrs post procedure when okay with provider doing procedure. Continue until INR >= 2.3 or INR >= 2.0 x 2 ([facility] protocol goal INR 2-3)  ? Recheck INR ~5 days after resuming warfarin           ANTICOAGULATION MANAGEMENT     Adarsh EDWARDS Ore 57 year old male is on warfarin with subtherapeutic INR result. (Goal INR 2.0-3.0)    Recent labs: (last 7 days)     22  0802   INR 1.9*       ASSESSMENT       Source(s): Chart Review and Patient/Caregiver Call       Warfarin doses taken: Warfarin taken as instructed    Diet: No new diet changes identified    New illness, injury, or hospitalization: No    Medication/supplement changes: None noted    Signs or symptoms of bleeding or clotting: No    Previous INR: Subtherapeutic    Additional findings: JANUARY  bridge plan addressed       PLAN     Recommended plan for no diet, medication or health factor changes affecting INR     Dosing Instructions: booster dose then continue your current warfarin dose with next INR in 2 weeks       Summary  As of 2022    Full warfarin instructions:  : 15 mg; : Hold; 7/3: Hold; : Hold; : Hold; : Hold; : 25 mg; : 20 mg; Otherwise 12.5 mg every Mon, Wed, Fri; 15 mg all other days   Next INR check:  2022             Telephone call with Adarsh who verbalizes understanding and agrees to plan    Lab visit scheduled    Education provided: Please call back if any changes to your diet,  medications or how you've been taking warfarin    Plan made per [facility] anticoagulation protocol    Joya Suero, RN  Anticoagulation Clinic  6/27/2022    _______________________________________________________________________     Anticoagulation Episode Summary     Current INR goal:  2.0-3.0   TTR:  45.5 % (4.8 mo)   Target end date:  Indefinite   Send INR reminders to:  [facility]    Indications    Recurrent deep vein thrombosis (DVT) (H) [I82.409]           Comments:           Anticoagulation Care Providers     Provider Role Specialty Phone number    Bertha Romero PA-C Referring Family Medicine [phone]

## 2022-06-27 NOTE — PROGRESS NOTES
AVS printed and mailed w/ dosing calendar and pt instructions.  Randa Fernández RN on 6/27/2022 at 11:26 AM

## 2022-06-27 NOTE — PATIENT INSTRUCTIONS
"Adarsh EDWARDS [place]  [address] [place] [id number]      Procedure Instructions for Anticoagulation     For your upcoming Steroid injection on 7/7 your healthcare provider wants you to stop warfarin as directed below. To protect you from a clot while your off your warfarin, your provider wants you to inject a short-acting medication called enoxaparin (Lovenox), this is called \"bridging.\"      Enoxaparin (Lovenox) is an injection that you or a caregiver can give at home. It is injected just underneath the skin in your stomach. Your anticoagulation nurse can explain to you how to give the injection if you have not done so before. Attached is more information on enoxaparin. Additionally a video is available at [email or web address]    Bring these instructions with you to your procedure to show the provider doing the procedure. Please discuss with the provider doing the procedure if it is okay to restart warfarin and enoxaparin as we have planned.      You will take enoxaparin 100 mg every 12 hours  7/1 Take last dose of warfarin  7/2, NO warfarin  7/3, NO warfarin  7/4, NO warfarin, enoxaparin every 12 hours (AM and PM)  7/5, NO warfarin, enoxaparin every 12 hours (AM and PM)  7/6, NO warfarin, enoxaparin AM only (no enoxaparin 24 hours prior to surgery)    7/7, DAY OF PROCEDURE, NO enoxaparin. Restart warfarin 25mg in the evening, if okay with provider doing the procedure. 7/8 take 20 mg warfarin   Make sure to ask the provider doing your procedure if it is okay to begin your warfarin and enoxaparin as planned     7/9, Restart enoxaparin every 12 hours (AM and PM), unless instructed otherwise by the physician, and 15 mg warfarin in the evening.   7/10, Enoxaparin every 12 hours (AM and PM) and 15 mg warfarin in the evening.  7/11, Enoxaparin every 12 hours (AM and PM) and 12.5 mg warfarin in the evening.  712, Recheck inr   Please watch for symptoms of both bleeding and clotting while on warfarin " and enoxaparin:    Call 911 or go to the emergency room if you are experiencing any of the following:   Coughing or throwing up blood, can't control bleeding from a cut or injury after putting pressure on it, have a sudden severe headache, have red or black stools, or have red or orange urine.  Chest pain or shortness of breath  Any symptoms of a stroke including: sudden numbness or weakness in your face, arm or leg; sudden confusion or trouble speaking, reading or understanding; sudden blurred or decreased vision; sudden trouble walking or moving a part of the body; sudden severe headache for no reason.    Call your care team if you have:   Bleeding gums, large bruises, pale skin.  Sudden pain, tenderness or swelling in your leg or arm    Please contact the Anticoagulation Clinic at [phone]  if you have any questions or concerns.     Joya Suero RN

## 2022-06-29 ENCOUNTER — ANCILLARY PROCEDURE (OUTPATIENT)
Dept: GENERAL RADIOLOGY | Facility: CLINIC | Age: 57
End: 2022-06-29
Attending: FAMILY MEDICINE
Payer: COMMERCIAL

## 2022-06-29 ENCOUNTER — PRE VISIT (OUTPATIENT)
Dept: ORTHOPEDICS | Facility: CLINIC | Age: 57
End: 2022-06-29
Payer: COMMERCIAL

## 2022-06-29 ENCOUNTER — OFFICE VISIT (OUTPATIENT)
Dept: ORTHOPEDICS | Facility: CLINIC | Age: 57
End: 2022-06-29
Payer: COMMERCIAL

## 2022-06-29 DIAGNOSIS — M25.561 ACUTE PAIN OF RIGHT KNEE: ICD-10-CM

## 2022-06-29 DIAGNOSIS — M25.561 ACUTE PAIN OF RIGHT KNEE: Primary | ICD-10-CM

## 2022-06-29 PROCEDURE — 73562 X-RAY EXAM OF KNEE 3: CPT | Mod: RT | Performed by: RADIOLOGY

## 2022-06-29 PROCEDURE — 99213 OFFICE O/P EST LOW 20 MIN: CPT | Mod: 25 | Performed by: FAMILY MEDICINE

## 2022-06-29 PROCEDURE — 20610 DRAIN/INJ JOINT/BURSA W/O US: CPT | Mod: RT | Performed by: FAMILY MEDICINE

## 2022-06-29 RX ADMIN — TRIAMCINOLONE ACETONIDE 40 MG: 40 INJECTION, SUSPENSION INTRA-ARTICULAR; INTRAMUSCULAR at 16:08

## 2022-06-29 NOTE — PROGRESS NOTES
[facility]  SPORTS MEDICINE CLINIC VISIT     Jun 29, 2022        ASSESSMENT & PLAN    58 yo with right knee medial knee pain. Suspect degenerative meniscus injury    Reviewed imaging and assessment with patient in detail  Steroid injection today. See procedure note for details.   Recommended course of pt  Consider use of cut out knee sleeve for comfort  Follow up in 6 weeks if not improving    Lex Magallon MD  [facility] SPORTS MEDICINE [facility]    -----  Chief Complaint   Patient presents with     Consult     Right knee pain       SUBJECTIVE  Adarsh Salvador is a/an 57 year old male who is seen as a self referral for evaluation of  Right knee.     The patient is seen by themselves.  The patient is ambidextrous handed    Onset: 2 week(s) ago. Reports insidious onset without acute precipitating event.  Location of Pain: right knee, medial  Worsened by: Going downstairs.  Better with: Nothing  Treatments tried: ice and heat  Associated symptoms: swelling, popping, numbness and tingling. No catching or instability    Orthopedic/Surgical history: Yes - meniscetomy  Social History/Occupation: [employer]      REVIEW OF SYSTEMS:    Do you have fever, chills, weight loss? No    Do you have any vision problems? No    Do you have any chest pain or edema? No    Do you have any shortness of breath or wheezing?  No    Do you have stomach problems? No    Do you have any numbness or focal weakness? No    Do you have diabetes? No    Do you have problems with bleeding or clotting? No    Do you have an rashes or other skin lesions? No    OBJECTIVE:  There were no vitals taken for this visit.     Patient is alert, No acute distress, pleasant and conversational.    Gait: antalgic    right knee:   Skin intact. No erythema or ecchymosis.  No effusion or soft tissue swelling.    AROM: Zero to approximately 135  with pain on terminal flexion    Palpation: No medial or lateral facet joint tenderness.  No lateral  joint line tenderness  + medial joint line tenderness    Special Tests:  Negative bounce test, + forced flexion and + Elis's for pain.  No ligamentous laxity or pain with valgus or varus stress.  Negative Lachman's, Anterior Drawer and Posterior Drawer     Full Isometric quad strength, extensor mechanism in place     Neurovascularly intact in the lower extremity    Hip and Ankle with full AROM and nontender      RADIOLOGY:    3 view xrays of right knee performed and reviewed independently demonstrating no acute findings. Minimal narrowing in medial compartment. Otherwise no significant djd. See EMR for formal radiology report.          Large Joint Injection/Arthocentesis: R knee joint    Date/Time: 6/29/2022 4:08 PM  Performed by: Lex Magallon MD  Authorized by: Lex Magallon MD     Indications:  Pain  Needle Size:  22 G  Guidance: landmark guided    Approach:  Anterolateral  Location:  Knee      Medications:  40 mg triamcinolone 40 MG/ML  Outcome:  Tolerated well, no immediate complications  Procedure discussed: discussed risks, benefits, and alternatives    Consent Given by:  Patient  Timeout: timeout called immediately prior to procedure    Prep: patient was prepped and draped in usual sterile fashion       There were no complications. The patient tolerated the procedure well. There was minimal bleeding.   The patient was instructed to ice the knee upon leaving clinic and refrain from overuse over the next 2 days.   The patient was instructed to go to the emergency room with any unusual pain, swelling, or redness occurred in the injected area.     Lex Magallon MD

## 2022-06-29 NOTE — LETTER
6/29/2022         RE: Adarsh Salvador  [address]  [place] [address]        Dear Colleague,    Thank you for referring your patient, Adarsh Salvador, to the [facility] [place]. Please see a copy of my visit note below.      [facility]  SPORTS MEDICINE CLINIC VISIT     Jun 29, 2022        ASSESSMENT & PLAN    56 yo with right knee medial knee pain. Suspect degenerative meniscus injury    Reviewed imaging and assessment with patient in detail  Steroid injection today. See procedure note for details.   Recommended course of pt  Consider use of cut out knee sleeve for comfort  Follow up in 6 weeks if not improving    Lex Magallon MD  [facility]    -----  Chief Complaint   Patient presents with     Consult     Right knee pain       SUBJECTIVE  Adarsh Salvador is a/an 57 year old male who is seen as a self referral for evaluation of  Right knee.     The patient is seen by themselves.  The patient is ambidextrous handed    Onset: 2 week(s) ago. Reports insidious onset without acute precipitating event.  Location of Pain: right knee, medial  Worsened by: Going downstairs.  Better with: Nothing  Treatments tried: ice and heat  Associated symptoms: swelling, popping, numbness and tingling. No catching or instability    Orthopedic/Surgical history: Yes - meniscetomy  Social History/Occupation: [employer]      REVIEW OF SYSTEMS:    Do you have fever, chills, weight loss? No    Do you have any vision problems? No    Do you have any chest pain or edema? No    Do you have any shortness of breath or wheezing?  No    Do you have stomach problems? No    Do you have any numbness or focal weakness? No    Do you have diabetes? No    Do you have problems with bleeding or clotting? No    Do you have an rashes or other skin lesions? No    OBJECTIVE:  There were no vitals taken for this visit.     Patient is alert, No acute distress, pleasant and  conversational.    Gait: antalgic    right knee:   Skin intact. No erythema or ecchymosis.  No effusion or soft tissue swelling.    AROM: Zero to approximately 135  with pain on terminal flexion    Palpation: No medial or lateral facet joint tenderness.  No lateral joint line tenderness  + medial joint line tenderness    Special Tests:  Negative bounce test, + forced flexion and + Elis's for pain.  No ligamentous laxity or pain with valgus or varus stress.  Negative Lachman's, Anterior Drawer and Posterior Drawer     Full Isometric quad strength, extensor mechanism in place     Neurovascularly intact in the lower extremity    Hip and Ankle with full AROM and nontender      RADIOLOGY:    3 view xrays of right knee performed and reviewed independently demonstrating no acute findings. Minimal narrowing in medial compartment. Otherwise no significant djd. See EMR for formal radiology report.          Large Joint Injection/Arthocentesis: R knee joint    Date/Time: 6/29/2022 4:08 PM  Performed by: Lxe Magallon MD  Authorized by: Lex Magallon MD     Indications:  Pain  Needle Size:  22 G  Guidance: landmark guided    Approach:  Anterolateral  Location:  Knee      Medications:  40 mg triamcinolone 40 MG/ML  Outcome:  Tolerated well, no immediate complications  Procedure discussed: discussed risks, benefits, and alternatives    Consent Given by:  Patient  Timeout: timeout called immediately prior to procedure    Prep: patient was prepped and draped in usual sterile fashion       There were no complications. The patient tolerated the procedure well. There was minimal bleeding.   The patient was instructed to ice the knee upon leaving clinic and refrain from overuse over the next 2 days.   The patient was instructed to go to the emergency room with any unusual pain, swelling, or redness occurred in the injected area.     Lex Magallon MD              Again, thank you for allowing me to participate  in the care of your patient.        Sincerely,        Lex Magallon MD

## 2022-07-03 RX ORDER — TRIAMCINOLONE ACETONIDE 40 MG/ML
40 INJECTION, SUSPENSION INTRA-ARTICULAR; INTRAMUSCULAR
Status: DISCONTINUED | OUTPATIENT
Start: 2022-06-29 | End: 2022-07-07

## 2022-07-07 ENCOUNTER — HOSPITAL ENCOUNTER (OUTPATIENT)
Facility: AMBULATORY SURGERY CENTER | Age: 57
Discharge: HOME | End: 2022-07-07
Payer: COMMERCIAL

## 2022-07-07 VITALS
DIASTOLIC BLOOD PRESSURE: 84 MMHG | HEART RATE: 67 BPM | RESPIRATION RATE: 16 BRPM | TEMPERATURE: 97.6 F | SYSTOLIC BLOOD PRESSURE: 139 MMHG | OXYGEN SATURATION: 97 %

## 2022-07-07 DIAGNOSIS — M79.605 BILATERAL LEG PAIN: ICD-10-CM

## 2022-07-07 DIAGNOSIS — M79.604 BILATERAL LEG PAIN: ICD-10-CM

## 2022-07-07 PROCEDURE — G8907 PT DOC NO EVENTS ON DISCHARG: HCPCS

## 2022-07-07 PROCEDURE — 62323 NJX INTERLAMINAR LMBR/SAC: CPT

## 2022-07-07 PROCEDURE — G8918 PT W/O PREOP ORDER IV AB PRO: HCPCS

## 2022-07-07 RX ORDER — LIDOCAINE HYDROCHLORIDE 10 MG/ML
INJECTION, SOLUTION EPIDURAL; INFILTRATION; INTRACAUDAL; PERINEURAL PRN
Status: DISCONTINUED | OUTPATIENT
Start: 2022-07-07 | End: 2022-07-07 | Stop reason: HOSPADM

## 2022-07-07 RX ORDER — IOPAMIDOL 408 MG/ML
INJECTION, SOLUTION INTRATHECAL PRN
Status: DISCONTINUED | OUTPATIENT
Start: 2022-07-07 | End: 2022-07-07 | Stop reason: HOSPADM

## 2022-07-07 RX ORDER — METHYLPREDNISOLONE ACETATE 40 MG/ML
INJECTION, SUSPENSION INTRA-ARTICULAR; INTRALESIONAL; INTRAMUSCULAR; SOFT TISSUE PRN
Status: DISCONTINUED | OUTPATIENT
Start: 2022-07-07 | End: 2022-07-07 | Stop reason: HOSPADM

## 2022-07-07 RX ORDER — BUPIVACAINE HYDROCHLORIDE 2.5 MG/ML
INJECTION, SOLUTION INFILTRATION; PERINEURAL PRN
Status: DISCONTINUED | OUTPATIENT
Start: 2022-07-07 | End: 2022-07-07 | Stop reason: HOSPADM

## 2022-07-07 NOTE — PROGRESS NOTES
"  Assessment & Plan     Migraine with aura and without status migrainosus, not intractable  Worsening  See hpi for details    - Adult Neurology  Referral; Future  - rizatriptan (MAXALT-MLT) 10 MG ODT; Take 1 tablet (10 mg) by mouth at onset of headache for migraine May repeat in 2 hours. Max 3 tablets/24 hours.  - ondansetron (ZOFRAN ODT) 4 MG ODT tab; Take 1 tablet (4 mg) by mouth every 8 hours as needed for nausea    Gastroesophageal reflux disease without esophagitis  See hpi for plan/details  - Adult GI  Referral - Procedure Only; Future  - Adult GI  Referral - Consult Only; Future      Billin min spent on patient today including chart review, history, exam, and explaining treatment plan and follow-up.     Bertha Romero PA-C  [facility]   Adarsh is a 57 year old accompanied by his Self, presenting for the following health issues:  Migraine and Health Maintenance    Is due for eye exam. Will schedule.         History of Present Illness       Headaches:   Since the patient's last clinic visit, headaches are: worsened  The patient is getting headaches:  1to2 a week  He is not able to do normal daily activities when he has a migraine.  The patient is taking the following rescue/relief medications:  Excedrin   Patient states \"I get only a small amount of relief\" from the rescue/relief medications.   The patient is taking the following medications to prevent migraines:  No medications to prevent migraines  In the past 4 weeks, the patient has gone to an Urgent Care or Emergency Room 0 times times due to headaches.    He eats 2-3 servings of fruits and vegetables daily.He consumes 2 sweetened beverage(s) daily.He exercises with enough effort to increase his heart rate 10 to 19 minutes per day.  He exercises with enough effort to increase his heart rate 3 or less days per week. He is missing 1 dose(s) of medications per week.     Patient is on " nortipline and amlodipine for other reasons not for headache prevention.   H/o recurrent dvt on coumadin for life. No h/o stroke or heart attack.    Before he had one migraine a month, started getting them more frequently 1-3 times a week the past month. Can't sleep with it. Starts in temples, rarely gets aura, gets dizzy, nauseated with it. Stress could be triggering it. Sometimes a perfume can trigger it. Gets sensitive to light and sound.     At end of visit patient mentions recurrent globus. bmi 41 and eats before he lies down sometimes. Omeprazole 40 mg helps but not taking away issue. Last endoscopy was years ago just showed gerd nothing else. Will repeat and refer to gi. Needs to lose weight and change lifestyle.         Review of Systems   Constitutional, HEENT, cardiovascular, pulmonary, GI, , musculoskeletal, neuro, skin, endocrine and psych systems are negative, except as otherwise noted.      Objective    /83   Pulse 103   Temp 97.4  F (36.3  C) (Tympanic)   Resp 16   Wt 133.4 kg (294 lb)   SpO2 98%   BMI 41.00 kg/m    Body mass index is 41 kg/m .  Physical Exam   GENERAL: alert, no distress and obese  EYES: Eyes grossly normal to inspection, PERRL and conjunctivae and sclerae normal  NECK: no adenopathy, no asymmetry, masses, or scars and thyroid normal to palpation  RESP: lungs clear to auscultation - no rales, rhonchi or wheezes  CV: regular rate and rhythm, normal S1 S2, no S3 or S4, no murmur, click or rub, no peripheral edema and peripheral pulses strong  ABDOMEN: soft, nontender, no hepatosplenomegaly, no masses and bowel sounds normal  MS: no gross musculoskeletal defects noted, no edema  NEURO: Normal strength and tone, mentation intact and speech normal  PSYCH: mentation appears normal, affect flat normal for patient            .  ..

## 2022-07-07 NOTE — DISCHARGE INSTRUCTIONS
PAIN INJECTION HOME CARE INSTRUCTIONS  Activity  -You may resume most normal activity levels with the exception of strenuous activity. It may help to move in ways that hurt before the injection, to see if the pain is still there, but do not overdo it.     -DO NOT remove bandaid for 24 hours  -DO NOT shower for 24 hours      Pain  -You may feel immediate pain relief and numbness for a period of time after the injection. This may indicate the medication has reached the right spot.  -Your pain may return after this short pain-free period, or may even be a little worse for a day or two. It may be caused by needle irritation or by the medication itself. The medications usually take two or three days to start working, but can take as long as a week.    -You may use an ice pack for 20 minutes every 2 hours for the first 24 hours  -You may use a heating pad after the first 24 hours  -You may use Tylenol (acetaminophen) every 4 hours or other pain medicines as directed by your physician      DID YOU RECEIVE SEDATION TODAY?  No    If you received sedation please follow these additional safety measures.    Sedation medicine, if given, may remain active for many hours. It is important for the next 24 hours that you do not:  -Drive a car  -Operate machines or power tools  -Consume alcohol, including beer  -Sign any important papers or legal documents    DID YOU RECEIVE STEROIDS TODAY?  Yes    Common side effects of steroids:  Not everyone will experience corticosteroid side effects. If side effects are experienced, they will gradually subside in the 7-10 day period following an injection. Most common side effects include:  -Flushed face and/or chest  -Feeling of warmth, particularly in the face but could be an overall feeling of warmth  -Increased blood sugar in diabetic patients  -Menstrual irregularities my occur. If taking hormone-based birth control an alternate method of birth control is recommended  -Sleep disturbances  and/or mood swings are possible  -Leg cramps    PLEASE KEEP TRACK OF YOUR SYMPTOMS AND NOTE ANY CHANGES FOR YOUR DOCTOR.       Please contact us if you have:  -Severe pain  -Fever more than 101.5 degrees Fahrenheit  -Signs of infection at the injection site (redness, swelling, or drainage)      FOR PAIN CENTER PATIENTS of Dr Hahn:  If you have questions, please contact the Pain Clinic at [phone] Option #1 between the hours of 7:00 am and 3:00 pm Monday through Friday. After office hours you can contact the on call provider by dialing [phone]. If you need immediate attention, we recommend that you go to a hospital emergency room or dial [phone].      FOR PM&R PATIENTS of Dr Sanchez:  For patients seen by the PM and R service, please call [phone].(Monday through Friday 8a-5p.  After business hours and weekends call [phone] and ask for the PM and R doctor on call). If you need to fax a pain diary to PM&R the fax number is [phone]. If you are unable to fax, uploading to [employer] is encouraged, then send to provider. If you have procedure scheduling questions please call [phone] Option #2

## 2022-07-11 ENCOUNTER — OFFICE VISIT (OUTPATIENT)
Dept: FAMILY MEDICINE | Facility: CLINIC | Age: 57
End: 2022-07-11
Payer: COMMERCIAL

## 2022-07-11 VITALS
RESPIRATION RATE: 16 BRPM | WEIGHT: 294 LBS | OXYGEN SATURATION: 98 % | SYSTOLIC BLOOD PRESSURE: 128 MMHG | DIASTOLIC BLOOD PRESSURE: 83 MMHG | BODY MASS INDEX: 41 KG/M2 | HEART RATE: 103 BPM | TEMPERATURE: 97.4 F

## 2022-07-11 DIAGNOSIS — K21.9 GASTROESOPHAGEAL REFLUX DISEASE WITHOUT ESOPHAGITIS: Chronic | ICD-10-CM

## 2022-07-11 DIAGNOSIS — G43.109 MIGRAINE WITH AURA AND WITHOUT STATUS MIGRAINOSUS, NOT INTRACTABLE: Primary | ICD-10-CM

## 2022-07-11 PROCEDURE — 99214 OFFICE O/P EST MOD 30 MIN: CPT | Performed by: PHYSICIAN ASSISTANT

## 2022-07-11 RX ORDER — ONDANSETRON 4 MG/1
4 TABLET, ORALLY DISINTEGRATING ORAL EVERY 8 HOURS PRN
Qty: 20 TABLET | Refills: 1 | Status: SHIPPED | OUTPATIENT
Start: 2022-07-11 | End: 2022-11-28

## 2022-07-11 RX ORDER — RIZATRIPTAN BENZOATE 10 MG/1
10 TABLET, ORALLY DISINTEGRATING ORAL
Qty: 18 TABLET | Refills: 0 | Status: SHIPPED | OUTPATIENT
Start: 2022-07-11 | End: 2022-08-05

## 2022-07-11 ASSESSMENT — ASTHMA QUESTIONNAIRES: ACT_TOTALSCORE: 25

## 2022-07-12 ENCOUNTER — LAB (OUTPATIENT)
Dept: LAB | Facility: CLINIC | Age: 57
End: 2022-07-12
Payer: COMMERCIAL

## 2022-07-12 ENCOUNTER — ANTICOAGULATION THERAPY VISIT (OUTPATIENT)
Dept: ANTICOAGULATION | Facility: CLINIC | Age: 57
End: 2022-07-12

## 2022-07-12 ENCOUNTER — TELEPHONE (OUTPATIENT)
Dept: FAMILY MEDICINE | Facility: CLINIC | Age: 57
End: 2022-07-12

## 2022-07-12 DIAGNOSIS — I82.409 RECURRENT DEEP VEIN THROMBOSIS (DVT) (H): ICD-10-CM

## 2022-07-12 DIAGNOSIS — I82.409 RECURRENT DEEP VEIN THROMBOSIS (DVT) (H): Primary | ICD-10-CM

## 2022-07-12 LAB — INR BLD: 2.1 (ref 0.9–1.1)

## 2022-07-12 PROCEDURE — 85610 PROTHROMBIN TIME: CPT

## 2022-07-12 PROCEDURE — 36416 COLLJ CAPILLARY BLOOD SPEC: CPT

## 2022-07-12 NOTE — TELEPHONE ENCOUNTER
Reason for call:  Other   Patient called regarding (reason for call): returning call  Additional comments: Patient is returning Nurse Janeth Cool's phone call.           Phone number to reach patient:  Cell number on file:    Telephone Information:   Mobile [phone]       Best Time:  Anytime    Can we leave a detailed message on this number?  YES    Travel screening: Not Applicable

## 2022-07-12 NOTE — PROGRESS NOTES
ANTICOAGULATION MANAGEMENT     Adarsh Salvador 57 year old male is on warfarin with therapeutic INR result. (Goal INR 2.0-3.0)    Recent labs: (last 7 days)     07/12/22  0759   INR 2.1*       ASSESSMENT       Source(s): Chart Review    Previous INR was Subtherapeutic    Medication, diet, health changes since last INR chart reviewed; none identified      Patient held warfarin last week for JANUARY procedure bridged with lovenox.  Left message to stop the lovenox today.     PLAN     Unable to reach Adarsh today.    Left message to continue current dose of warfarin 15 mg tonight. Request call back for assessment.    Follow up required to confirm warfarin dose taken and assess for changes    Jnaeth Cool RN  Anticoagulation Clinic  7/12/2022

## 2022-07-12 NOTE — TELEPHONE ENCOUNTER
See [facility] encounter.    Janeth Cool RN  [facility] Anticoagulation [facility]

## 2022-07-13 NOTE — PROGRESS NOTES
Left message with detailed instructions and sent a mychart message to patient    Rubi Nathan RN - [facility]

## 2022-07-13 NOTE — PROGRESS NOTES
Unable to reach Adarsh today.    LMTCB    Follow up required to confirm warfarin dose taken and assess for changes    Rubi Nathan RN  Anticoagulation Clinic  7/13/2022

## 2022-07-17 NOTE — PROCEDURES
Interlaminar Epidural Steroid Injection  The patient s identity, the procedure to be performed and the specific site of the procedure was verified in accordance with [facility] Protocol.    Diagnosis: Lumbar Radiculitis  Pre-procedure Pain Score: 7/10    Procedure Note:    Informed consent was obtained.  The patient was placed comfortably into a prone position and was then prepped and draped in a sterile fashion.  There was no evidence of infection at the site of needle insertion.  The skin was anesthetized with 1% lidocaine and a tuohy needle was advanced under fluoroscopic guidance into the epidural space using a loss of resistance technique.  CSF was not aspirated, blood was not aspirated, paresthesia was not noted.  Position was confirmed with radio-opaque contrast.  The patient tolerated the procedure without complications.  The patient was observed for 30 minutes and was then released with post-procedure instructions.    The patient was given discharge instructions and verbalizes understanding, including understanding of those signs and symptoms that would require emergency care.     Level:L5/S1  Laterality: central     Needle Type:   20g touh        Medication:  80mg methylprednisolone  2ml Normal Saline,   1ml 0.25% Bupivacaine        Post Procedure Pain Score: 0/10      Counseling: Greater than 50% of this patient visit was spent in counseling the patient regarding the treatment of their pain, coordinating their overall treatment plan and assessing their progress.

## 2022-07-20 ENCOUNTER — OFFICE VISIT (OUTPATIENT)
Dept: ORTHOPEDICS | Facility: CLINIC | Age: 57
End: 2022-07-20
Payer: COMMERCIAL

## 2022-07-20 DIAGNOSIS — M25.561 ACUTE PAIN OF RIGHT KNEE: Primary | ICD-10-CM

## 2022-07-20 PROCEDURE — 99213 OFFICE O/P EST LOW 20 MIN: CPT | Performed by: FAMILY MEDICINE

## 2022-07-20 NOTE — LETTER
7/20/2022         RE: Adarsh Salvador  [address]  [place] [address]        Dear Colleague,    Thank you for referring your patient, Adarsh Salvador, to the [facility] SP[facility]TS [facility]. Please see a copy of my visit note below.      [facility]  SPORTS MEDICINE CLINIC VISIT     Jul 20, 2022      ASSESSMENT & PLAN    58 yo with persistent right knee pain due to suspected meniscus injury    Reviewed imaging and assessment with patient in detail  We will try a knee brace for comfort  Also plan to pursue ct arthrogram as the patient is unable to have mri due to implantable device    Lex Magallon MD  [facility] SPORTS MEDIC[facility]    -----  Chief Complaint   Patient presents with     RECHECK     Right knee pain - not improving       SUBJECTIVE  Adarsh Salvador is a/an 57 year old male who is seen for follow up of right knee pain.     The patient is seen by themselves.    Date of injury: 1 month ago  Date of Last Visit: 6/29/22   Symptoms: no change  Worsened by: Going downstairs.  Better with: Nothing  Treatments tried: ice and heat, injection  Associated symptoms: swelling, popping, numbness and tingling. No catching or instability      REVIEW OF SYSTEMS:    See HPI     OBJECTIVE:  There were no vitals taken for this visit.     TTP medial joint line    RADIOLOGY:    No imaging this visit          Again, thank you for allowing me to participate in the care of your patient.        Sincerely,        Lex Magallon MD

## 2022-07-20 NOTE — PROGRESS NOTES
[facility]  SPORTS MEDICINE CLINIC VISIT     Jul 20, 2022      ASSESSMENT & PLAN    56 yo with persistent right knee pain due to suspected meniscus injury    Reviewed imaging and assessment with patient in detail  We will try a knee brace for comfort  Also plan to pursue ct arthrogram as the patient is unable to have mri due to implantable device    Lex Magallon MD  [facility] [place]    -----  Chief Complaint   Patient presents with     RECHECK     Right knee pain - not improving       SUBJECTIVE  Adarsh Salvador is a/an 57 year old male who is seen for follow up of right knee pain.     The patient is seen by themselves.    Date of injury: 1 month ago  Date of Last Visit: 6/29/22   Symptoms: no change  Worsened by: Going downstairs.  Better with: Nothing  Treatments tried: ice and heat, injection  Associated symptoms: swelling, popping, numbness and tingling. No catching or instability      REVIEW OF SYSTEMS:    See HPI     OBJECTIVE:  There were no vitals taken for this visit.     TTP medial joint line    RADIOLOGY:    No imaging this visit

## 2022-07-22 ENCOUNTER — TELEPHONE (OUTPATIENT)
Dept: FAMILY MEDICINE | Facility: CLINIC | Age: 57
End: 2022-07-22

## 2022-07-22 DIAGNOSIS — I82.409 RECURRENT DEEP VEIN THROMBOSIS (DVT) (H): Primary | ICD-10-CM

## 2022-07-22 NOTE — TELEPHONE ENCOUNTER
Reason for Call:  Anticoagulation callback     Detailed comments:  Adarsh called he is having a procedure done on 7/29/22 and his doctor told him to stop his warfarin. He wants to discuss this with an INR nurse. He said he needs to speak with someone today 7/22/22.    Phone Number Patient can be reached at: Cell number on file:    Telephone Information:   Mobile [phone]       Best Time: ASAP he needs to speak to someone today 7/22/22.    Can we leave a detailed message on this number? Not Applicable    Call taken on 7/22/2022 at 2:24 PM by Kamla Ivy

## 2022-07-22 NOTE — TELEPHONE ENCOUNTER
Routing to Sports Medicine for clarification:  Is there a target INR for upcoming procedure, and if reversal needed, patient will need to bridge based on previous risk assessment/history.      Indication for Anticoagulation: PE and Recurrent DVT       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 stopping warfarin 12/30/2021    Bilateral PE 01/2022    DVT 09/2021    History of thrombophlebitis 2008        Joie-Procedure Risk stratification for thromboembolism: moderate (2018 American Society of Hematology guideline)     VTE: 2018 American Society of Hematology recommends against bridging in low and moderate risk VTE patients holding warfarin      VTE: 2016 Anticoagulation Forum clinical guidance recommends no bridge therapy for most VTE patients interrupting warfarin with possible exceptions for VTE within previous month, prior hx recurrent VTE during anticoagulation therapy interruption, or undergoing a procedure with high for VTE  (joint replacement surgery or major abdominal cancer resection)    Siomara Montaño, PharmD BCACP  Anticoagulation Clinical Pharmacist

## 2022-07-22 NOTE — TELEPHONE ENCOUNTER
Spoke with Adarsh, he states that pre procedure RN states he would need to be off of his warfarin for a few days pre and post procedure. Asking Pharm D for advise, will need ? clarification from MD or from Rad.

## 2022-07-25 NOTE — TELEPHONE ENCOUNTER
Talked with Adarsh, informed him of no need to hold warfarin for procedure. He will continue with his current dosing and next inr scheduled for 8/1/2022

## 2022-07-25 NOTE — TELEPHONE ENCOUNTER
Called Min [facility] to confirm requirements for 7/29 injection of knee. Rukhsana confirmed that NO warfarin interruption is required. INR goal is less than 3 for 7/29 injection.     Nohemi Rey, AdamarisD, BCPS

## 2022-07-29 ENCOUNTER — HOSPITAL ENCOUNTER (OUTPATIENT)
Dept: GENERAL RADIOLOGY | Facility: CLINIC | Age: 57
Discharge: HOME | End: 2022-07-29
Attending: FAMILY MEDICINE
Payer: COMMERCIAL

## 2022-07-29 ENCOUNTER — HOSPITAL ENCOUNTER (OUTPATIENT)
Dept: CT IMAGING | Facility: CLINIC | Age: 57
Discharge: HOME | End: 2022-07-29
Attending: FAMILY MEDICINE
Payer: COMMERCIAL

## 2022-07-29 VITALS — HEART RATE: 80 BPM | DIASTOLIC BLOOD PRESSURE: 83 MMHG | OXYGEN SATURATION: 99 % | SYSTOLIC BLOOD PRESSURE: 149 MMHG

## 2022-07-29 DIAGNOSIS — M25.561 ACUTE PAIN OF RIGHT KNEE: ICD-10-CM

## 2022-07-29 PROCEDURE — 250N000009 HC RX 250: Performed by: FAMILY MEDICINE

## 2022-07-29 PROCEDURE — 73701 CT LOWER EXTREMITY W/DYE: CPT | Mod: RT

## 2022-07-29 PROCEDURE — 255N000002 HC RX 255 OP 636: Performed by: FAMILY MEDICINE

## 2022-07-29 PROCEDURE — 77002 NEEDLE LOCALIZATION BY XRAY: CPT

## 2022-07-29 RX ORDER — IOPAMIDOL 408 MG/ML
20 INJECTION, SOLUTION INTRATHECAL ONCE
Status: COMPLETED | OUTPATIENT
Start: 2022-07-29 | End: 2022-07-29

## 2022-07-29 RX ADMIN — LIDOCAINE HYDROCHLORIDE 4 ML: 10 INJECTION, SOLUTION EPIDURAL; INFILTRATION; INTRACAUDAL; PERINEURAL at 09:19

## 2022-07-29 RX ADMIN — IOPAMIDOL 20 ML: 408 INJECTION, SOLUTION INTRATHECAL at 09:19

## 2022-07-29 NOTE — DISCHARGE INSTRUCTIONS
JOINT ARTHROGRAM DISCHARGE INSTRUCTIONS    What to expect  Your joint may feel a little sore for the next day or two, but many people don't feel any different.   You should receive the imaging results from the provider who ordered this study within a few days.   What to do  Minimize your activity today. You may resume your normal activity tomorrow as tolerated.   You may shower tomorrow, but do not submerge in bathtub, hot tub or pool for 48 hours.    Use ice packs as needed for discomfort.  You may resume all medications, including blood thinners.  You may take over the counter acetaminophen (Tylenol) or ibuprofen (Motrin, Advil) for mild discomfort after the procedure.    What to watch for  Bruising or slight swelling at the puncture site can be normal. If you begin to develop redness or excessive swelling at the site, fever over 1010F, notable increase in pain, weakness, or numbness, please contact your primary care or referring provider.  If you have questions or concerns  You may contact the [facility] Radiology Department at [phone] between 8am-4:30pm Monday through Friday.  If you have urgent questions outside of these normal business hours, please contact the [facility] on-call physician at [phone].    The provider who performed your procedure was Dr. Jacobo.

## 2022-08-01 ENCOUNTER — ANTICOAGULATION THERAPY VISIT (OUTPATIENT)
Dept: ANTICOAGULATION | Facility: CLINIC | Age: 57
End: 2022-08-01

## 2022-08-01 ENCOUNTER — LAB (OUTPATIENT)
Dept: LAB | Facility: CLINIC | Age: 57
End: 2022-08-01
Payer: COMMERCIAL

## 2022-08-01 DIAGNOSIS — I82.409 RECURRENT DEEP VEIN THROMBOSIS (DVT) (H): ICD-10-CM

## 2022-08-01 DIAGNOSIS — I82.409 RECURRENT DEEP VEIN THROMBOSIS (DVT) (H): Primary | ICD-10-CM

## 2022-08-01 LAB — INR BLD: 2.1 (ref 0.9–1.1)

## 2022-08-01 PROCEDURE — 36416 COLLJ CAPILLARY BLOOD SPEC: CPT

## 2022-08-01 PROCEDURE — 85610 PROTHROMBIN TIME: CPT

## 2022-08-01 NOTE — PROGRESS NOTES
ANTICOAGULATION MANAGEMENT     Adarsh Salvador 57 year old male is on warfarin with therapeutic INR result. (Goal INR 2.0-3.0)    Recent labs: (last 7 days)     08/01/22  0831   INR 2.1*       ASSESSMENT       Source(s): Chart Review    Previous INR was Therapeutic last visit; previously outside of goal range    Medication, diet, health changes since last INR chart reviewed; none identified-patient had injection on Friday, did not need to hold warfarin prior.           PLAN     Recommended plan for no diet, medication or health factor changes affecting INR     Dosing Instructions: Continue your current warfarin dose with next INR in 4 weeks (last INR was 3 weeks ago)      Summary  As of 8/1/2022    Full warfarin instructions:  12.5 mg every Mon, Wed, Fri; 15 mg all other days   Next INR check:  8/29/2022             Detailed voice message left for Adarsh with dosing instructions and follow up date.     Contact [phone]  to schedule and with any changes, questions or concerns.     Education provided: Please call back if any changes to your diet, medications or how you've been taking warfarin    Plan made per [facility] anticoagulation protocol    Nirali Carpenter, RN  Anticoagulation Clinic  8/1/2022    _______________________________________________________________________     Anticoagulation Episode Summary     Current INR goal:  2.0-3.0   TTR:  52.0 % (5.9 mo)   Target end date:  Indefinite   Send INR reminders to:  [facility]    Indications    Recurrent deep vein thrombosis (DVT) (H) [I82.409]           Comments:           Anticoagulation Care Providers     Provider Role Specialty Phone number    Bertha Romero PA-C Referring Family Medicine [phone]

## 2022-08-01 NOTE — PROGRESS NOTES
ANTICOAGULATION MANAGEMENT     Adarsh Salvador 57 year old male is on warfarin with therapeutic INR result. (Goal INR 2.0-3.0)    Recent labs: (last 7 days)     08/01/22  0831   INR 2.1*       ASSESSMENT       Source(s): Chart Review and Patient/Caregiver Call       Warfarin doses taken: Less warfarin taken than planned which may be affecting INR    Diet: No new diet changes identified    New illness, injury, or hospitalization: No    Medication/supplement changes: None noted    Signs or symptoms of bleeding or clotting: No    Previous INR: Therapeutic last 2(+) visits    Additional findings: Since last INR, patient reports he has been taking 15 mg on Tue/Thu; 12.5 mg all other days. Since patient has been taking this dose for the last 3 weeks and INR is in range, will continue same dose, with the 15 mg spaced out more equally during the week.       PLAN     Recommended plan for no diet, medication or health factor changes affecting INR     Dosing Instructions: Continue dose of 15 mg every Tue, Sat; 12.5 mg all other days (this is a 5.1% decrease from the maintenance dose, but it reflects the dosing patient was taking for the last 3 weeks) with next INR in 4 weeks       Summary  As of 8/1/2022    Full warfarin instructions:  15 mg every Tue, Sat; 12.5 mg all other days   Next INR check:  8/29/2022             Telephone call with Adarsh who verbalizes understanding and agrees to plan    Lab visit scheduled    Education provided: Contact [phone]  with any changes, questions or concerns.     Plan made per [facility] anticoagulation protocol    Nirali Carpenter, RN  Anticoagulation Clinic  8/1/2022    _______________________________________________________________________     Anticoagulation Episode Summary     Current INR goal:  2.0-3.0   TTR:  52.0 % (5.9 mo)   Target end date:  Indefinite   Send INR reminders to:  [facility]    Indications    Recurrent deep vein thrombosis (DVT) (H) [I82.409]           Comments:            Anticoagulation Care Providers     Provider Role Specialty Phone number    Bertha Romero PA-C Referring Family Medicine [phone]

## 2022-08-04 DIAGNOSIS — Z87.442 HISTORY OF KIDNEY STONES: ICD-10-CM

## 2022-08-04 DIAGNOSIS — I10 HYPERTENSION, UNSPECIFIED TYPE: ICD-10-CM

## 2022-08-04 DIAGNOSIS — I10 HYPERTENSION GOAL BP (BLOOD PRESSURE) < 140/90: ICD-10-CM

## 2022-08-04 DIAGNOSIS — R09.A2 GLOBUS SYNDROME: ICD-10-CM

## 2022-08-04 DIAGNOSIS — G43.109 MIGRAINE WITH AURA AND WITHOUT STATUS MIGRAINOSUS, NOT INTRACTABLE: ICD-10-CM

## 2022-08-05 RX ORDER — RIZATRIPTAN BENZOATE 10 MG/1
TABLET, ORALLY DISINTEGRATING ORAL
Qty: 18 TABLET | Refills: 0 | Status: SHIPPED | OUTPATIENT
Start: 2022-08-05 | End: 2022-11-28

## 2022-08-05 RX ORDER — TAMSULOSIN HYDROCHLORIDE 0.4 MG/1
CAPSULE ORAL
Qty: 90 CAPSULE | Refills: 1 | Status: SHIPPED | OUTPATIENT
Start: 2022-08-05 | End: 2022-12-22

## 2022-08-05 RX ORDER — AMLODIPINE BESYLATE 10 MG/1
TABLET ORAL
Qty: 90 TABLET | Refills: 1 | Status: SHIPPED | OUTPATIENT
Start: 2022-08-05 | End: 2022-12-22

## 2022-08-05 RX ORDER — CHLORTHALIDONE 25 MG/1
25 TABLET ORAL DAILY
Qty: 90 TABLET | Refills: 1 | Status: SHIPPED | OUTPATIENT
Start: 2022-08-05 | End: 2022-12-22

## 2022-08-05 RX ORDER — OMEPRAZOLE 40 MG/1
40 CAPSULE, DELAYED RELEASE ORAL DAILY
Qty: 90 CAPSULE | Refills: 2 | Status: SHIPPED | OUTPATIENT
Start: 2022-08-05 | End: 2022-12-22

## 2022-08-05 NOTE — TELEPHONE ENCOUNTER
"Routing refill request to provider for review/approval because:  BP out of range       Requested Prescriptions   Pending Prescriptions Disp Refills     rizatriptan (MAXALT-MLT) 10 MG ODT [Pharmacy Med Name: RIZATRIPTAN 10 MG ODT] 18 tablet 0     Sig: TAKE 1 TABLET BY MOUTH AT ONSET OF HEADACHE FOR MIGRAINE MAY REPEAT IN 2 HOURS. MAX 3 TABS/24 HOURS.       Serotonin Agonists Failed - 8/4/2022  4:00 PM        Failed - Blood pressure under 140/90 in past 12 months     BP Readings from Last 3 Encounters:   07/29/22 (!) 149/83   07/11/22 128/83   07/07/22 139/84                 Failed - Serotonin Agonist request needs review.     Please review patient's record. If patient has had 8 or more treatments in the past month, please forward to provider.          Passed - Recent (12 mo) or future (30 days) visit within the authorizing provider's specialty     Patient has had an office visit with the authorizing provider or a provider within the authorizing providers department within the previous 12 mos or has a future within next 30 days. See \"Patient Info\" tab in inbasket, or \"Choose Columns\" in Meds & Orders section of the refill encounter.              Passed - Medication is active on med list        Passed - Patient is age 18 or older           chlorthalidone (HYGROTON) 25 MG tablet [Pharmacy Med Name: CHLORTHALIDONE 25 MG TABLET] 90 tablet 1     Sig: TAKE 1 TABLET (25 MG) BY MOUTH DAILY ADD ON FOR BLOOD PRESSURE       Diuretics (Including Combos) Protocol Failed - 8/4/2022  4:00 PM        Failed - Blood pressure under 140/90 in past 12 months     BP Readings from Last 3 Encounters:   07/29/22 (!) 149/83   07/11/22 128/83   07/07/22 139/84                 Passed - Recent (12 mo) or future (30 days) visit within the authorizing provider's specialty     Patient has had an office visit with the authorizing provider or a provider within the authorizing providers department within the previous 12 mos or has a future within next " "30 days. See \"Patient Info\" tab in inbasket, or \"Choose Columns\" in Meds & Orders section of the refill encounter.              Passed - Medication is active on med list        Passed - Patient is age 18 or older        Passed - Normal serum creatinine on file in past 12 months     Recent Labs   Lab Test 06/07/22  1124   CR 0.91              Passed - Normal serum potassium on file in past 12 months     Recent Labs   Lab Test 06/07/22  1124   POTASSIUM 4.3                    Passed - Normal serum sodium on file in past 12 months     Recent Labs   Lab Test 06/07/22  1124                    amLODIPine (NORVASC) 10 MG tablet [Pharmacy Med Name: AMLODIPINE BESYLATE 10 MG TAB] 90 tablet 1     Sig: TAKE 1 TABLET BY MOUTH DAILY FOR BLOOD PRESSURE       Calcium Channel Blockers Protocol  Failed - 8/4/2022  4:00 PM        Failed - Blood pressure under 140/90 in past 12 months     BP Readings from Last 3 Encounters:   07/29/22 (!) 149/83   07/11/22 128/83   07/07/22 139/84                 Passed - Recent (12 mo) or future (30 days) visit within the authorizing provider's specialty     Patient has had an office visit with the authorizing provider or a provider within the authorizing providers department within the previous 12 mos or has a future within next 30 days. See \"Patient Info\" tab in inbasket, or \"Choose Columns\" in Meds & Orders section of the refill encounter.              Passed - Medication is active on med list        Passed - Patient is age 18 or older        Passed - Normal serum creatinine on file in past 12 months     Recent Labs   Lab Test 06/07/22  1124   CR 0.91       Ok to refill medication if creatinine is low             tamsulosin (FLOMAX) 0.4 MG capsule [Pharmacy Med Name: TAMSULOSIN HCL 0.4 MG CAPSULE] 90 capsule 1     Sig: TAKE 1 CAPSULE BY MOUTH EVERY DAY       Alpha Blockers Failed - 8/4/2022  4:00 PM        Failed - Blood pressure under 140/90 in past 12 months     BP Readings from Last 3 " "Encounters:   07/29/22 (!) 149/83   07/11/22 128/83   07/07/22 139/84                 Passed - Recent (12 mo) or future (30 days) visit within the authorizing provider's specialty     Patient has had an office visit with the authorizing provider or a provider within the authorizing providers department within the previous 12 mos or has a future within next 30 days. See \"Patient Info\" tab in inbasket, or \"Choose Columns\" in Meds & Orders section of the refill encounter.              Passed - Patient does not have Tadalafil, Vardenafil, or Sildenafil on their medication list        Passed - Medication is active on med list        Passed - Patient is 18 years of age or older         Signed Prescriptions Disp Refills    omeprazole (PRILOSEC) 40 MG DR capsule 90 capsule 2     Sig: TAKE 1 CAPSULE (40 MG) BY MOUTH DAILY TAKE FOR AT LEAST TWO MONTHS       PPI Protocol Passed - 8/4/2022  4:00 PM        Passed - Not on Clopidogrel (unless Pantoprazole ordered)        Passed - No diagnosis of osteoporosis on record        Passed - Recent (12 mo) or future (30 days) visit within the authorizing provider's specialty     Patient has had an office visit with the authorizing provider or a provider within the authorizing providers department within the previous 12 mos or has a future within next 30 days. See \"Patient Info\" tab in inbasket, or \"Choose Columns\" in Meds & Orders section of the refill encounter.              Passed - Medication is active on med list        Passed - Patient is age 18 or older           Marilee Miller RN    "

## 2022-08-08 ENCOUNTER — OFFICE VISIT (OUTPATIENT)
Dept: ORTHOPEDICS | Facility: CLINIC | Age: 57
End: 2022-08-08
Payer: COMMERCIAL

## 2022-08-08 VITALS — WEIGHT: 285 LBS | HEIGHT: 71 IN | BODY MASS INDEX: 39.9 KG/M2

## 2022-08-08 DIAGNOSIS — G89.29 CHRONIC PAIN OF RIGHT KNEE: Primary | ICD-10-CM

## 2022-08-08 DIAGNOSIS — M25.561 CHRONIC PAIN OF RIGHT KNEE: Primary | ICD-10-CM

## 2022-08-08 PROCEDURE — 99204 OFFICE O/P NEW MOD 45 MIN: CPT | Performed by: ORTHOPAEDIC SURGERY

## 2022-08-08 NOTE — PROGRESS NOTES
Pre-Operative Teaching Flowsheet     Person(s) involved in teaching: Patient     Motivation Level:  Receptive (willing/able to accept information) and asks appropriate questions where applicable: Yes  Any cultural factors/Hoahaoism beliefs that may influence understanding or compliance? No     Patient demonstrates understanding of the following:  Pre-operative planning, including the necessary appointments and preparation needed prior to surgery: Yes  Which situations necessitate calling provider and whom to contact: Yes  Pain management techniques pre and post op: Yes  How, and when, to access community resources: Yes    Who will stay/ with patient after surgery: Sister  Who will drive patient to surgery: Sister  Patient will do pre-op within .   Currently taking Coumadin, patient will reach out to his clinic on bridging.  PT at [facility].            Additional Teaching Concerns Addressed:   Post-operative living arrangements and necessary adaptations to living environment.     Instructional Materials Used/Given: Yes, pre-op packet given including forms for Your surgery day, medications to stop taking before surgery, preparing for surgery, Covid-19 testing, showering before surgery, Stop light tool introduced, Opioid pain medication guideline, pre-op physical form, and map  Patient expressed understanding of all forms given, questions were answered and will review in more detail at home.     Time spent with patient: 20 minutes.

## 2022-08-08 NOTE — H&P (VIEW-ONLY)
"CHIEF CONCERN: Right knee pain    HISTORY:   Patient developed right knee pain 5-6 weeks ago \"out of the blue.\" Pain is worst on the medial side. Worst with twisting, picking up items, climbing ladders for work. Wears a brace to help the knee feel more stable. Patient reports history of approx 5 right knee scopes for meniscus injuries in various states, last 2004. He uses cane to ambulate due to extensive peripheral neuropathy, has a nerve stimulator in place so not able to get MRI's.      PAST MEDICAL HISTORY (Reviewed with the patient and in the EPIC medical record)  Patient has history of multiple DVTs, is on warfarin  Has a history of insulin dependent diabetes mellitus.  Past Medical History:   Diagnosis Date     Anxiety      Depression      GERD (gastroesophageal reflux disease)      HTN (hypertension)      IBS (irritable bowel syndrome)      Kidney stone 6/15/2011    Pt believes these were Calcium stones     Neuropathy        PAST SURGICAL HISTORY: (Reviewed with the patient and in the EPIC medical record)  Past Surgical History:   Procedure Laterality Date    Procedure: HYDROGEN BREATH TEST;  Surgeon: Darion Swift MD;  Location: [facility]     INJECT EPIDURAL LUMBAR N/A 7/7/2022    Procedure: INJECTION, SPINE, LUMBAR, EPIDURAL L5/S1;  Surgeon: Michi Hahn MD;  Location: [facility]     LITHOTRIPSY  09/30/2010    LITHOTRIPSY 09/30/2010 LEFT EXTRACORPOREAL SHOCK WAVE LITHOTRIPSY, FLEXIBLE CYSTOSCOPY, LEFT STENT REMOVAL       ORTHOPEDIC SURGERY  10/03/2007    KNEE ARTHROSCOPY 10/03/2007 RightX2       RELEASE CARPAL TUNNEL Right 1/26/2018    Procedure: RELEASE CARPAL TUNNEL;  Right carpal tunnel release;  Surgeon: Wiliam Saenz MD;  Location: [facility]     VASCULAR SURGERY  11/24/2008    Radiofrequency ablation left saphenous vein 11/24/2008 Done by Dr. Lozoya       MEDICATIONS: (Reviewed with the patient and in the [facility] medical record)    Notable medications include: Insulin, warfarin, " nortryptiline    ALLERGIES: (Reviewed with the patient and in the EPIC medical record)  Allergies   Allergen Reactions     Artificial Sweetner (Informational Only) [Artificial Sweetner (Informational Only) ] GI Disturbance     ALL artificial sweetners cause severe diarrhea & flu symptoms     Hydromorphone Anaphylaxis     Ibuprofen GI Disturbance     Morphine Sulfate Concentrate Anaphylaxis     Morphine [Fumaric Acid] Anaphylaxis     Hydrocodone-Acetaminophen Itching     Tramadol Hives     Trazodone Hives     Gabapentin      GI upset per pt     Keflex [Cephalexin] Diarrhea     Upset stomach     Codeine Phosphate Itching     Ketorolac Tromethamine Rash     SOCIAL HISTORY: (Reviewed with the patient and in the medical record)  --Tobacco: None  --Occupation: [employer]  --Avocation/Sport: Fly fishing    FAMILY HISTORY: (Reviewed with the patient and in the medical record)  -- No family history of bleeding, clotting, or difficulty with anesthesia    REVIEW OF SYSTEMS: (Reviewed with the patient and on the health intake form)  -- A comprehensive 10 point review of systems was conducted and is negative except as noted in the HPI    EXAM:     General: Awake, Alert and Oriented, No acute Distress. Articulate and Interactive    Body mass index is 39.75 kg/m .    Right Lower extremity :    Skin is Warm and Well perfused, no suggestion of infection. Mild edema    Medial joint line tenderness    ROM 5 to 110 degrees    ACL, PCL, MCL, LCL stable    EHL/FHL/TA/GS 5/5    Sensation baseline reduced L3-S1    2+ Dorsalis Pedis Pulse    IMAGING:    Plain Radiographs:  X-ray right knee 6/29/2022  No acute osseus abnormality.    CT angiogram of right knee 7/29/2022, per radiologist read:  1. High-grade radial tear in the posterior horn of the medial  meniscus. The body of the medial meniscus is peripherally extruded.  2. Focal area of chondromalacia in the medial femoral condyle  posteriorly.  3. Small popliteal  cyst.      ASSESSMENT:  1. Right knee meniscus tear    PLAN:  Plan for right knee arthroscopic partial menisectomy. Discussed with the patient the risks, benefits, complications and techniques of surgery as well as the natural history of meniscus tears and the alternative treatment options. The risks include, but are not limited to the risk of death and risk of a myocardial infarction, risk of bleeding and a risk of infection, risk of nerve damage and a risk of muscle damage, stiffness, instability, andcontinued pain or worsening pain. The patient was provided an opportunity to ask questions and these were answered.  Refer to anticoagulation team regarding patient's history of DVT's and warfarin. Will need a plan for anticoagulation following surgery from primary team

## 2022-08-08 NOTE — LETTER
"    8/8/2022         RE: Adarsh Salvador  [id number] [place] [facility] [address]        Dear Colleague,    Thank you for referring your patient, Adarsh Salvador, to the [facility] [place]. Please see a copy of my visit note below.    CHIEF CONCERN: Right knee pain    HISTORY:   Patient developed right knee pain 5-6 weeks ago \"out of the blue.\" Pain is worst on the medial side. Worst with twisting, picking up items, climbing ladders for work. Wears a brace to help the knee feel more stable. Patient reports history of approx 5 right knee scopes for meniscus injuries in [place] states, last [place]. He uses cane to ambulate due to extensive peripheral neuropathy, has a nerve stimulator in place so not able to get MRI's.      PAST MEDICAL HISTORY (Reviewed with the patient and in the [facility] medical record)  Patient has history of multiple DVTs, is on warfarin  Has a history of insulin dependent diabetes mellitus.  Past Medical History:   Diagnosis Date     Anxiety      Depression      GERD (gastroesophageal reflux disease)      HTN (hypertension)      IBS (irritable bowel syndrome)      Kidney stone 6/15/2011    Pt believes these were Calcium stones     Neuropathy        PAST SURGICAL HISTORY: (Reviewed with the patient and in the [facility] medical record)  Past Surgical History:   Procedure Laterality Date    Procedure: HYDROGEN BREATH TEST;  Surgeon: Darion Swift MD;  Location: [facility]     INJECT EPIDURAL LUMBAR N/A 7/7/2022    Procedure: INJECTION, SPINE, LUMBAR, EPIDURAL L5/S1;  Surgeon: Michi Hahn MD;  Location: [facility]     LITHOTRIPSY  09/30/2010    LITHOTRIPSY 09/30/2010 LEFT EXTRACORPOREAL SHOCK WAVE LITHOTRIPSY, FLEXIBLE CYSTOSCOPY, LEFT STENT REMOVAL       ORTHOPEDIC SURGERY  10/03/2007    KNEE ARTHROSCOPY 10/03/2007 RightX2       RELEASE CARPAL TUNNEL Right 1/26/2018    Procedure: RELEASE CARPAL TUNNEL;  Right carpal tunnel release;  Surgeon: Wiliam Saenz MD;  Location: [facility]     " VASCULAR SURGERY  11/24/2008    Radiofrequency ablation left saphenous vein 11/24/2008 Done by Dr. Lozoya       MEDICATIONS: (Reviewed with the patient and in the [facility] medical record)    Notable medications include: Insulin, warfarin, nortryptiline    ALLERGIES: (Reviewed with the patient and in the EPIC medical record)  Allergies   Allergen Reactions     Artificial Sweetner (Informational Only) [Artificial Sweetner (Informational Only) ] GI Disturbance     ALL artificial sweetners cause severe diarrhea & flu symptoms     Hydromorphone Anaphylaxis     Ibuprofen GI Disturbance     Morphine Sulfate Concentrate Anaphylaxis     Morphine [Fumaric Acid] Anaphylaxis     Hydrocodone-Acetaminophen Itching     Tramadol Hives     Trazodone Hives     Gabapentin      GI upset per pt     Keflex [Cephalexin] Diarrhea     Upset stomach     Codeine Phosphate Itching     Ketorolac Tromethamine Rash     SOCIAL HISTORY: (Reviewed with the patient and in the medical record)  --Tobacco: None  --Occupation: [employer]  --Avocation/Sport: Fly fishing    FAMILY HISTORY: (Reviewed with the patient and in the medical record)  -- No family history of bleeding, clotting, or difficulty with anesthesia    REVIEW OF SYSTEMS: (Reviewed with the patient and on the health intake form)  -- A comprehensive 10 point review of systems was conducted and is negative except as noted in the HPI    EXAM:     General: Awake, Alert and Oriented, No acute Distress. Articulate and Interactive    Body mass index is 39.75 kg/m .    Right Lower extremity :    Skin is Warm and Well perfused, no suggestion of infection. Mild edema    Medial joint line tenderness    ROM 5 to 110 degrees    ACL, PCL, MCL, LCL stable    EHL/FHL/TA/GS 5/5    Sensation baseline reduced L3-S1    2+ Dorsalis Pedis Pulse    IMAGING:    Plain Radiographs:  X-ray right knee 6/29/2022  No acute osseus abnormality.    CT angiogram of right knee 7/29/2022, per radiologist read:  1. High-grade  radial tear in the posterior horn of the medial  meniscus. The body of the medial meniscus is peripherally extruded.  2. Focal area of chondromalacia in the medial femoral condyle  posteriorly.  3. Small popliteal cyst.      ASSESSMENT:  1. Right knee meniscus tear    PLAN:  Plan for right knee arthroscopic partial menisectomy. Discussed with the patient the risks, benefits, complications and techniques of surgery as well as the natural history of meniscus tears and the alternative treatment options. The risks include, but are not limited to the risk of death and risk of a myocardial infarction, risk of bleeding and a risk of infection, risk of nerve damage and a risk of muscle damage, stiffness, instability, andcontinued pain or worsening pain. The patient was provided an opportunity to ask questions and these were answered.  Refer to anticoagulation team regarding patient's history of DVT's and warfarin. Will need a plan for anticoagulation following surgery from primary team          Pre-Operative Teaching Flowsheet     Person(s) involved in teaching: Patient     Motivation Level:  Receptive (willing/able to accept information) and asks appropriate questions where applicable: Yes  Any cultural factors/Orthodox beliefs that may influence understanding or compliance? No     Patient demonstrates understanding of the following:  Pre-operative planning, including the necessary appointments and preparation needed prior to surgery: Yes  Which situations necessitate calling provider and whom to contact: Yes  Pain management techniques pre and post op: Yes  How, and when, to access community resources: Yes    Who will stay/ with patient after surgery: Sister  Who will drive patient to surgery: Sister  Patient will do pre-op within [facility].   Currently taking Coumadin, patient will reach out to his clinic on bridging.  PT at [facility].          Additional Teaching Concerns Addressed:   Post-operative living arrangements  and necessary adaptations to living environment.     Instructional Materials Used/Given: Yes, pre-op packet given including forms for Your surgery day, medications to stop taking before surgery, preparing for surgery, Covid-19 testing, showering before surgery, Stop light tool introduced, Opioid pain medication guideline, pre-op physical form, and map  Patient expressed understanding of all forms given, questions were answered and will review in more detail at home.     Time spent with patient: 20 minutes.        Again, thank you for allowing me to participate in the care of your patient.        Sincerely,        Carlos A Em MD

## 2022-08-10 ENCOUNTER — TELEPHONE (OUTPATIENT)
Dept: FAMILY MEDICINE | Facility: CLINIC | Age: 57
End: 2022-08-10

## 2022-08-10 ENCOUNTER — TELEPHONE (OUTPATIENT)
Dept: ANTICOAGULATION | Facility: CLINIC | Age: 57
End: 2022-08-10

## 2022-08-10 DIAGNOSIS — I82.409 RECURRENT DEEP VEIN THROMBOSIS (DVT) (H): Primary | ICD-10-CM

## 2022-08-10 RX ORDER — ENOXAPARIN SODIUM 100 MG/ML
0.75 INJECTION SUBCUTANEOUS 2 TIMES DAILY
Qty: 20 ML | Refills: 1 | Status: SHIPPED | OUTPATIENT
Start: 2022-08-10 | End: 2022-12-22

## 2022-08-10 NOTE — TELEPHONE ENCOUNTER
Talked with Adarsh, plan for same day surgery on 8/19 for repair of meniscus.Will route to Pharm D for bridge plan.

## 2022-08-10 NOTE — TELEPHONE ENCOUNTER
Patient left a voicemail requesting a call for bridging and holding orders for an upcoming procedure.    Thalia Chau RN    [facility] Anticoagulation [facility]

## 2022-08-10 NOTE — TELEPHONE ENCOUNTER
JOIE-PROCEDURAL ANTICOAGULATION  MANAGEMENT    ASSESSMENT     Warfarin interruption plan for knee surgery on 2022.  Indication for Anticoagulation: PE and Recurrent DVT       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 stopping warfarin 2021    Bilateral PE 2022    DVT 2021    History of thrombophlebitis         Joie-Procedure Risk stratification for thromboembolism: moderate (2018 American Society of Hematology guideline)     VTE: 2018 American Society of Hematology recommends against bridging in low and moderate risk VTE patients holding warfarin      VTE: 2016 Anticoagulation Forum clinical guidance recommends no bridge therapy for most VTE patients interrupting warfarin with possible exceptions for VTE within previous month, prior hx recurrent VTE during anticoagulation therapy interruption, or undergoing a procedure with high for VTE  (joint replacement surgery or major abdominal cancer resection)     RECOMMENDATION       Pre-Procedure:  o Hold warfarin for 5 days, until after procedure startin2022   o Enoxaparin (Lovenox) 100 mg subq Q 12 hrs (0.75 mg/kg Q 12 hrs for BMI >= 40 kg/m2 per [facility] P&T approved dose adjustment protocol)   - Start enoxaparin: 2022  - Last dose of enoxaparin prior to procedure: 2022 AM  (~24 hours prior to procedure)      Post-Procedure:  o Resume warfarin dose if okay with provider doing procedure on night of procedure, 2022 PM: 25mg 2022 20mg  o Resume enoxaparin (Lovenox) ~ 24-48 hrs post procedure when okay with provider doing procedure. Continue until INR >= 2.3 or INR >= 2.0 x 2 ([facility] protocol goal INR 2-3 new start)  o Recheck INR ~6 days after resuming warfarin   ?       Plan routed to referring provider for approval  ?   Siomara Montaño RPH    SUBJECTIVE/OBJECTIVE     Adarsh Salvador, a 57 year old male    Goal INR Range: 2.0-3.0     Patient bridged in past: Yes: 2022 for JANUARY    Wt Readings from Last 3 Encounters:  "  08/08/22 129.3 kg (285 lb)   07/11/22 133.4 kg (294 lb)   06/09/22 131.5 kg (290 lb)      Ideal body weight: 75.3 kg (166 lb 0.1 oz)  Adjusted ideal body weight: 96.9 kg (213 lb 9.7 oz)     Estimated body mass index is 39.75 kg/m  as calculated from the following:    Height as of 8/8/22: 1.803 m (5' 11\").    Weight as of 8/8/22: 129.3 kg (285 lb).    Lab Results   Component Value Date    INR 2.1 (H) 08/01/2022    INR 2.1 (H) 07/12/2022    INR 1.9 (H) 06/27/2022     Lab Results   Component Value Date    HGB 10.2 05/04/2022    HGB 13.1 01/29/2021    HCT 31.9 05/04/2022    HCT 40.2 01/29/2021     05/04/2022     01/29/2021     Lab Results   Component Value Date    CR 0.91 06/07/2022    CR 0.88 02/21/2022    CR 0.94 01/29/2021     CrCl cannot be calculated (Patient's most recent lab result is older than the maximum 30 days allowed.).  "

## 2022-08-10 NOTE — LETTER
August 10, 2022      Adarsh EDWARDS [place]  [id number]  [place] [address]        Dear Adarsh,     Last warfarin dose: 08/13/2022 08/14/2022, NO warfarin  08/15/2022, NO warfarin  08/16/2022, NO warfarin, begin enoxaparin injections into the abdomen every 12 hours (AM and PM)  08/17/2022, NO warfarin, enoxaparin injection into the abdomen every 12 hours (AM and PM)  08/18/2022, NO warfarin, enoxaparin injection into the abdomen AM only (no enoxaparin 24 hours prior to surgery)  08/19/2022, DAY OF PROCEDURE, NO enoxaparin. Restart warfarin 25mg (5 tabs) in the evening, unless instructed otherwise by the physician.  08/20/2022, Restart enoxaparin injections into the abdomen every 12 hours (AM and PM), unless instructed otherwise by the physician, and 20mg (4 tabs) warfarin in the evening.   08/21/2022, Enoxaparin injection into the abdomen every 12 hours (AM and PM) and 12.5mg (2.5 tabs) warfarin in the evening.  08/22/2022, Enoxaparin injection into the abdomen every 12 hours (AM and PM) and 12.5mg (2.5 tabs) warfarin in the evening.  08/23/2022, Enoxaparin injection into the abdomen every 12 hours (AM and PM) and 15mg (3 tabs) warfarin in the evening.  08/24/2022, Enoxaparin injection into the abdomen in the AM. Recheck INR at the Lab for further dosing instructions    If you have any questions please call the Anticoagulation Clinic at [phone]    Sincerely,        VEGA Elaine

## 2022-08-10 NOTE — TELEPHONE ENCOUNTER
Lovenox ordered    Last warfarin dose: 08/13/2022 08/14/2022, NO warfarin  08/15/2022, NO warfarin  08/16/2022, NO warfarin, begin enoxaparin injections into the abdomen every 12 hours (AM and PM)  08/17/2022, NO warfarin, enoxaparin injection into the abdomen every 12 hours (AM and PM)  08/18/2022, NO warfarin, enoxaparin injection into the abdomen AM only (no enoxaparin 24 hours prior to surgery)  08/19/2022, DAY OF PROCEDURE, NO enoxaparin. Restart warfarin 25mg (5 tabs) in the evening, unless instructed otherwise by the physician.  08/20/2022, Restart enoxaparin injections into the abdomen every 12 hours (AM and PM), unless instructed otherwise by the physician, and 20mg (4 tabs) warfarin in the evening.   08/21/2022, Enoxaparin injection into the abdomen every 12 hours (AM and PM) and 12.5mg (2.5 tabs) warfarin in the evening.  08/22/2022, Enoxaparin injection into the abdomen every 12 hours (AM and PM) and 12.5mg (2.5 tabs) warfarin in the evening.  08/23/2022, Enoxaparin injection into the abdomen every 12 hours (AM and PM) and 15mg (3 tabs) warfarin in the evening.  08/24/2022, Enoxaparin injection into the abdomen in the AM. Recheck INR at the Lab for further dosing instructions.   If you have any questions please call the Anticoagulation Clinic at [phone].

## 2022-08-16 ENCOUNTER — OFFICE VISIT (OUTPATIENT)
Dept: FAMILY MEDICINE | Facility: CLINIC | Age: 57
End: 2022-08-16
Payer: COMMERCIAL

## 2022-08-16 VITALS
HEIGHT: 71 IN | RESPIRATION RATE: 18 BRPM | WEIGHT: 293 LBS | BODY MASS INDEX: 41.02 KG/M2 | TEMPERATURE: 97.9 F | OXYGEN SATURATION: 97 % | SYSTOLIC BLOOD PRESSURE: 157 MMHG | DIASTOLIC BLOOD PRESSURE: 88 MMHG | HEART RATE: 71 BPM

## 2022-08-16 DIAGNOSIS — M25.561 CHRONIC PAIN OF RIGHT KNEE: ICD-10-CM

## 2022-08-16 DIAGNOSIS — G89.29 CHRONIC PAIN OF RIGHT KNEE: ICD-10-CM

## 2022-08-16 DIAGNOSIS — E66.01 MORBID OBESITY (H): ICD-10-CM

## 2022-08-16 DIAGNOSIS — F41.1 GAD (GENERALIZED ANXIETY DISORDER): Chronic | ICD-10-CM

## 2022-08-16 DIAGNOSIS — I82.409 RECURRENT DEEP VEIN THROMBOSIS (DVT) (H): ICD-10-CM

## 2022-08-16 DIAGNOSIS — Z79.01 CURRENT USE OF LONG TERM ANTICOAGULATION: ICD-10-CM

## 2022-08-16 DIAGNOSIS — Z01.818 PREOPERATIVE EXAMINATION: Primary | ICD-10-CM

## 2022-08-16 DIAGNOSIS — E11.42 TYPE 2 DIABETES MELLITUS WITH DIABETIC POLYNEUROPATHY, WITHOUT LONG-TERM CURRENT USE OF INSULIN (H): ICD-10-CM

## 2022-08-16 DIAGNOSIS — I10 BENIGN ESSENTIAL HYPERTENSION: Chronic | ICD-10-CM

## 2022-08-16 LAB
ANION GAP SERPL CALCULATED.3IONS-SCNC: 4 MMOL/L (ref 3–14)
BUN SERPL-MCNC: 13 MG/DL (ref 7–30)
CALCIUM SERPL-MCNC: 9.2 MG/DL (ref 8.5–10.1)
CHLORIDE BLD-SCNC: 101 MMOL/L (ref 94–109)
CO2 SERPL-SCNC: 32 MMOL/L (ref 20–32)
CREAT SERPL-MCNC: 0.83 MG/DL (ref 0.66–1.25)
ERYTHROCYTE [DISTWIDTH] IN BLOOD BY AUTOMATED COUNT: 14 % (ref 10–15)
GFR SERPL CREATININE-BSD FRML MDRD: >90 ML/MIN/1.73M2
GLUCOSE BLD-MCNC: 238 MG/DL (ref 70–99)
HBA1C MFR BLD: 8.5 % (ref 0–5.6)
HCT VFR BLD AUTO: 37.3 % (ref 40–53)
HGB BLD-MCNC: 12.1 G/DL (ref 13.3–17.7)
MCH RBC QN AUTO: 28.5 PG (ref 26.5–33)
MCHC RBC AUTO-ENTMCNC: 32.4 G/DL (ref 31.5–36.5)
MCV RBC AUTO: 88 FL (ref 78–100)
PLATELET # BLD AUTO: 237 10E3/UL (ref 150–450)
POTASSIUM BLD-SCNC: 4 MMOL/L (ref 3.4–5.3)
RBC # BLD AUTO: 4.25 10E6/UL (ref 4.4–5.9)
SODIUM SERPL-SCNC: 137 MMOL/L (ref 133–144)
WBC # BLD AUTO: 7.2 10E3/UL (ref 4–11)

## 2022-08-16 PROCEDURE — 80048 BASIC METABOLIC PNL TOTAL CA: CPT | Performed by: FAMILY MEDICINE

## 2022-08-16 PROCEDURE — 85027 COMPLETE CBC AUTOMATED: CPT | Performed by: FAMILY MEDICINE

## 2022-08-16 PROCEDURE — 83036 HEMOGLOBIN GLYCOSYLATED A1C: CPT | Performed by: FAMILY MEDICINE

## 2022-08-16 PROCEDURE — 36415 COLL VENOUS BLD VENIPUNCTURE: CPT | Performed by: FAMILY MEDICINE

## 2022-08-16 PROCEDURE — 99214 OFFICE O/P EST MOD 30 MIN: CPT | Performed by: FAMILY MEDICINE

## 2022-08-16 ASSESSMENT — PAIN SCALES - GENERAL: PAINLEVEL: EXTREME PAIN (8)

## 2022-08-16 NOTE — PROGRESS NOTES
[facility]  [address]  [place] [address]  Phone: [phone]  Primary Provider: Bertha Romero  Pre-op Performing Provider: JULI ESCAMILLA      PREOPERATIVE EVALUATION:  Today's date: 8/16/2022    Adarsh Salvador is a 57 year old male who presents for a preoperative evaluation.    Surgical Information:  Surgery/Procedure: examination under anesthesia, right knee arthroscopy, meniscectomy  Surgery Location: [facility]  Surgeon: Carlos A Em MD  Surgery Date: 8/19/22  Time of Surgery: 3:15 PM  Where patient plans to recover: At home with family  Fax number for surgical facility: Note does not need to be faxed, will be available electronically in Epic.    Type of Anesthesia Anticipated: to be determined    Assessment & Plan     The proposed surgical procedure is considered INTERMEDIATE risk.    (Z01.818) Preoperative examination  (primary encounter diagnosis)  Comment: No absolute medical contraindication to surgery.  Plan: May proceed as scheduled.    (M25.561,  G89.29) Chronic pain of right knee  Comment: Agree with arthroscopy.      (E11.42) Type 2 diabetes mellitus with diabetic polyneuropathy, without long-term current use of insulin (H)  Comment: Elevated, but A1c less than 8.5.  Lab Results   Component Value Date    A1C 8.5 08/16/2022    A1C 7.0 06/07/2022    A1C 11.2 01/24/2022    A1C 7.4 12/03/2020    A1C 7.5 03/26/2020    A1C 6.6 10/03/2019    A1C 6.8 01/11/2019    A1C 6.5 08/17/2018        Plan: CBC with platelets, Hemoglobin A1c, Basic         metabolic panel  (Ca, Cl, CO2, Creat, Gluc, K,         Na, BUN)        should not interfere with surgery.    (E66.01) Morbid obesity (H)  Comment: Reviewed lifestyle.  Plan: Monitor.    (I82.409) Recurrent deep vein thrombosis (DVT) (H)  Comment: On lifelong anticoagulation.  Plan: Transition to Lovenox prior to surgery per anticoagulation clinic.    (I10) Benign essential hypertension  Comment:  Slight elevation.  Plan: Should not affect surgery.    (F41.1) CONNOR (generalized anxiety disorder)  Comment: Doing well.  Plan: Monitor.    (Z79.01) Current use of long term anticoagulation  Comment: Followed by anticoagulation clinic.    Patient Instructions       Preparing for Your Surgery  Last warfarin dose: 08/13/2022 08/14/2022, NO warfarin  08/15/2022, NO warfarin  08/16/2022, NO warfarin, begin enoxaparin injections into the abdomen every 12 hours (AM and PM)  08/17/2022, NO warfarin, enoxaparin injection into the abdomen every 12 hours (AM and PM)  08/18/2022, NO warfarin, enoxaparin injection into the abdomen AM only (no enoxaparin 24 hours prior to surgery)  08/19/2022, DAY OF PROCEDURE, NO enoxaparin. Restart warfarin 25mg (5 tabs) in the evening, unless instructed otherwise by the physician.  08/20/2022, Restart enoxaparin injections into the abdomen every 12 hours (AM and PM), unless instructed otherwise by the physician, and 20mg (4 tabs) warfarin in the evening.   08/21/2022, Enoxaparin injection into the abdomen every 12 hours (AM and PM) and 12.5mg (2.5 tabs) warfarin in the evening.  08/22/2022, Enoxaparin injection into the abdomen every 12 hours (AM and PM) and 12.5mg (2.5 tabs) warfarin in the evening.  08/23/2022, Enoxaparin injection into the abdomen every 12 hours (AM and PM) and 15mg (3 tabs) warfarin in the evening.  08/24/2022, Enoxaparin injection into the abdomen in the AM. Recheck INR at the Lab for further dosing instructions.   If you have any questions please call the Anticoagulation Clinic at [phone].    Take 1/2 of your Lantus insulin the morning of surgery.   Don't take the metformin the day of surgery.       Getting started  A nurse will call you to review your health history and instructions. They will give you an arrival time based on your scheduled surgery time. Please be ready to share:    Your doctor's clinic name and phone number    Your medical, surgical and  anesthesia history    A list of allergies and sensitivities    A list of medicines, including herbal treatments and over-the-counter drugs    Whether the patient has a legal guardian (ask how to send us the papers in advance)  Please tell us if you're pregnant--or if there's any chance you might be pregnant. Some surgeries may injure a fetus (unborn baby), so they require a pregnancy test. Surgeries that are safe for a fetus don't always need a test, and you can choose whether to have one.   If you have a child who's having surgery, please ask for a copy of Preparing for Your Child's Surgery.    Preparing for surgery  1. Within 30 days of surgery: Have a pre-op exam (sometimes called an H&P, or History and Physical). This can be done at a clinic or pre-operative center.  ? If you're having a , you may not need this exam. Talk to your care team.  2. At your pre-op exam, talk to your care team about all medicines you take. If you need to stop any medicines before surgery, ask when to start taking them again.  ? We do this for your safety. Many medicines can make you bleed too much during surgery. Some change how well surgery (anesthesia) drugs work.  3. Call your insurance company to let them know you're having surgery. (If you don't have insurance, call [phone].)  4. Call your clinic if there's any change in your health. This includes signs of a cold or flu (sore throat, runny nose, cough, rash, fever). It also includes a scrape or scratch near the surgery site.  5. If you have questions on the day of surgery, call your hospital or surgery center.  COVID testing  You may need to be tested for COVID-19 before having surgery. If so, we will give you instructions.  Eating and drinking guidelines  For your safety: Unless your surgeon tells you otherwise, follow the guidelines below.    Eat and drink as usual until 8 hours before surgery. After that, no food or milk.    Drink clear liquids until 2 hours  before surgery. These are liquids you can see through, like water, Gatorade and Propel Water. You may also have black coffee and tea (no cream or milk).    Nothing by mouth within 2 hours of surgery. This includes gum, candy and breath mints.    If you drink alcohol: Stop drinking it the night before surgery.    If your care team tells you to take medicine on the morning of surgery, it's okay to take it with a sip of water.  Preventing infection  1. Shower or bathe the night before and morning of your surgery. Follow the instructions your clinic gave you. (If no instructions, use regular soap.)  2. Don't shave or clip hair near your surgery site. We'll remove the hair if needed.  3. Don't smoke or vape the morning of surgery. You may chew nicotine gum up to 2 hours before surgery. A nicotine patch is okay.  ? Note: Some surgeries require you to completely quit smoking and nicotine. Check with your surgeon.  4. Your care team will make every effort to keep you safe from infection. We will:  ? Clean our hands often with soap and water (or an alcohol-based hand rub).  ? Clean the skin at your surgery site with a special soap that kills germs.  ? Give you a special gown to keep you warm. (Cold raises the risk of infection.)  ? Wear special hair covers, masks, gowns and gloves during surgery.  ? Give antibiotic medicine, if prescribed. Not all surgeries need antibiotics.  What to bring on the day of surgery  1. Photo ID and insurance card  2. Copy of your health care directive, if you have one  3. Glasses and hearing aides (bring cases)  ? You can't wear contacts during surgery  4. Inhaler and eye drops, if you use them (tell us about these when you arrive)  5. CPAP machine or breathing device, if you use them  6. A few personal items, if spending the night  7. If you have . . .  ? A pacemaker, ICD (cardiac defibrillator) or other implant: Bring the ID card.  ? An implanted stimulator: Bring the remote control.  ? A  legal guardian: Bring a copy of the certified (court-stamped) guardianship papers.  Please remove any jewelry, including body piercings. Leave jewelry and other valuables at home.  If you're going home the day of surgery    You must have a responsible adult drive you home. They should stay with you overnight as well.    If you don't have someone to stay with you, and you aren't safe to go home alone, we may keep you overnight. Insurance often won't pay for this.  After surgery  If it's hard to control your pain or you need more pain medicine, please call your surgeon's office.  Questions?   If you have any questions for your care team, list them here: _________________________________________________________________________________________________________________________________________________________________________ ____________________________________ ____________________________________ ____________________________________  For informational purposes only. Not to replace the advice of your health care provider. Copyright   2003, 2019 [email or web address]. All rights reserved. Clinically reviewed by Meghna Beth MD. [employer] [id number] - REV 07/21.           Implanted Device: no        Risks and Recommendations:  The patient has the following additional risks and recommendations for perioperative complications:   - Morbid obesity (BMI >40)  Diabetes:  - Patient is on insulin therapy; diabetic NPO guidelines provided and discussed.  Anemia/Bleeding/Clotting:    - History of DVT or PE, consider DVT prevention postoperatively    Medication Instructions:  See above.    RECOMMENDATION:  APPROVAL GIVEN to proceed with proposed procedure, without further diagnostic evaluation.    Subjective     HPI related to upcoming procedure: Chronic knee pain, evaluated by orthopedics, patient wishes to proceed with arthroscopy.      Preop Questions 8/16/2022   1. Have you ever had a heart attack or stroke? No   2. Have you ever  had surgery on your heart or blood vessels, such as a stent placement, a coronary artery bypass, or surgery on an artery in your head, neck, heart, or legs? No   3. Do you have chest pain with activity? No   4. Do you have a history of  heart failure? No   5. Do you currently have a cold, bronchitis or symptoms of other infection? No   6. Do you have a cough, shortness of breath, or wheezing? No   7. Do you or anyone in your family have previous history of blood clots? YES -personal history   8. Do you or does anyone in your family have a serious bleeding problem such as prolonged bleeding following surgeries or cuts? No   9. Have you ever had problems with anemia or been told to take iron pills? No   10. Have you had any abnormal blood loss such as black, tarry or bloody stools? No   11. Have you ever had a blood transfusion? No   12. Are you willing to have a blood transfusion if it is medically needed before, during, or after your surgery? Yes   13. Have you or any of your relatives ever had problems with anesthesia? No   14. Do you have sleep apnea, excessive snoring or daytime drowsiness? No   15. Do you have any artifical heart valves or other implanted medical devices like a pacemaker, defibrillator, or continuous glucose monitor? YES -   15a. What type of device do you have? Nerve stimulator   15b. Name of the clinic that manages your device:  Pelvic floor   16. Do you have artificial joints? No   17. Are you allergic to latex? No     Health Care Directive:  Patient does not have a Health Care Directive or Living Will:     Preoperative Review of :   reviewed - controlled substances reflected in medication list.      Status of Chronic Conditions:  See problem list for active medical problems.  Problems all longstanding and stable, except as noted/documented.  See ROS for pertinent symptoms related to these conditions.      Review of Systems  Constitutional, neuro, ENT, endocrine, pulmonary, cardiac,  gastrointestinal, genitourinary, musculoskeletal, integument and psychiatric systems are negative, except as otherwise noted.    Patient Active Problem List    Diagnosis Date Noted     Chronic pain of right knee 08/08/2022     Priority: Medium     Added automatically from request for surgery [id number]       Bilateral leg pain 06/09/2022     Priority: Medium     Added automatically from request for surgery [id number]       Low volume of ejaculated semen 06/07/2022     Priority: Medium     Recurrent deep vein thrombosis (DVT) (H) 09/27/2021     Priority: Medium     Precordial pain 02/15/2021     Priority: Medium     Dyslipidemia 02/15/2021     Priority: Medium     Elevated C-reactive protein 02/15/2021     Priority: Medium     Gastric reflux 02/15/2021     Priority: Medium     Internal derangement of knee 02/15/2021     Priority: Medium     Nicotine dependence 02/15/2021     Priority: Medium     Varicose veins of lower extremity 02/15/2021     Priority: Medium     Vitamin D deficiency 02/15/2021     Priority: Medium     Neuropathy 06/23/2020     Priority: Medium     Morbid obesity (H) 03/26/2020     Priority: Medium     Insomnia, unspecified type 05/09/2019     Priority: Medium     CONNOR (generalized anxiety disorder) 05/08/2019     Priority: Medium     Hyperlipidemia LDL goal <100 08/17/2018     Priority: Medium     Type 2 diabetes mellitus with diabetic polyneuropathy, without long-term current use of insulin (H) 01/18/2018     Priority: Medium     Mild persistent asthma without complication 01/12/2018     Priority: Medium     Right inguinal hernia 06/14/2016     Priority: Medium     Irritable bowel syndrome with diarrhea 03/21/2016     Priority: Medium     Fatty liver 02/22/2016     Priority: Medium     Benign essential hypertension 12/15/2015     Priority: Medium     Anemia in other chronic diseases classified elsewhere 12/15/2015     Priority: Medium     Hypertriglyceridemia 07/12/2013     Priority: Medium     Chronic  maxillary sinusitis 09/18/2012     Priority: Medium     Believes this is secondary to exposure to toxic fumes at work- he is a .  He regularly wears a mask ventilator and believes this helps.  Has only tried intermittent nasal washes, and OTC medications.  Not ever tried steroid nasal sprays or other controller medications.         Chronic rhinitis 09/18/2012     Priority: Medium     Constipation 04/24/2012     Priority: Medium     GERD (gastroesophageal reflux disease) 06/15/2011     Priority: Medium     Chronic RLQ pain 06/15/2011     Priority: Medium      Past Medical History:   Diagnosis Date     Anaphylactic reaction 8/14/2015     Anxiety      Depression      DM2 (diabetes mellitus, type 2) (H)      GERD (gastroesophageal reflux disease)      HTN (hypertension)      IBS (irritable bowel syndrome)      Kidney stone 6/15/2011    Pt believes these were Calcium stones     Neuropathy      Past Surgical History:   Procedure Laterality Date     COLONOSCOPY  5/1/2012    Procedure:COLONOSCOPY; screening colonoscopy; Surgeon:KINGSLEY DOS SANTOS; Location:[facility]     COLONOSCOPY  4/21/2014    Procedure: COMBINED COLONOSCOPY, SINGLE BIOPSY/POLYPECTOMY BY BIOPSY;  Surgeon: Duane, William Charles, MD;  Location: [facility]     COLONOSCOPY N/A 4/21/2022    Procedure: COLONOSCOPY, WITH POLYPECTOMY AND BIOPSY;  Surgeon: Luiz Calvert MD;  Location: [facility]     CYSTOSCOPY  05/01/2008    CYSTOSCOPY W/ URETERAL STENT PLACEMENT 05/01/2008      CYSTOSCOPY  09/26/2010    CYSTOSCOPY W/ URETERAL STENT PLACEMENT 09/26/2010 Left      CYSTOSCOPY  05/18/2012    CYSTOSCOPY, LEFT URETEROSCOPY AND STENT PLACEMENT left retrograde 05/18/2012      CYSTOSCOPY,+URETEROSCOPY  06/10/2008    URETEROSCOPY 06/10/2008 Right      HC BREATH HYDROGEN TEST  3/7/2014    Procedure: HYDROGEN BREATH TEST;  Surgeon: Darion Swift MD;  Location: [facility]     INJECT EPIDURAL LUMBAR N/A 7/7/2022    Procedure: INJECTION, SPINE, LUMBAR, EPIDURAL L5/S1;   Surgeon: Michi Hahn MD;  Location: [facility]     LITHOTRIPSY  09/30/2010    LITHOTRIPSY 09/30/2010 LEFT EXTRACORPOREAL SHOCK WAVE LITHOTRIPSY, FLEXIBLE CYSTOSCOPY, LEFT STENT REMOVAL       ORTHOPEDIC SURGERY  10/03/2007    KNEE ARTHROSCOPY 10/03/2007 RightX2       RELEASE CARPAL TUNNEL Right 1/26/2018    Procedure: RELEASE CARPAL TUNNEL;  Right carpal tunnel release;  Surgeon: Wiliam Saenz MD;  Location: [facility]     VASCULAR SURGERY  11/24/2008    Radiofrequency ablation left saphenous vein 11/24/2008 Done by Dr. Lozoya       Current Outpatient Medications   Medication Sig Dispense Refill     albuterol (PROAIR HFA/PROVENTIL HFA/VENTOLIN HFA) 108 (90 Base) MCG/ACT inhaler Inhale 2 puffs into the lungs every 6 hours as needed for shortness of breath / dyspnea or wheezing 6.7 g 3     Alpha Lipoic Acid 200 MG CAPS Take 200 mg by mouth 3 times daily 90 capsule 3     amLODIPine (NORVASC) 10 MG tablet TAKE 1 TABLET BY MOUTH DAILY FOR BLOOD PRESSURE 90 tablet 1     ASPIRIN PO Take 325 mg by mouth daily        aspirin-acetaminophen-caffeine (EXCEDRIN MIGRAINE) 250-250-65 MG tablet Take 1 tablet by mouth       atorvastatin (LIPITOR) 40 MG tablet Take 1 tablet (40 mg) by mouth daily 90 tablet 3     blood glucose (ACCU-CHEK GUIDE) test strip Use to test blood sugar 3 times daily or as directed. 300 strip 3     blood glucose monitoring (ACCU-CHEK FASTCLIX) lancets Use to test blood sugar 1 times daily or as directed.  Ok to substitute alternative if insurance prefers. 102 each 11     calcium carbonate (TUMS) 500 MG chewable tablet Take 1 chew tab by mouth daily       chlorthalidone (HYGROTON) 25 MG tablet TAKE 1 TABLET (25 MG) BY MOUTH DAILY ADD ON FOR BLOOD PRESSURE 90 tablet 1     cyclobenzaprine (FLEXERIL) 10 MG tablet Take 0.5-1 tablets (5-10 mg) by mouth 3 times daily as needed for muscle spasms 90 tablet 3     diazepam (VALIUM) 5 MG tablet Take 1 tablet (5 mg) by mouth every 5 minutes prior to surgery 1 tablet 1      DULoxetine (CYMBALTA) 60 MG capsule Take 1 capsule (60 mg) by mouth 2 times daily 180 capsule 3     enoxaparin ANTICOAGULANT (LOVENOX) 100 MG/ML syringe Inject 1 mL (100 mg) Subcutaneous 2 times daily 20 mL 1     EPINEPHrine (EPIPEN) 0.3 MG/0.3ML injection Inject 0.3 mLs (0.3 mg) into the muscle once as needed for anaphylaxis 2 each 2     fluticasone (FLONASE) 50 MCG/ACT nasal spray INSTILL 1 SPRAY INTO BOTH NOSTRILS DAILY 48 mL 1     fluticasone-salmeterol (ADVAIR) 500-50 MCG/DOSE inhaler Inhale 1 puff into the lungs 2 times daily 1 each 11     insulin glargine (BASAGLAR KWIKPEN) 100 UNIT/ML pen Inject 36 Units Subcutaneous daily Max Total Daily Dose 45 units per day 15 mL 3     insulin pen needle (32G X 4 MM) 32G X 4 MM miscellaneous Use 2 pen needles daily or as directed. 200 each 1     losartan (COZAAR) 25 MG tablet Take 1 tablet (25 mg) by mouth daily Note decrease in dose 90 tablet 0     metFORMIN (GLUCOPHAGE-XR) 500 MG 24 hr tablet Take 2 tablets (1,000 mg) by mouth 2 times daily (with meals) 120 tablet 5     montelukast (SINGULAIR) 10 MG tablet TAKE 1 TABLET (10 MG) BY MOUTH AT BEDTIME FOR ALLERGIES/ASTHMA 90 tablet 2     multivitamin, therapeutic (THERA-VIT) TABS Take 1 tablet by mouth daily       naproxen sodium (ANAPROX) 220 MG tablet Take 220 mg by mouth       nortriptyline (PAMELOR) 10 MG capsule Take 2 capsules (20 mg) by mouth At Bedtime 60 capsule 5     omeprazole (PRILOSEC) 40 MG DR capsule TAKE 1 CAPSULE (40 MG) BY MOUTH DAILY TAKE FOR AT LEAST TWO MONTHS 90 capsule 2     ondansetron (ZOFRAN ODT) 4 MG ODT tab Take 1 tablet (4 mg) by mouth every 8 hours as needed for nausea 20 tablet 1     pregabalin (LYRICA) 50 MG capsule Take 1 capsule (50 mg) by mouth 2 times daily 60 capsule 1     rizatriptan (MAXALT-MLT) 10 MG ODT TAKE 1 TABLET BY MOUTH AT ONSET OF HEADACHE FOR MIGRAINE MAY REPEAT IN 2 HOURS. MAX 3 TABS/24 HOURS. 18 tablet 0     tamsulosin (FLOMAX) 0.4 MG capsule TAKE 1 CAPSULE BY MOUTH  "EVERY DAY 90 capsule 1     warfarin ANTICOAGULANT (COUMADIN) 5 MG tablet Take 15 mg Tues, Thurs and 12.5 mg all other days, or as directed by the Coumadin clinic 225 tablet 1     acetaminophen (TYLENOL) 325 MG tablet Take 2 tablets (650 mg) by mouth every 4 hours as needed for mild pain 50 tablet 0     hydrOXYzine (ATARAX) 25 MG tablet Take 1 tablet (25 mg) by mouth 3 times daily as needed for itching 10 tablet 0     oxyCODONE (ROXICODONE) 5 MG tablet Take 1-2 tablets (5-10 mg) by mouth every 4 hours as needed for moderate to severe pain 10 tablet 0     senna-docusate (SENOKOT-S/PERICOLACE) 8.6-50 MG tablet Take 1-2 tablets by mouth 2 times daily 30 tablet 0       Allergies   Allergen Reactions     Artificial Sweetner (Informational Only) [Artificial Sweetner (Informational Only) ] GI Disturbance     ALL artificial sweetners cause severe diarrhea & flu symptoms     Hydromorphone Anaphylaxis     Ibuprofen GI Disturbance     Morphine Sulfate Concentrate Anaphylaxis     Morphine [Fumaric Acid] Anaphylaxis     Hydrocodone-Acetaminophen Itching     Tramadol Hives     Trazodone Hives     Gabapentin      GI upset per pt     Keflex [Cephalexin] Diarrhea     Upset stomach     Codeine Phosphate Itching     Ketorolac Tromethamine Rash        Social History     Tobacco Use     Smoking status: Never Smoker     Smokeless tobacco: Current User     Types: Chew     Tobacco comment: Chew   Substance Use Topics     Alcohol use: Not Currently     Alcohol/week: 0.0 standard drinks     Comment: occasional, few drinks a month       History   Drug Use     Comment: CBD occasional         Objective     BP (!) 157/88   Pulse 71   Temp 97.9  F (36.6  C) (Tympanic)   Resp 18   Ht 1.803 m (5' 11\")   Wt 132.9 kg (293 lb)   SpO2 97%   BMI 40.87 kg/m      Physical Exam  GENERAL: Healthy, alert and no distress  EYES: Eyes grossly normal to inspection, conjunctivae and sclerae normal  RESP: Lungs clear to auscultation - no rales, rhonchi or " wheezes  CV: Regular rate and rhythm, normal S1 S2, no murmur  MS: No gross musculoskeletal defects noted, no edema  NEURO: Normal strength and tone, mentation intact and speech normal  PSYCH: Mentation appears normal, affect normal/bright     Recent Labs   Lab Test 08/01/22  0831 07/12/22  0759 06/13/22  0714 06/07/22  1124 05/16/22  0815 05/04/22  1802 03/02/22  0920 02/21/22  1811 01/24/22  0829 01/24/22  0812 01/15/22  0000 01/02/22  1500   HGB  --   --   --   --   --  10.2*  --   --   --   --   --  13.9   PLT  --   --   --   --   --  242  --   --   --   --   --  181   INR 2.1* 2.1*   < >  --    < > 2.4*   < > 1.8*   < >  --    < >  --    NA  --   --   --  138  --   --   --  142  --   --   --   --    POTASSIUM  --   --   --  4.3  --   --   --  4.0  --   --   --   --    CR  --   --   --  0.91  --   --   --  0.88  --   --   --   --    A1C  --   --   --  7.0*  --   --   --   --   --  11.2*  --   --     < > = values in this interval not displayed.        Diagnostics:  Recent Results (from the past 168 hour(s))   Basic metabolic panel  (Ca, Cl, CO2, Creat, Gluc, K, Na, BUN)    Collection Time: 08/16/22  9:29 AM   Result Value Ref Range    Sodium 137 133 - 144 mmol/L    Potassium 4.0 3.4 - 5.3 mmol/L    Chloride 101 94 - 109 mmol/L    Carbon Dioxide (CO2) 32 20 - 32 mmol/L    Anion Gap 4 3 - 14 mmol/L    Urea Nitrogen 13 7 - 30 mg/dL    Creatinine 0.83 0.66 - 1.25 mg/dL    Calcium 9.2 8.5 - 10.1 mg/dL    Glucose 238 (H) 70 - 99 mg/dL    GFR Estimate >90 >60 mL/min/1.73m2   Hemoglobin A1c    Collection Time: 08/16/22  9:29 AM   Result Value Ref Range    Hemoglobin A1C 8.5 (H) 0.0 - 5.6 %   CBC with platelets    Collection Time: 08/16/22  9:29 AM   Result Value Ref Range    WBC Count 7.2 4.0 - 11.0 10e3/uL    RBC Count 4.25 (L) 4.40 - 5.90 10e6/uL    Hemoglobin 12.1 (L) 13.3 - 17.7 g/dL    Hematocrit 37.3 (L) 40.0 - 53.0 %    MCV 88 78 - 100 fL    MCH 28.5 26.5 - 33.0 pg    MCHC 32.4 31.5 - 36.5 g/dL    RDW 14.0 10.0 -  15.0 %    Platelet Count 237 150 - 450 10e3/uL   Glucose by meter    Collection Time: 08/19/22 12:48 PM   Result Value Ref Range    GLUCOSE BY METER POCT 203 (H) 70 - 99 mg/dL      No EKG required, no history of coronary heart disease, significant arrhythmia, peripheral arterial disease or other structural heart disease.    Revised Cardiac Risk Index (RCRI):  The patient has the following serious cardiovascular risks for perioperative complications:   - No serious cardiac risks = 0 points     RCRI Interpretation: 0 points: Class I (very low risk - 0.4% complication rate)           Signed Electronically by: Mariela Mirza MD  Copy of this evaluation report is provided to requesting physician.

## 2022-08-16 NOTE — PATIENT INSTRUCTIONS
Preparing for Your Surgery  Last warfarin dose: 08/13/2022 08/14/2022, NO warfarin  08/15/2022, NO warfarin  08/16/2022, NO warfarin, begin enoxaparin injections into the abdomen every 12 hours (AM and PM)  08/17/2022, NO warfarin, enoxaparin injection into the abdomen every 12 hours (AM and PM)  08/18/2022, NO warfarin, enoxaparin injection into the abdomen AM only (no enoxaparin 24 hours prior to surgery)  08/19/2022, DAY OF PROCEDURE, NO enoxaparin. Restart warfarin 25mg (5 tabs) in the evening, unless instructed otherwise by the physician.  08/20/2022, Restart enoxaparin injections into the abdomen every 12 hours (AM and PM), unless instructed otherwise by the physician, and 20mg (4 tabs) warfarin in the evening.   08/21/2022, Enoxaparin injection into the abdomen every 12 hours (AM and PM) and 12.5mg (2.5 tabs) warfarin in the evening.  08/22/2022, Enoxaparin injection into the abdomen every 12 hours (AM and PM) and 12.5mg (2.5 tabs) warfarin in the evening.  08/23/2022, Enoxaparin injection into the abdomen every 12 hours (AM and PM) and 15mg (3 tabs) warfarin in the evening.  08/24/2022, Enoxaparin injection into the abdomen in the AM. Recheck INR at the Lab for further dosing instructions.   If you have any questions please call the Anticoagulation Clinic at [phone].    Take 1/2 of your Lantus insulin the morning of surgery.   Don't take the metformin the day of surgery.       Getting started  A nurse will call you to review your health history and instructions. They will give you an arrival time based on your scheduled surgery time. Please be ready to share:  Your doctor's clinic name and phone number  Your medical, surgical and anesthesia history  A list of allergies and sensitivities  A list of medicines, including herbal treatments and over-the-counter drugs  Whether the patient has a legal guardian (ask how to send us the papers in advance)  Please tell us if you're pregnant--or if there's any  chance you might be pregnant. Some surgeries may injure a fetus (unborn baby), so they require a pregnancy test. Surgeries that are safe for a fetus don't always need a test, and you can choose whether to have one.   If you have a child who's having surgery, please ask for a copy of Preparing for Your Child's Surgery.    Preparing for surgery  Within 30 days of surgery: Have a pre-op exam (sometimes called an H&P, or History and Physical). This can be done at a clinic or pre-operative center.  If you're having a , you may not need this exam. Talk to your care team.  At your pre-op exam, talk to your care team about all medicines you take. If you need to stop any medicines before surgery, ask when to start taking them again.  We do this for your safety. Many medicines can make you bleed too much during surgery. Some change how well surgery (anesthesia) drugs work.  Call your insurance company to let them know you're having surgery. (If you don't have insurance, call [phone].)  Call your clinic if there's any change in your health. This includes signs of a cold or flu (sore throat, runny nose, cough, rash, fever). It also includes a scrape or scratch near the surgery site.  If you have questions on the day of surgery, call your hospital or surgery center.  COVID testing  You may need to be tested for COVID-19 before having surgery. If so, we will give you instructions.  Eating and drinking guidelines  For your safety: Unless your surgeon tells you otherwise, follow the guidelines below.  Eat and drink as usual until 8 hours before surgery. After that, no food or milk.  Drink clear liquids until 2 hours before surgery. These are liquids you can see through, like water, Gatorade and Propel Water. You may also have black coffee and tea (no cream or milk).  Nothing by mouth within 2 hours of surgery. This includes gum, candy and breath mints.  If you drink alcohol: Stop drinking it the night before  surgery.  If your care team tells you to take medicine on the morning of surgery, it's okay to take it with a sip of water.  Preventing infection  Shower or bathe the night before and morning of your surgery. Follow the instructions your clinic gave you. (If no instructions, use regular soap.)  Don't shave or clip hair near your surgery site. We'll remove the hair if needed.  Don't smoke or vape the morning of surgery. You may chew nicotine gum up to 2 hours before surgery. A nicotine patch is okay.  Note: Some surgeries require you to completely quit smoking and nicotine. Check with your surgeon.  Your care team will make every effort to keep you safe from infection. We will:  Clean our hands often with soap and water (or an alcohol-based hand rub).  Clean the skin at your surgery site with a special soap that kills germs.  Give you a special gown to keep you warm. (Cold raises the risk of infection.)  Wear special hair covers, masks, gowns and gloves during surgery.  Give antibiotic medicine, if prescribed. Not all surgeries need antibiotics.  What to bring on the day of surgery  Photo ID and insurance card  Copy of your health care directive, if you have one  Glasses and hearing aides (bring cases)  You can't wear contacts during surgery  Inhaler and eye drops, if you use them (tell us about these when you arrive)  CPAP machine or breathing device, if you use them  A few personal items, if spending the night  If you have . . .  A pacemaker, ICD (cardiac defibrillator) or other implant: Bring the ID card.  An implanted stimulator: Bring the remote control.  A legal guardian: Bring a copy of the certified (court-stamped) guardianship papers.  Please remove any jewelry, including body piercings. Leave jewelry and other valuables at home.  If you're going home the day of surgery  You must have a responsible adult drive you home. They should stay with you overnight as well.  If you don't have someone to stay with you,  and you aren't safe to go home alone, we may keep you overnight. Insurance often won't pay for this.  After surgery  If it's hard to control your pain or you need more pain medicine, please call your surgeon's office.  Questions?   If you have any questions for your care team, list them here: _________________________________________________________________________________________________________________________________________________________________________ ____________________________________ ____________________________________ ____________________________________  For informational purposes only. Not to replace the advice of your health care provider. Copyright   2003, 2019 [employer]. All rights reserved. Clinically reviewed by Meghna Beth MD. [employer] 835508 - REV 07/21.

## 2022-08-18 ENCOUNTER — TELEPHONE (OUTPATIENT)
Dept: ORTHOPEDICS | Facility: CLINIC | Age: 57
End: 2022-08-18

## 2022-08-18 ENCOUNTER — ANESTHESIA EVENT (OUTPATIENT)
Dept: SURGERY | Facility: AMBULATORY SURGERY CENTER | Age: 57
End: 2022-08-18
Payer: COMMERCIAL

## 2022-08-18 NOTE — TELEPHONE ENCOUNTER
[facility] Call Center    Phone Message    May a detailed message be left on voicemail: yes     Reason for Call: Other: Pt is needing a call back with time and location for his surgery tommorrow. Please call patient regarding this     Action Taken: Other: [facility]    Travel Screening: Not Applicable

## 2022-08-18 NOTE — TELEPHONE ENCOUNTER
Spoke with patient, he just spoke with surgery center.  Discussed what medications to hold, the only thing they said at the pre-op H&P was to cut his insulin in half.  He will hold his Norvasc and he wants to hold his Lyrica morning dose.  He had no further questions at this time. Radha Alonso RN

## 2022-08-18 NOTE — TELEPHONE ENCOUNTER
Received call from patient requesting to know his arrival time for surgery tomorrow with Dr Em. Patient was advised to arrive by 12:30pm.     Patient had additional questions about which medications he can still take. Patient will wait for a call back from an RN to discuss.     Message given to ASC control desk for RN to call patient ASAP.

## 2022-08-19 ENCOUNTER — ANESTHESIA (OUTPATIENT)
Dept: SURGERY | Facility: AMBULATORY SURGERY CENTER | Age: 57
End: 2022-08-19
Payer: COMMERCIAL

## 2022-08-19 ENCOUNTER — TELEPHONE (OUTPATIENT)
Dept: FAMILY MEDICINE | Facility: CLINIC | Age: 57
End: 2022-08-19

## 2022-08-19 ENCOUNTER — HOSPITAL ENCOUNTER (OUTPATIENT)
Facility: AMBULATORY SURGERY CENTER | Age: 57
Discharge: HOME | End: 2022-08-19
Attending: ORTHOPAEDIC SURGERY
Payer: COMMERCIAL

## 2022-08-19 VITALS
RESPIRATION RATE: 18 BRPM | BODY MASS INDEX: 40.6 KG/M2 | WEIGHT: 290 LBS | OXYGEN SATURATION: 96 % | HEIGHT: 71 IN | HEART RATE: 62 BPM | SYSTOLIC BLOOD PRESSURE: 119 MMHG | TEMPERATURE: 97.2 F | DIASTOLIC BLOOD PRESSURE: 68 MMHG

## 2022-08-19 DIAGNOSIS — M25.561 CHRONIC PAIN OF RIGHT KNEE: ICD-10-CM

## 2022-08-19 DIAGNOSIS — G89.29 CHRONIC PAIN OF RIGHT KNEE: ICD-10-CM

## 2022-08-19 LAB
GLUCOSE BLDC GLUCOMTR-MCNC: 203 MG/DL (ref 70–99)
GLUCOSE BLDC GLUCOMTR-MCNC: 209 MG/DL (ref 70–99)

## 2022-08-19 PROCEDURE — 82962 GLUCOSE BLOOD TEST: CPT | Mod: 91 | Performed by: PATHOLOGY

## 2022-08-19 PROCEDURE — 29881 ARTHRS KNE SRG MNISECTMY M/L: CPT | Mod: RT | Performed by: ORTHOPAEDIC SURGERY

## 2022-08-19 PROCEDURE — 29881 ARTHRS KNE SRG MNISECTMY M/L: CPT | Mod: RT

## 2022-08-19 RX ORDER — FENTANYL CITRATE 50 UG/ML
INJECTION, SOLUTION INTRAMUSCULAR; INTRAVENOUS PRN
Status: DISCONTINUED | OUTPATIENT
Start: 2022-08-19 | End: 2022-08-19

## 2022-08-19 RX ORDER — ACETAMINOPHEN 325 MG/1
975 TABLET ORAL ONCE
Status: COMPLETED | OUTPATIENT
Start: 2022-08-19 | End: 2022-08-19

## 2022-08-19 RX ORDER — DEXAMETHASONE SODIUM PHOSPHATE 4 MG/ML
INJECTION, SOLUTION INTRA-ARTICULAR; INTRALESIONAL; INTRAMUSCULAR; INTRAVENOUS; SOFT TISSUE PRN
Status: DISCONTINUED | OUTPATIENT
Start: 2022-08-19 | End: 2022-08-19

## 2022-08-19 RX ORDER — ACETAMINOPHEN 325 MG/1
650 TABLET ORAL
Status: DISCONTINUED | OUTPATIENT
Start: 2022-08-19 | End: 2022-08-20 | Stop reason: HOSPADM

## 2022-08-19 RX ORDER — HYDROXYZINE HYDROCHLORIDE 25 MG/1
25 TABLET, FILM COATED ORAL
Status: DISCONTINUED | OUTPATIENT
Start: 2022-08-19 | End: 2022-08-20 | Stop reason: HOSPADM

## 2022-08-19 RX ORDER — HYDROMORPHONE HYDROCHLORIDE 1 MG/ML
0.2 INJECTION, SOLUTION INTRAMUSCULAR; INTRAVENOUS; SUBCUTANEOUS EVERY 5 MIN PRN
Status: DISCONTINUED | OUTPATIENT
Start: 2022-08-19 | End: 2022-08-19 | Stop reason: HOSPADM

## 2022-08-19 RX ORDER — KETAMINE HYDROCHLORIDE 10 MG/ML
INJECTION INTRAMUSCULAR; INTRAVENOUS PRN
Status: DISCONTINUED | OUTPATIENT
Start: 2022-08-19 | End: 2022-08-19

## 2022-08-19 RX ORDER — OXYCODONE HYDROCHLORIDE 5 MG/1
5 TABLET ORAL EVERY 4 HOURS PRN
Status: DISCONTINUED | OUTPATIENT
Start: 2022-08-19 | End: 2022-08-20 | Stop reason: HOSPADM

## 2022-08-19 RX ORDER — PROPOFOL 10 MG/ML
INJECTION, EMULSION INTRAVENOUS PRN
Status: DISCONTINUED | OUTPATIENT
Start: 2022-08-19 | End: 2022-08-19

## 2022-08-19 RX ORDER — ONDANSETRON 4 MG/1
4 TABLET, ORALLY DISINTEGRATING ORAL EVERY 30 MIN PRN
Status: DISCONTINUED | OUTPATIENT
Start: 2022-08-19 | End: 2022-08-20 | Stop reason: HOSPADM

## 2022-08-19 RX ORDER — LIDOCAINE HYDROCHLORIDE 20 MG/ML
INJECTION, SOLUTION INFILTRATION; PERINEURAL PRN
Status: DISCONTINUED | OUTPATIENT
Start: 2022-08-19 | End: 2022-08-19

## 2022-08-19 RX ORDER — AMOXICILLIN 250 MG
1-2 CAPSULE ORAL 2 TIMES DAILY
Qty: 30 TABLET | Refills: 0 | Status: SHIPPED | OUTPATIENT
Start: 2022-08-19 | End: 2022-11-30

## 2022-08-19 RX ORDER — FENTANYL CITRATE 50 UG/ML
25 INJECTION, SOLUTION INTRAMUSCULAR; INTRAVENOUS
Status: DISCONTINUED | OUTPATIENT
Start: 2022-08-19 | End: 2022-08-20 | Stop reason: HOSPADM

## 2022-08-19 RX ORDER — ONDANSETRON 4 MG/1
4 TABLET, ORALLY DISINTEGRATING ORAL
Status: DISCONTINUED | OUTPATIENT
Start: 2022-08-19 | End: 2022-08-20 | Stop reason: HOSPADM

## 2022-08-19 RX ORDER — ACETAMINOPHEN 325 MG/1
650 TABLET ORAL EVERY 4 HOURS PRN
Qty: 50 TABLET | Refills: 0 | Status: SHIPPED | OUTPATIENT
Start: 2022-08-19 | End: 2023-04-24

## 2022-08-19 RX ORDER — CEFAZOLIN SODIUM IN 0.9 % NACL 3 G/100 ML
3 INTRAVENOUS SOLUTION, PIGGYBACK (ML) INTRAVENOUS SEE ADMIN INSTRUCTIONS
Status: DISCONTINUED | OUTPATIENT
Start: 2022-08-19 | End: 2022-08-19 | Stop reason: HOSPADM

## 2022-08-19 RX ORDER — LIDOCAINE 40 MG/G
CREAM TOPICAL
Status: DISCONTINUED | OUTPATIENT
Start: 2022-08-19 | End: 2022-08-19 | Stop reason: HOSPADM

## 2022-08-19 RX ORDER — FENTANYL CITRATE 50 UG/ML
25 INJECTION, SOLUTION INTRAMUSCULAR; INTRAVENOUS EVERY 5 MIN PRN
Status: DISCONTINUED | OUTPATIENT
Start: 2022-08-19 | End: 2022-08-19 | Stop reason: HOSPADM

## 2022-08-19 RX ORDER — DIPHENHYDRAMINE HYDROCHLORIDE 50 MG/ML
12.5 INJECTION INTRAMUSCULAR; INTRAVENOUS ONCE
Status: COMPLETED | OUTPATIENT
Start: 2022-08-19 | End: 2022-08-19

## 2022-08-19 RX ORDER — ONDANSETRON 2 MG/ML
INJECTION INTRAMUSCULAR; INTRAVENOUS PRN
Status: DISCONTINUED | OUTPATIENT
Start: 2022-08-19 | End: 2022-08-19

## 2022-08-19 RX ORDER — ONDANSETRON 2 MG/ML
4 INJECTION INTRAMUSCULAR; INTRAVENOUS EVERY 30 MIN PRN
Status: DISCONTINUED | OUTPATIENT
Start: 2022-08-19 | End: 2022-08-20 | Stop reason: HOSPADM

## 2022-08-19 RX ORDER — PROPOFOL 10 MG/ML
INJECTION, EMULSION INTRAVENOUS CONTINUOUS PRN
Status: DISCONTINUED | OUTPATIENT
Start: 2022-08-19 | End: 2022-08-19

## 2022-08-19 RX ORDER — BUPIVACAINE HYDROCHLORIDE AND EPINEPHRINE 2.5; 5 MG/ML; UG/ML
INJECTION, SOLUTION INFILTRATION; PERINEURAL PRN
Status: DISCONTINUED | OUTPATIENT
Start: 2022-08-19 | End: 2022-08-19 | Stop reason: HOSPADM

## 2022-08-19 RX ORDER — ACETAMINOPHEN 325 MG/1
975 TABLET ORAL ONCE
Status: DISCONTINUED | OUTPATIENT
Start: 2022-08-19 | End: 2022-08-19 | Stop reason: HOSPADM

## 2022-08-19 RX ORDER — HYDROXYZINE HYDROCHLORIDE 25 MG/1
25 TABLET, FILM COATED ORAL 3 TIMES DAILY PRN
Qty: 10 TABLET | Refills: 0 | Status: SHIPPED | OUTPATIENT
Start: 2022-08-19 | End: 2022-12-22

## 2022-08-19 RX ORDER — OXYCODONE HYDROCHLORIDE 5 MG/1
5-10 TABLET ORAL EVERY 4 HOURS PRN
Qty: 10 TABLET | Refills: 0 | Status: SHIPPED | OUTPATIENT
Start: 2022-08-19 | End: 2022-11-30

## 2022-08-19 RX ORDER — SODIUM CHLORIDE, SODIUM LACTATE, POTASSIUM CHLORIDE, CALCIUM CHLORIDE 600; 310; 30; 20 MG/100ML; MG/100ML; MG/100ML; MG/100ML
INJECTION, SOLUTION INTRAVENOUS CONTINUOUS
Status: DISCONTINUED | OUTPATIENT
Start: 2022-08-19 | End: 2022-08-19 | Stop reason: HOSPADM

## 2022-08-19 RX ORDER — CEFAZOLIN SODIUM IN 0.9 % NACL 3 G/100 ML
3 INTRAVENOUS SOLUTION, PIGGYBACK (ML) INTRAVENOUS
Status: COMPLETED | OUTPATIENT
Start: 2022-08-19 | End: 2022-08-19

## 2022-08-19 RX ORDER — SODIUM CHLORIDE, SODIUM LACTATE, POTASSIUM CHLORIDE, CALCIUM CHLORIDE 600; 310; 30; 20 MG/100ML; MG/100ML; MG/100ML; MG/100ML
INJECTION, SOLUTION INTRAVENOUS CONTINUOUS
Status: DISCONTINUED | OUTPATIENT
Start: 2022-08-19 | End: 2022-08-20 | Stop reason: HOSPADM

## 2022-08-19 RX ORDER — OXYCODONE HYDROCHLORIDE 5 MG/1
5 TABLET ORAL
Status: COMPLETED | OUTPATIENT
Start: 2022-08-19 | End: 2022-08-19

## 2022-08-19 RX ADMIN — SODIUM CHLORIDE, SODIUM LACTATE, POTASSIUM CHLORIDE, CALCIUM CHLORIDE: 600; 310; 30; 20 INJECTION, SOLUTION INTRAVENOUS at 12:58

## 2022-08-19 RX ADMIN — LIDOCAINE HYDROCHLORIDE 100 MG: 20 INJECTION, SOLUTION INFILTRATION; PERINEURAL at 14:33

## 2022-08-19 RX ADMIN — KETAMINE HYDROCHLORIDE 30 MG: 10 INJECTION INTRAMUSCULAR; INTRAVENOUS at 14:33

## 2022-08-19 RX ADMIN — ONDANSETRON 4 MG: 2 INJECTION INTRAMUSCULAR; INTRAVENOUS at 14:33

## 2022-08-19 RX ADMIN — PROPOFOL 200 MCG/KG/MIN: 10 INJECTION, EMULSION INTRAVENOUS at 14:33

## 2022-08-19 RX ADMIN — PROPOFOL 200 MG: 10 INJECTION, EMULSION INTRAVENOUS at 14:33

## 2022-08-19 RX ADMIN — Medication 3 G: at 14:28

## 2022-08-19 RX ADMIN — FENTANYL CITRATE 50 MCG: 50 INJECTION, SOLUTION INTRAMUSCULAR; INTRAVENOUS at 14:40

## 2022-08-19 RX ADMIN — OXYCODONE HYDROCHLORIDE 5 MG: 5 TABLET ORAL at 16:08

## 2022-08-19 RX ADMIN — ACETAMINOPHEN 975 MG: 325 TABLET ORAL at 12:56

## 2022-08-19 RX ADMIN — DIPHENHYDRAMINE HYDROCHLORIDE 12.5 MG: 50 INJECTION INTRAMUSCULAR; INTRAVENOUS at 16:06

## 2022-08-19 RX ADMIN — DEXAMETHASONE SODIUM PHOSPHATE 4 MG: 4 INJECTION, SOLUTION INTRA-ARTICULAR; INTRALESIONAL; INTRAMUSCULAR; INTRAVENOUS; SOFT TISSUE at 14:40

## 2022-08-19 NOTE — OR NURSING
Patient at [facility] for knee arthroscopy with Dr. Em. Patient saw Dr. Mirza on 8/16 for pre-op evaluation. H&P charting incomplete. Called [facility] in [place]. Spoke with nurse who said she would page Dr. Mirza to complete charting on H&P.  Lakeshia Gu on 8/19/2022 at 1:19 PM

## 2022-08-19 NOTE — DISCHARGE INSTRUCTIONS
ARTHROSCOPY, DEBRIDEMENT, MENISECTOMY, OR CHONDROPLASTY POST OPERATIVE INSTRUCTIONS     WBAT WITHOUT BRACE PROTOCOL      FOLLOW UP APPOINTMENT  A follow-up appointment with Dr. Em should be scheduled approximately one to two weeks after surgery. If your appointment was not scheduled prior to surgery, please call [phone].    Your follow up appointment will be at the location that you regularly see Dr. Em:    [facility]  [address], [place] [address] [phone]    [address] [address]  [phone]    Physical therapy:   Physical therapy should begin 3-5 days after surgery. An order for physical therapy will be provided by Dr. Em's office but it will be your responsibility to schedule the first appointment at the location of your choice.     ACTIVITY  Weight bearing status:   You will be allowed to weight bear as tolerated on your operative leg using assistive devices (crutches) as needed.     Exercises:   Perform the following exercises at least three times per day for the first four weeks after surgery to prevent complications, such as blood clots in your legs:  1) Point and flex your feet  2) Move your ankle around in big circles  3) Wiggle your toes   Also, perform thigh muscle tightening exercises for 10 to 15 minutes at least three times per day for the first four weeks after surgery.    Athletic Activities:  Activities such as swimming, bicycling, jogging, running, and stop-and-go sports should be avoided until permitted by your provider.    Driving:  You may return to driving once the following criteria have been met: 1) all narcotic pain medication has been discontinued, 2) you have full control over both lower extremities. Please contact Dr. Em's office with any questions.    Return to Work:  Return to work as soon as possible.  Your  ability to work depends on a number of factors - your level of discomfort and how much demand your job puts on your knees.  If you have any questions, please call Dr. Em's office.      COMFORT AND PAIN MANAGEMENT  Elevation:   During times of inactivity throughout the first two weeks after surgery, make an effort to decrease swelling by elevating your operative extremity. This is most effectively done by lying down and placing several pillows lengthwise under your thigh and calf to raise your toes above the level of your nose. To ensure that your knee remains in full extension, do not place pillows directly under your knee.     Icing:  An ice pack will be provided to control swelling and discomfort after surgery. Place a thin towel on your skin and apply the ice pack overtop. You may apply ice for 20 minutes as often as two times per hour.    Pain Medications:  You will be discharged with acetaminophen (Tylenol) and a narcotic medication for pain management after surgery. Acetaminophen is most effective when it is taken per the schedule outlined by your provider (every four, six, or eight hours as prescribed). You may safely use acetaminophen as prescribed for the first four weeks after surgery provided you do not exceed the maximum daily dose prescribed by your provider (usually 3000 mg - 4000 mg). The narcotic pain medication should only be taken on an as-needed basis when necessary and should be reserved for severe pain that is not controlled with scheduled acetaminophen. In the first three days following surgery, your symptoms may warrant use of the narcotic pain medication every three, four, or six hours as prescribed. After three days, focus your efforts on decreasing (tapering) use of narcotic medications.   The most successful tapering strategy is to first, decrease the dose (number of tablets) and second, increase the interval (time in between doses). For example, if you begin taking two tablets every  four hours after surgery, start your taper by decreasing one of these doses to one tablet. Every one to two days, decrease another dose to one tablet until you are eventually taking one tablet every four hours. Once this is achieved, focus on increasing the number of hours between doses, moving from one tablet every four hours to one tablet every six hours. As tolerated, continue to increase the interval to eight and twelve hours. Eventually, taper to one dose every evening and discontinue when no longer needed.      ANTICOAGULATION  Depending on your risk factors, your provider may prescribe aspirin to prevent blood clots. If prescribed, take aspirin daily for the first four weeks after surgery.      WOUND CARE AND SHOWERING  Wound care:  Keep the initial post-op dressing on, clean, and dry for the first three days after surgery. 72 hours after surgery, you may remove the dressing. Your surgical incisions were closed with steristrips (small white tape that is directly on the incision areas) that should be left on until they fall off or are removed at your first office visit. Ok to leave incision open to air after dressing is removed. Do not apply any lotions, creams, or ointments to the surgical site. All sutures will also be removed at your first office visit. Under no circumstance should you pick or scratch your incision.    Showering:  You may shower on the third day after surgery (immediatly after the dressing is removed) provided your incision is intact and dry without drainage. You may allow water to run over the incision, but do not soak or submerge the incision. Do not scrub the incision.     Tub Bathing:  Tub bathing, swimming, or any other activities that cause your incision to be submerged should be avoided until allowed by your provider. Typically, patients are allowed to return to these activities four weeks after surgery.      CONTACTING YOUR PHYSICIAN:  You may experience symptoms that require  follow-up before your scheduled appointment. Please contact Dr. Em's office if you experience:  1) Pain in your knee that persists or worsens in the first few days after surgery  2) Excessive redness or drainage of cloudy or bloody material from the wounds (clear red tinted fluid and some mild drainage should be expected) or drainage of any kind five days after surgery  3) A temperature elevation greater than 101.5 F   4) Pain, swelling or redness in your calf  5) Numbness or weakness in your leg or foot      Regular business hours (Monday - Friday, 8am - 5pm):  [facility]: [phone]  [facility]: [phone]    After hours and weekends:  [facility] on call Orthopedic resident: [phone]      Anticoagulation plan per PCP:   Last warfarin dose: 08/13/2022 08/14/2022, NO warfarin  08/15/2022, NO warfarin  08/16/2022, NO warfarin, begin enoxaparin injections into the abdomen every 12 hours (AM and PM)  08/17/2022, NO warfarin, enoxaparin injection into the abdomen every 12 hours (AM and PM)  08/18/2022, NO warfarin, enoxaparin injection into the abdomen AM only (no enoxaparin 24 hours prior to surgery)  08/19/2022, DAY OF PROCEDURE, NO enoxaparin. Restart warfarin 25mg (5 tabs) in the evening, unless instructed otherwise by the physician.  08/20/2022, Restart enoxaparin injections into the abdomen every 12 hours (AM and PM), unless instructed otherwise by the physician, and 20mg (4 tabs) warfarin in the evening.   08/21/2022, Enoxaparin injection into the abdomen every 12 hours (AM and PM) and 12.5mg (2.5 tabs) warfarin in the evening.  08/22/2022, Enoxaparin injection into the abdomen every 12 hours (AM and PM) and 12.5mg (2.5 tabs) warfarin in the evening.  08/23/2022, Enoxaparin injection into the abdomen every 12 hours (AM and PM) and 15mg (3 tabs) warfarin in the evening.  08/24/2022, Enoxaparin  "injection into the abdomen in the AM. Recheck INR at the Lab for further dosing instructions.   If you have any questions please call the Anticoagulation Clinic at [phone].      [facility]  Home Care Following Anesthesia  For 24 hours after surgery:  Get plenty of rest.  A responsible adult must stay with you for at least 24 hours after you leave the surgery center.  Do not drive or use heavy equipment.  If you have weakness or tingling, don't drive or use heavy equipment until this feeling goes away.   Do not drink alcohol.   Avoid strenuous or risky activities.  Ask for help when climbing stairs.  You may feel lightheaded.  IF so, sit for a few minutes before standing.  Have someone help you get up.   If you have nausea (feel sick to your stomach): Drink only clear liquids such as apple juice, ginger ale, broth or 7-Up.  Rest may also help.  Be sure to drink enough fluids.  Move to a regular diet as you feel able.   You may have a slight fever.  Call the doctor if your fever is over 100 F (37.7 C) (taken under the tongue) or lasts longer than 24 hours.  You may have a dry mouth, a sore throat, muscle aches or trouble sleeping. These should go away after 24 hours.  Do not make important or legal decisions.   It is recommended to avoid smoking.        Today you received a Marcaine or bupivacaine block to numb the nerves near your surgery site.  This is a block using local anesthetic or \"numbing\" medication injected around the nerves to anesthetize or \"numb\" the area supplied by those nerves.  This block is injected into the muscle layer near your surgical site.  The medication may numb the location where you had surgery for 6-18 hours, but may last up to 24 hours.  If your surgical site is an arm or leg you should be careful with your affected limb, since it is possible to injure your limb without being aware of it due to the numbing.  Until full feeling returns, you should " guard against bumping or hitting your limb, and avoid extreme hot or cold temperatures on the skin.  As the block wears off, the feeling will return as a tingling or prickly sensation near your surgical site.  You will experience more discomfort from your incision as the feeling returns.  You may want to take a pain pill (a narcotic or Tylenol if this was prescribed by your surgeon) when you start to experience mild pain before the pain beccomes more severe.  If your pain medications do not control your pain you should notifiy your surgeon.    Tips for taking pain medications  To get the best pain relief possible, remember these points:  Take pain medications as directed, before pain becomes severe.  Pain medication can upset your stomach: taking it with food may help.  Constipation is a common side effect of pain medication. Drink plenty of  fluids.  Eat foods high in fiber. Take a stool softener if recommended by your doctor or pharmacist.  Do not drink alcohol, drive or operate machinery while taking pain medications.  Ask about other ways to control pain, such as with heat, ice or relaxation.    Tylenol/Acetaminophen Consumption  To help encourage the safe use of acetaminophen, the makers of TYLENOL  have lowered the maximum daily dose for single-ingredient Extra Strength TYLENOL  (acetaminophen) products sold in the [place] from 8 pills per day (4,000 mg) to 6 pills per day (3,000 mg). The dosing interval has also changed from 2 pills every 4-6 hours to 2 pills every 6 hours.  If you feel your pain relief is insufficient, you may take Tylenol/Acetaminophen in addition to your narcotic pain medication.   Be careful not to exceed 3,000 mg of Tylenol/Acetaminophen in a 24 hour period from all sources.  If you are taking extra strength Tylenol/acetaminophen (500 mg), the maximum dose is 6 tablets in 24 hours.  If you are taking regular strength acetaminophen (325 mg), the maximum dose is 9 tablets in 24 hours.    Call  "a doctor for any of the following:  Signs of infection (fever, growing tenderness at the surgery site, a large amount of drainage or bleeding, severe pain, foul-smelling drainage, redness, swelling).  It has been over 8 to 10 hours since surgery and you are still not able to urinate (pass water).  Headache for over 24 hours.  Numbness, tingling or weakness the day after surgery (if you had spinal anesthesia).  Signs of Covid-19 infection (temperature over 100 degrees, shortness of breath, cough, loss of taste/smell, generalized body aches, persistent headache, chills, sore throat, nausea/vomiting/diarrhea)  Your doctor is:       Dr. Carlos A mE, Orthopaedics: [phone]               Or dial [phone] and ask for the resident on call for:  Orthopaedics  For emergency care, call the:  [facility] Emergency Department: [phone] (TTY for hearing impaired: [phone])                    Safety Tips for Using Crutches    Crutch Fit:  Assume good standing posture with shoulders relaxed and crutch tips 6-8 inches out from the side of the foot.  The underarm pad should fall 2-3 fingers width below the armpit.  The handgrip is positioned level with the wrist to allow 30  flexion at the elbow.    Safety Tips:  Bear weight on your hands, not on your armpits.  Do not add extra padding to the underarm pad. This will, in effect, lengthen the crutches and increase risk of nerve injury.  Wear flat, properly fitting shoes. Do not walk in stocking feet, high heels or slippers.  Household hazards:  --Throw rugs should be removed from floors.  --Stairs should be cleared of obstacles.  --Use extra caution on slippery, highly polished, littered or uneven floor surfaces.  --Check for electric cords.  Check crutch tips for excessive wear and keep wing nuts tight.  While walking, look forward with  head up  and  eyes open.  Take equal length steps.  Use BOTH crutches.    Stairs Sequence:  UP: \"Good\" leg first, followed by " " bad  leg, then crutches.  DOWN: Crutches, followed by  bad  leg, \"good\" leg.     Walking with Crutches:  Move both crutches forward at the same time.  Non-Weight Bearing (NWB):  Hold the involved leg up and swing through the crutches with the involved leg. The involved leg does not touch the floor.  Toe Touch Weight Bearing (TTWB): Move the involved leg forward. Rest it lightly on the floor for balance only. Step through the crutches with the uninvolved leg.  Partial Weight Bearing (PWB): Move the involved leg forward. Step down the weight of the leg only.  Step through the crutches with the uninvolved leg.  Weight Bearing As Tolerated (WBAT): Move the involved leg forward. Put as much pressure through the involved leg as you can tolerate comfortably. Then step through the crutches with the uninvolved leg.   "

## 2022-08-19 NOTE — INTERVAL H&P NOTE
"I have reviewed the surgical (or preoperative) H&P that is linked to this encounter, and examined the patient. There are no significant changes    Clinical Conditions Present on Arrival:  Clinically Significant Risk Factors Present on Admission                 # Coagulation Defect: home medication list includes an anticoagulant medication  # Platelet Defect: home medication list includes an antiplatelet medication  # DMII: A1C = N/A within past 3 months  # Severe Obesity: Estimated body mass index is 40.87 kg/m  as calculated from the following:    Height as of 8/16/22: 1.803 m (5' 11\").    Weight as of 8/16/22: 132.9 kg (293 lb).       "

## 2022-08-19 NOTE — BRIEF OP NOTE
[facility] Brief Operative Note    Pre-operative diagnosis: Chronic pain of right knee [M25.561, G89.29]   Post-operative diagnosis * No post-op diagnosis entered *     Procedure: Procedure(s):  examination under anesthesia, right knee arthroscopy, meniscectomy   Surgeon(s): Surgeon(s) and Role:     * Carlos A Em MD - Primary     * Priya James PA-C - Assisting     * Reggie Porras MD - Resident - Assisting   Estimated blood loss: 5 mL    Specimens: * No specimens in log *   Findings: Radial tear of mid-body posterior horn medial meniscus     Plan:  - SDS, discharge home per PACU criteria  - Pain: Oxycodone, Tylenol prn  - Dressing: keep intact x48 hours  - WBAT LLE, ROMAT  - Resume Warfarin tonight. Ok to resume Lovenox bridge tomorrow. Anticoagulation plan as outlined in family medicine/pharmicist plan (see notes from 8/10/22 and 8/16/22).  - Follow-up: 2 weeks for suture removal and wound check    Reggie Porras MD  Orthopaedic Surgery PGY5

## 2022-08-19 NOTE — ANESTHESIA POSTPROCEDURE EVALUATION
Patient: Adarsh Salvador    Procedure: Procedure(s):  examination under anesthesia, right knee arthroscopy, meniscectomy       Anesthesia Type:  General    Note:  Disposition: Outpatient   Postop Pain Control: Uneventful            Sign Out: Well controlled pain   PONV: No   Neuro/Psych: Uneventful            Sign Out: Acceptable/Baseline neuro status   Airway/Respiratory: Uneventful            Sign Out: Acceptable/Baseline resp. status   CV/Hemodynamics: Uneventful            Sign Out: Acceptable CV status; No obvious hypovolemia; No obvious fluid overload   Other NRE: NONE   DID A NON-ROUTINE EVENT OCCUR? No           Last vitals:  Vitals Value Taken Time   /74 08/19/22 1625   Temp 36.3  C (97.3  F) 08/19/22 1625   Pulse 59 08/19/22 1627   Resp 19 08/19/22 1627   SpO2 95 % 08/19/22 1627   Vitals shown include unvalidated device data.    Electronically Signed By: Sesar Gregg MD  August 19, 2022  4:38 PM

## 2022-08-19 NOTE — ANESTHESIA CARE TRANSFER NOTE
Patient: Adarsh Salvador    Procedure: Procedure(s):  examination under anesthesia, right knee arthroscopy, meniscectomy       Diagnosis: Chronic pain of right knee [M25.561, G89.29]  Diagnosis Additional Information: No value filed.    Anesthesia Type:   General     Note:    Oropharynx: oral airway in place  Level of Consciousness: drowsy  Oxygen Supplementation: face mask  Level of Supplemental Oxygen (L/min / FiO2): 6  Independent Airway: airway patency satisfactory and stable  Dentition: dentition unchanged  Vital Signs Stable: post-procedure vital signs reviewed and stable  Report to RN Given: handoff report given  Patient transferred to: PACU    Handoff Report: Identifed the Patient, Identified the Reponsible Provider, Reviewed the pertinent medical history, Discussed the surgical course, Reviewed Intra-OP anesthesia mangement and issues during anesthesia, Set expectations for post-procedure period and Allowed opportunity for questions and acknowledgement of understanding      Vitals:  Vitals Value Taken Time   BP     Temp     Pulse 72 08/19/22 1534   Resp 17 08/19/22 1534   SpO2 91 % 08/19/22 1534   Vitals shown include unvalidated device data.    Electronically Signed By: IRENE Stevens CRNA  August 19, 2022  3:35 PM

## 2022-08-19 NOTE — ANESTHESIA PREPROCEDURE EVALUATION
Anesthesia Pre-Procedure Evaluation    Patient: Adarsh Salvador   MRN: [id number] : 1965        Procedure : Procedure(s):  examination under anesthesia, right knee arthroscopy, meniscectomy          Past Medical History:   Diagnosis Date     Anaphylactic reaction 2015     Anxiety      Depression      DM2 (diabetes mellitus, type 2) (H)      GERD (gastroesophageal reflux disease)      HTN (hypertension)      IBS (irritable bowel syndrome)      Kidney stone 6/15/2011    Pt believes these were Calcium stones     Neuropathy       Past Surgical History:   Procedure Laterality Date     COLONOSCOPY  2012    Procedure:COLONOSCOPY; screening colonoscopy; Surgeon:KINGSLEY DOS SANTOS; Location:[facility] OR     COLONOSCOPY  2014    Procedure: COMBINED COLONOSCOPY, SINGLE BIOPSY/POLYPECTOMY BY BIOPSY;  Surgeon: Duane, William Charles, MD;  Location: [facility] OR     COLONOSCOPY N/A 2022    Procedure: COLONOSCOPY, WITH POLYPECTOMY AND BIOPSY;  Surgeon: Luiz Calvert MD;  Location: [facility]     CYSTOSCOPY  2008    CYSTOSCOPY W/ URETERAL STENT PLACEMENT 2008      CYSTOSCOPY  2010    CYSTOSCOPY W/ URETERAL STENT PLACEMENT 2010 Left      CYSTOSCOPY  2012    CYSTOSCOPY, LEFT URETEROSCOPY AND STENT PLACEMENT left retrograde 2012      CYSTOSCOPY,+URETEROSCOPY  06/10/2008    URETEROSCOPY 06/10/2008 Right      HC BREATH HYDROGEN TEST  3/7/2014    Procedure: HYDROGEN BREATH TEST;  Surgeon: Darion Swift MD;  Location: [facility]     INJECT EPIDURAL LUMBAR N/A 2022    Procedure: INJECTION, SPINE, LUMBAR, EPIDURAL L5/S1;  Surgeon: Michi Hahn MD;  Location: [facility]     LITHOTRIPSY  2010    LITHOTRIPSY 2010 LEFT EXTRACORPOREAL SHOCK WAVE LITHOTRIPSY, FLEXIBLE CYSTOSCOPY, LEFT STENT REMOVAL       ORTHOPEDIC SURGERY  10/03/2007    KNEE ARTHROSCOPY 10/03/2007 RightX2       RELEASE CARPAL TUNNEL Right 2018    Procedure: RELEASE CARPAL TUNNEL;  Right carpal tunnel release;   Surgeon: Wiliam Saenz MD;  Location: [facility] OR     VASCULAR SURGERY  11/24/2008    Radiofrequency ablation left saphenous vein 11/24/2008 Done by Dr. Lozoya        Allergies   Allergen Reactions     Artificial Sweetner (Informational Only) [Artificial Sweetner (Informational Only) ] GI Disturbance     ALL artificial sweetners cause severe diarrhea & flu symptoms     Hydromorphone Anaphylaxis     Ibuprofen GI Disturbance     Morphine Sulfate Concentrate Anaphylaxis     Morphine [Fumaric Acid] Anaphylaxis     Hydrocodone-Acetaminophen Itching     Tramadol Hives     Trazodone Hives     Gabapentin      GI upset per pt     Keflex [Cephalexin] Diarrhea     Upset stomach     Codeine Phosphate Itching     Ketorolac Tromethamine Rash      Social History     Tobacco Use     Smoking status: Never Smoker     Smokeless tobacco: Current User     Types: Chew     Tobacco comment: Chew   Substance Use Topics     Alcohol use: Not Currently     Alcohol/week: 0.0 standard drinks     Comment: occasional, few drinks a month      Wt Readings from Last 1 Encounters:   08/16/22 132.9 kg (293 lb)        Anesthesia Evaluation   Pt has had prior anesthetic.         ROS/MED HX  ENT/Pulmonary:     (+) asthma     Neurologic:       Cardiovascular:     (+) hypertension-----    METS/Exercise Tolerance:     Hematologic:       Musculoskeletal:       GI/Hepatic:     (+) GERD, liver disease,     Renal/Genitourinary:     (+) renal disease,     Endo:     (+) type II DM,     Psychiatric/Substance Use:       Infectious Disease:       Malignancy:       Other:            Physical Exam    Airway  airway exam normal           Respiratory Devices and Support         Dental  no notable dental history         Cardiovascular   cardiovascular exam normal          Pulmonary   pulmonary exam normal                OUTSIDE LABS:  CBC:   Lab Results   Component Value Date    WBC 7.2 08/16/2022    WBC 7.1 05/04/2022    HGB 12.1 (L) 08/16/2022    HGB 10.2 (L)  05/04/2022    HCT 37.3 (L) 08/16/2022    HCT 31.9 (L) 05/04/2022     08/16/2022     05/04/2022     BMP:   Lab Results   Component Value Date     08/16/2022     06/07/2022    POTASSIUM 4.0 08/16/2022    POTASSIUM 4.3 06/07/2022    CHLORIDE 101 08/16/2022    CHLORIDE 105 06/07/2022    CO2 32 08/16/2022    CO2 25 06/07/2022    BUN 13 08/16/2022    BUN 15 06/07/2022    CR 0.83 08/16/2022    CR 0.91 06/07/2022     (H) 08/16/2022     (H) 06/07/2022     COAGS:   Lab Results   Component Value Date    INR 2.1 (H) 08/01/2022     POC:   Lab Results   Component Value Date     (H) 01/26/2018     HEPATIC:   Lab Results   Component Value Date    ALBUMIN 4.0 10/03/2019    PROTTOTAL 7.5 10/03/2019    ALT 26 10/03/2019    AST 12 10/03/2019    ALKPHOS 106 10/03/2019    BILITOTAL 0.4 10/03/2019     OTHER:   Lab Results   Component Value Date    A1C 8.5 (H) 08/16/2022    LUZ 9.2 08/16/2022    PHOS 2.8 12/15/2015    TSH 3.48 01/09/2018    CRP <2.9 11/23/2018    SED 14 01/09/2018       Anesthesia Plan    ASA Status:  3   NPO Status:  NPO Appropriate    Anesthesia Type: General.     - Airway: LMA   Induction: Intravenous.   Maintenance: TIVA.        Consents    Anesthesia Plan(s) and associated risks, benefits, and realistic alternatives discussed. Questions answered and patient/representative(s) expressed understanding.     - Discussed: Risks, Benefits and Alternatives for BOTH SEDATION and the PROCEDURE were discussed     - Discussed with:  Patient         Postoperative Care    Pain management: Oral pain medications.   PONV prophylaxis: Ondansetron (or other 5HT-3), Dexamethasone or Solumedrol     Comments:                Juan Luis Lee MD, MD

## 2022-08-19 NOTE — OP NOTE
PREOPERATIVE DIAGNOSIS: Right  knee medial meniscus tear.   POSTOPERATIVE DIAGNOSIS: Right  knee medial meniscus tear.   PROCEDURES:   1. Examination under anesthesia, right knee.   2. Right  knee arthroscopy, partial medial meniscectomy.   SURGEON: Carlos A Em MD   ASSISTANT: Priya James PA-C. The assistance of Ms. James was necessary for positioning, arthroscopic visualization, retraction and meniscectomy.  OPERATIVE INDICATIONS: Adarsh is a very pleasant 57 year old male who presented to my clinic with medial joint line pain. An CT arthrogram had been obtained by his primary care physician demonstrating a displaced medial meniscus tear. I reviewed with him his history, physical and imaging exam. I felt that he would be a candidate for knee arthroscopy, partial medial meniscectomy. He was apprised of the risks and benefits of surgery and desired to proceed despite the risks.   OPERATIVE DETAILS: In the preoperative area, the patient's informed consent was reviewed and he desired to proceed. Right  knee was marked. The patient was in agreement. he was taken to the operating room, timeout was performed and all parties were in agreement. Preoperative antibiotics was given within 1 hour of time of surgery. He was placed supine on the operating room table, surrendered to LMA anesthesia and examination under anesthesia was performed with the following findings: Full passive range of motion 0 to 135 degrees. No patellar instability. Stable to varus and valgus stress at 0 and 30 degrees. Stable anterior, posterior drawer. No pivot shift. Negative dial test. Posterior drawer 0. Lachman 0. At this time, a bump was placed underneath the ipsilateral hip. Egg crate was placed beneath the well leg. No tourniquet was placed. A side post was utilized. Right  leg was prepped and draped in the usual sterile fashion. Standard anteromedial and anterolateral arthroscopic portals were created and diagnostic arthroscopy was  performed with the following findings: Medial patellar facet was Grade 2, lateral patellar facet was Grade 2, central patellar ridge was Grade 2, central trochlea was Grade 1, medial femoral condyle was Grade 2 and possibly grade 3, medial tibial plateau was Grade 2, lateral femoral condyle was normal, lateral tibial plateau was normal. Lateral meniscus was intact. Medial meniscus had a displaced tear. ACL and PCL were intact. Popliteus tendon intact.    Alternating then between a motorized shaver and a small biter, a partial medial meniscectomy was performed until a balanced stable rim of meniscal tissue remained. At this time, all excess arthroscopic fluid and meniscal debris was removed from the knee joint. Copious irrigation was performed. Portal sites were closed with nylon. Sterile dressings were applied. The patient was awakened from anesthesia and transferred to the recovery room in stable condition with stable vital signs.     COMPLICATIONS: None apparent.   DRAINS: None.   SPECIMENS: None.   ESTIMATED BLOOD LOSS: 5 mL.   TOURNIQUET: No tourniquet was placed.  POSTOPERATIVE PLAN: The patient will be weightbearing as tolerated, range of motion as tolerated, can shower on postoperative day #3. No submerging the wounds. No chemical DVT prophylaxis. Follow up in my Orthopedic Clinic in 1 week time. No running, jumping, pounding sports for 6 weeks.

## 2022-08-19 NOTE — TELEPHONE ENCOUNTER
Reason for Call:  Other pre op    Detailed comments:  Kaia from [facility] needs pre op finished & signed patient's surgery @ 2:00 today    Phone Number Patient can be reached at: Other phone number:  [phone]    Best Time: ASAP    Can we leave a detailed message on this number? YES    Call taken on 8/19/2022 at 12:33 PM by Mirlande Nevarez

## 2022-08-26 ENCOUNTER — THERAPY VISIT (OUTPATIENT)
Dept: PHYSICAL THERAPY | Facility: CLINIC | Age: 57
End: 2022-08-26
Attending: ORTHOPAEDIC SURGERY
Payer: COMMERCIAL

## 2022-08-26 DIAGNOSIS — M25.561 ACUTE PAIN OF RIGHT KNEE: ICD-10-CM

## 2022-08-26 DIAGNOSIS — G89.29 CHRONIC PAIN OF RIGHT KNEE: ICD-10-CM

## 2022-08-26 DIAGNOSIS — M25.561 CHRONIC PAIN OF RIGHT KNEE: ICD-10-CM

## 2022-08-26 DIAGNOSIS — Z47.89 AFTERCARE FOLLOWING SURGERY OF THE MUSCULOSKELETAL SYSTEM: ICD-10-CM

## 2022-08-26 PROCEDURE — 97110 THERAPEUTIC EXERCISES: CPT | Mod: GP | Performed by: PHYSICAL THERAPIST

## 2022-08-26 PROCEDURE — 97161 PT EVAL LOW COMPLEX 20 MIN: CPT | Mod: GP | Performed by: PHYSICAL THERAPIST

## 2022-08-26 ASSESSMENT — ACTIVITIES OF DAILY LIVING (ADL)
WEAKNESS: THE SYMPTOM AFFECTS MY ACTIVITY MODERATELY
GO DOWN STAIRS: ACTIVITY IS VERY DIFFICULT
AS_A_RESULT_OF_YOUR_KNEE_INJURY,_HOW_WOULD_YOU_RATE_YOUR_CURRENT_LEVEL_OF_DAILY_ACTIVITY?: ABNORMAL
GIVING WAY, BUCKLING OR SHIFTING OF KNEE: I HAVE THE SYMPTOM BUT IT DOES NOT AFFECT MY ACTIVITY
HOW_WOULD_YOU_RATE_THE_CURRENT_FUNCTION_OF_YOUR_KNEE_DURING_YOUR_USUAL_DAILY_ACTIVITIES_ON_A_SCALE_FROM_0_TO_100_WITH_100_BEING_YOUR_LEVEL_OF_KNEE_FUNCTION_PRIOR_TO_YOUR_INJURY_AND_0_BEING_THE_INABILITY_TO_PERFORM_ANY_OF_YOUR_USUAL_DAILY_ACTIVITIES?: 50
RAW_SCORE: 24
SIT WITH YOUR KNEE BENT: ACTIVITY IS SOMEWHAT DIFFICULT
WALK: ACTIVITY IS VERY DIFFICULT
HOW_WOULD_YOU_RATE_THE_OVERALL_FUNCTION_OF_YOUR_KNEE_DURING_YOUR_USUAL_DAILY_ACTIVITIES?: ABNORMAL
RISE FROM A CHAIR: ACTIVITY IS VERY DIFFICULT
STIFFNESS: THE SYMPTOM AFFECTS MY ACTIVITY SEVERELY
KNEE_ACTIVITY_OF_DAILY_LIVING_SUM: 24
KNEE_ACTIVITY_OF_DAILY_LIVING_SCORE: 34.29
STAND: ACTIVITY IS SOMEWHAT DIFFICULT
KNEEL ON THE FRONT OF YOUR KNEE: ACTIVITY IS VERY DIFFICULT
SQUAT: ACTIVITY IS VERY DIFFICULT
SWELLING: THE SYMPTOM PREVENTS ME FROM ALL DAILY ACTIVITIES
LIMPING: THE SYMPTOM AFFECTS MY ACTIVITY SLIGHTLY
PAIN: THE SYMPTOM AFFECTS MY ACTIVITY MODERATELY
GO UP STAIRS: ACTIVITY IS VERY DIFFICULT

## 2022-08-26 NOTE — PROGRESS NOTES
Physical Therapy Initial Evaluation  Subjective:  The history is provided by the patient. No  was used.   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 Had surgery 8/19/22 for right medial meniscectomy.         Type of problem:  Right knee.    This is a new (8/19/22) condition.  Condition occurred with:  A twist.  Where condition occurred: at home.  Patient reports pain:  Medial.  Pain is described as aching and is constant.  Pain is worse in the P.M..  Since onset symptoms are unchanged.  Associated symptoms:  Loss of motion/stiffness and loss of strength. Symptoms are exacerbated by standing, bending/squatting, activity, ascending stairs, descending stairs and weight bearing  and relieved by analgesics.  Special tests included:  MRI.  Previous treatment includes surgery. There was none improvement following previous treatment.  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  Barriers include:  None as reported by patient.    Patient Health History  Adarsh Salvador being seen for right knee pain .     Problem began: 8/19/2022.   Problem occurred: twisted knee wrong   Pain is reported as 6/10 on pain scale.  General health as reported by patient is fair.  Pertinent medical history includes: asthma, diabetes, high blood pressure, implanted device and overweight.   Red flags:  None as reported by patient.  Medical allergies: none.   Surgeries include:  Other. Other surgery history details: knee .    Current medications:  Anti-depressants, heparin/coumadin, muscle relaxants and high blood pressure medication.    Current occupation is carpnter / .   Primary job tasks include:  Driving, prolonged standing, pushing/pulling and repetitive tasks.                                    Objective:  System                                                 Knee Evaluation:  ROM:    AROM    Hyperextension:  Left:  0    Right: 0    Flexion: Left: 136    Right: 115  PROM        Flexion: Left:   Right:  135 pt reporting pain but no resistance.       Strength:     Extension:  Left: 5/5   Strong/pain free  Pain:      Right: 5/5   Strong/painful  Pain:  Flexion:  Left: 5/5   Strong/pain free  Pain:      Right: 5/5   Strong/pain free  Pain:    Quad Set Left: Good    Pain:   Quad Set Right: Good    Pain:    Special Tests:     Right knee positive for the following tests:  Meniscal  Palpation:      Right knee tenderness present at:  Medial Joint Line; Patellar Medial and Patellar Inferior  Right knee tenderness not present at:  Popliteal; Biceps Femoral; Semitendinosus; Patellar Lateral and Patellar Superior            General     ROS    Assessment/Plan:    Patient is a 57 year old male with right side knee complaints.    Patient has the following significant findings with corresponding treatment plan.                Diagnosis 1:  Right medial knee pain s/p menisectomy  Pain -  hot/cold therapy, US, manual therapy, self management, education, directional preference exercise and home program  Decreased ROM/flexibility - manual therapy, therapeutic exercise, therapeutic activity and home program  Decreased joint mobility - manual therapy, therapeutic exercise, therapeutic activity and home program  Decreased strength - therapeutic exercise, therapeutic activities and home program  Decreased function - therapeutic activities and home program    Therapy Evaluation Codes:   1) History comprised of:   Personal factors that impact the plan of care:      None.    Comorbidity factors that impact the plan of care are:      Overweight.     Medications impacting care: None.  2) Examination of Body Systems comprised of:   Body structures and functions that impact the plan of care:      Knee.   Activity limitations that impact the plan of care are:      Squatting/kneeling, Stairs,  Standing and Walking.  3) Clinical presentation characteristics are:   Stable/Uncomplicated.  4) Decision-Making    Low complexity using standardized patient assessment instrument and/or measureable assessment of functional outcome.  Cumulative Therapy Evaluation is: Low complexity.    Previous and current functional limitations:  (See Goal Flow Sheet for this information)    Short term and Long term goals: (See Goal Flow Sheet for this information)     Communication ability:  Patient appears to be able to clearly communicate and understand verbal and written communication and follow directions correctly.  Treatment Explanation - The following has been discussed with the patient:   RX ordered/plan of care  Anticipated outcomes  Possible risks and side effects  This patient would benefit from PT intervention to resume normal activities.   Rehab potential is good.    Frequency:  2 X week, once daily  Duration:  for 2 weeks tapering to 1 X a week over 6 weeks  Discharge Plan:  Achieve all LTG.  Independent in home treatment program.  Reach maximal therapeutic benefit.    Please refer to the daily flowsheet for treatment today, total treatment time and time spent performing 1:1 timed codes.

## 2022-08-29 ENCOUNTER — OFFICE VISIT (OUTPATIENT)
Dept: ORTHOPEDICS | Facility: CLINIC | Age: 57
End: 2022-08-29
Payer: COMMERCIAL

## 2022-08-29 ENCOUNTER — LAB (OUTPATIENT)
Dept: LAB | Facility: CLINIC | Age: 57
End: 2022-08-29
Payer: COMMERCIAL

## 2022-08-29 ENCOUNTER — ANTICOAGULATION THERAPY VISIT (OUTPATIENT)
Dept: ANTICOAGULATION | Facility: CLINIC | Age: 57
End: 2022-08-29

## 2022-08-29 VITALS — HEIGHT: 71 IN | BODY MASS INDEX: 40.6 KG/M2 | WEIGHT: 290 LBS

## 2022-08-29 DIAGNOSIS — I82.409 RECURRENT DEEP VEIN THROMBOSIS (DVT) (H): Primary | ICD-10-CM

## 2022-08-29 DIAGNOSIS — I82.409 RECURRENT DEEP VEIN THROMBOSIS (DVT) (H): ICD-10-CM

## 2022-08-29 DIAGNOSIS — G89.29 CHRONIC PAIN OF RIGHT KNEE: Primary | ICD-10-CM

## 2022-08-29 DIAGNOSIS — M25.561 CHRONIC PAIN OF RIGHT KNEE: Primary | ICD-10-CM

## 2022-08-29 LAB — INR BLD: 1 (ref 0.9–1.1)

## 2022-08-29 PROCEDURE — 85610 PROTHROMBIN TIME: CPT

## 2022-08-29 PROCEDURE — 99024 POSTOP FOLLOW-UP VISIT: CPT

## 2022-08-29 PROCEDURE — 36416 COLLJ CAPILLARY BLOOD SPEC: CPT

## 2022-08-29 RX ORDER — HYDROMORPHONE HYDROCHLORIDE 4 MG/1
4 TABLET ORAL
COMMUNITY
Start: 2022-08-26 | End: 2022-11-30

## 2022-08-29 NOTE — PROGRESS NOTES
ANTICOAGULATION MANAGEMENT     Adarsh Salvador 57 year old male is on warfarin with subtherapeutic INR result. (Goal INR 2.0-3.0)    Recent labs: (last 7 days)     08/29/22  0806   INR 1.0       ASSESSMENT       Source(s): Chart Review and Patient/Caregiver Call       Warfarin doses taken: Warfarin taken as instructed and see calendar for full information, and comments in calendar    Diet: No new diet changes identified    New illness, injury, or hospitalization: Yes: Knee surgery with holds and bridge plan, now new ER visit with Kidney stone and stent placement and will have kidney stone removal this coming friday    Medication/supplement changes: None noted    Signs or symptoms of bleeding or clotting: No    Previous INR: Subtherapeutic    Additional findings: Bridging with lovenox. will continue to hold warfarin and continue Lovenox bid until procedure Friday, will use same bridge plan as documented       PLAN     Recommended plan for temporary change(s) affecting INR     Dosing Instructions: continue bid Lovenox until surgery friday, and then bridge plan with next INR in 10 days       Summary  As of 8/29/2022    Full warfarin instructions:  8/29: Hold; 8/30: Hold; 8/31: Hold; 9/1: Hold; Otherwise 15 mg every Tue, Sat; 12.5 mg all other days   Next INR check:                Spoke with Adarsh with dosing instructions and follow up date.     Lab visit scheduled    Education provided: Goal range and significance of current result, Importance of therapeutic range, Importance of following up at instructed interval and Contact [phone]  with any changes, questions or concerns.     Plan made per [facility] anticoagulation protocol    Argentina Antonio RN  Anticoagulation Clinic  8/29/2022    _______________________________________________________________________     Anticoagulation Episode Summary     Current INR goal:  2.0-3.0   TTR:  46.3 % (6.9 mo)   Target end date:  Indefinite   Send INR reminders to:  MANOJ MARTÍNEZ     Indications    Recurrent deep vein thrombosis (DVT) (H) [I82.409]           Comments:           Anticoagulation Care Providers     Provider Role Specialty Phone number    Bertha Romero PA-C Referring [facility] [phone]

## 2022-08-29 NOTE — LETTER
8/29/2022         RE: Adarsh Salvador  [address]  [facility] [address]        Dear Colleague,    Thank you for referring your patient, Adarsh Salvador, to the [facility] [place]. Please see a copy of my visit note below.    Chief Complaint:   1. Follow up, DOS 8/19/22 with Dr. Em    Procedures:  1. Examination under anesthesia, right knee.   2. Right  knee arthroscopy, partial medial meniscectomy    History:  Adarsh Salvador is a 57 year old patient 1 week s/p above procedure. He is doing very well in terms of his right knee. Unfortunately, he developed kidney stones over the weekend and has a procedure with Urology scheduled for later this week. He denies right knee pain, any discomfort is controlled with tylenol. Did not have any drainage from incisions. Seeing PT, has visits scheduled twice weekly.   Exam:     General: Awake, Alert, and oriented. Articulates and communicates with a normal affect     Right Lower Extremity:    Incisions well healed without evidence of infection, no erythema, drainage, or wound dehiscence    Normal post-operative effusion and ecchymosis    Range of motion and stability exam not performed    Neurovascularly intact    Gait: Mildly antalgic without use of assistive device     Imaging:  No new imaging.     Medications:     Current Outpatient Medications:      acetaminophen (TYLENOL) 325 MG tablet, Take 2 tablets (650 mg) by mouth every 4 hours as needed for mild pain, Disp: 50 tablet, Rfl: 0     albuterol (PROAIR HFA/PROVENTIL HFA/VENTOLIN HFA) 108 (90 Base) MCG/ACT inhaler, Inhale 2 puffs into the lungs every 6 hours as needed for shortness of breath / dyspnea or wheezing, Disp: 6.7 g, Rfl: 3     Alpha Lipoic Acid 200 MG CAPS, Take 200 mg by mouth 3 times daily, Disp: 90 capsule, Rfl: 3     amLODIPine (NORVASC) 10 MG tablet, TAKE 1 TABLET BY MOUTH DAILY FOR BLOOD PRESSURE, Disp: 90 tablet, Rfl: 1     ASPIRIN PO, Take 325 mg by mouth daily , Disp: , Rfl:       aspirin-acetaminophen-caffeine (EXCEDRIN MIGRAINE) 250-250-65 MG tablet, Take 1 tablet by mouth, Disp: , Rfl:      atorvastatin (LIPITOR) 40 MG tablet, Take 1 tablet (40 mg) by mouth daily, Disp: 90 tablet, Rfl: 3     blood glucose (ACCU-CHEK GUIDE) test strip, Use to test blood sugar 3 times daily or as directed., Disp: 300 strip, Rfl: 3     blood glucose monitoring (ACCU-CHEK FASTCLIX) lancets, Use to test blood sugar 1 times daily or as directed.  Ok to substitute alternative if insurance prefers., Disp: 102 each, Rfl: 11     calcium carbonate (TUMS) 500 MG chewable tablet, Take 1 chew tab by mouth daily, Disp: , Rfl:      chlorthalidone (HYGROTON) 25 MG tablet, TAKE 1 TABLET (25 MG) BY MOUTH DAILY ADD ON FOR BLOOD PRESSURE, Disp: 90 tablet, Rfl: 1     cyclobenzaprine (FLEXERIL) 10 MG tablet, Take 0.5-1 tablets (5-10 mg) by mouth 3 times daily as needed for muscle spasms, Disp: 90 tablet, Rfl: 3     diazepam (VALIUM) 5 MG tablet, Take 1 tablet (5 mg) by mouth every 5 minutes prior to surgery, Disp: 1 tablet, Rfl: 1     DULoxetine (CYMBALTA) 60 MG capsule, Take 1 capsule (60 mg) by mouth 2 times daily, Disp: 180 capsule, Rfl: 3     enoxaparin ANTICOAGULANT (LOVENOX) 100 MG/ML syringe, Inject 1 mL (100 mg) Subcutaneous 2 times daily, Disp: 20 mL, Rfl: 1     EPINEPHrine (EPIPEN) 0.3 MG/0.3ML injection, Inject 0.3 mLs (0.3 mg) into the muscle once as needed for anaphylaxis, Disp: 2 each, Rfl: 2     fluticasone (FLONASE) 50 MCG/ACT nasal spray, INSTILL 1 SPRAY INTO BOTH NOSTRILS DAILY, Disp: 48 mL, Rfl: 1     fluticasone-salmeterol (ADVAIR) 500-50 MCG/DOSE inhaler, Inhale 1 puff into the lungs 2 times daily, Disp: 1 each, Rfl: 11     HYDROmorphone (DILAUDID) 4 MG tablet, Take 4 mg by mouth, Disp: , Rfl:      hydrOXYzine (ATARAX) 25 MG tablet, Take 1 tablet (25 mg) by mouth 3 times daily as needed for itching, Disp: 10 tablet, Rfl: 0     insulin glargine (BASAGLAR KWIKPEN) 100 UNIT/ML pen, Inject 36 Units Subcutaneous  daily Max Total Daily Dose 45 units per day, Disp: 15 mL, Rfl: 3     insulin pen needle (32G X 4 MM) 32G X 4 MM miscellaneous, Use 2 pen needles daily or as directed., Disp: 200 each, Rfl: 1     losartan (COZAAR) 25 MG tablet, Take 1 tablet (25 mg) by mouth daily Note decrease in dose, Disp: 90 tablet, Rfl: 0     metFORMIN (GLUCOPHAGE-XR) 500 MG 24 hr tablet, Take 2 tablets (1,000 mg) by mouth 2 times daily (with meals), Disp: 120 tablet, Rfl: 5     montelukast (SINGULAIR) 10 MG tablet, TAKE 1 TABLET (10 MG) BY MOUTH AT BEDTIME FOR ALLERGIES/ASTHMA, Disp: 90 tablet, Rfl: 2     multivitamin, therapeutic (THERA-VIT) TABS, Take 1 tablet by mouth daily, Disp: , Rfl:      naproxen sodium (ANAPROX) 220 MG tablet, Take 220 mg by mouth, Disp: , Rfl:      nortriptyline (PAMELOR) 10 MG capsule, Take 2 capsules (20 mg) by mouth At Bedtime, Disp: 60 capsule, Rfl: 5     omeprazole (PRILOSEC) 40 MG DR capsule, TAKE 1 CAPSULE (40 MG) BY MOUTH DAILY TAKE FOR AT LEAST TWO MONTHS, Disp: 90 capsule, Rfl: 2     ondansetron (ZOFRAN ODT) 4 MG ODT tab, Take 1 tablet (4 mg) by mouth every 8 hours as needed for nausea, Disp: 20 tablet, Rfl: 1     oxyCODONE (ROXICODONE) 5 MG tablet, Take 1-2 tablets (5-10 mg) by mouth every 4 hours as needed for moderate to severe pain, Disp: 10 tablet, Rfl: 0     pregabalin (LYRICA) 50 MG capsule, Take 1 capsule (50 mg) by mouth 2 times daily, Disp: 60 capsule, Rfl: 1     rizatriptan (MAXALT-MLT) 10 MG ODT, TAKE 1 TABLET BY MOUTH AT ONSET OF HEADACHE FOR MIGRAINE MAY REPEAT IN 2 HOURS. MAX 3 TABS/24 HOURS., Disp: 18 tablet, Rfl: 0     senna-docusate (SENOKOT-S/PERICOLACE) 8.6-50 MG tablet, Take 1-2 tablets by mouth 2 times daily, Disp: 30 tablet, Rfl: 0     tamsulosin (FLOMAX) 0.4 MG capsule, TAKE 1 CAPSULE BY MOUTH EVERY DAY, Disp: 90 capsule, Rfl: 1     warfarin ANTICOAGULANT (COUMADIN) 5 MG tablet, Take 15 mg Tues, Thurs and 12.5 mg all other days, or as directed by the Coumadin clinic, Disp: 225 tablet,  Rfl: 1  No current facility-administered medications for this visit.    Facility-Administered Medications Ordered in Other Visits:      BUPivacaine (MARCAINE) injection 0.5%, 10 mL, Intradermal, Once, Vinnie Espinoza, DO     DOBUTamine 500 mg in dextrose 5% 250 mL (adult std conc) premix, 2.5-20 mcg/kg/min, Intravenous, Continuous, Navarro Butcher MD, Stopped at 04/25/19 4581     iopamidol (ISOVUE-M 200) solution 10 mL, 10 mL, Intravenous, Once, Vinnie Espinoza, DO     methylPREDNISolone (DEPO-MEDROL) injection 80 mg, 80 mg, Intramuscular, Once, Vinnie Espinoza, DO     metoprolol (LOPRESSOR) injection 5 mg, 5 mg, Intravenous, Q5 Min PRN, Navarro Butcher MD, 2 mg at 04/25/19 7252    Assesment:  1. 1 week s/p right knee arthroscopy, medial meniscectomy. Doing very well in terms of his right knee.     Plan:   -Weight bearing as tolerated  -Range of motion as tolerated  -No running, jumping, pounding sports for 6 weeks   -Follow up with Dr. Em at 6 weeks, sooner if needed      Priya James PA-C 8/29/2022 11:57 AM  Orthopedic Surgery      This patient was seen by my physician assistant Priya James.      Again, thank you for allowing me to participate in the care of your patient.        Sincerely,        Carlos A Em MD

## 2022-08-29 NOTE — PROGRESS NOTES
Chief Complaint:   1. Follow up, DOS 8/19/22 with Dr. Em    Procedures:  1. Examination under anesthesia, right knee.   2. Right  knee arthroscopy, partial medial meniscectomy    History:  Adarsh Salvador is a 57 year old patient 1 week s/p above procedure. He is doing very well in terms of his right knee. Unfortunately, he developed kidney stones over the weekend and has a procedure with Urology scheduled for later this week. He denies right knee pain, any discomfort is controlled with tylenol. Did not have any drainage from incisions. Seeing PT, has visits scheduled twice weekly.   Exam:     General: Awake, Alert, and oriented. Articulates and communicates with a normal affect     Right Lower Extremity:    Incisions well healed without evidence of infection, no erythema, drainage, or wound dehiscence    Normal post-operative effusion and ecchymosis    Range of motion and stability exam not performed    Neurovascularly intact    Gait: Mildly antalgic without use of assistive device     Imaging:  No new imaging.     Medications:     Current Outpatient Medications:      acetaminophen (TYLENOL) 325 MG tablet, Take 2 tablets (650 mg) by mouth every 4 hours as needed for mild pain, Disp: 50 tablet, Rfl: 0     albuterol (PROAIR HFA/PROVENTIL HFA/VENTOLIN HFA) 108 (90 Base) MCG/ACT inhaler, Inhale 2 puffs into the lungs every 6 hours as needed for shortness of breath / dyspnea or wheezing, Disp: 6.7 g, Rfl: 3     Alpha Lipoic Acid 200 MG CAPS, Take 200 mg by mouth 3 times daily, Disp: 90 capsule, Rfl: 3     amLODIPine (NORVASC) 10 MG tablet, TAKE 1 TABLET BY MOUTH DAILY FOR BLOOD PRESSURE, Disp: 90 tablet, Rfl: 1     ASPIRIN PO, Take 325 mg by mouth daily , Disp: , Rfl:      aspirin-acetaminophen-caffeine (EXCEDRIN MIGRAINE) 250-250-65 MG tablet, Take 1 tablet by mouth, Disp: , Rfl:      atorvastatin (LIPITOR) 40 MG tablet, Take 1 tablet (40 mg) by mouth daily, Disp: 90 tablet, Rfl: 3     blood glucose (ACCU-CHEK GUIDE)  test strip, Use to test blood sugar 3 times daily or as directed., Disp: 300 strip, Rfl: 3     blood glucose monitoring (ACCU-CHEK FASTCLIX) lancets, Use to test blood sugar 1 times daily or as directed.  Ok to substitute alternative if insurance prefers., Disp: 102 each, Rfl: 11     calcium carbonate (TUMS) 500 MG chewable tablet, Take 1 chew tab by mouth daily, Disp: , Rfl:      chlorthalidone (HYGROTON) 25 MG tablet, TAKE 1 TABLET (25 MG) BY MOUTH DAILY ADD ON FOR BLOOD PRESSURE, Disp: 90 tablet, Rfl: 1     cyclobenzaprine (FLEXERIL) 10 MG tablet, Take 0.5-1 tablets (5-10 mg) by mouth 3 times daily as needed for muscle spasms, Disp: 90 tablet, Rfl: 3     diazepam (VALIUM) 5 MG tablet, Take 1 tablet (5 mg) by mouth every 5 minutes prior to surgery, Disp: 1 tablet, Rfl: 1     DULoxetine (CYMBALTA) 60 MG capsule, Take 1 capsule (60 mg) by mouth 2 times daily, Disp: 180 capsule, Rfl: 3     enoxaparin ANTICOAGULANT (LOVENOX) 100 MG/ML syringe, Inject 1 mL (100 mg) Subcutaneous 2 times daily, Disp: 20 mL, Rfl: 1     EPINEPHrine (EPIPEN) 0.3 MG/0.3ML injection, Inject 0.3 mLs (0.3 mg) into the muscle once as needed for anaphylaxis, Disp: 2 each, Rfl: 2     fluticasone (FLONASE) 50 MCG/ACT nasal spray, INSTILL 1 SPRAY INTO BOTH NOSTRILS DAILY, Disp: 48 mL, Rfl: 1     fluticasone-salmeterol (ADVAIR) 500-50 MCG/DOSE inhaler, Inhale 1 puff into the lungs 2 times daily, Disp: 1 each, Rfl: 11     HYDROmorphone (DILAUDID) 4 MG tablet, Take 4 mg by mouth, Disp: , Rfl:      hydrOXYzine (ATARAX) 25 MG tablet, Take 1 tablet (25 mg) by mouth 3 times daily as needed for itching, Disp: 10 tablet, Rfl: 0     insulin glargine (BASAGLAR KWIKPEN) 100 UNIT/ML pen, Inject 36 Units Subcutaneous daily Max Total Daily Dose 45 units per day, Disp: 15 mL, Rfl: 3     insulin pen needle (32G X 4 MM) 32G X 4 MM miscellaneous, Use 2 pen needles daily or as directed., Disp: 200 each, Rfl: 1     losartan (COZAAR) 25 MG tablet, Take 1 tablet (25 mg)  by mouth daily Note decrease in dose, Disp: 90 tablet, Rfl: 0     metFORMIN (GLUCOPHAGE-XR) 500 MG 24 hr tablet, Take 2 tablets (1,000 mg) by mouth 2 times daily (with meals), Disp: 120 tablet, Rfl: 5     montelukast (SINGULAIR) 10 MG tablet, TAKE 1 TABLET (10 MG) BY MOUTH AT BEDTIME FOR ALLERGIES/ASTHMA, Disp: 90 tablet, Rfl: 2     multivitamin, therapeutic (THERA-VIT) TABS, Take 1 tablet by mouth daily, Disp: , Rfl:      naproxen sodium (ANAPROX) 220 MG tablet, Take 220 mg by mouth, Disp: , Rfl:      nortriptyline (PAMELOR) 10 MG capsule, Take 2 capsules (20 mg) by mouth At Bedtime, Disp: 60 capsule, Rfl: 5     omeprazole (PRILOSEC) 40 MG DR capsule, TAKE 1 CAPSULE (40 MG) BY MOUTH DAILY TAKE FOR AT LEAST TWO MONTHS, Disp: 90 capsule, Rfl: 2     ondansetron (ZOFRAN ODT) 4 MG ODT tab, Take 1 tablet (4 mg) by mouth every 8 hours as needed for nausea, Disp: 20 tablet, Rfl: 1     oxyCODONE (ROXICODONE) 5 MG tablet, Take 1-2 tablets (5-10 mg) by mouth every 4 hours as needed for moderate to severe pain, Disp: 10 tablet, Rfl: 0     pregabalin (LYRICA) 50 MG capsule, Take 1 capsule (50 mg) by mouth 2 times daily, Disp: 60 capsule, Rfl: 1     rizatriptan (MAXALT-MLT) 10 MG ODT, TAKE 1 TABLET BY MOUTH AT ONSET OF HEADACHE FOR MIGRAINE MAY REPEAT IN 2 HOURS. MAX 3 TABS/24 HOURS., Disp: 18 tablet, Rfl: 0     senna-docusate (SENOKOT-S/PERICOLACE) 8.6-50 MG tablet, Take 1-2 tablets by mouth 2 times daily, Disp: 30 tablet, Rfl: 0     tamsulosin (FLOMAX) 0.4 MG capsule, TAKE 1 CAPSULE BY MOUTH EVERY DAY, Disp: 90 capsule, Rfl: 1     warfarin ANTICOAGULANT (COUMADIN) 5 MG tablet, Take 15 mg Tues, Thurs and 12.5 mg all other days, or as directed by the Coumadin clinic, Disp: 225 tablet, Rfl: 1  No current facility-administered medications for this visit.    Facility-Administered Medications Ordered in Other Visits:      BUPivacaine (MARCAINE) injection 0.5%, 10 mL, Intradermal, Once, Vinnie Espinoza,      DOBUTamine 500 mg in  dextrose 5% 250 mL (adult std conc) premix, 2.5-20 mcg/kg/min, Intravenous, Continuous, Navarro Butcher MD, Stopped at 04/25/19 0569     iopamidol (ISOVUE-M 200) solution 10 mL, 10 mL, Intravenous, Once, Vinnie Espinoza Trino, DO     methylPREDNISolone (DEPO-MEDROL) injection 80 mg, 80 mg, Intramuscular, Once, Vinnie Espinoza Trino, DO     metoprolol (LOPRESSOR) injection 5 mg, 5 mg, Intravenous, Q5 Min PRN, Navarro Butcher MD, 2 mg at 04/25/19 3139    Assesment:  1. 1 week s/p right knee arthroscopy, medial meniscectomy. Doing very well in terms of his right knee.     Plan:   -Weight bearing as tolerated  -Range of motion as tolerated  -No running, jumping, pounding sports for 6 weeks   -Follow up with Dr. Em at 6 weeks, sooner if needed      Priya James PA-C 8/29/2022 11:57 AM  Orthopedic Surgery

## 2022-08-30 ENCOUNTER — LAB (OUTPATIENT)
Dept: LAB | Facility: OTHER | Age: 57
End: 2022-08-30
Attending: FAMILY MEDICINE

## 2022-08-30 DIAGNOSIS — Z20.822 ENCOUNTER FOR LABORATORY TESTING FOR COVID-19 VIRUS: ICD-10-CM

## 2022-08-30 LAB — SARS-COV-2 RNA RESP QL NAA+PROBE: NEGATIVE

## 2022-08-30 PROCEDURE — U0003 INFECTIOUS AGENT DETECTION BY NUCLEIC ACID (DNA OR RNA); SEVERE ACUTE RESPIRATORY SYNDROME CORONAVIRUS 2 (SARS-COV-2) (CORONAVIRUS DISEASE [COVID-19]), AMPLIFIED PROBE TECHNIQUE, MAKING USE OF HIGH THROUGHPUT TECHNOLOGIES AS DESCRIBED BY CMS-2020-01-R: HCPCS

## 2022-08-30 PROCEDURE — U0005 INFEC AGEN DETEC AMPLI PROBE: HCPCS

## 2022-08-30 NOTE — PROGRESS NOTES
[facility]    OUTPATIENT Physical Therapy ORTHOPEDIC EVALUATION  PLAN OF TREATMENT FOR OUTPATIENT REHABILITATION  (COMPLETE FOR INITIAL CLAIMS ONLY)  Patient's Last Name, First Name, M.I.  YOB: 1965  Adarsh Salvador    Provider s Name:  [facility]   Medical Record No.  [id number]   Start of Care Date:  08/26/22   Onset Date:  08/19/22    Type:     _X__PT   ___OT Medical Diagnosis:    Encounter Diagnoses   Name Primary?    Chronic pain of right knee     Acute pain of right knee     Aftercare following surgery of the musculoskeletal system         Treatment Diagnosis:  right knee pain s/p        Goals:     08/26/22 0500   Body Part   Goals listed below are for right knee   Goal #1   Goal #1 standing   Previous Functional Level No restrictions   Current Functional Level Hours patient can stand   Performance level 6 hours 6/10 PL   STG Target Performance Hours patient will be able to stand   Performance level 6 hours 3/10 PL   Rationale for housekeeping tasks such as vacuuming, bed making, mowing, gardening;for safe community ambulation;for safe household ambulation   Due date 09/23/22   LTG Target Performance Hours patient will be able to stand   Performance Level 6 hours 2/10 PL or less   Rationale for housekeeping tasks such as vacuuming, bed making, mowing;for safe household ambulation;for safe community ambulation   Due date 10/21/22       Therapy Frequency:  2 / week for 2 weeks then 1 x/ week for 6  Predicted Duration of Therapy Intervention:  8 week    Bull Contreras PT                 I CERTIFY THE NEED FOR THESE SERVICES FURNISHED UNDER        THIS PLAN OF TREATMENT AND WHILE UNDER MY CARE     (Physician attestation of this document indicates review and certification of the therapy plan).                     Certification Date From:  08/26/22   Certification Date  To:  10/21/22    Referring Provider:  Carlos A Gillespie*    Initial Assessment        See Epic Evaluation SOC Date: 08/26/22

## 2022-08-30 NOTE — PROGRESS NOTES
Called patient to confirm last dose Lovenox should be Thursday AM, 24 hours prior to procedure, and resume Saturday if advised OK to restart by provider.  Wanted to make sure was clear  For patient when needed to stop and restart since charted notes stated to continue Lovenox through Friday.  Also stated writer sent MyChart with updated schedule.    Siomara Montaño, PharmD BCACP  Anticoagulation Clinical Pharmacist

## 2022-09-06 ENCOUNTER — ANTICOAGULATION THERAPY VISIT (OUTPATIENT)
Dept: ANTICOAGULATION | Facility: CLINIC | Age: 57
End: 2022-09-06

## 2022-09-06 ENCOUNTER — TELEPHONE (OUTPATIENT)
Dept: FAMILY MEDICINE | Facility: CLINIC | Age: 57
End: 2022-09-06

## 2022-09-06 DIAGNOSIS — I82.409 RECURRENT DEEP VEIN THROMBOSIS (DVT) (H): Primary | ICD-10-CM

## 2022-09-06 DIAGNOSIS — M25.561 CHRONIC PAIN OF RIGHT KNEE: ICD-10-CM

## 2022-09-06 DIAGNOSIS — G89.29 CHRONIC PAIN OF RIGHT KNEE: ICD-10-CM

## 2022-09-06 RX ORDER — OXYCODONE AND ACETAMINOPHEN 5; 325 MG/1; MG/1
1 TABLET ORAL
COMMUNITY
Start: 2022-09-02 | End: 2022-11-30

## 2022-09-06 RX ORDER — OXYCODONE HYDROCHLORIDE 5 MG/1
5-10 TABLET ORAL EVERY 4 HOURS PRN
Qty: 10 TABLET | Refills: 0 | Status: CANCELLED | OUTPATIENT
Start: 2022-09-06

## 2022-09-06 NOTE — PROGRESS NOTES
Talked with Adarsh, surgery for stent removal cancelled due to provider out with Covid. Will have to reschedule at a later date, unknown.     To take 25mg of warfarin tonight and resume previous warfarin schedule with INR 9/9, to continue with Lovenox bid until INR>2.0     Adarsh states he just picked up more Lovenox today.   ANTICOAGULATION MANAGEMENT           ASSESSMENT       Source(s): Chart Review and Patient/Caregiver Call       Warfarin doses taken: Warfarin taken as instructed and Has been holding warfarin since 8/29/2022    Diet: No new diet changes identified    New illness, injury, or hospitalization: No    Medication/supplement changes: None noted    Signs or symptoms of bleeding or clotting: Yes: bleeding at Lovenox injection site yesterday, bleed thru bandaide x2. No further bleeding today    Previous INR: Subtherapeutic    Additional findings: Bridging with Enoxaparin until INR >= 2.0       PLAN     Recommended plan for temporary change(s) affecting INR     Dosing Instructions: booster dose then continue your current warfarin dose with next INR in 3 days       Summary  As of 9/6/2022    Full warfarin instructions:  9/6: 25 mg; Otherwise 15 mg every Tue, Sat; 12.5 mg all other days   Next INR check:               Telephone call with Adarsh who verbalizes understanding and agrees to plan    Lab visit scheduled    Education provided: Importance of following up at instructed interval, Importance of taking warfarin as instructed, Monitoring for bleeding signs and symptoms and Lovenox/Heparin education provided: role of enoxaparin/heparin in bridge therapy and monitoring for signs and symptoms of bruising and bleeding     Plan made per [facility] anticoagulation protocol    Argentina Antonio RN  Anticoagulation Clinic  9/6/2022    _______________________________________________________________________     Anticoagulation Episode Summary     Current INR goal:  2.0-3.0   TTR:  46.3 % (6.9 mo)   Target end date:  Indefinite    Send INR reminders to:  [facility]    Indications    Recurrent deep vein thrombosis (DVT) (H) [I82.409]           Comments:           Anticoagulation Care Providers     Provider Role Specialty Phone number    Bertha Romero PA-C Referring [facility] [phone]

## 2022-09-06 NOTE — TELEPHONE ENCOUNTER
Called and spoke to patient. Scheduled with Frederic Thomas tomorrow. Patient is upset that Dr Garcia has covid and nobody can refill his medication.Dalila Tinajero MA/TC

## 2022-09-06 NOTE — TELEPHONE ENCOUNTER
Pt requesting refill of Percocet for pain related to kidney stone and stent placement. Pt states this is generally prescribed by his Urologist, Dr. Gracia, from [facility].    Pt states he requested refill from Dr. Gracia's office today, but was told the provider is out sick and pt was instructed to call his PCP for the refill instead. Routed to PCP to review and advise.    Routing refill request to provider for review/approval because:  Drug not active on patient's medication list (last prescribed by provider outside of [facility]) - Added to med list per external pharmacy reconciliation.    Cheyenne Vicente, LANGN, RN

## 2022-09-06 NOTE — TELEPHONE ENCOUNTER
The patient should be offered an appointment with 1st available provider. Unable to refill opiates without visit.    Gatito Sultana M.D.

## 2022-09-07 ENCOUNTER — THERAPY VISIT (OUTPATIENT)
Dept: PHYSICAL THERAPY | Facility: CLINIC | Age: 57
End: 2022-09-07
Payer: COMMERCIAL

## 2022-09-07 ENCOUNTER — OFFICE VISIT (OUTPATIENT)
Dept: FAMILY MEDICINE | Facility: CLINIC | Age: 57
End: 2022-09-07
Payer: COMMERCIAL

## 2022-09-07 ENCOUNTER — TELEPHONE (OUTPATIENT)
Dept: UROLOGY | Facility: CLINIC | Age: 57
End: 2022-09-07

## 2022-09-07 VITALS
WEIGHT: 282 LBS | DIASTOLIC BLOOD PRESSURE: 84 MMHG | RESPIRATION RATE: 16 BRPM | BODY MASS INDEX: 37.37 KG/M2 | SYSTOLIC BLOOD PRESSURE: 139 MMHG | HEART RATE: 80 BPM | OXYGEN SATURATION: 95 % | HEIGHT: 73 IN | TEMPERATURE: 97.2 F

## 2022-09-07 DIAGNOSIS — Z47.89 AFTERCARE FOLLOWING SURGERY OF THE MUSCULOSKELETAL SYSTEM: ICD-10-CM

## 2022-09-07 DIAGNOSIS — M25.561 ACUTE PAIN OF RIGHT KNEE: Primary | ICD-10-CM

## 2022-09-07 DIAGNOSIS — Z87.442 PERSONAL HISTORY OF KIDNEY STONES: Primary | ICD-10-CM

## 2022-09-07 PROCEDURE — 97110 THERAPEUTIC EXERCISES: CPT | Mod: CQ | Performed by: PHYSICAL THERAPY ASSISTANT

## 2022-09-07 PROCEDURE — 99213 OFFICE O/P EST LOW 20 MIN: CPT | Performed by: PHYSICIAN ASSISTANT

## 2022-09-07 RX ORDER — OXYCODONE AND ACETAMINOPHEN 5; 325 MG/1; MG/1
1 TABLET ORAL 3 TIMES DAILY PRN
Qty: 12 TABLET | Refills: 0 | Status: SHIPPED | OUTPATIENT
Start: 2022-09-07 | End: 2022-09-11

## 2022-09-07 ASSESSMENT — PAIN SCALES - GENERAL: PAINLEVEL: SEVERE PAIN (6)

## 2022-09-07 NOTE — PROGRESS NOTES
"  Assessment & Plan       ICD-10-CM    1. Personal history of kidney stones  Z87.442 Adult Urology  Referral     oxyCODONE-acetaminophen (PERCOCET) 5-325 MG tablet   Talk to patient about his concerns I did refill his Percocet this 1 time.  Warning signs side effects were discussed.  He placed a new referral for urology and will have him follow-up with primary provider for continued care.      Return in about 1 week (around 9/14/2022) for recheck.    Frederic Thomas PA-C  [facility]   Adarsh is a 57 year old accompanied by his self, presenting for the following health issues:  Medication Request      History of Present Illness       Reason for visit:  Kidney stone    He eats 2-3 servings of fruits and vegetables daily.He consumes 1 sweetened beverage(s) daily.He exercises with enough effort to increase his heart rate 9 or less minutes per day.  He exercises with enough effort to increase his heart rate 4 days per week.   He is taking medications regularly.   sees outside Urologist. And recently  Is out due to COVID.  Just had lithotripsy and stints placed.     He has had years of histories of off-and-on kidney stones.  He has seen Dr. Gracia in the past but is currently out on medical leave.  Patient is not quite sure what to do for continued follow-up.  He is very frustrated.  Care Everywhere Reviewed    He is requesting a refill of his Percocet as he uses this as needed for his kidney stones.    Review of Systems   Constitutional, HEENT, cardiovascular, pulmonary, gi and gu systems are negative, except as otherwise noted.      Objective    /84   Pulse 80   Temp 97.2  F (36.2  C) (Tympanic)   Resp 16   Ht 1.848 m (6' 0.75\")   Wt 127.9 kg (282 lb)   SpO2 95%   BMI 37.46 kg/m    Body mass index is 37.46 kg/m .  Physical Exam   GENERAL: healthy, alert and no distress  PSYCH: mentation appears normal, affect normal/bright        "

## 2022-09-07 NOTE — TELEPHONE ENCOUNTER
Call center called with pt on the line. Pt is asking to been seen for a stent removal since there original provider is out. Pt has not been seen in our clinic. Pt would need a consult before an appt to remove stent, likely a procedure too as previous was removed that way. Writer informed for pt to reach to their original providers clinic for their options if they have another provider the pt can see otherwise a referral should be placed for stent removal to them get pt in quickly if needed.

## 2022-09-13 ENCOUNTER — THERAPY VISIT (OUTPATIENT)
Dept: PHYSICAL THERAPY | Facility: CLINIC | Age: 57
End: 2022-09-13
Payer: COMMERCIAL

## 2022-09-13 DIAGNOSIS — M25.561 ACUTE PAIN OF RIGHT KNEE: Primary | ICD-10-CM

## 2022-09-13 DIAGNOSIS — Z47.89 AFTERCARE FOLLOWING SURGERY OF THE MUSCULOSKELETAL SYSTEM: ICD-10-CM

## 2022-09-13 PROCEDURE — 97140 MANUAL THERAPY 1/> REGIONS: CPT | Mod: GP | Performed by: PHYSICAL THERAPIST

## 2022-09-13 PROCEDURE — 97110 THERAPEUTIC EXERCISES: CPT | Mod: GP | Performed by: PHYSICAL THERAPIST

## 2022-09-14 ENCOUNTER — DOCUMENTATION ONLY (OUTPATIENT)
Dept: FAMILY MEDICINE | Facility: CLINIC | Age: 57
End: 2022-09-14

## 2022-09-14 NOTE — PROGRESS NOTES
Left message for patient to return call. He has an appointment for a pre surgical covid swab and we will not be able to preform thru [facility] because his surgery is thru [facility]. He will need to go elsewhere for his test.

## 2022-09-20 ENCOUNTER — TELEPHONE (OUTPATIENT)
Dept: ANTICOAGULATION | Facility: CLINIC | Age: 57
End: 2022-09-20

## 2022-09-20 DIAGNOSIS — I82.409 RECURRENT DEEP VEIN THROMBOSIS (DVT) (H): Primary | ICD-10-CM

## 2022-09-20 NOTE — TELEPHONE ENCOUNTER
ANTICOAGULATION     Adarsh Salvador is overdue for INR check.      Spoke with Adarsh who declined to schedule a lab appointment at this time. If calling back please schedule as soon as possible.     Patient has upcoming procedure, needs bridge instructions. Lithotripsy on 9/27 and 1 week later will have stent removal. Per TE on 9/2 to continue to bridge between procedures and restart warfarin after 2nd procedure. Routing to [facility] to review for bridge    Rubi Nathan RN

## 2022-09-21 ENCOUNTER — THERAPY VISIT (OUTPATIENT)
Dept: PHYSICAL THERAPY | Facility: CLINIC | Age: 57
End: 2022-09-21
Payer: COMMERCIAL

## 2022-09-21 DIAGNOSIS — M25.561 ACUTE PAIN OF RIGHT KNEE: Primary | ICD-10-CM

## 2022-09-21 DIAGNOSIS — Z47.89 AFTERCARE FOLLOWING SURGERY OF THE MUSCULOSKELETAL SYSTEM: ICD-10-CM

## 2022-09-21 PROCEDURE — 97110 THERAPEUTIC EXERCISES: CPT | Mod: GP | Performed by: PHYSICAL THERAPIST

## 2022-09-21 ASSESSMENT — ACTIVITIES OF DAILY LIVING (ADL)
STAND: ACTIVITY IS NOT DIFFICULT
LIMPING: I DO NOT HAVE THE SYMPTOM
AS_A_RESULT_OF_YOUR_KNEE_INJURY,_HOW_WOULD_YOU_RATE_YOUR_CURRENT_LEVEL_OF_DAILY_ACTIVITY?: NEARLY NORMAL
PAIN: THE SYMPTOM AFFECTS MY ACTIVITY SLIGHTLY
STIFFNESS: I HAVE THE SYMPTOM BUT IT DOES NOT AFFECT MY ACTIVITY
HOW_WOULD_YOU_RATE_THE_OVERALL_FUNCTION_OF_YOUR_KNEE_DURING_YOUR_USUAL_DAILY_ACTIVITIES?: NEARLY NORMAL
HOW_WOULD_YOU_RATE_THE_CURRENT_FUNCTION_OF_YOUR_KNEE_DURING_YOUR_USUAL_DAILY_ACTIVITIES_ON_A_SCALE_FROM_0_TO_100_WITH_100_BEING_YOUR_LEVEL_OF_KNEE_FUNCTION_PRIOR_TO_YOUR_INJURY_AND_0_BEING_THE_INABILITY_TO_PERFORM_ANY_OF_YOUR_USUAL_DAILY_ACTIVITIES?: 90
GO DOWN STAIRS: ACTIVITY IS NOT DIFFICULT
RAW_SCORE: 59
GO UP STAIRS: ACTIVITY IS MINIMALLY DIFFICULT
GIVING WAY, BUCKLING OR SHIFTING OF KNEE: I DO NOT HAVE THE SYMPTOM
RISE FROM A CHAIR: ACTIVITY IS MINIMALLY DIFFICULT
WALK: ACTIVITY IS MINIMALLY DIFFICULT
KNEE_ACTIVITY_OF_DAILY_LIVING_SCORE: 84.29
KNEEL ON THE FRONT OF YOUR KNEE: ACTIVITY IS SOMEWHAT DIFFICULT
SWELLING: I HAVE THE SYMPTOM BUT IT DOES NOT AFFECT MY ACTIVITY
KNEE_ACTIVITY_OF_DAILY_LIVING_SUM: 59
WEAKNESS: I HAVE THE SYMPTOM BUT IT DOES NOT AFFECT MY ACTIVITY
SIT WITH YOUR KNEE BENT: ACTIVITY IS NOT DIFFICULT
SQUAT: ACTIVITY IS MINIMALLY DIFFICULT

## 2022-09-21 NOTE — TELEPHONE ENCOUNTER
MARYANN-PROCEDURAL ANTICOAGULATION  MANAGEMENT    ASSESSMENT     Warfarin interruption plan for lithotripsy  on 2022 & stent removal tentatively 10/04/2022 .         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 stopping warfarin 2021    Bilateral PE 2022    DVT 2021    History of thrombophlebitis         Maryann-Procedure Risk stratification for thromboembolism: moderate (2018 American Society of Hematology guideline)     VTE: 2018 American Society of Hematology recommends against bridging in low and moderate risk VTE patients holding warfarin      VTE: 2016 Anticoagulation Forum clinical guidance recommends no bridge therapy for most VTE patients interrupting warfarin with possible exceptions for VTE within previous month, prior hx recurrent VTE during anticoagulation therapy interruption, or undergoing a procedure with high for VTE  (joint replacement surgery or major abdominal cancer resection)        RECOMMENDATION       Pre-Procedure:  o Hold warfarin for 5 days, until after procedure startin2022   o Enoxaparin (Lovenox) 100 mg subq Q 12 hrs (0.75 mg/kg Q 12 hrs for BMI >= 40 kg/m2 per [facility] P&T approved dose adjustment protocol)   - Start enoxaparin: 2022  - Last dose of enoxaparin prior to procedure: 2022 PM  (~48 hours prior to procedure per surgeon request)      Post-Procedure:    Resume Lovenox 24-72 hours after procedure as directed by provider    Last dose Lovenox AM day before procedure or PM 2 days before procedure (as directed by provider)       Post Stent removal  o Resume warfarin dose if okay with provider doing procedure on night of procedure, PM: 25mg, and 20mg day 2 of warfarin restart   o Resume enoxaparin (Lovenox) ~ 24-72 hrs post procedure when okay with provider doing procedure. Continue until INR >= 2.0  o Recheck INR ~5-6 days after resuming warfarin   ?       Plan routed to referring provider for approval  ?   Siomara Montaño,  "[facility]    SUBJECTIVE/OBJECTIVE     Adarsh Salvador, a 57 year old male    Goal INR Range: 2.0-3.0     Patient bridged in past: Yes: 08/2022 for procedure      Wt Readings from Last 3 Encounters:   09/07/22 127.9 kg (282 lb)   08/29/22 131.5 kg (290 lb)   08/19/22 131.5 kg (290 lb)      Ideal body weight: 79.3 kg (174 lb 14.1 oz)  Adjusted ideal body weight: 98.8 kg (217 lb 11.6 oz)     Estimated body mass index is 37.46 kg/m  as calculated from the following:    Height as of 9/7/22: 1.848 m (6' 0.75\").    Weight as of 9/7/22: 127.9 kg (282 lb).    Lab Results   Component Value Date    INR 1.0 08/29/2022    INR 2.1 (H) 08/01/2022    INR 2.1 (H) 07/12/2022     Lab Results   Component Value Date    HGB 12.1 08/16/2022    HGB 13.1 01/29/2021    HCT 37.3 08/16/2022    HCT 40.2 01/29/2021     08/16/2022     01/29/2021     Lab Results   Component Value Date    CR 0.83 08/16/2022    CR 0.91 06/07/2022    CR 0.88 02/21/2022     CrCl cannot be calculated (Patient's most recent lab result is older than the maximum 30 days allowed.).  "

## 2022-09-22 RX ORDER — ENOXAPARIN SODIUM 100 MG/ML
0.75 INJECTION SUBCUTANEOUS EVERY 12 HOURS
Qty: 20 ML | Refills: 1 | Status: SHIPPED | OUTPATIENT
Start: 2022-09-22 | End: 2022-11-22

## 2022-09-22 NOTE — TELEPHONE ENCOUNTER
ACC will need to watch chart and Adarsh will need to let us know date of second procedure to give specific instructions, but here is a general outline:  Last warfarin dose: 09/21/2022 09/22/2022, NO warfarin  09/23/2022, NO warfarin  09/24/2022, NO warfarin, begin enoxaparin injections into the abdomen every 12 hours (AM and PM)  09/25/2022, NO warfarin, enoxaparin injection into the abdomen every 12 hours (AM and PM)  09/26/2022, NO warfarin, NO enoxaparin   09/27/2022, DAY OF PROCEDURE, NO enoxaparin.   09/28/2022 or date provider clears to restart resume enoxaparin until instructed to stop prior to second procedure  PM of procedure #2 Restart warfarin 25mg unless instructed by provider doing procedure  Post procedure day 2, Restart enoxaparin injections into the abdomen every 12 hours (AM and PM), unless instructed otherwise by the physician, and 20mg (4 tabs) warfarin in the evening.   Post procedure day 3, Enoxaparin injection into the abdomen every 12 hours (AM and PM) and normally scheduled dose of warfarin in the evening.  Post procedure day 4 & 5, Enoxaparin injection into the abdomen every 12 hours (AM and PM) and normally scheduled warfarin in the evening.  Post procedure day 6 (if not weekend), Enoxaparin injection into the abdomen in the AM. Recheck INR at Lab for further dosing instructions.   If you have any questions please call the Anticoagulation Clinic at [phone].

## 2022-09-22 NOTE — TELEPHONE ENCOUNTER
Spoke with patient and advised.  He does not have a date yet for the second procedure.  He will let the ACC know when this is scheduled. Will also postpone message to follow up on 9/28 if do not hear from patient prior to that. Lovenox sent to pharmacy, he currently has 5 days of dosing on hand.     Janeth Cool RN  [facility] Anticoagulation Clinic

## 2022-09-22 NOTE — TELEPHONE ENCOUNTER
Attempted to reach patient and voicemail is full. Sent a SMS message.  Will try again later if patient does not call back.    Janeth Cool RN  [facility] Anticoagulation Clinic

## 2022-09-28 ENCOUNTER — TELEPHONE (OUTPATIENT)
Dept: ANTICOAGULATION | Facility: CLINIC | Age: 57
End: 2022-09-28

## 2022-09-28 DIAGNOSIS — I82.409 RECURRENT DEEP VEIN THROMBOSIS (DVT) (H): Primary | ICD-10-CM

## 2022-09-28 NOTE — TELEPHONE ENCOUNTER
Original plan per 2022 TE:     Warfarin interruption plan for lithotripsy  on 2022 & stent removal tentatively 10/04/2022 .          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 stopping warfarin 2021    Bilateral PE 2022    DVT 2021    History of thrombophlebitis         Joie-Procedure Risk stratification for thromboembolism: moderate (2018 American Society of Hematology guideline)     VTE: 2018 American Society of Hematology recommends against bridging in low and moderate risk VTE patients holding warfarin      VTE: 2016 Anticoagulation Forum clinical guidance recommends no bridge therapy for most VTE patients interrupting warfarin with possible exceptions for VTE within previous month, prior hx recurrent VTE during anticoagulation therapy interruption, or undergoing a procedure with high for VTE  (joint replacement surgery or major abdominal cancer resection)         RECOMMENDATION        Pre-Procedure:  ? Hold warfarin for 5 days, until after procedure startin2022   ? Enoxaparin (Lovenox) 100 mg subq Q 12 hrs (0.75 mg/kg Q 12 hrs for BMI >= 40 kg/m2 per [facility] P&T approved dose adjustment protocol)     Start enoxaparin: 2022    Last dose of enoxaparin prior to procedure: 2022 PM  (~48 hours prior to procedure per surgeon request)       Post-Procedure:  ? Resume Lovenox 24-72 hours after procedure as directed by provider  ? Last dose Lovenox AM day before procedure or PM 2 days before procedure (as directed by provider)        Post Stent removal  ? Resume warfarin dose if okay with provider doing procedure on night of procedure, PM: 25mg, and 20mg day 2 of warfarin restart   ? Resume enoxaparin (Lovenox) ~ 24-72 hrs post procedure when okay with provider doing procedure. Continue until INR >= 2.0  ? Recheck INR ~5-6 days after resuming warfarin     Review of instructions from urology post procedure at [facility] are to hold enoxaparin & warfarin until 10/04/2022.    Due  to patient expressed concern regarding length of hold versus relative bleeding risk, did ask hematology to weigh in related to risk, but they have not seen Adarsh since his recurrent DVT after stopping warfarin this past winter.  Routing to PCP to review.      Siomara Montaño, PharmD BCACP  Anticoagulation Clinical Pharmacist

## 2022-09-28 NOTE — TELEPHONE ENCOUNTER
Talked with Adarsh, he had procedure yesterday. States still having pain and taking pain meds.     Had last Lovenox on Sunday. He was instructed to hold warfarin and Lovenox until next procedure.     He is concerned and worried about holding anticoagulation and history of clots.    Talked with [facility], will route to care team.     Pharmacist wondering about needing Heme/Onc review.    Informed Adarsh that I would route questions to our pharmacist and his primary provider and will get back to him later today or tomorrow to advise.

## 2022-09-29 ENCOUNTER — TELEPHONE (OUTPATIENT)
Dept: FAMILY MEDICINE | Facility: CLINIC | Age: 57
End: 2022-09-29

## 2022-09-29 DIAGNOSIS — Z59.00 HOMELESS: Primary | ICD-10-CM

## 2022-09-29 SDOH — ECONOMIC STABILITY - HOUSING INSECURITY: HOMELESSNESS UNSPECIFIED: Z59.00

## 2022-09-29 NOTE — TELEPHONE ENCOUNTER
Urology has requested not resume Lovenox between procedures, or about 1 week totally off anticoagulation.    If there is no hematuria concerns are you comfortable resuming Lovenox as originally planned in procedure plan, or would you be open to further discussion with Dr. Gracia to come to an alternate solution that meets Adarsh and provider's concerns.       Thanks,  Siomara Montaño, PharmD BCACP  Anticoagulation Clinical Pharmacist

## 2022-09-29 NOTE — TELEPHONE ENCOUNTER
"Pt attempted to return call to Anticoagulation RN. Pt states he will keep his phone near him to receive return call. Please return call to pt.    Additionally, RN has placed Care Coordination referral for pt. Pt also states to RN that he is in a \"bad situation\" right now for living arrangements. Pt states he is basically homeless and could use assistance finding a new place to live. Pt states he will stay with his boyfriend temporarily until he can figure something out. Pt states he would welcome outreach by someone from the Care Coordination team.    RUKHSANA Mott, RN    "

## 2022-09-29 NOTE — TELEPHONE ENCOUNTER
He was re-started because he had another dvt after stopping the coumadin.  I am not sure why he would need to hold lovenox, is there a way to bridge him so he is not without any anticoagulation? I can see why that would worry him. I do not bridge people I get help with that, can either MTM or INR nurse help with a bridging plan? Or is urology saying he can't. I am not fully understanding the question thank you, Bertha

## 2022-09-29 NOTE — TELEPHONE ENCOUNTER
Relayed providers message below. Patient will contact provider from [facility].       Rubi Nathan RN - [facility] Anticoagulation [facility]

## 2022-09-29 NOTE — TELEPHONE ENCOUNTER
PLEASE CALL PATIENT:           Hematology will not comment on his blood thinning medications, I understand his concern of not being on anything due to his high risk of clots.  Unfortunately his surgeon works for [employer] so I don't have a direct line to call them or message them.  Please     1) have Adarsh call and see if he can get a hold of the team for [facility] and have his concerns addressed regarding anticoagulation plan    IF he does not respond to Adarsh within 24 hours then proceed to the next plan    2)  Call Dr. Underwood office at [facility] and see if they would be willing to speak to me or [facility] team about the plan.  He can call or text me at     Bertha

## 2022-09-29 NOTE — TELEPHONE ENCOUNTER
Attempted to leave a message but VM is full    Rubi Nathan, RN - [facility] Anticoagulation Clinic

## 2022-09-30 ENCOUNTER — PATIENT OUTREACH (OUTPATIENT)
Dept: CARE COORDINATION | Facility: CLINIC | Age: 57
End: 2022-09-30

## 2022-09-30 NOTE — PROGRESS NOTES
Clinic Care Coordination Contact  [facility]/Voicemail       Clinical Data: Care Coordinator Outreach  Outreach attempted x 1.  Left message on patient's voicemail with call back information and requested return call.  Plan: Care Coordinator will try to reach patient again in 1-2 business days.    CHRISTIANE Garcia  [facility] Care Coordination  [facility]  [phone]

## 2022-09-30 NOTE — LETTER
[facility] CARE COORDINATION  [address]  [place] [address]    October 6, 2022    Adarsh Salvador  [address]  [place] [address]      Dear Adarsh,    I am a clinic community health worker who works with the [facility].  I have been trying to reach you recently to introduce Clinic Care Coordination. I wanted to provide you with our contact information to call with any support or resource questions or concerns. Below is a description of clinic care coordination and how our team can further assist you.       The clinic care coordination team is made up of a registered nurse, , financial resource worker and community health worker who understand the health care system. The goal of clinic care coordination is to help you manage your health and improve access to the health care system. Our team works alongside your provider to assist you in determining your health and social needs. We can help you obtain health care and community resources, providing you with necessary information and education. We can work with you through any barriers and develop a care plan that helps coordinate and strengthen the communication between you and your care team.    Please feel free to contact Lalita GODOY at [phone] regarding care coordination and what we can offer.      We are focused on providing you with the highest-quality healthcare experience possible.    Sincerely,       Sharon VAZQUEZ  Community Health Worker  [facility]  Clinic Care Coordination  [facility]  [email or web address]  [email or web address]   Office: [phone]

## 2022-10-01 ENCOUNTER — LAB (OUTPATIENT)
Dept: LAB | Facility: CLINIC | Age: 57
End: 2022-10-01
Payer: COMMERCIAL

## 2022-10-01 DIAGNOSIS — Z20.822 ENCOUNTER FOR LABORATORY TESTING FOR COVID-19 VIRUS: ICD-10-CM

## 2022-10-01 PROCEDURE — U0003 INFECTIOUS AGENT DETECTION BY NUCLEIC ACID (DNA OR RNA); SEVERE ACUTE RESPIRATORY SYNDROME CORONAVIRUS 2 (SARS-COV-2) (CORONAVIRUS DISEASE [COVID-19]), AMPLIFIED PROBE TECHNIQUE, MAKING USE OF HIGH THROUGHPUT TECHNOLOGIES AS DESCRIBED BY CMS-2020-01-R: HCPCS

## 2022-10-01 PROCEDURE — U0005 INFEC AGEN DETEC AMPLI PROBE: HCPCS

## 2022-10-02 LAB — SARS-COV-2 RNA RESP QL NAA+PROBE: NEGATIVE

## 2022-10-05 NOTE — PROGRESS NOTES
Clinic Care Coordination Contact  [facility]/Voicemail    Clinical Data: Care Coordination Outreach  Patient was admitted 10/1 for UTI. Patient had general surgery 10/3, discharged 10/4.  Referral note: Please schedule with [facility] - housing needs as he is homeless    Outreach attempted x 1.  CHW could not leave a message on patient's voicemail as mailbox is currently fulll.  Plan: CHW will try to reach patient again in 1-2 business days.     Sharon VAZQUEZ  Community Health Worker  [facility]  Clinic Care Coordination  [facility]  [email or web address]   Office: [phone]

## 2022-10-06 ENCOUNTER — TELEPHONE (OUTPATIENT)
Dept: ANTICOAGULATION | Facility: CLINIC | Age: 57
End: 2022-10-06

## 2022-10-06 NOTE — TELEPHONE ENCOUNTER
ANTICOAGULATION  MANAGEMENT: Discharge Review    Adarsh Salvador chart reviewed for anticoagulation continuity of care    Hospital Admission on 10/5 for lithotripsy.    Discharge disposition: Home    Results:    No results for input(s): INR, [id number], F2, ALMWH, AAUFH in the last 168 hours.  Anticoagulation inpatient management:     held warfarin due to bleeding/surgical procedure      Anticoagulation discharge instructions:     Warfarin dosing: hold warfarin until bleeding resolves, pt plans to restart warfarin and lovenox tomorrow 10/7   Bridging: bridging with enoxaparin (Lovenox)   INR goal change: No      Medication changes affecting anticoagulation: No    Additional factors affecting anticoagulation: No     PLAN     Agree with discharge plan for follow up on 10/11 for an INR     Spoke with Adarsh   Recommended follow up is scheduled    Anticoagulation Calendar updated    Janeth Cool RN

## 2022-10-06 NOTE — PROGRESS NOTES
Clinic Care Coordination Contact  [facility]/Voicemail    Clinical Data: Care Coordination Outreach  Outreach attempted x 2.  Patient's mailbox remains full.   CHW could not leave a message with call back information.    Note: 10/5/22  [facility] Emergency Room  dx: dizzy, falls    Plan: CHW will send care coordination introduction letter with care coordinator contact information and explanation of care coordination services via [employer]. CHW will do no further outreaches at this time.    Sharon VAZQUEZ  Community Health Worker  [facility] Care Coordination  [facility]  [email or web address]   Office: [phone]

## 2022-10-11 ENCOUNTER — ANTICOAGULATION THERAPY VISIT (OUTPATIENT)
Dept: ANTICOAGULATION | Facility: CLINIC | Age: 57
End: 2022-10-11

## 2022-10-11 ENCOUNTER — LAB (OUTPATIENT)
Dept: LAB | Facility: CLINIC | Age: 57
End: 2022-10-11
Payer: COMMERCIAL

## 2022-10-11 DIAGNOSIS — I82.409 RECURRENT DEEP VEIN THROMBOSIS (DVT) (H): ICD-10-CM

## 2022-10-11 DIAGNOSIS — I82.409 RECURRENT DEEP VEIN THROMBOSIS (DVT) (H): Primary | ICD-10-CM

## 2022-10-11 LAB — INR BLD: 1.3 (ref 0.9–1.1)

## 2022-10-11 PROCEDURE — 36415 COLL VENOUS BLD VENIPUNCTURE: CPT

## 2022-10-11 PROCEDURE — 85610 PROTHROMBIN TIME: CPT

## 2022-10-11 NOTE — PROGRESS NOTES
ANTICOAGULATION MANAGEMENT     Adarsh Salvador 57 year old male is on warfarin with subtherapeutic INR result. (Goal INR 2.0-3.0)    Recent labs: (last 7 days)     10/11/22  0859   INR 1.3*       ASSESSMENT       Source(s): Chart Review    Previous INR was Subtherapeutic    Medication, diet, health changes since last INR chart reviewed; none identified    Patient recently held for surgery and bleeding. Was suppose to restart warfarin on 10/7, is also on lovenox.        PLAN     Unable to reach Adarsh today.    No instructions provided. Unable to leave voicemail. Voicemail is full, did leave a SMS message with ACC    Follow up required to confirm warfarin dose taken and assess for changes    Janeth Cool RN  Anticoagulation Clinic  10/11/2022

## 2022-10-12 NOTE — PROGRESS NOTES
ANTICOAGULATION MANAGEMENT     Adarsh Salvador 57 year old male is on warfarin with subtherapeutic INR result. (Goal INR 2.0-3.0)    Recent labs: (last 7 days)     10/11/22  0859   INR 1.3*       ASSESSMENT       Source(s): Chart Review and Patient/Caregiver Call       Warfarin doses taken: Warfarin taken as instructed    Diet: No new diet changes identified    New illness, injury, or hospitalization: Yes: s/p lithotripsy     Medication/supplement changes: None noted    Signs or symptoms of bleeding or clotting: No    Previous INR: Subtherapeutic    Additional findings: Bridging with Enoxaparin until INR >= 2.3 or INR >= 2.0 x 2 (ACC protocol goal INR 2-3 new start) and pt restarted on Friday        PLAN     Recommended plan for temporary change(s) affecting INR     Dosing Instructions: booster dose then continue your current warfarin dose with next INR in 3 days       Summary  As of 10/11/2022    Full warfarin instructions:  10/12: 17.5 mg; Otherwise 15 mg every Tue, Sat; 12.5 mg all other days   Next INR check:  10/14/2022             Telephone call with Adarsh who agrees to plan and repeated back plan correctly    Lab visit scheduled    Education provided: Goal range and significance of current result and Lovenox/Heparin education provided: role of enoxaparin/heparin in setting of new PE and/or DVT     Plan made per ACC anticoagulation protocol    Janeth Cool, RN  Anticoagulation Clinic  10/12/2022    _______________________________________________________________________     Anticoagulation Episode Summary     Current INR goal:  2.0-3.0   TTR:  38.3 % (8.3 mo)   Target end date:  Indefinite   Send INR reminders to:  [facility]    Indications    Recurrent deep vein thrombosis (DVT) (H) [I82.409]           Comments:           Anticoagulation Care Providers     Provider Role Specialty Phone number    Bertha Romero PA-C Referring Family Medicine [phone]

## 2022-10-14 ENCOUNTER — ANTICOAGULATION THERAPY VISIT (OUTPATIENT)
Dept: ANTICOAGULATION | Facility: CLINIC | Age: 57
End: 2022-10-14

## 2022-10-14 ENCOUNTER — LAB (OUTPATIENT)
Dept: LAB | Facility: CLINIC | Age: 57
End: 2022-10-14
Payer: COMMERCIAL

## 2022-10-14 DIAGNOSIS — I82.409 RECURRENT DEEP VEIN THROMBOSIS (DVT) (H): Primary | ICD-10-CM

## 2022-10-14 DIAGNOSIS — I82.409 RECURRENT DEEP VEIN THROMBOSIS (DVT) (H): ICD-10-CM

## 2022-10-14 LAB — INR BLD: 1.9 (ref 0.9–1.1)

## 2022-10-14 PROCEDURE — 36415 COLL VENOUS BLD VENIPUNCTURE: CPT

## 2022-10-14 PROCEDURE — 85610 PROTHROMBIN TIME: CPT

## 2022-10-14 NOTE — PROGRESS NOTES
ANTICOAGULATION MANAGEMENT     Adarsh Salvador 57 year old male is on warfarin with subtherapeutic INR result. (Goal INR 2.0-3.0)    Recent labs: (last 7 days)     10/14/22  1613   INR 1.9*       ASSESSMENT       Source(s): Chart Review and Patient/Caregiver Call       Warfarin doses taken: Warfarin taken as instructed    Diet: No new diet changes identified    New illness, injury, or hospitalization: No, no further interventions needed, all drains out, will have a 24 hour urine test done    Medication/supplement changes: None noted    Signs or symptoms of bleeding or clotting: Has bruising on abd from Lovenox injections, per his ususal        Previous INR: Subtherapeutic    Additional findings: Bridging with Enoxaparin until INR >= 2.0, Adarsh has enough Lovenox for over the weekend       PLAN     Recommended plan for temporary change(s) affecting INR     Dosing Instructions: booster dose then continue your current warfarin dose with next INR in 3 days       Summary  As of 10/14/2022    Full warfarin instructions:  10/14: 20 mg; Otherwise 15 mg every Tue, Sat; 12.5 mg all other days   Next INR check:  10/17/2022             Telephone call with Adarsh who verbalizes understanding and agrees to plan    Lab visit scheduled    Education provided: Goal range and significance of current result, Importance of therapeutic range, Importance of following up at instructed interval, Importance of taking warfarin as instructed, Monitoring for bleeding signs and symptoms, Monitoring for clotting signs and symptoms, When to seek medical attention/emergency care, Importance of notifying clinic for changes in medications; a sooner lab recheck maybe needed. and Contact [phone]  with any changes, questions or concerns.     Plan made per [facility] anticoagulation protocol    Argentina Antonio RN  Anticoagulation Clinic  10/14/2022    _______________________________________________________________________     Anticoagulation Episode Summary      Current INR goal:  2.0-3.0   TTR:  37.8 % (8.4 mo)   Target end date:  Indefinite   Send INR reminders to:  [facility]    Indications    Recurrent deep vein thrombosis (DVT) (H) [I82.409]           Comments:           Anticoagulation Care Providers     Provider Role Specialty Phone number    Bertha Romero PA-C Referring [facility] [phone]

## 2022-10-17 DIAGNOSIS — E11.65 TYPE 2 DIABETES MELLITUS WITH HYPERGLYCEMIA, WITH LONG-TERM CURRENT USE OF INSULIN (H): ICD-10-CM

## 2022-10-17 DIAGNOSIS — E11.9 TYPE 2 DIABETES MELLITUS WITHOUT COMPLICATION, WITHOUT LONG-TERM CURRENT USE OF INSULIN (H): ICD-10-CM

## 2022-10-17 DIAGNOSIS — Z79.4 TYPE 2 DIABETES MELLITUS WITH HYPERGLYCEMIA, WITH LONG-TERM CURRENT USE OF INSULIN (H): ICD-10-CM

## 2022-10-17 DIAGNOSIS — E11.42 TYPE 2 DIABETES MELLITUS WITH DIABETIC POLYNEUROPATHY, WITHOUT LONG-TERM CURRENT USE OF INSULIN (H): ICD-10-CM

## 2022-10-18 RX ORDER — INSULIN GLARGINE 100 [IU]/ML
36 INJECTION, SOLUTION SUBCUTANEOUS DAILY
Qty: 15 ML | Refills: 0 | Status: SHIPPED | OUTPATIENT
Start: 2022-10-18 | End: 2022-12-22

## 2022-10-26 ENCOUNTER — TELEPHONE (OUTPATIENT)
Dept: ANTICOAGULATION | Facility: CLINIC | Age: 57
End: 2022-10-26

## 2022-10-26 NOTE — TELEPHONE ENCOUNTER
ANTICOAGULATION     Adarsh Salvador is overdue for INR check.      Unable to leave  as mailbox was full. Sent SMS notification with anticoag call back number. If pt calls back please schedule INR.    Owen Lindsay RN

## 2022-10-28 NOTE — PROGRESS NOTES
----- Message from Kaelyn Headley sent at 10/28/2022 11:47 AM CDT -----  Please correct the referral to Dr. Hernandes. Referral is entered Internal for an external provider. Referral needs to be corrected to External so referral will not be in the scheduling WQ.    Thanks,  Kaelyn     [facility] Initial Evaluation  Subjective:    Patient is a 52 year old male presenting with rehab cervical spine hpi. The history is provided by the patient. No  was used.   Adarsh Salvador is a 52 year old male with a cervical spine (and lumbar spine) condition.  Condition occurred with:  Insidious onset.  Condition occurred: for unknown reasons.  This is a chronic condition  About two and a half months ago patient states his pain in his neck as well as back had gotten worse, but he does not know the exact cause. Patient provides a detailed history concerning his job. He reports he has very demanding lifting requirements and he believes this may have contributed to his chronic low back and neck pain. He reports he has had low back pain for as long as he can remember, but the neck pain had gotten worse recently. He reports he has previously seen physical therapy for his LBP and it was helpful, but reports that he did not continue with treatments and does not state why. Patient also received an injection on 4/28/2017 which he states has helped take some of his headaches away, but that is all. MD referral on 4/10/2017.    Patient reports pain:  Cervical right side, cervical left side and other (and lower lumbar spine).  Radiates to:  Shoulder left, upper arm left, elbow left, lower arm left and hand left (left leg down to the knee).  Pain is described as aching and is intermittent and reported as 6/10.     Symptoms are exacerbated by lifting (standing for the low back pain) and relieved by rest and muscle relaxants.  Since onset symptoms are gradually worsening.  Special tests:  X-ray.  Previous treatment includes physical therapy and other (PT: 2/21/2017-3/16/2017. Other: Epidural injection.).  There was moderate improvement following previous treatment.  General health as reported by patient is fair.  Pertinent medical history includes:  High blood pressure, overweight and asthma.   Medical allergies: no.  Other surgeries include:  Orthopedic surgery and other (kidney stone hernia).  Current medications:  Muscle relaxants, high blood pressure medication and cardiac medication.  Current occupation is .  Patient is working in normal job without restrictions.  Primary job tasks include:  Prolonged standing, lifting, repetitive tasks, driving and other (pushing and pulling).    Barriers include:  None as reported by the patient.    Red flags:  None as reported by the patient.      Patient reports his goal are to decrease his pain so he is able to work more comfortably as well as decrease the pain with sitting and putting on his shoes in the morning.                   Objective:    Standing Alignment:    Cervical/Thoracic:  Forward head, cervical lordosis decreased and thoracic kyphosis increased  Shoulder/UE:  Rounded shoulders  Lumbar:  Lordosis decr  Pelvic:  Normal                         Lumbar/SI Evaluation    Lumbar Myotomes:    T12-L3 (Hip Flex):  Left: 4    Right: 4  L2-4 (Quads):  Left:  4    Right:  5  L4 (Ankle DF):  Left:  5    Right:  5  L5 (Great Toe Ext): Left: 5    Right: 5   S1 (Toe Raise):  Left: 5    Right: 5  Lumbar DTR's:    L4 (Quad):  Left:  0   Right:  0  S1 (Achilles):  Left:  1   Right:  1              Spinal Segmental Conclusions: Assess next visit as was limited in time during initial evaluation.               Cervical/Thoracic Evaluation    Headaches: cervical  Cervical Myotomes:    C1-2 (Neck Flex): Left:  5    Right: 5  C3 (neck side bend): Left: 5    Right: 5  C4 (shrug):  Left: 4    Right: 4  C5 (Deltoid):  Left: 5    Right: 5  C6 (Biceps):  Left: 5    Right: 5  C7 (Triceps):  Left: 5    Right: 5  C8 (Thumb Ext): Left: 5    Right: 5  T1 (Intrinsics): Left: 5    Right: 5              Spinal Segmental Conclusions:  Assess next visit as was limited in time at initial evaluation.                                                  Fatimah Cervical  Evaluation    Posture:  Sitting: poor  Standing: poor  Protruding Head: yes  Wry Neck: no      Movement Loss:    Flexion (Flex): min and pain  Retraction (RET): mod and pain  Extension (EXT): mod and pain  Lateral Flexion Right (LF R): min and pain  Lateral Flexion Left (LF L): min and pain  Rotation Right (ROT R): nil and pain  Rotation Left (ROT L): nil and pain  Test Movements:      RET: During: increases  After: worse  Mechanical Response: no effect  Repeat RET: During: increases  After: worse  Mechanical Response: no effect      RET (Lying): During: decreases  After: no better  Mechanical Response: no effect  Repeat RET (Lying): During: decreases  After: better  Mechanical Response: IncROM                    Conclusion: derangement  Principle of Treatment:      Extension: Supine repeated retraction      Xenia Lumbar Evaluation    Posture:  Sitting: poor  Standing: poor  Lordosis: Reduced  Lateral Shift: no      Movement Loss:  Flexion (Flex): mod and pain  Extension (EXT): mod and pain  Side [place] R (SG R): nil and pain  Side [place] L (SG L): nil and pain  Test Movements:  FIS: During: no effect  After: no effect  Mechanical Response: no effect  Repeat FIS: During: no effect  After: no effect  Mechanical Response: no effect  EIS: During: no effect  After: no effect  Mechanical Response: no effect  Repeat EIS: During: decreases  After: better  Mechanical Response: IncROM    EIL: During: decreases  After: no better  Mechanical Response: no effect  Repeat EIL: During: decreases  After: better  Mechanical Response: IncROM        Conclusion: derangement  Principle of Treatment:      Extension: REIL- prone press ups                                           ROS    Assessment/Plan:       Patient is a 52 year old male with cervical and lumbar complaints.    Patient has the following significant findings with corresponding treatment plan.                Diagnosis 1: Cervical and Lumbar Radicular Pain    Pain -  self  management, education and directional preference exercise  Decreased ROM/flexibility - manual therapy and therapeutic exercise  Decreased joint mobility - manual therapy and therapeutic exercise  Decreased strength - therapeutic exercise and therapeutic activities  Impaired muscle performance - neuro re-education  Decreased function - therapeutic activities  Impaired posture - neuro re-education    Therapy Evaluation Codes:   1) History comprised of:   Personal factors that impact the plan of care:      Overall behavior pattern, Past/current experiences, Time since onset of symptoms and Work status.    Comorbidity factors that impact the plan of care are:      Overweight and Weakness.     Medications impacting care: Muscle relaxant.  2) Examination of Body Systems comprised of:   Body structures and functions that impact the plan of care:      Cervical spine and Lumbar spine.   Activity limitations that impact the plan of care are:      Bending, Lifting, Sitting, Standing, Working and Sleeping.  3) Clinical presentation characteristics are:   Evolving/Changing.  4) Decision-Making    Moderate complexity using standardized patient assessment instrument and/or measureable assessment of functional outcome.  Cumulative Therapy Evaluation is: Moderate complexity.    Previous and current functional limitations:  (See Goal Flow Sheet for this information)    Short term and Long term goals: (See Goal Flow Sheet for this information)     Communication ability:  Patient appears to be able to clearly communicate and understand verbal and written communication and follow directions correctly.  Treatment Explanation - The following has been discussed with the patient:   RX ordered/plan of care  Anticipated outcomes  Possible risks and side effects  This patient would benefit from PT intervention to resume normal activities.   Rehab potential is fair.    Frequency:  1 X week, once daily  Duration:  for 6 weeks  Discharge Plan:   Achieve all LTG.  Independent in home treatment program.  Reach maximal therapeutic benefit.    Please refer to the daily flowsheet for treatment today, total treatment time and time spent performing 1:1 timed codes.

## 2022-11-02 ENCOUNTER — DOCUMENTATION ONLY (OUTPATIENT)
Dept: ANTICOAGULATION | Facility: CLINIC | Age: 57
End: 2022-11-02

## 2022-11-02 NOTE — PROGRESS NOTES
ANTICOAGULATION     Adarsh EDWARDS [place] is overdue for INR check.      Reminder letter sent    Rubi Nathan RN

## 2022-11-02 NOTE — LETTER
[facility] ANTICOAGULATION CLINIC  [address] [place] [address]  Phone: [phone]  Fax: [phone]       November 2, 2022        Adarsh Salvador  [address] [address]            Dear Adarsh,    You are currently under the care of [facility] for your warfarin (Coumadin , Jantoven ) therapy.  We are contacting you because our records show you were due for an INR on 10/17.    There are potentially serious risks when taking warfarin without careful monitoring and we want to make sure you are safely managed.  Routine lab monitoring is required for warfarin refills.     Please call [phone]  as soon as possible to schedule an appointment.  If there has been a change in your care or other concerns, please let us know so we can help and or update our records.     Sincerely,       [facility]

## 2022-11-04 ENCOUNTER — NURSE TRIAGE (OUTPATIENT)
Dept: FAMILY MEDICINE | Facility: CLINIC | Age: 57
End: 2022-11-04

## 2022-11-04 NOTE — TELEPHONE ENCOUNTER
Pt is asymptomatic at time of call but sx have been intermittent daily consistently all week. Pt will come to urgent care after finished with his other commitment today, or have another  bring him to ED if sx return and are severe before he can be evaluated at urgent care clinic.      Reason for Disposition    MODERATE dizziness (e.g., interferes with normal activities) (Exception: dizziness caused by heat exposure, sudden standing, or poor fluid intake)    Additional Information    Negative: SEVERE difficulty breathing (e.g., struggling for each breath, speaks in single words)    Negative: Shock suspected (e.g., cold/pale/clammy skin, too weak to stand, low BP, rapid pulse)    Negative: Difficult to awaken or acting confused (e.g., disoriented, slurred speech)    Negative: Fainted, and still feels dizzy afterwards    Negative: Overdose (accidental or intentional) of medications    Negative: New neurologic deficit that is present now: * Weakness of the face, arm, or leg on one side of the body * Numbness of the face, arm, or leg on one side of the body * Loss of speech or garbled speech    Negative: Heart beating < 50 beats per minute OR > 140 beats per minute    Negative: Sounds like a life-threatening emergency to the triager    Negative: Chest pain    Negative: Rectal bleeding, bloody stool, or tarry-black stool    Negative: Vomiting is main symptom    Negative: Diarrhea is main symptom    Negative: Headache is main symptom    Negative: Heat exhaustion suspected (i.e., dehydration from heat exposure)    Negative: Patient states that they are having an anxiety or panic attack    Negative: Dizziness from low blood sugar (i.e., < 60 mg/dl or 3.5 mmol/l)    Negative: SEVERE dizziness (e.g., unable to stand, requires support to walk, feels like passing out now)    Negative: SEVERE headache or neck pain    Negative: Spinning or tilting sensation (vertigo) present now and one or more stroke risk factors (i.e.,  "hypertension, diabetes mellitus, prior stroke/TIA, heart attack, age over 60) (Exception: prior physician evaluation for this AND no different/worse than usual)    Negative: Neurologic deficit that was brief (now gone), ANY of the following:* Weakness of the face, arm, or leg on one side of the body* Numbness of the face, arm, or leg on one side of the body* Loss of speech or garbled speech    Negative: Loss of vision or double vision  (Exception: Similar to previous migraines.)    Negative: Extra heart beats OR irregular heart beating (i.e., 'palpitations')    Negative: Difficulty breathing    Negative: Drinking very little and has signs of dehydration (e.g., no urine > 12 hours, very dry mouth, very lightheaded)    Negative: Follows bleeding (e.g., stomach, rectum, vagina)  (Exception: Became dizzy from sight of small amount blood.)    Negative: Patient sounds very sick or weak to the triager    Negative: Lightheadedness (dizziness) present now, after 2 hours of rest and fluids    Negative: Spinning or tilting sensation (vertigo) present now    Negative: Fever > 103 F (39.4 C)    Negative: Fever > 100.0 F (37.8 C) and has diabetes mellitus or a weak immune system (e.g., HIV positive, cancer chemotherapy, organ transplant, splenectomy, chronic steroids)    Answer Assessment - Initial Assessment Questions  1. DESCRIPTION: \"Describe your dizziness.\"      Daily since onset a week ago with hx of vertigo evaluated at ED.    2. LIGHTHEADED: \"Do you feel lightheaded?\" (e.g., somewhat faint, woozy, weak upon standing)      Lightheaded like a blur when getting up quickly, then the spinning vertigo lasts minutes to hours.    3. VERTIGO: \"Do you feel like either you or the room is spinning or tilting?\" (i.e. vertigo)      Spinning intermittent daily x 1 week.    4. SEVERITY: \"How bad is it?\"  \"Do you feel like you are going to faint?\" \"Can you stand and walk?\"    - MILD: Feels slightly dizzy, but walking normally.    - " "MODERATE: Feels unsteady when walking, but not falling; interferes with normal activities (e.g., school, work).    - SEVERE: Unable to walk without falling, or requires assistance to walk without falling; feels like passing out now.       Pt states the sx is not present at this moment, but last episode was this morning when getting up to go to bathroom.    5. ONSET:  \"When did the dizziness begin?\"      Hx of similar episodes, but current sx x 1 week    6. AGGRAVATING FACTORS: \"Does anything make it worse?\" (e.g., standing, change in head position)      Movement and position change    7. HEART RATE: \"Can you tell me your heart rate?\" \"How many beats in 15 seconds?\"  (Note: not all patients can do this)        No identified heart involvement    8. CAUSE: \"What do you think is causing the dizziness?\"      vertigo    9. RECURRENT SYMPTOM: \"Have you had dizziness before?\" If Yes, ask: \"When was the last time?\" \"What happened that time?\"      Yes - see ED encounter    10. OTHER SYMPTOMS: \"Do you have any other symptoms?\" (e.g., fever, chest pain, vomiting, diarrhea, bleeding)        Denies    Protocols used: DIZZINESS-A-OH      "

## 2022-11-17 ENCOUNTER — DOCUMENTATION ONLY (OUTPATIENT)
Dept: ANTICOAGULATION | Facility: CLINIC | Age: 57
End: 2022-11-17

## 2022-11-17 NOTE — PROGRESS NOTES
Anticoagulation clinic notificiation    Bertha,    Your patient, Adarsh Salvador, is past due for an INR. Their last result was 1.9 on 10/14/22 and was due to come back on 10/28/22.    Adarsh Salvador received phone calls and letters over the last several weeks in attempt to arrange a follow up appointment.  Adarsh Salvador will be sent another letter today.     No action is required from you unless you have additional information or if you would like to reach out personally to Adarsh Salvador.    Please don t hesitate to contact the Anticoagulation Management Program if you have any questions or concerns.     Sincerely,     [facility]tico[facility]

## 2022-11-17 NOTE — LETTER
November 17, 2022    Adarsh Salvador  [address] [place] [address]  [place] [address]      Dear Adarsh,    You are currently under the care of [facility] for your warfarin (Coumadin , Jantoven ) therapy.  We are contacting you because our records show you were due for an INR on 10/28/22.    There are potentially serious risks when taking warfarin without careful monitoring and we want to make sure you are safely managed.  Routine lab monitoring is required for warfarin refills.     Please call [phone]  as soon as possible to schedule an appointment.  If there has been a change in your care or other concerns, please let us know so we can help and or update our records.     Sincerely,       [facility]

## 2022-11-21 ENCOUNTER — DOCUMENTATION ONLY (OUTPATIENT)
Dept: ANTICOAGULATION | Facility: CLINIC | Age: 57
End: 2022-11-21

## 2022-11-21 ENCOUNTER — VIRTUAL VISIT (OUTPATIENT)
Dept: FAMILY MEDICINE | Facility: CLINIC | Age: 57
End: 2022-11-21
Payer: COMMERCIAL

## 2022-11-21 DIAGNOSIS — Z79.899 ENCOUNTER FOR LONG-TERM (CURRENT) USE OF MEDICATIONS: ICD-10-CM

## 2022-11-21 DIAGNOSIS — E11.42 TYPE 2 DIABETES MELLITUS WITH DIABETIC POLYNEUROPATHY, WITHOUT LONG-TERM CURRENT USE OF INSULIN (H): ICD-10-CM

## 2022-11-21 DIAGNOSIS — Z87.09 HISTORY OF ASTHMA: ICD-10-CM

## 2022-11-21 DIAGNOSIS — I82.409 RECURRENT DEEP VEIN THROMBOSIS (DVT) (H): Primary | ICD-10-CM

## 2022-11-21 DIAGNOSIS — R05.1 ACUTE COUGH: Primary | ICD-10-CM

## 2022-11-21 DIAGNOSIS — R06.00 DYSPNEA, UNSPECIFIED TYPE: ICD-10-CM

## 2022-11-21 PROCEDURE — 99214 OFFICE O/P EST MOD 30 MIN: CPT | Mod: 95 | Performed by: INTERNAL MEDICINE

## 2022-11-21 RX ORDER — RIZATRIPTAN BENZOATE 10 MG/1
TABLET, ORALLY DISINTEGRATING ORAL
Qty: 18 TABLET | Refills: 0 | Status: CANCELLED | OUTPATIENT
Start: 2022-11-21

## 2022-11-21 RX ORDER — PREDNISONE 20 MG/1
20 TABLET ORAL 2 TIMES DAILY
Qty: 10 TABLET | Refills: 0 | Status: SHIPPED | OUTPATIENT
Start: 2022-11-21 | End: 2022-11-30

## 2022-11-21 RX ORDER — BENZONATATE 100 MG/1
100 CAPSULE ORAL 3 TIMES DAILY PRN
Qty: 30 CAPSULE | Refills: 0 | Status: SHIPPED | OUTPATIENT
Start: 2022-11-21 | End: 2022-12-22

## 2022-11-21 RX ORDER — ONDANSETRON 4 MG/1
4 TABLET, ORALLY DISINTEGRATING ORAL EVERY 8 HOURS PRN
Qty: 20 TABLET | Refills: 1 | Status: CANCELLED | OUTPATIENT
Start: 2022-11-21

## 2022-11-21 NOTE — PROGRESS NOTES
"Adarsh is a 57 year old who is being evaluated via a billable video visit.      How would you like to obtain your AVS? MyChart  If the video visit is dropped, the invitation should be resent by: Text to cell phone: [phone] - dox   Will anyone else be joining your video visit? No          Assessment & Plan   Problem List Items Addressed This Visit        Nervous and Auditory    Type 2 diabetes mellitus with diabetic polyneuropathy, without long-term current use of insulin (H)    Relevant Medications    predniSONE (DELTASONE) 20 MG tablet   Other Visit Diagnoses     Acute cough    -  Primary    Relevant Medications    benzonatate (TESSALON) 100 MG capsule    predniSONE (DELTASONE) 20 MG tablet    amoxicillin-clavulanate (AUGMENTIN) 875-125 MG tablet    Other Relevant Orders    XR Chest 2 Views    Encounter for long-term (current) use of medications        Dyspnea, unspecified type        Relevant Medications    predniSONE (DELTASONE) 20 MG tablet    Other Relevant Orders    XR Chest 2 Views    History of asthma             Patient known diabetic and has history of asthma and currently feeling shortness of breath T max of 102 Fahrenheit yesterday.  There was exposure to influenza A his significant other with diagnosis of such.  Patient has not been out of the house for the last week.  He describes severe shortness of breath but not coughing any phlegm and having head congestion.  Advised patient would be in his best interest to go to the ER to get a chest x-ray and get his pulse ox checked and lung exam.  This was a telephone visit as patient could not open the video.  Patient was coughing during the visit also I could hear someone else coughing in the background.  Patient was able to talk in full sentences and was not lethargic during the telephone encounter.  Advised him we will need to start him on prednisone 20 mg twice daily given his history of asthma and \"description of severe shortness of breath\" in addition " "to Augmentin for 7 days, advised patient to get a chest x-ray done through the ER visit and  Get further evaluation.  Patient in agreement to go to the ER.  All questions answered.  He reports that his diabetes blood sugar numbers have been elevated and that would be concerning when he starts prednisone, needs to closely monitor.  He continues on Robitussin-DM as needed, keep well-hydrated and other supportive measures.  Patient is maintained on chronic anticoagulation for history of blood clots in the past, he does chew tobacco but does not smoke.         BMI:   Estimated body mass index is 37.46 kg/m  as calculated from the following:    Height as of 9/7/22: 1.848 m (6' 0.75\").    Weight as of 9/7/22: 127.9 kg (282 lb).   Weight management plan: Discussed healthy diet and exercise guidelines    CONSULTATION/REFERRAL to GO TO ED  See Patient Instructions    Return if symptoms worsen or fail to improve, for other, GO TO ED.    Rio Hall MD  [facility]   Adarsh is a 57 year old, presenting for the following health issues:  URI (Patient report cough, sore throat and sinus congestion sx started on Monday Nov 14, has not been tested for COVID-19, has been taking robitussin DM but does not help with sx. )      HPI     DEEP COUGHING, into my lungs,congestion headaches, and body aches  Sx's has bene a week, for now, head congestion, temp 102 F, last night    Has 2 cats on me .    Thinks more like bronchitis, susceptible, nothing loose,   Tried robitussin and Nyquil , nothing works   not producing phlegm,   Has asthma and has been using his inhaler, is wheezing and shortness, and BF can hear wheezing when he lays head on him,     \"very tight in his chest\"      ..Takes 40 units insulin    ZPak x 5 days,   Tessalon cough drops   Prednisone 20 mg 1 tab twice for 5 days          Review of Systems   Constitutional, HEENT, cardiovascular, pulmonary, gi and gu systems are negative, " except as otherwise noted.      Objective    Vitals - Patient Reported  Temperature (Patient Reported): 102  F (38.9  C)  Pain Score: Extreme Pain (9)  Pain Loc:  (body aches)      Vitals:  No vitals were obtained today due to virtual visit.    Physical Exam   GENERAL: Healthy, alert and no distress  EYES: Eyes grossly normal to inspection.  No discharge or erythema, or obvious scleral/conjunctival abnormalities.  RESP: No audible wheeze, cough, or visible cyanosis.  No visible retractions or increased work of breathing.    SKIN: Visible skin clear. No significant rash, abnormal pigmentation or lesions.  NEURO: Cranial nerves grossly intact.  Mentation and speech appropriate for age.  PSYCH: Mentation appears normal, affect normal/bright, judgement and insight intact, normal speech and appearance well-groomed.    Lab on 10/14/2022   Component Date Value Ref Range Status     INR 10/14/2022 1.9 (H)  0.9 - 1.1 Final               Video-Visit Details    Video Start Time: 9:00 AM     Type of service:  Video Visit    Video End Time:9:15 AM    Originating Location (pt. Location): Home        Distant Location (provider location):  On-site    Platform used for Video Visit: Breezeplay

## 2022-11-21 NOTE — PROGRESS NOTES
ANTICOAGULATION  MANAGEMENT     Interacting Medication Review    Interacting medication(s): Augmentin with warfarin.    Duration: 7 days (11/21 to 11/27)    Indication: Cough    New medication?: Yes, interaction may increase INR and risk of bleeding. Closer INR monitoring recommended.  Patient was also placed on Prednisone.       PLAN     Continue your current warfarin dose with next INR in 1 week            Telephone call with Adarsh Patient is currently in the ED at  [facility].    Patient states he has missed some doses so his INR may be low.    No adjustment to anticoagulation calendar was required    Plan made per [facility] anticoagulation protocol    Petey HURTADO RN  Anticoagulation Clinic

## 2022-11-22 RX ORDER — ENOXAPARIN SODIUM 100 MG/ML
0.75 INJECTION SUBCUTANEOUS EVERY 12 HOURS
Qty: 10 ML | Refills: 1 | Status: SHIPPED | OUTPATIENT
Start: 2022-11-22 | End: 2022-11-30

## 2022-11-22 NOTE — NURSING NOTE
Per verbal from Dr Hall, cancel CXR order.    Patient seen at ED and xray was completed there.      Bria Parker, RT (R)

## 2022-11-25 ENCOUNTER — LAB (OUTPATIENT)
Dept: LAB | Facility: CLINIC | Age: 57
End: 2022-11-25
Payer: COMMERCIAL

## 2022-11-25 ENCOUNTER — TELEPHONE (OUTPATIENT)
Dept: FAMILY MEDICINE | Facility: CLINIC | Age: 57
End: 2022-11-25

## 2022-11-25 ENCOUNTER — ANTICOAGULATION THERAPY VISIT (OUTPATIENT)
Dept: ANTICOAGULATION | Facility: CLINIC | Age: 57
End: 2022-11-25

## 2022-11-25 DIAGNOSIS — I82.409 RECURRENT DEEP VEIN THROMBOSIS (DVT) (H): ICD-10-CM

## 2022-11-25 DIAGNOSIS — I82.409 RECURRENT DEEP VEIN THROMBOSIS (DVT) (H): Primary | ICD-10-CM

## 2022-11-25 DIAGNOSIS — R42 VERTIGO: Primary | ICD-10-CM

## 2022-11-25 LAB — INR BLD: 1.6 (ref 0.9–1.1)

## 2022-11-25 PROCEDURE — 36415 COLL VENOUS BLD VENIPUNCTURE: CPT

## 2022-11-25 PROCEDURE — 85610 PROTHROMBIN TIME: CPT

## 2022-11-25 RX ORDER — MECLIZINE HYDROCHLORIDE 25 MG/1
25 TABLET ORAL 3 TIMES DAILY PRN
Qty: 90 TABLET | Refills: 0 | Status: SHIPPED | OUTPATIENT
Start: 2022-11-25 | End: 2022-12-22

## 2022-11-25 NOTE — TELEPHONE ENCOUNTER
Medication Question or Refill    Contacts       Type Contact Phone/Fax    11/25/2022 10:53 AM CST Phone (Incoming) Adarsh Salvador (Self) [phone] (M)          What medication are you calling about (include dose and sig)?: pt states he has taken medication for verdigo in the past and needs this prescribed asap as he can not drive today    Controlled Substance Agreement on file:   CSA -- Patient Level:    CSA: None found at the patient level.       Who prescribed the medication?: hospital doctor  Dr. Tess Burnett  Do you need a refill? Yes:      When did you use the medication last? today    Patient offered an appointment? Yes:     Do you have any questions or concerns?  Yes:     Preferred Pharmacy:  [id number] [address] [address]  Phone: [phone] Fax: [phone]    Could we send this information to you in [facility] or would you prefer to receive a phone call?:   Patient would prefer a phone call   Okay to leave a detailed message?: Yes at Cell number on file:    Telephone Information:   Mobile [phone]     Anju Caballero,

## 2022-11-25 NOTE — PROGRESS NOTES
"  Chief Complaint   Patient presents with   • Cough   • Urinary Frequency       History of Present Illness    41 y.o.female presents for cough and urinary frequency.    Initial sx nasal congestion started taking allegra and nasal spray. Got worse; gets this \"huge deep cough\". Some wheeze. Worse at night. Productive white green secretions. No fever. Face feels hot; red but no fever. gets frequent sinus infections and URI sx. Has steroids June and July. With last abx zpak June 26. Didn't get cough med filled.   Has had Urinary frequency then feeling like not able to empty bladder; no dysuria but wanted to make sure didn't have uti.    Review of Systems   Constitutional: Negative for chills and fever.   HENT: Positive for congestion, postnasal drip, rhinorrhea and sinus pressure. Negative for ear pain, sneezing and sore throat.    Respiratory: Positive for cough and wheezing. Negative for shortness of breath.    Genitourinary: Positive for frequency. Negative for dysuria, flank pain and urgency.   Musculoskeletal: Negative for myalgias.   Neurological: Negative for headache.         [facility]  The following portions of the patient's history were reviewed and updated as appropriate: allergies, current medications, past family history, past medical history, past social history, past surgical history and problem list.     Past Medical History:   Diagnosis Date   • Abdominal pain    • Abnormal breast exam    • Abnormal Pap smear of cervix    • Achilles tendinitis    • Acute sinusitis    • Anxiety    • Arthritis    • Asthma    • Tavera's syndrome    • Bipolar 1 disorder (CMS/HCC)    • Bowel trouble    • Breast cyst    • Colon polyps    • Constipation    • Depression    • Diverticulitis    • Diverticulitis of colon    • Endometriosis    • Eustachian tube dysfunction    • Extremity pain    • Fatty liver    • Female pelvic pain    • Fibromyalgia    • Gastric ulcer    • H/O bladder infections    • History of rashes as a child    • " ANTICOAGULATION MANAGEMENT     Adarsh Salvador 57 year old male is on warfarin with subtherapeutic INR result. (Goal INR 2.0-3.0)    Recent labs: (last 7 days)     11/25/22  0954   INR 1.6*       ASSESSMENT       Source(s): Chart Review and Patient/Caregiver Call       Warfarin doses taken: Warfarin taken as instructed    Diet: has had influ since Monday    New illness, injury, or hospitalization: Yes: INFLU A+    Medication/supplement changes: Tamiflu started on 11/25/2022 which may be increasing INR today    Signs or symptoms of bleeding or clotting: No    Previous INR: Subtherapeutic    Additional findings: wanting to get meds for vertigo today, also states breathing is worse today, states he won't hesitate to be seen, or triage if sx concerning       PLAN     Recommended plan for temporary change(s) affecting INR     Dosing Instructions: booster dose then continue your current warfarin dose with next INR in 1 week       Summary  As of 11/25/2022    Full warfarin instructions:  11/25: 20 mg; Otherwise 15 mg every Tue, Sat; 12.5 mg all other days; Starting 11/25/2022   Next INR check:  12/2/2022             Telephone call with Adarsh who verbalizes understanding and agrees to plan    Lab visit scheduled    Education provided:     Please call back if any changes to your diet, medications or how you've been taking warfarin    Goal range and lab monitoring: goal range and significance of current result, Importance of therapeutic range and Importance of following up at instructed interval    Plan made per [facility] anticoagulation protocol    Argentina Antonio, RN  Anticoagulation Clinic  11/25/2022    _______________________________________________________________________     Anticoagulation Episode Summary     Current INR goal:  2.0-3.0   TTR:  32.4 % (9.8 mo)   Target end date:  Indefinite   Send INR reminders to:  [facility]    Indications    Recurrent deep vein thrombosis (DVT) (H) [I82.409]           Comments:            Anticoagulation Care Providers     Provider Role Specialty Phone number    Bertha Romero PA-C Referring Family Medicine [phone]           Irritable bowel syndrome    • Low back pain    • Lumbar radiculopathy    • Menopausal symptoms    • Muscle weakness    • Ovarian cyst    • PID (pelvic inflammatory disease)    • Recurrent UTI    • Sexual problems    • Spinal stenosis of lumbar region       Allergies   Allergen Reactions   • Adhesive Tape Hives   • Latex Hives   • Sulfa Antibiotics Hives   • Sulfate Hives   • Wound Dressing Adhesive Hives and Unknown - High Severity   • Biaxin [Clarithromycin] Nausea Only   • Codeine Itching   • Penicillins Nausea Only   • Ciprofloxacin Unknown - High Severity   • Nuts Unknown - High Severity      Social History     Tobacco Use   • Smoking status: Former Smoker     Packs/day: 0.50     Types: Cigarettes     Start date: 2003     Quit date: 2021     Years since quittin.5   • Smokeless tobacco: Never Used   • Tobacco comment: nicotine patches for smoking cessation   Vaping Use   • Vaping Use: Never used   Substance Use Topics   • Alcohol use: No   • Drug use: Never     Past Surgical History:   Procedure Laterality Date   •  SECTION     • CHOLECYSTECTOMY     • COLONOSCOPY     • HYSTERECTOMY     • NASAL ENDOSCOPY     • OOPHORECTOMY Bilateral    • OTHER SURGICAL HISTORY      Laparoscopy (diagnostic) gynecologic with biopsy   • SHOULDER SURGERY     • UPPER GASTROINTESTINAL ENDOSCOPY  2019      Family History   Problem Relation Age of Onset   • Liver disease Mother    • Diabetes Mother    • Hyperlipidemia Mother    • Hypertension Mother    • Thyroid disease Mother    • Arthritis Father    • Mental illness Father    • Migraines Sister    • Stroke Other    • Diabetes Other    • Hyperlipidemia Other    • Hypertension Other    • Mental illness Other    • Migraines Other    • Tuberculosis Other    • Cancer Maternal Grandmother    • Cancer Maternal Grandfather    • Cancer Paternal Grandmother    • Cancer Paternal Grandfather    • No Known Problems Son    • Mental illness Daughter    • Breast cancer Neg  Hx    • Endometrial cancer Neg Hx    • Ovarian cancer Neg Hx            Current Outpatient Medications:   •  Acid Gone 160-105 MG chewable tablet chewable tablet, CHEW AND SWALLOW 2 TABLETS BY MOUTH TWICE DAILY TO FOUR TIMES DAILY IF NEEDED FOR HEARTBURN. TAKE SEPARATELY FROM OTHER MEDICATIONS, Disp: , Rfl:   •  azelastine (ASTELIN) 0.1 % nasal spray, 1 spray each nostril daily, Disp: 30 mL, Rfl: 2  •  beclomethasone (Qvar) 80 MCG/ACT inhaler, if needed., Disp: , Rfl:   •  BIOTIN PO, Take 1 tablet by mouth Daily., Disp: , Rfl:   •  cetirizine (zyrTEC) 10 MG tablet, Take 1 tablet by mouth Daily., Disp: , Rfl: 1  •  COLLAGEN PO, Take 1 tablet by mouth., Disp: , Rfl:   •  dicyclomine (Bentyl) 10 MG capsule, Take 1 capsule by mouth 4 (Four) Times a Day Before Meals & at Bedtime As Needed (abdominal cramping diarrhea)., Disp: 120 capsule, Rfl: 2  •  dilTIAZem (CARDIZEM) 60 MG tablet, Take 60 mg by mouth., Disp: , Rfl:   •  dilTIAZem CD (CARDIZEM CD) 120 MG 24 hr capsule, TAKE 1 CAPSULE BY MOUTH DAILY, Disp: 90 capsule, Rfl: 1  •  EPIPEN 2-JAREN 0.3 MG/0.3ML solution auto-injector injection, U UTD, Disp: , Rfl: 6  •  estradiol (ESTRACE) 2 MG tablet, TAKE 1 TABLET BY MOUTH DAILY, Disp: 30 tablet, Rfl: 2  •  fluconazole (Diflucan) 150 MG tablet, Take 1 tab by mouth now and repeat in 72 hrs., Disp: 2 tablet, Rfl: 0  •  LORazepam (ATIVAN) 1 MG tablet, Take 1 mg by mouth Daily As Needed., Disp: , Rfl: 0  •  lubiprostone (AMITIZA) 8 MCG capsule, Take 8 mcg by mouth 2 (Two) Times a Day As Needed., Disp: , Rfl:   •  Magnesium 250 MG tablet, TK 1-2 TS PO D FOR CONSTIPATION IF TAKING MIRALAX IS NOT EFFECTIVE, Disp: , Rfl:   •  metFORMIN (GLUCOPHAGE) 500 MG tablet, Take 1 tablet by mouth Daily With Breakfast., Disp: 90 tablet, Rfl: 1  •  montelukast (SINGULAIR) 10 MG tablet, TAKE 1 TABLET BY MOUTH EVERY NIGHT, Disp: 30 tablet, Rfl: 11  •  Multiple Vitamin (MULTI-VITAMIN DAILY PO), Take  by mouth Daily., Disp: , Rfl:   •  multivitamin  "with minerals (CENTROVITE) tablet tablet, Take  by mouth Daily., Disp: , Rfl:   •  naltrexone-bupropion ER (CONTRAVE) 8-90 MG tablet, Take 2 tablets by mouth 2 (Two) Times a Day., Disp: 120 tablet, Rfl: 0  •  omeprazole (priLOSEC) 40 MG capsule, Take 40 mg by mouth 2 (two) times a day., Disp: , Rfl:   •  ondansetron ODT (ZOFRAN-ODT) 4 MG disintegrating tablet, Place 1 tablet on the tongue Every 8 (Eight) Hours As Needed for Nausea or Vomiting., Disp: 30 tablet, Rfl: 1  •  pregabalin (LYRICA) 100 MG capsule, Take 1 capsule by mouth 2 (Two) Times a Day., Disp: 60 capsule, Rfl: 0  •  promethazine-dextromethorphan (PROMETHAZINE-DM) 6.25-15 MG/5ML syrup, Take 5 mL by mouth 4 (Four) Times a Day As Needed for Cough., Disp: 118 mL, Rfl: 0  •  tiZANidine (Zanaflex) 4 MG tablet, Take 1 tablet by mouth Every 8 (Eight) Hours As Needed for Muscle Spasms., Disp: 60 tablet, Rfl: 0  •  topiramate (TOPAMAX) 100 MG tablet, Take 100 mg by mouth 2 (Two) Times a Day., Disp: , Rfl:   •  topiramate (TOPAMAX) 50 MG tablet, Take 50 mg by mouth 2 (Two) Times a Day., Disp: , Rfl:   •  traMADol (ULTRAM) 50 MG tablet, Take 1 tablet by mouth Every 8 (Eight) Hours As Needed for Moderate Pain ., Disp: 60 tablet, Rfl: 2  •  traZODone (DESYREL) 150 MG tablet, Take 1 tablet by mouth Every Night., Disp: , Rfl: 3  •  XHANCE 93 MCG/ACT Exhaler Suspension, Inhale 1 puff Daily As Needed., Disp: , Rfl:     VITALS:  /80   Pulse 116   Temp 97.8 °F (36.6 °C)   Ht 154.9 cm (61\")   Wt 79.8 kg (176 lb)   SpO2 98%   BMI 33.25 kg/m²     Physical Exam  Vitals reviewed.   Constitutional:       General: She is not in acute distress.     Appearance: She is not ill-appearing.   HENT:      Right Ear: Tympanic membrane, ear canal and external ear normal.      Left Ear: Tympanic membrane, ear canal and external ear normal.      Nose: Nose normal.      Mouth/Throat:      Mouth: Mucous membranes are moist.   Eyes:      Extraocular Movements: Extraocular " movements intact.      Pupils: Pupils are equal, round, and reactive to light.   Cardiovascular:      Rate and Rhythm: Normal rate and regular rhythm.      Heart sounds: Normal heart sounds.   Pulmonary:      Effort: Pulmonary effort is normal. No respiratory distress.      Breath sounds: Normal breath sounds.   Neurological:      General: No focal deficit present.      Mental Status: She is alert and oriented to person, place, and time.   Psychiatric:         Mood and Affect: Mood normal.         Result Review :          POC Urinalysis Dipstick, Automated  Order: [id number]  Status:  Final result   Visible to patient:  Yes ([place]) Next appt:  08/16/2021 at 02:30 PM in Neurology (MAGGIE Navarro) Dx:  Dysuria  Specimen Information: Urine         0 Result Notes  Component   Ref Range & Units 1 d ago 1 yr ago   Color   Yellow, Straw, Dark Yellow, Janie Dark Yellow  Yellow    Clarity, UA   Clear CloudyAbnormal   Clear    Specific Gravity    1.005 - 1.030 1.030  1.015    pH, Urine   5.0 - 8.0 6.0  7.5    Leukocytes   Negative Negative  Negative    Nitrite, UA   Negative Negative  Negative    Protein, POC   Negative mg/dL TraceAbnormal   Negative    Comment: 15 mg / dL   Glucose, UA   Negative, 1000 mg/dL (3+) mg/dL Negative  Negative    Ketones, UA   Negative TraceAbnormal   Negative    Comment: 5 mg / dL   Urobilinogen, UA   Normal Normal  Normal    Bilirubin   Negative Small (1+)Abnormal   Negative    Comment: 1 mg / dL   Blood, UA   Negative Negative  Negative              Assessment and Plan    Diagnoses and all orders for this visit:    1. Cough (Primary)  Doesn't look like a sinus infection or pneumonia. Possible viral bronchitis but no red flags.  She is going to try to get the phenergan DM filled that was previously sent to pharmacy. OTC recommendations include mucinex DM or delsym. Take an oral antihistamine.  2. Urine frequency  -     POC Urinalysis Dipstick, Automated        I discussed the  patients findings and my recommendations with patient.  Patient was encouraged to keep me informed of any acute changes, lack of improvement, or any new concerning symptoms.  Patient voiced understanding of all instructions and denied further questions.      Follow Up   Return if symptoms worsen or fail to improve.      Electronically signed by:    MAGGIE Chacon  08/10/2021

## 2022-11-25 NOTE — TELEPHONE ENCOUNTER
Looks like he was given meclizine. Anything further would require an appt.  Sent over. Bertha Romero PA-C

## 2022-11-26 ENCOUNTER — NURSE TRIAGE (OUTPATIENT)
Dept: NURSING | Facility: CLINIC | Age: 57
End: 2022-11-26

## 2022-11-26 DIAGNOSIS — G43.109 MIGRAINE WITH AURA AND WITHOUT STATUS MIGRAINOSUS, NOT INTRACTABLE: ICD-10-CM

## 2022-11-27 NOTE — TELEPHONE ENCOUNTER
Nurse Triage SBAR    Situation:   -Hyperglycemia    Background:   -Patient calling, It is okay to leave a detailed message at this number.     Assessment:   -BG is 375 now, it has been jassi all week - but this is the worst it has been  -Feeling overall unwell  -Dizziness   -in ED on Monday for URI  -Breathing is getting worse since then    Recommendation:   Go to ED Now    TIFFANIE CHANEY RN on 11/26/2022 at 7:12 PM           Reason for Disposition    [1] Blood glucose > 240 mg/dL (13.3 mmol/L) AND [2] rapid breathing    Additional Information    Negative: Acting confused (e.g., disoriented, slurred speech)    Negative: Very weak (e.g., can't stand)    Negative: Sounds like a life-threatening emergency to the triager    Negative: Unconscious or difficult to awaken    Negative: [1] Vomiting AND [2] signs of dehydration (e.g., very dry mouth, lightheaded, dark urine)    Negative: Blood glucose > 500 mg/dL (27.8 mmol/L)    Negative: Vomiting lasts > 4 hours    Negative: [1] Blood glucose > 240 mg/dL (13.3 mmol/L) AND [2] urine ketones moderate-large (or more than 1+)    Negative: [1] Blood glucose > 240 mg/dL (13.3 mmol/L) AND [2] blood ketones > 1.4 mmol/L    Negative: [1] Blood glucose > 240 mg/dL (13.3 mmol/L) AND [2] vomiting AND [3] unable to check for ketones (in blood or urine)    Negative: [1] New-onset diabetes suspected (e.g., frequent urination, weak, weight loss) AND [2] vomiting or rapid breathing    Protocols used: DIABETES - HIGH BLOOD SUGAR-A-AH

## 2022-11-28 RX ORDER — RIZATRIPTAN BENZOATE 10 MG/1
TABLET, ORALLY DISINTEGRATING ORAL
Qty: 18 TABLET | Refills: 0 | Status: SHIPPED | OUTPATIENT
Start: 2022-11-28 | End: 2022-12-22

## 2022-11-29 NOTE — PROGRESS NOTES
Discharge Note    Progress reporting period is from initial evaluation date (please see noted date below) to Sep 21, 2022.  Linked Episodes   Type: Episode: Status: Noted: Resolved: Last update: Updated by:   PHYSICAL THERAPY right knee s/p 8/26/22 Active 8/26/2022 9/21/2022 11:11 AM Bull Contreras PT      Comments:       Adarsh failed to follow up and current status is unknown.  Please see information below for last relevant information on current status.  Patient seen for 4 visits.    SUBJECTIVE  Subjective changes noted by patient:  patient reports knee has been feeling pretty good. with walking up hill and stuff he will get some popping in the knee. only thing he can't do is kneel on his knee.  .  Current pain level is 3/10.     Previous pain level was  5/10.   Changes in function:  Yes (See Goal flowsheet attached for changes in current functional level)  Adverse reaction to treatment or activity: None    OBJECTIVE  Changes noted in objective findings: knee ROM 0-0-135, slr flexion 5/5 slight thigh pain, hip abd 5/5 no pain, good patellar mobility.     ASSESSMENT/PLAN  Diagnosis: right knee pain s/p   Updated problem list and treatment plan:   Decreased function - HEP  STG/LTGs have been met or progress has been made towards goals:  Yes, please see goal flowsheet for most current information  Assessment of Progress: current status is unknown.    Last current status: Pt is progressing as expected   Self Management Plans:  HEP  I have re-evaluated this patient and find that the nature, scope, duration and intensity of the therapy is appropriate for the medical condition of the patient.  Adarsh continues to require the following intervention to meet STG and LTG's:  HEP.    Recommendations:  Discharge with current home program.  Patient to follow up with MD as needed.    Please refer to the daily flowsheet for treatment today, total treatment time and time spent performing 1:1 timed codes.

## 2022-11-30 ENCOUNTER — OFFICE VISIT (OUTPATIENT)
Dept: URGENT CARE | Facility: URGENT CARE | Age: 57
End: 2022-11-30
Payer: COMMERCIAL

## 2022-11-30 VITALS
HEART RATE: 109 BPM | BODY MASS INDEX: 36.32 KG/M2 | DIASTOLIC BLOOD PRESSURE: 90 MMHG | TEMPERATURE: 97.2 F | OXYGEN SATURATION: 95 % | WEIGHT: 273.4 LBS | SYSTOLIC BLOOD PRESSURE: 134 MMHG

## 2022-11-30 DIAGNOSIS — J45.21 MILD INTERMITTENT ASTHMA WITH ACUTE EXACERBATION: Primary | ICD-10-CM

## 2022-11-30 PROCEDURE — 99214 OFFICE O/P EST MOD 30 MIN: CPT | Performed by: FAMILY MEDICINE

## 2022-11-30 RX ORDER — PREDNISONE 20 MG/1
40 TABLET ORAL DAILY
Qty: 10 TABLET | Refills: 0 | Status: SHIPPED | OUTPATIENT
Start: 2022-11-30 | End: 2022-12-05

## 2022-11-30 NOTE — PROGRESS NOTES
Chief complaint: shortness of breath       Shortness of breath tightness painful to breath  Cough x 3 days   For about a week now   They did covid test  And was negative   Wheezing: yes  Chest pain or exertional shortness of breath: NO   Exposure to pertussis or pertussis like symptoms: No  Orthopnea, worsening edema, pnd: NO  Rash: NO  Tried OTC medications without relief  No hemoptysis.  Worsening symptoms hence patient came in to be seen     Problem list and histories reviewed & adjusted, as indicated.  Additional history: as documented    Problem list, Medication list, Allergies, and Medical/Social/Surgical histories reviewed in [facility] and updated as appropriate.    ROS:  Constitutional, HEENT, cardiovascular, pulmonary, gi and gu systems are negative, except as otherwise noted.    OBJECTIVE:                                                    BP (!) 134/90   Pulse 109   Temp 97.2  F (36.2  C) (Tympanic)   Wt 124 kg (273 lb 6.4 oz)   SpO2 95%   BMI 36.32 kg/m    Body mass index is 36.32 kg/m .  GENERAL: healthy, alert and no distress  EYES: pink palpebral conjunctiva, anicteric sclera  ENT: midline nasal septum normal ear exam. congested sinuses.   Mouth: moist buccal mucosa nonhyperemic posterior pharyngeal wall. No tonsillar enlargement or cellulitis  NECK: no adenopathy, no asymmetry, masses, or scars and thyroid normal to palpation  RESP: lungs clear to auscultation - No  rales, rhonchi or wheezes    CV: regular rate and rhythm, normal S1 S2, no S3 or S4,  No murmurs, click or rub  SKIN: no visible rashes noted  Pscyh: Appropriate mood and affect  MS: no gross musculoskeletal defects noted    Diagnostic Test Results:  No results found for this or any previous visit (from the past 24 hour(s)).     ASSESSMENT/PLAN:                                                      No diagnosis found.     ICD-10-CM    1. Mild intermittent asthma with acute exacerbation  J45.21 predniSONE (DELTASONE) 20 MG tablet           Prescribed with prednisone   Aware can raise his blood sugars, patient states he will monitor  He plans to repeat a covid test if symptoms persist  If with any symptoms of chest pain or shortness of breath, lightheadedness, palpitations, feeling like passing out or change and worsening in the quality of your symptoms, please proceed to ER. Recommend follow up with PCP in a few days for re-evaluation.    Adverse reactions of medications discussed.  Over the counter medications discussed.   Aware to come back in if with worsening symptoms or if no relief despite treatment plan  Patient voiced understanding and had no further questions.     MD Vashti Bah MD  [facility] [place]

## 2022-12-02 ENCOUNTER — LAB (OUTPATIENT)
Dept: LAB | Facility: CLINIC | Age: 57
End: 2022-12-02
Payer: COMMERCIAL

## 2022-12-02 ENCOUNTER — ANTICOAGULATION THERAPY VISIT (OUTPATIENT)
Dept: ANTICOAGULATION | Facility: CLINIC | Age: 57
End: 2022-12-02

## 2022-12-02 DIAGNOSIS — I82.409 RECURRENT DEEP VEIN THROMBOSIS (DVT) (H): ICD-10-CM

## 2022-12-02 DIAGNOSIS — I82.409 RECURRENT DEEP VEIN THROMBOSIS (DVT) (H): Primary | ICD-10-CM

## 2022-12-02 LAB — INR BLD: 2.3 (ref 0.9–1.1)

## 2022-12-02 PROCEDURE — 36416 COLLJ CAPILLARY BLOOD SPEC: CPT

## 2022-12-02 PROCEDURE — 85610 PROTHROMBIN TIME: CPT

## 2022-12-02 NOTE — PROGRESS NOTES
ANTICOAGULATION MANAGEMENT     Adarsh Salvador 57 year old male is on warfarin with therapeutic INR result. (Goal INR 2.0-3.0)    Recent labs: (last 7 days)     12/02/22  0747   INR 2.3*       ASSESSMENT       Source(s): Chart Review and Patient/Caregiver Call       Warfarin doses taken: Warfarin taken as instructed    Diet: No new diet changes identified    New illness, injury, or hospitalization: Yes: URI     Medication/supplement changes: prednisone 5 day course (dates: 11/30/2022) subsequent INRs may increased. Closer INR monitoring recommended.    Signs or symptoms of bleeding or clotting: No    Previous INR: Subtherapeutic    Additional findings: Bridging with Enoxaparin until INR >= 2.0 Ok to stop Lovenox today           PLAN     Recommended plan for temporary change(s) affecting INR     Dosing Instructions: Continue your current warfarin dose Stop bridging with Enoxaparin with next INR in 1 week       Summary  As of 12/2/2022    Full warfarin instructions:  15 mg every Tue, Sat; 12.5 mg all other days; Starting 12/2/2022   Next INR check:  12/16/2022             Telephone call with Adarsh who verbalizes understanding and agrees to plan    Lab visit scheduled    Education provided:     Please call back if any changes to your diet, medications or how you've been taking warfarin    Taking warfarin: importance of following [facility] instructions vs instructions on the prescription bottle    Lovenox/Heparin education provided: role of enoxaparin/heparin in bridge therapy     Plan made per [facility] anticoagulation protocol    Argentina Anotnio RN  Anticoagulation Clinic  12/2/2022    _______________________________________________________________________     Anticoagulation Episode Summary     Current INR goal:  2.0-3.0   TTR:  32.7 % (10 mo)   Target end date:  Indefinite   Send INR reminders to:  [facility]    Indications    Recurrent deep vein thrombosis (DVT) (H) [I82.409]           Comments:           Anticoagulation Care  Providers     Provider Role Specialty Phone number    Bertha Romero PA-C Referring Family Medicine [phone]

## 2022-12-08 NOTE — DISCHARGE SUMMARY
AFTER YOU GO HOME   ü DO relax; minimize your activity for 24 hours   ü You may resume normal activity tomorrow   ü You may remove the bandage in the evening or next morning   ü You may resume bathing the next day   ü Drink at least 4 extra glasses of fluid today if not on fluid restrictions   ü DO NOT drive or operate machinery at home or at work for at least 24 hours   VISIT THE EMERGENCY ROOM OR URGENT CARE IF:   ü There is redness or swelling at the injection site   ü There is discharge from the injection site   ü You develop a temperature of 101  F or greater   ADDITIONAL INSTRUCTIONS:   ü You may resume your Coumadin or other blood thinner at your regular dose today. Follow up with your physician to have your INR rechecked if indicated.   ü If you gain no relief from the injection after two (2) weeks, follow-up with your provider for your options.   Contacts:   During business hours from 8 to 5 pm, you may call [phone] to reach a nurse advisor at [facility].   After hours, call [facility]  [phone]. Ask for the Radiologist on-call. Someone is on-call 24 hrs/day.   [facility] Toll Free Number   [phone]    Normal for race None

## 2022-12-09 ENCOUNTER — ANTICOAGULATION THERAPY VISIT (OUTPATIENT)
Dept: ANTICOAGULATION | Facility: CLINIC | Age: 57
End: 2022-12-09

## 2022-12-09 ENCOUNTER — LAB (OUTPATIENT)
Dept: LAB | Facility: CLINIC | Age: 57
End: 2022-12-09
Payer: COMMERCIAL

## 2022-12-09 DIAGNOSIS — I82.409 RECURRENT DEEP VEIN THROMBOSIS (DVT) (H): Primary | ICD-10-CM

## 2022-12-09 DIAGNOSIS — I82.409 RECURRENT DEEP VEIN THROMBOSIS (DVT) (H): ICD-10-CM

## 2022-12-09 LAB — INR BLD: 3.5 (ref 0.9–1.1)

## 2022-12-09 PROCEDURE — 36416 COLLJ CAPILLARY BLOOD SPEC: CPT

## 2022-12-09 PROCEDURE — 85610 PROTHROMBIN TIME: CPT

## 2022-12-09 NOTE — PROGRESS NOTES
ANTICOAGULATION MANAGEMENT     Adarsh Salvador 57 year old male is on warfarin with supratherapeutic INR result. (Goal INR 2.0-3.0)    Recent labs: (last 7 days)     12/09/22  0758   INR 3.5*       ASSESSMENT       Source(s): Chart Review and Patient/Caregiver Call       Warfarin doses taken: Warfarin taken as instructed    Diet: No new diet changes identified    New illness, injury, or hospitalization: No still feeling bad after having influenza several weeks ago    Medication/supplement changes: another dose of prednisone ended on sunday.    Signs or symptoms of bleeding or clotting: No    Previous INR: Therapeutic last visit; previously outside of goal range    Additional findings: None       PLAN     Recommended plan for temporary change(s) affecting INR     Dosing Instructions: partial hold then continue your current warfarin dose with next INR in 10 days       Summary  As of 12/9/2022    Full warfarin instructions:  12/10: 5 mg; Otherwise 15 mg every Tue, Sat; 12.5 mg all other days; Starting 12/9/2022   Next INR check:  12/22/2022             Telephone call with Adarsh who verbalizes understanding and agrees to plan    Lab visit scheduled    Education provided:     Please call back if any changes to your diet, medications or how you've been taking warfarin    Goal range and lab monitoring: goal range and significance of current result, Importance of therapeutic range and Importance of following up at instructed interval    Plan made per [facility] anticoagulation protocol    Argentina Antonio RN  Anticoagulation Clinic  12/9/2022    _______________________________________________________________________     Anticoagulation Episode Summary     Current INR goal:  2.0-3.0   TTR:  33.2 % (10.3 mo)   Target end date:  Indefinite   Send INR reminders to:  [facility]    Indications    Recurrent deep vein thrombosis (DVT) (H) [I82.409]           Comments:           Anticoagulation Care Providers     Provider Role Specialty Phone  number    Bertha Romero PA-C [facility] [phone]

## 2022-12-15 NOTE — PROGRESS NOTES
Assessment & Plan     Type 2 diabetes mellitus with diabetic polyneuropathy, without long-term current use of insulin (H)  Worsening  Increase insulin  See below for plan    - Hemoglobin A1c; Future  - Basic metabolic panel  (Ca, Cl, CO2, Creat, Gluc, K, Na, BUN); Future  - atorvastatin (LIPITOR) 40 MG tablet; Take 1 tablet (40 mg) by mouth daily  - insulin glargine (BASAGLAR KWIKPEN) 100 UNIT/ML pen; Inject 45 Units Subcutaneous daily  - Adult Endocrinology  Referral; Future  - [facility] Adult Diabetes Educator Referral; Future  - insulin pen needle (32G X 4 MM) 32G X 4 MM miscellaneous; Use 2 pen needles daily or as directed.  - losartan (COZAAR) 25 MG tablet; Take 1 tablet (25 mg) by mouth daily  - metFORMIN (GLUCOPHAGE XR) 500 MG 24 hr tablet; Take 2 tablets (1,000 mg) by mouth 2 times daily (with meals)    Recurrent deep vein thrombosis (DVT) (H)    - INR point of care  - warfarin ANTICOAGULANT (COUMADIN) 5 MG tablet; Take 15 mg Tues, Thurs and 12.5 mg all other days, or as directed by the Coumadin clinic    Encounter for long-term (current) use of medications      Vertigo    - meclizine (ANTIVERT) 25 MG tablet; Take 1 tablet (25 mg) by mouth 3 times daily as needed for dizziness    Cough, unspecified type    - albuterol (PROAIR HFA/PROVENTIL HFA/VENTOLIN HFA) 108 (90 Base) MCG/ACT inhaler; Inhale 2 puffs into the lungs every 6 hours as needed for shortness of breath or wheezing  - fluticasone-salmeterol (ADVAIR) 500-50 MCG/ACT inhaler; Inhale 1 puff into the lungs every 12 hours    Hypertension goal BP (blood pressure) < 140/90    - amLODIPine (NORVASC) 10 MG tablet; Take 1 tablet (10 mg) by mouth daily for blood pressure    Type 2 diabetes mellitus with hyperglycemia, with long-term current use of insulin (H)    - blood glucose (ACCU-CHEK GUIDE) test strip; Use to test blood sugar 3 times daily or as directed.  - insulin glargine (BASAGLAR KWIKPEN) 100 UNIT/ML pen; Inject 45 Units Subcutaneous  daily    Type 2 diabetes mellitus without complication, without long-term current use of insulin (H)    - blood glucose (ACCU-CHEK GUIDE) test strip; Use to test blood sugar 3 times daily or as directed.  - blood glucose monitoring (ACCU-CHEK FASTCLIX) lancets; Use to test blood sugar 1 times daily or as directed.  Ok to substitute alternative if insurance prefers.  - insulin glargine (BASAGLAR KWIKPEN) 100 UNIT/ML pen; Inject 45 Units Subcutaneous daily    Hypertension, unspecified type    - chlorthalidone (HYGROTON) 25 MG tablet; Take 1 tablet (25 mg) by mouth daily Add on for blood pressure    Diabetic polyneuropathy associated with type 2 diabetes mellitus (H)    - DULoxetine (CYMBALTA) 60 MG capsule; Take 1 capsule (60 mg) by mouth 2 times daily  - nortriptyline (PAMELOR) 10 MG capsule; Take 2 capsules (20 mg) by mouth At Bedtime  - pregabalin (LYRICA) 50 MG capsule; Take 1 capsule (50 mg) by mouth 2 times daily    Anaphylaxis, sequela    - EPINEPHrine (ANY BX GENERIC EQUIV) 0.3 MG/0.3ML injection 2-pack; Inject 0.3 mLs (0.3 mg) into the muscle once as needed for anaphylaxis    Mild persistent asthma without complication    - montelukast (SINGULAIR) 10 MG tablet; Take 1 tablet (10 mg) by mouth At Bedtime For allergies/asthma    Globus syndrome    - omeprazole (PRILOSEC) 40 MG DR capsule; Take 1 capsule (40 mg) by mouth daily Take for at least two months    Migraine with aura and without status migrainosus, not intractable    - ondansetron (ZOFRAN ODT) 4 MG ODT tab; Take 1 tablet (4 mg) by mouth every 8 hours as needed for nausea  - rizatriptan (MAXALT-MLT) 10 MG ODT; TAKE 1 TABLET BY MOUTH AT ONSET OF HEADACHE FOR MIGRAINE MAY REPEAT IN 2 HOURS. MAX 3 TABS/24 HOURS.    Bilateral leg pain    - pregabalin (LYRICA) 50 MG capsule; Take 1 capsule (50 mg) by mouth 2 times daily      Patient Instructions   Call [phone] for [employer] help line/password assistance    Send me a [employer] message with some blood sugars  reading in a week    Schedule with diabetic educator and endocrinologist                Return in about 1 week (around 12/29/2022) for with my chart message.    Bertha Romero PA-C  [facility]    Soren Altamirano is a 57 year old accompanied by his Self, presenting for the following health issues:  Vertigo      History of Present Illness       Reason for visit:  Vidgo    He eats 0-1 servings of fruits and vegetables daily.He consumes 2 sweetened beverage(s) daily.He exercises with enough effort to increase his heart rate 30 to 60 minutes per day.  He exercises with enough effort to increase his heart rate 3 or less days per week.   He is taking medications regularly.       Diabetes 2- poor control. Sugars in 300-400. Has paresthesias in feet, not worse.     htn-No chest pain, shortness of breath, edema, PND, or orthopnea. No dizziness or vision changes. No side effects from medications. Blood pressure has been stable on medication.    Obesity-trying to work on eating better. morbidly obese.     Asthma-stable.     Migraine-stable on meds     vertigo-comes and goes. meds helpl.     Dvt/pe multiple in history-on anticoag for life. Due for inr.     Lab Results   Component Value Date    A1C 8.5 08/16/2022    A1C 7.0 06/07/2022    A1C 11.2 01/24/2022    A1C 7.4 12/03/2020    A1C 7.5 03/26/2020    A1C 6.6 10/03/2019    A1C 6.8 01/11/2019    A1C 6.5 08/17/2018       Review of Systems   Constitutional, HEENT, cardiovascular, pulmonary, GI, , musculoskeletal, neuro, skin, endocrine and psych systems are negative, except as otherwise noted.      Objective    /87   Pulse 103   Temp 97.9  F (36.6  C) (Tympanic)   Resp 16   Wt 128.8 kg (284 lb)   SpO2 98%   BMI 37.73 kg/m    Body mass index is 37.73 kg/m .  Physical Exam   GENERAL: alert, no distress and obese  RESP: lungs clear to auscultation - no rales, rhonchi or wheezes  CV: regular rate and rhythm, normal S1 S2, no S3 or S4, no  murmur, click or rub, no peripheral edema and peripheral pulses strong  MS: no gross musculoskeletal defects noted, no edema  NEURO: Normal strength and tone, mentation intact and speech normal  PSYCH: mentation appears normal, affect normal/bright

## 2022-12-22 ENCOUNTER — ANTICOAGULATION THERAPY VISIT (OUTPATIENT)
Dept: ANTICOAGULATION | Facility: CLINIC | Age: 57
End: 2022-12-22

## 2022-12-22 ENCOUNTER — OFFICE VISIT (OUTPATIENT)
Dept: FAMILY MEDICINE | Facility: CLINIC | Age: 57
End: 2022-12-22
Payer: COMMERCIAL

## 2022-12-22 VITALS
DIASTOLIC BLOOD PRESSURE: 87 MMHG | RESPIRATION RATE: 16 BRPM | TEMPERATURE: 97.9 F | BODY MASS INDEX: 37.73 KG/M2 | OXYGEN SATURATION: 98 % | WEIGHT: 284 LBS | SYSTOLIC BLOOD PRESSURE: 137 MMHG | HEART RATE: 94 BPM

## 2022-12-22 DIAGNOSIS — M79.605 BILATERAL LEG PAIN: ICD-10-CM

## 2022-12-22 DIAGNOSIS — G43.109 MIGRAINE WITH AURA AND WITHOUT STATUS MIGRAINOSUS, NOT INTRACTABLE: ICD-10-CM

## 2022-12-22 DIAGNOSIS — E11.42 TYPE 2 DIABETES MELLITUS WITH DIABETIC POLYNEUROPATHY, WITHOUT LONG-TERM CURRENT USE OF INSULIN (H): Primary | ICD-10-CM

## 2022-12-22 DIAGNOSIS — E11.9 TYPE 2 DIABETES MELLITUS WITHOUT COMPLICATION, WITHOUT LONG-TERM CURRENT USE OF INSULIN (H): ICD-10-CM

## 2022-12-22 DIAGNOSIS — R05.9 COUGH, UNSPECIFIED TYPE: ICD-10-CM

## 2022-12-22 DIAGNOSIS — R09.A2 GLOBUS SYNDROME: ICD-10-CM

## 2022-12-22 DIAGNOSIS — T78.2XXS ANAPHYLAXIS, SEQUELA: ICD-10-CM

## 2022-12-22 DIAGNOSIS — I82.409 RECURRENT DEEP VEIN THROMBOSIS (DVT) (H): Primary | ICD-10-CM

## 2022-12-22 DIAGNOSIS — E11.42 DIABETIC POLYNEUROPATHY ASSOCIATED WITH TYPE 2 DIABETES MELLITUS (H): ICD-10-CM

## 2022-12-22 DIAGNOSIS — I10 HYPERTENSION, UNSPECIFIED TYPE: ICD-10-CM

## 2022-12-22 DIAGNOSIS — R42 VERTIGO: ICD-10-CM

## 2022-12-22 DIAGNOSIS — M79.604 BILATERAL LEG PAIN: ICD-10-CM

## 2022-12-22 DIAGNOSIS — I82.409 RECURRENT DEEP VEIN THROMBOSIS (DVT) (H): ICD-10-CM

## 2022-12-22 DIAGNOSIS — E11.65 TYPE 2 DIABETES MELLITUS WITH HYPERGLYCEMIA, WITH LONG-TERM CURRENT USE OF INSULIN (H): ICD-10-CM

## 2022-12-22 DIAGNOSIS — J45.30 MILD PERSISTENT ASTHMA WITHOUT COMPLICATION: Chronic | ICD-10-CM

## 2022-12-22 DIAGNOSIS — I10 HYPERTENSION GOAL BP (BLOOD PRESSURE) < 140/90: ICD-10-CM

## 2022-12-22 DIAGNOSIS — Z79.899 ENCOUNTER FOR LONG-TERM (CURRENT) USE OF MEDICATIONS: ICD-10-CM

## 2022-12-22 DIAGNOSIS — Z79.4 TYPE 2 DIABETES MELLITUS WITH HYPERGLYCEMIA, WITH LONG-TERM CURRENT USE OF INSULIN (H): ICD-10-CM

## 2022-12-22 PROBLEM — Z71.89 ADVANCED DIRECTIVES, COUNSELING/DISCUSSION: Status: ACTIVE | Noted: 2022-12-22

## 2022-12-22 LAB
ANION GAP SERPL CALCULATED.3IONS-SCNC: 8 MMOL/L (ref 3–14)
BUN SERPL-MCNC: 9 MG/DL (ref 7–30)
CALCIUM SERPL-MCNC: 8.8 MG/DL (ref 8.5–10.1)
CHLORIDE BLD-SCNC: 104 MMOL/L (ref 94–109)
CO2 SERPL-SCNC: 26 MMOL/L (ref 20–32)
CREAT SERPL-MCNC: 0.76 MG/DL (ref 0.66–1.25)
GFR SERPL CREATININE-BSD FRML MDRD: >90 ML/MIN/1.73M2
GLUCOSE BLD-MCNC: 256 MG/DL (ref 70–99)
HBA1C MFR BLD: 10 % (ref 0–5.6)
INR BLD: 2.3 (ref 0.9–1.1)
POTASSIUM BLD-SCNC: 3.8 MMOL/L (ref 3.4–5.3)
SODIUM SERPL-SCNC: 138 MMOL/L (ref 133–144)

## 2022-12-22 PROCEDURE — 85610 PROTHROMBIN TIME: CPT | Performed by: PHYSICIAN ASSISTANT

## 2022-12-22 PROCEDURE — 80048 BASIC METABOLIC PNL TOTAL CA: CPT | Performed by: PHYSICIAN ASSISTANT

## 2022-12-22 PROCEDURE — 36415 COLL VENOUS BLD VENIPUNCTURE: CPT | Performed by: PHYSICIAN ASSISTANT

## 2022-12-22 PROCEDURE — 99214 OFFICE O/P EST MOD 30 MIN: CPT | Performed by: PHYSICIAN ASSISTANT

## 2022-12-22 PROCEDURE — 83036 HEMOGLOBIN GLYCOSYLATED A1C: CPT | Performed by: PHYSICIAN ASSISTANT

## 2022-12-22 RX ORDER — DULOXETIN HYDROCHLORIDE 60 MG/1
60 CAPSULE, DELAYED RELEASE ORAL 2 TIMES DAILY
Qty: 180 CAPSULE | Refills: 3 | Status: SHIPPED | OUTPATIENT
Start: 2022-12-22

## 2022-12-22 RX ORDER — ALBUTEROL SULFATE 90 UG/1
2 AEROSOL, METERED RESPIRATORY (INHALATION) EVERY 6 HOURS PRN
Qty: 6.7 G | Refills: 3 | Status: SHIPPED | OUTPATIENT
Start: 2022-12-22 | End: 2024-04-23

## 2022-12-22 RX ORDER — ONDANSETRON 4 MG/1
4 TABLET, ORALLY DISINTEGRATING ORAL EVERY 8 HOURS PRN
Qty: 20 TABLET | Refills: 1 | Status: SHIPPED | OUTPATIENT
Start: 2022-12-22 | End: 2023-04-24

## 2022-12-22 RX ORDER — INSULIN GLARGINE 100 [IU]/ML
45 INJECTION, SOLUTION SUBCUTANEOUS DAILY
Qty: 15 ML | Refills: 1 | Status: SHIPPED | OUTPATIENT
Start: 2022-12-22 | End: 2023-03-21

## 2022-12-22 RX ORDER — MECLIZINE HYDROCHLORIDE 25 MG/1
25 TABLET ORAL 3 TIMES DAILY PRN
Qty: 90 TABLET | Refills: 1 | Status: SHIPPED | OUTPATIENT
Start: 2022-12-22

## 2022-12-22 RX ORDER — BLOOD SUGAR DIAGNOSTIC
STRIP MISCELLANEOUS
Qty: 300 STRIP | Refills: 3 | Status: SHIPPED | OUTPATIENT
Start: 2022-12-22

## 2022-12-22 RX ORDER — NORTRIPTYLINE HCL 10 MG
20 CAPSULE ORAL AT BEDTIME
Qty: 60 CAPSULE | Refills: 5 | Status: SHIPPED | OUTPATIENT
Start: 2022-12-22 | End: 2023-04-10

## 2022-12-22 RX ORDER — PREGABALIN 50 MG/1
50 CAPSULE ORAL 2 TIMES DAILY
Qty: 60 CAPSULE | Refills: 1 | Status: SHIPPED | OUTPATIENT
Start: 2022-12-22 | End: 2023-04-10

## 2022-12-22 RX ORDER — AMLODIPINE BESYLATE 10 MG/1
10 TABLET ORAL DAILY
Qty: 90 TABLET | Refills: 1 | Status: SHIPPED | OUTPATIENT
Start: 2022-12-22 | End: 2023-08-15

## 2022-12-22 RX ORDER — FLUTICASONE PROPIONATE AND SALMETEROL 500; 50 UG/1; UG/1
1 POWDER RESPIRATORY (INHALATION) EVERY 12 HOURS
Qty: 1 EACH | Refills: 11 | Status: SHIPPED | OUTPATIENT
Start: 2022-12-22 | End: 2024-04-23

## 2022-12-22 RX ORDER — ATORVASTATIN CALCIUM 40 MG/1
40 TABLET, FILM COATED ORAL DAILY
Qty: 90 TABLET | Refills: 3 | Status: SHIPPED | OUTPATIENT
Start: 2022-12-22 | End: 2023-08-15

## 2022-12-22 RX ORDER — MONTELUKAST SODIUM 10 MG/1
10 TABLET ORAL AT BEDTIME
Qty: 90 TABLET | Refills: 2 | Status: SHIPPED | OUTPATIENT
Start: 2022-12-22 | End: 2024-08-06

## 2022-12-22 RX ORDER — RIZATRIPTAN BENZOATE 10 MG/1
TABLET, ORALLY DISINTEGRATING ORAL
Qty: 18 TABLET | Refills: 1 | Status: SHIPPED | OUTPATIENT
Start: 2022-12-22 | End: 2023-04-10

## 2022-12-22 RX ORDER — CHLORTHALIDONE 25 MG/1
25 TABLET ORAL DAILY
Qty: 90 TABLET | Refills: 1 | Status: SHIPPED | OUTPATIENT
Start: 2022-12-22 | End: 2023-04-10

## 2022-12-22 RX ORDER — OMEPRAZOLE 40 MG/1
40 CAPSULE, DELAYED RELEASE ORAL DAILY
Qty: 90 CAPSULE | Refills: 2 | Status: SHIPPED | OUTPATIENT
Start: 2022-12-22 | End: 2023-04-10

## 2022-12-22 RX ORDER — LANCETS
EACH MISCELLANEOUS
Qty: 102 EACH | Refills: 11 | Status: SHIPPED | OUTPATIENT
Start: 2022-12-22

## 2022-12-22 RX ORDER — LOSARTAN POTASSIUM 25 MG/1
25 TABLET ORAL DAILY
Qty: 90 TABLET | Refills: 1 | Status: SHIPPED | OUTPATIENT
Start: 2022-12-22 | End: 2023-04-10

## 2022-12-22 RX ORDER — WARFARIN SODIUM 5 MG/1
TABLET ORAL
Qty: 225 TABLET | Refills: 1 | Status: SHIPPED | OUTPATIENT
Start: 2022-12-22 | End: 2023-04-10

## 2022-12-22 RX ORDER — EPINEPHRINE 0.3 MG/.3ML
0.3 INJECTION SUBCUTANEOUS
Qty: 2 EACH | Refills: 2 | Status: SHIPPED | OUTPATIENT
Start: 2022-12-22

## 2022-12-22 RX ORDER — METFORMIN HCL 500 MG
1000 TABLET, EXTENDED RELEASE 24 HR ORAL 2 TIMES DAILY WITH MEALS
Qty: 120 TABLET | Refills: 5 | Status: SHIPPED | OUTPATIENT
Start: 2022-12-22 | End: 2023-04-10

## 2022-12-22 ASSESSMENT — PAIN SCALES - GENERAL: PAINLEVEL: NO PAIN (0)

## 2022-12-22 NOTE — PATIENT INSTRUCTIONS
Call [phone] for [employer] help line/password assistance    Send me a [employer] message with some blood sugars reading in a week    Schedule with diabetic educator and endocrinologist

## 2022-12-22 NOTE — PROGRESS NOTES
ANTICOAGULATION MANAGEMENT     Adarsh Salvador 57 year old male is on warfarin with therapeutic INR result. (Goal INR 2.0-3.0)    Recent labs: (last 7 days)     12/22/22  1011   INR 2.3*       ASSESSMENT       Source(s): Chart Review    Previous INR was Supratherapeutic    Medication, diet, health changes since last INR chart reviewed; none identified           PLAN     Recommended plan for no diet, medication or health factor changes affecting INR     Dosing Instructions: Continue your current warfarin dose with next INR in 2 weeks       Summary  As of 12/22/2022    Full warfarin instructions:  15 mg every Tue, Sat; 12.5 mg all other days   Next INR check:  1/5/2023             Detailed voice message left for Adarsh with dosing instructions and follow up date.     Contact [phone]  to schedule and with any changes, questions or concerns.     Education provided:     Please call back if any changes to your diet, medications or how you've been taking warfarin    Plan made per [facility] anticoagulation protocol    Joby Marie RN  Anticoagulation Clinic  12/22/2022    _______________________________________________________________________     Anticoagulation Episode Summary     Current INR goal:  2.0-3.0   TTR:  34.2 % (10.7 mo)   Target end date:  Indefinite   Send INR reminders to:  [facility]    Indications    Recurrent deep vein thrombosis (DVT) (H) [I82.409]           Comments:           Anticoagulation Care Providers     Provider Role Specialty Phone number    Bertha Romero PA-C Referring Family Medicine [phone]

## 2022-12-22 NOTE — RESULT ENCOUNTER NOTE
Pato Altamirano,       Your recent test results are attached, if you have any questions or concerns please feel free to contact me via e-mail or call [phone].  A1c poor as expected.  Please follow-up as we discussed.       It was a pleasure to see you at your recent office visit.      Sincerely,  Bertha Romero PA-C

## 2022-12-24 NOTE — PROGRESS NOTES
Subjective:  HPI                    Objective:  System    Physical Exam    General     ROS    Assessment/Plan:    PROGRESS  REPORT    Progress reporting period is from 11/20/18 to 12/11/18.       SUBJECTIVE  Patient reports his foot is sore as hell again. Foot felt great again after last visit. but the second he is on his feet he starts to make the pain worse. 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     Current Pain level: 7/10.     Initial Pain level: 4/10.   Changes in function:  Yes (See Goal flowsheet attached for changes in current functional level)  Adverse reaction to treatment or activity: None    OBJECTIVE  Pain in heel and extends up into the toes. DF 8 deg, 65 PF no pain, 46 inversion with pain, 18 deg eversion inversion 5/5 no pain, eversion 5/5 with pain, DF5/5 no pain, PF 4/5 with slight pain. great toe flexion 5/5 with pain.      ASSESSMENT/PLAN  Updated problem list and treatment plan: Diagnosis 1:  Left foot pain / plantar fasciitis  Pain -  hot/cold therapy, US, manual therapy, self management, education, directional preference exercise and home program  Decreased ROM/flexibility - manual therapy, therapeutic exercise, therapeutic activity and home program  Decreased strength - therapeutic exercise, therapeutic activities and home program  Decreased function - therapeutic activities and home program  STG/LTGs have been met or progress has been made towards goals:  Yes (See Goal flow sheet completed today.)  Assessment of Progress: The patient's condition is improving.  Self Management Plans:  Patient is independent in a home treatment program.  I have re-evaluated this patient and find that the nature, scope, duration and intensity of the therapy is appropriate for the medical condition of the patient.  Adarsh continues to require the following intervention to meet STG and LTG's:  PT    Recommendations:  This patient would benefit from continued therapy.     Frequency:  1 X week, once  daily  Duration:  for 5 weeks        Please refer to the daily flowsheet for treatment today, total treatment time and time spent performing 1:1 timed codes.             No

## 2022-12-26 ENCOUNTER — TELEPHONE (OUTPATIENT)
Dept: FAMILY MEDICINE | Facility: CLINIC | Age: 57
End: 2022-12-26

## 2022-12-26 NOTE — TELEPHONE ENCOUNTER
Diabetes Education Scheduling Outreach #1:    Call to patient to schedule. Voicemail full.    Also sent Mux[employer] message for second attempt. Requested patient to call to schedule.    Mary Yadav OnCall  Diabetes and Nutrition Scheduling

## 2023-01-05 ENCOUNTER — TRANSFERRED RECORDS (OUTPATIENT)
Dept: MULTI SPECIALTY CLINIC | Facility: CLINIC | Age: 58
End: 2023-01-05

## 2023-01-05 ENCOUNTER — OFFICE VISIT (OUTPATIENT)
Dept: ORTHOPEDICS | Facility: CLINIC | Age: 58
End: 2023-01-05
Payer: COMMERCIAL

## 2023-01-05 DIAGNOSIS — M25.561 CHRONIC PAIN OF RIGHT KNEE: Primary | ICD-10-CM

## 2023-01-05 DIAGNOSIS — G89.29 CHRONIC PAIN OF RIGHT KNEE: Primary | ICD-10-CM

## 2023-01-05 LAB — RETINOPATHY: NORMAL

## 2023-01-05 PROCEDURE — 20610 DRAIN/INJ JOINT/BURSA W/O US: CPT | Mod: RT | Performed by: ORTHOPAEDIC SURGERY

## 2023-01-05 RX ORDER — TRIAMCINOLONE ACETONIDE 40 MG/ML
40 INJECTION, SUSPENSION INTRA-ARTICULAR; INTRAMUSCULAR
Status: DISCONTINUED | OUTPATIENT
Start: 2023-01-05 | End: 2023-09-06

## 2023-01-05 RX ADMIN — TRIAMCINOLONE ACETONIDE 40 MG: 40 INJECTION, SUSPENSION INTRA-ARTICULAR; INTRAMUSCULAR at 10:00

## 2023-01-05 NOTE — PROGRESS NOTES
Large Joint Injection/Arthocentesis: R knee joint    Date/Time: 1/5/2023 10:00 AM  Performed by: Carlos A Em MD  Authorized by: Carlos A Em MD     Indications:  Pain  Needle Size:  22 G  Guidance: landmark guided    Location:  Knee      Medications:  40 mg triamcinolone 40 MG/ML  Outcome:  Tolerated well, no immediate complications  Procedure discussed: discussed risks, benefits, and alternatives    Consent Given by:  Patient  Timeout: timeout called immediately prior to procedure    Prep: patient was prepped and draped in usual sterile fashion

## 2023-01-05 NOTE — PROGRESS NOTES
Chief Complaint:   1. 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 Right  knee arthroscopy, partial medial meniscectomy; date of surgery 8/19/2022    History:  Overall doing well though pain persists.  Gives a SANE score of 50.  Does feel improved from his preoperative state.  Not particularly interested in more surgery at this time has had multiple meniscectomies in the past.  Further he has a spinal cord stimulator precluding an MRI.    Exam:     General: Awake, Alert, and oriented. Articulates and communicates with a normal affect     Right Lower Extremity:    Incisions well healed without evidence of infection, no erythema, drainage, or wound dehiscence    No post-operative effusion or ecchymosis    Range of motion 0 to 130 degrees     Knee is stable     Neurovascularly intact    Gait: Mildly antalgic without use of assistive device     Imaging:  No new imaging.     Assesment:  1.  4+ month status post arthroscopic meniscectomy right knee    Plan:   Weightbearing as tolerated  Range of motion as tolerated per the patient I do think that he is doing improve his from his preoperative state and in light of this information we will continue.  I discussed with him the possibility of adding a corticosteroid injection is little bit of a boost to improve his symptoms.  He like to do this.    After written informed consent obtained and signed, after sufficient prepping and sterile technique, 40 mg of kenalog and , 8 cc of 1% lidocaine were injected without complication into the right going forward he can get 2 to 3 injections/year.  No lifetime maximum.  Knee. The patient tolerated the injection well and a sterile dressing was applied.

## 2023-01-05 NOTE — LETTER
1/5/2023         RE: Adarsh Salvador  [facility] [id number]        Dear Colleague,    Thank you for referring your patient, Adarsh Salvador, to the [facility]. Please see a copy of my visit note below.    Chief Complaint:   1. 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 Right  knee arthroscopy, partial medial meniscectomy; date of surgery 8/19/2022    History:  Overall doing well though pain persists.  Gives a SANE score of 50.  Does feel improved from his preoperative state.  Not particularly interested in more surgery at this time has had multiple meniscectomies in the past.  Further he has a spinal cord stimulator precluding an MRI.    Exam:     General: Awake, Alert, and oriented. Articulates and communicates with a normal affect     Right Lower Extremity:    Incisions well healed without evidence of infection, no erythema, drainage, or wound dehiscence    No post-operative effusion or ecchymosis    Range of motion 0 to 130 degrees     Knee is stable     Neurovascularly intact    Gait: Mildly antalgic without use of assistive device     Imaging:  No new imaging.     Assesment:  1.  4+ month status post arthroscopic meniscectomy right knee    Plan:   Weightbearing as tolerated  Range of motion as tolerated per the patient I do think that he is doing improve his from his preoperative state and in light of this information we will continue.  I discussed with him the possibility of adding a corticosteroid injection is little bit of a boost to improve his symptoms.  He like to do this.    After written informed consent obtained and signed, after sufficient prepping and sterile technique, 40 mg of kenalog and , 8 cc of 1% lidocaine were injected without complication into the right going forward he can get 2 to 3 injections/year.  No lifetime maximum.  Knee. The patient tolerated the injection well and a  sterile dressing was applied.                             Large Joint Injection/Arthocentesis: R knee joint    Date/Time: 1/5/2023 10:00 AM  Performed by: Carlos A Em MD  Authorized by: Carlos A Em MD     Indications:  Pain  Needle Size:  22 G  Guidance: landmark guided    Location:  Knee      Medications:  40 mg triamcinolone 40 MG/ML  Outcome:  Tolerated well, no immediate complications  Procedure discussed: discussed risks, benefits, and alternatives    Consent Given by:  Patient  Timeout: timeout called immediately prior to procedure    Prep: patient was prepped and draped in usual sterile fashion             Again, thank you for allowing me to participate in the care of your patient.        Sincerely,        Carlos A Em MD

## 2023-01-05 NOTE — NURSING NOTE
Reason For Visit:   Chief Complaint   Patient presents with     Follow Up     f/u right knee pain  s/p right knee arthroscopy with partial medial meniscectomy DOS: 8/19/22       ?  No  Occupation Unemployed.  Currently working? No.  Work status?  On disability.  Date of injury: NA  Type of injury: NA.  Type of surgery: s/p right knee arthroscopy with partial medial meniscectomy DOS: 8/19/22  Smoker: No, chew  Request smoking cessation information: No    Sane Score  Right  knee - Affected  Left Knee- 90  Right Knee- 50      Selena Kelsey, EMT

## 2023-01-05 NOTE — NURSING NOTE
[facility]   ORTHOPEDICS & SPORTS MEDICINE  [address] [place] [address]  Dept: [phone]  ______________________________________________________________________________    Patient: Adarsh Salvador   : 1965   MRN: [id number]   2023    INVASIVE PROCEDURE SAFETY CHECKLIST    Date: 23   Procedure: Right knee injection  Patient Name: Adarsh Salvador  MRN: [id number]  YOB: 1965    Action: Complete sections as appropriate. Any discrepancy results in a HARD COPY until resolved.     PRE PROCEDURE:  Patient ID verified with 2 identifiers (name and  or MRN): Yes  Procedure and site verified with patient/designee (when able): Yes  Accurate consent documentation in medical record: Yes  H&P (or appropriate assessment) documented in medical record: Yes  H&P must be up to 20 days prior to procedure and updates within 24 hours of procedure as applicable: NA  Relevant diagnostic and radiology test results appropriately labeled and displayed as applicable: Yes  Procedure site(s) marked with provider initials: NA    TIMEOUT:  Time-Out performed immediately prior to starting procedure, including verbal and active participation of all team members addressing the following:Yes  * Correct patient identify  * Confirmed that the correct side and site are marked  * An accurate procedure consent form  * Agreement on the procedure to be done  * Correct patient position  * Relevant images and results are properly labeled and appropriately displayed  * The need to administer antibiotics or fluids for irrigation purposes during the procedure as applicable   * Safety precautions based on patient history or medication use    DURING PROCEDURE: Verification of correct person, site, and procedures any time the responsibility for care of the patient is transferred to another member of the care team.       Prior to injection, verified patient identity using patient's name and date of birth.  Due to  injection administration, patient instructed to remain in clinic for 15 minutes  afterwards, and to report any adverse reaction to me immediately.    Joint injection was performed.      Drug Amount Wasted:  None.  Vial/Syringe: Single dose vial  Expiration Date:  8/2024      Selena Kelsey  January 5, 2023

## 2023-01-19 ENCOUNTER — TELEPHONE (OUTPATIENT)
Dept: ANTICOAGULATION | Facility: CLINIC | Age: 58
End: 2023-01-19
Payer: COMMERCIAL

## 2023-01-19 NOTE — TELEPHONE ENCOUNTER
ANTICOAGULATION     Adarsh Salvador is overdue for INR check.      Spoke with Adarsh and scheduled lab appointment on 1/26    Rubi Nathan RN

## 2023-01-26 ENCOUNTER — ANCILLARY PROCEDURE (OUTPATIENT)
Dept: GENERAL RADIOLOGY | Facility: CLINIC | Age: 58
End: 2023-01-26
Attending: ORTHOPAEDIC SURGERY
Payer: COMMERCIAL

## 2023-01-26 ENCOUNTER — TELEPHONE (OUTPATIENT)
Dept: ORTHOPEDICS | Facility: CLINIC | Age: 58
End: 2023-01-26

## 2023-01-26 ENCOUNTER — ANTICOAGULATION THERAPY VISIT (OUTPATIENT)
Dept: ANTICOAGULATION | Facility: CLINIC | Age: 58
End: 2023-01-26

## 2023-01-26 ENCOUNTER — OFFICE VISIT (OUTPATIENT)
Dept: ORTHOPEDICS | Facility: CLINIC | Age: 58
End: 2023-01-26
Payer: COMMERCIAL

## 2023-01-26 ENCOUNTER — LAB (OUTPATIENT)
Dept: LAB | Facility: CLINIC | Age: 58
End: 2023-01-26
Payer: COMMERCIAL

## 2023-01-26 ENCOUNTER — DOCUMENTATION ONLY (OUTPATIENT)
Dept: ANTICOAGULATION | Facility: CLINIC | Age: 58
End: 2023-01-26

## 2023-01-26 DIAGNOSIS — I82.409 RECURRENT DEEP VEIN THROMBOSIS (DVT) (H): Primary | ICD-10-CM

## 2023-01-26 DIAGNOSIS — I82.409 RECURRENT DEEP VEIN THROMBOSIS (DVT) (H): ICD-10-CM

## 2023-01-26 DIAGNOSIS — G89.29 CHRONIC PAIN OF RIGHT KNEE: Primary | ICD-10-CM

## 2023-01-26 DIAGNOSIS — M25.561 CHRONIC PAIN OF RIGHT KNEE: Primary | ICD-10-CM

## 2023-01-26 DIAGNOSIS — G89.29 CHRONIC PAIN OF RIGHT KNEE: ICD-10-CM

## 2023-01-26 DIAGNOSIS — M25.561 CHRONIC PAIN OF RIGHT KNEE: ICD-10-CM

## 2023-01-26 LAB — INR BLD: 1.7 (ref 0.9–1.1)

## 2023-01-26 PROCEDURE — 85610 PROTHROMBIN TIME: CPT

## 2023-01-26 PROCEDURE — 99214 OFFICE O/P EST MOD 30 MIN: CPT | Performed by: ORTHOPAEDIC SURGERY

## 2023-01-26 PROCEDURE — 36416 COLLJ CAPILLARY BLOOD SPEC: CPT

## 2023-01-26 PROCEDURE — 73564 X-RAY EXAM KNEE 4 OR MORE: CPT | Mod: RT | Performed by: RADIOLOGY

## 2023-01-26 RX ORDER — DICLOFENAC SODIUM 75 MG/1
75 TABLET, DELAYED RELEASE ORAL 2 TIMES DAILY
Qty: 60 TABLET | Refills: 0 | Status: SHIPPED | OUTPATIENT
Start: 2023-01-26 | End: 2023-04-10

## 2023-01-26 ASSESSMENT — PAIN SCALES - GENERAL: PAINLEVEL: MODERATE PAIN (5)

## 2023-01-26 NOTE — PROGRESS NOTES
Chief Complaint:   1. Right knee pain    Diagnosis:  1.  Medial meniscus tear right knee  2.  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 Right  knee arthroscopy, partial medial meniscectomy; date of surgery 8/19/2022    History:  The patient returns to my clinic today for interval follow-up he is a pleasant 57-year-old male.  I saw him in early January and offered him a corticosteroid injection to his right knee.  He states that this was not helpful to him.  He did not provide lasting benefit.  He is actually had a very eventful few weeks.  He recently had to move somewhat abruptly from the house that he was living in this required extensive work to move all of his belongings to a new facility.  And he is actually had more pain since that time.  It also has been slippery and difficult to move around outside and has had pain secondary to that as well    Exam:     General: Awake, Alert, and oriented. Articulates and communicates with a normal affect     Right Lower Extremity:    Incisions well healed without evidence of infection, no erythema, drainage, or wound dehiscence    No post-operative effusion or ecchymosis    Range of motion 0 to 130 degrees     Knee is stable     Neurovascularly intact    Gait: Mildly antalgic without use of assistive device     Imaging:  PA flexion weightbearing, lateral and patellofemoral views obtained today shows joint space narrowing of the right knee as compared to the left.  Though admittedly I do not think it is full-thickness and there is some cartilage remaining.    Assesment:  1.  5 months month status post arthroscopic meniscectomy right knee as well as chondroplasty medial compartment with continued symptomatology    Plan:   At this time the patient has not seen lasting improvement from the injection.  I am going to start him on oral diclofenac 75 mg p.o. twice daily on a scheduled basis and then transition to as needed.  Were also can get new radiographs  which had a chance to look at.  I am going to discussed with the patient in 1 week the results of the radiographs as well as the results of the anti-inflammatory.  We will he will let us know how he is doing at that time.  I am concerned that he may not have enough arthritis to warrant referral to arthroplasty however I certainly will not preclude him from seeing a joint replacement surgeon if that is what he wants to do.    Realistically I do not believe that we are going to be able to offer him another arthroscopic surgery and expect a different outcome as this is already been done multiple times.

## 2023-01-26 NOTE — PROGRESS NOTES
ANTICOAGULATION MANAGEMENT     Adarsh Salvador 57 year old male is on warfarin with subtherapeutic INR result. (Goal INR 2.0-3.0)    Recent labs: (last 7 days)     01/26/23  1012   INR 1.7*       ASSESSMENT       Source(s): Chart Review and Patient/Caregiver Call       Warfarin doses taken: Missed dose(s) may be affecting INR    Diet: No new diet changes identified    New illness, injury, or hospitalization: No    Medication/supplement changes: None noted    Signs or symptoms of bleeding or clotting: No    Previous INR: Therapeutic last visit; previously outside of goal range    Additional findings: None       PLAN     Recommended plan for temporary change(s) affecting INR     Dosing Instructions: booster dose then continue your current warfarin dose with next INR in 2 weeks       Summary  As of 1/26/2023    Full warfarin instructions:  1/26: 15 mg; Otherwise 15 mg every Tue, Sat; 12.5 mg all other days   Next INR check:  2/9/2023             Telephone call with Adarsh who verbalizes understanding and agrees to plan and who agrees to plan and repeated back plan correctly    Lab visit scheduled    Education provided:     Please call back if any changes to your diet, medications or how you've been taking warfarin    Plan made per [facility] anticoagulation protocol    Joby Marie, RN  Anticoagulation Clinic  1/26/2023    _______________________________________________________________________     Anticoagulation Episode Summary     Current INR goal:  2.0-3.0   TTR:  35.8 % (11.9 mo)   Target end date:  Indefinite   Send INR reminders to:  [facility]    Indications    Recurrent deep vein thrombosis (DVT) (H) [I82.409]           Comments:           Anticoagulation Care Providers     Provider Role Specialty Phone number    Bertha Romero PA-C Referring Family Medicine [phone]

## 2023-01-26 NOTE — PROGRESS NOTES
ANTICOAGULATION CLINIC REFERRAL RENEWAL REQUEST       An annual renewal order is required for all patients referred to [facility].?  Please review and sign the pended referral order for Adarsh Salvador.       ANTICOAGULATION SUMMARY      Warfarin indication(s)   Recurrent DVT    Mechanical heart valve present?  NO       Current goal range   INR: 2.0-3.0     Goal appropriate for indication? Goal INR 2-3, standard for indication(s) above     Time in Therapeutic Range (TTR)  (Goal > 60%) 35.8%       Office visit with referring provider's group within last year yes on 12/22/22       Nirali Carpenter RN  [facility]

## 2023-01-26 NOTE — TELEPHONE ENCOUNTER
Left Voicemail (1st Attempt) for the patient to call back and schedule the following:    Appointment type: return  Provider: dr. castillo  Return date: next week 1/30-2/3  Specialty phone number: [phone]   Additonal Notes: Schedule  Telephone return visit in 1 wk to discuss XR and meds  .  Tabitha padilla Procedure   Orthopedics, Podiatry, Sports Medicine, Ent ,Eye , Audiology, Adult Endocrine & Diabetes, Nutrition & Medication Therapy Management Specialties   [facility]

## 2023-01-26 NOTE — NURSING NOTE
Reason For Visit:   Chief Complaint   Patient presents with     RECHECK     R knee injection follow up- no relief at all. Moved 1 wk after shot.          ?  No  Occupation Unemployed.  Currently working? No.  Work status?  On disability.  Date of injury: NA  Type of injury: NA.  Type of surgery: s/p right knee arthroscopy with partial medial meniscectomy DOS: 8/19/22  Smoker: No, chew  Request smoking cessation information: No     Sane Score  Right  knee - Affected  Left Knee- 90  Right Knee- 50          Eriberto Winchester, ATC

## 2023-02-02 ENCOUNTER — VIRTUAL VISIT (OUTPATIENT)
Dept: ORTHOPEDICS | Facility: CLINIC | Age: 58
End: 2023-02-02
Payer: COMMERCIAL

## 2023-02-02 DIAGNOSIS — G89.29 CHRONIC PAIN OF RIGHT KNEE: Primary | ICD-10-CM

## 2023-02-02 DIAGNOSIS — M25.561 CHRONIC PAIN OF RIGHT KNEE: Primary | ICD-10-CM

## 2023-02-02 PROCEDURE — 99207 PR NO BILLABLE SERVICE THIS VISIT: CPT | Mod: 93 | Performed by: ORTHOPAEDIC SURGERY

## 2023-02-02 NOTE — NURSING NOTE
Reason For Visit:   Chief Complaint   Patient presents with     Follow Up     Knee soreness       ?  No  Occupation Unemployed.  Currently working? No.  Work status?  On disability.  Date of injury: NA  Type of injury: NA.  Type of surgery: s/p right knee arthroscopy with partial medial meniscectomy DOS: 8/19/22  Smoker: No, chew  Request smoking cessation information: No     Sane Score  Right  knee - Affected  Left Knee- 100   Right Knee- 30-35    Selena Kelsey, EMT

## 2023-02-02 NOTE — LETTER
2/2/2023         RE: Adarsh Salvador  [address]  [place] [address]        Dear Colleague,    Thank you for referring your patient, Adarsh Salvador, to the [facility]. Please see a copy of my visit note below.    Adarsh is a 57 year old who is being evaluated via a billable telephone visit.      Diagnosis:  1.  Medial meniscus tear right knee  2.  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 Right  knee arthroscopy, partial medial meniscectomy; date of surgery 8/19/2022    History:  The patient underwent an arthroscopic partial meniscectomy in August 2022.  He reports continued pain about his knee.I saw him through clinic approximately 1 week ago at that time I started him on oral anti-inflammatories as he had continued pain.  He states that its not making him any worse.  Though he admits that he is really not a whole lot better.  He gives a SANE score of 50 on his right versus 90 on the left.  He has been very active throughout his life doing skiing at a very high level and has had multiple injuries.  He reports that he has had multiple meniscectomy type procedures numbering greater than 5.  After our last visit I started an oral anti-inflammatory and obtain new radiographs today returns to clinic for a telephone visit to discuss the radiographs as well as the result of the management.    He reports not significant improvement from the meds.  He is interested in a more definitive solution    Exam:     General: Awake, Alert, and oriented. Articulates and communicates with a normal affect     Right Lower Extremity:    No examination was completed today as this was a telephone visit    Imaging:  New radiographs obtained today and reviewed independently by myself does show joint space narrowing of the medial compartment of the right kneeAs compared to the left.  It is not quite full-thickness cartilage loss but there is clear joint space narrowing as compared to the  contralateral side.    Arthroscopic imaging from August 19 of this year does demonstrate some chondrosis of the medial compartment particularly on the femur the lateral compartment and patellofemoral compartments.  Better preserved    Assesment:  1.  5 months month status post arthroscopic meniscectomy right knee as well as chondroplasty medial compartment with continued symptomatology and not great response from oral anti-inflammatory management    Plan:   I long discussion with the patient.  At this time I told him that though his x-rays do show some progressive disease it is not quite to the level that normally I would think he would need a knee replacement with that said he certainly does have some arthritis which we also confirmed on arthroscopy.  He had multiple meniscectomy procedures in the past.  And I again told him that I do not think that we can perform another arthroscopic procedure and expect a different outcome for his knee.    The patient cannot get an MRI secondary to a nerve stimulator    I told the patient that I am happy to have him speak with one of our arthroplasty surgeons.  I am not sure that he is necessarily going to be a candidate for this but at the same time I think it is important for him to talk through this and see what options are potentially available.  He certainly does have some arthritis though admittedly it seems to be less than what is normal for knee replacement surgery and I have informed the patient of this as well.  I do not think he be a candidate for more arthroscopic procedures on this side as he has not seen benefit has had greater than 5 by his estimation.    Certainly can continue to do things such as maximization of nonsurgical management, oral anti-inflammatories, injection management.  Also reviewed consideration of referral to a pain management clinic could be utilized if he is not found to not be a candidate for joint replacement surgery.                 What  phone number would you like to be contacted at? [phone]  How would you like to obtain your AVS? MyChart    Distant Location (provider location):  On-site  Phone call duration: 10 minutes        Again, thank you for allowing me to participate in the care of your patient.        Sincerely,        Carlos A Em MD

## 2023-02-02 NOTE — PROGRESS NOTES
Adarsh is a 57 year old who is being evaluated via a billable telephone visit.      Diagnosis:  1.  Medial meniscus tear right knee  2.  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 Right  knee arthroscopy, partial medial meniscectomy; date of surgery 8/19/2022    History:  The patient underwent an arthroscopic partial meniscectomy in August 2022.  He reports continued pain about his knee.I saw him through clinic approximately 1 week ago at that time I started him on oral anti-inflammatories as he had continued pain.  He states that its not making him any worse.  Though he admits that he is really not a whole lot better.  He gives a SANE score of 50 on his right versus 90 on the left.  He has been very active throughout his life doing skiing at a very high level and has had multiple injuries.  He reports that he has had multiple meniscectomy type procedures numbering greater than 5.  After our last visit I started an oral anti-inflammatory and obtain new radiographs today returns to clinic for a telephone visit to discuss the radiographs as well as the result of the management.    He reports not significant improvement from the meds.  He is interested in a more definitive solution    Exam:     General: Awake, Alert, and oriented. Articulates and communicates with a normal affect     Right Lower Extremity:    No examination was completed today as this was a telephone visit    Imaging:  New radiographs obtained today and reviewed independently by myself does show joint space narrowing of the medial compartment of the right kneeAs compared to the left.  It is not quite full-thickness cartilage loss but there is clear joint space narrowing as compared to the contralateral side.    Arthroscopic imaging from August 19 of this year does demonstrate some chondrosis of the medial compartment particularly on the femur the lateral compartment and patellofemoral compartments.  Better preserved    Assesment:  1.   5 months month status post arthroscopic meniscectomy right knee as well as chondroplasty medial compartment with continued symptomatology and not great response from oral anti-inflammatory management    Plan:   I long discussion with the patient.  At this time I told him that though his x-rays do show some progressive disease it is not quite to the level that normally I would think he would need a knee replacement with that said he certainly does have some arthritis which we also confirmed on arthroscopy.  He had multiple meniscectomy procedures in the past.  And I again told him that I do not think that we can perform another arthroscopic procedure and expect a different outcome for his knee.    The patient cannot get an MRI secondary to a nerve stimulator    I told the patient that I am happy to have him speak with one of our arthroplasty surgeons.  I am not sure that he is necessarily going to be a candidate for this but at the same time I think it is important for him to talk through this and see what options are potentially available.  He certainly does have some arthritis though admittedly it seems to be less than what is normal for knee replacement surgery and I have informed the patient of this as well.  I do not think he be a candidate for more arthroscopic procedures on this side as he has not seen benefit has had greater than 5 by his estimation.    Certainly can continue to do things such as maximization of nonsurgical management, oral anti-inflammatories, injection management.  Also reviewed consideration of referral to a pain management clinic could be utilized if he is not found to not be a candidate for joint replacement surgery.                 What phone number would you like to be contacted at? [phone]  How would you like to obtain your AVS? Jacqueline    Distant Location (provider location):  On-site  Phone call duration: 10 minutes

## 2023-02-02 NOTE — PROGRESS NOTES
SUBJECTIVE:                                                    Adarsh Salvador is a 52 year old male who presents to clinic today for the following health issues:      1)  ENT Symptoms             Symptoms: cc Present Absent Comment   Fever/Chills   x    Fatigue  x     Muscle Aches  x     Eye Irritation  x     Sneezing  x     Nasal Lobo/Drg   x    Sinus Pressure/Pain   x    Loss of smell   x    Dental pain   x    Sore Throat   x    Swollen Glands   x    Ear Pain/Fullness   x    Cough  x     Wheeze  x     Chest Pain   x    Shortness of breath  x  With cough   Rash   x    Other   x      Symptom duration:  x 3-4 weeks   Symptom severity:  severe   Treatments tried:  OTC   Contacts:  none     Patient has congestion and cough x 1 month and sinus symptoms.   Mild persistent asthma--dulera and albuterol previously-taking dulera daily, using albuterol PRN and it is helpful.  Uses dulera mainly in winter, helps him breathe better.   Started off as a cold.  No recent antibiotic.   Greenish yellow nasal drainage.  Blood nose once.   Nonproductive.  Dry cough, worse at night.   Tolerates antibiotics well.   Is on prednisone for carpel tunnel right now, seeing ortho for this.       2)  Passed a stone this morning. H/o kidney stones-he would like to get a urine to rule out UTI.  Some burning with urination still. No flank pain.   Not fasting.     3) HTN: high today.   Has not been on lisinopril for 2 months, ran out of medications per patient.  Will get fasting labs in 1 month after he has been back on the medication.  No chest pain or edema or PND.       Problem list and histories reviewed & adjusted, as indicated.  Additional history: as documented    Patient Active Problem List   Diagnosis     GERD (gastroesophageal reflux disease)     Kidney stone     Chronic RLQ pain     Hyperlipidemia LDL goal <160     Hypertension goal BP (blood pressure) < 140/90     Constipation     Abdominal pain, right upper quadrant     Adult celiac  LVM for patient to return call to schedule appointment.   "disease     Chronic maxillary sinusitis     Chronic rhinitis     Obesity (BMI 30-39.9)     Pure hyperglyceridemia     Anaphylactic reaction     Benign essential hypertension     Anemia in other chronic diseases classified elsewhere     Fatty liver     Irritable bowel syndrome with diarrhea     Right inguinal hernia     BMI 40.0-44.9, adult (H)     Pain in thoracic spine     Past Surgical History:   Procedure Laterality Date     C REMOVAL OF KIDNEY STONE       COLONOSCOPY  5/1/2012    Procedure:COLONOSCOPY; screening colonoscopy; Surgeon:KINGSLEY DOS SANTOS; Location:[facility] OR     COLONOSCOPY  4/21/2014    Procedure: COMBINED COLONOSCOPY, SINGLE BIOPSY/POLYPECTOMY BY BIOPSY;  Surgeon: Duane, William Charles, MD;  Location: [facility] OR     HC BREATH HYDROGEN TEST  3/7/2014    Procedure: HYDROGEN BREATH TEST;  Surgeon: Darion Swift MD;  Location: [facility]     ORTHOPEDIC SURGERY      x3 Rt knee        Social History   Substance Use Topics     Smoking status: Never Smoker     Smokeless tobacco: Current User     Types: Chew      Comment: Chew     Alcohol use No      Comment: \"quart a year\" 2012     Family History   Problem Relation Age of Onset     CANCER Mother 52     lung, smoked     Cancer - colorectal Father 53     unsure type, pt attributes to radiation exposure     Myocardial Infarction Father 45     Myocardial Infarction Paternal Grandfather 35     DIABETES Other      nephew- type 1     DIABETES Maternal Aunt      1     DIABETES Maternal Aunt      2     DIABETES Paternal Uncle      1     DIABETES Paternal Uncle      2     DIABETES Paternal Uncle      3     DIABETES Paternal Uncle      4         Current Outpatient Prescriptions   Medication Sig Dispense Refill     lisinopril (PRINIVIL/ZESTRIL) 20 MG tablet TAKE 1 TABLET EVERY DAY 30 tablet 1     albuterol (PROAIR HFA/PROVENTIL HFA/VENTOLIN HFA) 108 (90 BASE) MCG/ACT Inhaler Inhale 2 puffs into the lungs every 6 hours as needed for shortness of breath / dyspnea or wheezing " 1 Inhaler 3     nortriptyline (PAMELOR) 10 MG capsule START AT 1 CAP AT BEDTIME FOR 5 DAYS, THEN 2 CAPS AT BEDTIME FOR 5 DAYS, THEN 3 CAPS AT BEDTIME. 90 capsule 0     methylPREDNISolone (MEDROL DOSEPAK) 4 MG tablet Follow package instructions 21 tablet 0     amLODIPine (NORVASC) 10 MG tablet Take 1 tablet (10 mg) by mouth daily 90 tablet 3     ASPIRIN PO Take 325 mg by mouth daily       cyclobenzaprine (FLEXERIL) 10 MG tablet Take 0.5-1 tablets (5-10 mg) by mouth 3 times daily as needed for muscle spasms 90 tablet 1     sertraline (ZOLOFT) 50 MG tablet Take 1 tablet (50 mg) by mouth daily 90 tablet 0     naproxen (NAPROSYN) 500 MG tablet Take 1 tablet (500 mg) by mouth 2 times daily as needed for moderate pain 30 tablet 0     mometasone-formoterol (DULERA) 100-5 MCG/ACT oral inhaler Inhale 2 puffs into the lungs 2 times daily Rinse mouth after use. 1 Inhaler 3     calcium carbonate (TUMS) 500 MG chewable tablet Take 1 chew tab by mouth daily       dicyclomine (BENTYL) 20 MG tablet Take 1 tablet (20 mg) by mouth 4 times daily as needed 40 tablet 5     diphenoxylate-atropine (LOMOTIL) 2.5-0.025 MG per tablet Take 2 tablets by mouth 4 times daily as needed for diarrhea 30 tablet 1     Acetaminophen (TYLENOL PO) Take 1,000 mg by mouth every 8 hours as needed for mild pain or fever       multivitamin, therapeutic (THERA-VIT) TABS Take 1 tablet by mouth daily       simvastatin (ZOCOR) 20 MG tablet Take 1 tablet (20 mg) by mouth At Bedtime 30 tablet 3     EPINEPHrine (EPIPEN) 0.3 MG/0.3ML injection Inject 0.3 mLs (0.3 mg) into the muscle once as needed for anaphylaxis 2 each 2     Dietary Management Product (VSL#3) PACK Use 1-2 capsules/day 30 each 3     [DISCONTINUED] lisinopril (PRINIVIL/ZESTRIL) 20 MG tablet TAKE 1 TABLET EVERY DAY 30 tablet 0     fluticasone (FLOVENT DISKUS) 100 MCG/BLIST AEPB Inhale 1 puff into the lungs 2 times daily (Patient not taking: Reported on 12/12/2017) 1 Inhaler 1     [DISCONTINUED]  albuterol (PROAIR HFA, PROVENTIL HFA, VENTOLIN HFA) 108 (90 BASE) MCG/ACT inhaler Inhale 2 puffs into the lungs every 6 hours as needed for shortness of breath / dyspnea or wheezing 1 Inhaler 1     Allergies   Allergen Reactions     Artificial Sweetner (Informational Only) [Artificial Sweetner (Informational Only) ] GI Disturbance     ALL artificial sweetners cause severe diarrhea & flu symptoms     Hydromorphone Anaphylaxis     Morphine [Fumaric Acid] Anaphylaxis     Hydrocodone-Acetaminophen Itching     Tramadol Hives     Trazodone Hives     Gabapentin      GI upset per pt     Codeine Phosphate Itching     Ketorolac Tromethamine Rash         ROS:  Constitutional, HEENT, cardiovascular, pulmonary, GI, , musculoskeletal, neuro, skin, endocrine and psych systems are negative, except as otherwise noted.      OBJECTIVE:   /88  Pulse 82  Temp 98.3  F (36.8  C) (Oral)  Wt 295 lb (133.8 kg)  SpO2 98%  BMI 41.14 kg/m2  Body mass index is 41.14 kg/(m^2).  GENERAL:  No acute distress.  Interacts appropriately.  Breathing without difficulty.  Alert.  HEENT:  Tympanic membranes intact without effusion or erythema.  Oral mucosa moist. Posterior pharynx has erythema.  Posterior pharynx has no exudate. Without edema. Tender maxillary sinuses.   NECK:  Soft and supple.  without tenderness.  Without lymphadenopathy.  Normal range of motion.    CARDIAC:   Regular rate and rhythm.  No murmurs, rubs, or gallops.   PULMONARY: Clear to auscultation bilaterally.  No  wheezes, crackles, or rhonchi.  Normal air exchange/breath sounds.  No use of accessory muscles.    PSYCH: Normal affect.  SKIN: No rashes.        Results for orders placed or performed in visit on 12/12/17 (from the past 24 hour(s))   UA reflex to Microscopic   Result Value Ref Range    Color Urine Yellow     Appearance Urine Clear     Glucose Urine Negative NEG^Negative mg/dL    Bilirubin Urine Negative NEG^Negative    Ketones Urine Negative NEG^Negative mg/dL     Specific Gravity Urine 1.025 1.003 - 1.035    Blood Urine Moderate (A) NEG^Negative    pH Urine 6.0 5.0 - 7.0 pH    Protein Albumin Urine Trace (A) NEG^Negative mg/dL    Urobilinogen Urine 0.2 0.2 - 1.0 EU/dL    Nitrite Urine Negative NEG^Negative    Leukocyte Esterase Urine Negative NEG^Negative    Source Midstream Urine    Urine Microscopic   Result Value Ref Range    WBC Urine O - 2 OTO2^O - 2 /HPF    RBC Urine 10-25 (A) OTO2^O - 2 /HPF         ASSESSMENT/PLAN:     ASSESSMENT / PLAN:  (N20.0) Kidney stone  (primary encounter diagnosis)  Comment:   Plan: UA reflex to Microscopic, Urine Microscopic,         Comprehensive metabolic panel        Passed already, monitor symptoms, explains blood in urine but no UTI present    (I10) Hypertension goal BP (blood pressure) < 140/90  Comment:   Plan: lisinopril (PRINIVIL/ZESTRIL) 20 MG tablet        See below    (R05) Cough  Comment:  Bronchitis versus sinus infection  Plan: albuterol (PROAIR HFA/PROVENTIL HFA/VENTOLIN         HFA) 108 (90 BASE) MCG/ACT Inhaler,         amoxicillin-clavulanate (AUGMENTIN) 875-125 MG         per tablet, mometasone-formoterol (DULERA)         100-5 MCG/ACT oral inhaler            (Z13.220) Lipid screening  Comment:   Plan: Lipid panel reflex to direct LDL Fasting        See below    Patient Instructions   Return fasting for labs in 1 month (make lab only appointment)  Start back on lisinopril medication, I would like labs about 1 month after that  Take antibiotic with food  Let me know if your symptoms do not start improving over the next week or if they worsen at any time (fever, chest pain, etc)        Bertha Romero PA-C  [facility]

## 2023-02-03 ENCOUNTER — TELEPHONE (OUTPATIENT)
Dept: ORTHOPEDICS | Facility: CLINIC | Age: 58
End: 2023-02-03
Payer: COMMERCIAL

## 2023-02-03 NOTE — TELEPHONE ENCOUNTER
Left Voicemail (1st Attempt) for the patient to call back and schedule the following:    Appointment type: consult   Provider: dr. Waite or Dr. valenzuela   Return date: next opening  Specialty phone number: [phone]       Tabitha padilla Procedure   Orthopedics, Podiatry, Sports Medicine, Ent ,Eye , Audiology, Adult Endocrine & Diabetes, Nutrition & Medication Therapy Management Specialties   [facility]

## 2023-02-09 ENCOUNTER — LAB (OUTPATIENT)
Dept: LAB | Facility: CLINIC | Age: 58
End: 2023-02-09
Payer: COMMERCIAL

## 2023-02-09 ENCOUNTER — ANTICOAGULATION THERAPY VISIT (OUTPATIENT)
Dept: ANTICOAGULATION | Facility: CLINIC | Age: 58
End: 2023-02-09

## 2023-02-09 DIAGNOSIS — I82.409 RECURRENT DEEP VEIN THROMBOSIS (DVT) (H): ICD-10-CM

## 2023-02-09 DIAGNOSIS — I82.409 RECURRENT DEEP VEIN THROMBOSIS (DVT) (H): Primary | ICD-10-CM

## 2023-02-09 LAB — INR BLD: 1.4 (ref 0.9–1.1)

## 2023-02-09 PROCEDURE — 36416 COLLJ CAPILLARY BLOOD SPEC: CPT

## 2023-02-09 PROCEDURE — 85610 PROTHROMBIN TIME: CPT

## 2023-02-09 NOTE — TELEPHONE ENCOUNTER
DIAGNOSIS: right knee   APPOINTMENT DATE: 02/14/2023   NOTES STATUS DETAILS   OFFICE NOTE from referring provider Internal 02/02/2023 Dr Em [facility]    OFFICE NOTE from other specialist Internal 09/21/2022 PT [facility]   06/29/2022 Dr Magallon [facility]    DISCHARGE SUMMARY from hospital N/A    DISCHARGE REPORT from the ER N/A    OPERATIVE REPORT Internal 08/19/2022 examination under anesthesia, right knee arthroscopy, meniscectomy   MEDICATION LIST N/A    EMG (for Spine) N/A    IMPLANT RECORD/STICKER N/A    LABS     CBC/DIFF N/A    CULTURES N/A    INJECTIONS DONE IN RADIOLOGY N/A    MRI N/A    CT SCAN Internal 07/29/2022 RT knee   XRAYS (IMAGES & REPORTS) Internal 01/26/2023 RT knee  06/29/2022 RT knee   TUMOR     PATHOLOGY  Slides & report N/A

## 2023-02-09 NOTE — PROGRESS NOTES
ANTICOAGULATION MANAGEMENT     Adarsh Salvador 57 year old male is on warfarin with subtherapeutic INR result. (Goal INR 2.0-3.0)    Recent labs: (last 7 days)     02/09/23  1021   INR 1.4*       ASSESSMENT       Source(s): Chart Review and Patient/Caregiver Call       Warfarin doses taken: Missed dose(s) may be affecting INR    Diet: No new diet changes identified    New illness, injury, or hospitalization: No    Medication/supplement changes: None noted    Signs or symptoms of bleeding or clotting: No    Previous INR: Subtherapeutic    Additional findings: None       PLAN     Recommended plan for temporary change(s) affecting INR     Dosing Instructions: booster dose then continue your current warfarin dose with next INR in 5 days       Summary  As of 2/9/2023    Full warfarin instructions:  2/9: 17.5 mg; Otherwise 15 mg every Tue, Sat; 12.5 mg all other days   Next INR check:  2/14/2023             Telephone call with Adarsh who verbalizes understanding and agrees to plan    Check at provider office visit    Education provided:     Goal range and lab monitoring: goal range and significance of current result    Contact [phone]  with any changes, questions or concerns.     Plan made per [facility] anticoagulation protocol    Janeth Cool RN  Anticoagulation Clinic  2/9/2023    _______________________________________________________________________     Anticoagulation Episode Summary     Current INR goal:  2.0-3.0   TTR:  33.7 % (1 y)   Target end date:  Indefinite   Send INR reminders to:  [facility]    Indications    Recurrent deep vein thrombosis (DVT) (H) [I82.409]           Comments:           Anticoagulation Care Providers     Provider Role Specialty Phone number    Bertha Romero PA-C Referring Family Medicine [phone]

## 2023-02-14 ENCOUNTER — LAB (OUTPATIENT)
Dept: LAB | Facility: CLINIC | Age: 58
End: 2023-02-14
Payer: COMMERCIAL

## 2023-02-14 ENCOUNTER — ANTICOAGULATION THERAPY VISIT (OUTPATIENT)
Dept: ANTICOAGULATION | Facility: CLINIC | Age: 58
End: 2023-02-14

## 2023-02-14 ENCOUNTER — OFFICE VISIT (OUTPATIENT)
Dept: ORTHOPEDICS | Facility: CLINIC | Age: 58
End: 2023-02-14
Payer: COMMERCIAL

## 2023-02-14 ENCOUNTER — PRE VISIT (OUTPATIENT)
Dept: ORTHOPEDICS | Facility: CLINIC | Age: 58
End: 2023-02-14

## 2023-02-14 VITALS — WEIGHT: 290 LBS | HEIGHT: 72 IN | BODY MASS INDEX: 39.28 KG/M2

## 2023-02-14 DIAGNOSIS — G89.29 CHRONIC PAIN OF RIGHT KNEE: Primary | ICD-10-CM

## 2023-02-14 DIAGNOSIS — I82.409 RECURRENT DEEP VEIN THROMBOSIS (DVT) (H): ICD-10-CM

## 2023-02-14 DIAGNOSIS — M25.561 CHRONIC PAIN OF RIGHT KNEE: Primary | ICD-10-CM

## 2023-02-14 DIAGNOSIS — I82.409 RECURRENT DEEP VEIN THROMBOSIS (DVT) (H): Primary | ICD-10-CM

## 2023-02-14 LAB — INR BLD: 1.7 (ref 0.9–1.1)

## 2023-02-14 PROCEDURE — 85610 PROTHROMBIN TIME: CPT

## 2023-02-14 PROCEDURE — 99214 OFFICE O/P EST MOD 30 MIN: CPT | Performed by: ORTHOPAEDIC SURGERY

## 2023-02-14 PROCEDURE — 36416 COLLJ CAPILLARY BLOOD SPEC: CPT

## 2023-02-14 NOTE — PROGRESS NOTES
ANTICOAGULATION MANAGEMENT     Adarsh Salvador 57 year old male is on warfarin with subtherapeutic INR result. (Goal INR 2.0-3.0)    Recent labs: (last 7 days)     02/14/23  1553   INR 1.7*       ASSESSMENT       Source(s): Chart Review    Previous INR was Subtherapeutic    Medication, diet, health changes since last INR chart reviewed; none identified           PLAN     Unable to reach Adarsh today.    Left message to continue current dose of warfarin 15 mg tonight. Request call back for assessment.    Follow up required to confirm warfarin dose taken and assess for changes    Janeth Cool, RN  Anticoagulation Clinic  2/14/2023

## 2023-02-14 NOTE — PROGRESS NOTES
Assessment: This is a 57 year old with a pretty painful knee despite a lot of management of this including activity modification, oral pain medication, injections, arthroscopy, and extensive physical therapy. Question today is if he would benefit from a total knee. We reviewed his radiographs and arthroscopy imaging which show early cartilage changes. I counseled the patient that the most likely outcome of a total knee done without significant cartilage loss is probably just a painful knee with knee replacement implants cemented to it. He is not interested in that. All the patient's questions were answered to the best of my ability.     Plan:  PRN    Chief Complaint: Consult (Right knee consult)      Physician:  No ref. provider found    HPI: Adarsh Salvador is a 57 year old male who presents today for evaluation of his right knee     Symptom Profile  Location of symptoms:  Medial and lateral joint lines, medial and lateral to the patella, distal to the patella  Onset: insidious  Trend: getting no better or worse   Duration of symptoms: about months   Quality of symptoms: aching, sharp/stabbing  Severity: severe  Alleviate: activity modification  Exacerbating: activities, walking  Previous Treatments: Previous treatments include activity modification, oral pain medication, physical therapy, intra-articular injection, knee arthroscopy.    JERZY Cordova 50.12  PROMIS Mental: declined  PROMIS Physical: declined  [facility]: 3    MEDICAL HISTORY:   Past Medical History:   Diagnosis Date     Anaphylactic reaction 8/14/2015     Anxiety      Depression      DM2 (diabetes mellitus, type 2) (H)      GERD (gastroesophageal reflux disease)      HTN (hypertension)      IBS (irritable bowel syndrome)      Kidney stone 6/15/2011    Pt believes these were Calcium stones     Neuropathy    diabetic neuropathy       Medications:     Current Outpatient Medications:      acetaminophen (TYLENOL) 325 MG tablet, Take 2 tablets (650 mg) by mouth every 4  hours as needed for mild pain, Disp: 50 tablet, Rfl: 0     albuterol (PROAIR HFA/PROVENTIL HFA/VENTOLIN HFA) 108 (90 Base) MCG/ACT inhaler, Inhale 2 puffs into the lungs every 6 hours as needed for shortness of breath or wheezing, Disp: 6.7 g, Rfl: 3     amLODIPine (NORVASC) 10 MG tablet, Take 1 tablet (10 mg) by mouth daily for blood pressure, Disp: 90 tablet, Rfl: 1     ASPIRIN PO, Take 325 mg by mouth daily , Disp: , Rfl:      aspirin-acetaminophen-caffeine (EXCEDRIN MIGRAINE) 250-250-65 MG tablet, Take 1 tablet by mouth, Disp: , Rfl:      atorvastatin (LIPITOR) 40 MG tablet, Take 1 tablet (40 mg) by mouth daily, Disp: 90 tablet, Rfl: 3     blood glucose (ACCU-CHEK GUIDE) test strip, Use to test blood sugar 3 times daily or as directed., Disp: 300 strip, Rfl: 3     blood glucose monitoring (ACCU-CHEK FASTCLIX) lancets, Use to test blood sugar 1 times daily or as directed.  Ok to substitute alternative if insurance prefers., Disp: 102 each, Rfl: 11     calcium carbonate (TUMS) 500 MG chewable tablet, Take 1 chew tab by mouth daily, Disp: , Rfl:      chlorthalidone (HYGROTON) 25 MG tablet, Take 1 tablet (25 mg) by mouth daily Add on for blood pressure, Disp: 90 tablet, Rfl: 1     cyclobenzaprine (FLEXERIL) 10 MG tablet, Take 0.5-1 tablets (5-10 mg) by mouth 3 times daily as needed for muscle spasms, Disp: 90 tablet, Rfl: 3     diclofenac (VOLTAREN) 75 MG EC tablet, Take 1 tablet (75 mg) by mouth 2 times daily, Disp: 60 tablet, Rfl: 0     DULoxetine (CYMBALTA) 60 MG capsule, Take 1 capsule (60 mg) by mouth 2 times daily, Disp: 180 capsule, Rfl: 3     EPINEPHrine (ANY BX GENERIC EQUIV) 0.3 MG/0.3ML injection 2-pack, Inject 0.3 mLs (0.3 mg) into the muscle once as needed for anaphylaxis, Disp: 2 each, Rfl: 2     fluticasone (FLONASE) 50 MCG/ACT nasal spray, INSTILL 1 SPRAY INTO BOTH NOSTRILS DAILY, Disp: 48 mL, Rfl: 1     fluticasone-salmeterol (ADVAIR) 500-50 MCG/ACT inhaler, Inhale 1 puff into the lungs every 12  hours, Disp: 1 each, Rfl: 11     insulin glargine (BASAGLAR KWIKPEN) 100 UNIT/ML pen, Inject 45 Units Subcutaneous daily, Disp: 15 mL, Rfl: 1     insulin pen needle (32G X 4 MM) 32G X 4 MM miscellaneous, Use 2 pen needles daily or as directed., Disp: 200 each, Rfl: 1     losartan (COZAAR) 25 MG tablet, Take 1 tablet (25 mg) by mouth daily, Disp: 90 tablet, Rfl: 1     meclizine (ANTIVERT) 25 MG tablet, Take 1 tablet (25 mg) by mouth 3 times daily as needed for dizziness, Disp: 90 tablet, Rfl: 1     metFORMIN (GLUCOPHAGE XR) 500 MG 24 hr tablet, Take 2 tablets (1,000 mg) by mouth 2 times daily (with meals), Disp: 120 tablet, Rfl: 5     montelukast (SINGULAIR) 10 MG tablet, Take 1 tablet (10 mg) by mouth At Bedtime For allergies/asthma, Disp: 90 tablet, Rfl: 2     multivitamin, therapeutic (THERA-VIT) TABS, Take 1 tablet by mouth daily, Disp: , Rfl:      naproxen sodium (ANAPROX) 220 MG tablet, Take 220 mg by mouth, Disp: , Rfl:      nortriptyline (PAMELOR) 10 MG capsule, Take 2 capsules (20 mg) by mouth At Bedtime, Disp: 60 capsule, Rfl: 5     omeprazole (PRILOSEC) 40 MG DR capsule, Take 1 capsule (40 mg) by mouth daily Take for at least two months, Disp: 90 capsule, Rfl: 2     ondansetron (ZOFRAN ODT) 4 MG ODT tab, Take 1 tablet (4 mg) by mouth every 8 hours as needed for nausea, Disp: 20 tablet, Rfl: 1     pregabalin (LYRICA) 50 MG capsule, Take 1 capsule (50 mg) by mouth 2 times daily, Disp: 60 capsule, Rfl: 1     rizatriptan (MAXALT-MLT) 10 MG ODT, TAKE 1 TABLET BY MOUTH AT ONSET OF HEADACHE FOR MIGRAINE MAY REPEAT IN 2 HOURS. MAX 3 TABS/24 HOURS., Disp: 18 tablet, Rfl: 1     warfarin ANTICOAGULANT (COUMADIN) 5 MG tablet, Take 15 mg Tues, Thurs and 12.5 mg all other days, or as directed by the Coumadin clinic, Disp: 225 tablet, Rfl: 1    Current Facility-Administered Medications:      triamcinolone (KENALOG-40) injection 40 mg, 40 mg, , , Carlos A Em MD, 40 mg at 01/05/23  1000    Facility-Administered Medications Ordered in Other Visits:      BUPivacaine (MARCAINE) injection 0.5%, 10 mL, Intradermal, Once, Vinnie Espinoza, DO     DOBUTamine 500 mg in dextrose 5% 250 mL (adult std conc) premix, 2.5-20 mcg/kg/min, Intravenous, Continuous, Navarro Butcher MD, Stopped at 04/25/19 1559     iopamidol (ISOVUE-M 200) solution 10 mL, 10 mL, Intravenous, Once, Vinnie Espinoza, DO     methylPREDNISolone (DEPO-MEDROL) injection 80 mg, 80 mg, Intramuscular, Once, Vinnie Espinoza, DO     metoprolol (LOPRESSOR) injection 5 mg, 5 mg, Intravenous, Q5 Min PRN, Navarro Butcher MD, 2 mg at 04/25/19 1559    Allergies: Artificial sweetner (informational only) [artificial sweetner (informational only) ], Hydromorphone, Ibuprofen, Morphine sulfate concentrate, Morphine [fumaric acid], Hydrocodone-acetaminophen, Tramadol, Trazodone, Gabapentin, Keflex [cephalexin], Codeine phosphate, and Ketorolac tromethamine    SURGICAL HISTORY:   Past Surgical History:   Procedure Laterality Date     ARTHROSCOPY KNEE WITH MEDIAL MENISCECTOMY Right 8/19/2022    Procedure: examination under anesthesia, right knee arthroscopy, meniscectomy;  Surgeon: Carlos A Em MD;  Location: [facility] OR     COLONOSCOPY  5/1/2012    Procedure:COLONOSCOPY; screening colonoscopy; Surgeon:KINGSLEY DOS SANTOS; Location:[facility]     COLONOSCOPY  4/21/2014    Procedure: COMBINED COLONOSCOPY, SINGLE BIOPSY/POLYPECTOMY BY BIOPSY;  Surgeon: Duane, William Charles, MD;  Location: [facility]     COLONOSCOPY N/A 4/21/2022    Procedure: COLONOSCOPY, WITH POLYPECTOMY AND BIOPSY;  Surgeon: Luiz Calvert MD;  [facility]     CYSTOSCOPY  05/01/2008    CYSTOSCOPY W/ URETERAL STENT PLACEMENT 05/01/2008      CYSTOSCOPY  09/26/2010    CYSTOSCOPY W/ URETERAL STENT PLACEMENT 09/26/2010 Left      CYSTOSCOPY  05/18/2012    CYSTOSCOPY, LEFT URETEROSCOPY AND STENT PLACEMENT left retrograde 05/18/2012      CYSTOSCOPY,+URETEROSCOPY   06/10/2008    URETEROSCOPY 06/10/2008 Right      HC BREATH HYDROGEN TEST  3/7/2014    Procedure: HYDROGEN BREATH TEST;  Surgeon: Darion Swift MD;  Location: [facility]     INJECT EPIDURAL LUMBAR N/A 7/7/2022    Procedure: INJECTION, SPINE, LUMBAR, EPIDURAL L5/S1;  Surgeon: Michi Hahn MD;  Location: [facility]     LITHOTRIPSY  09/30/2010    LITHOTRIPSY 09/30/2010 LEFT EXTRACORPOREAL SHOCK WAVE LITHOTRIPSY, FLEXIBLE CYSTOSCOPY, LEFT STENT REMOVAL       ORTHOPEDIC SURGERY  10/03/2007    KNEE ARTHROSCOPY 10/03/2007 RightX2       RELEASE CARPAL TUNNEL Right 1/26/2018    Procedure: RELEASE CARPAL TUNNEL;  Right carpal tunnel release;  Surgeon: Wiliam Saenz MD;  Location: [facility]     VASCULAR SURGERY  11/24/2008    Radiofrequency ablation left saphenous vein 11/24/2008 Done by Dr. Lozoya         FAMILY HISTORY:   Family History   Problem Relation Age of Onset     Cancer Mother 52        lung, smoked     Cancer - colorectal Father 53        unsure type, pt attributes to radiation exposure     Myocardial Infarction Father 45     Coronary Artery Disease Father      Myocardial Infarction Paternal Grandfather 35     Diabetes Other         nephew- type 1     Diabetes Maternal Aunt         1     Diabetes Maternal Aunt         2     Diabetes Paternal Uncle         1     Diabetes Paternal Uncle         2     Diabetes Paternal Uncle         3     Diabetes Paternal Uncle         4     Colon Cancer No family hx of        SOCIAL HISTORY:   Social History     Tobacco Use     Smoking status: Never     Smokeless tobacco: Current     Types: Chew     Tobacco comments:     Chew   Substance Use Topics     Alcohol use: Not Currently     Alcohol/week: 0.0 standard drinks     Comment: occasional, few drinks a month   retired        REVIEW OF SYSTEMS:  The comprehensive review of systems from the intake form was reviewed with the patient.  No fever, weight change or fatigue. No dry eyes. No oral ulcers, sore throat or voice  "change. No palpitations, syncope, angina or edema.  No chest pain, excessive sleepiness, shortness of breath or hemoptysis.   No abdominal pain, nausea, vomiting, diarrhea or heartburn.  No skin rash. No focal weakness or numbness. No bleeding or lymphadenopathy. No rhinitis or hives.     Exam:  On physical examination the patient appears the stated age, is in no acute distress, affect is appropriate, and breathing is non-labored.  Vitals are documented in the EMR and have been reviewed:    Ht 1.829 m (6')   Wt 131.5 kg (290 lb)   BMI 39.33 kg/m    6' 0\"  Body mass index is 39.33 kg/m .    Rises from chair:  Gait: mild antalgic with less time on the right   Gains the exam table:    Right  Knee  Appearance: benign  Clinical alignment: neutral  Effusion: small  Tenderness to palpation: medial and lateral joint lines, patellar tendon, medial and lateral to the patella  Extension: 3  Flexion: 125  Collateral ligaments: intact  Cruciate ligaments: grossly intact     Hip examination: benign hip ROM with groin or anterior thigh symptoms.     Distally, the circulatory, motor, and sensation exam is intact with 5/5 EHL, gastroc-soleus, and tibialis anterior.    Sensation to light touch is intact.    Dorsalis pedis and posterior tibialis pulses are palpable.    There are no sores on the feet, no bruising, and no lymphedema.    X-rays:   PACS Images     Show images for XR Knee Right G/E 4 Views     Study Result    Narrative & Impression   EXAM: XR KNEE RIGHT G/E 4 VIEWS  LOCATION: [facility]  DATE/TIME: 1/26/2023 11:42 AM     INDICATION:  Chronic pain of right knee, Chronic pain of right knee  COMPARISON: 06/29/2022                                                                      IMPRESSION: No fracture. Mild medial compartment degenerative arthritis has minimally progressed. Minimal effusion.       CT  show no hip OA        "

## 2023-02-14 NOTE — LETTER
2/14/2023         RE: Adarsh Salvador  [address]  [place] [address]        Dear Colleague,    Thank you for referring your patient, Adarsh Salvador, to the [facility]. Please see a copy of my visit note below.    Assessment: This is a 57 year old with a pretty painful knee despite a lot of management of this including activity modification, oral pain medication, injections, arthroscopy, and extensive physical therapy. Question today is if he would benefit from a total knee. We reviewed his radiographs and arthroscopy imaging which show early cartilage changes. I counseled the patient that the most likely outcome of a total knee done without significant cartilage loss is probably just a painful knee with knee replacement implants cemented to it. He is not interested in that. All the patient's questions were answered to the best of my ability.     Plan:  PRN    Chief Complaint: Consult (Right knee consult)      Physician:  No ref. provider found    HPI: Adarsh Salvador is a 57 year old male who presents today for evaluation of his right knee     Symptom Profile  Location of symptoms:  Medial and lateral joint lines, medial and lateral to the patella, distal to the patella  Onset: insidious  Trend: getting no better or worse   Duration of symptoms: about months   Quality of symptoms: aching, sharp/stabbing  Severity: severe  Alleviate: activity modification  Exacerbating: activities, walking  Previous Treatments: Previous treatments include activity modification, oral pain medication, physical therapy, intra-articular injection, knee arthroscopy.    JERZY Cordova 50.12  PROMIS Mental: declined  PROMIS Physical: declined  [facility]: 3    MEDICAL HISTORY:   Past Medical History:   Diagnosis Date     Anaphylactic reaction 8/14/2015     Anxiety      Depression      DM2 (diabetes mellitus, type 2) (H)      GERD (gastroesophageal reflux disease)      HTN (hypertension)      IBS (irritable bowel  syndrome)      Kidney stone 6/15/2011    Pt believes these were Calcium stones     Neuropathy    diabetic neuropathy       Medications:     Current Outpatient Medications:      acetaminophen (TYLENOL) 325 MG tablet, Take 2 tablets (650 mg) by mouth every 4 hours as needed for mild pain, Disp: 50 tablet, Rfl: 0     albuterol (PROAIR HFA/PROVENTIL HFA/VENTOLIN HFA) 108 (90 Base) MCG/ACT inhaler, Inhale 2 puffs into the lungs every 6 hours as needed for shortness of breath or wheezing, Disp: 6.7 g, Rfl: 3     amLODIPine (NORVASC) 10 MG tablet, Take 1 tablet (10 mg) by mouth daily for blood pressure, Disp: 90 tablet, Rfl: 1     ASPIRIN PO, Take 325 mg by mouth daily , Disp: , Rfl:      aspirin-acetaminophen-caffeine (EXCEDRIN MIGRAINE) 250-250-65 MG tablet, Take 1 tablet by mouth, Disp: , Rfl:      atorvastatin (LIPITOR) 40 MG tablet, Take 1 tablet (40 mg) by mouth daily, Disp: 90 tablet, Rfl: 3     blood glucose (ACCU-CHEK GUIDE) test strip, Use to test blood sugar 3 times daily or as directed., Disp: 300 strip, Rfl: 3     blood glucose monitoring (ACCU-CHEK FASTCLIX) lancets, Use to test blood sugar 1 times daily or as directed.  Ok to substitute alternative if insurance prefers., Disp: 102 each, Rfl: 11     calcium carbonate (TUMS) 500 MG chewable tablet, Take 1 chew tab by mouth daily, Disp: , Rfl:      chlorthalidone (HYGROTON) 25 MG tablet, Take 1 tablet (25 mg) by mouth daily Add on for blood pressure, Disp: 90 tablet, Rfl: 1     cyclobenzaprine (FLEXERIL) 10 MG tablet, Take 0.5-1 tablets (5-10 mg) by mouth 3 times daily as needed for muscle spasms, Disp: 90 tablet, Rfl: 3     diclofenac (VOLTAREN) 75 MG EC tablet, Take 1 tablet (75 mg) by mouth 2 times daily, Disp: 60 tablet, Rfl: 0     DULoxetine (CYMBALTA) 60 MG capsule, Take 1 capsule (60 mg) by mouth 2 times daily, Disp: 180 capsule, Rfl: 3     EPINEPHrine (ANY BX GENERIC EQUIV) 0.3 MG/0.3ML injection 2-pack, Inject 0.3 mLs (0.3 mg) into the muscle once as  needed for anaphylaxis, Disp: 2 each, Rfl: 2     fluticasone (FLONASE) 50 MCG/ACT nasal spray, INSTILL 1 SPRAY INTO BOTH NOSTRILS DAILY, Disp: 48 mL, Rfl: 1     fluticasone-salmeterol (ADVAIR) 500-50 MCG/ACT inhaler, Inhale 1 puff into the lungs every 12 hours, Disp: 1 each, Rfl: 11     insulin glargine (BASAGLAR KWIKPEN) 100 UNIT/ML pen, Inject 45 Units Subcutaneous daily, Disp: 15 mL, Rfl: 1     insulin pen needle (32G X 4 MM) 32G X 4 MM miscellaneous, Use 2 pen needles daily or as directed., Disp: 200 each, Rfl: 1     losartan (COZAAR) 25 MG tablet, Take 1 tablet (25 mg) by mouth daily, Disp: 90 tablet, Rfl: 1     meclizine (ANTIVERT) 25 MG tablet, Take 1 tablet (25 mg) by mouth 3 times daily as needed for dizziness, Disp: 90 tablet, Rfl: 1     metFORMIN (GLUCOPHAGE XR) 500 MG 24 hr tablet, Take 2 tablets (1,000 mg) by mouth 2 times daily (with meals), Disp: 120 tablet, Rfl: 5     montelukast (SINGULAIR) 10 MG tablet, Take 1 tablet (10 mg) by mouth At Bedtime For allergies/asthma, Disp: 90 tablet, Rfl: 2     multivitamin, therapeutic (THERA-VIT) TABS, Take 1 tablet by mouth daily, Disp: , Rfl:      naproxen sodium (ANAPROX) 220 MG tablet, Take 220 mg by mouth, Disp: , Rfl:      nortriptyline (PAMELOR) 10 MG capsule, Take 2 capsules (20 mg) by mouth At Bedtime, Disp: 60 capsule, Rfl: 5     omeprazole (PRILOSEC) 40 MG DR capsule, Take 1 capsule (40 mg) by mouth daily Take for at least two months, Disp: 90 capsule, Rfl: 2     ondansetron (ZOFRAN ODT) 4 MG ODT tab, Take 1 tablet (4 mg) by mouth every 8 hours as needed for nausea, Disp: 20 tablet, Rfl: 1     pregabalin (LYRICA) 50 MG capsule, Take 1 capsule (50 mg) by mouth 2 times daily, Disp: 60 capsule, Rfl: 1     rizatriptan (MAXALT-MLT) 10 MG ODT, TAKE 1 TABLET BY MOUTH AT ONSET OF HEADACHE FOR MIGRAINE MAY REPEAT IN 2 HOURS. MAX 3 TABS/24 HOURS., Disp: 18 tablet, Rfl: 1     warfarin ANTICOAGULANT (COUMADIN) 5 MG tablet, Take 15 mg Tues, Thurs and 12.5 mg all  other days, or as directed by the Coumadin clinic, Disp: 225 tablet, Rfl: 1    Current Facility-Administered Medications:      triamcinolone (KENALOG-40) injection 40 mg, 40 mg, , , Carlos A Em MD, 40 mg at 01/05/23 1000    Facility-Administered Medications Ordered in Other Visits:      BUPivacaine (MARCAINE) injection 0.5%, 10 mL, Intradermal, Once, Vinnie Espinoza, DO     DOBUTamine 500 mg in dextrose 5% 250 mL (adult std conc) premix, 2.5-20 mcg/kg/min, Intravenous, Continuous, Navarro Butcher MD, Stopped at 04/25/19 1559     iopamidol (ISOVUE-M 200) solution 10 mL, 10 mL, Intravenous, Once, Vinnie Espinoza, DO     methylPREDNISolone (DEPO-MEDROL) injection 80 mg, 80 mg, Intramuscular, Once, Vinnie Espinoza, DO     metoprolol (LOPRESSOR) injection 5 mg, 5 mg, Intravenous, Q5 Min PRN, Navarro Butcher MD, 2 mg at 04/25/19 1559    Allergies: Artificial sweetner (informational only) [artificial sweetner (informational only) ], Hydromorphone, Ibuprofen, Morphine sulfate concentrate, Morphine [fumaric acid], Hydrocodone-acetaminophen, Tramadol, Trazodone, Gabapentin, Keflex [cephalexin], Codeine phosphate, and Ketorolac tromethamine    SURGICAL HISTORY:   Past Surgical History:   Procedure Laterality Date     ARTHROSCOPY KNEE WITH MEDIAL MENISCECTOMY Right 8/19/2022    Procedure: examination under anesthesia, right knee arthroscopy, meniscectomy;  Surgeon: Carlos A Em MD;  Location: [facility] OR     COLONOSCOPY  5/1/2012    Procedure:COLONOSCOPY; screening colonoscopy; Surgeon:KINGSLEY DOS SANTOS; Location:[facility] OR     COLONOSCOPY  4/21/2014    Procedure: COMBINED COLONOSCOPY, SINGLE BIOPSY/POLYPECTOMY BY BIOPSY;  Surgeon: Duane, William Charles, MD;  Location: [facility] OR     COLONOSCOPY N/A 4/21/2022    Procedure: COLONOSCOPY, WITH POLYPECTOMY AND BIOPSY;  Surgeon: Luiz Calvert MD;  Location:  GI     CYSTOSCOPY  05/01/2008    CYSTOSCOPY W/ URETERAL STENT PLACEMENT  05/01/2008      CYSTOSCOPY  09/26/2010    CYSTOSCOPY W/ URETERAL STENT PLACEMENT 09/26/2010 Left      CYSTOSCOPY  05/18/2012    CYSTOSCOPY, LEFT URETEROSCOPY AND STENT PLACEMENT left retrograde 05/18/2012      CYSTOSCOPY,+URETEROSCOPY  06/10/2008    URETEROSCOPY 06/10/2008 Right      HC BREATH HYDROGEN TEST  3/7/2014    Procedure: HYDROGEN BREATH TEST;  Surgeon: Darion Swift MD;  Location: [facility] GI     INJECT EPIDURAL LUMBAR N/A 7/7/2022    Procedure: INJECTION, SPINE, LUMBAR, EPIDURAL L5/S1;  Surgeon: Michi Hahn MD;  Location: [facility] OR     LITHOTRIPSY  09/30/2010    LITHOTRIPSY 09/30/2010 LEFT EXTRACORPOREAL SHOCK WAVE LITHOTRIPSY, FLEXIBLE CYSTOSCOPY, LEFT STENT REMOVAL       ORTHOPEDIC SURGERY  10/03/2007    KNEE ARTHROSCOPY 10/03/2007 RightX2       RELEASE CARPAL TUNNEL Right 1/26/2018    Procedure: RELEASE CARPAL TUNNEL;  Right carpal tunnel release;  Surgeon: Wiliam Saenz MD;  Location: [facility] OR     VASCULAR SURGERY  11/24/2008    Radiofrequency ablation left saphenous vein 11/24/2008 Done by Dr. Lozoya         FAMILY HISTORY:   Family History   Problem Relation Age of Onset     Cancer Mother 52        lung, smoked     Cancer - colorectal Father 53        unsure type, pt attributes to radiation exposure     Myocardial Infarction Father 45     Coronary Artery Disease Father      Myocardial Infarction Paternal Grandfather 35     Diabetes Other         nephew- type 1     Diabetes Maternal Aunt         1     Diabetes Maternal Aunt         2     Diabetes Paternal Uncle         1     Diabetes Paternal Uncle         2     Diabetes Paternal Uncle         3     Diabetes Paternal Uncle         4     Colon Cancer No family hx of        SOCIAL HISTORY:   Social History     Tobacco Use     Smoking status: Never     Smokeless tobacco: Current     Types: Chew     Tobacco comments:     Chew   Substance Use Topics     Alcohol use: Not Currently     Alcohol/week: 0.0 standard drinks     Comment: occasional, few  "drinks a month   retired        REVIEW OF SYSTEMS:  The comprehensive review of systems from the intake form was reviewed with the patient.  No fever, weight change or fatigue. No dry eyes. No oral ulcers, sore throat or voice change. No palpitations, syncope, angina or edema.  No chest pain, excessive sleepiness, shortness of breath or hemoptysis.   No abdominal pain, nausea, vomiting, diarrhea or heartburn.  No skin rash. No focal weakness or numbness. No bleeding or lymphadenopathy. No rhinitis or hives.     Exam:  On physical examination the patient appears the stated age, is in no acute distress, affect is appropriate, and breathing is non-labored.  Vitals are documented in the EMR and have been reviewed:    Ht 1.829 m (6')   Wt 131.5 kg (290 lb)   BMI 39.33 kg/m    6' 0\"  Body mass index is 39.33 kg/m .    Rises from chair:  Gait: mild antalgic with less time on the right   Gains the exam table:    Right  Knee  Appearance: benign  Clinical alignment: neutral  Effusion: small  Tenderness to palpation: medial and lateral joint lines, patellar tendon, medial and lateral to the patella  Extension: 3  Flexion: 125  Collateral ligaments: intact  Cruciate ligaments: grossly intact     Hip examination: benign hip ROM with groin or anterior thigh symptoms.     Distally, the circulatory, motor, and sensation exam is intact with 5/5 EHL, gastroc-soleus, and tibialis anterior.    Sensation to light touch is intact.    Dorsalis pedis and posterior tibialis pulses are palpable.    There are no sores on the feet, no bruising, and no lymphedema.    X-rays:   PACS Images     Show images for XR Knee Right G/E 4 Views     Study Result    Narrative & Impression   EXAM: XR KNEE RIGHT G/E 4 VIEWS  LOCATION: [facility]  DATE/TIME: 1/26/2023 11:42 AM     INDICATION:  Chronic pain of right knee, Chronic pain of right knee  COMPARISON: 06/29/2022                                                          "             IMPRESSION: No fracture. Mild medial compartment degenerative arthritis has minimally progressed. Minimal effusion.       CT  show no hip OA            Again, thank you for allowing me to participate in the care of your patient.        Sincerely,        Andre Waite MD

## 2023-02-15 NOTE — PROGRESS NOTES
ANTICOAGULATION MANAGEMENT     Adarsh Salvador 57 year old male is on warfarin with subtherapeutic INR result. (Goal INR 2.0-3.0)    Recent labs: (last 7 days)     02/14/23  1553   INR 1.7*       ASSESSMENT       Source(s): Chart Review and Patient/Caregiver Call       Warfarin doses taken: Missed dose(s) may be affecting INR    Diet: No new diet changes identified    New illness, injury, or hospitalization: No    Medication/supplement changes: None noted    Signs or symptoms of bleeding or clotting: No    Previous INR: Subtherapeutic    Additional findings: None       PLAN     Recommended plan for temporary change(s) affecting INR     Dosing Instructions: Continue your current warfarin dose with next INR in 1 week       Summary  As of 2/14/2023    Full warfarin instructions:  15 mg every Tue, Sat; 12.5 mg all other days   Next INR check:  2/22/2023             Telephone call with Adarsh who verbalizes understanding and agrees to plan    Lab visit scheduled    Education provided:     Goal range and lab monitoring: goal range and significance of current result    Contact [phone]  with any changes, questions or concerns.     Plan made per [facility] anticoagulation protocol    Janeth Cool, RN  Anticoagulation Clinic  2/15/2023    _______________________________________________________________________     Anticoagulation Episode Summary     Current INR goal:  2.0-3.0   TTR:  33.8 % (1 y)   Target end date:  Indefinite   Send INR reminders to:  [facility]    Indications    Recurrent deep vein thrombosis (DVT) (H) [I82.409]           Comments:           Anticoagulation Care Providers     Provider Role Specialty Phone number    Bertha Romero PA-C Referring Family Medicine [phone]

## 2023-02-27 ENCOUNTER — LAB (OUTPATIENT)
Dept: LAB | Facility: CLINIC | Age: 58
End: 2023-02-27
Payer: COMMERCIAL

## 2023-02-27 ENCOUNTER — ANTICOAGULATION THERAPY VISIT (OUTPATIENT)
Dept: ANTICOAGULATION | Facility: CLINIC | Age: 58
End: 2023-02-27

## 2023-02-27 DIAGNOSIS — I82.409 RECURRENT DEEP VEIN THROMBOSIS (DVT) (H): ICD-10-CM

## 2023-02-27 DIAGNOSIS — I82.409 RECURRENT DEEP VEIN THROMBOSIS (DVT) (H): Primary | ICD-10-CM

## 2023-02-27 LAB — INR BLD: 3.3 (ref 0.9–1.1)

## 2023-02-27 PROCEDURE — 36416 COLLJ CAPILLARY BLOOD SPEC: CPT

## 2023-02-27 PROCEDURE — 85610 PROTHROMBIN TIME: CPT

## 2023-02-27 NOTE — PROGRESS NOTES
ANTICOAGULATION MANAGEMENT     Adarsh Salvador 57 year old male is on warfarin with supratherapeutic INR result. (Goal INR 2.0-3.0)    Recent labs: (last 7 days)     02/27/23  1433   INR 3.3*       ASSESSMENT       Source(s): Chart Review    Previous INR was Subtherapeutic    Medication, diet, health changes since last INR chart reviewed; none identified             PLAN     Unable to reach Adarsh today.    Left message to continue current dose of warfarin 12.5 mg tonight. Request call back for assessment.    Follow up required to confirm warfarin dose taken and assess for changes    Janeth Cool, RN  Anticoagulation Clinic  2/27/2023

## 2023-02-28 NOTE — PROGRESS NOTES
ANTICOAGULATION MANAGEMENT     Adarsh Salvador 57 year old male is on warfarin with therapeutic INR result. (Goal INR 2.0-3.0)    Recent labs: (last 7 days)     02/27/23  1433   INR 3.3*       ASSESSMENT       Source(s): Chart Review and Patient/Caregiver Call       Warfarin doses taken: Warfarin taken as instructed    Diet: No new diet changes identified    New illness, injury, or hospitalization: No    Medication/supplement changes: None noted    Signs or symptoms of bleeding or clotting: No    Previous INR: Subtherapeutic    Additional findings: INR has been fluctuating due to missed doses will recheck in 2 weeks and if still supra will need to discuss changing maintenance dose          PLAN     Recommended plan for no diet, medication or health factor changes affecting INR     Dosing Instructions: partial hold then continue your current warfarin dose with next INR in 2 weeks       Summary  As of 2/27/2023    Full warfarin instructions:  15 mg every Tue, Sat; 12.5 mg all other days   Next INR check:  3/13/2023             Telephone call with Adarsh who verbalizes understanding and agrees to plan    Lab visit scheduled    Education provided:     Goal range and lab monitoring: goal range and significance of current result    Contact [phone]  with any changes, questions or concerns.     Plan made per [facility] anticoagulation protocol    Janeth Cool RN  Anticoagulation Clinic  2/28/2023    _______________________________________________________________________     Anticoagulation Episode Summary     Current INR goal:  2.0-3.0   TTR:  36.0 % (1 y)   Target end date:  Indefinite   Send INR reminders to:  [facility]    Indications    Recurrent deep vein thrombosis (DVT) (H) [I82.409]           Comments:           Anticoagulation Care Providers     Provider Role Specialty Phone number    Bertha Romero PA-C Referring Family Medicine [phone]

## 2023-03-03 NOTE — PROGRESS NOTES
"CHIEF COMPLAINT:   Chief Complaint   Patient presents with     Right Knee - Pain     Right knee arthroscope on 8/19/22 patient is here for a second opinion. Patient has had pain on and off since 1990 slide off icy roof. He has had multiple surgeries since then. Pain started again in Jan. 2023 after moving furniture. Warm baths with absent salt helps the pain and stairs, walking and standing for long periods makes the pain worse. Pain starts at the medial side of the knee and travels to the lateral side.   .        HISTORY OF PRESENT ILLNESS    Adarsh Salvador is a 57 year old male seen for evaluation of ongoing right knee pain with no known recent injury.   Pain has been present for years, on/off since ~1990 slid off an icy roof.  He's had multiple surgeries (~12?) on the knee since then, most recently 8/2022 with Dr Em. Pain started again 1/2023 when moving furniture. He locates pain along the inner aspect to under the knee cap \"like someone is sticking a bao knife into it\", aggravated with stairs, walking and prolonged standing. Treatment has been epson salt, warm baths, diclofenac, tylenol, topical voltaren.    Pain at rest, night.     He had an injection 1/5/2023 with Dr Em without much relief. He was seen by Dr Waite to discuss whether he'd be a canddiate for total knee arthroplasty, but based on low grade osteoarthritis, didn't think it would be beneficial at this time. He's here for another opinion.     Right knee arthroscopy with medial meniscus debridement 8/19/2022 with Dr Em, noted grade 2 chondrosis.    Patient has a history of recurrent DVT. On warfarin.    History of intermittent low back pain. Numbness and tingling. Takes Lyrica, it does help.    Present symptoms: pain medially  and anteriorly, pain sharp, shooting, dull/achy , moderate pain, severe pain.    Pain severity: 6/10  Frequency of symptoms: are constant  Exacerbating Factors: weight bearing, stairs, [id number], prolonged " standing  Relieving Factors: rest, epsom salt baths.  Night Pain: Yes  Pain while at rest: Yes   Numbness or tingling: Yes   Patient has tried:     NSAIDS: diclofenac      Physical Therapy: not recently, did some after knee surgery 8/2023 but ended up with a kidney stone so had to stop      Activity modification: Yes      Bracing: on occasion      Injections: Yes 1/5/2023 with Dr Em without relief.     Ice: Yes      Assistive device:  No     Other: tylenol. Topical voltaren works once in a while.      Other PMH:  has a past medical history of Anaphylactic reaction (8/14/2015), Anxiety, Depression, DM2 (diabetes mellitus, type 2) (H), GERD (gastroesophageal reflux disease), HTN (hypertension), IBS (irritable bowel syndrome), Kidney stone (6/15/2011), and Neuropathy.  Patient Active Problem List   Diagnosis     GERD (gastroesophageal reflux disease)     Chronic RLQ pain     Constipation     Chronic maxillary sinusitis     Chronic rhinitis     Hypertriglyceridemia     Benign essential hypertension     Anemia in other chronic diseases classified elsewhere     Fatty liver     Irritable bowel syndrome with diarrhea     Right inguinal hernia     Mild persistent asthma without complication     Type 2 diabetes mellitus with diabetic polyneuropathy, without long-term current use of insulin (H)     Hyperlipidemia LDL goal <100     CONNOR (generalized anxiety disorder)     Insomnia, unspecified type     Morbid obesity (H)     Neuropathy     Recurrent deep vein thrombosis (DVT) (H)     Precordial pain     Dyslipidemia     Elevated C-reactive protein     Gastric reflux     Internal derangement of knee     Nicotine dependence     Varicose veins of lower extremity     Vitamin D deficiency     Low volume of ejaculated semen     Bilateral leg pain     Chronic pain of right knee     Acute pain of right knee     Aftercare following surgery of the musculoskeletal system     Advanced directives, counseling/discussion       Surgical  Hx:  has a past surgical history that includes Colonoscopy (5/1/2012); BREATH HYDROGEN TEST (3/7/2014); orthopedic surgery (10/03/2007); Colonoscopy (4/21/2014); Release carpal tunnel (Right, 1/26/2018); cystoscopy (05/01/2008); cystoscopy,+ureteroscopy (06/10/2008); vascular surgery (11/24/2008); cystoscopy (09/26/2010); lithotripsy (09/30/2010); cystoscopy (05/18/2012); Colonoscopy (N/A, 4/21/2022); Inject epidural lumbar (N/A, 7/7/2022); and Arthroscopy knee with medial meniscectomy (Right, 8/19/2022).    Medications:   Current Outpatient Medications:      acetaminophen (TYLENOL) 325 MG tablet, Take 2 tablets (650 mg) by mouth every 4 hours as needed for mild pain, Disp: 50 tablet, Rfl: 0     albuterol (PROAIR HFA/PROVENTIL HFA/VENTOLIN HFA) 108 (90 Base) MCG/ACT inhaler, Inhale 2 puffs into the lungs every 6 hours as needed for shortness of breath or wheezing, Disp: 6.7 g, Rfl: 3     amLODIPine (NORVASC) 10 MG tablet, Take 1 tablet (10 mg) by mouth daily for blood pressure, Disp: 90 tablet, Rfl: 1     ASPIRIN PO, Take 325 mg by mouth daily , Disp: , Rfl:      aspirin-acetaminophen-caffeine (EXCEDRIN MIGRAINE) 250-250-65 MG tablet, Take 1 tablet by mouth, Disp: , Rfl:      atorvastatin (LIPITOR) 40 MG tablet, Take 1 tablet (40 mg) by mouth daily, Disp: 90 tablet, Rfl: 3     blood glucose (ACCU-CHEK GUIDE) test strip, Use to test blood sugar 3 times daily or as directed., Disp: 300 strip, Rfl: 3     blood glucose monitoring (ACCU-CHEK FASTCLIX) lancets, Use to test blood sugar 1 times daily or as directed.  Ok to substitute alternative if insurance prefers., Disp: 102 each, Rfl: 11     calcium carbonate (TUMS) 500 MG chewable tablet, Take 1 chew tab by mouth daily, Disp: , Rfl:      chlorthalidone (HYGROTON) 25 MG tablet, Take 1 tablet (25 mg) by mouth daily Add on for blood pressure, Disp: 90 tablet, Rfl: 1     cyclobenzaprine (FLEXERIL) 10 MG tablet, Take 0.5-1 tablets (5-10 mg) by mouth 3 times daily as needed  for muscle spasms, Disp: 90 tablet, Rfl: 3     diclofenac (VOLTAREN) 75 MG EC tablet, Take 1 tablet (75 mg) by mouth 2 times daily, Disp: 60 tablet, Rfl: 0     DULoxetine (CYMBALTA) 60 MG capsule, Take 1 capsule (60 mg) by mouth 2 times daily, Disp: 180 capsule, Rfl: 3     EPINEPHrine (ANY BX GENERIC EQUIV) 0.3 MG/0.3ML injection 2-pack, Inject 0.3 mLs (0.3 mg) into the muscle once as needed for anaphylaxis, Disp: 2 each, Rfl: 2     fluticasone (FLONASE) 50 MCG/ACT nasal spray, INSTILL 1 SPRAY INTO BOTH NOSTRILS DAILY, Disp: 48 mL, Rfl: 1     fluticasone-salmeterol (ADVAIR) 500-50 MCG/ACT inhaler, Inhale 1 puff into the lungs every 12 hours, Disp: 1 each, Rfl: 11     insulin glargine (BASAGLAR KWIKPEN) 100 UNIT/ML pen, Inject 45 Units Subcutaneous daily, Disp: 15 mL, Rfl: 1     insulin pen needle (32G X 4 MM) 32G X 4 MM miscellaneous, Use 2 pen needles daily or as directed., Disp: 200 each, Rfl: 1     losartan (COZAAR) 25 MG tablet, Take 1 tablet (25 mg) by mouth daily, Disp: 90 tablet, Rfl: 1     meclizine (ANTIVERT) 25 MG tablet, Take 1 tablet (25 mg) by mouth 3 times daily as needed for dizziness, Disp: 90 tablet, Rfl: 1     metFORMIN (GLUCOPHAGE XR) 500 MG 24 hr tablet, Take 2 tablets (1,000 mg) by mouth 2 times daily (with meals), Disp: 120 tablet, Rfl: 5     montelukast (SINGULAIR) 10 MG tablet, Take 1 tablet (10 mg) by mouth At Bedtime For allergies/asthma, Disp: 90 tablet, Rfl: 2     multivitamin, therapeutic (THERA-VIT) TABS, Take 1 tablet by mouth daily, Disp: , Rfl:      naproxen sodium (ANAPROX) 220 MG tablet, Take 220 mg by mouth, Disp: , Rfl:      nortriptyline (PAMELOR) 10 MG capsule, Take 2 capsules (20 mg) by mouth At Bedtime, Disp: 60 capsule, Rfl: 5     omeprazole (PRILOSEC) 40 MG DR capsule, Take 1 capsule (40 mg) by mouth daily Take for at least two months, Disp: 90 capsule, Rfl: 2     ondansetron (ZOFRAN ODT) 4 MG ODT tab, Take 1 tablet (4 mg) by mouth every 8 hours as needed for nausea, Disp:  20 tablet, Rfl: 1     pregabalin (LYRICA) 50 MG capsule, Take 1 capsule (50 mg) by mouth 2 times daily, Disp: 60 capsule, Rfl: 1     rizatriptan (MAXALT-MLT) 10 MG ODT, TAKE 1 TABLET BY MOUTH AT ONSET OF HEADACHE FOR MIGRAINE MAY REPEAT IN 2 HOURS. MAX 3 TABS/24 HOURS., Disp: 18 tablet, Rfl: 1     warfarin ANTICOAGULANT (COUMADIN) 5 MG tablet, Take 15 mg Tues, Thurs and 12.5 mg all other days, or as directed by the Coumadin clinic, Disp: 225 tablet, Rfl: 1    Current Facility-Administered Medications:      triamcinolone (KENALOG-40) injection 40 mg, 40 mg, , , Carlos A Em MD, 40 mg at 01/05/23 1000    Facility-Administered Medications Ordered in Other Visits:      BUPivacaine (MARCAINE) injection 0.5%, 10 mL, Intradermal, Once, Vinnie Espinoza,      DOBUTamine 500 mg in dextrose 5% 250 mL (adult std conc) premix, 2.5-20 mcg/kg/min, Intravenous, Continuous, Navarro Butcher MD, Stopped at 04/25/19 1559     iopamidol (ISOVUE-M 200) solution 10 mL, 10 mL, Intravenous, Once, Vinnie Espinoza, DO     methylPREDNISolone (DEPO-MEDROL) injection 80 mg, 80 mg, Intramuscular, Once, Vinnie Espinoza,      metoprolol (LOPRESSOR) injection 5 mg, 5 mg, Intravenous, Q5 Min PRN, Navarro Butcher MD, 2 mg at 04/25/19 1559    Allergies:   Allergies   Allergen Reactions     Artificial Sweetner (Informational Only) [Artificial Sweetner (Informational Only) ] GI Disturbance     ALL artificial sweetners cause severe diarrhea & flu symptoms     Hydromorphone Anaphylaxis     Ibuprofen GI Disturbance     Morphine Sulfate Concentrate Anaphylaxis     Morphine [Fumaric Acid] Anaphylaxis     Hydrocodone-Acetaminophen Itching     Tramadol Hives     Trazodone Hives     Gabapentin      GI upset per pt     Keflex [Cephalexin] Diarrhea     Upset stomach     Codeine Phosphate Itching     Ketorolac Tromethamine Rash       Social Hx: retired .   reports that he has never smoked. His smokeless tobacco use  includes chew. He reports that he does not currently use alcohol. He reports current drug use.    Family Hx: family history includes Cancer (age of onset: 52) in his mother; Cancer - colorectal (age of onset: 53) in his father; Coronary Artery Disease in his father; Diabetes in his maternal aunt, maternal aunt, paternal uncle, paternal uncle, paternal uncle, paternal uncle, and another family member; Myocardial Infarction (age of onset: 35) in his paternal grandfather; Myocardial Infarction (age of onset: 45) in his father.    REVIEW OF SYSTEMS: 10 point ROS neg other than the symptoms noted above in the HPI and PMH. Notables include  CONSTITUTIONAL:NEGATIVE for fever, chills, change in weight  INTEGUMENTARY/SKIN: NEGATIVE for worrisome rashes, moles or lesions  MUSCULOSKELETAL:See HPI above  NEURO: NEGATIVE for weakness, dizziness or paresthesias    PHYSICAL EXAM:  BP (!) 142/87   Pulse 101   Resp 18   Ht 1.829 m (6')   Wt 131.5 kg (290 lb)   BMI 39.33 kg/m     GENERAL APPEARANCE: healthy, alert, no distress  SKIN: no suspicious lesions or rashes  NEURO: Normal strength and tone, mentation intact and speech normal  PSYCH:  mentation appears normal and affect normal, not anxious  RESPIRATORY: No increased work of breathing.  HANDS: no clubbing, nail pitting  LYMPH: no palpable popliteal lymphadenopathy.    BILATERAL LOWER EXTREMITIES:  Gait: slight favors the right.  Alignment: varus  No gross deformities or masses.  No Quad atrophy, strength normal.  Intact sensation deep peroneal nerve, superficial peroneal nerve, med/lat tibial nerve, sural nerve, saphenous nerve  Intact EHL, EDL, TA, FHL, GS, quadriceps hamstrings and hip flexors  Toes warm and well perfused, brisk capillary refill. Palpable 2+ dp pulses.  Bilateral calf soft and nttp or squeeze.  DTRs: achilles 2+, patella 2+.  Edema: trace    LEFT KNEE EXAM:    Skin: intact, no ecchymosis or erythema  Squat: 100 %, not limited by pain.     ROM: slight  "hyper extension to 130 flexion  Tight hamstrings on straight leg raise.  Effusion: none  Tender: NTTP med/lat joint line, anterior or posterior knee  McMurrays: negative    MCL: stable, and non-painful at both 0 and 30 degrees knee flexion  Varus stress: stable, and non-painful at both 0 and 30 degrees knee flexion  Lachmans: neg, firm endpoint  Posterior Drawer stable  Patellofemoral joint:                Apprehension: negative              Crepitations: minimal    RIGHT KNEE EXAM:    Skin: intact, no ecchymosis or erythema  Squat: 100 %, not limited by pain.     ROM: slight hyper extension to 130 flexion  Tight hamstrings on straight leg raise.  Effusion: trace  Tender: medial joint line, medial patello-femoral retinaculum, medial and lateral patella facet  Nontender to palpation posterior knee.  McMurrays: negative    MCL: stable, and non-painful at both 0 and 30 degrees knee flexion  Varus stress: stable, and non-painful at both 0 and 30 degrees knee flexion  Lachmans: neg, firm endpoint  Posterior Drawer stable  Patellofemoral joint:                Apprehension: negative              Crepitations: mild   Grind: positive.    X-RAY: bilateral bates, 3 views right knee from 1/26/2023 were reviewed in clinic today. On my review, no obvious fractures or dislocations. No fracture. Mild medial compartment degenerative arthritis has minimally progressed. Minimal effusion.    CT arthrogram 7/29/2022:  1. High-grade radial tear in the posterior horn of the medial meniscus. The body of the medial meniscus is peripherally extruded.  2. Focal area of chondromalacia in the medial femoral condyle posteriorly.  3. Small popliteal cyst.       ASSESSMENT/PLAN: Adarsh Salvador is a 57 year old male with chronic right knee pain, primary osteoarthritis.     * reviewed imaging studies with patient, showing arthritic changes, or wearing of the cartilage in the knee. This can be caused by normal \"wear and tear\" over the years or " "following prior injury to the knee.    Treatment typically starts nonsurgically. Surgical indication for total knee arthroplasty  when nonsurgical management is no longer effective.    At this time, I do not think his osteoarthritis is severe enough to warrant knee replacement.    Non-surgical treatment for knee arthritis includes:    * rest, sitting  * Activity modification - avoid impact activities or activities that aggravate symptoms.  * NSAIDS (non-steroidal anti-inflammatory medications; e.g. Aleve, advil, motrin, ibuprofen) - regular use for inflammation ( twice daily or three times daily), with food, as long as no contra-indications Please discuss with primary care doctor if needed  * ice, 15-20 minutes at a time several times a day or as needed.  * Strengthening of quadriceps muscles  * Physical Therapy for strengthening, stretching and range of motion exercises of legs  * Tylenol as needed for pain, consider Tylenol arthritis or similar  * Weight loss: Weight loss:  Body mass index is 39.33 kg/m .. weight loss benefits, not only for the current pain symptoms, but also overall health. Recommend a good diet plan that works for the patient, with the assistance of a dietician or primary care doctor as needed. Also, a good, low-impact exercise program for at least 20 minutes per day, 3 times per week, such as exercise bike, elliptical , or pool.  * Exercise: low impact such as stationary bike, elliptical, pool.  * Injections: cortisone versus viscosupplementation (hyaluronic acid, \"rooster comb\", \"gel shots\"); risks and perceived benefits discussed today. Patient elects  to proceed with repeat cortisone. See procedure note below.  * consider visco injection in future as needed. Will need pre-authorization for that in future.  * Bracing: bracing the knee may offer some relief of symptoms when worn and provide some stability.  * over the counter supplements such as glucosamine and chondroitin sulfate may " help with joint pain.  * topical ointments may help as well    * return to clinic as needed.      Large Joint Injection/Arthocentesis: R knee joint    Date/Time: 3/8/2023 9:56 AM  Performed by: Edwin Ch MD  Authorized by: Edwin Ch MD     Indications:  Pain  Needle Size:  22 G  Guidance: landmark guided    Approach:  Posterior  Location:  Knee      Medications:  80 mg methylPREDNISolone 80 MG/ML; 4 mL bupivacaine HCl (PF) 0.25 %  Outcome:  Tolerated well, no immediate complications  Consent Given by:  Patient  Timeout: timeout called immediately prior to procedure    Prep: patient was prepped and draped in usual sterile fashion          Edwin Ch M.D., M.S.  Dept. of Orthopaedic Surgery  [facility]

## 2023-03-08 ENCOUNTER — OFFICE VISIT (OUTPATIENT)
Dept: ORTHOPEDICS | Facility: CLINIC | Age: 58
End: 2023-03-08
Payer: COMMERCIAL

## 2023-03-08 VITALS
WEIGHT: 290 LBS | HEIGHT: 72 IN | DIASTOLIC BLOOD PRESSURE: 87 MMHG | HEART RATE: 101 BPM | BODY MASS INDEX: 39.28 KG/M2 | SYSTOLIC BLOOD PRESSURE: 142 MMHG | RESPIRATION RATE: 18 BRPM

## 2023-03-08 DIAGNOSIS — G89.29 CHRONIC PAIN OF RIGHT KNEE: ICD-10-CM

## 2023-03-08 DIAGNOSIS — M17.11 PRIMARY OSTEOARTHRITIS OF RIGHT KNEE: Primary | ICD-10-CM

## 2023-03-08 DIAGNOSIS — M25.561 CHRONIC PAIN OF RIGHT KNEE: ICD-10-CM

## 2023-03-08 PROCEDURE — 20610 DRAIN/INJ JOINT/BURSA W/O US: CPT | Mod: RT | Performed by: ORTHOPAEDIC SURGERY

## 2023-03-08 RX ORDER — METHYLPREDNISOLONE ACETATE 80 MG/ML
80 INJECTION, SUSPENSION INTRA-ARTICULAR; INTRALESIONAL; INTRAMUSCULAR; SOFT TISSUE
Status: DISCONTINUED | OUTPATIENT
Start: 2023-03-08 | End: 2023-09-06

## 2023-03-08 RX ORDER — BUPIVACAINE HYDROCHLORIDE 2.5 MG/ML
4 INJECTION, SOLUTION EPIDURAL; INFILTRATION; INTRACAUDAL
Status: DISCONTINUED | OUTPATIENT
Start: 2023-03-08 | End: 2023-09-06

## 2023-03-08 RX ADMIN — BUPIVACAINE HYDROCHLORIDE 4 ML: 2.5 INJECTION, SOLUTION EPIDURAL; INFILTRATION; INTRACAUDAL at 09:56

## 2023-03-08 RX ADMIN — METHYLPREDNISOLONE ACETATE 80 MG: 80 INJECTION, SUSPENSION INTRA-ARTICULAR; INTRALESIONAL; INTRAMUSCULAR; SOFT TISSUE at 09:56

## 2023-03-08 ASSESSMENT — PAIN SCALES - GENERAL: PAINLEVEL: EXTREME PAIN (8)

## 2023-03-08 NOTE — LETTER
"    3/8/2023         RE: Adarsh Salvador  [address]  [place] [address]        Dear Colleague,    Thank you for referring your patient, Adarsh Salvador, to the [facility]. Please see a copy of my visit note below.    CHIEF COMPLAINT:   Chief Complaint   Patient presents with     Right Knee - Pain     Right knee arthroscope on 8/19/22 patient is here for a second opinion. Patient has had pain on and off since 1990 slide off icy roof. He has had multiple surgeries since then. Pain started again in Jan. 2023 after moving furniture. Warm baths with absent salt helps the pain and stairs, walking and standing for long periods makes the pain worse. Pain starts at the medial side of the knee and travels to the lateral side.   .        HISTORY OF PRESENT ILLNESS    Adarsh Salvador is a 57 year old male seen for evaluation of ongoing right knee pain with no known recent injury.   Pain has been present for years, on/off since ~1990 slid off an icy roof.  He's had multiple surgeries (~12?) on the knee since then, most recently 8/2022 with Dr Em. Pain started again 1/2023 when moving furniture. He locates pain along the inner aspect to under the knee cap \"like someone is sticking a bao knife into it\", aggravated with stairs, walking and prolonged standing. Treatment has been epson salt, warm baths, diclofenac, tylenol, topical voltaren.    Pain at rest, night.     He had an injection 1/5/2023 with Dr Em without much relief. He was seen by Dr Waite to discuss whether he'd be a canddiate for total knee arthroplasty, but based on low grade osteoarthritis, didn't think it would be beneficial at this time. He's here for another opinion.     Right knee arthroscopy with medial meniscus debridement 8/19/2022 with Dr Em, noted grade 2 chondrosis.    Patient has a history of recurrent DVT. On warfarin.    History of intermittent low back pain. Numbness and tingling. Takes Lyrica, it " does help.    Present symptoms: pain medially  and anteriorly, pain sharp, shooting, dull/achy , moderate pain, severe pain.    Pain severity: 6/10  Frequency of symptoms: are constant  Exacerbating Factors: weight bearing, stairs, [id number], prolonged standing  Relieving Factors: rest, epsom salt baths.  Night Pain: Yes  Pain while at rest: Yes   Numbness or tingling: Yes   Patient has tried:     NSAIDS: diclofenac      Physical Therapy: not recently, did some after knee surgery 8/2023 but ended up with a kidney stone so had to stop      Activity modification: Yes      Bracing: on occasion      Injections: Yes 1/5/2023 with Dr Em without relief.     Ice: Yes      Assistive device:  No     Other: tylenol. Topical voltaren works once in a while.      Other PMH:  has a past medical history of Anaphylactic reaction (8/14/2015), Anxiety, Depression, DM2 (diabetes mellitus, type 2) (H), GERD (gastroesophageal reflux disease), HTN (hypertension), IBS (irritable bowel syndrome), Kidney stone (6/15/2011), and Neuropathy.  Patient Active Problem List   Diagnosis     GERD (gastroesophageal reflux disease)     Chronic RLQ pain     Constipation     Chronic maxillary sinusitis     Chronic rhinitis     Hypertriglyceridemia     Benign essential hypertension     Anemia in other chronic diseases classified elsewhere     Fatty liver     Irritable bowel syndrome with diarrhea     Right inguinal hernia     Mild persistent asthma without complication     Type 2 diabetes mellitus with diabetic polyneuropathy, without long-term current use of insulin (H)     Hyperlipidemia LDL goal <100     CONNOR (generalized anxiety disorder)     Insomnia, unspecified type     Morbid obesity (H)     Neuropathy     Recurrent deep vein thrombosis (DVT) (H)     Precordial pain     Dyslipidemia     Elevated C-reactive protein     Gastric reflux     Internal derangement of knee     Nicotine dependence     Varicose veins of lower extremity     Vitamin  D deficiency     Low volume of ejaculated semen     Bilateral leg pain     Chronic pain of right knee     Acute pain of right knee     Aftercare following surgery of the musculoskeletal system     Advanced directives, counseling/discussion       Surgical Hx:  has a past surgical history that includes Colonoscopy (5/1/2012); BREATH HYDROGEN TEST (3/7/2014); orthopedic surgery (10/03/2007); Colonoscopy (4/21/2014); Release carpal tunnel (Right, 1/26/2018); cystoscopy (05/01/2008); cystoscopy,+ureteroscopy (06/10/2008); vascular surgery (11/24/2008); cystoscopy (09/26/2010); lithotripsy (09/30/2010); cystoscopy (05/18/2012); Colonoscopy (N/A, 4/21/2022); Inject epidural lumbar (N/A, 7/7/2022); and Arthroscopy knee with medial meniscectomy (Right, 8/19/2022).    Medications:   Current Outpatient Medications:      acetaminophen (TYLENOL) 325 MG tablet, Take 2 tablets (650 mg) by mouth every 4 hours as needed for mild pain, Disp: 50 tablet, Rfl: 0     albuterol (PROAIR HFA/PROVENTIL HFA/VENTOLIN HFA) 108 (90 Base) MCG/ACT inhaler, Inhale 2 puffs into the lungs every 6 hours as needed for shortness of breath or wheezing, Disp: 6.7 g, Rfl: 3     amLODIPine (NORVASC) 10 MG tablet, Take 1 tablet (10 mg) by mouth daily for blood pressure, Disp: 90 tablet, Rfl: 1     ASPIRIN PO, Take 325 mg by mouth daily , Disp: , Rfl:      aspirin-acetaminophen-caffeine (EXCEDRIN MIGRAINE) 250-250-65 MG tablet, Take 1 tablet by mouth, Disp: , Rfl:      atorvastatin (LIPITOR) 40 MG tablet, Take 1 tablet (40 mg) by mouth daily, Disp: 90 tablet, Rfl: 3     blood glucose (ACCU-CHEK GUIDE) test strip, Use to test blood sugar 3 times daily or as directed., Disp: 300 strip, Rfl: 3     blood glucose monitoring (ACCU-CHEK FASTCLIX) lancets, Use to test blood sugar 1 times daily or as directed.  Ok to substitute alternative if insurance prefers., Disp: 102 each, Rfl: 11     calcium carbonate (TUMS) 500 MG chewable tablet, Take 1 chew tab by mouth  daily, Disp: , Rfl:      chlorthalidone (HYGROTON) 25 MG tablet, Take 1 tablet (25 mg) by mouth daily Add on for blood pressure, Disp: 90 tablet, Rfl: 1     cyclobenzaprine (FLEXERIL) 10 MG tablet, Take 0.5-1 tablets (5-10 mg) by mouth 3 times daily as needed for muscle spasms, Disp: 90 tablet, Rfl: 3     diclofenac (VOLTAREN) 75 MG EC tablet, Take 1 tablet (75 mg) by mouth 2 times daily, Disp: 60 tablet, Rfl: 0     DULoxetine (CYMBALTA) 60 MG capsule, Take 1 capsule (60 mg) by mouth 2 times daily, Disp: 180 capsule, Rfl: 3     EPINEPHrine (ANY BX GENERIC EQUIV) 0.3 MG/0.3ML injection 2-pack, Inject 0.3 mLs (0.3 mg) into the muscle once as needed for anaphylaxis, Disp: 2 each, Rfl: 2     fluticasone (FLONASE) 50 MCG/ACT nasal spray, INSTILL 1 SPRAY INTO BOTH NOSTRILS DAILY, Disp: 48 mL, Rfl: 1     fluticasone-salmeterol (ADVAIR) 500-50 MCG/ACT inhaler, Inhale 1 puff into the lungs every 12 hours, Disp: 1 each, Rfl: 11     insulin glargine (BASAGLAR KWIKPEN) 100 UNIT/ML pen, Inject 45 Units Subcutaneous daily, Disp: 15 mL, Rfl: 1     insulin pen needle (32G X 4 MM) 32G X 4 MM miscellaneous, Use 2 pen needles daily or as directed., Disp: 200 each, Rfl: 1     losartan (COZAAR) 25 MG tablet, Take 1 tablet (25 mg) by mouth daily, Disp: 90 tablet, Rfl: 1     meclizine (ANTIVERT) 25 MG tablet, Take 1 tablet (25 mg) by mouth 3 times daily as needed for dizziness, Disp: 90 tablet, Rfl: 1     metFORMIN (GLUCOPHAGE XR) 500 MG 24 hr tablet, Take 2 tablets (1,000 mg) by mouth 2 times daily (with meals), Disp: 120 tablet, Rfl: 5     montelukast (SINGULAIR) 10 MG tablet, Take 1 tablet (10 mg) by mouth At Bedtime For allergies/asthma, Disp: 90 tablet, Rfl: 2     multivitamin, therapeutic (THERA-VIT) TABS, Take 1 tablet by mouth daily, Disp: , Rfl:      naproxen sodium (ANAPROX) 220 MG tablet, Take 220 mg by mouth, Disp: , Rfl:      nortriptyline (PAMELOR) 10 MG capsule, Take 2 capsules (20 mg) by mouth At Bedtime, Disp: 60 capsule,  Rfl: 5     omeprazole (PRILOSEC) 40 MG DR capsule, Take 1 capsule (40 mg) by mouth daily Take for at least two months, Disp: 90 capsule, Rfl: 2     ondansetron (ZOFRAN ODT) 4 MG ODT tab, Take 1 tablet (4 mg) by mouth every 8 hours as needed for nausea, Disp: 20 tablet, Rfl: 1     pregabalin (LYRICA) 50 MG capsule, Take 1 capsule (50 mg) by mouth 2 times daily, Disp: 60 capsule, Rfl: 1     rizatriptan (MAXALT-MLT) 10 MG ODT, TAKE 1 TABLET BY MOUTH AT ONSET OF HEADACHE FOR MIGRAINE MAY REPEAT IN 2 HOURS. MAX 3 TABS/24 HOURS., Disp: 18 tablet, Rfl: 1     warfarin ANTICOAGULANT (COUMADIN) 5 MG tablet, Take 15 mg Tues, Thurs and 12.5 mg all other days, or as directed by the Coumadin clinic, Disp: 225 tablet, Rfl: 1    Current Facility-Administered Medications:      triamcinolone (KENALOG-40) injection 40 mg, 40 mg, , , Carlos A Em MD, 40 mg at 01/05/23 1000    Facility-Administered Medications Ordered in Other Visits:      BUPivacaine (MARCAINE) injection 0.5%, 10 mL, Intradermal, Once, Vinnie Espinoza DO     DOBUTamine 500 mg in dextrose 5% 250 mL (adult std conc) premix, 2.5-20 mcg/kg/min, Intravenous, Continuous, Navarro Butcher MD, Stopped at 04/25/19 0655     iopamidol (ISOVUE-M 200) solution 10 mL, 10 mL, Intravenous, Once, Vinnie Espinoza DO     methylPREDNISolone (DEPO-MEDROL) injection 80 mg, 80 mg, Intramuscular, Once, Vinnie Espinoza DO     metoprolol (LOPRESSOR) injection 5 mg, 5 mg, Intravenous, Q5 Min PRN, Navarro Butcher MD, 2 mg at 04/25/19 4415    Allergies:   Allergies   Allergen Reactions     Artificial Sweetner (Informational Only) [Artificial Sweetner (Informational Only) ] GI Disturbance     ALL artificial sweetners cause severe diarrhea & flu symptoms     Hydromorphone Anaphylaxis     Ibuprofen GI Disturbance     Morphine Sulfate Concentrate Anaphylaxis     Morphine [Fumaric Acid] Anaphylaxis     Hydrocodone-Acetaminophen Itching     Tramadol Hives      Trazodone Hives     Gabapentin      GI upset per pt     Keflex [Cephalexin] Diarrhea     Upset stomach     Codeine Phosphate Itching     Ketorolac Tromethamine Rash       Social Hx: retired .   reports that he has never smoked. His smokeless tobacco use includes chew. He reports that he does not currently use alcohol. He reports current drug use.    Family Hx: family history includes Cancer (age of onset: 52) in his mother; Cancer - colorectal (age of onset: 53) in his father; Coronary Artery Disease in his father; Diabetes in his maternal aunt, maternal aunt, paternal uncle, paternal uncle, paternal uncle, paternal uncle, and another family member; Myocardial Infarction (age of onset: 35) in his paternal grandfather; Myocardial Infarction (age of onset: 45) in his father.    REVIEW OF SYSTEMS: 10 point ROS neg other than the symptoms noted above in the HPI and PMH. Notables include  CONSTITUTIONAL:NEGATIVE for fever, chills, change in weight  INTEGUMENTARY/SKIN: NEGATIVE for worrisome rashes, moles or lesions  MUSCULOSKELETAL:See HPI above  NEURO: NEGATIVE for weakness, dizziness or paresthesias    PHYSICAL EXAM:  BP (!) 142/87   Pulse 101   Resp 18   Ht 1.829 m (6')   Wt 131.5 kg (290 lb)   BMI 39.33 kg/m     GENERAL APPEARANCE: healthy, alert, no distress  SKIN: no suspicious lesions or rashes  NEURO: Normal strength and tone, mentation intact and speech normal  PSYCH:  mentation appears normal and affect normal, not anxious  RESPIRATORY: No increased work of breathing.  HANDS: no clubbing, nail pitting  LYMPH: no palpable popliteal lymphadenopathy.    BILATERAL LOWER EXTREMITIES:  Gait: slight favors the right.  Alignment: varus  No gross deformities or masses.  No Quad atrophy, strength normal.  Intact sensation deep peroneal nerve, superficial peroneal nerve, med/lat tibial nerve, sural nerve, saphenous nerve  Intact EHL, EDL, TA, FHL, GS, quadriceps hamstrings and hip flexors  Toes warm and well  perfused, brisk capillary refill. Palpable 2+ dp pulses.  Bilateral calf soft and nttp or squeeze.  DTRs: achilles 2+, patella 2+.  Edema: trace    LEFT KNEE EXAM:    Skin: intact, no ecchymosis or erythema  Squat: 100 %, not limited by pain.     ROM: slight hyper extension to 130 flexion  Tight hamstrings on straight leg raise.  Effusion: none  Tender: NTTP med/lat joint line, anterior or posterior knee  McMurrays: negative    MCL: stable, and non-painful at both 0 and 30 degrees knee flexion  Varus stress: stable, and non-painful at both 0 and 30 degrees knee flexion  Lachmans: neg, firm endpoint  Posterior Drawer stable  Patellofemoral joint:                Apprehension: negative              Crepitations: minimal    RIGHT KNEE EXAM:    Skin: intact, no ecchymosis or erythema  Squat: 100 %, not limited by pain.     ROM: slight hyper extension to 130 flexion  Tight hamstrings on straight leg raise.  Effusion: trace  Tender: medial joint line, medial patello-femoral retinaculum, medial and lateral patella facet  Nontender to palpation posterior knee.  McMurrays: negative    MCL: stable, and non-painful at both 0 and 30 degrees knee flexion  Varus stress: stable, and non-painful at both 0 and 30 degrees knee flexion  Lachmans: neg, firm endpoint  Posterior Drawer stable  Patellofemoral joint:                Apprehension: negative              Crepitations: mild   Grind: positive.    X-RAY: bilateral bates, 3 views right knee from 1/26/2023 were reviewed in clinic today. On my review, no obvious fractures or dislocations. No fracture. Mild medial compartment degenerative arthritis has minimally progressed. Minimal effusion.    CT arthrogram 7/29/2022:  1. High-grade radial tear in the posterior horn of the medial meniscus. The body of the medial meniscus is peripherally extruded.  2. Focal area of chondromalacia in the medial femoral condyle posteriorly.  3. Small popliteal cyst.       ASSESSMENT/PLAN: Adarsh Salvador  "is a 57 year old male with chronic right knee pain, primary osteoarthritis.     * reviewed imaging studies with patient, showing arthritic changes, or wearing of the cartilage in the knee. This can be caused by normal \"wear and tear\" over the years or following prior injury to the knee.    Treatment typically starts nonsurgically. Surgical indication for total knee arthroplasty  when nonsurgical management is no longer effective.    At this time, I do not think his osteoarthritis is severe enough to warrant knee replacement.    Non-surgical treatment for knee arthritis includes:    * rest, sitting  * Activity modification - avoid impact activities or activities that aggravate symptoms.  * NSAIDS (non-steroidal anti-inflammatory medications; e.g. Aleve, advil, motrin, ibuprofen) - regular use for inflammation ( twice daily or three times daily), with food, as long as no contra-indications Please discuss with primary care doctor if needed  * ice, 15-20 minutes at a time several times a day or as needed.  * Strengthening of quadriceps muscles  * Physical Therapy for strengthening, stretching and range of motion exercises of legs  * Tylenol as needed for pain, consider Tylenol arthritis or similar  * Weight loss: Weight loss:  Body mass index is 39.33 kg/m .. weight loss benefits, not only for the current pain symptoms, but also overall health. Recommend a good diet plan that works for the patient, with the assistance of a dietician or primary care doctor as needed. Also, a good, low-impact exercise program for at least 20 minutes per day, 3 times per week, such as exercise bike, elliptical , or pool.  * Exercise: low impact such as stationary bike, elliptical, pool.  * Injections: cortisone versus viscosupplementation (hyaluronic acid, \"rooster comb\", \"gel shots\"); risks and perceived benefits discussed today. Patient elects  to proceed with repeat cortisone. See procedure note below.  * consider visco injection " in future as needed. Will need pre-authorization for that in future.  * Bracing: bracing the knee may offer some relief of symptoms when worn and provide some stability.  * over the counter supplements such as glucosamine and chondroitin sulfate may help with joint pain.  * topical ointments may help as well    * return to clinic as needed.      Large Joint Injection/Arthocentesis: R knee joint    Date/Time: 3/8/2023 9:56 AM  Performed by: Edwin Ch MD  Authorized by: Edwin Ch MD     Indications:  Pain  Needle Size:  22 G  Guidance: landmark guided    Approach:  Posterior  Location:  Knee      Medications:  80 mg methylPREDNISolone 80 MG/ML; 4 mL bupivacaine HCl (PF) 0.25 %  Outcome:  Tolerated well, no immediate complications  Consent Given by:  Patient  Timeout: timeout called immediately prior to procedure    Prep: patient was prepped and draped in usual sterile fashion          Edwin Ch M.D., M.S.  Dept. of Orthopaedic Surgery  [facility]          Again, thank you for allowing me to participate in the care of your patient.        Sincerely,        Edwin Ch MD

## 2023-03-13 ENCOUNTER — ANTICOAGULATION THERAPY VISIT (OUTPATIENT)
Dept: ANTICOAGULATION | Facility: CLINIC | Age: 58
End: 2023-03-13

## 2023-03-13 ENCOUNTER — LAB (OUTPATIENT)
Dept: LAB | Facility: CLINIC | Age: 58
End: 2023-03-13
Payer: COMMERCIAL

## 2023-03-13 DIAGNOSIS — I82.409 RECURRENT DEEP VEIN THROMBOSIS (DVT) (H): Primary | ICD-10-CM

## 2023-03-13 DIAGNOSIS — I82.409 RECURRENT DEEP VEIN THROMBOSIS (DVT) (H): ICD-10-CM

## 2023-03-13 LAB — INR BLD: 1.4 (ref 0.9–1.1)

## 2023-03-13 PROCEDURE — 85610 PROTHROMBIN TIME: CPT

## 2023-03-13 PROCEDURE — 36416 COLLJ CAPILLARY BLOOD SPEC: CPT

## 2023-03-13 NOTE — PROGRESS NOTES
ANTICOAGULATION MANAGEMENT     Adarsh Salvador 57 year old male is on warfarin with subtherapeutic INR result. (Goal INR 2.0-3.0)    Recent labs: (last 7 days)     03/13/23  1044   INR 1.4*       ASSESSMENT       Source(s): Chart Review and Patient/Caregiver Call       Warfarin doses taken: Missed dose(s) may be affecting INR    Diet: No new diet changes identified    New illness, injury, or hospitalization: No    Medication/supplement changes: None noted    Signs or symptoms of bleeding or clotting: No    Previous INR: Supratherapeutic    Additional findings: None         PLAN     Recommended plan for temporary change(s) affecting INR     Dosing Instructions: booster dose then continue your current warfarin dose with next INR in 2 weeks       Summary  As of 3/13/2023    Full warfarin instructions:  3/13: 20 mg; Otherwise 15 mg every Tue, Sat; 12.5 mg all other days   Next INR check:  4/3/2023             Telephone call with Adarsh who verbalizes understanding and agrees to plan    Lab visit scheduled    Education provided:     Please call back if any changes to your diet, medications or how you've been taking warfarin    Symptom monitoring: monitoring for clotting signs and symptoms    Plan made per [facility] anticoagulation protocol    Joya Suero RN  Anticoagulation Clinic  3/13/2023    _______________________________________________________________________     Anticoagulation Episode Summary     Current INR goal:  2.0-3.0   TTR:  37.9 % (1 y)   Target end date:  Indefinite   Send INR reminders to:  [facility]    Indications    Recurrent deep vein thrombosis (DVT) (H) [I82.409]           Comments:           Anticoagulation Care Providers     Provider Role Specialty Phone number    Bertha Romero PA-C Referring Family Medicine [phone]

## 2023-03-21 DIAGNOSIS — E11.42 TYPE 2 DIABETES MELLITUS WITH DIABETIC POLYNEUROPATHY, WITHOUT LONG-TERM CURRENT USE OF INSULIN (H): ICD-10-CM

## 2023-03-21 DIAGNOSIS — Z79.4 TYPE 2 DIABETES MELLITUS WITH HYPERGLYCEMIA, WITH LONG-TERM CURRENT USE OF INSULIN (H): ICD-10-CM

## 2023-03-21 DIAGNOSIS — E11.9 TYPE 2 DIABETES MELLITUS WITHOUT COMPLICATION, WITHOUT LONG-TERM CURRENT USE OF INSULIN (H): ICD-10-CM

## 2023-03-21 DIAGNOSIS — E11.65 TYPE 2 DIABETES MELLITUS WITH HYPERGLYCEMIA, WITH LONG-TERM CURRENT USE OF INSULIN (H): ICD-10-CM

## 2023-03-21 RX ORDER — INSULIN GLARGINE 100 [IU]/ML
45 INJECTION, SOLUTION SUBCUTANEOUS DAILY
Qty: 15 ML | Refills: 3 | Status: SHIPPED | OUTPATIENT
Start: 2023-03-21 | End: 2023-04-10

## 2023-03-27 ENCOUNTER — LAB (OUTPATIENT)
Dept: LAB | Facility: CLINIC | Age: 58
End: 2023-03-27
Payer: COMMERCIAL

## 2023-03-27 ENCOUNTER — ANTICOAGULATION THERAPY VISIT (OUTPATIENT)
Dept: ANTICOAGULATION | Facility: CLINIC | Age: 58
End: 2023-03-27

## 2023-03-27 DIAGNOSIS — I82.409 RECURRENT DEEP VEIN THROMBOSIS (DVT) (H): ICD-10-CM

## 2023-03-27 DIAGNOSIS — I82.409 RECURRENT DEEP VEIN THROMBOSIS (DVT) (H): Primary | ICD-10-CM

## 2023-03-27 LAB — INR BLD: 2.9 (ref 0.9–1.1)

## 2023-03-27 PROCEDURE — 85610 PROTHROMBIN TIME: CPT

## 2023-03-27 PROCEDURE — 36416 COLLJ CAPILLARY BLOOD SPEC: CPT

## 2023-03-27 NOTE — PROGRESS NOTES
ANTICOAGULATION MANAGEMENT     Adarsh Salvador 57 year old male is on warfarin with therapeutic INR result. (Goal INR 2.0-3.0)    Recent labs: (last 7 days)     03/27/23  0754   INR 2.9*       ASSESSMENT       Source(s): Chart Review    Previous INR was Subtherapeutic    Medication, diet, health changes since last INR chart reviewed; none identified             PLAN     Recommended plan for no diet, medication or health factor changes affecting INR     Dosing Instructions: Continue your current warfarin dose with next INR in 3 weeks       Summary  As of 3/27/2023    Full warfarin instructions:  15 mg every Tue, Sat; 12.5 mg all other days   Next INR check:  4/17/2023             Detailed voice message left for Adarsh with dosing instructions and follow up date.     Message left to make apt in no later then 3 weeks for recheck of inr    Education provided:     Please call back if any changes to your diet, medications or how you've been taking warfarin    Plan made per [facility] anticoagulation protocol    Joya Suero RN  Anticoagulation Clinic  3/27/2023    _______________________________________________________________________     Anticoagulation Episode Summary     Current INR goal:  2.0-3.0   TTR:  38.3 % (1 y)   Target end date:  Indefinite   Send INR reminders to:  [facility]    Indications    Recurrent deep vein thrombosis (DVT) (H) [I82.409]           Comments:           Anticoagulation Care Providers     Provider Role Specialty Phone number    Bertha Romero PA-C Referring Family Medicine [phone]

## 2023-04-04 NOTE — PROGRESS NOTES
Assessment & Plan     Type 2 diabetes mellitus with diabetic polyneuropathy, with long-term current use of insulin (H)  Not to goal, increase to 50 from 45 units long actin and add short acting  Monitor for hypoglycemia  To endocrine if diabetic ed not able to get sugars down  - insulin glargine (BASAGLAR KWIKPEN) 100 UNIT/ML pen; Inject 50 Units Subcutaneous daily    Hypertriglyceridemia      Benign essential hypertension      Diabetic polyneuropathy associated with type 2 diabetes mellitus (H)    - nortriptyline (PAMELOR) 10 MG capsule; Take 2 capsules (20 mg) by mouth At Bedtime  - pregabalin (LYRICA) 50 MG capsule; Take 1 capsule (50 mg) by mouth 2 times daily    Globus syndrome    - omeprazole (PRILOSEC) 40 MG DR capsule; Take 1 capsule (40 mg) by mouth daily Take for at least two months    Bilateral leg pain    - pregabalin (LYRICA) 50 MG capsule; Take 1 capsule (50 mg) by mouth 2 times daily    Migraine with aura and without status migrainosus, not intractable    - rizatriptan (MAXALT-MLT) 10 MG ODT; TAKE 1 TABLET BY MOUTH AT ONSET OF HEADACHE FOR MIGRAINE MAY REPEAT IN 2 HOURS. MAX 3 TABS/24 HOURS.    Recurrent deep vein thrombosis (DVT) (H)  ON for life. Went off and got another clot so is on for life.      - warfarin ANTICOAGULANT (COUMADIN) 5 MG tablet; Take 15 mg Tues, Thurs and 12.5 mg all other days, or as directed by the Coumadin clinic    Seasonal allergic rhinitis due to other allergic trigger    - fluticasone (FLONASE) 50 MCG/ACT nasal spray; Spray 1 spray into both nostrils daily    Chronic pain of right knee  See ortho for OA    Back muscle spasm    - cyclobenzaprine (FLEXERIL) 10 MG tablet; Take 0.5-1 tablets (5-10 mg) by mouth 3 times daily as needed for muscle spasms    Hypertension, unspecified type    - chlorthalidone (HYGROTON) 25 MG tablet; Take 1 tablet (25 mg) by mouth daily Add on for blood pressure    Erectile dysfunction, unspecified erectile dysfunction type  Discussed side effects  and to never take with NTG  - sildenafil (VIAGRA) 50 MG tablet; Take 1-2 tablets ( mg) by mouth daily as needed (prior to sexual activity)    Abdominal pain, generalized  Ongoing off and on. He is wondering if it has to do with previous hernia surgery. Will refer.   - CBC with platelets and differential; Future  - Comprehensive metabolic panel (BMP + Alb, Alk Phos, ALT, AST, Total. Bili, TP); Future  - Adult General Surg Referral; Future  - CBC with platelets and differential  - Comprehensive metabolic panel (BMP + Alb, Alk Phos, ALT, AST, Total. Bili, TP)    Right inguinal pain  Radiates from complaint above, not worsening. No fever. Will check cbc and refer. To er with severe worsening symptoms.  - Adult General Surg Referral; Future    ARIANE Ortiz [facility]   Adarsh is a 57 year old, presenting for the following health issues:  Diabetes         View : No data to display.              History of Present Illness       Diabetes:   He presents for follow up of diabetes.  He is checking home blood glucose three times daily. He checks blood glucose before meals.  Blood glucose is sometimes over 200 and never under 70. When his blood glucose is low, the patient is asymptomatic for confusion, blurred vision, lethargy and reports not feeling dizzy, shaky, or weak.  He is concerned about blood sugar frequently over 200.  He is having numbness in feet, burning in feet, excessive thirst and blurry vision. The patient has had a diabetic eye exam in the last 12 months. Eye exam performed on 1523. Location of last eye exam [facility].        Reason for visit:  Ed pain    He eats 0-1 servings of fruits and vegetables daily.He consumes 2 sweetened beverage(s) daily.He exercises with enough effort to increase his heart rate 9 or less minutes per day.  He exercises with enough effort to increase his heart rate 4 days per week. He is missing 1 dose(s) of medications per  week.     Patient with recently uncontrolled diabetes. bmi 39. Last a1c was 10.0. did not see diabetic ed like I asked. Referred again. May need endocrine. Will add mealtime insulin, he used in hospital and like it.       Engaged to be  later this year with boyfriend. He would like something to help with his ED. Has never tried anything. We discussed factors that likely led to his ED.   Boyfriend has diabetes as well per patient and feels he can help him improve.    Has parestheias in feet, no worse. H/o uncontrolled sugars.     htn-No chest pain, shortness of breath, edema, PND, or orthopnea. No dizziness or vision changes. No side effects from medications. Blood pressure has been stable on medication.    Review of Systems   Constitutional, HEENT, cardiovascular, pulmonary, GI, , musculoskeletal, neuro, skin, endocrine and psych systems are negative, except as otherwise noted.      Objective    /88   Pulse 102   Temp 97.3  F (36.3  C) (Tympanic)   Resp 16   Wt 131.5 kg (290 lb)   SpO2 96%   BMI 39.33 kg/m    Body mass index is 39.33 kg/m .  Physical Exam   GENERAL: alert, no distress and obese  RESP: lungs clear to auscultation - no rales, rhonchi or wheezes  CV: regular rate and rhythm, normal S1 S2, no S3 or S4, no murmur, click or rub, no peripheral edema and peripheral pulses strong  MS: no gross musculoskeletal defects noted, no edema  NEURO: Normal strength and tone, mentation intact and speech normal  PSYCH: mentation appears normal, affect normal/bright

## 2023-04-10 ENCOUNTER — OFFICE VISIT (OUTPATIENT)
Dept: FAMILY MEDICINE | Facility: CLINIC | Age: 58
End: 2023-04-10
Payer: COMMERCIAL

## 2023-04-10 VITALS
TEMPERATURE: 97.3 F | DIASTOLIC BLOOD PRESSURE: 88 MMHG | SYSTOLIC BLOOD PRESSURE: 131 MMHG | OXYGEN SATURATION: 96 % | HEART RATE: 102 BPM | WEIGHT: 290 LBS | BODY MASS INDEX: 39.33 KG/M2 | RESPIRATION RATE: 16 BRPM

## 2023-04-10 DIAGNOSIS — G43.109 MIGRAINE WITH AURA AND WITHOUT STATUS MIGRAINOSUS, NOT INTRACTABLE: ICD-10-CM

## 2023-04-10 DIAGNOSIS — E11.42 TYPE 2 DIABETES MELLITUS WITH DIABETIC POLYNEUROPATHY, WITH LONG-TERM CURRENT USE OF INSULIN (H): Primary | ICD-10-CM

## 2023-04-10 DIAGNOSIS — M25.561 CHRONIC PAIN OF RIGHT KNEE: ICD-10-CM

## 2023-04-10 DIAGNOSIS — J30.89 SEASONAL ALLERGIC RHINITIS DUE TO OTHER ALLERGIC TRIGGER: ICD-10-CM

## 2023-04-10 DIAGNOSIS — R09.A2 GLOBUS SYNDROME: ICD-10-CM

## 2023-04-10 DIAGNOSIS — M62.830 BACK MUSCLE SPASM: ICD-10-CM

## 2023-04-10 DIAGNOSIS — R10.31 RIGHT INGUINAL PAIN: ICD-10-CM

## 2023-04-10 DIAGNOSIS — I10 HYPERTENSION, UNSPECIFIED TYPE: ICD-10-CM

## 2023-04-10 DIAGNOSIS — I82.409 RECURRENT DEEP VEIN THROMBOSIS (DVT) (H): ICD-10-CM

## 2023-04-10 DIAGNOSIS — Z79.4 TYPE 2 DIABETES MELLITUS WITH DIABETIC POLYNEUROPATHY, WITH LONG-TERM CURRENT USE OF INSULIN (H): Primary | ICD-10-CM

## 2023-04-10 DIAGNOSIS — I10 BENIGN ESSENTIAL HYPERTENSION: Chronic | ICD-10-CM

## 2023-04-10 DIAGNOSIS — G89.29 CHRONIC PAIN OF RIGHT KNEE: ICD-10-CM

## 2023-04-10 DIAGNOSIS — M79.604 BILATERAL LEG PAIN: ICD-10-CM

## 2023-04-10 DIAGNOSIS — E11.42 DIABETIC POLYNEUROPATHY ASSOCIATED WITH TYPE 2 DIABETES MELLITUS (H): ICD-10-CM

## 2023-04-10 DIAGNOSIS — E78.1 HYPERTRIGLYCERIDEMIA: Chronic | ICD-10-CM

## 2023-04-10 DIAGNOSIS — R10.84 ABDOMINAL PAIN, GENERALIZED: ICD-10-CM

## 2023-04-10 DIAGNOSIS — N52.9 ERECTILE DYSFUNCTION, UNSPECIFIED ERECTILE DYSFUNCTION TYPE: ICD-10-CM

## 2023-04-10 DIAGNOSIS — M79.605 BILATERAL LEG PAIN: ICD-10-CM

## 2023-04-10 LAB
ALBUMIN SERPL-MCNC: 3.8 G/DL (ref 3.4–5)
ALP SERPL-CCNC: 126 U/L (ref 40–150)
ALT SERPL W P-5'-P-CCNC: 31 U/L (ref 0–70)
ANION GAP SERPL CALCULATED.3IONS-SCNC: 4 MMOL/L (ref 3–14)
AST SERPL W P-5'-P-CCNC: 18 U/L (ref 0–45)
BASOPHILS # BLD AUTO: 0 10E3/UL (ref 0–0.2)
BASOPHILS NFR BLD AUTO: 0 %
BILIRUB SERPL-MCNC: 0.2 MG/DL (ref 0.2–1.3)
BUN SERPL-MCNC: 19 MG/DL (ref 7–30)
CALCIUM SERPL-MCNC: 9.8 MG/DL (ref 8.5–10.1)
CHLORIDE BLD-SCNC: 96 MMOL/L (ref 94–109)
CHOLEST SERPL-MCNC: 285 MG/DL
CO2 SERPL-SCNC: 32 MMOL/L (ref 20–32)
CREAT SERPL-MCNC: 0.94 MG/DL (ref 0.66–1.25)
CREAT UR-MCNC: 155 MG/DL
EOSINOPHIL # BLD AUTO: 0.2 10E3/UL (ref 0–0.7)
EOSINOPHIL NFR BLD AUTO: 2 %
ERYTHROCYTE [DISTWIDTH] IN BLOOD BY AUTOMATED COUNT: 14.2 % (ref 10–15)
FASTING STATUS PATIENT QL REPORTED: YES
GFR SERPL CREATININE-BSD FRML MDRD: >90 ML/MIN/1.73M2
GLUCOSE BLD-MCNC: 332 MG/DL (ref 70–99)
HBA1C MFR BLD: 11.3 % (ref 0–5.6)
HCT VFR BLD AUTO: 40.3 % (ref 40–53)
HDLC SERPL-MCNC: 46 MG/DL
HGB BLD-MCNC: 13.5 G/DL (ref 13.3–17.7)
LDLC SERPL CALC-MCNC: 192 MG/DL
LDLC SERPL CALC-MCNC: ABNORMAL MG/DL
LYMPHOCYTES # BLD AUTO: 2.5 10E3/UL (ref 0.8–5.3)
LYMPHOCYTES NFR BLD AUTO: 27 %
MCH RBC QN AUTO: 29.2 PG (ref 26.5–33)
MCHC RBC AUTO-ENTMCNC: 33.5 G/DL (ref 31.5–36.5)
MCV RBC AUTO: 87 FL (ref 78–100)
MICROALBUMIN UR-MCNC: 149 MG/L
MICROALBUMIN/CREAT UR: 96.13 MG/G CR (ref 0–17)
MONOCYTES # BLD AUTO: 0.7 10E3/UL (ref 0–1.3)
MONOCYTES NFR BLD AUTO: 8 %
NEUTROPHILS # BLD AUTO: 5.7 10E3/UL (ref 1.6–8.3)
NEUTROPHILS NFR BLD AUTO: 62 %
NONHDLC SERPL-MCNC: 239 MG/DL
PLATELET # BLD AUTO: 248 10E3/UL (ref 150–450)
POTASSIUM BLD-SCNC: 4.3 MMOL/L (ref 3.4–5.3)
PROT SERPL-MCNC: 8.6 G/DL (ref 6.8–8.8)
RBC # BLD AUTO: 4.62 10E6/UL (ref 4.4–5.9)
SODIUM SERPL-SCNC: 132 MMOL/L (ref 133–144)
TRIGL SERPL-MCNC: 452 MG/DL
WBC # BLD AUTO: 9.1 10E3/UL (ref 4–11)

## 2023-04-10 PROCEDURE — 80053 COMPREHEN METABOLIC PANEL: CPT | Performed by: PHYSICIAN ASSISTANT

## 2023-04-10 PROCEDURE — 99214 OFFICE O/P EST MOD 30 MIN: CPT | Performed by: PHYSICIAN ASSISTANT

## 2023-04-10 PROCEDURE — 36415 COLL VENOUS BLD VENIPUNCTURE: CPT | Performed by: PHYSICIAN ASSISTANT

## 2023-04-10 PROCEDURE — 85025 COMPLETE CBC W/AUTO DIFF WBC: CPT | Performed by: PHYSICIAN ASSISTANT

## 2023-04-10 PROCEDURE — 83721 ASSAY OF BLOOD LIPOPROTEIN: CPT | Mod: 59 | Performed by: PHYSICIAN ASSISTANT

## 2023-04-10 PROCEDURE — 80061 LIPID PANEL: CPT | Performed by: PHYSICIAN ASSISTANT

## 2023-04-10 PROCEDURE — 82043 UR ALBUMIN QUANTITATIVE: CPT | Performed by: PHYSICIAN ASSISTANT

## 2023-04-10 PROCEDURE — 83036 HEMOGLOBIN GLYCOSYLATED A1C: CPT | Performed by: PHYSICIAN ASSISTANT

## 2023-04-10 PROCEDURE — 82570 ASSAY OF URINE CREATININE: CPT | Performed by: PHYSICIAN ASSISTANT

## 2023-04-10 RX ORDER — CYCLOBENZAPRINE HCL 10 MG
5-10 TABLET ORAL 3 TIMES DAILY PRN
Qty: 90 TABLET | Refills: 3 | Status: SHIPPED | OUTPATIENT
Start: 2023-04-10 | End: 2023-04-24

## 2023-04-10 RX ORDER — NORTRIPTYLINE HCL 10 MG
20 CAPSULE ORAL AT BEDTIME
Qty: 60 CAPSULE | Refills: 5 | Status: SHIPPED | OUTPATIENT
Start: 2023-04-10 | End: 2024-04-24

## 2023-04-10 RX ORDER — LOSARTAN POTASSIUM 25 MG/1
25 TABLET ORAL DAILY
Qty: 90 TABLET | Refills: 1 | Status: SHIPPED | OUTPATIENT
Start: 2023-04-10 | End: 2023-08-15

## 2023-04-10 RX ORDER — RIZATRIPTAN BENZOATE 10 MG/1
TABLET, ORALLY DISINTEGRATING ORAL
Qty: 18 TABLET | Refills: 1 | Status: SHIPPED | OUTPATIENT
Start: 2023-04-10

## 2023-04-10 RX ORDER — FLUTICASONE PROPIONATE 50 MCG
1 SPRAY, SUSPENSION (ML) NASAL DAILY
Qty: 48 ML | Refills: 1 | Status: SHIPPED | OUTPATIENT
Start: 2023-04-10 | End: 2023-04-24

## 2023-04-10 RX ORDER — INSULIN GLARGINE 100 [IU]/ML
50 INJECTION, SOLUTION SUBCUTANEOUS DAILY
Qty: 15 ML | Refills: 1 | Status: SHIPPED | OUTPATIENT
Start: 2023-04-10 | End: 2023-05-08

## 2023-04-10 RX ORDER — CHLORTHALIDONE 25 MG/1
25 TABLET ORAL DAILY
Qty: 90 TABLET | Refills: 1 | Status: SHIPPED | OUTPATIENT
Start: 2023-04-10 | End: 2023-08-15

## 2023-04-10 RX ORDER — SILDENAFIL 50 MG/1
50-100 TABLET, FILM COATED ORAL DAILY PRN
Qty: 20 TABLET | Refills: 3 | Status: SHIPPED | OUTPATIENT
Start: 2023-04-10 | End: 2023-08-15

## 2023-04-10 RX ORDER — METFORMIN HCL 500 MG
1000 TABLET, EXTENDED RELEASE 24 HR ORAL 2 TIMES DAILY WITH MEALS
Qty: 120 TABLET | Refills: 5 | Status: SHIPPED | OUTPATIENT
Start: 2023-04-10 | End: 2023-08-15

## 2023-04-10 RX ORDER — INSULIN ASPART 100 [IU]/ML
4 INJECTION, SOLUTION INTRAVENOUS; SUBCUTANEOUS
Qty: 360 ML | Refills: 1 | Status: SHIPPED | OUTPATIENT
Start: 2023-04-10 | End: 2023-09-13

## 2023-04-10 RX ORDER — WARFARIN SODIUM 5 MG/1
TABLET ORAL
Qty: 225 TABLET | Refills: 1 | Status: SHIPPED | OUTPATIENT
Start: 2023-04-10 | End: 2023-07-26

## 2023-04-10 RX ORDER — PREGABALIN 50 MG/1
50 CAPSULE ORAL 2 TIMES DAILY
Qty: 60 CAPSULE | Refills: 1 | Status: SHIPPED | OUTPATIENT
Start: 2023-04-10 | End: 2024-04-24

## 2023-04-10 RX ORDER — OMEPRAZOLE 40 MG/1
40 CAPSULE, DELAYED RELEASE ORAL DAILY
Qty: 90 CAPSULE | Refills: 2 | Status: SHIPPED | OUTPATIENT
Start: 2023-04-10 | End: 2024-04-23

## 2023-04-10 ASSESSMENT — ASTHMA QUESTIONNAIRES
ACT_TOTALSCORE: 14
QUESTION_1 LAST FOUR WEEKS HOW MUCH OF THE TIME DID YOUR ASTHMA KEEP YOU FROM GETTING AS MUCH DONE AT WORK, SCHOOL OR AT HOME: SOME OF THE TIME
QUESTION_2 LAST FOUR WEEKS HOW OFTEN HAVE YOU HAD SHORTNESS OF BREATH: ONCE A DAY
QUESTION_5 LAST FOUR WEEKS HOW WOULD YOU RATE YOUR ASTHMA CONTROL: SOMEWHAT CONTROLLED
ACT_TOTALSCORE: 14
QUESTION_3 LAST FOUR WEEKS HOW OFTEN DID YOUR ASTHMA SYMPTOMS (WHEEZING, COUGHING, SHORTNESS OF BREATH, CHEST TIGHTNESS OR PAIN) WAKE YOU UP AT NIGHT OR EARLIER THAN USUAL IN THE MORNING: ONCE OR TWICE
QUESTION_4 LAST FOUR WEEKS HOW OFTEN HAVE YOU USED YOUR RESCUE INHALER OR NEBULIZER MEDICATION (SUCH AS ALBUTEROL): ONE OR TWO TIMES PER DAY

## 2023-04-10 ASSESSMENT — PAIN SCALES - GENERAL: PAINLEVEL: NO PAIN (0)

## 2023-04-11 ENCOUNTER — TELEPHONE (OUTPATIENT)
Dept: FAMILY MEDICINE | Facility: CLINIC | Age: 58
End: 2023-04-11
Payer: COMMERCIAL

## 2023-04-11 DIAGNOSIS — E11.42 TYPE 2 DIABETES MELLITUS WITH DIABETIC POLYNEUROPATHY, WITHOUT LONG-TERM CURRENT USE OF INSULIN (H): ICD-10-CM

## 2023-04-11 RX ORDER — SYRINGE-NEEDLE,INSULIN,0.5 ML 27GX1/2"
SYRINGE, EMPTY DISPOSABLE MISCELLANEOUS
Status: CANCELLED | OUTPATIENT
Start: 2023-04-11

## 2023-04-11 NOTE — RESULT ENCOUNTER NOTE
Pato Altamirano,       Your recent test results are attached, if you have any questions or concerns please feel free to contact me via e-mail or call [phone].  Labs poor as expected. Please change your medications as we discussed and follow up as we discussed.          It was a pleasure to see you at your recent office visit.      Sincerely,  Bertha Romero PA-C

## 2023-04-12 NOTE — TELEPHONE ENCOUNTER
Patient states he was prescribed Novolog insulin vial  Patient needs prescriptions for the syringes and needles as well     Thalia CROFTN, RN

## 2023-04-13 RX ORDER — SYRINGE-NEEDLE,INSULIN,0.5 ML 27GX1/2"
SYRINGE, EMPTY DISPOSABLE MISCELLANEOUS
Qty: 200 EACH | Refills: 1 | Status: SHIPPED | OUTPATIENT
Start: 2023-04-13

## 2023-04-24 ENCOUNTER — OFFICE VISIT (OUTPATIENT)
Dept: URGENT CARE | Facility: URGENT CARE | Age: 58
End: 2023-04-24
Payer: COMMERCIAL

## 2023-04-24 ENCOUNTER — ANCILLARY PROCEDURE (OUTPATIENT)
Dept: GENERAL RADIOLOGY | Facility: CLINIC | Age: 58
End: 2023-04-24
Attending: NURSE PRACTITIONER
Payer: COMMERCIAL

## 2023-04-24 VITALS
BODY MASS INDEX: 39.2 KG/M2 | OXYGEN SATURATION: 97 % | TEMPERATURE: 97.5 F | RESPIRATION RATE: 14 BRPM | WEIGHT: 289 LBS | DIASTOLIC BLOOD PRESSURE: 76 MMHG | HEART RATE: 111 BPM | SYSTOLIC BLOOD PRESSURE: 146 MMHG

## 2023-04-24 DIAGNOSIS — S96.912A SPRAIN AND STRAIN OF LEFT ANKLE: ICD-10-CM

## 2023-04-24 DIAGNOSIS — S93.402A SPRAIN AND STRAIN OF LEFT ANKLE: ICD-10-CM

## 2023-04-24 DIAGNOSIS — S99.912A ANKLE INJURY, LEFT, INITIAL ENCOUNTER: Primary | ICD-10-CM

## 2023-04-24 PROBLEM — I82.409 DVT (DEEP VENOUS THROMBOSIS) (H): Status: ACTIVE | Noted: 2022-01-15

## 2023-04-24 PROCEDURE — 73610 X-RAY EXAM OF ANKLE: CPT | Mod: TC | Performed by: RADIOLOGY

## 2023-04-24 PROCEDURE — 99214 OFFICE O/P EST MOD 30 MIN: CPT | Performed by: NURSE PRACTITIONER

## 2023-04-24 ASSESSMENT — PAIN SCALES - GENERAL: PAINLEVEL: EXTREME PAIN (8)

## 2023-04-24 NOTE — PROGRESS NOTES
Assessment & Plan     1. Ankle injury, left, initial encounter    - XR Ankle Left G/E 3 Views    2. Sprain and strain of left ankle  CAM boot applied   - Ankle/Foot Bracing Supplies DME Walking Boot; Left; Pneumatic; Short    Patient Instructions   Rest the affected painful area as much as possible.  Apply ice for 15-20 minutes intermittently as needed and especially after any offending activity. As pain recedes, begin normal activities slowly as tolerated.  Consider Physical Therapy if symptoms not better with symptomatic care.    Wear boot daily for 1-2 weeks    Follow up with your primary care provider in 1-2       IRENE Perdomo [facility]    Subjective     Adarsh is a 57 year old male who presents to clinic today for the following health issues:  Chief Complaint   Patient presents with     Foot Injury     Pt left foot - he was throwing away a headboard and twisted his ankle on the stairs.      HPI    Patient states he was walking down stairs today carrying a headboard when he stepped off last step felt immediate pain left lateral ankle. Pain with ambulation. Swelling has not taken ibuprofen or iced.        Review of Systems  Constitutional, HEENT, cardiovascular, pulmonary, gi and gu systems are negative, except as otherwise noted.      Objective    BP (!) 146/76   Pulse 111   Temp 97.5  F (36.4  C) (Tympanic)   Resp 14   Wt 131.1 kg (289 lb)   SpO2 97%   BMI 39.20 kg/m    Physical Exam   GENERAL: alert, moderate distress and over weight  RESP: lungs clear to auscultation - no rales, rhonchi or wheezes  CV: regular rate and rhythm, normal S1 S2, no S3 or S4, no murmur, click or rub, no peripheral edema and peripheral pulses strong  MS: tenderness with palpation left lateral ankle. Pain with ROM. CMS is intact.   SKIN: no suspicious lesions or rashes  Results for orders placed or performed in visit on 04/24/23   XR Ankle Left G/E 3 Views     Status: None    Narrative     EXAM: XR ANKLE LEFT G/E 3 VIEWS  LOCATION: [facility] [place]  DATE/TIME: 4/24/2023 6:12 PM CDT    INDICATION:  Ankle injury, left, initial encounter  COMPARISON: None.      Impression    IMPRESSION: No fractures are evident. The ankle mortise is intact. The talar dome is unremarkable. Plantar and Achilles calcaneal spurs.

## 2023-04-24 NOTE — PATIENT INSTRUCTIONS
Rest the affected painful area as much as possible.  Apply ice for 15-20 minutes intermittently as needed and especially after any offending activity. As pain recedes, begin normal activities slowly as tolerated.  Consider Physical Therapy if symptoms not better with symptomatic care.    Wear boot daily for 1-2 weeks    Follow up with your primary care provider in 1-2

## 2023-04-26 ENCOUNTER — LAB (OUTPATIENT)
Dept: LAB | Facility: CLINIC | Age: 58
End: 2023-04-26
Payer: COMMERCIAL

## 2023-04-26 ENCOUNTER — ANTICOAGULATION THERAPY VISIT (OUTPATIENT)
Dept: ANTICOAGULATION | Facility: CLINIC | Age: 58
End: 2023-04-26

## 2023-04-26 DIAGNOSIS — I82.409 RECURRENT DEEP VEIN THROMBOSIS (DVT) (H): Primary | ICD-10-CM

## 2023-04-26 DIAGNOSIS — I82.409 RECURRENT DEEP VEIN THROMBOSIS (DVT) (H): ICD-10-CM

## 2023-04-26 LAB — INR BLD: 3.6 (ref 0.9–1.1)

## 2023-04-26 PROCEDURE — 36416 COLLJ CAPILLARY BLOOD SPEC: CPT

## 2023-04-26 PROCEDURE — 85610 PROTHROMBIN TIME: CPT

## 2023-04-26 NOTE — PROGRESS NOTES
ANTICOAGULATION MANAGEMENT     Adarsh Salvador 57 year old male is on warfarin with supratherapeutic INR result. (Goal INR 2.0-3.0)    Recent labs: (last 7 days)     04/26/23  1051   INR 3.6*       ASSESSMENT       Source(s): Chart Review and Patient/Caregiver Call       Warfarin doses taken: Warfarin taken as instructed    Diet: No new diet changes identified    Medication/supplement changes: None noted    New illness, injury, or hospitalization: Yes: ankle injury, swelling and wearing a CAM boot for 1-2 weeks     Signs or symptoms of bleeding or clotting: No    Previous result: Therapeutic last visit; previously outside of goal range    Additional findings: None         PLAN     Recommended plan for temporary change(s) affecting INR     Dosing Instructions: partial hold then continue your current warfarin dose with next INR in 2 weeks       Summary  As of 4/26/2023    Full warfarin instructions:  4/27: 7.5 mg; Otherwise 15 mg every Tue, Sat; 12.5 mg all other days   Next INR check:  5/10/2023             Telephone call with Adarsh who verbalizes understanding and agrees to plan    Lab visit scheduled    Education provided:     Contact [phone]  with any changes, questions or concerns.     Plan made per [facility] anticoagulation protocol    Janeth Cool RN  Anticoagulation Clinic  4/26/2023    _______________________________________________________________________     Anticoagulation Episode Summary     Current INR goal:  2.0-3.0   TTR:  32.3 % (1 y)   Target end date:  Indefinite   Send INR reminders to:  [facility]    Indications    Recurrent deep vein thrombosis (DVT) (H) [I82.409]           Comments:           Anticoagulation Care Providers     Provider Role Specialty Phone number    Bertha Romero PA-C Referring Family Medicine [phone]

## 2023-05-01 ENCOUNTER — OFFICE VISIT (OUTPATIENT)
Dept: SURGERY | Facility: CLINIC | Age: 58
End: 2023-05-01
Payer: COMMERCIAL

## 2023-05-01 VITALS
BODY MASS INDEX: 39.2 KG/M2 | DIASTOLIC BLOOD PRESSURE: 94 MMHG | WEIGHT: 289 LBS | HEART RATE: 62 BPM | SYSTOLIC BLOOD PRESSURE: 151 MMHG

## 2023-05-01 DIAGNOSIS — R10.31 RIGHT INGUINAL PAIN: Primary | ICD-10-CM

## 2023-05-01 PROCEDURE — 99203 OFFICE O/P NEW LOW 30 MIN: CPT | Performed by: SURGERY

## 2023-05-01 NOTE — LETTER
5/1/2023         RE: Adarsh Salvador  [address]  [place] [address]        Dear Colleague,    Thank you for referring your patient, Adarsh Salvador, to the [facility]. Please see a copy of my visit note below.    Patient seen in consultation for abdominal pain by Bertha Romero    HPI:  Patient is a 57 year old male  with complaints of pain in abdomen  The patient noticed the symptoms about 4 months ago.    Had right inguinal hernia repair maybe 5 years ago, thinks might be related. [place] [facility] Open repair with mesh- Bard modified Kugel patch and extra large plug  Gets episodes of pain right abdomen that can get very severe, gets balled up on floor, can't do much.  Had one recently just while out for a drive.  History of kidney stones and this feels different. Has not noticed any definite recurrence of hernia but does not want to push there with the pain  nothing makes the episode better.  Patient has not family history of hernia problems    Review Of Systems    Skin: negative  Ears/Nose/Throat: negative  Respiratory: No shortness of breath, dyspnea on exertion, cough, or hemoptysis. 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  Endocrine: diabetes      Past Medical History:   Diagnosis Date     Anaphylactic reaction 8/14/2015     Anxiety      Depression      DM2 (diabetes mellitus, type 2) (H)      GERD (gastroesophageal reflux disease)      HTN (hypertension)      IBS (irritable bowel syndrome)      Kidney stone 6/15/2011    Pt believes these were Calcium stones     Neuropathy        Past Surgical History:   Procedure Laterality Date     ARTHROSCOPY KNEE WITH MEDIAL MENISCECTOMY Right 8/19/2022     Procedure: examination under anesthesia, right knee arthroscopy, meniscectomy;  Surgeon: Carlos A Em MD;  Location: [facility] OR     COLONOSCOPY  5/1/2012    Procedure:COLONOSCOPY; screening colonoscopy; Surgeon:KINGSLEY DOS SANTOS; Location:[facility] OR     COLONOSCOPY  4/21/2014    Procedure: COMBINED COLONOSCOPY, SINGLE BIOPSY/POLYPECTOMY BY BIOPSY;  Surgeon: Duane, William Charles, MD;  Location: [facility] OR     COLONOSCOPY N/A 4/21/2022    Procedure: COLONOSCOPY, WITH POLYPECTOMY AND BIOPSY;  Surgeon: Luiz Calvert MD;  Location: [facility]     CYSTOSCOPY  05/01/2008    CYSTOSCOPY W/ URETERAL STENT PLACEMENT 05/01/2008      CYSTOSCOPY  09/26/2010    CYSTOSCOPY W/ URETERAL STENT PLACEMENT 09/26/2010 Left      CYSTOSCOPY  05/18/2012    CYSTOSCOPY, LEFT URETEROSCOPY AND STENT PLACEMENT left retrograde 05/18/2012      CYSTOSCOPY,+URETEROSCOPY  06/10/2008    URETEROSCOPY 06/10/2008 Right      HC BREATH HYDROGEN TEST  3/7/2014    Procedure: HYDROGEN BREATH TEST;  Surgeon: Darion Swift MD;  Location: [facility]     INJECT EPIDURAL LUMBAR N/A 7/7/2022    Procedure: INJECTION, SPINE, LUMBAR, EPIDURAL L5/S1;  Surgeon: Michi Hahn MD;  Location: [facility] OR     LITHOTRIPSY  09/30/2010    LITHOTRIPSY 09/30/2010 LEFT EXTRACORPOREAL SHOCK WAVE LITHOTRIPSY, FLEXIBLE CYSTOSCOPY, LEFT STENT REMOVAL       ORTHOPEDIC SURGERY  10/03/2007    KNEE ARTHROSCOPY 10/03/2007 RightX2       RELEASE CARPAL TUNNEL Right 1/26/2018    Procedure: RELEASE CARPAL TUNNEL;  Right carpal tunnel release;  Surgeon: Wiliam Saenz MD;  Location: [facility] OR     VASCULAR SURGERY  11/24/2008    Radiofrequency ablation left saphenous vein 11/24/2008 Done by Dr. Lozoya         Social History     Socioeconomic History     Marital status: Single     Spouse name: Not on file     Number of children: 0     Years of education: Not on file     Highest education level: Not on file   Occupational History     Occupation: - with robotics     Employer: [employer]   Tobacco Use     Smoking status: Never     Smokeless tobacco: Current     Types: Chew     Tobacco comments:     Chew   Vaping Use     Vaping status: Never Used   Substance and Sexual Activity     Alcohol use: Not Currently     Alcohol/week: 0.0 standard drinks of alcohol     Comment: occasional, few drinks a month     Drug use: Yes     Comment: CBD occasional     Sexual activity: Never   Other Topics Concern     Parent/sibling w/ CABG, MI or angioplasty before 65F 55M? Not Asked   Social History Narrative    Works at [employer], as a  (25+ years experience).       Social Determinants of Health     Financial Resource Strain: Not on file   Food Insecurity: Not on file   Transportation Needs: Not on file   Physical Activity: Not on file   Stress: Not on file   Social Connections: Not on file   Intimate Partner Violence: Not on file   Housing Stability: Not on file       Current Outpatient Medications   Medication Sig Dispense Refill     albuterol (PROAIR HFA/PROVENTIL HFA/VENTOLIN HFA) 108 (90 Base) MCG/ACT inhaler Inhale 2 puffs into the lungs every 6 hours as needed for shortness of breath or wheezing 6.7 g 3     amLODIPine (NORVASC) 10 MG tablet Take 1 tablet (10 mg) by mouth daily for blood pressure 90 tablet 1     ASPIRIN PO Take 325 mg by mouth daily        atorvastatin (LIPITOR) 40 MG tablet Take 1 tablet (40 mg) by mouth daily 90 tablet 3     blood glucose (ACCU-CHEK GUIDE) test strip Use to test blood sugar 3 times daily or as directed. 300 strip 3     blood glucose monitoring (ACCU-CHEK FASTCLIX) lancets Use to test blood sugar 1 times daily or as directed.  Ok to substitute alternative if insurance prefers. 102 each 11     calcium carbonate (TUMS) 500 MG chewable tablet Take 1 chew tab by mouth daily       chlorthalidone (HYGROTON) 25 MG tablet Take 1 tablet (25 mg) by mouth daily Add on for blood pressure 90 tablet 1     DULoxetine (CYMBALTA) 60 MG capsule Take 1 capsule (60 mg) by  "mouth 2 times daily 180 capsule 3     EPINEPHrine (ANY BX GENERIC EQUIV) 0.3 MG/0.3ML injection 2-pack Inject 0.3 mLs (0.3 mg) into the muscle once as needed for anaphylaxis 2 each 2     fluticasone-salmeterol (ADVAIR) 500-50 MCG/ACT inhaler Inhale 1 puff into the lungs every 12 hours 1 each 11     insulin aspart (NOVOLOG VIAL) 100 UNITS/ML vial Inject 4 Units Subcutaneous 3 times daily (with meals) Dispense 360 units/month 360 mL 1     insulin glargine (BASAGLAR KWIKPEN) 100 UNIT/ML pen Inject 50 Units Subcutaneous daily 15 mL 1     insulin pen needle (32G X 4 MM) 32G X 4 MM miscellaneous Use 3  pen needles daily or as directed. 200 each 1     insulin pen needle (32G X 4 MM) 32G X 4 MM miscellaneous Use 2 pen needles daily or as directed. 200 each 1     insulin syringe-needle U-100 (29G X 1/2\" 0.5 ML) 29G X 1/2\" 0.5 ML miscellaneous Use 3 syringes daily or as directed. 200 each 1     losartan (COZAAR) 25 MG tablet Take 1 tablet (25 mg) by mouth daily 90 tablet 1     meclizine (ANTIVERT) 25 MG tablet Take 1 tablet (25 mg) by mouth 3 times daily as needed for dizziness 90 tablet 1     metFORMIN (GLUCOPHAGE XR) 500 MG 24 hr tablet Take 2 tablets (1,000 mg) by mouth 2 times daily (with meals) 120 tablet 5     montelukast (SINGULAIR) 10 MG tablet Take 1 tablet (10 mg) by mouth At Bedtime For allergies/asthma 90 tablet 2     multivitamin, therapeutic (THERA-VIT) TABS Take 1 tablet by mouth daily       naproxen sodium (ANAPROX) 220 MG tablet Take 220 mg by mouth       nortriptyline (PAMELOR) 10 MG capsule Take 2 capsules (20 mg) by mouth At Bedtime 60 capsule 5     omeprazole (PRILOSEC) 40 MG DR capsule Take 1 capsule (40 mg) by mouth daily Take for at least two months 90 capsule 2     pregabalin (LYRICA) 50 MG capsule Take 1 capsule (50 mg) by mouth 2 times daily 60 capsule 1     rizatriptan (MAXALT-MLT) 10 MG ODT TAKE 1 TABLET BY MOUTH AT ONSET OF HEADACHE FOR MIGRAINE MAY REPEAT IN 2 HOURS. MAX 3 TABS/24 HOURS. 18 " tablet 1     sildenafil (VIAGRA) 50 MG tablet Take 1-2 tablets ( mg) by mouth daily as needed (prior to sexual activity) 20 tablet 3     warfarin ANTICOAGULANT (COUMADIN) 5 MG tablet Take 15 mg Tues, Thurs and 12.5 mg all other days, or as directed by the Coumadin clinic 225 tablet 1       Medications and history reviewed    Physical exam:  Vitals: BP (!) 151/94   Pulse 62   Wt 131.1 kg (289 lb)   BMI 39.20 kg/m    BMI= Body mass index is 39.2 kg/m .    Constitutional: healthy, alert and no distress  Head: Normocephalic. No masses, lesions, tenderness or abnormalities  Gastrointestinal: Abdomen soft. BS normal. No masses, organomegaly, positive findings: obese, few areas of local bruising from injections.  Mild tenderness in the right abdomen/right lower quadrant with palpation but the same is mostly focused in the right groin.  Exam of the groin somewhat limited by body habitus but did not feel any obvious hernia recurrence, no impulse with cough.  Pain was primarily with the palpation, Valsalva/cough did not worsen.  Musculoskeletal: extremities normal- no gross deformities noted, gait normal and normal muscle tone.  Brace on left foot.  Skin: no suspicious lesions or rashes  Psychiatric: mentation appears normal and affect normal/bright      Assessment:     ICD-10-CM    1. Right inguinal pain  R10.31 CT Abdomen Pelvis w/o Contrast        Plan: Abdominal pain may actually be primarily from right groin which is area he had an open hernia repair in 2016.  Had a plug and patch.  Certainly may be because of pain.  No imaging done yet so we will go ahead with a CAT scan to get a look at the abdominal organs and look for any hernia recurrence.  Will let him know once imaging reviewed and can decide on plan going forward.  This pain can get quite severe and is very limiting to his daily activities.    Sherman Poon MD        Again, thank you for allowing me to participate in the care of your patient.         Sincerely,        Sherman Poon MD

## 2023-05-01 NOTE — PROGRESS NOTES
Patient seen in consultation for abdominal pain by Bertha Romero    HPI:  Patient is a 57 year old male  with complaints of pain in abdomen  The patient noticed the symptoms about 4 months ago.    Had right inguinal hernia repair maybe 5 years ago, thinks might be related. [place] Marie [place] [facility] [place] repair with mesh- Bard modified Kugel patch and extra large plug  Gets episodes of pain right abdomen that can get very severe, gets balled up on floor, can't do much.  Had one recently just while out for a drive.  History of kidney stones and this feels different. Has not noticed any definite recurrence of hernia but does not want to push there with the pain  nothing makes the episode better.  Patient has not family history of hernia problems    Review Of Systems    Skin: negative  Ears/Nose/Throat: negative  Respiratory: No shortness of breath, dyspnea on exertion, cough, or hemoptysis. 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  Endocrine: diabetes      Past Medical History:   Diagnosis Date     Anaphylactic reaction 8/14/2015     Anxiety      Depression      DM2 (diabetes mellitus, type 2) (H)      GERD (gastroesophageal reflux disease)      HTN (hypertension)      IBS (irritable bowel syndrome)      Kidney stone 6/15/2011    Pt believes these were Calcium stones     Neuropathy        Past Surgical History:   Procedure Laterality Date     ARTHROSCOPY KNEE WITH MEDIAL MENISCECTOMY Right 8/19/2022    Procedure: examination under anesthesia, right knee arthroscopy, meniscectomy;  Surgeon: Carlos A Em MD;  Location: [facility] [place]     COLONOSCOPY  5/1/2012    Procedure:COLONOSCOPY; screening colonoscopy; Surgeon:KINGSLEY DOS SANTOS;  Location:[facility] OR     COLONOSCOPY  4/21/2014    Procedure: COMBINED COLONOSCOPY, SINGLE BIOPSY/POLYPECTOMY BY BIOPSY;  Surgeon: Duane, William Charles, MD;  Location: [facility] OR     COLONOSCOPY N/A 4/21/2022    Procedure: COLONOSCOPY, WITH POLYPECTOMY AND BIOPSY;  Surgeon: Luiz Calvert MD;  Location: [facility]     CYSTOSCOPY  05/01/2008    CYSTOSCOPY W/ URETERAL STENT PLACEMENT 05/01/2008      CYSTOSCOPY  09/26/2010    CYSTOSCOPY W/ URETERAL STENT PLACEMENT 09/26/2010 Left      CYSTOSCOPY  05/18/2012    CYSTOSCOPY, LEFT URETEROSCOPY AND STENT PLACEMENT left retrograde 05/18/2012      CYSTOSCOPY,+URETEROSCOPY  06/10/2008    URETEROSCOPY 06/10/2008 Right      HC BREATH HYDROGEN TEST  3/7/2014    Procedure: HYDROGEN BREATH TEST;  Surgeon: Darion Swift MD;  Location: [facility]     INJECT EPIDURAL LUMBAR N/A 7/7/2022    Procedure: INJECTION, SPINE, LUMBAR, EPIDURAL L5/S1;  Surgeon: Michi Hahn MD;  Location: [facility] OR     LITHOTRIPSY  09/30/2010    LITHOTRIPSY 09/30/2010 LEFT EXTRACORPOREAL SHOCK WAVE LITHOTRIPSY, FLEXIBLE CYSTOSCOPY, LEFT STENT REMOVAL       ORTHOPEDIC SURGERY  10/03/2007    KNEE ARTHROSCOPY 10/03/2007 RightX2       RELEASE CARPAL TUNNEL Right 1/26/2018    Procedure: RELEASE CARPAL TUNNEL;  Right carpal tunnel release;  Surgeon: Wiliam Saenz MD;  Location: [facility] OR     VASCULAR SURGERY  11/24/2008    Radiofrequency ablation left saphenous vein 11/24/2008 Done by Dr. Lozoya         Social History     Socioeconomic History     Marital status: Single     Spouse name: Not on file     Number of children: 0     Years of education: Not on file     Highest education level: Not on file   Occupational History     Occupation: - with robotics     Employer: [employer]   Tobacco Use     Smoking status: Never     Smokeless tobacco: Current     Types: Chew     Tobacco comments:     Chew   Vaping Use     Vaping status: Never Used   Substance and Sexual Activity     Alcohol use: Not Currently      Alcohol/week: 0.0 standard drinks of alcohol     Comment: occasional, few drinks a month     Drug use: Yes     Comment: CBD occasional     Sexual activity: Never   Other Topics Concern     Parent/sibling w/ CABG, MI or angioplasty before 65F 55M? Not Asked   Social History Narrative    Works at [employer], as a  (25+ years experience).       Social Determinants of Health     Financial Resource Strain: Not on file   Food Insecurity: Not on file   Transportation Needs: Not on file   Physical Activity: Not on file   Stress: Not on file   Social Connections: Not on file   Intimate Partner Violence: Not on file   Housing Stability: Not on file       Current Outpatient Medications   Medication Sig Dispense Refill     albuterol (PROAIR HFA/PROVENTIL HFA/VENTOLIN HFA) 108 (90 Base) MCG/ACT inhaler Inhale 2 puffs into the lungs every 6 hours as needed for shortness of breath or wheezing 6.7 g 3     amLODIPine (NORVASC) 10 MG tablet Take 1 tablet (10 mg) by mouth daily for blood pressure 90 tablet 1     ASPIRIN PO Take 325 mg by mouth daily        atorvastatin (LIPITOR) 40 MG tablet Take 1 tablet (40 mg) by mouth daily 90 tablet 3     blood glucose (ACCU-CHEK GUIDE) test strip Use to test blood sugar 3 times daily or as directed. 300 strip 3     blood glucose monitoring (ACCU-CHEK FASTCLIX) lancets Use to test blood sugar 1 times daily or as directed.  Ok to substitute alternative if insurance prefers. 102 each 11     calcium carbonate (TUMS) 500 MG chewable tablet Take 1 chew tab by mouth daily       chlorthalidone (HYGROTON) 25 MG tablet Take 1 tablet (25 mg) by mouth daily Add on for blood pressure 90 tablet 1     DULoxetine (CYMBALTA) 60 MG capsule Take 1 capsule (60 mg) by mouth 2 times daily 180 capsule 3     EPINEPHrine (ANY BX GENERIC EQUIV) 0.3 MG/0.3ML injection 2-pack Inject 0.3 mLs (0.3 mg) into the muscle once as needed for anaphylaxis 2 each 2     fluticasone-salmeterol (ADVAIR) 500-50 MCG/ACT  "inhaler Inhale 1 puff into the lungs every 12 hours 1 each 11     insulin aspart (NOVOLOG VIAL) 100 UNITS/ML vial Inject 4 Units Subcutaneous 3 times daily (with meals) Dispense 360 units/month 360 mL 1     insulin glargine (BASAGLAR KWIKPEN) 100 UNIT/ML pen Inject 50 Units Subcutaneous daily 15 mL 1     insulin pen needle (32G X 4 MM) 32G X 4 MM miscellaneous Use 3  pen needles daily or as directed. 200 each 1     insulin pen needle (32G X 4 MM) 32G X 4 MM miscellaneous Use 2 pen needles daily or as directed. 200 each 1     insulin syringe-needle U-100 (29G X 1/2\" 0.5 ML) 29G X 1/2\" 0.5 ML miscellaneous Use 3 syringes daily or as directed. 200 each 1     losartan (COZAAR) 25 MG tablet Take 1 tablet (25 mg) by mouth daily 90 tablet 1     meclizine (ANTIVERT) 25 MG tablet Take 1 tablet (25 mg) by mouth 3 times daily as needed for dizziness 90 tablet 1     metFORMIN (GLUCOPHAGE XR) 500 MG 24 hr tablet Take 2 tablets (1,000 mg) by mouth 2 times daily (with meals) 120 tablet 5     montelukast (SINGULAIR) 10 MG tablet Take 1 tablet (10 mg) by mouth At Bedtime For allergies/asthma 90 tablet 2     multivitamin, therapeutic (THERA-VIT) TABS Take 1 tablet by mouth daily       naproxen sodium (ANAPROX) 220 MG tablet Take 220 mg by mouth       nortriptyline (PAMELOR) 10 MG capsule Take 2 capsules (20 mg) by mouth At Bedtime 60 capsule 5     omeprazole (PRILOSEC) 40 MG DR capsule Take 1 capsule (40 mg) by mouth daily Take for at least two months 90 capsule 2     pregabalin (LYRICA) 50 MG capsule Take 1 capsule (50 mg) by mouth 2 times daily 60 capsule 1     rizatriptan (MAXALT-MLT) 10 MG ODT TAKE 1 TABLET BY MOUTH AT ONSET OF HEADACHE FOR MIGRAINE MAY REPEAT IN 2 HOURS. MAX 3 TABS/24 HOURS. 18 tablet 1     sildenafil (VIAGRA) 50 MG tablet Take 1-2 tablets ( mg) by mouth daily as needed (prior to sexual activity) 20 tablet 3     warfarin ANTICOAGULANT (COUMADIN) 5 MG tablet Take 15 mg Tues, Thurs and 12.5 mg all other " days, or as directed by the Coumadin clinic 225 tablet 1       Medications and history reviewed    Physical exam:  Vitals: BP (!) 151/94   Pulse 62   Wt 131.1 kg (289 lb)   BMI 39.20 kg/m    BMI= Body mass index is 39.2 kg/m .    Constitutional: healthy, alert and no distress  Head: Normocephalic. No masses, lesions, tenderness or abnormalities  Gastrointestinal: Abdomen soft. BS normal. No masses, organomegaly, positive findings: obese, few areas of local bruising from injections.  Mild tenderness in the right abdomen/right lower quadrant with palpation but the same is mostly focused in the right groin.  Exam of the groin somewhat limited by body habitus but did not feel any obvious hernia recurrence, no impulse with cough.  Pain was primarily with the palpation, Valsalva/cough did not worsen.  Musculoskeletal: extremities normal- no gross deformities noted, gait normal and normal muscle tone.  Brace on left foot.  Skin: no suspicious lesions or rashes  Psychiatric: mentation appears normal and affect normal/bright      Assessment:     ICD-10-CM    1. Right inguinal pain  R10.31 CT Abdomen Pelvis w/o Contrast        Plan: Abdominal pain may actually be primarily from right groin which is area he had an open hernia repair in 2016.  Had a plug and patch.  Certainly may be because of pain.  No imaging done yet so we will go ahead with a CAT scan to get a look at the abdominal organs and look for any hernia recurrence.  Will let him know once imaging reviewed and can decide on plan going forward.  This pain can get quite severe and is very limiting to his daily activities.    Sherman Poon MD

## 2023-05-05 ENCOUNTER — ANCILLARY PROCEDURE (OUTPATIENT)
Dept: CT IMAGING | Facility: CLINIC | Age: 58
End: 2023-05-05
Attending: SURGERY
Payer: COMMERCIAL

## 2023-05-05 DIAGNOSIS — R10.31 RIGHT INGUINAL PAIN: ICD-10-CM

## 2023-05-05 PROCEDURE — 74176 CT ABD & PELVIS W/O CONTRAST: CPT | Performed by: RADIOLOGY

## 2023-05-08 ENCOUNTER — ALLIED HEALTH/NURSE VISIT (OUTPATIENT)
Dept: EDUCATION SERVICES | Facility: CLINIC | Age: 58
End: 2023-05-08
Payer: COMMERCIAL

## 2023-05-08 DIAGNOSIS — Z79.4 TYPE 2 DIABETES MELLITUS WITH DIABETIC POLYNEUROPATHY, WITH LONG-TERM CURRENT USE OF INSULIN (H): ICD-10-CM

## 2023-05-08 DIAGNOSIS — Z79.4 TYPE 2 DIABETES MELLITUS WITH DIABETIC POLYNEUROPATHY, WITH LONG-TERM CURRENT USE OF INSULIN (H): Primary | ICD-10-CM

## 2023-05-08 DIAGNOSIS — E11.42 TYPE 2 DIABETES MELLITUS WITH DIABETIC POLYNEUROPATHY, WITH LONG-TERM CURRENT USE OF INSULIN (H): Primary | ICD-10-CM

## 2023-05-08 DIAGNOSIS — E11.42 TYPE 2 DIABETES MELLITUS WITH DIABETIC POLYNEUROPATHY, WITH LONG-TERM CURRENT USE OF INSULIN (H): ICD-10-CM

## 2023-05-08 PROCEDURE — G0108 DIAB MANAGE TRN  PER INDIV: HCPCS | Performed by: DIETITIAN, REGISTERED

## 2023-05-08 RX ORDER — INSULIN GLARGINE 100 [IU]/ML
38 INJECTION, SOLUTION SUBCUTANEOUS DAILY
Qty: 15 ML | Refills: 1 | Status: SHIPPED | OUTPATIENT
Start: 2023-05-08 | End: 2023-09-11

## 2023-05-08 RX ORDER — INSULIN ASPART 100 [IU]/ML
INJECTION, SOLUTION INTRAVENOUS; SUBCUTANEOUS
Qty: 15 ML | Refills: 3 | Status: SHIPPED | OUTPATIENT
Start: 2023-05-08 | End: 2024-05-09

## 2023-05-08 NOTE — PATIENT INSTRUCTIONS
Basaglar 38    Novolog 18 units with breakfast and 18 units with dinner.  Once you start the Januvia if you get any lows decrease your dose to 12 units.    I will send a new prescription to your doctor to switch to novolog pens vs. Vials    I will also request Januvia from your doctor.    Let me know if you can get the [email or web address] kirk working on your phone.  If it is I will order the Shari 3.  IF not I will order a Shari 2 and the reader.  Send me a [employer] message when you know.    Alternative milk options: fairlife milk, almond milk OR greek yogurt (old home or rivera)/cottage cheese-     Look for high Fiber cereals.

## 2023-05-08 NOTE — PROGRESS NOTES
Diabetes Self-Management Education & Support    Presents for: Individual review    Type of Service: In Person Visit    Assessment Type:   ASSESSMENT:  Current blood sugars are significantly elevated.  His balance of Novolog to Basaglar is heavy on the long acting.  He would benefit from a 0.2u/kg insulin dose increase, then re balance toward a 50/50 split.  Additionally he would benefit from weight loss which will only happen if he changes his diet and we are able to lessen his blood sugar control from insulin.  He reports Trulicity was too expensive so he did not start taking that medication.  Spoke with the pharmacy group who estimate Januavia, Farxiga/Jardiance, and shari OR dexcom are all  0$ copay.  Recommend starting patient with Januvia and a shari for further improved blood sugars.     His current intake is high carbohydrate for breakfast and would benefit from either selecting a different milk option OR higher fiber cereal.  He notes his activity in the winter is low, but frequently fly fishes all summer.    Patient's most recent   Lab Results   Component Value Date    A1C 11.3 04/10/2023    A1C 7.4 12/03/2020     is not meeting goal of <7.0    Diabetes knowledge and skills assessment:   Patient is knowledgeable in diabetes management concepts related to: Being Active    Continue education with the following diabetes management concepts: Healthy Eating, Monitoring and Taking Medication    Based on learning assessment above, most appropriate setting for further diabetes education would be: Individual setting.      PLAN    Patient to try to download Shari 3 kirk, if not will order Shari 2.  Patient to message CDE this week regarding his phones abilities.    Recommend TDD insulin increase by 0.2 unit(s)/kg to address elevations and rebalance doses: Basaglar 50-0-0-0 --> 38-0-0-0, Novolog 4-0-4-0 --> 18-0-18-0.  Additionally recommend Novolog flex pens vs. Vials.    Recommend start Januvia 25mg, which will  hopefully lessen his insulin need.  Topics to cover at upcoming visits: Healthy Eating, Monitoring and Taking Medication    Follow-up: 6/5/23    See Care Plan for co-developed, patient-state behavior change goals.  AVS provided for patient today.    Education Materials Provided:  No new materials provided today      SUBJECTIVE/OBJECTIVE:  Presents for: Individual review  Accompanied by: Self  Diabetes education in the past 24mo: No  Focus of Visit: Monitoring, Taking Medication, Assistance w/ making life changes  Diabetes type: Type 2  Disease course: Worsening  How confident are you filling out medical forms by yourself:: Extremely  Diabetes management related comments/concerns: I would like a pen and not a vial of the novolog.  I'd like to lose weight but I couldn't afford the trulicity.  Transportation concerns: No  Difficulty affording diabetes medication?: Sometimes  Difficulty affording diabetes testing supplies?: Sometimes  Other concerns:: None  Cultural Influences/Ethnic Background:  Not  or     Diabetes Symptoms & Complications:  Fatigue: Yes  Neuropathy: Yes  Weight trend: Stable  Complications assessed today?: Yes  CVA: Yes  Sexual dysfunction: Yes    Patient Problem List and Family Medical History reviewed for relevant medical history, current medical status, and diabetes risk factors.    Vitals:  There were no vitals taken for this visit.  Estimated body mass index is 39.2 kg/m  as calculated from the following:    Height as of 3/8/23: 1.829 m (6').    Weight as of 5/1/23: 131.1 kg (289 lb).   Last 3 BP:   BP Readings from Last 3 Encounters:   05/01/23 (!) 151/94   04/24/23 (!) 146/76   04/10/23 131/88       History   Smoking Status     Never   Smokeless Tobacco     Current     Types: Chew       Labs:  Lab Results   Component Value Date    A1C 11.3 04/10/2023    A1C 7.4 12/03/2020     Lab Results   Component Value Date     04/10/2023     01/29/2021     Lab Results    Component Value Date    LDL  04/10/2023      Comment:      Cannot estimate LDL when triglyceride exceeds 400 mg/dL     04/10/2023    LDL  03/26/2020     Cannot estimate LDL when triglyceride exceeds 400 mg/dL     03/26/2020     HDL Cholesterol   Date Value Ref Range Status   03/26/2020 32 (L) >39 mg/dL Final     Direct Measure HDL   Date Value Ref Range Status   04/10/2023 46 >=40 mg/dL Final   ]  GFR Estimate   Date Value Ref Range Status   04/10/2023 >90 >60 mL/min/1.73m2 Final     Comment:     eGFR calculated using 2021 CKD-EPI equation.   01/29/2021 >90 >60 mL/min/[1.73_m2] Final     Comment:     Non  GFR Calc  Starting 12/18/2018, serum creatinine based estimated GFR (eGFR) will be   calculated using the Chronic Kidney Disease Epidemiology Collaboration   (CKD-EPI) equation.       GFR Estimate If Black   Date Value Ref Range Status   01/29/2021 >90 >60 mL/min/[1.73_m2] Final     Comment:      GFR Calc  Starting 12/18/2018, serum creatinine based estimated GFR (eGFR) will be   calculated using the Chronic Kidney Disease Epidemiology Collaboration   (CKD-EPI) equation.       Lab Results   Component Value Date    CR 0.94 04/10/2023    CR 0.94 01/29/2021     No results found for: MICROALBUMIN    Healthy Eating:  Healthy Eating Assessed Today: Yes  Cultural/Anabaptist diet restrictions?: No  Meal planning/habits: None  Meals include: Breakfast, Dinner  Breakfast: bowl rice krispies  (corn flakes, life, grape nuts, shredded wheat) OR eggs, toast and schultz  OR cream of wheat OR oatmeal - milk 1-2 banana  Dinner: pork chop- pile of potato- mixed veggies  Snacks: 1-2 fruits or hamburger.  Doesnt snack every day  Beverages: Water, Tea, Other (3/4c koolaid per gallon)  Has patient met with a dietitian in the past?: Yes    Being Active:  Being Active Assessed Today: Yes  Exercise:: Yes (lots of fly fishing in the summer, none in winter)  Days per week of moderate to strenuous  exercise (like a brisk walk): 3  On average, minutes per day of exercise at this level: 40  How intense was your typical exercise? : Light (like stretching or slow walking)  Exercise Minutes per Week: 120  Barrier to exercise: Other    Monitoring:  Monitoring Assessed Today: Yes  Did patient bring glucose meter to appointment? : Yes  Blood Glucose Meter: Accu-chek  Times checking blood sugar at home (number): 3  Times checking blood sugar at home (per): Day  Blood glucose trend: Fluctuating          Taking Medications:  Diabetes Medication(s)     Biguanides       metFORMIN (GLUCOPHAGE XR) 500 MG 24 hr tablet    Take 2 tablets (1,000 mg) by mouth 2 times daily (with meals)    Insulin       insulin aspart (NOVOLOG VIAL) 100 UNITS/ML vial    Inject 4 Units Subcutaneous 3 times daily (with meals) Dispense 360 units/month     insulin glargine (BASAGLAR KWIKPEN) 100 UNIT/ML pen    Inject 50 Units Subcutaneous daily          Taking Medication Assessed Today: Yes  Current Treatments: Non-insulin Injectables, Oral Medication (taken by mouth)  Problems taking diabetes medications regularly?: No  Diabetes medication side effects?: No    Problem Solving:  Problem Solving Assessed Today: Yes  Is the patient at risk for hypoglycemia?: Yes  Hypoglycemia Frequency: Never  Does patient have glucagon emergency kit?: No  Is the patient at risk for DKA?: No     Reducing Risks:  Diabetes Risks: Age over 45 years, Sedentary Lifestyle  CAD Risks: Diabetes Mellitus, Stress, Tobacco exposure, Sedentary lifestyle    Healthy Coping:  Healthy Coping Assessed Today: Yes  Emotional response to diabetes: Ready to learn  Stage of change: PREPARATION (Decided to change - considering how)  Support resources: None  Patient Activation Measure Survey Score:      1/24/2022     7:00 AM 4/10/2023     8:00 AM   DEMETRIUS Score (Last Two)   DEMETRIUS Raw Score 40 40   Activation Score 100 100   DEMETRIUS Level 4 4         Care Plan and Education Provided:  Care Plan:  Diabetes   Updates made by Ivory Dennis RD since 5/8/2023 12:00 AM      Problem: HbA1C Not In Goal       Goal: Establish Regular Follow-Ups with PCP       Task: Discuss with PCP the recommended timing for patient's next follow up visit(s)    Responsible User: Ivory Dennis RD      Task: Discuss schedule for PCP visits with patient    Responsible User: Ivory Dennis RD      Goal: Get HbA1C Level in Goal       Task: Educate patient on diabetes education self-management topics Completed 5/8/2023   Responsible User: Ivory Dennis RD      Task: Educate patient on benefits of regular glucose monitoring Completed 5/8/2023   Responsible User: Ivory Dennis RD      Task: Refer patient to appropriate extended care team member, as needed (Medication Therapy Management, Behavioral Health, Physical Therapy, etc.)    Responsible User: Ivory Dennis RD      Task: Discuss diabetes treatment plan with patient    Responsible User: Ivory Dennis RD      Problem: Diabetes Self-Management Education Needed to Optimize Self-Care Behaviors       Goal: Understand diabetes pathophysiology and disease progression       Task: Provide education on diabetes pathophysiology and disease progression specfic to patient's diabetes type    Responsible User: Ivory Dennis RD      Goal: Healthy Eating - follow a healthy eating pattern for diabetes    This Visit's Progress: 10%   Note:    I will consider trying a higher fiber cereal     Task: Provide education on portion control and consistency in amount, composition and timing of food intake Completed 5/8/2023   Responsible User: Ivory Dennis RD      Task: Provide education on managing carbohydrate intake (carbohydrate counting, plate planning method, etc.)    Responsible User: Ivory Dennis RD      Task: Provide education on weight management Completed 5/8/2023   Responsible User: Ivory Dennis RD      Task: Provide education on heart healthy eating    Responsible  User: Ivory Dennis RD      Task: Provide education on eating out    Responsible User: Ivory Dennis RD      Task: Develop individualized healthy eating plan with patient Completed 5/8/2023   Responsible User: Ivory Dennis RD      Goal: Being Active - get regular physical activity, working up to at least 150 minutes per week       Task: Provide education on relationship of activity to glucose and precautions to take if at risk for low glucose    Responsible User: Ivory Dennis RD      Task: Discuss barriers to physical activity with patient    Responsible User: Ivory Dennis RD      Task: Develop physical activity plan with patient    Responsible User: Ivory Dennis RD      Task: Explore community resources including walking groups, assistance programs, and home videos    Responsible User: Ivory Dennis RD      Goal: Monitoring - monitor glucose and ketones as directed       Task: Provide education on blood glucose monitoring (purpose, proper technique, frequency, glucose targets, interpreting results, when to use glucose control solution, sharps disposal) Completed 5/8/2023   Responsible User: Ivory Dennis RD      Task: Provide education on continuous glucose monitoring (sensor placement, use of kirk or /reader, understanding glucose trends, alerts and alarms, differences between sensor glucose and blood glucose) Completed 5/8/2023   Responsible User: Ivory Dennis RD      Task: Provide education on ketone monitoring (when to monitor, frequency, etc.)    Responsible User: Ivory Dennis RD      Goal: Taking Medication - patient is consistently taking medications as directed       Task: Provide education on action of prescribed medication, including when to take and possible side effects    Responsible User: Ivory Dennis RD      Task: Provide education on insulin and injectable diabetes medications, including administration, storage, site selection and rotation for injection  sites    Responsible User: Ivory Dennis RD      Task: Discuss barriers to medication adherence with patient and provide management technique ideas as appropriate    Responsible User: Ivory Dennis RD      Task: Provide education on frequency and refill details of medications    Responsible User: Ivory Dennis RD      Goal: Problem Solving - know how to prevent and manage short-term diabetes complications       Task: Provide education on high blood glucose - causes, signs/symptoms, prevention and treatment    Responsible User: Ivory Dennis RD      Task: Provide education on low blood glucose - causes, signs/symptoms, prevention, treatment, carrying a carbohydrate source at all times, and medical identification    Responsible User: Ivory Dennis RD      Task: Provide education on safe travel with diabetes    Responsible User: Ivory Dennis RD      Task: Provide education on how to care for diabetes on sick days    Responsible User: Ivory Dennis RD      Task: Provide education on when to call a health care provider    Responsible User: Ivory Dennis RD      Goal: Reducing Risks - know how to prevent and treat long-term diabetes complications       Task: Provide education on major complications of diabetes, prevention, early diagnostic measures and treatment of complications    Responsible User: Ivory Dennis RD      Task: Provide education on recommended care for dental, eye and foot health    Responsible User: Ivory Dennis RD      Task: Provide education on Hemoglobin A1c - goals and relationship to blood glucose levels    Responsible User: Ivory Dennis RD      Task: Provide education on recommendations for heart health - lipid levels and goals, blood pressure and goals, and aspirin therapy, if indicated    Responsible User: Ivory Dennis RD      Task: Provide education on tobacco cessation    Responsible User: Ivory Dennis RD      Goal: Healthy Coping - use available  resources to cope with the challenges of managing diabetes       Task: Discuss recognizing feelings about having diabetes    Responsible User: Ivory Dennis RD      Task: Provide education on the benefits of making appropriate lifestyle changes    Responsible User: Ivory Dennis RD      Task: Provide education on benefits of utilizing support systems    Responsible User: Ivory Dennis RD      Task: Discuss methods for coping with stress    Responsible User: Ivory Dennis RD      Task: Provide education on when to seek professional counseling    Responsible User: Ivory Dennis RD Hilary Wilde MS, RD, LD, CDE    Time Spent: 65 minutes  Encounter Type: Individual    Any diabetes medication dose changes were made via the CDE Protocol per the patient's primary care provider. A copy of this encounter was shared with the provider.

## 2023-05-08 NOTE — TELEPHONE ENCOUNTER
NOTE TO PROVIDER REQUESTING MEDICATION ORDERS:    Adarsh Salvador glucose are significantly elevated    Current diabetes medications:  yes:     Diabetes Medication(s)     Biguanides       metFORMIN (GLUCOPHAGE XR) 500 MG 24 hr tablet    Take 2 tablets (1,000 mg) by mouth 2 times daily (with meals)    Insulin       insulin aspart (NOVOLOG VIAL) 100 UNITS/ML vial    Inject 4 Units Subcutaneous 3 times daily (with meals) Dispense 360 units/month     insulin glargine (BASAGLAR KWIKPEN) 100 UNIT/ML pen    Inject 50 Units Subcutaneous daily      .       Recommend total daily insulin dose increase by 0.2 unit(s)/kg (26 units).  Recommend rebalancing insulins to provide 50% long acting and 50% short acting.  Additionally recommend novolog pens vs vials. If you prefer different doses please adjust the prescription.    Basaglar: 50-0-0-0 --> 38-0-0-0  Novolog 4-4-4-0 --> 18-0-18-0 (regularly eats 2 meals a day)    Additionally recommend starting Januvia 25mg which appears to be covered by insurance.       Patient follow up plan CDE visit 6/5/23.      Orders pended. If in agreement, please note approval and sign pended orders, otherwise please indicate an alternate plan. Route back to writer to notify the patient.       Ivory Dennis MS, RD, LD, CDE

## 2023-05-08 NOTE — LETTER
5/8/2023         RE: Adarsh Salvador  [address]  [place] [address]        Dear Colleague,    Thank you for referring your patient, Adarsh Salvador, to the [facility]. Please see a copy of my visit note below.    Diabetes Self-Management Education & Support    Presents for: Individual review    Type of Service: In Person Visit    Assessment Type:   ASSESSMENT:  Current blood sugars are significantly elevated.  His balance of Novolog to Basaglar is heavy on the long acting.  He would benefit from a 0.2u/kg insulin dose increase, then re balance toward a 50/50 split.  Additionally he would benefit from weight loss which will only happen if he changes his diet and we are able to lessen his blood sugar control from insulin.  He reports Trulicity was too expensive so he did not start taking that medication.  Spoke with the pharmacy group who estimate Januavia, Farxiga/Jardiance, and valeria OR dexcom are all  0$ copay.  Recommend starting patient with Januvia and a valeria for further improved blood sugars.     His current intake is high carbohydrate for breakfast and would benefit from either selecting a different milk option OR higher fiber cereal.  He notes his activity in the winter is low, but frequently fly fishes all summer.    Patient's most recent   Lab Results   Component Value Date    A1C 11.3 04/10/2023    A1C 7.4 12/03/2020     is not meeting goal of <7.0    Diabetes knowledge and skills assessment:   Patient is knowledgeable in diabetes management concepts related to: Being Active    Continue education with the following diabetes management concepts: Healthy Eating, Monitoring and Taking Medication    Based on learning assessment above, most appropriate setting for further diabetes education would be: Individual setting.      PLAN    Patient to try to download Valeria 3 kirk, if not will order Valeria 2.  Patient to message CDE this week regarding his phones abilities.    Recommend  TDD insulin increase by 0.2 unit(s)/kg to address elevations and rebalance doses: Basaglar 50-0-0-0 --> 38-0-0-0, Novolog 4-0-4-0 --> 18-0-18-0.  Additionally recommend Novolog flex pens vs. Vials.    Recommend start Januvia 25mg, which will hopefully lessen his insulin need.  Topics to cover at upcoming visits: Healthy Eating, Monitoring and Taking Medication    Follow-up: 6/5/23    See Care Plan for co-developed, patient-state behavior change goals.  AVS provided for patient today.    Education Materials Provided:  No new materials provided today      SUBJECTIVE/OBJECTIVE:  Presents for: Individual review  Accompanied by: Self  Diabetes education in the past 24mo: No  Focus of Visit: Monitoring, Taking Medication, Assistance w/ making life changes  Diabetes type: Type 2  Disease course: Worsening  How confident are you filling out medical forms by yourself:: Extremely  Diabetes management related comments/concerns: I would like a pen and not a vial of the novolog.  I'd like to lose weight but I couldn't afford the trulicity.  Transportation concerns: No  Difficulty affording diabetes medication?: Sometimes  Difficulty affording diabetes testing supplies?: Sometimes  Other concerns:: None  Cultural Influences/Ethnic Background:  Not  or     Diabetes Symptoms & Complications:  Fatigue: Yes  Neuropathy: Yes  Weight trend: Stable  Complications assessed today?: Yes  CVA: Yes  Sexual dysfunction: Yes    Patient Problem List and Family Medical History reviewed for relevant medical history, current medical status, and diabetes risk factors.    Vitals:  There were no vitals taken for this visit.  Estimated body mass index is 39.2 kg/m  as calculated from the following:    Height as of 3/8/23: 1.829 m (6').    Weight as of 5/1/23: 131.1 kg (289 lb).   Last 3 BP:   BP Readings from Last 3 Encounters:   05/01/23 (!) 151/94   04/24/23 (!) 146/76   04/10/23 131/88       History   Smoking Status     Never    Smokeless Tobacco     Current     Types: Chew       Labs:  Lab Results   Component Value Date    A1C 11.3 04/10/2023    A1C 7.4 12/03/2020     Lab Results   Component Value Date     04/10/2023     01/29/2021     Lab Results   Component Value Date    LDL  04/10/2023      Comment:      Cannot estimate LDL when triglyceride exceeds 400 mg/dL     04/10/2023    LDL  03/26/2020     Cannot estimate LDL when triglyceride exceeds 400 mg/dL     03/26/2020     HDL Cholesterol   Date Value Ref Range Status   03/26/2020 32 (L) >39 mg/dL Final     Direct Measure HDL   Date Value Ref Range Status   04/10/2023 46 >=40 mg/dL Final   ]  GFR Estimate   Date Value Ref Range Status   04/10/2023 >90 >60 mL/min/1.73m2 Final     Comment:     eGFR calculated using 2021 CKD-EPI equation.   01/29/2021 >90 >60 mL/min/[1.73_m2] Final     Comment:     Non  GFR Calc  Starting 12/18/2018, serum creatinine based estimated GFR (eGFR) will be   calculated using the Chronic Kidney Disease Epidemiology Collaboration   (CKD-EPI) equation.       GFR Estimate If Black   Date Value Ref Range Status   01/29/2021 >90 >60 mL/min/[1.73_m2] Final     Comment:      GFR Calc  Starting 12/18/2018, serum creatinine based estimated GFR (eGFR) will be   calculated using the Chronic Kidney Disease Epidemiology Collaboration   (CKD-EPI) equation.       Lab Results   Component Value Date    CR 0.94 04/10/2023    CR 0.94 01/29/2021     No results found for: MICROALBUMIN    Healthy Eating:  Healthy Eating Assessed Today: Yes  Cultural/Buddhism diet restrictions?: No  Meal planning/habits: None  Meals include: Breakfast, Dinner  Breakfast: bowl rice krispies  (corn flakes, life, grape nuts, shredded wheat) OR eggs, toast and schultz  OR cream of wheat OR oatmeal - milk 1-2 banana  Dinner: pork chop- pile of potato- mixed veggies  Snacks: 1-2 fruits or hamburger.  Doesnt snack every day  Beverages: Water, Tea,  Other (3/4c koolaid per gallon)  Has patient met with a dietitian in the past?: Yes    Being Active:  Being Active Assessed Today: Yes  Exercise:: Yes (lots of fly fishing in the summer, none in winter)  Days per week of moderate to strenuous exercise (like a brisk walk): 3  On average, minutes per day of exercise at this level: 40  How intense was your typical exercise? : Light (like stretching or slow walking)  Exercise Minutes per Week: 120  Barrier to exercise: Other    Monitoring:  Monitoring Assessed Today: Yes  Did patient bring glucose meter to appointment? : Yes  Blood Glucose Meter: Accu-chek  Times checking blood sugar at home (number): 3  Times checking blood sugar at home (per): Day  Blood glucose trend: Fluctuating          Taking Medications:  Diabetes Medication(s)     Biguanides       metFORMIN (GLUCOPHAGE XR) 500 MG 24 hr tablet    Take 2 tablets (1,000 mg) by mouth 2 times daily (with meals)    Insulin       insulin aspart (NOVOLOG VIAL) 100 UNITS/ML vial    Inject 4 Units Subcutaneous 3 times daily (with meals) Dispense 360 units/month     insulin glargine (BASAGLAR KWIKPEN) 100 UNIT/ML pen    Inject 50 Units Subcutaneous daily          Taking Medication Assessed Today: Yes  Current Treatments: Non-insulin Injectables, Oral Medication (taken by mouth)  Problems taking diabetes medications regularly?: No  Diabetes medication side effects?: No    Problem Solving:  Problem Solving Assessed Today: Yes  Is the patient at risk for hypoglycemia?: Yes  Hypoglycemia Frequency: Never  Does patient have glucagon emergency kit?: No  Is the patient at risk for DKA?: No     Reducing Risks:  Diabetes Risks: Age over 45 years, Sedentary Lifestyle  CAD Risks: Diabetes Mellitus, Stress, Tobacco exposure, Sedentary lifestyle    Healthy Coping:  Healthy Coping Assessed Today: Yes  Emotional response to diabetes: Ready to learn  Stage of change: PREPARATION (Decided to change - considering how)  Support resources:  None  Patient Activation Measure Survey Score:      1/24/2022     7:00 AM 4/10/2023     8:00 AM   DEMETRIUS Score (Last Two)   DEMETRIUS Raw Score 40 40   Activation Score 100 100   DEMETRIUS Level 4 4         Care Plan and Education Provided:  Care Plan: Diabetes   Updates made by Ivory Dennis RD since 5/8/2023 12:00 AM      Problem: HbA1C Not In Goal       Goal: Establish Regular Follow-Ups with PCP       Task: Discuss with PCP the recommended timing for patient's next follow up visit(s)    Responsible User: Ivory Dennis RD      Task: Discuss schedule for PCP visits with patient    Responsible User: Ivory Dennis RD      Goal: Get HbA1C Level in Goal       Task: Educate patient on diabetes education self-management topics Completed 5/8/2023   Responsible User: Ivory Dennis RD      Task: Educate patient on benefits of regular glucose monitoring Completed 5/8/2023   Responsible User: Ivory Dennis RD      Task: Refer patient to appropriate extended care team member, as needed (Medication Therapy Management, Behavioral Health, Physical Therapy, etc.)    Responsible User: Ivory Dennis RD      Task: Discuss diabetes treatment plan with patient    Responsible User: Ivory Dennis RD      Problem: Diabetes Self-Management Education Needed to Optimize Self-Care Behaviors       Goal: Understand diabetes pathophysiology and disease progression       Task: Provide education on diabetes pathophysiology and disease progression specfic to patient's diabetes type    Responsible User: Ivory Dennis RD      Goal: Healthy Eating - follow a healthy eating pattern for diabetes    This Visit's Progress: 10%   Note:    I will consider trying a higher fiber cereal     Task: Provide education on portion control and consistency in amount, composition and timing of food intake Completed 5/8/2023   Responsible User: Ivory Dennis RD      Task: Provide education on managing carbohydrate intake (carbohydrate counting, plate  planning method, etc.)    Responsible User: Ivory Dennis RD      Task: Provide education on weight management Completed 5/8/2023   Responsible User: Ivory Dennis RD      Task: Provide education on heart healthy eating    Responsible User: Ivory Dennis RD      Task: Provide education on eating out    Responsible User: Ivory Dennis RD      Task: Develop individualized healthy eating plan with patient Completed 5/8/2023   Responsible User: Ivory Dennis RD      Goal: Being Active - get regular physical activity, working up to at least 150 minutes per week       Task: Provide education on relationship of activity to glucose and precautions to take if at risk for low glucose    Responsible User: Ivory Dennis RD      Task: Discuss barriers to physical activity with patient    Responsible User: Ivory Dennis RD      Task: Develop physical activity plan with patient    Responsible User: Ivory Dennis RD      Task: Explore community resources including walking groups, assistance programs, and home videos    Responsible User: Ivory Dennis RD      Goal: Monitoring - monitor glucose and ketones as directed       Task: Provide education on blood glucose monitoring (purpose, proper technique, frequency, glucose targets, interpreting results, when to use glucose control solution, sharps disposal) Completed 5/8/2023   Responsible User: Ivory Dennis RD      Task: Provide education on continuous glucose monitoring (sensor placement, use of kirk or /reader, understanding glucose trends, alerts and alarms, differences between sensor glucose and blood glucose) Completed 5/8/2023   Responsible User: Ivory Dennis RD      Task: Provide education on ketone monitoring (when to monitor, frequency, etc.)    Responsible User: Ivory Dennis RD      Goal: Taking Medication - patient is consistently taking medications as directed       Task: Provide education on action of prescribed medication,  including when to take and possible side effects    Responsible User: Ivory Dennis RD      Task: Provide education on insulin and injectable diabetes medications, including administration, storage, site selection and rotation for injection sites    Responsible User: Ivory Dennis RD      Task: Discuss barriers to medication adherence with patient and provide management technique ideas as appropriate    Responsible User: Ivory Dennis RD      Task: Provide education on frequency and refill details of medications    Responsible User: Ivory Dennis RD      Goal: Problem Solving - know how to prevent and manage short-term diabetes complications       Task: Provide education on high blood glucose - causes, signs/symptoms, prevention and treatment    Responsible User: Ivory Dennis RD      Task: Provide education on low blood glucose - causes, signs/symptoms, prevention, treatment, carrying a carbohydrate source at all times, and medical identification    Responsible User: Ivory Dennis RD      Task: Provide education on safe travel with diabetes    Responsible User: Ivory Dennis RD      Task: Provide education on how to care for diabetes on sick days    Responsible User: Ivory Dennis RD      Task: Provide education on when to call a health care provider    Responsible User: Ivory Dennis RD      Goal: Reducing Risks - know how to prevent and treat long-term diabetes complications       Task: Provide education on major complications of diabetes, prevention, early diagnostic measures and treatment of complications    Responsible User: Ivory Dennis RD      Task: Provide education on recommended care for dental, eye and foot health    Responsible User: Ivory Dennis RD      Task: Provide education on Hemoglobin A1c - goals and relationship to blood glucose levels    Responsible User: Ivory Dennis RD      Task: Provide education on recommendations for heart health - lipid levels and  goals, blood pressure and goals, and aspirin therapy, if indicated    Responsible User: Ivory Dennis RD      Task: Provide education on tobacco cessation    Responsible User: Ivory Dennis RD      Goal: Healthy Coping - use available resources to cope with the challenges of managing diabetes       Task: Discuss recognizing feelings about having diabetes    Responsible User: Ivory Dennis RD      Task: Provide education on the benefits of making appropriate lifestyle changes    Responsible User: Ivory Dennis RD      Task: Provide education on benefits of utilizing support systems    Responsible User: Ivory Dennis RD      Task: Discuss methods for coping with stress    Responsible User: Ivory Dennis RD      Task: Provide education on when to seek professional counseling    Responsible User: Ivory Dennis RD Hilary Wilde MS, RD, LD, CDE    Time Spent: 65 minutes  Encounter Type: Individual    Any diabetes medication dose changes were made via the CDE Protocol per the patient's primary care provider. A copy of this encounter was shared with the provider.

## 2023-05-10 ENCOUNTER — ANTICOAGULATION THERAPY VISIT (OUTPATIENT)
Dept: ANTICOAGULATION | Facility: CLINIC | Age: 58
End: 2023-05-10

## 2023-05-10 ENCOUNTER — LAB (OUTPATIENT)
Dept: LAB | Facility: CLINIC | Age: 58
End: 2023-05-10
Payer: COMMERCIAL

## 2023-05-10 DIAGNOSIS — I82.409 RECURRENT DEEP VEIN THROMBOSIS (DVT) (H): ICD-10-CM

## 2023-05-10 LAB — INR BLD: 2.3 (ref 0.9–1.1)

## 2023-05-10 PROCEDURE — 85610 PROTHROMBIN TIME: CPT

## 2023-05-10 PROCEDURE — 36415 COLL VENOUS BLD VENIPUNCTURE: CPT

## 2023-05-10 NOTE — PROGRESS NOTES
"ANTICOAGULATION MANAGEMENT     Adarsh Salvador 58 year old male is on warfarin with therapeutic INR result. (Goal INR 2.0-3.0)    Recent labs: (last 7 days)     05/10/23  1124   INR 2.3*       ASSESSMENT       Source(s): Chart Review and Patient/Caregiver Call       Warfarin doses taken: Warfarin taken as instructed    Diet: No new diet changes identified    Medication/supplement changes: None noted    New illness, injury, or hospitalization: Yes: ankle injury-intermittent healing-\"some days are better than others\"    Pt has intermittent abdominal pain. Pt had a hernia repair in 2016 with mesh. Pt saw surgeon on 5/1/23. Pt waiting for advisement on results from recent CT scan.    Signs or symptoms of bleeding or clotting: No    Previous result: Supratherapeutic    Additional findings: Pt will call and update [facility] if any changes or procedures planned         PLAN       Dosing Instructions: Continue your current warfarin dose with next INR in 3 weeks       Summary  As of 5/10/2023    Full warfarin instructions:  15 mg every Tue, Sat; 12.5 mg all other days   Next INR check:  5/31/2023             Telephone call with Adarsh who verbalizes understanding and agrees to plan    Lab visit scheduled    Education provided:     Please call back if any changes to your diet, medications or how you've been taking warfarin    Contact [phone]  with any changes, questions or concerns.     Plan made per [facility] anticoagulation protocol    aRfaela Tucker RN  Anticoagulation Clinic  5/10/2023    _______________________________________________________________________     Anticoagulation Episode Summary     Current INR goal:  2.0-3.0   TTR:  31.8 % (1 y)   Target end date:  Indefinite   Send INR reminders to:  [facility]    Indications    Recurrent deep vein thrombosis (DVT) (H) [I82.409]           Comments:           Anticoagulation Care Providers     Provider Role Specialty Phone number    Bertha Romero PA-C Referring " Family Medicine [phone]

## 2023-05-31 ENCOUNTER — LAB (OUTPATIENT)
Dept: LAB | Facility: CLINIC | Age: 58
End: 2023-05-31
Payer: COMMERCIAL

## 2023-05-31 ENCOUNTER — ANTICOAGULATION THERAPY VISIT (OUTPATIENT)
Dept: ANTICOAGULATION | Facility: CLINIC | Age: 58
End: 2023-05-31

## 2023-05-31 DIAGNOSIS — I82.409 RECURRENT DEEP VEIN THROMBOSIS (DVT) (H): Primary | ICD-10-CM

## 2023-05-31 DIAGNOSIS — I82.409 RECURRENT DEEP VEIN THROMBOSIS (DVT) (H): ICD-10-CM

## 2023-05-31 LAB — INR BLD: 2.4 (ref 0.9–1.1)

## 2023-05-31 PROCEDURE — 36416 COLLJ CAPILLARY BLOOD SPEC: CPT

## 2023-05-31 PROCEDURE — 85610 PROTHROMBIN TIME: CPT

## 2023-05-31 NOTE — PROGRESS NOTES
ANTICOAGULATION MANAGEMENT     Adarsh Salvador 58 year old male is on warfarin with therapeutic INR result. (Goal INR 2.0-3.0)    Recent labs: (last 7 days)     05/31/23  0817   INR 2.4*       ASSESSMENT       Source(s): Chart Review       Warfarin doses taken: Reviewed in chart    Diet: No new diet changes identified    Medication/supplement changes: None noted    New illness, injury, or hospitalization: No    Signs or symptoms of bleeding or clotting: No    Previous result: Therapeutic last visit; previously outside of goal range    Additional findings: None         PLAN     Recommended plan for no diet, medication or health factor changes affecting INR     Dosing Instructions: Continue your current warfarin dose with next INR in 4 weeks       Summary  As of 5/31/2023    Full warfarin instructions:  15 mg every Tue, Sat; 12.5 mg all other days   Next INR check:  6/28/2023             Detailed voice message left for Adarsh with dosing instructions and follow up date.     Contact [phone]  to schedule and with any changes, questions or concerns.     Education provided:     Please call back if any changes to your diet, medications or how you've been taking warfarin    Plan made per [facility] anticoagulation protocol    Priya Plunkett, RN  Anticoagulation Clinic  5/31/2023    _______________________________________________________________________     Anticoagulation Episode Summary     Current INR goal:  2.0-3.0   TTR:  32.5 % (1 y)   Target end date:  Indefinite   Send INR reminders to:  [facility]    Indications    Recurrent deep vein thrombosis (DVT) (H) [I82.409]           Comments:           Anticoagulation Care Providers     Provider Role Specialty Phone number    Bertha Romero PA-C Referring Family Medicine [phone]

## 2023-06-07 NOTE — PROGRESS NOTES
"CHIEF COMPLAINT:   Chief Complaint   Patient presents with     Right Knee - Pain     Right knee pain. Anterior knee pain. Had injection: 3/8/23 worked well for about 1.5 months. Last night he slipped on the stairs and hyperextended a bit, so it's sore today.    .        HISTORY OF PRESENT ILLNESS    Adarsh Salvador is a 58 year old male seen for evaluation of ongoing right knee pain with no known recent injury.   Seen by us 3/8/2023, received cortisone injection. Returns today with continued pain in the front of the knee. Injection helped about 1.5 months. Last night he slipped on the stairs and hyperextended a bit, so more sore today.      Pain has been present for years, on/off since ~1990 slid off an icy roof.  He's had multiple surgeries (~12?) on the knee since then, most recently 8/2022 with Dr Em. Pain started again 1/2023 when moving furniture.     He locates pain along the inner aspect to under the knee cap \"like someone is sticking a bao knife into it\", aggravated with stairs, walking and prolonged standing. Treatment has been epson salt, warm baths, diclofenac, tylenol, topical voltaren.    Pain at rest, night.     He had an injection 1/5/2023 with Dr Em without much relief. He was seen by Dr Waite to discuss whether he'd be a canddiate for total knee arthroplasty, but based on low grade osteoarthritis, didn't think it would be beneficial at this time.      Right knee arthroscopy with medial meniscus debridement 8/19/2022 with Dr Em, noted grade 2 chondrosis.    Patient has a history of recurrent DVT. On warfarin.    History of intermittent low back pain. Numbness and tingling. Takes Lyrica, it does help.    Adarsh is diabetic, A1c=11.3 on 4/10/2023.    Present symptoms: pain medially  and anteriorly, pain sharp, shooting, dull/achy , moderate pain, severe pain.    Pain severity: 6/10  Frequency of symptoms: are constant  Exacerbating Factors: weight bearing, stairs, [id number], prolonged " standing  Relieving Factors: rest, epsom salt baths.  Night Pain: Yes  Pain while at rest: Yes   Numbness or tingling: Yes   Patient has tried:     NSAIDS: diclofenac      Physical Therapy: not recently, did some after knee surgery 8/2023 but ended up with a kidney stone so had to stop      Activity modification: Yes      Bracing: on occasion      Injections: Yes 1/5/2023 with Dr Em without relief. With us 3/8/2023 helped about 1.5 months.     Ice: Yes      Assistive device:  No     Other: tylenol. Topical voltaren works once in a while.      Other PMH:  has a past medical history of Anaphylactic reaction (8/14/2015), Anxiety, Depression, DM2 (diabetes mellitus, type 2) (H), GERD (gastroesophageal reflux disease), HTN (hypertension), IBS (irritable bowel syndrome), Kidney stone (6/15/2011), and Neuropathy.  Patient Active Problem List   Diagnosis     GERD (gastroesophageal reflux disease)     Chronic RLQ pain     Constipation     Chronic maxillary sinusitis     Chronic rhinitis     Hypertriglyceridemia     Benign essential hypertension     Anemia in other chronic diseases classified elsewhere     Fatty liver     Irritable bowel syndrome with diarrhea     Right inguinal hernia     Mild persistent asthma without complication     Type 2 diabetes mellitus with diabetic polyneuropathy, with long-term current use of insulin (H)     Hyperlipidemia LDL goal <100     CONNOR (generalized anxiety disorder)     Insomnia, unspecified type     Morbid obesity (H)     Neuropathy     Recurrent deep vein thrombosis (DVT) (H)     Precordial pain     Dyslipidemia     Elevated C-reactive protein     Gastric reflux     Internal derangement of knee     Nicotine dependence     Varicose veins of lower extremity     Vitamin D deficiency     Low volume of ejaculated semen     Bilateral leg pain     Chronic pain of right knee     Acute pain of right knee     Aftercare following surgery of the musculoskeletal system     Advanced directives,  counseling/discussion     DVT (deep venous thrombosis) (H)       Surgical Hx:  has a past surgical history that includes Colonoscopy (5/1/2012); BREATH HYDROGEN TEST (3/7/2014); orthopedic surgery (10/03/2007); Colonoscopy (4/21/2014); Release carpal tunnel (Right, 1/26/2018); cystoscopy (05/01/2008); cystoscopy,+ureteroscopy (06/10/2008); vascular surgery (11/24/2008); cystoscopy (09/26/2010); lithotripsy (09/30/2010); cystoscopy (05/18/2012); Colonoscopy (N/A, 4/21/2022); Inject epidural lumbar (N/A, 7/7/2022); and Arthroscopy knee with medial meniscectomy (Right, 8/19/2022).    Medications:   Current Outpatient Medications:      albuterol (PROAIR HFA/PROVENTIL HFA/VENTOLIN HFA) 108 (90 Base) MCG/ACT inhaler, Inhale 2 puffs into the lungs every 6 hours as needed for shortness of breath or wheezing, Disp: 6.7 g, Rfl: 3     amLODIPine (NORVASC) 10 MG tablet, Take 1 tablet (10 mg) by mouth daily for blood pressure, Disp: 90 tablet, Rfl: 1     ASPIRIN PO, Take 325 mg by mouth daily , Disp: , Rfl:      atorvastatin (LIPITOR) 40 MG tablet, Take 1 tablet (40 mg) by mouth daily, Disp: 90 tablet, Rfl: 3     blood glucose (ACCU-CHEK GUIDE) test strip, Use to test blood sugar 3 times daily or as directed., Disp: 300 strip, Rfl: 3     blood glucose monitoring (ACCU-CHEK FASTCLIX) lancets, Use to test blood sugar 1 times daily or as directed.  Ok to substitute alternative if insurance prefers., Disp: 102 each, Rfl: 11     calcium carbonate (TUMS) 500 MG chewable tablet, Take 1 chew tab by mouth daily, Disp: , Rfl:      chlorthalidone (HYGROTON) 25 MG tablet, Take 1 tablet (25 mg) by mouth daily Add on for blood pressure, Disp: 90 tablet, Rfl: 1     DULoxetine (CYMBALTA) 60 MG capsule, Take 1 capsule (60 mg) by mouth 2 times daily, Disp: 180 capsule, Rfl: 3     EPINEPHrine (ANY BX GENERIC EQUIV) 0.3 MG/0.3ML injection 2-pack, Inject 0.3 mLs (0.3 mg) into the muscle once as needed for anaphylaxis, Disp: 2 each, Rfl: 2      "fluticasone-salmeterol (ADVAIR) 500-50 MCG/ACT inhaler, Inhale 1 puff into the lungs every 12 hours, Disp: 1 each, Rfl: 11     insulin aspart (NOVOLOG FLEXPEN) 100 UNIT/ML pen, Inject 18 units before breakfast and 18 units before dinner *Will need to schedule with new PCP for future refills*, Disp: 15 mL, Rfl: 3     insulin aspart (NOVOLOG VIAL) 100 UNITS/ML vial, Inject 4 Units Subcutaneous 3 times daily (with meals) Dispense 360 units/month, Disp: 360 mL, Rfl: 1     insulin glargine (BASAGLAR KWIKPEN) 100 UNIT/ML pen, Inject 38 Units Subcutaneous daily *Will need to establish with new PCP for future refills*, Disp: 15 mL, Rfl: 1     insulin pen needle (32G X 4 MM) 32G X 4 MM miscellaneous, Use 3  pen needles daily or as directed., Disp: 200 each, Rfl: 1     insulin pen needle (32G X 4 MM) 32G X 4 MM miscellaneous, Use 2 pen needles daily or as directed., Disp: 200 each, Rfl: 1     insulin syringe-needle U-100 (29G X 1/2\" 0.5 ML) 29G X 1/2\" 0.5 ML miscellaneous, Use 3 syringes daily or as directed., Disp: 200 each, Rfl: 1     losartan (COZAAR) 25 MG tablet, Take 1 tablet (25 mg) by mouth daily, Disp: 90 tablet, Rfl: 1     meclizine (ANTIVERT) 25 MG tablet, Take 1 tablet (25 mg) by mouth 3 times daily as needed for dizziness, Disp: 90 tablet, Rfl: 1     metFORMIN (GLUCOPHAGE XR) 500 MG 24 hr tablet, Take 2 tablets (1,000 mg) by mouth 2 times daily (with meals), Disp: 120 tablet, Rfl: 5     montelukast (SINGULAIR) 10 MG tablet, Take 1 tablet (10 mg) by mouth At Bedtime For allergies/asthma, Disp: 90 tablet, Rfl: 2     multivitamin, therapeutic (THERA-VIT) TABS, Take 1 tablet by mouth daily, Disp: , Rfl:      naproxen sodium (ANAPROX) 220 MG tablet, Take 220 mg by mouth, Disp: , Rfl:      nortriptyline (PAMELOR) 10 MG capsule, Take 2 capsules (20 mg) by mouth At Bedtime, Disp: 60 capsule, Rfl: 5     omeprazole (PRILOSEC) 40 MG DR capsule, Take 1 capsule (40 mg) by mouth daily Take for at least two months, Disp: 90 " capsule, Rfl: 2     pregabalin (LYRICA) 50 MG capsule, Take 1 capsule (50 mg) by mouth 2 times daily, Disp: 60 capsule, Rfl: 1     rizatriptan (MAXALT-MLT) 10 MG ODT, TAKE 1 TABLET BY MOUTH AT ONSET OF HEADACHE FOR MIGRAINE MAY REPEAT IN 2 HOURS. MAX 3 TABS/24 HOURS., Disp: 18 tablet, Rfl: 1     sildenafil (VIAGRA) 50 MG tablet, Take 1-2 tablets ( mg) by mouth daily as needed (prior to sexual activity), Disp: 20 tablet, Rfl: 3     sitagliptin (JANUVIA) 25 MG tablet, Take 1 tablet (25 mg) by mouth daily *Will need to establish with new PCP for future refills*, Disp: 30 tablet, Rfl: 1     warfarin ANTICOAGULANT (COUMADIN) 5 MG tablet, Take 15 mg Tues, Thurs and 12.5 mg all other days, or as directed by the Coumadin clinic, Disp: 225 tablet, Rfl: 1    Current Facility-Administered Medications:      4 mL bupivacaine (MARCAINE) preservative free injection 0.25% (10 mL vial), 4 mL, , , Edwin Ch MD, 4 mL at 03/08/23 0956     methylPREDNISolone (DEPO-MEDROL) injection 80 mg, 80 mg, , , Edwin Ch MD, 80 mg at 03/08/23 0956     triamcinolone (KENALOG-40) injection 40 mg, 40 mg, , , Carlos A Em MD, 40 mg at 01/05/23 1000    Facility-Administered Medications Ordered in Other Visits:      BUPivacaine (MARCAINE) injection 0.5%, 10 mL, Intradermal, Once, Vinnie Espinoza, DO     DOBUTamine 500 mg in dextrose 5% 250 mL (adult std conc) premix, 2.5-20 mcg/kg/min, Intravenous, Continuous, Navarro Butcher MD, Stopped at 04/25/19 1559     iopamidol (ISOVUE-M 200) solution 10 mL, 10 mL, Intravenous, Once, Vinnie Espinoza, DO     methylPREDNISolone (DEPO-MEDROL) injection 80 mg, 80 mg, Intramuscular, Once, Vinnie Espinoza, DO     metoprolol (LOPRESSOR) injection 5 mg, 5 mg, Intravenous, Q5 Min PRN, Navarro Butcher MD, 2 mg at 04/25/19 0542    Allergies:   Allergies   Allergen Reactions     Artificial Sweetner (Informational Only) [Artificial Sweetner (Informational Only) ] GI  Disturbance     ALL artificial sweetners cause severe diarrhea & flu symptoms     Hydromorphone Anaphylaxis     Ibuprofen GI Disturbance     Morphine Sulfate Concentrate Anaphylaxis     Morphine [Fumaric Acid] Anaphylaxis     Hydrocodone-Acetaminophen Itching     Tramadol Hives     Trazodone Hives     Gabapentin      GI upset per pt     Keflex [Cephalexin] Diarrhea     Upset stomach     Codeine Phosphate Itching     Ketorolac Tromethamine Rash       Social Hx: retired .   reports that he has never smoked. His smokeless tobacco use includes chew. He reports that he does not currently use alcohol. He reports current drug use.    Family Hx: family history includes Cancer (age of onset: 52) in his mother; Cancer - colorectal (age of onset: 53) in his father; Coronary Artery Disease in his father; Diabetes in his maternal aunt, maternal aunt, paternal uncle, paternal uncle, paternal uncle, paternal uncle, and another family member; Myocardial Infarction (age of onset: 35) in his paternal grandfather; Myocardial Infarction (age of onset: 45) in his father.    REVIEW OF SYSTEMS: 10 point ROS neg other than the symptoms noted above in the HPI and PMH. Notables include  CONSTITUTIONAL:NEGATIVE for fever, chills, change in weight  INTEGUMENTARY/SKIN: NEGATIVE for worrisome rashes, moles or lesions  MUSCULOSKELETAL:See HPI above  NEURO: NEGATIVE for weakness, dizziness or paresthesias    PHYSICAL EXAM:  Ht 1.829 m (6')   Wt 129.7 kg (286 lb)   BMI 38.79 kg/m     GENERAL APPEARANCE: healthy, alert, no distress  SKIN: no suspicious lesions or rashes  NEURO: Normal strength and tone, mentation intact and speech normal  PSYCH:  mentation appears normal and affect normal, not anxious  RESPIRATORY: No increased work of breathing.    BILATERAL LOWER EXTREMITIES:  Gait: favors the right.  Alignment: varus  No Quad atrophy, strength normal.  Intact sensation deep peroneal nerve, superficial peroneal nerve, med/lat tibial nerve,  sural nerve, saphenous nerve  Intact EHL, EDL, TA, FHL, GS, quadriceps hamstrings and hip flexors  Bilateral calf soft and nttp or squeeze.  Edema: trace    LEFT KNEE EXAM:    Skin: intact, no ecchymosis or erythema  Squat: 100 %, not limited by pain.     ROM: slight hyper extension to 130 flexion  Tight hamstrings on straight leg raise.  Effusion: none  Tender: NTTP med/lat joint line, anterior or posterior knee  McMurrays: negative    MCL: stable, and non-painful at both 0 and 30 degrees knee flexion  Varus stress: stable, and non-painful at both 0 and 30 degrees knee flexion  Lachmans: neg, firm endpoint  Posterior Drawer stable  Patellofemoral joint:                Apprehension: negative              Crepitations: minimal    RIGHT KNEE EXAM:    Skin: intact, no ecchymosis or erythema  Squat: 100 %, not limited by pain.     ROM: slight hyper extension to 130 flexion  Tight hamstrings on straight leg raise.  Effusion: trace  Tender: medial joint line, medial patello-femoral retinaculum, medial and lateral patella facet  Nontender to palpation posterior knee.  McMurrays: negative    Diffuse ligamentous laxity in all directions.  Patellofemoral joint:                Apprehension: negative              Crepitations: mild   Grind: positive.    X-RAY: bilateral bates, 3 views right knee from 1/26/2023 were reviewed in clinic today. On my review, no obvious fractures or dislocations. No fracture. Mild medial compartment degenerative arthritis has minimally progressed. Minimal effusion.    CT arthrogram 7/29/2022:  1. High-grade radial tear in the posterior horn of the medial meniscus. The body of the medial meniscus is peripherally extruded.  2. Focal area of chondromalacia in the medial femoral condyle posteriorly.  3. Small popliteal cyst.       ASSESSMENT/PLAN: Adarsh Salvador is a 58 year old male with chronic right knee pain, primary osteoarthritis. Uncontrolled diabetes mellitus.     * reviewed imaging studies with  "patient, showing arthritic changes, or wearing of the cartilage in the knee. This can be caused by normal \"wear and tear\" over the years or following prior injury to the knee.    Treatment typically starts nonsurgically. Surgical indication for total knee arthroplasty  when nonsurgical management is no longer effective.    At this time, I do not think his osteoarthritis is severe enough to warrant knee replacement.    Non-surgical treatment for knee arthritis includes:    * rest, sitting  * Activity modification - avoid impact activities or activities that aggravate symptoms.  * NSAIDS (non-steroidal anti-inflammatory medications; e.g. Aleve, advil, motrin, ibuprofen) - regular use for inflammation ( twice daily or three times daily), with food, as long as no contra-indications Please discuss with primary care doctor if needed  * ice, 15-20 minutes at a time several times a day or as needed.  * Strengthening of quadriceps muscles  * Physical Therapy for strengthening, stretching and range of motion exercises of legs  * Tylenol as needed for pain, consider Tylenol arthritis or similar  * Weight loss: Weight loss:  Body mass index is 38.79 kg/m .. weight loss benefits, not only for the current pain symptoms, but also overall health. Recommend a good diet plan that works for the patient, with the assistance of a dietician or primary care doctor as needed. Also, a good, low-impact exercise program for at least 20 minutes per day, 3 times per week, such as exercise bike, elliptical , or pool.  * Exercise: low impact such as stationary bike, elliptical, pool.  * Injections: cortisone versus viscosupplementation (hyaluronic acid, \"rooster comb\", \"gel shots\"); risks and perceived benefits discussed today. Patient elects  to proceed with repeat cortisone. See procedure note below.  * consider visco injection in future as needed. Will need pre-authorization for that in future. Will place preauthorization today.  * " Bracing: bracing the knee may offer some relief of symptoms when worn and provide some stability.  * over the counter supplements such as glucosamine and chondroitin sulfate may help with joint pain.  * topical ointments may help as well    * return to clinic as needed.    * needs to work on diabetic control prior to any elective surgery in the future. His hgb A1c=11.3, which is not within surgical parameters of <8.        Large Joint Injection/Arthocentesis: R knee joint    Date/Time: 6/8/2023 2:19 PM    Performed by: Frederic Bain PA  Authorized by: Ediwn Ch MD    Indications:  Pain and osteoarthritis  Needle Size:  22 G  Guidance: landmark guided    Approach:  Anteromedial  Location:  Knee      Medications:  80 mg methylPREDNISolone 80 MG/ML; 4 mL bupivacaine 0.25 %  Outcome:  Tolerated well, no immediate complications  Procedure discussed: discussed risks, benefits, and alternatives    Consent Given by:  Patient  Prep: patient was prepped and draped in usual sterile fashion          Edwin Ch M.D., M.S.  Dept. of Orthopaedic Surgery  [facility]

## 2023-06-08 ENCOUNTER — OFFICE VISIT (OUTPATIENT)
Dept: ORTHOPEDICS | Facility: CLINIC | Age: 58
End: 2023-06-08
Payer: COMMERCIAL

## 2023-06-08 VITALS — BODY MASS INDEX: 38.74 KG/M2 | HEIGHT: 72 IN | WEIGHT: 286 LBS

## 2023-06-08 DIAGNOSIS — M17.11 PRIMARY OSTEOARTHRITIS OF RIGHT KNEE: Primary | ICD-10-CM

## 2023-06-08 DIAGNOSIS — G89.29 CHRONIC PAIN OF RIGHT KNEE: ICD-10-CM

## 2023-06-08 DIAGNOSIS — M25.561 CHRONIC PAIN OF RIGHT KNEE: ICD-10-CM

## 2023-06-08 PROCEDURE — 99213 OFFICE O/P EST LOW 20 MIN: CPT | Mod: 25 | Performed by: ORTHOPAEDIC SURGERY

## 2023-06-08 PROCEDURE — 20610 DRAIN/INJ JOINT/BURSA W/O US: CPT | Mod: RT | Performed by: ORTHOPAEDIC SURGERY

## 2023-06-08 RX ADMIN — METHYLPREDNISOLONE ACETATE 80 MG: 80 INJECTION, SUSPENSION INTRA-ARTICULAR; INTRALESIONAL; INTRAMUSCULAR; SOFT TISSUE at 14:19

## 2023-06-08 RX ADMIN — BUPIVACAINE HYDROCHLORIDE 4 ML: 2.5 INJECTION, SOLUTION INFILTRATION; PERINEURAL at 14:19

## 2023-06-08 ASSESSMENT — PAIN SCALES - GENERAL: PAINLEVEL: SEVERE PAIN (6)

## 2023-06-08 NOTE — LETTER
"    6/8/2023         RE: Adarsh Salvador  [address]  [place] [address]        Dear Colleague,    Thank you for referring your patient, Adarsh Salvador, to the [facility]. Please see a copy of my visit note below.    CHIEF COMPLAINT:   Chief Complaint   Patient presents with     Right Knee - Pain     Right knee pain. Anterior knee pain. Had injection: 3/8/23 worked well for about 1.5 months. Last night he slipped on the stairs and hyperextended a bit, so it's sore today.    .        HISTORY OF PRESENT ILLNESS    Adarsh Salvador is a 58 year old male seen for evaluation of ongoing right knee pain with no known recent injury.   Seen by us 3/8/2023, received cortisone injection. Returns today with continued pain in the front of the knee. Injection helped about 1.5 months. Last night he slipped on the stairs and hyperextended a bit, so more sore today.      Pain has been present for years, on/off since ~1990 slid off an icy roof.  He's had multiple surgeries (~12?) on the knee since then, most recently 8/2022 with Dr Em. Pain started again 1/2023 when moving furniture.     He locates pain along the inner aspect to under the knee cap \"like someone is sticking a bao knife into it\", aggravated with stairs, walking and prolonged standing. Treatment has been epson salt, warm baths, diclofenac, tylenol, topical voltaren.    Pain at rest, night.     He had an injection 1/5/2023 with Dr Em without much relief. He was seen by Dr Waite to discuss whether he'd be a canddiate for total knee arthroplasty, but based on low grade osteoarthritis, didn't think it would be beneficial at this time.      Right knee arthroscopy with medial meniscus debridement 8/19/2022 with Dr Em, noted grade 2 chondrosis.    Patient has a history of recurrent DVT. On warfarin.    History of intermittent low back pain. Numbness and tingling. Takes Lyrica, it does help.    Adarsh is diabetic, A1c=11.3 on " 4/10/2023.    Present symptoms: pain medially  and anteriorly, pain sharp, shooting, dull/achy , moderate pain, severe pain.    Pain severity: 6/10  Frequency of symptoms: are constant  Exacerbating Factors: weight bearing, stairs, [id number], prolonged standing  Relieving Factors: rest, epsom salt baths.  Night Pain: Yes  Pain while at rest: Yes   Numbness or tingling: Yes   Patient has tried:     NSAIDS: diclofenac      Physical Therapy: not recently, did some after knee surgery 8/2023 but ended up with a kidney stone so had to stop      Activity modification: Yes      Bracing: on occasion      Injections: Yes 1/5/2023 with Dr Em without relief. With us 3/8/2023 helped about 1.5 months.     Ice: Yes      Assistive device:  No     Other: tylenol. Topical voltaren works once in a while.      Other PMH:  has a past medical history of Anaphylactic reaction (8/14/2015), Anxiety, Depression, DM2 (diabetes mellitus, type 2) (H), GERD (gastroesophageal reflux disease), HTN (hypertension), IBS (irritable bowel syndrome), Kidney stone (6/15/2011), and Neuropathy.  Patient Active Problem List   Diagnosis     GERD (gastroesophageal reflux disease)     Chronic RLQ pain     Constipation     Chronic maxillary sinusitis     Chronic rhinitis     Hypertriglyceridemia     Benign essential hypertension     Anemia in other chronic diseases classified elsewhere     Fatty liver     Irritable bowel syndrome with diarrhea     Right inguinal hernia     Mild persistent asthma without complication     Type 2 diabetes mellitus with diabetic polyneuropathy, with long-term current use of insulin (H)     Hyperlipidemia LDL goal <100     CONNOR (generalized anxiety disorder)     Insomnia, unspecified type     Morbid obesity (H)     Neuropathy     Recurrent deep vein thrombosis (DVT) (H)     Precordial pain     Dyslipidemia     Elevated C-reactive protein     Gastric reflux     Internal derangement of knee     Nicotine dependence      Varicose veins of lower extremity     Vitamin D deficiency     Low volume of ejaculated semen     Bilateral leg pain     Chronic pain of right knee     Acute pain of right knee     Aftercare following surgery of the musculoskeletal system     Advanced directives, counseling/discussion     DVT (deep venous thrombosis) (H)       Surgical Hx:  has a past surgical history that includes Colonoscopy (5/1/2012); BREATH HYDROGEN TEST (3/7/2014); orthopedic surgery (10/03/2007); Colonoscopy (4/21/2014); Release carpal tunnel (Right, 1/26/2018); cystoscopy (05/01/2008); cystoscopy,+ureteroscopy (06/10/2008); vascular surgery (11/24/2008); cystoscopy (09/26/2010); lithotripsy (09/30/2010); cystoscopy (05/18/2012); Colonoscopy (N/A, 4/21/2022); Inject epidural lumbar (N/A, 7/7/2022); and Arthroscopy knee with medial meniscectomy (Right, 8/19/2022).    Medications:   Current Outpatient Medications:      albuterol (PROAIR HFA/PROVENTIL HFA/VENTOLIN HFA) 108 (90 Base) MCG/ACT inhaler, Inhale 2 puffs into the lungs every 6 hours as needed for shortness of breath or wheezing, Disp: 6.7 g, Rfl: 3     amLODIPine (NORVASC) 10 MG tablet, Take 1 tablet (10 mg) by mouth daily for blood pressure, Disp: 90 tablet, Rfl: 1     ASPIRIN PO, Take 325 mg by mouth daily , Disp: , Rfl:      atorvastatin (LIPITOR) 40 MG tablet, Take 1 tablet (40 mg) by mouth daily, Disp: 90 tablet, Rfl: 3     blood glucose (ACCU-CHEK GUIDE) test strip, Use to test blood sugar 3 times daily or as directed., Disp: 300 strip, Rfl: 3     blood glucose monitoring (ACCU-CHEK FASTCLIX) lancets, Use to test blood sugar 1 times daily or as directed.  Ok to substitute alternative if insurance prefers., Disp: 102 each, Rfl: 11     calcium carbonate (TUMS) 500 MG chewable tablet, Take 1 chew tab by mouth daily, Disp: , Rfl:      chlorthalidone (HYGROTON) 25 MG tablet, Take 1 tablet (25 mg) by mouth daily Add on for blood pressure, Disp: 90 tablet, Rfl: 1     DULoxetine  "(CYMBALTA) 60 MG capsule, Take 1 capsule (60 mg) by mouth 2 times daily, Disp: 180 capsule, Rfl: 3     EPINEPHrine (ANY BX GENERIC EQUIV) 0.3 MG/0.3ML injection 2-pack, Inject 0.3 mLs (0.3 mg) into the muscle once as needed for anaphylaxis, Disp: 2 each, Rfl: 2     fluticasone-salmeterol (ADVAIR) 500-50 MCG/ACT inhaler, Inhale 1 puff into the lungs every 12 hours, Disp: 1 each, Rfl: 11     insulin aspart (NOVOLOG FLEXPEN) 100 UNIT/ML pen, Inject 18 units before breakfast and 18 units before dinner *Will need to schedule with new PCP for future refills*, Disp: 15 mL, Rfl: 3     insulin aspart (NOVOLOG VIAL) 100 UNITS/ML vial, Inject 4 Units Subcutaneous 3 times daily (with meals) Dispense 360 units/month, Disp: 360 mL, Rfl: 1     insulin glargine (BASAGLAR KWIKPEN) 100 UNIT/ML pen, Inject 38 Units Subcutaneous daily *Will need to establish with new PCP for future refills*, Disp: 15 mL, Rfl: 1     insulin pen needle (32G X 4 MM) 32G X 4 MM miscellaneous, Use 3  pen needles daily or as directed., Disp: 200 each, Rfl: 1     insulin pen needle (32G X 4 MM) 32G X 4 MM miscellaneous, Use 2 pen needles daily or as directed., Disp: 200 each, Rfl: 1     insulin syringe-needle U-100 (29G X 1/2\" 0.5 ML) 29G X 1/2\" 0.5 ML miscellaneous, Use 3 syringes daily or as directed., Disp: 200 each, Rfl: 1     losartan (COZAAR) 25 MG tablet, Take 1 tablet (25 mg) by mouth daily, Disp: 90 tablet, Rfl: 1     meclizine (ANTIVERT) 25 MG tablet, Take 1 tablet (25 mg) by mouth 3 times daily as needed for dizziness, Disp: 90 tablet, Rfl: 1     metFORMIN (GLUCOPHAGE XR) 500 MG 24 hr tablet, Take 2 tablets (1,000 mg) by mouth 2 times daily (with meals), Disp: 120 tablet, Rfl: 5     montelukast (SINGULAIR) 10 MG tablet, Take 1 tablet (10 mg) by mouth At Bedtime For allergies/asthma, Disp: 90 tablet, Rfl: 2     multivitamin, therapeutic (THERA-VIT) TABS, Take 1 tablet by mouth daily, Disp: , Rfl:      naproxen sodium (ANAPROX) 220 MG tablet, Take " 220 mg by mouth, Disp: , Rfl:      nortriptyline (PAMELOR) 10 MG capsule, Take 2 capsules (20 mg) by mouth At Bedtime, Disp: 60 capsule, Rfl: 5     omeprazole (PRILOSEC) 40 MG DR capsule, Take 1 capsule (40 mg) by mouth daily Take for at least two months, Disp: 90 capsule, Rfl: 2     pregabalin (LYRICA) 50 MG capsule, Take 1 capsule (50 mg) by mouth 2 times daily, Disp: 60 capsule, Rfl: 1     rizatriptan (MAXALT-MLT) 10 MG ODT, TAKE 1 TABLET BY MOUTH AT ONSET OF HEADACHE FOR MIGRAINE MAY REPEAT IN 2 HOURS. MAX 3 TABS/24 HOURS., Disp: 18 tablet, Rfl: 1     sildenafil (VIAGRA) 50 MG tablet, Take 1-2 tablets ( mg) by mouth daily as needed (prior to sexual activity), Disp: 20 tablet, Rfl: 3     sitagliptin (JANUVIA) 25 MG tablet, Take 1 tablet (25 mg) by mouth daily *Will need to establish with new PCP for future refills*, Disp: 30 tablet, Rfl: 1     warfarin ANTICOAGULANT (COUMADIN) 5 MG tablet, Take 15 mg Tues, Thurs and 12.5 mg all other days, or as directed by the Coumadin clinic, Disp: 225 tablet, Rfl: 1    Current Facility-Administered Medications:      4 mL bupivacaine (MARCAINE) preservative free injection 0.25% (10 mL vial), 4 mL, , , Edwin Ch MD, 4 mL at 03/08/23 0956     methylPREDNISolone (DEPO-MEDROL) injection 80 mg, 80 mg, , , Edwin Ch MD, 80 mg at 03/08/23 0956     triamcinolone (KENALOG-40) injection 40 mg, 40 mg, , , Carlos A Em MD, 40 mg at 01/05/23 1000    Facility-Administered Medications Ordered in Other Visits:      BUPivacaine (MARCAINE) injection 0.5%, 10 mL, Intradermal, Once, Vinnie Espinoza, DO     DOBUTamine 500 mg in dextrose 5% 250 mL (adult std conc) premix, 2.5-20 mcg/kg/min, Intravenous, Continuous, Navarro Butcher MD, Stopped at 04/25/19 1559     iopamidol (ISOVUE-M 200) solution 10 mL, 10 mL, Intravenous, Once, Vinnie Espinoza,      methylPREDNISolone (DEPO-MEDROL) injection 80 mg, 80 mg, Intramuscular, Once, Vinnie Espinoza,  DO     metoprolol (LOPRESSOR) injection 5 mg, 5 mg, Intravenous, Q5 Min PRN, Navarro Butcher MD, 2 mg at 04/25/19 5423    Allergies:   Allergies   Allergen Reactions     Artificial Sweetner (Informational Only) [Artificial Sweetner (Informational Only) ] GI Disturbance     ALL artificial sweetners cause severe diarrhea & flu symptoms     Hydromorphone Anaphylaxis     Ibuprofen GI Disturbance     Morphine Sulfate Concentrate Anaphylaxis     Morphine [Fumaric Acid] Anaphylaxis     Hydrocodone-Acetaminophen Itching     Tramadol Hives     Trazodone Hives     Gabapentin      GI upset per pt     Keflex [Cephalexin] Diarrhea     Upset stomach     Codeine Phosphate Itching     Ketorolac Tromethamine Rash       Social Hx: retired .   reports that he has never smoked. His smokeless tobacco use includes chew. He reports that he does not currently use alcohol. He reports current drug use.    Family Hx: family history includes Cancer (age of onset: 52) in his mother; Cancer - colorectal (age of onset: 53) in his father; Coronary Artery Disease in his father; Diabetes in his maternal aunt, maternal aunt, paternal uncle, paternal uncle, paternal uncle, paternal uncle, and another family member; Myocardial Infarction (age of onset: 35) in his paternal grandfather; Myocardial Infarction (age of onset: 45) in his father.    REVIEW OF SYSTEMS: 10 point ROS neg other than the symptoms noted above in the HPI and PMH. Notables include  CONSTITUTIONAL:NEGATIVE for fever, chills, change in weight  INTEGUMENTARY/SKIN: NEGATIVE for worrisome rashes, moles or lesions  MUSCULOSKELETAL:See HPI above  NEURO: NEGATIVE for weakness, dizziness or paresthesias    PHYSICAL EXAM:  Ht 1.829 m (6')   Wt 129.7 kg (286 lb)   BMI 38.79 kg/m     GENERAL APPEARANCE: healthy, alert, no distress  SKIN: no suspicious lesions or rashes  NEURO: Normal strength and tone, mentation intact and speech normal  PSYCH:  mentation appears normal and  affect normal, not anxious  RESPIRATORY: No increased work of breathing.    BILATERAL LOWER EXTREMITIES:  Gait: favors the right.  Alignment: varus  No Quad atrophy, strength normal.  Intact sensation deep peroneal nerve, superficial peroneal nerve, med/lat tibial nerve, sural nerve, saphenous nerve  Intact EHL, EDL, TA, FHL, GS, quadriceps hamstrings and hip flexors  Bilateral calf soft and nttp or squeeze.  Edema: trace    LEFT KNEE EXAM:    Skin: intact, no ecchymosis or erythema  Squat: 100 %, not limited by pain.     ROM: slight hyper extension to 130 flexion  Tight hamstrings on straight leg raise.  Effusion: none  Tender: NTTP med/lat joint line, anterior or posterior knee  McMurrays: negative    MCL: stable, and non-painful at both 0 and 30 degrees knee flexion  Varus stress: stable, and non-painful at both 0 and 30 degrees knee flexion  Lachmans: neg, firm endpoint  Posterior Drawer stable  Patellofemoral joint:                Apprehension: negative              Crepitations: minimal    RIGHT KNEE EXAM:    Skin: intact, no ecchymosis or erythema  Squat: 100 %, not limited by pain.     ROM: slight hyper extension to 130 flexion  Tight hamstrings on straight leg raise.  Effusion: trace  Tender: medial joint line, medial patello-femoral retinaculum, medial and lateral patella facet  Nontender to palpation posterior knee.  McMurrays: negative    Diffuse ligamentous laxity in all directions.  Patellofemoral joint:                Apprehension: negative              Crepitations: mild   Grind: positive.    X-RAY: bilateral bates, 3 views right knee from 1/26/2023 were reviewed in clinic today. On my review, no obvious fractures or dislocations. No fracture. Mild medial compartment degenerative arthritis has minimally progressed. Minimal effusion.    CT arthrogram 7/29/2022:  1. High-grade radial tear in the posterior horn of the medial meniscus. The body of the medial meniscus is peripherally extruded.  2.  "Focal area of chondromalacia in the medial femoral condyle posteriorly.  3. Small popliteal cyst.       ASSESSMENT/PLAN: Adarsh Salvador is a 58 year old male with chronic right knee pain, primary osteoarthritis. Uncontrolled diabetes mellitus.     * reviewed imaging studies with patient, showing arthritic changes, or wearing of the cartilage in the knee. This can be caused by normal \"wear and tear\" over the years or following prior injury to the knee.    Treatment typically starts nonsurgically. Surgical indication for total knee arthroplasty  when nonsurgical management is no longer effective.    At this time, I do not think his osteoarthritis is severe enough to warrant knee replacement.    Non-surgical treatment for knee arthritis includes:    * rest, sitting  * Activity modification - avoid impact activities or activities that aggravate symptoms.  * NSAIDS (non-steroidal anti-inflammatory medications; e.g. Aleve, advil, motrin, ibuprofen) - regular use for inflammation ( twice daily or three times daily), with food, as long as no contra-indications Please discuss with primary care doctor if needed  * ice, 15-20 minutes at a time several times a day or as needed.  * Strengthening of quadriceps muscles  * Physical Therapy for strengthening, stretching and range of motion exercises of legs  * Tylenol as needed for pain, consider Tylenol arthritis or similar  * Weight loss: Weight loss:  Body mass index is 38.79 kg/m .. weight loss benefits, not only for the current pain symptoms, but also overall health. Recommend a good diet plan that works for the patient, with the assistance of a dietician or primary care doctor as needed. Also, a good, low-impact exercise program for at least 20 minutes per day, 3 times per week, such as exercise bike, elliptical , or pool.  * Exercise: low impact such as stationary bike, elliptical, pool.  * Injections: cortisone versus viscosupplementation (hyaluronic acid, \"rooster comb\", " "\"gel shots\"); risks and perceived benefits discussed today. Patient elects  to proceed with repeat cortisone. See procedure note below.  * consider visco injection in future as needed. Will need pre-authorization for that in future. Will place preauthorization today.  * Bracing: bracing the knee may offer some relief of symptoms when worn and provide some stability.  * over the counter supplements such as glucosamine and chondroitin sulfate may help with joint pain.  * topical ointments may help as well    * return to clinic as needed.    * needs to work on diabetic control prior to any elective surgery in the future. His hgb A1c=11.3, which is not within surgical parameters of <8.        Large Joint Injection/Arthocentesis: R knee joint    Date/Time: 6/8/2023 2:19 PM    Performed by: Frederic Bain PA  Authorized by: Edwin Ch MD    Indications:  Pain and osteoarthritis  Needle Size:  22 G  Guidance: landmark guided    Approach:  Anteromedial  Location:  Knee      Medications:  80 mg methylPREDNISolone 80 MG/ML; 4 mL bupivacaine 0.25 %  Outcome:  Tolerated well, no immediate complications  Procedure discussed: discussed risks, benefits, and alternatives    Consent Given by:  Patient  Prep: patient was prepped and draped in usual sterile fashion          Edwin Ch M.D., M.S.  Dept. of Orthopaedic Surgery  [facility]          Again, thank you for allowing me to participate in the care of your patient.        Sincerely,        Edwin Ch MD    "

## 2023-06-09 RX ORDER — BUPIVACAINE HYDROCHLORIDE 2.5 MG/ML
4 INJECTION, SOLUTION INFILTRATION; PERINEURAL
Status: DISCONTINUED | OUTPATIENT
Start: 2023-06-08 | End: 2023-09-06

## 2023-06-09 RX ORDER — METHYLPREDNISOLONE ACETATE 80 MG/ML
80 INJECTION, SUSPENSION INTRA-ARTICULAR; INTRALESIONAL; INTRAMUSCULAR; SOFT TISSUE
Status: DISCONTINUED | OUTPATIENT
Start: 2023-06-08 | End: 2023-09-06

## 2023-06-19 ENCOUNTER — NURSE TRIAGE (OUTPATIENT)
Dept: FAMILY MEDICINE | Facility: CLINIC | Age: 58
End: 2023-06-19
Payer: COMMERCIAL

## 2023-06-19 NOTE — TELEPHONE ENCOUNTER
"Symptoms started on Friday, has been bleeding with each BM since then. Felt very lightheaded and dizzy yesterday. Having severe abdominal pain and cramping that comes and goes. Worst pain is after having bowel movement. Notes he had black and tarry stools about a week ago, not tarry now, more hard.  Rates pain about 8/10. Could not sleep or get comfortable last night and night before. Patient feels he is worsening since start of symptoms.    Due to severity of pain, worsening symptoms, patient taking blood thinner, reporting feeling very dizzy yesterday, advised for patient to be evaluated in the ER now.  Patient agreed, is planning to have spouse drive him to [facility].      Odessa Hancock RN  Clinical Triage/Primary Care  [facility]      Reason for Disposition    Taking Coumadin (warfarin) or other strong blood thinner, or known bleeding disorder (e.g., thrombocytopenia)    Bloody, black, or tarry bowel movements  (Exception: Chronic-unchanged black-grey bowel movements and is taking iron pills or Pepto-Bismol.)    SEVERE abdominal pain (e.g., excruciating)    Additional Information    Negative: Passed out (i.e., fainted, collapsed and was not responding)    Negative: Shock suspected (e.g., cold/pale/clammy skin, too weak to stand, low BP, rapid pulse)    Negative: Vomiting red blood or black (coffee ground) material    Negative: Sounds like a life-threatening emergency to the triager    Negative: Diarrhea is main symptom    Negative: Rectal symptoms    Answer Assessment - Initial Assessment Questions  1. APPEARANCE of BLOOD: \"What color is it?\" \"Is it passed separately, on the surface of the stool, or mixed in with the stool?\"       Bright red blood, patient said is in stool, on surface, in toilet  2. AMOUNT: \"How much blood was passed?\"       \"I don't know\", passing blood with each BM  3. FREQUENCY: \"How many times has blood been passed with the stools?\"       About 3-4 times since " "Friday  4. ONSET: \"When was the blood first seen in the stools?\" (Days or weeks)       Friday, 3 days ago  5. DIARRHEA: \"Is there also some diarrhea?\" If Yes, ask: \"How many diarrhea stools in the past 24 hours?\"       No diarrhea  6. CONSTIPATION: \"Do you have constipation?\" If Yes, ask: \"How bad is it?\"      Yes, having hard stools for weeks  7. RECURRENT SYMPTOMS: \"Have you had blood in your stools before?\" If Yes, ask: \"When was the last time?\" and \"What happened that time?\"       No  8. BLOOD THINNERS: \"Do you take any blood thinners?\" (e.g., Coumadin/warfarin, Pradaxa/dabigatran, aspirin)      Yes, taking Coumadin  9. OTHER SYMPTOMS: \"Do you have any other symptoms?\"  (e.g., abdomen pain, vomiting, dizziness, fever)      Abdominal pain, cramping, dizziness all present over the past 3 days  10. PREGNANCY: \"Is there any chance you are pregnant?\" \"When was your last menstrual period?\"        Male patient    Protocols used: RECTAL BLEEDING-A-[place]    "

## 2023-06-26 NOTE — PROGRESS NOTES
"CHIEF COMPLAINT:   Chief Complaint   Patient presents with     Right Knee - Pain     Patient is here today for right knee osteoarthritis, pain is located on the medial side of the knee, he would like to try Synvisc today. Last  injection was cortisone on 6/8/23.          HISTORY OF PRESENT ILLNESS    Adarsh Salvador is a 58 year old male seen for evaluation of ongoing right knee pain with no known recent injury.   Seen by us 6/8/2023, received cortisone injection. Returns today with continued pain in the front of the knee. Most recent injection didn't really help. He has been very active with \"back to the 50's\" show Saturuday and then [employer] on Sunday.     He has received authorization for Synvisc injection, so presents back today for that.    Pain has been present for years, on/off since ~1990 slid off an icy roof.  He's had multiple surgeries (~12?) on the knee since then, most recently 8/2022 with Dr Em. Pain started again 1/2023 when moving furniture.     He locates pain along the inner aspect of the knee to under the knee cap \"like someone is sticking a bao knife into it\", aggravated with stairs, walking and prolonged standing. Treatment has been epson salt, warm baths, diclofenac, tylenol, topical voltaren.    Pain at rest, night.     He had an injection 1/5/2023 with Dr Em without much relief. He was seen by Dr Waite to discuss whether he'd be a canddiate for total knee arthroplasty, but based on low grade osteoarthritis, didn't think it would be beneficial at this time.      Right knee arthroscopy with medial meniscus debridement 8/19/2022 with Dr Em, noted grade 2 chondrosis.    Patient has a history of recurrent DVT. On warfarin.    History of intermittent low back pain. Numbness and tingling. Takes Lyrica, it does help.    Adarsh is diabetic, A1c=11.3 on 4/10/2023.    Present symptoms: pain medially  and anteriorly, pain sharp, shooting, dull/achy , moderate pain, severe pain.    Pain " severity: 8/10  Frequency of symptoms: are constant  Exacerbating Factors: weight bearing, stairs, [id number], prolonged standing  Relieving Factors: rest, epsom salt baths.  Night Pain: Yes  Pain while at rest: Yes   Numbness or tingling: Yes   Patient has tried:     NSAIDS: diclofenac      Physical Therapy: not recently, did some after knee surgery 8/2023 but ended up with a kidney stone so had to stop      Activity modification: Yes      Bracing: on occasion      Injections: Yes 1/5/2023 with Dr Em without relief. With us 3/8/2023 helped about 1.5 months. 6/8/2023 without much relief.     Ice: Yes      Assistive device:  No     Other: tylenol. Topical voltaren works once in a while.      Other PMH:  has a past medical history of Anaphylactic reaction (8/14/2015), Anxiety, Depression, DM2 (diabetes mellitus, type 2) (H), GERD (gastroesophageal reflux disease), HTN (hypertension), IBS (irritable bowel syndrome), Kidney stone (6/15/2011), and Neuropathy.  Patient Active Problem List   Diagnosis     GERD (gastroesophageal reflux disease)     Chronic RLQ pain     Constipation     Chronic maxillary sinusitis     Chronic rhinitis     Hypertriglyceridemia     Benign essential hypertension     Anemia in other chronic diseases classified elsewhere     Fatty liver     Irritable bowel syndrome with diarrhea     Right inguinal hernia     Mild persistent asthma without complication     Type 2 diabetes mellitus with diabetic polyneuropathy, with long-term current use of insulin (H)     Hyperlipidemia LDL goal <100     CONNOR (generalized anxiety disorder)     Insomnia, unspecified type     Morbid obesity (H)     Neuropathy     Recurrent deep vein thrombosis (DVT) (H)     Precordial pain     Dyslipidemia     Elevated C-reactive protein     Gastric reflux     Internal derangement of knee     Nicotine dependence     Varicose veins of lower extremity     Vitamin D deficiency     Low volume of ejaculated semen     Bilateral  leg pain     Chronic pain of right knee     Acute pain of right knee     Aftercare following surgery of the musculoskeletal system     Advanced directives, counseling/discussion     DVT (deep venous thrombosis) (H)       Surgical Hx:  has a past surgical history that includes Colonoscopy (5/1/2012); BREATH HYDROGEN TEST (3/7/2014); orthopedic surgery (10/03/2007); Colonoscopy (4/21/2014); Release carpal tunnel (Right, 1/26/2018); cystoscopy (05/01/2008); cystoscopy,+ureteroscopy (06/10/2008); vascular surgery (11/24/2008); cystoscopy (09/26/2010); lithotripsy (09/30/2010); cystoscopy (05/18/2012); Colonoscopy (N/A, 4/21/2022); Inject epidural lumbar (N/A, 7/7/2022); and Arthroscopy knee with medial meniscectomy (Right, 8/19/2022).    Medications:   Current Outpatient Medications:      albuterol (PROAIR HFA/PROVENTIL HFA/VENTOLIN HFA) 108 (90 Base) MCG/ACT inhaler, Inhale 2 puffs into the lungs every 6 hours as needed for shortness of breath or wheezing, Disp: 6.7 g, Rfl: 3     amLODIPine (NORVASC) 10 MG tablet, Take 1 tablet (10 mg) by mouth daily for blood pressure, Disp: 90 tablet, Rfl: 1     ASPIRIN PO, Take 325 mg by mouth daily , Disp: , Rfl:      atorvastatin (LIPITOR) 40 MG tablet, Take 1 tablet (40 mg) by mouth daily, Disp: 90 tablet, Rfl: 3     blood glucose (ACCU-CHEK GUIDE) test strip, Use to test blood sugar 3 times daily or as directed., Disp: 300 strip, Rfl: 3     blood glucose monitoring (ACCU-CHEK FASTCLIX) lancets, Use to test blood sugar 1 times daily or as directed.  Ok to substitute alternative if insurance prefers., Disp: 102 each, Rfl: 11     calcium carbonate (TUMS) 500 MG chewable tablet, Take 1 chew tab by mouth daily, Disp: , Rfl:      chlorthalidone (HYGROTON) 25 MG tablet, Take 1 tablet (25 mg) by mouth daily Add on for blood pressure, Disp: 90 tablet, Rfl: 1     DULoxetine (CYMBALTA) 60 MG capsule, Take 1 capsule (60 mg) by mouth 2 times daily, Disp: 180 capsule, Rfl: 3     EPINEPHrine  "(ANY BX GENERIC EQUIV) 0.3 MG/0.3ML injection 2-pack, Inject 0.3 mLs (0.3 mg) into the muscle once as needed for anaphylaxis, Disp: 2 each, Rfl: 2     fluticasone-salmeterol (ADVAIR) 500-50 MCG/ACT inhaler, Inhale 1 puff into the lungs every 12 hours, Disp: 1 each, Rfl: 11     insulin aspart (NOVOLOG FLEXPEN) 100 UNIT/ML pen, Inject 18 units before breakfast and 18 units before dinner *Will need to schedule with new PCP for future refills*, Disp: 15 mL, Rfl: 3     insulin aspart (NOVOLOG VIAL) 100 UNITS/ML vial, Inject 4 Units Subcutaneous 3 times daily (with meals) Dispense 360 units/month, Disp: 360 mL, Rfl: 1     insulin glargine (BASAGLAR KWIKPEN) 100 UNIT/ML pen, Inject 38 Units Subcutaneous daily *Will need to establish with new PCP for future refills*, Disp: 15 mL, Rfl: 1     insulin pen needle (32G X 4 MM) 32G X 4 MM miscellaneous, Use 3  pen needles daily or as directed., Disp: 200 each, Rfl: 1     insulin pen needle (32G X 4 MM) 32G X 4 MM miscellaneous, Use 2 pen needles daily or as directed., Disp: 200 each, Rfl: 1     insulin syringe-needle U-100 (29G X 1/2\" 0.5 ML) 29G X 1/2\" 0.5 ML miscellaneous, Use 3 syringes daily or as directed., Disp: 200 each, Rfl: 1     losartan (COZAAR) 25 MG tablet, Take 1 tablet (25 mg) by mouth daily, Disp: 90 tablet, Rfl: 1     meclizine (ANTIVERT) 25 MG tablet, Take 1 tablet (25 mg) by mouth 3 times daily as needed for dizziness, Disp: 90 tablet, Rfl: 1     metFORMIN (GLUCOPHAGE XR) 500 MG 24 hr tablet, Take 2 tablets (1,000 mg) by mouth 2 times daily (with meals), Disp: 120 tablet, Rfl: 5     montelukast (SINGULAIR) 10 MG tablet, Take 1 tablet (10 mg) by mouth At Bedtime For allergies/asthma, Disp: 90 tablet, Rfl: 2     multivitamin, therapeutic (THERA-VIT) TABS, Take 1 tablet by mouth daily, Disp: , Rfl:      naproxen sodium (ANAPROX) 220 MG tablet, Take 220 mg by mouth, Disp: , Rfl:      nortriptyline (PAMELOR) 10 MG capsule, Take 2 capsules (20 mg) by mouth At " Bedtime, Disp: 60 capsule, Rfl: 5     omeprazole (PRILOSEC) 40 MG DR capsule, Take 1 capsule (40 mg) by mouth daily Take for at least two months, Disp: 90 capsule, Rfl: 2     pregabalin (LYRICA) 50 MG capsule, Take 1 capsule (50 mg) by mouth 2 times daily, Disp: 60 capsule, Rfl: 1     rizatriptan (MAXALT-MLT) 10 MG ODT, TAKE 1 TABLET BY MOUTH AT ONSET OF HEADACHE FOR MIGRAINE MAY REPEAT IN 2 HOURS. MAX 3 TABS/24 HOURS., Disp: 18 tablet, Rfl: 1     sildenafil (VIAGRA) 50 MG tablet, Take 1-2 tablets ( mg) by mouth daily as needed (prior to sexual activity), Disp: 20 tablet, Rfl: 3     sitagliptin (JANUVIA) 25 MG tablet, Take 1 tablet (25 mg) by mouth daily *Will need to establish with new PCP for future refills*, Disp: 30 tablet, Rfl: 1     warfarin ANTICOAGULANT (COUMADIN) 5 MG tablet, Take 15 mg Tues, Thurs and 12.5 mg all other days, or as directed by the Coumadin clinic, Disp: 225 tablet, Rfl: 1    Current Facility-Administered Medications:      4 mL bupivacaine (MARCAINE) preservative free injection 0.25% (10 mL vial), 4 mL, , , Edwin Ch MD, 4 mL at 03/08/23 0956     bupivacaine (MARCAINE) 0.25 % injection 4 mL, 4 mL, , , Edwin Ch MD, 4 mL at 06/08/23 1419     methylPREDNISolone (DEPO-MEDROL) injection 80 mg, 80 mg, , , Edwin Ch MD, 80 mg at 06/08/23 1419     methylPREDNISolone (DEPO-MEDROL) injection 80 mg, 80 mg, , , Edwin Ch MD, 80 mg at 03/08/23 0956     triamcinolone (KENALOG-40) injection 40 mg, 40 mg, , , Carlos A Em MD, 40 mg at 01/05/23 1000    Facility-Administered Medications Ordered in Other Visits:      BUPivacaine (MARCAINE) injection 0.5%, 10 mL, Intradermal, Once, Vinnie Espinoza, DO     DOBUTamine 500 mg in dextrose 5% 250 mL (adult std conc) premix, 2.5-20 mcg/kg/min, Intravenous, Continuous, Navarro Butcher MD, Stopped at 04/25/19 1559     iopamidol (ISOVUE-M 200) solution 10 mL, 10 mL, Intravenous, Once, Vinnie Espinoza,  DO     methylPREDNISolone (DEPO-MEDROL) injection 80 mg, 80 mg, Intramuscular, Once, Vinnie Espinoza, DO     metoprolol (LOPRESSOR) injection 5 mg, 5 mg, Intravenous, Q5 Min PRN, Navarro Butcher MD, 2 mg at 04/25/19 0302    Allergies:   Allergies   Allergen Reactions     Artificial Sweetner (Informational Only) [Artificial Sweetner (Informational Only) ] GI Disturbance     ALL artificial sweetners cause severe diarrhea & flu symptoms     Hydromorphone Anaphylaxis     Ibuprofen GI Disturbance     Morphine Sulfate Concentrate Anaphylaxis     Morphine [Fumaric Acid] Anaphylaxis     Hydrocodone-Acetaminophen Itching     Tramadol Hives     Trazodone Hives     Gabapentin      GI upset per pt     Keflex [Cephalexin] Diarrhea     Upset stomach     Codeine Phosphate Itching     Ketorolac Tromethamine Rash       Social Hx: retired .   reports that he has never smoked. His smokeless tobacco use includes chew. He reports that he does not currently use alcohol. He reports current drug use.    Family Hx: family history includes Cancer (age of onset: 52) in his mother; Cancer - colorectal (age of onset: 53) in his father; Coronary Artery Disease in his father; Diabetes in his maternal aunt, maternal aunt, paternal uncle, paternal uncle, paternal uncle, paternal uncle, and another family member; Myocardial Infarction (age of onset: 35) in his paternal grandfather; Myocardial Infarction (age of onset: 45) in his father.    REVIEW OF SYSTEMS: 10 point ROS neg other than the symptoms noted above in the HPI and PMH. Notables include  CONSTITUTIONAL:NEGATIVE for fever, chills, change in weight  INTEGUMENTARY/SKIN: NEGATIVE for worrisome rashes, moles or lesions  MUSCULOSKELETAL:See HPI above  NEURO: NEGATIVE for weakness, dizziness or paresthesias    PHYSICAL EXAM:  BP (!) 165/108   Pulse 99   Resp 18   Ht 1.829 m (6')   Wt 129.7 kg (286 lb)   BMI 38.79 kg/m     GENERAL APPEARANCE: healthy, alert, no distress  SKIN:  no suspicious lesions or rashes  NEURO: Normal strength and tone, mentation intact and speech normal  PSYCH:  mentation appears normal and affect normal, not anxious  RESPIRATORY: No increased work of breathing.    BILATERAL LOWER EXTREMITIES:  Gait: favors the right.  Alignment: varus  No Quad atrophy, strength normal.  Intact sensation deep peroneal nerve, superficial peroneal nerve, med/lat tibial nerve, sural nerve, saphenous nerve  Intact EHL, EDL, TA, FHL, GS, quadriceps hamstrings and hip flexors  Bilateral calf soft and nttp or squeeze.  Edema: trace    LEFT KNEE EXAM:    Skin: intact, no ecchymosis or erythema  Squat: 100 %, not limited by pain.     ROM: slight hyper extension to 130 flexion  Tight hamstrings on straight leg raise.  Effusion: none  Tender: NTTP med/lat joint line, anterior or posterior knee  McMurrays: negative    MCL: stable, and non-painful at both 0 and 30 degrees knee flexion  Varus stress: stable, and non-painful at both 0 and 30 degrees knee flexion  Lachmans: neg, firm endpoint  Posterior Drawer stable  Patellofemoral joint:                Apprehension: negative              Crepitations: minimal    RIGHT KNEE EXAM:    Skin: intact, no ecchymosis or erythema  Squat: 100 %, not limited by pain.     ROM: slight hyper extension to 130 flexion  Tight hamstrings on straight leg raise.  Effusion: trace  Tender: medial joint line, medial patello-femoral retinaculum, medial and lateral patella facet  Nontender to palpation posterior knee.  McMurrays: negative    Diffuse ligamentous laxity in all directions.  Patellofemoral joint:                Apprehension: negative              Crepitations: mild   Grind: positive.    X-RAY: bilateral bates, 3 views right knee from 1/26/2023-- no obvious fractures or dislocations. No fracture. Mild medial compartment degenerative arthritis has minimally progressed. Minimal effusion.    CT arthrogram 7/29/2022:  1. High-grade radial tear in the  "posterior horn of the medial meniscus. The body of the medial meniscus is peripherally extruded.  2. Focal area of chondromalacia in the medial femoral condyle posteriorly.  3. Small popliteal cyst.       ASSESSMENT/PLAN: Adarsh Salvador is a 58 year old male with chronic right knee pain, primary osteoarthritis. Uncontrolled diabetes mellitus.     * reviewed imaging studies with patient, showing arthritic changes, or wearing of the cartilage in the knee. This can be caused by normal \"wear and tear\" over the years or following prior injury to the knee.    Treatment typically starts nonsurgically. Surgical indication for total knee arthroplasty  when nonsurgical management is no longer effective.    At this time, I do not think his osteoarthritis is severe enough to warrant knee replacement.    Non-surgical treatment for knee arthritis includes:    * rest, sitting  * Activity modification - avoid impact activities or activities that aggravate symptoms.  * NSAIDS (non-steroidal anti-inflammatory medications; e.g. Aleve, advil, motrin, ibuprofen) - regular use for inflammation ( twice daily or three times daily), with food, as long as no contra-indications Please discuss with primary care doctor if needed  * ice, 15-20 minutes at a time several times a day or as needed.  * Strengthening of quadriceps muscles  * Physical Therapy for strengthening, stretching and range of motion exercises of legs  * Tylenol as needed for pain, consider Tylenol arthritis or similar  * Weight loss: Weight loss:  Body mass index is 38.79 kg/m .. weight loss benefits, not only for the current pain symptoms, but also overall health. Recommend a good diet plan that works for the patient, with the assistance of a dietician or primary care doctor as needed. Also, a good, low-impact exercise program for at least 20 minutes per day, 3 times per week, such as exercise bike, elliptical , or pool.  * Exercise: low impact such as stationary bike, " "elliptical, pool.  * Injections: cortisone versus viscosupplementation (hyaluronic acid, \"rooster comb\", \"gel shots\"); risks and perceived benefits discussed today. Patient elects  to proceed with synviscOne injectoin today. See procedure note below.  * Bracing: bracing the knee may offer some relief of symptoms when worn and provide some stability.  * over the counter supplements such as glucosamine and chondroitin sulfate may help with joint pain.  * topical ointments may help as well    * return to clinic as needed.    * needs to work on diabetic control prior to any elective surgery in the future. His hgb A1c=11.3, which is not within surgical parameters of <8.        Large Joint Injection/Arthocentesis: R knee joint    Date/Time: 6/28/2023 10:51 AM    Performed by: Frederic Bain PA  Authorized by: Edwin Ch MD    Indications:  Pain and osteoarthritis  Needle Size:  20 G  Guidance: landmark guided    Approach:  Superolateral  Approach comment:  Supine  Location:  Knee      Medications:  48 mg hylan 48 MG/6ML; 4 mL bupivacaine 0.25 %  Outcome:  Tolerated well, no immediate complications  Procedure discussed: discussed risks, benefits, and alternatives    Consent Given by:  Patient  Prep: patient was prepped and draped in usual sterile fashion          Edwin Ch M.D., M.S.  Dept. of Orthopaedic Surgery  [facility], [place]      "

## 2023-06-28 ENCOUNTER — LAB (OUTPATIENT)
Dept: LAB | Facility: CLINIC | Age: 58
End: 2023-06-28
Payer: COMMERCIAL

## 2023-06-28 ENCOUNTER — OFFICE VISIT (OUTPATIENT)
Dept: ORTHOPEDICS | Facility: CLINIC | Age: 58
End: 2023-06-28
Payer: COMMERCIAL

## 2023-06-28 ENCOUNTER — ANTICOAGULATION THERAPY VISIT (OUTPATIENT)
Dept: ANTICOAGULATION | Facility: CLINIC | Age: 58
End: 2023-06-28

## 2023-06-28 VITALS
HEART RATE: 99 BPM | HEIGHT: 72 IN | SYSTOLIC BLOOD PRESSURE: 165 MMHG | RESPIRATION RATE: 18 BRPM | DIASTOLIC BLOOD PRESSURE: 108 MMHG | BODY MASS INDEX: 38.74 KG/M2 | WEIGHT: 286 LBS

## 2023-06-28 DIAGNOSIS — M17.11 PRIMARY OSTEOARTHRITIS OF RIGHT KNEE: Primary | ICD-10-CM

## 2023-06-28 DIAGNOSIS — I82.409 RECURRENT DEEP VEIN THROMBOSIS (DVT) (H): ICD-10-CM

## 2023-06-28 DIAGNOSIS — I82.409 RECURRENT DEEP VEIN THROMBOSIS (DVT) (H): Primary | ICD-10-CM

## 2023-06-28 LAB — INR BLD: 2.3 (ref 0.9–1.1)

## 2023-06-28 PROCEDURE — 36416 COLLJ CAPILLARY BLOOD SPEC: CPT

## 2023-06-28 PROCEDURE — 20610 DRAIN/INJ JOINT/BURSA W/O US: CPT | Mod: RT | Performed by: ORTHOPAEDIC SURGERY

## 2023-06-28 PROCEDURE — 85610 PROTHROMBIN TIME: CPT

## 2023-06-28 RX ADMIN — BUPIVACAINE HYDROCHLORIDE 4 ML: 2.5 INJECTION, SOLUTION INFILTRATION; PERINEURAL at 10:51

## 2023-06-28 ASSESSMENT — PAIN SCALES - GENERAL: PAINLEVEL: EXTREME PAIN (8)

## 2023-06-28 NOTE — LETTER
"    6/28/2023         RE: Adarsh Salvador  [address]  [place] [address]        Dear Colleague,    Thank you for referring your patient, Adarsh Salvador, to the [facility] [place]. Please see a copy of my visit note below.    CHIEF COMPLAINT:   Chief Complaint   Patient presents with     Right Knee - Pain     Patient is here today for right knee osteoarthritis, pain is located on the medial side of the knee, he would like to try Synvisc today. Last  injection was cortisone on 6/8/23.          HISTORY OF PRESENT ILLNESS    Adarsh Salvador is a 58 year old male seen for evaluation of ongoing right knee pain with no known recent injury.   Seen by us 6/8/2023, received cortisone injection. Returns today with continued pain in the front of the knee. Most recent injection didn't really help. He has been very active with \"[facility] 50's\" [facility] and then [employer] on Sunday.     He has received authorization for Synvisc injection, so presents back today for that.    Pain has been present for years, on/off since ~1990 slid off an icy roof.  He's had multiple surgeries (~12?) on the knee since then, most recently 8/2022 with Dr Em. Pain started again 1/2023 when moving furniture.     He locates pain along the inner aspect of the knee to under the knee cap \"like someone is sticking a bao knife into it\", aggravated with stairs, walking and prolonged standing. Treatment has been epson salt, warm baths, diclofenac, tylenol, topical voltaren.    Pain at rest, night.     He had an injection 1/5/2023 with Dr Em without much relief. He was seen by Dr Waite to discuss whether he'd be a canddiate for total knee arthroplasty, but based on low grade osteoarthritis, didn't think it would be beneficial at this time.      Right knee arthroscopy with medial meniscus debridement 8/19/2022 with Dr Em, noted grade 2 chondrosis.    Patient has a history of recurrent DVT. On " warfarin.    History of intermittent low back pain. Numbness and tingling. Takes Lyrica, it does help.    Adarsh is diabetic, A1c=11.3 on 4/10/2023.    Present symptoms: pain medially  and anteriorly, pain sharp, shooting, dull/achy , moderate pain, severe pain.    Pain severity: 8/10  Frequency of symptoms: are constant  Exacerbating Factors: weight bearing, stairs, [id number], prolonged standing  Relieving Factors: rest, epsom salt baths.  Night Pain: Yes  Pain while at rest: Yes   Numbness or tingling: Yes   Patient has tried:     NSAIDS: diclofenac      Physical Therapy: not recently, did some after knee surgery 8/2023 but ended up with a kidney stone so had to stop      Activity modification: Yes      Bracing: on occasion      Injections: Yes 1/5/2023 with Dr Em without relief. With us 3/8/2023 helped about 1.5 months. 6/8/2023 without much relief.     Ice: Yes      Assistive device:  No     Other: tylenol. Topical voltaren works once in a while.      Other PMH:  has a past medical history of Anaphylactic reaction (8/14/2015), Anxiety, Depression, DM2 (diabetes mellitus, type 2) (H), GERD (gastroesophageal reflux disease), HTN (hypertension), IBS (irritable bowel syndrome), Kidney stone (6/15/2011), and Neuropathy.  Patient Active Problem List   Diagnosis     GERD (gastroesophageal reflux disease)     Chronic RLQ pain     Constipation     Chronic maxillary sinusitis     Chronic rhinitis     Hypertriglyceridemia     Benign essential hypertension     Anemia in other chronic diseases classified elsewhere     Fatty liver     Irritable bowel syndrome with diarrhea     Right inguinal hernia     Mild persistent asthma without complication     Type 2 diabetes mellitus with diabetic polyneuropathy, with long-term current use of insulin (H)     Hyperlipidemia LDL goal <100     CONNOR (generalized anxiety disorder)     Insomnia, unspecified type     Morbid obesity (H)     Neuropathy     Recurrent deep vein thrombosis  (DVT) (H)     Precordial pain     Dyslipidemia     Elevated C-reactive protein     Gastric reflux     Internal derangement of knee     Nicotine dependence     Varicose veins of lower extremity     Vitamin D deficiency     Low volume of ejaculated semen     Bilateral leg pain     Chronic pain of right knee     Acute pain of right knee     Aftercare following surgery of the musculoskeletal system     Advanced directives, counseling/discussion     DVT (deep venous thrombosis) (H)       Surgical Hx:  has a past surgical history that includes Colonoscopy (5/1/2012); BREATH HYDROGEN TEST (3/7/2014); orthopedic surgery (10/03/2007); Colonoscopy (4/21/2014); Release carpal tunnel (Right, 1/26/2018); cystoscopy (05/01/2008); cystoscopy,+ureteroscopy (06/10/2008); vascular surgery (11/24/2008); cystoscopy (09/26/2010); lithotripsy (09/30/2010); cystoscopy (05/18/2012); Colonoscopy (N/A, 4/21/2022); Inject epidural lumbar (N/A, 7/7/2022); and Arthroscopy knee with medial meniscectomy (Right, 8/19/2022).    Medications:   Current Outpatient Medications:      albuterol (PROAIR HFA/PROVENTIL HFA/VENTOLIN HFA) 108 (90 Base) MCG/ACT inhaler, Inhale 2 puffs into the lungs every 6 hours as needed for shortness of breath or wheezing, Disp: 6.7 g, Rfl: 3     amLODIPine (NORVASC) 10 MG tablet, Take 1 tablet (10 mg) by mouth daily for blood pressure, Disp: 90 tablet, Rfl: 1     ASPIRIN PO, Take 325 mg by mouth daily , Disp: , Rfl:      atorvastatin (LIPITOR) 40 MG tablet, Take 1 tablet (40 mg) by mouth daily, Disp: 90 tablet, Rfl: 3     blood glucose (ACCU-CHEK GUIDE) test strip, Use to test blood sugar 3 times daily or as directed., Disp: 300 strip, Rfl: 3     blood glucose monitoring (ACCU-CHEK FASTCLIX) lancets, Use to test blood sugar 1 times daily or as directed.  Ok to substitute alternative if insurance prefers., Disp: 102 each, Rfl: 11     calcium carbonate (TUMS) 500 MG chewable tablet, Take 1 chew tab by mouth daily, Disp: ,  "Rfl:      chlorthalidone (HYGROTON) 25 MG tablet, Take 1 tablet (25 mg) by mouth daily Add on for blood pressure, Disp: 90 tablet, Rfl: 1     DULoxetine (CYMBALTA) 60 MG capsule, Take 1 capsule (60 mg) by mouth 2 times daily, Disp: 180 capsule, Rfl: 3     EPINEPHrine (ANY BX GENERIC EQUIV) 0.3 MG/0.3ML injection 2-pack, Inject 0.3 mLs (0.3 mg) into the muscle once as needed for anaphylaxis, Disp: 2 each, Rfl: 2     fluticasone-salmeterol (ADVAIR) 500-50 MCG/ACT inhaler, Inhale 1 puff into the lungs every 12 hours, Disp: 1 each, Rfl: 11     insulin aspart (NOVOLOG FLEXPEN) 100 UNIT/ML pen, Inject 18 units before breakfast and 18 units before dinner *Will need to schedule with new PCP for future refills*, Disp: 15 mL, Rfl: 3     insulin aspart (NOVOLOG VIAL) 100 UNITS/ML vial, Inject 4 Units Subcutaneous 3 times daily (with meals) Dispense 360 units/month, Disp: 360 mL, Rfl: 1     insulin glargine (BASAGLAR KWIKPEN) 100 UNIT/ML pen, Inject 38 Units Subcutaneous daily *Will need to establish with new PCP for future refills*, Disp: 15 mL, Rfl: 1     insulin pen needle (32G X 4 MM) 32G X 4 MM miscellaneous, Use 3  pen needles daily or as directed., Disp: 200 each, Rfl: 1     insulin pen needle (32G X 4 MM) 32G X 4 MM miscellaneous, Use 2 pen needles daily or as directed., Disp: 200 each, Rfl: 1     insulin syringe-needle U-100 (29G X 1/2\" 0.5 ML) 29G X 1/2\" 0.5 ML miscellaneous, Use 3 syringes daily or as directed., Disp: 200 each, Rfl: 1     losartan (COZAAR) 25 MG tablet, Take 1 tablet (25 mg) by mouth daily, Disp: 90 tablet, Rfl: 1     meclizine (ANTIVERT) 25 MG tablet, Take 1 tablet (25 mg) by mouth 3 times daily as needed for dizziness, Disp: 90 tablet, Rfl: 1     metFORMIN (GLUCOPHAGE XR) 500 MG 24 hr tablet, Take 2 tablets (1,000 mg) by mouth 2 times daily (with meals), Disp: 120 tablet, Rfl: 5     montelukast (SINGULAIR) 10 MG tablet, Take 1 tablet (10 mg) by mouth At Bedtime For allergies/asthma, Disp: 90 " tablet, Rfl: 2     multivitamin, therapeutic (THERA-VIT) TABS, Take 1 tablet by mouth daily, Disp: , Rfl:      naproxen sodium (ANAPROX) 220 MG tablet, Take 220 mg by mouth, Disp: , Rfl:      nortriptyline (PAMELOR) 10 MG capsule, Take 2 capsules (20 mg) by mouth At Bedtime, Disp: 60 capsule, Rfl: 5     omeprazole (PRILOSEC) 40 MG DR capsule, Take 1 capsule (40 mg) by mouth daily Take for at least two months, Disp: 90 capsule, Rfl: 2     pregabalin (LYRICA) 50 MG capsule, Take 1 capsule (50 mg) by mouth 2 times daily, Disp: 60 capsule, Rfl: 1     rizatriptan (MAXALT-MLT) 10 MG ODT, TAKE 1 TABLET BY MOUTH AT ONSET OF HEADACHE FOR MIGRAINE MAY REPEAT IN 2 HOURS. MAX 3 TABS/24 HOURS., Disp: 18 tablet, Rfl: 1     sildenafil (VIAGRA) 50 MG tablet, Take 1-2 tablets ( mg) by mouth daily as needed (prior to sexual activity), Disp: 20 tablet, Rfl: 3     sitagliptin (JANUVIA) 25 MG tablet, Take 1 tablet (25 mg) by mouth daily *Will need to establish with new PCP for future refills*, Disp: 30 tablet, Rfl: 1     warfarin ANTICOAGULANT (COUMADIN) 5 MG tablet, Take 15 mg Tues, Thurs and 12.5 mg all other days, or as directed by the Coumadin clinic, Disp: 225 tablet, Rfl: 1    Current Facility-Administered Medications:      4 mL bupivacaine (MARCAINE) preservative free injection 0.25% (10 mL vial), 4 mL, , , Edwin Ch MD, 4 mL at 03/08/23 0956     bupivacaine (MARCAINE) 0.25 % injection 4 mL, 4 mL, , , Edwin Ch MD, 4 mL at 06/08/23 1419     methylPREDNISolone (DEPO-MEDROL) injection 80 mg, 80 mg, , , Edwin Ch MD, 80 mg at 06/08/23 1419     methylPREDNISolone (DEPO-MEDROL) injection 80 mg, 80 mg, , , Edwin Ch MD, 80 mg at 03/08/23 0956     triamcinolone (KENALOG-40) injection 40 mg, 40 mg, , , Carlos A Em MD, 40 mg at 01/05/23 1000    Facility-Administered Medications Ordered in Other Visits:      BUPivacaine (MARCAINE) injection 0.5%, 10 mL, Intradermal, Once, Smith,  Vinnie Jameson, DO     DOBUTamine 500 mg in dextrose 5% 250 mL (adult std conc) premix, 2.5-20 mcg/kg/min, Intravenous, Continuous, Navarro Butcher MD, Stopped at 04/25/19 1559     iopamidol (ISOVUE-M 200) solution 10 mL, 10 mL, Intravenous, Once, Vinnie Espinoza, DO     methylPREDNISolone (DEPO-MEDROL) injection 80 mg, 80 mg, Intramuscular, Once, Vinnie Espinoza, DO     metoprolol (LOPRESSOR) injection 5 mg, 5 mg, Intravenous, Q5 Min PRN, Navarro Butcher MD, 2 mg at 04/25/19 1559    Allergies:   Allergies   Allergen Reactions     Artificial Sweetner (Informational Only) [Artificial Sweetner (Informational Only) ] GI Disturbance     ALL artificial sweetners cause severe diarrhea & flu symptoms     Hydromorphone Anaphylaxis     Ibuprofen GI Disturbance     Morphine Sulfate Concentrate Anaphylaxis     Morphine [Fumaric Acid] Anaphylaxis     Hydrocodone-Acetaminophen Itching     Tramadol Hives     Trazodone Hives     Gabapentin      GI upset per pt     Keflex [Cephalexin] Diarrhea     Upset stomach     Codeine Phosphate Itching     Ketorolac Tromethamine Rash       Social Hx: retired .   reports that he has never smoked. His smokeless tobacco use includes chew. He reports that he does not currently use alcohol. He reports current drug use.    Family Hx: family history includes Cancer (age of onset: 52) in his mother; Cancer - colorectal (age of onset: 53) in his father; Coronary Artery Disease in his father; Diabetes in his maternal aunt, maternal aunt, paternal uncle, paternal uncle, paternal uncle, paternal uncle, and another family member; Myocardial Infarction (age of onset: 35) in his paternal grandfather; Myocardial Infarction (age of onset: 45) in his father.    REVIEW OF SYSTEMS: 10 point ROS neg other than the symptoms noted above in the HPI and PMH. Notables include  CONSTITUTIONAL:NEGATIVE for fever, chills, change in weight  INTEGUMENTARY/SKIN: NEGATIVE for worrisome rashes, moles or  lesions  MUSCULOSKELETAL:See HPI above  NEURO: NEGATIVE for weakness, dizziness or paresthesias    PHYSICAL EXAM:  BP (!) 165/108   Pulse 99   Resp 18   Ht 1.829 m (6')   Wt 129.7 kg (286 lb)   BMI 38.79 kg/m     GENERAL APPEARANCE: healthy, alert, no distress  SKIN: no suspicious lesions or rashes  NEURO: Normal strength and tone, mentation intact and speech normal  PSYCH:  mentation appears normal and affect normal, not anxious  RESPIRATORY: No increased work of breathing.    BILATERAL LOWER EXTREMITIES:  Gait: favors the right.  Alignment: varus  No Quad atrophy, strength normal.  Intact sensation deep peroneal nerve, superficial peroneal nerve, med/lat tibial nerve, sural nerve, saphenous nerve  Intact EHL, EDL, TA, FHL, GS, quadriceps hamstrings and hip flexors  Bilateral calf soft and nttp or squeeze.  Edema: trace    LEFT KNEE EXAM:    Skin: intact, no ecchymosis or erythema  Squat: 100 %, not limited by pain.     ROM: slight hyper extension to 130 flexion  Tight hamstrings on straight leg raise.  Effusion: none  Tender: NTTP med/lat joint line, anterior or posterior knee  McMurrays: negative    MCL: stable, and non-painful at both 0 and 30 degrees knee flexion  Varus stress: stable, and non-painful at both 0 and 30 degrees knee flexion  Lachmans: neg, firm endpoint  Posterior Drawer stable  Patellofemoral joint:                Apprehension: negative              Crepitations: minimal    RIGHT KNEE EXAM:    Skin: intact, no ecchymosis or erythema  Squat: 100 %, not limited by pain.     ROM: slight hyper extension to 130 flexion  Tight hamstrings on straight leg raise.  Effusion: trace  Tender: medial joint line, medial patello-femoral retinaculum, medial and lateral patella facet  Nontender to palpation posterior knee.  McMurrays: negative    Diffuse ligamentous laxity in all directions.  Patellofemoral joint:                Apprehension: negative              Crepitations: mild   Grind:  "positive.    X-RAY: bilateral bates, 3 views right knee from 1/26/2023-- no obvious fractures or dislocations. No fracture. Mild medial compartment degenerative arthritis has minimally progressed. Minimal effusion.    CT arthrogram 7/29/2022:  1. High-grade radial tear in the posterior horn of the medial meniscus. The body of the medial meniscus is peripherally extruded.  2. Focal area of chondromalacia in the medial femoral condyle posteriorly.  3. Small popliteal cyst.       ASSESSMENT/PLAN: Adarsh Salvador is a 58 year old male with chronic right knee pain, primary osteoarthritis. Uncontrolled diabetes mellitus.     * reviewed imaging studies with patient, showing arthritic changes, or wearing of the cartilage in the knee. This can be caused by normal \"wear and tear\" over the years or following prior injury to the knee.    Treatment typically starts nonsurgically. Surgical indication for total knee arthroplasty  when nonsurgical management is no longer effective.    At this time, I do not think his osteoarthritis is severe enough to warrant knee replacement.    Non-surgical treatment for knee arthritis includes:    * rest, sitting  * Activity modification - avoid impact activities or activities that aggravate symptoms.  * NSAIDS (non-steroidal anti-inflammatory medications; e.g. Aleve, advil, motrin, ibuprofen) - regular use for inflammation ( twice daily or three times daily), with food, as long as no contra-indications Please discuss with primary care doctor if needed  * ice, 15-20 minutes at a time several times a day or as needed.  * Strengthening of quadriceps muscles  * Physical Therapy for strengthening, stretching and range of motion exercises of legs  * Tylenol as needed for pain, consider Tylenol arthritis or similar  * Weight loss: Weight loss:  Body mass index is 38.79 kg/m .. weight loss benefits, not only for the current pain symptoms, but also overall health. Recommend a good diet plan that works " "for the patient, with the assistance of a dietician or primary care doctor as needed. Also, a good, low-impact exercise program for at least 20 minutes per day, 3 times per week, such as exercise bike, elliptical , or pool.  * Exercise: low impact such as stationary bike, elliptical, pool.  * Injections: cortisone versus viscosupplementation (hyaluronic acid, \"rooster comb\", \"gel shots\"); risks and perceived benefits discussed today. Patient elects  to proceed with synviscOne injectoin today. See procedure note below.  * Bracing: bracing the knee may offer some relief of symptoms when worn and provide some stability.  * over the counter supplements such as glucosamine and chondroitin sulfate may help with joint pain.  * topical ointments may help as well    * return to clinic as needed.    * needs to work on diabetic control prior to any elective surgery in the future. His hgb A1c=11.3, which is not within surgical parameters of <8.        Large Joint Injection/Arthocentesis: R knee joint    Date/Time: 6/28/2023 10:51 AM    Performed by: Frederic Bain PA  Authorized by: Edwin Ch MD    Indications:  Pain and osteoarthritis  Needle Size:  20 G  Guidance: landmark guided    Approach:  Superolateral  Approach comment:  Supine  Location:  Knee      Medications:  48 mg hylan 48 MG/6ML; 4 mL bupivacaine 0.25 %  Outcome:  Tolerated well, no immediate complications  Procedure discussed: discussed risks, benefits, and alternatives    Consent Given by:  Patient  Prep: patient was prepped and draped in usual sterile fashion          Edwin Ch M.D., M.S.  Dept. of Orthopaedic Surgery  [facility]          Again, thank you for allowing me to participate in the care of your patient.        Sincerely,        Edwin Ch MD    "

## 2023-06-28 NOTE — PROGRESS NOTES
ANTICOAGULATION MANAGEMENT     Adarsh Salvador 58 year old male is on warfarin with therapeutic INR result. (Goal INR 2.0-3.0)    Recent labs: (last 7 days)     06/28/23  0831   INR 2.3*       ASSESSMENT       Source(s): Chart Review and Patient/Caregiver Call       Warfarin doses taken: Warfarin taken as instructed    Diet: No new diet changes identified    Medication/supplement changes: None noted    New illness, injury, or hospitalization: No    Signs or symptoms of bleeding or clotting: No    Previous result: Therapeutic last 2(+) visits    Additional findings: Pt has been having ongoing pain in knee, saw provider today for injection         PLAN     Recommended plan for no diet, medication or health factor changes affecting INR     Dosing Instructions: Continue your current warfarin dose with next INR in 4 weeks       Summary  As of 6/28/2023    Full warfarin instructions:  15 mg every Tue, Sat; 12.5 mg all other days   Next INR check:  7/26/2023             Telephone call with Adarsh who verbalizes understanding and agrees to plan    Lab visit scheduled    Education provided:     Goal range and lab monitoring: goal range and significance of current result and Importance of therapeutic range    Importance of notifying anticoagulation clinic for: Ongoing/increasing pain, s/s of bleeding/bruising or new injuries/illnesses to update [facility] as we may want him to recheck INR sooner.     Plan made per [facility] anticoagulation protocol    Guanakito Welsh RN  Anticoagulation Clinic  6/28/2023    _______________________________________________________________________     Anticoagulation Episode Summary     Current INR goal:  2.0-3.0   TTR:  40.2 % (1 y)   Target end date:  Indefinite   Send INR reminders to:  [facility]    Indications    Recurrent deep vein thrombosis (DVT) (H) [I82.409]           Comments:           Anticoagulation Care Providers     Provider Role Specialty Phone number    Bertha Romero PA-C Referring  Family Medicine [phone]

## 2023-06-29 RX ORDER — BUPIVACAINE HYDROCHLORIDE 2.5 MG/ML
4 INJECTION, SOLUTION INFILTRATION; PERINEURAL
Status: DISCONTINUED | OUTPATIENT
Start: 2023-06-28 | End: 2023-09-06

## 2023-07-10 ENCOUNTER — ALLIED HEALTH/NURSE VISIT (OUTPATIENT)
Dept: EDUCATION SERVICES | Facility: CLINIC | Age: 58
End: 2023-07-10
Payer: COMMERCIAL

## 2023-07-10 DIAGNOSIS — Z79.4 TYPE 2 DIABETES MELLITUS WITH DIABETIC POLYNEUROPATHY, WITH LONG-TERM CURRENT USE OF INSULIN (H): Primary | ICD-10-CM

## 2023-07-10 DIAGNOSIS — E11.42 TYPE 2 DIABETES MELLITUS WITH DIABETIC POLYNEUROPATHY, WITH LONG-TERM CURRENT USE OF INSULIN (H): Primary | ICD-10-CM

## 2023-07-10 PROCEDURE — G0108 DIAB MANAGE TRN  PER INDIV: HCPCS | Mod: 95 | Performed by: DIETITIAN, REGISTERED

## 2023-07-10 RX ORDER — BLOOD-GLUCOSE SENSOR
1 EACH MISCELLANEOUS
Qty: 2 EACH | Refills: 5 | Status: SHIPPED | OUTPATIENT
Start: 2023-07-10 | End: 2024-07-11

## 2023-07-10 NOTE — LETTER
7/10/2023         RE: Adarsh Salvador  [address]  [place] [address]        Dear Colleague,    Thank you for referring your patient, Adarsh Salvador, to the [facility]. Please see a copy of my visit note below.    Diabetes Self-Management Education & Support    Presents for: Individual review    Type of Service: In Person Visit    Assessment Type:   ASSESSMENT:  Patients blood sugars remain elevated.  His goal is to get his a1c under 8.0 to qualify for knee surgery.  He was unable to check if the valeria kirk was on his phone due to family deaths recently.  Was able to download today in clinic.  Recommend insulin dose increase today to both basal and bolus insulin to help lower blood sugars.      Patient's most recent   Lab Results   Component Value Date    A1C 11.3 04/10/2023    A1C 7.4 12/03/2020     is not meeting goal of <8.0    Diabetes knowledge and skills assessment:   Patient is knowledgeable in diabetes management concepts related to: Monitoring    Continue education with the following diabetes management concepts: Healthy Eating, Monitoring and Taking Medication    Based on learning assessment above, most appropriate setting for further diabetes education would be: Individual setting.      PLAN    Basaglar 38-0-0-0 --> 44-0-0-0  Novolog 18-0-18-0 --> 25-0-25-0  Order Freestyle valeria 3    Topics to cover at upcoming visits: Healthy Eating and Monitoring    Follow-up: 8/7/23    See Care Plan for co-developed, patient-state behavior change goals.  AVS provided for patient today.    Education Materials Provided:  Freestyle valeria 3 handout      SUBJECTIVE/OBJECTIVE:  Presents for: Individual review  Accompanied by: Self  Diabetes education in the past 24mo: No  Focus of Visit: Monitoring, Taking Medication, Assistance w/ making life changes  Diabetes type: Type 2  Disease course: Worsening  How confident are you filling out medical forms by yourself:: Extremely  Diabetes management  related comments/concerns: Insurance doesn't cover Januvia  Transportation concerns: No  Difficulty affording diabetes medication?: Sometimes  Difficulty affording diabetes testing supplies?: Sometimes  Other concerns:: None  Cultural Influences/Ethnic Background:  Not  or     Diabetes Symptoms & Complications:  Fatigue: Yes  Neuropathy: Yes  Weight trend: Stable  Complications assessed today?: Yes  CVA: Yes  Sexual dysfunction: Yes    Patient Problem List and Family Medical History reviewed for relevant medical history, current medical status, and diabetes risk factors.    Vitals:  There were no vitals taken for this visit.  Estimated body mass index is 38.79 kg/m  as calculated from the following:    Height as of 6/28/23: 1.829 m (6').    Weight as of 6/28/23: 129.7 kg (286 lb).   Last 3 BP:   BP Readings from Last 3 Encounters:   06/28/23 (!) 165/108   05/01/23 (!) 151/94   04/24/23 (!) 146/76       History   Smoking Status     Never   Smokeless Tobacco     Current     Types: Chew       Labs:  Lab Results   Component Value Date    A1C 11.3 04/10/2023    A1C 7.4 12/03/2020     Lab Results   Component Value Date     04/10/2023     01/29/2021     Lab Results   Component Value Date    LDL  04/10/2023      Comment:      Cannot estimate LDL when triglyceride exceeds 400 mg/dL     04/10/2023    LDL  03/26/2020     Cannot estimate LDL when triglyceride exceeds 400 mg/dL     03/26/2020     HDL Cholesterol   Date Value Ref Range Status   03/26/2020 32 (L) >39 mg/dL Final     Direct Measure HDL   Date Value Ref Range Status   04/10/2023 46 >=40 mg/dL Final   ]  GFR Estimate   Date Value Ref Range Status   04/10/2023 >90 >60 mL/min/1.73m2 Final     Comment:     eGFR calculated using 2021 CKD-EPI equation.   01/29/2021 >90 >60 mL/min/[1.73_m2] Final     Comment:     Non  GFR Calc  Starting 12/18/2018, serum creatinine based estimated GFR (eGFR) will be   calculated  using the Chronic Kidney Disease Epidemiology Collaboration   (CKD-EPI) equation.       GFR Estimate If Black   Date Value Ref Range Status   01/29/2021 >90 >60 mL/min/[1.73_m2] Final     Comment:      GFR Calc  Starting 12/18/2018, serum creatinine based estimated GFR (eGFR) will be   calculated using the Chronic Kidney Disease Epidemiology Collaboration   (CKD-EPI) equation.       Lab Results   Component Value Date    CR 0.94 04/10/2023    CR 0.94 01/29/2021     No results found for: MICROALBUMIN    Healthy Eating:  Healthy Eating Assessed Today: Yes  Cultural/Quaker diet restrictions?: No  Meal planning/habits: None  Meals include: Breakfast, Dinner  Breakfast: bowl rice krispies  (corn flakes, life, grape nuts, shredded wheat) OR eggs, toast and schultz  OR cream of wheat OR oatmeal - milk 1-2 banana  Dinner: pork chop- pile of potato- mixed veggies  Snacks: 1-2 fruits or hamburger.  Doesnt snack every day  Beverages: Water, Tea, Other (3/4c koolaid per gallon)  Has patient met with a dietitian in the past?: Yes    koolaid- 1/2 gallon- 1/2 c sugar       7/9-  B- Pancakes with low sugar jelly  Went fishing- chito  Dinner- bean and schultz soup    Being Active:  Being Active Assessed Today: Yes  Exercise:: Yes (lots of fly fishing in the summer, none in winter)  Days per week of moderate to strenuous exercise (like a brisk walk): 3  On average, minutes per day of exercise at this level: 40  How intense was your typical exercise? : Light (like stretching or slow walking)  Exercise Minutes per Week: 120  Barrier to exercise: Other    Monitoring:  Monitoring Assessed Today: Yes  Did patient bring glucose meter to appointment? : Yes  Blood Glucose Meter: Accu-chek  Times checking blood sugar at home (number): 3  Times checking blood sugar at home (per): Day  Blood glucose trend: Fluctuating            Taking Medications:  Diabetes Medication(s)     Biguanides       metFORMIN (GLUCOPHAGE XR) 500 MG 24  hr tablet    Take 2 tablets (1,000 mg) by mouth 2 times daily (with meals)    Dipeptidyl Peptidase-4 (DPP-4) Inhibitors       sitagliptin (JANUVIA) 25 MG tablet    Take 1 tablet (25 mg) by mouth daily *Will need to establish with new PCP for future refills* -not taking due to cost    Insulin       insulin aspart (NOVOLOG FLEXPEN) 100 UNIT/ML pen    Inject 18 units before breakfast and 18 units before dinner *Will need to schedule with new PCP for future refills*     insulin aspart (NOVOLOG VIAL) 100 UNITS/ML vial    Inject 4 Units Subcutaneous 3 times daily (with meals) Dispense 360 units/month     insulin glargine (BASAGLAR KWIKPEN) 100 UNIT/ML pen    Inject 38 Units Subcutaneous daily *Will need to establish with new PCP for future refills*          Taking Medication Assessed Today: Yes  Current Treatments: Non-insulin Injectables, Oral Medication (taken by mouth)  Given by: Patient  Injection/Infusion sites: Abdomen  Problems taking diabetes medications regularly?: No  Diabetes medication side effects?: No    Problem Solving:  Problem Solving Assessed Today: Yes  Is the patient at risk for hypoglycemia?: Yes  Hypoglycemia Frequency: Never  Does patient have glucagon emergency kit?: No  Is the patient at risk for DKA?: No              Reducing Risks:  Diabetes Risks: Age over 45 years, Sedentary Lifestyle  CAD Risks: Diabetes Mellitus, Stress, Tobacco exposure, Sedentary lifestyle    Healthy Coping:  Healthy Coping Assessed Today: Yes  Emotional response to diabetes: Ready to learn  Stage of change: PREPARATION (Decided to change - considering how)  Support resources: None  Patient Activation Measure Survey Score:      1/24/2022     7:00 AM 4/10/2023     8:00 AM   DEMETRIUS Score (Last Two)   DEMETRIUS Raw Score 40 40   Activation Score 100 100   DEMETRIUS Level 4 4         Care Plan and Education Provided:  Care Plan: Diabetes   Updates made by Ivory Dennis RD since 7/10/2023 12:00 AM      Problem: Diabetes Self-Management  Education Needed to Optimize Self-Care Behaviors       Goal: Healthy Eating - follow a healthy eating pattern for diabetes    This Visit's Progress: 50%   Recent Progress: 10%   Note:    I will consider trying a higher fiber cereal         Ivory Dennis MS, RD, LD, CDE  Time Spent: 45 minutes  Encounter Type: Individual    Any diabetes medication dose changes were made via the CDE Protocol per the patient's primary care provider. A copy of this encounter was shared with the provider.

## 2023-07-10 NOTE — TELEPHONE ENCOUNTER
Patient needs new pmd - not me - I have too many diabetes and can't take anymore. Refill done. Ravindra Clements MD

## 2023-07-10 NOTE — LETTER
July 11, 2023    Adarsh Salvador  [address]   [place] [address]    Dear Adarsh,       We recently received a refill request for Continuous Blood Gluc Sensor (FREESTYLE FROYLAN 3 SENSOR) [facility].  We have refilled this for one time  only because you are due for a:    Diabetes office visit and establish care with a new provider.      Please call at your earliest convenience so that there will not be a delay with your future refills.          Thank you,   Your [facility] Team/  [phone]

## 2023-07-10 NOTE — PATIENT INSTRUCTIONS
Move to the mini bag of microwave popcorn.  Popcorn - 3 cups = 15g carbohydrates     Basaglar increase to 44 units  Novolog Increase to 25 units twice daily    Try increasing vegetables daily- aim for 1/2 plate of vegetables    Carrots, peppers, cauliflower, zucchini, egg plant, tomato, cabbage,

## 2023-07-10 NOTE — TELEPHONE ENCOUNTER
NOTE TO PROVIDER REQUESTING MEDICATION ORDERS:    Adarsh Salvador glucose are elevated    Current diabetes medications:  yes:     Diabetes Medication(s)     Biguanides       metFORMIN (GLUCOPHAGE XR) 500 MG 24 hr tablet    Take 2 tablets (1,000 mg) by mouth 2 times daily (with meals)    Dipeptidyl Peptidase-4 (DPP-4) Inhibitors       sitagliptin (JANUVIA) 25 MG tablet    Take 1 tablet (25 mg) by mouth daily *Will need to establish with new PCP for future refills*    Insulin       insulin aspart (NOVOLOG FLEXPEN) 100 UNIT/ML pen    Inject 18 units before breakfast and 18 units before dinner *Will need to schedule with new PCP for future refills*     insulin aspart (NOVOLOG VIAL) 100 UNITS/ML vial    Inject 4 Units Subcutaneous 3 times daily (with meals) Dispense 360 units/month     insulin glargine (BASAGLAR KWIKPEN) 100 UNIT/ML pen    Inject 38 Units Subcutaneous daily *Will need to establish with new PCP for future refills*      .       Recommend freestyle valeria 3.  Patients pcp not available to approve sensor.  He will use phone as .      Patient follow up plan 8/7/23.      Orders pended. If in agreement, please note approval and sign pended orders, otherwise please indicate an alternate plan. Route back to writer to notify the patient.       Ivory Dennis MS, RD, LD, CDE

## 2023-07-10 NOTE — PROGRESS NOTES
Diabetes Self-Management Education & Support    Presents for: Individual review    Type of Service: In Person Visit    Assessment Type:   ASSESSMENT:  Patients blood sugars remain elevated.  His goal is to get his a1c under 8.0 to qualify for knee surgery.  He was unable to check if the valeria kirk was on his phone due to family deaths recently.  Was able to download today in clinic.  Recommend insulin dose increase today to both basal and bolus insulin to help lower blood sugars.      Patient's most recent   Lab Results   Component Value Date    A1C 11.3 04/10/2023    A1C 7.4 12/03/2020     is not meeting goal of <8.0    Diabetes knowledge and skills assessment:   Patient is knowledgeable in diabetes management concepts related to: Monitoring    Continue education with the following diabetes management concepts: Healthy Eating, Monitoring and Taking Medication    Based on learning assessment above, most appropriate setting for further diabetes education would be: Individual setting.      PLAN    Basaglar 38-0-0-0 --> 44-0-0-0  Novolog 18-0-18-0 --> 25-0-25-0  Order Freestyle valeria 3    Topics to cover at upcoming visits: Healthy Eating and Monitoring    Follow-up: 8/7/23    See Care Plan for co-developed, patient-state behavior change goals.  AVS provided for patient today.    Education Materials Provided:  Freestyle valeria 3 handout      SUBJECTIVE/OBJECTIVE:  Presents for: Individual review  Accompanied by: Self  Diabetes education in the past 24mo: No  Focus of Visit: Monitoring, Taking Medication, Assistance w/ making life changes  Diabetes type: Type 2  Disease course: Worsening  How confident are you filling out medical forms by yourself:: Extremely  Diabetes management related comments/concerns: Insurance doesn't cover Januvia  Transportation concerns: No  Difficulty affording diabetes medication?: Sometimes  Difficulty affording diabetes testing supplies?: Sometimes  Other concerns:: None  Cultural  Influences/Ethnic Background:  Not  or     Diabetes Symptoms & Complications:  Fatigue: Yes  Neuropathy: Yes  Weight trend: Stable  Complications assessed today?: Yes  CVA: Yes  Sexual dysfunction: Yes    Patient Problem List and Family Medical History reviewed for relevant medical history, current medical status, and diabetes risk factors.    Vitals:  There were no vitals taken for this visit.  Estimated body mass index is 38.79 kg/m  as calculated from the following:    Height as of 6/28/23: 1.829 m (6').    Weight as of 6/28/23: 129.7 kg (286 lb).   Last 3 BP:   BP Readings from Last 3 Encounters:   06/28/23 (!) 165/108   05/01/23 (!) 151/94   04/24/23 (!) 146/76       History   Smoking Status     Never   Smokeless Tobacco     Current     Types: Chew       Labs:  Lab Results   Component Value Date    A1C 11.3 04/10/2023    A1C 7.4 12/03/2020     Lab Results   Component Value Date     04/10/2023     01/29/2021     Lab Results   Component Value Date    LDL  04/10/2023      Comment:      Cannot estimate LDL when triglyceride exceeds 400 mg/dL     04/10/2023    LDL  03/26/2020     Cannot estimate LDL when triglyceride exceeds 400 mg/dL     03/26/2020     HDL Cholesterol   Date Value Ref Range Status   03/26/2020 32 (L) >39 mg/dL Final     Direct Measure HDL   Date Value Ref Range Status   04/10/2023 46 >=40 mg/dL Final   ]  GFR Estimate   Date Value Ref Range Status   04/10/2023 >90 >60 mL/min/1.73m2 Final     Comment:     eGFR calculated using 2021 CKD-EPI equation.   01/29/2021 >90 >60 mL/min/[1.73_m2] Final     Comment:     Non  GFR Calc  Starting 12/18/2018, serum creatinine based estimated GFR (eGFR) will be   calculated using the Chronic Kidney Disease Epidemiology Collaboration   (CKD-EPI) equation.       GFR Estimate If Black   Date Value Ref Range Status   01/29/2021 >90 >60 mL/min/[1.73_m2] Final     Comment:      GFR Calc  Starting  12/18/2018, serum creatinine based estimated GFR (eGFR) will be   calculated using the Chronic Kidney Disease Epidemiology Collaboration   (CKD-EPI) equation.       Lab Results   Component Value Date    CR 0.94 04/10/2023    CR 0.94 01/29/2021     No results found for: MICROALBUMIN    Healthy Eating:  Healthy Eating Assessed Today: Yes  Cultural/Jainism diet restrictions?: No  Meal planning/habits: None  Meals include: Breakfast, Dinner  Breakfast: bowl rice krispies  (corn flakes, life, grape nuts, shredded wheat) OR eggs, toast and schultz  OR cream of wheat OR oatmeal - milk 1-2 banana  Dinner: pork chop- pile of potato- mixed veggies  Snacks: 1-2 fruits or hamburger.  Doesnt snack every day  Beverages: Water, Tea, Other (3/4c koolaid per gallon)  Has patient met with a dietitian in the past?: Yes    koolaid- 1/2 gallon- 1/2 c sugar       7/9-  B- Pancakes with low sugar jelly  Went fishing- chito  Dinner- bean and schultz soup    Being Active:  Being Active Assessed Today: Yes  Exercise:: Yes (lots of fly fishing in the summer, none in winter)  Days per week of moderate to strenuous exercise (like a brisk walk): 3  On average, minutes per day of exercise at this level: 40  How intense was your typical exercise? : Light (like stretching or slow walking)  Exercise Minutes per Week: 120  Barrier to exercise: Other    Monitoring:  Monitoring Assessed Today: Yes  Did patient bring glucose meter to appointment? : Yes  Blood Glucose Meter: Accu-chek  Times checking blood sugar at home (number): 3  Times checking blood sugar at home (per): Day  Blood glucose trend: Fluctuating            Taking Medications:  Diabetes Medication(s)     Biguanides       metFORMIN (GLUCOPHAGE XR) 500 MG 24 hr tablet    Take 2 tablets (1,000 mg) by mouth 2 times daily (with meals)    Dipeptidyl Peptidase-4 (DPP-4) Inhibitors       sitagliptin (JANUVIA) 25 MG tablet    Take 1 tablet (25 mg) by mouth daily *Will need to establish with new PCP  for future refills* -not taking due to cost    Insulin       insulin aspart (NOVOLOG FLEXPEN) 100 UNIT/ML pen    Inject 18 units before breakfast and 18 units before dinner *Will need to schedule with new PCP for future refills*     insulin aspart (NOVOLOG VIAL) 100 UNITS/ML vial    Inject 4 Units Subcutaneous 3 times daily (with meals) Dispense 360 units/month     insulin glargine (BASAGLAR KWIKPEN) 100 UNIT/ML pen    Inject 38 Units Subcutaneous daily *Will need to establish with new PCP for future refills*          Taking Medication Assessed Today: Yes  Current Treatments: Non-insulin Injectables, Oral Medication (taken by mouth)  Given by: Patient  Injection/Infusion sites: Abdomen  Problems taking diabetes medications regularly?: No  Diabetes medication side effects?: No    Problem Solving:  Problem Solving Assessed Today: Yes  Is the patient at risk for hypoglycemia?: Yes  Hypoglycemia Frequency: Never  Does patient have glucagon emergency kit?: No  Is the patient at risk for DKA?: No              Reducing Risks:  Diabetes Risks: Age over 45 years, Sedentary Lifestyle  CAD Risks: Diabetes Mellitus, Stress, Tobacco exposure, Sedentary lifestyle    Healthy Coping:  Healthy Coping Assessed Today: Yes  Emotional response to diabetes: Ready to learn  Stage of change: PREPARATION (Decided to change - considering how)  Support resources: None  Patient Activation Measure Survey Score:      1/24/2022     7:00 AM 4/10/2023     8:00 AM   DEMETRIUS Score (Last Two)   DEMETRIUS Raw Score 40 40   Activation Score 100 100   DEMETRIUS Level 4 4         Care Plan and Education Provided:  Care Plan: Diabetes   Updates made by Ivory Dennis RD since 7/10/2023 12:00 AM      Problem: Diabetes Self-Management Education Needed to Optimize Self-Care Behaviors       Goal: Healthy Eating - follow a healthy eating pattern for diabetes    This Visit's Progress: 50%   Recent Progress: 10%   Note:    I will consider trying a higher fiber cereal          Ivory Dennis MS, RD, LD, CDE  Time Spent: 45 minutes  Encounter Type: Individual    Any diabetes medication dose changes were made via the CDE Protocol per the patient's primary care provider. A copy of this encounter was shared with the provider.

## 2023-07-13 NOTE — LETTER
1/26/2023         RE: Adarsh Salvador  [address]  [place] [address]        Dear Colleague,    Thank you for referring your patient, Adarsh Salvador, to the [facility]. Please see a copy of my visit note below.    Chief Complaint:   1. Right knee pain    Diagnosis:  1.  Medial meniscus tear right knee  2.  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 Right  knee arthroscopy, partial medial meniscectomy; date of surgery 8/19/2022    History:  The patient returns to my clinic today for interval follow-up he is a pleasant 57-year-old male.  I saw him in early January and offered him a corticosteroid injection to his right knee.  He states that this was not helpful to him.  He did not provide lasting benefit.  He is actually had a very eventful few weeks.  He recently had to move somewhat abruptly from the house that he was living in this required extensive work to move all of his belongings to a new facility.  And he is actually had more pain since that time.  It also has been slippery and difficult to move around outside and has had pain secondary to that as well    Exam:     General: Awake, Alert, and oriented. Articulates and communicates with a normal affect     Right Lower Extremity:    Incisions well healed without evidence of infection, no erythema, drainage, or wound dehiscence    No post-operative effusion or ecchymosis    Range of motion 0 to 130 degrees     Knee is stable     Neurovascularly intact    Gait: Mildly antalgic without use of assistive device     Imaging:  PA flexion weightbearing, lateral and patellofemoral views obtained today shows joint space narrowing of the right knee as compared to the left.  Though admittedly I do not think it is full-thickness and there is some cartilage remaining.    Assesment:  1.  5 months month status post arthroscopic meniscectomy right knee as well as chondroplasty medial compartment with continued  symptomatology    Plan:   At this time the patient has not seen lasting improvement from the injection.  I Pema start him on oral diclofenac 75 mg p.o. twice daily on a scheduled basis and then transition to as needed.  Were also can get new radiographs which had a chance to look at.  I am going to discussed with the patient in 1 week the results of the radiographs as well as the results of the anti-inflammatory.  We will he will let us know how he is doing at that time.  I am concerned that he may not have enough arthritis to warrant referral to arthroplasty however I certainly will not preclude him from seeing a joint replacement surgeon if that is what he wants to do.    Realistically I do not believe that we are going to be able to offer him another arthroscopic surgery and expect a different outcome as this is already been done multiple times.                   Again, thank you for allowing me to participate in the care of your patient.        Sincerely,        Carlos A Em MD     [facility] (Hem/Onc)

## 2023-07-16 ENCOUNTER — HEALTH MAINTENANCE LETTER (OUTPATIENT)
Age: 58
End: 2023-07-16

## 2023-07-26 ENCOUNTER — LAB (OUTPATIENT)
Dept: LAB | Facility: CLINIC | Age: 58
End: 2023-07-26
Payer: COMMERCIAL

## 2023-07-26 ENCOUNTER — ANTICOAGULATION THERAPY VISIT (OUTPATIENT)
Dept: ANTICOAGULATION | Facility: CLINIC | Age: 58
End: 2023-07-26

## 2023-07-26 DIAGNOSIS — I82.409 RECURRENT DEEP VEIN THROMBOSIS (DVT) (H): Primary | ICD-10-CM

## 2023-07-26 DIAGNOSIS — I82.409 RECURRENT DEEP VEIN THROMBOSIS (DVT) (H): ICD-10-CM

## 2023-07-26 LAB — INR BLD: 2.1 (ref 0.9–1.1)

## 2023-07-26 PROCEDURE — 36416 COLLJ CAPILLARY BLOOD SPEC: CPT

## 2023-07-26 PROCEDURE — 85610 PROTHROMBIN TIME: CPT

## 2023-07-26 RX ORDER — WARFARIN SODIUM 5 MG/1
TABLET ORAL
Qty: 225 TABLET | Refills: 1 | Status: SHIPPED | OUTPATIENT
Start: 2023-07-26 | End: 2023-11-27

## 2023-07-26 NOTE — PROGRESS NOTES
.ANTICOAGULATION MANAGEMENT:  Medication Refill    Anticoagulation Summary  As of 7/26/2023      Warfarin maintenance plan:  15 mg (5 mg x 3) every Tue, Sat; 12.5 mg (5 mg x 2.5) all other days   Next INR check:  8/30/2023   Target end date:  Indefinite    Indications    Recurrent deep vein thrombosis (DVT) (H) [I82.409]                 Anticoagulation Care Providers       Provider Role Specialty Phone number    Bertha Romero PA-C Referring Family Medicine [phone]            Refill Criteria    Visit with referring provider/group: Meets criteria: office visit within referring provider group in the last 1 year on 4/24/23. PCP has since left [place], so patient will need a new PCP. [facility].    [facility] referral signed last signed: 01/29/2023; within last year: Yes    Lab monitoring not exceeding 2 weeks overdue:  Yes    Adarsh meets all criteria for refill. Rx instructions and quantity match patient's current dosing plan. Warfarin 90 day supply with 1 refill granted per [facility] protocol     Petey HURTADO RN  Anticoagulation Clinic

## 2023-07-26 NOTE — PROGRESS NOTES
ANTICOAGULATION MANAGEMENT     Adarsh Salvador 58 year old male is on warfarin with therapeutic INR result. (Goal INR 2.0-3.0)    Recent labs: (last 7 days)     07/26/23  0828   INR 2.1*       ASSESSMENT     Source(s): Chart Review  Previous INR was Therapeutic last visit; previously outside of goal range  Medication, diet, health changes since last INR chart reviewed; none identified         PLAN     Recommended plan for no diet, medication or health factor changes affecting INR     Dosing Instructions: Continue your current warfarin dose with next INR in 5 weeks       Summary  As of 7/26/2023      Full warfarin instructions:  15 mg every Tue, Sat; 12.5 mg all other days   Next INR check:  8/30/2023               Detailed voice message left for Adarsh with dosing instructions and follow up date.     Contact [phone]  to schedule and with any changes, questions or concerns.     Education provided:   Please call back if any changes to your diet, medications or how you've been taking warfarin  Contact [phone]  with any changes, questions or concerns.     Plan made per [facility] anticoagulation protocol    Petey HURTADO RN  Anticoagulation Clinic  7/26/2023    _______________________________________________________________________     Anticoagulation Episode Summary       Current INR goal:  2.0-3.0   TTR:  42.0 % (1 y)   Target end date:  Indefinite   Send INR reminders to:  MANOJ MARTÍNEZ    Indications    Recurrent deep vein thrombosis (DVT) (H) [I82.409]             Comments:               Anticoagulation Care Providers       Provider Role Specialty Phone number    Bertha Romero PA-C Referring Family Medicine [phone]

## 2023-08-07 ENCOUNTER — ALLIED HEALTH/NURSE VISIT (OUTPATIENT)
Dept: EDUCATION SERVICES | Facility: CLINIC | Age: 58
End: 2023-08-07
Payer: COMMERCIAL

## 2023-08-07 DIAGNOSIS — Z79.4 TYPE 2 DIABETES MELLITUS WITH DIABETIC POLYNEUROPATHY, WITH LONG-TERM CURRENT USE OF INSULIN (H): Primary | ICD-10-CM

## 2023-08-07 DIAGNOSIS — E11.42 TYPE 2 DIABETES MELLITUS WITH DIABETIC POLYNEUROPATHY, WITH LONG-TERM CURRENT USE OF INSULIN (H): Primary | ICD-10-CM

## 2023-08-07 PROCEDURE — G0108 DIAB MANAGE TRN  PER INDIV: HCPCS | Performed by: DIETITIAN, REGISTERED

## 2023-08-07 NOTE — PROGRESS NOTES
Diabetes Self-Management Education & Support    Presents for: CGM Review    Type of Service: In Person Visit    Assessment Type:   REPORTS:      8/6-9pm- saltine crackers  8/7- 12 oz milk and ecig    Pt verbalized understanding of concepts discussed and recommendations provided today.       Continue education with the following diabetes management concepts: Healthy Eating, Monitoring, and Taking Medication    ASSESSMENT:  Blood sugars remain elevated, but are on average improving.  He was not aware milk raises blood sugar.  At this time it sounds like he is at the start of understanding how foods raise blood sugar and how his Novolog impacts the readings.  He would benefit from marking in his novolog doses to understand what elevations are due to insulin timing and what are due to missmatch of dose.  He would benefit from trying out an insulin to carbohydrate ratio.     Glucose Patterns & Trends:  No clear patterns identified    PLAN    Try out a 1 unit/4g insulin to carbohydrate ratio.  Track Novolog doses in the valeria kirk    Topics to cover at upcoming visits: Healthy Eating, Monitoring, and Taking Medication    Follow-up: 9/5/23    See Care Plan for co-developed, patient-state behavior change goals.  AVS provided for patient today.    Education Materials Provided:  No new materials provided today      SUBJECTIVE/OBJECTIVE:  Presents for: CGM Review  Accompanied by: Self  Diabetes education in the past 24mo: No  Focus of Visit: Monitoring, Taking Medication, Assistance w/ making life changes, CGM  Type of CGM visit: Personal CGM Follow-up  Diabetes type: Type 2  Disease course: Improving  How confident are you filling out medical forms by yourself:: Extremely  Diabetes management related comments/concerns: I love the valeria,  why did my blood sugar go up I only had milk this morning  Transportation concerns: No  Difficulty affording diabetes medication?: Sometimes  Difficulty affording diabetes testing supplies?:  Sometimes  Other concerns:: None  Cultural Influences/Ethnic Background:  Not  or     Diabetes Symptoms & Complications:  Fatigue: Yes  Neuropathy: Yes  Weight trend: Stable  Complications assessed today?: Yes  CVA: Yes  Sexual dysfunction: Yes    Patient Problem List and Family Medical History reviewed for relevant medical history, current medical status, and diabetes risk factors.    Vitals:  There were no vitals taken for this visit.  Estimated body mass index is 38.79 kg/m  as calculated from the following:    Height as of 6/28/23: 1.829 m (6').    Weight as of 6/28/23: 129.7 kg (286 lb).   Last 3 BP:   BP Readings from Last 3 Encounters:   06/28/23 (!) 165/108   05/01/23 (!) 151/94   04/24/23 (!) 146/76       History   Smoking Status    Never   Smokeless Tobacco    Current    Types: Chew       Labs:  Lab Results   Component Value Date    A1C 11.3 04/10/2023    A1C 7.4 12/03/2020     Lab Results   Component Value Date     04/10/2023     01/29/2021     Lab Results   Component Value Date    LDL  04/10/2023      Comment:      Cannot estimate LDL when triglyceride exceeds 400 mg/dL     04/10/2023    LDL  03/26/2020     Cannot estimate LDL when triglyceride exceeds 400 mg/dL     03/26/2020     HDL Cholesterol   Date Value Ref Range Status   03/26/2020 32 (L) >39 mg/dL Final     Direct Measure HDL   Date Value Ref Range Status   04/10/2023 46 >=40 mg/dL Final   ]  GFR Estimate   Date Value Ref Range Status   04/10/2023 >90 >60 mL/min/1.73m2 Final     Comment:     eGFR calculated using 2021 CKD-EPI equation.   01/29/2021 >90 >60 mL/min/[1.73_m2] Final     Comment:     Non  GFR Calc  Starting 12/18/2018, serum creatinine based estimated GFR (eGFR) will be   calculated using the Chronic Kidney Disease Epidemiology Collaboration   (CKD-EPI) equation.       GFR Estimate If Black   Date Value Ref Range Status   01/29/2021 >90 >60 mL/min/[1.73_m2] Final     Comment:       GFR Calc  Starting 12/18/2018, serum creatinine based estimated GFR (eGFR) will be   calculated using the Chronic Kidney Disease Epidemiology Collaboration   (CKD-EPI) equation.       Lab Results   Component Value Date    CR 0.94 04/10/2023    CR 0.94 01/29/2021     No results found for: MICROALBUMIN    Healthy Eating:  Healthy Eating Assessed Today: Yes  Cultural/Yazdanism diet restrictions?: No  Meal planning/habits: None  Meals include: Breakfast, Dinner  Breakfast: bowl rice krispies  (corn flakes, life, grape nuts, shredded wheat) OR eggs, toast and schultz  OR cream of wheat OR oatmeal - milk 1-2 banana  Dinner: pork chop- pile of potato- mixed veggies  Snacks: 1-2 fruits or hamburger.  Doesnt snack every day  Beverages: Water, Tea, Other, Milk (3/4c koolaid per gallon)  Has patient met with a dietitian in the past?: Yes    Being Active:  Being Active Assessed Today: Yes  Exercise:: Yes (lots of fly fishing in the summer, none in winter)  Days per week of moderate to strenuous exercise (like a brisk walk): 3  On average, minutes per day of exercise at this level: 40  How intense was your typical exercise? : Light (like stretching or slow walking)  Exercise Minutes per Week: 120  Barrier to exercise: Other    Monitoring:  Monitoring Assessed Today: Yes  Did patient bring glucose meter to appointment? : Yes  Blood Glucose Meter: Accu-chek, CGM  Times checking blood sugar at home (number): 3  Times checking blood sugar at home (per): Day  Blood glucose trend: Fluctuating    Taking Medications:  Diabetes Medication(s)       Biguanides       metFORMIN (GLUCOPHAGE XR) 500 MG 24 hr tablet    Take 2 tablets (1,000 mg) by mouth 2 times daily (with meals)      Dipeptidyl Peptidase-4 (DPP-4) Inhibitors       sitagliptin (JANUVIA) 25 MG tablet    Take 1 tablet (25 mg) by mouth daily *Will need to establish with new PCP for future refills*      Insulin       insulin aspart (NOVOLOG FLEXPEN) 100 UNIT/ML  pen    Inject 18 units before breakfast and 18 units before dinner *Will need to schedule with new PCP for future refills*     insulin aspart (NOVOLOG VIAL) 100 UNITS/ML vial    Inject 4 Units Subcutaneous 3 times daily (with meals) Dispense 360 units/month     insulin glargine (BASAGLAR KWIKPEN) 100 UNIT/ML pen    Inject 38 Units Subcutaneous daily *Will need to establish with new PCP for future refills*            Taking Medication Assessed Today: Yes  Current Treatments: Non-insulin Injectables, Oral Medication (taken by mouth)  Given by: Patient  Injection/Infusion sites: Abdomen  Problems taking diabetes medications regularly?: No  Diabetes medication side effects?: No    Problem Solving:  Problem Solving Assessed Today: Yes  Is the patient at risk for hypoglycemia?: Yes  Hypoglycemia Frequency: Never  Does patient have glucagon emergency kit?: No  Is the patient at risk for DKA?: No              Reducing Risks:  Diabetes Risks: Age over 45 years, Sedentary Lifestyle  CAD Risks: Diabetes Mellitus, Stress, Tobacco exposure, Sedentary lifestyle    Healthy Coping:  Healthy Coping Assessed Today: Yes  Emotional response to diabetes: Ready to learn  Stage of change: PREPARATION (Decided to change - considering how)  Support resources: None  Patient Activation Measure Survey Score:      1/24/2022     7:00 AM 4/10/2023     8:00 AM   DEMETRIUS Score (Last Two)   DEMETRIUS Raw Score 40 40   Activation Score 100 100   DEMETRIUS Level 4 4         Care Plan and Education Provided:  Care Plan: Diabetes   Updates made by Ivory Dennis RD since 8/7/2023 12:00 AM        Problem: Diabetes Self-Management Education Needed to Optimize Self-Care Behaviors         Goal: Healthy Eating - follow a healthy eating pattern for diabetes    This Visit's Progress: 50%   Recent Progress: 50%   Note:    I will consider trying a higher fiber cereal       Goal: Monitoring - monitor glucose and ketones as directed    This Visit's Progress: 0%   Note:    I  will start tracking my novolog doses in the valeria kirk         Ivory Dennis MS, RD, LD, CDE      Time Spent: 60 minutes  Encounter Type: Individual    Any diabetes medication dose changes were made via the CDE Protocol per the patient's primary care provider. A copy of this encounter was shared with the provider.

## 2023-08-07 NOTE — LETTER
8/7/2023         RE: Adarsh Salvador  [address]  [place] [address]        Dear Colleague,    Thank you for referring your patient, Adarsh Salvador, to the [facility]. Please see a copy of my visit note below.    Diabetes Self-Management Education & Support    Presents for: CGM Review    Type of Service: In Person Visit    Assessment Type:   REPORTS:      8/6-9pm- saltine crackers  8/7- 12 oz milk and ecig    Pt verbalized understanding of concepts discussed and recommendations provided today.       Continue education with the following diabetes management concepts: Healthy Eating, Monitoring, and Taking Medication    ASSESSMENT:  Blood sugars remain elevated, but are on average improving.  He was not aware milk raises blood sugar.  At this time it sounds like he is at the start of understanding how foods raise blood sugar and how his Novolog impacts the readings.  He would benefit from marking in his novolog doses to understand what elevations are due to insulin timing and what are due to missmatch of dose.  He would benefit from trying out an insulin to carbohydrate ratio.     Glucose Patterns & Trends:  No clear patterns identified    PLAN    Try out a 1 unit/4g insulin to carbohydrate ratio.  Track Novolog doses in the valeria kirk    Topics to cover at upcoming visits: Healthy Eating, Monitoring, and Taking Medication    Follow-up: 9/5/23    See Care Plan for co-developed, patient-state behavior change goals.  AVS provided for patient today.    Education Materials Provided:  No new materials provided today      SUBJECTIVE/OBJECTIVE:  Presents for: CGM Review  Accompanied by: Self  Diabetes education in the past 24mo: No  Focus of Visit: Monitoring, Taking Medication, Assistance w/ making life changes, CGM  Type of CGM visit: Personal CGM Follow-up  Diabetes type: Type 2  Disease course: Improving  How confident are you filling out medical forms by yourself:: Extremely  Diabetes  management related comments/concerns: I love the valeria,  why did my blood sugar go up I only had milk this morning  Transportation concerns: No  Difficulty affording diabetes medication?: Sometimes  Difficulty affording diabetes testing supplies?: Sometimes  Other concerns:: None  Cultural Influences/Ethnic Background:  Not  or     Diabetes Symptoms & Complications:  Fatigue: Yes  Neuropathy: Yes  Weight trend: Stable  Complications assessed today?: Yes  CVA: Yes  Sexual dysfunction: Yes    Patient Problem List and Family Medical History reviewed for relevant medical history, current medical status, and diabetes risk factors.    Vitals:  There were no vitals taken for this visit.  Estimated body mass index is 38.79 kg/m  as calculated from the following:    Height as of 6/28/23: 1.829 m (6').    Weight as of 6/28/23: 129.7 kg (286 lb).   Last 3 BP:   BP Readings from Last 3 Encounters:   06/28/23 (!) 165/108   05/01/23 (!) 151/94   04/24/23 (!) 146/76       History   Smoking Status     Never   Smokeless Tobacco     Current     Types: Chew       Labs:  Lab Results   Component Value Date    A1C 11.3 04/10/2023    A1C 7.4 12/03/2020     Lab Results   Component Value Date     04/10/2023     01/29/2021     Lab Results   Component Value Date    LDL  04/10/2023      Comment:      Cannot estimate LDL when triglyceride exceeds 400 mg/dL     04/10/2023    LDL  03/26/2020     Cannot estimate LDL when triglyceride exceeds 400 mg/dL     03/26/2020     HDL Cholesterol   Date Value Ref Range Status   03/26/2020 32 (L) >39 mg/dL Final     Direct Measure HDL   Date Value Ref Range Status   04/10/2023 46 >=40 mg/dL Final   ]  GFR Estimate   Date Value Ref Range Status   04/10/2023 >90 >60 mL/min/1.73m2 Final     Comment:     eGFR calculated using 2021 CKD-EPI equation.   01/29/2021 >90 >60 mL/min/[1.73_m2] Final     Comment:     Non  GFR Calc  Starting 12/18/2018, serum  creatinine based estimated GFR (eGFR) will be   calculated using the Chronic Kidney Disease Epidemiology Collaboration   (CKD-EPI) equation.       GFR Estimate If Black   Date Value Ref Range Status   01/29/2021 >90 >60 mL/min/[1.73_m2] Final     Comment:      GFR Calc  Starting 12/18/2018, serum creatinine based estimated GFR (eGFR) will be   calculated using the Chronic Kidney Disease Epidemiology Collaboration   (CKD-EPI) equation.       Lab Results   Component Value Date    CR 0.94 04/10/2023    CR 0.94 01/29/2021     No results found for: MICROALBUMIN    Healthy Eating:  Healthy Eating Assessed Today: Yes  Cultural/Tenriism diet restrictions?: No  Meal planning/habits: None  Meals include: Breakfast, Dinner  Breakfast: bowl rice krispies  (corn flakes, life, grape nuts, shredded wheat) OR eggs, toast and schultz  OR cream of wheat OR oatmeal - milk 1-2 banana  Dinner: pork chop- pile of potato- mixed veggies  Snacks: 1-2 fruits or hamburger.  Doesnt snack every day  Beverages: Water, Tea, Other, Milk (3/4c koolaid per gallon)  Has patient met with a dietitian in the past?: Yes    Being Active:  Being Active Assessed Today: Yes  Exercise:: Yes (lots of fly fishing in the summer, none in winter)  Days per week of moderate to strenuous exercise (like a brisk walk): 3  On average, minutes per day of exercise at this level: 40  How intense was your typical exercise? : Light (like stretching or slow walking)  Exercise Minutes per Week: 120  Barrier to exercise: Other    Monitoring:  Monitoring Assessed Today: Yes  Did patient bring glucose meter to appointment? : Yes  Blood Glucose Meter: Accu-chek, CGM  Times checking blood sugar at home (number): 3  Times checking blood sugar at home (per): Day  Blood glucose trend: Fluctuating    Taking Medications:  Diabetes Medication(s)       Biguanides       metFORMIN (GLUCOPHAGE XR) 500 MG 24 hr tablet    Take 2 tablets (1,000 mg) by mouth 2 times daily (with  meals)      Dipeptidyl Peptidase-4 (DPP-4) Inhibitors       sitagliptin (JANUVIA) 25 MG tablet    Take 1 tablet (25 mg) by mouth daily *Will need to establish with new PCP for future refills*      Insulin       insulin aspart (NOVOLOG FLEXPEN) 100 UNIT/ML pen    Inject 18 units before breakfast and 18 units before dinner *Will need to schedule with new PCP for future refills*     insulin aspart (NOVOLOG VIAL) 100 UNITS/ML vial    Inject 4 Units Subcutaneous 3 times daily (with meals) Dispense 360 units/month     insulin glargine (BASAGLAR KWIKPEN) 100 UNIT/ML pen    Inject 38 Units Subcutaneous daily *Will need to establish with new PCP for future refills*            Taking Medication Assessed Today: Yes  Current Treatments: Non-insulin Injectables, Oral Medication (taken by mouth)  Given by: Patient  Injection/Infusion sites: Abdomen  Problems taking diabetes medications regularly?: No  Diabetes medication side effects?: No    Problem Solving:  Problem Solving Assessed Today: Yes  Is the patient at risk for hypoglycemia?: Yes  Hypoglycemia Frequency: Never  Does patient have glucagon emergency kit?: No  Is the patient at risk for DKA?: No              Reducing Risks:  Diabetes Risks: Age over 45 years, Sedentary Lifestyle  CAD Risks: Diabetes Mellitus, Stress, Tobacco exposure, Sedentary lifestyle    Healthy Coping:  Healthy Coping Assessed Today: Yes  Emotional response to diabetes: Ready to learn  Stage of change: PREPARATION (Decided to change - considering how)  Support resources: None  Patient Activation Measure Survey Score:      1/24/2022     7:00 AM 4/10/2023     8:00 AM   DEMETRIUS Score (Last Two)   DEMETRIUS Raw Score 40 40   Activation Score 100 100   DEMETRIUS Level 4 4         Care Plan and Education Provided:  Care Plan: Diabetes   Updates made by Ivory Dennis RD since 8/7/2023 12:00 AM        Problem: Diabetes Self-Management Education Needed to Optimize Self-Care Behaviors         Goal: Healthy Eating - follow a  healthy eating pattern for diabetes    This Visit's Progress: 50%   Recent Progress: 50%   Note:    I will consider trying a higher fiber cereal       Goal: Monitoring - monitor glucose and ketones as directed    This Visit's Progress: 0%   Note:    I will start tracking my novolog doses in the valeria kirk         Ivory Dennis MS, RD, LD, CDE      Time Spent: 60 minutes  Encounter Type: Individual    Any diabetes medication dose changes were made via the CDE Protocol per the patient's primary care provider. A copy of this encounter was shared with the provider.

## 2023-08-07 NOTE — PATIENT INSTRUCTIONS
Milk raises blood sugars- 12 oz= 18g carbohydrate- you may need some novolog with a glass    42g carbohydrates per 1/2 sleeve of Saltines     130g carbohydrate minimum daily--> 200g daily probably more realistic     Use the pencil to track your novolog dose and timing- how much you take and when    With novolog frequently it is timing it early enough before meals vs. Increasing doses    1 unit of novolog will cover 4-5g carbohydrates    Example= 12 oz milk= 18g carbohydrates  To use your insulin to carbohydrate ratio 18/4=4.5 units-- then take 4 units if pre meal reading is under 130, if it's higher take 5 units     1 cup pasta =45g --> use your insulin to carbohydrate ratio 45/4g = 11 units of novolog  Garlic bread 1 pc OR knot 19g carbohydrates 19/4g = 5 units    Small tamez ususally 30-35g/ 4g= 7-8 units

## 2023-08-15 ENCOUNTER — OFFICE VISIT (OUTPATIENT)
Dept: FAMILY MEDICINE | Facility: CLINIC | Age: 58
End: 2023-08-15
Payer: COMMERCIAL

## 2023-08-15 VITALS
OXYGEN SATURATION: 96 % | HEART RATE: 98 BPM | WEIGHT: 301 LBS | HEIGHT: 72 IN | TEMPERATURE: 97.4 F | DIASTOLIC BLOOD PRESSURE: 86 MMHG | BODY MASS INDEX: 40.77 KG/M2 | RESPIRATION RATE: 18 BRPM | SYSTOLIC BLOOD PRESSURE: 132 MMHG

## 2023-08-15 DIAGNOSIS — I26.99 PULMONARY EMBOLISM, OTHER, UNSPECIFIED CHRONICITY, UNSPECIFIED WHETHER ACUTE COR PULMONALE PRESENT (H): ICD-10-CM

## 2023-08-15 DIAGNOSIS — E11.42 TYPE 2 DIABETES MELLITUS WITH DIABETIC POLYNEUROPATHY, WITH LONG-TERM CURRENT USE OF INSULIN (H): Primary | ICD-10-CM

## 2023-08-15 DIAGNOSIS — E66.01 MORBID OBESITY (H): ICD-10-CM

## 2023-08-15 DIAGNOSIS — I10 HYPERTENSION, UNSPECIFIED TYPE: ICD-10-CM

## 2023-08-15 DIAGNOSIS — Z79.4 TYPE 2 DIABETES MELLITUS WITH DIABETIC POLYNEUROPATHY, WITH LONG-TERM CURRENT USE OF INSULIN (H): Primary | ICD-10-CM

## 2023-08-15 LAB
ANION GAP SERPL CALCULATED.3IONS-SCNC: 12 MMOL/L (ref 7–15)
BUN SERPL-MCNC: 10.6 MG/DL (ref 6–20)
CALCIUM SERPL-MCNC: 9.4 MG/DL (ref 8.6–10)
CHLORIDE SERPL-SCNC: 104 MMOL/L (ref 98–107)
CREAT SERPL-MCNC: 0.84 MG/DL (ref 0.67–1.17)
DEPRECATED HCO3 PLAS-SCNC: 26 MMOL/L (ref 22–29)
GFR SERPL CREATININE-BSD FRML MDRD: >90 ML/MIN/1.73M2
GLUCOSE SERPL-MCNC: 141 MG/DL (ref 70–99)
HBA1C MFR BLD: 8.4 % (ref 0–5.6)
POTASSIUM SERPL-SCNC: 4 MMOL/L (ref 3.4–5.3)
SODIUM SERPL-SCNC: 142 MMOL/L (ref 136–145)

## 2023-08-15 PROCEDURE — 36415 COLL VENOUS BLD VENIPUNCTURE: CPT | Performed by: PHYSICIAN ASSISTANT

## 2023-08-15 PROCEDURE — 80048 BASIC METABOLIC PNL TOTAL CA: CPT | Performed by: PHYSICIAN ASSISTANT

## 2023-08-15 PROCEDURE — 99214 OFFICE O/P EST MOD 30 MIN: CPT | Performed by: PHYSICIAN ASSISTANT

## 2023-08-15 PROCEDURE — 83036 HEMOGLOBIN GLYCOSYLATED A1C: CPT | Performed by: PHYSICIAN ASSISTANT

## 2023-08-15 RX ORDER — CHLORTHALIDONE 25 MG/1
25 TABLET ORAL DAILY
Qty: 90 TABLET | Refills: 1 | Status: SHIPPED | OUTPATIENT
Start: 2023-08-15 | End: 2024-04-23

## 2023-08-15 RX ORDER — ATORVASTATIN CALCIUM 40 MG/1
40 TABLET, FILM COATED ORAL DAILY
Qty: 90 TABLET | Refills: 3 | Status: SHIPPED | OUTPATIENT
Start: 2023-08-15 | End: 2024-04-23

## 2023-08-15 RX ORDER — LOSARTAN POTASSIUM 25 MG/1
25 TABLET ORAL DAILY
Qty: 90 TABLET | Refills: 1 | Status: SHIPPED | OUTPATIENT
Start: 2023-08-15 | End: 2024-04-23

## 2023-08-15 RX ORDER — METFORMIN HCL 500 MG
1000 TABLET, EXTENDED RELEASE 24 HR ORAL 2 TIMES DAILY WITH MEALS
Qty: 360 TABLET | Refills: 2 | Status: SHIPPED | OUTPATIENT
Start: 2023-08-15 | End: 2024-04-23

## 2023-08-15 RX ORDER — AMLODIPINE BESYLATE 10 MG/1
10 TABLET ORAL DAILY
Qty: 90 TABLET | Refills: 1 | Status: SHIPPED | OUTPATIENT
Start: 2023-08-15 | End: 2024-04-23

## 2023-08-15 ASSESSMENT — PATIENT HEALTH QUESTIONNAIRE - PHQ9
SUM OF ALL RESPONSES TO PHQ QUESTIONS 1-9: 6
10. IF YOU CHECKED OFF ANY PROBLEMS, HOW DIFFICULT HAVE THESE PROBLEMS MADE IT FOR YOU TO DO YOUR WORK, TAKE CARE OF THINGS AT HOME, OR GET ALONG WITH OTHER PEOPLE: SOMEWHAT DIFFICULT
SUM OF ALL RESPONSES TO PHQ QUESTIONS 1-9: 6

## 2023-08-15 ASSESSMENT — ASTHMA QUESTIONNAIRES: ACT_TOTALSCORE: 13

## 2023-08-15 NOTE — NURSING NOTE
Chief Complaint   Patient presents with    Diabetes       Initial BP (!) 146/95   Pulse 98   Temp 97.4  F (36.3  C) (Tympanic)   Resp 18   Ht 1.829 m (6')   Wt 136.5 kg (301 lb)   SpO2 96%   BMI 40.82 kg/m   Estimated body mass index is 40.82 kg/m  as calculated from the following:    Height as of this encounter: 1.829 m (6').    Weight as of this encounter: 136.5 kg (301 lb).  Medication Reconciliation: manuel Bustos MA

## 2023-08-15 NOTE — PROGRESS NOTES
Assessment & Plan     Type 2 diabetes mellitus with diabetic polyneuropathy, with long-term current use of insulin (H)  Insulin management with adjustment with Diabetes Education.   Continue with the current dosage.   He will make the transition to Endocrinology for long term insulin management.   I will plan to refill until the appointment is made.   - Basic metabolic panel  (Ca, Cl, CO2, Creat, Gluc, K, Na, BUN); Future  - Hemoglobin A1c; Future  - atorvastatin (LIPITOR) 40 MG tablet; Take 1 tablet (40 mg) by mouth daily  - metFORMIN (GLUCOPHAGE XR) 500 MG 24 hr tablet; Take 2 tablets (1,000 mg) by mouth 2 times daily (with meals)  - Adult Endocrinology  Referral; Future  - Basic metabolic panel  (Ca, Cl, CO2, Creat, Gluc, K, Na, BUN)  - Hemoglobin A1c    Pulmonary embolism, other, unspecified chronicity, unspecified whether acute cor pulmonale present (H)  Long term anticoagulation    Hypertension, unspecified type  Stable, BP is at goal on recheck.   - amLODIPine (NORVASC) 10 MG tablet; Take 1 tablet (10 mg) by mouth daily for blood pressure  - chlorthalidone (HYGROTON) 25 MG tablet; Take 1 tablet (25 mg) by mouth daily Add on for blood pressure  - losartan (COZAAR) 25 MG tablet; Take 1 tablet (25 mg) by mouth daily    Morbid obesity (H)  Work on lifestyle modifications.              BMI:   Estimated body mass index is 40.82 kg/m  as calculated from the following:    Height as of this encounter: 1.829 m (6').    Weight as of this encounter: 136.5 kg (301 lb).   Weight management plan: Discussed healthy diet and exercise guidelines        Kristen M. Kehr, PA-C  [facility]   Adarsh is a 58 year old, presenting for the following health issues:  Diabetes    Adarsh is here today to establish with a new primary provider.   He was previously seen by Bertha Romero PA-C    He has had poor control of type 2 diabetes for over a year. He recently became motivated to have better  control since he is planning total knee replacement.  He has been working with Diabetes Education and adjustments are being made with insulin. He is also taking metformin. Other oral medications were prescribed, but not covered. He is not using Januvia. He is also taking atorvastatin 40  mg daily    Hypertension: amlodipine 10 mg,   losartan 50 mg and hygroton 25 mg daily.     History of recurrent blood clots, PE and DVT: managed with coumdain              8/15/2023     9:53 AM   Additional Questions   Roomed by Delmar ALMAGUER MA       History of Present Illness       Diabetes:   He presents for follow up of diabetes.   He is checking home blood glucose with a continuous glucose monitor.   He checks blood glucose before meals, after meals, before and after meals and at bedtime.  Blood glucose is sometimes over 200 and sometimes under 70. He is aware of hypoglycemia symptoms including shakiness and weakness.   He is concerned about other.   He is having numbness in feet, burning in feet, excessive thirst and weight gain.            He eats 4 or more servings of fruits and vegetables daily.He consumes 1 sweetened beverage(s) daily.He exercises with enough effort to increase his heart rate 30 to 60 minutes per day.  He exercises with enough effort to increase his heart rate 3 or less days per week. He is missing 1 dose(s) of medications per week.               Review of Systems   Constitutional, HEENT, cardiovascular, pulmonary, GI, , musculoskeletal, neuro, skin, endocrine and psych systems are negative, except as otherwise noted.      Objective    /86   Pulse 98   Temp 97.4  F (36.3  C) (Tympanic)   Resp 18   Ht 1.829 m (6')   Wt 136.5 kg (301 lb)   SpO2 96%   BMI 40.82 kg/m    Body mass index is 40.82 kg/m .  Physical Exam   GENERAL: healthy, alert and no distress  RESP: lungs clear to auscultation - no rales, rhonchi or wheezes  CV: regular rate and rhythm, normal S1 S2, no S3 or S4, no murmur, click or  rub, no peripheral edema and peripheral pulses strong  MS: no gross musculoskeletal defects noted, no edema  SKIN: no suspicious lesions or rashes  NEURO: Normal strength and tone, mentation intact and speech normal  PSYCH: mentation appears normal, affect normal/bright  Diabetic foot exam: normal DP and PT pulses and no trophic changes or ulcerative lesions

## 2023-09-05 ENCOUNTER — ALLIED HEALTH/NURSE VISIT (OUTPATIENT)
Dept: EDUCATION SERVICES | Facility: CLINIC | Age: 58
End: 2023-09-05
Payer: COMMERCIAL

## 2023-09-05 DIAGNOSIS — Z79.4 TYPE 2 DIABETES MELLITUS WITH DIABETIC POLYNEUROPATHY, WITH LONG-TERM CURRENT USE OF INSULIN (H): Primary | ICD-10-CM

## 2023-09-05 DIAGNOSIS — E11.42 TYPE 2 DIABETES MELLITUS WITH DIABETIC POLYNEUROPATHY, WITH LONG-TERM CURRENT USE OF INSULIN (H): Primary | ICD-10-CM

## 2023-09-05 PROCEDURE — G0108 DIAB MANAGE TRN  PER INDIV: HCPCS | Performed by: DIETITIAN, REGISTERED

## 2023-09-05 NOTE — PROGRESS NOTES
Diabetes Self-Management Education & Support    Presents for: CGM Review    Type of Service: In Person Visit    Assessment Type:   REPORTS:                      24 hour recall  B- eggs, and schultz  D- 1 can green beans  Beverage- water-          Milk- having little milk per day  Sticks to -tea-with honey just enough for flavor, water, some coffee with creamer- stopped cappuccino (loves them), no more soda      Takes novolog 2-3 times  No has not had anything      Pt verbalized understanding of concepts discussed and recommendations provided today.       Continue education with the following diabetes management concepts: Healthy Eating and Taking Medication    ASSESSMENT:  Blood sugars are similar to last visit with a few more highs due to recent trip.  His average glucose and GMI both indicate A1c under 8.0 which is required for his knee replacement surgery.  His most recent A1c would still have 1 month of elevated blood sugars included on it.  He has stopped drinking milk to help lower blood sugars and for weight loss.  He additionally stopped having cappuccino drinks, and will only have tea with some honey.  Over the weekend he injured his knee worse than it usually is.  He is typically taking Novolog 2-3 times a day, but does not always record the doses.      Glucose Patterns & Trends:  Elevated between meals.    PLAN    Increase to Basaglar 0-0-0-44 --> 0-0-0-50.  Try to use Insulin to carbohydrate ratio (should see different amounts other than 15-20u)    Schedule knee replacement appointment.   Topics to cover at upcoming visits: Healthy Eating, Monitoring, and Taking Medication    Follow-up: 1 month    See Care Plan for co-developed, patient-state behavior change goals.  AVS provided for patient today.    Education Materials Provided:  No new materials provided today      SUBJECTIVE/OBJECTIVE:  Presents for: CGM Review  Accompanied by: Self  Diabetes education in the past 24mo: Yes  Focus of Visit: Monitoring,  Taking Medication, Assistance w/ making life changes, CGM  Type of CGM visit: Personal CGM Follow-up  Diabetes type: Type 2  Disease course: Improving  How confident are you filling out medical forms by yourself:: Extremely  Diabetes management related comments/concerns: I was away this weekend and sometimes missed my novolog OR ate high carb foods.  I want to schedule my knee appointment this week.  Transportation concerns: No  Difficulty affording diabetes medication?: Sometimes  Difficulty affording diabetes testing supplies?: Sometimes  Other concerns:: None  Cultural Influences/Ethnic Background:  Not  or     Diabetes Symptoms & Complications:  Fatigue: Yes  Neuropathy: Yes  Weight trend: Stable  Complications assessed today?: Yes  CVA: Yes  Sexual dysfunction: Yes    Patient Problem List and Family Medical History reviewed for relevant medical history, current medical status, and diabetes risk factors.    Vitals:  There were no vitals taken for this visit.  Estimated body mass index is 40.82 kg/m  as calculated from the following:    Height as of 8/15/23: 1.829 m (6').    Weight as of 8/15/23: 136.5 kg (301 lb).   Last 3 BP:   BP Readings from Last 3 Encounters:   08/15/23 132/86   06/28/23 (!) 165/108   05/01/23 (!) 151/94       History   Smoking Status    Never   Smokeless Tobacco    Current    Types: Chew       Labs:  Lab Results   Component Value Date    A1C 8.4 08/15/2023    A1C 7.4 12/03/2020     Lab Results   Component Value Date     08/15/2023     04/10/2023     01/29/2021     Lab Results   Component Value Date    LDL  04/10/2023      Comment:      Cannot estimate LDL when triglyceride exceeds 400 mg/dL     04/10/2023    LDL  03/26/2020     Cannot estimate LDL when triglyceride exceeds 400 mg/dL     03/26/2020     HDL Cholesterol   Date Value Ref Range Status   03/26/2020 32 (L) >39 mg/dL Final     Direct Measure HDL   Date Value Ref Range Status    04/10/2023 46 >=40 mg/dL Final   ]  GFR Estimate   Date Value Ref Range Status   08/15/2023 >90 >60 mL/min/1.73m2 Final   01/29/2021 >90 >60 mL/min/[1.73_m2] Final     Comment:     Non  GFR Calc  Starting 12/18/2018, serum creatinine based estimated GFR (eGFR) will be   calculated using the Chronic Kidney Disease Epidemiology Collaboration   (CKD-EPI) equation.       GFR Estimate If Black   Date Value Ref Range Status   01/29/2021 >90 >60 mL/min/[1.73_m2] Final     Comment:      GFR Calc  Starting 12/18/2018, serum creatinine based estimated GFR (eGFR) will be   calculated using the Chronic Kidney Disease Epidemiology Collaboration   (CKD-EPI) equation.       Lab Results   Component Value Date    CR 0.84 08/15/2023    CR 0.94 01/29/2021     No results found for: MICROALBUMIN    Healthy Eating:  Healthy Eating Assessed Today: Yes  Cultural/Sabianist diet restrictions?: No  Meal planning/habits: None  Meals include: Breakfast, Dinner  Breakfast: bowl rice krispies  (corn flakes, life, grape nuts, shredded wheat) OR eggs, toast and schultz  OR cream of wheat OR oatmeal - milk 1-2 banana  Dinner: pork chop- pile of potato- mixed veggies  Snacks: 1-2 fruits or hamburger.  Doesnt snack every day  Beverages: Water, Tea, Other (3/4c koolaid per gallon)  Has patient met with a dietitian in the past?: Yes    Being Active:  Being Active Assessed Today: Yes  Exercise:: Yes (lots of fly fishing in the summer, none in winter)  Days per week of moderate to strenuous exercise (like a brisk walk): 3  On average, minutes per day of exercise at this level: 40  How intense was your typical exercise? : Light (like stretching or slow walking)  Exercise Minutes per Week: 120  Barrier to exercise: Other    Monitoring:  Monitoring Assessed Today: Yes  Did patient bring glucose meter to appointment? : Yes  Blood Glucose Meter: Accu-chek, CGM  Times checking blood sugar at home (number): 3  Times checking blood  sugar at home (per): Day  Blood glucose trend: Fluctuating      Taking Medications:  Diabetes Medication(s)       Biguanides       metFORMIN (GLUCOPHAGE XR) 500 MG 24 hr tablet    Take 2 tablets (1,000 mg) by mouth 2 times daily (with meals)      Insulin       insulin aspart (NOVOLOG FLEXPEN) 100 UNIT/ML pen    Inject 18 units before breakfast and 18 units before dinner *Will need to schedule with new PCP for future refills*     insulin aspart (NOVOLOG VIAL) 100 UNITS/ML vial    Inject 4 Units Subcutaneous 3 times daily (with meals) Dispense 360 units/month     insulin glargine (BASAGLAR KWIKPEN) 100 UNIT/ML pen    Inject 38 Units Subcutaneous daily *Will need to establish with new PCP for future refills*            Taking Medication Assessed Today: Yes  Current Treatments: Non-insulin Injectables, Oral Medication (taken by mouth)  Given by: Patient  Injection/Infusion sites: Abdomen  Problems taking diabetes medications regularly?: No  Diabetes medication side effects?: No    Problem Solving:  Problem Solving Assessed Today: Yes  Is the patient at risk for hypoglycemia?: Yes  Hypoglycemia Frequency: Never  Does patient have glucagon emergency kit?: No  Is the patient at risk for DKA?: No              Reducing Risks:  Diabetes Risks: Age over 45 years, Sedentary Lifestyle  CAD Risks: Diabetes Mellitus, Stress, Tobacco exposure, Sedentary lifestyle    Healthy Coping:  Healthy Coping Assessed Today: Yes  Emotional response to diabetes: Ready to learn  Informal Support system:: Family, Friends  Stage of change: ACTION (Actively working towards change)  Support resources: None  Patient Activation Measure Survey Score:      1/24/2022     7:00 AM 4/10/2023     8:00 AM   DEMETRIUS Score (Last Two)   DEMETRIUS Raw Score 40 40   Activation Score 100 100   DEMETRIUS Level 4 4         Care Plan and Education Provided:  Care Plan: Diabetes   Updates made by Ivory Dennis RD since 9/5/2023 12:00 AM        Problem: Diabetes Self-Management  Education Needed to Optimize Self-Care Behaviors         Goal: Healthy Eating - follow a healthy eating pattern for diabetes    This Visit's Progress: 50%   Recent Progress: 50%   Note:    I will consider trying a higher fiber cereal       Goal: Monitoring - monitor glucose and ketones as directed    This Visit's Progress: 50%   Recent Progress: 0%   Note:    I will start tracking my novolog doses in the [facility]         Ivory Dennis MS, RD, LD, CDE  Time Spent: 60 minutes  Encounter Type: Individual    Any diabetes medication dose changes were made via the CDE Protocol per the patient's primary care provider. A copy of this encounter was shared with the provider.

## 2023-09-05 NOTE — LETTER
9/5/2023         RE: Adarsh Salvador  [address]  [place] [address]        Dear Colleague,    Thank you for referring your patient, Adarsh Salvador, to the [facility]. Please see a copy of my visit note below.    Diabetes Self-Management Education & Support    Presents for: CGM Review    Type of Service: In Person Visit    Assessment Type:   REPORTS:                      24 hour recall  B- eggs, and schultz  D- 1 can green beans  Beverage- water-          Milk- having little milk per day  Sticks to -tea-with honey just enough for flavor, water, some coffee with creamer- stopped cappuccino (loves them), no more soda      Takes novolog 2-3 times  No has not had anything      Pt verbalized understanding of concepts discussed and recommendations provided today.       Continue education with the following diabetes management concepts: Healthy Eating and Taking Medication    ASSESSMENT:  Blood sugars are similar to last visit with a few more highs due to recent trip.  His average glucose and GMI both indicate A1c under 8.0 which is required for his knee replacement surgery.  His most recent A1c would still have 1 month of elevated blood sugars included on it.  He has stopped drinking milk to help lower blood sugars and for weight loss.  He additionally stopped having cappuccino drinks, and will only have tea with some honey.  Over the weekend he injured his knee worse than it usually is.  He is typically taking Novolog 2-3 times a day, but does not always record the doses.      Glucose Patterns & Trends:  Elevated between meals.    PLAN    Increase to Basaglar 0-0-0-44 --> 0-0-0-50.  Try to use Insulin to carbohydrate ratio (should see different amounts other than 15-20u)    Schedule knee replacement appointment.   Topics to cover at upcoming visits: Healthy Eating, Monitoring, and Taking Medication    Follow-up: 1 month    See Care Plan for co-developed, patient-state behavior change  goals.  AVS provided for patient today.    Education Materials Provided:  No new materials provided today      SUBJECTIVE/OBJECTIVE:  Presents for: CGM Review  Accompanied by: Self  Diabetes education in the past 24mo: Yes  Focus of Visit: Monitoring, Taking Medication, Assistance w/ making life changes, CGM  Type of CGM visit: Personal CGM Follow-up  Diabetes type: Type 2  Disease course: Improving  How confident are you filling out medical forms by yourself:: Extremely  Diabetes management related comments/concerns: I was away this weekend and sometimes missed my novolog OR ate high carb foods.  I want to schedule my knee appointment this week.  Transportation concerns: No  Difficulty affording diabetes medication?: Sometimes  Difficulty affording diabetes testing supplies?: Sometimes  Other concerns:: None  Cultural Influences/Ethnic Background:  Not  or     Diabetes Symptoms & Complications:  Fatigue: Yes  Neuropathy: Yes  Weight trend: Stable  Complications assessed today?: Yes  CVA: Yes  Sexual dysfunction: Yes    Patient Problem List and Family Medical History reviewed for relevant medical history, current medical status, and diabetes risk factors.    Vitals:  There were no vitals taken for this visit.  Estimated body mass index is 40.82 kg/m  as calculated from the following:    Height as of 8/15/23: 1.829 m (6').    Weight as of 8/15/23: 136.5 kg (301 lb).   Last 3 BP:   BP Readings from Last 3 Encounters:   08/15/23 132/86   06/28/23 (!) 165/108   05/01/23 (!) 151/94       History   Smoking Status     Never   Smokeless Tobacco     Current     Types: Chew       Labs:  Lab Results   Component Value Date    A1C 8.4 08/15/2023    A1C 7.4 12/03/2020     Lab Results   Component Value Date     08/15/2023     04/10/2023     01/29/2021     Lab Results   Component Value Date    LDL  04/10/2023      Comment:      Cannot estimate LDL when triglyceride exceeds 400 mg/dL      04/10/2023    LDL  03/26/2020     Cannot estimate LDL when triglyceride exceeds 400 mg/dL     03/26/2020     HDL Cholesterol   Date Value Ref Range Status   03/26/2020 32 (L) >39 mg/dL Final     Direct Measure HDL   Date Value Ref Range Status   04/10/2023 46 >=40 mg/dL Final   ]  GFR Estimate   Date Value Ref Range Status   08/15/2023 >90 >60 mL/min/1.73m2 Final   01/29/2021 >90 >60 mL/min/[1.73_m2] Final     Comment:     Non  GFR Calc  Starting 12/18/2018, serum creatinine based estimated GFR (eGFR) will be   calculated using the Chronic Kidney Disease Epidemiology Collaboration   (CKD-EPI) equation.       GFR Estimate If Black   Date Value Ref Range Status   01/29/2021 >90 >60 mL/min/[1.73_m2] Final     Comment:      GFR Calc  Starting 12/18/2018, serum creatinine based estimated GFR (eGFR) will be   calculated using the Chronic Kidney Disease Epidemiology Collaboration   (CKD-EPI) equation.       Lab Results   Component Value Date    CR 0.84 08/15/2023    CR 0.94 01/29/2021     No results found for: MICROALBUMIN    Healthy Eating:  Healthy Eating Assessed Today: Yes  Cultural/Alevism diet restrictions?: No  Meal planning/habits: None  Meals include: Breakfast, Dinner  Breakfast: bowl rice krispies  (corn flakes, life, grape nuts, shredded wheat) OR eggs, toast and schultz  OR cream of wheat OR oatmeal - milk 1-2 banana  Dinner: pork chop- pile of potato- mixed veggies  Snacks: 1-2 fruits or hamburger.  Doesnt snack every day  Beverages: Water, Tea, Other (3/4c koolaid per gallon)  Has patient met with a dietitian in the past?: Yes    Being Active:  Being Active Assessed Today: Yes  Exercise:: Yes (lots of fly fishing in the summer, none in winter)  Days per week of moderate to strenuous exercise (like a brisk walk): 3  On average, minutes per day of exercise at this level: 40  How intense was your typical exercise? : Light (like stretching or slow walking)  Exercise Minutes  per Week: 120  Barrier to exercise: Other    Monitoring:  Monitoring Assessed Today: Yes  Did patient bring glucose meter to appointment? : Yes  Blood Glucose Meter: Accu-chek, CGM  Times checking blood sugar at home (number): 3  Times checking blood sugar at home (per): Day  Blood glucose trend: Fluctuating      Taking Medications:  Diabetes Medication(s)       Biguanides       metFORMIN (GLUCOPHAGE XR) 500 MG 24 hr tablet    Take 2 tablets (1,000 mg) by mouth 2 times daily (with meals)      Insulin       insulin aspart (NOVOLOG FLEXPEN) 100 UNIT/ML pen    Inject 18 units before breakfast and 18 units before dinner *Will need to schedule with new PCP for future refills*     insulin aspart (NOVOLOG VIAL) 100 UNITS/ML vial    Inject 4 Units Subcutaneous 3 times daily (with meals) Dispense 360 units/month     insulin glargine (BASAGLAR KWIKPEN) 100 UNIT/ML pen    Inject 38 Units Subcutaneous daily *Will need to establish with new PCP for future refills*            Taking Medication Assessed Today: Yes  Current Treatments: Non-insulin Injectables, Oral Medication (taken by mouth)  Given by: Patient  Injection/Infusion sites: Abdomen  Problems taking diabetes medications regularly?: No  Diabetes medication side effects?: No    Problem Solving:  Problem Solving Assessed Today: Yes  Is the patient at risk for hypoglycemia?: Yes  Hypoglycemia Frequency: Never  Does patient have glucagon emergency kit?: No  Is the patient at risk for DKA?: No              Reducing Risks:  Diabetes Risks: Age over 45 years, Sedentary Lifestyle  CAD Risks: Diabetes Mellitus, Stress, Tobacco exposure, Sedentary lifestyle    Healthy Coping:  Healthy Coping Assessed Today: Yes  Emotional response to diabetes: Ready to learn  Informal Support system:: Family, Friends  Stage of change: ACTION (Actively working towards change)  Support resources: None  Patient Activation Measure Survey Score:      1/24/2022     7:00 AM 4/10/2023     8:00 AM   DEMETRIUS  Score (Last Two)   DEMETRIUS Raw Score 40 40   Activation Score 100 100   DEMETRIUS Level 4 4         Care Plan and Education Provided:  Care Plan: Diabetes   Updates made by Ivory Dennis RD since 9/5/2023 12:00 AM        Problem: Diabetes Self-Management Education Needed to Optimize Self-Care Behaviors         Goal: Healthy Eating - follow a healthy eating pattern for diabetes    This Visit's Progress: 50%   Recent Progress: 50%   Note:    I will consider trying a higher fiber cereal       Goal: Monitoring - monitor glucose and ketones as directed    This Visit's Progress: 50%   Recent Progress: 0%   Note:    I will start tracking my novolog doses in the [facility]         Ivory Dennis MS, RD, LD, CDE  Time Spent: 60 minutes  Encounter Type: Individual    Any diabetes medication dose changes were made via the CDE Protocol per the patient's primary care provider. A copy of this encounter was shared with the provider.

## 2023-09-05 NOTE — PATIENT INSTRUCTIONS
"Honey raises blood sugar the same as sugar    Basaglar: increase to 50 units    Continue working with the ICR- 1 unit/4-5g carbohydrate- should see slightly different doses of 10-25u    1 cob of corn= 30g carbohydrates about    PB&J- carbohydrates are coming from Bread and jelly    Schedule appointment for knee surgery- if they delay it due to last A1c have them look at Time in range and \"GMI\"  GMI= glucose management indicator- estimated A1c based on average blood sugars              "

## 2023-09-06 ENCOUNTER — ANCILLARY PROCEDURE (OUTPATIENT)
Dept: GENERAL RADIOLOGY | Facility: CLINIC | Age: 58
End: 2023-09-06
Attending: PHYSICIAN ASSISTANT
Payer: COMMERCIAL

## 2023-09-06 ENCOUNTER — OFFICE VISIT (OUTPATIENT)
Dept: ORTHOPEDICS | Facility: CLINIC | Age: 58
End: 2023-09-06
Payer: COMMERCIAL

## 2023-09-06 VITALS — BODY MASS INDEX: 39.28 KG/M2 | HEIGHT: 72 IN | WEIGHT: 290 LBS

## 2023-09-06 DIAGNOSIS — M17.11 PRIMARY OSTEOARTHRITIS OF RIGHT KNEE: ICD-10-CM

## 2023-09-06 DIAGNOSIS — M25.561 CHRONIC PAIN OF RIGHT KNEE: ICD-10-CM

## 2023-09-06 DIAGNOSIS — M17.11 PRIMARY OSTEOARTHRITIS OF RIGHT KNEE: Primary | ICD-10-CM

## 2023-09-06 DIAGNOSIS — G89.29 CHRONIC PAIN OF RIGHT KNEE: ICD-10-CM

## 2023-09-06 PROCEDURE — 73562 X-RAY EXAM OF KNEE 3: CPT | Mod: TC | Performed by: RADIOLOGY

## 2023-09-06 PROCEDURE — 99213 OFFICE O/P EST LOW 20 MIN: CPT | Performed by: ORTHOPAEDIC SURGERY

## 2023-09-06 RX ORDER — TRANEXAMIC ACID 650 MG/1
1950 TABLET ORAL ONCE
Status: CANCELLED | OUTPATIENT
Start: 2023-09-06 | End: 2023-09-06

## 2023-09-06 ASSESSMENT — PAIN SCALES - GENERAL: PAINLEVEL: EXTREME PAIN (8)

## 2023-09-06 NOTE — LETTER
"    9/6/2023         RE: Adarsh Salvador  [address]  [place] [address]        Dear Colleague,    Thank you for referring your patient, Adarsh Salvador, to the [facility] [place]. Please see a copy of my visit note below.    CHIEF COMPLAINT:   Chief Complaint   Patient presents with     Right Knee - Pain     Right knee pain. Last injection with SynviscOne on 6/28/23. This did not help, maybe 2 weeks of a little relief. He is walking with a cane. His glucose is coming down and he would like to schedule surgery.         HISTORY OF PRESENT ILLNESS    Adarsh Salvador is a 58 year old male seen for evaluation of ongoing right knee pain with no known recent injury.   Seen by us 6/28/2023 and had SynviscOne injection without much relief. Previously on 6/8/2023, received cortisone injection without much relief. Returns today with continued pain in the front of the knee. He'd like to proceed with total knee arthroplasty.     Pain has been present for years, on/off since ~1990 slid off an icy roof.  He's had multiple surgeries (~12?) on the knee since then, most recently 8/2022 with Dr Em. Pain started again 1/2023 when moving furniture.     He locates pain along the inner aspect of the knee to under the knee cap \"like someone is sticking a bao knife into it\", aggravated with stairs, walking and prolonged standing. Treatment has been epson salt, warm baths, diclofenac, tylenol, topical voltaren.    Pain at rest, night.     He had an injection 1/5/2023 with Dr Em without much relief. He was seen by Dr Waite to discuss whether he'd be a canddiate for total knee arthroplasty, but based on low grade osteoarthritis at the time, didn't think it would be beneficial at this time.      Right knee arthroscopy with medial meniscus debridement 8/19/2022 with Dr Em, noted grade 2 chondrosis.    Patient has a history of recurrent DVT. On warfarin.    History of intermittent low back pain. " Numbness and tingling. Takes Lyrica, it does help.    Adarsh is diabetic, A1c=8.4 on 8/15/2023. He's working hard on his diabetic control, now has a continuous monitor and is down to 7.6 today. It was >11 in April.    Present symptoms: pain medially  and anteriorly, pain sharp, shooting, dull/achy , moderate pain, severe pain.    Pain severity: 8/10  Frequency of symptoms: are constant  Exacerbating Factors: weight bearing, stairs, [id number], prolonged standing  Relieving Factors: rest, epsom salt baths.  Night Pain: Yes  Pain while at rest: Yes   Numbness or tingling: Yes   Patient has tried:     NSAIDS:  diclofenac       Physical Therapy:  not recently, did some after knee surgery 8/2023 but ended up with a kidney stone so had to stop       Activity modification: Yes      Bracing:  on occasion       Injections: Yes 1/5/2023 with Dr Em without relief. With us 3/8/2023 helped about 1.5 months. 6/8/2023 without much relief.     Ice: Yes      Assistive device:  Yes, cane.     Other: tylenol. Topical voltaren works once in a while.      Other PMH:  has a past medical history of Anaphylactic reaction (8/14/2015), Anxiety, Depression, DM2 (diabetes mellitus, type 2) (H), GERD (gastroesophageal reflux disease), HTN (hypertension), IBS (irritable bowel syndrome), Kidney stone (6/15/2011), and Neuropathy.  Patient Active Problem List   Diagnosis     GERD (gastroesophageal reflux disease)     Chronic RLQ pain     Constipation     Chronic maxillary sinusitis     Chronic rhinitis     Hypertriglyceridemia     Hypertension, unspecified type     Anemia in other chronic diseases classified elsewhere     Fatty liver     Irritable bowel syndrome with diarrhea     Right inguinal hernia     Mild persistent asthma without complication     Type 2 diabetes mellitus with diabetic polyneuropathy, with long-term current use of insulin (H)     Hyperlipidemia LDL goal <100     CONNOR (generalized anxiety disorder)     Insomnia,  unspecified type     Morbid obesity (H)     Neuropathy     Recurrent deep vein thrombosis (DVT) (H)     Precordial pain     Dyslipidemia     Elevated C-reactive protein     Gastric reflux     Internal derangement of knee     Nicotine dependence     Varicose veins of lower extremity     Vitamin D deficiency     Low volume of ejaculated semen     Bilateral leg pain     Chronic pain of right knee     Acute pain of right knee     Aftercare following surgery of the musculoskeletal system     Advanced directives, counseling/discussion     DVT (deep venous thrombosis) (H)     Pulmonary embolism, other, unspecified chronicity, unspecified whether acute cor pulmonale present (H)       Surgical Hx:  has a past surgical history that includes Colonoscopy (5/1/2012); BREATH HYDROGEN TEST (3/7/2014); orthopedic surgery (10/03/2007); Colonoscopy (4/21/2014); Release carpal tunnel (Right, 1/26/2018); cystoscopy (05/01/2008); cystoscopy,+ureteroscopy (06/10/2008); vascular surgery (11/24/2008); cystoscopy (09/26/2010); lithotripsy (09/30/2010); cystoscopy (05/18/2012); Colonoscopy (N/A, 4/21/2022); Inject epidural lumbar (N/A, 7/7/2022); and Arthroscopy knee with medial meniscectomy (Right, 8/19/2022).    Medications:   Current Outpatient Medications:      albuterol (PROAIR HFA/PROVENTIL HFA/VENTOLIN HFA) 108 (90 Base) MCG/ACT inhaler, Inhale 2 puffs into the lungs every 6 hours as needed for shortness of breath or wheezing, Disp: 6.7 g, Rfl: 3     amLODIPine (NORVASC) 10 MG tablet, Take 1 tablet (10 mg) by mouth daily for blood pressure, Disp: 90 tablet, Rfl: 1     ASPIRIN PO, Take 325 mg by mouth daily , Disp: , Rfl:      atorvastatin (LIPITOR) 40 MG tablet, Take 1 tablet (40 mg) by mouth daily, Disp: 90 tablet, Rfl: 3     blood glucose (ACCU-CHEK GUIDE) test strip, Use to test blood sugar 3 times daily or as directed., Disp: 300 strip, Rfl: 3     blood glucose monitoring (ACCU-CHEK FASTCLIX) lancets, Use to test blood sugar 1  "times daily or as directed.  Ok to substitute alternative if insurance prefers., Disp: 102 each, Rfl: 11     calcium carbonate (TUMS) 500 MG chewable tablet, Take 1 chew tab by mouth daily, Disp: , Rfl:      chlorthalidone (HYGROTON) 25 MG tablet, Take 1 tablet (25 mg) by mouth daily Add on for blood pressure, Disp: 90 tablet, Rfl: 1     Continuous Blood Gluc Sensor (FREESTYLE FROYLAN 3 SENSOR) MISC, 1 each every 14 days, Disp: 2 each, Rfl: 5     DULoxetine (CYMBALTA) 60 MG capsule, Take 1 capsule (60 mg) by mouth 2 times daily, Disp: 180 capsule, Rfl: 3     EPINEPHrine (ANY BX GENERIC EQUIV) 0.3 MG/0.3ML injection 2-pack, Inject 0.3 mLs (0.3 mg) into the muscle once as needed for anaphylaxis, Disp: 2 each, Rfl: 2     fluticasone-salmeterol (ADVAIR) 500-50 MCG/ACT inhaler, Inhale 1 puff into the lungs every 12 hours, Disp: 1 each, Rfl: 11     insulin aspart (NOVOLOG FLEXPEN) 100 UNIT/ML pen, Inject 18 units before breakfast and 18 units before dinner *Will need to schedule with new PCP for future refills*, Disp: 15 mL, Rfl: 3     insulin aspart (NOVOLOG VIAL) 100 UNITS/ML vial, Inject 4 Units Subcutaneous 3 times daily (with meals) Dispense 360 units/month, Disp: 360 mL, Rfl: 1     insulin glargine (BASAGLAR KWIKPEN) 100 UNIT/ML pen, Inject 38 Units Subcutaneous daily *Will need to establish with new PCP for future refills*, Disp: 15 mL, Rfl: 1     insulin pen needle (32G X 4 MM) 32G X 4 MM miscellaneous, Use 3  pen needles daily or as directed., Disp: 200 each, Rfl: 1     insulin pen needle (32G X 4 MM) 32G X 4 MM miscellaneous, Use 2 pen needles daily or as directed., Disp: 200 each, Rfl: 1     insulin syringe-needle U-100 (29G X 1/2\" 0.5 ML) 29G X 1/2\" 0.5 ML miscellaneous, Use 3 syringes daily or as directed., Disp: 200 each, Rfl: 1     losartan (COZAAR) 25 MG tablet, Take 1 tablet (25 mg) by mouth daily, Disp: 90 tablet, Rfl: 1     meclizine (ANTIVERT) 25 MG tablet, Take 1 tablet (25 mg) by mouth 3 times daily as " needed for dizziness, Disp: 90 tablet, Rfl: 1     metFORMIN (GLUCOPHAGE XR) 500 MG 24 hr tablet, Take 2 tablets (1,000 mg) by mouth 2 times daily (with meals), Disp: 360 tablet, Rfl: 2     montelukast (SINGULAIR) 10 MG tablet, Take 1 tablet (10 mg) by mouth At Bedtime For allergies/asthma, Disp: 90 tablet, Rfl: 2     multivitamin, therapeutic (THERA-VIT) TABS, Take 1 tablet by mouth daily, Disp: , Rfl:      naproxen sodium (ANAPROX) 220 MG tablet, Take 220 mg by mouth, Disp: , Rfl:      nortriptyline (PAMELOR) 10 MG capsule, Take 2 capsules (20 mg) by mouth At Bedtime, Disp: 60 capsule, Rfl: 5     omeprazole (PRILOSEC) 40 MG DR capsule, Take 1 capsule (40 mg) by mouth daily Take for at least two months, Disp: 90 capsule, Rfl: 2     pregabalin (LYRICA) 50 MG capsule, Take 1 capsule (50 mg) by mouth 2 times daily, Disp: 60 capsule, Rfl: 1     rizatriptan (MAXALT-MLT) 10 MG ODT, TAKE 1 TABLET BY MOUTH AT ONSET OF HEADACHE FOR MIGRAINE MAY REPEAT IN 2 HOURS. MAX 3 TABS/24 HOURS., Disp: 18 tablet, Rfl: 1     warfarin ANTICOAGULANT (COUMADIN) 5 MG tablet, Take 15 mg Tues, Thurs and 12.5 mg all other days, or as directed by the Coumadin clinic, Disp: 225 tablet, Rfl: 1  No current facility-administered medications for this visit.    Facility-Administered Medications Ordered in Other Visits:      BUPivacaine (MARCAINE) injection 0.5%, 10 mL, Intradermal, Once, Vinnie Espinoza, DO     DOBUTamine 500 mg in dextrose 5% 250 mL (adult std conc) premix, 2.5-20 mcg/kg/min, Intravenous, Continuous, Navarro Butcher MD, Stopped at 04/25/19 1559     iopamidol (ISOVUE-M 200) solution 10 mL, 10 mL, Intravenous, Once, Vinnie Espinoza, DO     methylPREDNISolone (DEPO-MEDROL) injection 80 mg, 80 mg, Intramuscular, Once, Vinnie Espinoza, DO     metoprolol (LOPRESSOR) injection 5 mg, 5 mg, Intravenous, Q5 Min PRN, Navarro Butcher MD, 2 mg at 04/25/19 6717    Allergies:   Allergies   Allergen Reactions     Artificial  Sweetner (Informational Only) [Artificial Sweetner (Informational Only) ] GI Disturbance     ALL artificial sweetners cause severe diarrhea & flu symptoms     Hydromorphone Anaphylaxis     Ibuprofen GI Disturbance     Morphine Sulfate Concentrate Anaphylaxis     Morphine [Fumaric Acid] Anaphylaxis     Hydrocodone-Acetaminophen Itching     Tramadol Hives     Trazodone Hives     Gabapentin      GI upset per pt     Keflex [Cephalexin] Diarrhea     Upset stomach     Codeine Phosphate Itching     Ketorolac Tromethamine Rash       Social Hx: retired .   reports that he has never smoked. His smokeless tobacco use includes chew. He reports that he does not currently use alcohol. He reports current drug use.    Family Hx: family history includes Cancer (age of onset: 52) in his mother; Cancer - colorectal (age of onset: 53) in his father; Coronary Artery Disease in his father; Diabetes in his maternal aunt, maternal aunt, paternal uncle, paternal uncle, paternal uncle, paternal uncle, and another family member; Myocardial Infarction (age of onset: 35) in his paternal grandfather; Myocardial Infarction (age of onset: 45) in his father.    REVIEW OF SYSTEMS: 10 point ROS neg other than the symptoms noted above in the HPI and PMH. Notables include  CONSTITUTIONAL:NEGATIVE for fever, chills, change in weight  INTEGUMENTARY/SKIN: NEGATIVE for worrisome rashes, moles or lesions  MUSCULOSKELETAL:See HPI above  NEURO: NEGATIVE for weakness, dizziness or paresthesias    PHYSICAL EXAM:  Ht 1.829 m (6')   Wt 131.5 kg (290 lb)   BMI 39.33 kg/m     GENERAL APPEARANCE: healthy, alert, no distress  SKIN: no suspicious lesions or rashes  NEURO: Normal strength and tone, mentation intact and speech normal  PSYCH:  mentation appears normal and affect normal, not anxious  RESPIRATORY: No increased work of breathing.    BILATERAL LOWER EXTREMITIES:  Gait: favors the right. Using cane left hand.  Alignment: varus  No Quad atrophy,  strength normal.  Intact sensation deep peroneal nerve, superficial peroneal nerve, med/lat tibial nerve, sural nerve, saphenous nerve  Intact EHL, EDL, TA, FHL, GS, quadriceps hamstrings and hip flexors  Bilateral calf soft and nttp or squeeze.  Edema: trace    LEFT KNEE EXAM:    Skin: intact, no ecchymosis or erythema  ROM: slight hyper extension to 130 flexion  Tight hamstrings on straight leg raise.  Effusion: none  Tender: NTTP med/lat joint line, anterior or posterior knee  McMurrays: negative    MCL: stable, and non-painful at both 0 and 30 degrees knee flexion  Varus stress: stable, and non-painful at both 0 and 30 degrees knee flexion  Lachmans: neg, firm endpoint  Posterior Drawer stable  Patellofemoral joint:                Apprehension: negative              Crepitations: minimal    RIGHT KNEE EXAM:    Skin: intact, no ecchymosis or erythema  ROM: slight hyper extension to 130 flexion  Tight hamstrings on straight leg raise.  Effusion: trace  Tender: medial joint line, medial patello-femoral retinaculum, medial and lateral patella facet  Nontender to palpation posterior knee.  McMurrays: negative    Diffuse ligamentous laxity in all directions.  Patellofemoral joint:                Apprehension: negative              Crepitations: mild   Grind: positive.    X-RAY: 3 views right knee 9/6/2023 reviewed, mild-moderate  medial compartment narrowing.  Preserved lateral and patello-femoral compartments.      CT arthrogram 7/29/2022:  1. High-grade radial tear in the posterior horn of the medial meniscus. The body of the medial meniscus is peripherally extruded.  2. Focal area of chondromalacia in the medial femoral condyle posteriorly.  3. Small popliteal cyst.       ASSESSMENT/PLAN: Adarsh Salvador is a 58 year old male with chronic right knee pain, primary osteoarthritis.      * reviewed imaging studies with patient, showing arthritic changes, or wearing of the cartilage in the knee. This can be caused by normal  "\"wear and tear\" over the years or following prior injury to the knee.  Treatment typically starts nonsurgically. Surgical indication for total knee arthroplasty  when nonsurgical management is no longer effective.  Again, pain worse than what would suggest based on images.    ** Risks of surgery include, but not limited to: bleeding (possibly requiring transfusion), infection, pain, scar, damage to adjacent structures (e.g. Nerves, blood vessels, bone, cartilage), temporary or permanent nerve damage, recurrence of symptoms, implant dislocation, instability, implant failure, implant infection, unequal limb lengths, malalignment, stiffness, need for further surgery, blood clots, pulmonary embolism, risks of anesthesia, and death.  * the knee will not feel \"normal\" as it won't be \"normal\" after knee replacement. It may feel \"clunky\" due to the nature of the hard metal and plastic implant.  * the expectation is for improved pain, not necessarily complete pain relief.  If you have surgery on your right knee, you will not be able to drive for likely 4-6 weeks, or until you have attained full knee function, range of motion, and ability to drive.  Given that based on images not bone on bone, might continue to have pain even after successful total knee arthroplasty surgery    ** understanding these risks, as a quality of life decision, the patient would like to proceed with surgery: right total knee arthroplasty.    * will arrange right total knee arthroplasty at a mutual convenience in the near future  * discussed patient and their  will plan hospitalization overnight with discharge home the next morning, daily physical therapy starting either the day of or day after surgery, and then therapy on an outpatient basis after discharge.  * will plan postoperative, pharmacologic deep vein thrombosis prophylaxis x4 weeks.   * postoperative pain control will be oral medications upon discharge from hospital  * postoperative " Physical Therapy to start upon discharge from the hospital.  * patient will need preoperative H+P prior to surgery from primary care provider , needs to have an updated A1c check and needs to be <8, also will need to determine his anticoagulation preop as to be bridged or not.   * will see patient 2 weeks postoperative, sooner as needed, for wound check and xrays. Will obtain AP, lateral and sunrise views of the knee at that time.    * patient encouraged to call in interim if any new questions or concerns arise.    * recommend no elective dental procedures for 4-6 months after surgery. Any emergency dental procedures, mouth infections, abscesses, etc must be taken care of immediately with preventive antibiotics.     * patient will need longterm (at least 2 years) prophylactic antibiotics use with dental procedures or other invasive procedures (2 g amoxicilin or  2g Keflex or 500mg azithromycin or clarithromycin or 100mg doxycycline) 30-60 minutes prior to dental procedures.      Baseline KOOS/PROMIS today.     * needs to continue to work on diabetic control prior to any elective surgery in the future. His last hgb A1c=8.4, which is not within surgical parameters of <8. It will need to be <8 at his H+P in order to have surgery.        Edwin Ch M.D., M.S.  Dept. of Orthopaedic Surgery  [facility]        Again, thank you for allowing me to participate in the care of your patient.        Sincerely,        Edwin Ch MD

## 2023-09-06 NOTE — PROGRESS NOTES
"CHIEF COMPLAINT:   Chief Complaint   Patient presents with    Right Knee - Pain     Right knee pain. Last injection with SynviscOne on 6/28/23. This did not help, maybe 2 weeks of a little relief. He is walking with a cane. His glucose is coming down and he would like to schedule surgery.         HISTORY OF PRESENT ILLNESS    Adarsh Salvador is a 58 year old male seen for evaluation of ongoing right knee pain with no known recent injury.   Seen by us 6/28/2023 and had SynviscOne injection without much relief. Previously on 6/8/2023, received cortisone injection without much relief. Returns today with continued pain in the front of the knee. He'd like to proceed with total knee arthroplasty.     Pain has been present for years, on/off since ~1990 slid off an icy roof.  He's had multiple surgeries (~12?) on the knee since then, most recently 8/2022 with Dr Em. Pain started again 1/2023 when moving furniture.     He locates pain along the inner aspect of the knee to under the knee cap \"like someone is sticking a bao knife into it\", aggravated with stairs, walking and prolonged standing. Treatment has been epson salt, warm baths, diclofenac, tylenol, topical voltaren.    Pain at rest, night.     He had an injection 1/5/2023 with Dr Em without much relief. He was seen by Dr Waite to discuss whether he'd be a canddiate for total knee arthroplasty, but based on low grade osteoarthritis at the time, didn't think it would be beneficial at this time.      Right knee arthroscopy with medial meniscus debridement 8/19/2022 with Dr Em, noted grade 2 chondrosis.    Patient has a history of recurrent DVT. On warfarin.    History of intermittent low back pain. Numbness and tingling. Takes Lyrica, it does help.    Adarsh is diabetic, A1c=8.4 on 8/15/2023. He's working hard on his diabetic control, now has a continuous monitor and is down to 7.6 today. It was >11 in April.    Present symptoms: pain medially  and " anteriorly, pain sharp, shooting, dull/achy , moderate pain, severe pain.    Pain severity: 8/10  Frequency of symptoms: are constant  Exacerbating Factors: weight bearing, stairs, [id number], prolonged standing  Relieving Factors: rest, epsom salt baths.  Night Pain: Yes  Pain while at rest: Yes   Numbness or tingling: Yes   Patient has tried:     NSAIDS:  diclofenac       Physical Therapy:  not recently, did some after knee surgery 8/2023 but ended up with a kidney stone so had to stop       Activity modification: Yes      Bracing:  on occasion       Injections: Yes 1/5/2023 with Dr Em without relief. With us 3/8/2023 helped about 1.5 months. 6/8/2023 without much relief.     Ice: Yes      Assistive device:  Yes, cane.     Other: tylenol. Topical voltaren works once in a while.      Other PMH:  has a past medical history of Anaphylactic reaction (8/14/2015), Anxiety, Depression, DM2 (diabetes mellitus, type 2) (H), GERD (gastroesophageal reflux disease), HTN (hypertension), IBS (irritable bowel syndrome), Kidney stone (6/15/2011), and Neuropathy.  Patient Active Problem List   Diagnosis    GERD (gastroesophageal reflux disease)    Chronic RLQ pain    Constipation    Chronic maxillary sinusitis    Chronic rhinitis    Hypertriglyceridemia    Hypertension, unspecified type    Anemia in other chronic diseases classified elsewhere    Fatty liver    Irritable bowel syndrome with diarrhea    Right inguinal hernia    Mild persistent asthma without complication    Type 2 diabetes mellitus with diabetic polyneuropathy, with long-term current use of insulin (H)    Hyperlipidemia LDL goal <100    CONNOR (generalized anxiety disorder)    Insomnia, unspecified type    Morbid obesity (H)    Neuropathy    Recurrent deep vein thrombosis (DVT) (H)    Precordial pain    Dyslipidemia    Elevated C-reactive protein    Gastric reflux    Internal derangement of knee    Nicotine dependence    Varicose veins of lower extremity     Vitamin D deficiency    Low volume of ejaculated semen    Bilateral leg pain    Chronic pain of right knee    Acute pain of right knee    Aftercare following surgery of the musculoskeletal system    Advanced directives, counseling/discussion    DVT (deep venous thrombosis) (H)    Pulmonary embolism, other, unspecified chronicity, unspecified whether acute cor pulmonale present (H)       Surgical Hx:  has a past surgical history that includes Colonoscopy (5/1/2012); BREATH HYDROGEN TEST (3/7/2014); orthopedic surgery (10/03/2007); Colonoscopy (4/21/2014); Release carpal tunnel (Right, 1/26/2018); cystoscopy (05/01/2008); cystoscopy,+ureteroscopy (06/10/2008); vascular surgery (11/24/2008); cystoscopy (09/26/2010); lithotripsy (09/30/2010); cystoscopy (05/18/2012); Colonoscopy (N/A, 4/21/2022); Inject epidural lumbar (N/A, 7/7/2022); and Arthroscopy knee with medial meniscectomy (Right, 8/19/2022).    Medications:   Current Outpatient Medications:     albuterol (PROAIR HFA/PROVENTIL HFA/VENTOLIN HFA) 108 (90 Base) MCG/ACT inhaler, Inhale 2 puffs into the lungs every 6 hours as needed for shortness of breath or wheezing, Disp: 6.7 g, Rfl: 3    amLODIPine (NORVASC) 10 MG tablet, Take 1 tablet (10 mg) by mouth daily for blood pressure, Disp: 90 tablet, Rfl: 1    ASPIRIN PO, Take 325 mg by mouth daily , Disp: , Rfl:     atorvastatin (LIPITOR) 40 MG tablet, Take 1 tablet (40 mg) by mouth daily, Disp: 90 tablet, Rfl: 3    blood glucose (ACCU-CHEK GUIDE) test strip, Use to test blood sugar 3 times daily or as directed., Disp: 300 strip, Rfl: 3    blood glucose monitoring (ACCU-CHEK FASTCLIX) lancets, Use to test blood sugar 1 times daily or as directed.  Ok to substitute alternative if insurance prefers., Disp: 102 each, Rfl: 11    calcium carbonate (TUMS) 500 MG chewable tablet, Take 1 chew tab by mouth daily, Disp: , Rfl:     chlorthalidone (HYGROTON) 25 MG tablet, Take 1 tablet (25 mg) by mouth daily Add on for blood  "pressure, Disp: 90 tablet, Rfl: 1    Continuous Blood Gluc Sensor (FREESTYLE FROYLAN 3 SENSOR) MISC, 1 each every 14 days, Disp: 2 each, Rfl: 5    DULoxetine (CYMBALTA) 60 MG capsule, Take 1 capsule (60 mg) by mouth 2 times daily, Disp: 180 capsule, Rfl: 3    EPINEPHrine (ANY BX GENERIC EQUIV) 0.3 MG/0.3ML injection 2-pack, Inject 0.3 mLs (0.3 mg) into the muscle once as needed for anaphylaxis, Disp: 2 each, Rfl: 2    fluticasone-salmeterol (ADVAIR) 500-50 MCG/ACT inhaler, Inhale 1 puff into the lungs every 12 hours, Disp: 1 each, Rfl: 11    insulin aspart (NOVOLOG FLEXPEN) 100 UNIT/ML pen, Inject 18 units before breakfast and 18 units before dinner *Will need to schedule with new PCP for future refills*, Disp: 15 mL, Rfl: 3    insulin aspart (NOVOLOG VIAL) 100 UNITS/ML vial, Inject 4 Units Subcutaneous 3 times daily (with meals) Dispense 360 units/month, Disp: 360 mL, Rfl: 1    insulin glargine (BASAGLAR KWIKPEN) 100 UNIT/ML pen, Inject 38 Units Subcutaneous daily *Will need to establish with new PCP for future refills*, Disp: 15 mL, Rfl: 1    insulin pen needle (32G X 4 MM) 32G X 4 MM miscellaneous, Use 3  pen needles daily or as directed., Disp: 200 each, Rfl: 1    insulin pen needle (32G X 4 MM) 32G X 4 MM miscellaneous, Use 2 pen needles daily or as directed., Disp: 200 each, Rfl: 1    insulin syringe-needle U-100 (29G X 1/2\" 0.5 ML) 29G X 1/2\" 0.5 ML miscellaneous, Use 3 syringes daily or as directed., Disp: 200 each, Rfl: 1    losartan (COZAAR) 25 MG tablet, Take 1 tablet (25 mg) by mouth daily, Disp: 90 tablet, Rfl: 1    meclizine (ANTIVERT) 25 MG tablet, Take 1 tablet (25 mg) by mouth 3 times daily as needed for dizziness, Disp: 90 tablet, Rfl: 1    metFORMIN (GLUCOPHAGE XR) 500 MG 24 hr tablet, Take 2 tablets (1,000 mg) by mouth 2 times daily (with meals), Disp: 360 tablet, Rfl: 2    montelukast (SINGULAIR) 10 MG tablet, Take 1 tablet (10 mg) by mouth At Bedtime For allergies/asthma, Disp: 90 tablet, Rfl: " 2    multivitamin, therapeutic (THERA-VIT) TABS, Take 1 tablet by mouth daily, Disp: , Rfl:     naproxen sodium (ANAPROX) 220 MG tablet, Take 220 mg by mouth, Disp: , Rfl:     nortriptyline (PAMELOR) 10 MG capsule, Take 2 capsules (20 mg) by mouth At Bedtime, Disp: 60 capsule, Rfl: 5    omeprazole (PRILOSEC) 40 MG DR capsule, Take 1 capsule (40 mg) by mouth daily Take for at least two months, Disp: 90 capsule, Rfl: 2    pregabalin (LYRICA) 50 MG capsule, Take 1 capsule (50 mg) by mouth 2 times daily, Disp: 60 capsule, Rfl: 1    rizatriptan (MAXALT-MLT) 10 MG ODT, TAKE 1 TABLET BY MOUTH AT ONSET OF HEADACHE FOR MIGRAINE MAY REPEAT IN 2 HOURS. MAX 3 TABS/24 HOURS., Disp: 18 tablet, Rfl: 1    warfarin ANTICOAGULANT (COUMADIN) 5 MG tablet, Take 15 mg Tues, Thurs and 12.5 mg all other days, or as directed by the Coumadin clinic, Disp: 225 tablet, Rfl: 1  No current facility-administered medications for this visit.    Facility-Administered Medications Ordered in Other Visits:     BUPivacaine (MARCAINE) injection 0.5%, 10 mL, Intradermal, Once, Vinnie Espinoza DO    DOBUTamine 500 mg in dextrose 5% 250 mL (adult std conc) premix, 2.5-20 mcg/kg/min, Intravenous, Continuous, Navarro Butcher MD, Stopped at 04/25/19 0284    iopamidol (ISOVUE-M 200) solution 10 mL, 10 mL, Intravenous, Once, Vinnie Espinoza DO    methylPREDNISolone (DEPO-MEDROL) injection 80 mg, 80 mg, Intramuscular, Once, Vinnie Espinoza DO    metoprolol (LOPRESSOR) injection 5 mg, 5 mg, Intravenous, Q5 Min PRN, Navarro Butcher MD, 2 mg at 04/25/19 5342    Allergies:   Allergies   Allergen Reactions    Artificial Sweetner (Informational Only) [Artificial Sweetner (Informational Only) ] GI Disturbance     ALL artificial sweetners cause severe diarrhea & flu symptoms    Hydromorphone Anaphylaxis    Ibuprofen GI Disturbance    Morphine Sulfate Concentrate Anaphylaxis    Morphine [Fumaric Acid] Anaphylaxis    Hydrocodone-Acetaminophen  Itching    Tramadol Hives    Trazodone Hives    Gabapentin      GI upset per pt    Keflex [Cephalexin] Diarrhea     Upset stomach    Codeine Phosphate Itching    Ketorolac Tromethamine Rash       Social Hx: retired .   reports that he has never smoked. His smokeless tobacco use includes chew. He reports that he does not currently use alcohol. He reports current drug use.    Family Hx: family history includes Cancer (age of onset: 52) in his mother; Cancer - colorectal (age of onset: 53) in his father; Coronary Artery Disease in his father; Diabetes in his maternal aunt, maternal aunt, paternal uncle, paternal uncle, paternal uncle, paternal uncle, and another family member; Myocardial Infarction (age of onset: 35) in his paternal grandfather; Myocardial Infarction (age of onset: 45) in his father.    REVIEW OF SYSTEMS: 10 point ROS neg other than the symptoms noted above in the HPI and PMH. Notables include  CONSTITUTIONAL:NEGATIVE for fever, chills, change in weight  INTEGUMENTARY/SKIN: NEGATIVE for worrisome rashes, moles or lesions  MUSCULOSKELETAL:See HPI above  NEURO: NEGATIVE for weakness, dizziness or paresthesias    PHYSICAL EXAM:  Ht 1.829 m (6')   Wt 131.5 kg (290 lb)   BMI 39.33 kg/m     GENERAL APPEARANCE: healthy, alert, no distress  SKIN: no suspicious lesions or rashes  NEURO: Normal strength and tone, mentation intact and speech normal  PSYCH:  mentation appears normal and affect normal, not anxious  RESPIRATORY: No increased work of breathing.    BILATERAL LOWER EXTREMITIES:  Gait: favors the right. Using cane left hand.  Alignment: varus  No Quad atrophy, strength normal.  Intact sensation deep peroneal nerve, superficial peroneal nerve, med/lat tibial nerve, sural nerve, saphenous nerve  Intact EHL, EDL, TA, FHL, GS, quadriceps hamstrings and hip flexors  Bilateral calf soft and nttp or squeeze.  Edema: trace    LEFT KNEE EXAM:    Skin: intact, no ecchymosis or erythema  ROM: slight hyper  "extension to 130 flexion  Tight hamstrings on straight leg raise.  Effusion: none  Tender: NTTP med/lat joint line, anterior or posterior knee  McMurrays: negative    MCL: stable, and non-painful at both 0 and 30 degrees knee flexion  Varus stress: stable, and non-painful at both 0 and 30 degrees knee flexion  Lachmans: neg, firm endpoint  Posterior Drawer stable  Patellofemoral joint:                Apprehension: negative              Crepitations: minimal    RIGHT KNEE EXAM:    Skin: intact, no ecchymosis or erythema  ROM: slight hyper extension to 130 flexion  Tight hamstrings on straight leg raise.  Effusion: trace  Tender: medial joint line, medial patello-femoral retinaculum, medial and lateral patella facet  Nontender to palpation posterior knee.  McMurrays: negative    Diffuse ligamentous laxity in all directions.  Patellofemoral joint:                Apprehension: negative              Crepitations: mild   Grind: positive.    X-RAY: 3 views right knee 9/6/2023 reviewed, mild-moderate  medial compartment narrowing.  Preserved lateral and patello-femoral compartments.      CT arthrogram 7/29/2022:  1. High-grade radial tear in the posterior horn of the medial meniscus. The body of the medial meniscus is peripherally extruded.  2. Focal area of chondromalacia in the medial femoral condyle posteriorly.  3. Small popliteal cyst.       ASSESSMENT/PLAN: Adarsh Salvador is a 58 year old male with chronic right knee pain, primary osteoarthritis.      * reviewed imaging studies with patient, showing arthritic changes, or wearing of the cartilage in the knee. This can be caused by normal \"wear and tear\" over the years or following prior injury to the knee.  Treatment typically starts nonsurgically. Surgical indication for total knee arthroplasty  when nonsurgical management is no longer effective.  Again, pain worse than what would suggest based on images.    ** Risks of surgery include, but not limited to: bleeding " "(possibly requiring transfusion), infection, pain, scar, damage to adjacent structures (e.g. Nerves, blood vessels, bone, cartilage), temporary or permanent nerve damage, recurrence of symptoms, implant dislocation, instability, implant failure, implant infection, unequal limb lengths, malalignment, stiffness, need for further surgery, blood clots, pulmonary embolism, risks of anesthesia, and death.  * the knee will not feel \"normal\" as it won't be \"normal\" after knee replacement. It may feel \"clunky\" due to the nature of the hard metal and plastic implant.  * the expectation is for improved pain, not necessarily complete pain relief.  If you have surgery on your right knee, you will not be able to drive for likely 4-6 weeks, or until you have attained full knee function, range of motion, and ability to drive.  Given that based on images not bone on bone, might continue to have pain even after successful total knee arthroplasty surgery    ** understanding these risks, as a quality of life decision, the patient would like to proceed with surgery: right total knee arthroplasty.    * will arrange right total knee arthroplasty at a mutual convenience in the near future  * discussed patient and their  will plan hospitalization overnight with discharge home the next morning, daily physical therapy starting either the day of or day after surgery, and then therapy on an outpatient basis after discharge.  * will plan postoperative, pharmacologic deep vein thrombosis prophylaxis x4 weeks.   * postoperative pain control will be oral medications upon discharge from hospital  * postoperative Physical Therapy to start upon discharge from the hospital.  * patient will need preoperative H+P prior to surgery from primary care provider , needs to have an updated A1c check and needs to be <8, also will need to determine his anticoagulation preop as to be bridged or not.   * will see patient 2 weeks postoperative, sooner as " needed, for wound check and xrays. Will obtain AP, lateral and sunrise views of the knee at that time.    * patient encouraged to call in interim if any new questions or concerns arise.    * recommend no elective dental procedures for 4-6 months after surgery. Any emergency dental procedures, mouth infections, abscesses, etc must be taken care of immediately with preventive antibiotics.     * patient will need longterm (at least 2 years) prophylactic antibiotics use with dental procedures or other invasive procedures (2 g amoxicilin or  2g Keflex or 500mg azithromycin or clarithromycin or 100mg doxycycline) 30-60 minutes prior to dental procedures.      Baseline KOOS/PROMIS today.     * needs to continue to work on diabetic control prior to any elective surgery in the future. His last hgb A1c=8.4, which is not within surgical parameters of <8. It will need to be <8 at his H+P in order to have surgery.        Edwin Ch M.D., M.S.  Dept. of Orthopaedic Surgery  [facility]

## 2023-09-07 ENCOUNTER — MYC MEDICAL ADVICE (OUTPATIENT)
Dept: ANTICOAGULATION | Facility: CLINIC | Age: 58
End: 2023-09-07
Payer: COMMERCIAL

## 2023-09-07 ENCOUNTER — TELEPHONE (OUTPATIENT)
Dept: SURGERY | Facility: CLINIC | Age: 58
End: 2023-09-07
Payer: COMMERCIAL

## 2023-09-07 NOTE — TELEPHONE ENCOUNTER
ANTICOAGULATION     Adarsh EDWARDS [place] is overdue for INR check.       Mychart sent    Nirali Carpenter RN

## 2023-09-08 ENCOUNTER — TELEPHONE (OUTPATIENT)
Dept: ANTICOAGULATION | Facility: CLINIC | Age: 58
End: 2023-09-08
Payer: COMMERCIAL

## 2023-09-08 DIAGNOSIS — I82.409 RECURRENT DEEP VEIN THROMBOSIS (DVT) (H): Primary | ICD-10-CM

## 2023-09-08 NOTE — TELEPHONE ENCOUNTER
09/08/23  11:23 AM    Patient is having a TKA on 9/26/23. Please review for procedure plan.     Patient is overdue for an INR but did not want to schedule at this time. He would prefer to wait until after the procedure due to pain. Patient does have a pre-op 9/13 so we could have it drawn at that time.    Petey Fuentes, RN, BSN, PHN  [facility]   [phone]

## 2023-09-08 NOTE — TELEPHONE ENCOUNTER
Type of surgery: 9/26  Location of surgery: [place] OR  Date and time of surgery: 9/26  Surgeon: fely  Pre-Op Appt Date: 9/13  Post-Op Appt Date: 10/11   Packet sent out: Yes  Pre-cert/Authorization completed:  No  Date:

## 2023-09-10 DIAGNOSIS — Z79.4 TYPE 2 DIABETES MELLITUS WITH DIABETIC POLYNEUROPATHY, WITH LONG-TERM CURRENT USE OF INSULIN (H): ICD-10-CM

## 2023-09-10 DIAGNOSIS — E11.42 TYPE 2 DIABETES MELLITUS WITH DIABETIC POLYNEUROPATHY, WITH LONG-TERM CURRENT USE OF INSULIN (H): ICD-10-CM

## 2023-09-11 RX ORDER — INSULIN GLARGINE 100 [IU]/ML
38 INJECTION, SOLUTION SUBCUTANEOUS DAILY
Qty: 15 ML | Refills: 0 | Status: SHIPPED | OUTPATIENT
Start: 2023-09-11 | End: 2023-09-13

## 2023-09-13 ENCOUNTER — ANTICOAGULATION THERAPY VISIT (OUTPATIENT)
Dept: ANTICOAGULATION | Facility: CLINIC | Age: 58
End: 2023-09-13

## 2023-09-13 ENCOUNTER — TELEPHONE (OUTPATIENT)
Dept: FAMILY MEDICINE | Facility: CLINIC | Age: 58
End: 2023-09-13

## 2023-09-13 ENCOUNTER — OFFICE VISIT (OUTPATIENT)
Dept: FAMILY MEDICINE | Facility: CLINIC | Age: 58
End: 2023-09-13
Payer: COMMERCIAL

## 2023-09-13 VITALS
WEIGHT: 288 LBS | HEART RATE: 107 BPM | HEIGHT: 72 IN | BODY MASS INDEX: 39.01 KG/M2 | SYSTOLIC BLOOD PRESSURE: 126 MMHG | OXYGEN SATURATION: 97 % | TEMPERATURE: 98.4 F | DIASTOLIC BLOOD PRESSURE: 88 MMHG

## 2023-09-13 DIAGNOSIS — Z01.818 PREOP GENERAL PHYSICAL EXAM: Primary | ICD-10-CM

## 2023-09-13 DIAGNOSIS — I82.409 RECURRENT DEEP VEIN THROMBOSIS (DVT) (H): ICD-10-CM

## 2023-09-13 DIAGNOSIS — M17.11 PRIMARY OSTEOARTHRITIS OF RIGHT KNEE: ICD-10-CM

## 2023-09-13 DIAGNOSIS — E11.42 TYPE 2 DIABETES MELLITUS WITH DIABETIC POLYNEUROPATHY, WITH LONG-TERM CURRENT USE OF INSULIN (H): ICD-10-CM

## 2023-09-13 DIAGNOSIS — Z79.4 TYPE 2 DIABETES MELLITUS WITH DIABETIC POLYNEUROPATHY, WITH LONG-TERM CURRENT USE OF INSULIN (H): ICD-10-CM

## 2023-09-13 DIAGNOSIS — I82.409 RECURRENT DEEP VEIN THROMBOSIS (DVT) (H): Primary | ICD-10-CM

## 2023-09-13 LAB
HBA1C MFR BLD: 7.9 % (ref 0–5.6)
HGB BLD-MCNC: 13.6 G/DL (ref 13.3–17.7)
INR BLD: 1.3 (ref 0.9–1.1)

## 2023-09-13 PROCEDURE — 85018 HEMOGLOBIN: CPT | Performed by: NURSE PRACTITIONER

## 2023-09-13 PROCEDURE — 80048 BASIC METABOLIC PNL TOTAL CA: CPT | Performed by: NURSE PRACTITIONER

## 2023-09-13 PROCEDURE — 99214 OFFICE O/P EST MOD 30 MIN: CPT | Performed by: NURSE PRACTITIONER

## 2023-09-13 PROCEDURE — 85610 PROTHROMBIN TIME: CPT | Performed by: NURSE PRACTITIONER

## 2023-09-13 PROCEDURE — 36415 COLL VENOUS BLD VENIPUNCTURE: CPT | Performed by: NURSE PRACTITIONER

## 2023-09-13 PROCEDURE — 83036 HEMOGLOBIN GLYCOSYLATED A1C: CPT | Performed by: NURSE PRACTITIONER

## 2023-09-13 RX ORDER — ENOXAPARIN SODIUM 100 MG/ML
0.75 INJECTION SUBCUTANEOUS EVERY 12 HOURS
Qty: 28 ML | Refills: 1 | Status: SHIPPED | OUTPATIENT
Start: 2023-09-13 | End: 2024-08-06

## 2023-09-13 RX ORDER — INSULIN GLARGINE 100 [IU]/ML
50 INJECTION, SOLUTION SUBCUTANEOUS DAILY
Refills: 0 | COMMUNITY
Start: 2023-09-13 | End: 2023-09-15

## 2023-09-13 NOTE — H&P (VIEW-ONLY)
[facility]  [address] [address] [address]  Phone: [phone]  Primary Provider: Kehr, Kristen M  Pre-op Performing Provider: LORENE LARA      PREOPERATIVE EVALUATION:  Today's date: 9/13/2023    Adarsh Salvador is a 58 year old male who presents for a preoperative evaluation.      Surgical Information:  Surgery/ProcedureARTHROPLASTY, KNEE, TOTAL :   Surgery Location: [facility] OR  Surgeon: Edwin Ch  Surgery Date: 9/26/2023  Time of Surgery: TBD  Where patient plans to recover: At home with family  Fax number for surgical facility: Note does not need to be faxed, will be available electronically in Epic.    Assessment & Plan     The proposed surgical procedure is considered INTERMEDIATE risk.    Preop general physical exam  - Hemoglobin A1c  - Basic metabolic panel  (Ca, Cl, CO2, Creat, Gluc, K, Na, BUN)  - Hemoglobin    Primary osteoarthritis of right knee    Type 2 diabetes mellitus with diabetic polyneuropathy, with long-term current use of insulin (H)  A1C improved to 7.9, per ortho notes needs to be < 8 for surgery, okay to proceed.   Patient to continue working on diabetic control and follow-up with primary in 3 months for recheck.    - insulin glargine (BASAGLAR KWIKPEN) 100 UNIT/ML pen; Inject 50 Units Subcutaneous daily  - Hemoglobin A1c  - Basic metabolic panel  (Ca, Cl, CO2, Creat, Gluc, K, Na, BUN)    Recurrent deep vein thrombosis (DVT) (H)  Anticoagulation clinic and pharmacist helping with plan for bridging.   - INR point of care       Implanted Device:   - Type of device: nerve stimulator, cgm Patient advised to bring device information on day of surgery.       - No identified additional risk factors other than previously addressed    Antiplatelet or Anticoagulation Medication Instructions:   - warfarin: Bridging therapy will be coordinated by anticoagulation pharmacist    Additional Medication Instructions:  Hold metformin the morning of surgery.   Hold  chlorthalidone and losartan the morning of surgery.   Do not take your morning meal time insulin (Novolog).    Take only 50% of your usual long acting insulin the morning of surgery (take 25 units instead of 50 units).     RECOMMENDATION:  APPROVAL GIVEN to proceed with proposed procedure, without further diagnostic evaluation.    Subjective       HPI related to upcoming procedure: Adarsh Salvador presents for a preoperative physical.  He will be having a right total knee replacement for a history of osteoarthritis.  Has been having a lot of pain not improved with other measures.         9/13/2023     2:47 PM   Preop Questions   1. Have you ever had a heart attack or stroke? No   2. Have you ever had surgery on your heart or blood vessels, such as a stent placement, a coronary artery bypass, or surgery on an artery in your head, neck, heart, or legs? No   3. Do you have chest pain with activity? No   4. Do you have a history of  heart failure? No   5. Do you currently have a cold, bronchitis or symptoms of other infection? No   6. Do you have a cough, shortness of breath, or wheezing? No   7. Do you or anyone in your family have previous history of blood clots? YES - DVT and PE, chronic warfarin   8. Do you or does anyone in your family have a serious bleeding problem such as prolonged bleeding following surgeries or cuts? No   9. Have you ever had problems with anemia or been told to take iron pills? No   10. Have you had any abnormal blood loss such as black, tarry or bloody stools? No   11. Have you ever had a blood transfusion? No   12. Are you willing to have a blood transfusion if it is medically needed before, during, or after your surgery? Yes   13. Have you or any of your relatives ever had problems with anesthesia? No   14. Do you have sleep apnea, excessive snoring or daytime drowsiness? No   15. Do you have any artifical heart valves or other implanted medical devices like a pacemaker, defibrillator, or  continuous glucose monitor? YES    15a. What type of device do you have? nerve stimulator for bowel control and continuous glucose monitor   15b. Name of the clinic that manages your device:  [facility], [facility] with primary care   16. Do you have artificial joints? No   17. Are you allergic to latex? No       Health Care Directive:  Patient does not have a Health Care Directive or Living Will: Discussed advance care planning with patient; however, patient declined at this time.    Preoperative Review of :   reviewed - controlled substances reflected in medication list.      Status of Chronic Conditions:  See problem list for active medical problems.  Problems all longstanding and stable, except as noted/documented.  See ROS for pertinent symptoms related to these conditions.    Review of Systems  CONSTITUTIONAL: NEGATIVE for fever, chills, change in weight  INTEGUMENTARY/SKIN: NEGATIVE for worrisome rashes, moles or lesions  EYES: NEGATIVE for vision changes or irritation  ENT/MOUTH: NEGATIVE for ear, mouth and throat problems  RESP: NEGATIVE for significant cough or SOB  CV: NEGATIVE for chest pain, palpitations or peripheral edema  GI: NEGATIVE for nausea, abdominal pain, heartburn, or change in bowel habits  : NEGATIVE for frequency, dysuria, or hematuria  MUSCULOSKELETAL: NEGATIVE for significant arthralgias or myalgia  NEURO: NEGATIVE for weakness, dizziness or paresthesias  ENDOCRINE: NEGATIVE for temperature intolerance, skin/hair changes  HEME: NEGATIVE for bleeding problems  PSYCHIATRIC: NEGATIVE for changes in mood or affect    Patient Active Problem List    Diagnosis Date Noted    Pulmonary embolism, other, unspecified chronicity, unspecified whether acute cor pulmonale present (H) 08/15/2023     Priority: Medium    Advanced directives, counseling/discussion 12/22/2022     Priority: Medium     Pt was given info on ADV. Munir Amaral MA      Acute pain of right knee 08/26/2022     Priority:  Medium    Aftercare following surgery of the musculoskeletal system 08/26/2022     Priority: Medium    Chronic pain of right knee 08/08/2022     Priority: Medium     Added automatically from request for surgery [id number]      Bilateral leg pain 06/09/2022     Priority: Medium     Added automatically from request for surgery [id number]      Low volume of ejaculated semen 06/07/2022     Priority: Medium    DVT (deep venous thrombosis) (H) 01/15/2022     Priority: Medium    Recurrent deep vein thrombosis (DVT) (H) 09/27/2021     Priority: Medium    Precordial pain 02/15/2021     Priority: Medium    Dyslipidemia 02/15/2021     Priority: Medium    Elevated C-reactive protein 02/15/2021     Priority: Medium    Gastric reflux 02/15/2021     Priority: Medium    Internal derangement of knee 02/15/2021     Priority: Medium    Nicotine dependence 02/15/2021     Priority: Medium    Varicose veins of lower extremity 02/15/2021     Priority: Medium    Vitamin D deficiency 02/15/2021     Priority: Medium    Neuropathy 06/23/2020     Priority: Medium    Morbid obesity (H) 03/26/2020     Priority: Medium    Insomnia, unspecified type 05/09/2019     Priority: Medium    CONNOR (generalized anxiety disorder) 05/08/2019     Priority: Medium    Hyperlipidemia LDL goal <100 08/17/2018     Priority: Medium    Type 2 diabetes mellitus with diabetic polyneuropathy, with long-term current use of insulin (H) 01/18/2018     Priority: Medium    Mild persistent asthma without complication 01/12/2018     Priority: Medium    Right inguinal hernia 06/14/2016     Priority: Medium    Irritable bowel syndrome with diarrhea 03/21/2016     Priority: Medium    Fatty liver 02/22/2016     Priority: Medium    Hypertension, unspecified type 12/15/2015     Priority: Medium    Anemia in other chronic diseases classified elsewhere 12/15/2015     Priority: Medium    Hypertriglyceridemia 07/12/2013     Priority: Medium    Chronic maxillary sinusitis 09/18/2012      Priority: Medium     Believes this is secondary to exposure to toxic fumes at work- he is a .  He regularly wears a mask ventilator and believes this helps.  Has only tried intermittent nasal washes, and OTC medications.  Not ever tried steroid nasal sprays or other controller medications.        Chronic rhinitis 09/18/2012     Priority: Medium    Constipation 04/24/2012     Priority: Medium    GERD (gastroesophageal reflux disease) 06/15/2011     Priority: Medium    Chronic RLQ pain 06/15/2011     Priority: Medium      Past Medical History:   Diagnosis Date    Anaphylactic reaction 8/14/2015    Anxiety     Depression     DM2 (diabetes mellitus, type 2) (H)     GERD (gastroesophageal reflux disease)     HTN (hypertension)     IBS (irritable bowel syndrome)     Kidney stone 6/15/2011    Pt believes these were Calcium stones    Neuropathy      Past Surgical History:   Procedure Laterality Date    ARTHROSCOPY KNEE WITH MEDIAL MENISCECTOMY Right 8/19/2022    Procedure: examination under anesthesia, right knee arthroscopy, meniscectomy;  Surgeon: Carlos A Em MD;  Location: [facility] OR    COLONOSCOPY  5/1/2012    Procedure:COLONOSCOPY; screening colonoscopy; Surgeon:KINGSLEY DOS SANTOS; Location:[facility] OR    COLONOSCOPY  4/21/2014    Procedure: COMBINED COLONOSCOPY, SINGLE BIOPSY/POLYPECTOMY BY BIOPSY;  Surgeon: Duane, William Charles, MD;  Location: [facility] OR    COLONOSCOPY N/A 4/21/2022    Procedure: COLONOSCOPY, WITH POLYPECTOMY AND BIOPSY;  Surgeon: Luiz Calvert MD;  Location:  GI    CYSTOSCOPY  05/01/2008    CYSTOSCOPY W/ URETERAL STENT PLACEMENT 05/01/2008     CYSTOSCOPY  09/26/2010    CYSTOSCOPY W/ URETERAL STENT PLACEMENT 09/26/2010 Left     CYSTOSCOPY  05/18/2012    CYSTOSCOPY, LEFT URETEROSCOPY AND STENT PLACEMENT left retrograde 05/18/2012     CYSTOSCOPY,+URETEROSCOPY  06/10/2008    URETEROSCOPY 06/10/2008 Right     HC BREATH HYDROGEN TEST  3/7/2014    Procedure: HYDROGEN BREATH TEST;   Surgeon: Darion Swift MD;  Location: [facility]    INJECT EPIDURAL LUMBAR N/A 7/7/2022    Procedure: INJECTION, SPINE, LUMBAR, EPIDURAL L5/S1;  Surgeon: Michi Hahn MD;  Location: [facility] OR    LITHOTRIPSY  09/30/2010    LITHOTRIPSY 09/30/2010 LEFT EXTRACORPOREAL SHOCK WAVE LITHOTRIPSY, FLEXIBLE CYSTOSCOPY, LEFT STENT REMOVAL      ORTHOPEDIC SURGERY  10/03/2007    KNEE ARTHROSCOPY 10/03/2007 RightX2      RELEASE CARPAL TUNNEL Right 1/26/2018    Procedure: RELEASE CARPAL TUNNEL;  Right carpal tunnel release;  Surgeon: Wiliam Saenz MD;  Location: [facility] OR    VASCULAR SURGERY  11/24/2008    Radiofrequency ablation left saphenous vein 11/24/2008 Done by Dr. Lozoya       Current Outpatient Medications   Medication Sig Dispense Refill    albuterol (PROAIR HFA/PROVENTIL HFA/VENTOLIN HFA) 108 (90 Base) MCG/ACT inhaler Inhale 2 puffs into the lungs every 6 hours as needed for shortness of breath or wheezing 6.7 g 3    amLODIPine (NORVASC) 10 MG tablet Take 1 tablet (10 mg) by mouth daily for blood pressure 90 tablet 1    ASPIRIN PO Take 325 mg by mouth daily       atorvastatin (LIPITOR) 40 MG tablet Take 1 tablet (40 mg) by mouth daily 90 tablet 3    blood glucose (ACCU-CHEK GUIDE) test strip Use to test blood sugar 3 times daily or as directed. 300 strip 3    blood glucose monitoring (ACCU-CHEK FASTCLIX) lancets Use to test blood sugar 1 times daily or as directed.  Ok to substitute alternative if insurance prefers. 102 each 11    calcium carbonate (TUMS) 500 MG chewable tablet Take 1 chew tab by mouth daily      chlorthalidone (HYGROTON) 25 MG tablet Take 1 tablet (25 mg) by mouth daily Add on for blood pressure 90 tablet 1    Continuous Blood Gluc Sensor (FREESTYLE FROYLAN 3 SENSOR) MISC 1 each every 14 days 2 each 5    DULoxetine (CYMBALTA) 60 MG capsule Take 1 capsule (60 mg) by mouth 2 times daily 180 capsule 3    fluticasone-salmeterol (ADVAIR) 500-50 MCG/ACT inhaler Inhale 1 puff into the lungs every 12  "hours 1 each 11    insulin aspart (NOVOLOG FLEXPEN) 100 UNIT/ML pen Inject 18 units before breakfast and 18 units before dinner *Will need to schedule with new PCP for future refills* 15 mL 3    insulin glargine (BASAGLAR KWIKPEN) 100 UNIT/ML pen Inject 50 Units Subcutaneous daily  0    insulin pen needle (32G X 4 MM) 32G X 4 MM miscellaneous Use 3  pen needles daily or as directed. 200 each 1    insulin pen needle (32G X 4 MM) 32G X 4 MM miscellaneous Use 2 pen needles daily or as directed. 200 each 1    insulin syringe-needle U-100 (29G X 1/2\" 0.5 ML) 29G X 1/2\" 0.5 ML miscellaneous Use 3 syringes daily or as directed. 200 each 1    losartan (COZAAR) 25 MG tablet Take 1 tablet (25 mg) by mouth daily 90 tablet 1    meclizine (ANTIVERT) 25 MG tablet Take 1 tablet (25 mg) by mouth 3 times daily as needed for dizziness 90 tablet 1    metFORMIN (GLUCOPHAGE XR) 500 MG 24 hr tablet Take 2 tablets (1,000 mg) by mouth 2 times daily (with meals) 360 tablet 2    montelukast (SINGULAIR) 10 MG tablet Take 1 tablet (10 mg) by mouth At Bedtime For allergies/asthma 90 tablet 2    multivitamin, therapeutic (THERA-VIT) TABS Take 1 tablet by mouth daily      naproxen sodium (ANAPROX) 220 MG tablet Take 220 mg by mouth      nortriptyline (PAMELOR) 10 MG capsule Take 2 capsules (20 mg) by mouth At Bedtime 60 capsule 5    omeprazole (PRILOSEC) 40 MG DR capsule Take 1 capsule (40 mg) by mouth daily Take for at least two months 90 capsule 2    pregabalin (LYRICA) 50 MG capsule Take 1 capsule (50 mg) by mouth 2 times daily 60 capsule 1    rizatriptan (MAXALT-MLT) 10 MG ODT TAKE 1 TABLET BY MOUTH AT ONSET OF HEADACHE FOR MIGRAINE MAY REPEAT IN 2 HOURS. MAX 3 TABS/24 HOURS. 18 tablet 1    warfarin ANTICOAGULANT (COUMADIN) 5 MG tablet Take 15 mg Tues, Thurs and 12.5 mg all other days, or as directed by the Coumadin clinic 225 tablet 1    enoxaparin ANTICOAGULANT (LOVENOX) 100 MG/ML syringe Inject 1 mL (100 mg) Subcutaneous every 12 hours 28 " mL 1    EPINEPHrine (ANY BX GENERIC EQUIV) 0.3 MG/0.3ML injection 2-pack Inject 0.3 mLs (0.3 mg) into the muscle once as needed for anaphylaxis (Patient not taking: Reported on 9/13/2023) 2 each 2       Allergies   Allergen Reactions    Artificial Sweetner (Informational Only) [Artificial Sweetner (Informational Only) ] GI Disturbance     ALL artificial sweetners cause severe diarrhea & flu symptoms    Hydromorphone Anaphylaxis    Ibuprofen GI Disturbance    Morphine Sulfate Concentrate Anaphylaxis    Morphine [Fumaric Acid] Anaphylaxis    Hydrocodone-Acetaminophen Itching    Tramadol Hives    Trazodone Hives    Gabapentin      GI upset per pt    Keflex [Cephalexin] Diarrhea     Upset stomach    Codeine Phosphate Itching    Ketorolac Tromethamine Rash        Social History     Tobacco Use    Smoking status: Never    Smokeless tobacco: Current     Types: Chew    Tobacco comments:     Chew   Substance Use Topics    Alcohol use: Not Currently     Alcohol/week: 0.0 standard drinks of alcohol     Comment: occasional, few drinks a month       History   Drug Use     Comment: CBD occasional         Objective     /88   Pulse 107   Temp 98.4  F (36.9  C)   Ht 1.829 m (6')   Wt 130.6 kg (288 lb)   SpO2 97%   BMI 39.06 kg/m      Physical Exam    GENERAL APPEARANCE: healthy, alert and no distress     EYES: EOMI,  PERRL     HENT: nose and mouth without ulcers or lesions     NECK: no adenopathy, no asymmetry, masses, or scars and thyroid normal to palpation     RESP: lungs clear to auscultation - no rales, rhonchi or wheezes     CV: regular rates and rhythm, normal S1 S2, no S3 or S4 and no murmur, click or rub     ABDOMEN:  soft, nontender, no HSM or masses and bowel sounds normal     MS: extremities normal- no gross deformities noted, no evidence of inflammation in joints, FROM in all extremities.     SKIN: no suspicious lesions or rashes     NEURO: Normal strength and tone, sensory exam grossly normal, mentation  intact and speech normal     PSYCH: mentation appears normal. and affect normal/bright     LYMPHATICS: No cervical adenopathy         Diagnostics:    Lab Results   Component Value Date    A1C 7.9 09/13/2023    A1C 8.4 08/15/2023    A1C 11.3 04/10/2023    A1C 10.0 12/22/2022    A1C 8.5 08/16/2022    A1C 7.4 12/03/2020    A1C 7.5 03/26/2020    A1C 6.6 10/03/2019    A1C 6.8 01/11/2019    A1C 6.5 08/17/2018      Recent Labs   Lab Test 09/13/23  1539 08/15/23  1058    142   POTASSIUM 4.0 4.0   CHLORIDE 100 104   CO2 26 26   ANIONGAP 13 12   * 141*   BUN 14.3 10.6   CR 1.09 0.84   LUZ 9.3 9.4    CBC RESULTS:   Recent Labs   Lab Test 09/13/23  1539 04/10/23  0925   WBC  --  9.1   RBC  --  4.62   HGB 13.6 13.5   HCT  --  40.3   MCV  --  87   MCH  --  29.2   MCHC  --  33.5   RDW  --  14.2   PLT  --  248        No EKG required, no history of coronary heart disease, significant arrhythmia, peripheral arterial disease or other structural heart disease.    Revised Cardiac Risk Index (RCRI):  The patient has the following serious cardiovascular risks for perioperative complications:   - Diabetes Mellitus (on Insulin) = 1 point     RCRI Interpretation: 1 point: Class II (low risk - 0.9% complication rate)         Signed Electronically by: Daisy Steward CNP  Copy of this evaluation report is provided to requesting physician.

## 2023-09-13 NOTE — TELEPHONE ENCOUNTER
Provider doing pre-op also noted plan in her notes.    Lovenox ordered    Siomara Montaño, PharmD BCACP  Anticoagulation Clinical Pharmacist

## 2023-09-13 NOTE — TELEPHONE ENCOUNTER
[facility]-PROCEDURAL ANTICOAGULATION  MANAGEMENT    ASSESSMENT     Warfarin interruption plan for TKA on 2023.    Indication for Anticoagulation: PE and Recurrent DVT    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 stopping warfarin 2021  Bilateral PE 2022  DVT 2021  History of thrombophlebitis     [facility]-Procedure Risk stratification for thromboembolism: moderate ( Chest guidelines)    VTE:  CHEST Perioperative Management guidelines suggest against bridging for patients with Hx of VTE as sole clinical indication for warfarin except in high risk stratification patients.  VTE: 2016 Anticoagulation Forum clinical guidance recommends no bridge therapy for most VTE patients interrupting warfarin with possible exceptions for VTE within previous month, prior hx recurrent VTE during anticoagulation therapy interruption, or undergoing a procedure with high for VTE  (joint replacement surgery or major abdominal cancer resection)     RECOMMENDATION     Pre-Procedure:  Hold warfarin for 5 days, until after procedure startin2023   Enoxaparin (Lovenox) 100 mg subq Q 12 hrs (0.75 mg/kg Q 12 hrs for BMI >= 40 kg/m2 per [facility] P&T approved dose adjustment protocol)   Start enoxaparin:   Last dose of enoxaparin prior to procedure: 2023 AM  (~24 hours prior to procedure)    Post-Procedure:  Resume warfarin dose if okay with provider doing procedure on night of procedure, 2023 PM: 15mg  Resume enoxaparin (Lovenox) ~ 24-48 hrs post procedure when okay with provider doing procedure. Continue until INR >= 2.0  Recheck INR ~5 days after resuming warfarin   ?     Plan routed to referring provider for approval  ?   Siomara Montaño RPH    SUBJECTIVE/OBJECTIVE     Adarsh Salvador, a 58 year old male    Goal INR Range: 2.0-3.0     Patient bridged in past: Yes: last 10/2022 for urology procedures      Wt Readings from Last 3 Encounters:   23 131.5 kg (290 lb)   08/15/23 136.5 kg (301 lb)    06/28/23 129.7 kg (286 lb)      Ideal body weight: 77.6 kg (171 lb 1.2 oz)  Adjusted ideal body weight: 99.2 kg (218 lb 10.3 oz)     Estimated body mass index is 39.33 kg/m  as calculated from the following:    Height as of 9/6/23: 1.829 m (6').    Weight as of 9/6/23: 131.5 kg (290 lb).    Lab Results   Component Value Date    INR 2.1 (H) 07/26/2023    INR 2.3 (H) 06/28/2023    INR 2.4 (H) 05/31/2023     Lab Results   Component Value Date    HGB 13.5 04/10/2023    HCT 40.3 04/10/2023     04/10/2023     Lab Results   Component Value Date    CR 0.84 08/15/2023    CR 0.94 04/10/2023    CR 0.76 12/22/2022     Estimated Creatinine Clearance: 137.8 mL/min (based on SCr of 0.84 mg/dL).

## 2023-09-13 NOTE — PATIENT INSTRUCTIONS
How to Take Your Medication Before Surgery  Hold metformin the morning of surgery.   Hold chlorthalidone and losartan the morning of surgery.   Do not take your morning meal time insulin (Novolog).    Take only 50% of your usual long acting insulin the morning of surgery (take 25 units instead of 50 units).    The anticoagulation clinic will contact you about what to do with warfarin and bridging with lovenox.     For informational purposes only. Not to replace the advice of your health care provider. Copyright   2003, 2019 [facility]. All rights reserved. Clinically reviewed by Meghna Beth MD. [employer] [id number] - REV .  Preparing for Your Surgery  Getting started  A nurse will call you to review your health history and instructions. They will give you an arrival time based on your scheduled surgery time. Please be ready to share:  Your doctor's clinic name and phone number  Your medical, surgical, and anesthesia history  A list of allergies and sensitivities  A list of medicines, including herbal treatments and over-the-counter drugs  Whether the patient has a legal guardian (ask how to send us the papers in advance)  Please tell us if you're pregnant--or if there's any chance you might be pregnant. Some surgeries may injure a fetus (unborn baby), so they require a pregnancy test. Surgeries that are safe for a fetus don't always need a test, and you can choose whether to have one.   If you have a child who's having surgery, please ask for a copy of Preparing for Your Child's Surgery.    Preparing for surgery  Within 10 to 30 days of surgery: Have a pre-op exam (sometimes called an H&P, or History and Physical). This can be done at a clinic or pre-operative center.  If you're having a , you may not need this exam. Talk to your care team.  At your pre-op exam, talk to your care team about all medicines you take. If you need to stop any medicines before surgery, ask when to start taking  them again.  We do this for your safety. Many medicines can make you bleed too much during surgery. Some change how well surgery (anesthesia) drugs work.  Call your insurance company to let them know you're having surgery. (If you don't have insurance, call [phone].)  Call your clinic if there's any change in your health. This includes signs of a cold or flu (sore throat, runny nose, cough, rash, fever). It also includes a scrape or scratch near the surgery site.  If you have questions on the day of surgery, call your hospital or surgery center.  Eating and drinking guidelines  For your safety: Unless your surgeon tells you otherwise, follow the guidelines below.  Eat and drink as usual until 8 hours before you arrive for surgery. After that, no food or milk.  Drink clear liquids until 2 hours before you arrive. These are liquids you can see through, like water, Gatorade, and Propel Water. They also include plain black coffee and tea (no cream or milk), candy, and breath mints. You can spit out gum when you arrive.  If you drink alcohol: Stop drinking it the night before surgery.  If your care team tells you to take medicine on the morning of surgery, it's okay to take it with a sip of water.  Preventing infection  Shower or bathe the night before and morning of your surgery. Follow the instructions your clinic gave you. (If no instructions, use regular soap.)  Don't shave or clip hair near your surgery site. We'll remove the hair if needed.  Don't smoke or vape the morning of surgery. You may chew nicotine gum up to 2 hours before surgery. A nicotine patch is okay.  Note: Some surgeries require you to completely quit smoking and nicotine. Check with your surgeon.  Your care team will make every effort to keep you safe from infection. We will:  Clean our hands often with soap and water (or an alcohol-based hand rub).  Clean the skin at your surgery site with a special soap that kills germs.  Give you a special  gown to keep you warm. (Cold raises the risk of infection.)  Wear special hair covers, masks, gowns and gloves during surgery.  Give antibiotic medicine, if prescribed. Not all surgeries need antibiotics.  What to bring on the day of surgery  Photo ID and insurance card  Copy of your health care directive, if you have one  Glasses and hearing aids (bring cases)  You can't wear contacts during surgery  Inhaler and eye drops, if you use them (tell us about these when you arrive)  CPAP machine or breathing device, if you use them  A few personal items, if spending the night  If you have . . .  A pacemaker, ICD (cardiac defibrillator) or other implant: Bring the ID card.  An implanted stimulator: Bring the remote control.  A legal guardian: Bring a copy of the certified (court-stamped) guardianship papers.  Please remove any jewelry, including body piercings. Leave jewelry and other valuables at home.  If you're going home the day of surgery  You must have a responsible adult drive you home. They should stay with you overnight as well.  If you don't have someone to stay with you, and you aren't safe to go home alone, we may keep you overnight. Insurance often won't pay for this.  After surgery  If it's hard to control your pain or you need more pain medicine, please call your surgeon's office.  Questions?   If you have any questions for your care team, list them here: _________________________________________________________________________________________________________________________________________________________________________ ____________________________________ ____________________________________ ____________________________________

## 2023-09-13 NOTE — TELEPHONE ENCOUNTER
TKA on 9/26/23 - discussed hold/bridge instructions with pt today and a copy was sent to him via [employer]. He will look it over and let us know if he has questions on Monday.     Patient verbalized understanding and agrees with plan of care. Pt had no further questions or concerns at this time.     Joby Marie RN, BSN  [facility] Anticoagulation Team

## 2023-09-13 NOTE — PROGRESS NOTES
[facility]  [address] [address] [address]  Phone: [phone]  Primary Provider: Kehr, Kristen M  Pre-op Performing Provider: LORENE LARA      PREOPERATIVE EVALUATION:  Today's date: 9/13/2023    Adarsh Salvador is a 58 year old male who presents for a preoperative evaluation.      Surgical Information:  Surgery/ProcedureARTHROPLASTY, KNEE, TOTAL :   Surgery Location: [facility] OR  Surgeon: Edwin Ch  Surgery Date: 9/26/2023  Time of Surgery: TBD  Where patient plans to recover: At home with family  Fax number for surgical facility: Note does not need to be faxed, will be available electronically in Epic.    Assessment & Plan     The proposed surgical procedure is considered INTERMEDIATE risk.    Preop general physical exam  - Hemoglobin A1c  - Basic metabolic panel  (Ca, Cl, CO2, Creat, Gluc, K, Na, BUN)  - Hemoglobin    Primary osteoarthritis of right knee    Type 2 diabetes mellitus with diabetic polyneuropathy, with long-term current use of insulin (H)  A1C improved to 7.9, per ortho notes needs to be < 8 for surgery, okay to proceed.   Patient to continue working on diabetic control and follow-up with primary in 3 months for recheck.    - insulin glargine (BASAGLAR KWIKPEN) 100 UNIT/ML pen; Inject 50 Units Subcutaneous daily  - Hemoglobin A1c  - Basic metabolic panel  (Ca, Cl, CO2, Creat, Gluc, K, Na, BUN)    Recurrent deep vein thrombosis (DVT) (H)  Anticoagulation clinic and pharmacist helping with plan for bridging.   - INR point of care       Implanted Device:   - Type of device: nerve stimulator, cgm Patient advised to bring device information on day of surgery.       - No identified additional risk factors other than previously addressed    Antiplatelet or Anticoagulation Medication Instructions:   - warfarin: Bridging therapy will be coordinated by anticoagulation pharmacist    Additional Medication Instructions:  Hold metformin the morning of surgery.   Hold  chlorthalidone and losartan the morning of surgery.   Do not take your morning meal time insulin (Novolog).    Take only 50% of your usual long acting insulin the morning of surgery (take 25 units instead of 50 units).     RECOMMENDATION:  APPROVAL GIVEN to proceed with proposed procedure, without further diagnostic evaluation.    Subjective       HPI related to upcoming procedure: Adarsh Salvador presents for a preoperative physical.  He will be having a right total knee replacement for a history of osteoarthritis.  Has been having a lot of pain not improved with other measures.         9/13/2023     2:47 PM   Preop Questions   1. Have you ever had a heart attack or stroke? No   2. Have you ever had surgery on your heart or blood vessels, such as a stent placement, a coronary artery bypass, or surgery on an artery in your head, neck, heart, or legs? No   3. Do you have chest pain with activity? No   4. Do you have a history of  heart failure? No   5. Do you currently have a cold, bronchitis or symptoms of other infection? No   6. Do you have a cough, shortness of breath, or wheezing? No   7. Do you or anyone in your family have previous history of blood clots? YES - DVT and PE, chronic warfarin   8. Do you or does anyone in your family have a serious bleeding problem such as prolonged bleeding following surgeries or cuts? No   9. Have you ever had problems with anemia or been told to take iron pills? No   10. Have you had any abnormal blood loss such as black, tarry or bloody stools? No   11. Have you ever had a blood transfusion? No   12. Are you willing to have a blood transfusion if it is medically needed before, during, or after your surgery? Yes   13. Have you or any of your relatives ever had problems with anesthesia? No   14. Do you have sleep apnea, excessive snoring or daytime drowsiness? No   15. Do you have any artifical heart valves or other implanted medical devices like a pacemaker, defibrillator, or  continuous glucose monitor? YES    15a. What type of device do you have? nerve stimulator for bowel control and continuous glucose monitor   15b. Name of the clinic that manages your device:  [facility], [facility] with primary care   16. Do you have artificial joints? No   17. Are you allergic to latex? No       Health Care Directive:  Patient does not have a Health Care Directive or Living Will: Discussed advance care planning with patient; however, patient declined at this time.    Preoperative Review of :   reviewed - controlled substances reflected in medication list.      Status of Chronic Conditions:  See problem list for active medical problems.  Problems all longstanding and stable, except as noted/documented.  See ROS for pertinent symptoms related to these conditions.    Review of Systems  CONSTITUTIONAL: NEGATIVE for fever, chills, change in weight  INTEGUMENTARY/SKIN: NEGATIVE for worrisome rashes, moles or lesions  EYES: NEGATIVE for vision changes or irritation  ENT/MOUTH: NEGATIVE for ear, mouth and throat problems  RESP: NEGATIVE for significant cough or SOB  CV: NEGATIVE for chest pain, palpitations or peripheral edema  GI: NEGATIVE for nausea, abdominal pain, heartburn, or change in bowel habits  : NEGATIVE for frequency, dysuria, or hematuria  MUSCULOSKELETAL: NEGATIVE for significant arthralgias or myalgia  NEURO: NEGATIVE for weakness, dizziness or paresthesias  ENDOCRINE: NEGATIVE for temperature intolerance, skin/hair changes  HEME: NEGATIVE for bleeding problems  PSYCHIATRIC: NEGATIVE for changes in mood or affect    Patient Active Problem List    Diagnosis Date Noted    Pulmonary embolism, other, unspecified chronicity, unspecified whether acute cor pulmonale present (H) 08/15/2023     Priority: Medium    Advanced directives, counseling/discussion 12/22/2022     Priority: Medium     Pt was given info on ADV. Munir Amaral MA      Acute pain of right knee 08/26/2022     Priority:  Medium    Aftercare following surgery of the musculoskeletal system 08/26/2022     Priority: Medium    Chronic pain of right knee 08/08/2022     Priority: Medium     Added automatically from request for surgery [id number]      Bilateral leg pain 06/09/2022     Priority: Medium     Added automatically from request for surgery [id number]      Low volume of ejaculated semen 06/07/2022     Priority: Medium    DVT (deep venous thrombosis) (H) 01/15/2022     Priority: Medium    Recurrent deep vein thrombosis (DVT) (H) 09/27/2021     Priority: Medium    Precordial pain 02/15/2021     Priority: Medium    Dyslipidemia 02/15/2021     Priority: Medium    Elevated C-reactive protein 02/15/2021     Priority: Medium    Gastric reflux 02/15/2021     Priority: Medium    Internal derangement of knee 02/15/2021     Priority: Medium    Nicotine dependence 02/15/2021     Priority: Medium    Varicose veins of lower extremity 02/15/2021     Priority: Medium    Vitamin D deficiency 02/15/2021     Priority: Medium    Neuropathy 06/23/2020     Priority: Medium    Morbid obesity (H) 03/26/2020     Priority: Medium    Insomnia, unspecified type 05/09/2019     Priority: Medium    CONNOR (generalized anxiety disorder) 05/08/2019     Priority: Medium    Hyperlipidemia LDL goal <100 08/17/2018     Priority: Medium    Type 2 diabetes mellitus with diabetic polyneuropathy, with long-term current use of insulin (H) 01/18/2018     Priority: Medium    Mild persistent asthma without complication 01/12/2018     Priority: Medium    Right inguinal hernia 06/14/2016     Priority: Medium    Irritable bowel syndrome with diarrhea 03/21/2016     Priority: Medium    Fatty liver 02/22/2016     Priority: Medium    Hypertension, unspecified type 12/15/2015     Priority: Medium    Anemia in other chronic diseases classified elsewhere 12/15/2015     Priority: Medium    Hypertriglyceridemia 07/12/2013     Priority: Medium    Chronic maxillary sinusitis 09/18/2012      Priority: Medium     Believes this is secondary to exposure to toxic fumes at work- he is a .  He regularly wears a mask ventilator and believes this helps.  Has only tried intermittent nasal washes, and OTC medications.  Not ever tried steroid nasal sprays or other controller medications.        Chronic rhinitis 09/18/2012     Priority: Medium    Constipation 04/24/2012     Priority: Medium    GERD (gastroesophageal reflux disease) 06/15/2011     Priority: Medium    Chronic RLQ pain 06/15/2011     Priority: Medium      Past Medical History:   Diagnosis Date    Anaphylactic reaction 8/14/2015    Anxiety     Depression     DM2 (diabetes mellitus, type 2) (H)     GERD (gastroesophageal reflux disease)     HTN (hypertension)     IBS (irritable bowel syndrome)     Kidney stone 6/15/2011    Pt believes these were Calcium stones    Neuropathy      Past Surgical History:   Procedure Laterality Date    ARTHROSCOPY KNEE WITH MEDIAL MENISCECTOMY Right 8/19/2022    Procedure: examination under anesthesia, right knee arthroscopy, meniscectomy;  Surgeon: Carlos A Em MD;  Location: [facility] OR    COLONOSCOPY  5/1/2012    Procedure:COLONOSCOPY; screening colonoscopy; Surgeon:KINGSLEY DOS SANTOS; Location:[facility] OR    COLONOSCOPY  4/21/2014    Procedure: COMBINED COLONOSCOPY, SINGLE BIOPSY/POLYPECTOMY BY BIOPSY;  Surgeon: Duane, William Charles, MD;  Location: [facility] OR    COLONOSCOPY N/A 4/21/2022    Procedure: COLONOSCOPY, WITH POLYPECTOMY AND BIOPSY;  Surgeon: Luiz Calvert MD;  Location:  GI    CYSTOSCOPY  05/01/2008    CYSTOSCOPY W/ URETERAL STENT PLACEMENT 05/01/2008     CYSTOSCOPY  09/26/2010    CYSTOSCOPY W/ URETERAL STENT PLACEMENT 09/26/2010 Left     CYSTOSCOPY  05/18/2012    CYSTOSCOPY, LEFT URETEROSCOPY AND STENT PLACEMENT left retrograde 05/18/2012     CYSTOSCOPY,+URETEROSCOPY  06/10/2008    URETEROSCOPY 06/10/2008 Right     HC BREATH HYDROGEN TEST  3/7/2014    Procedure: HYDROGEN BREATH TEST;   Surgeon: Darion Swift MD;  Location: [facility] GI    INJECT EPIDURAL LUMBAR N/A 7/7/2022    Procedure: INJECTION, SPINE, LUMBAR, EPIDURAL L5/S1;  Surgeon: Michi Hahn MD;  Location: [facility] OR    LITHOTRIPSY  09/30/2010    LITHOTRIPSY 09/30/2010 LEFT EXTRACORPOREAL SHOCK WAVE LITHOTRIPSY, FLEXIBLE CYSTOSCOPY, LEFT STENT REMOVAL      ORTHOPEDIC SURGERY  10/03/2007    KNEE ARTHROSCOPY 10/03/2007 RightX2      RELEASE CARPAL TUNNEL Right 1/26/2018    Procedure: RELEASE CARPAL TUNNEL;  Right carpal tunnel release;  Surgeon: Wiliam Saenz MD;  Location: [facility] OR    VASCULAR SURGERY  11/24/2008    Radiofrequency ablation left saphenous vein 11/24/2008 Done by Dr. Lozoya       Current Outpatient Medications   Medication Sig Dispense Refill    albuterol (PROAIR HFA/PROVENTIL HFA/VENTOLIN HFA) 108 (90 Base) MCG/ACT inhaler Inhale 2 puffs into the lungs every 6 hours as needed for shortness of breath or wheezing 6.7 g 3    amLODIPine (NORVASC) 10 MG tablet Take 1 tablet (10 mg) by mouth daily for blood pressure 90 tablet 1    ASPIRIN PO Take 325 mg by mouth daily       atorvastatin (LIPITOR) 40 MG tablet Take 1 tablet (40 mg) by mouth daily 90 tablet 3    blood glucose (ACCU-CHEK GUIDE) test strip Use to test blood sugar 3 times daily or as directed. 300 strip 3    blood glucose monitoring (ACCU-CHEK FASTCLIX) lancets Use to test blood sugar 1 times daily or as directed.  Ok to substitute alternative if insurance prefers. 102 each 11    calcium carbonate (TUMS) 500 MG chewable tablet Take 1 chew tab by mouth daily      chlorthalidone (HYGROTON) 25 MG tablet Take 1 tablet (25 mg) by mouth daily Add on for blood pressure 90 tablet 1    Continuous Blood Gluc Sensor (FREESTYLE FROYLAN 3 SENSOR) MISC 1 each every 14 days 2 each 5    DULoxetine (CYMBALTA) 60 MG capsule Take 1 capsule (60 mg) by mouth 2 times daily 180 capsule 3    fluticasone-salmeterol (ADVAIR) 500-50 MCG/ACT inhaler Inhale 1 puff into the lungs every 12  "hours 1 each 11    insulin aspart (NOVOLOG FLEXPEN) 100 UNIT/ML pen Inject 18 units before breakfast and 18 units before dinner *Will need to schedule with new PCP for future refills* 15 mL 3    insulin glargine (BASAGLAR KWIKPEN) 100 UNIT/ML pen Inject 50 Units Subcutaneous daily  0    insulin pen needle (32G X 4 MM) 32G X 4 MM miscellaneous Use 3  pen needles daily or as directed. 200 each 1    insulin pen needle (32G X 4 MM) 32G X 4 MM miscellaneous Use 2 pen needles daily or as directed. 200 each 1    insulin syringe-needle U-100 (29G X 1/2\" 0.5 ML) 29G X 1/2\" 0.5 ML miscellaneous Use 3 syringes daily or as directed. 200 each 1    losartan (COZAAR) 25 MG tablet Take 1 tablet (25 mg) by mouth daily 90 tablet 1    meclizine (ANTIVERT) 25 MG tablet Take 1 tablet (25 mg) by mouth 3 times daily as needed for dizziness 90 tablet 1    metFORMIN (GLUCOPHAGE XR) 500 MG 24 hr tablet Take 2 tablets (1,000 mg) by mouth 2 times daily (with meals) 360 tablet 2    montelukast (SINGULAIR) 10 MG tablet Take 1 tablet (10 mg) by mouth At Bedtime For allergies/asthma 90 tablet 2    multivitamin, therapeutic (THERA-VIT) TABS Take 1 tablet by mouth daily      naproxen sodium (ANAPROX) 220 MG tablet Take 220 mg by mouth      nortriptyline (PAMELOR) 10 MG capsule Take 2 capsules (20 mg) by mouth At Bedtime 60 capsule 5    omeprazole (PRILOSEC) 40 MG DR capsule Take 1 capsule (40 mg) by mouth daily Take for at least two months 90 capsule 2    pregabalin (LYRICA) 50 MG capsule Take 1 capsule (50 mg) by mouth 2 times daily 60 capsule 1    rizatriptan (MAXALT-MLT) 10 MG ODT TAKE 1 TABLET BY MOUTH AT ONSET OF HEADACHE FOR MIGRAINE MAY REPEAT IN 2 HOURS. MAX 3 TABS/24 HOURS. 18 tablet 1    warfarin ANTICOAGULANT (COUMADIN) 5 MG tablet Take 15 mg Tues, Thurs and 12.5 mg all other days, or as directed by the Coumadin clinic 225 tablet 1    enoxaparin ANTICOAGULANT (LOVENOX) 100 MG/ML syringe Inject 1 mL (100 mg) Subcutaneous every 12 hours 28 " mL 1    EPINEPHrine (ANY BX GENERIC EQUIV) 0.3 MG/0.3ML injection 2-pack Inject 0.3 mLs (0.3 mg) into the muscle once as needed for anaphylaxis (Patient not taking: Reported on 9/13/2023) 2 each 2       Allergies   Allergen Reactions    Artificial Sweetner (Informational Only) [Artificial Sweetner (Informational Only) ] GI Disturbance     ALL artificial sweetners cause severe diarrhea & flu symptoms    Hydromorphone Anaphylaxis    Ibuprofen GI Disturbance    Morphine Sulfate Concentrate Anaphylaxis    Morphine [Fumaric Acid] Anaphylaxis    Hydrocodone-Acetaminophen Itching    Tramadol Hives    Trazodone Hives    Gabapentin      GI upset per pt    Keflex [Cephalexin] Diarrhea     Upset stomach    Codeine Phosphate Itching    Ketorolac Tromethamine Rash        Social History     Tobacco Use    Smoking status: Never    Smokeless tobacco: Current     Types: Chew    Tobacco comments:     Chew   Substance Use Topics    Alcohol use: Not Currently     Alcohol/week: 0.0 standard drinks of alcohol     Comment: occasional, few drinks a month       History   Drug Use     Comment: CBD occasional         Objective     /88   Pulse 107   Temp 98.4  F (36.9  C)   Ht 1.829 m (6')   Wt 130.6 kg (288 lb)   SpO2 97%   BMI 39.06 kg/m      Physical Exam    GENERAL APPEARANCE: healthy, alert and no distress     EYES: EOMI,  PERRL     HENT: nose and mouth without ulcers or lesions     NECK: no adenopathy, no asymmetry, masses, or scars and thyroid normal to palpation     RESP: lungs clear to auscultation - no rales, rhonchi or wheezes     CV: regular rates and rhythm, normal S1 S2, no S3 or S4 and no murmur, click or rub     ABDOMEN:  soft, nontender, no HSM or masses and bowel sounds normal     MS: extremities normal- no gross deformities noted, no evidence of inflammation in joints, FROM in all extremities.     SKIN: no suspicious lesions or rashes     NEURO: Normal strength and tone, sensory exam grossly normal, mentation  intact and speech normal     PSYCH: mentation appears normal. and affect normal/bright     LYMPHATICS: No cervical adenopathy         Diagnostics:    Lab Results   Component Value Date    A1C 7.9 09/13/2023    A1C 8.4 08/15/2023    A1C 11.3 04/10/2023    A1C 10.0 12/22/2022    A1C 8.5 08/16/2022    A1C 7.4 12/03/2020    A1C 7.5 03/26/2020    A1C 6.6 10/03/2019    A1C 6.8 01/11/2019    A1C 6.5 08/17/2018      Recent Labs   Lab Test 09/13/23  1539 08/15/23  1058    142   POTASSIUM 4.0 4.0   CHLORIDE 100 104   CO2 26 26   ANIONGAP 13 12   * 141*   BUN 14.3 10.6   CR 1.09 0.84   LUZ 9.3 9.4    CBC RESULTS:   Recent Labs   Lab Test 09/13/23  1539 04/10/23  0925   WBC  --  9.1   RBC  --  4.62   HGB 13.6 13.5   HCT  --  40.3   MCV  --  87   MCH  --  29.2   MCHC  --  33.5   RDW  --  14.2   PLT  --  248        No EKG required, no history of coronary heart disease, significant arrhythmia, peripheral arterial disease or other structural heart disease.    Revised Cardiac Risk Index (RCRI):  The patient has the following serious cardiovascular risks for perioperative complications:   - Diabetes Mellitus (on Insulin) = 1 point     RCRI Interpretation: 1 point: Class II (low risk - 0.9% complication rate)         Signed Electronically by: Daisy Steward CNP  Copy of this evaluation report is provided to requesting physician.

## 2023-09-13 NOTE — TELEPHONE ENCOUNTER
Patient calling and requesting hgb A1c number. Value provided, not interpreted. Patient reports he needed the value to be below 8 to have knee surgery. Advised provider will interpret lab values and recommendations in the next few days.         Daisy Ely RN

## 2023-09-13 NOTE — PROGRESS NOTES
ANTICOAGULATION MANAGEMENT     Adarsh Salvador 58 year old male is on warfarin with subtherapeutic INR result. (Goal INR 2.0-3.0)    Recent labs: (last 7 days)     09/13/23  1539   INR 1.3*       ASSESSMENT     Source(s): Chart Review and Patient/Caregiver Call     Warfarin doses taken: Missed dose(s) may be affecting INR  Diet: No new diet changes identified  Medication/supplement changes: None noted  New illness, injury, or hospitalization: No  Signs or symptoms of bleeding or clotting: No  Previous result: Therapeutic last 2(+) visits  Additional findings: Upcoming surgery/procedure TKA on 9/26/23 - discussed hold/bridge instructions with pt today and a copy was sent to him via [employer]. He will look it over and let us know if he has questions on Monday       PLAN     Recommended plan for temporary change(s) affecting INR     Dosing Instructions: booster dose then continue your current warfarin dose with next INR in 5 days       Summary  As of 9/13/2023      Full warfarin instructions:  9/13: 25 mg; Otherwise 15 mg every Tue, Sat; 12.5 mg all other days   Next INR check:  9/18/2023               Telephone call with Adarsh who verbalizes understanding and agrees to plan and who agrees to plan and repeated back plan correctly    Lab visit scheduled    Education provided:   Taking warfarin: Importance of taking warfarin as instructed  Goal range and lab monitoring: goal range and significance of current result, Importance of therapeutic range, and Importance of following up at instructed interval  Symptom monitoring: monitoring for clotting signs and symptoms, monitoring for stroke signs and symptoms, and when to seek medical attention/emergency care  Written instructions provided via [employer]     Plan made with [facility] Pharmacist Siomara Marie, RN  Anticoagulation Clinic  9/13/2023    _______________________________________________________________________     Anticoagulation Episode Summary       Current  INR goal:  2.0-3.0   TTR:  41.2 % (1 y)   Target end date:  Indefinite   Send INR reminders to:  [facility]    Indications    Recurrent deep vein thrombosis (DVT) (H) [I82.409]             Comments:               Anticoagulation Care Providers       Provider Role Specialty Phone number    Bertha Romero PA-C Referring [facility] [phone]

## 2023-09-14 LAB
ANION GAP SERPL CALCULATED.3IONS-SCNC: 13 MMOL/L (ref 7–15)
BUN SERPL-MCNC: 14.3 MG/DL (ref 6–20)
CALCIUM SERPL-MCNC: 9.3 MG/DL (ref 8.6–10)
CHLORIDE SERPL-SCNC: 100 MMOL/L (ref 98–107)
CREAT SERPL-MCNC: 1.09 MG/DL (ref 0.67–1.17)
DEPRECATED HCO3 PLAS-SCNC: 26 MMOL/L (ref 22–29)
EGFRCR SERPLBLD CKD-EPI 2021: 80 ML/MIN/1.73M2
GLUCOSE SERPL-MCNC: 186 MG/DL (ref 70–99)
POTASSIUM SERPL-SCNC: 4 MMOL/L (ref 3.4–5.3)
SODIUM SERPL-SCNC: 139 MMOL/L (ref 136–145)

## 2023-09-15 ENCOUNTER — TELEPHONE (OUTPATIENT)
Dept: FAMILY MEDICINE | Facility: CLINIC | Age: 58
End: 2023-09-15
Payer: COMMERCIAL

## 2023-09-15 DIAGNOSIS — Z79.4 TYPE 2 DIABETES MELLITUS WITH DIABETIC POLYNEUROPATHY, WITH LONG-TERM CURRENT USE OF INSULIN (H): ICD-10-CM

## 2023-09-15 DIAGNOSIS — E11.42 TYPE 2 DIABETES MELLITUS WITH DIABETIC POLYNEUROPATHY, WITH LONG-TERM CURRENT USE OF INSULIN (H): ICD-10-CM

## 2023-09-15 RX ORDER — INSULIN GLARGINE 100 [IU]/ML
50 INJECTION, SOLUTION SUBCUTANEOUS DAILY
Qty: 45 ML | Refills: 0 | Status: SHIPPED | OUTPATIENT
Start: 2023-09-15 | End: 2024-02-20

## 2023-09-15 NOTE — TELEPHONE ENCOUNTER
"Pt states he has only 6 units of insulin remaining. Pt requests refill sent today. Per chart review, the 9/13/23 order was entered \"historical.\"     Disp Refills Start End MIKE   insulin glargine (BASAGLAR KWIKPEN) 100 UNIT/ML pen  0 9/13/2023  No   Sig - Route: Inject 50 Units Subcutaneous daily - Subcutaneous   Class: Historical     LANG MottN, RN    "

## 2023-09-18 ENCOUNTER — ANTICOAGULATION THERAPY VISIT (OUTPATIENT)
Dept: ANTICOAGULATION | Facility: CLINIC | Age: 58
End: 2023-09-18

## 2023-09-18 ENCOUNTER — LAB (OUTPATIENT)
Dept: LAB | Facility: CLINIC | Age: 58
End: 2023-09-18
Payer: COMMERCIAL

## 2023-09-18 DIAGNOSIS — I82.409 RECURRENT DEEP VEIN THROMBOSIS (DVT) (H): ICD-10-CM

## 2023-09-18 DIAGNOSIS — I82.409 RECURRENT DEEP VEIN THROMBOSIS (DVT) (H): Primary | ICD-10-CM

## 2023-09-18 LAB — INR BLD: 1.7 (ref 0.9–1.1)

## 2023-09-18 PROCEDURE — 36415 COLL VENOUS BLD VENIPUNCTURE: CPT

## 2023-09-18 PROCEDURE — 85610 PROTHROMBIN TIME: CPT

## 2023-09-18 NOTE — PROGRESS NOTES
ANTICOAGULATION MANAGEMENT     Adarsh Salvador 58 year old male is on warfarin with subtherapeutic INR result. (Goal INR 2.0-3.0)    Recent labs: (last 7 days)     09/18/23  0953   INR 1.7*       ASSESSMENT     Source(s): Chart Review and Patient/Caregiver Call     Warfarin doses taken: Booster dose(s) recently taken which may be affecting INR  Diet: No new diet changes identified  Medication/supplement changes: None noted  New illness, injury, or hospitalization: No  Signs or symptoms of bleeding or clotting: No  Previous result: Subtherapeutic  Additional findings: Upcoming surgery/procedure TKA on 9/26/23. 5 day hold with bridge. Will start bridging this evening due to low INR.       PLAN     Recommended plan for temporary change(s) affecting INR     Dosing Instructions: Continue your current warfarin dose start bridging with Enoxaparin with next INR in 2 weeks. Patient will hold warfarin 9/21-9/25.       Summary  As of 9/18/2023      Full warfarin instructions:  9/21: Hold; 9/22: Hold; 9/23: Hold; 9/24: Hold; 9/25: Hold; Otherwise 15 mg every Tue, Sat; 12.5 mg all other days   Next INR check:  10/3/2023               Telephone call with Adarsh who verbalizes understanding and agrees to plan    Lab visit scheduled    Education provided:   Please call back if any changes to your diet, medications or how you've been taking warfarin  Symptom monitoring: monitoring for bleeding signs and symptoms, monitoring for clotting signs and symptoms, monitoring for stroke signs and symptoms, and when to seek medical attention/emergency care  Lovenox/Heparin education provided: role of enoxaparin/heparin in bridge therapy   Contact [phone]  with any changes, questions or concerns.     Plan made with [facility] Pharmacist Siomara HURTADO, RN  Anticoagulation Clinic  9/18/2023    _______________________________________________________________________     Anticoagulation Episode Summary       Current INR goal:  2.0-3.0   TTR:   41.3 % (1 y)   Target end date:  Indefinite   Send INR reminders to:  [facility]    Indications    Recurrent deep vein thrombosis (DVT) (H) [I82.409]             Comments:               Anticoagulation Care Providers       Provider Role Specialty Phone number    Bertha Romero PA-C Referring [facility] [phone]

## 2023-09-18 NOTE — PROGRESS NOTES
ANTICOAGULATION MANAGEMENT     Adarsh Salvador 58 year old male is on warfarin with subtherapeutic INR result. (Goal INR 2.0-3.0)    Recent labs: (last 7 days)     09/18/23  0953   INR 1.7*       ASSESSMENT     Source(s): Chart Review  Previous INR was Subtherapeutic  Medication, diet, health changes since last INR: Upcoming procedure on 9/26/23. Will need to start bridging with Lovenox today due to sub-therapeutic INR.         PLAN     Recommended plan for temporary change(s) affecting INR     Dosing Instructions: Continue your current warfarin dose start bridging with Enoxaparin. Patient will start warfarin hold 9/21-9-25. Procedure instructions sent via my chart. INR in 2 weeks       Summary  As of 9/18/2023      Full warfarin instructions:  9/21: Hold; 9/22: Hold; 9/23: Hold; 9/24: Hold; 9/25: Hold; Otherwise 15 mg every Tue, Sat; 12.5 mg all other days   Next INR check:  10/3/2023               Detailed voice message left for Adarsh with dosing instructions and follow up date.   Sent [facility] message with dosing and follow up instructions    Contact [phone]  to schedule and with any changes, questions or concerns.     Education provided:   Please call back if any changes to your diet, medications or how you've been taking warfarin  Symptom monitoring: monitoring for clotting signs and symptoms, monitoring for stroke signs and symptoms, and when to seek medical attention/emergency care  Lovenox/Heparin education provided: role of enoxaparin/heparin in bridge therapy and prescribed dose and frequency   Contact [phone]  with any changes, questions or concerns.     Plan made with [facility] Pharmacist Siomara HURTADO, RN  Anticoagulation Clinic  9/18/2023    _______________________________________________________________________     Anticoagulation Episode Summary       Current INR goal:  2.0-3.0   TTR:  41.3 % (1 y)   Target end date:  Indefinite   Send INR reminders to:  [facility]    Indications     Recurrent deep vein thrombosis (DVT) (H) [I82.409]             Comments:               Anticoagulation Care Providers       Provider Role Specialty Phone number    Bertha Romero PA-C [facility] [phone]

## 2023-09-19 NOTE — PROVIDER NOTIFICATION
Ortho Navigator Note      Pre-op Date 9/13/23     Medical Clearance  Cleared     Labs Stable   A1C 7.9   COVID Test Date No longer indicated      Skin  Intact      Activity: Ambulates independently      Equipment Need Patient will likely need a walker for discharge. Defer to PT/OT for recs.       Implanted Device:   - Type of device: nerve stimulator, cgm Patient advised to bring device information on day of surger   Meds to Hold Held all supplements 14 days prior to surgery  Full warfarin instructions:  9/21: Hold; 9/22: Hold; 9/23: Hold; 9/24: Hold; 9/25: Hold;  Bring with Lovenox starting 9/18 per anticoagulation clinic.      Naproxen- Hold for 4 days prior to surgery  Metformin the morning of surgery.    Novolog- Hold morning dose on DOS   Lantus- Take only 50% of your usual long acting insulin the morning of surgery (take 25 units instead of 50 units).     * Medication recommendations are not intended to be exhaustive; they are limited to common medications that are potentially dangerous if incorrectly managed   NPO Instructions  Defer to PAN RN     Pre-op Joint Education Complete? Complete   Discharge Plan Patient has plan to discharge home on morning of POD 1.   Fiance will arrive at hospital at 0800 to participate in therapy and discharge education. They will then transport patient home    /Transportation Richar will be  and transportation.  is physically able to care for patient.      Barriers to Discharge No barriers to discharge.      Additional Info/   Special Needs : HX of multiple DVTs and a PE post COVID.           09/19/23 1526   Discharge Planning   Patient/Family Anticipates Transition to home with family   Concerns to be Addressed no discharge needs identified   Living Arrangements   People in Home significant other   Type of Residence Private Residence   Is your private residence a single family home or apartment? Apartment   Number of Stairs, Within Home, Primary none   Once  home, are you able to live on one level? Yes   Bathroom Shower/Tub Tub/Shower unit   Equipment Currently Used at Home cane, straight;crutches   Support System   Support Systems Spouse/Significant Other   Do you have someone available to stay with you one or two nights once you are home? Yes   Medical Clearance   Date of Physical 09/13/23   Clinic Name [facility]   It is recommended that you call and check with any specialty providers before surgery to see if you need surgical clearance.  Do you see any specialty providers outside of your primary care provider? Yes  (Anticoag clinic)   Blood   Known Bleeding Disorder or Coagulopathy Yes  (HX of DVT and PE related to COVID per patient.)   Does the patient have any Holiness/cultural preferences related to blood products? No   Education   Has the patient scheduled or completed pre-op total joint education, either in class or online, in the last 12 months? No   Patient attended total joint pre-op class/received pre-op teaching  online   Relationship/Living Environment   Name(s) of People in Home Richar hopper)

## 2023-09-25 ENCOUNTER — ANESTHESIA EVENT (OUTPATIENT)
Dept: SURGERY | Facility: CLINIC | Age: 58
End: 2023-09-25
Payer: COMMERCIAL

## 2023-09-25 NOTE — ANESTHESIA PREPROCEDURE EVALUATION
Anesthesia Pre-Procedure Evaluation    Patient: Adarsh Salvador   MRN: [id number] : 1965        Procedure : Procedure(s):  examination under anesthesia, right knee arthroscopy, meniscectomy          Past Medical History:   Diagnosis Date     Anaphylactic reaction 2015     Anxiety      Depression      DM2 (diabetes mellitus, type 2) (H)      GERD (gastroesophageal reflux disease)      HTN (hypertension)      IBS (irritable bowel syndrome)      Kidney stone 6/15/2011    Pt believes these were Calcium stones     Neuropathy       Past Surgical History:   Procedure Laterality Date     ARTHROSCOPY KNEE WITH MEDIAL MENISCECTOMY Right 2022    Procedure: examination under anesthesia, right knee arthroscopy, meniscectomy;  Surgeon: Carlos A Em MD;  Location: [facility] OR     COLONOSCOPY  2012    Procedure:COLONOSCOPY; screening colonoscopy; Surgeon:KINGSLEY DOS SANTOS; Location:[facility] OR     COLONOSCOPY  2014    Procedure: COMBINED COLONOSCOPY, SINGLE BIOPSY/POLYPECTOMY BY BIOPSY;  Surgeon: Duane, William Charles, MD;  Location: [facility] OR     COLONOSCOPY N/A 2022    Procedure: COLONOSCOPY, WITH POLYPECTOMY AND BIOPSY;  Surgeon: Luiz Calvert MD;  Location: [facility]     CYSTOSCOPY  2008    CYSTOSCOPY W/ URETERAL STENT PLACEMENT 2008      CYSTOSCOPY  2010    CYSTOSCOPY W/ URETERAL STENT PLACEMENT 2010 Left      CYSTOSCOPY  2012    CYSTOSCOPY, LEFT URETEROSCOPY AND STENT PLACEMENT left retrograde 2012      CYSTOSCOPY,+URETEROSCOPY  06/10/2008    URETEROSCOPY 06/10/2008 Right      HC BREATH HYDROGEN TEST  3/7/2014    Procedure: HYDROGEN BREATH TEST;  Surgeon: Darion Swift MD;  Location: [facility]     INJECT EPIDURAL LUMBAR N/A 2022    Procedure: INJECTION, SPINE, LUMBAR, EPIDURAL L5/S1;  Surgeon: Michi Hahn MD;  Location: [facility] OR     LITHOTRIPSY  2010    LITHOTRIPSY 2010 LEFT EXTRACORPOREAL SHOCK WAVE LITHOTRIPSY, FLEXIBLE CYSTOSCOPY,  LEFT STENT REMOVAL       ORTHOPEDIC SURGERY  10/03/2007    KNEE ARTHROSCOPY 10/03/2007 RightX2       RELEASE CARPAL TUNNEL Right 1/26/2018    Procedure: RELEASE CARPAL TUNNEL;  Right carpal tunnel release;  Surgeon: Wiliam Saenz MD;  Location: [facility] OR     VASCULAR SURGERY  11/24/2008    Radiofrequency ablation left saphenous vein 11/24/2008 Done by Dr. Lozoya        Allergies   Allergen Reactions     Artificial Sweetner (Informational Only) [Artificial Sweetner (Informational Only) ] GI Disturbance     ALL artificial sweetners cause severe diarrhea & flu symptoms     Hydromorphone Anaphylaxis     Ibuprofen GI Disturbance     Morphine Sulfate Concentrate Anaphylaxis     Morphine [Fumaric Acid] Anaphylaxis     Hydrocodone-Acetaminophen Itching     Tramadol Hives     Trazodone Hives     Gabapentin      GI upset per pt     Keflex [Cephalexin] Diarrhea     Upset stomach     Codeine Phosphate Itching     Ketorolac Tromethamine Rash      Social History     Tobacco Use     Smoking status: Never     Smokeless tobacco: Current     Types: Chew     Tobacco comments:     Chew   Substance Use Topics     Alcohol use: Not Currently     Alcohol/week: 0.0 standard drinks of alcohol     Comment: occasional, few drinks a month      Wt Readings from Last 1 Encounters:   09/13/23 130.6 kg (288 lb)        Anesthesia Evaluation   Pt has had prior anesthetic. Type: General and MAC.        ROS/MED HX  ENT/Pulmonary:     (+)                    Intermittent, asthma  Treatment: Inhaler prn,                 Neurologic:       Cardiovascular: Comment: Stress echo 2019:    Normal dobutamine stress echocardiogram without evidence of inducible  ischemia. Target heart rate was achieved. Heart rate and blood pressure  response to dobutamine were normal. Normal LV function and wall motion at  rest. With stress, the left ventricular ejection fraction increased from 55-  60% to greater than 65% and the left ventricular size decreased  appropriately.  No regional wall motion abnormality with stress.  No subjective symptoms to suggest ischemia.  There was no ECG evidence of ischemia.  No significant valve disease on screening doppler evaluation. The aortic root  and visualized ascending aorta are normal.      (+) Dyslipidemia hypertension- -   -  - -   Taking blood thinners                                   METS/Exercise Tolerance:     Hematologic:     (+) History of blood clots,               Musculoskeletal:  - neg musculoskeletal ROS     GI/Hepatic:     (+) GERD, Asymptomatic on medication,           liver disease,       Renal/Genitourinary:     (+) renal disease,      Nephrolithiasis ,       Endo:     (+)  type II DM, Last HgA1c: 7.9, date: 9/23, Using insulin,  Normal glucose range: 143,  Diabetic complications: neuropathy.      Obesity,       Psychiatric/Substance Use:     (+) psychiatric history anxiety       Infectious Disease:  - neg infectious disease ROS     Malignancy:  - neg malignancy ROS     Other:  - neg other ROS          Physical Exam    Airway  airway exam normal      Mallampati: II   TM distance: > 3 FB   Neck ROM: full   Mouth opening: > 3 cm    Respiratory Devices and Support         Dental  no notable dental history  unable to assess        Cardiovascular   cardiovascular exam normal          Pulmonary   pulmonary exam normal              OUTSIDE LABS:  CBC:   Lab Results   Component Value Date    WBC 9.1 04/10/2023    WBC 7.2 08/16/2022    HGB 13.6 09/13/2023    HGB 13.5 04/10/2023    HCT 40.3 04/10/2023    HCT 37.3 (L) 08/16/2022     04/10/2023     08/16/2022     BMP:   Lab Results   Component Value Date     09/13/2023     08/15/2023    POTASSIUM 4.0 09/13/2023    POTASSIUM 4.0 08/15/2023    CHLORIDE 100 09/13/2023    CHLORIDE 104 08/15/2023    CO2 26 09/13/2023    CO2 26 08/15/2023    BUN 14.3 09/13/2023    BUN 10.6 08/15/2023    CR 1.09 09/13/2023    CR 0.84 08/15/2023     (H) 09/13/2023      (H) 08/15/2023     COAGS:   Lab Results   Component Value Date    INR 1.7 (H) 09/18/2023     POC:   Lab Results   Component Value Date     (H) 01/26/2018     HEPATIC:   Lab Results   Component Value Date    ALBUMIN 3.8 04/10/2023    PROTTOTAL 8.6 04/10/2023    ALT 31 04/10/2023    AST 18 04/10/2023    ALKPHOS 126 04/10/2023    BILITOTAL 0.2 04/10/2023     OTHER:   Lab Results   Component Value Date    A1C 7.9 (H) 09/13/2023    LUZ 9.3 09/13/2023    PHOS 2.8 12/15/2015    TSH 3.48 01/09/2018    CRP <2.9 11/23/2018    SED 14 01/09/2018       Anesthesia Plan    ASA Status:  3    NPO Status:  NPO Appropriate    Anesthesia Type: Spinal.   Induction: Intravenous.   Maintenance: TIVA.        Consents    Anesthesia Plan(s) and associated risks, benefits, and realistic alternatives discussed. Questions answered and patient/representative(s) expressed understanding.     - Discussed: Risks, Benefits and Alternatives for BOTH SEDATION and the PROCEDURE were discussed     - Discussed with:  Patient            Postoperative Care    Pain management: Oral pain medications, Peripheral nerve block (Single Shot).   PONV prophylaxis: Ondansetron (or other 5HT-3), Dexamethasone or Solumedrol     Comments:                IRENE Ahn CRNA

## 2023-09-26 ENCOUNTER — HOSPITAL ENCOUNTER (OUTPATIENT)
Facility: CLINIC | Age: 58
Discharge: HOME | End: 2023-09-29
Attending: ORTHOPAEDIC SURGERY | Admitting: ORTHOPAEDIC SURGERY
Payer: COMMERCIAL

## 2023-09-26 ENCOUNTER — ANESTHESIA (OUTPATIENT)
Dept: SURGERY | Facility: CLINIC | Age: 58
End: 2023-09-26
Payer: COMMERCIAL

## 2023-09-26 ENCOUNTER — APPOINTMENT (OUTPATIENT)
Dept: GENERAL RADIOLOGY | Facility: CLINIC | Age: 58
End: 2023-09-26
Attending: PHYSICIAN ASSISTANT
Payer: COMMERCIAL

## 2023-09-26 DIAGNOSIS — Z96.651 S/P TOTAL KNEE ARTHROPLASTY, RIGHT: Primary | ICD-10-CM

## 2023-09-26 LAB
GLUCOSE BLDC GLUCOMTR-MCNC: 143 MG/DL (ref 70–99)
GLUCOSE BLDC GLUCOMTR-MCNC: 153 MG/DL (ref 70–99)
GLUCOSE BLDC GLUCOMTR-MCNC: 267 MG/DL (ref 70–99)
INR PPP: 0.96 (ref 0.85–1.15)

## 2023-09-26 PROCEDURE — 250N000013 HC RX MED GY IP 250 OP 250 PS 637: Performed by: PHYSICIAN ASSISTANT

## 2023-09-26 PROCEDURE — 258N000003 HC RX IP 258 OP 636

## 2023-09-26 PROCEDURE — 258N000001 HC RX 258: Performed by: ORTHOPAEDIC SURGERY

## 2023-09-26 PROCEDURE — 272N000001 HC OR GENERAL SUPPLY STERILE: Performed by: ORTHOPAEDIC SURGERY

## 2023-09-26 PROCEDURE — C1713 ANCHOR/SCREW BN/BN,TIS/BN: HCPCS | Performed by: ORTHOPAEDIC SURGERY

## 2023-09-26 PROCEDURE — 370N000017 HC ANESTHESIA TECHNICAL FEE, PER MIN: Performed by: ORTHOPAEDIC SURGERY

## 2023-09-26 PROCEDURE — 258N000003 HC RX IP 258 OP 636: Performed by: PHYSICIAN ASSISTANT

## 2023-09-26 PROCEDURE — 250N000011 HC RX IP 250 OP 636: Performed by: ORTHOPAEDIC SURGERY

## 2023-09-26 PROCEDURE — 360N000077 HC SURGERY LEVEL 4, PER MIN: Performed by: ORTHOPAEDIC SURGERY

## 2023-09-26 PROCEDURE — 999N000141 HC STATISTIC PRE-PROCEDURE NURSING ASSESSMENT: Performed by: ORTHOPAEDIC SURGERY

## 2023-09-26 PROCEDURE — 85610 PROTHROMBIN TIME: CPT | Performed by: NURSE ANESTHETIST, CERTIFIED REGISTERED

## 2023-09-26 PROCEDURE — 36415 COLL VENOUS BLD VENIPUNCTURE: CPT | Performed by: NURSE ANESTHETIST, CERTIFIED REGISTERED

## 2023-09-26 PROCEDURE — 250N000011 HC RX IP 250 OP 636: Mod: JZ | Performed by: PHYSICIAN ASSISTANT

## 2023-09-26 PROCEDURE — 250N000011 HC RX IP 250 OP 636: Mod: JZ | Performed by: NURSE ANESTHETIST, CERTIFIED REGISTERED

## 2023-09-26 PROCEDURE — 250N000009 HC RX 250: Performed by: NURSE ANESTHETIST, CERTIFIED REGISTERED

## 2023-09-26 PROCEDURE — 250N000011 HC RX IP 250 OP 636: Performed by: PHYSICIAN ASSISTANT

## 2023-09-26 PROCEDURE — 258N000003 HC RX IP 258 OP 636: Performed by: NURSE ANESTHETIST, CERTIFIED REGISTERED

## 2023-09-26 PROCEDURE — 250N000013 HC RX MED GY IP 250 OP 250 PS 637

## 2023-09-26 PROCEDURE — 271N000001 HC OR GENERAL SUPPLY NON-STERILE: Performed by: ORTHOPAEDIC SURGERY

## 2023-09-26 PROCEDURE — 999N000065 XR KNEE PORT RIGHT 1/2 VIEWS: Mod: RT

## 2023-09-26 PROCEDURE — 710N000009 HC RECOVERY PHASE 1, LEVEL 1, PER MIN: Performed by: ORTHOPAEDIC SURGERY

## 2023-09-26 PROCEDURE — 250N000009 HC RX 250

## 2023-09-26 PROCEDURE — 250N000009 HC RX 250: Performed by: ORTHOPAEDIC SURGERY

## 2023-09-26 PROCEDURE — 27447 TOTAL KNEE ARTHROPLASTY: CPT | Mod: RT | Performed by: ORTHOPAEDIC SURGERY

## 2023-09-26 PROCEDURE — 82962 GLUCOSE BLOOD TEST: CPT

## 2023-09-26 PROCEDURE — 250N000011 HC RX IP 250 OP 636: Performed by: NURSE ANESTHETIST, CERTIFIED REGISTERED

## 2023-09-26 PROCEDURE — C1776 JOINT DEVICE (IMPLANTABLE): HCPCS | Performed by: ORTHOPAEDIC SURGERY

## 2023-09-26 PROCEDURE — 27447 TOTAL KNEE ARTHROPLASTY: CPT | Mod: AS | Performed by: PHYSICIAN ASSISTANT

## 2023-09-26 DEVICE — TIBIAL COMPONENT
Type: IMPLANTABLE DEVICE | Site: KNEE | Status: FUNCTIONAL
Brand: TRIATHLON

## 2023-09-26 DEVICE — POSTERIOR STABILIZED FEMORAL
Type: IMPLANTABLE DEVICE | Site: KNEE | Status: FUNCTIONAL
Brand: TRIATHLON

## 2023-09-26 DEVICE — PATELLA
Type: IMPLANTABLE DEVICE | Site: KNEE | Status: FUNCTIONAL
Brand: TRIATHLON

## 2023-09-26 DEVICE — TIBIAL BEARING INSERT - PS
Type: IMPLANTABLE DEVICE | Site: KNEE | Status: FUNCTIONAL
Brand: TRIATHLON

## 2023-09-26 DEVICE — DEPUY CMW 2 FAST SET BONE CEMENT 20G: Type: IMPLANTABLE DEVICE | Site: KNEE | Status: FUNCTIONAL

## 2023-09-26 RX ORDER — DEXAMETHASONE SODIUM PHOSPHATE 4 MG/ML
INJECTION, SOLUTION INTRA-ARTICULAR; INTRALESIONAL; INTRAMUSCULAR; INTRAVENOUS; SOFT TISSUE PRN
Status: DISCONTINUED | OUTPATIENT
Start: 2023-09-26 | End: 2023-09-26

## 2023-09-26 RX ORDER — FENTANYL CITRATE 50 UG/ML
50 INJECTION, SOLUTION INTRAMUSCULAR; INTRAVENOUS EVERY 5 MIN PRN
Status: DISCONTINUED | OUTPATIENT
Start: 2023-09-26 | End: 2023-09-26 | Stop reason: HOSPADM

## 2023-09-26 RX ORDER — MAGNESIUM SULFATE HEPTAHYDRATE 40 MG/ML
2 INJECTION, SOLUTION INTRAVENOUS ONCE
Status: COMPLETED | OUTPATIENT
Start: 2023-09-26 | End: 2023-09-26

## 2023-09-26 RX ORDER — METFORMIN HCL 500 MG
1000 TABLET, EXTENDED RELEASE 24 HR ORAL 2 TIMES DAILY WITH MEALS
Status: DISCONTINUED | OUTPATIENT
Start: 2023-09-27 | End: 2023-09-29 | Stop reason: HOSPADM

## 2023-09-26 RX ORDER — KETAMINE HYDROCHLORIDE 10 MG/ML
INJECTION INTRAMUSCULAR; INTRAVENOUS PRN
Status: DISCONTINUED | OUTPATIENT
Start: 2023-09-26 | End: 2023-09-26

## 2023-09-26 RX ORDER — LIDOCAINE HYDROCHLORIDE 20 MG/ML
INJECTION, SOLUTION INFILTRATION; PERINEURAL PRN
Status: DISCONTINUED | OUTPATIENT
Start: 2023-09-26 | End: 2023-09-26

## 2023-09-26 RX ORDER — DEXTROSE MONOHYDRATE 25 G/50ML
25-50 INJECTION, SOLUTION INTRAVENOUS
Status: DISCONTINUED | OUTPATIENT
Start: 2023-09-26 | End: 2023-09-29 | Stop reason: HOSPADM

## 2023-09-26 RX ORDER — HYDRALAZINE HYDROCHLORIDE 20 MG/ML
2.5-5 INJECTION INTRAMUSCULAR; INTRAVENOUS EVERY 10 MIN PRN
Status: DISCONTINUED | OUTPATIENT
Start: 2023-09-26 | End: 2023-09-26 | Stop reason: HOSPADM

## 2023-09-26 RX ORDER — NALOXONE HYDROCHLORIDE 0.4 MG/ML
0.2 INJECTION, SOLUTION INTRAMUSCULAR; INTRAVENOUS; SUBCUTANEOUS
Status: DISCONTINUED | OUTPATIENT
Start: 2023-09-26 | End: 2023-09-29 | Stop reason: HOSPADM

## 2023-09-26 RX ORDER — VANCOMYCIN HYDROCHLORIDE 1 G/20ML
INJECTION, POWDER, LYOPHILIZED, FOR SOLUTION INTRAVENOUS PRN
Status: DISCONTINUED | OUTPATIENT
Start: 2023-09-26 | End: 2023-09-26 | Stop reason: HOSPADM

## 2023-09-26 RX ORDER — ACETAMINOPHEN 325 MG/1
975 TABLET ORAL ONCE
Status: COMPLETED | OUTPATIENT
Start: 2023-09-26 | End: 2023-09-26

## 2023-09-26 RX ORDER — CHLORTHALIDONE 25 MG/1
25 TABLET ORAL DAILY
Status: DISCONTINUED | OUTPATIENT
Start: 2023-09-27 | End: 2023-09-29 | Stop reason: HOSPADM

## 2023-09-26 RX ORDER — FENTANYL CITRATE 50 UG/ML
25 INJECTION, SOLUTION INTRAMUSCULAR; INTRAVENOUS EVERY 5 MIN PRN
Status: DISCONTINUED | OUTPATIENT
Start: 2023-09-26 | End: 2023-09-26 | Stop reason: HOSPADM

## 2023-09-26 RX ORDER — ACETAMINOPHEN 325 MG/1
650 TABLET ORAL EVERY 4 HOURS PRN
Qty: 100 TABLET | Refills: 0 | Status: SHIPPED | OUTPATIENT
Start: 2023-09-26

## 2023-09-26 RX ORDER — POLYETHYLENE GLYCOL 3350 17 G/17G
17 POWDER, FOR SOLUTION ORAL DAILY
Status: DISCONTINUED | OUTPATIENT
Start: 2023-09-27 | End: 2023-09-29 | Stop reason: HOSPADM

## 2023-09-26 RX ORDER — ONDANSETRON 2 MG/ML
4 INJECTION INTRAMUSCULAR; INTRAVENOUS EVERY 30 MIN PRN
Status: DISCONTINUED | OUTPATIENT
Start: 2023-09-26 | End: 2023-09-26 | Stop reason: HOSPADM

## 2023-09-26 RX ORDER — ACETAMINOPHEN 325 MG/1
975 TABLET ORAL EVERY 8 HOURS
Status: COMPLETED | OUTPATIENT
Start: 2023-09-26 | End: 2023-09-29

## 2023-09-26 RX ORDER — BISACODYL 10 MG
10 SUPPOSITORY, RECTAL RECTAL DAILY PRN
Status: DISCONTINUED | OUTPATIENT
Start: 2023-09-26 | End: 2023-09-29 | Stop reason: HOSPADM

## 2023-09-26 RX ORDER — OXYCODONE HYDROCHLORIDE 5 MG/1
10 TABLET ORAL EVERY 4 HOURS PRN
Status: DISCONTINUED | OUTPATIENT
Start: 2023-09-26 | End: 2023-09-29 | Stop reason: HOSPADM

## 2023-09-26 RX ORDER — NICOTINE POLACRILEX 4 MG
15-30 LOZENGE BUCCAL
Status: DISCONTINUED | OUTPATIENT
Start: 2023-09-26 | End: 2023-09-29 | Stop reason: HOSPADM

## 2023-09-26 RX ORDER — NALOXONE HYDROCHLORIDE 0.4 MG/ML
0.4 INJECTION, SOLUTION INTRAMUSCULAR; INTRAVENOUS; SUBCUTANEOUS
Status: DISCONTINUED | OUTPATIENT
Start: 2023-09-26 | End: 2023-09-29 | Stop reason: HOSPADM

## 2023-09-26 RX ORDER — AMOXICILLIN 250 MG
1-2 CAPSULE ORAL 2 TIMES DAILY
Qty: 30 TABLET | Refills: 0 | Status: SHIPPED | OUTPATIENT
Start: 2023-09-26 | End: 2023-11-16

## 2023-09-26 RX ORDER — HYDROXYZINE HYDROCHLORIDE 25 MG/1
25 TABLET, FILM COATED ORAL EVERY 6 HOURS PRN
Status: DISCONTINUED | OUTPATIENT
Start: 2023-09-26 | End: 2023-09-27

## 2023-09-26 RX ORDER — AMLODIPINE BESYLATE 10 MG/1
10 TABLET ORAL DAILY
Status: DISCONTINUED | OUTPATIENT
Start: 2023-09-27 | End: 2023-09-29 | Stop reason: HOSPADM

## 2023-09-26 RX ORDER — MULTIVITAMIN,THERAPEUTIC
1 TABLET ORAL DAILY
Status: DISCONTINUED | OUTPATIENT
Start: 2023-09-27 | End: 2023-09-27

## 2023-09-26 RX ORDER — ENOXAPARIN SODIUM 100 MG/ML
100 INJECTION SUBCUTANEOUS EVERY 12 HOURS
Status: DISCONTINUED | OUTPATIENT
Start: 2023-09-27 | End: 2023-09-27

## 2023-09-26 RX ORDER — LIDOCAINE 40 MG/G
CREAM TOPICAL
Status: DISCONTINUED | OUTPATIENT
Start: 2023-09-26 | End: 2023-09-29 | Stop reason: HOSPADM

## 2023-09-26 RX ORDER — ROPIVACAINE HYDROCHLORIDE 5 MG/ML
INJECTION, SOLUTION EPIDURAL; INFILTRATION; PERINEURAL
Status: COMPLETED | OUTPATIENT
Start: 2023-09-26 | End: 2023-09-26

## 2023-09-26 RX ORDER — WARFARIN SODIUM 5 MG/1
15 TABLET ORAL
Status: COMPLETED | OUTPATIENT
Start: 2023-09-26 | End: 2023-09-26

## 2023-09-26 RX ORDER — ONDANSETRON 2 MG/ML
4 INJECTION INTRAMUSCULAR; INTRAVENOUS EVERY 6 HOURS PRN
Status: DISCONTINUED | OUTPATIENT
Start: 2023-09-26 | End: 2023-09-29 | Stop reason: HOSPADM

## 2023-09-26 RX ORDER — BUPIVACAINE HYDROCHLORIDE 7.5 MG/ML
INJECTION, SOLUTION INTRASPINAL PRN
Status: DISCONTINUED | OUTPATIENT
Start: 2023-09-26 | End: 2023-09-26

## 2023-09-26 RX ORDER — ATORVASTATIN CALCIUM 20 MG/1
40 TABLET, FILM COATED ORAL EVERY EVENING
Status: DISCONTINUED | OUTPATIENT
Start: 2023-09-26 | End: 2023-09-29 | Stop reason: HOSPADM

## 2023-09-26 RX ORDER — DIPHENHYDRAMINE HYDROCHLORIDE 50 MG/ML
25 INJECTION INTRAMUSCULAR; INTRAVENOUS EVERY 6 HOURS PRN
Status: DISCONTINUED | OUTPATIENT
Start: 2023-09-26 | End: 2023-09-26 | Stop reason: HOSPADM

## 2023-09-26 RX ORDER — BUPIVACAINE HYDROCHLORIDE 5 MG/ML
INJECTION, SOLUTION PERINEURAL PRN
Status: DISCONTINUED | OUTPATIENT
Start: 2023-09-26 | End: 2023-09-26 | Stop reason: HOSPADM

## 2023-09-26 RX ORDER — MECLIZINE HYDROCHLORIDE 25 MG/1
25 TABLET ORAL 3 TIMES DAILY PRN
Status: DISCONTINUED | OUTPATIENT
Start: 2023-09-26 | End: 2023-09-29 | Stop reason: HOSPADM

## 2023-09-26 RX ORDER — PROPOFOL 10 MG/ML
INJECTION, EMULSION INTRAVENOUS CONTINUOUS PRN
Status: DISCONTINUED | OUTPATIENT
Start: 2023-09-26 | End: 2023-09-26

## 2023-09-26 RX ORDER — CEFAZOLIN SODIUM 2 G/100ML
2 INJECTION, SOLUTION INTRAVENOUS EVERY 8 HOURS
Status: COMPLETED | OUTPATIENT
Start: 2023-09-27 | End: 2023-09-27

## 2023-09-26 RX ORDER — LOSARTAN POTASSIUM 25 MG/1
25 TABLET ORAL DAILY
Status: DISCONTINUED | OUTPATIENT
Start: 2023-09-26 | End: 2023-09-29 | Stop reason: HOSPADM

## 2023-09-26 RX ORDER — MEPERIDINE HYDROCHLORIDE 25 MG/ML
12.5 INJECTION INTRAMUSCULAR; INTRAVENOUS; SUBCUTANEOUS EVERY 5 MIN PRN
Status: DISCONTINUED | OUTPATIENT
Start: 2023-09-26 | End: 2023-09-26 | Stop reason: HOSPADM

## 2023-09-26 RX ORDER — PANTOPRAZOLE SODIUM 40 MG/1
40 TABLET, DELAYED RELEASE ORAL
Status: DISCONTINUED | OUTPATIENT
Start: 2023-09-27 | End: 2023-09-29 | Stop reason: HOSPADM

## 2023-09-26 RX ORDER — AMOXICILLIN 250 MG
1 CAPSULE ORAL 2 TIMES DAILY
Status: DISCONTINUED | OUTPATIENT
Start: 2023-09-26 | End: 2023-09-29 | Stop reason: HOSPADM

## 2023-09-26 RX ORDER — DIMENHYDRINATE 50 MG/ML
25 INJECTION, SOLUTION INTRAMUSCULAR; INTRAVENOUS
Status: DISCONTINUED | OUTPATIENT
Start: 2023-09-26 | End: 2023-09-26 | Stop reason: HOSPADM

## 2023-09-26 RX ORDER — SODIUM CHLORIDE, SODIUM LACTATE, POTASSIUM CHLORIDE, CALCIUM CHLORIDE 600; 310; 30; 20 MG/100ML; MG/100ML; MG/100ML; MG/100ML
INJECTION, SOLUTION INTRAVENOUS CONTINUOUS
Status: DISCONTINUED | OUTPATIENT
Start: 2023-09-26 | End: 2023-09-26 | Stop reason: HOSPADM

## 2023-09-26 RX ORDER — DULOXETIN HYDROCHLORIDE 30 MG/1
60 CAPSULE, DELAYED RELEASE ORAL 2 TIMES DAILY
Status: DISCONTINUED | OUTPATIENT
Start: 2023-09-26 | End: 2023-09-29 | Stop reason: HOSPADM

## 2023-09-26 RX ORDER — METHOCARBAMOL 500 MG/1
500 TABLET, FILM COATED ORAL 4 TIMES DAILY
Status: DISCONTINUED | OUTPATIENT
Start: 2023-09-26 | End: 2023-09-27

## 2023-09-26 RX ORDER — EPHEDRINE SULFATE 50 MG/ML
INJECTION, SOLUTION INTRAMUSCULAR; INTRAVENOUS; SUBCUTANEOUS PRN
Status: DISCONTINUED | OUTPATIENT
Start: 2023-09-26 | End: 2023-09-26

## 2023-09-26 RX ORDER — DIPHENHYDRAMINE HCL 25 MG
25 CAPSULE ORAL EVERY 6 HOURS PRN
Status: DISCONTINUED | OUTPATIENT
Start: 2023-09-26 | End: 2023-09-26 | Stop reason: HOSPADM

## 2023-09-26 RX ORDER — LIDOCAINE 40 MG/G
CREAM TOPICAL
Status: DISCONTINUED | OUTPATIENT
Start: 2023-09-26 | End: 2023-09-26 | Stop reason: HOSPADM

## 2023-09-26 RX ORDER — CEFAZOLIN SODIUM/WATER 3 G/30 ML
3 SYRINGE (ML) INTRAVENOUS SEE ADMIN INSTRUCTIONS
Status: DISCONTINUED | OUTPATIENT
Start: 2023-09-26 | End: 2023-09-26 | Stop reason: HOSPADM

## 2023-09-26 RX ORDER — SODIUM CHLORIDE, SODIUM LACTATE, POTASSIUM CHLORIDE, CALCIUM CHLORIDE 600; 310; 30; 20 MG/100ML; MG/100ML; MG/100ML; MG/100ML
INJECTION, SOLUTION INTRAVENOUS CONTINUOUS
Status: DISCONTINUED | OUTPATIENT
Start: 2023-09-26 | End: 2023-09-29 | Stop reason: HOSPADM

## 2023-09-26 RX ORDER — ACETAMINOPHEN 325 MG/1
650 TABLET ORAL EVERY 4 HOURS PRN
Status: DISCONTINUED | OUTPATIENT
Start: 2023-09-29 | End: 2023-09-29 | Stop reason: HOSPADM

## 2023-09-26 RX ORDER — PREGABALIN 50 MG/1
50 CAPSULE ORAL 2 TIMES DAILY
Status: DISCONTINUED | OUTPATIENT
Start: 2023-09-26 | End: 2023-09-29 | Stop reason: HOSPADM

## 2023-09-26 RX ORDER — ONDANSETRON 4 MG/1
4 TABLET, ORALLY DISINTEGRATING ORAL EVERY 30 MIN PRN
Status: DISCONTINUED | OUTPATIENT
Start: 2023-09-26 | End: 2023-09-26 | Stop reason: HOSPADM

## 2023-09-26 RX ORDER — CEFAZOLIN SODIUM/WATER 3 G/30 ML
3 SYRINGE (ML) INTRAVENOUS
Status: COMPLETED | OUTPATIENT
Start: 2023-09-26 | End: 2023-09-26

## 2023-09-26 RX ORDER — ONDANSETRON 4 MG/1
4 TABLET, ORALLY DISINTEGRATING ORAL EVERY 6 HOURS PRN
Status: DISCONTINUED | OUTPATIENT
Start: 2023-09-26 | End: 2023-09-29 | Stop reason: HOSPADM

## 2023-09-26 RX ORDER — OXYCODONE HYDROCHLORIDE 5 MG/1
5-10 TABLET ORAL EVERY 4 HOURS PRN
Qty: 16 TABLET | Refills: 0 | Status: SHIPPED | OUTPATIENT
Start: 2023-09-26 | End: 2023-11-16

## 2023-09-26 RX ORDER — HYDROXYZINE HYDROCHLORIDE 25 MG/1
25 TABLET, FILM COATED ORAL EVERY 6 HOURS PRN
Qty: 30 TABLET | Refills: 0 | Status: SHIPPED | OUTPATIENT
Start: 2023-09-26 | End: 2023-11-16

## 2023-09-26 RX ORDER — TRANEXAMIC ACID 650 MG/1
1950 TABLET ORAL ONCE
Status: COMPLETED | OUTPATIENT
Start: 2023-09-26 | End: 2023-09-26

## 2023-09-26 RX ORDER — PROCHLORPERAZINE MALEATE 5 MG
10 TABLET ORAL EVERY 6 HOURS PRN
Status: DISCONTINUED | OUTPATIENT
Start: 2023-09-26 | End: 2023-09-29 | Stop reason: HOSPADM

## 2023-09-26 RX ORDER — FENTANYL CITRATE 0.05 MG/ML
INJECTION, SOLUTION INTRAMUSCULAR; INTRAVENOUS PRN
Status: DISCONTINUED | OUTPATIENT
Start: 2023-09-26 | End: 2023-09-26

## 2023-09-26 RX ORDER — OXYCODONE HYDROCHLORIDE 5 MG/1
5 TABLET ORAL EVERY 4 HOURS PRN
Status: DISCONTINUED | OUTPATIENT
Start: 2023-09-26 | End: 2023-09-29 | Stop reason: HOSPADM

## 2023-09-26 RX ORDER — NORTRIPTYLINE HCL 10 MG
20 CAPSULE ORAL AT BEDTIME
Status: DISCONTINUED | OUTPATIENT
Start: 2023-09-26 | End: 2023-09-29 | Stop reason: HOSPADM

## 2023-09-26 RX ADMIN — OXYCODONE HYDROCHLORIDE 10 MG: 5 TABLET ORAL at 19:46

## 2023-09-26 RX ADMIN — HYDROXYZINE HYDROCHLORIDE 25 MG: 25 TABLET, FILM COATED ORAL at 23:34

## 2023-09-26 RX ADMIN — CEFAZOLIN SODIUM 2 G: 2 INJECTION, SOLUTION INTRAVENOUS at 23:35

## 2023-09-26 RX ADMIN — KETAMINE HYDROCHLORIDE 25 MG: 10 INJECTION INTRAMUSCULAR; INTRAVENOUS at 14:20

## 2023-09-26 RX ADMIN — FENTANYL CITRATE 25 MCG: 50 INJECTION INTRAMUSCULAR; INTRAVENOUS at 16:55

## 2023-09-26 RX ADMIN — DULOXETINE HYDROCHLORIDE 60 MG: 30 CAPSULE, DELAYED RELEASE ORAL at 19:48

## 2023-09-26 RX ADMIN — MAGNESIUM SULFATE HEPTAHYDRATE 2 G: 2 INJECTION, SOLUTION INTRAVENOUS at 16:58

## 2023-09-26 RX ADMIN — SODIUM CHLORIDE, POTASSIUM CHLORIDE, SODIUM LACTATE AND CALCIUM CHLORIDE: 600; 310; 30; 20 INJECTION, SOLUTION INTRAVENOUS at 12:22

## 2023-09-26 RX ADMIN — Medication 10 MG: at 13:49

## 2023-09-26 RX ADMIN — TRANEXAMIC ACID 1950 MG: 650 TABLET ORAL at 12:20

## 2023-09-26 RX ADMIN — PREGABALIN 50 MG: 50 CAPSULE ORAL at 19:47

## 2023-09-26 RX ADMIN — LOSARTAN POTASSIUM 25 MG: 25 TABLET, FILM COATED ORAL at 19:47

## 2023-09-26 RX ADMIN — FENTANYL CITRATE 50 MCG: 50 INJECTION INTRAMUSCULAR; INTRAVENOUS at 17:17

## 2023-09-26 RX ADMIN — MIDAZOLAM 2 MG: 1 INJECTION INTRAMUSCULAR; INTRAVENOUS at 13:31

## 2023-09-26 RX ADMIN — KETAMINE HYDROCHLORIDE 25 MG: 10 INJECTION INTRAMUSCULAR; INTRAVENOUS at 15:17

## 2023-09-26 RX ADMIN — PROPOFOL 50 MCG/KG/MIN: 10 INJECTION, EMULSION INTRAVENOUS at 14:08

## 2023-09-26 RX ADMIN — ATORVASTATIN CALCIUM 40 MG: 20 TABLET, FILM COATED ORAL at 19:47

## 2023-09-26 RX ADMIN — ACETAMINOPHEN 975 MG: 325 TABLET, FILM COATED ORAL at 12:21

## 2023-09-26 RX ADMIN — LIDOCAINE HYDROCHLORIDE 0.3 ML: 10 INJECTION, SOLUTION EPIDURAL; INFILTRATION; INTRACAUDAL; PERINEURAL at 12:22

## 2023-09-26 RX ADMIN — Medication 2 G: at 16:02

## 2023-09-26 RX ADMIN — HYDROXYZINE HYDROCHLORIDE 25 MG: 25 TABLET, FILM COATED ORAL at 17:23

## 2023-09-26 RX ADMIN — Medication 10 MG: at 13:45

## 2023-09-26 RX ADMIN — METHOCARBAMOL 500 MG: 500 TABLET ORAL at 21:40

## 2023-09-26 RX ADMIN — OXYCODONE HYDROCHLORIDE 10 MG: 5 TABLET ORAL at 23:34

## 2023-09-26 RX ADMIN — Medication 3 G: at 14:00

## 2023-09-26 RX ADMIN — SODIUM CHLORIDE, POTASSIUM CHLORIDE, SODIUM LACTATE AND CALCIUM CHLORIDE: 600; 310; 30; 20 INJECTION, SOLUTION INTRAVENOUS at 19:53

## 2023-09-26 RX ADMIN — FENTANYL CITRATE 100 MCG: 0.05 INJECTION, SOLUTION INTRAMUSCULAR; INTRAVENOUS at 14:01

## 2023-09-26 RX ADMIN — LIDOCAINE HYDROCHLORIDE 100 MG: 20 INJECTION, SOLUTION INFILTRATION; PERINEURAL at 14:08

## 2023-09-26 RX ADMIN — SODIUM CHLORIDE, POTASSIUM CHLORIDE, SODIUM LACTATE AND CALCIUM CHLORIDE: 600; 310; 30; 20 INJECTION, SOLUTION INTRAVENOUS at 21:45

## 2023-09-26 RX ADMIN — DEXAMETHASONE SODIUM PHOSPHATE 4 MG: 4 INJECTION, SOLUTION INTRA-ARTICULAR; INTRALESIONAL; INTRAMUSCULAR; INTRAVENOUS; SOFT TISSUE at 13:40

## 2023-09-26 RX ADMIN — NORTRIPTYLINE HYDROCHLORIDE 20 MG: 10 CAPSULE ORAL at 21:40

## 2023-09-26 RX ADMIN — PHENYLEPHRINE HYDROCHLORIDE 100 MCG: 10 INJECTION INTRAVENOUS at 14:34

## 2023-09-26 RX ADMIN — MIDAZOLAM 2 MG: 1 INJECTION INTRAMUSCULAR; INTRAVENOUS at 14:01

## 2023-09-26 RX ADMIN — FENTANYL CITRATE 100 MCG: 0.05 INJECTION, SOLUTION INTRAMUSCULAR; INTRAVENOUS at 13:31

## 2023-09-26 RX ADMIN — SODIUM CHLORIDE, POTASSIUM CHLORIDE, SODIUM LACTATE AND CALCIUM CHLORIDE: 600; 310; 30; 20 INJECTION, SOLUTION INTRAVENOUS at 13:57

## 2023-09-26 RX ADMIN — PHENYLEPHRINE HYDROCHLORIDE 100 MCG: 10 INJECTION INTRAVENOUS at 14:46

## 2023-09-26 RX ADMIN — BUPIVACAINE HYDROCHLORIDE IN DEXTROSE 1.8 ML: 7.5 INJECTION, SOLUTION SUBARACHNOID at 14:07

## 2023-09-26 RX ADMIN — WARFARIN SODIUM 15 MG: 5 TABLET ORAL at 19:50

## 2023-09-26 RX ADMIN — ROPIVACAINE HYDROCHLORIDE 20 ML: 5 INJECTION, SOLUTION EPIDURAL; INFILTRATION; PERINEURAL at 13:40

## 2023-09-26 RX ADMIN — SENNOSIDES AND DOCUSATE SODIUM 1 TABLET: 50; 8.6 TABLET ORAL at 19:48

## 2023-09-26 RX ADMIN — ACETAMINOPHEN 975 MG: 325 TABLET, FILM COATED ORAL at 19:49

## 2023-09-26 ASSESSMENT — ACTIVITIES OF DAILY LIVING (ADL)
ADLS_ACUITY_SCORE: 18

## 2023-09-26 NOTE — PROGRESS NOTES
"[place] [facility] ADMISSION NOTE    Patient admitted to room 2314 at approximately 1800 via cart from surgery. Patient was accompanied by transport tech.     Verbal SBAR report received from Yaima FERRARI prior to patient arrival.     Patient trasferred to bed via air carmelita. Patient alert and oriented X 3. Pain is not well controlled.  Medication(s) being used: narcotic analgesics including opioids given in PACU.  . Admission vital signs: Blood pressure 134/77, pulse 76, temperature 97.5  F (36.4  C), temperature source Oral, resp. rate 14, height 1.829 m (6' 0.01\"), weight 130.6 kg (288 lb), SpO2 95 %. Patient was oriented to plan of care, call light, bed controls, tv, telephone, bathroom, and visiting hours.     Risk Assessment    The following safety risks were identified during admission: fall. Yellow risk band applied: YES.     Skin Initial Assessment    This writer admitted this patient and completed a full skin assessment and Kerwin score in the Adult PCS flowsheet. Appropriate interventions initiated as needed.     Secondary skin check completed by Rubi FERRARI.         Education    Patient has a [place] to Observation order: No  Observation education completed and documented: N/A      Donald Ambrocio RN      "

## 2023-09-26 NOTE — ANESTHESIA POSTPROCEDURE EVALUATION
Patient: Adarsh Salvador    Procedure: Procedure(s):  ARTHROPLASTY, KNEE, TOTAL Right       Anesthesia Type:  Spinal    Note:  Disposition: Inpatient   Postop Pain Control: Uneventful            Sign Out: Well controlled pain   PONV: No   Neuro/Psych: Uneventful            Sign Out: Acceptable/Baseline neuro status   Airway/Respiratory: Uneventful            Sign Out: Acceptable/Baseline resp. status   CV/Hemodynamics: Uneventful            Sign Out: Acceptable CV status; No obvious hypovolemia; No obvious fluid overload   Other NRE: NONE   DID A NON-ROUTINE EVENT OCCUR? No       Last vitals:  Vitals Value Taken Time   /77 09/26/23 1733   Temp 36.4  C (97.5  F) 09/26/23 1733   Pulse 76 09/26/23 1733   Resp 13 09/26/23 1733   SpO2 95 % 09/26/23 1733   Vitals shown include unvalidated device data.    Electronically Signed By: IRENE Madsen CRNA  September 26, 2023  6:21 PM

## 2023-09-26 NOTE — ANESTHESIA CARE TRANSFER NOTE
Patient: Adarsh Salvador    Procedure: Procedure(s):  ARTHROPLASTY, KNEE, TOTAL Right       Diagnosis: Primary osteoarthritis of right knee [M17.11]  Diagnosis Additional Information: No value filed.    Anesthesia Type:   Spinal     Note:      Level of Consciousness: awake  Oxygen Supplementation: face mask    Independent Airway: airway patency satisfactory and stable    Vital Signs Stable: post-procedure vital signs reviewed and stable    Patient transferred to: PACU    Handoff Report: Identifed the Patient, Identified the Reponsible Provider, Reviewed the pertinent medical history, Discussed the surgical course, Reviewed Intra-OP anesthesia mangement and issues during anesthesia, Set expectations for post-procedure period and Allowed opportunity for questions and acknowledgement of understanding  Vitals:  Vitals Value Taken Time   /77 09/26/23 1733   Temp 36.4  C (97.5  F) 09/26/23 1733   Pulse 76 09/26/23 1733   Resp 13 09/26/23 1733   SpO2 95 % 09/26/23 1733   Vitals shown include unvalidated device data.    Electronically Signed By: IRENE Madsen CRNA  September 26, 2023  6:22 PM

## 2023-09-26 NOTE — H&P
"The History and Physical on patient's chart was personally reviewed today with the patient. there have been no interval changes in patient's history since H+P performed.    History:  Adarsh Salvador is a 58 year old male seen with  ongoing right knee pain with no known recent injury.   Seen by us 6/28/2023 and had SynviscOne injection without much relief. Previously on 6/8/2023, received cortisone injection without much relief. Returns today with continued pain in the front of the knee. He'd like to proceed with total knee arthroplasty.      Pain has been present for years, on/off since ~1990 slid off an icy roof.  He's had multiple surgeries (~12?) on the knee since then, most recently 8/2022 with Dr Em. Pain started again 1/2023 when moving furniture.      He locates pain along the inner aspect of the knee to under the knee cap \"like someone is sticking a bao knife into it\", aggravated with stairs, walking and prolonged standing. Treatment has been epson salt, warm baths, diclofenac, tylenol, topical voltaren.     Pain at rest, night.      He had an injection 1/5/2023 with Dr Em without much relief. He was seen by Dr Waite to discuss whether he'd be a canddiate for total knee arthroplasty, but based on low grade osteoarthritis at the time, didn't think it would be beneficial at this time.       Right knee arthroscopy with medial meniscus debridement 8/19/2022 with Dr Em, noted grade 2 chondrosis.     Patient has a history of recurrent DVT. On warfarin.     History of intermittent low back pain. Numbness and tingling. Takes Lyrica, it does help.     Adarsh is diabetic    Adarsh Salvador is a 58 year old male with chronic right knee pain, primary osteoarthritis.       * reviewed imaging studies with patient, showing arthritic changes, or wearing of the cartilage in the knee. This can be caused by normal \"wear and tear\" over the years or following prior injury to the knee.  Treatment typically starts " "nonsurgically. Surgical indication for total knee arthroplasty  when nonsurgical management is no longer effective.  Again, pain worse than what would suggest based on images.     ** Risks of surgery include, but not limited to: bleeding (possibly requiring transfusion), infection, pain, scar, damage to adjacent structures (e.g. Nerves, blood vessels, bone, cartilage), temporary or permanent nerve damage, recurrence of symptoms, implant dislocation, instability, implant failure, implant infection, unequal limb lengths, malalignment, stiffness, need for further surgery, blood clots, pulmonary embolism, risks of anesthesia, and death.  * the knee will not feel \"normal\" as it won't be \"normal\" after knee replacement. It may feel \"clunky\" due to the nature of the hard metal and plastic implant.  * the expectation is for improved pain, not necessarily complete pain relief.  If you have surgery on your right knee, you will not be able to drive for likely 4-6 weeks, or until you have attained full knee function, range of motion, and ability to drive.  Given that based on images not bone on bone, might continue to have pain even after successful total knee arthroplasty surgery     ** understanding these risks, as a quality of life decision, the patient would like to proceed with surgery: right total knee arthroplasty.    Patient elects to proceed with planned procedure. Right total knee arthroplasty.    Risks and perceived benefits of surgery again discussed with patient. Patient's questions addressed and answered. Written informed consent obtained and reviewed. Surgical site marked with indelible marker with patient's participation after confirming site with patient.      Edwin Ch M.D., M.S.  Dept. of Orthopaedic Surgery  [facility]    "

## 2023-09-26 NOTE — OP NOTE
DATE OF SERVICE:  9/26/2023    PREOPERATIVE DIAGNOSIS:  Primary osteoarthritis, right knee.    POSTOPERATIVE DIAGNOSIS: Primary osteoarthritis, right knee.    OPERATION PERFORMED: Hybrid tricompartmental total knee arthroplasty ( press-fit tibial and femoral components for the medial, lateral compartments, and cemented patellofemoral component), right knee.    ATTENDING SURGEON: Edwin Ch M.D., M.S.    ASSISTANT(S): Yoan Bain PA-C   The PA's assistance was medically necessary given the technical complexity of the case; with assistance with patient positioning, retraction and knee joint exposure, limb manipulation for femoral and tibial osteotomies, assistance with implant placement, trial and final arthroplasty knee implant reduction, and wound closure.     ANESTHESIA:  Spinal with MAC, in the supine position.    IV FLUIDS:  1400 mL LR.    EBL:   100mL.    URINE OUTPUT: no mercedes    TOURNIQUET TIME: 59 minutes at 300mmHg.    IMPLANTS / GRAFTS:        #6 posterior stabilized [place] Triathlon press-fit beaded femur,      #7 [place] Triathlon Tritanium press-fit tibial tray     #35 [place] Triathlon X3 asymmetric medial off-set patella      12mm X3 PS polyethylene tibial liner    LATERAL RELEASE:   None required - good tracking.    APPROACH:   Medial Parapatellar    COMPLICATIONS:  NONE    ANTIBIOTICS:  Cefazolin 2 gm intravenously prior to tourniquet inflation and 2gm at release and closure.    Tranexamic Acid: 1950mg oral prior to procedure in preop.    SPECIMENS: none    DRAINS: none    FINDINGS: grade 4 medial chondrosis on the femur and tibia. Inflammed synovium, effusion.     INDICATIONS FOR PROCEDURE: Adarsh Salvador is a pleasant 58 year old male with ongoing knee pain.Xrays showed  degenerative arthrosis.The patient has unfortunately failed nonoperative attempts at knee pain relief with ongoing symptomatic arthropathy, including physical therapy, activity modification, weight loss, NSAIDS, and injections  (cortisone and viscosupplementation). He's had numerous surgeries on the right knee. Symptoms have been interfering with activities of daily living and quality of life.  The risk and benefit analysis of knee arthroplasty was explained to include but not be limited to wear, loosening, infection, bleeding, pain, scar, implant dislocation, fracture, failure to relieve symptoms, thromboembolic disease, neurovascular damage with possible temporary or permanent nerve damage, leg length inequality, need for further surgery, risks of anesthesia and death.  Despite these risks, as a quality of life decision, the patient elected to proceed.    And with informed and written consent the patient was taken to the operating room.    PROCEDURE AND FINDINGS:    The patient was identified in the preoperative holding area. The correct surgical procedure and site was confirmed with the patient. Again, the risks of surgery were reviewed. The patient elected to proceed understanding the risks. Written informed consent was obtained. The correct surgical site was marked with an indelible marker by myself, Edwin Ch MD, the surgeon.     The patient was then taken to the operating room and placed supine on the operating table. After adequate anesthesia was obtained, a non-sterile tourniquet was placed to the upper thigh. All bony prominences were well padded. The arms were well positioned and padded to be comfortable. The right knee and lower extremity was prepped and draped in the usual sterile fashion.     A time-out was completed confirming the correct patient, the correct surgery and surgical site, positioning, the availability of implants, administration of prophylactic antibiotics, and known allergies by all surgical staff.    A midline incision was made and carried down through the peritenon over the patella tendon.  A medial-based arthrotomy was extended proximally using a medial parapatellar approach and distally using a  subperiosteal medial collateral ligament elevation.  Accessible anterior menisci were incised.  The knee was flexed, and canal entry into both femur and tibia, using intramedullary guides, allowed a 5 degree 8 mm distal femoral cut across the distal aspect of the medial and lateral femoral condyles, and a 3 degree 4 mm medial-based tibial cut across the medial and lateral tibial plateau.    Minimally, or less,  invasive techniques with limited incisons and reduced sized cutting blocks and minimal patellar eversion were utilized.    Attention was then turned to the femur.  This was sized to prevent notching and externally rotated to the epicondylar axis.  Necessary anterior and posterior as well as Chamfer cuts were made medial and lateral.   A trial femoral component was placed after removing remaining menisci.  The flexion and extension gap balancing was assured using an appropriate sized poly.  The tibial alignment pins were removed following flexion/extension balancing.  Tibial component size was selected and punched, externally rotating tibial component to the junction of the middle and medial thirds of the tibial tubercle.    Attention was then turned to the patella and the 26 mm patella was recessed to a 13 mm thick patella to accommodate a medial off-set component which was drilled.  A complete synovectomy was performed.    Patellar tracking was assured using a thicker poly insert. No lateral release was needed. After confirmation of such, 1 bag of cement was mixed at the back table.   Beginning with the tibia and then the femur, these components were press-fit into place. This was then followed by the patella, which was cemented in place.  Excess cement was removed from patella component.  The knee was taken to extension.  The tourniquet was released, and a second dose of Ancef was administered.    The wound was copiously irrigated during the cement cure with dilute betadine solution as well as antibiotic  saline. A local joint block was administered through the wound into the surrounding tissues. The actual polyethylene, liner which allowed full extension and good flexion, was inserted. The wound was irrigated again. One gram of vancomycin powder was sprinkled into the knee. The medial arthrotomy, medial collateral lateral elevation, and deep fascia of the vastus medialis obliquus was closed with 0 Ethibond.  0 Vicryl and 3-0 monocryl were used in the peritenon and subdermal tissue respectively.  A 3-0 monocryl subcuticular suture was used to maintain good skin eversion and apposition.  Dermabond was placed over the closed incision. A water-proof sterile dressing (Aquacel Ag)  was applied over adaptec gauze.    The patient was then taken to recovery in stable condition.    The postoperative plan will be weight bearing as tolerated, knee arthroplasty protocol with range of motion to full immediately, pharmacologic DVT prophylaxis will resume his warfarin, starting 15mg tonight, and bridge with Lovenox until therapeutic starting tomorrow morning, and antibiotics for 23 hours.  We look forward to following the patient through the perioperative time period.    Edwin Ch M.D., M.S.  Dept. of Orthopaedic Surgery  [facility]

## 2023-09-26 NOTE — MEDICATION SCRIBE - ADMISSION MEDICATION HISTORY
Medication Scribe Admission Medication History    Admission medication history is complete. The information provided in this note is only as accurate as the sources available at the time of the update.    Medication reconciliation/reorder completed by provider prior to medication history? Yes    Information Source(s): Patient and CareEverywhere/SureScripts via  in room with patient and finished at desk.    Pertinent Information: Freestyle stays in left arm okay, but falls out of right  arm before it is time to be changed.    Has Albuterol Inhaler with him today.  Counts carb for Novolog Insulin dosing.  Meds added to PTA list note that patient was not taking at home: Tylenol 325 mg tab, Hydroxyzine 25 mg tab, Oxycodone 5 mg tab, Senna-Doc tab    Changes made to PTA medication list:  Added: None  Deleted: Duplicate pen needle.  Changed: None    Medication Affordability:  Not including over the counter (OTC) medications, was there a time in the past 3 months when you did not take your medications as prescribed because of cost?: Yes (Viagra)    Allergies reviewed with patient and updates made in EHR: yes, no change.    Medication History Completed By: Tammi Bergeron 9/26/2023 1:53 PM    Prior to Admission medications    Medication Sig Last Dose Taking? Auth Provider Long Term End Date   acetaminophen (TYLENOL) 325 MG tablet Take 2 tablets (650 mg) by mouth every 4 hours as needed for other (mild pain)  Yes Frederic Bain PA     albuterol (PROAIR HFA/PROVENTIL HFA/VENTOLIN HFA) 108 (90 Base) MCG/ACT inhaler Inhale 2 puffs into the lungs every 6 hours as needed for shortness of breath or wheezing 9/26/2023 at 1000, has with today Yes Bertha Romero PA-C Yes    amLODIPine (NORVASC) 10 MG tablet Take 1 tablet (10 mg) by mouth daily for blood pressure 9/26/2023 at 0800 Yes Kehr, Kristen M, PA-C Yes    ASPIRIN PO Take 325 mg by mouth daily  9/18/2023 at am on hold Yes Reported, Patient     atorvastatin  (LIPITOR) 40 MG tablet Take 1 tablet (40 mg) by mouth daily  Patient taking differently: Take 40 mg by mouth every evening 9/25/2023 at pm Yes Kehr, Kristen M, PA-C Yes    blood glucose (ACCU-CHEK GUIDE) test strip Use to test blood sugar 3 times daily or as directed. 9/26/2023 at am #125 Yes Bertha Romero PA-C     blood glucose monitoring (ACCU-CHEK FASTCLIX) lancets Use to test blood sugar 1 times daily or as directed.  Ok to substitute alternative if insurance prefers. 9/26/2023 at am Yes Bertha Romero PA-C     calcium carbonate (TUMS) 500 MG chewable tablet Take 1 chew tab by mouth daily 9/18/2023 at am on hold Yes Reported, Patient     chlorthalidone (HYGROTON) 25 MG tablet Take 1 tablet (25 mg) by mouth daily Add on for blood pressure 9/25/2023 at am Yes Kehr, Kristen M, PA-C Yes    Continuous Blood Gluc Sensor (FREESTYLE FROYLAN 3 SENSOR) MISC 1 each every 14 days 9/20/2023 at off Yes Ravindra Clements MD     DULoxetine (CYMBALTA) 60 MG capsule Take 1 capsule (60 mg) by mouth 2 times daily 9/25/2023 at pm Yes Bertha Romero PA-C Yes    enoxaparin ANTICOAGULANT (LOVENOX) 100 MG/ML syringe Inject 1 mL (100 mg) Subcutaneous every 12 hours 9/25/2023 at pm Yes Kehr, Kristen M, PA-C     EPINEPHrine (ANY BX GENERIC EQUIV) 0.3 MG/0.3ML injection 2-pack Inject 0.3 mLs (0.3 mg) into the muscle once as needed for anaphylaxis over a year ago at on hand if needed Yes Bertha Romero PA-C     fluticasone-salmeterol (ADVAIR) 500-50 MCG/ACT inhaler Inhale 1 puff into the lungs every 12 hours 9/26/2023 at 1000 Yes Bertha Romero PA-C Yes    hydrOXYzine (ATARAX) 25 MG tablet Take 1 tablet (25 mg) by mouth every 6 hours as needed for itching or anxiety (with pain, moderate pain)  Yes Frederic Bain PA     insulin aspart (NOVOLOG FLEXPEN) 100 UNIT/ML pen Inject 18 units before breakfast and 18 units before dinner *Will need to schedule with new PCP for future refills*  "9/25/2023 at afternoon 5 units Yes Jose A Nunes PA-C Yes    insulin glargine (BASAGLAR KWIKPEN) 100 UNIT/ML pen Inject 50 Units Subcutaneous daily 9/26/2023 at am #25 units Yes Daisy Steward, CNP Yes    insulin pen needle (32G X 4 MM) 32G X 4 MM miscellaneous Use 3  pen needles daily or as directed. 9/26/2023 at am Yes Bertha Romero PA-C     insulin syringe-needle U-100 (29G X 1/2\" 0.5 ML) 29G X 1/2\" 0.5 ML miscellaneous Use 3 syringes daily or as directed. Unknown at Unknown Yes Bertha Romero PA-C     losartan (COZAAR) 25 MG tablet Take 1 tablet (25 mg) by mouth daily 9/25/2023 at am Yes Kehr, Kristen M, PA-C Yes    meclizine (ANTIVERT) 25 MG tablet Take 1 tablet (25 mg) by mouth 3 times daily as needed for dizziness 9/25/2023 at pm Yes Bertha Romero PA-C     metFORMIN (GLUCOPHAGE XR) 500 MG 24 hr tablet Take 2 tablets (1,000 mg) by mouth 2 times daily (with meals) 9/25/2023 at pm Yes Kehr, Kristen M, PA-C Yes    montelukast (SINGULAIR) 10 MG tablet Take 1 tablet (10 mg) by mouth At Bedtime For allergies/asthma 9/25/2023 at hs Yes Bertha Romero PA-C Yes    multivitamin, therapeutic (THERA-VIT) TABS Take 1 tablet by mouth daily 9/20/2023 at am on hold Yes Reported, Patient     naproxen sodium (ANAPROX) 220 MG tablet Take 220 mg by mouth daily as needed for moderate pain (arthritis) More than a month at Unknown Yes Reported, Patient     nortriptyline (PAMELOR) 10 MG capsule Take 2 capsules (20 mg) by mouth At Bedtime 9/25/2023 at hs Yes Bertha Romero PA-C Yes    omeprazole (PRILOSEC) 40 MG DR capsule Take 1 capsule (40 mg) by mouth daily Take for at least two months 9/25/2023 at am Yes Bertha Romero PA-C     oxyCODONE (ROXICODONE) 5 MG tablet Take 1-2 tablets (5-10 mg) by mouth every 4 hours as needed for moderate to severe pain  Yes Frederic Bain PA     pregabalin (LYRICA) 50 MG capsule Take 1 capsule (50 mg) by mouth 2 times " daily 9/25/2023 at pm Yes Bertha Romero PA-C Yes    rizatriptan (MAXALT-MLT) 10 MG ODT TAKE 1 TABLET BY MOUTH AT ONSET OF HEADACHE FOR MIGRAINE MAY REPEAT IN 2 HOURS. MAX 3 TABS/24 HOURS. 9/22/2023 at Unknown Yes Bertha Romero PA-C     senna-docusate (SENOKOT-S/PERICOLACE) 8.6-50 MG tablet Take 1-2 tablets by mouth 2 times daily Take while on oral narcotics to prevent or treat constipation.  Yes Frederic Bain PA     warfarin ANTICOAGULANT (COUMADIN) 5 MG tablet Take 15 mg Tues, Thurs and 12.5 mg all other days, or as directed by the Coumadin clinic 9/20/2023 at am, on hold Yes Christa Boshc APRN CNP

## 2023-09-26 NOTE — INTERVAL H&P NOTE
"I have reviewed the surgical (or preoperative) H&P that is linked to this encounter, and examined the patient. There are no significant changes    Clinical Conditions Present on Arrival:  Clinically Significant Risk Factors Present on Admission                # Drug Induced Coagulation Defect: home medication list includes an anticoagulant medication  # Drug Induced Platelet Defect: home medication list includes an antiplatelet medication  # DMII: A1C = 7.9 % (Ref range: 0.0 - 5.6 %) within past 6 months  # Obesity: Estimated body mass index is 39.05 kg/m  as calculated from the following:    Height as of this encounter: 1.829 m (6' 0.01\").    Weight as of this encounter: 130.6 kg (288 lb).       "

## 2023-09-26 NOTE — ANESTHESIA PROCEDURE NOTES
Adductor canal Procedure Note    Pre-Procedure   Staff -        CRNA: Aimee Rivers APRN CRNA       Performed By: CRNA       Location: pre-op       Procedure Start/Stop Times: 9/26/2023 1:30 PM and 9/26/2023 1:42 PM       Pre-Anesthestic Checklist: patient identified, IV checked, site marked, risks and benefits discussed, informed consent, monitors and equipment checked, pre-op evaluation, at physician/surgeon's request and post-op pain management  Timeout:       Correct Patient: Yes        Correct Procedure: Yes        Correct Site: Yes        Correct Position: Yes        Correct Laterality: Yes        Site Marked: Yes  Procedure Documentation  Procedure: Adductor canal       Diagnosis: RIGHT FEMORAL NERVE BLOCK VIA THE ADDUCTOR CANAL APPROACH       Laterality: right       Patient Position: supine       Patient Prep/Sterile Barriers: sterile gloves, mask, patient draped       Skin prep: DuraPrep       Local skin infiltrated with 3 mL of 1% lidocaine.           Catheter threaded easily.             Ultrasound guided       1. Ultrasound was used to identify targeted nerve, plexus, vascular marker, or fascial plane and place a needle adjacent to it in real-time.       2. Ultrasound was used to visualize the spread of anesthetic in close proximity to the above referenced structure.       3. A permanent image is entered into the patient's record.       4. The visualized anatomic structures appeared normal.       5. There were no apparent abnormal pathologic findings.    Assessment/Narrative         The placement was negative for: blood aspirated, painful injection and site bleeding       Paresthesias: No.       Test dose of 5 mL lidocaine 2% w/ 1:200,000 epinephrine at 13:37 CDT.        .       Bolus given via needle. no blood aspirated via catheter.        Secured via.        Insertion/Infusion Method: Single Shot    Medication(s) Administered   Ropivacaine 0.5% PF (Infiltration) - Infiltration   20 mL -  "9/26/2023 1:40:00 PM  Medication Administration Time: 9/26/2023 1:30 PM      FOR [facility] ([facility]) ONLY:   Pain Team Contact information: please page the Pain Team Via [employer]. Search \"Pain\". During daytime hours, please page the attending first. At night please page the resident first.      "

## 2023-09-26 NOTE — ANESTHESIA PROCEDURE NOTES
"Intrathecal injection Procedure Note    Pre-Procedure   Staff -        CRNA: Iam Freeman APRN CRNA       Performed By: CRNA       Location: OR       Pre-Anesthestic Checklist: patient identified, IV checked, risks and benefits discussed, informed consent, monitors and equipment checked, pre-op evaluation, at physician/surgeon's request and post-op pain management  Timeout:       Correct Patient: Yes        Correct Procedure: Yes        Correct Site: Yes        Correct Position: Yes   Procedure Documentation  Procedure: intrathecal injection       Patient Position: sitting       Patient Prep/Sterile Barriers: sterile gloves, mask, patient draped       Skin prep: Chloraprep       Insertion Site: L3-4. (midline approach).       Needle Gauge: 25.        Needle Length (Inches): 3.5        Spinal Needle Type: Pencan       Introducer used       Introducer: 20 G       # of attempts: 1 and  # of redirects:  0    Assessment/Narrative         Paresthesias: No.       CSF fluid: clear.       Opening pressure was cmH2O while  Sitting.        FOR [facility] ([facility]) ONLY:   Pain Team Contact information: please page the Pain Team Via [email or web address]. Search \"Pain\". During daytime hours, please page the attending first. At night please page the resident first.      "

## 2023-09-27 ENCOUNTER — APPOINTMENT (OUTPATIENT)
Dept: PHYSICAL THERAPY | Facility: CLINIC | Age: 58
End: 2023-09-27
Attending: ORTHOPAEDIC SURGERY
Payer: COMMERCIAL

## 2023-09-27 ENCOUNTER — APPOINTMENT (OUTPATIENT)
Dept: ULTRASOUND IMAGING | Facility: CLINIC | Age: 58
End: 2023-09-27
Payer: COMMERCIAL

## 2023-09-27 LAB
CREAT SERPL-MCNC: 1.29 MG/DL (ref 0.67–1.17)
EGFRCR SERPLBLD CKD-EPI 2021: 65 ML/MIN/1.73M2
FASTING STATUS PATIENT QL REPORTED: ABNORMAL
GLUCOSE BLDC GLUCOMTR-MCNC: 168 MG/DL (ref 70–99)
GLUCOSE BLDC GLUCOMTR-MCNC: 171 MG/DL (ref 70–99)
GLUCOSE BLDC GLUCOMTR-MCNC: 185 MG/DL (ref 70–99)
GLUCOSE BLDC GLUCOMTR-MCNC: 196 MG/DL (ref 70–99)
GLUCOSE SERPL-MCNC: 186 MG/DL (ref 70–99)
HGB BLD-MCNC: 10.7 G/DL (ref 13.3–17.7)
HOLD SPECIMEN: NORMAL
INR PPP: 1.04 (ref 0.85–1.15)
INR PPP: 1.09 (ref 0.85–1.15)
PLATELET # BLD AUTO: 230 10E3/UL (ref 150–450)

## 2023-09-27 PROCEDURE — 250N000011 HC RX IP 250 OP 636: Mod: JZ | Performed by: ORTHOPAEDIC SURGERY

## 2023-09-27 PROCEDURE — 85049 AUTOMATED PLATELET COUNT: CPT | Performed by: ORTHOPAEDIC SURGERY

## 2023-09-27 PROCEDURE — 85610 PROTHROMBIN TIME: CPT | Performed by: ORTHOPAEDIC SURGERY

## 2023-09-27 PROCEDURE — 36415 COLL VENOUS BLD VENIPUNCTURE: CPT | Performed by: ORTHOPAEDIC SURGERY

## 2023-09-27 PROCEDURE — 250N000013 HC RX MED GY IP 250 OP 250 PS 637

## 2023-09-27 PROCEDURE — 250N000013 HC RX MED GY IP 250 OP 250 PS 637: Performed by: ORTHOPAEDIC SURGERY

## 2023-09-27 PROCEDURE — 97162 PT EVAL MOD COMPLEX 30 MIN: CPT | Mod: GP

## 2023-09-27 PROCEDURE — 85610 PROTHROMBIN TIME: CPT | Performed by: PHYSICIAN ASSISTANT

## 2023-09-27 PROCEDURE — 82962 GLUCOSE BLOOD TEST: CPT

## 2023-09-27 PROCEDURE — 250N000011 HC RX IP 250 OP 636: Mod: JZ | Performed by: PHYSICIAN ASSISTANT

## 2023-09-27 PROCEDURE — 99214 OFFICE O/P EST MOD 30 MIN: CPT

## 2023-09-27 PROCEDURE — 258N000003 HC RX IP 258 OP 636

## 2023-09-27 PROCEDURE — 250N000011 HC RX IP 250 OP 636: Performed by: PHYSICIAN ASSISTANT

## 2023-09-27 PROCEDURE — 82565 ASSAY OF CREATININE: CPT | Performed by: ORTHOPAEDIC SURGERY

## 2023-09-27 PROCEDURE — 36415 COLL VENOUS BLD VENIPUNCTURE: CPT | Performed by: PHYSICIAN ASSISTANT

## 2023-09-27 PROCEDURE — 250N000013 HC RX MED GY IP 250 OP 250 PS 637: Performed by: PHYSICIAN ASSISTANT

## 2023-09-27 PROCEDURE — 85018 HEMOGLOBIN: CPT | Performed by: PHYSICIAN ASSISTANT

## 2023-09-27 PROCEDURE — 250N000012 HC RX MED GY IP 250 OP 636 PS 637

## 2023-09-27 PROCEDURE — 96372 THER/PROPH/DIAG INJ SC/IM: CPT | Performed by: ORTHOPAEDIC SURGERY

## 2023-09-27 PROCEDURE — 93970 EXTREMITY STUDY: CPT

## 2023-09-27 PROCEDURE — 82947 ASSAY GLUCOSE BLOOD QUANT: CPT | Mod: 91 | Performed by: ORTHOPAEDIC SURGERY

## 2023-09-27 RX ORDER — ENOXAPARIN SODIUM 100 MG/ML
100 INJECTION SUBCUTANEOUS EVERY 12 HOURS
Status: DISCONTINUED | OUTPATIENT
Start: 2023-09-28 | End: 2023-09-29 | Stop reason: HOSPADM

## 2023-09-27 RX ORDER — HYDROXYZINE HYDROCHLORIDE 25 MG/1
25-50 TABLET, FILM COATED ORAL EVERY 6 HOURS PRN
Status: DISCONTINUED | OUTPATIENT
Start: 2023-09-27 | End: 2023-09-29 | Stop reason: HOSPADM

## 2023-09-27 RX ORDER — ENOXAPARIN SODIUM 100 MG/ML
100 INJECTION SUBCUTANEOUS EVERY 12 HOURS
Status: DISCONTINUED | OUTPATIENT
Start: 2023-09-27 | End: 2023-09-27

## 2023-09-27 RX ORDER — NICOTINE POLACRILEX 4 MG
15-30 LOZENGE BUCCAL
Status: DISCONTINUED | OUTPATIENT
Start: 2023-09-27 | End: 2023-09-27

## 2023-09-27 RX ORDER — METHOCARBAMOL 500 MG/1
1000 TABLET, FILM COATED ORAL 4 TIMES DAILY
Status: DISCONTINUED | OUTPATIENT
Start: 2023-09-27 | End: 2023-09-29 | Stop reason: HOSPADM

## 2023-09-27 RX ORDER — KETOROLAC TROMETHAMINE 15 MG/ML
15 INJECTION, SOLUTION INTRAMUSCULAR; INTRAVENOUS EVERY 6 HOURS PRN
Status: DISPENSED | OUTPATIENT
Start: 2023-09-28 | End: 2023-09-29

## 2023-09-27 RX ORDER — KETOROLAC TROMETHAMINE 15 MG/ML
15 INJECTION, SOLUTION INTRAMUSCULAR; INTRAVENOUS ONCE
Status: COMPLETED | OUTPATIENT
Start: 2023-09-27 | End: 2023-09-27

## 2023-09-27 RX ORDER — DEXTROSE MONOHYDRATE 25 G/50ML
25-50 INJECTION, SOLUTION INTRAVENOUS
Status: DISCONTINUED | OUTPATIENT
Start: 2023-09-27 | End: 2023-09-27

## 2023-09-27 RX ADMIN — PANTOPRAZOLE SODIUM 40 MG: 40 TABLET, DELAYED RELEASE ORAL at 06:18

## 2023-09-27 RX ADMIN — MULTIVITAMIN TABLET 1 TABLET: TABLET at 08:11

## 2023-09-27 RX ADMIN — HYDROXYZINE HYDROCHLORIDE 25 MG: 25 TABLET, FILM COATED ORAL at 06:18

## 2023-09-27 RX ADMIN — METHOCARBAMOL 500 MG: 500 TABLET ORAL at 11:43

## 2023-09-27 RX ADMIN — SODIUM CHLORIDE, POTASSIUM CHLORIDE, SODIUM LACTATE AND CALCIUM CHLORIDE: 600; 310; 30; 20 INJECTION, SOLUTION INTRAVENOUS at 13:26

## 2023-09-27 RX ADMIN — PREGABALIN 50 MG: 50 CAPSULE ORAL at 20:09

## 2023-09-27 RX ADMIN — INSULIN ASPART 1 UNITS: 100 INJECTION, SOLUTION INTRAVENOUS; SUBCUTANEOUS at 13:22

## 2023-09-27 RX ADMIN — SODIUM CHLORIDE, POTASSIUM CHLORIDE, SODIUM LACTATE AND CALCIUM CHLORIDE 1000 ML: 600; 310; 30; 20 INJECTION, SOLUTION INTRAVENOUS at 17:32

## 2023-09-27 RX ADMIN — NORTRIPTYLINE HYDROCHLORIDE 20 MG: 10 CAPSULE ORAL at 21:41

## 2023-09-27 RX ADMIN — METHOCARBAMOL 1000 MG: 500 TABLET ORAL at 21:41

## 2023-09-27 RX ADMIN — INSULIN ASPART 1 UNITS: 100 INJECTION, SOLUTION INTRAVENOUS; SUBCUTANEOUS at 18:25

## 2023-09-27 RX ADMIN — KETOROLAC TROMETHAMINE 15 MG: 15 INJECTION, SOLUTION INTRAMUSCULAR; INTRAVENOUS at 19:58

## 2023-09-27 RX ADMIN — SODIUM CHLORIDE, POTASSIUM CHLORIDE, SODIUM LACTATE AND CALCIUM CHLORIDE 1000 ML: 600; 310; 30; 20 INJECTION, SOLUTION INTRAVENOUS at 13:40

## 2023-09-27 RX ADMIN — INSULIN GLARGINE 50 UNITS: 100 INJECTION, SOLUTION SUBCUTANEOUS at 08:13

## 2023-09-27 RX ADMIN — ACETAMINOPHEN 975 MG: 325 TABLET, FILM COATED ORAL at 20:09

## 2023-09-27 RX ADMIN — SENNOSIDES AND DOCUSATE SODIUM 1 TABLET: 50; 8.6 TABLET ORAL at 08:11

## 2023-09-27 RX ADMIN — MECLIZINE HYDROCHLORIDE 25 MG: 25 TABLET ORAL at 09:29

## 2023-09-27 RX ADMIN — ENOXAPARIN SODIUM 100 MG: 100 INJECTION SUBCUTANEOUS at 08:26

## 2023-09-27 RX ADMIN — WARFARIN SODIUM 12.5 MG: 7.5 TABLET ORAL at 18:07

## 2023-09-27 RX ADMIN — SENNOSIDES AND DOCUSATE SODIUM 1 TABLET: 50; 8.6 TABLET ORAL at 20:09

## 2023-09-27 RX ADMIN — SODIUM CHLORIDE, POTASSIUM CHLORIDE, SODIUM LACTATE AND CALCIUM CHLORIDE: 600; 310; 30; 20 INJECTION, SOLUTION INTRAVENOUS at 14:45

## 2023-09-27 RX ADMIN — ACETAMINOPHEN 975 MG: 325 TABLET, FILM COATED ORAL at 03:34

## 2023-09-27 RX ADMIN — PANTOPRAZOLE SODIUM 40 MG: 40 TABLET, DELAYED RELEASE ORAL at 16:39

## 2023-09-27 RX ADMIN — CEFAZOLIN SODIUM 2 G: 2 INJECTION, SOLUTION INTRAVENOUS at 08:29

## 2023-09-27 RX ADMIN — PREGABALIN 50 MG: 50 CAPSULE ORAL at 08:11

## 2023-09-27 RX ADMIN — DULOXETINE HYDROCHLORIDE 60 MG: 30 CAPSULE, DELAYED RELEASE ORAL at 20:09

## 2023-09-27 RX ADMIN — ONDANSETRON 4 MG: 4 TABLET, ORALLY DISINTEGRATING ORAL at 04:23

## 2023-09-27 RX ADMIN — OXYCODONE HYDROCHLORIDE 5 MG: 5 TABLET ORAL at 18:20

## 2023-09-27 RX ADMIN — DULOXETINE HYDROCHLORIDE 60 MG: 30 CAPSULE, DELAYED RELEASE ORAL at 08:26

## 2023-09-27 RX ADMIN — ACETAMINOPHEN 975 MG: 325 TABLET, FILM COATED ORAL at 11:43

## 2023-09-27 RX ADMIN — OXYCODONE HYDROCHLORIDE 5 MG: 5 TABLET ORAL at 09:17

## 2023-09-27 RX ADMIN — METFORMIN HYDROCHLORIDE 1000 MG: 500 TABLET, EXTENDED RELEASE ORAL at 08:09

## 2023-09-27 RX ADMIN — METHOCARBAMOL 500 MG: 500 TABLET ORAL at 08:09

## 2023-09-27 RX ADMIN — METHOCARBAMOL 1000 MG: 500 TABLET ORAL at 16:39

## 2023-09-27 RX ADMIN — ATORVASTATIN CALCIUM 40 MG: 20 TABLET, FILM COATED ORAL at 16:39

## 2023-09-27 RX ADMIN — SODIUM CHLORIDE, POTASSIUM CHLORIDE, SODIUM LACTATE AND CALCIUM CHLORIDE: 600; 310; 30; 20 INJECTION, SOLUTION INTRAVENOUS at 18:08

## 2023-09-27 RX ADMIN — POLYETHYLENE GLYCOL 3350 17 G: 17 POWDER, FOR SOLUTION ORAL at 08:09

## 2023-09-27 RX ADMIN — OXYCODONE HYDROCHLORIDE 10 MG: 5 TABLET ORAL at 03:33

## 2023-09-27 RX ADMIN — METFORMIN HYDROCHLORIDE 1000 MG: 500 TABLET, EXTENDED RELEASE ORAL at 16:39

## 2023-09-27 RX ADMIN — OXYCODONE HYDROCHLORIDE 10 MG: 5 TABLET ORAL at 21:41

## 2023-09-27 RX ADMIN — HYDROXYZINE HYDROCHLORIDE 25 MG: 25 TABLET, FILM COATED ORAL at 18:07

## 2023-09-27 ASSESSMENT — ACTIVITIES OF DAILY LIVING (ADL)
ADLS_ACUITY_SCORE: 18
ADLS_ACUITY_SCORE: 20
ADLS_ACUITY_SCORE: 20
ADLS_ACUITY_SCORE: 18
ADLS_ACUITY_SCORE: 18
ADLS_ACUITY_SCORE: 20
ADLS_ACUITY_SCORE: 20
ADLS_ACUITY_SCORE: 18
ADLS_ACUITY_SCORE: 18

## 2023-09-27 NOTE — PROVIDER NOTIFICATION
Dr. Ch notified at this time to update on patients on-going severe pain.  Discussed sleepiness related to narcotics, however, he remains 10/10 pain.  He has been unable to get out of bed or sit on edge of bed due to pain.      Due to drowsiness, Pain management plan will remain the same.

## 2023-09-27 NOTE — PROGRESS NOTES
Attempted to sit on edge of bed again.  He gets near syncopal and very dizzy.  Update sent to [facility] at this time.  Still very drowsy/ sedated and has a hard time keeping eyes open and focusing.  capnography kept in place for continuous monitoring-readings are within normal limits and he is on room air.  Oxycodone avoided today and it did not affect pain scale with administration of 5 mg this morning.  Home muscle relaxer increased and dose given.     Page sent to [facility] regarding near syncopal when attempted to get out of bed around 1700.  Telephone orders received for second bolus.  Will give another dose of meclizine.  Current blood pressure 94/61.

## 2023-09-27 NOTE — PROGRESS NOTES
LORI Morse was notified regarding patient's hypotension, inability to complete orthostatics due to dizziness and hypotension, and pain behind right knee and calf. New orders placed by provider.      Patient in supine position, bolus infusing, straight cath complete. Current blood pressure: 100/50.

## 2023-09-27 NOTE — PROVIDER NOTIFICATION
Pain increasing to intolerable levels.  Patient thriving and verbalizing intolerance.  Page sent to Dr. Ch and suggested additional nerve block.  Ok for additional nerve block, however this would be under anesthesia's discretion.  Telephone order received by Dr. Ch to increase atarax.      This writer called anesthesia who will assess if nerve block appropriate and will see patient.    Within about 20 minutes after calling anesthesia, patient screaming, shaking and thriving in bed in pain.  Charge nurse called to bedside to help distract.  Atarax given and will give oxycodone.  Call place to anesthesia again and informed her that atarax and warfarin with be at this time to ensure there would be no contraindications    Awaiting for anesthesia to assess at bedside.  Charge nurse holding patients hands.  [employer] message sent to [facility] And voicemail played, message not left at that time.  Second call to Sherman placed at 1830 and spoke to provider    Sherman will be up to see patient.

## 2023-09-27 NOTE — CONSULTS
Care Management Note:    Care Management team received referral from Ortho team to assist pt with discharge planning services post surgical services.    Per IDT rounds, EMR review, and/or discussion with PT/OT staff, current recommendations are for TCU vs Home Care.    ROSHAN met with pt and his fiance, Richar, at bedside.  Both indicate that TCU is not an option & that Richar has taken the whole week off from work to be able to care for the pt at home.  ROSHAN did place Home Care referral via home care hub, but SW not hopeful of an agency of picking up due to his insurance.    Care Management to continue to follow for cares.    VIJAY Shen  Care Transitions   Tele: [phone]

## 2023-09-27 NOTE — PROGRESS NOTES
"Patient vital signs are at baseline: Yes  Patient able to ambulate as they were prior to admission or with assist devices provided by therapies during their stay:  No, could only stand at the side of the bed then became nauseated and had to lay back down.  Patient MUST void prior to discharge:  Yes  Patient able to tolerate oral intake:  Yes  Pain has adequate pain control using Oral analgesics:  No, patient states that he \"does not even feel like the pain meds are working\".  Does patient have an identified :  Yes  Has goal D/C date and time been discussed with patient:  Yes    "

## 2023-09-27 NOTE — PLAN OF CARE
Goal Outcome Evaluation:       Patient vital signs are at baseline: No,  Reason:  Hypotension, bolus given   Patient able to ambulate as they were prior to admission or with assist devices provided by therapies during their stay:  No,  Reason:  Pain, hypotension and dizziness. Was only able to sit at edge of bed.   Patient MUST void prior to discharge:  Yes  Patient able to tolerate oral intake:  Yes  Pain has adequate pain control using Oral analgesics:  No,  Reason:  Scheduled and PRN pain medications not adequately managing patients pain.   Does patient have an identified : No  Reason:    Has goal D/C date and time been discussed with patient:  No,  Reason:  Patient not stable enough to discharge due to uncontrolled pain, and experiencing hypotension, dizziness and light sedation.

## 2023-09-27 NOTE — CONSULTS
Clinical Pharmacy - Warfarin Dosing Consult     Pharmacy has been consulted to manage this patient s warfarin therapy.  Indication: DVT/PE Prophylaxis  Therapy Goal: INR 2-3  Warfarin Prior to Admission: Yes  Warfarin PTA Regimen: 15mg Tues/Sat, 12.5mg ROW  Significant drug interactions: Lovenox bridge therapy (100 mg BID)  Dose Comments: INR 1.04 - PD1 - Continue with PTA dosing of 12.5 mg tonight with Lovenox bridge BID through 10/3 per provider.    INR   Date Value Ref Range Status   09/27/2023 1.04 0.85 - 1.15 Final   09/26/2023 0.96 0.85 - 1.15 Final     Recommend warfarin 12.5 mg today.  Pharmacy will monitor Adarsh Salvador daily and order warfarin doses to achieve specified goal.      Please contact pharmacy as soon as possible if the warfarin needs to be held for a procedure or if the warfarin goals change.     Roderick Rubalcava RPH on 9/27/2023 at 12:37 PM

## 2023-09-27 NOTE — PROGRESS NOTES
House supervisor called to patients bedside at 1840 to assist nurses in aiding patient until anesthesia/and or provider arrives at bedside.

## 2023-09-27 NOTE — PROGRESS NOTES
[facility] Hospital Medicine Progress Note  Date of Service: 09/27/2023    Assessment & Plan   Adarsh Salvador is a 58 year old male who presented on 9/26/2023 for scheduled Procedure(s):  ARTHROPLASTY, KNEE, TOTAL Right by Edwin Ch MD and is being followed by the hospital medicine service for co-management of acute and/or chronic perioperative medical problems.      S/p Procedure(s):  ARTHROPLASTY, KNEE, TOTAL Right   1 Day Post-Op   Difficulty with pain control.   - pain control, wound cares, physical therapy, occupational therapy and DVT prophylaxis per orthopedic surgery service    Recurrent deep vein thrombosis (DVT)   Pulmonary embolism  Previously had superficial thrombophlebitis of right basilic vein Jan 2008. Has fam history of DVT, & had LLE DVT 9/2021. Saw hematology in Dec 2021 who felt that his DVTs were mildly provoked, and that the patient may be able to stop chronic anticoagulation at some point but should be aggressively anticoagulated & deemed high risk for any surgical procedures or hospitalizations. Anticoagulated PTA with warfarin, has been bridging with lovenox. INR POD1 (9/27) 1.04, goal is 2-3.   -Continue warfarin with dosing per pharmacy  -Resume lovenox evening 9/27 100mg Q12hrs   -Very helpful note 9/13/23 from anticoag clinic with bridging instructions (plan to bridge until at least 10/3 INR check).     Addendum: patient is having trouble tolerating opioids due to significant sedation with amount of opioids required to get even minimal pain control. Pain is 10/10 in posterior knee and calf.   -RN discussed difficulty with pain control with attending surgeon. Appreciate assistance with optimal pain management  -US eval for DVT in bilateral LE    Second addendum: LE US shows no DVT, but does show complex fluid collection 8.8x1.5cm in proximal right calf. Discussed with Dr. Soria, presume probably hematoma.   -Hold lovenox evening 9/27, reassess in AM  and plan to likely resume lovenox in AM. Continue warfarin as scheduled.     Acute kidney injury  Baseline creatinine around 0.9, creatinine POD1 is 1.29. Suspect elevation due to volume depletion from anticipated uvaldo-operative blood loss (patient also on lovenox uvaldo-op so anticipate more oozing) plus decreased PO fluid intake.   -BMP in AM  -/hr continuous    Mild post-op hypotension  Chronic hypertension  Mildly hypotensive post-op (94/53). Got lightheaded with standing and appears pallorous, but denies symptoms at rest.   -Ordered PTA losartan, amlodipine, & chlorthalidone with hold parameters but if BP does not improve POD1 may be need to hold some of these on discharge until resolved.   -Orthostatics ordered    Addendum: patient had significant orthostatic hypotension (sbp 60's) with sitting up at edge of bed. Got lightheaded, dizzy. Laid back down, no falls or syncopal episodes.   -1L bolus over 1 hr  -Continue regular IVF after bolus    Anemia   Acute blood loss anemia superimposed on chronic anemia. Baseline hemoglobin around 12.0, hemoglobin POD1 10.7. Due to routine anticipated blood loss during surgery. Chronic anemia is classified as anemia of chronic disease. Cannot find iron studies in recent past. Monitor intermittently.   -CBC in AM    Type 2 diabetes mellitus   Borderline poor control, most recent hemoglobin A1c 7.9. Insulin dependent. Managed PTA with insulin 50units glargine daily. Also has aspart 18 units before breakfast and dinner on PTA med list but patient says he has been carb counting to adjust which works well for him; unclear at this time what carb count scale patient is using and he is too drowsy to carb count himself AM 9/27.   -Resume normal insulin glargine dosing 50units daily (is now tolerating diet)  -Medium sliding scale insulin   -Hold PTA aspart until patient is alert enough to participate in carb counting like he does PTA  -Continue PTA metformin 1000mg BID.      Obstructive lung disease  Had seen pulmonology in 2019 for dyspnea & cough. PFTs were WNL but with low lung volumes felt possibly related to fibrotic lung disease. Also felt that obesity may be contributing to symptoms. Managed PT with montelukast 10mg at bedtime, Advair, albuterol inhaler. Breathing at baseline POD1.   -Continue home meds.     Migraine  Managed PTA with rizatriptan & pregabalin, continue PRN on discharge.     Generalized anxiety disorder  Managed PTA with nortriptyline 20mg at bedtime, duloxetine 60mg BID, continue both.     Gastroesophageal reflux disease  Continue PTA PPI.     Hypertriglyceridemia  Continue PTA atorvastatin      DVT Prophylaxis: as per orthopedic surgery service - warfarin with lovenox bridging  Code Status: Full Code    Lines: PIV  Burk catheter: NOT present    Discussion: Medically, the patient is slow to recover from surgery. New KYM, dizzy & lightheaded with sitting on edge of bed. Mild pallor, extremely drowsy with borderline hypotension.     Disposition: Anticipate discharge most likely tomorrow (9/28) given plethora of issues to work on prior to being medically ready for discharge.     Attestation:  I have reviewed today's vital signs, notes, medications, labs and imaging.    I have discussed, or will be discussing, the patient with hospitalist physician, Dr. Bledsoe.     Elisabet Morrison PA-C       Interval History   Having a lot of pain overnight throughout entire right leg. Worst in knee, but radiates up to hip and down calf into ankle & foot.   Extremely drowsy. Will alert to voice but difficulty opening eyes for prolonged periods of time.   Denies lightheadedness or dizziness while laying flat but does get dizzy with sitting or standing at edge of bed. Has not ambulated since surgery. Does have dizzy spells at baseline. Unclear cause.   Urinated once since surgery with some hesitation but felt he could completely empty bladder and denies dysuria. Not passing  gas yet, no BM yet.   Denies chest pain, pleuritic pain with deep breathing.     Physical Exam   Temp:  [97.5  F (36.4  C)-99  F (37.2  C)] 97.9  F (36.6  C)  Pulse:  [] 80  Resp:  [14-31] 19  BP: ()/(43-83) 94/53  SpO2:  [87 %-99 %] 92 %    Weights:   Vitals:    09/26/23 1155   Weight: 130.6 kg (288 lb)    Body mass index is 39.05 kg/m .    Constitutional: sleepy but alerts briefly to voice, laying in bed, appears uncomfortable, ill  CV: regular rate & rhythm, no murmurs, rubs, or gallops. Radial pulses 2+. No pitting LE edema. No unilateral leg edema, erythema, or tenderness. Suman sign negative.   Respiratory: CTA bilaterally, respirations unlabored on room air.   GI: Bowel sounds present throughout. Abdomen soft, nontender to palpation, nondistended.   Skin: Warm, clammy, pallorous. Surgical site exam deferred to orthopedics.  Musculoskeletal: Normal muscle bulk. Remainder of MSK exam deferred to orthopedics.   Neuro: distal sensation intact to lower extremities bilaterally, speech intact, extremely drowsy and only briefly alerts to voice.     Data   Recent Labs   Lab 09/27/23  0529 09/27/23  0301 09/26/23  2144 09/26/23  1235 09/26/23  1224   HGB 10.7*  --   --   --   --    INR 1.04  --   --   --  0.96   * 196* 267*   < >  --     < > = values in this interval not displayed.     Imaging  Recent Results (from the past 24 hour(s))   XR Knee Port Right 1/2 Views    Narrative    EXAM: XR KNEE PORT RIGHT 1/2 VIEWS  LOCATION: [facility]  DATE: 9/26/2023    INDICATION: Post Op Total Knee  COMPARISON: 09/06/2023      Impression    IMPRESSION: Status post recent right total knee arthroplasty. No immediate hardware complication. Expected postsurgical soft tissue edema, subcutaneous emphysema, and gas containing joint effusion. No acute fracture or malalignment.      I reviewed all new labs and imaging results over the last 24 hours.    Medications    lactated ringers 100  mL/hr at 09/26/23 2145      acetaminophen  975 mg Oral Q8H    amLODIPine  10 mg Oral Daily    atorvastatin  40 mg Oral QPM    ceFAZolin  2 g Intravenous Q8H    chlorthalidone  25 mg Oral Daily    DULoxetine  60 mg Oral BID    enoxaparin ANTICOAGULANT  100 mg Subcutaneous Q12H    insulin glargine  50 Units Subcutaneous Daily    losartan  25 mg Oral Daily    metFORMIN  1,000 mg Oral BID w/meals    methocarbamol  500 mg Oral 4x Daily    multivitamin, therapeutic  1 tablet Oral Daily    nortriptyline  20 mg Oral At Bedtime    pantoprazole  40 mg Oral BID AC    polyethylene glycol  17 g Oral Daily    pregabalin  50 mg Oral BID    senna-docusate  1 tablet Oral BID    sodium chloride (PF)  3 mL Intracatheter Q8H    Warfarin Therapy Reminder  1 each Oral See Admin Instructions     Elisabet Morrison PA-C

## 2023-09-27 NOTE — PROGRESS NOTES
09/27/23 1307   Appointment Info   Signing Clinician's Name / Credentials (PT) Kalpana Iglesias, PT   Quick Adds   Quick Adds Certification   Living Environment   People in Home significant other   Current Living Arrangements apartment   Home Accessibility no concerns   Living Environment Comments level entry, has elevator   Self-Care   Equipment Currently Used at Home cane, straight   Fall history within last six months yes   Number of times patient has fallen within last six months 2   Activity/Exercise/Self-Care Comment indep with mobility with cane, ADL's, and shares IADL's with venita. venita has the week off and able to assist if needed.   General Information   Onset of Illness/Injury or Date of Surgery 09/26/23   Referring Physician Link, LORI Patel   Patient/Family Therapy Goals Statement (PT) feel better, wants to return home and does not think he will need TCU   Pertinent History of Current Problem (include personal factors and/or comorbidities that impact the POC) 58 y.o. male s/p R TKA performed 9/26, now WBAT without restriction. hospitalization complicated due to pain, low BP, and reduced alertness.   Weight-Bearing Status - RLE weight-bearing as tolerated   Pain Assessment   Patient Currently in Pain Yes, see Vital Sign flowsheet  (R knee)   Range of Motion (ROM)   Range of Motion ROM deficits secondary to surgical procedure;ROM deficits secondary to pain   Strength (Manual Muscle Testing)   Strength Comments not formally assessed due to pain   Bed Mobility   Comment, (Bed Mobility) supine>sit with modA x2, complaints of dizzines with sitting EOB, returned to supine with totalA, BP 68/40 with sitting   Transfers   Comment, (Transfers) unable due to low BP   Gait/Stairs (Locomotion)   Comment, (Gait/Stairs) unable due to low BP   Clinical Impression   Criteria for Skilled Therapeutic Intervention Yes, treatment indicated   PT Diagnosis (PT) impaired mobility s/p R TKA   Influenced by the following  impairments pain, reduced ROM, LE weakness   Functional limitations due to impairments impaired bed mobility, transfers, gait   Clinical Presentation (PT Evaluation Complexity) Unstable/Unpredictable   Clinical Presentation Rationale clinical reasoning   Clinical Decision Making (Complexity) moderate complexity   Planned Therapy Interventions (PT) bed mobility training;balance training;cryotherapy;gait training;home exercise program;patient/family education;ROM (range of motion);strengthening;transfer training;progressive activity/exercise;risk factor education;home program guidelines   Anticipated Equipment Needs at Discharge (PT)   (TBD pending progress, does not have walker)   Risk & Benefits of therapy have been explained evaluation/treatment results reviewed;care plan/treatment goals reviewed;risks/benefits reviewed;current/potential barriers reviewed;participants voiced agreement with care plan;participants included;patient;spouse/significant other   Clinical Impression Comments Pt limited in PT evaluation due to low BP, see vitals tab for supine and sitting BP, unable to assess standing due to low and symptomatic BP in sitting. Pt stating he wants to go home once feeling better, states he will not go to TCU, fiance is home with pt for the remainder of the week to assist and pt has no stairs to complete. PT will return tomorrow to assess mobility as medically appropriate.   PT Total Evaluation Time   PT Eval, Moderate Complexity Minutes (50561) 15   Therapy Certification   Start of care date 09/27/23   Certification date from 09/27/23   Certification date to 10/04/23   Medical Diagnosis s/p R TKA   Physical Therapy Goals   PT Frequency 6x/week   PT Predicted Duration/Target Date for Goal Attainment 10/04/23   PT Goals Bed Mobility;Transfers;Gait   PT: Bed Mobility Supervision/stand-by assist;Supine to/from sit   PT: Transfers Supervision/stand-by assist;Bed to/from chair;Assistive device   PT: Gait  Supervision/stand-by assist;Standard walker;25 feet   PT Discharge Planning   PT Plan Wed 1/6; assess mobility, BP too low to stand on POD 1, needs to be able to walk short distances and complete R TKA HEP   PT Discharge Recommendation (DC Rec) home with assist;home with home care physical therapy;Transitional Care Facility   PT Rationale for DC Rec limited evaluation due to low BP, too soon for PT discharge recommendations and will update once more mobility completed. pt declines need for TCU   PT Brief overview of current status supine<>sit with modA x2   Total Session Time   Total Session Time (sum of timed and untimed services) 15     [facility]  OUTPATIENT PHYSICAL THERAPY EVALUATION  PLAN OF TREATMENT FOR OUTPATIENT REHABILITATION  (COMPLETE FOR INITIAL CLAIMS ONLY)  Patient's Last Name, First Name, M.I.  YOB: 1965  Adarsh Salvador                        Provider's Name  [facility] Medical Record No.  [id number]                             Onset Date:  09/26/23   Start of Care Date:  09/27/23   Type:     _X_PT   ___OT   ___SLP Medical Diagnosis:  s/p R TKA              PT Diagnosis:  impaired mobility s/p R TKA Visits from SOC:  1     See note for plan of treatment, functional goals and certification details    I CERTIFY THE NEED FOR THESE SERVICES FURNISHED UNDER        THIS PLAN OF TREATMENT AND WHILE UNDER MY CARE     (Physician co-signature of this document indicates review and certification of the therapy plan).

## 2023-09-28 ENCOUNTER — APPOINTMENT (OUTPATIENT)
Dept: PHYSICAL THERAPY | Facility: CLINIC | Age: 58
End: 2023-09-28
Attending: ORTHOPAEDIC SURGERY
Payer: COMMERCIAL

## 2023-09-28 LAB
ANION GAP SERPL CALCULATED.3IONS-SCNC: 10 MMOL/L (ref 7–15)
BUN SERPL-MCNC: 15.6 MG/DL (ref 6–20)
CALCIUM SERPL-MCNC: 8.5 MG/DL (ref 8.6–10)
CHLORIDE SERPL-SCNC: 97 MMOL/L (ref 98–107)
CREAT SERPL-MCNC: 1.06 MG/DL (ref 0.67–1.17)
EGFRCR SERPLBLD CKD-EPI 2021: 82 ML/MIN/1.73M2
ERYTHROCYTE [DISTWIDTH] IN BLOOD BY AUTOMATED COUNT: 13.8 % (ref 10–15)
GLUCOSE BLDC GLUCOMTR-MCNC: 126 MG/DL (ref 70–99)
GLUCOSE BLDC GLUCOMTR-MCNC: 142 MG/DL (ref 70–99)
GLUCOSE BLDC GLUCOMTR-MCNC: 149 MG/DL (ref 70–99)
GLUCOSE BLDC GLUCOMTR-MCNC: 158 MG/DL (ref 70–99)
GLUCOSE BLDC GLUCOMTR-MCNC: 214 MG/DL (ref 70–99)
GLUCOSE SERPL-MCNC: 154 MG/DL (ref 70–99)
HCO3 SERPL-SCNC: 26 MMOL/L (ref 22–29)
HCT VFR BLD AUTO: 29.7 % (ref 40–53)
HGB BLD-MCNC: 9.6 G/DL (ref 13.3–17.7)
INR PPP: 1.21 (ref 0.85–1.15)
MCH RBC QN AUTO: 28.4 PG (ref 26.5–33)
MCHC RBC AUTO-ENTMCNC: 32.3 G/DL (ref 31.5–36.5)
MCV RBC AUTO: 88 FL (ref 78–100)
PLATELET # BLD AUTO: 169 10E3/UL (ref 150–450)
POTASSIUM SERPL-SCNC: 3.8 MMOL/L (ref 3.4–5.3)
RBC # BLD AUTO: 3.38 10E6/UL (ref 4.4–5.9)
SODIUM SERPL-SCNC: 133 MMOL/L (ref 135–145)
WBC # BLD AUTO: 7 10E3/UL (ref 4–11)

## 2023-09-28 PROCEDURE — 85027 COMPLETE CBC AUTOMATED: CPT

## 2023-09-28 PROCEDURE — 85610 PROTHROMBIN TIME: CPT | Performed by: ORTHOPAEDIC SURGERY

## 2023-09-28 PROCEDURE — 250N000013 HC RX MED GY IP 250 OP 250 PS 637: Performed by: ORTHOPAEDIC SURGERY

## 2023-09-28 PROCEDURE — 80048 BASIC METABOLIC PNL TOTAL CA: CPT

## 2023-09-28 PROCEDURE — 250N000011 HC RX IP 250 OP 636: Mod: JZ

## 2023-09-28 PROCEDURE — 99215 OFFICE O/P EST HI 40 MIN: CPT | Performed by: FAMILY MEDICINE

## 2023-09-28 PROCEDURE — 250N000013 HC RX MED GY IP 250 OP 250 PS 637

## 2023-09-28 PROCEDURE — 250N000013 HC RX MED GY IP 250 OP 250 PS 637: Performed by: PHYSICIAN ASSISTANT

## 2023-09-28 PROCEDURE — 258N000003 HC RX IP 258 OP 636

## 2023-09-28 PROCEDURE — 96372 THER/PROPH/DIAG INJ SC/IM: CPT

## 2023-09-28 PROCEDURE — 82962 GLUCOSE BLOOD TEST: CPT

## 2023-09-28 PROCEDURE — 250N000011 HC RX IP 250 OP 636: Mod: JZ | Performed by: ORTHOPAEDIC SURGERY

## 2023-09-28 PROCEDURE — 36415 COLL VENOUS BLD VENIPUNCTURE: CPT | Performed by: ORTHOPAEDIC SURGERY

## 2023-09-28 PROCEDURE — 97530 THERAPEUTIC ACTIVITIES: CPT | Mod: GP

## 2023-09-28 RX ADMIN — HYDROXYZINE HYDROCHLORIDE 25 MG: 25 TABLET, FILM COATED ORAL at 08:09

## 2023-09-28 RX ADMIN — WARFARIN SODIUM 12.5 MG: 7.5 TABLET ORAL at 17:22

## 2023-09-28 RX ADMIN — METHOCARBAMOL 1000 MG: 500 TABLET ORAL at 12:58

## 2023-09-28 RX ADMIN — ACETAMINOPHEN 975 MG: 325 TABLET, FILM COATED ORAL at 03:50

## 2023-09-28 RX ADMIN — ACETAMINOPHEN 975 MG: 325 TABLET, FILM COATED ORAL at 20:00

## 2023-09-28 RX ADMIN — KETOROLAC TROMETHAMINE 15 MG: 15 INJECTION, SOLUTION INTRAMUSCULAR; INTRAVENOUS at 21:51

## 2023-09-28 RX ADMIN — ENOXAPARIN SODIUM 100 MG: 100 INJECTION SUBCUTANEOUS at 08:08

## 2023-09-28 RX ADMIN — INSULIN ASPART 1 UNITS: 100 INJECTION, SOLUTION INTRAVENOUS; SUBCUTANEOUS at 08:08

## 2023-09-28 RX ADMIN — METFORMIN HYDROCHLORIDE 1000 MG: 500 TABLET, EXTENDED RELEASE ORAL at 08:09

## 2023-09-28 RX ADMIN — OXYCODONE HYDROCHLORIDE 10 MG: 5 TABLET ORAL at 19:59

## 2023-09-28 RX ADMIN — DULOXETINE HYDROCHLORIDE 60 MG: 30 CAPSULE, DELAYED RELEASE ORAL at 08:09

## 2023-09-28 RX ADMIN — METFORMIN HYDROCHLORIDE 1000 MG: 500 TABLET, EXTENDED RELEASE ORAL at 17:22

## 2023-09-28 RX ADMIN — PREGABALIN 50 MG: 50 CAPSULE ORAL at 08:09

## 2023-09-28 RX ADMIN — METHOCARBAMOL 1000 MG: 500 TABLET ORAL at 08:09

## 2023-09-28 RX ADMIN — ACETAMINOPHEN 975 MG: 325 TABLET, FILM COATED ORAL at 12:58

## 2023-09-28 RX ADMIN — OXYCODONE HYDROCHLORIDE 10 MG: 5 TABLET ORAL at 15:50

## 2023-09-28 RX ADMIN — OXYCODONE HYDROCHLORIDE 10 MG: 5 TABLET ORAL at 06:25

## 2023-09-28 RX ADMIN — SENNOSIDES AND DOCUSATE SODIUM 1 TABLET: 50; 8.6 TABLET ORAL at 08:09

## 2023-09-28 RX ADMIN — SODIUM CHLORIDE, POTASSIUM CHLORIDE, SODIUM LACTATE AND CALCIUM CHLORIDE: 600; 310; 30; 20 INJECTION, SOLUTION INTRAVENOUS at 01:59

## 2023-09-28 RX ADMIN — PANTOPRAZOLE SODIUM 40 MG: 40 TABLET, DELAYED RELEASE ORAL at 17:23

## 2023-09-28 RX ADMIN — METHOCARBAMOL 1000 MG: 500 TABLET ORAL at 21:47

## 2023-09-28 RX ADMIN — PREGABALIN 50 MG: 50 CAPSULE ORAL at 20:00

## 2023-09-28 RX ADMIN — ATORVASTATIN CALCIUM 40 MG: 20 TABLET, FILM COATED ORAL at 17:22

## 2023-09-28 RX ADMIN — ENOXAPARIN SODIUM 100 MG: 100 INJECTION SUBCUTANEOUS at 19:59

## 2023-09-28 RX ADMIN — MECLIZINE HYDROCHLORIDE 25 MG: 25 TABLET ORAL at 08:09

## 2023-09-28 RX ADMIN — PANTOPRAZOLE SODIUM 40 MG: 40 TABLET, DELAYED RELEASE ORAL at 06:25

## 2023-09-28 RX ADMIN — KETOROLAC TROMETHAMINE 15 MG: 15 INJECTION, SOLUTION INTRAMUSCULAR; INTRAVENOUS at 08:27

## 2023-09-28 RX ADMIN — SENNOSIDES AND DOCUSATE SODIUM 1 TABLET: 50; 8.6 TABLET ORAL at 19:59

## 2023-09-28 RX ADMIN — INSULIN GLARGINE 50 UNITS: 100 INJECTION, SOLUTION SUBCUTANEOUS at 08:08

## 2023-09-28 RX ADMIN — OXYCODONE HYDROCHLORIDE 10 MG: 5 TABLET ORAL at 11:37

## 2023-09-28 RX ADMIN — SODIUM CHLORIDE, POTASSIUM CHLORIDE, SODIUM LACTATE AND CALCIUM CHLORIDE: 600; 310; 30; 20 INJECTION, SOLUTION INTRAVENOUS at 10:18

## 2023-09-28 RX ADMIN — POLYETHYLENE GLYCOL 3350 17 G: 17 POWDER, FOR SOLUTION ORAL at 08:22

## 2023-09-28 RX ADMIN — METHOCARBAMOL 1000 MG: 500 TABLET ORAL at 17:22

## 2023-09-28 RX ADMIN — HYDROXYZINE HYDROCHLORIDE 50 MG: 25 TABLET, FILM COATED ORAL at 19:59

## 2023-09-28 RX ADMIN — DULOXETINE HYDROCHLORIDE 60 MG: 30 CAPSULE, DELAYED RELEASE ORAL at 19:59

## 2023-09-28 RX ADMIN — HYDROXYZINE HYDROCHLORIDE 50 MG: 25 TABLET, FILM COATED ORAL at 00:00

## 2023-09-28 RX ADMIN — INSULIN ASPART 2 UNITS: 100 INJECTION, SOLUTION INTRAVENOUS; SUBCUTANEOUS at 12:19

## 2023-09-28 RX ADMIN — NORTRIPTYLINE HYDROCHLORIDE 20 MG: 10 CAPSULE ORAL at 21:50

## 2023-09-28 RX ADMIN — KETOROLAC TROMETHAMINE 15 MG: 15 INJECTION, SOLUTION INTRAMUSCULAR; INTRAVENOUS at 01:59

## 2023-09-28 ASSESSMENT — ACTIVITIES OF DAILY LIVING (ADL)
ADLS_ACUITY_SCORE: 20

## 2023-09-28 NOTE — PROGRESS NOTES
Patient vital signs are at baseline: Yes  Patient able to ambulate as they were prior to admission or with assist devices provided by therapies during their stay:  No,  Reason:  Patient is dizzy when standing up, blood pressure drops and patient gets nauseated.  Patient MUST void prior to discharge:  Yes  Patient able to tolerate oral intake:  Yes  Pain has adequate pain control using Oral analgesics:  No, IV Toradol is being given to help control pain.  Does patient have an identified :  Yes  Has goal D/C date and time been discussed with patient:  Yes

## 2023-09-28 NOTE — PROGRESS NOTES
Attempted to reach Sherman via [employer]era-    Paged provider overhead at this time to med/surg-patient screaming in pain.

## 2023-09-28 NOTE — PROGRESS NOTES
Care team at bedside-Sherman [facility] And Anesthesia    House supervisor and RN remain at bedside.

## 2023-09-28 NOTE — PROGRESS NOTES
Voicemail left on Sherman's K. Cell at this time to inform him that patient will be staying overnight.  Discharge goals not met.

## 2023-09-28 NOTE — PROGRESS NOTES
"[facility] DAILY PROGRESS NOTE      History: Adarsh Salvador is a 58 year old male with advanced knee arthritis now POD # 1 s/p Right total knee arthroplasty on 2023 with Dr. Ch.     Interval events: Uncontrolled post-operative pain. Adarsh slept well last night, however he began experiencing uncontrolled pain this afternoon. His blood pressure was low at 94/53. A 1000 ml fluid bolus was initiated x 2, but his pressures have remained low preventing the use of IV narcotics. An ultrasound ruled out DVT, however a complex fluid collection was measured at 8.8 cm of the right calf. He is on a Lovenox bridging protocol as he restarts his coumadin at 100 mg bid until 10/3/23.      SUBJECTIVE:  Complaint of excessive pain. Unable to participate with PT. Spirometry 1700. Afebrile.    OBJECTIVE:  BP 94/61   Pulse 93   Temp 98.3  F (36.8  C) (Oral)   Resp 18   Ht 1.829 m (6' 0.01\")   Wt 130.6 kg (288 lb)   SpO2 93%   BMI 39.05 kg/m      Temp (24hrs), Av.1  F (36.7  C), Min:97.9  F (36.6  C), Max:98.3  F (36.8  C)      General: Laying in bed writhing in pain. Holding his breath with Oxygen saturations dropping repeatedly in the 70's.     Right Lower Extremity: Ace wrap has been removed.   Incision is covered, aquacel in place. No drainage noted. Skin is warm and dry.   Mild swelling of knee, calf.    Intact ehl, edl, ta, fhl, gs, quads, hamstrings, hip flexors  Toes warm and well perfused w/ brisk capillary refill, palpable dorsalis pedis pulse  Calves soft and nontender to squeeze.      Invalid input(s): HEMOGLOBIN 10.7. 13.6 pre-op.  Recent Labs   Lab Test 23  1524 23  0529 23  1224   INR 1.09 1.04 0.96     Recent Labs   Lab Test 23  1539 08/15/23  1058 04/10/23  0925   POTASSIUM 4.0 4.0 4.3   CHLORIDE 100 104 96   ANIONGAP 13 12 4         PT:  Not assessed completely. See note. Attempt evaluation tomorrow.           ASSESSMENT: Adarsh Salvador is a 58 year old male POD # 1 s/p Right " total knee arthroplasty on 9/26/2023. Uncontrolled pain. Persistent Hypotension. Surgical hematoma. Multiple drug sensitivities.    PLAN: Overnight observation tonight.  Toradol 15 mg IV x one.  Increase Hydroxyzine to 50 mg.  Continue oral oxycodone at 5 mg until blood pressures become stable.  Consider transfer to TCU for increased assistance needs.

## 2023-09-28 NOTE — PROGRESS NOTES
"[facility] DAILY PROGRESS NOTE      History: Adarsh Salvador is a 58 year old male with advanced knee arthritis now POD # 2 s/p right total knee arthroplasty on 2023 with Dr Ch     Interval events: pain control problems. Started on iv toradol. Hypotension. Dizziness.      SUBJECTIVE:  Patient states pain is much better  controlled with prescribed medications compared to yesterday. Yesterday was horrible once the block wore off. Tolerating diet. Voiding spontaneously. Denies numbness or tingling in affected extremity. Denies nausea or vomiting. Denies chest pain, shortness of breath. Patient notes baseline dizziness, takes meclizine for it.      OBJECTIVE:  /61 (BP Location: Left arm)   Pulse 81   Temp 98.3  F (36.8  C) (Oral)   Resp 16   Ht 1.829 m (6' 0.01\")   Wt 130.6 kg (288 lb)   SpO2 95%   BMI 39.05 kg/m      Temp (24hrs), Av.4  F (36.9  C), Min:98  F (36.7  C), Max:98.8  F (37.1  C)      General:   Patient awake, alert. Significant other at bedside.  On 2L 02 via nasal cannula  Appears comfortable, no acute distress.    right Lower Extremity:    Incision is covered, aquacel in place.  Mild swelling of knee, calf    Intact sensation deep peroneal nerve, superficial peroneal nerve, medial and lateral plantar nerve, sural nerve, saphenous nerve.  Intact ehl, edl, ta, fhl, gs, quads, hamstrings, hip flexors  No calf discomfort active and passive range of motion of the foot/ankle.  Toes warm and well perfused w/ brisk capillary refill, palpable dorsalis pedis pulse  Calves soft and nontender to squeeze.      Invalid input(s): HEMOGLOBIN    Hemoglobin   Date Value Ref Range Status   2023 9.6 (L) 13.3 - 17.7 g/dL Final   2023 10.7 (L) 13.3 - 17.7 g/dL Final   2021 13.1 (L) 13.3 - 17.7 g/dL Final   2019 12.7 (L) 13.3 - 17.7 g/dL Final       Recent Labs   Lab Test 23  0535 23  1524 23  0529   INR 1.21* 1.09 1.04     Recent Labs   Lab Test " 09/28/23  0535 09/13/23  1539 08/15/23  1058   POTASSIUM 3.8 4.0 4.0   CHLORIDE 97* 100 104   ANIONGAP 10 13 12         PT:  pending.           ASSESSMENT: Adarsh Salvador is a 58 year old male POD # 2 s/p right total knee arthroplasty on 9/26/2023    PLAN:    * glad to hear pain is doing better. Will have to hold iv toradol to make sure will be controlled on orals only prior to discharge; also, want to get off any medications contributing to any further bleeding/blood thinning given warfarin/lovenox.  Unclear of dizziness, hypotension. Has been getting ivf. Appr hospitalist input.  * Weightbearing: weight bear as tolerated   * no pillow or bump behind the knee, knee should be straight when at rest.  * elevation of leg, knee should be straight.  * Diet  * IVF, saline lock when tolerating good PO  * Consults: hospitalists, appreciate comanagement  * Pain control: PO pain meds w/ iv for break through pain  * DVT prophy: warfarin, home dosage, bridge with lovenox.  * Activity: with assist and assistive device.  * PT: daily  * Remove waterproof Aquacel AG dressing 10-14 days postoperative at home, cover with dry guaze as needed.    * Dispo: pending. Anticipate home possibly later today if does well with therapy, pain controlled with orals only, stable pressures  * postop followup with Dr Ch upon discharge.    Edwin Ch M.D., M.S.  Dept. of Orthopaedic Surgery  [facility]

## 2023-09-28 NOTE — PROGRESS NOTES
[facility] Hospitalist Progress Note           Assessment & Plan        Adarsh Salvador is a 58 year old male who presented on 9/26/2023 for scheduled Procedure(s):  ARTHROPLASTY, KNEE, TOTAL Right by Edwin Ch MD and is being followed by the hospital medicine service for co-management of acute and/or chronic perioperative medical problems.       S/p Procedure(s):   ARTHROPLASTY, KNEE, TOTAL Right   Difficulty with pain control 9/27    - pain control, wound cares, physical therapy, occupational therapy and DVT prophylaxis per orthopedic surgery service      Post-op hypotension with significant orthostasis   Chronic hypertension  Mildly hypotensive post-op (94/53). Got lightheaded with standing and appears pallorous, but denies symptoms at rest.   -PTA losartan, amlodipine, & chlorthalidone on hold so far - reassess 9/29 - likely will need to slowly resume these as needed going forward.    - patient had significant orthostatic hypotension (sbp 60's) with sitting up at edge of bed 9/27.  Got lightheaded, dizzy. Laid back down, no falls or syncopal episodes.   -1L bolus over 1 hour 9/27, then continued LR @ 125/hour   Still somewhat orthostatic AM 9/8, but orthostatic blood pressures normalized by pm 9/28    History of Recurrent deep vein thrombosis (DVT)/Pulmonary embolism   Likely post-operative hematoma proximal right calf   Previously had superficial thrombophlebitis of right basilic vein Jan 2008. Has fam history of DVT, & had LLE DVT 9/2021. Saw hematology in Dec 2021 who felt that his DVTs were mildly provoked, and that the patient may be able to stop chronic anticoagulation at some point but should be aggressively anticoagulated & deemed high risk for any surgical procedures or hospitalizations. Anticoagulated PTA with warfarin, has been bridging with lovenox. INR POD1 (9/27) 1.04, goal is 2-3.   -Continue warfarin with dosing per pharmacy   -Resume lovenox evening 9/27 100mg Q12hrs   -Very helpful note  9/13/23 from [facility] with bridging instructions (plan to bridge until at least 10/3 INR check).    9/27/23 --  patient is having trouble tolerating opioids due to significant sedation with amount of opioids required to get even minimal pain control. Pain is 10/10 in posterior knee and calf.   -RN discussed difficulty with pain control with attending surgeon. Appreciate assistance with optimal pain management   -LE US 9/27 showed no DVT, but does show complex fluid collection 8.8x1.5cm in proximal right calf. Discussed with Dr. Soria, presume probably hematoma.   -Held lovenox evening 9/27, resumed AM 9/28    -  INR 1.21 AM 9/28 - Continue warfarin as scheduled.   Hemoglobin stable and hematoma clinically much improved 9/28.        Acute kidney injury  Baseline creatinine around 0.9, creatinine POD1 is 1.29. Suspect elevation due to volume depletion from anticipated uvaldo-operative blood loss (patient also on lovenox uvaldo-op so anticipate more oozing) plus decreased PO fluid intake.   -IVF as below   - normalized 9/28         Anemia   Acute blood loss anemia superimposed on chronic anemia.   Baseline hemoglobin around 12.0, 10.7 ? 9.6.   Consistent with routine anticipated blood loss during surgery. Chronic anemia is classified as anemia of chronic disease. Cannot find iron studies in recent past. Monitor intermittently.       Type 2 diabetes mellitus   Borderline poor control, most recent hemoglobin A1c 7.9. Insulin dependent. Managed PTA with insulin 50units glargine daily. Also has aspart 18 units before breakfast and dinner on PTA med list but patient says he has been carb counting to adjust which works well for him; unclear at this time what carb count scale patient is using and he is too drowsy to carb count himself AM 9/27.   -Resume normal insulin glargine dosing 50units daily (is now tolerating diet)  -Held PTA aspart initially due to grogginess as above.    -Medium sliding scale insulin   -Continue  "PTA metformin 1000mg BID.   - continue home regimen on discharge      Obstructive lung disease  Had seen pulmonology in 2019 for dyspnea & cough. PFTs were WNL but with low lung volumes felt possibly related to fibrotic lung disease. Also felt that obesity may be contributing to symptoms. Managed PT with montelukast 10mg at bedtime, Advair, albuterol inhaler. Breathing at baseline POD1.   - doing well on room air 9/28   -Continue home meds.      Migraine  Managed PTA with rizatriptan & pregabalin, continue PRN on discharge.      Generalized anxiety disorder  Managed PTA with nortriptyline 20mg at bedtime, duloxetine 60mg BID, continue both.      Gastroesophageal reflux disease  Continue PTA PPI.      Hypertriglyceridemia  Continue PTA atorvastatin     DVT Prophylaxis: as per orthopedic surgery service - warfarin with lovenox bridging    Code Status: Full Code     Lines: PIV    Burk catheter: NOT present      Diet  Orders Placed This Encounter      Advance Diet as Tolerated: Regular Diet Adult      Discharge Instruction - Regular Diet Adult                        Disposition Plan      Clearly improved today, but still a bit too weak for discharge per physical therapy and patient - with improvement including improvement in orthostasis and pain suspect he'll be able to discharge home tomorrow.  Does not want TCU.                   Jeff Bledsoe MD, MD  Hospitalist Service  [facility]  Securely message with the [employer] Web Console (learn more here)  Text page via [employer] Paging/Directory             Interval History:   Patient still weak and somewhat light-headed with standing but improving.  No fever or chills. Pain markedly better today.  Right upper calf pain is pretty much gone today.  Overall feels \"much better\" today.  No new or worsening symptoms. No dyspnea.  No fever or chills.                 Review of Systems:    ROS: 10 point ROS neg other than the symptoms noted above in the HPI. "           Medications:   Current active medications and PTA medications reviewed, see medication list for details.            Physical Exam:   Vitals were reviewed  Patient Vitals for the past 24 hrs:   BP Temp Temp src Pulse Resp SpO2   23 1025 127/67 -- -- -- -- --   23 1016 (!) 81/44 -- -- 107 18 98 %   23 0723 107/61 98.3  F (36.8  C) Oral 81 16 95 %   23 0625 114/70 -- -- -- -- 99 %   23 0351 101/61 -- -- -- -- --   23 0209 98/45 98  F (36.7  C) Oral 77 16 97 %   23 2148 107/50 98.8  F (37.1  C) Oral 88 19 97 %   23 1700 94/61 -- -- -- -- --   23 1559 (!) 143/61 98.3  F (36.8  C) Oral 93 18 93 %   23 1325 100/50 -- -- -- -- --       Temperatures:  Current - Temp: 98.3  F (36.8  C); Max - Temp  Av.4  F (36.9  C)  Min: 98  F (36.7  C)  Max: 98.8  F (37.1  C)  Respiration range: Resp  Av.4  Min: 16  Max: 19  Pulse range: Pulse  Av.2  Min: 77  Max: 107  Blood pressure range: Systolic (24hrs), Av , Min:81 , Max:143   ; Diastolic (24hrs), Av, Min:44, Max:70    Pulse oximetry range: SpO2  Av.5 %  Min: 93 %  Max: 99 %  I/O last 3 completed shifts:  In: 2600 [I.V.:600; IV Piggyback:2000]  Out: 3000 [Urine:3000]    Intake/Output Summary (Last 24 hours) at 2023 1103  Last data filed at 2023 0626  Gross per 24 hour   Intake 2600 ml   Output 3000 ml   Net -400 ml     EXAM:  General: awake and alert, NAD, oriented x 3  Head: normocephalic  Neck: unremarkable, no lymphadenopathy   HEENT: oropharynx pink and moist    Heart: Regular rate and rhythm, no murmurs, rubs, or gallops  Lungs: clear to auscultation bilaterally with good air movement throughout  Abdomen: soft, non-tender, no masses or organomegaly  Right calf unremarkable with no tenderness or palpable swelling today   Extremities: no edema in lower extremities   Skin unremarkable as visualized.               Data:     Reviewed data:  Results for orders placed or performed  during the hospital encounter of 09/26/23 (from the past 24 hour(s))   Glucose by meter   Result Value Ref Range    GLUCOSE BY METER POCT 185 (H) 70 - 99 mg/dL   INR   Result Value Ref Range    INR 1.09 0.85 - 1.15   Extra Tube    Narrative    The following orders were created for panel order Extra Tube.  Procedure                               Abnormality         Status                     ---------                               -----------         ------                     Extra Green Top (Lithium...[[id number]]                      Final result                 Please view results for these tests on the individual orders.   Extra Green Top (Lithium Heparin) Tube   Result Value Ref Range    Hold Specimen C    US Lower Extremity Venous Duplex Bilateral    Narrative    US LOWER EXTREMITY VENOUS DUPLEX BILATERAL 9/27/2023 4:05 PM    CLINICAL HISTORY: post op R knee arthroplasty uncontrollable pain  behind surgical knee & in calf. Please eval for possible DVT.  TECHNIQUE: Venous Duplex ultrasound of bilateral lower extremities  with and without compression, augmentation and duplex. Color flow and  spectral Doppler with waveform analysis performed.    COMPARISON: None.    FINDINGS: Exam includes the common femoral, femoral, popliteal veins  as well as segmentally visualized deep calf veins and greater  saphenous vein.     RIGHT: No deep vein thrombosis. There has been prior stripping of the  greater saphenous vein. Given this limitation, no superficial  thrombophlebitis. No popliteal cyst. Complex fluid collection in the  proximal calf 8.8 x 1.5 cm.    LEFT: No deep vein thrombosis. There has been prior stripping of the  greater saphenous vein. Given this limitation, no superficial  thrombophlebitis. No popliteal cyst.      Impression    IMPRESSION:  1.  No deep venous thrombosis in the bilateral lower extremities.  2.  Complex fluid collection in the right calf measuring up to 8.8 cm.    JUAN JOSE DELGADO MD          SYSTEM ID:  [id number]   Glucose by meter   Result Value Ref Range    GLUCOSE BY METER POCT 171 (H) 70 - 99 mg/dL   Glucose by meter   Result Value Ref Range    GLUCOSE BY METER POCT 168 (H) 70 - 99 mg/dL   Glucose by meter   Result Value Ref Range    GLUCOSE BY METER POCT 142 (H) 70 - 99 mg/dL   Basic metabolic panel   Result Value Ref Range    Sodium 133 (L) 135 - 145 mmol/L    Potassium 3.8 3.4 - 5.3 mmol/L    Chloride 97 (L) 98 - 107 mmol/L    Carbon Dioxide (CO2) 26 22 - 29 mmol/L    Anion Gap 10 7 - 15 mmol/L    Urea Nitrogen 15.6 6.0 - 20.0 mg/dL    Creatinine 1.06 0.67 - 1.17 mg/dL    GFR Estimate 82 >60 mL/min/1.73m2    Calcium 8.5 (L) 8.6 - 10.0 mg/dL    Glucose 154 (H) 70 - 99 mg/dL   INR   Result Value Ref Range    INR 1.21 (H) 0.85 - 1.15   CBC with platelets   Result Value Ref Range    WBC Count 7.0 4.0 - 11.0 10e3/uL    RBC Count 3.38 (L) 4.40 - 5.90 10e6/uL    Hemoglobin 9.6 (L) 13.3 - 17.7 g/dL    Hematocrit 29.7 (L) 40.0 - 53.0 %    MCV 88 78 - 100 fL    MCH 28.4 26.5 - 33.0 pg    MCHC 32.3 31.5 - 36.5 g/dL    RDW 13.8 10.0 - 15.0 %    Platelet Count 169 150 - 450 10e3/uL   Glucose by meter   Result Value Ref Range    GLUCOSE BY METER POCT 158 (H) 70 - 99 mg/dL     *Note: Due to a large number of results and/or encounters for the requested time period, some results have not been displayed. A complete set of results can be found in Results Review.           Attestation:  I have reviewed today's vital signs, notes, medications, labs and imaging.  Amount of time spent managing this patient today:  40 minutes.     Jeff Bledsoe MD

## 2023-09-28 NOTE — PROGRESS NOTES
Care Management Follow Up    Length of Stay (days): 0    Expected Discharge Date: 09/27/2023     Concerns to be Addressed: no discharge needs identified     Patient plan of care discussed at interdisciplinary rounds: Yes    Anticipated Discharge Disposition: Home (with assistance from future )     Anticipated Discharge Services: None  Anticipated Discharge DME:      Patient/family educated on Medicare website which has current facility and service quality ratings:    Education Provided on the Discharge Plan:    Patient/Family in Agreement with the Plan:      Referrals Placed by CM/SW:    Private pay costs discussed: Not applicable    Additional Information:  Writer informed patient CTS was unable to secure a Home Care agency for PT and patient would need to come in as an outpatient. Patient is in agreement states he has helps at home and will be able to come for therapy.  Araceli Blount RN   Inpatient Care Coordinator  [facility] [phone]  [facility] [phone]     Araceli Blount RN

## 2023-09-28 NOTE — PLAN OF CARE
Goal Outcome Evaluation:    Patient vital signs are at baseline: No, Reason: hypotension and dizzy spells.  Patient able to ambulate as they were prior to admission or with assist devices provided by therapies during their stay:  No,  Reason:  not steady on his feet, dizzy spells.  Patient MUST void prior to discharge:  Yes  Patient able to tolerate oral intake:  Yes  Pain has adequate pain control using Oral analgesics:  Yes  Does patient have an identified :  Yes  Has goal D/C date and time been discussed with patient:  Yes      Is able to take steps to the chair and sat in chair this afternoon. Has not walked in room or outside of room, aside from small steps to chair. Patient willing to stay the night but does not want transitional care upon discharge. No Toradol given since 0830. PRN oxycodone given 1137.

## 2023-09-28 NOTE — PROGRESS NOTES
"LORI Muñiz gave order for Toradol 15 mg X1 after consulting with Dr Ch. Pt prior to Toradol was moaning out loudly, whimpering and restless. Post Toradol, pt stated pain of \"5\" on 1-10 scale and is currently quietly resting, appears to be sleeping. CAPNO on and WNL with patient on 3L/NC. Prior pt was dropping into the 80s with bearing down and moaning, but easily recovered into the 90s with 1:1 instructions to correctly breath. Has rash listed as allergy to Toradol and received Atarax prior to dose. Kevin reports he \"has been tolerating meds if given Benadryl or something before.\"  Did speak to Dr Garibay, on-call for Dr Ch and did receive and order for Toradol 15mg every 6 hours Prn for 24 hours with Atarax increase to 25-50 mg every 6 hours PRN prior to this order. Will continue to monitor for reaction to meds.   "

## 2023-09-29 ENCOUNTER — APPOINTMENT (OUTPATIENT)
Dept: PHYSICAL THERAPY | Facility: CLINIC | Age: 58
End: 2023-09-29
Attending: ORTHOPAEDIC SURGERY
Payer: COMMERCIAL

## 2023-09-29 ENCOUNTER — DOCUMENTATION ONLY (OUTPATIENT)
Dept: ANTICOAGULATION | Facility: CLINIC | Age: 58
End: 2023-09-29
Payer: COMMERCIAL

## 2023-09-29 VITALS
TEMPERATURE: 98.4 F | DIASTOLIC BLOOD PRESSURE: 64 MMHG | OXYGEN SATURATION: 96 % | WEIGHT: 288 LBS | HEIGHT: 72 IN | BODY MASS INDEX: 39.01 KG/M2 | HEART RATE: 90 BPM | SYSTOLIC BLOOD PRESSURE: 122 MMHG | RESPIRATION RATE: 16 BRPM

## 2023-09-29 DIAGNOSIS — I82.409 RECURRENT DEEP VEIN THROMBOSIS (DVT) (H): Primary | ICD-10-CM

## 2023-09-29 LAB
ANION GAP SERPL CALCULATED.3IONS-SCNC: 9 MMOL/L (ref 7–15)
BUN SERPL-MCNC: 15 MG/DL (ref 6–20)
CALCIUM SERPL-MCNC: 8.2 MG/DL (ref 8.6–10)
CHLORIDE SERPL-SCNC: 100 MMOL/L (ref 98–107)
CREAT SERPL-MCNC: 1.05 MG/DL (ref 0.67–1.17)
DEPRECATED HCO3 PLAS-SCNC: 27 MMOL/L (ref 22–29)
EGFRCR SERPLBLD CKD-EPI 2021: 83 ML/MIN/1.73M2
ERYTHROCYTE [DISTWIDTH] IN BLOOD BY AUTOMATED COUNT: 13.5 % (ref 10–15)
ERYTHROCYTE [DISTWIDTH] IN BLOOD BY AUTOMATED COUNT: 13.7 % (ref 10–15)
GLUCOSE BLDC GLUCOMTR-MCNC: 112 MG/DL (ref 70–99)
GLUCOSE BLDC GLUCOMTR-MCNC: 124 MG/DL (ref 70–99)
GLUCOSE BLDC GLUCOMTR-MCNC: 133 MG/DL (ref 70–99)
GLUCOSE SERPL-MCNC: 117 MG/DL (ref 70–99)
HCT VFR BLD AUTO: 25.9 % (ref 40–53)
HCT VFR BLD AUTO: 26.6 % (ref 40–53)
HGB BLD-MCNC: 8.6 G/DL (ref 13.3–17.7)
HGB BLD-MCNC: 8.7 G/DL (ref 13.3–17.7)
INR PPP: 1.5 (ref 0.85–1.15)
MCH RBC QN AUTO: 28.7 PG (ref 26.5–33)
MCH RBC QN AUTO: 28.7 PG (ref 26.5–33)
MCHC RBC AUTO-ENTMCNC: 32.7 G/DL (ref 31.5–36.5)
MCHC RBC AUTO-ENTMCNC: 33.2 G/DL (ref 31.5–36.5)
MCV RBC AUTO: 86 FL (ref 78–100)
MCV RBC AUTO: 88 FL (ref 78–100)
PLATELET # BLD AUTO: 164 10E3/UL (ref 150–450)
PLATELET # BLD AUTO: 167 10E3/UL (ref 150–450)
POTASSIUM SERPL-SCNC: 3.7 MMOL/L (ref 3.4–5.3)
RBC # BLD AUTO: 3 10E6/UL (ref 4.4–5.9)
RBC # BLD AUTO: 3.03 10E6/UL (ref 4.4–5.9)
SODIUM SERPL-SCNC: 136 MMOL/L (ref 135–145)
WBC # BLD AUTO: 5.7 10E3/UL (ref 4–11)
WBC # BLD AUTO: 6.5 10E3/UL (ref 4–11)

## 2023-09-29 PROCEDURE — 258N000003 HC RX IP 258 OP 636: Performed by: FAMILY MEDICINE

## 2023-09-29 PROCEDURE — 250N000013 HC RX MED GY IP 250 OP 250 PS 637: Performed by: PHYSICIAN ASSISTANT

## 2023-09-29 PROCEDURE — 250N000011 HC RX IP 250 OP 636: Mod: JZ

## 2023-09-29 PROCEDURE — 36415 COLL VENOUS BLD VENIPUNCTURE: CPT

## 2023-09-29 PROCEDURE — 97530 THERAPEUTIC ACTIVITIES: CPT | Mod: GP

## 2023-09-29 PROCEDURE — 99418 PROLNG IP/OBS E/M EA 15 MIN: CPT

## 2023-09-29 PROCEDURE — 85610 PROTHROMBIN TIME: CPT | Performed by: ORTHOPAEDIC SURGERY

## 2023-09-29 PROCEDURE — 96372 THER/PROPH/DIAG INJ SC/IM: CPT

## 2023-09-29 PROCEDURE — 85027 COMPLETE CBC AUTOMATED: CPT | Performed by: FAMILY MEDICINE

## 2023-09-29 PROCEDURE — 250N000013 HC RX MED GY IP 250 OP 250 PS 637

## 2023-09-29 PROCEDURE — 36415 COLL VENOUS BLD VENIPUNCTURE: CPT | Performed by: FAMILY MEDICINE

## 2023-09-29 PROCEDURE — 99215 OFFICE O/P EST HI 40 MIN: CPT

## 2023-09-29 PROCEDURE — 999N000111 HC STATISTIC OT IP EVAL DEFER: Performed by: OCCUPATIONAL THERAPIST

## 2023-09-29 PROCEDURE — 250N000013 HC RX MED GY IP 250 OP 250 PS 637: Performed by: ORTHOPAEDIC SURGERY

## 2023-09-29 PROCEDURE — 85027 COMPLETE CBC AUTOMATED: CPT

## 2023-09-29 PROCEDURE — 82962 GLUCOSE BLOOD TEST: CPT

## 2023-09-29 PROCEDURE — 97116 GAIT TRAINING THERAPY: CPT | Mod: GP

## 2023-09-29 PROCEDURE — 80048 BASIC METABOLIC PNL TOTAL CA: CPT | Performed by: FAMILY MEDICINE

## 2023-09-29 RX ADMIN — OXYCODONE HYDROCHLORIDE 5 MG: 5 TABLET ORAL at 08:35

## 2023-09-29 RX ADMIN — OXYCODONE HYDROCHLORIDE 5 MG: 5 TABLET ORAL at 11:07

## 2023-09-29 RX ADMIN — SODIUM CHLORIDE, POTASSIUM CHLORIDE, SODIUM LACTATE AND CALCIUM CHLORIDE: 600; 310; 30; 20 INJECTION, SOLUTION INTRAVENOUS at 04:05

## 2023-09-29 RX ADMIN — ACETAMINOPHEN 975 MG: 325 TABLET, FILM COATED ORAL at 11:07

## 2023-09-29 RX ADMIN — SENNOSIDES AND DOCUSATE SODIUM 1 TABLET: 50; 8.6 TABLET ORAL at 08:35

## 2023-09-29 RX ADMIN — POLYETHYLENE GLYCOL 3350 17 G: 17 POWDER, FOR SOLUTION ORAL at 08:33

## 2023-09-29 RX ADMIN — OXYCODONE HYDROCHLORIDE 10 MG: 5 TABLET ORAL at 04:04

## 2023-09-29 RX ADMIN — METHOCARBAMOL 1000 MG: 500 TABLET ORAL at 08:35

## 2023-09-29 RX ADMIN — METFORMIN HYDROCHLORIDE 1000 MG: 500 TABLET, EXTENDED RELEASE ORAL at 08:35

## 2023-09-29 RX ADMIN — PANTOPRAZOLE SODIUM 40 MG: 40 TABLET, DELAYED RELEASE ORAL at 08:35

## 2023-09-29 RX ADMIN — INSULIN GLARGINE 50 UNITS: 100 INJECTION, SOLUTION SUBCUTANEOUS at 08:41

## 2023-09-29 RX ADMIN — METHOCARBAMOL 1000 MG: 500 TABLET ORAL at 11:07

## 2023-09-29 RX ADMIN — ENOXAPARIN SODIUM 100 MG: 100 INJECTION SUBCUTANEOUS at 08:38

## 2023-09-29 RX ADMIN — DULOXETINE HYDROCHLORIDE 60 MG: 30 CAPSULE, DELAYED RELEASE ORAL at 08:35

## 2023-09-29 RX ADMIN — HYDROXYZINE HYDROCHLORIDE 50 MG: 25 TABLET, FILM COATED ORAL at 04:04

## 2023-09-29 RX ADMIN — ACETAMINOPHEN 975 MG: 325 TABLET, FILM COATED ORAL at 04:04

## 2023-09-29 RX ADMIN — HYDROXYZINE HYDROCHLORIDE 50 MG: 25 TABLET, FILM COATED ORAL at 12:19

## 2023-09-29 RX ADMIN — PREGABALIN 50 MG: 50 CAPSULE ORAL at 08:35

## 2023-09-29 ASSESSMENT — ACTIVITIES OF DAILY LIVING (ADL)
ADLS_ACUITY_SCORE: 19
ADLS_ACUITY_SCORE: 19
ADLS_ACUITY_SCORE: 20
ADLS_ACUITY_SCORE: 19
ADLS_ACUITY_SCORE: 20

## 2023-09-29 NOTE — PROGRESS NOTES
ANTICOAGULATION  MANAGEMENT: Discharge Review    Adarsh Salvador chart reviewed for anticoagulation continuity of care    Hospital Admission on 9-26 for right knee arthroplasty.    Discharge disposition: Home    Results:    Recent labs: (last 7 days)     09/26/23  1224 09/27/23  0529 09/27/23  1524 09/28/23  0535 09/29/23  0521   INR 0.96 1.04 1.09 1.21* 1.50*     Anticoagulation inpatient management:     home regimen resumed post procedure on 9-    Anticoagulation discharge instructions:     Warfarin dosing: home regimen continued   Bridging: bridging with enoxaparin (Lovenox)   INR goal change: No      Medication changes affecting anticoagulation: Yes: tylenol    Additional factors affecting anticoagulation: No     PLAN     Recommend to check INR on 10-3 as scheduled     Patient not contacted    Anticoagulation Calendar updated    Thalia Acharya RN

## 2023-09-29 NOTE — PHARMACY-CONSULT NOTE
Clinical Pharmacy- Warfarin Discharge Note  This patient is currently on warfarin for the treatment of recurrent DVT.  INR Goal= 2-3  Expected length of therapy lifetime.    Warfarin PTA Regimen: 15mg Tues/Sat, 12.5mg ROW    Anticoagulation Dose History  More data exists         Latest Ref Rng & Units 7/26/2023 9/13/2023 9/18/2023 9/26/2023 9/27/2023 9/28/2023 9/29/2023   Recent Dosing and Labs   warfarin ANTICOAGULANT (COUMADIN) tablet 12.5 mg - - - - - 12.5 mg, $Given 12.5 mg, $Given -   warfarin ANTICOAGULANT (COUMADIN) tablet 15 mg - - - - 15 mg, $Given - - -   INR 0.85 - 1.15 2.1  1.3  1.7  0.96  1.09  1.04  1.21  1.50        Vitamin K doses administered during the last 7 days: n/a  FFP administered during the last 7 days: n/a  Recommend discharging the patient on a warfarin regimen of 15 mg on Tuesday/Saturday and 12.5 mg ROW with a prescription for warfarin 5mg tablets. Take along with enoxaparin 100 mg BID for bridge therapy following surgery with INR check on Tuesday 10/3.     Roderick Rubalcava RPH on 9/29/2023 at 1:59 PM

## 2023-09-29 NOTE — PROGRESS NOTES
[facility]    Medicine Progress Note - Hospitalist Service    Date of Admission:  9/26/2023    Assessment & Plan   Adarsh Salvador is a 58 year old male with a past medical history of T2DM c/b diabetic polyneuropathy & bowel incontinence (S/P R transsacral bowel nerve stimulator 02/2015), recurrent DVTs & PEs (on AC), class II obesity, chronic anemia, obstructive lung disease, CONNOR, GERD, HLD, kidney stones (S/P multiple cystoscopies w/ stents 2008, 2010, 2012), traumatic R knee injury (1990) c/b chronic R knee pain (S/P multiple arthroscopies 2007, 2022, 2023). He was admitted for R TKA in the setting of refractory R knee pain. He was admitted for further post-op monitoring and management. Medicine was consulted for medical comanagement.     Chronic R Knee Pain (S/P R TKA 09/26/23)   Sustained R knee injury after reportedly sliding off an icy roof in 1990, has had chronic, intermittent pain since then, refractory to topical, oral, and injected agents. Thus, underwent R TKA w/ Dr. Edwin Mcconnell on 09/26/23 w/o intraoperative complications. EBL ~100mL. Post-op course complicated by the outlined issues as below.  - Post-op mgmt per Ortho   - WBAT w/ knee arthroplasty protocol   - DVT ppx bridging from Lovenox back to PTA Warfarin (see below)   - PT/OT   - Senokot BID for now  - Pain mgmt per Ortho   - Agree w/ scheduled Tylenol 975mg Q8H     - Agree w/ scheduled Robaxin 1000mg QID   - Oxycodone 5-10mg Q4H PRN for moderate pain   - IV Dilaudid 0.2mg or 0.4mg for severe/breakthrough pain (if no relief w/ PO)   - Continue PTA Lyrica, Nortriptyline, Cymbalta     Hx of recurrent DVT  Hx of PE  Concern for post-operative hematoma, R calf, improving  Previously had superficial thrombophlebitis of right basilic vein Jan 2008. Has fam history of DVT, & had LLE DVT 9/2021. Saw Hematology in Dec 2021 who felt that his DVTs were mildly provoked, and that the patient may be able to stop chronic anticoagulation  at some point but should be aggressively anticoagulated & deemed high risk for any surgical procedures or hospitalizations. Anticoagulated PTA with Warfarin (INR goal 2-3). Warfarin held for surgery as above, but now bridging back to Warfarin with lovenox. There was some c/f recurrent DVT on POD #1 as he had increased pain, but BLE US negative for DVTs, though did show complex fluid collection measuring 8.8cm x 1.5cm in proximal R calf, suspected hematoma.  - Continue bridging w/ Lovenox until INR is at goal (today is 1.50)   - Resumed on 9/28 following discovery of hematoma on US  - Continue Warfarin with dosing per pharmacy  - At discharge, can continue with Lovenox 100mg injections Q12H until Warfarin bridged as below  - Very helpful note 9/18/23 from antico clinic with bridging instructions (plan to bridge until at least 10/3 INR check)     KYM, suspect prerenal, improving  Baseline creatinine around 0.9, peaked at 1.29 on POD #1. Cr today is 1.05. Suspect KYM is multifactorial and prerenal, and d/t volume depletion from anticipated uvaldo-operative blood loss (patient also on lovenox uvaldo-op so likely had more oozing), decreased PO fluid intake, and R calf hematoma as above. No lyte derangements.  - Get repeat BMP w/ PCP within one week of discharge     Post-op Hypotension, mild, improving  Chronic hypertension  Mildly hypotensive post-op (90s/50s). Got lightheaded with standing, but denied symptoms at rest. BP trends over past day show back to goal (110s-130s/60s-70s).   - Resumed PTA Amlodipine 10mg today (POD #3) given uptrending BP  - Can resume Losartan and Chlorthalidone at discharge   - Hold at home if SBP <110     Acute-on-Chronic Anemia  Acute blood loss anemia superimposed on chronic anemia. Baseline hemoglobin around 12.0, hemoglobin POD #1 10.7, now down to 8.7 on POD #3, suspected d/t hematoma in R calf and blood loss during surgery. His chronic anemia is likely d/t chronic disease. Cannot find  iron studies in recent past. Recheck on POD #3 showed stable Hgb of 8.6. Has remained HDS.  - Follow-up w/ PCP for repeat CBC within one week  - Go back to ER if dizzy, chest pain, LOC, dyspnea     Type 2 Diabetes Mellitus, insulin-dependent  Diabetic Polyneuropathy  Borderline poor control, most recent hemoglobin A1c 7.9 on 09/13/23. Managed PTA w/ 50 units Lantus daily, Metformin 1000mg BID. He also has Novolog 18 units before breakfast and dinner on PTA med list but patient says he has been carb counting to adjust which works well for him; unclear at this time what carb count scale patient is using and he is too drowsy to carb count himself AM 9/27.   - Continue PTA Lantus at 50 units daily  - Medium sliding scale insulin   - Hold PTA Novolog dosing until patient is alert enough to participate in carb counting like he does PTA  - Continue PTA metformin 1000mg BID  - BG checks TID AC + qSH + 0200  - Hypoglycemia protocol  - On pain medications for diabetic polyneuropathy as above     Obstructive Lung Disease  Had seen pulmonology in 2019 for dyspnea & cough. PFTs were WNL but with low lung volumes felt possibly related to fibrotic lung disease. Also felt that obesity may be contributing to symptoms. Managed PT with montelukast 10mg at bedtime, Advair, albuterol inhaler. No hypoxia, no dyspnea.  - Continue home meds     Chronic Migraines  Managed PTA with PRN rizatriptan & Lyrica.   - Continue Lyrica while here  - Continue Rizatriptan PRN on discharge     Generalized Anxiety Disorder  Managed PTA with Nortriptyline 20mg at bedtime, Cymbalta 60mg BID.  - Continue PTA meds     GERD  PTA PPI.  - Continue PTA PPI      Hypertriglyceridemia  PTA Statin.  - Continue PTA Statin       Diet: Advance Diet as Tolerated: Regular Diet Adult  Discharge Instruction - Regular Diet Adult    DVT Prophylaxis: Bridging from Lovenox to Warfarin  Burk Catheter: Not present  Lines: None     Cardiac Monitoring: None  Code Status: Full  "Code      Clinically Significant Risk Factors Present on Admission                 # Drug Induced Coagulation Defect: home medication list includes an anticoagulant medication    # Drug Induced Platelet Defect: home medication list includes an antiplatelet medication     # Hypertension: Noted on problem list     # DMII: A1C = 7.9 % (Ref range: 0.0 - 5.6 %) within past 6 months      # Obesity: Estimated body mass index is 39.05 kg/m  as calculated from the following:    Height as of this encounter: 1.829 m (6' 0.01\").    Weight as of this encounter: 130.6 kg (288 lb).       # Asthma: noted on problem list        Disposition Plan     Expected Discharge Date: 09/29/2023                The patient's care was discussed with the Attending Physician, Dr. Carlos De Oliveira, Bedside Nurse, Patient, and Patient's Family.    Haroon Montaño PA-C  Hospitalist Service  [facility]  Securely message with e[employer] (more info)  Text page via [facility] Paging/Directory   ______________________________________________________________________    Interval History   Patient reports feeling sleepy this morning.  However, he does feel that his pain is well controlled with his current pain regimen, estimating his pain to be 5/10 in severity, improved from a couple days ago.  He otherwise denies headaches, vision changes, chest pain, shortness of breath, abdominal pain, nausea, vomiting, urinary or bowel changes, lower extremity swelling.    Does acknowledge his plan to bridge from Lovenox to warfarin, and reports that he has Lovenox injections at home.    Physical Exam   Vital Signs: Temp: 98.4  F (36.9  C) Temp src: Oral BP: 117/73 Pulse: 90   Resp: 18 SpO2: 96 % O2 Device: None (Room air)    Weight: 288 lbs 0 oz    Constitutional: awake, alert, but drowsy-appearing, cooperative, no apparent distress, and appears stated age. Lying inclined in bedside chair,  Eyes: lids and lashes normal, sclera clear, and conjunctiva " normal  ENT: normocephalic, without obvious abnormality  Respiratory: No increased work of breathing, good air exchange, clear to auscultation bilaterally, no crackles or wheezing  Cardiovascular: regular rate and rhythm, normal S1 and S2, no S3, no S4, and no murmur noted  GI: normal bowel sounds, soft, non-distended, and non-tender  Skin: no bruising or bleeding, no redness, warmth, or swelling, no rashes, and no lesions. RLE visualized w/ post-surgical dressing which is not removed for exam. However, dressing appears C/D/I, and there is no surrounding erythema, drainage, or bleeding.  Musculoskeletal: no lower extremity pitting edema present, no deformities. No calf tenderness.   Neurologic: Moving all extremities equally and spontaneously. No obvious focal neuro deficits.  Neuropsychiatric: General: normal, calm, and normal eye contact  Level of consciousness: drowsy  Affect: flat    Medical Decision Making       90 MINUTES SPENT BY ME on the date of service doing chart review, history, exam, documentation & further activities per the note.      Data     I have personally reviewed the following data over the past 24 hrs:    6.5  \   8.6 (L)   / 164     136 100 15.0 /  133 (H)   3.7 27 1.05 \     INR:  1.50 (H) PTT:  N/A   D-dimer:  N/A Fibrinogen:  N/A       Imaging results reviewed over the past 24 hrs:   No results found for this or any previous visit (from the past 24 hour(s)).  Recent Labs   Lab 09/29/23  1218 09/29/23  1106 09/29/23  0753 09/29/23  0521 09/28/23  0752 09/28/23  0535 09/27/23  1730 09/27/23  1524 09/27/23  1213 09/27/23  0529   WBC 6.5  --   --  5.7  --  7.0  --   --   --   --    HGB 8.6*  --   --  8.7*  --  9.6*  --   --   --  10.7*   MCV 86  --   --  88  --  88  --   --   --   --      --   --  167  --  169  --   --   --  230   INR  --   --   --  1.50*  --  1.21*  --  1.09  --  1.04   NA  --   --   --  136  --  133*  --   --   --   --    POTASSIUM  --   --   --  3.7  --  3.8  --   --    --   --    CHLORIDE  --   --   --  100  --  97*  --   --   --   --    CO2  --   --   --  27  --  26  --   --   --   --    BUN  --   --   --  15.0  --  15.6  --   --   --   --    CR  --   --   --  1.05  --  1.06  --   --   --  1.29*   ANIONGAP  --   --   --  9  --  10  --   --   --   --    LUZ  --   --   --  8.2*  --  8.5*  --   --   --   --    GLC  --  133* 124* 117*   < > 154*   < >  --    < > 186*    < > = values in this interval not displayed.

## 2023-09-29 NOTE — PLAN OF CARE
Pt discharged home at 1530.  Pain managed on oral medications.  Able to ambulate with walker and SBA.  Tolerating diet, voiding, VSS.  Discharge instructions reviewed with pt and SO, copy provided.  Both verbalized understanding.  Prescriptions sent to pharmacy.  All belongings sent home with pt.

## 2023-09-29 NOTE — PLAN OF CARE
Physical Therapy Discharge Summary    Reason for therapy discharge:    All goals and outcomes met, no further needs identified.    Progress towards therapy goal(s). See goals on Care Plan in [facility] electronic health record for goal details.  Goals met    Therapy recommendation(s):    Continued therapy is recommended.  Rationale/Recommendations:  Recommend continued OP PT to progress R TKA aftercares. Pt demonstrated ability to ambulate household distances with walker today without complaints of dizziness, pain rated as 5/10. Walker issued through insurance and significant other looking into renting wheelchair for longer distances due to pain and complicated hospitalization. Pt wanting to return home once medically stable and is now safe to do so from a mobility standpoint.

## 2023-09-29 NOTE — UTILIZATION REVIEW
"Admission Status; Secondary Review Determination     Admission Date: 9/26/2023 11:28 AM       Under the authority of the Utilization Management Committee, the utilization review process indicated a secondary review on the above patient.  The review outcome is based on review of the medical records, discussions with staff, and applying clinical experience noted on the date of the review.        ()      Inpatient Status Appropriate - This patient's medical care is consistent with medical management for inpatient care and reasonable inpatient medical practice.      () Observation Status Appropriate - This patient does not meet hospital inpatient criteria and is placed in observation status. If this patient's primary payer is Medicare and was admitted as an inpatient, Condition Code 44 should be used and patient status changed to \"observation\".   () Admission Status NOT Appropriate - This patient's medical care is not consistent with medical management for Inpatient or Observation Status.        (x) Outpatient Procedure Status Appropriate - Procedure not on Medicare Inpatient list and no complications at the time of this review       RATIONALE FOR DETERMINATION      Brief clinical presentation, information copied from the chart, abbreviated and edited for relevant content:       Adarsh Salvador is a 58 year old male POD # 2 s/p right total knee arthroplasty on 9/26/2023. Was supposed to discharge yesterday but having uncontrolled pain and some dizziness. Improved pain management and given IVF bolus. Discussed with the attending who plans on discharging today. IF NOT, due to pain or other post op issues, will advance to IP.       In summary, the severity of illness, intensity of service provided, expected length of stay and risk for adverse outcome make the care complex, high risk and appropriate for hospital admission.        The information on this document is developed by the utilization review team in order for the " business office to ensure compliance.  This only denotes the appropriateness of proper admission status and does not reflect the quality of care rendered.         The definitions of Inpatient Status and Observation Status used in making the determination above are those provided in the CMS Coverage Manual, Chapter 1 and Chapter 6, section 70.4.      Sincerely,      Radha Hsu MD   Utilization Review/ Case Management  [facility].

## 2023-09-29 NOTE — PROGRESS NOTES
Patient vital signs are at baseline: Yes  Patient able to ambulate as they were prior to admission or with assist devices provided by therapies during their stay:  No,  Reason:  Patient has been 1-2 assist.   Patient MUST void prior to discharge:  Yes  Patient able to tolerate oral intake:  Yes  Pain has adequate pain control using Oral analgesics:  No,  Reason:  patient had an episode of severe pain just after PO medication administered which included Tylenol, Atarax, and Oxycodone. See MAR for medication details. Overnight pain has improved.   Does patient have an identified :  Yes  Has goal D/C date and time been discussed with patient:  Yes

## 2023-09-29 NOTE — PLAN OF CARE
Occupational Therapy: Orders received. Chart reviewed and discussed with care team.? Occupational Therapy not indicated due to patient with no acute OT needs identified at this time.? Defer discharge recommendations to PT.? Will complete orders.

## 2023-09-29 NOTE — PROGRESS NOTES
"[facility] DAILY PROGRESS NOTE      History: Adarsh Salvador is a 58 year old male with advanced knee arthritis now POD # 3 s/p Right total knee arthroplasty on 2023 with Dr. Ch.     Interval events: Poor pain control due to hypotension with significant orthostasis limiting use of narcotics. Max dose of Oxycodone was limited to 5 mg. Multiple allergies to pain medications. Acute blood loss anemia with underlying chronic anemia. Post-op hematoma formation Right calf measuring 8.8 by 1.5 cm. Non expanding. Lovenox was held while initiating Toradol 15 IV. Hemoglobin was 13.6 pre-op, 10.7 on POD 1 and dropped to 8.7 on POD 2. Recheck prior to discharge shows stabilization at 8.6. He is bridging his Coumadin with Lovenox 100 mg  bid.      SUBJECTIVE:  Feeling much better today. Rated at 5/10. Lightheadedness has resolved. PT finally assessed. Lateral calf at hematoma site is non-tender. He would like to go home. He now has a wheelchair as well as a walker. I am encouraging him to obtain a blood pressure cuff to monitor pressures as we restart his Antihypertensives.    OBJECTIVE:  /64 (BP Location: Left arm)   Pulse 90   Temp 98.4  F (36.9  C) (Oral)   Resp 16   Ht 1.829 m (6' 0.01\")   Wt 130.6 kg (288 lb)   SpO2 96%   BMI 39.05 kg/m      Temp (24hrs), Av.7  F (37.1  C), Min:98.4  F (36.9  C), Max:99.3  F (37.4  C)      General: Much improved. Sitting comfortably on room air. Fluent conversation with no pain response. Spirometry improved to 2500.    Right Lower Extremity:  Incision is covered, aquacel in place. No drainage noted.     Mild swelling of knee, calf without tenderness nor expansion of Right calf hematoma.    Intact sensation deep peroneal nerve, superficial peroneal nerve, medial and lateral plantar nerve, sural nerve, saphenous nerve.  Intact ehl, edl, ta, fhl, gs, quads, hamstrings, hip flexors  Toes warm and well perfused w/ brisk capillary refill, palpable dorsalis pedis and " anterior tibial pulses.  Calves soft and nontender to squeeze.      Invalid input(s): HEMOGLOBIN 8.6  Recent Labs   Lab Test 09/29/23  0521 09/28/23  0535 09/27/23  1524   INR 1.50* 1.21* 1.09     Recent Labs   Lab Test 09/29/23  0521 09/28/23  0535 09/13/23  1539   POTASSIUM 3.7 3.8 4.0   CHLORIDE 100 97* 100   ANIONGAP 9 10 13         PT:  Assessment completed, see note. Home exercises instructed. I have emphasized obtaining full knee extension. He will need to attend OPPT at the [facility] next week.           ASSESSMENT: Adarsh Salvador is a 58 year old male POD # 3 s/p Right total knee arthroplasty on 9/26/2023, doing well.    PLAN: Discharge to home.  Follow Dr. Ch's Discharge Instructions.  Recheck with Primary Care Provider next week to evaluate Anemia and Kidney function.  Recheck with Dr. Ch in Two weeks.  Remove Aquacel dressing in one week. Wash and recover with sterile gauze if any drainage.  Bridge Lovenox 100 mg bid with Warfarin 12.5 and 15 mg on Tues and Sat until 10/3/23 for DVT prophylaxis.  Obtain Blood Pressure Cuff and hold hypertensive meds if systolic is lower than 110.

## 2023-09-30 ENCOUNTER — NURSE TRIAGE (OUTPATIENT)
Dept: NURSING | Facility: CLINIC | Age: 58
End: 2023-09-30
Payer: COMMERCIAL

## 2023-09-30 ENCOUNTER — APPOINTMENT (OUTPATIENT)
Dept: GENERAL RADIOLOGY | Facility: CLINIC | Age: 58
End: 2023-09-30
Attending: FAMILY MEDICINE
Payer: COMMERCIAL

## 2023-09-30 ENCOUNTER — HOSPITAL ENCOUNTER (EMERGENCY)
Facility: CLINIC | Age: 58
Discharge: HOME | End: 2023-09-30
Attending: FAMILY MEDICINE | Admitting: FAMILY MEDICINE
Payer: COMMERCIAL

## 2023-09-30 VITALS
WEIGHT: 315 LBS | TEMPERATURE: 98.7 F | HEIGHT: 72 IN | RESPIRATION RATE: 18 BRPM | HEART RATE: 94 BPM | SYSTOLIC BLOOD PRESSURE: 135 MMHG | OXYGEN SATURATION: 92 % | DIASTOLIC BLOOD PRESSURE: 84 MMHG | BODY MASS INDEX: 42.66 KG/M2
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
## 2023-09-30 PROCEDURE — 73560 X-RAY EXAM OF KNEE 1 OR 2: CPT | Mod: RT

## 2023-09-30 PROCEDURE — 250N000013 HC RX MED GY IP 250 OP 250 PS 637: Performed by: FAMILY MEDICINE

## 2023-09-30 PROCEDURE — 99283 EMERGENCY DEPT VISIT LOW MDM: CPT | Performed by: FAMILY MEDICINE

## 2023-09-30 RX ORDER — OXYCODONE HYDROCHLORIDE 5 MG/1
10 TABLET ORAL ONCE
Status: COMPLETED | OUTPATIENT
Start: 2023-09-30 | End: 2023-09-30

## 2023-09-30 RX ADMIN — OXYCODONE HYDROCHLORIDE 10 MG: 5 TABLET ORAL at 08:37

## 2023-09-30 ASSESSMENT — ACTIVITIES OF DAILY LIVING (ADL): ADLS_ACUITY_SCORE: 35

## 2023-09-30 NOTE — ED TRIAGE NOTES
Recent right knee replacement on Tuesday and fell on right knee this morning around 7 am. Pt was unable to get self back up and phoned EMS from the ground. Pt landed on carpet. Denies hitting head.

## 2023-09-30 NOTE — ED TRIAGE NOTES
Pt was using walker when he fell and believes he slipped on something.      Triage Assessment       Row Name 09/30/23 0805       Triage Assessment (Adult)    Airway WDL WDL       Respiratory WDL    Respiratory WDL WDL       Skin Circulation/Temperature WDL    Skin Circulation/Temperature WDL WDL       Cardiac WDL    Cardiac WDL WDL       Peripheral/Neurovascular WDL    Peripheral Neurovascular WDL WDL       Cognitive/Neuro/Behavioral WDL    Cognitive/Neuro/Behavioral WDL WDL

## 2023-09-30 NOTE — ED NOTES
Pt appears pale, speaking in full sentences, c/o knee pain. States when he fell his right foot met his butt (hyperflexion).

## 2023-09-30 NOTE — DISCHARGE INSTRUCTIONS
RETURN TO THE EMERGENCY ROOM FOR THE FOLLOWING:    Severely worsened pain, fever greater than 101, or at anytime for any concern.    FOLLOW UP:    With your surgeon as scheduled.    TREATMENT RECOMMENDATIONS:    None new.  No changes.    NURSE ADVICE LINE:  [phone] or [phone]

## 2023-09-30 NOTE — TELEPHONE ENCOUNTER
"Fall on artifical knee, right knee. It was put in Tuesday.  He said he is going to call 911. His significant other didn't see any blood and when she touched it, it was painful which lead him to say \"I think I should call 911\".  I told him, if he can't get to the ER, that is appropriate.  Piedad San RN  [facility]    Reason for Disposition   Can't stand (bear weight) or walk    Additional Information   Negative: Serious injury with multiple fractures (broken bones)   Negative: [1] Major bleeding (e.g., actively dripping or spurting) AND [2] can't be stopped   Negative: Looks like a dislocated joint or knee cap (crooked or deformed)   Negative: Bullet wound, stabbed by knife, or other serious penetrating wound   Negative: Sounds like a life-threatening emergency to the triager   Negative: Wound looks infected   Negative: Knee pain from overuse (e.g., sports, running, physical work)   Negative: Knee pain not from an injury    Protocols used: Knee Injury-A-AH    "

## 2023-09-30 NOTE — ED PROVIDER NOTES
HPI   Patient is a 58-year-old male presenting by EMS transport after a fall.  He had a right knee arthroplasty performed on 9/26.  He has a known history of diabetes, insulin and metformin prescribed.  He has a known history of obesity, right hernia, constipation, and reflux.  He takes omeprazole, Lyrica.  Known history of hyperlipidemia and hypertension, taking Norvasc, Lipitor, chlorthalidone, losartan.  Known history of DVT and PE.  Known history of anxiety and anemia.  Not a smoker.  Occasional alcohol.  Occasional CBD.  He presents with his male partner.    The patient was up and trying to go to the bathroom this morning.  He was using a walker on his own.  He describes tripping/slipping and falling to the ground.  He fell to the left side but his right knee buckled underneath him and flexed completely.  He denies blunt trauma to the knee itself.  He has severe pain.  The last oxycodone he took was at 4:00 this morning.  He denies other injuries.      Allergies:  Allergies   Allergen Reactions    Artificial Sweetner (Informational Only) [Artificial Sweetner (Informational Only) ] GI Disturbance     ALL artificial sweetners cause severe diarrhea & flu symptoms    Hydromorphone Anaphylaxis    Ibuprofen GI Disturbance    Morphine Sulfate Concentrate Anaphylaxis    Morphine [Fumaric Acid] Anaphylaxis    Hydrocodone-Acetaminophen Itching    Tramadol Hives    Trazodone Hives    Gabapentin GI Disturbance     GI upset per pt    Keflex [Cephalexin] Diarrhea     Upset stomach    Codeine Phosphate Itching    Ketorolac Tromethamine Rash     Problem List:    Patient Active Problem List    Diagnosis Date Noted    S/P TKR (total knee replacement), right 09/26/2023     Priority: Medium    Pulmonary embolism, other, unspecified chronicity, unspecified whether acute cor pulmonale present (H) 08/15/2023     Priority: Medium    Advanced directives, counseling/discussion 12/22/2022     Priority: Medium     Pt was given info on  ADVNeha Amaral MA      Acute pain of right knee 08/26/2022     Priority: Medium    Aftercare following surgery of the musculoskeletal system 08/26/2022     Priority: Medium    Chronic pain of right knee 08/08/2022     Priority: Medium     Added automatically from request for surgery [id number]      Bilateral leg pain 06/09/2022     Priority: Medium     Added automatically from request for surgery [id number]      Low volume of ejaculated semen 06/07/2022     Priority: Medium    DVT (deep venous thrombosis) (H) 01/15/2022     Priority: Medium    Recurrent deep vein thrombosis (DVT) (H) 09/27/2021     Priority: Medium    Precordial pain 02/15/2021     Priority: Medium    Dyslipidemia 02/15/2021     Priority: Medium    Elevated C-reactive protein 02/15/2021     Priority: Medium    Gastric reflux 02/15/2021     Priority: Medium    Internal derangement of knee 02/15/2021     Priority: Medium    Nicotine dependence 02/15/2021     Priority: Medium    Varicose veins of lower extremity 02/15/2021     Priority: Medium    Vitamin D deficiency 02/15/2021     Priority: Medium    Neuropathy 06/23/2020     Priority: Medium    Morbid obesity (H) 03/26/2020     Priority: Medium    Insomnia, unspecified type 05/09/2019     Priority: Medium    CONNOR (generalized anxiety disorder) 05/08/2019     Priority: Medium    Hyperlipidemia LDL goal <100 08/17/2018     Priority: Medium    Type 2 diabetes mellitus with diabetic polyneuropathy, with long-term current use of insulin (H) 01/18/2018     Priority: Medium    Mild persistent asthma without complication 01/12/2018     Priority: Medium    Right inguinal hernia 06/14/2016     Priority: Medium    Irritable bowel syndrome with diarrhea 03/21/2016     Priority: Medium    Fatty liver 02/22/2016     Priority: Medium    Hypertension, unspecified type 12/15/2015     Priority: Medium    Anemia in other chronic diseases classified elsewhere 12/15/2015     Priority: Medium    Hypertriglyceridemia 07/12/2013      Priority: Medium    Chronic maxillary sinusitis 09/18/2012     Priority: Medium     Believes this is secondary to exposure to toxic fumes at work- he is a .  He regularly wears a mask ventilator and believes this helps.  Has only tried intermittent nasal washes, and OTC medications.  Not ever tried steroid nasal sprays or other controller medications.        Chronic rhinitis 09/18/2012     Priority: Medium    Constipation 04/24/2012     Priority: Medium    GERD (gastroesophageal reflux disease) 06/15/2011     Priority: Medium    Chronic RLQ pain 06/15/2011     Priority: Medium      Past Medical History:    Past Medical History:   Diagnosis Date    Anaphylactic reaction 8/14/2015    Anxiety     Depression     DM2 (diabetes mellitus, type 2) (H)     GERD (gastroesophageal reflux disease)     HTN (hypertension)     IBS (irritable bowel syndrome)     Kidney stone 6/15/2011    Neuropathy      Past Surgical History:    Past Surgical History:   Procedure Laterality Date    ARTHROPLASTY KNEE Right 9/26/2023    Procedure: ARTHROPLASTY, KNEE, TOTAL Right;  Surgeon: Edwin Ch MD;  Location: [facility]    ARTHROSCOPY KNEE WITH MEDIAL MENISCECTOMY Right 8/19/2022    Procedure: examination under anesthesia, right knee arthroscopy, meniscectomy;  Surgeon: Carlos A Em MD;  Location: [facility] OR    COLONOSCOPY  5/1/2012    Procedure:COLONOSCOPY; screening colonoscopy; Surgeon:KINGSLEY DOS SANTOS; Location:[facility] OR    COLONOSCOPY  4/21/2014    Procedure: COMBINED COLONOSCOPY, SINGLE BIOPSY/POLYPECTOMY BY BIOPSY;  Surgeon: Duane, William Charles, MD;  Location: [facility] OR    COLONOSCOPY N/A 4/21/2022    Procedure: COLONOSCOPY, WITH POLYPECTOMY AND BIOPSY;  Surgeon: Luiz Calvert MD;  Location:  GI    CYSTOSCOPY  05/01/2008    CYSTOSCOPY W/ URETERAL STENT PLACEMENT 05/01/2008     CYSTOSCOPY  09/26/2010    CYSTOSCOPY W/ URETERAL STENT PLACEMENT 09/26/2010 Left     CYSTOSCOPY  05/18/2012    CYSTOSCOPY, LEFT  URETEROSCOPY AND STENT PLACEMENT left retrograde 05/18/2012     CYSTOSCOPY,+URETEROSCOPY  06/10/2008    URETEROSCOPY 06/10/2008 Right     HC BREATH HYDROGEN TEST  3/7/2014    Procedure: HYDROGEN BREATH TEST;  Surgeon: Darion Swift MD;  Location: [facility]    INJECT EPIDURAL LUMBAR N/A 7/7/2022    Procedure: INJECTION, SPINE, LUMBAR, EPIDURAL L5/S1;  Surgeon: Michi Hahn MD;  Location: [facility] OR    LITHOTRIPSY  09/30/2010    LITHOTRIPSY 09/30/2010 LEFT EXTRACORPOREAL SHOCK WAVE LITHOTRIPSY, FLEXIBLE CYSTOSCOPY, LEFT STENT REMOVAL      ORTHOPEDIC SURGERY  10/03/2007    KNEE ARTHROSCOPY 10/03/2007 RightX2      RELEASE CARPAL TUNNEL Right 1/26/2018    Procedure: RELEASE CARPAL TUNNEL;  Right carpal tunnel release;  Surgeon: Wiliam Saenz MD;  Location: [facility] OR    VASCULAR SURGERY  11/24/2008    Radiofrequency ablation left saphenous vein 11/24/2008 Done by Dr. Lozoya       Family History:    Family History   Problem Relation Age of Onset    Cancer Mother 52        lung, smoked    Cancer - colorectal Father 53        unsure type, pt attributes to radiation exposure    Myocardial Infarction Father 45    Coronary Artery Disease Father     Myocardial Infarction Paternal Grandfather 35    Diabetes Other         nephew- type 1    Diabetes Maternal Aunt         1    Diabetes Maternal Aunt         2    Diabetes Paternal Uncle         1    Diabetes Paternal Uncle         2    Diabetes Paternal Uncle         3    Diabetes Paternal Uncle         4    Colon Cancer No family hx of      Social History:  Marital Status:  Single [1]  Social History     Tobacco Use    Smoking status: Never    Smokeless tobacco: Current     Types: Chew    Tobacco comments:     Chew   Vaping Use    Vaping Use: Never used   Substance Use Topics    Alcohol use: Not Currently     Alcohol/week: 0.0 standard drinks of alcohol     Comment: occasional, few drinks a month    Drug use: Yes     Comment: CBD occasional      Medications:   "  acetaminophen (TYLENOL) 325 MG tablet  albuterol (PROAIR HFA/PROVENTIL HFA/VENTOLIN HFA) 108 (90 Base) MCG/ACT inhaler  amLODIPine (NORVASC) 10 MG tablet  atorvastatin (LIPITOR) 40 MG tablet  blood glucose (ACCU-CHEK GUIDE) test strip  blood glucose monitoring (ACCU-CHEK FASTCLIX) lancets  calcium carbonate (TUMS) 500 MG chewable tablet  chlorthalidone (HYGROTON) 25 MG tablet  Continuous Blood Gluc Sensor (FREESTYLE FROYLAN 3 SENSOR) MISC  DULoxetine (CYMBALTA) 60 MG capsule  enoxaparin ANTICOAGULANT (LOVENOX) 100 MG/ML syringe  EPINEPHrine (ANY BX GENERIC EQUIV) 0.3 MG/0.3ML injection 2-pack  fluticasone-salmeterol (ADVAIR) 500-50 MCG/ACT inhaler  hydrOXYzine (ATARAX) 25 MG tablet  insulin aspart (NOVOLOG FLEXPEN) 100 UNIT/ML pen  insulin glargine (BASAGLAR KWIKPEN) 100 UNIT/ML pen  insulin pen needle (32G X 4 MM) 32G X 4 MM miscellaneous  insulin syringe-needle U-100 (29G X 1/2\" 0.5 ML) 29G X 1/2\" 0.5 ML miscellaneous  losartan (COZAAR) 25 MG tablet  meclizine (ANTIVERT) 25 MG tablet  metFORMIN (GLUCOPHAGE XR) 500 MG 24 hr tablet  montelukast (SINGULAIR) 10 MG tablet  multivitamin, therapeutic (THERA-VIT) TABS  nortriptyline (PAMELOR) 10 MG capsule  omeprazole (PRILOSEC) 40 MG DR capsule  oxyCODONE (ROXICODONE) 5 MG tablet  pregabalin (LYRICA) 50 MG capsule  rizatriptan (MAXALT-MLT) 10 MG ODT  senna-docusate (SENOKOT-S/PERICOLACE) 8.6-50 MG tablet  warfarin ANTICOAGULANT (COUMADIN) 5 MG tablet      Review of Systems   All other systems reviewed and are negative.      PE   BP: (!) 163/84  Pulse: 86  Temp: 98.7  F (37.1  C)  Resp: 18  Height: 182.9 cm (6')  Weight: (!) 176.9 kg (390 lb)  SpO2: 97 %  Physical Exam  Vitals and nursing note reviewed.   Constitutional:       General: He is not in acute distress.     Appearance: He is obese.      Comments: Pale.   HENT:      Head: Atraumatic.      Right Ear: External ear normal.      Left Ear: External ear normal.      Nose: Nose normal.      Mouth/Throat:      Mouth: " Mucous membranes are moist.      Pharynx: Oropharynx is clear.   Eyes:      General: No scleral icterus.     Extraocular Movements: Extraocular movements intact.      Conjunctiva/sclera: Conjunctivae normal.      Pupils: Pupils are equal, round, and reactive to light.   Cardiovascular:      Rate and Rhythm: Normal rate.   Pulmonary:      Effort: Pulmonary effort is normal. No respiratory distress.   Musculoskeletal:      Cervical back: Normal range of motion.      Comments: Dressing over the top of the midline knee incision, this is removed and incision is found to be intact.  No bleeding or drainage.  Tender throughout the anterior knee.  Severe pain with any attempted range of motion of the right knee.  Right foot is warm.   Skin:     General: Skin is warm and dry.   Neurological:      Mental Status: He is alert and oriented to person, place, and time.   Psychiatric:         Behavior: Behavior normal.         ED COURSE and [facility]   0834.  patient has symptoms and signs as described above.  X-ray right knee.  Oxycodone 2 tablets.  Hemoglobin 8.7 and then 8.6 on 9/29.  No reported blood loss.  No hematochezia or melena.  No throwing up blood.  He denies other recent illness.  He reports eating breakfast, lunch, and dinner yesterday.  His mucosa is dry but he reports normal fluid intake.  Not tachycardic.  Blood pressure 163/84.    0949.  X-ray unremarkable for acute pathology.  Patient improved with medication given.  Follow-up as scheduled.    Electronic medical chart reviewed, including medical problems, medications, medical allergies, social history.  Recent hospitalizations and surgical procedures reviewed.  Recent clinic visits and consultations reviewed.  Recent labs and test results reviewed.  Nursing notes reviewed.    The patient, their parent if applicable, and/or their medical decision maker(s) and I have reviewed all of the available historical information, applicable PMH, physical exam findings, and  objective diagnostic data gathered during this ED visit.  We then discussed all work-up options and then together agreed upon the course taken during this visit.  The ultimate disposition and plan was a cooperative decision made between myself and the patient, their parent if applicable, and/or their legal decision maker(s).  The risks and benefits of all decisions made during this visit were discussed to the best of my abilities given the circumstances, and all parties are understanding of the pertinent ramifications of these decisions.      LABS  Labs Ordered and Resulted from Time of ED Arrival to Time of ED Departure - No data to display    IMAGING  Images reviewed by me.  Radiology report also reviewed.  XR Knee Right 1/2 Views   Final Result   IMPRESSION: Total knee arthroplasty with adjacent gas which could be from an open wound or recent surgery. Excellent position and alignment of components. There is no evidence of hardware loosening or periprosthetic fracture. No significant effusion.                Procedures    Medications   oxyCODONE (ROXICODONE) tablet 10 mg (10 mg Oral $Given 9/30/23 0837)         IMPRESSION       ICD-10-CM    1. Fall, initial encounter  W19.XXXA                Medication List      There are no discharge medications for this visit.                     Adriel Chavez MD  09/30/23 0949

## 2023-10-01 ENCOUNTER — NURSE TRIAGE (OUTPATIENT)
Dept: NURSING | Facility: CLINIC | Age: 58
End: 2023-10-01
Payer: COMMERCIAL

## 2023-10-01 ENCOUNTER — TELEPHONE (OUTPATIENT)
Dept: ORTHOPEDICS | Facility: CLINIC | Age: 58
End: 2023-10-01
Payer: COMMERCIAL

## 2023-10-01 NOTE — TELEPHONE ENCOUNTER
I called Adarsh Salvador on 10/1/2023 at 1:23 PM.  Patient with total knee arthroplasty from Dr. Edwin Ch on 9/26/23.  He fell and bent knee yesterday.  Seen in emergency room with dressing change.  No wound issues.  He complains of increased pain, swelling, nausea, constipation.  Also worried because his  goes back to work tomorrow and he will not have anyone to take care of him.  He still has a few oxycodone.  He says his cat took his stool softener bottle somewhere.  He told me he was heading to the emergency room again.    I encouraged knee compression with ace wrap.  Oxycodone and tylenol as needed for pain.  He should find or buy stool softener.  They could get that from emergency room also.  He should call in morning to get on Dr. Edwin Ch clinic schedule for visit.  He wanted to know if emergency room would keep him.  I said that was doubtful, but I can't speak for them.

## 2023-10-01 NOTE — TELEPHONE ENCOUNTER
I called patient back to see if the provider has called him yet.     Patient tells me that the provider hasn't called. Pt then states he's starting to feel dizzy.     I advised patient that he should return to the ED for the following reasons:     Severe, worsened, uncontrolled pain  Worsened weakness, and now feeling dizzy. He's a fall risk and states he's not safe at home.    Advised him that while he's in the ED, he can discuss possible home care options/discharge to TCU if not safe at home. Unsure what he will qualify for.     Patient verbalized understanding and had no further questions.      Anabell Peterson RN  [place] Nurse Advisor  10/1/2023 1:11 PM

## 2023-10-01 NOTE — TELEPHONE ENCOUNTER
Nurse Triage SBAR    Situation: Triage call for post-op symptoms.     Background:     Pt had a TKR on 09/26/23 with Dr. Ch at [facility].     Pt sustained a fall last night and was brought to the ED by EMS.     Assessment: Pain is severe and uncontrolled when he moves his knee. During triage call, at rest, he denies pain. He is taking Oxycodone 1-2 tabs every 4-6 hours, Tylenol, and hydroxyzine. He's taking all three meds together. This is not providing him pain relief. His pain is worsening.     Pt also reports that he is becoming more weak and that he is not safe to be at home by himself (his spouse needs to return to work tomorrow or his job is at risk). Pt states he doesn't qualify for home care services.     Pt tells me that when he's up, his knee is buckling and he is afraid he will continue to fall.     Protocol Recommended Disposition:   Page Surgeon now.     12:47PM: I contacted the [facility] answering service who sent a page to on-call Dr. Garibay to call pt directly at [phone].     Anabell Peterson RN  [place] Nurse Advisor  10/1/2023 12:49 PM     Reason for Disposition   [1] SEVERE post-op pain (e.g., excruciating, pain scale 8-10) AND [2] not controlled with pain medications    Additional Information   Negative: Sounds like a life-threatening emergency to the triager   Negative: [1] Widespread rash AND [2] bright red, sunburn-like   Negative: [1] SEVERE headache AND [2] after spinal (epidural) anesthesia   Negative: [1] Vomiting AND [2] persists > 4 hours   Negative: [1] Vomiting AND [2] abdomen looks much more swollen than usual   Negative: [1] Drinking very little AND [2] dehydration suspected (e.g., no urine > 12 hours, very dry mouth, very lightheaded)   Negative: Fever > 100.4 F (38.0 C)   Negative: Patient sounds very sick or weak to the triager   Negative: Sounds like a serious complication to the triager    Protocols used: Post-Op Symptoms and [id number]

## 2023-10-02 ENCOUNTER — DOCUMENTATION ONLY (OUTPATIENT)
Dept: ANTICOAGULATION | Facility: CLINIC | Age: 58
End: 2023-10-02

## 2023-10-02 ENCOUNTER — APPOINTMENT (OUTPATIENT)
Dept: ULTRASOUND IMAGING | Facility: CLINIC | Age: 58
End: 2023-10-02
Attending: EMERGENCY MEDICINE
Payer: COMMERCIAL

## 2023-10-02 ENCOUNTER — HOSPITAL ENCOUNTER (EMERGENCY)
Facility: CLINIC | Age: 58
Discharge: HOME | End: 2023-10-02
Attending: EMERGENCY MEDICINE | Admitting: EMERGENCY MEDICINE
Payer: COMMERCIAL

## 2023-10-02 VITALS
TEMPERATURE: 100.1 F | SYSTOLIC BLOOD PRESSURE: 139 MMHG | RESPIRATION RATE: 16 BRPM | BODY MASS INDEX: 39.28 KG/M2 | WEIGHT: 290 LBS | OXYGEN SATURATION: 94 % | HEIGHT: 72 IN | DIASTOLIC BLOOD PRESSURE: 71 MMHG | HEART RATE: 79 BPM

## 2023-10-02 DIAGNOSIS — M79.604 RIGHT LEG PAIN: ICD-10-CM

## 2023-10-02 DIAGNOSIS — G89.18 ACUTE POST-OPERATIVE PAIN: ICD-10-CM

## 2023-10-02 LAB
ALBUMIN SERPL BCG-MCNC: 3.5 G/DL (ref 3.5–5.2)
ALBUMIN UR-MCNC: NEGATIVE MG/DL
ALP SERPL-CCNC: 116 U/L (ref 40–129)
ALT SERPL W P-5'-P-CCNC: 18 U/L (ref 0–70)
ANION GAP SERPL CALCULATED.3IONS-SCNC: 12 MMOL/L (ref 7–15)
APPEARANCE UR: CLEAR
AST SERPL W P-5'-P-CCNC: 18 U/L (ref 0–45)
BASOPHILS # BLD AUTO: 0 10E3/UL (ref 0–0.2)
BASOPHILS NFR BLD AUTO: 1 %
BILIRUB SERPL-MCNC: 0.5 MG/DL
BILIRUB UR QL STRIP: NEGATIVE
BUN SERPL-MCNC: 11 MG/DL (ref 6–20)
CALCIUM SERPL-MCNC: 9.3 MG/DL (ref 8.6–10)
CHLORIDE SERPL-SCNC: 101 MMOL/L (ref 98–107)
COLOR UR AUTO: YELLOW
CREAT SERPL-MCNC: 0.98 MG/DL (ref 0.67–1.17)
DEPRECATED HCO3 PLAS-SCNC: 27 MMOL/L (ref 22–29)
EGFRCR SERPLBLD CKD-EPI 2021: >90 ML/MIN/1.73M2
EOSINOPHIL # BLD AUTO: 0.2 10E3/UL (ref 0–0.7)
EOSINOPHIL NFR BLD AUTO: 3 %
ERYTHROCYTE [DISTWIDTH] IN BLOOD BY AUTOMATED COUNT: 13.6 % (ref 10–15)
GLUCOSE SERPL-MCNC: 174 MG/DL (ref 70–99)
GLUCOSE UR STRIP-MCNC: NEGATIVE MG/DL
HCT VFR BLD AUTO: 28.3 % (ref 40–53)
HGB BLD-MCNC: 9.1 G/DL (ref 13.3–17.7)
HGB UR QL STRIP: NEGATIVE
HOLD SPECIMEN: NORMAL
HYALINE CASTS: 1 /LPF
IMM GRANULOCYTES # BLD: 0 10E3/UL
IMM GRANULOCYTES NFR BLD: 0 %
KETONES UR STRIP-MCNC: NEGATIVE MG/DL
LEUKOCYTE ESTERASE UR QL STRIP: NEGATIVE
LYMPHOCYTES # BLD AUTO: 1.9 10E3/UL (ref 0.8–5.3)
LYMPHOCYTES NFR BLD AUTO: 30 %
MCH RBC QN AUTO: 28.8 PG (ref 26.5–33)
MCHC RBC AUTO-ENTMCNC: 32.2 G/DL (ref 31.5–36.5)
MCV RBC AUTO: 90 FL (ref 78–100)
MONOCYTES # BLD AUTO: 0.5 10E3/UL (ref 0–1.3)
MONOCYTES NFR BLD AUTO: 8 %
MUCOUS THREADS #/AREA URNS LPF: PRESENT /LPF
NEUTROPHILS # BLD AUTO: 3.8 10E3/UL (ref 1.6–8.3)
NEUTROPHILS NFR BLD AUTO: 58 %
NITRATE UR QL: NEGATIVE
NRBC # BLD AUTO: 0 10E3/UL
NRBC BLD AUTO-RTO: 0 /100
PH UR STRIP: 6.5 [PH] (ref 5–7)
PLAT MORPH BLD: NORMAL
PLATELET # BLD AUTO: 264 10E3/UL (ref 150–450)
POTASSIUM SERPL-SCNC: 3.7 MMOL/L (ref 3.4–5.3)
PROT SERPL-MCNC: 6.9 G/DL (ref 6.4–8.3)
RBC # BLD AUTO: 3.16 10E6/UL (ref 4.4–5.9)
RBC MORPH BLD: NORMAL
RBC URINE: <1 /HPF
SODIUM SERPL-SCNC: 140 MMOL/L (ref 135–145)
SP GR UR STRIP: 1.02 (ref 1–1.03)
UROBILINOGEN UR STRIP-MCNC: 6 MG/DL
WBC # BLD AUTO: 6.5 10E3/UL (ref 4–11)
WBC URINE: 1 /HPF

## 2023-10-02 PROCEDURE — 96374 THER/PROPH/DIAG INJ IV PUSH: CPT | Performed by: EMERGENCY MEDICINE

## 2023-10-02 PROCEDURE — 96375 TX/PRO/DX INJ NEW DRUG ADDON: CPT | Performed by: EMERGENCY MEDICINE

## 2023-10-02 PROCEDURE — 96376 TX/PRO/DX INJ SAME DRUG ADON: CPT | Performed by: EMERGENCY MEDICINE

## 2023-10-02 PROCEDURE — 80053 COMPREHEN METABOLIC PANEL: CPT | Performed by: EMERGENCY MEDICINE

## 2023-10-02 PROCEDURE — 93970 EXTREMITY STUDY: CPT

## 2023-10-02 PROCEDURE — 99285 EMERGENCY DEPT VISIT HI MDM: CPT | Performed by: EMERGENCY MEDICINE

## 2023-10-02 PROCEDURE — 81001 URINALYSIS AUTO W/SCOPE: CPT | Performed by: EMERGENCY MEDICINE

## 2023-10-02 PROCEDURE — 250N000011 HC RX IP 250 OP 636: Performed by: EMERGENCY MEDICINE

## 2023-10-02 PROCEDURE — 36415 COLL VENOUS BLD VENIPUNCTURE: CPT | Performed by: EMERGENCY MEDICINE

## 2023-10-02 PROCEDURE — 99285 EMERGENCY DEPT VISIT HI MDM: CPT | Mod: 25 | Performed by: EMERGENCY MEDICINE

## 2023-10-02 PROCEDURE — 93970 EXTREMITY STUDY: CPT | Mod: 26 | Performed by: RADIOLOGY

## 2023-10-02 PROCEDURE — 85025 COMPLETE CBC W/AUTO DIFF WBC: CPT | Performed by: EMERGENCY MEDICINE

## 2023-10-02 RX ORDER — DIPHENHYDRAMINE HYDROCHLORIDE 50 MG/ML
25 INJECTION INTRAMUSCULAR; INTRAVENOUS ONCE
Status: COMPLETED | OUTPATIENT
Start: 2023-10-02 | End: 2023-10-02

## 2023-10-02 RX ORDER — HYDROMORPHONE HYDROCHLORIDE 1 MG/ML
0.5 INJECTION, SOLUTION INTRAMUSCULAR; INTRAVENOUS; SUBCUTANEOUS ONCE
Status: COMPLETED | OUTPATIENT
Start: 2023-10-02 | End: 2023-10-02

## 2023-10-02 RX ORDER — ACETAMINOPHEN 325 MG/1
650 TABLET ORAL EVERY 4 HOURS PRN
Status: DISCONTINUED | OUTPATIENT
Start: 2023-10-02 | End: 2023-10-02 | Stop reason: HOSPADM

## 2023-10-02 RX ADMIN — HYDROMORPHONE HYDROCHLORIDE 0.5 MG: 1 INJECTION, SOLUTION INTRAMUSCULAR; INTRAVENOUS; SUBCUTANEOUS at 06:45

## 2023-10-02 RX ADMIN — DIPHENHYDRAMINE HYDROCHLORIDE 25 MG: 50 INJECTION, SOLUTION INTRAMUSCULAR; INTRAVENOUS at 05:08

## 2023-10-02 RX ADMIN — HYDROMORPHONE HYDROCHLORIDE 0.5 MG: 1 INJECTION, SOLUTION INTRAMUSCULAR; INTRAVENOUS; SUBCUTANEOUS at 05:08

## 2023-10-02 ASSESSMENT — ACTIVITIES OF DAILY LIVING (ADL)
ADLS_ACUITY_SCORE: 35
ADLS_ACUITY_SCORE: 35

## 2023-10-02 NOTE — DISCHARGE INSTRUCTIONS
Please follow-up with your primary care provider and orthopedic surgeon in the next 2 to 3 days for further evaluation and follow-up.  Please wear Ace wrap for comfort.  Please continue your home pain medications including Tylenol and oxycodone as needed for pain.  Return to the emergency department if any worsening symptoms.  It was a pleasure taking care of you today.  We hope you feel better soon.

## 2023-10-02 NOTE — ED TRIAGE NOTES
Right knee pain moving down to right calf and leg. S/p right knee surgery 09/26/23.      Triage Assessment       Row Name 10/02/23 0323       Triage Assessment (Adult)    Airway WDL WDL       Respiratory WDL    Respiratory WDL WDL       Skin Circulation/Temperature WDL    Skin Circulation/Temperature WDL WDL       Cardiac WDL    Cardiac WDL WDL       Peripheral/Neurovascular WDL    Peripheral Neurovascular WDL WDL

## 2023-10-02 NOTE — PROGRESS NOTES
ANTICOAGULATION  MANAGEMENT: Discharge Review    Adarsh Salvador chart reviewed for anticoagulation continuity of care    Emergency room visit on 9/30 & 10/2 for Fall, right leg pain.    Discharge disposition: Home    Results:    Recent labs: (last 7 days)     09/26/23  1224 09/27/23  0529 09/27/23  1524 09/28/23  0535 09/29/23  0521   INR 0.96 1.04 1.09 1.21* 1.50*     Anticoagulation inpatient management:     not applicable     Anticoagulation discharge instructions:     Warfarin dosing:  not addressed   Bridging: bridging with enoxaparin (Lovenox)   INR goal change: No      Medication changes affecting anticoagulation: No    Additional factors affecting anticoagulation: Yes: possible- inflammatory response     PLAN     No adjustment to anticoagulation plan needed Pt has INR tomorrow    Patient not contacted    No adjustment to Anticoagulation Calendar was required    Priya Plunkett RN

## 2023-10-02 NOTE — ED PROVIDER NOTES
ED Provider Note  [facility]      History     Chief Complaint   Patient presents with    Leg Pain     Pt came with right leg pain, S/p right knee surgery 09/0623.     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 Ch who presents to the emergency department for evaluation of right leg pain.  Patient complains of right leg pain that started this morning woke him up from sleep around 2 AM.  Patient describes the pain as a severe sharp stabbing pain located in his right leg.  Patient states that initially the pain started in his mid calf and has slowly progressed and moved up to his right thigh.  Patient denies any aggravating relieving factors.  Patient has been taking Tylenol and oxycodone for pain.  Patient reports recent surgical intervention which went well and he believes it is healing okay.  Patient does report that he did fall the other day, was seen in the emergency department to be safe, had x-rays which were unremarkable.  Patient concern for possible DVT as he reports history of PE/DVT in the past.  Is currently on warfarin and bridging with Lovenox due to his recent procedure.  No other complaints.    Past Medical History  Past Medical History:   Diagnosis Date    Anaphylactic reaction 8/14/2015    Anxiety     Depression     DM2 (diabetes mellitus, type 2) (H)     GERD (gastroesophageal reflux disease)     HTN (hypertension)     IBS (irritable bowel syndrome)     Kidney stone 6/15/2011    Pt believes these were Calcium stones    Neuropathy      Past Surgical History:   Procedure Laterality Date    ARTHROPLASTY KNEE Right 9/26/2023    Procedure: ARTHROPLASTY, KNEE, TOTAL Right;  Surgeon: Edwin Ch MD;  Location: [facility]    ARTHROSCOPY KNEE WITH MEDIAL MENISCECTOMY Right 8/19/2022     Procedure: examination under anesthesia, right knee arthroscopy, meniscectomy;  Surgeon: Carlos A Em MD;  Location: [facility] OR    COLONOSCOPY  5/1/2012    Procedure:COLONOSCOPY; screening colonoscopy; Surgeon:KINGSLEY DOS SANTOS; Location:[facility] OR    COLONOSCOPY  4/21/2014    Procedure: COMBINED COLONOSCOPY, SINGLE BIOPSY/POLYPECTOMY BY BIOPSY;  Surgeon: Duane, William Charles, MD;  Location: [facility] OR    COLONOSCOPY N/A 4/21/2022    Procedure: COLONOSCOPY, WITH POLYPECTOMY AND BIOPSY;  Surgeon: Luiz Calvert MD;  Location: [facility]    CYSTOSCOPY  05/01/2008    CYSTOSCOPY W/ URETERAL STENT PLACEMENT 05/01/2008     CYSTOSCOPY  09/26/2010    CYSTOSCOPY W/ URETERAL STENT PLACEMENT 09/26/2010 Left     CYSTOSCOPY  05/18/2012    CYSTOSCOPY, LEFT URETEROSCOPY AND STENT PLACEMENT left retrograde 05/18/2012     CYSTOSCOPY,+URETEROSCOPY  06/10/2008    URETEROSCOPY 06/10/2008 Right     HC BREATH HYDROGEN TEST  3/7/2014    Procedure: HYDROGEN BREATH TEST;  Surgeon: Darion Swift MD;  Location: [facility]    INJECT EPIDURAL LUMBAR N/A 7/7/2022    Procedure: INJECTION, SPINE, LUMBAR, EPIDURAL L5/S1;  Surgeon: Michi Hahn MD;  Location: [facility] OR    LITHOTRIPSY  09/30/2010    LITHOTRIPSY 09/30/2010 LEFT EXTRACORPOREAL SHOCK WAVE LITHOTRIPSY, FLEXIBLE CYSTOSCOPY, LEFT STENT REMOVAL      ORTHOPEDIC SURGERY  10/03/2007    KNEE ARTHROSCOPY 10/03/2007 RightX2      RELEASE CARPAL TUNNEL Right 1/26/2018    Procedure: RELEASE CARPAL TUNNEL;  Right carpal tunnel release;  Surgeon: Wiliam Saenz MD;  Location: [facility] OR    VASCULAR SURGERY  11/24/2008    Radiofrequency ablation left saphenous vein 11/24/2008 Done by Dr. Lozoya       acetaminophen (TYLENOL) 325 MG tablet  albuterol (PROAIR HFA/PROVENTIL HFA/VENTOLIN HFA) 108 (90 Base) MCG/ACT inhaler  amLODIPine (NORVASC) 10 MG tablet  atorvastatin (LIPITOR) 40 MG tablet  blood glucose (ACCU-CHEK GUIDE) test strip  blood glucose monitoring (ACCU-CHEK FASTCLIX)  "lancets  calcium carbonate (TUMS) 500 MG chewable tablet  chlorthalidone (HYGROTON) 25 MG tablet  Continuous Blood Gluc Sensor (FREESTYLE FROYLAN 3 SENSOR) MISC  DULoxetine (CYMBALTA) 60 MG capsule  enoxaparin ANTICOAGULANT (LOVENOX) 100 MG/ML syringe  EPINEPHrine (ANY BX GENERIC EQUIV) 0.3 MG/0.3ML injection 2-pack  fluticasone-salmeterol (ADVAIR) 500-50 MCG/ACT inhaler  hydrOXYzine (ATARAX) 25 MG tablet  insulin aspart (NOVOLOG FLEXPEN) 100 UNIT/ML pen  insulin glargine (BASAGLAR KWIKPEN) 100 UNIT/ML pen  insulin pen needle (32G X 4 MM) 32G X 4 MM miscellaneous  insulin syringe-needle U-100 (29G X 1/2\" 0.5 ML) 29G X 1/2\" 0.5 ML miscellaneous  losartan (COZAAR) 25 MG tablet  meclizine (ANTIVERT) 25 MG tablet  metFORMIN (GLUCOPHAGE XR) 500 MG 24 hr tablet  montelukast (SINGULAIR) 10 MG tablet  multivitamin, therapeutic (THERA-VIT) TABS  nortriptyline (PAMELOR) 10 MG capsule  omeprazole (PRILOSEC) 40 MG DR capsule  oxyCODONE (ROXICODONE) 5 MG tablet  pregabalin (LYRICA) 50 MG capsule  rizatriptan (MAXALT-MLT) 10 MG ODT  senna-docusate (SENOKOT-S/PERICOLACE) 8.6-50 MG tablet  warfarin ANTICOAGULANT (COUMADIN) 5 MG tablet      Allergies   Allergen Reactions    Artificial Sweetner (Informational Only) [Artificial Sweetner (Informational Only) ] GI Disturbance     ALL artificial sweetners cause severe diarrhea & flu symptoms    Hydromorphone Anaphylaxis    Ibuprofen GI Disturbance    Morphine Sulfate Concentrate Anaphylaxis    Morphine [Fumaric Acid] Anaphylaxis    Hydrocodone-Acetaminophen Itching    Tramadol Hives    Trazodone Hives    Gabapentin GI Disturbance     GI upset per pt    Keflex [Cephalexin] Diarrhea     Upset stomach    Codeine Phosphate Itching    Ketorolac Tromethamine Rash     Family History  Family History   Problem Relation Age of Onset    Cancer Mother 52        lung, smoked    Cancer - colorectal Father 53        unsure type, pt attributes to radiation exposure    Myocardial Infarction Father 45    " Coronary Artery Disease Father     Myocardial Infarction Paternal Grandfather 35    Diabetes Other         nephew- type 1    Diabetes Maternal Aunt         1    Diabetes Maternal Aunt         2    Diabetes Paternal Uncle         1    Diabetes Paternal Uncle         2    Diabetes Paternal Uncle         3    Diabetes Paternal Uncle         4    Colon Cancer No family hx of      Social History   Social History     Tobacco Use    Smoking status: Never    Smokeless tobacco: Current     Types: Chew    Tobacco comments:     Chew   Vaping Use    Vaping Use: Never used   Substance Use Topics    Alcohol use: Not Currently     Alcohol/week: 0.0 standard drinks of alcohol     Comment: occasional, few drinks a month    Drug use: Yes     Comment: CBD occasional      Past medical history, past surgical history, medications, allergies, family history, and social history were reviewed with the patient. No additional pertinent items.      A medically appropriate review of systems was performed with pertinent positives and negatives noted in the HPI, and all other systems negative.    Physical Exam   BP: 137/86  Pulse: 88  Temp: 100.1  F (37.8  C)  Resp: 18  Height: 182.9 cm (6')  Weight: 131.5 kg (290 lb)  SpO2: 98 %  Physical Exam   General: Afebrile, moderate distress secondary to pain  HEENT: Normocephalic, atraumatic, conjunctivae normal. MMM  Neck: non-tender, supple  Cardio: regular rate. regular rhythm   Resp: Normal work of breathing, no respiratory distress, lungs clear bilaterally, no wheezing, rhonchi, rales  Chest/Back: no visual signs of trauma, no CVA tenderness   Abdomen: soft, non distension, no tenderness, no peritoneal signs   Neuro: alert and fully oriented. CN II-XII grossly intact. Grossly normal strength and sensation in all extremities.   MSK: Right lower extremity with diffuse tenderness to palpation right posterior calf and right upper thigh and groin area.  Right knee with no tenderness to palpation,  surgical incision appears to be healing well, no erythema, no drainage  Integumentary/Skin: no rash visualized, normal color  Psych: normal affect, normal behavior      ED Course, Procedures, & Data      Procedures    Results for orders placed or performed during the hospital encounter of 10/02/23   US Lower Extremity Venous Duplex Bilateral     Status: None (Preliminary result)    Impression    RESIDENT PRELIMINARY INTERPRETATION  IMPRESSION:  1.  No evidence of deep venous thrombosis in either lower extremity.  2.  Right popliteal fluid collection measuring 6.8 cm.   [place] Draw     Status: None    Narrative    The following orders were created for panel order [place] Draw.  Procedure                               Abnormality         Status                     ---------                               -----------         ------                     Extra Red Top Tube[[id number]]                               Final result               Extra Green Top (Lithium...[[id number]]                      Final result               Extra Purple Top Tube[[id number]]                            Final result                 Please view results for these tests on the individual orders.   Extra Red Top Tube     Status: None   Result Value Ref Range    Hold Specimen JIC    Extra Green Top (Lithium Heparin) Tube     Status: None   Result Value Ref Range    Hold Specimen JIC    Extra Purple Top Tube     Status: None   Result Value Ref Range    Hold Specimen JIC    Comprehensive metabolic panel     Status: Abnormal   Result Value Ref Range    Sodium 140 135 - 145 mmol/L    Potassium 3.7 3.4 - 5.3 mmol/L    Carbon Dioxide (CO2) 27 22 - 29 mmol/L    Anion Gap 12 7 - 15 mmol/L    Urea Nitrogen 11.0 6.0 - 20.0 mg/dL    Creatinine 0.98 0.67 - 1.17 mg/dL    GFR Estimate >90 >60 mL/min/1.73m2    Calcium 9.3 8.6 - 10.0 mg/dL    Chloride 101 98 - 107 mmol/L    Glucose 174 (H) 70 - 99 mg/dL    Alkaline Phosphatase 116 40 - 129 U/L    AST 18 0 - 45  U/L    ALT 18 0 - 70 U/L    Protein Total 6.9 6.4 - 8.3 g/dL    Albumin 3.5 3.5 - 5.2 g/dL    Bilirubin Total 0.5 <=1.2 mg/dL   UA with Microscopic reflex to Culture     Status: Abnormal    Specimen: Urine, Midstream   Result Value Ref Range    Color Urine Yellow Colorless, Straw, Light Yellow, Yellow    Appearance Urine Clear Clear    Glucose Urine Negative Negative mg/dL    Bilirubin Urine Negative Negative    Ketones Urine Negative Negative mg/dL    Specific Gravity Urine 1.020 1.003 - 1.035    Blood Urine Negative Negative    pH Urine 6.5 5.0 - 7.0    Protein Albumin Urine Negative Negative mg/dL    Urobilinogen Urine 6.0 (A) Normal, 2.0 mg/dL    Nitrite Urine Negative Negative    Leukocyte Esterase Urine Negative Negative    Mucus Urine Present (A) None Seen /LPF    RBC Urine <1 <=2 /HPF    WBC Urine 1 <=5 /HPF    Hyaline Casts Urine 1 <=2 /LPF    Narrative    Urine Culture not indicated   CBC with platelets and differential     Status: Abnormal   Result Value Ref Range    WBC Count 6.5 4.0 - 11.0 10e3/uL    RBC Count 3.16 (L) 4.40 - 5.90 10e6/uL    Hemoglobin 9.1 (L) 13.3 - 17.7 g/dL    Hematocrit 28.3 (L) 40.0 - 53.0 %    MCV 90 78 - 100 fL    MCH 28.8 26.5 - 33.0 pg    MCHC 32.2 31.5 - 36.5 g/dL    RDW 13.6 10.0 - 15.0 %    Platelet Count 264 150 - 450 10e3/uL    % Neutrophils 58 %    % Lymphocytes 30 %    % Monocytes 8 %    % Eosinophils 3 %    % Basophils 1 %    % Immature Granulocytes 0 %    NRBCs per 100 WBC 0 <1 /100    Absolute Neutrophils 3.8 1.6 - 8.3 10e3/uL    Absolute Lymphocytes 1.9 0.8 - 5.3 10e3/uL    Absolute Monocytes 0.5 0.0 - 1.3 10e3/uL    Absolute Eosinophils 0.2 0.0 - 0.7 10e3/uL    Absolute Basophils 0.0 0.0 - 0.2 10e3/uL    Absolute Immature Granulocytes 0.0 <=0.4 10e3/uL    Absolute NRBCs 0.0 10e3/uL   RBC and Platelet Morphology     Status: None   Result Value Ref Range    Platelet Assessment  Automated Count Confirmed. Platelet morphology is normal.     Automated Count Confirmed.  Platelet morphology is normal.    RBC Morphology Confirmed RBC Indices    CBC with platelets differential     Status: Abnormal    Narrative    The following orders were created for panel order CBC with platelets differential.  Procedure                               Abnormality         Status                     ---------                               -----------         ------                     CBC with platelets and d...[[id number]]  Abnormal            Final result               RBC and Platelet Morphology[[id number]]                      Final result                 Please view results for these tests on the individual orders.     Medications   acetaminophen (TYLENOL) tablet 650 mg (has no administration in time range)   HYDROmorphone (PF) (DILAUDID) injection 0.5 mg (0.5 mg Intravenous $Given 10/2/23 0508)   diphenhydrAMINE (BENADRYL) injection 25 mg (25 mg Intravenous $Given 10/2/23 0508)   HYDROmorphone (PF) (DILAUDID) injection 0.5 mg (0.5 mg Intravenous $Given 10/2/23 0645)     Labs Ordered and Resulted from Time of ED Arrival to Time of ED Departure   COMPREHENSIVE METABOLIC PANEL - Abnormal       Result Value    Sodium 140      Potassium 3.7      Carbon Dioxide (CO2) 27      Anion Gap 12      Urea Nitrogen 11.0      Creatinine 0.98      GFR Estimate >90      Calcium 9.3      Chloride 101      Glucose 174 (*)     Alkaline Phosphatase 116      AST 18      ALT 18      Protein Total 6.9      Albumin 3.5      Bilirubin Total 0.5     ROUTINE UA WITH MICROSCOPIC REFLEX TO CULTURE - Abnormal    Color Urine Yellow      Appearance Urine Clear      Glucose Urine Negative      Bilirubin Urine Negative      Ketones Urine Negative      Specific Gravity Urine 1.020      Blood Urine Negative      pH Urine 6.5      Protein Albumin Urine Negative      Urobilinogen Urine 6.0 (*)     Nitrite Urine Negative      Leukocyte Esterase Urine Negative      Mucus Urine Present (*)     RBC Urine <1      WBC Urine 1      Hyaline Casts  Urine 1     CBC WITH PLATELETS AND DIFFERENTIAL - Abnormal    WBC Count 6.5      RBC Count 3.16 (*)     Hemoglobin 9.1 (*)     Hematocrit 28.3 (*)     MCV 90      MCH 28.8      MCHC 32.2      RDW 13.6      Platelet Count 264      % Neutrophils 58      % Lymphocytes 30      % Monocytes 8      % Eosinophils 3      % Basophils 1      % Immature Granulocytes 0      NRBCs per 100 WBC 0      Absolute Neutrophils 3.8      Absolute Lymphocytes 1.9      Absolute Monocytes 0.5      Absolute Eosinophils 0.2      Absolute Basophils 0.0      Absolute Immature Granulocytes 0.0      Absolute NRBCs 0.0     RBC AND PLATELET MORPHOLOGY    Platelet Assessment        Value: Automated Count Confirmed. Platelet morphology is normal.    RBC Morphology Confirmed RBC Indices     INR     US Lower Extremity Venous Duplex Bilateral   Preliminary Result   RESIDENT PRELIMINARY INTERPRETATION   IMPRESSION:   1.  No evidence of deep venous thrombosis in either lower extremity.   2.  Right popliteal fluid collection measuring 6.8 cm.             Critical care was not performed.     Medical Decision Making  The patient's presentation was of moderate complexity (an undiagnosed new problem with uncertain diagnosis).    The patient's evaluation involved:  review of external note(s) from 1 sources (recent orthopedic notes)  ordering and/or review of 3+ test(s) in this encounter (see separate area of note for details)  independent interpretation of testing performed by another health professional (ultrasound)  discussion of management or test interpretation with another health professional (orthopedics)    The patient's management necessitated high risk (a parenteral controlled substance).    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 Ch who  presents to the emergency department for evaluation of right leg pain.  Upon arrival patient is nontoxic-appearing, moderate distress secondary to pain.  Patient was treated with IV Dilaudid for pain.  I reviewed comprehensive labs which remarkable for physical in 6.5, hemoglobin 9.1, urinalysis with no evidence of acute infection no acute metabolic or electrolyte abnormality.  I personally reviewed and interpreted bilateral lower extremity ultrasound which demonstrates no acute DVT.  Right popliteal fluid collection measuring 6.8 cm.  I discussed patient management and ultrasound findings with orthopedics.  Low suspicion for acute infection.  Likely hematoma.  Recommend Ace wrap, supportive care, continue pain management with previous pain medications Tylenol/oxycodone, close outpatient follow-up with primary care provider and orthopedics.  I discussed results with patient and significant other who understand agrees with plan.  Return precautions discussed.  Patient discharged.     I have reviewed the nursing notes. I have reviewed the findings, diagnosis, plan and need for follow up with the patient.    New Prescriptions    No medications on file       Final diagnoses:   Right leg pain   Acute post-operative pain       Karen James MD  [facility] EMERGENCY DEPARTMENT  10/2/2023     Karen James MD  10/02/23 0710

## 2023-10-03 ENCOUNTER — TELEPHONE (OUTPATIENT)
Dept: ORTHOPEDICS | Facility: CLINIC | Age: 58
End: 2023-10-03

## 2023-10-03 ENCOUNTER — PATIENT OUTREACH (OUTPATIENT)
Dept: CARE COORDINATION | Facility: CLINIC | Age: 58
End: 2023-10-03

## 2023-10-03 ENCOUNTER — HOSPITAL ENCOUNTER (EMERGENCY)
Facility: CLINIC | Age: 58
Discharge: HOME | End: 2023-10-03
Attending: EMERGENCY MEDICINE | Admitting: EMERGENCY MEDICINE
Payer: COMMERCIAL

## 2023-10-03 VITALS
SYSTOLIC BLOOD PRESSURE: 125 MMHG | OXYGEN SATURATION: 95 % | RESPIRATION RATE: 16 BRPM | TEMPERATURE: 97.8 F | DIASTOLIC BLOOD PRESSURE: 86 MMHG | WEIGHT: 290 LBS | HEIGHT: 72 IN | HEART RATE: 92 BPM | BODY MASS INDEX: 39.28 KG/M2

## 2023-10-03 DIAGNOSIS — K59.00 CONSTIPATION, UNSPECIFIED CONSTIPATION TYPE: ICD-10-CM

## 2023-10-03 DIAGNOSIS — M25.561 ACUTE POSTOPERATIVE PAIN OF RIGHT KNEE: ICD-10-CM

## 2023-10-03 DIAGNOSIS — Z96.651 S/P TKR (TOTAL KNEE REPLACEMENT), RIGHT: Primary | ICD-10-CM

## 2023-10-03 DIAGNOSIS — G89.18 ACUTE POSTOPERATIVE PAIN OF RIGHT KNEE: ICD-10-CM

## 2023-10-03 PROCEDURE — 99282 EMERGENCY DEPT VISIT SF MDM: CPT

## 2023-10-03 PROCEDURE — 99283 EMERGENCY DEPT VISIT LOW MDM: CPT | Performed by: EMERGENCY MEDICINE

## 2023-10-03 ASSESSMENT — ACTIVITIES OF DAILY LIVING (ADL)
ADLS_ACUITY_SCORE: 33
ADLS_ACUITY_SCORE: 33

## 2023-10-03 NOTE — TELEPHONE ENCOUNTER
Social work order placed and patient notified. Patient confirmed understanding.     Thalia FAIR RN, [facility] 10/03/23 11:03 AM

## 2023-10-03 NOTE — PROGRESS NOTES
Community Health Worker Initial Outreach    CHW Initial Information Gathering:  Referral Source: PCP  Preferred Hospital: [place], [place]  [phone]  Preferred Urgent Care: [facility] [place], [phone]  Current living arrangement:: I live in a private home  Type of residence:: Apartment  Community Resources: Financial/Insurance  Supplies Currently Used at Home: None  Equipment Currently Used at Home: cane, straight  Informal Support system:: Family, Friends  No PCP office visit in Past Year: No  Transportation means:: Friend, Family  CHW Additional Questions  If ED/Hospital discharge, follow-up appointment scheduled as recommended?: N/A  Medication changes made following ED/Hospital discharge?: N/A  MyChart active?: Yes  Patient sent Social Determinants of Health questionnaire?: No    Patient accepts CC: Yes. Patient scheduled for assessment with CC ROSHAN on 10/5 at 10 am. Patient noted desire to discuss help recovering at home, support he had fell through.     CHRISTIANE Roth  [place] [place] [facility]  [phone]

## 2023-10-03 NOTE — TELEPHONE ENCOUNTER
9/26 knee surgery   Patient is wondering how long he needs support and is wondering if he can be alone for 8-10 hours daily. Patient can be alone at this time, but he states he needs help   No fevers that he knows of but he has chills  Knee is very swollen   Sometimes red and hot to the touch  Patient is still constipated and he has been taking miralax and senna  Patient is taking only taken one Oxycodone in the last two days   Patient went to ER yesterday and he states they did not do anything for him  Patient wants help to get into a transitional care unit because he cannot do anything himself and his  needs to go back to work  I have added patient to Dr. Soria schedule in [place] on Thursday 10/5  Forwarding to provider and ortho team to review and advise    Thalia FAIR RN, [facility] 10/03/23 10:02 AM

## 2023-10-03 NOTE — TELEPHONE ENCOUNTER
"Called and spoke to Adarsh, a number of things going on.    As for pain, swelling, as we've discussed numerous times prior to surgery as well as after surgery in the hospital, normal. He just had another DVT scan negative. Advised more elevation, laying back and getting feet up, \"toes above the nose\".    As dizziness, this has been going on since surgery while in the hospital, but also notes problems even prior. I thinks this is just exacerbated by postoperative condition.    As for help at home, he notes his plan \"fell through\" and would need some assistance now as his roommate needs to get to work and maybe some other issues there.    I don't know what we can do at this time, maybe try to get some homehealth if possible.    I can have our  reachout and discuss options, pending insurance coverage, etc.    He was appreciative of the call.      Edwin Ch M.D., M.S.  Dept. of Orthopaedic Surgery  [facility]    "

## 2023-10-03 NOTE — TELEPHONE ENCOUNTER
[facility] Call Center    Phone Message    May a detailed message be left on voicemail: yes     Reason for Call: Other: Adarsh called he has some questions concerning post op care he had a knee replacement on 9/26/23 Please call to discuss     Action Taken: Other: FSOC BE Orthopedic Surgery    Travel Screening: Not Applicable

## 2023-10-03 NOTE — ED PROVIDER NOTES
History     Chief Complaint   Patient presents with    Constipation     HPI  Adarsh Salvador is a 58 year old male with who presents for constipation in the setting of narcotic pain management after total knee replacement on 9/26/2023.  Patient also has history of DVT and was seen yesterday at the [facility] emergency department for knee pain.  Review of that encounter shows that ultrasound was performed and no evidence of DVT.  Orthopedics was consulted, follow-up scheduled in clinic.  Patient states that he has had issues with constipation for a long time.  He is only taking what his  has been giving him and per his  this was 1 package of MiraLAX 1 time.  No fevers.  No nausea vomiting.  Feels weak having difficulty getting around with his walker.  Review of the chart also shows that there was conversation with orthopedic surgeon today and request for  to reach out to see if there is home health options available to him.    The patient's PMHx, Surgical Hx, Allergies, and Medications were all reviewed with the patient.    Allergies:  Allergies   Allergen Reactions    Artificial Sweetner (Informational Only) [Artificial Sweetner (Informational Only) ] GI Disturbance     ALL artificial sweetners cause severe diarrhea & flu symptoms    Hydromorphone Anaphylaxis    Ibuprofen GI Disturbance    Morphine Sulfate Concentrate Anaphylaxis    Morphine [Fumaric Acid] Anaphylaxis    Hydrocodone-Acetaminophen Itching    Tramadol Hives    Trazodone Hives    Gabapentin GI Disturbance     GI upset per pt    Keflex [Cephalexin] Diarrhea     Upset stomach    Codeine Phosphate Itching    Ketorolac Tromethamine Rash       Problem List:    Patient Active Problem List    Diagnosis Date Noted    S/P TKR (total knee replacement), right 09/26/2023     Priority: Medium    Pulmonary embolism, other, unspecified chronicity, unspecified whether acute cor pulmonale present (H) 08/15/2023     Priority: Medium    Advanced  directives, counseling/discussion 12/22/2022     Priority: Medium     Pt was given info on ADV[facility]ha Amaral MA      Acute pain of right knee 08/26/2022     Priority: Medium    Aftercare following surgery of the musculoskeletal system 08/26/2022     Priority: Medium    Chronic pain of right knee 08/08/2022     Priority: Medium     Added automatically from request for surgery [id number]      Bilateral leg pain 06/09/2022     Priority: Medium     Added automatically from request for surgery [id number]      Low volume of ejaculated semen 06/07/2022     Priority: Medium    DVT (deep venous thrombosis) (H) 01/15/2022     Priority: Medium    Recurrent deep vein thrombosis (DVT) (H) 09/27/2021     Priority: Medium    Precordial pain 02/15/2021     Priority: Medium    Dyslipidemia 02/15/2021     Priority: Medium    Elevated C-reactive protein 02/15/2021     Priority: Medium    Gastric reflux 02/15/2021     Priority: Medium    Internal derangement of knee 02/15/2021     Priority: Medium    Nicotine dependence 02/15/2021     Priority: Medium    Varicose veins of lower extremity 02/15/2021     Priority: Medium    Vitamin D deficiency 02/15/2021     Priority: Medium    Neuropathy 06/23/2020     Priority: Medium    Morbid obesity (H) 03/26/2020     Priority: Medium    Insomnia, unspecified type 05/09/2019     Priority: Medium    CONNOR (generalized anxiety disorder) 05/08/2019     Priority: Medium    Hyperlipidemia LDL goal <100 08/17/2018     Priority: Medium    Type 2 diabetes mellitus with diabetic polyneuropathy, with long-term current use of insulin (H) 01/18/2018     Priority: Medium    Mild persistent asthma without complication 01/12/2018     Priority: Medium    Right inguinal hernia 06/14/2016     Priority: Medium    Irritable bowel syndrome with diarrhea 03/21/2016     Priority: Medium    Fatty liver 02/22/2016     Priority: Medium    Hypertension, unspecified type 12/15/2015     Priority: Medium    Anemia in other chronic  diseases classified elsewhere 12/15/2015     Priority: Medium    Hypertriglyceridemia 07/12/2013     Priority: Medium    Chronic maxillary sinusitis 09/18/2012     Priority: Medium     Believes this is secondary to exposure to toxic fumes at work- he is a .  He regularly wears a mask ventilator and believes this helps.  Has only tried intermittent nasal washes, and OTC medications.  Not ever tried steroid nasal sprays or other controller medications.        Chronic rhinitis 09/18/2012     Priority: Medium    Constipation 04/24/2012     Priority: Medium    GERD (gastroesophageal reflux disease) 06/15/2011     Priority: Medium    Chronic RLQ pain 06/15/2011     Priority: Medium        Past Medical History:    Past Medical History:   Diagnosis Date    Anaphylactic reaction 8/14/2015    Anxiety     Depression     DM2 (diabetes mellitus, type 2) (H)     GERD (gastroesophageal reflux disease)     HTN (hypertension)     IBS (irritable bowel syndrome)     Kidney stone 6/15/2011    Neuropathy        Past Surgical History:    Past Surgical History:   Procedure Laterality Date    ARTHROPLASTY KNEE Right 9/26/2023    Procedure: ARTHROPLASTY, KNEE, TOTAL Right;  Surgeon: Edwin Ch MD;  Location: [facility]    ARTHROSCOPY KNEE WITH MEDIAL MENISCECTOMY Right 8/19/2022    Procedure: examination under anesthesia, right knee arthroscopy, meniscectomy;  Surgeon: Carlos A Em MD;  Location: [facility] OR    COLONOSCOPY  5/1/2012    Procedure:COLONOSCOPY; screening colonoscopy; Surgeon:KINGSLEY DOS SANTOS; Location:[facility]    COLONOSCOPY  4/21/2014    Procedure: COMBINED COLONOSCOPY, SINGLE BIOPSY/POLYPECTOMY BY BIOPSY;  Surgeon: Duane, William Charles, MD;  Location: [facility]    COLONOSCOPY N/A 4/21/2022    Procedure: COLONOSCOPY, WITH POLYPECTOMY AND BIOPSY;  Surgeon: Luiz Calvert MD;  Location:  GI    CYSTOSCOPY  05/01/2008    CYSTOSCOPY W/ URETERAL STENT PLACEMENT 05/01/2008     CYSTOSCOPY  09/26/2010     CYSTOSCOPY W/ URETERAL STENT PLACEMENT 09/26/2010 Left     CYSTOSCOPY  05/18/2012    CYSTOSCOPY, LEFT URETEROSCOPY AND STENT PLACEMENT left retrograde 05/18/2012     CYSTOSCOPY,+URETEROSCOPY  06/10/2008    URETEROSCOPY 06/10/2008 Right     HC BREATH HYDROGEN TEST  3/7/2014    Procedure: HYDROGEN BREATH TEST;  Surgeon: Darion Swift MD;  Location: [facility]    INJECT EPIDURAL LUMBAR N/A 7/7/2022    Procedure: INJECTION, SPINE, LUMBAR, EPIDURAL L5/S1;  Surgeon: Michi Hahn MD;  Location: [facility]    LITHOTRIPSY  09/30/2010    LITHOTRIPSY 09/30/2010 LEFT EXTRACORPOREAL SHOCK WAVE LITHOTRIPSY, FLEXIBLE CYSTOSCOPY, LEFT STENT REMOVAL      ORTHOPEDIC SURGERY  10/03/2007    KNEE ARTHROSCOPY 10/03/2007 RightX2      RELEASE CARPAL TUNNEL Right 1/26/2018    Procedure: RELEASE CARPAL TUNNEL;  Right carpal tunnel release;  Surgeon: Wiliam Saenz MD;  Location: [facility] OR    VASCULAR SURGERY  11/24/2008    Radiofrequency ablation left saphenous vein 11/24/2008 Done by Dr. Lozoya         Family History:    Family History   Problem Relation Age of Onset    Cancer Mother 52        lung, smoked    Cancer - colorectal Father 53        unsure type, pt attributes to radiation exposure    Myocardial Infarction Father 45    Coronary Artery Disease Father     Myocardial Infarction Paternal Grandfather 35    Diabetes Other         nephew- type 1    Diabetes Maternal Aunt         1    Diabetes Maternal Aunt         2    Diabetes Paternal Uncle         1    Diabetes Paternal Uncle         2    Diabetes Paternal Uncle         3    Diabetes Paternal Uncle         4    Colon Cancer No family hx of        Social History:  Marital Status:  Single [1]  Social History     Tobacco Use    Smoking status: Never    Smokeless tobacco: Current     Types: Chew    Tobacco comments:     Chew   Vaping Use    Vaping Use: Never used   Substance Use Topics    Alcohol use: Not Currently     Alcohol/week: 0.0 standard drinks of alcohol     Comment:  "occasional, few drinks a month    Drug use: Yes     Comment: CBD occasional        Medications:    acetaminophen (TYLENOL) 325 MG tablet  albuterol (PROAIR HFA/PROVENTIL HFA/VENTOLIN HFA) 108 (90 Base) MCG/ACT inhaler  amLODIPine (NORVASC) 10 MG tablet  atorvastatin (LIPITOR) 40 MG tablet  blood glucose (ACCU-CHEK GUIDE) test strip  blood glucose monitoring (ACCU-CHEK FASTCLIX) lancets  calcium carbonate (TUMS) 500 MG chewable tablet  chlorthalidone (HYGROTON) 25 MG tablet  Continuous Blood Gluc Sensor (FREESTYLE FROYLAN 3 SENSOR) MISC  DULoxetine (CYMBALTA) 60 MG capsule  enoxaparin ANTICOAGULANT (LOVENOX) 100 MG/ML syringe  EPINEPHrine (ANY BX GENERIC EQUIV) 0.3 MG/0.3ML injection 2-pack  fluticasone-salmeterol (ADVAIR) 500-50 MCG/ACT inhaler  hydrOXYzine (ATARAX) 25 MG tablet  insulin aspart (NOVOLOG FLEXPEN) 100 UNIT/ML pen  insulin glargine (BASAGLAR KWIKPEN) 100 UNIT/ML pen  insulin pen needle (32G X 4 MM) 32G X 4 MM miscellaneous  insulin syringe-needle U-100 (29G X 1/2\" 0.5 ML) 29G X 1/2\" 0.5 ML miscellaneous  losartan (COZAAR) 25 MG tablet  meclizine (ANTIVERT) 25 MG tablet  metFORMIN (GLUCOPHAGE XR) 500 MG 24 hr tablet  montelukast (SINGULAIR) 10 MG tablet  multivitamin, therapeutic (THERA-VIT) TABS  nortriptyline (PAMELOR) 10 MG capsule  omeprazole (PRILOSEC) 40 MG DR capsule  oxyCODONE (ROXICODONE) 5 MG tablet  pregabalin (LYRICA) 50 MG capsule  rizatriptan (MAXALT-MLT) 10 MG ODT  senna-docusate (SENOKOT-S/PERICOLACE) 8.6-50 MG tablet  warfarin ANTICOAGULANT (COUMADIN) 5 MG tablet          Review of Systems  Pertinent positives and negatives mentioned in HPI    Physical Exam   BP: (!) 157/90  Pulse: 110  Temp: 97.9  F (36.6  C)  Resp: 20  Height: 182.9 cm (6')  Weight: 131.5 kg (290 lb)  SpO2: 100 %    Physical Exam  GEN: Awake, alert, and cooperative. Appears distressed but non toxic.   CV : extremities warm and well perfused.   PULM: Normal effort. Speaking in full sentences.   ABD: Soft, non-tender, " non-distended. No rebound or guarding.   NEURO: Normal speech. Following commands. CN II-XII grossly intact. Answering questions and interacting appropriately.   INT: Warm. No diaphoresis. Normal color.     ED Course        Procedures                 Critical Care time:  none               No results found. However, due to the size of the patient record, not all encounters were searched. Please check Results Review for a complete set of results.    Medications - No data to display    Assessments & Plan (with Medical Decision Making)   58 year old male with past medical history notable for total knee arthroplasty on 9/26/2023 with difficulty controlling pain with more than 1 week of constipation.  Has only taken 1 dose of MiraLAX and no suppositories or enemas.  Reassuring vital signs and no fever.  Reassuring abdominal exam.  Was seen at the [facility] emergency department yesterday for knee pain and had thorough evaluation.  Patient's been having difficulty controlling his pain and function at home and to review also shows that there is a request for social work to help to see if they can be some home nursing services available for him.  Soapsuds enema today with result of large amount of formed stool.  Patient was feeling much better afterwards.  I would like him to take 1-2 capfuls of MiraLAX per day while taking narcotic pain medications.  Can decrease or stop when stools become loose.  Like him to follow-up with his primary care team and with social work to see if they can help get him services at home.  He is comfortable being discharged to home.         I have reviewed the nursing notes.         Discharge Medication List as of 10/3/2023  9:34 PM          Final diagnoses:   Acute postoperative pain of right knee   Constipation, unspecified constipation type     Fortino Cates MD        10/3/2023   [facility] EMERGENCY DEPT    Disclaimer: This note consists of words and symbols  derived from keyboarding and dictation using voice recognition software.  As a result, there may be errors that have gone undetected.  Please consider this when interpreting information found in this note.               Fortino Cates MD  10/03/23 3714

## 2023-10-03 NOTE — ED TRIAGE NOTES
"2.5 - 3 weeks of no BM takes miralax chronically had L knee surgery last week had been taking pain meds but stopped     Went to [facility] ER Sunday \"they blew me off\"     Triage Assessment       Row Name 10/03/23 3617       Triage Assessment (Adult)    Airway WDL WDL       Respiratory WDL    Respiratory WDL WDL       Skin Circulation/Temperature WDL    Skin Circulation/Temperature WDL WDL       Cardiac WDL    Cardiac WDL WDL       Peripheral/Neurovascular WDL    Peripheral Neurovascular WDL WDL    Capillary Refill, General less than/equal to 3 secs       Cognitive/Neuro/Behavioral WDL    Cognitive/Neuro/Behavioral WDL WDL       [place] Coma Scale    Best Eye Response 4-->(E4) spontaneous    Best Motor Response 6-->(M6) obeys commands    Best Verbal Response 5-->(V5) oriented    [place] Coma Scale Score 15                    "

## 2023-10-04 ENCOUNTER — TELEPHONE (OUTPATIENT)
Dept: ANTICOAGULATION | Facility: CLINIC | Age: 58
End: 2023-10-04
Payer: COMMERCIAL

## 2023-10-04 NOTE — DISCHARGE INSTRUCTIONS
Take 1-2 capfuls of miralax daily until stools are loose. Your pain medications are constipating.     Follow up with your primary care team as needed.     If your symptoms worsen or you develop new or concerning symptoms, please return to the Emergency Department for further evaluation and treatment.

## 2023-10-04 NOTE — TELEPHONE ENCOUNTER
ANTICOAGULATION  MANAGEMENT: Discharge Review    Adarsh Salvador chart reviewed for anticoagulation continuity of care    Emergency room visit on 10/3/23 for constipation.    Discharge disposition: Home    Results:  Recent labs: (last 7 days)     09/27/23  1524 09/28/23  0535 09/29/23  0521   INR 1.09 1.21* 1.50*     Anticoagulation inpatient management:     not applicable     Anticoagulation discharge instructions:     Warfarin dosing: home regimen continued   Bridging: bridging with enoxaparin (Lovenox)   INR goal change: No      Medication changes affecting anticoagulation: No    Additional factors affecting anticoagulation: recent surgery, pain/inflammation     PLAN     Patient is currently overdue for an INR check-- remains on warfarin and enoxaparin post-operatively. Reports having missed doses of both over the past week. Continues to have significant pain; no longer on narcotic pain medication, just taking tylenol and icing. Very immobile (only using wheelchair and walker for short distances). Scheduled appt for 10/11/23-- advised to continued current maintenance dose of warfarin and enoxaparin. Will call if he needs more.    No adjustment to Anticoagulation Calendar was required      Taylor Tavarez RN

## 2023-10-04 NOTE — ED NOTES
Digital exam completed and no stool at end of rectum.  Soap suds enema given with 600ml water.  Pt tolerated well.  Pt and SO informed pt needs to hold as long as able,

## 2023-10-05 ENCOUNTER — PATIENT OUTREACH (OUTPATIENT)
Dept: NURSING | Facility: CLINIC | Age: 58
End: 2023-10-05
Payer: COMMERCIAL

## 2023-10-05 ENCOUNTER — ANCILLARY PROCEDURE (OUTPATIENT)
Dept: GENERAL RADIOLOGY | Facility: CLINIC | Age: 58
End: 2023-10-05
Attending: ORTHOPAEDIC SURGERY
Payer: COMMERCIAL

## 2023-10-05 ENCOUNTER — OFFICE VISIT (OUTPATIENT)
Dept: ORTHOPEDICS | Facility: CLINIC | Age: 58
End: 2023-10-05
Payer: COMMERCIAL

## 2023-10-05 VITALS
WEIGHT: 274 LBS | HEIGHT: 72 IN | SYSTOLIC BLOOD PRESSURE: 130 MMHG | BODY MASS INDEX: 37.11 KG/M2 | HEART RATE: 112 BPM | DIASTOLIC BLOOD PRESSURE: 80 MMHG

## 2023-10-05 DIAGNOSIS — Z96.651 S/P TOTAL KNEE ARTHROPLASTY, RIGHT: Primary | ICD-10-CM

## 2023-10-05 DIAGNOSIS — Z96.651 S/P TOTAL KNEE ARTHROPLASTY, RIGHT: ICD-10-CM

## 2023-10-05 PROCEDURE — 73562 X-RAY EXAM OF KNEE 3: CPT | Mod: TC | Performed by: RADIOLOGY

## 2023-10-05 PROCEDURE — 99024 POSTOP FOLLOW-UP VISIT: CPT | Performed by: ORTHOPAEDIC SURGERY

## 2023-10-05 ASSESSMENT — PAIN SCALES - GENERAL: PAINLEVEL: SEVERE PAIN (7)

## 2023-10-05 NOTE — LETTER
[facility] CARE COORDINATION  [address] [facility] [address]    October 5, 2023    Adarsh EDWARDS [facility]  [address]   [place] [address]      Dear Adarsh,      I am a  clinic care coordinator who works with Kristen M. Kehr, PA-C with the [facility]. I wanted to introduce myself and provide you with my contact information for you to be able to call me with any questions or concerns. Below is a description of clinic care coordination and how I can further assist you.       The clinic care coordination team is made up of a registered nurse, , financial resource worker and community health worker who understand the health care system. The goal of clinic care coordination is to help you manage your health and improve access to the health care system. Our team works alongside your provider to assist you in determining your health and social needs. We can help you obtain health care and community resources, providing you with necessary information and education. We can work with you through any barriers and develop a care plan that helps coordinate and strengthen the communication between you and your care team.  Our services are voluntary and are offered without charge to you personally.    Please feel free to contact me with any questions or concerns regarding care coordination and what we can offer.      We are focused on providing you with the highest-quality healthcare experience possible.    Sincerely,     Brittnee Bowen, [facility]  Social Work Primary Care Clinic Care Coordinator  [facility]  [phone]  [email or web address]

## 2023-10-05 NOTE — LETTER
10/5/2023         RE: Adarsh Salvador  [address]  [place] [address]        Dear Colleague,    Thank you for referring your patient, Adarsh Salvador, to the [facility]. Please see a copy of my visit note below.    Chief Complaint   Patient presents with     Right Knee - Total Joint Post-op     DOS 9/26/23. Patient notes he is here because he was told to come in and talk about homecare. Patient notes his knee is very very painful. He has not started physical therapy, starts next week. He has been using a walker since yesterday at home.          SURGERY: Total knee arthroplasty, right knee ([facility])  DATE OF SURGERY: 9/26/2023      HISTORY OF PRESENT ILLNESS: Adarsh Salvador is a 58 year old male seen for postoperative evaluation of right total knee arthroplasty. It has been 9 days since surgery. Returns today a week early due to concerns of pain, swelling and needing homecare. Continues with a lot of knee pain. Walked yesterday and today more pain. Using a walker. He has not yet started therapy, starts next week. He is having difficulties getting out of the house. As of next Monday, he won't have anyone home with him to help him. He's not able to drive because of surgery.    Denies any wound problems. Denies numbness, tingling, or weakness in the affected extremity. Denies fevers chills night sweats. Continues to take warfarin for anti-coagulation for deep vein thrombosis prophylaxis, as well as longterm anticoagulation with history of DVT in the psat.     He presented to the ED on 10/3/2023 for pain, swelling. Had DVT scan negative and looked likely typical postoperative. He was discharged.    Present symptoms: moderate pain, mild swelling.          Past medical history:   Past Medical History:   Diagnosis Date     Anaphylactic reaction 8/14/2015     Anxiety      Depression      DM2 (diabetes mellitus, type 2) (H)      GERD (gastroesophageal reflux disease)       HTN (hypertension)      IBS (irritable bowel syndrome)      Kidney stone 6/15/2011    Pt believes these were Calcium stones     Neuropathy      Patient Active Problem List    Diagnosis Date Noted     S/P TKR (total knee replacement), right 09/26/2023     Priority: Medium     Pulmonary embolism, other, unspecified chronicity, unspecified whether acute cor pulmonale present (H) 08/15/2023     Priority: Medium     Advanced directives, counseling/discussion 12/22/2022     Priority: Medium     Pt was given info on ADV. Munir Amaral MA       Acute pain of right knee 08/26/2022     Priority: Medium     Aftercare following surgery of the musculoskeletal system 08/26/2022     Priority: Medium     Chronic pain of right knee 08/08/2022     Priority: Medium     Added automatically from request for surgery [id number]       Bilateral leg pain 06/09/2022     Priority: Medium     Added automatically from request for surgery [id number]       Low volume of ejaculated semen 06/07/2022     Priority: Medium     DVT (deep venous thrombosis) (H) 01/15/2022     Priority: Medium     Recurrent deep vein thrombosis (DVT) (H) 09/27/2021     Priority: Medium     Precordial pain 02/15/2021     Priority: Medium     Dyslipidemia 02/15/2021     Priority: Medium     Elevated C-reactive protein 02/15/2021     Priority: Medium     Gastric reflux 02/15/2021     Priority: Medium     Internal derangement of knee 02/15/2021     Priority: Medium     Nicotine dependence 02/15/2021     Priority: Medium     Varicose veins of lower extremity 02/15/2021     Priority: Medium     Vitamin D deficiency 02/15/2021     Priority: Medium     Neuropathy 06/23/2020     Priority: Medium     Morbid obesity (H) 03/26/2020     Priority: Medium     Insomnia, unspecified type 05/09/2019     Priority: Medium     CONNOR (generalized anxiety disorder) 05/08/2019     Priority: Medium     Hyperlipidemia LDL goal <100 08/17/2018     Priority: Medium     Type 2 diabetes mellitus with  diabetic polyneuropathy, with long-term current use of insulin (H) 01/18/2018     Priority: Medium     Mild persistent asthma without complication 01/12/2018     Priority: Medium     Right inguinal hernia 06/14/2016     Priority: Medium     Irritable bowel syndrome with diarrhea 03/21/2016     Priority: Medium     Fatty liver 02/22/2016     Priority: Medium     Hypertension, unspecified type 12/15/2015     Priority: Medium     Anemia in other chronic diseases classified elsewhere 12/15/2015     Priority: Medium     Hypertriglyceridemia 07/12/2013     Priority: Medium     Chronic maxillary sinusitis 09/18/2012     Priority: Medium     Believes this is secondary to exposure to toxic fumes at work- he is a .  He regularly wears a mask ventilator and believes this helps.  Has only tried intermittent nasal washes, and OTC medications.  Not ever tried steroid nasal sprays or other controller medications.         Chronic rhinitis 09/18/2012     Priority: Medium     Constipation 04/24/2012     Priority: Medium     GERD (gastroesophageal reflux disease) 06/15/2011     Priority: Medium     Chronic RLQ pain 06/15/2011     Priority: Medium       Past Surgical History:   Past Surgical History:   Procedure Laterality Date     ARTHROPLASTY KNEE Right 9/26/2023    Procedure: ARTHROPLASTY, KNEE, TOTAL Right;  Surgeon: Edwin Ch MD;  Location: [facility]     ARTHROSCOPY KNEE WITH MEDIAL MENISCECTOMY Right 8/19/2022    Procedure: examination under anesthesia, right knee arthroscopy, meniscectomy;  Surgeon: Carlos A Em MD;  Location: [facility] OR     COLONOSCOPY  5/1/2012    Procedure:COLONOSCOPY; screening colonoscopy; Surgeon:KINGSLEY DOS SANTOS; Location:[facility] OR     COLONOSCOPY  4/21/2014    Procedure: COMBINED COLONOSCOPY, SINGLE BIOPSY/POLYPECTOMY BY BIOPSY;  Surgeon: Duane, William Charles, MD;  Location: [facility] OR     COLONOSCOPY N/A 4/21/2022    Procedure: COLONOSCOPY, WITH POLYPECTOMY AND BIOPSY;  Surgeon:  Luiz Calvert MD;  Location: [facility]     CYSTOSCOPY  05/01/2008    CYSTOSCOPY W/ URETERAL STENT PLACEMENT 05/01/2008      CYSTOSCOPY  09/26/2010    CYSTOSCOPY W/ URETERAL STENT PLACEMENT 09/26/2010 Left      CYSTOSCOPY  05/18/2012    CYSTOSCOPY, LEFT URETEROSCOPY AND STENT PLACEMENT left retrograde 05/18/2012      CYSTOSCOPY,+URETEROSCOPY  06/10/2008    URETEROSCOPY 06/10/2008 Right      HC BREATH HYDROGEN TEST  3/7/2014    Procedure: HYDROGEN BREATH TEST;  Surgeon: Darion Swift MD;  Location: [facility]     INJECT EPIDURAL LUMBAR N/A 7/7/2022    Procedure: INJECTION, SPINE, LUMBAR, EPIDURAL L5/S1;  Surgeon: Michi Hahn MD;  Location: [facility] OR     LITHOTRIPSY  09/30/2010    LITHOTRIPSY 09/30/2010 LEFT EXTRACORPOREAL SHOCK WAVE LITHOTRIPSY, FLEXIBLE CYSTOSCOPY, LEFT STENT REMOVAL       ORTHOPEDIC SURGERY  10/03/2007    KNEE ARTHROSCOPY 10/03/2007 RightX2       RELEASE CARPAL TUNNEL Right 1/26/2018    Procedure: RELEASE CARPAL TUNNEL;  Right carpal tunnel release;  Surgeon: Wiliam Saenz MD;  Location: [facility] OR     VASCULAR SURGERY  11/24/2008    Radiofrequency ablation left saphenous vein 11/24/2008 Done by Dr. Lozoya         Medications:   Current Outpatient Medications:      acetaminophen (TYLENOL) 325 MG tablet, Take 2 tablets (650 mg) by mouth every 4 hours as needed for other (mild pain), Disp: 100 tablet, Rfl: 0     albuterol (PROAIR HFA/PROVENTIL HFA/VENTOLIN HFA) 108 (90 Base) MCG/ACT inhaler, Inhale 2 puffs into the lungs every 6 hours as needed for shortness of breath or wheezing, Disp: 6.7 g, Rfl: 3     amLODIPine (NORVASC) 10 MG tablet, Take 1 tablet (10 mg) by mouth daily for blood pressure, Disp: 90 tablet, Rfl: 1     atorvastatin (LIPITOR) 40 MG tablet, Take 1 tablet (40 mg) by mouth daily (Patient taking differently: Take 40 mg by mouth every evening), Disp: 90 tablet, Rfl: 3     blood glucose (ACCU-CHEK GUIDE) test strip, Use to test blood sugar 3 times daily or as directed., Disp:  "300 strip, Rfl: 3     blood glucose monitoring (ACCU-CHEK FASTCLIX) lancets, Use to test blood sugar 1 times daily or as directed.  Ok to substitute alternative if insurance prefers., Disp: 102 each, Rfl: 11     calcium carbonate (TUMS) 500 MG chewable tablet, Take 1 chew tab by mouth daily, Disp: , Rfl:      chlorthalidone (HYGROTON) 25 MG tablet, Take 1 tablet (25 mg) by mouth daily Add on for blood pressure, Disp: 90 tablet, Rfl: 1     Continuous Blood Gluc Sensor (FREESTYLE FROYLAN 3 SENSOR) MISC, 1 each every 14 days, Disp: 2 each, Rfl: 5     DULoxetine (CYMBALTA) 60 MG capsule, Take 1 capsule (60 mg) by mouth 2 times daily, Disp: 180 capsule, Rfl: 3     enoxaparin ANTICOAGULANT (LOVENOX) 100 MG/ML syringe, Inject 1 mL (100 mg) Subcutaneous every 12 hours, Disp: 28 mL, Rfl: 1     EPINEPHrine (ANY BX GENERIC EQUIV) 0.3 MG/0.3ML injection 2-pack, Inject 0.3 mLs (0.3 mg) into the muscle once as needed for anaphylaxis, Disp: 2 each, Rfl: 2     fluticasone-salmeterol (ADVAIR) 500-50 MCG/ACT inhaler, Inhale 1 puff into the lungs every 12 hours, Disp: 1 each, Rfl: 11     hydrOXYzine (ATARAX) 25 MG tablet, Take 1 tablet (25 mg) by mouth every 6 hours as needed for itching or anxiety (with pain, moderate pain), Disp: 30 tablet, Rfl: 0     insulin aspart (NOVOLOG FLEXPEN) 100 UNIT/ML pen, Inject 18 units before breakfast and 18 units before dinner *Will need to schedule with new PCP for future refills*, Disp: 15 mL, Rfl: 3     insulin glargine (BASAGLAR KWIKPEN) 100 UNIT/ML pen, Inject 50 Units Subcutaneous daily, Disp: 45 mL, Rfl: 0     insulin pen needle (32G X 4 MM) 32G X 4 MM miscellaneous, Use 3  pen needles daily or as directed., Disp: 200 each, Rfl: 1     insulin syringe-needle U-100 (29G X 1/2\" 0.5 ML) 29G X 1/2\" 0.5 ML miscellaneous, Use 3 syringes daily or as directed., Disp: 200 each, Rfl: 1     losartan (COZAAR) 25 MG tablet, Take 1 tablet (25 mg) by mouth daily, Disp: 90 tablet, Rfl: 1     meclizine (ANTIVERT) " 25 MG tablet, Take 1 tablet (25 mg) by mouth 3 times daily as needed for dizziness, Disp: 90 tablet, Rfl: 1     metFORMIN (GLUCOPHAGE XR) 500 MG 24 hr tablet, Take 2 tablets (1,000 mg) by mouth 2 times daily (with meals), Disp: 360 tablet, Rfl: 2     montelukast (SINGULAIR) 10 MG tablet, Take 1 tablet (10 mg) by mouth At Bedtime For allergies/asthma, Disp: 90 tablet, Rfl: 2     multivitamin, therapeutic (THERA-VIT) TABS, Take 1 tablet by mouth daily, Disp: , Rfl:      nortriptyline (PAMELOR) 10 MG capsule, Take 2 capsules (20 mg) by mouth At Bedtime, Disp: 60 capsule, Rfl: 5     omeprazole (PRILOSEC) 40 MG DR capsule, Take 1 capsule (40 mg) by mouth daily Take for at least two months, Disp: 90 capsule, Rfl: 2     oxyCODONE (ROXICODONE) 5 MG tablet, Take 1-2 tablets (5-10 mg) by mouth every 4 hours as needed for moderate to severe pain, Disp: 16 tablet, Rfl: 0     pregabalin (LYRICA) 50 MG capsule, Take 1 capsule (50 mg) by mouth 2 times daily, Disp: 60 capsule, Rfl: 1     rizatriptan (MAXALT-MLT) 10 MG ODT, TAKE 1 TABLET BY MOUTH AT ONSET OF HEADACHE FOR MIGRAINE MAY REPEAT IN 2 HOURS. MAX 3 TABS/24 HOURS., Disp: 18 tablet, Rfl: 1     senna-docusate (SENOKOT-S/PERICOLACE) 8.6-50 MG tablet, Take 1-2 tablets by mouth 2 times daily Take while on oral narcotics to prevent or treat constipation., Disp: 30 tablet, Rfl: 0     warfarin ANTICOAGULANT (COUMADIN) 5 MG tablet, Take 15 mg Tues, Thurs and 12.5 mg all other days, or as directed by the Coumadin clinic, Disp: 225 tablet, Rfl: 1  No current facility-administered medications for this visit.    Facility-Administered Medications Ordered in Other Visits:      BUPivacaine (MARCAINE) injection 0.5%, 10 mL, Intradermal, Once, Vinnie Espinoza,      DOBUTamine 500 mg in dextrose 5% 250 mL (adult std conc) premix, 2.5-20 mcg/kg/min, Intravenous, Continuous, Navarro Butcher MD, Stopped at 04/25/19 1559     iopamidol (ISOVUE-M 200) solution 10 mL, 10 mL, Intravenous,  Once, Vinnie Espinoza, DO     methylPREDNISolone (DEPO-MEDROL) injection 80 mg, 80 mg, Intramuscular, Once, Vinnie Espinoza, DO     metoprolol (LOPRESSOR) injection 5 mg, 5 mg, Intravenous, Q5 Min PRN, Navarro Butcher MD, 2 mg at 04/25/19 9836    Allergies:   Allergies   Allergen Reactions     Artificial Sweetner (Informational Only) [Artificial Sweetner (Informational Only) ] GI Disturbance     ALL artificial sweetners cause severe diarrhea & flu symptoms     Hydromorphone Anaphylaxis     Ibuprofen GI Disturbance     Morphine Sulfate Concentrate Anaphylaxis     Morphine [Fumaric Acid] Anaphylaxis     Hydrocodone-Acetaminophen Itching     Tramadol Hives     Trazodone Hives     Gabapentin GI Disturbance     GI upset per pt     Keflex [Cephalexin] Diarrhea     Upset stomach     Codeine Phosphate Itching     Ketorolac Tromethamine Rash         REVIEW OF SYSTEMS:  CONSTITUTIONAL:NEGATIVE for fever, chills, night sweats, change in weight  INTEGUMENTARY/SKIN: NEGATIVE for worrisome rashes, moles or lesions  MUSCULOSKELETAL:See HPI above  NEURO: NEGATIVE for weakness, dizziness or paresthesias  CARDIOVASCULAR: negative for chest pain, shortness of breath    PHYSICAL EXAM:  /80   Pulse 112   Ht 1.829 m (6')   Wt 124.3 kg (274 lb)   BMI 37.16 kg/m     GENERAL APPEARANCE: healthy, alert, no distress; accompanied by his partner.  SKIN: no suspicious lesions or rashes  NEURO: Normal strength and tone, mentation intact and speech normal  PSYCH:  mentation appears normal and affect normal  RESPIRATORY: No increased work of breathing.    BILATERAL LOWER EXTREMITIES:  Gait: using walker for support, quadriceps avoidance right.    Intact sensation deep peroneal nerve, superficial peroneal nerve, med/lat tibial nerve, sural nerve, saphenous nerve  Intact EHL, EDL, TA, FHL, GS, quadriceps hamstrings and hip flexors  Toes warm and well perfused, brisk capillary refill. Palpable 2+ dp pulses.  Bilateral calf soft  and nttp or squeeze.  Edema: trace    right  KNEE EXAM:    Incision: healing well. Dried skin glue in place.   Skin: intact, no erythema, resolving minimal ecchymosis  ROM: 5 extension to 90 flexion  Effusion: moderate   Tender: diffuse      X-RAY:  3 views right knee from 10/5/2023 were reviewed in clinic today. No obvious fractures or dislocations. Total knee arthroplasty components in place without evidence of failure or loosening. Effusion. Anterior soft tissue swelling with slight resolving gas bubbles.      Impression: Adarsh Salvador is a 58 year old male status post Total knee arthroplasty, right knee, 9 days out from surgery, with continued typical postoperative pain, swelling. Difficulties with ambulation and home cares..      Plan:   * reviewed xrays with patient, showing total knee arthroplasty implants.  Overall, clinically, the knee looks much better than typical at 9 days postoperative.  Again, as we discussed prior to surgery, normal to have pain, swelling most sever the first few weeks then gradually improve. Nothing concerning on today's exam to suggest otherwise.  * continue DVT prophylaxis with warfarin indefinitely, as prior.  * continue with knee range of motion exercises, focusing on extension  * wean off all pain medications as tolerated  * xrays next visit: 2 views of the knee  * return to clinic 4 weeks  * Physical Therapy, home exercise program.  Given inability to get to therapy and being home alone, I'm recommending homehealth therapy and an aide.  * all questions addressed and answered prior to discharge from clinic today to patient's satisfaction.     * patient will need longterm (at least 2 years) prophylactic antibiotics use with dental procedures or other invasive procedures (2 g amoxicilin or  2g Keflex or 500mg azithromycin or clarithromycin or 100mg doxycycline) 30-60 minutes prior to dental procedures.    * KOOS Jr/Promis Knee score: NOT to be done at next visit; NOT completed during  todays visit; to be completed at 12 weeks and 12 months postoperative.      Edwin Ch M.D., M.S.  Dept. of Orthopaedic Surgery  [facility]             Again, thank you for allowing me to participate in the care of your patient.        Sincerely,        Edwin Ch MD

## 2023-10-05 NOTE — PROGRESS NOTES
Chief Complaint   Patient presents with    Right Knee - Total Joint Post-op     DOS 9/26/23. Patient notes he is here because he was told to come in and talk about homecare. Patient notes his knee is very very painful. He has not started physical therapy, starts next week. He has been using a walker since yesterday at home.          SURGERY: Total knee arthroplasty, right knee ([facility])  DATE OF SURGERY: 9/26/2023      HISTORY OF PRESENT ILLNESS: Adarsh Salvador is a 58 year old male seen for postoperative evaluation of right total knee arthroplasty. It has been 9 days since surgery. Returns today a week early due to concerns of pain, swelling and needing homecare. Continues with a lot of knee pain. Walked yesterday and today more pain. Using a walker. He has not yet started therapy, starts next week. He is having difficulties getting out of the house. As of next Monday, he won't have anyone home with him to help him. He's not able to drive because of surgery.    Denies any wound problems. Denies numbness, tingling, or weakness in the affected extremity. Denies fevers chills night sweats. Continues to take warfarin for anti-coagulation for deep vein thrombosis prophylaxis, as well as longterm anticoagulation with history of DVT in the psat.     He presented to the ED on 10/3/2023 for pain, swelling. Had DVT scan negative and looked likely typical postoperative. He was discharged.    Present symptoms: moderate pain, mild swelling.          Past medical history:   Past Medical History:   Diagnosis Date    Anaphylactic reaction 8/14/2015    Anxiety     Depression     DM2 (diabetes mellitus, type 2) (H)     GERD (gastroesophageal reflux disease)     HTN (hypertension)     IBS (irritable bowel syndrome)     Kidney stone 6/15/2011    Pt believes these were Calcium stones    Neuropathy      Patient Active Problem List    Diagnosis Date Noted    S/P TKR (total knee replacement), right 09/26/2023      Priority: Medium    Pulmonary embolism, other, unspecified chronicity, unspecified whether acute cor pulmonale present (H) 08/15/2023     Priority: Medium    Advanced directives, counseling/discussion 12/22/2022     Priority: Medium     Pt was given info on ADVNeha Amaral MA      Acute pain of right knee 08/26/2022     Priority: Medium    Aftercare following surgery of the musculoskeletal system 08/26/2022     Priority: Medium    Chronic pain of right knee 08/08/2022     Priority: Medium     Added automatically from request for surgery [id number]      Bilateral leg pain 06/09/2022     Priority: Medium     Added automatically from request for surgery [id number]      Low volume of ejaculated semen 06/07/2022     Priority: Medium    DVT (deep venous thrombosis) (H) 01/15/2022     Priority: Medium    Recurrent deep vein thrombosis (DVT) (H) 09/27/2021     Priority: Medium    Precordial pain 02/15/2021     Priority: Medium    Dyslipidemia 02/15/2021     Priority: Medium    Elevated C-reactive protein 02/15/2021     Priority: Medium    Gastric reflux 02/15/2021     Priority: Medium    Internal derangement of knee 02/15/2021     Priority: Medium    Nicotine dependence 02/15/2021     Priority: Medium    Varicose veins of lower extremity 02/15/2021     Priority: Medium    Vitamin D deficiency 02/15/2021     Priority: Medium    Neuropathy 06/23/2020     Priority: Medium    Morbid obesity (H) 03/26/2020     Priority: Medium    Insomnia, unspecified type 05/09/2019     Priority: Medium    CONNOR (generalized anxiety disorder) 05/08/2019     Priority: Medium    Hyperlipidemia LDL goal <100 08/17/2018     Priority: Medium    Type 2 diabetes mellitus with diabetic polyneuropathy, with long-term current use of insulin (H) 01/18/2018     Priority: Medium    Mild persistent asthma without complication 01/12/2018     Priority: Medium    Right inguinal hernia 06/14/2016     Priority: Medium    Irritable bowel syndrome with diarrhea 03/21/2016      Priority: Medium    Fatty liver 02/22/2016     Priority: Medium    Hypertension, unspecified type 12/15/2015     Priority: Medium    Anemia in other chronic diseases classified elsewhere 12/15/2015     Priority: Medium    Hypertriglyceridemia 07/12/2013     Priority: Medium    Chronic maxillary sinusitis 09/18/2012     Priority: Medium     Believes this is secondary to exposure to toxic fumes at work- he is a .  He regularly wears a mask ventilator and believes this helps.  Has only tried intermittent nasal washes, and OTC medications.  Not ever tried steroid nasal sprays or other controller medications.        Chronic rhinitis 09/18/2012     Priority: Medium    Constipation 04/24/2012     Priority: Medium    GERD (gastroesophageal reflux disease) 06/15/2011     Priority: Medium    Chronic RLQ pain 06/15/2011     Priority: Medium       Past Surgical History:   Past Surgical History:   Procedure Laterality Date    ARTHROPLASTY KNEE Right 9/26/2023    Procedure: ARTHROPLASTY, KNEE, TOTAL Right;  Surgeon: Edwin Ch MD;  Location: [facility] OR    ARTHROSCOPY KNEE WITH MEDIAL MENISCECTOMY Right 8/19/2022    Procedure: examination under anesthesia, right knee arthroscopy, meniscectomy;  Surgeon: Carlos A Em MD;  Location: [facility] OR    COLONOSCOPY  5/1/2012    Procedure:COLONOSCOPY; screening colonoscopy; Surgeon:KINGSLEY DOS SANTOS; Location:[facility] OR    COLONOSCOPY  4/21/2014    Procedure: COMBINED COLONOSCOPY, SINGLE BIOPSY/POLYPECTOMY BY BIOPSY;  Surgeon: Duane, William Charles, MD;  Location: [facility] OR    COLONOSCOPY N/A 4/21/2022    Procedure: COLONOSCOPY, WITH POLYPECTOMY AND BIOPSY;  Surgeon: Luiz Calvert MD;  Location:  GI    CYSTOSCOPY  05/01/2008    CYSTOSCOPY W/ URETERAL STENT PLACEMENT 05/01/2008     CYSTOSCOPY  09/26/2010    CYSTOSCOPY W/ URETERAL STENT PLACEMENT 09/26/2010 Left     CYSTOSCOPY  05/18/2012    CYSTOSCOPY, LEFT URETEROSCOPY AND STENT PLACEMENT left retrograde  05/18/2012     CYSTOSCOPY,+URETEROSCOPY  06/10/2008    URETEROSCOPY 06/10/2008 Right     HC BREATH HYDROGEN TEST  3/7/2014    Procedure: HYDROGEN BREATH TEST;  Surgeon: Darion Swift MD;  Location: [facility] GI    INJECT EPIDURAL LUMBAR N/A 7/7/2022    Procedure: INJECTION, SPINE, LUMBAR, EPIDURAL L5/S1;  Surgeon: Michi Hahn MD;  Location: [facility] OR    LITHOTRIPSY  09/30/2010    LITHOTRIPSY 09/30/2010 LEFT EXTRACORPOREAL SHOCK WAVE LITHOTRIPSY, FLEXIBLE CYSTOSCOPY, LEFT STENT REMOVAL      ORTHOPEDIC SURGERY  10/03/2007    KNEE ARTHROSCOPY 10/03/2007 RightX2      RELEASE CARPAL TUNNEL Right 1/26/2018    Procedure: RELEASE CARPAL TUNNEL;  Right carpal tunnel release;  Surgeon: Wiliam Saenz MD;  Location: [facility] OR    VASCULAR SURGERY  11/24/2008    Radiofrequency ablation left saphenous vein 11/24/2008 Done by Dr. Lozoya         Medications:   Current Outpatient Medications:     acetaminophen (TYLENOL) 325 MG tablet, Take 2 tablets (650 mg) by mouth every 4 hours as needed for other (mild pain), Disp: 100 tablet, Rfl: 0    albuterol (PROAIR HFA/PROVENTIL HFA/VENTOLIN HFA) 108 (90 Base) MCG/ACT inhaler, Inhale 2 puffs into the lungs every 6 hours as needed for shortness of breath or wheezing, Disp: 6.7 g, Rfl: 3    amLODIPine (NORVASC) 10 MG tablet, Take 1 tablet (10 mg) by mouth daily for blood pressure, Disp: 90 tablet, Rfl: 1    atorvastatin (LIPITOR) 40 MG tablet, Take 1 tablet (40 mg) by mouth daily (Patient taking differently: Take 40 mg by mouth every evening), Disp: 90 tablet, Rfl: 3    blood glucose (ACCU-CHEK GUIDE) test strip, Use to test blood sugar 3 times daily or as directed., Disp: 300 strip, Rfl: 3    blood glucose monitoring (ACCU-CHEK FASTCLIX) lancets, Use to test blood sugar 1 times daily or as directed.  Ok to substitute alternative if insurance prefers., Disp: 102 each, Rfl: 11    calcium carbonate (TUMS) 500 MG chewable tablet, Take 1 chew tab by mouth daily, Disp: , Rfl:      "chlorthalidone (HYGROTON) 25 MG tablet, Take 1 tablet (25 mg) by mouth daily Add on for blood pressure, Disp: 90 tablet, Rfl: 1    Continuous Blood Gluc Sensor (FREESTYLE FROYLAN 3 SENSOR) [facility], 1 each every 14 days, Disp: 2 each, Rfl: 5    DULoxetine (CYMBALTA) 60 MG capsule, Take 1 capsule (60 mg) by mouth 2 times daily, Disp: 180 capsule, Rfl: 3    enoxaparin ANTICOAGULANT (LOVENOX) 100 MG/ML syringe, Inject 1 mL (100 mg) Subcutaneous every 12 hours, Disp: 28 mL, Rfl: 1    EPINEPHrine (ANY BX GENERIC EQUIV) 0.3 MG/0.3ML injection 2-pack, Inject 0.3 mLs (0.3 mg) into the muscle once as needed for anaphylaxis, Disp: 2 each, Rfl: 2    fluticasone-salmeterol (ADVAIR) 500-50 MCG/ACT inhaler, Inhale 1 puff into the lungs every 12 hours, Disp: 1 each, Rfl: 11    hydrOXYzine (ATARAX) 25 MG tablet, Take 1 tablet (25 mg) by mouth every 6 hours as needed for itching or anxiety (with pain, moderate pain), Disp: 30 tablet, Rfl: 0    insulin aspart (NOVOLOG FLEXPEN) 100 UNIT/ML pen, Inject 18 units before breakfast and 18 units before dinner *Will need to schedule with new PCP for future refills*, Disp: 15 mL, Rfl: 3    insulin glargine (BASAGLAR KWIKPEN) 100 UNIT/ML pen, Inject 50 Units Subcutaneous daily, Disp: 45 mL, Rfl: 0    insulin pen needle (32G X 4 MM) 32G X 4 MM miscellaneous, Use 3  pen needles daily or as directed., Disp: 200 each, Rfl: 1    insulin syringe-needle U-100 (29G X 1/2\" 0.5 ML) 29G X 1/2\" 0.5 ML miscellaneous, Use 3 syringes daily or as directed., Disp: 200 each, Rfl: 1    losartan (COZAAR) 25 MG tablet, Take 1 tablet (25 mg) by mouth daily, Disp: 90 tablet, Rfl: 1    meclizine (ANTIVERT) 25 MG tablet, Take 1 tablet (25 mg) by mouth 3 times daily as needed for dizziness, Disp: 90 tablet, Rfl: 1    metFORMIN (GLUCOPHAGE XR) 500 MG 24 hr tablet, Take 2 tablets (1,000 mg) by mouth 2 times daily (with meals), Disp: 360 tablet, Rfl: 2    montelukast (SINGULAIR) 10 MG tablet, Take 1 tablet (10 mg) by mouth At " Bedtime For allergies/asthma, Disp: 90 tablet, Rfl: 2    multivitamin, therapeutic (THERA-VIT) TABS, Take 1 tablet by mouth daily, Disp: , Rfl:     nortriptyline (PAMELOR) 10 MG capsule, Take 2 capsules (20 mg) by mouth At Bedtime, Disp: 60 capsule, Rfl: 5    omeprazole (PRILOSEC) 40 MG DR capsule, Take 1 capsule (40 mg) by mouth daily Take for at least two months, Disp: 90 capsule, Rfl: 2    oxyCODONE (ROXICODONE) 5 MG tablet, Take 1-2 tablets (5-10 mg) by mouth every 4 hours as needed for moderate to severe pain, Disp: 16 tablet, Rfl: 0    pregabalin (LYRICA) 50 MG capsule, Take 1 capsule (50 mg) by mouth 2 times daily, Disp: 60 capsule, Rfl: 1    rizatriptan (MAXALT-MLT) 10 MG ODT, TAKE 1 TABLET BY MOUTH AT ONSET OF HEADACHE FOR MIGRAINE MAY REPEAT IN 2 HOURS. MAX 3 TABS/24 HOURS., Disp: 18 tablet, Rfl: 1    senna-docusate (SENOKOT-S/PERICOLACE) 8.6-50 MG tablet, Take 1-2 tablets by mouth 2 times daily Take while on oral narcotics to prevent or treat constipation., Disp: 30 tablet, Rfl: 0    warfarin ANTICOAGULANT (COUMADIN) 5 MG tablet, Take 15 mg Tues, Thurs and 12.5 mg all other days, or as directed by the Coumadin clinic, Disp: 225 tablet, Rfl: 1  No current facility-administered medications for this visit.    Facility-Administered Medications Ordered in Other Visits:     BUPivacaine (MARCAINE) injection 0.5%, 10 mL, Intradermal, Once, Vinnie Espinoza, DO    DOBUTamine 500 mg in dextrose 5% 250 mL (adult std conc) premix, 2.5-20 mcg/kg/min, Intravenous, Continuous, Navarro Butcher MD, Stopped at 04/25/19 1552    iopamidol (ISOVUE-M 200) solution 10 mL, 10 mL, Intravenous, Once, Vinnie Espinoza,     methylPREDNISolone (DEPO-MEDROL) injection 80 mg, 80 mg, Intramuscular, Once, Vinnie Espinoza, DO    metoprolol (LOPRESSOR) injection 5 mg, 5 mg, Intravenous, Q5 Min PRN, Navarro Butcher MD, 2 mg at 04/25/19 8483    Allergies:   Allergies   Allergen Reactions    Artificial Sweetner  (Informational Only) [Artificial Sweetner (Informational Only) ] GI Disturbance     ALL artificial sweetners cause severe diarrhea & flu symptoms    Hydromorphone Anaphylaxis    Ibuprofen GI Disturbance    Morphine Sulfate Concentrate Anaphylaxis    Morphine [Fumaric Acid] Anaphylaxis    Hydrocodone-Acetaminophen Itching    Tramadol Hives    Trazodone Hives    Gabapentin GI Disturbance     GI upset per pt    Keflex [Cephalexin] Diarrhea     Upset stomach    Codeine Phosphate Itching    Ketorolac Tromethamine Rash         REVIEW OF SYSTEMS:  CONSTITUTIONAL:NEGATIVE for fever, chills, night sweats, change in weight  INTEGUMENTARY/SKIN: NEGATIVE for worrisome rashes, moles or lesions  MUSCULOSKELETAL:See HPI above  NEURO: NEGATIVE for weakness, dizziness or paresthesias  CARDIOVASCULAR: negative for chest pain, shortness of breath    PHYSICAL EXAM:  /80   Pulse 112   Ht 1.829 m (6')   Wt 124.3 kg (274 lb)   BMI 37.16 kg/m     GENERAL APPEARANCE: healthy, alert, no distress; accompanied by his partner.  SKIN: no suspicious lesions or rashes  NEURO: Normal strength and tone, mentation intact and speech normal  PSYCH:  mentation appears normal and affect normal  RESPIRATORY: No increased work of breathing.    BILATERAL LOWER EXTREMITIES:  Gait: using walker for support, quadriceps avoidance right.    Intact sensation deep peroneal nerve, superficial peroneal nerve, med/lat tibial nerve, sural nerve, saphenous nerve  Intact EHL, EDL, TA, FHL, GS, quadriceps hamstrings and hip flexors  Toes warm and well perfused, brisk capillary refill. Palpable 2+ dp pulses.  Bilateral calf soft and nttp or squeeze.  Edema: trace    right  KNEE EXAM:    Incision: healing well. Dried skin glue in place.   Skin: intact, no erythema, resolving minimal ecchymosis  ROM: 5 extension to 90 flexion  Effusion: moderate   Tender: diffuse      X-RAY:  3 views right knee from 10/5/2023 were reviewed in clinic today. No obvious fractures or  dislocations. Total knee arthroplasty components in place without evidence of failure or loosening. Effusion. Anterior soft tissue swelling with slight resolving gas bubbles.      Impression: Adarsh Salvador is a 58 year old male status post Total knee arthroplasty, right knee, 9 days out from surgery, with continued typical postoperative pain, swelling. Difficulties with ambulation and home cares..      Plan:   * reviewed xrays with patient, showing total knee arthroplasty implants.  Overall, clinically, the knee looks much better than typical at 9 days postoperative.  Again, as we discussed prior to surgery, normal to have pain, swelling most sever the first few weeks then gradually improve. Nothing concerning on today's exam to suggest otherwise.  * continue DVT prophylaxis with warfarin indefinitely, as prior.  * continue with knee range of motion exercises, focusing on extension  * wean off all pain medications as tolerated  * xrays next visit: 2 views of the knee  * return to clinic 4 weeks  * Physical Therapy, home exercise program.  Given inability to get to therapy and being home alone, I'm recommending homehealth therapy and an aide.  * all questions addressed and answered prior to discharge from clinic today to patient's satisfaction.     * patient will need longterm (at least 2 years) prophylactic antibiotics use with dental procedures or other invasive procedures (2 g amoxicilin or  2g Keflex or 500mg azithromycin or clarithromycin or 100mg doxycycline) 30-60 minutes prior to dental procedures.    * KOOS Jr/Promis Knee score: NOT to be done at next visit; NOT completed during todays visit; to be completed at 12 weeks and 12 months postoperative.      Edwin Ch M.D., M.S.  Dept. of Orthopaedic Surgery  [facility]

## 2023-10-05 NOTE — PROGRESS NOTES
Clinic Care Coordination Contact  Clinic Care Coordination Contact  OUTREACH    Referral Information:  Referral Source: PCP    Primary Diagnosis: Psychosocial    Chief Complaint   Patient presents with    Clinic Care Coordination - Initial     ROSHAN ABAD     Assessment: ROSHAN ABAD outreached to patient for scheduled assessment. Patient accepts care coordination.    Addressed patient's recent ED visit and patient reports he is doing good again and got things taken care of.    Patient is needing more support at home as his boyfriend is back to work and his granddaughter who was helping this week is going back home. Patient is curious if he can be set up with home care. ROSHAN ABAD to route message to PCP and other providers. Patient willing to do outpatient PT/OT if he cannot secure home care.    ROSHAN ABAD called patient back relaying message that he can discuss home care with Dr Soria at his afternoon appt today. Patient had no further questions or concerns.     Universal Utilization:   Clinic Utilization  Difficulty keeping appointments:: No  Compliance Concerns: No  No-Show Concerns: No  No PCP office visit in Past Year: No  Utilization      Hospital Admissions  1             ED Visits  3             No Show Count (past year)  4                    Current as of: 10/4/2023  9:17 PM              Clinical Concerns:  Current Medical Concerns:  Knee surgery    Current Behavioral Concerns: None    Education Provided to patient: Role of care coordination      Health Maintenance Reviewed: Not assessed  Clinical Pathway: None    Medication Management:  Medication review status: Medications not reviewed due to nature of call.     Living Situation:  Current living arrangement:: I live in a private home  Type of residence:: Apartment    Lifestyle & Psychosocial Needs:    Social Determinants of Health     Food Insecurity: Not on file   Depression: At risk (8/15/2023)    PHQ-2     PHQ-2 Score: 3   Housing Stability: Not on file   Tobacco Use:  High Risk (9/27/2023)    Patient History     Smoking Tobacco Use: Never     Smokeless Tobacco Use: Current     Passive Exposure: Not on file   Financial Resource Strain: Not on file   Alcohol Use: Not on file   Transportation Needs: Not on file   Physical Activity: Not on file   Interpersonal Safety: Not on file   Stress: Not on file   Social Connections: Not on file        Transportation means:: Friend, Family     Informal Support system:: Family, Friends      Resources and Interventions:  Current Resources:   Community Resources: Financial/Insurance  Supplies Currently Used at Home: None  Equipment Currently Used at Home: cane, straight     Advance Care Plan/Directive  Advanced Care Plans/Directives on file:: No       Care Plan:  Care Plan: Help At Home       Problem: Insufficient In-home support       Goal: Establish adequate home support       Start Date: 10/5/2023 Expected End Date: 11/5/2023    Note:     Barriers: Nothing currently set up  Strengths: Motivated to work with care coordination  Patient expressed understanding of goal: Yes  Action steps to achieve this goal:  1. I will work with my providers to see if home care is an option.  2. I will work with my providers to see if outpatient PT/OT is an option.  3. I will work with care coordination for additional support and resources.                                Patient/Caregiver understanding: Pt reports understanding and denies any additional questions or concerns at this times. SW CC engaged in AIDET communication during encounter.    Outreach Frequency: monthly  Future Appointments                Today Edwin Ch MD [facility] K[facility] Shine    In 6 days Edwin Ch MD [facility]    In 6 days [facility]    In 1 month Allyn Lyon PA-C [facility]    In 4 months AN LAB M  [facility]            Plan: Patient was provided with this writer's contact information and encouraged to call with any questions or concerns. SW will route message to providers re: home care and patient will follow up with his provider at his appt this afternoon.    Brittnee Bowen [facility]  Social Work Primary Care Clinic Care Coordinator  [facility]  [phone]  [email or web address]

## 2023-10-05 NOTE — LETTER
[facility] CARE COORDINATION  [address] [address] [facility] [address]    October 5, 2023      Adarsh Salvador  [address]  [place] [address]    Dear Christi Altamirano is a Patient Centered Plan of Care for your enrollment in Care Coordination. Please let us know if you have additional questions, concerns, or goals that we can assist with.    Sincerely,    Brittnee Bowen, [facility]  Social Work Primary Care Clinic Care Coordinator  [facility]  [phone]  [email or web address]  Patient Centered Plan of Care  About Me:        Patient Name:  Adarsh Salvador    YOB: 1965  Age:         58 year old   [facility] MRN:    [id number] Telephone Information:  Home Phone [phone]   Mobile [phone]       Address:  [address]  [place] [address] Email address:  [email or web address]      Emergency Contact(s)    Name Relationship Lgl Grd Work Phone Home Phone Mobile Phone   1. ABNER VARGAS Sister No none [phone]   2. MATTHIASGOPALILKINGSLEY Significant ot*    [phone]           Primary language:  English     needed? No   [facility] [employer]:  [phone] op. 1  Other communication barriers:None    Preferred Method of Communication:  Mail  Current living arrangement: I live in a private home    Mobility Status/ Medical Equipment: No data recorded      Health Maintenance  Health Maintenance Reviewed:   Health Maintenance Due   Topic Date Due    YEARLY PREVENTIVE VISIT  Never done    ASTHMA ACTION PLAN  02/14/2023    INFLUENZA VACCINE (1) 09/01/2023     My Access Plan  Medical Emergency 911   Primary Clinic Line [facility] - [phone]   24 Hour Appointment Line [phone] or  [phone]) (toll-free)   24 Hour Nurse Line [phone] (toll-free)   Preferred Urgent Care [facility], [phone]     Preferred  [place], [place]  [phone]     Preferred Pharmacy WRITTEN PRESCRIPTION REQUESTED     Behavioral Health Crisis Line The National Suicide Prevention Lifeline at 1-349.244.5773 or Text/Call 988       My Care Team Members  Patient Care Team         Relationship Specialty Notifications Start End    Kehr, Kristen M, PA-C PCP - General Family Medicine  8/23/23     Phone: [phone] Fax: [phone] [address] [address] [facility] [address]    Scottie Golden MD MD Gastroenterology  7/8/16     Phone: [phone] Fax: [phone] [address] [facility] [address]    Clint Uriostegui MD MD Pulmonary Disease  5/29/19     Phone: [phone] Fax: [phone]         [address] [facility] [address]    Juan Luis Gray MD Assigned Neuroscience Provider   2/28/21     Phone: [phone] Fax: [phone]         [address] [place] [address]    Amanda Hussein RD Diabetes Educator Dietitian, Registered  2/24/22      [address] [facility] [address] [facility] [address]    Sherman Poon MD Assigned Surgical Provider   5/6/23     Phone: [phone] Fax: [phone]         [address] [address] [address]    Ivory Dennis RD Diabetes Educator Dietitian, Registered  5/8/23     Phone: [phone]          [address] [address]    Kehr, Kristen M, PA-C Assigned PCP   8/19/23     Phone: [phone] Fax: [phone] [address] [address] [facility] [address]    Allyn Lyon PA-C Physician Assistant Endocrinology, Diabetes, and Metabolism  8/23/23     Phone: [phone] Fax: [phone] [address] [facility] [address]    Carlos A Em MD Assigned Musculoskeletal Provider   9/2/23     Phone: [phone] Fax: [phone]         [address] [facility] [address]    Brittnee Bowen LICSW Lead Care Coordinator  Admissions 10/3/23               My Care  Plans  Self Management and Treatment Plan  Care Plan  Care Plan: Help At Home       Problem: Insufficient In-home support       Goal: Establish adequate home support       Start Date: 10/5/2023 Expected End Date: 11/5/2023    Note:     Barriers: Nothing currently set up  Strengths: Motivated to work with care coordination  Patient expressed understanding of goal: Yes  Action steps to achieve this goal:  1. I will work with my providers to see if home care is an option.  2. I will work with my providers to see if outpatient PT/OT is an option.  3. I will work with care coordination for additional support and resources.                                 Action Plans on File:            Depression          Advance Care Plans/Directives Type:   No data recorded    My Medical and Care Information  Problem List   Patient Active Problem List   Diagnosis    GERD (gastroesophageal reflux disease)    Chronic RLQ pain    Constipation    Chronic maxillary sinusitis    Chronic rhinitis    Hypertriglyceridemia    Hypertension, unspecified type    Anemia in other chronic diseases classified elsewhere    Fatty liver    Irritable bowel syndrome with diarrhea    Right inguinal hernia    Mild persistent asthma without complication    Type 2 diabetes mellitus with diabetic polyneuropathy, with long-term current use of insulin (H)    Hyperlipidemia LDL goal <100    CONNOR (generalized anxiety disorder)    Insomnia, unspecified type    Morbid obesity (H)    Neuropathy    Recurrent deep vein thrombosis (DVT) (H)    Precordial pain    Dyslipidemia    Elevated C-reactive protein    Gastric reflux    Internal derangement of knee    Nicotine dependence    Varicose veins of lower extremity    Vitamin D deficiency    Low volume of ejaculated semen    Bilateral leg pain    Chronic pain of right knee    Acute pain of right knee    Aftercare following surgery of the musculoskeletal system    Advanced directives, counseling/discussion    DVT (deep  "venous thrombosis) (H)    Pulmonary embolism, other, unspecified chronicity, unspecified whether acute cor pulmonale present (H)    S/P TKR (total knee replacement), right      Current Medications and Allergies:    Current Outpatient Medications   Medication    acetaminophen (TYLENOL) 325 MG tablet    albuterol (PROAIR HFA/PROVENTIL HFA/VENTOLIN HFA) 108 (90 Base) MCG/ACT inhaler    amLODIPine (NORVASC) 10 MG tablet    atorvastatin (LIPITOR) 40 MG tablet    blood glucose (ACCU-CHEK GUIDE) test strip    blood glucose monitoring (ACCU-CHEK FASTCLIX) lancets    calcium carbonate (TUMS) 500 MG chewable tablet    chlorthalidone (HYGROTON) 25 MG tablet    Continuous Blood Gluc Sensor (Phthisis DiagnosticsSTYLE FROYLAN 3 SENSOR) MISC    DULoxetine (CYMBALTA) 60 MG capsule    enoxaparin ANTICOAGULANT (LOVENOX) 100 MG/ML syringe    EPINEPHrine (ANY BX GENERIC EQUIV) 0.3 MG/0.3ML injection 2-pack    fluticasone-salmeterol (ADVAIR) 500-50 MCG/ACT inhaler    hydrOXYzine (ATARAX) 25 MG tablet    insulin aspart (NOVOLOG FLEXPEN) 100 UNIT/ML pen    insulin glargine (BASAGLAR KWIKPEN) 100 UNIT/ML pen    insulin pen needle (32G X 4 MM) 32G X 4 MM miscellaneous    insulin syringe-needle U-100 (29G X 1/2\" 0.5 ML) 29G X 1/2\" 0.5 ML miscellaneous    losartan (COZAAR) 25 MG tablet    meclizine (ANTIVERT) 25 MG tablet    metFORMIN (GLUCOPHAGE XR) 500 MG 24 hr tablet    montelukast (SINGULAIR) 10 MG tablet    multivitamin, therapeutic (THERA-VIT) TABS    nortriptyline (PAMELOR) 10 MG capsule    omeprazole (PRILOSEC) 40 MG DR capsule    oxyCODONE (ROXICODONE) 5 MG tablet    pregabalin (LYRICA) 50 MG capsule    rizatriptan (MAXALT-MLT) 10 MG ODT    senna-docusate (SENOKOT-S/PERICOLACE) 8.6-50 MG tablet    warfarin ANTICOAGULANT (COUMADIN) 5 MG tablet     No current facility-administered medications for this visit.     Facility-Administered Medications Ordered in Other Visits   Medication    BUPivacaine (MARCAINE) injection 0.5%    DOBUTamine 500 mg in " dextrose 5% 250 mL (adult std conc) premix    iopamidol (ISOVUE-M 200) solution 10 mL    methylPREDNISolone (DEPO-MEDROL) injection 80 mg    metoprolol (LOPRESSOR) injection 5 mg     Care Coordination Start Date: 10/3/2023   Frequency of Care Coordination: monthly     Form Last Updated: 10/05/2023

## 2023-10-12 ENCOUNTER — THERAPY VISIT (OUTPATIENT)
Dept: PHYSICAL THERAPY | Facility: CLINIC | Age: 58
End: 2023-10-12
Attending: PHYSICIAN ASSISTANT
Payer: COMMERCIAL

## 2023-10-12 DIAGNOSIS — Z96.651 S/P TOTAL KNEE ARTHROPLASTY, RIGHT: ICD-10-CM

## 2023-10-12 PROCEDURE — 97161 PT EVAL LOW COMPLEX 20 MIN: CPT | Mod: GP

## 2023-10-12 PROCEDURE — 97110 THERAPEUTIC EXERCISES: CPT | Mod: GP

## 2023-10-12 PROCEDURE — 97116 GAIT TRAINING THERAPY: CPT | Mod: GP

## 2023-10-12 ASSESSMENT — ACTIVITIES OF DAILY LIVING (ADL)
SIT WITH YOUR KNEE BENT: ACTIVITY IS FAIRLY DIFFICULT
SQUAT: I AM UNABLE TO DO THE ACTIVITY
KNEE_ACTIVITY_OF_DAILY_LIVING_SCORE: 24.29
WEAKNESS: THE SYMPTOM AFFECTS MY ACTIVITY SEVERELY
AS_A_RESULT_OF_YOUR_KNEE_INJURY,_HOW_WOULD_YOU_RATE_YOUR_CURRENT_LEVEL_OF_DAILY_ACTIVITY?: SEVERELY ABNORMAL
STIFFNESS: THE SYMPTOM AFFECTS MY ACTIVITY SEVERELY
HOW_WOULD_YOU_RATE_THE_CURRENT_FUNCTION_OF_YOUR_KNEE_DURING_YOUR_USUAL_DAILY_ACTIVITIES_ON_A_SCALE_FROM_0_TO_100_WITH_100_BEING_YOUR_LEVEL_OF_KNEE_FUNCTION_PRIOR_TO_YOUR_INJURY_AND_0_BEING_THE_INABILITY_TO_PERFORM_ANY_OF_YOUR_USUAL_DAILY_ACTIVITIES?: 45
RAW_SCORE: 17
GO DOWN STAIRS: I AM UNABLE TO DO THE ACTIVITY
GIVING WAY, BUCKLING OR SHIFTING OF KNEE: THE SYMPTOM AFFECTS MY ACTIVITY MODERATELY
GO UP STAIRS: I AM UNABLE TO DO THE ACTIVITY
KNEEL ON THE FRONT OF YOUR KNEE: ACTIVITY IS VERY DIFFICULT
RISE FROM A CHAIR: ACTIVITY IS SOMEWHAT DIFFICULT
WALK: ACTIVITY IS FAIRLY DIFFICULT
SWELLING: THE SYMPTOM AFFECTS MY ACTIVITY SEVERELY
HOW_WOULD_YOU_RATE_THE_OVERALL_FUNCTION_OF_YOUR_KNEE_DURING_YOUR_USUAL_DAILY_ACTIVITIES?: ABNORMAL
PAIN: THE SYMPTOM AFFECTS MY ACTIVITY SEVERELY
STAND: ACTIVITY IS FAIRLY DIFFICULT
KNEE_ACTIVITY_OF_DAILY_LIVING_SUM: 17
LIMPING: THE SYMPTOM AFFECTS MY ACTIVITY SEVERELY

## 2023-10-12 NOTE — PROGRESS NOTES
"Physical Therapy Initial Evaluation: Subjective History    Surgical Procedure: R TKA  Date of Surgery: 9/26/23.    Surgeon Name: Dr. Edwin Ch  Precautions/Restrictions/MD instructions: WBAT    Average Daily Pain Levels: 4/10 (will get up to 7-8/10) (Location: Both sides of right knee; Quality: Aching/Throbbing)  Other Symptoms: R sided numbness and tingling up to hip  Symptom Mgmt Strategies: Topical analgesic, icing    Prior orthopaedic history/procedures: Hx of DVT/PE - on long-term Warfarin  Prior non-operative management: None  Next MD Appt Date: 11/9/23    Functional limitations following procedure: Walking, transfers, sleeping  Previous level of function: Walking with SPC - independent in all ADLS    Patient Employment: Retired   Desired Physical Activity (Goals): \"Back to walking normal with walker, and a normal daily routine without pain\"    Red Flags: (Bold when present) - reviewed the following and denies  Malaise, unexplained weight loss, night pain, fever    Post-Operative Physical Therapy Examination    Physical Mobility Status  Gait: Heavy forward lean with FWW, antalgic gait on R, foot flat initial contact  Transfers:  Requires min A for sit <>supine with RLE    Anthropometric Measures     Right Left Difference   Joint ROM      Hyperextension  5 deg 14 deg   Extension Lacking 5 deg     Flexion 78 deg 125 deg 47 deg   Circumferential Measures      Joint Line 45.5 cm 43.0 cm 2.5 cm   15 cm Prox      Effusion          Quadriceps Muscle Activation Right Left   Isometric Quad Activation Fair Normal   Straight Leg Raising Unable to perform No extensor lag     Status of Incision: Clean & healing    Assessment/Plan    Patient is a 58 year old male with right side knee complaints.    Patient has the following significant findings with corresponding treatment plan.                Diagnosis 1:  R TKA  Pain -  hot/cold therapy, US, electric stimulation, manual therapy, and self management  Decreased " ROM/flexibility - manual therapy, therapeutic exercise, therapeutic activity, and home program  Decreased joint mobility - manual therapy, therapeutic exercise, therapeutic activity, and home program  Decreased strength - therapeutic exercise, therapeutic activities, and home program  Impaired balance - neuro re-education, gait training, therapeutic activities, adaptive equipment/assistive device, and home program  Decreased proprioception - neuro re-education, gait training, therapeutic activities, and home program  Inflammation - cold therapy, US, electric stimulation, and self management/home program  Edema - vasopneumatics, electric stimulation, cold therapy, cryocuff, and self management/home program  Impaired gait - gait training, assistive devices, and home program  Impaired muscle performance - biofeedback, electric stimulation, neuro re-education, and home program  Decreased function - therapeutic activities and home program  Impaired posture - neuro re-education, therapeutic activities, and home program  Instability -  Therapeutic Activity  Therapeutic Exercise  Neuromuscular Re-education  home program    Therapy Evaluation Codes:   History comprised of:   Personal factors that impact the plan of care:      None.    Comorbidity factors that impact the plan of care are:      Diabetes, Heart problems, and Overweight.     Medications impacting care: Heparin/coumadin.  Examination of Body Systems comprised of:   Body structures and functions that impact the plan of care:      Knee.   Activity limitations that impact the plan of care are:      Bathing, Bending, Driving, Dressing, Jumping, Lifting, Running, Sitting, Squatting/kneeling, Stairs, Standing, Walking, and Sleeping.  Clinical presentation characteristics are:   Stable/Uncomplicated.  Decision-Making    Low complexity using standardized patient assessment instrument and/or measureable assessment of functional outcome.  Cumulative Therapy Evaluation is:  Low complexity.    Previous and current functional limitations:  (See Goal Flow Sheet for this information)    Short term and Long term goals: (See Goal Flow Sheet for this information)     Communication ability:  Patient appears to be able to clearly communicate and understand verbal and written communication and follow directions correctly.  Treatment Explanation - The following has been discussed with the patient:   RX ordered/plan of care  Anticipated outcomes  Possible risks and side effects  This patient would benefit from PT intervention to resume normal activities.   Rehab potential is excellent.    Frequency:  2 X week, once daily  Duration:  for 6 weeks tapering to 1 X a week over 6 weeks  Discharge Plan:  Achieve all LTG.  Independent in home treatment program.  Reach maximal therapeutic benefit.    Please refer to the daily flowsheet for treatment today, total treatment time and time spent performing 1:1 timed codes.

## 2023-10-19 ENCOUNTER — THERAPY VISIT (OUTPATIENT)
Dept: PHYSICAL THERAPY | Facility: CLINIC | Age: 58
End: 2023-10-19
Attending: PHYSICIAN ASSISTANT
Payer: COMMERCIAL

## 2023-10-19 DIAGNOSIS — Z96.651 STATUS POST TOTAL RIGHT KNEE REPLACEMENT: ICD-10-CM

## 2023-10-19 DIAGNOSIS — M25.561 ACUTE PAIN OF RIGHT KNEE: Primary | ICD-10-CM

## 2023-10-19 PROCEDURE — 97110 THERAPEUTIC EXERCISES: CPT | Mod: GP

## 2023-10-19 PROCEDURE — 97530 THERAPEUTIC ACTIVITIES: CPT | Mod: GP

## 2023-10-19 PROCEDURE — 97116 GAIT TRAINING THERAPY: CPT | Mod: GP

## 2023-10-20 ENCOUNTER — LAB (OUTPATIENT)
Dept: LAB | Facility: CLINIC | Age: 58
End: 2023-10-20
Payer: COMMERCIAL

## 2023-10-20 ENCOUNTER — ANTICOAGULATION THERAPY VISIT (OUTPATIENT)
Dept: ANTICOAGULATION | Facility: CLINIC | Age: 58
End: 2023-10-20

## 2023-10-20 DIAGNOSIS — I82.409 RECURRENT DEEP VEIN THROMBOSIS (DVT) (H): Primary | ICD-10-CM

## 2023-10-20 DIAGNOSIS — I82.409 RECURRENT DEEP VEIN THROMBOSIS (DVT) (H): ICD-10-CM

## 2023-10-20 LAB — INR BLD: 1.7 (ref 0.9–1.1)

## 2023-10-20 PROCEDURE — 85610 PROTHROMBIN TIME: CPT

## 2023-10-20 PROCEDURE — 36416 COLLJ CAPILLARY BLOOD SPEC: CPT

## 2023-10-20 NOTE — PROGRESS NOTES
ANTICOAGULATION MANAGEMENT     Adarsh Salvador 58 year old male is on warfarin with subtherapeutic INR result. (Goal INR 2.0-3.0)    Recent labs: (last 7 days)     10/20/23  1603   INR 1.7*       ASSESSMENT     Source(s): Chart Review and Patient/Caregiver Call     Warfarin doses taken: Warfarin taken as instructed  Diet: No new diet changes identified  Medication/supplement changes: None noted  New illness, injury, or hospitalization: No  Signs or symptoms of bleeding or clotting: No  Previous result: Subtherapeutic  Additional findings:  Pt was overdue for his INR. Last INR was 1.5. He states he has not been taking lovenox and will not restart at this time       PLAN     Recommended plan for no diet, medication or health factor changes affecting INR     Dosing Instructions: booster dose then Increase your warfarin dose (5.4% change) with next INR in 1 week       Summary  As of 10/20/2023      Full warfarin instructions:  10/20: 15 mg; Otherwise 12.5 mg every Mon, Wed, Fri; 15 mg all other days   Next INR check:  10/27/2023               Telephone call with Adarsh who verbalizes understanding and agrees to plan and who agrees to plan and repeated back plan correctly    Lab visit scheduled    Education provided:   Goal range and lab monitoring: goal range and significance of current result, Importance of therapeutic range, and Importance of following up at instructed interval  Symptom monitoring: monitoring for clotting signs and symptoms, monitoring for stroke signs and symptoms, and when to seek medical attention/emergency care    Plan made per [facility] anticoagulation protocol    Joby Marie RN  Anticoagulation Clinic  10/20/2023    _______________________________________________________________________     Anticoagulation Episode Summary       Current INR goal:  2.0-3.0   TTR:  41.2% (1 y)   Target end date:  Indefinite   Send INR reminders to:  [facility]    Indications    Recurrent deep vein thrombosis (DVT)  (H) [I82.409]             Comments:               Anticoagulation Care Providers       Provider Role Specialty Phone number    Bertha Romero PA-C Referring Family Medicine [phone]

## 2023-10-23 ENCOUNTER — THERAPY VISIT (OUTPATIENT)
Dept: PHYSICAL THERAPY | Facility: CLINIC | Age: 58
End: 2023-10-23
Payer: COMMERCIAL

## 2023-10-23 DIAGNOSIS — G89.29 CHRONIC PAIN OF RIGHT KNEE: Primary | ICD-10-CM

## 2023-10-23 DIAGNOSIS — M25.561 CHRONIC PAIN OF RIGHT KNEE: Primary | ICD-10-CM

## 2023-10-23 PROCEDURE — 97110 THERAPEUTIC EXERCISES: CPT | Mod: GP

## 2023-10-26 ENCOUNTER — THERAPY VISIT (OUTPATIENT)
Dept: PHYSICAL THERAPY | Facility: CLINIC | Age: 58
End: 2023-10-26
Attending: PHYSICIAN ASSISTANT
Payer: COMMERCIAL

## 2023-10-26 DIAGNOSIS — M25.561 ACUTE PAIN OF RIGHT KNEE: Primary | ICD-10-CM

## 2023-10-26 PROCEDURE — 97530 THERAPEUTIC ACTIVITIES: CPT | Mod: GP

## 2023-10-26 PROCEDURE — 97110 THERAPEUTIC EXERCISES: CPT | Mod: GP

## 2023-10-26 PROCEDURE — 97116 GAIT TRAINING THERAPY: CPT | Mod: GP

## 2023-10-27 ENCOUNTER — ANTICOAGULATION THERAPY VISIT (OUTPATIENT)
Dept: ANTICOAGULATION | Facility: CLINIC | Age: 58
End: 2023-10-27

## 2023-10-27 ENCOUNTER — LAB (OUTPATIENT)
Dept: LAB | Facility: CLINIC | Age: 58
End: 2023-10-27
Payer: COMMERCIAL

## 2023-10-27 DIAGNOSIS — I82.409 RECURRENT DEEP VEIN THROMBOSIS (DVT) (H): Primary | ICD-10-CM

## 2023-10-27 DIAGNOSIS — I82.409 RECURRENT DEEP VEIN THROMBOSIS (DVT) (H): ICD-10-CM

## 2023-10-27 LAB — INR BLD: 1.7 (ref 0.9–1.1)

## 2023-10-27 PROCEDURE — 85610 PROTHROMBIN TIME: CPT

## 2023-10-27 PROCEDURE — 36416 COLLJ CAPILLARY BLOOD SPEC: CPT

## 2023-10-27 NOTE — PROGRESS NOTES
ANTICOAGULATION MANAGEMENT     Adarsh Salvador 58 year old male is on warfarin with subtherapeutic INR result. (Goal INR 2.0-3.0)    Recent labs: (last 7 days)     10/27/23  1553   INR 1.7*       ASSESSMENT     Source(s): Chart Review and Patient/Caregiver Call     Warfarin doses taken: Missed dose(s) may be affecting INR  Diet: No new diet changes identified  Medication/supplement changes: None noted  New illness, injury, or hospitalization: No  Signs or symptoms of bleeding or clotting: No  Previous result: Subtherapeutic  Additional findings: None       PLAN     Recommended plan for temporary change(s) affecting INR     Dosing Instructions: booster dose then continue your current warfarin dose with next INR in 1 week       Summary  As of 10/27/2023      Full warfarin instructions:  10/27: 15 mg; Otherwise 12.5 mg every Mon, Wed, Fri; 15 mg all other days   Next INR check:  11/3/2023               Telephone call with Adarsh who verbalizes understanding and agrees to plan and who agrees to plan and repeated back plan correctly    Lab visit scheduled    Education provided:   Taking warfarin: Importance of taking warfarin as instructed  Symptom monitoring: monitoring for clotting signs and symptoms, monitoring for stroke signs and symptoms, and when to seek medical attention/emergency care    Plan made per [facility] anticoagulation protocol    Priya Plunkett RN  Anticoagulation Clinic  10/27/2023    _______________________________________________________________________     Anticoagulation Episode Summary       Current INR goal:  2.0-3.0   TTR:  41.2% (1 y)   Target end date:  Indefinite   Send INR reminders to:  [facility]    Indications    Recurrent deep vein thrombosis (DVT) (H) [I82.409]             Comments:               Anticoagulation Care Providers       Provider Role Specialty Phone number    Bertha Romero PA-C Referring Family Medicine [phone]

## 2023-10-30 ENCOUNTER — THERAPY VISIT (OUTPATIENT)
Dept: PHYSICAL THERAPY | Facility: CLINIC | Age: 58
End: 2023-10-30
Payer: COMMERCIAL

## 2023-10-30 DIAGNOSIS — M25.561 ACUTE PAIN OF RIGHT KNEE: Primary | ICD-10-CM

## 2023-10-30 PROCEDURE — 97110 THERAPEUTIC EXERCISES: CPT | Mod: GP

## 2023-11-02 ENCOUNTER — THERAPY VISIT (OUTPATIENT)
Dept: PHYSICAL THERAPY | Facility: CLINIC | Age: 58
End: 2023-11-02
Payer: COMMERCIAL

## 2023-11-02 DIAGNOSIS — M25.561 ACUTE PAIN OF RIGHT KNEE: Primary | ICD-10-CM

## 2023-11-02 PROCEDURE — 97110 THERAPEUTIC EXERCISES: CPT | Mod: GP | Performed by: PHYSICAL THERAPIST

## 2023-11-03 ENCOUNTER — ANTICOAGULATION THERAPY VISIT (OUTPATIENT)
Dept: ANTICOAGULATION | Facility: CLINIC | Age: 58
End: 2023-11-03

## 2023-11-03 ENCOUNTER — LAB (OUTPATIENT)
Dept: LAB | Facility: CLINIC | Age: 58
End: 2023-11-03
Payer: COMMERCIAL

## 2023-11-03 DIAGNOSIS — I82.409 RECURRENT DEEP VEIN THROMBOSIS (DVT) (H): ICD-10-CM

## 2023-11-03 DIAGNOSIS — I82.409 RECURRENT DEEP VEIN THROMBOSIS (DVT) (H): Primary | ICD-10-CM

## 2023-11-03 LAB — INR BLD: 1.7 (ref 0.9–1.1)

## 2023-11-03 PROCEDURE — 85610 PROTHROMBIN TIME: CPT

## 2023-11-03 PROCEDURE — 36416 COLLJ CAPILLARY BLOOD SPEC: CPT

## 2023-11-03 NOTE — PROGRESS NOTES
ANTICOAGULATION MANAGEMENT     Adarsh Salvador 58 year old male is on warfarin with subtherapeutic INR result. (Goal INR 2.0-3.0)    Recent labs: (last 7 days)     11/03/23  1543   INR 1.7*       ASSESSMENT     Source(s): Chart Review and Patient/Caregiver Call     Warfarin doses taken: Warfarin taken as instructed  Diet: No new diet changes identified  Medication/supplement changes: None noted  New illness, injury, or hospitalization: No  Signs or symptoms of bleeding or clotting: No  Previous result: Subtherapeutic  Additional findings: None       PLAN     Recommended plan for no diet, medication or health factor changes affecting INR     Dosing Instructions: Increase your warfarin dose (5.1% change) with next INR in 1 week       Summary  As of 11/3/2023      Full warfarin instructions:  12.5 mg every Mon; 15 mg all other days   Next INR check:  11/10/2023               Telephone call with Adarsh who verbalizes understanding and agrees to plan and who agrees to plan and repeated back plan correctly    Lab visit scheduled    Education provided:   Goal range and lab monitoring: goal range and significance of current result, Importance of therapeutic range, and Importance of following up at instructed interval  Symptom monitoring: monitoring for bleeding signs and symptoms, monitoring for clotting signs and symptoms, and when to seek medical attention/emergency care    Plan made per [facility] anticoagulation protocol    Joby Marie RN  Anticoagulation Clinic  11/3/2023    _______________________________________________________________________     Anticoagulation Episode Summary       Current INR goal:  2.0-3.0   TTR:  41.2% (1 y)   Target end date:  Indefinite   Send INR reminders to:  [facility]    Indications    Recurrent deep vein thrombosis (DVT) (H) [I82.409]             Comments:               Anticoagulation Care Providers       Provider Role Specialty Phone number    Bertha Romero PA-C Referring  Family Medicine [phone]

## 2023-11-06 ENCOUNTER — OFFICE VISIT (OUTPATIENT)
Dept: URGENT CARE | Facility: URGENT CARE | Age: 58
End: 2023-11-06
Payer: COMMERCIAL

## 2023-11-06 VITALS
BODY MASS INDEX: 37.76 KG/M2 | WEIGHT: 278.4 LBS | RESPIRATION RATE: 11 BRPM | OXYGEN SATURATION: 98 % | SYSTOLIC BLOOD PRESSURE: 146 MMHG | TEMPERATURE: 98.2 F | HEART RATE: 107 BPM | DIASTOLIC BLOOD PRESSURE: 88 MMHG

## 2023-11-06 DIAGNOSIS — R21 EXANTHEM: Primary | ICD-10-CM

## 2023-11-06 DIAGNOSIS — Z79.4 TYPE 2 DIABETES MELLITUS WITH DIABETIC POLYNEUROPATHY, WITH LONG-TERM CURRENT USE OF INSULIN (H): ICD-10-CM

## 2023-11-06 DIAGNOSIS — E11.42 TYPE 2 DIABETES MELLITUS WITH DIABETIC POLYNEUROPATHY, WITH LONG-TERM CURRENT USE OF INSULIN (H): ICD-10-CM

## 2023-11-06 LAB
BASOPHILS # BLD AUTO: 0 10E3/UL (ref 0–0.2)
BASOPHILS NFR BLD AUTO: 0 %
DEPRECATED S PYO AG THROAT QL EIA: NEGATIVE
EOSINOPHIL # BLD AUTO: 0.4 10E3/UL (ref 0–0.7)
EOSINOPHIL NFR BLD AUTO: 6 %
ERYTHROCYTE [DISTWIDTH] IN BLOOD BY AUTOMATED COUNT: 14 % (ref 10–15)
GROUP A STREP BY PCR: NOT DETECTED
HCT VFR BLD AUTO: 40.1 % (ref 40–53)
HGB BLD-MCNC: 13 G/DL (ref 13.3–17.7)
IMM GRANULOCYTES # BLD: 0 10E3/UL
IMM GRANULOCYTES NFR BLD: 0 %
LYMPHOCYTES # BLD AUTO: 1.7 10E3/UL (ref 0.8–5.3)
LYMPHOCYTES NFR BLD AUTO: 24 %
MCH RBC QN AUTO: 27.7 PG (ref 26.5–33)
MCHC RBC AUTO-ENTMCNC: 32.4 G/DL (ref 31.5–36.5)
MCV RBC AUTO: 85 FL (ref 78–100)
MONOCYTES # BLD AUTO: 0.6 10E3/UL (ref 0–1.3)
MONOCYTES NFR BLD AUTO: 9 %
NEUTROPHILS # BLD AUTO: 4.2 10E3/UL (ref 1.6–8.3)
NEUTROPHILS NFR BLD AUTO: 61 %
PLATELET # BLD AUTO: 280 10E3/UL (ref 150–450)
RBC # BLD AUTO: 4.7 10E6/UL (ref 4.4–5.9)
WBC # BLD AUTO: 6.9 10E3/UL (ref 4–11)

## 2023-11-06 PROCEDURE — 85025 COMPLETE CBC W/AUTO DIFF WBC: CPT | Performed by: FAMILY MEDICINE

## 2023-11-06 PROCEDURE — 87651 STREP A DNA AMP PROBE: CPT | Performed by: FAMILY MEDICINE

## 2023-11-06 PROCEDURE — 99214 OFFICE O/P EST MOD 30 MIN: CPT | Performed by: FAMILY MEDICINE

## 2023-11-06 PROCEDURE — 36415 COLL VENOUS BLD VENIPUNCTURE: CPT | Performed by: FAMILY MEDICINE

## 2023-11-06 RX ORDER — PREDNISONE 20 MG/1
TABLET ORAL
Qty: 7 TABLET | Refills: 0 | Status: SHIPPED | OUTPATIENT
Start: 2023-11-06 | End: 2023-11-12

## 2023-11-06 NOTE — PROGRESS NOTES
There are no exam notes on file for this visit.  Nursing note reviewed with patient.  Accurracy and completeness verified.    Chief Complaint   Patient presents with    Rash     All over body, noticed on Saturday. Itching and red.        HPI:  Adarsh Salvador is a 58 year old male who has a rash, arm creases, chest, groin area  2 days  No new medications  Aching joints    ROS: As per HPI.  Constitutional: feeling hot and cold     Allergies, reviewed:     Allergies   Allergen Reactions    Artificial Sweetner (Informational Only) [Artificial Sweetner (Informational Only) ] GI Disturbance     ALL artificial sweetners cause severe diarrhea & flu symptoms    Hydromorphone Anaphylaxis    Ibuprofen GI Disturbance    Morphine Sulfate Concentrate Anaphylaxis    Morphine [Fumaric Acid] Anaphylaxis    Hydrocodone-Acetaminophen Itching    Tramadol Hives    Trazodone Hives    Gabapentin GI Disturbance     GI upset per pt    Keflex [Cephalexin] Diarrhea     Upset stomach    Codeine Phosphate Itching    Ketorolac Tromethamine Rash      Med list prior to vist:  acetaminophen (TYLENOL) 325 MG tablet, Take 2 tablets (650 mg) by mouth every 4 hours as needed for other (mild pain)  albuterol (PROAIR HFA/PROVENTIL HFA/VENTOLIN HFA) 108 (90 Base) MCG/ACT inhaler, Inhale 2 puffs into the lungs every 6 hours as needed for shortness of breath or wheezing  amLODIPine (NORVASC) 10 MG tablet, Take 1 tablet (10 mg) by mouth daily for blood pressure  atorvastatin (LIPITOR) 40 MG tablet, Take 1 tablet (40 mg) by mouth daily (Patient taking differently: Take 40 mg by mouth every evening)  blood glucose (ACCU-CHEK GUIDE) test strip, Use to test blood sugar 3 times daily or as directed.  blood glucose monitoring (ACCU-CHEK FASTCLIX) lancets, Use to test blood sugar 1 times daily or as directed.  Ok to substitute alternative if insurance prefers.  calcium carbonate (TUMS) 500 MG chewable tablet, Take 1 chew tab by mouth daily  chlorthalidone (HYGROTON)  "25 MG tablet, Take 1 tablet (25 mg) by mouth daily Add on for blood pressure  Continuous Blood Gluc Sensor (FREESTYLE FROYLAN 3 SENSOR) [facility], 1 each every 14 days  DULoxetine (CYMBALTA) 60 MG capsule, Take 1 capsule (60 mg) by mouth 2 times daily  enoxaparin ANTICOAGULANT (LOVENOX) 100 MG/ML syringe, Inject 1 mL (100 mg) Subcutaneous every 12 hours  EPINEPHrine (ANY BX GENERIC EQUIV) 0.3 MG/0.3ML injection 2-pack, Inject 0.3 mLs (0.3 mg) into the muscle once as needed for anaphylaxis  fluticasone-salmeterol (ADVAIR) 500-50 MCG/ACT inhaler, Inhale 1 puff into the lungs every 12 hours  hydrOXYzine (ATARAX) 25 MG tablet, Take 1 tablet (25 mg) by mouth every 6 hours as needed for itching or anxiety (with pain, moderate pain)  insulin aspart (NOVOLOG FLEXPEN) 100 UNIT/ML pen, Inject 18 units before breakfast and 18 units before dinner *Will need to schedule with new PCP for future refills*  insulin glargine (BASAGLAR KWIKPEN) 100 UNIT/ML pen, Inject 50 Units Subcutaneous daily  insulin pen needle (32G X 4 MM) 32G X 4 MM miscellaneous, Use 3  pen needles daily or as directed.  insulin syringe-needle U-100 (29G X 1/2\" 0.5 ML) 29G X 1/2\" 0.5 ML miscellaneous, Use 3 syringes daily or as directed.  losartan (COZAAR) 25 MG tablet, Take 1 tablet (25 mg) by mouth daily  meclizine (ANTIVERT) 25 MG tablet, Take 1 tablet (25 mg) by mouth 3 times daily as needed for dizziness  metFORMIN (GLUCOPHAGE XR) 500 MG 24 hr tablet, Take 2 tablets (1,000 mg) by mouth 2 times daily (with meals)  montelukast (SINGULAIR) 10 MG tablet, Take 1 tablet (10 mg) by mouth At Bedtime For allergies/asthma  multivitamin, therapeutic (THERA-VIT) TABS, Take 1 tablet by mouth daily  nortriptyline (PAMELOR) 10 MG capsule, Take 2 capsules (20 mg) by mouth At Bedtime  omeprazole (PRILOSEC) 40 MG DR capsule, Take 1 capsule (40 mg) by mouth daily Take for at least two months  oxyCODONE (ROXICODONE) 5 MG tablet, Take 1-2 tablets (5-10 mg) by mouth every 4 hours as " needed for moderate to severe pain  pregabalin (LYRICA) 50 MG capsule, Take 1 capsule (50 mg) by mouth 2 times daily  rizatriptan (MAXALT-MLT) 10 MG ODT, TAKE 1 TABLET BY MOUTH AT ONSET OF HEADACHE FOR MIGRAINE MAY REPEAT IN 2 HOURS. MAX 3 TABS/24 HOURS.  senna-docusate (SENOKOT-S/PERICOLACE) 8.6-50 MG tablet, Take 1-2 tablets by mouth 2 times daily Take while on oral narcotics to prevent or treat constipation.  warfarin ANTICOAGULANT (COUMADIN) 5 MG tablet, Take 15 mg Tues, Thurs and 12.5 mg all other days, or as directed by the Coumadin clinic    BUPivacaine (MARCAINE) injection 0.5%  DOBUTamine 500 mg in dextrose 5% 250 mL (adult std conc) premix  iopamidol (ISOVUE-M 200) solution 10 mL  methylPREDNISolone (DEPO-MEDROL) injection 80 mg  metoprolol (LOPRESSOR) injection 5 mg      Medications reviewed and updated    Objective:  BP (!) 146/88 (BP Location: Right arm, Cuff Size: Adult Large)   Pulse 107   Temp 98.2  F (36.8  C) (Tympanic)   Resp 11   Wt 126.3 kg (278 lb 6.4 oz)   SpO2 98%   BMI 37.76 kg/m    General Appearance: Pleasant, alert, WN/WD in no acute respiratory distress.  Eye Exam: Normal external eye, conjunctiva, lids.  Skin: [place] rsh, arm creases, chest, groin area  Neurologic Exam: Nonfocal, no tremor. Normal gait.  Psychiatric Exam: Alert - appropriate, normal affect    Results for orders placed or performed in visit on 11/06/23   CBC with platelets and differential     Status: Abnormal   Result Value Ref Range    WBC Count 6.9 4.0 - 11.0 10e3/uL    RBC Count 4.70 4.40 - 5.90 10e6/uL    Hemoglobin 13.0 (L) 13.3 - 17.7 g/dL    Hematocrit 40.1 40.0 - 53.0 %    MCV 85 78 - 100 fL    MCH 27.7 26.5 - 33.0 pg    MCHC 32.4 31.5 - 36.5 g/dL    RDW 14.0 10.0 - 15.0 %    Platelet Count 280 150 - 450 10e3/uL    % Neutrophils 61 %    % Lymphocytes 24 %    % Monocytes 9 %    % Eosinophils 6 %    % Basophils 0 %    % Immature Granulocytes 0 %    Absolute Neutrophils 4.2 1.6 - 8.3 10e3/uL    Absolute  Lymphocytes 1.7 0.8 - 5.3 10e3/uL    Absolute Monocytes 0.6 0.0 - 1.3 10e3/uL    Absolute Eosinophils 0.4 0.0 - 0.7 10e3/uL    Absolute Basophils 0.0 0.0 - 0.2 10e3/uL    Absolute Immature Granulocytes 0.0 <=0.4 10e3/uL   Streptococcus A Rapid Screen w/Reflex to PCR - Clinic Collect     Status: Normal    Specimen: Throat; Swab   Result Value Ref Range    Group A Strep antigen Negative Negative   CBC with platelets and differential     Status: Abnormal    Narrative    The following orders were created for panel order CBC with platelets and differential.  Procedure                               Abnormality         Status                     ---------                               -----------         ------                     CBC with platelets and d...[[id number]]  Abnormal            Final result                 Please view results for these tests on the individual orders.       ASSESSMENT/PLAN:    ICD-10-CM    1. Exanthem  R21 CBC with platelets and differential     Streptococcus A Rapid Screen w/Reflex to PCR - Clinic Collect     CBC with platelets and differential     Group A Streptococcus PCR Throat Swab     predniSONE (DELTASONE) 20 MG tablet      2. Type 2 diabetes mellitus with diabetic polyneuropathy, with long-term current use of insulin (H)  E11.42     Z79.4          Exanthem, unknown cause  Ddx inc drug rxn, viral condition, such as parvo    Short course of steroids  Has diabetes so watch glucoses close    Qamar Grant MD MPH

## 2023-11-07 ENCOUNTER — THERAPY VISIT (OUTPATIENT)
Dept: PHYSICAL THERAPY | Facility: CLINIC | Age: 58
End: 2023-11-07
Payer: COMMERCIAL

## 2023-11-07 DIAGNOSIS — M25.561 ACUTE PAIN OF RIGHT KNEE: Primary | ICD-10-CM

## 2023-11-07 PROCEDURE — 97110 THERAPEUTIC EXERCISES: CPT | Mod: GP

## 2023-11-07 NOTE — PROGRESS NOTES
11/07/23 0500   Appointment Info   Signing clinician's name / credentials Cheyenne Starks, PT, DPT, Shannan Hooper, SPT   Total/Authorized Visits E and T (18 planned)   Visits Used 7   Medical Diagnosis R TKA   PT Tx Diagnosis R Knee pain   Other pertinent information Requires frequent redirection w/ exercises. Neuropathy in both feet and L hand d/t diabetes   Progress Note/Certification   Onset of illness/injury or Date of Surgery 09/26/23   Therapy Frequency 2x/wk   Predicted Duration 6 weeks, then 1x/wk for 6 wks   Progress Note Completed Date 11/07/23   PT Goal 1   Goal Identifier LTG1   Goal Description Patient will be able to walk with normal gait mechanics with SPC   Rationale to maximize safety and independence with performance of ADLs and functional tasks;to maximize safety and independence within the community;to maximize safety and independence within the home;to maximize safety and independence with self cares;to maximize safety and independence with transportation   Goal Progress Walking w/ SPC today, good heel to toe motion, equal WBing B   Target Date 11/23/23   PT Goal 2   Goal Identifier LTG2   Goal Description Patient will report an improvement of at least 7 on the KOS to demonstrate MCID in 8 weeks   Rationale to maximize safety and independence with performance of ADLs and functional tasks;to maximize safety and independence within the home;to maximize safety and independence within the community;to maximize safety and independence with transportation;to maximize safety and independence with self cares   Target Date 01/18/24   PT Goal 3   Goal Identifier LTG3   Goal Description Patient will be able to perform a sit to stand without UE support and with good control   Rationale to maximize safety and independence with performance of ADLs and functional tasks;to maximize safety and independence within the home;to maximize safety and independence within the community;to maximize safety and  independence with transportation;to maximize safety and independence with self cares   Goal Progress uses arms to push up from sitting   Target Date 11/23/23   Subjective Report   Subjective Report Walking 10-15min once or twice a day. Pain is mild day to day, SLR increases pain and is most challenging. Pt is concerned with gaining his function back.   Objective Measures   Objective Measures Objective Measure 1;Objective Measure 2   Objective Measure 1   Objective Measure R knee ROM   Details 0* extension. 119* flexion.   Objective Measure 2   Objective Measure Quadricep strength   Details 2+/5 quadricep, unable to perform LAQ in sitting, extensor lag with SLR supine today however could perform 1x with high pain per patient.   Therapeutic Procedure/Exercise   Therapeutic Procedures: strength, endurance, ROM, flexibillity minutes (22087) 40   Ther Proc 1 Recumbent bike   Ther Proc 1 - Details x5min, level 7, seat 10   Ther Proc 3 Heel slides   Ther Proc 3 - Details 1x5 to assess ROM, d/c from HEP as full ROM. pt education focus on strength   Ther Proc 4 Education throughout for progressing strength   PTRx Ther Proc 1 Toe Raises   PTRx Ther Proc 1 - Details Per HEP Stand at counter up on toes and back down. Today x10   PTRx Ther Proc 2 Squat   PTRx Ther Proc 2 - Details per HEP hold onto counter top. 10 repetitions 2x daily. Today x10 w/ cues for hip hinge    PTRx Ther Proc 3 Terminal Knee Extension Wall/Ball   PTRx Ther Proc 3 - Details per HEP goal of 10-20 repetitions 2x per day. Today x8 w/ pink ball    PTRx Ther Proc 4 Front Knee Strengthening   PTRx Ther Proc 4 - Details per HEP goal of 10-20 repetitions in a row. 2x daily. you can do this on your sofa assist with the left foot to lift it up then leg go once the right knee is straight then slowly lower the right leg down on its own. Today x10 w/ cues for performance and eccentric lowering   PTRx Ther Proc 5 Hip Flexion Straight Leg Raise   PTRx Ther Proc 5 -  Details per HEP goal of 10-20 repetitions. you can do this sitting on the edge of the table. 2x daily. Today x10 sitting w/ less pain than in supine   Skilled Intervention manual and verbal cueing   Patient Response/Progress ROM continues to improve once loosened up during session. Continues to struggle with quad activation; able to perform very effortful seated SLR with slight posterior trunk lean to assist bringing leg up)   Plan   Home program PTRX   Plan for next session long arc quad (concentric), leg press, balance, gait   Total Session Time   Timed Code Treatment Minutes 40   Total Treatment Time (sum of timed and untimed services) 40         PLAN  Continue therapy per current plan of care. Focus on strengthening exercises going forward.     Beginning/End Dates of Progress Note Reporting Period:  10/19/2023 to 11/07/2023    Referring Provider:  Frederic Bain

## 2023-11-10 ENCOUNTER — ANTICOAGULATION THERAPY VISIT (OUTPATIENT)
Dept: ANTICOAGULATION | Facility: CLINIC | Age: 58
End: 2023-11-10

## 2023-11-10 ENCOUNTER — LAB (OUTPATIENT)
Dept: LAB | Facility: CLINIC | Age: 58
End: 2023-11-10
Payer: COMMERCIAL

## 2023-11-10 DIAGNOSIS — I82.409 RECURRENT DEEP VEIN THROMBOSIS (DVT) (H): Primary | ICD-10-CM

## 2023-11-10 DIAGNOSIS — I82.409 RECURRENT DEEP VEIN THROMBOSIS (DVT) (H): ICD-10-CM

## 2023-11-10 LAB — INR BLD: 2.2 (ref 0.9–1.1)

## 2023-11-10 PROCEDURE — 85610 PROTHROMBIN TIME: CPT

## 2023-11-10 PROCEDURE — 36416 COLLJ CAPILLARY BLOOD SPEC: CPT

## 2023-11-10 NOTE — PROGRESS NOTES
ANTICOAGULATION MANAGEMENT     Adarsh Salvador 58 year old male is on warfarin with therapeutic INR result. (Goal INR 2.0-3.0)    Recent labs: (last 7 days)     11/10/23  1542   INR 2.2*       ASSESSMENT     Source(s): Chart Review and Patient/Caregiver Call     Warfarin doses taken: Warfarin taken as instructed  Diet: No new diet changes identified  Medication/supplement changes: None noted  New illness, injury, or hospitalization: No  Signs or symptoms of bleeding or clotting: No  Previous result: Subtherapeutic  Additional findings: None       PLAN     Recommended plan for no diet, medication or health factor changes affecting INR     Dosing Instructions: Continue your current warfarin dose with next INR in 6 days       Summary  As of 11/10/2023      Full warfarin instructions:  12.5 mg every Mon; 15 mg all other days   Next INR check:  11/16/2023               Telephone call with Adarsh who verbalizes understanding and agrees to plan and who agrees to plan and repeated back plan correctly    Lab visit scheduled    Education provided:   None required    Plan made per [facility] anticoagulation protocol    Priya Plunkett RN  Anticoagulation Clinic  11/10/2023    _______________________________________________________________________     Anticoagulation Episode Summary       Current INR goal:  2.0-3.0   TTR:  42.0% (1 y)   Target end date:  Indefinite   Send INR reminders to:  [facility]    Indications    Recurrent deep vein thrombosis (DVT) (H) [I82.409]             Comments:               Anticoagulation Care Providers       Provider Role Specialty Phone number    Bertha Romero PA-C Referring Family Medicine [phone]

## 2023-11-13 ENCOUNTER — PATIENT OUTREACH (OUTPATIENT)
Dept: CARE COORDINATION | Facility: CLINIC | Age: 58
End: 2023-11-13
Payer: COMMERCIAL

## 2023-11-13 NOTE — PROGRESS NOTES
Clinic Care Coordination Contact  Community Health Worker Follow Up    Care Gaps:     Health Maintenance Due   Topic Date Due    YEARLY PREVENTIVE VISIT  Never done    ASTHMA ACTION PLAN  02/14/2023    INFLUENZA VACCINE (1) 09/01/2023       Patient will discuss at next PCP visit    Care Plan:   Care Plan: Help At Home       Problem: Insufficient In-home support       Goal: Establish adequate home support       Start Date: 10/5/2023 Expected End Date: 11/5/2023    This Visit's Progress: On Hold    Note:     Barriers: Nothing currently set up  Strengths: Motivated to work with care coordination  Patient expressed understanding of goal: Yes  Action steps to achieve this goal:  1. I will work with my providers to see if home care is an option.  2. I will work with my providers to see if outpatient PT/OT is an option.  3. I will work with care coordination for additional support and resources.                                Intervention and Education during outreach: Patient has been very frustrated with the health care system due to having to see multiple specialists. He is not understanding why his PCP can't do more for him. He also states that he will be moving to the [facility] in Feb and will most likely switch clinics as it is too far to drive. Suggested he look into an internal medicine doctor at the [facility].     He is currently on prednisone and states his blood sugars have been poorly controlled. CHW suggested patient reach out to endocrine when he is put on prednisone to discuss how to better manage his blood sugars when he is on this.     CHW Plan: CHW will continue to support patient with goals through routine scheduled outreach.     Next outreach due:  12/13/23

## 2023-11-14 ENCOUNTER — THERAPY VISIT (OUTPATIENT)
Dept: PHYSICAL THERAPY | Facility: CLINIC | Age: 58
End: 2023-11-14
Payer: COMMERCIAL

## 2023-11-14 DIAGNOSIS — M25.561 ACUTE PAIN OF RIGHT KNEE: Primary | ICD-10-CM

## 2023-11-14 PROCEDURE — 97110 THERAPEUTIC EXERCISES: CPT | Mod: GP | Performed by: PHYSICAL THERAPIST

## 2023-11-14 NOTE — PROGRESS NOTES
Chief Complaint   Patient presents with    Right Knee - Total Joint Post-op     DOS 9/26/23, 7 wk s/p. Patient notes his knee is doing pretty good. He continues with physical therapy, twice weekly, has only had one for the last 2 weeks. No issues or concerns.          SURGERY: Total knee arthroplasty, right knee ([facility])  DATE OF SURGERY: 9/26/2023      HISTORY OF PRESENT ILLNESS: Adarsh Salvador is a 58 year old male seen for postoperative evaluation of right total knee arthroplasty. It has been 7 weeks since surgery. Returns today doing quite well, improving. Continues with therapy twice weekly, but has only had 1 session the past 2 weeks.    He's now walking without a cane.    Denies any wound problems. Denies numbness, tingling, or weakness in the affected extremity. Denies fevers chills night sweats. Continues to take warfarin for anti-coagulation for deep vein thrombosis prophylaxis, as well as longterm anticoagulation with history of DVT in the past.    Present symptoms: mild pain, mild swelling.          Past medical history:   Past Medical History:   Diagnosis Date    Anaphylactic reaction 8/14/2015    Anxiety     Depression     DM2 (diabetes mellitus, type 2) (H)     GERD (gastroesophageal reflux disease)     HTN (hypertension)     IBS (irritable bowel syndrome)     Kidney stone 6/15/2011    Pt believes these were Calcium stones    Neuropathy      Patient Active Problem List    Diagnosis Date Noted    Status post total right knee replacement 10/19/2023     Priority: Medium    S/P TKR (total knee replacement), right 09/26/2023     Priority: Medium    Pulmonary embolism, other, unspecified chronicity, unspecified whether acute cor pulmonale present (H) 08/15/2023     Priority: Medium    Advanced directives, counseling/discussion 12/22/2022     Priority: Medium     Pt was given info on ADV. Munir Amaral MA      Acute pain of right knee 08/26/2022     Priority: Medium    Aftercare following  Lab orders picked up, mother aware  surgery of the musculoskeletal system 08/26/2022     Priority: Medium    Chronic pain of right knee 08/08/2022     Priority: Medium     Added automatically from request for surgery [id number]      Bilateral leg pain 06/09/2022     Priority: Medium     Added automatically from request for surgery [id number]      Low volume of ejaculated semen 06/07/2022     Priority: Medium    DVT (deep venous thrombosis) (H) 01/15/2022     Priority: Medium    Recurrent deep vein thrombosis (DVT) (H) 09/27/2021     Priority: Medium    Precordial pain 02/15/2021     Priority: Medium    Dyslipidemia 02/15/2021     Priority: Medium    Elevated C-reactive protein 02/15/2021     Priority: Medium    Gastric reflux 02/15/2021     Priority: Medium    Internal derangement of knee 02/15/2021     Priority: Medium    Nicotine dependence 02/15/2021     Priority: Medium    Varicose veins of lower extremity 02/15/2021     Priority: Medium    Vitamin D deficiency 02/15/2021     Priority: Medium    Neuropathy 06/23/2020     Priority: Medium    Morbid obesity (H) 03/26/2020     Priority: Medium    Insomnia, unspecified type 05/09/2019     Priority: Medium    CONNOR (generalized anxiety disorder) 05/08/2019     Priority: Medium    Hyperlipidemia LDL goal <100 08/17/2018     Priority: Medium    Type 2 diabetes mellitus with diabetic polyneuropathy, with long-term current use of insulin (H) 01/18/2018     Priority: Medium    Mild persistent asthma without complication 01/12/2018     Priority: Medium    Right inguinal hernia 06/14/2016     Priority: Medium    Irritable bowel syndrome with diarrhea 03/21/2016     Priority: Medium    Fatty liver 02/22/2016     Priority: Medium    Hypertension, unspecified type 12/15/2015     Priority: Medium    Anemia in other chronic diseases classified elsewhere 12/15/2015     Priority: Medium    Hypertriglyceridemia 07/12/2013     Priority: Medium    Chronic maxillary sinusitis 09/18/2012     Priority: Medium     Believes this is  secondary to exposure to toxic fumes at work- he is a .  He regularly wears a mask ventilator and believes this helps.  Has only tried intermittent nasal washes, and OTC medications.  Not ever tried steroid nasal sprays or other controller medications.        Chronic rhinitis 09/18/2012     Priority: Medium    Constipation 04/24/2012     Priority: Medium    GERD (gastroesophageal reflux disease) 06/15/2011     Priority: Medium    Chronic RLQ pain 06/15/2011     Priority: Medium       Past Surgical History:   Past Surgical History:   Procedure Laterality Date    ARTHROPLASTY KNEE Right 9/26/2023    Procedure: ARTHROPLASTY, KNEE, TOTAL Right;  Surgeon: Edwin Ch MD;  Location: [facility]    ARTHROSCOPY KNEE WITH MEDIAL MENISCECTOMY Right 8/19/2022    Procedure: examination under anesthesia, right knee arthroscopy, meniscectomy;  Surgeon: Carlos A Em MD;  Location: [facility] OR    COLONOSCOPY  5/1/2012    Procedure:COLONOSCOPY; screening colonoscopy; Surgeon:KINGSLEY DOS SANTOS; Location:[facility]    COLONOSCOPY  4/21/2014    Procedure: COMBINED COLONOSCOPY, SINGLE BIOPSY/POLYPECTOMY BY BIOPSY;  Surgeon: Duane, William Charles, MD;  Location: [facility]    COLONOSCOPY N/A 4/21/2022    Procedure: COLONOSCOPY, WITH POLYPECTOMY AND BIOPSY;  Surgeon: Luiz Calvert MD;  Location: [facility]    CYSTOSCOPY  05/01/2008    CYSTOSCOPY W/ URETERAL STENT PLACEMENT 05/01/2008     CYSTOSCOPY  09/26/2010    CYSTOSCOPY W/ URETERAL STENT PLACEMENT 09/26/2010 Left     CYSTOSCOPY  05/18/2012    CYSTOSCOPY, LEFT URETEROSCOPY AND STENT PLACEMENT left retrograde 05/18/2012     CYSTOSCOPY,+URETEROSCOPY  06/10/2008    URETEROSCOPY 06/10/2008 Right     HC BREATH HYDROGEN TEST  3/7/2014    Procedure: HYDROGEN BREATH TEST;  Surgeon: Darion Swift MD;  Location: [facility]    INJECT EPIDURAL LUMBAR N/A 7/7/2022    Procedure: INJECTION, SPINE, LUMBAR, EPIDURAL L5/S1;  Surgeon: Michi Hahn MD;  Location: [facility]    LITHOTRIPSY   09/30/2010    LITHOTRIPSY 09/30/2010 LEFT EXTRACORPOREAL SHOCK WAVE LITHOTRIPSY, FLEXIBLE CYSTOSCOPY, LEFT STENT REMOVAL      ORTHOPEDIC SURGERY  10/03/2007    KNEE ARTHROSCOPY 10/03/2007 RightX2      RELEASE CARPAL TUNNEL Right 1/26/2018    Procedure: RELEASE CARPAL TUNNEL;  Right carpal tunnel release;  Surgeon: Wiliam Saenz MD;  Location: [facility] OR    VASCULAR SURGERY  11/24/2008    Radiofrequency ablation left saphenous vein 11/24/2008 Done by Dr. Lozoya         Medications:   Current Outpatient Medications:     acetaminophen (TYLENOL) 325 MG tablet, Take 2 tablets (650 mg) by mouth every 4 hours as needed for other (mild pain), Disp: 100 tablet, Rfl: 0    albuterol (PROAIR HFA/PROVENTIL HFA/VENTOLIN HFA) 108 (90 Base) MCG/ACT inhaler, Inhale 2 puffs into the lungs every 6 hours as needed for shortness of breath or wheezing, Disp: 6.7 g, Rfl: 3    amLODIPine (NORVASC) 10 MG tablet, Take 1 tablet (10 mg) by mouth daily for blood pressure, Disp: 90 tablet, Rfl: 1    atorvastatin (LIPITOR) 40 MG tablet, Take 1 tablet (40 mg) by mouth daily (Patient taking differently: Take 40 mg by mouth every evening), Disp: 90 tablet, Rfl: 3    blood glucose (ACCU-CHEK GUIDE) test strip, Use to test blood sugar 3 times daily or as directed., Disp: 300 strip, Rfl: 3    blood glucose monitoring (ACCU-CHEK FASTCLIX) lancets, Use to test blood sugar 1 times daily or as directed.  Ok to substitute alternative if insurance prefers., Disp: 102 each, Rfl: 11    calcium carbonate (TUMS) 500 MG chewable tablet, Take 1 chew tab by mouth daily, Disp: , Rfl:     chlorthalidone (HYGROTON) 25 MG tablet, Take 1 tablet (25 mg) by mouth daily Add on for blood pressure, Disp: 90 tablet, Rfl: 1    Continuous Blood Gluc Sensor (FREESTYLE FROYLAN 3 SENSOR) MISC, 1 each every 14 days, Disp: 2 each, Rfl: 5    DULoxetine (CYMBALTA) 60 MG capsule, Take 1 capsule (60 mg) by mouth 2 times daily, Disp: 180 capsule, Rfl: 3    enoxaparin ANTICOAGULANT  "(LOVENOX) 100 MG/ML syringe, Inject 1 mL (100 mg) Subcutaneous every 12 hours, Disp: 28 mL, Rfl: 1    EPINEPHrine (ANY BX GENERIC EQUIV) 0.3 MG/0.3ML injection 2-pack, Inject 0.3 mLs (0.3 mg) into the muscle once as needed for anaphylaxis, Disp: 2 each, Rfl: 2    fluticasone-salmeterol (ADVAIR) 500-50 MCG/ACT inhaler, Inhale 1 puff into the lungs every 12 hours, Disp: 1 each, Rfl: 11    hydrOXYzine (ATARAX) 25 MG tablet, Take 1 tablet (25 mg) by mouth every 6 hours as needed for itching or anxiety (with pain, moderate pain), Disp: 30 tablet, Rfl: 0    insulin aspart (NOVOLOG FLEXPEN) 100 UNIT/ML pen, Inject 18 units before breakfast and 18 units before dinner *Will need to schedule with new PCP for future refills*, Disp: 15 mL, Rfl: 3    insulin glargine (BASAGLAR KWIKPEN) 100 UNIT/ML pen, Inject 50 Units Subcutaneous daily, Disp: 45 mL, Rfl: 0    insulin pen needle (32G X 4 MM) 32G X 4 MM miscellaneous, Use 3  pen needles daily or as directed., Disp: 200 each, Rfl: 1    insulin syringe-needle U-100 (29G X 1/2\" 0.5 ML) 29G X 1/2\" 0.5 ML miscellaneous, Use 3 syringes daily or as directed., Disp: 200 each, Rfl: 1    losartan (COZAAR) 25 MG tablet, Take 1 tablet (25 mg) by mouth daily, Disp: 90 tablet, Rfl: 1    meclizine (ANTIVERT) 25 MG tablet, Take 1 tablet (25 mg) by mouth 3 times daily as needed for dizziness, Disp: 90 tablet, Rfl: 1    metFORMIN (GLUCOPHAGE XR) 500 MG 24 hr tablet, Take 2 tablets (1,000 mg) by mouth 2 times daily (with meals), Disp: 360 tablet, Rfl: 2    montelukast (SINGULAIR) 10 MG tablet, Take 1 tablet (10 mg) by mouth At Bedtime For allergies/asthma, Disp: 90 tablet, Rfl: 2    multivitamin, therapeutic (THERA-VIT) TABS, Take 1 tablet by mouth daily, Disp: , Rfl:     nortriptyline (PAMELOR) 10 MG capsule, Take 2 capsules (20 mg) by mouth At Bedtime, Disp: 60 capsule, Rfl: 5    omeprazole (PRILOSEC) 40 MG DR capsule, Take 1 capsule (40 mg) by mouth daily Take for at least two months, Disp: 90 " capsule, Rfl: 2    oxyCODONE (ROXICODONE) 5 MG tablet, Take 1-2 tablets (5-10 mg) by mouth every 4 hours as needed for moderate to severe pain, Disp: 16 tablet, Rfl: 0    pregabalin (LYRICA) 50 MG capsule, Take 1 capsule (50 mg) by mouth 2 times daily, Disp: 60 capsule, Rfl: 1    rizatriptan (MAXALT-MLT) 10 MG ODT, TAKE 1 TABLET BY MOUTH AT ONSET OF HEADACHE FOR MIGRAINE MAY REPEAT IN 2 HOURS. MAX 3 TABS/24 HOURS., Disp: 18 tablet, Rfl: 1    senna-docusate (SENOKOT-S/PERICOLACE) 8.6-50 MG tablet, Take 1-2 tablets by mouth 2 times daily Take while on oral narcotics to prevent or treat constipation., Disp: 30 tablet, Rfl: 0    warfarin ANTICOAGULANT (COUMADIN) 5 MG tablet, Take 15 mg Tues, Thurs and 12.5 mg all other days, or as directed by the Coumadin clinic, Disp: 225 tablet, Rfl: 1  No current facility-administered medications for this visit.    Facility-Administered Medications Ordered in Other Visits:     BUPivacaine (MARCAINE) injection 0.5%, 10 mL, Intradermal, Once, Vinnie Espinoza,     DOBUTamine 500 mg in dextrose 5% 250 mL (adult std conc) premix, 2.5-20 mcg/kg/min, Intravenous, Continuous, Navarro Butcher MD, Stopped at 04/25/19 6187    iopamidol (ISOVUE-M 200) solution 10 mL, 10 mL, Intravenous, Once, Vinnie Espinoza,     methylPREDNISolone (DEPO-MEDROL) injection 80 mg, 80 mg, Intramuscular, Once, Vinnie Espinoza DO    metoprolol (LOPRESSOR) injection 5 mg, 5 mg, Intravenous, Q5 Min PRN, Navarro Butcher MD, 2 mg at 04/25/19 8258    Allergies:   Allergies   Allergen Reactions    Artificial Sweetner (Informational Only) [Artificial Sweetner (Informational Only) ] GI Disturbance     ALL artificial sweetners cause severe diarrhea & flu symptoms    Hydromorphone Anaphylaxis    Ibuprofen GI Disturbance    Morphine Sulfate Concentrate Anaphylaxis    Morphine [Fumaric Acid] Anaphylaxis    Hydrocodone-Acetaminophen Itching    Tramadol Hives    Trazodone Hives    Gabapentin GI  Disturbance     GI upset per pt    Keflex [Cephalexin] Diarrhea     Upset stomach    Codeine Phosphate Itching    Ketorolac Tromethamine Rash         REVIEW OF SYSTEMS:  CONSTITUTIONAL:NEGATIVE for fever, chills, night sweats, change in weight  INTEGUMENTARY/SKIN: NEGATIVE for worrisome rashes, moles or lesions  MUSCULOSKELETAL:See HPI above  NEURO: NEGATIVE for weakness, dizziness or paresthesias  CARDIOVASCULAR: negative for chest pain, shortness of breath    PHYSICAL EXAM:  /85   Pulse 112   Ht 1.829 m (6')   Wt 130.8 kg (288 lb 6.4 oz)   BMI 39.11 kg/m     GENERAL APPEARANCE: healthy, alert, no distress   SKIN: no suspicious lesions or rashes  NEURO: Normal strength and tone, mentation intact and speech normal  PSYCH:  mentation appears normal and affect normal  RESPIRATORY: No increased work of breathing.    BILATERAL LOWER EXTREMITIES:  Gait: walks unassisted. Mld quadriceps avoidance right.    Bilateral calf soft and nttp or squeeze.  Edema: trace    right  KNEE EXAM:    Incision: healed well.    Skin: intact, no erythema, no ecchymosis  ROM: 5 extension to 110 flexion  Effusion: moderate   Tender: diffuse      X-RAY:  2 views right knee from 11/16/2023 were reviewed in clinic today. No obvious fractures or dislocations. Total knee arthroplasty components in place without evidence of failure or loosening. Effusion.        Impression: Adarsh Salvador is a 58 year old male status post Total knee arthroplasty, right knee, 7 weeks out from surgery, doing well.      Plan:   * reviewed xrays with patient, showing total knee arthroplasty implants.  Overall, clinically, the knee looks much better than prior visit, and he's feeling better.  Again, as we discussed prior to surgery, normal to have pain, swelling most sever the first few weeks then gradually improve. Nothing concerning on today's exam to suggest otherwise.  * continue DVT prophylaxis with warfarin indefinitely, as prior.  * continue with knee range  of motion exercises, focusing on extension  * wean off all pain medications as tolerated  * xrays next visit: 2 views of the knee  * return to clinic 5 weeks  * Physical Therapy, home exercise program.  Given inability to get to therapy and being home alone, I'm recommending homehealth therapy and an aide.  * all questions addressed and answered prior to discharge from clinic today to patient's satisfaction.     * patient will need longterm (at least 2 years) prophylactic antibiotics use with dental procedures or other invasive procedures (2 g amoxicilin or  2g Keflex or 500mg azithromycin or clarithromycin or 100mg doxycycline) 30-60 minutes prior to dental procedures.    * KOOS Jr/Promis Knee score: IS to be done at next visit (12 weeks); NOT completed during todays visit; to be completed at 12 weeks and 12 months postoperative.      Edwin Ch M.D., M.S.  Dept. of Orthopaedic Surgery  [facility]

## 2023-11-16 ENCOUNTER — LAB (OUTPATIENT)
Dept: LAB | Facility: CLINIC | Age: 58
End: 2023-11-16
Payer: COMMERCIAL

## 2023-11-16 ENCOUNTER — OFFICE VISIT (OUTPATIENT)
Dept: ORTHOPEDICS | Facility: CLINIC | Age: 58
End: 2023-11-16
Payer: COMMERCIAL

## 2023-11-16 ENCOUNTER — ANTICOAGULATION THERAPY VISIT (OUTPATIENT)
Dept: ANTICOAGULATION | Facility: CLINIC | Age: 58
End: 2023-11-16

## 2023-11-16 ENCOUNTER — PATIENT OUTREACH (OUTPATIENT)
Dept: CARE COORDINATION | Facility: CLINIC | Age: 58
End: 2023-11-16

## 2023-11-16 ENCOUNTER — ANCILLARY PROCEDURE (OUTPATIENT)
Dept: GENERAL RADIOLOGY | Facility: CLINIC | Age: 58
End: 2023-11-16
Attending: ORTHOPAEDIC SURGERY
Payer: COMMERCIAL

## 2023-11-16 VITALS
HEART RATE: 112 BPM | HEIGHT: 72 IN | BODY MASS INDEX: 39.06 KG/M2 | SYSTOLIC BLOOD PRESSURE: 138 MMHG | WEIGHT: 288.4 LBS | DIASTOLIC BLOOD PRESSURE: 85 MMHG

## 2023-11-16 DIAGNOSIS — I82.409 RECURRENT DEEP VEIN THROMBOSIS (DVT) (H): Primary | ICD-10-CM

## 2023-11-16 DIAGNOSIS — Z96.651 S/P TOTAL KNEE ARTHROPLASTY, RIGHT: ICD-10-CM

## 2023-11-16 DIAGNOSIS — I82.409 RECURRENT DEEP VEIN THROMBOSIS (DVT) (H): ICD-10-CM

## 2023-11-16 DIAGNOSIS — Z96.651 S/P TOTAL KNEE ARTHROPLASTY, RIGHT: Primary | ICD-10-CM

## 2023-11-16 LAB — INR BLD: 1.6 (ref 0.9–1.1)

## 2023-11-16 PROCEDURE — 99024 POSTOP FOLLOW-UP VISIT: CPT | Performed by: ORTHOPAEDIC SURGERY

## 2023-11-16 PROCEDURE — 85610 PROTHROMBIN TIME: CPT

## 2023-11-16 PROCEDURE — 36416 COLLJ CAPILLARY BLOOD SPEC: CPT

## 2023-11-16 PROCEDURE — 73560 X-RAY EXAM OF KNEE 1 OR 2: CPT | Mod: TC | Performed by: RADIOLOGY

## 2023-11-16 ASSESSMENT — PAIN SCALES - GENERAL: PAINLEVEL: MILD PAIN (3)

## 2023-11-16 NOTE — LETTER
11/16/2023         RE: Adarsh Salvador  [address]  [place] [address]        Dear Colleague,    Thank you for referring your patient, Adarsh Salvador, to the [facility]. Please see a copy of my visit note below.    Chief Complaint   Patient presents with     Right Knee - Total Joint Post-op     DOS 9/26/23, 7 wk s/p. Patient notes his knee is doing pretty good. He continues with physical therapy, twice weekly, has only had one for the last 2 weeks. No issues or concerns.          SURGERY: Total knee arthroplasty, right knee ([facility])  DATE OF SURGERY: 9/26/2023      HISTORY OF PRESENT ILLNESS: Adarsh Salvador is a 58 year old male seen for postoperative evaluation of right total knee arthroplasty. It has been 7 weeks since surgery. Returns today doing quite well, improving. Continues with therapy twice weekly, but has only had 1 session the past 2 weeks.    He's now walking without a cane.    Denies any wound problems. Denies numbness, tingling, or weakness in the affected extremity. Denies fevers chills night sweats. Continues to take warfarin for anti-coagulation for deep vein thrombosis prophylaxis, as well as longterm anticoagulation with history of DVT in the past.    Present symptoms: mild pain, mild swelling.          Past medical history:   Past Medical History:   Diagnosis Date     Anaphylactic reaction 8/14/2015     Anxiety      Depression      DM2 (diabetes mellitus, type 2) (H)      GERD (gastroesophageal reflux disease)      HTN (hypertension)      IBS (irritable bowel syndrome)      Kidney stone 6/15/2011    Pt believes these were Calcium stones     Neuropathy      Patient Active Problem List    Diagnosis Date Noted     Status post total right knee replacement 10/19/2023     Priority: Medium     S/P TKR (total knee replacement), right 09/26/2023     Priority: Medium     Pulmonary embolism, other, unspecified chronicity, unspecified whether  acute cor pulmonale present (H) 08/15/2023     Priority: Medium     Advanced directives, counseling/discussion 12/22/2022     Priority: Medium     Pt was given info on ADV. Munir Amaral MA       Acute pain of right knee 08/26/2022     Priority: Medium     Aftercare following surgery of the musculoskeletal system 08/26/2022     Priority: Medium     Chronic pain of right knee 08/08/2022     Priority: Medium     Added automatically from request for surgery [id number]       Bilateral leg pain 06/09/2022     Priority: Medium     Added automatically from request for surgery [id number]       Low volume of ejaculated semen 06/07/2022     Priority: Medium     DVT (deep venous thrombosis) (H) 01/15/2022     Priority: Medium     Recurrent deep vein thrombosis (DVT) (H) 09/27/2021     Priority: Medium     Precordial pain 02/15/2021     Priority: Medium     Dyslipidemia 02/15/2021     Priority: Medium     Elevated C-reactive protein 02/15/2021     Priority: Medium     Gastric reflux 02/15/2021     Priority: Medium     Internal derangement of knee 02/15/2021     Priority: Medium     Nicotine dependence 02/15/2021     Priority: Medium     Varicose veins of lower extremity 02/15/2021     Priority: Medium     Vitamin D deficiency 02/15/2021     Priority: Medium     Neuropathy 06/23/2020     Priority: Medium     Morbid obesity (H) 03/26/2020     Priority: Medium     Insomnia, unspecified type 05/09/2019     Priority: Medium     CONNOR (generalized anxiety disorder) 05/08/2019     Priority: Medium     Hyperlipidemia LDL goal <100 08/17/2018     Priority: Medium     Type 2 diabetes mellitus with diabetic polyneuropathy, with long-term current use of insulin (H) 01/18/2018     Priority: Medium     Mild persistent asthma without complication 01/12/2018     Priority: Medium     Right inguinal hernia 06/14/2016     Priority: Medium     Irritable bowel syndrome with diarrhea 03/21/2016     Priority: Medium     Fatty liver 02/22/2016     Priority:  Medium     Hypertension, unspecified type 12/15/2015     Priority: Medium     Anemia in other chronic diseases classified elsewhere 12/15/2015     Priority: Medium     Hypertriglyceridemia 07/12/2013     Priority: Medium     Chronic maxillary sinusitis 09/18/2012     Priority: Medium     Believes this is secondary to exposure to toxic fumes at work- he is a .  He regularly wears a mask ventilator and believes this helps.  Has only tried intermittent nasal washes, and OTC medications.  Not ever tried steroid nasal sprays or other controller medications.         Chronic rhinitis 09/18/2012     Priority: Medium     Constipation 04/24/2012     Priority: Medium     GERD (gastroesophageal reflux disease) 06/15/2011     Priority: Medium     Chronic RLQ pain 06/15/2011     Priority: Medium       Past Surgical History:   Past Surgical History:   Procedure Laterality Date     ARTHROPLASTY KNEE Right 9/26/2023    Procedure: ARTHROPLASTY, KNEE, TOTAL Right;  Surgeon: Edwin Ch MD;  Location: [facility]     ARTHROSCOPY KNEE WITH MEDIAL MENISCECTOMY Right 8/19/2022    Procedure: examination under anesthesia, right knee arthroscopy, meniscectomy;  Surgeon: Carlos A Em MD;  Location: [facility] OR     COLONOSCOPY  5/1/2012    Procedure:COLONOSCOPY; screening colonoscopy; Surgeon:KINGSLEY DOS SANTOS; Location:[facility] OR     COLONOSCOPY  4/21/2014    Procedure: COMBINED COLONOSCOPY, SINGLE BIOPSY/POLYPECTOMY BY BIOPSY;  Surgeon: Duane, William Charles, MD;  Location: [facility] OR     COLONOSCOPY N/A 4/21/2022    Procedure: COLONOSCOPY, WITH POLYPECTOMY AND BIOPSY;  Surgeon: Luiz Calvert MD;  Location:  GI     CYSTOSCOPY  05/01/2008    CYSTOSCOPY W/ URETERAL STENT PLACEMENT 05/01/2008      CYSTOSCOPY  09/26/2010    CYSTOSCOPY W/ URETERAL STENT PLACEMENT 09/26/2010 Left      CYSTOSCOPY  05/18/2012    CYSTOSCOPY, LEFT URETEROSCOPY AND STENT PLACEMENT left retrograde 05/18/2012      CYSTOSCOPY,+URETEROSCOPY  06/10/2008     URETEROSCOPY 06/10/2008 Right      HC BREATH HYDROGEN TEST  3/7/2014    Procedure: HYDROGEN BREATH TEST;  Surgeon: Darion Swift MD;  Location: [facility]     INJECT EPIDURAL LUMBAR N/A 7/7/2022    Procedure: INJECTION, SPINE, LUMBAR, EPIDURAL L5/S1;  Surgeon: Michi Hahn MD;  Location: [facility] OR     LITHOTRIPSY  09/30/2010    LITHOTRIPSY 09/30/2010 LEFT EXTRACORPOREAL SHOCK WAVE LITHOTRIPSY, FLEXIBLE CYSTOSCOPY, LEFT STENT REMOVAL       ORTHOPEDIC SURGERY  10/03/2007    KNEE ARTHROSCOPY 10/03/2007 RightX2       RELEASE CARPAL TUNNEL Right 1/26/2018    Procedure: RELEASE CARPAL TUNNEL;  Right carpal tunnel release;  Surgeon: Wiliam Saenz MD;  Location: [facility] OR     VASCULAR SURGERY  11/24/2008    Radiofrequency ablation left saphenous vein 11/24/2008 Done by Dr. Lozoya         Medications:   Current Outpatient Medications:      acetaminophen (TYLENOL) 325 MG tablet, Take 2 tablets (650 mg) by mouth every 4 hours as needed for other (mild pain), Disp: 100 tablet, Rfl: 0     albuterol (PROAIR HFA/PROVENTIL HFA/VENTOLIN HFA) 108 (90 Base) MCG/ACT inhaler, Inhale 2 puffs into the lungs every 6 hours as needed for shortness of breath or wheezing, Disp: 6.7 g, Rfl: 3     amLODIPine (NORVASC) 10 MG tablet, Take 1 tablet (10 mg) by mouth daily for blood pressure, Disp: 90 tablet, Rfl: 1     atorvastatin (LIPITOR) 40 MG tablet, Take 1 tablet (40 mg) by mouth daily (Patient taking differently: Take 40 mg by mouth every evening), Disp: 90 tablet, Rfl: 3     blood glucose (ACCU-CHEK GUIDE) test strip, Use to test blood sugar 3 times daily or as directed., Disp: 300 strip, Rfl: 3     blood glucose monitoring (ACCU-CHEK FASTCLIX) lancets, Use to test blood sugar 1 times daily or as directed.  Ok to substitute alternative if insurance prefers., Disp: 102 each, Rfl: 11     calcium carbonate (TUMS) 500 MG chewable tablet, Take 1 chew tab by mouth daily, Disp: , Rfl:      chlorthalidone (HYGROTON) 25 MG tablet, Take 1  "tablet (25 mg) by mouth daily Add on for blood pressure, Disp: 90 tablet, Rfl: 1     Continuous Blood Gluc Sensor (FREESTYLE FROYLAN 3 SENSOR) MISC, 1 each every 14 days, Disp: 2 each, Rfl: 5     DULoxetine (CYMBALTA) 60 MG capsule, Take 1 capsule (60 mg) by mouth 2 times daily, Disp: 180 capsule, Rfl: 3     enoxaparin ANTICOAGULANT (LOVENOX) 100 MG/ML syringe, Inject 1 mL (100 mg) Subcutaneous every 12 hours, Disp: 28 mL, Rfl: 1     EPINEPHrine (ANY BX GENERIC EQUIV) 0.3 MG/0.3ML injection 2-pack, Inject 0.3 mLs (0.3 mg) into the muscle once as needed for anaphylaxis, Disp: 2 each, Rfl: 2     fluticasone-salmeterol (ADVAIR) 500-50 MCG/ACT inhaler, Inhale 1 puff into the lungs every 12 hours, Disp: 1 each, Rfl: 11     hydrOXYzine (ATARAX) 25 MG tablet, Take 1 tablet (25 mg) by mouth every 6 hours as needed for itching or anxiety (with pain, moderate pain), Disp: 30 tablet, Rfl: 0     insulin aspart (NOVOLOG FLEXPEN) 100 UNIT/ML pen, Inject 18 units before breakfast and 18 units before dinner *Will need to schedule with new PCP for future refills*, Disp: 15 mL, Rfl: 3     insulin glargine (BASAGLAR KWIKPEN) 100 UNIT/ML pen, Inject 50 Units Subcutaneous daily, Disp: 45 mL, Rfl: 0     insulin pen needle (32G X 4 MM) 32G X 4 MM miscellaneous, Use 3  pen needles daily or as directed., Disp: 200 each, Rfl: 1     insulin syringe-needle U-100 (29G X 1/2\" 0.5 ML) 29G X 1/2\" 0.5 ML miscellaneous, Use 3 syringes daily or as directed., Disp: 200 each, Rfl: 1     losartan (COZAAR) 25 MG tablet, Take 1 tablet (25 mg) by mouth daily, Disp: 90 tablet, Rfl: 1     meclizine (ANTIVERT) 25 MG tablet, Take 1 tablet (25 mg) by mouth 3 times daily as needed for dizziness, Disp: 90 tablet, Rfl: 1     metFORMIN (GLUCOPHAGE XR) 500 MG 24 hr tablet, Take 2 tablets (1,000 mg) by mouth 2 times daily (with meals), Disp: 360 tablet, Rfl: 2     montelukast (SINGULAIR) 10 MG tablet, Take 1 tablet (10 mg) by mouth At Bedtime For allergies/asthma, " Disp: 90 tablet, Rfl: 2     multivitamin, therapeutic (THERA-VIT) TABS, Take 1 tablet by mouth daily, Disp: , Rfl:      nortriptyline (PAMELOR) 10 MG capsule, Take 2 capsules (20 mg) by mouth At Bedtime, Disp: 60 capsule, Rfl: 5     omeprazole (PRILOSEC) 40 MG DR capsule, Take 1 capsule (40 mg) by mouth daily Take for at least two months, Disp: 90 capsule, Rfl: 2     oxyCODONE (ROXICODONE) 5 MG tablet, Take 1-2 tablets (5-10 mg) by mouth every 4 hours as needed for moderate to severe pain, Disp: 16 tablet, Rfl: 0     pregabalin (LYRICA) 50 MG capsule, Take 1 capsule (50 mg) by mouth 2 times daily, Disp: 60 capsule, Rfl: 1     rizatriptan (MAXALT-MLT) 10 MG ODT, TAKE 1 TABLET BY MOUTH AT ONSET OF HEADACHE FOR MIGRAINE MAY REPEAT IN 2 HOURS. MAX 3 TABS/24 HOURS., Disp: 18 tablet, Rfl: 1     senna-docusate (SENOKOT-S/PERICOLACE) 8.6-50 MG tablet, Take 1-2 tablets by mouth 2 times daily Take while on oral narcotics to prevent or treat constipation., Disp: 30 tablet, Rfl: 0     warfarin ANTICOAGULANT (COUMADIN) 5 MG tablet, Take 15 mg Tues, Thurs and 12.5 mg all other days, or as directed by the Coumadin clinic, Disp: 225 tablet, Rfl: 1  No current facility-administered medications for this visit.    Facility-Administered Medications Ordered in Other Visits:      BUPivacaine (MARCAINE) injection 0.5%, 10 mL, Intradermal, Once, Vinnie Espinoza, DO     DOBUTamine 500 mg in dextrose 5% 250 mL (adult std conc) premix, 2.5-20 mcg/kg/min, Intravenous, Continuous, Navarro Butcher MD, Stopped at 04/25/19 1559     iopamidol (ISOVUE-M 200) solution 10 mL, 10 mL, Intravenous, Once, Vinnie Espinoza, DO     methylPREDNISolone (DEPO-MEDROL) injection 80 mg, 80 mg, Intramuscular, Once, Vinnie Espinoza, DO     metoprolol (LOPRESSOR) injection 5 mg, 5 mg, Intravenous, Q5 Min PRN, Navarro Butcher MD, 2 mg at 04/25/19 7415    Allergies:   Allergies   Allergen Reactions     Artificial Sweetner (Informational Only)  [Artificial Sweetner (Informational Only) ] GI Disturbance     ALL artificial sweetners cause severe diarrhea & flu symptoms     Hydromorphone Anaphylaxis     Ibuprofen GI Disturbance     Morphine Sulfate Concentrate Anaphylaxis     Morphine [Fumaric Acid] Anaphylaxis     Hydrocodone-Acetaminophen Itching     Tramadol Hives     Trazodone Hives     Gabapentin GI Disturbance     GI upset per pt     Keflex [Cephalexin] Diarrhea     Upset stomach     Codeine Phosphate Itching     Ketorolac Tromethamine Rash         REVIEW OF SYSTEMS:  CONSTITUTIONAL:NEGATIVE for fever, chills, night sweats, change in weight  INTEGUMENTARY/SKIN: NEGATIVE for worrisome rashes, moles or lesions  MUSCULOSKELETAL:See HPI above  NEURO: NEGATIVE for weakness, dizziness or paresthesias  CARDIOVASCULAR: negative for chest pain, shortness of breath    PHYSICAL EXAM:  /85   Pulse 112   Ht 1.829 m (6')   Wt 130.8 kg (288 lb 6.4 oz)   BMI 39.11 kg/m     GENERAL APPEARANCE: healthy, alert, no distress   SKIN: no suspicious lesions or rashes  NEURO: Normal strength and tone, mentation intact and speech normal  PSYCH:  mentation appears normal and affect normal  RESPIRATORY: No increased work of breathing.    BILATERAL LOWER EXTREMITIES:  Gait: walks unassisted. Mld quadriceps avoidance right.    Bilateral calf soft and nttp or squeeze.  Edema: trace    right  KNEE EXAM:    Incision: healed well.    Skin: intact, no erythema, no ecchymosis  ROM: 5 extension to 110 flexion  Effusion: moderate   Tender: diffuse      X-RAY:  2 views right knee from 11/16/2023 were reviewed in clinic today. No obvious fractures or dislocations. Total knee arthroplasty components in place without evidence of failure or loosening. Effusion.        Impression: Adarsh Salvador is a 58 year old male status post Total knee arthroplasty, right knee, 7 weeks out from surgery, doing well.      Plan:   * reviewed xrays with patient, showing total knee arthroplasty  implants.  Overall, clinically, the knee looks much better than prior visit, and he's feeling better.  Again, as we discussed prior to surgery, normal to have pain, swelling most sever the first few weeks then gradually improve. Nothing concerning on today's exam to suggest otherwise.  * continue DVT prophylaxis with warfarin indefinitely, as prior.  * continue with knee range of motion exercises, focusing on extension  * wean off all pain medications as tolerated  * xrays next visit: 2 views of the knee  * return to clinic 5 weeks  * Physical Therapy, home exercise program.  Given inability to get to therapy and being home alone, I'm recommending homehealth therapy and an aide.  * all questions addressed and answered prior to discharge from clinic today to patient's satisfaction.     * patient will need longterm (at least 2 years) prophylactic antibiotics use with dental procedures or other invasive procedures (2 g amoxicilin or  2g Keflex or 500mg azithromycin or clarithromycin or 100mg doxycycline) 30-60 minutes prior to dental procedures.    * KOOS Jr/Promis Knee score: IS to be done at next visit (12 weeks); NOT completed during todays visit; to be completed at 12 weeks and 12 months postoperative.      Edwin Ch M.D., M.S.  Dept. of Orthopaedic Surgery  [facility]             Again, thank you for allowing me to participate in the care of your patient.        Sincerely,        Edwin Ch MD

## 2023-11-16 NOTE — PROGRESS NOTES
ANTICOAGULATION MANAGEMENT     Adarsh Salvador 58 year old male is on warfarin with subtherapeutic INR result. (Goal INR 2.0-3.0)    Recent labs: (last 7 days)     11/16/23  1521   INR 1.6*       ASSESSMENT     Source(s): Chart Review and Patient/Caregiver Call     Warfarin doses taken: Warfarin taken as instructed but pt states he has been stressed lately so he may have missed a dose but is unsure  Diet: No new diet changes identified  Medication/supplement changes: None noted  New illness, injury, or hospitalization: No  Signs or symptoms of bleeding or clotting: No  Previous result: Therapeutic last visit; previously outside of goal range  Additional findings:  Pt has been under more stress lately which could be affecting his INR       PLAN     Recommended plan for no diet, medication or health factor changes affecting INR     Dosing Instructions: Increase your warfarin dose (12.2% change) with next INR in 10 days       Summary  As of 11/16/2023      Full warfarin instructions:  11/16: 20 mg; Otherwise 20 mg every Mon, Thu; 15 mg all other days   Next INR check:  11/27/2023               Telephone call with Adarsh who verbalizes understanding and agrees to plan and who agrees to plan and repeated back plan correctly    Lab visit scheduled    Education provided:   Goal range and lab monitoring: goal range and significance of current result, Importance of therapeutic range, and Importance of following up at instructed interval    Plan made per [facility] anticoagulation protocol    Joby Marie RN  Anticoagulation Clinic  11/16/2023    _______________________________________________________________________     Anticoagulation Episode Summary       Current INR goal:  2.0-3.0   TTR:  42.6% (1 y)   Target end date:  Indefinite   Send INR reminders to:  [facility]    Indications    Recurrent deep vein thrombosis (DVT) (H) [I82.409]             Comments:               Anticoagulation Care Providers       Provider Role  Specialty Phone number    Bertha Romero PA-C Referring Family Medicine [phone]

## 2023-11-16 NOTE — PROGRESS NOTES
Clinic Care Coordination Contact  Care Coordination Clinician Chart Review    Situation: Patient chart reviewed by Care Coordinator.       Background: Care Coordination Program started: 10/3/2023. Initial assessment completed and patient-centered care plan(s) were developed with participation from patient. Lead CC handed patient off to CHW for continued outreaches.       Assessment: Per chart review, patient outreach completed by CC CHW on 11/13.  Patient is actively working to accomplish goal(s). Patient's goal(s) appropriate and relevant at this time. Patient is not due for updated Plan of Care.  Assessments will be completed annually or as needed/with change of patient status.    Per chart review, patient completing outpatient PT. Patient reports to CHW that he is frustrated with the health care system. CHW advised patient to reach out [facility] once he moves to [facility].       Care Plan: Help At Home       Problem: Insufficient In-home support       Goal: Establish adequate home support       Start Date: 10/5/2023 Expected End Date: 11/5/2023    This Visit's Progress: 50% Recent Progress: On Hold    Note:     Barriers: Nothing currently set up  Strengths: Motivated to work with care coordination  Patient expressed understanding of goal: Yes  Action steps to achieve this goal:  1. I will work with my providers to see if home care is an option.  2. I will work with my providers to see if outpatient PT/OT is an option.  3. I will work with care coordination for additional support and resources.                                   Plan/Recommendations: The patient will continue working with Care Coordination to achieve goal(s) as above. CHW will continue outreaches at minimum every 30 days and will involve Lead CC as needed or if patient is ready to move to Maintenance. Lead CC will continue to monitor CHW outreaches and patient's progress to goal(s) every 6 weeks.     Plan of Care updated and sent to  patient: Lala Bowen [facility]  Social Work Primary Care Clinic Care Coordinator  [facility]  [phone]  [email or web address]

## 2023-11-20 ENCOUNTER — THERAPY VISIT (OUTPATIENT)
Dept: PHYSICAL THERAPY | Facility: CLINIC | Age: 58
End: 2023-11-20
Payer: COMMERCIAL

## 2023-11-20 DIAGNOSIS — M25.561 ACUTE PAIN OF RIGHT KNEE: Primary | ICD-10-CM

## 2023-11-20 PROCEDURE — 97110 THERAPEUTIC EXERCISES: CPT | Mod: GP

## 2023-11-27 ENCOUNTER — ANTICOAGULATION THERAPY VISIT (OUTPATIENT)
Dept: ANTICOAGULATION | Facility: CLINIC | Age: 58
End: 2023-11-27

## 2023-11-27 ENCOUNTER — THERAPY VISIT (OUTPATIENT)
Dept: PHYSICAL THERAPY | Facility: CLINIC | Age: 58
End: 2023-11-27
Payer: COMMERCIAL

## 2023-11-27 ENCOUNTER — LAB (OUTPATIENT)
Dept: LAB | Facility: CLINIC | Age: 58
End: 2023-11-27
Payer: COMMERCIAL

## 2023-11-27 DIAGNOSIS — I82.409 RECURRENT DEEP VEIN THROMBOSIS (DVT) (H): Primary | ICD-10-CM

## 2023-11-27 DIAGNOSIS — I82.409 RECURRENT DEEP VEIN THROMBOSIS (DVT) (H): ICD-10-CM

## 2023-11-27 DIAGNOSIS — M25.561 ACUTE PAIN OF RIGHT KNEE: Primary | ICD-10-CM

## 2023-11-27 LAB — INR BLD: 1.4 (ref 0.9–1.1)

## 2023-11-27 PROCEDURE — 85610 PROTHROMBIN TIME: CPT

## 2023-11-27 PROCEDURE — 97110 THERAPEUTIC EXERCISES: CPT | Mod: GP

## 2023-11-27 RX ORDER — WARFARIN SODIUM 5 MG/1
TABLET ORAL
Qty: 280 TABLET | Refills: 1 | Status: SHIPPED | OUTPATIENT
Start: 2023-11-27 | End: 2024-04-23

## 2023-11-27 NOTE — PROGRESS NOTES
ANTICOAGULATION MANAGEMENT     Adarsh Salvador 58 year old male is on warfarin with subtherapeutic INR result. (Goal INR 2.0-3.0)    Recent labs: (last 7 days)     11/27/23  1543   INR 1.4*       ASSESSMENT     Source(s): Chart Review and Patient/Caregiver Call     Warfarin doses taken: Missed dose(s) may be affecting INR - pt ran out of his coumadin on Saturday - he will  some more today  Diet: No new diet changes identified  Medication/supplement changes: None noted  New illness, injury, or hospitalization: No  Signs or symptoms of bleeding or clotting: No  Previous result: Subtherapeutic  Additional findings: None and Bridging with Enoxaparin until INR >= 2.0 - pt has lovenox at home so will be restarting this today and recheck on Thursday. Refill sent in today with updated dosing amount       PLAN     Recommended plan for temporary change(s) affecting INR     Dosing Instructions: booster dose then continue your current warfarin dose with next INR in 3 days       Summary  As of 11/27/2023      Full warfarin instructions:  20 mg every Mon, Thu; 15 mg all other days   Next INR check:  11/30/2023               Telephone call with Adarsh who verbalizes understanding and agrees to plan and who agrees to plan and repeated back plan correctly    Lab visit scheduled    Education provided:   Goal range and lab monitoring: goal range and significance of current result, Importance of therapeutic range, and Importance of following up at instructed interval  Symptom monitoring: monitoring for clotting signs and symptoms, monitoring for stroke signs and symptoms, and when to seek medical attention/emergency care    Plan made per [facility] anticoagulation protocol    Joby Marie RN  Anticoagulation Clinic  11/27/2023    _______________________________________________________________________     Anticoagulation Episode Summary       Current INR goal:  2.0-3.0   TTR:  42.5% (1 y)   Target end date:  Indefinite   [facility]    Indications    Recurrent deep vein thrombosis (DVT) (H) [I82.409]             Comments:               Anticoagulation Care Providers       Provider Role Specialty Phone number    Bertha Romero PA-C Referring [facility] [phone]

## 2023-11-30 ENCOUNTER — ANTICOAGULATION THERAPY VISIT (OUTPATIENT)
Dept: ANTICOAGULATION | Facility: CLINIC | Age: 58
End: 2023-11-30

## 2023-11-30 ENCOUNTER — LAB (OUTPATIENT)
Dept: LAB | Facility: CLINIC | Age: 58
End: 2023-11-30
Payer: COMMERCIAL

## 2023-11-30 DIAGNOSIS — I82.409 RECURRENT DEEP VEIN THROMBOSIS (DVT) (H): ICD-10-CM

## 2023-11-30 DIAGNOSIS — I82.409 RECURRENT DEEP VEIN THROMBOSIS (DVT) (H): Primary | ICD-10-CM

## 2023-11-30 LAB — INR BLD: 2 (ref 0.9–1.1)

## 2023-11-30 PROCEDURE — 36416 COLLJ CAPILLARY BLOOD SPEC: CPT

## 2023-11-30 PROCEDURE — 85610 PROTHROMBIN TIME: CPT

## 2023-11-30 NOTE — PROGRESS NOTES
ANTICOAGULATION MANAGEMENT     Adarsh Salvador 58 year old male is on warfarin with therapeutic INR result. (Goal INR 2.0-3.0)    Recent labs: (last 7 days)     11/30/23  1057   INR 2.0*       ASSESSMENT     Source(s): Chart Review  Previous INR was Subtherapeutic  Medication, diet, health changes since last INR chart reviewed; none identified          PLAN     Recommended plan for no diet, medication or health factor changes affecting INR     Dosing Instructions: Continue your current warfarin dose OK to stop Lovenox that was started on Monday with next INR in 1 week       Summary  As of 11/30/2023      Full warfarin instructions:  20 mg every Mon, Thu; 15 mg all other days   Next INR check:  12/7/2023               Detailed voice message left for Adarsh with dosing instructions and follow up date.     Contact [phone]  to schedule and with any changes, questions or concerns.     Education provided:   Please call back if any changes to your diet, medications or how you've been taking warfarin  Goal range and lab monitoring: goal range and significance of current result, Importance of therapeutic range, and Importance of following up at instructed interval    Plan made per [facility] anticoagulation protocol    Joby Marie RN  Anticoagulation Clinic  11/30/2023    _______________________________________________________________________     Anticoagulation Episode Summary       Current INR goal:  2.0-3.0   TTR:  42.4% (1 y)   Target end date:  Indefinite   Send INR reminders to:  [facility]    Indications    Recurrent deep vein thrombosis (DVT) (H) [I82.409]             Comments:               Anticoagulation Care Providers       Provider Role Specialty Phone number    Bertha Romero PA-C Referring Family Medicine [phone]

## 2023-12-07 ENCOUNTER — THERAPY VISIT (OUTPATIENT)
Dept: PHYSICAL THERAPY | Facility: CLINIC | Age: 58
End: 2023-12-07
Payer: COMMERCIAL

## 2023-12-07 ENCOUNTER — LAB (OUTPATIENT)
Dept: LAB | Facility: CLINIC | Age: 58
End: 2023-12-07
Payer: COMMERCIAL

## 2023-12-07 ENCOUNTER — ANTICOAGULATION THERAPY VISIT (OUTPATIENT)
Dept: ANTICOAGULATION | Facility: CLINIC | Age: 58
End: 2023-12-07

## 2023-12-07 DIAGNOSIS — M25.561 ACUTE PAIN OF RIGHT KNEE: Primary | ICD-10-CM

## 2023-12-07 DIAGNOSIS — I82.409 RECURRENT DEEP VEIN THROMBOSIS (DVT) (H): Primary | ICD-10-CM

## 2023-12-07 DIAGNOSIS — I82.409 RECURRENT DEEP VEIN THROMBOSIS (DVT) (H): ICD-10-CM

## 2023-12-07 LAB — INR BLD: 4.2 (ref 0.9–1.1)

## 2023-12-07 PROCEDURE — 85610 PROTHROMBIN TIME: CPT

## 2023-12-07 PROCEDURE — 36416 COLLJ CAPILLARY BLOOD SPEC: CPT

## 2023-12-07 PROCEDURE — 97110 THERAPEUTIC EXERCISES: CPT | Mod: GP

## 2023-12-07 NOTE — PROGRESS NOTES
ANTICOAGULATION MANAGEMENT     Adarsh Salvador 58 year old male is on warfarin with supratherapeutic INR result. (Goal INR 2.0-3.0)    Recent labs: (last 7 days)     12/07/23  0957   INR 4.2*       ASSESSMENT     Source(s): Chart Review and Patient/Caregiver Call     Warfarin doses taken: Warfarin taken as instructed - recent dosage increase  Diet: No new diet changes identified  Medication/supplement changes: None noted  New illness, injury, or hospitalization: No  Signs or symptoms of bleeding or clotting: No  Previous result: Therapeutic last visit; previously outside of goal range  Additional findings: None       PLAN     Recommended plan for temporary change(s) affecting INR     Dosing Instructions: partial hold then decrease your warfarin dose (8.7% change) with next INR in 1 week       Summary  As of 12/7/2023      Full warfarin instructions:  12/7: 10 mg; Otherwise 15 mg every day   Next INR check:  12/14/2023               Telephone call with Adarsh who verbalizes understanding and agrees to plan and who agrees to plan and repeated back plan correctly    Lab visit scheduled    Education provided:   Goal range and lab monitoring: goal range and significance of current result, Importance of therapeutic range, and Importance of following up at instructed interval    Plan made per [facility] anticoagulation protocol; closest % change with current tablet size made per protocol for for INR 4-4.9 (goal 2-3)    Joby Marie RN  Anticoagulation Clinic  12/7/2023    _______________________________________________________________________     Anticoagulation Episode Summary       Current INR goal:  2.0-3.0   TTR:  41.5% (1 y)   Target end date:  Indefinite   Send INR reminders to:  [facility]    Indications    Recurrent deep vein thrombosis (DVT) (H) [I82.409]             Comments:               Anticoagulation Care Providers       Provider Role Specialty Phone number    Bertha Romero PA-C Referring Family  Medicine [phone]

## 2023-12-08 NOTE — PROGRESS NOTES
Chief Complaint   Patient presents with    Right Knee - Total Joint Post-op     DOS 9/26/23, 11.5 wk s/p. Patient notes his knee is doing pretty good. He has been able to do things he hasn't been able to do in a along time. He still has some pain with stairs. He continues with physical therapy. He is walking with a cane.          SURGERY: Total knee arthroplasty, right knee ([facility])  DATE OF SURGERY: 9/26/2023      HISTORY OF PRESENT ILLNESS: Adarsh Salvador is a 58 year old male seen for postoperative evaluation of right total knee arthroplasty. It has been 11 weeks since surgery. Returns today doing quite well, improving. He's now doing things he hasn't been able to do for years. He uses a cane when out. He is very pleased.    He continues with therapy. He knows the leg is weak.      Denies any wound problems. Denies numbness, tingling, or weakness in the affected extremity. Denies fevers chills night sweats. Continues to take warfarin for anti-coagulation for deep vein thrombosis prophylaxis, as well as longterm anticoagulation with history of DVT in the past.    Present symptoms: mild pain, mild swelling.  Pain 0/10.        Past medical history:   Past Medical History:   Diagnosis Date    Anaphylactic reaction 8/14/2015    Anxiety     Depression     DM2 (diabetes mellitus, type 2) (H)     GERD (gastroesophageal reflux disease)     HTN (hypertension)     IBS (irritable bowel syndrome)     Kidney stone 6/15/2011    Pt believes these were Calcium stones    Neuropathy      Patient Active Problem List    Diagnosis Date Noted    Status post total right knee replacement 10/19/2023     Priority: Medium    S/P TKR (total knee replacement), right 09/26/2023     Priority: Medium    Pulmonary embolism, other, unspecified chronicity, unspecified whether acute cor pulmonale present (H) 08/15/2023     Priority: Medium    Advanced directives, counseling/discussion 12/22/2022     Priority: Medium     Pt was  given info on ADV. Munir Amaral MA      Acute pain of right knee 08/26/2022     Priority: Medium    Aftercare following surgery of the musculoskeletal system 08/26/2022     Priority: Medium    Chronic pain of right knee 08/08/2022     Priority: Medium     Added automatically from request for surgery [id number]      Bilateral leg pain 06/09/2022     Priority: Medium     Added automatically from request for surgery [id number]      Low volume of ejaculated semen 06/07/2022     Priority: Medium    DVT (deep venous thrombosis) (H) 01/15/2022     Priority: Medium    Recurrent deep vein thrombosis (DVT) (H) 09/27/2021     Priority: Medium    Precordial pain 02/15/2021     Priority: Medium    Dyslipidemia 02/15/2021     Priority: Medium    Elevated C-reactive protein 02/15/2021     Priority: Medium    Gastric reflux 02/15/2021     Priority: Medium    Internal derangement of knee 02/15/2021     Priority: Medium    Nicotine dependence 02/15/2021     Priority: Medium    Varicose veins of lower extremity 02/15/2021     Priority: Medium    Vitamin D deficiency 02/15/2021     Priority: Medium    Neuropathy 06/23/2020     Priority: Medium    Morbid obesity (H) 03/26/2020     Priority: Medium    Insomnia, unspecified type 05/09/2019     Priority: Medium    CONNOR (generalized anxiety disorder) 05/08/2019     Priority: Medium    Hyperlipidemia LDL goal <100 08/17/2018     Priority: Medium    Type 2 diabetes mellitus with diabetic polyneuropathy, with long-term current use of insulin (H) 01/18/2018     Priority: Medium    Mild persistent asthma without complication 01/12/2018     Priority: Medium    Right inguinal hernia 06/14/2016     Priority: Medium    Irritable bowel syndrome with diarrhea 03/21/2016     Priority: Medium    Fatty liver 02/22/2016     Priority: Medium    Hypertension, unspecified type 12/15/2015     Priority: Medium    Anemia in other chronic diseases classified elsewhere 12/15/2015     Priority: Medium     Hypertriglyceridemia 07/12/2013     Priority: Medium    Chronic maxillary sinusitis 09/18/2012     Priority: Medium     Believes this is secondary to exposure to toxic fumes at work- he is a .  He regularly wears a mask ventilator and believes this helps.  Has only tried intermittent nasal washes, and OTC medications.  Not ever tried steroid nasal sprays or other controller medications.        Chronic rhinitis 09/18/2012     Priority: Medium    Constipation 04/24/2012     Priority: Medium    GERD (gastroesophageal reflux disease) 06/15/2011     Priority: Medium    Chronic RLQ pain 06/15/2011     Priority: Medium       Past Surgical History:   Past Surgical History:   Procedure Laterality Date    ARTHROPLASTY KNEE Right 9/26/2023    Procedure: ARTHROPLASTY, KNEE, TOTAL Right;  Surgeon: Edwin Ch MD;  Location: [facility]    ARTHROSCOPY KNEE WITH MEDIAL MENISCECTOMY Right 8/19/2022    Procedure: examination under anesthesia, right knee arthroscopy, meniscectomy;  Surgeon: Carlos A Em MD;  Location: [facility] OR    COLONOSCOPY  5/1/2012    Procedure:COLONOSCOPY; screening colonoscopy; Surgeon:KINGSLEY DOS SANTOS; Location:[facility] OR    COLONOSCOPY  4/21/2014    Procedure: COMBINED COLONOSCOPY, SINGLE BIOPSY/POLYPECTOMY BY BIOPSY;  Surgeon: Duane, William Charles, MD;  Location: [facility] OR    COLONOSCOPY N/A 4/21/2022    Procedure: COLONOSCOPY, WITH POLYPECTOMY AND BIOPSY;  Surgeon: Luiz Calvert MD;  Location:  GI    CYSTOSCOPY  05/01/2008    CYSTOSCOPY W/ URETERAL STENT PLACEMENT 05/01/2008     CYSTOSCOPY  09/26/2010    CYSTOSCOPY W/ URETERAL STENT PLACEMENT 09/26/2010 Left     CYSTOSCOPY  05/18/2012    CYSTOSCOPY, LEFT URETEROSCOPY AND STENT PLACEMENT left retrograde 05/18/2012     CYSTOSCOPY,+URETEROSCOPY  06/10/2008    URETEROSCOPY 06/10/2008 Right     HC BREATH HYDROGEN TEST  3/7/2014    Procedure: HYDROGEN BREATH TEST;  Surgeon: Darion Swift MD;  Location: [facility]    INJECT EPIDURAL  LUMBAR N/A 7/7/2022    Procedure: INJECTION, SPINE, LUMBAR, EPIDURAL L5/S1;  Surgeon: Michi Hahn MD;  Location: [facility] OR    LITHOTRIPSY  09/30/2010    LITHOTRIPSY 09/30/2010 LEFT EXTRACORPOREAL SHOCK WAVE LITHOTRIPSY, FLEXIBLE CYSTOSCOPY, LEFT STENT REMOVAL      ORTHOPEDIC SURGERY  10/03/2007    KNEE ARTHROSCOPY 10/03/2007 RightX2      RELEASE CARPAL TUNNEL Right 1/26/2018    Procedure: RELEASE CARPAL TUNNEL;  Right carpal tunnel release;  Surgeon: Wiliam Saenz MD;  Location: [facility] OR    VASCULAR SURGERY  11/24/2008    Radiofrequency ablation left saphenous vein 11/24/2008 Done by Dr. Lozoya         Medications:   Current Outpatient Medications:     acetaminophen (TYLENOL) 325 MG tablet, Take 2 tablets (650 mg) by mouth every 4 hours as needed for other (mild pain), Disp: 100 tablet, Rfl: 0    albuterol (PROAIR HFA/PROVENTIL HFA/VENTOLIN HFA) 108 (90 Base) MCG/ACT inhaler, Inhale 2 puffs into the lungs every 6 hours as needed for shortness of breath or wheezing, Disp: 6.7 g, Rfl: 3    amLODIPine (NORVASC) 10 MG tablet, Take 1 tablet (10 mg) by mouth daily for blood pressure, Disp: 90 tablet, Rfl: 1    atorvastatin (LIPITOR) 40 MG tablet, Take 1 tablet (40 mg) by mouth daily (Patient taking differently: Take 40 mg by mouth every evening), Disp: 90 tablet, Rfl: 3    blood glucose (ACCU-CHEK GUIDE) test strip, Use to test blood sugar 3 times daily or as directed., Disp: 300 strip, Rfl: 3    blood glucose monitoring (ACCU-CHEK FASTCLIX) lancets, Use to test blood sugar 1 times daily or as directed.  Ok to substitute alternative if insurance prefers., Disp: 102 each, Rfl: 11    calcium carbonate (TUMS) 500 MG chewable tablet, Take 1 chew tab by mouth daily, Disp: , Rfl:     chlorthalidone (HYGROTON) 25 MG tablet, Take 1 tablet (25 mg) by mouth daily Add on for blood pressure, Disp: 90 tablet, Rfl: 1    Continuous Blood Gluc Sensor (FREESTYLE FROYLAN 3 SENSOR) MISC, 1 each every 14 days, Disp: 2 each, Rfl: 5     "DULoxetine (CYMBALTA) 60 MG capsule, Take 1 capsule (60 mg) by mouth 2 times daily, Disp: 180 capsule, Rfl: 3    enoxaparin ANTICOAGULANT (LOVENOX) 100 MG/ML syringe, Inject 1 mL (100 mg) Subcutaneous every 12 hours, Disp: 28 mL, Rfl: 1    EPINEPHrine (ANY BX GENERIC EQUIV) 0.3 MG/0.3ML injection 2-pack, Inject 0.3 mLs (0.3 mg) into the muscle once as needed for anaphylaxis (Patient not taking: Reported on 11/16/2023), Disp: 2 each, Rfl: 2    fluticasone-salmeterol (ADVAIR) 500-50 MCG/ACT inhaler, Inhale 1 puff into the lungs every 12 hours, Disp: 1 each, Rfl: 11    insulin aspart (NOVOLOG FLEXPEN) 100 UNIT/ML pen, Inject 18 units before breakfast and 18 units before dinner *Will need to schedule with new PCP for future refills*, Disp: 15 mL, Rfl: 3    insulin glargine (BASAGLAR KWIKPEN) 100 UNIT/ML pen, Inject 50 Units Subcutaneous daily, Disp: 45 mL, Rfl: 0    insulin pen needle (32G X 4 MM) 32G X 4 MM miscellaneous, Use 3  pen needles daily or as directed., Disp: 200 each, Rfl: 1    insulin syringe-needle U-100 (29G X 1/2\" 0.5 ML) 29G X 1/2\" 0.5 ML miscellaneous, Use 3 syringes daily or as directed., Disp: 200 each, Rfl: 1    losartan (COZAAR) 25 MG tablet, Take 1 tablet (25 mg) by mouth daily, Disp: 90 tablet, Rfl: 1    meclizine (ANTIVERT) 25 MG tablet, Take 1 tablet (25 mg) by mouth 3 times daily as needed for dizziness, Disp: 90 tablet, Rfl: 1    metFORMIN (GLUCOPHAGE XR) 500 MG 24 hr tablet, Take 2 tablets (1,000 mg) by mouth 2 times daily (with meals), Disp: 360 tablet, Rfl: 2    montelukast (SINGULAIR) 10 MG tablet, Take 1 tablet (10 mg) by mouth At Bedtime For allergies/asthma, Disp: 90 tablet, Rfl: 2    multivitamin, therapeutic (THERA-VIT) TABS, Take 1 tablet by mouth daily, Disp: , Rfl:     nortriptyline (PAMELOR) 10 MG capsule, Take 2 capsules (20 mg) by mouth At Bedtime, Disp: 60 capsule, Rfl: 5    omeprazole (PRILOSEC) 40 MG DR capsule, Take 1 capsule (40 mg) by mouth daily Take for at least two " months, Disp: 90 capsule, Rfl: 2    pregabalin (LYRICA) 50 MG capsule, Take 1 capsule (50 mg) by mouth 2 times daily, Disp: 60 capsule, Rfl: 1    rizatriptan (MAXALT-MLT) 10 MG ODT, TAKE 1 TABLET BY MOUTH AT ONSET OF HEADACHE FOR MIGRAINE MAY REPEAT IN 2 HOURS. MAX 3 TABS/24 HOURS., Disp: 18 tablet, Rfl: 1    warfarin ANTICOAGULANT (COUMADIN) 5 MG tablet, Take 20 mg Mon, Thurs and 15 mg all other days, or as directed by the Coumadin clinic, Disp: 280 tablet, Rfl: 1  No current facility-administered medications for this visit.    Facility-Administered Medications Ordered in Other Visits:     BUPivacaine (MARCAINE) injection 0.5%, 10 mL, Intradermal, Once, Vinnie Espinoza, DO    DOBUTamine 500 mg in dextrose 5% 250 mL (adult std conc) premix, 2.5-20 mcg/kg/min, Intravenous, Continuous, Navarro Butcher MD, Stopped at 04/25/19 1559    iopamidol (ISOVUE-M 200) solution 10 mL, 10 mL, Intravenous, Once, Vinnie Espinoza, DO    methylPREDNISolone (DEPO-MEDROL) injection 80 mg, 80 mg, Intramuscular, Once, Vinnie Espinoza, DO    metoprolol (LOPRESSOR) injection 5 mg, 5 mg, Intravenous, Q5 Min PRN, Navarro Butcher MD, 2 mg at 04/25/19 1559    Allergies:   Allergies   Allergen Reactions    Artificial Sweetner (Informational Only) [Artificial Sweetner (Informational Only) ] GI Disturbance     ALL artificial sweetners cause severe diarrhea & flu symptoms    Hydromorphone Anaphylaxis    Ibuprofen GI Disturbance    Morphine Sulfate Concentrate Anaphylaxis    Morphine [Fumaric Acid] Anaphylaxis    Hydrocodone-Acetaminophen Itching    Tramadol Hives    Trazodone Hives    Gabapentin GI Disturbance     GI upset per pt    Keflex [Cephalexin] Diarrhea     Upset stomach    Codeine Phosphate Itching    Ketorolac Tromethamine Rash         REVIEW OF SYSTEMS:  CONSTITUTIONAL:NEGATIVE for fever, chills, night sweats, change in weight  INTEGUMENTARY/SKIN: NEGATIVE for worrisome rashes, moles or lesions  MUSCULOSKELETAL:See  HPI above  NEURO: NEGATIVE for weakness, dizziness or paresthesias  CARDIOVASCULAR: negative for chest pain, shortness of breath    PHYSICAL EXAM:  /87   Pulse 97   Ht 1.829 m (6')   Wt 130.6 kg (288 lb)   BMI 39.06 kg/m     GENERAL APPEARANCE: healthy, alert, no distress   SKIN: no suspicious lesions or rashes  NEURO: Normal strength and tone, mentation intact and speech normal  PSYCH:  mentation appears normal and affect normal  RESPIRATORY: No increased work of breathing.    BILATERAL LOWER EXTREMITIES:  Gait: uses can left hand, Mld quadriceps avoidance right.    Bilateral calf soft and nttp or squeeze.  Edema: trace    right  KNEE EXAM:    Incision: healed well.    Skin: intact, no erythema, no ecchymosis  ROM: full extension to 120 flexion  Effusion: moderate   Tender: minimal.      X-RAY:  2 views right knee from 12/14/2023 were reviewed in clinic today. No obvious fractures or dislocations. Total knee arthroplasty components in place without evidence of failure or loosening. Effusion.        Impression: Adarsh Salvador is a 58 year old male status post Total knee arthroplasty, right knee, 11 weeks out from surgery, doing well.      Plan:   * reviewed xrays with patient, showing total knee arthroplasty implants.  Overall, clinically, the knee looks much better than prior visit, and he's feeling better.  Range of motion has come along nicely.  Need to work on strengthening and conditioning.  * continue DVT prophylaxis with warfarin indefinitely, as prior.  * xrays next visit: 2 views of the knee  * return to clinic 9 months  * Physical Therapy, home exercise program.  * all questions addressed and answered prior to discharge from clinic today to patient's satisfaction.     * patient will need longterm (at least 2 years) prophylactic antibiotics use with dental procedures or other invasive procedures (2 g amoxicilin or  2g Keflex or 500mg azithromycin or clarithromycin or 100mg doxycycline) 30-60 minutes  prior to dental procedures.    * KOOS Jr/Promis Knee score: IS to be done at next visit (12 months); was completed during todays visit; to be completed at 12 weeks and 12 months postoperative.      Edwin Ch M.D., M.S.  Dept. of Orthopaedic Surgery  [facility]

## 2023-12-11 ENCOUNTER — THERAPY VISIT (OUTPATIENT)
Dept: PHYSICAL THERAPY | Facility: CLINIC | Age: 58
End: 2023-12-11
Payer: COMMERCIAL

## 2023-12-11 DIAGNOSIS — M25.561 ACUTE PAIN OF RIGHT KNEE: Primary | ICD-10-CM

## 2023-12-11 PROCEDURE — 97110 THERAPEUTIC EXERCISES: CPT | Mod: GP

## 2023-12-11 NOTE — PROGRESS NOTES
12/11/23 0500   Appointment Info   Signing clinician's name / credentials Vini Diehl PT, DPT   Total/Authorized Visits E and T (18 planned)   Visits Used 12   Medical Diagnosis R TKA   PT Tx Diagnosis R Knee pain   Other pertinent information Requires frequent redirection w/ exercises. Neuropathy in both feet and L hand d/t diabetes   Progress Note/Certification   Onset of illness/injury or Date of Surgery 09/26/23   Therapy Frequency 2x/wk   Predicted Duration 6 weeks, then 1x/wk for 6 wks   Progress Note Completed Date 11/07/23   PT Goal 1   Goal Identifier LTG1   Goal Description Patient will be able to walk with normal gait mechanics with SPC   Rationale to maximize safety and independence with performance of ADLs and functional tasks;to maximize safety and independence within the community;to maximize safety and independence within the home;to maximize safety and independence with self cares;to maximize safety and independence with transportation   Goal Progress Slight antalgic gait with SPC   Target Date 11/23/23   PT Goal 2   Goal Identifier LTG2   Goal Description Patient will report an improvement of at least 7 on the KOS to demonstrate MCID in 8 weeks   Rationale to maximize safety and independence with performance of ADLs and functional tasks;to maximize safety and independence within the home;to maximize safety and independence within the community;to maximize safety and independence with transportation;to maximize safety and independence with self cares   Target Date 01/18/24   PT Goal 3   Goal Identifier LTG3   Goal Description Patient will be able to perform a sit to stand without UE support and with good control   Rationale to maximize safety and independence with performance of ADLs and functional tasks;to maximize safety and independence within the home;to maximize safety and independence within the community;to maximize safety and independence with transportation;to maximize safety and  "independence with self cares   Goal Progress Performed from elevated surface today   Target Date 11/23/23   Subjective Report   Subjective Report Patient reports that he continues to do well, still having occasionally knee buckling. Overall doing well.   Objective Measures   Objective Measures Objective Measure 1;Objective Measure 2   Objective Measure 1   Objective Measure R knee ROM   Details 4-0-126   Objective Measure 2   Objective Measure Quadricep strength   Details SLR x 10 reps with 5 degrees lag   Therapeutic Procedure/Exercise   Therapeutic Procedures: strength, endurance, ROM, flexibillity minutes (41206) 38   Therapeutic Procedures Ther Proc 2;Ther Proc 3;Ther Proc 4;Ther Proc 5   Ther Proc 1 Recumbent bike   Ther Proc 1 - Details Seat 9 - Level 8 - 6 min   Ther Proc 2 SLR   Ther Proc 2 - Details 2 x 10 sets   Ther Proc 3 Heel slides   Ther Proc 3 - Details NT   Ther Proc 4 Bridge   Ther Proc 4 - Details NT   Ther Proc 5 Leg Press Single Leg   Ther Proc 5 - Details 3 sets of 10 reps 30# on RLE (RPE   PTRx Ther Proc 1 Step up 2\"   PTRx Ther Proc 1 - Details x10 reps on R   PTRx Ther Proc 2 Sit to Stand   PTRx Ther Proc 2 - Details NT   PTRx Ther Proc 3 Split Squat   PTRx Ther Proc 3 - Details x10 reps with R leg forward and UE support   PTRx Ther Proc 4 LAQ   PTRx Ther Proc 4 - Details x10 reps - lacking TKE due to strength and pain   Skilled Intervention LE global strengthening - manual and verbal cueing   Patient Response/Progress Pt likes to be challenged. He is motivated and understood the importance of the HEP   Plan   Home program PTRX   Plan for next session long arc quad (concentric), leg press, balance, gait   Total Session Time   Timed Code Treatment Minutes 38   Total Treatment Time (sum of timed and untimed services) 38         PLAN  Continue therapy per current plan of care.    Beginning/End Dates of Progress Note Reporting Period:  11/07/23 to 12/11/2023    Referring Provider:  Frederic Abarca " Link

## 2023-12-11 NOTE — Clinical Note
Adarsh Blevins is now 11 weeks s/p R TKA. DOS is 9/26/23. You see him for follow up on 12/14/23.  His motion is 4-0-126. Walking with SPC due to occasional knee buckling at this time.  Strengthening is the biggest focus, he can perform 10 SLR with 5 degree of quad lag.  Pain is adequately controlled. Will continue to focus on strengthening especially with TKE.  Let me know if you have any questions.  Thanks,  Vini Diehl PT, DPT

## 2023-12-14 ENCOUNTER — ANTICOAGULATION THERAPY VISIT (OUTPATIENT)
Dept: ANTICOAGULATION | Facility: CLINIC | Age: 58
End: 2023-12-14

## 2023-12-14 ENCOUNTER — ANCILLARY PROCEDURE (OUTPATIENT)
Dept: GENERAL RADIOLOGY | Facility: CLINIC | Age: 58
End: 2023-12-14
Attending: ORTHOPAEDIC SURGERY
Payer: COMMERCIAL

## 2023-12-14 ENCOUNTER — LAB (OUTPATIENT)
Dept: LAB | Facility: CLINIC | Age: 58
End: 2023-12-14
Payer: COMMERCIAL

## 2023-12-14 ENCOUNTER — OFFICE VISIT (OUTPATIENT)
Dept: ORTHOPEDICS | Facility: CLINIC | Age: 58
End: 2023-12-14
Payer: COMMERCIAL

## 2023-12-14 VITALS
WEIGHT: 288 LBS | BODY MASS INDEX: 39.01 KG/M2 | DIASTOLIC BLOOD PRESSURE: 87 MMHG | HEIGHT: 72 IN | HEART RATE: 97 BPM | SYSTOLIC BLOOD PRESSURE: 135 MMHG

## 2023-12-14 DIAGNOSIS — I82.409 RECURRENT DEEP VEIN THROMBOSIS (DVT) (H): Primary | ICD-10-CM

## 2023-12-14 DIAGNOSIS — Z96.651 S/P TOTAL KNEE ARTHROPLASTY, RIGHT: Primary | ICD-10-CM

## 2023-12-14 DIAGNOSIS — Z96.651 S/P TOTAL KNEE ARTHROPLASTY, RIGHT: ICD-10-CM

## 2023-12-14 DIAGNOSIS — I82.409 RECURRENT DEEP VEIN THROMBOSIS (DVT) (H): ICD-10-CM

## 2023-12-14 LAB — INR BLD: 3.9 (ref 0.9–1.1)

## 2023-12-14 PROCEDURE — 85610 PROTHROMBIN TIME: CPT

## 2023-12-14 PROCEDURE — 36416 COLLJ CAPILLARY BLOOD SPEC: CPT

## 2023-12-14 PROCEDURE — 99024 POSTOP FOLLOW-UP VISIT: CPT | Performed by: ORTHOPAEDIC SURGERY

## 2023-12-14 PROCEDURE — 73560 X-RAY EXAM OF KNEE 1 OR 2: CPT | Mod: TC | Performed by: RADIOLOGY

## 2023-12-14 ASSESSMENT — PAIN SCALES - GENERAL: PAINLEVEL: NO PAIN (0)

## 2023-12-14 NOTE — LETTER
12/14/2023         RE: Adarsh Salvador  [address]  [place] [address]        Dear Colleague,    Thank you for referring your patient, Adarsh Salvador, to the [facility]. Please see a copy of my visit note below.    Chief Complaint   Patient presents with     Right Knee - Total Joint Post-op     DOS 9/26/23, 11.5 wk s/p. Patient notes his knee is doing pretty good. He has been able to do things he hasn't been able to do in a along time. He still has some pain with stairs. He continues with physical therapy. He is walking with a cane.          SURGERY: Total knee arthroplasty, right knee ([facility])  DATE OF SURGERY: 9/26/2023      HISTORY OF PRESENT ILLNESS: Adarsh Salvador is a 58 year old male seen for postoperative evaluation of right total knee arthroplasty. It has been 11 weeks since surgery. Returns today doing quite well, improving. He's now doing things he hasn't been able to do for years. He uses a cane when out. He is very pleased.    He continues with therapy. He knows the leg is weak.      Denies any wound problems. Denies numbness, tingling, or weakness in the affected extremity. Denies fevers chills night sweats. Continues to take warfarin for anti-coagulation for deep vein thrombosis prophylaxis, as well as longterm anticoagulation with history of DVT in the past.    Present symptoms: mild pain, mild swelling.  Pain 0/10.        Past medical history:   Past Medical History:   Diagnosis Date     Anaphylactic reaction 8/14/2015     Anxiety      Depression      DM2 (diabetes mellitus, type 2) (H)      GERD (gastroesophageal reflux disease)      HTN (hypertension)      IBS (irritable bowel syndrome)      Kidney stone 6/15/2011    Pt believes these were Calcium stones     Neuropathy      Patient Active Problem List    Diagnosis Date Noted     Status post total right knee replacement 10/19/2023     Priority: Medium     S/P TKR (total knee  replacement), right 09/26/2023     Priority: Medium     Pulmonary embolism, other, unspecified chronicity, unspecified whether acute cor pulmonale present (H) 08/15/2023     Priority: Medium     Advanced directives, counseling/discussion 12/22/2022     Priority: Medium     Pt was given info on ADV. Munir Amaral MA       Acute pain of right knee 08/26/2022     Priority: Medium     Aftercare following surgery of the musculoskeletal system 08/26/2022     Priority: Medium     Chronic pain of right knee 08/08/2022     Priority: Medium     Added automatically from request for surgery [id number]       Bilateral leg pain 06/09/2022     Priority: Medium     Added automatically from request for surgery [id number]       Low volume of ejaculated semen 06/07/2022     Priority: Medium     DVT (deep venous thrombosis) (H) 01/15/2022     Priority: Medium     Recurrent deep vein thrombosis (DVT) (H) 09/27/2021     Priority: Medium     Precordial pain 02/15/2021     Priority: Medium     Dyslipidemia 02/15/2021     Priority: Medium     Elevated C-reactive protein 02/15/2021     Priority: Medium     Gastric reflux 02/15/2021     Priority: Medium     Internal derangement of knee 02/15/2021     Priority: Medium     Nicotine dependence 02/15/2021     Priority: Medium     Varicose veins of lower extremity 02/15/2021     Priority: Medium     Vitamin D deficiency 02/15/2021     Priority: Medium     Neuropathy 06/23/2020     Priority: Medium     Morbid obesity (H) 03/26/2020     Priority: Medium     Insomnia, unspecified type 05/09/2019     Priority: Medium     CONNOR (generalized anxiety disorder) 05/08/2019     Priority: Medium     Hyperlipidemia LDL goal <100 08/17/2018     Priority: Medium     Type 2 diabetes mellitus with diabetic polyneuropathy, with long-term current use of insulin (H) 01/18/2018     Priority: Medium     Mild persistent asthma without complication 01/12/2018     Priority: Medium     Right inguinal hernia 06/14/2016     Priority:  Medium     Irritable bowel syndrome with diarrhea 03/21/2016     Priority: Medium     Fatty liver 02/22/2016     Priority: Medium     Hypertension, unspecified type 12/15/2015     Priority: Medium     Anemia in other chronic diseases classified elsewhere 12/15/2015     Priority: Medium     Hypertriglyceridemia 07/12/2013     Priority: Medium     Chronic maxillary sinusitis 09/18/2012     Priority: Medium     Believes this is secondary to exposure to toxic fumes at work- he is a .  He regularly wears a mask ventilator and believes this helps.  Has only tried intermittent nasal washes, and OTC medications.  Not ever tried steroid nasal sprays or other controller medications.         Chronic rhinitis 09/18/2012     Priority: Medium     Constipation 04/24/2012     Priority: Medium     GERD (gastroesophageal reflux disease) 06/15/2011     Priority: Medium     Chronic RLQ pain 06/15/2011     Priority: Medium       Past Surgical History:   Past Surgical History:   Procedure Laterality Date     ARTHROPLASTY KNEE Right 9/26/2023    Procedure: ARTHROPLASTY, KNEE, TOTAL Right;  Surgeon: Edwin Ch MD;  Location: [facility]     ARTHROSCOPY KNEE WITH MEDIAL MENISCECTOMY Right 8/19/2022    Procedure: examination under anesthesia, right knee arthroscopy, meniscectomy;  Surgeon: Carlos A Em MD;  Location: [facility] OR     COLONOSCOPY  5/1/2012    Procedure:COLONOSCOPY; screening colonoscopy; Surgeon:KINGSLEY DOS SANTOS; Location:[facility] OR     COLONOSCOPY  4/21/2014    Procedure: COMBINED COLONOSCOPY, SINGLE BIOPSY/POLYPECTOMY BY BIOPSY;  Surgeon: Duane, William Charles, MD;  Location: [facility] OR     COLONOSCOPY N/A 4/21/2022    Procedure: COLONOSCOPY, WITH POLYPECTOMY AND BIOPSY;  Surgeon: Luiz Calvert MD;  Location:  GI     CYSTOSCOPY  05/01/2008    CYSTOSCOPY W/ URETERAL STENT PLACEMENT 05/01/2008      CYSTOSCOPY  09/26/2010    CYSTOSCOPY W/ URETERAL STENT PLACEMENT 09/26/2010 Left      CYSTOSCOPY  05/18/2012     CYSTOSCOPY, LEFT URETEROSCOPY AND STENT PLACEMENT left retrograde 05/18/2012      CYSTOSCOPY,+URETEROSCOPY  06/10/2008    URETEROSCOPY 06/10/2008 Right      HC BREATH HYDROGEN TEST  3/7/2014    Procedure: HYDROGEN BREATH TEST;  Surgeon: Darion Swift MD;  Location: [facility]     INJECT EPIDURAL LUMBAR N/A 7/7/2022    Procedure: INJECTION, SPINE, LUMBAR, EPIDURAL L5/S1;  Surgeon: Michi Hahn MD;  Location: [facility] OR     LITHOTRIPSY  09/30/2010    LITHOTRIPSY 09/30/2010 LEFT EXTRACORPOREAL SHOCK WAVE LITHOTRIPSY, FLEXIBLE CYSTOSCOPY, LEFT STENT REMOVAL       ORTHOPEDIC SURGERY  10/03/2007    KNEE ARTHROSCOPY 10/03/2007 RightX2       RELEASE CARPAL TUNNEL Right 1/26/2018    Procedure: RELEASE CARPAL TUNNEL;  Right carpal tunnel release;  Surgeon: Wiliam Saenz MD;  Location: [facility] OR     VASCULAR SURGERY  11/24/2008    Radiofrequency ablation left saphenous vein 11/24/2008 Done by Dr. Lozoya         Medications:   Current Outpatient Medications:      acetaminophen (TYLENOL) 325 MG tablet, Take 2 tablets (650 mg) by mouth every 4 hours as needed for other (mild pain), Disp: 100 tablet, Rfl: 0     albuterol (PROAIR HFA/PROVENTIL HFA/VENTOLIN HFA) 108 (90 Base) MCG/ACT inhaler, Inhale 2 puffs into the lungs every 6 hours as needed for shortness of breath or wheezing, Disp: 6.7 g, Rfl: 3     amLODIPine (NORVASC) 10 MG tablet, Take 1 tablet (10 mg) by mouth daily for blood pressure, Disp: 90 tablet, Rfl: 1     atorvastatin (LIPITOR) 40 MG tablet, Take 1 tablet (40 mg) by mouth daily (Patient taking differently: Take 40 mg by mouth every evening), Disp: 90 tablet, Rfl: 3     blood glucose (ACCU-CHEK GUIDE) test strip, Use to test blood sugar 3 times daily or as directed., Disp: 300 strip, Rfl: 3     blood glucose monitoring (ACCU-CHEK FASTCLIX) lancets, Use to test blood sugar 1 times daily or as directed.  Ok to substitute alternative if insurance prefers., Disp: 102 each, Rfl: 11     calcium carbonate (TUMS)  "500 MG chewable tablet, Take 1 chew tab by mouth daily, Disp: , Rfl:      chlorthalidone (HYGROTON) 25 MG tablet, Take 1 tablet (25 mg) by mouth daily Add on for blood pressure, Disp: 90 tablet, Rfl: 1     Continuous Blood Gluc Sensor (FREESTYLE FROYLAN 3 SENSOR) MISC, 1 each every 14 days, Disp: 2 each, Rfl: 5     DULoxetine (CYMBALTA) 60 MG capsule, Take 1 capsule (60 mg) by mouth 2 times daily, Disp: 180 capsule, Rfl: 3     enoxaparin ANTICOAGULANT (LOVENOX) 100 MG/ML syringe, Inject 1 mL (100 mg) Subcutaneous every 12 hours, Disp: 28 mL, Rfl: 1     EPINEPHrine (ANY BX GENERIC EQUIV) 0.3 MG/0.3ML injection 2-pack, Inject 0.3 mLs (0.3 mg) into the muscle once as needed for anaphylaxis (Patient not taking: Reported on 11/16/2023), Disp: 2 each, Rfl: 2     fluticasone-salmeterol (ADVAIR) 500-50 MCG/ACT inhaler, Inhale 1 puff into the lungs every 12 hours, Disp: 1 each, Rfl: 11     insulin aspart (NOVOLOG FLEXPEN) 100 UNIT/ML pen, Inject 18 units before breakfast and 18 units before dinner *Will need to schedule with new PCP for future refills*, Disp: 15 mL, Rfl: 3     insulin glargine (BASAGLAR KWIKPEN) 100 UNIT/ML pen, Inject 50 Units Subcutaneous daily, Disp: 45 mL, Rfl: 0     insulin pen needle (32G X 4 MM) 32G X 4 MM miscellaneous, Use 3  pen needles daily or as directed., Disp: 200 each, Rfl: 1     insulin syringe-needle U-100 (29G X 1/2\" 0.5 ML) 29G X 1/2\" 0.5 ML miscellaneous, Use 3 syringes daily or as directed., Disp: 200 each, Rfl: 1     losartan (COZAAR) 25 MG tablet, Take 1 tablet (25 mg) by mouth daily, Disp: 90 tablet, Rfl: 1     meclizine (ANTIVERT) 25 MG tablet, Take 1 tablet (25 mg) by mouth 3 times daily as needed for dizziness, Disp: 90 tablet, Rfl: 1     metFORMIN (GLUCOPHAGE XR) 500 MG 24 hr tablet, Take 2 tablets (1,000 mg) by mouth 2 times daily (with meals), Disp: 360 tablet, Rfl: 2     montelukast (SINGULAIR) 10 MG tablet, Take 1 tablet (10 mg) by mouth At Bedtime For allergies/asthma, Disp: " 90 tablet, Rfl: 2     multivitamin, therapeutic (THERA-VIT) TABS, Take 1 tablet by mouth daily, Disp: , Rfl:      nortriptyline (PAMELOR) 10 MG capsule, Take 2 capsules (20 mg) by mouth At Bedtime, Disp: 60 capsule, Rfl: 5     omeprazole (PRILOSEC) 40 MG DR capsule, Take 1 capsule (40 mg) by mouth daily Take for at least two months, Disp: 90 capsule, Rfl: 2     pregabalin (LYRICA) 50 MG capsule, Take 1 capsule (50 mg) by mouth 2 times daily, Disp: 60 capsule, Rfl: 1     rizatriptan (MAXALT-MLT) 10 MG ODT, TAKE 1 TABLET BY MOUTH AT ONSET OF HEADACHE FOR MIGRAINE MAY REPEAT IN 2 HOURS. MAX 3 TABS/24 HOURS., Disp: 18 tablet, Rfl: 1     warfarin ANTICOAGULANT (COUMADIN) 5 MG tablet, Take 20 mg Mon, Thurs and 15 mg all other days, or as directed by the Coumadin clinic, Disp: 280 tablet, Rfl: 1  No current facility-administered medications for this visit.    Facility-Administered Medications Ordered in Other Visits:      BUPivacaine (MARCAINE) injection 0.5%, 10 mL, Intradermal, Once, Vinnie Espinoza,      DOBUTamine 500 mg in dextrose 5% 250 mL (adult std conc) premix, 2.5-20 mcg/kg/min, Intravenous, Continuous, Navarro Butcher MD, Stopped at 04/25/19 3799     iopamidol (ISOVUE-M 200) solution 10 mL, 10 mL, Intravenous, Once, Vinnie Espinoza,      methylPREDNISolone (DEPO-MEDROL) injection 80 mg, 80 mg, Intramuscular, Once, Vinnie Espinoza DO     metoprolol (LOPRESSOR) injection 5 mg, 5 mg, Intravenous, Q5 Min PRN, Navarro Butcher MD, 2 mg at 04/25/19 3083    Allergies:   Allergies   Allergen Reactions     Artificial Sweetner (Informational Only) [Artificial Sweetner (Informational Only) ] GI Disturbance     ALL artificial sweetners cause severe diarrhea & flu symptoms     Hydromorphone Anaphylaxis     Ibuprofen GI Disturbance     Morphine Sulfate Concentrate Anaphylaxis     Morphine [Fumaric Acid] Anaphylaxis     Hydrocodone-Acetaminophen Itching     Tramadol Hives     Trazodone Hives      Gabapentin GI Disturbance     GI upset per pt     Keflex [Cephalexin] Diarrhea     Upset stomach     Codeine Phosphate Itching     Ketorolac Tromethamine Rash         REVIEW OF SYSTEMS:  CONSTITUTIONAL:NEGATIVE for fever, chills, night sweats, change in weight  INTEGUMENTARY/SKIN: NEGATIVE for worrisome rashes, moles or lesions  MUSCULOSKELETAL:See HPI above  NEURO: NEGATIVE for weakness, dizziness or paresthesias  CARDIOVASCULAR: negative for chest pain, shortness of breath    PHYSICAL EXAM:  /87   Pulse 97   Ht 1.829 m (6')   Wt 130.6 kg (288 lb)   BMI 39.06 kg/m     GENERAL APPEARANCE: healthy, alert, no distress   SKIN: no suspicious lesions or rashes  NEURO: Normal strength and tone, mentation intact and speech normal  PSYCH:  mentation appears normal and affect normal  RESPIRATORY: No increased work of breathing.    BILATERAL LOWER EXTREMITIES:  Gait: uses can left hand, Mld quadriceps avoidance right.    Bilateral calf soft and nttp or squeeze.  Edema: trace    right  KNEE EXAM:    Incision: healed well.    Skin: intact, no erythema, no ecchymosis  ROM: full extension to 120 flexion  Effusion: moderate   Tender: minimal.      X-RAY:  2 views right knee from 12/14/2023 were reviewed in clinic today. No obvious fractures or dislocations. Total knee arthroplasty components in place without evidence of failure or loosening. Effusion.        Impression: Adarsh Salvador is a 58 year old male status post Total knee arthroplasty, right knee, 11 weeks out from surgery, doing well.      Plan:   * reviewed xrays with patient, showing total knee arthroplasty implants.  Overall, clinically, the knee looks much better than prior visit, and he's feeling better.  Range of motion has come along nicely.  Need to work on strengthening and conditioning.  * continue DVT prophylaxis with warfarin indefinitely, as prior.  * xrays next visit: 2 views of the knee  * return to clinic 9 months  * Physical Therapy, home exercise  program.  * all questions addressed and answered prior to discharge from clinic today to patient's satisfaction.     * patient will need longterm (at least 2 years) prophylactic antibiotics use with dental procedures or other invasive procedures (2 g amoxicilin or  2g Keflex or 500mg azithromycin or clarithromycin or 100mg doxycycline) 30-60 minutes prior to dental procedures.    * KOOS Jr/Promis Knee score: IS to be done at next visit (12 months); was completed during todays visit; to be completed at 12 weeks and 12 months postoperative.      Edwin Ch M.D., M.S.  Dept. of Orthopaedic Surgery  [facility]             Again, thank you for allowing me to participate in the care of your patient.        Sincerely,        Edwin Ch MD

## 2023-12-14 NOTE — PROGRESS NOTES
ANTICOAGULATION MANAGEMENT     Adarsh Salvador 58 year old male is on warfarin with supratherapeutic INR result. (Goal INR 2.0-3.0)    Recent labs: (last 7 days)     12/14/23  1542   INR 3.9*       ASSESSMENT     Source(s): Chart Review and Patient/Caregiver Call     Warfarin doses taken: Warfarin taken as instructed  Diet: No new diet changes identified  Medication/supplement changes: None noted  New illness, injury, or hospitalization: No- Pt still has significant swelling from his knee surgery, but is doing very well.  Signs or symptoms of bleeding or clotting: No  Previous result: Supratherapeutic  Additional findings: None       PLAN     Recommended plan for ongoing change(s) affecting INR     Dosing Instructions: partial hold then decrease your warfarin dose (9.5% change) with next INR in 2 weeks       Summary  As of 12/14/2023      Full warfarin instructions:  12/15: 5 mg; Otherwise 10 mg every Tue, Sat; 15 mg all other days   Next INR check:  12/28/2023               Telephone call with Adarsh who verbalizes understanding and agrees to plan and who agrees to plan and repeated back plan correctly    Lab visit scheduled    Education provided:   Symptom monitoring: monitoring for bleeding signs and symptoms    Plan made per [facility] anticoagulation protocol    Priya Plunkett RN  Anticoagulation Clinic  12/14/2023    _______________________________________________________________________     Anticoagulation Episode Summary       Current INR goal:  2.0-3.0   TTR:  41.5% (1 y)   Target end date:  Indefinite   Send INR reminders to:  MANOJ ANDOVER    Indications    Recurrent deep vein thrombosis (DVT) (H) [I82.409]             Comments:               Anticoagulation Care Providers       Provider Role Specialty Phone number    Bertha Romero PA-C Referring Family Medicine [phone]

## 2023-12-18 ENCOUNTER — PATIENT OUTREACH (OUTPATIENT)
Dept: CARE COORDINATION | Facility: CLINIC | Age: 58
End: 2023-12-18

## 2023-12-18 NOTE — PROGRESS NOTES
[facility]/Voicemail    Clinical Data: Care Coordinator Outreach    Outreach Documentation Number of Outreach Attempt   12/18/2023  10:13 AM 1       Left message on patient's voicemail with call back information and requested return call.    Plan:   Care Coordinator will try to reach patient again in 3-5 business days.    Next outreach due: 12/22/23

## 2023-12-27 ENCOUNTER — PATIENT OUTREACH (OUTPATIENT)
Dept: CARE COORDINATION | Facility: CLINIC | Age: 58
End: 2023-12-27
Payer: COMMERCIAL

## 2023-12-27 NOTE — PROGRESS NOTES
Clinic Care Coordination Contact  Care Coordination Clinician Chart Review    Situation: Patient chart reviewed by Care Coordinator.       Background: Care Coordination Program started: 10/3/2023. Initial assessment completed and patient-centered care plan(s) were developed with participation from patient. Lead CC handed patient off to CHW for continued outreaches.       Assessment: Per chart review, CHW attempted to reach patient on 12/18. CHW will continue to try to reach patient. Per chart review from 12/14 OV note, patient reported to his orthopedic doctor that he feels his knee is doing well and he is walking with a cane, patient continues with PT. Patient is actively working to accomplish goal(s). Patient's goal(s) appropriate and relevant at this time. Patient is due for updated Plan of Care.  Assessments will be completed annually or as needed/with change of patient status.      Care Plan: Help At Home       Problem: Insufficient In-home support       Goal: Establish adequate home support       Start Date: 10/5/2023 Expected End Date: 11/5/2023    This Visit's Progress: 50% Recent Progress: On Hold    Note:     Barriers: Nothing currently set up  Strengths: Motivated to work with care coordination  Patient expressed understanding of goal: Yes  Action steps to achieve this goal:  1. I will work with my providers to see if home care is an option.  2. I will work with my providers to see if outpatient PT/OT is an option.  3. I will work with care coordination for additional support and resources.                                   Plan/Recommendations: The patient will continue working with Care Coordination to achieve goal(s) as above. CHW will continue outreaches at minimum every 30 days and will involve Lead CC as needed or if patient is ready to move to Maintenance. Lead CC will continue to monitor CHW outreaches and patient's progress to goal(s) every 6 weeks.     Plan of Care updated and sent to patient:  Yes, via Jacqueline Bowen, [facility]  Social Work Primary Care Clinic Care Coordinator  [facility]  [phone]  [email or web address]

## 2023-12-27 NOTE — LETTER
[facility] COORDINATION  [address] [address] [facility] [address]    December 27, 2023      Adarsh Salvador  [address]  [place] [address]      Dear Christi Altamirano is an updated Patient Centered Plan of Care for your continued enrollment in Care Coordination. Please let us know if you have additional questions, concerns, or goals that we can assist with.     Sincerely,    Brittnee Bowen [facility]  Social Work Primary Care Clinic Care Coordinator  [facility]  [phone]  [email or web address]  Patient Centered Plan of Care  About Me:        Patient Name:  Adarsh Salvador    YOB: 1965  Age:         58 year old   [place] MRN:    [id number] Telephone Information:  Home Phone [phone]   Mobile [phone]       Address:  [address]  [place] [address] Email address:  [email or web address]      Emergency Contact(s)    Name Relationship Lgl Grd Work Phone Home Phone Mobile Phone   1. ABNER VARGAS Sister No none [phone]   2. MATTHIASGOPALILKINGSLEY Significant ot*    [phone]           Primary language:  English     needed? No   [place] [employer] [facility]:  [phone] op. 1  Other communication barriers:None    Preferred Method of Communication:  Mail  Current living arrangement: I live in a private home    Mobility Status/ Medical Equipment: No data recorded      Health Maintenance  Health Maintenance Reviewed:   Health Maintenance Due   Topic Date Due    YEARLY PREVENTIVE VISIT  Never done    HEPATITIS B IMMUNIZATION (1 of 3 - 3-dose series) Never done    COVID-19 Vaccine (1) Never done    Pneumococcal Vaccine: Pediatrics (0 to 5 Years) and At-Risk Patients (6 to 64 Years) (1 of 2 - PCV) Never done    ZOSTER IMMUNIZATION (1 of 2) Never done    ASTHMA ACTION PLAN  02/14/2023    INFLUENZA VACCINE (1) 09/01/2023    EYE EXAM  01/05/2024           My Access  Plan  Medical Emergency 911   Primary Clinic Line [facility] - [phone]   24 Hour Appointment Line [phone] or  [phone]) (toll-free)   24 Hour Nurse Line [phone] (toll-free)   Preferred Urgent Care [facility], [phone]     [place], [place]  [phone]     Preferred Pharmacy WRITTEN PRESCRIPTION REQUESTED     Behavioral Health Crisis Line The National Suicide Prevention Lifeline at 1-370.712.3616 or Text/Call 808           My Care Team Members  Patient Care Team         Relationship Specialty Notifications Start End    Kehr, Kristen M, PA-C PCP - General Family Medicine  8/23/23     Phone: [phone] Fax: [phone] [address] [facility] [address]    Scottie Golden MD MD Gastroenterology  7/8/16     Phone: [phone] Fax: [phone]         [address] [place] [address]    Clint Uriostegui MD MD Pulmonary Disease  5/29/19     Phone: [phone] Fax: [phone]          [facility] [address]    Juan Luis Gray MD Assigned Neuroscience Provider   2/28/21     Phone: [phone] Fax: [phone]         [address] [place] [address]    Amanda Hussein RD Diabetes Educator Dietitian, Registered  2/24/22     Sherman Poon MD Assigned Surgical Provider   5/6/23     Phone: [phone] Fax: [phone]         [address] [facility] [address]    Ivory Dennis RD Diabetes Educator Dietitian, Registered  5/8/23     Phone: [phone]          [facility] [address] [facility] [address]    Kehr, Kristen M, PA-C Assigned PCP   8/19/23     Phone: [phone] Fax: [phone]         [address] [facility] [address]    Allyn Lyon PA-C Physician Assistant Endocrinology, Diabetes, and Metabolism  8/23/23     Phone: [phone] Fax: [phone] [address] [facility]  [place] [address]    Brittnee Bowen LIC Lead Care Coordinator  Admissions 10/3/23     Lalita Shaver CHW Community Health Worker Primary Care - CC Admissions 10/5/23     Phone: [phone] Fax: [phone]        Kehr, Kristen M, PA-C Assigned Pain Medication Provider   10/7/23     Phone: [phone] Fax: [phone] [address] [facility] [address]    Edwin Ch MD Assigned Musculoskeletal Provider   10/14/23     Phone: [phone] Fax: [phone]         [address] [facility] [place] [address]                My Care Plans  Self Management and Treatment Plan    Care Plan  Care Plan: Help At Home       Problem: Insufficient In-home support       Goal: Establish adequate home support       Start Date: 10/5/2023 Expected End Date: 11/5/2023    This Visit's Progress: 50% Recent Progress: On Hold    Note:     Barriers: Nothing currently set up  Strengths: Motivated to work with care coordination  Patient expressed understanding of goal: Yes  Action steps to achieve this goal:  1. I will work with my providers to see if home care is an option.  2. I will work with my providers to see if outpatient PT/OT is an option.  3. I will work with care coordination for additional support and resources.                                Action Plans on File:            Depression          Advance Care Plans/Directives:   Advanced Care Plan/Directives on file:   No    Discussed with patient/caregiver(s): No data recorded           My Medical and Care Information  Problem List   Patient Active Problem List   Diagnosis    GERD (gastroesophageal reflux disease)    Chronic RLQ pain    Constipation    Chronic maxillary sinusitis    Chronic rhinitis    Hypertriglyceridemia    Hypertension, unspecified type    Anemia in other chronic diseases classified elsewhere    Fatty liver    Irritable bowel syndrome with diarrhea    Right inguinal hernia    Mild persistent asthma without complication    Type 2 diabetes  "mellitus with diabetic polyneuropathy, with long-term current use of insulin (H)    Hyperlipidemia LDL goal <100    CONNOR (generalized anxiety disorder)    Insomnia, unspecified type    Morbid obesity (H)    Neuropathy    Recurrent deep vein thrombosis (DVT) (H)    Precordial pain    Dyslipidemia    Elevated C-reactive protein    Gastric reflux    Internal derangement of knee    Nicotine dependence    Varicose veins of lower extremity    Vitamin D deficiency    Low volume of ejaculated semen    Bilateral leg pain    Chronic pain of right knee    Acute pain of right knee    Aftercare following surgery of the musculoskeletal system    Advanced directives, counseling/discussion    DVT (deep venous thrombosis) (H)    Pulmonary embolism, other, unspecified chronicity, unspecified whether acute cor pulmonale present (H)    S/P TKR (total knee replacement), right    Status post total right knee replacement      Current Medications and Allergies:   Current Outpatient Medications   Medication    acetaminophen (TYLENOL) 325 MG tablet    albuterol (PROAIR HFA/PROVENTIL HFA/VENTOLIN HFA) 108 (90 Base) MCG/ACT inhaler    amLODIPine (NORVASC) 10 MG tablet    atorvastatin (LIPITOR) 40 MG tablet    blood glucose (ACCU-CHEK GUIDE) test strip    blood glucose monitoring (ACCU-CHEK FASTCLIX) lancets    calcium carbonate (TUMS) 500 MG chewable tablet    chlorthalidone (HYGROTON) 25 MG tablet    Continuous Blood Gluc Sensor (FREESTYLE FROYLAN 3 SENSOR) MISC    DULoxetine (CYMBALTA) 60 MG capsule    enoxaparin ANTICOAGULANT (LOVENOX) 100 MG/ML syringe    EPINEPHrine (ANY BX GENERIC EQUIV) 0.3 MG/0.3ML injection 2-pack    fluticasone-salmeterol (ADVAIR) 500-50 MCG/ACT inhaler    insulin aspart (NOVOLOG FLEXPEN) 100 UNIT/ML pen    insulin glargine (BASAGLAR KWIKPEN) 100 UNIT/ML pen    insulin pen needle (32G X 4 MM) 32G X 4 MM miscellaneous    insulin syringe-needle U-100 (29G X 1/2\" 0.5 ML) 29G X 1/2\" 0.5 ML miscellaneous    losartan (COZAAR) " 25 MG tablet    meclizine (ANTIVERT) 25 MG tablet    metFORMIN (GLUCOPHAGE XR) 500 MG 24 hr tablet    montelukast (SINGULAIR) 10 MG tablet    multivitamin, therapeutic (THERA-VIT) TABS    nortriptyline (PAMELOR) 10 MG capsule    omeprazole (PRILOSEC) 40 MG DR capsule    pregabalin (LYRICA) 50 MG capsule    rizatriptan (MAXALT-MLT) 10 MG ODT    warfarin ANTICOAGULANT (COUMADIN) 5 MG tablet     No current facility-administered medications for this visit.     Facility-Administered Medications Ordered in Other Visits   Medication    BUPivacaine (MARCAINE) injection 0.5%    DOBUTamine 500 mg in dextrose 5% 250 mL (adult std conc) premix    iopamidol (ISOVUE-M 200) solution 10 mL    methylPREDNISolone (DEPO-MEDROL) injection 80 mg    metoprolol (LOPRESSOR) injection 5 mg         Care Coordination Start Date: 10/3/2023   Frequency of Care Coordination: monthly, more frequently as needed     Form Last Updated: 12/27/2023

## 2023-12-28 ENCOUNTER — DOCUMENTATION ONLY (OUTPATIENT)
Dept: ANTICOAGULATION | Facility: CLINIC | Age: 58
End: 2023-12-28

## 2023-12-28 ENCOUNTER — LAB (OUTPATIENT)
Dept: LAB | Facility: CLINIC | Age: 58
End: 2023-12-28
Payer: COMMERCIAL

## 2023-12-28 ENCOUNTER — THERAPY VISIT (OUTPATIENT)
Dept: PHYSICAL THERAPY | Facility: CLINIC | Age: 58
End: 2023-12-28
Payer: COMMERCIAL

## 2023-12-28 ENCOUNTER — ANTICOAGULATION THERAPY VISIT (OUTPATIENT)
Dept: ANTICOAGULATION | Facility: CLINIC | Age: 58
End: 2023-12-28

## 2023-12-28 ENCOUNTER — PATIENT OUTREACH (OUTPATIENT)
Dept: CARE COORDINATION | Facility: CLINIC | Age: 58
End: 2023-12-28

## 2023-12-28 DIAGNOSIS — I82.409 RECURRENT DEEP VEIN THROMBOSIS (DVT) (H): ICD-10-CM

## 2023-12-28 DIAGNOSIS — I82.409 RECURRENT DEEP VEIN THROMBOSIS (DVT) (H): Primary | ICD-10-CM

## 2023-12-28 DIAGNOSIS — M25.561 ACUTE PAIN OF RIGHT KNEE: Primary | ICD-10-CM

## 2023-12-28 LAB — INR BLD: 2.2 (ref 0.9–1.1)

## 2023-12-28 PROCEDURE — 36416 COLLJ CAPILLARY BLOOD SPEC: CPT

## 2023-12-28 PROCEDURE — 85610 PROTHROMBIN TIME: CPT

## 2023-12-28 PROCEDURE — 97110 THERAPEUTIC EXERCISES: CPT | Mod: GP

## 2023-12-28 NOTE — PROGRESS NOTES
Clinic Care Coordination Contact  Community Health Worker Follow Up    Care Gaps:     Health Maintenance Due   Topic Date Due    YEARLY PREVENTIVE VISIT  Never done    HEPATITIS B IMMUNIZATION (1 of 3 - 3-dose series) Never done    COVID-19 Vaccine (1) Never done    Pneumococcal Vaccine: Pediatrics (0 to 5 Years) and At-Risk Patients (6 to 64 Years) (1 of 2 - PCV) Never done    ZOSTER IMMUNIZATION (1 of 2) Never done    ASTHMA ACTION PLAN  02/14/2023    INFLUENZA VACCINE (1) 09/01/2023    EYE EXAM  01/05/2024       Discussed need for annual exam and to schedule eye exam.    Care Plan:   Care Plan: Help At Home       Problem: Insufficient In-home support       Goal: Establish adequate home support       Start Date: 10/5/2023 Expected End Date: 11/5/2023    This Visit's Progress: 60% Recent Progress: 50%    Note:     Barriers: Nothing currently set up  Strengths: Motivated to work with care coordination  Patient expressed understanding of goal: Yes  Action steps to achieve this goal:  1. I will work with my providers to see if home care is an option.  2. I will work with my providers to see if outpatient PT/OT is an option.  3. I will work with care coordination for additional support and resources.                                Intervention and Education during outreach: Patient is hoping to move next month their lease is up the end of February. They have applications in for 2 different properties. He is considering changing clinics due to the distance to the [place] clinic.     He has been compliant with his INR appointments.     He has had no issues with getting his medications.    CHW Plan: CHW will continue to support patient with goals through routine scheduled outreach.     Next outreach due:  1/26/24

## 2023-12-28 NOTE — PROGRESS NOTES
ANTICOAGULATION MANAGEMENT     Adarsh Salvador 58 year old male is on warfarin with therapeutic INR result. (Goal INR 2.0-3.0)    Recent labs: (last 7 days)     12/28/23  1510   INR 2.2*       ASSESSMENT     Source(s): Chart Review  Previous INR was Supratherapeutic  Medication, diet, health changes since last INR chart reviewed; none identified         PLAN     Recommended plan for no diet, medication or health factor changes affecting INR     Dosing Instructions: Continue your current warfarin dose with next INR in 2 weeks       Summary  As of 12/28/2023      Full warfarin instructions:  10 mg every Tue, Sat; 15 mg all other days   Next INR check:  1/11/2024               Detailed voice message left for Adarsh with dosing instructions and follow up date.     Contact [phone]  to schedule and with any changes, questions or concerns.     Education provided:   Please call back if any changes to your diet, medications or how you've been taking warfarin    Plan made per [facility] anticoagulation protocol    Priya Plunkett RN  Anticoagulation Clinic  12/28/2023    _______________________________________________________________________     Anticoagulation Episode Summary       Current INR goal:  2.0-3.0   TTR:  39.7% (1 y)   Target end date:  Indefinite   Send INR reminders to:  [facility]    Indications    Recurrent deep vein thrombosis (DVT) (H) [I82.409]             Comments:               Anticoagulation Care Providers       Provider Role Specialty Phone number    Bertha Romero PA-C Referring Family Medicine [phone]

## 2023-12-28 NOTE — PROGRESS NOTES
ANTICOAGULATION CLINIC REFERRAL RENEWAL REQUEST       An annual renewal order is required for all patients referred to [facility].?  Please review and sign the pended referral order for Adarsh Salvador.       ANTICOAGULATION SUMMARY      Warfarin indication(s)   Recurrent DVT    Mechanical heart valve present?  NO       Current goal range   INR: 2.0-3.0     Goal appropriate for indication? Goal INR 2-3, standard for indication(s) above     Time in Therapeutic Range (TTR)  (Goal > 60%) 39.7%       Office visit with referring provider's group within last year yes on 9/13/23       Priya Plunkett RN  [facility]

## 2024-01-24 ENCOUNTER — MYC MEDICAL ADVICE (OUTPATIENT)
Dept: ANTICOAGULATION | Facility: CLINIC | Age: 59
End: 2024-01-24
Payer: COMMERCIAL

## 2024-01-25 ENCOUNTER — THERAPY VISIT (OUTPATIENT)
Dept: PHYSICAL THERAPY | Facility: CLINIC | Age: 59
End: 2024-01-25
Payer: COMMERCIAL

## 2024-01-25 DIAGNOSIS — M25.561 ACUTE PAIN OF RIGHT KNEE: Primary | ICD-10-CM

## 2024-01-25 PROCEDURE — 97112 NEUROMUSCULAR REEDUCATION: CPT | Mod: GP

## 2024-01-25 PROCEDURE — 97110 THERAPEUTIC EXERCISES: CPT | Mod: GP

## 2024-01-31 ENCOUNTER — DOCUMENTATION ONLY (OUTPATIENT)
Dept: FAMILY MEDICINE | Facility: CLINIC | Age: 59
End: 2024-01-31
Payer: COMMERCIAL

## 2024-01-31 NOTE — PROGRESS NOTES
Adarsh EDWARDS [place] has an upcoming lab appointment requesting fasting labs for you. Please place orders as needed thank you!     Future Appointments   Date Time Provider Department Center   2/1/2024  8:40 AM Vini Diehl, PT [facility]   2/8/2024  8[facility]   2/8/2024 12:30 PM Vini Diehl, PT [facility]   2/22/2024  8:40 AM Vini Diehl, PT [facility]   3/7/2024  8:40 AM Vini Diehl, PT [facility]   3/21/2024  8:40 AM Vini Diehl, PT [facility]         There is no order available. Please review and place either future orders or HMPO (Review of Health Maintenance Protocol Orders), as appropriate.    Health Maintenance Due   Topic    ANNUAL REVIEW OF HM ORDERS      Katie Franz

## 2024-02-01 ENCOUNTER — THERAPY VISIT (OUTPATIENT)
Dept: PHYSICAL THERAPY | Facility: CLINIC | Age: 59
End: 2024-02-01
Payer: COMMERCIAL

## 2024-02-01 ENCOUNTER — ANCILLARY PROCEDURE (OUTPATIENT)
Dept: GENERAL RADIOLOGY | Facility: CLINIC | Age: 59
End: 2024-02-01
Attending: ORTHOPAEDIC SURGERY
Payer: COMMERCIAL

## 2024-02-01 ENCOUNTER — OFFICE VISIT (OUTPATIENT)
Dept: ORTHOPEDICS | Facility: CLINIC | Age: 59
End: 2024-02-01
Payer: COMMERCIAL

## 2024-02-01 VITALS
SYSTOLIC BLOOD PRESSURE: 123 MMHG | HEIGHT: 72 IN | HEART RATE: 109 BPM | DIASTOLIC BLOOD PRESSURE: 80 MMHG | BODY MASS INDEX: 38.74 KG/M2 | WEIGHT: 286 LBS

## 2024-02-01 DIAGNOSIS — Z96.651 S/P TOTAL KNEE ARTHROPLASTY, RIGHT: Primary | ICD-10-CM

## 2024-02-01 DIAGNOSIS — M25.561 ACUTE PAIN OF RIGHT KNEE: Primary | ICD-10-CM

## 2024-02-01 DIAGNOSIS — Z96.651 S/P TOTAL KNEE ARTHROPLASTY, RIGHT: ICD-10-CM

## 2024-02-01 PROCEDURE — 73560 X-RAY EXAM OF KNEE 1 OR 2: CPT | Mod: TC | Performed by: RADIOLOGY

## 2024-02-01 PROCEDURE — 99213 OFFICE O/P EST LOW 20 MIN: CPT | Performed by: ORTHOPAEDIC SURGERY

## 2024-02-01 PROCEDURE — 97110 THERAPEUTIC EXERCISES: CPT | Mod: GP

## 2024-02-01 ASSESSMENT — PAIN SCALES - GENERAL: PAINLEVEL: MODERATE PAIN (5)

## 2024-02-01 NOTE — PROGRESS NOTES
Chief Complaint   Patient presents with    Right Knee - Total Joint Post-op     DOS 9/26/23, 4 month s/p. Patient notes he has been falling a lot. He takes a step down and it's like nothing is there. His knee just collapses. He fell on 1/26/24, and again on 1/31/24. He is walking with a cane. He goes to physical therapy once a week. Next week is the last week and then it is once a month.          SURGERY: Total knee arthroplasty, right knee ([facility])  DATE OF SURGERY: 9/26/2023      HISTORY OF PRESENT ILLNESS: Adarsh Salvador is a 58 year old male seen for an unschedule postoperative evaluation of right total knee arthroplasty. It has been over 4 months since surgery. Returns today he had been doing quite well, improving, but sustained some recent falls. Feels like when he steps down, there is nothing there, and his knee collapses. He fell 1/26/2024 and again 1/31/2024. He is now walking with a cane. He goes to therapy weekly. His other knee has collapsed as well.    His therapist recommended he be checked out.    Denies any wound problems. Denies numbness, tingling, or weakness in the affected extremity. Denies fevers chills night sweats. Continues to take warfarin for anti-coagulation for deep vein thrombosis prophylaxis, as well as longterm anticoagulation with history of DVT in the past.    Present symptoms: mild pain, mild swelling.  Pain 5/10. (Was 0/10 last visit)        Past medical history:   Past Medical History:   Diagnosis Date    Anaphylactic reaction 8/14/2015    Anxiety     Depression     DM2 (diabetes mellitus, type 2) (H)     GERD (gastroesophageal reflux disease)     HTN (hypertension)     IBS (irritable bowel syndrome)     Kidney stone 6/15/2011    Pt believes these were Calcium stones    Neuropathy      Patient Active Problem List    Diagnosis Date Noted    Status post total right knee replacement 10/19/2023     Priority: Medium    S/P TKR (total knee replacement), right  09/26/2023     Priority: Medium    Pulmonary embolism, other, unspecified chronicity, unspecified whether acute cor pulmonale present (H) 08/15/2023     Priority: Medium    Advanced directives, counseling/discussion 12/22/2022     Priority: Medium     Pt was given info on ADVNeha Amaral MA      Acute pain of right knee 08/26/2022     Priority: Medium    Aftercare following surgery of the musculoskeletal system 08/26/2022     Priority: Medium    Chronic pain of right knee 08/08/2022     Priority: Medium     Added automatically from request for surgery [id number]      Bilateral leg pain 06/09/2022     Priority: Medium     Added automatically from request for surgery [id number]      Low volume of ejaculated semen 06/07/2022     Priority: Medium    DVT (deep venous thrombosis) (H) 01/15/2022     Priority: Medium    Recurrent deep vein thrombosis (DVT) (H) 09/27/2021     Priority: Medium    Precordial pain 02/15/2021     Priority: Medium    Dyslipidemia 02/15/2021     Priority: Medium    Elevated C-reactive protein 02/15/2021     Priority: Medium    Gastric reflux 02/15/2021     Priority: Medium    Internal derangement of knee 02/15/2021     Priority: Medium    Nicotine dependence 02/15/2021     Priority: Medium    Varicose veins of lower extremity 02/15/2021     Priority: Medium    Vitamin D deficiency 02/15/2021     Priority: Medium    Neuropathy 06/23/2020     Priority: Medium    Morbid obesity (H) 03/26/2020     Priority: Medium    Insomnia, unspecified type 05/09/2019     Priority: Medium    CONNOR (generalized anxiety disorder) 05/08/2019     Priority: Medium    Hyperlipidemia LDL goal <100 08/17/2018     Priority: Medium    Type 2 diabetes mellitus with diabetic polyneuropathy, with long-term current use of insulin (H) 01/18/2018     Priority: Medium    Mild persistent asthma without complication 01/12/2018     Priority: Medium    Right inguinal hernia 06/14/2016     Priority: Medium    Irritable bowel syndrome with  diarrhea 03/21/2016     Priority: Medium    Fatty liver 02/22/2016     Priority: Medium    Hypertension, unspecified type 12/15/2015     Priority: Medium    Anemia in other chronic diseases classified elsewhere 12/15/2015     Priority: Medium    Hypertriglyceridemia 07/12/2013     Priority: Medium    Chronic maxillary sinusitis 09/18/2012     Priority: Medium     Believes this is secondary to exposure to toxic fumes at work- he is a .  He regularly wears a mask ventilator and believes this helps.  Has only tried intermittent nasal washes, and OTC medications.  Not ever tried steroid nasal sprays or other controller medications.        Chronic rhinitis 09/18/2012     Priority: Medium    Constipation 04/24/2012     Priority: Medium    GERD (gastroesophageal reflux disease) 06/15/2011     Priority: Medium    Chronic RLQ pain 06/15/2011     Priority: Medium       Past Surgical History:   Past Surgical History:   Procedure Laterality Date    ARTHROPLASTY KNEE Right 9/26/2023    Procedure: ARTHROPLASTY, KNEE, TOTAL Right;  Surgeon: Edwin Ch MD;  Location: [facility] OR    ARTHROSCOPY KNEE WITH MEDIAL MENISCECTOMY Right 8/19/2022    Procedure: examination under anesthesia, right knee arthroscopy, meniscectomy;  Surgeon: Carlos A Em MD;  [place] [facility] [place]    COLONOSCOPY  5/1/2012    Procedure:COLONOSCOPY; screening colonoscopy; Surgeon:KINGSLEY DOS SANTOS; Location: OR    COLONOSCOPY  4/21/2014    Procedure: COMBINED COLONOSCOPY, SINGLE BIOPSY/POLYPECTOMY BY BIOPSY;  Surgeon: Duane, William Charles, MD;  Location: [facility] OR    COLONOSCOPY N/A 4/21/2022    Procedure: COLONOSCOPY, WITH POLYPECTOMY AND BIOPSY;  Surgeon: Luiz Calvert MD;  Location:  GI    CYSTOSCOPY  05/01/2008    CYSTOSCOPY W/ URETERAL STENT PLACEMENT 05/01/2008     CYSTOSCOPY  09/26/2010    CYSTOSCOPY W/ URETERAL STENT PLACEMENT 09/26/2010 Left     CYSTOSCOPY  05/18/2012    CYSTOSCOPY, LEFT URETEROSCOPY AND STENT PLACEMENT left  retrograde 05/18/2012     CYSTOSCOPY,+URETEROSCOPY  06/10/2008    URETEROSCOPY 06/10/2008 Right     HC BREATH HYDROGEN TEST  3/7/2014    Procedure: HYDROGEN BREATH TEST;  Surgeon: Darion Swift MD;  Location: [facility] GI    INJECT EPIDURAL LUMBAR N/A 7/7/2022    Procedure: INJECTION, SPINE, LUMBAR, EPIDURAL L5/S1;  Surgeon: Michi Hahn MD;  Location: [facility] OR    LITHOTRIPSY  09/30/2010    LITHOTRIPSY 09/30/2010 LEFT EXTRACORPOREAL SHOCK WAVE LITHOTRIPSY, FLEXIBLE CYSTOSCOPY, LEFT STENT REMOVAL      ORTHOPEDIC SURGERY  10/03/2007    KNEE ARTHROSCOPY 10/03/2007 RightX2      RELEASE CARPAL TUNNEL Right 1/26/2018    Procedure: RELEASE CARPAL TUNNEL;  Right carpal tunnel release;  Surgeon: Wiliam Saenz MD;  Location: [facility] OR    VASCULAR SURGERY  11/24/2008    Radiofrequency ablation left saphenous vein 11/24/2008 Done by Dr. Lozoya         Medications:   Current Outpatient Medications:     acetaminophen (TYLENOL) 325 MG tablet, Take 2 tablets (650 mg) by mouth every 4 hours as needed for other (mild pain), Disp: 100 tablet, Rfl: 0    albuterol (PROAIR HFA/PROVENTIL HFA/VENTOLIN HFA) 108 (90 Base) MCG/ACT inhaler, Inhale 2 puffs into the lungs every 6 hours as needed for shortness of breath or wheezing, Disp: 6.7 g, Rfl: 3    amLODIPine (NORVASC) 10 MG tablet, Take 1 tablet (10 mg) by mouth daily for blood pressure, Disp: 90 tablet, Rfl: 1    atorvastatin (LIPITOR) 40 MG tablet, Take 1 tablet (40 mg) by mouth daily (Patient taking differently: Take 40 mg by mouth every evening), Disp: 90 tablet, Rfl: 3    blood glucose (ACCU-CHEK GUIDE) test strip, Use to test blood sugar 3 times daily or as directed., Disp: 300 strip, Rfl: 3    blood glucose monitoring (ACCU-CHEK FASTCLIX) lancets, Use to test blood sugar 1 times daily or as directed.  Ok to substitute alternative if insurance prefers., Disp: 102 each, Rfl: 11    calcium carbonate (TUMS) 500 MG chewable tablet, Take 1 chew tab by mouth daily, Disp: , Rfl:     " chlorthalidone (HYGROTON) 25 MG tablet, Take 1 tablet (25 mg) by mouth daily Add on for blood pressure, Disp: 90 tablet, Rfl: 1    Continuous Blood Gluc Sensor (FREESTYLE FROYLAN 3 SENSOR) MISC, 1 each every 14 days, Disp: 2 each, Rfl: 5    DULoxetine (CYMBALTA) 60 MG capsule, Take 1 capsule (60 mg) by mouth 2 times daily, Disp: 180 capsule, Rfl: 3    enoxaparin ANTICOAGULANT (LOVENOX) 100 MG/ML syringe, Inject 1 mL (100 mg) Subcutaneous every 12 hours, Disp: 28 mL, Rfl: 1    EPINEPHrine (ANY BX GENERIC EQUIV) 0.3 MG/0.3ML injection 2-pack, Inject 0.3 mLs (0.3 mg) into the muscle once as needed for anaphylaxis, Disp: 2 each, Rfl: 2    fluticasone-salmeterol (ADVAIR) 500-50 MCG/ACT inhaler, Inhale 1 puff into the lungs every 12 hours, Disp: 1 each, Rfl: 11    insulin aspart (NOVOLOG FLEXPEN) 100 UNIT/ML pen, Inject 18 units before breakfast and 18 units before dinner *Will need to schedule with new PCP for future refills*, Disp: 15 mL, Rfl: 3    insulin glargine (BASAGLAR KWIKPEN) 100 UNIT/ML pen, Inject 50 Units Subcutaneous daily, Disp: 45 mL, Rfl: 0    insulin pen needle (32G X 4 MM) 32G X 4 MM miscellaneous, Use 3  pen needles daily or as directed., Disp: 200 each, Rfl: 1    insulin syringe-needle U-100 (29G X 1/2\" 0.5 ML) 29G X 1/2\" 0.5 ML miscellaneous, Use 3 syringes daily or as directed., Disp: 200 each, Rfl: 1    losartan (COZAAR) 25 MG tablet, Take 1 tablet (25 mg) by mouth daily, Disp: 90 tablet, Rfl: 1    meclizine (ANTIVERT) 25 MG tablet, Take 1 tablet (25 mg) by mouth 3 times daily as needed for dizziness, Disp: 90 tablet, Rfl: 1    metFORMIN (GLUCOPHAGE XR) 500 MG 24 hr tablet, Take 2 tablets (1,000 mg) by mouth 2 times daily (with meals), Disp: 360 tablet, Rfl: 2    montelukast (SINGULAIR) 10 MG tablet, Take 1 tablet (10 mg) by mouth At Bedtime For allergies/asthma, Disp: 90 tablet, Rfl: 2    multivitamin, therapeutic (THERA-VIT) TABS, Take 1 tablet by mouth daily, Disp: , Rfl:     nortriptyline " (PAMELOR) 10 MG capsule, Take 2 capsules (20 mg) by mouth At Bedtime, Disp: 60 capsule, Rfl: 5    omeprazole (PRILOSEC) 40 MG DR capsule, Take 1 capsule (40 mg) by mouth daily Take for at least two months, Disp: 90 capsule, Rfl: 2    pregabalin (LYRICA) 50 MG capsule, Take 1 capsule (50 mg) by mouth 2 times daily, Disp: 60 capsule, Rfl: 1    rizatriptan (MAXALT-MLT) 10 MG ODT, TAKE 1 TABLET BY MOUTH AT ONSET OF HEADACHE FOR MIGRAINE MAY REPEAT IN 2 HOURS. MAX 3 TABS/24 HOURS., Disp: 18 tablet, Rfl: 1    warfarin ANTICOAGULANT (COUMADIN) 5 MG tablet, Take 20 mg Mon, Thurs and 15 mg all other days, or as directed by the Coumadin clinic, Disp: 280 tablet, Rfl: 1  No current facility-administered medications for this visit.    Facility-Administered Medications Ordered in Other Visits:     BUPivacaine (MARCAINE) injection 0.5%, 10 mL, Intradermal, Once, Vinnie Espinoza, DO    DOBUTamine 500 mg in dextrose 5% 250 mL (adult std conc) premix, 2.5-20 mcg/kg/min, Intravenous, Continuous, Navarro Butcher MD, Stopped at 04/25/19 1559    iopamidol (ISOVUE-M 200) solution 10 mL, 10 mL, Intravenous, Once, Vinnie Espinoza, DO    methylPREDNISolone (DEPO-MEDROL) injection 80 mg, 80 mg, Intramuscular, Once, Vinnie Espinoza, DO    metoprolol (LOPRESSOR) injection 5 mg, 5 mg, Intravenous, Q5 Min PRN, Navarro Butcher MD, 2 mg at 04/25/19 1554    Allergies:   Allergies   Allergen Reactions    Artificial Sweetner (Informational Only) [Artificial Sweetner (Informational Only) ] GI Disturbance     ALL artificial sweetners cause severe diarrhea & flu symptoms    Hydromorphone Anaphylaxis    Ibuprofen GI Disturbance    Morphine Sulfate Concentrate Anaphylaxis    Morphine [Fumaric Acid] Anaphylaxis    Hydrocodone-Acetaminophen Itching    Tramadol Hives    Trazodone Hives    Gabapentin GI Disturbance     GI upset per pt    Keflex [Cephalexin] Diarrhea     Upset stomach    Codeine Phosphate Itching    Ketorolac Tromethamine  Rash         REVIEW OF SYSTEMS:  CONSTITUTIONAL:NEGATIVE for fever, chills, night sweats, change in weight  INTEGUMENTARY/SKIN: NEGATIVE for worrisome rashes, moles or lesions  MUSCULOSKELETAL:See HPI above  NEURO: NEGATIVE for weakness, dizziness or paresthesias  CARDIOVASCULAR: negative for chest pain, shortness of breath    PHYSICAL EXAM:  /80   Pulse 109   Ht 1.829 m (6')   Wt 129.7 kg (286 lb)   BMI 38.79 kg/m     GENERAL APPEARANCE: healthy, alert, no distress   SKIN: no suspicious lesions or rashes  NEURO: Normal strength and tone, mentation intact and speech normal  PSYCH:  mentation appears normal and affect normal  RESPIRATORY: No increased work of breathing.    BILATERAL LOWER EXTREMITIES:  Gait: uses can left hand, Mld quadriceps avoidance right.    Bilateral calf soft and nttp or squeeze.  Edema: trace    right  KNEE EXAM:    Incision: healed well.    Skin: small eschar abrasion anteromedial knee, ~1cm diameter.  ROM: full extension to 120 flexion  Effusion: small  Tender: minimal.  Stable to varus/valgus stress  Some laxity in flexion, anterior-posterior, within normal limits.      X-RAY:  2 views right knee from 2/1/2024 were reviewed in clinic today. No obvious fractures or dislocations. Total knee arthroplasty components in place without evidence of failure or loosening.  Trace Effusion.        Impression: Adarsh Salvador is a 58 year old male status post Total knee arthroplasty, right knee, 4+ months from surgery, weakness.      Plan:   * reviewed xrays with patient, showing total knee arthroplasty implants. All look good and stable.  I suspect diffuse weakness, deconditioning is why his knee(s) have given out.  Need to work on strengthening and conditioning.  Continue to use cane for support  Could consider bracing in the future as needed.  * continue DVT prophylaxis with warfarin indefinitely, as prior.  * xrays next visit: 2 views of the knee  * return to clinic 8 months  * Physical  Therapy, home exercise program.  * all questions addressed and answered prior to discharge from clinic today to patient's satisfaction.     * patient will need longterm (at least 2 years) prophylactic antibiotics use with dental procedures or other invasive procedures (2 g amoxicilin or  2g Keflex or 500mg azithromycin or clarithromycin or 100mg doxycycline) 30-60 minutes prior to dental procedures.    * KOOS Jr/Promis Knee score: IS to be done at 12 months; to be completed at 12 weeks and 12 months postoperative.      Edwin Ch M.D., M.S.  Dept. of Orthopaedic Surgery  [facility]

## 2024-02-01 NOTE — LETTER
2/1/2024         RE: Adarsh Salvador  [address]  [place] [address]        Dear Colleague,    Thank you for referring your patient, Adarsh Salvador, to the [facility]. Please see a copy of my visit note below.    Chief Complaint   Patient presents with     Right Knee - Total Joint Post-op     DOS 9/26/23, 4 month s/p. Patient notes he has been falling a lot. He takes a step down and it's like nothing is there. His knee just collapses. He fell on 1/26/24, and again on 1/31/24. He is walking with a cane. He goes to physical therapy once a week. Next week is the last week and then it is once a month.          SURGERY: Total knee arthroplasty, right knee ([facility])  DATE OF SURGERY: 9/26/2023      HISTORY OF PRESENT ILLNESS: Adarsh Salvador is a 58 year old male seen for an unschedule postoperative evaluation of right total knee arthroplasty. It has been over 4 months since surgery. Returns today he had been doing quite well, improving, but sustained some recent falls. Feels like when he steps down, there is nothing there, and his knee collapses. He fell 1/26/2024 and again 1/31/2024. He is now walking with a cane. He goes to therapy weekly. His other knee has collapsed as well.    His therapist recommended he be checked out.    Denies any wound problems. Denies numbness, tingling, or weakness in the affected extremity. Denies fevers chills night sweats. Continues to take warfarin for anti-coagulation for deep vein thrombosis prophylaxis, as well as longterm anticoagulation with history of DVT in the past.    Present symptoms: mild pain, mild swelling.  Pain 5/10. (Was 0/10 last visit)        Past medical history:   Past Medical History:   Diagnosis Date     Anaphylactic reaction 8/14/2015     Anxiety      Depression      DM2 (diabetes mellitus, type 2) (H)      GERD (gastroesophageal reflux disease)      HTN (hypertension)      IBS (irritable bowel syndrome)       Kidney stone 6/15/2011    Pt believes these were Calcium stones     Neuropathy      Patient Active Problem List    Diagnosis Date Noted     Status post total right knee replacement 10/19/2023     Priority: Medium     S/P TKR (total knee replacement), right 09/26/2023     Priority: Medium     Pulmonary embolism, other, unspecified chronicity, unspecified whether acute cor pulmonale present (H) 08/15/2023     Priority: Medium     Advanced directives, counseling/discussion 12/22/2022     Priority: Medium     Pt was given info on ADV. Munir Amaral MA       Acute pain of right knee 08/26/2022     Priority: Medium     Aftercare following surgery of the musculoskeletal system 08/26/2022     Priority: Medium     Chronic pain of right knee 08/08/2022     Priority: Medium     Added automatically from request for surgery [id number]       Bilateral leg pain 06/09/2022     Priority: Medium     Added automatically from request for surgery [id number]       Low volume of ejaculated semen 06/07/2022     Priority: Medium     DVT (deep venous thrombosis) (H) 01/15/2022     Priority: Medium     Recurrent deep vein thrombosis (DVT) (H) 09/27/2021     Priority: Medium     Precordial pain 02/15/2021     Priority: Medium     Dyslipidemia 02/15/2021     Priority: Medium     Elevated C-reactive protein 02/15/2021     Priority: Medium     Gastric reflux 02/15/2021     Priority: Medium     Internal derangement of knee 02/15/2021     Priority: Medium     Nicotine dependence 02/15/2021     Priority: Medium     Varicose veins of lower extremity 02/15/2021     Priority: Medium     Vitamin D deficiency 02/15/2021     Priority: Medium     Neuropathy 06/23/2020     Priority: Medium     Morbid obesity (H) 03/26/2020     Priority: Medium     Insomnia, unspecified type 05/09/2019     Priority: Medium     CONNOR (generalized anxiety disorder) 05/08/2019     Priority: Medium     Hyperlipidemia LDL goal <100 08/17/2018     Priority: Medium     Type 2 diabetes  mellitus with diabetic polyneuropathy, with long-term current use of insulin (H) 01/18/2018     Priority: Medium     Mild persistent asthma without complication 01/12/2018     Priority: Medium     Right inguinal hernia 06/14/2016     Priority: Medium     Irritable bowel syndrome with diarrhea 03/21/2016     Priority: Medium     Fatty liver 02/22/2016     Priority: Medium     Hypertension, unspecified type 12/15/2015     Priority: Medium     Anemia in other chronic diseases classified elsewhere 12/15/2015     Priority: Medium     Hypertriglyceridemia 07/12/2013     Priority: Medium     Chronic maxillary sinusitis 09/18/2012     Priority: Medium     Believes this is secondary to exposure to toxic fumes at work- he is a .  He regularly wears a mask ventilator and believes this helps.  Has only tried intermittent nasal washes, and OTC medications.  Not ever tried steroid nasal sprays or other controller medications.         Chronic rhinitis 09/18/2012     Priority: Medium     Constipation 04/24/2012     Priority: Medium     GERD (gastroesophageal reflux disease) 06/15/2011     Priority: Medium     Chronic RLQ pain 06/15/2011     Priority: Medium       Past Surgical History:   Past Surgical History:   Procedure Laterality Date     ARTHROPLASTY KNEE Right 9/26/2023    Procedure: ARTHROPLASTY, KNEE, TOTAL Right;  Surgeon: Edwin Ch MD;  Location: [facility]     ARTHROSCOPY KNEE WITH MEDIAL MENISCECTOMY Right 8/19/2022    Procedure: examination under anesthesia, right knee arthroscopy, meniscectomy;  Surgeon: Carlos A Em MD;  Location: [facility] OR     COLONOSCOPY  5/1/2012    Procedure:COLONOSCOPY; screening colonoscopy; Surgeon:KINGSLEY DOS SANTOS; Location:[facility]     COLONOSCOPY  4/21/2014    Procedure: COMBINED COLONOSCOPY, SINGLE BIOPSY/POLYPECTOMY BY BIOPSY;  Surgeon: Duane, William Charles, MD;  Location: [facility]     COLONOSCOPY N/A 4/21/2022    Procedure: COLONOSCOPY, WITH POLYPECTOMY AND BIOPSY;   Surgeon: Luiz Calvert MD;  Location: [facility]     CYSTOSCOPY  05/01/2008    CYSTOSCOPY W/ URETERAL STENT PLACEMENT 05/01/2008      CYSTOSCOPY  09/26/2010    CYSTOSCOPY W/ URETERAL STENT PLACEMENT 09/26/2010 Left      CYSTOSCOPY  05/18/2012    CYSTOSCOPY, LEFT URETEROSCOPY AND STENT PLACEMENT left retrograde 05/18/2012      CYSTOSCOPY,+URETEROSCOPY  06/10/2008    URETEROSCOPY 06/10/2008 Right      HC BREATH HYDROGEN TEST  3/7/2014    Procedure: HYDROGEN BREATH TEST;  Surgeon: Darion Swift MD;  Location: [facility]     INJECT EPIDURAL LUMBAR N/A 7/7/2022    Procedure: INJECTION, SPINE, LUMBAR, EPIDURAL L5/S1;  Surgeon: Michi Hahn MD;  Location: [facility]     LITHOTRIPSY  09/30/2010    LITHOTRIPSY 09/30/2010 LEFT EXTRACORPOREAL SHOCK WAVE LITHOTRIPSY, FLEXIBLE CYSTOSCOPY, LEFT STENT REMOVAL       ORTHOPEDIC SURGERY  10/03/2007    KNEE ARTHROSCOPY 10/03/2007 RightX2       RELEASE CARPAL TUNNEL Right 1/26/2018    Procedure: RELEASE CARPAL TUNNEL;  Right carpal tunnel release;  Surgeon: Wiliam Saenz MD;  Location: [facility]     VASCULAR SURGERY  11/24/2008    Radiofrequency ablation left saphenous vein 11/24/2008 Done by Dr. Lozoya         Medications:   Current Outpatient Medications:      acetaminophen (TYLENOL) 325 MG tablet, Take 2 tablets (650 mg) by mouth every 4 hours as needed for other (mild pain), Disp: 100 tablet, Rfl: 0     albuterol (PROAIR HFA/PROVENTIL HFA/VENTOLIN HFA) 108 (90 Base) MCG/ACT inhaler, Inhale 2 puffs into the lungs every 6 hours as needed for shortness of breath or wheezing, Disp: 6.7 g, Rfl: 3     amLODIPine (NORVASC) 10 MG tablet, Take 1 tablet (10 mg) by mouth daily for blood pressure, Disp: 90 tablet, Rfl: 1     atorvastatin (LIPITOR) 40 MG tablet, Take 1 tablet (40 mg) by mouth daily (Patient taking differently: Take 40 mg by mouth every evening), Disp: 90 tablet, Rfl: 3     blood glucose (ACCU-CHEK GUIDE) test strip, Use to test blood sugar 3 times daily or as  "directed., Disp: 300 strip, Rfl: 3     blood glucose monitoring (ACCU-CHEK FASTCLIX) lancets, Use to test blood sugar 1 times daily or as directed.  Ok to substitute alternative if insurance prefers., Disp: 102 each, Rfl: 11     calcium carbonate (TUMS) 500 MG chewable tablet, Take 1 chew tab by mouth daily, Disp: , Rfl:      chlorthalidone (HYGROTON) 25 MG tablet, Take 1 tablet (25 mg) by mouth daily Add on for blood pressure, Disp: 90 tablet, Rfl: 1     Continuous Blood Gluc Sensor (FREESTYLE FROYLAN 3 SENSOR) [facility], 1 each every 14 days, Disp: 2 each, Rfl: 5     DULoxetine (CYMBALTA) 60 MG capsule, Take 1 capsule (60 mg) by mouth 2 times daily, Disp: 180 capsule, Rfl: 3     enoxaparin ANTICOAGULANT (LOVENOX) 100 MG/ML syringe, Inject 1 mL (100 mg) Subcutaneous every 12 hours, Disp: 28 mL, Rfl: 1     EPINEPHrine (ANY BX GENERIC EQUIV) 0.3 MG/0.3ML injection 2-pack, Inject 0.3 mLs (0.3 mg) into the muscle once as needed for anaphylaxis, Disp: 2 each, Rfl: 2     fluticasone-salmeterol (ADVAIR) 500-50 MCG/ACT inhaler, Inhale 1 puff into the lungs every 12 hours, Disp: 1 each, Rfl: 11     insulin aspart (NOVOLOG FLEXPEN) 100 UNIT/ML pen, Inject 18 units before breakfast and 18 units before dinner *Will need to schedule with new PCP for future refills*, Disp: 15 mL, Rfl: 3     insulin glargine (BASAGLAR KWIKPEN) 100 UNIT/ML pen, Inject 50 Units Subcutaneous daily, Disp: 45 mL, Rfl: 0     insulin pen needle (32G X 4 MM) 32G X 4 MM miscellaneous, Use 3  pen needles daily or as directed., Disp: 200 each, Rfl: 1     insulin syringe-needle U-100 (29G X 1/2\" 0.5 ML) 29G X 1/2\" 0.5 ML miscellaneous, Use 3 syringes daily or as directed., Disp: 200 each, Rfl: 1     losartan (COZAAR) 25 MG tablet, Take 1 tablet (25 mg) by mouth daily, Disp: 90 tablet, Rfl: 1     meclizine (ANTIVERT) 25 MG tablet, Take 1 tablet (25 mg) by mouth 3 times daily as needed for dizziness, Disp: 90 tablet, Rfl: 1     metFORMIN (GLUCOPHAGE XR) 500 MG 24 hr " tablet, Take 2 tablets (1,000 mg) by mouth 2 times daily (with meals), Disp: 360 tablet, Rfl: 2     montelukast (SINGULAIR) 10 MG tablet, Take 1 tablet (10 mg) by mouth At Bedtime For allergies/asthma, Disp: 90 tablet, Rfl: 2     multivitamin, therapeutic (THERA-VIT) TABS, Take 1 tablet by mouth daily, Disp: , Rfl:      nortriptyline (PAMELOR) 10 MG capsule, Take 2 capsules (20 mg) by mouth At Bedtime, Disp: 60 capsule, Rfl: 5     omeprazole (PRILOSEC) 40 MG DR capsule, Take 1 capsule (40 mg) by mouth daily Take for at least two months, Disp: 90 capsule, Rfl: 2     pregabalin (LYRICA) 50 MG capsule, Take 1 capsule (50 mg) by mouth 2 times daily, Disp: 60 capsule, Rfl: 1     rizatriptan (MAXALT-MLT) 10 MG ODT, TAKE 1 TABLET BY MOUTH AT ONSET OF HEADACHE FOR MIGRAINE MAY REPEAT IN 2 HOURS. MAX 3 TABS/24 HOURS., Disp: 18 tablet, Rfl: 1     warfarin ANTICOAGULANT (COUMADIN) 5 MG tablet, Take 20 mg Mon, Thurs and 15 mg all other days, or as directed by the Coumadin clinic, Disp: 280 tablet, Rfl: 1  No current facility-administered medications for this visit.    Facility-Administered Medications Ordered in Other Visits:      BUPivacaine (MARCAINE) injection 0.5%, 10 mL, Intradermal, Once, Vinnie Espinoza,      DOBUTamine 500 mg in dextrose 5% 250 mL (adult std conc) premix, 2.5-20 mcg/kg/min, Intravenous, Continuous, Navarro Butcher MD, Stopped at 04/25/19 1559     iopamidol (ISOVUE-M 200) solution 10 mL, 10 mL, Intravenous, Once, Vinnie Espinoza, DO     methylPREDNISolone (DEPO-MEDROL) injection 80 mg, 80 mg, Intramuscular, Once, Vinnie Espinoza,      metoprolol (LOPRESSOR) injection 5 mg, 5 mg, Intravenous, Q5 Min PRN, Navarro Butcher MD, 2 mg at 04/25/19 1608    Allergies:   Allergies   Allergen Reactions     Artificial Sweetner (Informational Only) [Artificial Sweetner (Informational Only) ] GI Disturbance     ALL artificial sweetners cause severe diarrhea & flu symptoms     Hydromorphone  Anaphylaxis     Ibuprofen GI Disturbance     Morphine Sulfate Concentrate Anaphylaxis     Morphine [Fumaric Acid] Anaphylaxis     Hydrocodone-Acetaminophen Itching     Tramadol Hives     Trazodone Hives     Gabapentin GI Disturbance     GI upset per pt     Keflex [Cephalexin] Diarrhea     Upset stomach     Codeine Phosphate Itching     Ketorolac Tromethamine Rash         REVIEW OF SYSTEMS:  CONSTITUTIONAL:NEGATIVE for fever, chills, night sweats, change in weight  INTEGUMENTARY/SKIN: NEGATIVE for worrisome rashes, moles or lesions  MUSCULOSKELETAL:See HPI above  NEURO: NEGATIVE for weakness, dizziness or paresthesias  CARDIOVASCULAR: negative for chest pain, shortness of breath    PHYSICAL EXAM:  /80   Pulse 109   Ht 1.829 m (6')   Wt 129.7 kg (286 lb)   BMI 38.79 kg/m     GENERAL APPEARANCE: healthy, alert, no distress   SKIN: no suspicious lesions or rashes  NEURO: Normal strength and tone, mentation intact and speech normal  PSYCH:  mentation appears normal and affect normal  RESPIRATORY: No increased work of breathing.    BILATERAL LOWER EXTREMITIES:  Gait: uses can left hand, Mld quadriceps avoidance right.    Bilateral calf soft and nttp or squeeze.  Edema: trace    right  KNEE EXAM:    Incision: healed well.    Skin: small eschar abrasion anteromedial knee, ~1cm diameter.  ROM: full extension to 120 flexion  Effusion: small  Tender: minimal.  Stable to varus/valgus stress  Some laxity in flexion, anterior-posterior, within normal limits.      X-RAY:  2 views right knee from 2/1/2024 were reviewed in clinic today. No obvious fractures or dislocations. Total knee arthroplasty components in place without evidence of failure or loosening.  Trace Effusion.        Impression: Adarsh Salvador is a 58 year old male status post Total knee arthroplasty, right knee, 4+ months from surgery, weakness.      Plan:   * reviewed xrays with patient, showing total knee arthroplasty implants. All look good and stable.  I  suspect diffuse weakness, deconditioning is why his knee(s) have given out.  Need to work on strengthening and conditioning.  Continue to use cane for support  Could consider bracing in the future as needed.  * continue DVT prophylaxis with warfarin indefinitely, as prior.  * xrays next visit: 2 views of the knee  * return to clinic 8 months  * Physical Therapy, home exercise program.  * all questions addressed and answered prior to discharge from clinic today to patient's satisfaction.     * patient will need longterm (at least 2 years) prophylactic antibiotics use with dental procedures or other invasive procedures (2 g amoxicilin or  2g Keflex or 500mg azithromycin or clarithromycin or 100mg doxycycline) 30-60 minutes prior to dental procedures.    * KOOS Jr/Promis Knee score: IS to be done at 12 months; to be completed at 12 weeks and 12 months postoperative.      Edwin Ch M.D., M.S.  Dept. of Orthopaedic Surgery  [facility]             Again, thank you for allowing me to participate in the care of your patient.        Sincerely,        Edwin Ch MD

## 2024-02-02 ENCOUNTER — PATIENT OUTREACH (OUTPATIENT)
Dept: CARE COORDINATION | Facility: CLINIC | Age: 59
End: 2024-02-02

## 2024-02-02 NOTE — PROGRESS NOTES
[facility]/Voicemail    Clinical Data: Care Coordinator Outreach    Outreach Documentation Number of Outreach Attempt   12/18/2023  10:13 AM 1   2/2/2024  10:01 AM 1       Left message on patient's voicemail with call back information and requested return call.    Plan:  Care Coordinator will try to reach patient again in 3-5 business days.    Next outreach due: 2/8/24

## 2024-02-06 ENCOUNTER — PATIENT OUTREACH (OUTPATIENT)
Dept: CARE COORDINATION | Facility: CLINIC | Age: 59
End: 2024-02-06

## 2024-02-06 NOTE — PROGRESS NOTES
Clinic Care Coordination Contact  Care Coordination Clinician Chart Review    Situation: Patient chart reviewed by Care Coordinator.       Background: Care Coordination Program started: 10/3/2023. Initial assessment completed and patient-centered care plan(s) were developed with participation from patient. Lead CC handed patient off to CHW for continued outreaches.       Assessment: Per chart review, CHW unable to reach patient on 2/2 outreach. Patient is not due for updated Plan of Care.  Assessments will be completed annually or as needed/with change of patient status.    Per 12/28 outreach, patient is hoping to move by the end of February and considering changing PCP due to distance.       Care Plan: Help At Home       Problem: Insufficient In-home support       Goal: Establish adequate home support       Start Date: 10/5/2023 Expected End Date: 11/5/2023    This Visit's Progress: 60% Recent Progress: 50%    Note:     Barriers: Nothing currently set up  Strengths: Motivated to work with care coordination  Patient expressed understanding of goal: Yes  Action steps to achieve this goal:  1. I will work with my providers to see if home care is an option.  2. I will work with my providers to see if outpatient PT/OT is an option.  3. I will work with care coordination for additional support and resources.                                   Plan/Recommendations: The patient will continue working with Care Coordination to achieve goal(s) as above. CHW will continue outreaches at minimum every 30 days and will involve Lead CC as needed or if patient is ready to move to Maintenance. Lead CC will continue to monitor CHW outreaches and patient's progress to goal(s) every 6 weeks.     Plan of Care updated and sent to patient: Lala [facility]  Social Work Primary Care Clinic Care Coordinator  [facility]  [phone]  [email or web address]

## 2024-02-07 ENCOUNTER — DOCUMENTATION ONLY (OUTPATIENT)
Dept: FAMILY MEDICINE | Facility: CLINIC | Age: 59
End: 2024-02-07

## 2024-02-07 DIAGNOSIS — Z79.4 TYPE 2 DIABETES MELLITUS WITH DIABETIC POLYNEUROPATHY, WITH LONG-TERM CURRENT USE OF INSULIN (H): Primary | ICD-10-CM

## 2024-02-07 DIAGNOSIS — I10 HYPERTENSION, UNSPECIFIED TYPE: ICD-10-CM

## 2024-02-07 DIAGNOSIS — E11.42 TYPE 2 DIABETES MELLITUS WITH DIABETIC POLYNEUROPATHY, WITH LONG-TERM CURRENT USE OF INSULIN (H): Primary | ICD-10-CM

## 2024-02-08 ENCOUNTER — THERAPY VISIT (OUTPATIENT)
Dept: PHYSICAL THERAPY | Facility: CLINIC | Age: 59
End: 2024-02-08
Payer: COMMERCIAL

## 2024-02-08 ENCOUNTER — LAB (OUTPATIENT)
Dept: LAB | Facility: CLINIC | Age: 59
End: 2024-02-08
Payer: COMMERCIAL

## 2024-02-08 ENCOUNTER — ANTICOAGULATION THERAPY VISIT (OUTPATIENT)
Dept: ANTICOAGULATION | Facility: CLINIC | Age: 59
End: 2024-02-08

## 2024-02-08 DIAGNOSIS — E11.42 TYPE 2 DIABETES MELLITUS WITH DIABETIC POLYNEUROPATHY, WITH LONG-TERM CURRENT USE OF INSULIN (H): ICD-10-CM

## 2024-02-08 DIAGNOSIS — I82.409 RECURRENT DEEP VEIN THROMBOSIS (DVT) (H): ICD-10-CM

## 2024-02-08 DIAGNOSIS — M25.561 ACUTE PAIN OF RIGHT KNEE: Primary | ICD-10-CM

## 2024-02-08 DIAGNOSIS — I82.409 RECURRENT DEEP VEIN THROMBOSIS (DVT) (H): Primary | ICD-10-CM

## 2024-02-08 DIAGNOSIS — I10 HYPERTENSION, UNSPECIFIED TYPE: ICD-10-CM

## 2024-02-08 DIAGNOSIS — Z79.4 TYPE 2 DIABETES MELLITUS WITH DIABETIC POLYNEUROPATHY, WITH LONG-TERM CURRENT USE OF INSULIN (H): ICD-10-CM

## 2024-02-08 LAB
ANION GAP SERPL CALCULATED.3IONS-SCNC: 14 MMOL/L (ref 7–15)
BUN SERPL-MCNC: 11.7 MG/DL (ref 6–20)
CALCIUM SERPL-MCNC: 9.1 MG/DL (ref 8.6–10)
CHLORIDE SERPL-SCNC: 100 MMOL/L (ref 98–107)
CREAT SERPL-MCNC: 0.84 MG/DL (ref 0.67–1.17)
DEPRECATED HCO3 PLAS-SCNC: 22 MMOL/L (ref 22–29)
EGFRCR SERPLBLD CKD-EPI 2021: >90 ML/MIN/1.73M2
GLUCOSE SERPL-MCNC: 388 MG/DL (ref 70–99)
HBA1C MFR BLD: 12.2 % (ref 0–5.6)
INR BLD: 1.4 (ref 0.9–1.1)
POTASSIUM SERPL-SCNC: 4.3 MMOL/L (ref 3.4–5.3)
SODIUM SERPL-SCNC: 136 MMOL/L (ref 135–145)

## 2024-02-08 PROCEDURE — 36415 COLL VENOUS BLD VENIPUNCTURE: CPT

## 2024-02-08 PROCEDURE — 85610 PROTHROMBIN TIME: CPT

## 2024-02-08 PROCEDURE — 83036 HEMOGLOBIN GLYCOSYLATED A1C: CPT

## 2024-02-08 PROCEDURE — 97110 THERAPEUTIC EXERCISES: CPT | Mod: GP

## 2024-02-08 PROCEDURE — 80048 BASIC METABOLIC PNL TOTAL CA: CPT

## 2024-02-08 NOTE — PROGRESS NOTES
ANTICOAGULATION MANAGEMENT     Adarsh Salvador 58 year old male is on warfarin with subtherapeutic INR result. (Goal INR 2.0-3.0)    Recent labs: (last 7 days)     02/08/24  0812   INR 1.4*       ASSESSMENT     Source(s): Chart Review and Patient/Caregiver Call     Warfarin doses taken: Missed dose(s) may be affecting INR  Diet: No new diet changes identified  Medication/supplement changes: None noted  New illness, injury, or hospitalization: No  Signs or symptoms of bleeding or clotting: No  Previous result: Therapeutic last visit; previously outside of goal range  Additional findings:  In reviewing chart, pt has been falling more since his TKA - has been walking with a cane       PLAN     Recommended plan for temporary change(s) affecting INR     Dosing Instructions: booster dose then continue your current warfarin dose with next INR in 1 week       Summary  As of 2/8/2024      Full warfarin instructions:  2/8: 20 mg; Otherwise 10 mg every Tue, Sat; 15 mg all other days   Next INR check:  2/15/2024               Telephone call with Adarsh who verbalizes understanding and agrees to plan and who agrees to plan and repeated back plan correctly    Lab visit scheduled    Education provided:   Symptom monitoring: when to seek medical attention/emergency care and if you hit your head or have a bad fall seek emergency care  Contact [phone]  with any changes, questions or concerns.     Plan made per [facility] anticoagulation protocol    Joby Marie RN  Anticoagulation Clinic  2/8/2024    _______________________________________________________________________     Anticoagulation Episode Summary       Current INR goal:  2.0-3.0   TTR:  39.3% (1 y)   Target end date:  Indefinite   Send INR reminders to:  [facility]    Indications    Recurrent deep vein thrombosis (DVT) (H) [I82.409]             Comments:               Anticoagulation Care Providers       Provider Role Specialty Phone number    Bertha Romero,  ARIANE Referring Family Medicine [phone]    Kehr, Kristen M, PA-C Referring Family Medicine [phone]

## 2024-02-08 NOTE — PROGRESS NOTES
Katie Franz filed at 2/6/2024  8:55 AM    Status: Signed   Adarsh Salvador has an upcoming lab appointment requesting fasting labs for you. Please place orders as needed thank you!             Future Appointments   Date Time Provider Department Center   2/1/2024  8:40 AM Vini Diehl, PT [facility]   2/8/2024  8[facility]   2/8/2024 12:30 PM Vini Diehl, PT [facility]   2/22/2024  8:40 AM Vini Diehl, PT [facility]   3/7/2024  8:40 AM Vini Diehl, PT [facility]   3/21/2024  8:40 AM Vini Diehl, PT [facility]            There is no order available. Please review and place either future orders or HMPO (Review of Health Maintenance Protocol Orders), as appropriate.         Health Maintenance Due   Topic    ANNUAL REVIEW OF HM ORDERS       Katie Franz

## 2024-02-15 ENCOUNTER — ANTICOAGULATION THERAPY VISIT (OUTPATIENT)
Dept: ANTICOAGULATION | Facility: CLINIC | Age: 59
End: 2024-02-15

## 2024-02-15 ENCOUNTER — LAB (OUTPATIENT)
Dept: LAB | Facility: CLINIC | Age: 59
End: 2024-02-15
Payer: COMMERCIAL

## 2024-02-15 DIAGNOSIS — I82.409 RECURRENT DEEP VEIN THROMBOSIS (DVT) (H): ICD-10-CM

## 2024-02-15 DIAGNOSIS — I82.409 RECURRENT DEEP VEIN THROMBOSIS (DVT) (H): Primary | ICD-10-CM

## 2024-02-15 LAB — INR BLD: 3.6 (ref 0.9–1.1)

## 2024-02-15 PROCEDURE — 36416 COLLJ CAPILLARY BLOOD SPEC: CPT

## 2024-02-15 PROCEDURE — 85610 PROTHROMBIN TIME: CPT

## 2024-02-15 NOTE — PROGRESS NOTES
ANTICOAGULATION MANAGEMENT     Adarsh Salvador 58 year old male is on warfarin with supratherapeutic INR result. (Goal INR 2.0-3.0)    Recent labs: (last 7 days)     02/15/24  1345   INR 3.6*       ASSESSMENT     Source(s): Chart Review     Warfarin doses taken: Booster dose(s) recently taken which may be affecting INR  Diet:  LM  Medication/supplement changes: None noted  New illness, injury, or hospitalization: No  Signs or symptoms of bleeding or clotting: No  Previous result: Subtherapeutic  Additional findings: None       PLAN     Unable to reach pt today.    Left message for pt to wait to take warfarin tomorrow AM, will recommend a partial hold, 10 mg as he likely took his dose this morning.     Follow up required to confirm warfarin dose taken and assess for changes    Priya Plunkett RN  Anticoagulation Clinic  2/15/2024

## 2024-02-16 NOTE — PROGRESS NOTES
ANTICOAGULATION MANAGEMENT     Adarsh Salvador 58 year old male is on warfarin with supratherapeutic INR result. (Goal INR 2.0-3.0)    Recent labs: (last 7 days)     02/15/24  1345   INR 3.6*       ASSESSMENT     Source(s): Chart Review and Patient/Caregiver Call     Warfarin doses taken: More warfarin taken than planned which may be affecting INR was taking old dose  Diet: No new diet changes identified  Medication/supplement changes: None noted  New illness, injury, or hospitalization: No  Signs or symptoms of bleeding or clotting: No  Previous result: Subtherapeutic  Additional findings: None       PLAN     Recommended plan for ongoing change(s) affecting INR     Dosing Instructions: partial hold then continue your current warfarin dose (dose that he's been taking) with next INR in 1 week       Summary  As of 2/15/2024      Full warfarin instructions:  2/16: 10 mg; Otherwise 12.5 mg every Tue, Sat; 15 mg all other days   Next INR check:  2/22/2024               Telephone call with Adarsh who verbalizes understanding and agrees to plan and who agrees to plan and repeated back plan correctly    Lab visit scheduled    Education provided:   Taking warfarin: Importance of taking warfarin as instructed    Plan made per [facility] anticoagulation protocol    Priya Plunkett RN  Anticoagulation Clinic  2/16/2024    _______________________________________________________________________     Anticoagulation Episode Summary       Current INR goal:  2.0-3.0   TTR:  40.3% (1 y)   Target end date:  Indefinite   Send INR reminders to:  [facility]    Indications    Recurrent deep vein thrombosis (DVT) (H) [I82.409]             Comments:               Anticoagulation Care Providers       Provider Role Specialty Phone number    Bertha Romero PA-C Referring Family Medicine [phone]    Kehr, Kristen M, PA-C Referring Family Medicine [phone]

## 2024-02-19 ENCOUNTER — PATIENT OUTREACH (OUTPATIENT)
Dept: CARE COORDINATION | Facility: CLINIC | Age: 59
End: 2024-02-19

## 2024-02-19 NOTE — PROGRESS NOTES
Clinic Care Coordination Contact  Community Health Worker Follow Up    Care Gaps:     Health Maintenance Due   Topic Date Due    YEARLY PREVENTIVE VISIT  Never done    HEPATITIS B IMMUNIZATION (1 of 3 - 3-dose series) Never done    COVID-19 Vaccine (1) Never done    Pneumococcal Vaccine: Pediatrics (0 to 5 Years) and At-Risk Patients (6 to 64 Years) (1 of 2 - PCV) Never done    ZOSTER IMMUNIZATION (1 of 2) Never done    ASTHMA ACTION PLAN  02/14/2023    INFLUENZA VACCINE (1) 09/01/2023    EYE EXAM  01/05/2024    ASTHMA CONTROL TEST  02/15/2024       Patient will discuss at PCP visit the end of the month.     Care Plan:   Care Plan: Help At Home       Problem: Insufficient In-home support       Goal: Establish adequate home support       Start Date: 10/5/2023 Expected End Date: 11/5/2023    Recent Progress: 60%    Note:     Barriers: Nothing currently set up  Strengths: Motivated to work with care coordination  Patient expressed understanding of goal: Yes  Action steps to achieve this goal:  1. I will work with my providers to see if home care is an option.  2. I will work with my providers to see if outpatient PT/OT is an option.  3. I will work with care coordination for additional support and resources.      Patient didn't qualify for home care (not home bound) he is attending PT at a clinic. 2/19/24                              Intervention and Education during outreach: Patient did not qualify for home care due to not being home bound. He is getting PT outside of his home.     Patient recently had his A1c checked and it jumped from 7.9 to 12.2. Patient states that he knows it is his diet and that it is hard to afford healthy food options. CHW scheduled patient for CC RN visit on 2/20 to discuss getting his diabetes back under control and discuss getting him on [employer] to help with healthy food options.     CHW Plan: CHW will continue to support patient with goals through routine scheduled outreach.     Next  outreach due: 3/19/24

## 2024-02-20 ENCOUNTER — PATIENT OUTREACH (OUTPATIENT)
Dept: NURSING | Facility: CLINIC | Age: 59
End: 2024-02-20

## 2024-02-20 DIAGNOSIS — E11.42 TYPE 2 DIABETES MELLITUS WITH DIABETIC POLYNEUROPATHY, WITH LONG-TERM CURRENT USE OF INSULIN (H): ICD-10-CM

## 2024-02-20 DIAGNOSIS — Z79.4 TYPE 2 DIABETES MELLITUS WITH DIABETIC POLYNEUROPATHY, WITH LONG-TERM CURRENT USE OF INSULIN (H): ICD-10-CM

## 2024-02-20 ASSESSMENT — ACTIVITIES OF DAILY LIVING (ADL): DEPENDENT_IADLS:: INDEPENDENT

## 2024-02-20 NOTE — PROGRESS NOTES
Clinic Care Coordination Contact  Follow Up Progress Note      Assessment: Per 2/19/24 CHW outreach: Patient recently had his A1c checked and it jumped from 7.9 to 12.2. Patient states that he knows it is his diet and that it is hard to afford healthy food options. CHW scheduled patient for CC RN visit on 2/20 to discuss getting his diabetes back under control and discuss getting him on [employer] to help with healthy food options.     CC RN contacted patient, introduced self, and role. We discussed the above.   We discussed Kristen Kehr C-PA's 2/8/24 result note. Patient reports when he called endocrinology they were scheduling out many months. Patient did make follow up with PCP, pending visit for 2/28/24.   CC RN did encourage patient to make a visit with endocrinology, scheduling line provided.   Per chart review diabetic educator reached out to patient 10/3/24 letting him know she is available as needed. CC RN provided scheduling line.   We started discussing how to manage DM (healthy eating, physical activity, taking blood sugars, attending provider visits, and lab appointments, medication compliance ect...) and patient stated he has [facility] concern of access to healthy food. CC RN completed enrollment with [employer], allowing patient access to healthy foods with a monthly $80 voucher to shop food.   Patient went on to say that he received a letter in the mail from [facility], as of 1/31/24 patient no longer has medical insurance. We talked through FRW referral and patient declined this support. He felt comfortable reaching out to the County as the letter in the mail instructed him to. We created a new Care Coordination goal to address this barrier to care.     Care Gaps: patient to address with PCP at next routine OV.   Health Maintenance Due   Topic Date Due    YEARLY PREVENTIVE VISIT  Never done    HEPATITIS B IMMUNIZATION (1 of 3 - 3-dose series) Never done    COVID-19 Vaccine (1) Never done     Pneumococcal Vaccine: Pediatrics (0 to 5 Years) and At-Risk Patients (6 to 64 Years) (1 of 2 - PCV) Never done    ZOSTER IMMUNIZATION (1 of 2) Never done    ASTHMA ACTION PLAN  02/14/2023    INFLUENZA VACCINE (1) 09/01/2023    EYE EXAM  01/05/2024    ASTHMA CONTROL TEST  02/15/2024     Care Plans  Care Plan: Financial Wellbeing       Problem: Patients [employer] PM[employer] insurance ended 1/31/24       Goal: I will call the [facility] back in the next 1-2 days       Start Date: 2/20/2024 Expected End Date: 5/20/2024    Note:     Barriers: uninsured as of 1/31/24  Strengths: patient motivated to call out to [facility] and follow up.   Patient expressed understanding of goal: yes   Action steps to achieve this goal:  1. I received a letter in the mail notifying me that my [employer] PM[employer] insurance ended 1/31/24, and there is information in the letter on how to reach out for additional support and next steps.   2. I will call the phone number provided by the [facility] and follow their advice.   3. [facility] did offer me the financial resource worker referral to assist with contacting the [facility], but I feel confident in making this phone call to the [facility]. If at any point I feel I need the assistance of the financial worker, Ill let Summer know. As discussed, the financial resource workers involvement does not increase or decrease your [facility] eligibility for services.                                Intervention/Education provided during outreach: Discussed patients concerns and we created a new goal and action steps. CC RN asked open ended questions, provided support, resources, and encouragement as needed. Patient will reach out sooner than planned with new questions or concerns.       Outreach Frequency: monthly, more frequently as needed    Plan:   CC RN sent refill request to AN Refill pool at patients request for insulin.   CC RN will send this encounter to Kristen Kehr C-PA as FYI  Patient will attend his upcoming Kristen Kehr C-PA visit.    Patient will make follow up with [facility] as advised.   Patient will call [facility] as instruction on his [facility] insurance disenrollment letter and follow next steps.   CC RN sent Danal d/b/a [employer] message with [employer] follow up information after our enrollment today.   Care Coordinator will follow up in 1 month.  Summer Ricketts RN, BSN, PHN Care Coordinator  [place], [place], and [place]   Phone: [phone]

## 2024-02-20 NOTE — TELEPHONE ENCOUNTER
Patient stated he will be out of med prior to his pending visit with PCP 2/28/24.    Thank you, Summer Ricketts RN, BSN, PHN Care Coordinator  [place], [place], and [place] [place]   Phone: [phone]

## 2024-02-20 NOTE — Clinical Note
Good afternoon Dalila, sending message as FYI only. Upcoming visit with you for 2/28/24.  Thank you, Summer Ricketts RN, BSN, PHN Care Coordinator [place], [place], and [facility] [place]  Phone: [phone]

## 2024-02-21 RX ORDER — INSULIN GLARGINE 100 [IU]/ML
50 INJECTION, SOLUTION SUBCUTANEOUS DAILY
Qty: 15 ML | Refills: 0 | Status: SHIPPED | OUTPATIENT
Start: 2024-02-21 | End: 2024-04-23

## 2024-02-29 ENCOUNTER — TELEPHONE (OUTPATIENT)
Dept: ANTICOAGULATION | Facility: CLINIC | Age: 59
End: 2024-02-29

## 2024-02-29 NOTE — TELEPHONE ENCOUNTER
ANTICOAGULATION     Adarsh EDWARDS [place] is overdue for an INR check.     Was unable to reach patient and was unable to leave a voicemail. If patient calls, please schedule INR check as soon as possible.     Joby Marie RN

## 2024-03-07 ENCOUNTER — TELEPHONE (OUTPATIENT)
Dept: ANTICOAGULATION | Facility: CLINIC | Age: 59
End: 2024-03-07

## 2024-03-07 NOTE — TELEPHONE ENCOUNTER
ANTICOAGULATION     Adarsh Salvador is overdue for an INR check.     Was unable to reach patient and was unable to leave a voicemail. If patient calls, please schedule INR check as soon as possible.  and Reminder letter sent via [employer]    Joby Marie RN

## 2024-03-14 ENCOUNTER — TELEPHONE (OUTPATIENT)
Dept: ANTICOAGULATION | Facility: CLINIC | Age: 59
End: 2024-03-14

## 2024-03-14 NOTE — LETTER
[facility]  [address] [facility] [address]  Phone: [phone]  Fax: [phone]   March 14, 2024        Adarsh Salvador  [address]   [place] [facility] [address]            Dear Adarsh,    You are currently under the care of [facility] for your warfarin (Coumadin , Jantoven ) therapy.  We are contacting you because our records show you were due for an INR on 2/22/24.    There are potentially serious risks when taking warfarin without careful monitoring and we want to make sure you are safely managed.  Routine lab monitoring is required for warfarin refills.     Please call [phone]  as soon as possible to schedule a lab appointment. If it is difficult for you to get to lab, please call us to discuss options.  If there has been a change in your care or other concerns, please let us know so we can help and/or update our records.         Sincerely,       [facility]

## 2024-03-14 NOTE — TELEPHONE ENCOUNTER
ANTICOAGULATION     Adarsh EDWARDS [place] is overdue for an INR check.     Reminder letter sent    Janeth Cool RN

## 2024-03-19 ENCOUNTER — HOSPITAL ENCOUNTER (EMERGENCY)
Facility: CLINIC | Age: 59
Discharge: HOME | End: 2024-03-19
Attending: EMERGENCY MEDICINE | Admitting: EMERGENCY MEDICINE

## 2024-03-19 ENCOUNTER — APPOINTMENT (OUTPATIENT)
Dept: ULTRASOUND IMAGING | Facility: CLINIC | Age: 59
End: 2024-03-19
Attending: EMERGENCY MEDICINE

## 2024-03-19 VITALS
WEIGHT: 282.74 LBS | DIASTOLIC BLOOD PRESSURE: 91 MMHG | BODY MASS INDEX: 38.3 KG/M2 | RESPIRATION RATE: 18 BRPM | SYSTOLIC BLOOD PRESSURE: 159 MMHG | TEMPERATURE: 98.5 F | HEART RATE: 94 BPM | HEIGHT: 72 IN | OXYGEN SATURATION: 97 %

## 2024-03-19 DIAGNOSIS — M79.662 PAIN OF LEFT LOWER LEG: ICD-10-CM

## 2024-03-19 DIAGNOSIS — I80.02 THROMBOPHLEBITIS OF SUPERFICIAL VEINS OF LEFT LOWER EXTREMITY: ICD-10-CM

## 2024-03-19 PROCEDURE — 99284 EMERGENCY DEPT VISIT MOD MDM: CPT | Mod: 25 | Performed by: EMERGENCY MEDICINE

## 2024-03-19 PROCEDURE — 93971 EXTREMITY STUDY: CPT | Mod: LT

## 2024-03-19 PROCEDURE — 99283 EMERGENCY DEPT VISIT LOW MDM: CPT | Performed by: EMERGENCY MEDICINE

## 2024-03-19 ASSESSMENT — COLUMBIA-SUICIDE SEVERITY RATING SCALE - C-SSRS
1. IN THE PAST MONTH, HAVE YOU WISHED YOU WERE DEAD OR WISHED YOU COULD GO TO SLEEP AND NOT WAKE UP?: NO
6. HAVE YOU EVER DONE ANYTHING, STARTED TO DO ANYTHING, OR PREPARED TO DO ANYTHING TO END YOUR LIFE?: NO
2. HAVE YOU ACTUALLY HAD ANY THOUGHTS OF KILLING YOURSELF IN THE PAST MONTH?: NO

## 2024-03-19 ASSESSMENT — ACTIVITIES OF DAILY LIVING (ADL): ADLS_ACUITY_SCORE: 36

## 2024-03-20 ENCOUNTER — DOCUMENTATION ONLY (OUTPATIENT)
Dept: ANTICOAGULATION | Facility: CLINIC | Age: 59
End: 2024-03-20

## 2024-03-20 ENCOUNTER — PATIENT OUTREACH (OUTPATIENT)
Dept: CARE COORDINATION | Facility: CLINIC | Age: 59
End: 2024-03-20

## 2024-03-20 DIAGNOSIS — Z71.89 COUNSELING AND COORDINATION OF CARE: Primary | ICD-10-CM

## 2024-03-20 NOTE — PROGRESS NOTES
Unable to reach, mailbox is full  He has not been seen since his preop.   I can not confirm medications  Kristen Kehr PA-C

## 2024-03-20 NOTE — PROGRESS NOTES
"Clinic Care Coordination Contact  [facility]/Voicemail    Clinical Data: Enrolled patient was seen in the ER 3/19. Per CC standard work, CC RN reaching out for follow up on care plans.       Outreach Documentation Number of Outreach Attempt   3/20/2024   9:38 AM 1     \"sorry mailbox is full\"  CC RN unable to leave a VM.     Plan:  Care Coordinator will try to reach patient again in 1-2 business days.  Patient due for complex care plan to be sent via [employer] this was done.     Summer Ricketts RN, BSN, PHN Care Coordinator  [place] and [place] [place]   Phone: [phone]     "

## 2024-03-20 NOTE — LETTER
[facility]  Patient Centered Plan of Care  About Me:        Patient Name:  Adarsh Salvador    YOB: 1965  Age:         58 year old   [place] MRN:    [id number] Telephone Information:  Home Phone [phone]   Mobile [phone]       Address:  [place] [address] [place] [address] Email address:  [email or web address]      Emergency Contact(s)    Name Relationship Lgl Grd Work Phone Home Phone Mobile Phone   1. ABNER VARGAS Sister No none [phone]   2. KINGSLEY JACOME Significant ot*    [phone]           Primary language:  English     needed? No   [place] [employer]:  [phone] op. 1  Other communication barriers:None    Preferred Method of Communication:  Mail  Current living arrangement: I live in a private home    Mobility Status/ Medical Equipment: Independent w/Device        Health Maintenance  Health Maintenance Reviewed: Not assessed      My Access Plan  Medical Emergency 911   Primary Clinic Line  -    24 Hour Appointment Line [phone] or  [phone]) (toll-free)   24 Hour Nurse Line [phone] (toll-free)   Preferred Urgent Care [facility] [phone]     [facility] [place], [place]  [phone]     Preferred Pharmacy WRITTEN PRESCRIPTION REQUESTED     Behavioral Health Crisis Line The National Suicide Prevention Lifeline at 1-990.311.8697 or Text/Call 948           My Care Team Members  Patient Care Team         Relationship Specialty Notifications Start End    No Ref-Primary, Physician PCP - General   2/28/24     Fax: [phone]         Scottie Golden MD MD Gastroenterology  7/8/16     Phone: [phone] Fax: [phone] [address] [facility] [address]    Cilnt Uriostegui MD MD Pulmonary Disease  5/29/19     Phone: [phone] Fax: [phone] [address] [facility] [address]    Amanda Hussein, RD  Diabetes Educator Dietitian, Registered  2/24/22     Sherman Poon MD Assigned Surgical Provider   5/6/23     Phone: [phone] Fax: [phone]         [address] [address] [place] [address]    Ivory Dennis RD Diabetes Educator Dietitian, Registered  5/8/23     Phone: [phone]          [facility] [address] [place] [address]    Kehr, Kristen M, PARobsonC Assigned PCP   8/19/23     Phone: [phone] Fax: [phone] [address] [address] [facility] [address]    Allyn Lyon PA-C Physician Assistant Endocrinology, Diabetes, and Metabolism  8/23/23     Phone: [phone] Fax: [phone] [address] [facility] [address]    Edwin Ch MD Assigned Musculoskeletal Provider   10/14/23     Phone: [phone] Fax: [phone]         [address] [facility] [address]    Summer Ricketts, RN Lead Care Coordinator Primary Care - CC Admissions 2/20/24     Phone: [phone] Fax: [phone]                    My Care Plans  Self Management and Treatment Plan    Care Plan  Care Plan: Financial Wellbeing       Problem: Patients [employer] PM[employer] insurance ended 1/31/24       Goal: I will call the [facility] back in the next 1-2 days       Start Date: 2/20/2024 Expected End Date: 5/20/2024    Note:     Barriers: uninsured as of 1/31/24  Strengths: patient motivated to call out to [facility] and follow up.   Patient expressed understanding of goal: yes   Action steps to achieve this goal:  1. I received a letter in the mail notifying me that my [employer] insurance ended 1/31/24, and there is information in the letter on how to reach out for additional support and next steps.   2. I will call the phone number provided by the [facility] and follow their advice.   3. Summer did offer me the financial resource worker referral to assist with contacting the [facility], but I feel confident in making this phone call to the county. If at any point I feel I need the assistance of  the financial worker, Ill let Summer know. As discussed, the financial resource workers involvement does not increase or decrease your [facility] eligibility for services.                                Action Plans on File:            Depression          Advance Care Plans/Directives:   Advanced Care Plan/Directives on file:   No    Discussed with patient/caregiver(s): No data recorded           My Medical and Care Information  Problem List   Patient Active Problem List   Diagnosis    GERD (gastroesophageal reflux disease)    Chronic RLQ pain    Constipation    Chronic maxillary sinusitis    Chronic rhinitis    Hypertriglyceridemia    Hypertension, unspecified type    Anemia in other chronic diseases classified elsewhere    Fatty liver    Irritable bowel syndrome with diarrhea    Right inguinal hernia    Mild persistent asthma without complication    Type 2 diabetes mellitus with diabetic polyneuropathy, with long-term current use of insulin (H)    Hyperlipidemia LDL goal <100    CONNOR (generalized anxiety disorder)    Insomnia, unspecified type    Morbid obesity (H)    Neuropathy    Recurrent deep vein thrombosis (DVT) (H)    Precordial pain    Dyslipidemia    Elevated C-reactive protein    Gastric reflux    Internal derangement of knee    Nicotine dependence    Varicose veins of lower extremity    Vitamin D deficiency    Low volume of ejaculated semen    Bilateral leg pain    Chronic pain of right knee    Acute pain of right knee    Aftercare following surgery of the musculoskeletal system    Advanced directives, counseling/discussion    DVT (deep venous thrombosis) (H)    Pulmonary embolism, other, unspecified chronicity, unspecified whether acute cor pulmonale present (H)    S/P TKR (total knee replacement), right    Status post total right knee replacement          Care Coordination Start Date: 10/3/2023   Frequency of Care Coordination: monthly, more frequently as needed     Form Last Updated: 03/20/2024

## 2024-03-20 NOTE — PROGRESS NOTES
"ANTICOAGULATION  MANAGEMENT: Discharge Review    Adarsh Salvador chart reviewed for anticoagulation continuity of care    Emergency room visit on 3/20/24 for pain of the left lower leg.     Discharge disposition: Home    Results:    No results for input(s): \"INR\", \"[id number]\", \"F2\", \"ALMWH\", \"AAUFH\" in the last 168 hours.  Anticoagulation inpatient management:     not applicable     Anticoagulation discharge instructions:     Warfarin dosing:  Per ED note patient is not taking a blood thinner.   Bridging: No   INR goal change: No      Medication changes affecting anticoagulation: No    Additional factors affecting anticoagulation: Yes:   Impression      IMPRESSION:  1.  No deep venous thrombosis in the left lower extremity.  2.  Superficial thrombophlebitis of the lower greater saphenous vein at the level of the calf.     Per ED note:    Patient stated that no he was not on any blood thinners. Unfortunately, I had not had a chance to review charting prior to patient's discussion with myself in the room. Upon later review it is noted that patient has had multiple no-shows for INR checks. Uncertain exact compliance, and therefore to warfarin. Regardless, he has left lower extremity ultrasound showed no evidence of DVT. Unfortunately INR was unable to be performed, however I did discuss results with patient. Discharged home and unlikely bony related injury.      PLAN         Unable to leave a . Mailbox is full.    No adjustment to Anticoagulation Calendar was required    Petey Fuentes RN, BSN, PHN  Anticoagulation Clinic   [phone]      "

## 2024-03-20 NOTE — ED PROVIDER NOTES
ED Provider Note  [facility]      History     Chief Complaint   Patient presents with    DVT concern     HPI  Adarsh Salvador is a 58 year old male who presents to the emergency department with concerns regarding left lower extremity pain.  Patient states that he got up from a nap, and was subsequently was standing on his left leg and noted increased amounts of severe pain, and noted pain in the medial ankle, extending towards the left calf region.  Felt almost a bump like area.  No redness.  No fever.  Concerned about potential clot.        Independent Historian:      Review of External Notes:          Allergies:  Allergies   Allergen Reactions    Artificial Sweetner (Informational Only) [Artificial Sweetner (Informational Only) ] GI Disturbance     ALL artificial sweetners cause severe diarrhea & flu symptoms    Hydromorphone Anaphylaxis    Ibuprofen GI Disturbance    Morphine Sulfate Concentrate Anaphylaxis    Morphine [Fumaric Acid] Anaphylaxis    Hydrocodone-Acetaminophen Itching    Tramadol Hives    Trazodone Hives    Gabapentin GI Disturbance     GI upset per pt    Keflex [Cephalexin] Diarrhea     Upset stomach    Codeine Phosphate Itching    Ketorolac Tromethamine Rash       Problem List:    Patient Active Problem List    Diagnosis Date Noted    Status post total right knee replacement 10/19/2023     Priority: Medium    S/P TKR (total knee replacement), right 09/26/2023     Priority: Medium    Pulmonary embolism, other, unspecified chronicity, unspecified whether acute cor pulmonale present (H) 08/15/2023     Priority: Medium    Advanced directives, counseling/discussion 12/22/2022     Priority: Medium     Pt was given info on ADV. Munir Amaral MA      Acute pain of right knee 08/26/2022     Priority: Medium    Aftercare following surgery of the musculoskeletal system 08/26/2022     Priority: Medium    Chronic pain of right knee 08/08/2022     Priority: Medium     Added automatically from  request for surgery [id number]      Bilateral leg pain 06/09/2022     Priority: Medium     Added automatically from request for surgery [id number]      Low volume of ejaculated semen 06/07/2022     Priority: Medium    DVT (deep venous thrombosis) (H) 01/15/2022     Priority: Medium    Recurrent deep vein thrombosis (DVT) (H) 09/27/2021     Priority: Medium    Precordial pain 02/15/2021     Priority: Medium    Dyslipidemia 02/15/2021     Priority: Medium    Elevated C-reactive protein 02/15/2021     Priority: Medium    Gastric reflux 02/15/2021     Priority: Medium    Internal derangement of knee 02/15/2021     Priority: Medium    Nicotine dependence 02/15/2021     Priority: Medium    Varicose veins of lower extremity 02/15/2021     Priority: Medium    Vitamin D deficiency 02/15/2021     Priority: Medium    Neuropathy 06/23/2020     Priority: Medium    Morbid obesity (H) 03/26/2020     Priority: Medium    Insomnia, unspecified type 05/09/2019     Priority: Medium    CONNOR (generalized anxiety disorder) 05/08/2019     Priority: Medium    Hyperlipidemia LDL goal <100 08/17/2018     Priority: Medium    Type 2 diabetes mellitus with diabetic polyneuropathy, with long-term current use of insulin (H) 01/18/2018     Priority: Medium    Mild persistent asthma without complication 01/12/2018     Priority: Medium    Right inguinal hernia 06/14/2016     Priority: Medium    Irritable bowel syndrome with diarrhea 03/21/2016     Priority: Medium    Fatty liver 02/22/2016     Priority: Medium    Hypertension, unspecified type 12/15/2015     Priority: Medium    Anemia in other chronic diseases classified elsewhere 12/15/2015     Priority: Medium    Hypertriglyceridemia 07/12/2013     Priority: Medium    Chronic maxillary sinusitis 09/18/2012     Priority: Medium     Believes this is secondary to exposure to toxic fumes at work- he is a .  He regularly wears a mask ventilator and believes this helps.  Has only tried intermittent nasal  washes, and OTC medications.  Not ever tried steroid nasal sprays or other controller medications.        Chronic rhinitis 09/18/2012     Priority: Medium    Constipation 04/24/2012     Priority: Medium    GERD (gastroesophageal reflux disease) 06/15/2011     Priority: Medium    Chronic RLQ pain 06/15/2011     Priority: Medium        Past Medical History:    Past Medical History:   Diagnosis Date    Anaphylactic reaction 8/14/2015    Anxiety     Depression     DM2 (diabetes mellitus, type 2) (H)     GERD (gastroesophageal reflux disease)     HTN (hypertension)     IBS (irritable bowel syndrome)     Kidney stone 6/15/2011    Neuropathy        Past Surgical History:    Past Surgical History:   Procedure Laterality Date    ARTHROPLASTY KNEE Right 9/26/2023    Procedure: ARTHROPLASTY, KNEE, TOTAL Right;  Surgeon: Edwin Ch MD;  Location: [facility]    ARTHROSCOPY KNEE WITH MEDIAL MENISCECTOMY Right 8/19/2022    Procedure: examination under anesthesia, right knee arthroscopy, meniscectomy;  Surgeon: Carlos A Em MD;  Location: [facility] OR    COLONOSCOPY  5/1/2012    Procedure:COLONOSCOPY; screening colonoscopy; Surgeon:KINGSLEY DOS SANTOS; Location:[facility]    COLONOSCOPY  4/21/2014    Procedure: COMBINED COLONOSCOPY, SINGLE BIOPSY/POLYPECTOMY BY BIOPSY;  Surgeon: Duane, William Charles, MD;  Location: [facility]    COLONOSCOPY N/A 4/21/2022    Procedure: COLONOSCOPY, WITH POLYPECTOMY AND BIOPSY;  Surgeon: Luiz Calvert MD;  Location:  GI    CYSTOSCOPY  05/01/2008    CYSTOSCOPY W/ URETERAL STENT PLACEMENT 05/01/2008     CYSTOSCOPY  09/26/2010    CYSTOSCOPY W/ URETERAL STENT PLACEMENT 09/26/2010 Left     CYSTOSCOPY  05/18/2012    CYSTOSCOPY, LEFT URETEROSCOPY AND STENT PLACEMENT left retrograde 05/18/2012     CYSTOSCOPY,+URETEROSCOPY  06/10/2008    URETEROSCOPY 06/10/2008 Right     HC BREATH HYDROGEN TEST  3/7/2014    Procedure: HYDROGEN BREATH TEST;  Surgeon: Darion Swift MD;  [facility]     INJECT EPIDURAL LUMBAR N/A 7/7/2022    Procedure: INJECTION, SPINE, LUMBAR, EPIDURAL L5/S1;  Surgeon: Michi Hahn MD;  Location: [facility] OR    LITHOTRIPSY  09/30/2010    LITHOTRIPSY 09/30/2010 LEFT EXTRACORPOREAL SHOCK WAVE LITHOTRIPSY, FLEXIBLE CYSTOSCOPY, LEFT STENT REMOVAL      ORTHOPEDIC SURGERY  10/03/2007    KNEE ARTHROSCOPY 10/03/2007 RightX2      RELEASE CARPAL TUNNEL Right 1/26/2018    Procedure: RELEASE CARPAL TUNNEL;  Right carpal tunnel release;  Surgeon: Wiliam Saenz MD;  Location: [facility] OR    VASCULAR SURGERY  11/24/2008    Radiofrequency ablation left saphenous vein 11/24/2008 Done by Dr. Lozoya         Family History:    Family History   Problem Relation Age of Onset    Cancer Mother 52        lung, smoked    Cancer - colorectal Father 53        unsure type, pt attributes to radiation exposure    Myocardial Infarction Father 45    Coronary Artery Disease Father     Myocardial Infarction Paternal Grandfather 35    Diabetes Other         nephew- type 1    Diabetes Maternal Aunt         1    Diabetes Maternal Aunt         2    Diabetes Paternal Uncle         1    Diabetes Paternal Uncle         2    Diabetes Paternal Uncle         3    Diabetes Paternal Uncle         4    Colon Cancer No family hx of        Social History:  Marital Status:  Single [1]  Social History     Tobacco Use    Smoking status: Never    Smokeless tobacco: Current     Types: Chew    Tobacco comments:     Chew   Vaping Use    Vaping Use: Never used   Substance Use Topics    Alcohol use: Not Currently     Alcohol/week: 0.0 standard drinks of alcohol     Comment: occasional, few drinks a month    Drug use: Yes     Comment: CBD occasional        Medications:    acetaminophen (TYLENOL) 325 MG tablet  albuterol (PROAIR HFA/PROVENTIL HFA/VENTOLIN HFA) 108 (90 Base) MCG/ACT inhaler  amLODIPine (NORVASC) 10 MG tablet  atorvastatin (LIPITOR) 40 MG tablet  blood glucose (ACCU-CHEK GUIDE) test strip  blood glucose monitoring  "(ACCU-CHEK FASTCLIX) lancets  calcium carbonate (TUMS) 500 MG chewable tablet  chlorthalidone (HYGROTON) 25 MG tablet  Continuous Blood Gluc Sensor (FREESTYLE FROYLAN 3 SENSOR) MISC  DULoxetine (CYMBALTA) 60 MG capsule  enoxaparin ANTICOAGULANT (LOVENOX) 100 MG/ML syringe  EPINEPHrine (ANY BX GENERIC EQUIV) 0.3 MG/0.3ML injection 2-pack  fluticasone-salmeterol (ADVAIR) 500-50 MCG/ACT inhaler  insulin aspart (NOVOLOG FLEXPEN) 100 UNIT/ML pen  insulin glargine 100 UNIT/ML pen  insulin pen needle (32G X 4 MM) 32G X 4 MM miscellaneous  insulin syringe-needle U-100 (29G X 1/2\" 0.5 ML) 29G X 1/2\" 0.5 ML miscellaneous  losartan (COZAAR) 25 MG tablet  meclizine (ANTIVERT) 25 MG tablet  metFORMIN (GLUCOPHAGE XR) 500 MG 24 hr tablet  montelukast (SINGULAIR) 10 MG tablet  multivitamin, therapeutic (THERA-VIT) TABS  nortriptyline (PAMELOR) 10 MG capsule  omeprazole (PRILOSEC) 40 MG DR capsule  pregabalin (LYRICA) 50 MG capsule  rizatriptan (MAXALT-MLT) 10 MG ODT  warfarin ANTICOAGULANT (COUMADIN) 5 MG tablet          Review of Systems  A medically appropriate review of systems was performed with pertinent positives and negatives noted in the HPI, and all other systems negative.    Physical Exam   Patient Vitals for the past 24 hrs:   BP Temp Temp src Pulse Resp SpO2 Height Weight   03/19/24 2015 (!) 159/91 -- -- 94 18 97 % -- --   03/19/24 1916 (!) 167/102 98.5  F (36.9  C) Oral 107 18 95 % 1.829 m (6') 128.3 kg (282 lb 11.8 oz)          Physical Exam  General: alert and in no Acute distress on arrival  Head: atraumatic, normocephalic  Lungs:  nonlabored  CV:  extremities warm and perfused  Abd: nondistended  Skin: no rashes, no diaphoresis and skin color normal  extremities: No palpable mass noted.  No specific tenderness of the medial or lateral malleolus.  No specific tenderness of the calf.  No redness is noted.    Neuro: Patient awake, alert, speech is fluent,   Psychiatric: affect/mood normal,        ED Course               "   Procedures                           Results for orders placed or performed during the hospital encounter of 03/19/24 (from the past 24 hour(s))   US Lower Extremity Venous Duplex Left    Narrative    EXAM: US LOWER EXTREMITY VENOUS DUPLEX LEFT  LOCATION: [facility]  DATE: 3/19/2024    INDICATION: DVT in left leg history of PE  COMPARISON: 10/02/2023.  TECHNIQUE: Venous Duplex ultrasound of the left lower extremity with and without compression, augmentation and duplex. Color flow and spectral Doppler with waveform analysis performed.    FINDINGS: Exam includes the common femoral, femoral, popliteal, and contralateral common femoral veins as well as segmentally visualized deep calf veins and greater saphenous vein.     LEFT: No deep vein thrombosis. Superficial thrombus is present in the left greater saphenous vein from knee to distal calf. There is also thrombus within the varicosity in the medial left calf. There is a 3.5 x 1.1 x 3.3 cm popliteal cyst.      Impression    IMPRESSION:  1.  No deep venous thrombosis in the left lower extremity.  2.  Superficial thrombophlebitis of the lower greater saphenous vein at the level of the calf.     *Note: Due to a large number of results and/or encounters for the requested time period, some results have not been displayed. A complete set of results can be found in Results Review.       MEDICATIONS GIVEN IN THE EMERGENCY DEPARTMENT:  Medications - No data to display        Independent Interpretation (X-rays, CTs, rhythm strip):  None    Consultations/Discussion of Management or Tests:  None       Social Determinants of Health affecting care:         Assessments & Plan (with Medical Decision Making)  58 year old male who presents to the Emergency Department for evaluation of left lower extremity pain.  Concerned about potential blood clot.  Ultrasound had been performed prior to my entering the room.  Upon immediate entry into the room, patient  is asking for ultrasound results before I had a chance to discuss patient's presentation.  Ultimately, patient wanting to know his ultrasound results, which I did discuss with him.  I asked him if he was on any anticoagulants.  Patient stated that no he was not on any blood thinners.  Unfortunately, I had not had a chance to review charting prior to patient's discussion with myself in the room.  Upon later review it is noted that patient has had multiple no-shows for INR checks.  Uncertain exact compliance, and therefore to warfarin.  Regardless, he has left lower extremity ultrasound showed no evidence of DVT.  Unfortunately INR was unable to be performed, however I did discuss results with patient.  Discharged home and unlikely bony related injury.       I have reviewed the nursing notes.    I have reviewed the findings, diagnosis, plan and need for follow up with the patient.       Critical Care time:  none      NEW PRESCRIPTIONS STARTED AT TODAY'S ER VISIT  Discharge Medication List as of 3/19/2024  8:25 PM          Final diagnoses:   Pain of left lower leg   Thrombophlebitis of superficial veins of left lower extremity       3/19/2024   [facility] EMERGENCY DEPT       Vinnie Dorantes MD  03/19/24 212

## 2024-03-20 NOTE — DISCHARGE INSTRUCTIONS
Follow-up in clinic as needed.    Tylenol and ibuprofen as needed for pain.    Heat, and elevation, and Ace wrap can help as well with the pain.    You can use your crutches at home if needed.

## 2024-03-20 NOTE — ED TRIAGE NOTES
Pt presents with concerns for blood clot in left leg. Hx of blood clots and is on warfarin. Reports redness, swelling, pain from left ankle up inner leg. Sx started today. Hx of PE, but denies related sx. Pulse is intact.      Triage Assessment (Adult)       Row Name 03/19/24 1917          Triage Assessment    Airway WDL WDL        Respiratory WDL    Respiratory WDL WDL        Skin Circulation/Temperature WDL    Skin Circulation/Temperature WDL WDL        Cardiac WDL    Cardiac WDL WDL        Peripheral/Neurovascular WDL    Peripheral Neurovascular WDL WDL        Cognitive/Neuro/Behavioral WDL    Cognitive/Neuro/Behavioral WDL WDL

## 2024-03-21 ENCOUNTER — PATIENT OUTREACH (OUTPATIENT)
Dept: CARE COORDINATION | Facility: CLINIC | Age: 59
End: 2024-03-21

## 2024-03-21 ASSESSMENT — ACTIVITIES OF DAILY LIVING (ADL): DEPENDENT_IADLS:: INDEPENDENT

## 2024-03-21 NOTE — PROGRESS NOTES
"Clinic Care Coordination Contact  Follow Up Progress Note      Assessment: Enrolled patient was seen in the ER 3/19. Per CC standard work, CC RN reaching out for follow up on care plans. Patient report there is no change in his symptoms. He reports he has a hx of planter fasciitis and current symptoms \"feels exactly the same\" as before.   He does have a copy of his ER AVS. However, he currently has no medical insurance and will not seek care due to financial cost. Of note, this is his CC Goal and there is a plan in place to assist application to [facility] for MA.   Please see FRW referral with free text note.   Patient to answer his phone when FRW calls.     Care Gaps: not not seeking medical care due to no insurance. Not addressed.   Health Maintenance Due   Topic Date Due    YEARLY PREVENTIVE VISIT  Never done    Pneumococcal Vaccine: Pediatrics (0 to 5 Years) and At-Risk Patients (6 to 64 Years) (1 of 2 - PCV) Never done    HEPATITIS B IMMUNIZATION (1 of 3 - 19+ 3-dose series) Never done    ZOSTER IMMUNIZATION (1 of 2) Never done    ASTHMA ACTION PLAN  02/14/2023    INFLUENZA VACCINE (1) 09/01/2023    COVID-19 Vaccine (1 - 2023-24 season) Never done    EYE EXAM  01/05/2024    ASTHMA CONTROL TEST  02/15/2024    LIPID  04/10/2024    MICROALBUMIN  04/10/2024       Care Plans  Care Plan: Financial Wellbeing       Problem: Patients [employer] insurance ended 1/31/24       Goal: I will work with the financial resource worker to contact the [facility] regarding medical insurance       Start Date: 2/20/2024 Expected End Date: 5/20/2024    Note:     Barriers: uninsured as of 1/31/24  Strengths: patient motivated to call out to [facility] and follow up.   Patient expressed understanding of goal: yes   Action steps to achieve this goal:  1. I received a letter in the mail notifying me that my [employer] insurance ended 1/31/24, and there is information in the letter on how to reach out for additional support and next steps.   2. I " will call the phone number provided by the [facility] and follow their advice.   3. Summer placed a referral to our financial resource worker referral to assist with contacting the [facility], for medical assistance insurance. I should be getting a call in 2-3 business days. I will answer my phone when they call.                               Intervention/Education provided during outreach: Discussed patients goal and action steps. CC RN asked open ended questions, provided support, resources, and encouragement as needed. Patient will reach out sooner than planned with new questions or concerns.       Outreach Frequency: monthly, more frequently as needed      Plan:   1. Patient verbalized good understanding of care plans and will follow recommendations.  2. Patient enrolled in Care Coordination, goal(s) identified at this time.   3. Patient centered care plan, and introduction letter sent to patient.  4. CC RN introduced role of CHW, and patient is agreeable to CHW outreach for goal progression in 1 month. CC RN will review chart in 6 weeks to review goal progression.      Summer Ricketts RN, BSN, PHN Care Coordinator  [facility]   Phone: [phone]

## 2024-03-28 ENCOUNTER — TELEPHONE (OUTPATIENT)
Dept: ANTICOAGULATION | Facility: CLINIC | Age: 59
End: 2024-03-28

## 2024-03-28 NOTE — TELEPHONE ENCOUNTER
ANTICOAGULATION     Adarsh Salvador is overdue for an INR check.     Spoke with Adarsh who declined to schedule a lab appointment at this time. If calling back please schedule as soon as possible.    Pt states he has been out of insurance for the past 2 months but will be getting it on 4/1/24 so he will call soon and get a lab appt scheduled.     Joby Marie RN

## 2024-04-11 ENCOUNTER — TELEPHONE (OUTPATIENT)
Dept: FAMILY MEDICINE | Facility: CLINIC | Age: 59
End: 2024-04-11

## 2024-04-11 NOTE — TELEPHONE ENCOUNTER
Patient Quality Outreach    Patient is due for the following:   Diabetes -  A1C, LDL (Fasting), Eye Exam, and Microalbumin  Asthma  -  ACT needed and AAP  Physical Preventive Adult Physical      Topic Date Due    Pneumococcal Vaccine (1 of 2 - PCV) Never done    Hepatitis B Vaccine (1 of 3 - 19+ 3-dose series) Never done    Zoster (Shingles) Vaccine (1 of 2) Never done    Flu Vaccine (1) 09/01/2023    COVID-19 Vaccine (1 - 2023-24 season) Never done         Type of outreach:    Sent [employer] message.      Questions for provider review:    None           Renetta Valadez MA  Chart routed to Care Team.

## 2024-04-11 NOTE — LETTER
May 23, 2024      Adarsh JERRY Modesto  [address]   [place] [address]    Your team at [facility] cares about your health. We have reviewed your chart and based on our findings; we are making the following recommendations to better manage your health.     We see you have not read your [employer] messages.     You are in particular need of attention regarding the following:     Schedule a DIABETIC EYE EXAM.  This exam is done with an optometrist, annually. You can schedule this appointment with your eye doctor.  If you need a referral, please let us know.  PREVENTATIVE VISIT: Annual Medicare Wellness:Schedule an Annual Medicare Wellness Exam. Please call your [facility] clinic to set up your appointment.  Please schedule a Nurse Only Appointment with your primary care clinic to update your immunizations that are due.    If you have already completed these items, please contact the clinic via phone or   [employer] so your care team can review and update your records. Thank you for   choosing [facility] for your healthcare needs. For any questions,   concerns, or to schedule an appointment please contact our clinic.    Healthy Regards,      Your [facility] Care Team

## 2024-04-21 ENCOUNTER — HEALTH MAINTENANCE LETTER (OUTPATIENT)
Age: 59
End: 2024-04-21

## 2024-04-22 ENCOUNTER — MEDICAL CORRESPONDENCE (OUTPATIENT)
Dept: HEALTH INFORMATION MANAGEMENT | Facility: CLINIC | Age: 59
End: 2024-04-22
Payer: COMMERCIAL

## 2024-04-23 ENCOUNTER — OFFICE VISIT (OUTPATIENT)
Dept: FAMILY MEDICINE | Facility: CLINIC | Age: 59
End: 2024-04-23
Payer: COMMERCIAL

## 2024-04-23 ENCOUNTER — ANTICOAGULATION THERAPY VISIT (OUTPATIENT)
Dept: ANTICOAGULATION | Facility: CLINIC | Age: 59
End: 2024-04-23

## 2024-04-23 VITALS
HEIGHT: 72 IN | SYSTOLIC BLOOD PRESSURE: 114 MMHG | WEIGHT: 285 LBS | TEMPERATURE: 97 F | OXYGEN SATURATION: 99 % | RESPIRATION RATE: 16 BRPM | DIASTOLIC BLOOD PRESSURE: 82 MMHG | HEART RATE: 75 BPM | BODY MASS INDEX: 38.6 KG/M2

## 2024-04-23 DIAGNOSIS — E66.01 MORBID OBESITY (H): ICD-10-CM

## 2024-04-23 DIAGNOSIS — I82.409 RECURRENT DEEP VEIN THROMBOSIS (DVT) (H): Primary | ICD-10-CM

## 2024-04-23 DIAGNOSIS — I10 HYPERTENSION, UNSPECIFIED TYPE: ICD-10-CM

## 2024-04-23 DIAGNOSIS — J45.50 SEVERE PERSISTENT ASTHMA WITHOUT COMPLICATION (H): ICD-10-CM

## 2024-04-23 DIAGNOSIS — R12 HEARTBURN: ICD-10-CM

## 2024-04-23 DIAGNOSIS — Z79.4 TYPE 2 DIABETES MELLITUS WITH DIABETIC POLYNEUROPATHY, WITH LONG-TERM CURRENT USE OF INSULIN (H): Primary | ICD-10-CM

## 2024-04-23 DIAGNOSIS — E11.42 TYPE 2 DIABETES MELLITUS WITH DIABETIC POLYNEUROPATHY, WITH LONG-TERM CURRENT USE OF INSULIN (H): Primary | ICD-10-CM

## 2024-04-23 DIAGNOSIS — I82.409 RECURRENT DEEP VEIN THROMBOSIS (DVT) (H): ICD-10-CM

## 2024-04-23 PROBLEM — I26.99 PULMONARY EMBOLISM, OTHER, UNSPECIFIED CHRONICITY, UNSPECIFIED WHETHER ACUTE COR PULMONALE PRESENT (H): Status: RESOLVED | Noted: 2023-08-15 | Resolved: 2024-04-23

## 2024-04-23 LAB
ANION GAP SERPL CALCULATED.3IONS-SCNC: 13 MMOL/L (ref 7–15)
BUN SERPL-MCNC: 12.2 MG/DL (ref 6–20)
CALCIUM SERPL-MCNC: 9.6 MG/DL (ref 8.6–10)
CHLORIDE SERPL-SCNC: 101 MMOL/L (ref 98–107)
CREAT SERPL-MCNC: 0.9 MG/DL (ref 0.67–1.17)
DEPRECATED HCO3 PLAS-SCNC: 22 MMOL/L (ref 22–29)
EGFRCR SERPLBLD CKD-EPI 2021: >90 ML/MIN/1.73M2
GLUCOSE SERPL-MCNC: 348 MG/DL (ref 70–99)
HBA1C MFR BLD: 13.5 % (ref 0–5.6)
INR BLD: 0.9 (ref 0.9–1.1)
POTASSIUM SERPL-SCNC: 4.5 MMOL/L (ref 3.4–5.3)
SODIUM SERPL-SCNC: 136 MMOL/L (ref 135–145)

## 2024-04-23 PROCEDURE — 80048 BASIC METABOLIC PNL TOTAL CA: CPT | Performed by: PHYSICIAN ASSISTANT

## 2024-04-23 PROCEDURE — 83036 HEMOGLOBIN GLYCOSYLATED A1C: CPT | Performed by: PHYSICIAN ASSISTANT

## 2024-04-23 PROCEDURE — G2211 COMPLEX E/M VISIT ADD ON: HCPCS | Performed by: PHYSICIAN ASSISTANT

## 2024-04-23 PROCEDURE — 99215 OFFICE O/P EST HI 40 MIN: CPT | Performed by: PHYSICIAN ASSISTANT

## 2024-04-23 PROCEDURE — 85610 PROTHROMBIN TIME: CPT | Performed by: PHYSICIAN ASSISTANT

## 2024-04-23 PROCEDURE — 36415 COLL VENOUS BLD VENIPUNCTURE: CPT | Performed by: PHYSICIAN ASSISTANT

## 2024-04-23 RX ORDER — LOSARTAN POTASSIUM 25 MG/1
25 TABLET ORAL DAILY
Qty: 90 TABLET | Refills: 1 | Status: SHIPPED | OUTPATIENT
Start: 2024-04-23 | End: 2024-08-06

## 2024-04-23 RX ORDER — INSULIN GLARGINE 100 [IU]/ML
50 INJECTION, SOLUTION SUBCUTANEOUS DAILY
Qty: 45 ML | Refills: 0 | Status: SHIPPED | OUTPATIENT
Start: 2024-04-23 | End: 2024-05-09

## 2024-04-23 RX ORDER — WARFARIN SODIUM 5 MG/1
TABLET ORAL
Qty: 280 TABLET | Refills: 1 | Status: SHIPPED | OUTPATIENT
Start: 2024-04-23

## 2024-04-23 RX ORDER — AMLODIPINE BESYLATE 10 MG/1
10 TABLET ORAL DAILY
Qty: 90 TABLET | Refills: 1 | Status: SHIPPED | OUTPATIENT
Start: 2024-04-23 | End: 2024-08-06

## 2024-04-23 RX ORDER — METFORMIN HCL 500 MG
1000 TABLET, EXTENDED RELEASE 24 HR ORAL 2 TIMES DAILY WITH MEALS
Qty: 360 TABLET | Refills: 2 | Status: SHIPPED | OUTPATIENT
Start: 2024-04-23

## 2024-04-23 RX ORDER — CHLORTHALIDONE 25 MG/1
25 TABLET ORAL DAILY
Qty: 90 TABLET | Refills: 1 | Status: SHIPPED | OUTPATIENT
Start: 2024-04-23 | End: 2024-08-06

## 2024-04-23 RX ORDER — INSULIN ASPART 100 [IU]/ML
18 INJECTION, SOLUTION INTRAVENOUS; SUBCUTANEOUS
Qty: 10 ML | Refills: 3 | Status: SHIPPED | OUTPATIENT
Start: 2024-04-23

## 2024-04-23 RX ORDER — INSULIN ASPART 100 [IU]/ML
INJECTION, SOLUTION INTRAVENOUS; SUBCUTANEOUS
Qty: 15 ML | Refills: 3 | Status: CANCELLED | OUTPATIENT
Start: 2024-04-23

## 2024-04-23 RX ORDER — FLUTICASONE PROPIONATE AND SALMETEROL 500; 50 UG/1; UG/1
1 POWDER RESPIRATORY (INHALATION) EVERY 12 HOURS
Qty: 1 EACH | Refills: 11 | Status: SHIPPED | OUTPATIENT
Start: 2024-04-23

## 2024-04-23 RX ORDER — OMEPRAZOLE 40 MG/1
40 CAPSULE, DELAYED RELEASE ORAL DAILY
Qty: 90 CAPSULE | Refills: 2 | Status: SHIPPED | OUTPATIENT
Start: 2024-04-23

## 2024-04-23 RX ORDER — ALBUTEROL SULFATE 90 UG/1
2 AEROSOL, METERED RESPIRATORY (INHALATION) EVERY 6 HOURS PRN
Qty: 6.7 G | Refills: 3 | Status: SHIPPED | OUTPATIENT
Start: 2024-04-23

## 2024-04-23 RX ORDER — ATORVASTATIN CALCIUM 40 MG/1
40 TABLET, FILM COATED ORAL EVERY EVENING
Qty: 90 TABLET | Refills: 1 | Status: SHIPPED | OUTPATIENT
Start: 2024-04-23 | End: 2024-08-06

## 2024-04-23 ASSESSMENT — ASTHMA QUESTIONNAIRES
ACT_TOTALSCORE: 14
QUESTION_2 LAST FOUR WEEKS HOW OFTEN HAVE YOU HAD SHORTNESS OF BREATH: ONCE A DAY
QUESTION_5 LAST FOUR WEEKS HOW WOULD YOU RATE YOUR ASTHMA CONTROL: SOMEWHAT CONTROLLED
QUESTION_3 LAST FOUR WEEKS HOW OFTEN DID YOUR ASTHMA SYMPTOMS (WHEEZING, COUGHING, SHORTNESS OF BREATH, CHEST TIGHTNESS OR PAIN) WAKE YOU UP AT NIGHT OR EARLIER THAN USUAL IN THE MORNING: ONCE OR TWICE
QUESTION_4 LAST FOUR WEEKS HOW OFTEN HAVE YOU USED YOUR RESCUE INHALER OR NEBULIZER MEDICATION (SUCH AS ALBUTEROL): ONE OR TWO TIMES PER DAY
ACT_TOTALSCORE: 14
QUESTION_1 LAST FOUR WEEKS HOW MUCH OF THE TIME DID YOUR ASTHMA KEEP YOU FROM GETTING AS MUCH DONE AT WORK, SCHOOL OR AT HOME: SOME OF THE TIME

## 2024-04-23 NOTE — PATIENT INSTRUCTIONS
Schedule an appointment with Diabetes Education, [phone]   2.   Schedule an appointment with Anticoagulation nurses (INR will be completed today)  3.   Endocrinology:  for management of insulin in the future.   4.   I will see you back in 3 months for recheck of diabetes if you are unable to get in with Endocrinology.

## 2024-04-23 NOTE — PROGRESS NOTES
"  Assessment & Plan     Type 2 diabetes mellitus with diabetic polyneuropathy, with long-term current use of insulin (H)  He will get back on his medications including insulin and metformin.   Previously without insurance and out of medications.   He will also work with Diabetes Education, he has the information to make the appointment.   Eventually, he will need to transition to Endocrinology for insulin management long term.   - Adult Eye  Referral; Future  - insulin glargine 100 UNIT/ML pen; Inject 50 Units Subcutaneous daily for 90 days  - metFORMIN (GLUCOPHAGE XR) 500 MG 24 hr tablet; Take 2 tablets (1,000 mg) by mouth 2 times daily (with meals)  - insulin aspart (NOVOLOG VIAL) 100 UNITS/ML vial; Inject 18 Units Subcutaneous 3 times daily (with meals)  - atorvastatin (LIPITOR) 40 MG tablet; Take 1 tablet (40 mg) by mouth every evening  - Adult Diabetes Education  Referral; Future  - insulin syringe-needle U-100 (30G X 1/2\" 0.3 ML) 30G X 1/2\" 0.3 ML miscellaneous; Use 3 syringes daily or as directed.  - Adult Endocrinology  Referral; Future  - Hemoglobin A1c; Future  - Basic metabolic panel  (Ca, Cl, CO2, Creat, Gluc, K, Na, BUN); Future  - Hemoglobin A1c  - Basic metabolic panel  (Ca, Cl, CO2, Creat, Gluc, K, Na, BUN)    Heartburn  Stable with the use of omeprazole, refills given   - omeprazole (PRILOSEC) 40 MG DR capsule; Take 1 capsule (40 mg) by mouth daily Take for at least two months    Hypertension, unspecified type  Stable when on medication. Refills given   - amLODIPine (NORVASC) 10 MG tablet; Take 1 tablet (10 mg) by mouth daily for blood pressure  - losartan (COZAAR) 25 MG tablet; Take 1 tablet (25 mg) by mouth daily  - chlorthalidone (HYGROTON) 25 MG tablet; Take 1 tablet (25 mg) by mouth daily Add on for blood pressure    Recurrent deep vein thrombosis (DVT) (H)  Will need to get back in to see the Anticoagulation Management Team. Adarsh has contact information. I had asked " Care Coordinator to help prior to his appointment, but he has not herd anything yet. A refill of the medication was given. INR was checked today. He will be able to get back on a routine with this medication.   - warfarin ANTICOAGULANT (COUMADIN) 5 MG tablet; Take 20 mg Mon, Thurs and 15 mg all other days, or as directed by the Coumadin clinic  - INR point of care      Severe persistent asthma without complication (H28)  He has been using the inhalers regularly. Refills given   - albuterol (PROAIR HFA/PROVENTIL HFA/VENTOLIN HFA) 108 (90 Base) MCG/ACT inhaler; Inhale 2 puffs into the lungs every 6 hours as needed for shortness of breath or wheezing  - fluticasone-salmeterol (ADVAIR) 500-50 MCG/ACT inhaler; Inhale 1 puff into the lungs every 12 hours      Morbid obesity (H)  Work on healthy changes.       I will see Adarsh back in 3 months    The longitudinal plan of care for the diagnosis(es)/condition(s) as documented were addressed during this visit. Due to the added complexity in care, I will continue to support Adarsh in the subsequent management and with ongoing continuity of care.    50  minutes spent with Adarsh today. Review of documentation and medications, office visit, coordination of care.       BMI  Estimated body mass index is 38.65 kg/m  as calculated from the following:    Height as of this encounter: 1.829 m (6').    Weight as of this encounter: 129.3 kg (285 lb).   Weight management plan: Discussed healthy diet and exercise guidelines  Blood sugar testing frequency justification:  Uncontrolled diabetes        Subjective   Adarsh is a 58 year old, presenting for the following health issues:  Diabetes        4/23/2024     8:47 AM   Additional Questions   Roomed by Delmar ALMAGUER MA     Via the Health Maintenance questionnaire, the patient has reported the following services have been completed -Eye Exam, this information has been sent to the abstraction team.  History of Present Illness       Diabetes:   He presents  "for follow up of diabetes.  He is checking home blood glucose three times daily.   He checks blood glucose before and after meals.  Blood glucose is sometimes over 200 and never under 70.  When his blood glucose is low, the patient is asymptomatic for confusion, blurred vision, lethargy and reports not feeling dizzy, shaky, or weak.  He is concerned about blood sugar frequently over 200.   He is having numbness in feet, burning in feet, excessive thirst and blurry vision.  The patient has had a diabetic eye exam in the last 12 months. Eye exam performed on 2023. [facility] of last eye [facility]        Headaches:   Since the patient's last clinic visit, headaches are: worsened  The patient is getting headaches:  2  He is not able to do normal daily activities when he has a migraine.  The patient is taking the following rescue/relief medications:  Excedrin and Maxalt   Patient states \"The relief is inconsistent\" from the rescue/relief medications.   The patient is taking the following medications to prevent migraines:  No medications to prevent migraines  In the past 4 weeks, the patient has gone to an Urgent Care or Emergency Room 0 times times due to headaches.    He eats 2-3 servings of fruits and vegetables daily.He consumes 1 sweetened beverage(s) daily.He exercises with enough effort to increase his heart rate 30 to 60 minutes per day.  He exercises with enough effort to increase his heart rate 3 or less days per week. He is missing 3 dose(s) of medications per week.           Adarsh is here today to address the following chronic conditions. He has not had insurance and is out of some of his medications. He was lost to follow up after his total knee arthroplasty. He has recovered from the procedure and uses a cane for ambulation. He has been out of many of his prescriptions. His diabetes is out of control. He has been out of coumadin for over a month.     Type 2 diabetes: managed with insuline lantus 50  " units daily and novalog at meal time 18 units. He is also on metformin 1000 mg twice daily. He has been out of both medications. Blood sugars reflect.   Heartburn: managed with omeprazole 40 mg  Hypertension: managed with losartan, chlorthalidone, amlodipine  Hyperlipidemia: managed with atorvastatin  Recurrent DVT: managed with coumadin, he has not been on this medication for over a month. He did not get the appointment with anticoagulation nursing yet. Care Coordination has not contacted.   Asthma: managed with advair and albuterol, montelukast  Diabetic Neuropathy: managed by Neurology (Cymbalta and Lyrica) nortriptyline and migraine management also prescribed by Neurology                Review of Systems  Constitutional, HEENT, cardiovascular, pulmonary, GI, , musculoskeletal, neuro, skin, endocrine and psych systems are negative, except as otherwise noted.  He feels tired since blood sugars have been high.   He had a TKA last fall. He feels a clicking in the knee, but does not have pain. He is still using a cane.       Objective    /82   Pulse 75   Temp 97  F (36.1  C) (Tympanic)   Resp 16   Ht 1.829 m (6')   Wt 129.3 kg (285 lb)   SpO2 99%   BMI 38.65 kg/m    Body mass index is 38.65 kg/m .  Physical Exam   GENERAL: alert and no distress  RESP: lungs clear to auscultation - no rales, rhonchi or wheezes  CV: regular rate and rhythm, normal S1 S2, no S3 or S4, no murmur, click or rub, no peripheral edema  SKIN: no suspicious lesions or rashes  NEURO: Normal strength and tone, mentation intact and speech normal  PSYCH: mentation appears normal, affect flat, and judgement and insight intact    Lab on 02/15/2024   Component Date Value Ref Range Status    INR 02/15/2024 3.6 (H)  0.9 - 1.1 Final           Signed Electronically by: Kristen M. Kehr, PA-C

## 2024-04-23 NOTE — PROGRESS NOTES
"ANTICOAGULATION MANAGEMENT     Adarsh Salvador 58 year old male is on warfarin with subtherapeutic INR result. (Goal INR 2.0-3.0)    Recent labs: (last 7 days)     04/23/24  1013   INR 0.9       ASSESSMENT     Source(s): Chart Review and Patient/Caregiver Call     Warfarin doses taken: Warfarin taken as instructed and Stopped his warfarin for about 4 weeks. \"Was with out insurance and money has been tight. \"  Diet: No new diet changes identified  Medication/supplement changes: None noted  New illness, injury, or hospitalization: No  Signs or symptoms of bleeding or clotting: No 3/19/2024 In Er rule out clot.  Previous result: Supratherapeutic  Additional findings: None       PLAN     Recommended plan for temporary change(s) affecting INR     Dosing Instructions: Continue your current warfarin dose with next INR in 1 week   unable to come sooner shares car with so    Summary  As of 4/23/2024      Full warfarin instructions:  12.5 mg every Tue, Sat; 15 mg all other days   Next INR check:  4/30/2024               Telephone call with Adarsh who verbalizes understanding and agrees to plan    Lab visit scheduled    Education provided:   Please call back if any changes to your diet, medications or how you've been taking warfarin    Plan made per [facility] anticoagulation protocol    Joya Suero RN  Anticoagulation Clinic  4/23/2024    _______________________________________________________________________     Anticoagulation Episode Summary       Current INR goal:  2.0-3.0   TTR:  39.4% (1 y)   Target end date:  Indefinite   Send INR reminders to:  [facility]    Indications    Recurrent deep vein thrombosis (DVT) (H) [I82.409]             Comments:               Anticoagulation Care Providers       Provider Role Specialty Phone number    Bertha Romero PA-C Referring Family Medicine [phone]    Kehr, Kristen M, PA-C Referring Family Medicine [phone]            "

## 2024-04-24 ENCOUNTER — OFFICE VISIT (OUTPATIENT)
Dept: NEUROLOGY | Facility: CLINIC | Age: 59
End: 2024-04-24
Payer: COMMERCIAL

## 2024-04-24 ENCOUNTER — PATIENT OUTREACH (OUTPATIENT)
Dept: CARE COORDINATION | Facility: CLINIC | Age: 59
End: 2024-04-24

## 2024-04-24 VITALS
SYSTOLIC BLOOD PRESSURE: 154 MMHG | WEIGHT: 285 LBS | DIASTOLIC BLOOD PRESSURE: 98 MMHG | BODY MASS INDEX: 38.65 KG/M2 | HEART RATE: 85 BPM

## 2024-04-24 DIAGNOSIS — G56.22 ULNAR NEUROPATHY AT ELBOW OF LEFT UPPER EXTREMITY: ICD-10-CM

## 2024-04-24 DIAGNOSIS — E11.42 DIABETIC POLYNEUROPATHY ASSOCIATED WITH TYPE 2 DIABETES MELLITUS (H): Primary | ICD-10-CM

## 2024-04-24 DIAGNOSIS — M79.605 BILATERAL LEG PAIN: ICD-10-CM

## 2024-04-24 DIAGNOSIS — M79.604 BILATERAL LEG PAIN: ICD-10-CM

## 2024-04-24 PROCEDURE — 99214 OFFICE O/P EST MOD 30 MIN: CPT | Performed by: INTERNAL MEDICINE

## 2024-04-24 RX ORDER — PREGABALIN 75 MG/1
75 CAPSULE ORAL 2 TIMES DAILY
Qty: 60 CAPSULE | Refills: 11 | Status: SHIPPED | OUTPATIENT
Start: 2024-04-24

## 2024-04-24 RX ORDER — NORTRIPTYLINE HCL 10 MG
20 CAPSULE ORAL AT BEDTIME
Qty: 60 CAPSULE | Refills: 11 | Status: SHIPPED | OUTPATIENT
Start: 2024-04-24

## 2024-04-24 NOTE — LETTER
4/24/2024         RE: Adarsh Salvador  [address]  [place] [address]        Dear Colleague,    Thank you for referring your patient, Adarsh Salvador, to the [facility] [place]. Please see a copy of my visit note below.    [facility] Neurology Follow Up Visit    Adarsh Salvador MRN# [id number]   Age: 55 year old YOB: 1965     Brief history of symptoms: The patient was initially seen in neurologic consultation on 10/20/2020 for evaluation of neuropathy. Prior to that patient followed with Dr Montalvo. Please see the comprehensive neurologic consultation note from that date in the Epic records for details.     Interval history:   Symptoms are getting worse compared to before. The numbness and tingling has worsened. It goes above the ankles. He also feels it in the hands. His diabetes is not under control.     He is bothered by severe pains. The pain is mostly in the feet. He also has pain in the lower back.     He has a sacral nerve stimulator in place still, but needs to get it replaced. He hasn't seen a pain doctor previously. He previously saw Dr Hahn.     Patient reports headaches with light/sound sensitivity over the last year. They are happening several times a week. He takes Maxalt as needed, which helps.       Past Medical History:     Patient Active Problem List   Diagnosis     GERD (gastroesophageal reflux disease)     Chronic RLQ pain     Constipation     Chronic maxillary sinusitis     Chronic rhinitis     Hypertriglyceridemia     Hypertension, unspecified type     Anemia in other chronic diseases classified elsewhere     Fatty liver     Irritable bowel syndrome with diarrhea     Right inguinal hernia     Mild persistent asthma without complication     Type 2 diabetes mellitus with diabetic polyneuropathy, with long-term current use of insulin (H)     Hyperlipidemia LDL goal <100     CONNOR (generalized anxiety disorder)     Insomnia, unspecified type     Morbid  obesity (H)     Neuropathy     Recurrent deep vein thrombosis (DVT) (H)     Precordial pain     Dyslipidemia     Elevated C-reactive protein     Gastric reflux     Internal derangement of knee     Nicotine dependence     Varicose veins of lower extremity     Vitamin D deficiency     Low volume of ejaculated semen     Bilateral leg pain     Chronic pain of right knee     Acute pain of right knee     Aftercare following surgery of the musculoskeletal system     Advanced directives, counseling/discussion     DVT (deep venous thrombosis) (H)     S/P TKR (total knee replacement), right     Status post total right knee replacement     Severe persistent asthma without complication (H28)     Past Medical History:   Diagnosis Date     Anaphylactic reaction 08/14/2015     Anxiety      Depression      DM2 (diabetes mellitus, type 2) (H)      GERD (gastroesophageal reflux disease)      HTN (hypertension)      IBS (irritable bowel syndrome)      Kidney stone 06/15/2011    Pt believes these were Calcium stones     Neuropathy      Pulmonary embolism, other, unspecified chronicity, unspecified whether acute cor pulmonale present (H) 08/15/2023        Past Surgical History:     Past Surgical History:   Procedure Laterality Date     ARTHROPLASTY KNEE Right 9/26/2023    Procedure: ARTHROPLASTY, KNEE, TOTAL Right;  Surgeon: Edwin Ch MD;  Location: [facility]     ARTHROSCOPY KNEE WITH MEDIAL MENISCECTOMY Right 8/19/2022    Procedure: examination under anesthesia, right knee arthroscopy, meniscectomy;  Surgeon: Carlos A Em MD;  Location: [facility] OR     COLONOSCOPY  5/1/2012    Procedure:COLONOSCOPY; screening colonoscopy; Surgeon:KINGSLEY DOS SANTOS; Location:[facility] OR     COLONOSCOPY  4/21/2014    Procedure: COMBINED COLONOSCOPY, SINGLE BIOPSY/POLYPECTOMY BY BIOPSY;  Surgeon: Duane, William Charles, MD;  Location: [facility] OR     COLONOSCOPY N/A 4/21/2022    Procedure: COLONOSCOPY, WITH POLYPECTOMY AND BIOPSY;  Surgeon: Nehal  Luiz Swain MD;  Location: [facility]     CYSTOSCOPY  05/01/2008    CYSTOSCOPY W/ URETERAL STENT PLACEMENT 05/01/2008      CYSTOSCOPY  09/26/2010    CYSTOSCOPY W/ URETERAL STENT PLACEMENT 09/26/2010 Left      CYSTOSCOPY  05/18/2012    CYSTOSCOPY, LEFT URETEROSCOPY AND STENT PLACEMENT left retrograde 05/18/2012      CYSTOSCOPY,+URETEROSCOPY  06/10/2008    URETEROSCOPY 06/10/2008 Right      HC BREATH HYDROGEN TEST  3/7/2014    Procedure: HYDROGEN BREATH TEST;  Surgeon: Darion Swift MD;  Location: [facility]     INJECT EPIDURAL LUMBAR N/A 7/7/2022    Procedure: INJECTION, SPINE, LUMBAR, EPIDURAL L5/S1;  Surgeon: Michi Hahn MD;  Location: [facility] OR     LITHOTRIPSY  09/30/2010    LITHOTRIPSY 09/30/2010 LEFT EXTRACORPOREAL SHOCK WAVE LITHOTRIPSY, FLEXIBLE CYSTOSCOPY, LEFT STENT REMOVAL       ORTHOPEDIC SURGERY  10/03/2007    KNEE ARTHROSCOPY 10/03/2007 RightX2       RELEASE CARPAL TUNNEL Right 1/26/2018    Procedure: RELEASE CARPAL TUNNEL;  Right carpal tunnel release;  Surgeon: Wiliam Saenz MD;  Location: [facility] OR     VASCULAR SURGERY  11/24/2008    Radiofrequency ablation left saphenous vein 11/24/2008 Done by Dr. Lozoya          Social History:     Social History     Tobacco Use     Smoking status: Never     Smokeless tobacco: Current     Types: Chew     Tobacco comments:     Chew   Vaping Use     Vaping status: Never Used   Substance Use Topics     Alcohol use: Not Currently     Alcohol/week: 0.0 standard drinks of alcohol     Comment: occasional, few drinks a month     Drug use: Yes     Comment: CBD occasional        Family History:     Family History   Problem Relation Age of Onset     Cancer Mother 52        lung, smoked     Cancer - colorectal Father 53        unsure type, pt attributes to radiation exposure     Myocardial Infarction Father 45     Coronary Artery Disease Father      Myocardial Infarction Paternal Grandfather 35     Diabetes Other         nephew- type 1     Diabetes Maternal Aunt         1      Diabetes Maternal Aunt         2     Diabetes Paternal Uncle         1     Diabetes Paternal Uncle         2     Diabetes Paternal Uncle         3     Diabetes Paternal Uncle         4     Colon Cancer No family hx of         Medications:     Current Outpatient Medications   Medication Sig Dispense Refill     acetaminophen (TYLENOL) 325 MG tablet Take 2 tablets (650 mg) by mouth every 4 hours as needed for other (mild pain) 100 tablet 0     albuterol (PROAIR HFA/PROVENTIL HFA/VENTOLIN HFA) 108 (90 Base) MCG/ACT inhaler Inhale 2 puffs into the lungs every 6 hours as needed for shortness of breath or wheezing 6.7 g 3     amLODIPine (NORVASC) 10 MG tablet Take 1 tablet (10 mg) by mouth daily for blood pressure 90 tablet 1     atorvastatin (LIPITOR) 40 MG tablet Take 1 tablet (40 mg) by mouth every evening 90 tablet 1     blood glucose (ACCU-CHEK GUIDE) test strip Use to test blood sugar 3 times daily or as directed. 300 strip 3     blood glucose monitoring (ACCU-CHEK FASTCLIX) lancets Use to test blood sugar 1 times daily or as directed.  Ok to substitute alternative if insurance prefers. 102 each 11     calcium carbonate (TUMS) 500 MG chewable tablet Take 1 chew tab by mouth daily       chlorthalidone (HYGROTON) 25 MG tablet Take 1 tablet (25 mg) by mouth daily Add on for blood pressure 90 tablet 1     Continuous Blood Gluc Sensor (FREESTYLE FROYLAN 3 SENSOR) [facility] 1 each every 14 days 2 each 5     DULoxetine (CYMBALTA) 60 MG capsule Take 1 capsule (60 mg) by mouth 2 times daily 180 capsule 3     enoxaparin ANTICOAGULANT (LOVENOX) 100 MG/ML syringe Inject 1 mL (100 mg) Subcutaneous every 12 hours 28 mL 1     EPINEPHrine (ANY BX GENERIC EQUIV) 0.3 MG/0.3ML injection 2-pack Inject 0.3 mLs (0.3 mg) into the muscle once as needed for anaphylaxis 2 each 2     fluticasone-salmeterol (ADVAIR) 500-50 MCG/ACT inhaler Inhale 1 puff into the lungs every 12 hours 1 each 11     insulin aspart (NOVOLOG FLEXPEN) 100 UNIT/ML pen  "Inject 18 units before breakfast and 18 units before dinner *Will need to schedule with new PCP for future refills* 15 mL 3     insulin aspart (NOVOLOG VIAL) 100 UNITS/ML vial Inject 18 Units Subcutaneous 3 times daily (with meals) 10 mL 3     insulin glargine 100 UNIT/ML pen Inject 50 Units Subcutaneous daily for 90 days 45 mL 0     insulin pen needle (32G X 4 MM) 32G X 4 MM miscellaneous Use 3  pen needles daily or as directed. 200 each 1     insulin syringe-needle U-100 (29G X 1/2\" 0.5 ML) 29G X 1/2\" 0.5 ML miscellaneous Use 3 syringes daily or as directed. 200 each 1     insulin syringe-needle U-100 (30G X 1/2\" 0.3 ML) 30G X 1/2\" 0.3 ML miscellaneous Use 3 syringes daily or as directed. 100 each 3     losartan (COZAAR) 25 MG tablet Take 1 tablet (25 mg) by mouth daily 90 tablet 1     meclizine (ANTIVERT) 25 MG tablet Take 1 tablet (25 mg) by mouth 3 times daily as needed for dizziness 90 tablet 1     metFORMIN (GLUCOPHAGE XR) 500 MG 24 hr tablet Take 2 tablets (1,000 mg) by mouth 2 times daily (with meals) 360 tablet 2     montelukast (SINGULAIR) 10 MG tablet Take 1 tablet (10 mg) by mouth At Bedtime For allergies/asthma 90 tablet 2     multivitamin, therapeutic (THERA-VIT) TABS Take 1 tablet by mouth daily       nortriptyline (PAMELOR) 10 MG capsule Take 2 capsules (20 mg) by mouth At Bedtime 60 capsule 5     omeprazole (PRILOSEC) 40 MG DR capsule Take 1 capsule (40 mg) by mouth daily Take for at least two months 90 capsule 2     pregabalin (LYRICA) 50 MG capsule Take 1 capsule (50 mg) by mouth 2 times daily 60 capsule 1     rizatriptan (MAXALT-MLT) 10 MG ODT TAKE 1 TABLET BY MOUTH AT ONSET OF HEADACHE FOR MIGRAINE MAY REPEAT IN 2 HOURS. MAX 3 TABS/24 HOURS. 18 tablet 1     warfarin ANTICOAGULANT (COUMADIN) 5 MG tablet Take 20 mg Mon, Thurs and 15 mg all other days, or as directed by the Coumadin clinic 280 tablet 1     No current facility-administered medications for this visit.     Facility-Administered " Medications Ordered in Other Visits   Medication Dose Route Frequency Provider Last Rate Last Admin     BUPivacaine (MARCAINE) injection 0.5%  10 mL Intradermal Once Vinnie Espinoza, DO         DOBUTamine 500 mg in dextrose 5% 250 mL (adult std conc) premix  2.5-20 mcg/kg/min Intravenous Continuous Navarro Butcher MD   Stopped at 04/25/19 1559     iopamidol (ISOVUE-M 200) solution 10 mL  10 mL Intravenous Once Vinnie Espinoza,          methylPREDNISolone (DEPO-MEDROL) injection 80 mg  80 mg Intramuscular Once Vinnie Espinoza,          metoprolol (LOPRESSOR) injection 5 mg  5 mg Intravenous Q5 Min PRN Navarro Butcher MD   2 mg at 04/25/19 1559        Allergies:     Allergies   Allergen Reactions     Artificial Sweetner (Informational Only) [Artificial Sweetner (Informational Only) ] GI Disturbance     ALL artificial sweetners cause severe diarrhea & flu symptoms     Hydromorphone Anaphylaxis     Ibuprofen GI Disturbance     Morphine Sulfate Concentrate Anaphylaxis     Morphine [Fumaric Acid] Anaphylaxis     Hydrocodone-Acetaminophen Itching     Tramadol Hives     Trazodone Hives     Gabapentin GI Disturbance     GI upset per pt     Keflex [Cephalexin] Diarrhea     Upset stomach     Codeine Phosphate Itching     Ketorolac Tromethamine Rash        Review of Systems:   As above     Physical Exam:   Vitals: BP (!) 154/98 (BP Location: Right arm, Patient Position: Sitting, Cuff Size: Adult Large)   Pulse 85   Wt 129.3 kg (285 lb)   BMI 38.65 kg/m     HEENT: Normal optic discs bilaterally  General: Seated comfortably in no acute distress.  Lungs: breathing comfortably  Neurologic:     Mental Status: Fully alert, attentive. Normal memory and fund of knowledge. Language normal, speech clear and fluent, no paraphasic errors.      Cranial Nerves: No dysarthria. PERRL. EOMI. Face symmetric and facial sensation intact. Tongue protrusion, palate elevation intact.     Motor: No tremors or other abnormal  movements observed. Muscle tone normal throughout. Strength 5/5 throughout upper and lower extremities. Finger taps symmetric and normal speed.      Deep Tendon Reflexes: 2+/symmetric throughout upper extremities, 1+ patella and trace ankle jerks. Toes downgoing bilaterally.     Sensory: Vibration is 4 seconds in the bilateral great toes. Temperature are decreased in the feet compared to the hands. Light touch is decreased below the ankles. Proprioception is intact.      Coordination: Finger-nose-finger and heel to shin intact without dysmetria.      Gait: Mildly wide based antalgic steady casual gait.      Data reviewed on previous visits    Procedures:  EMG/NCS 6/2020  Interpretation:  This is an abnormal study, demonstrating electrophysiologic evidence of the following:  Sensorimotor axonal polyneuropathy affecting bilateral lower limbs. Comparison with two studies performed in 2017 demonstrates minimal interval worsening of amplitude attenuation.   Mild right ulnar neuropathy at the elbow.     Laboratory:  A1c 7.5 (3/2020)  B12 360, Heavy metals screen negative (2/2020)  TSH normal (2018)  Immunofixation negative (2017)  SSA/SSB negative (2018)  A1c 11.2 (1/2022)  A1c 7.4 (12/2020)    CT lumbar spine 2018  Impression:  1.  Multilevel lumbar spondylosis. Most prominent at L4-5 with  moderate to severe left neuroforaminal stenosis with left L4 exiting  nerve roots impingement, to a lesser grade at L3-4 with moderate left  neuroforaminal stenosis. Overall not significantly changed from  10/12/2017.  2.  Bilateral nonobstructing nephrolithiasis.    Pertinent Investigations since last visit:   EMG 5/2022  Interpretation:  This is an abnormal study, demonstrating electrophysiologic evidence of the following:  - Moderately severe length dependent sensorimotor axonal polyneuropathy. In comparison to June 2020 study, there has been mild interval worsening.   - Moderately severe left median mononeuropathy at the wrist  (carpal tunnel syndrome).  Comment: There is no evidence of lumbosacral polyradiculopathy as clinically questioned. 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 Nerve blocks of the lateral femoral cutaneous nerves could be considered at follow-up in pain clinic.     A1c 13.5 (4/2024)         Assessment and Plan:   Assessment:  Adarsh Salvador is a 58 year old male who presents today for follow-up of diabetic polyneuropathy. Patient has had mild worsening of symptoms since around 5190-3187. We will continue Cymbalta and nortriptyline (has been out of nortriptyline the last 2 months). We will also increase dosage of Lyrica. He plans to schedule visit with endo for management of diabetes.    Patient reports migraine type headaches over the last year. Restarting nortriptyline may be helpful for migraines. If it does not, we could consider alterative options or imaging for secondary causes. He can continue Maxalt prn.     Patient previously had benefit with epidural injection with Dr Hahn. Back pain has worsened since last visit and he would like to follow-up again.     He reports intermittent tingling in the left 4th/5th finger. Elbow brace placed for likely ulnar neuropathy at the elbow.     Plan:  - Continue Cymbalta 60 mg BID  - Continue Nortriptyline 20 mg nightly  - Increase Lyrica 75 mg BID  - Pain clinic follow-up  - Maxalt 10 mg prn for migraine    Follow up in Neurology clinic in 6 months or sooner if new issues arise    Juan Luis Gray MD   of Neurology  [facility]    The total time of this encounter today amounted to 32 minutes. This time included time spent with the patient, prep work, ordering tests, and performing post visit documentation.      Again, thank you for allowing me to participate in the care of your patient.        Sincerely,        Juan Luis Gray MD

## 2024-04-24 NOTE — PROGRESS NOTES
Clinic Care Coordination Contact  Community Health Worker Follow Up    Care Gaps:     Health Maintenance Due   Topic Date Due    Pneumococcal Vaccine: Pediatrics (0 to 5 Years) and At-Risk Patients (6 to 64 Years) (1 of 2 - PCV) Never done    MEDICARE ANNUAL WELLNESS VISIT  Never done    HEPATITIS B IMMUNIZATION (1 of 3 - 19+ 3-dose series) Never done    ZOSTER IMMUNIZATION (1 of 2) Never done    ASTHMA ACTION PLAN  02/14/2023    INFLUENZA VACCINE (1) 09/01/2023    COVID-19 Vaccine (1 - 2023-24 season) Never done    EYE EXAM  01/05/2024    LIPID  04/10/2024    MICROALBUMIN  04/10/2024       Reminded patient to schedule annual wellness visit     Care Plan:   Care Plan: Financial Wellbeing       Problem: Patients [employer] insurance ended 1/31/24       Goal: I will work with the financial resource worker to contact the [facility] regarding medical insurance       Start Date: 2/20/2024 Expected End Date: 5/20/2024    Note:     Barriers: uninsured as of 1/31/24  Strengths: patient motivated to call out to [facility] and follow up.   Patient expressed understanding of goal: yes   Action steps to achieve this goal:  1. I received a letter in the mail notifying me that my [employer] insurance ended 1/31/24, and there is information in the letter on how to reach out for additional support and next steps.   2. I will call the phone number provided by the [facility] and follow their advice.   3. Summer placed a referral to our financial resource worker referral to assist with contacting the [facility], for medical assistance insurance. I should be getting a call in 2-3 business days. I will answer my phone when they call.     Patient is over income for MA due to social security-he is now on Medicare                              Intervention and Education during outreach: Patient states he did not qualify for MA due to being over income with his social security. He is upset by this because he can barely afford his medications which he has  higher copays on now with his Medicare. CHW encouraged patient to talk with his pharmacist about any cost saving programs. His insulin and his inhalers are the ones that cost him the most.     Patient states that he did qualify for [employer] to help offset some of his income. He gets $80 a month but he is not sure where he can use it at. He states he didn't get a packet. CHW sent patient resources for Fare for All and [employer] through [employer].     CHW Plan: CHW will continue to support patient with goals through routine scheduled outreach.     Next outreach due: 5/23/24

## 2024-04-24 NOTE — PROGRESS NOTES
[facility] Neurology Follow Up Visit    Adarsh Salvador MRN# [id number]   Age: 55 year old YOB: 1965     Brief history of symptoms: The patient was initially seen in neurologic consultation on 10/20/2020 for evaluation of neuropathy. Prior to that patient followed with Dr Montalvo. Please see the comprehensive neurologic consultation note from that date in the Epic records for details.     Interval history:   Symptoms are getting worse compared to before. The numbness and tingling has worsened. It goes above the ankles. He also feels it in the hands. His diabetes is not under control.     He is bothered by severe pains. The pain is mostly in the feet. He also has pain in the lower back.     He has a sacral nerve stimulator in place still, but needs to get it replaced. He hasn't seen a pain doctor previously. He previously saw Dr Hahn.     Patient reports headaches with light/sound sensitivity over the last year. They are happening several times a week. He takes Maxalt as needed, which helps.       Past Medical History:     Patient Active Problem List   Diagnosis    GERD (gastroesophageal reflux disease)    Chronic RLQ pain    Constipation    Chronic maxillary sinusitis    Chronic rhinitis    Hypertriglyceridemia    Hypertension, unspecified type    Anemia in other chronic diseases classified elsewhere    Fatty liver    Irritable bowel syndrome with diarrhea    Right inguinal hernia    Mild persistent asthma without complication    Type 2 diabetes mellitus with diabetic polyneuropathy, with long-term current use of insulin (H)    Hyperlipidemia LDL goal <100    CONNOR (generalized anxiety disorder)    Insomnia, unspecified type    Morbid obesity (H)    Neuropathy    Recurrent deep vein thrombosis (DVT) (H)    Precordial pain    Dyslipidemia    Elevated C-reactive protein    Gastric reflux    Internal derangement of knee    Nicotine dependence    Varicose veins of lower extremity    Vitamin D deficiency    Low volume  of ejaculated semen    Bilateral leg pain    Chronic pain of right knee    Acute pain of right knee    Aftercare following surgery of the musculoskeletal system    Advanced directives, counseling/discussion    DVT (deep venous thrombosis) (H)    S/P TKR (total knee replacement), right    Status post total right knee replacement    Severe persistent asthma without complication (H28)     Past Medical History:   Diagnosis Date    Anaphylactic reaction 08/14/2015    Anxiety     Depression     DM2 (diabetes mellitus, type 2) (H)     GERD (gastroesophageal reflux disease)     HTN (hypertension)     IBS (irritable bowel syndrome)     Kidney stone 06/15/2011    Pt believes these were Calcium stones    Neuropathy     Pulmonary embolism, other, unspecified chronicity, unspecified whether acute cor pulmonale present (H) 08/15/2023        Past Surgical History:     Past Surgical History:   Procedure Laterality Date    ARTHROPLASTY KNEE Right 9/26/2023    Procedure: ARTHROPLASTY, KNEE, TOTAL Right;  Surgeon: Edwin Ch MD;  Location: [facility]    ARTHROSCOPY KNEE WITH MEDIAL MENISCECTOMY Right 8/19/2022    Procedure: examination under anesthesia, right knee arthroscopy, meniscectomy;  Surgeon: Carlos A Em MD;  Location: [facility] OR    COLONOSCOPY  5/1/2012    Procedure:COLONOSCOPY; screening colonoscopy; Surgeon:KINGSLEY DOS SANTOS; Location:[facility] OR    COLONOSCOPY  4/21/2014    Procedure: COMBINED COLONOSCOPY, SINGLE BIOPSY/POLYPECTOMY BY BIOPSY;  Surgeon: Duane, William Charles, MD;  Location: [facility] OR    COLONOSCOPY N/A 4/21/2022    Procedure: COLONOSCOPY, WITH POLYPECTOMY AND BIOPSY;  Surgeon: Luiz Calvert MD;  Location: [facility]    CYSTOSCOPY  05/01/2008    CYSTOSCOPY W/ URETERAL STENT PLACEMENT 05/01/2008     CYSTOSCOPY  09/26/2010    CYSTOSCOPY W/ URETERAL STENT PLACEMENT 09/26/2010 Left     CYSTOSCOPY  05/18/2012    CYSTOSCOPY, LEFT URETEROSCOPY AND STENT PLACEMENT left retrograde 05/18/2012      CYSTOSCOPY,+URETEROSCOPY  06/10/2008    URETEROSCOPY 06/10/2008 Right     HC BREATH HYDROGEN TEST  3/7/2014    Procedure: HYDROGEN BREATH TEST;  Surgeon: Darion Swift MD;  Location: [facility]    INJECT EPIDURAL LUMBAR N/A 7/7/2022    Procedure: INJECTION, SPINE, LUMBAR, EPIDURAL L5/S1;  Surgeon: Michi Hahn MD;  Location: [facility] OR    LITHOTRIPSY  09/30/2010    LITHOTRIPSY 09/30/2010 LEFT EXTRACORPOREAL SHOCK WAVE LITHOTRIPSY, FLEXIBLE CYSTOSCOPY, LEFT STENT REMOVAL      ORTHOPEDIC SURGERY  10/03/2007    KNEE ARTHROSCOPY 10/03/2007 RightX2      RELEASE CARPAL TUNNEL Right 1/26/2018    Procedure: RELEASE CARPAL TUNNEL;  Right carpal tunnel release;  Surgeon: Wiliam Saenz MD;  Location: [facility] OR    VASCULAR SURGERY  11/24/2008    Radiofrequency ablation left saphenous vein 11/24/2008 Done by Dr. Lozoya          Social History:     Social History     Tobacco Use    Smoking status: Never    Smokeless tobacco: Current     Types: Chew    Tobacco comments:     Chew   Vaping Use    Vaping status: Never Used   Substance Use Topics    Alcohol use: Not Currently     Alcohol/week: 0.0 standard drinks of alcohol     Comment: occasional, few drinks a month    Drug use: Yes     Comment: CBD occasional        Family History:     Family History   Problem Relation Age of Onset    Cancer Mother 52        lung, smoked    Cancer - colorectal Father 53        unsure type, pt attributes to radiation exposure    Myocardial Infarction Father 45    Coronary Artery Disease Father     Myocardial Infarction Paternal Grandfather 35    Diabetes Other         nephew- type 1    Diabetes Maternal Aunt         1    Diabetes Maternal Aunt         2    Diabetes Paternal Uncle         1    Diabetes Paternal Uncle         2    Diabetes Paternal Uncle         3    Diabetes Paternal Uncle         4    Colon Cancer No family hx of         Medications:     Current Outpatient Medications   Medication Sig Dispense Refill    acetaminophen  (TYLENOL) 325 MG tablet Take 2 tablets (650 mg) by mouth every 4 hours as needed for other (mild pain) 100 tablet 0    albuterol (PROAIR HFA/PROVENTIL HFA/VENTOLIN HFA) 108 (90 Base) MCG/ACT inhaler Inhale 2 puffs into the lungs every 6 hours as needed for shortness of breath or wheezing 6.7 g 3    amLODIPine (NORVASC) 10 MG tablet Take 1 tablet (10 mg) by mouth daily for blood pressure 90 tablet 1    atorvastatin (LIPITOR) 40 MG tablet Take 1 tablet (40 mg) by mouth every evening 90 tablet 1    blood glucose (ACCU-CHEK GUIDE) test strip Use to test blood sugar 3 times daily or as directed. 300 strip 3    blood glucose monitoring (ACCU-CHEK FASTCLIX) lancets Use to test blood sugar 1 times daily or as directed.  Ok to substitute alternative if insurance prefers. 102 each 11    calcium carbonate (TUMS) 500 MG chewable tablet Take 1 chew tab by mouth daily      chlorthalidone (HYGROTON) 25 MG tablet Take 1 tablet (25 mg) by mouth daily Add on for blood pressure 90 tablet 1    Continuous Blood Gluc Sensor (FREESTYLE FROYLAN 3 SENSOR) [facility] 1 each every 14 days 2 each 5    DULoxetine (CYMBALTA) 60 MG capsule Take 1 capsule (60 mg) by mouth 2 times daily 180 capsule 3    enoxaparin ANTICOAGULANT (LOVENOX) 100 MG/ML syringe Inject 1 mL (100 mg) Subcutaneous every 12 hours 28 mL 1    EPINEPHrine (ANY BX GENERIC EQUIV) 0.3 MG/0.3ML injection 2-pack Inject 0.3 mLs (0.3 mg) into the muscle once as needed for anaphylaxis 2 each 2    fluticasone-salmeterol (ADVAIR) 500-50 MCG/ACT inhaler Inhale 1 puff into the lungs every 12 hours 1 each 11    insulin aspart (NOVOLOG FLEXPEN) 100 UNIT/ML pen Inject 18 units before breakfast and 18 units before dinner *Will need to schedule with new PCP for future refills* 15 mL 3    insulin aspart (NOVOLOG VIAL) 100 UNITS/ML vial Inject 18 Units Subcutaneous 3 times daily (with meals) 10 mL 3    insulin glargine 100 UNIT/ML pen Inject 50 Units Subcutaneous daily for 90 days 45 mL 0    insulin pen  "needle (32G X 4 MM) 32G X 4 MM miscellaneous Use 3  pen needles daily or as directed. 200 each 1    insulin syringe-needle U-100 (29G X 1/2\" 0.5 ML) 29G X 1/2\" 0.5 ML miscellaneous Use 3 syringes daily or as directed. 200 each 1    insulin syringe-needle U-100 (30G X 1/2\" 0.3 ML) 30G X 1/2\" 0.3 ML miscellaneous Use 3 syringes daily or as directed. 100 each 3    losartan (COZAAR) 25 MG tablet Take 1 tablet (25 mg) by mouth daily 90 tablet 1    meclizine (ANTIVERT) 25 MG tablet Take 1 tablet (25 mg) by mouth 3 times daily as needed for dizziness 90 tablet 1    metFORMIN (GLUCOPHAGE XR) 500 MG 24 hr tablet Take 2 tablets (1,000 mg) by mouth 2 times daily (with meals) 360 tablet 2    montelukast (SINGULAIR) 10 MG tablet Take 1 tablet (10 mg) by mouth At Bedtime For allergies/asthma 90 tablet 2    multivitamin, therapeutic (THERA-VIT) TABS Take 1 tablet by mouth daily      nortriptyline (PAMELOR) 10 MG capsule Take 2 capsules (20 mg) by mouth At Bedtime 60 capsule 5    omeprazole (PRILOSEC) 40 MG DR capsule Take 1 capsule (40 mg) by mouth daily Take for at least two months 90 capsule 2    pregabalin (LYRICA) 50 MG capsule Take 1 capsule (50 mg) by mouth 2 times daily 60 capsule 1    rizatriptan (MAXALT-MLT) 10 MG ODT TAKE 1 TABLET BY MOUTH AT ONSET OF HEADACHE FOR MIGRAINE MAY REPEAT IN 2 HOURS. MAX 3 TABS/24 HOURS. 18 tablet 1    warfarin ANTICOAGULANT (COUMADIN) 5 MG tablet Take 20 mg Mon, Thurs and 15 mg all other days, or as directed by the Coumadin clinic 280 tablet 1     No current facility-administered medications for this visit.     Facility-Administered Medications Ordered in Other Visits   Medication Dose Route Frequency Provider Last Rate Last Admin    BUPivacaine (MARCAINE) injection 0.5%  10 mL Intradermal Once Vinnie Espinoza,         DOBUTamine 500 mg in dextrose 5% 250 mL (adult std conc) premix  2.5-20 mcg/kg/min Intravenous Continuous Navarro Butcher MD   Stopped at 04/25/19 1559    " iopamidol (ISOVUE-M 200) solution 10 mL  10 mL Intravenous Once Vinnie Espinoza,         methylPREDNISolone (DEPO-MEDROL) injection 80 mg  80 mg Intramuscular Once Vinnie Espinoza, DO        metoprolol (LOPRESSOR) injection 5 mg  5 mg Intravenous Q5 Min PRN Navarro Butcher MD   2 mg at 04/25/19 6079        Allergies:     Allergies   Allergen Reactions    Artificial Sweetner (Informational Only) [Artificial Sweetner (Informational Only) ] GI Disturbance     ALL artificial sweetners cause severe diarrhea & flu symptoms    Hydromorphone Anaphylaxis    Ibuprofen GI Disturbance    Morphine Sulfate Concentrate Anaphylaxis    Morphine [Fumaric Acid] Anaphylaxis    Hydrocodone-Acetaminophen Itching    Tramadol Hives    Trazodone Hives    Gabapentin GI Disturbance     GI upset per pt    Keflex [Cephalexin] Diarrhea     Upset stomach    Codeine Phosphate Itching    Ketorolac Tromethamine Rash        Review of Systems:   As above     Physical Exam:   Vitals: BP (!) 154/98 (BP Location: Right arm, Patient Position: Sitting, Cuff Size: Adult Large)   Pulse 85   Wt 129.3 kg (285 lb)   BMI 38.65 kg/m     HEENT: Normal optic discs bilaterally  General: Seated comfortably in no acute distress.  Lungs: breathing comfortably  Neurologic:     Mental Status: Fully alert, attentive. Normal memory and fund of knowledge. Language normal, speech clear and fluent, no paraphasic errors.      Cranial Nerves: No dysarthria. PERRL. EOMI. Face symmetric and facial sensation intact. Tongue protrusion, palate elevation intact.     Motor: No tremors or other abnormal movements observed. Muscle tone normal throughout. Strength 5/5 throughout upper and lower extremities. Finger taps symmetric and normal speed.      Deep Tendon Reflexes: 2+/symmetric throughout upper extremities, 1+ patella and trace ankle jerks. Toes downgoing bilaterally.     Sensory: Vibration is 4 seconds in the bilateral great toes. Temperature are decreased in the  feet compared to the hands. Light touch is decreased below the ankles. Proprioception is intact.      Coordination: Finger-nose-finger and heel to shin intact without dysmetria.      Gait: Mildly wide based antalgic steady casual gait.      Data reviewed on previous visits    Procedures:  EMG/NCS 6/2020  Interpretation:  This is an abnormal study, demonstrating electrophysiologic evidence of the following:  Sensorimotor axonal polyneuropathy affecting bilateral lower limbs. Comparison with two studies performed in 2017 demonstrates minimal interval worsening of amplitude attenuation.   Mild right ulnar neuropathy at the elbow.     Laboratory:  A1c 7.5 (3/2020)  B12 360, Heavy metals screen negative (2/2020)  TSH normal (2018)  Immunofixation negative (2017)  SSA/SSB negative (2018)  A1c 11.2 (1/2022)  A1c 7.4 (12/2020)    CT lumbar spine 2018  Impression:  1.  Multilevel lumbar spondylosis. Most prominent at L4-5 with  moderate to severe left neuroforaminal stenosis with left L4 exiting  nerve roots impingement, to a lesser grade at L3-4 with moderate left  neuroforaminal stenosis. Overall not significantly changed from  10/12/2017.  2.  Bilateral nonobstructing nephrolithiasis.    Pertinent Investigations since last visit:   EMG 5/2022  Interpretation:  This is an abnormal study, demonstrating electrophysiologic evidence of the following:  - Moderately severe length dependent sensorimotor axonal polyneuropathy. In comparison to June 2020 study, there has been mild interval worsening.   - Moderately severe left median mononeuropathy at the wrist (carpal tunnel syndrome).  Comment: There is no evidence of lumbosacral polyradiculopathy as clinically questioned. 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 Nerve blocks of the lateral femoral cutaneous nerves could be considered at follow-up in pain clinic.     A1c 13.5 (4/2024)         Assessment and Plan:   Assessment:  Adarsh sanchez a  58 year old male who presents today for follow-up of diabetic polyneuropathy. Patient has had mild worsening of symptoms since around 3829-4728. We will continue Cymbalta and nortriptyline (has been out of nortriptyline the last 2 months). We will also increase dosage of Lyrica. He plans to schedule visit with endo for management of diabetes.    Patient reports migraine type headaches over the last year. Restarting nortriptyline may be helpful for migraines. If it does not, we could consider alterative options or imaging for secondary causes. He can continue Maxalt prn.     Patient previously had benefit with epidural injection with Dr Hahn. Back pain has worsened since last visit and he would like to follow-up again.     He reports intermittent tingling in the left 4th/5th finger. Elbow brace placed for likely ulnar neuropathy at the elbow.     Plan:  - Continue Cymbalta 60 mg BID  - Continue Nortriptyline 20 mg nightly  - Increase Lyrica 75 mg BID  - Pain clinic follow-up  - Maxalt 10 mg prn for migraine    Follow up in Neurology clinic in 6 months or sooner if new issues arise    Juan Luis Gray MD   of Neurology  [facility]    The total time of this encounter today amounted to 32 minutes. This time included time spent with the patient, prep work, ordering tests, and performing post visit documentation.

## 2024-04-24 NOTE — LETTER
[facility]  [address] [facility] [address]    April 24, 2024    Adarsh EDWARDS [facility]  [address]   [place] [address]      Dear Adarsh,    I am a clinic community health worker who works with Kehr, Kristen M with the [facility]. I wanted to thank you for spending the time to talk with me.  Below is information regarding the resources we discussed:     [email or web address]      [email or web address]    Please feel free to contact me with any questions or concerns.           Sincerely,    CHRISTIANE Roth [place], [address] [place] and [facility]  [phone]

## 2024-04-30 ENCOUNTER — LAB (OUTPATIENT)
Dept: LAB | Facility: CLINIC | Age: 59
End: 2024-04-30
Payer: COMMERCIAL

## 2024-04-30 ENCOUNTER — ANTICOAGULATION THERAPY VISIT (OUTPATIENT)
Dept: ANTICOAGULATION | Facility: CLINIC | Age: 59
End: 2024-04-30

## 2024-04-30 DIAGNOSIS — I82.409 RECURRENT DEEP VEIN THROMBOSIS (DVT) (H): ICD-10-CM

## 2024-04-30 DIAGNOSIS — I82.409 RECURRENT DEEP VEIN THROMBOSIS (DVT) (H): Primary | ICD-10-CM

## 2024-04-30 LAB — INR BLD: 1.6 (ref 0.9–1.1)

## 2024-04-30 PROCEDURE — 36416 COLLJ CAPILLARY BLOOD SPEC: CPT

## 2024-04-30 PROCEDURE — 85610 PROTHROMBIN TIME: CPT

## 2024-04-30 NOTE — PROGRESS NOTES
ANTICOAGULATION MANAGEMENT     Adarsh Salvador 58 year old male is on warfarin with subtherapeutic INR result. (Goal INR 2.0-3.0)    Recent labs: (last 7 days)     04/30/24  1045   INR 1.6*       ASSESSMENT     Source(s): Chart Review  Previous INR was Subtherapeutic  Medication, diet, health changes since last INR chart reviewed; none identified         PLAN     Unable to reach Adarsh today.    Left message to take a booster dose of warfarin,  20 mg tonight. Request call back for assessment.    Follow up required to discuss out of range result     Janeth Cool, RN  Anticoagulation Clinic  4/30/2024

## 2024-05-01 NOTE — PROGRESS NOTES
ANTICOAGULATION MANAGEMENT     Adarsh Salvador 58 year old male is on warfarin with subtherapeutic INR result. (Goal INR 2.0-3.0)    Recent labs: (last 7 days)     04/30/24  1045   INR 1.6*       ASSESSMENT     Source(s): Chart Review and Patient/Caregiver Call     Warfarin doses taken: Warfarin taken as instructed  Diet: No new diet changes identified  Medication/supplement changes: None noted  New illness, injury, or hospitalization: No  Signs or symptoms of bleeding or clotting: No  Previous result: Subtherapeutic  Additional findings: Pt restarted warfarin on 4/23/24 after being off X 1 month  Pt did not take booster dose as advised yesterday 4/30/24. Pt agreed to take booster dose today 5/1/24.     PLAN     Recommended plan for ongoing change(s) affecting INR     Dosing Instructions: booster dose then Increase your warfarin dose (2.5% change) with next INR in 1 week       Summary  As of 4/30/2024      Full warfarin instructions:  5/1: 20 mg; Otherwise 12.5 mg every Tue; 15 mg all other days   Next INR check:  5/8/2024               Telephone call with Adarsh who verbalizes understanding and agrees to plan    Lab visit scheduled    Education provided:   Contact [phone]  with any changes, questions or concerns.     Plan made with [facility] Pharmacist Nohemi Tucker, RN  Anticoagulation Clinic  5/1/2024    _______________________________________________________________________     Anticoagulation Episode Summary       Current INR goal:  2.0-3.0   TTR:  39.5% (1 y)   Target end date:  Indefinite   Send INR reminders to:  [facility]    Indications    Recurrent deep vein thrombosis (DVT) (H) [I82.409]             Comments:               Anticoagulation Care Providers       Provider Role Specialty Phone number    Bertha Romero PA-C Referring Family Medicine [phone]    Kehr, Kristen M, PA-C Referring Family Medicine [phone]

## 2024-05-02 ENCOUNTER — PATIENT OUTREACH (OUTPATIENT)
Dept: CARE COORDINATION | Facility: CLINIC | Age: 59
End: 2024-05-02
Payer: COMMERCIAL

## 2024-05-02 NOTE — Clinical Note
Hi! I completed his CC goal based on your last outreach notes. Does he want to move to maintenance status, or set a new goal? Patient has several overdue care gaps, and has 2 BPAs firing. These may serve as a new CC goal? Or can graduate as well.  Let me know once you follow up with him at your schedule follow up date.  Thank you, Summer

## 2024-05-02 NOTE — PROGRESS NOTES
Clinic Care Coordination Contact  Care Coordination Clinician Chart Review    Situation: Patient chart reviewed by Care Coordinator.       Background: Care Coordination Program started: 10/3/2023. Initial assessment completed and patient-centered care plan(s) were developed with participation from patient. Lead CC handed patient off to CHW for continued outreaches.       Assessment: Per chart review, patient outreach completed by CC CHW on 4/24/24.  Patient completed his CC goal. Patient is not due for updated Plan of Care.  Assessments will be completed annually or as needed/with change of patient status.      Patient has several overdue care gaps, and has 2 BPAs firing. These may serve as a new CC goal.       Plan/Recommendations: The patient will continue working with Care Coordination to achieve goal(s) as above. CHW will continue outreaches at minimum every 30 days and will involve Lead CC as needed or if patient is ready to move to Maintenance. Lead CC will continue to monitor CHW outreaches and patient's progress to goal(s) every 6 weeks.     Plan of Care updated and sent to patient: Lala Ricketts RN, BSN, PHN Care Coordinator  Edgar Caballero and Adriana Boyd   Phone: [phone]

## 2024-05-09 ENCOUNTER — TELEPHONE (OUTPATIENT)
Dept: EDUCATION SERVICES | Facility: CLINIC | Age: 59
End: 2024-05-09

## 2024-05-09 ENCOUNTER — ALLIED HEALTH/NURSE VISIT (OUTPATIENT)
Dept: EDUCATION SERVICES | Facility: CLINIC | Age: 59
End: 2024-05-09
Attending: PHYSICIAN ASSISTANT
Payer: COMMERCIAL

## 2024-05-09 DIAGNOSIS — E11.42 TYPE 2 DIABETES MELLITUS WITH DIABETIC POLYNEUROPATHY, WITH LONG-TERM CURRENT USE OF INSULIN (H): ICD-10-CM

## 2024-05-09 DIAGNOSIS — Z79.4 TYPE 2 DIABETES MELLITUS WITH DIABETIC POLYNEUROPATHY, WITH LONG-TERM CURRENT USE OF INSULIN (H): ICD-10-CM

## 2024-05-09 PROCEDURE — G0108 DIAB MANAGE TRN  PER INDIV: HCPCS | Performed by: DIETITIAN, REGISTERED

## 2024-05-09 RX ORDER — INSULIN ASPART 100 [IU]/ML
22 INJECTION, SOLUTION INTRAVENOUS; SUBCUTANEOUS
Qty: 90 ML | Refills: 1 | Status: SHIPPED | OUTPATIENT
Start: 2024-05-09

## 2024-05-09 RX ORDER — INSULIN GLARGINE 100 [IU]/ML
55 INJECTION, SOLUTION SUBCUTANEOUS DAILY
Qty: 60 ML | Refills: 1 | Status: SHIPPED | OUTPATIENT
Start: 2024-05-09 | End: 2024-08-06

## 2024-05-09 NOTE — TELEPHONE ENCOUNTER
Patient is in need of assistance looking into the patient assistance programs for his Novolog, Lantus and potentially Jardiance if able (would like to change him to this, currently too expensive).    I also provided him with the [facility] prescription assistance phone number as well.    Thank you,  Mi Javed RD, LD, [facility]  Certified Diabetes Care &   Outpatient Adult Care - [facility]

## 2024-05-09 NOTE — PROGRESS NOTES
Diabetes Self-Management Education & Support    Presents for:      Type of Service: In Person Visit      REPORTS:            Pt verbalized understanding of concepts discussed and recommendations provided today.       Continue education with the following diabetes management concepts: Healthy Eating,, Monitoring, Taking Medication, Problem Solving, Reducing Risks, and Healthy Coping    ASSESSMENT:  Met with Adarsh today to review his glucose and diabetes management. He ran out of meds for a few months and A1C drastically danielle. Patient is now taking his medication as prescribed and is seeing improvements in his glucose. We reviewed use, insulin site rotation and the importance of improving glucose. Patient would like to change medication from metformin due to things he has heard. Cost of Jardiance ($47/month) too much at this time - will look into prescription assistance programs for patient.   Patient has limited mobility using a cane.Patient reports eating smaller amounts of carbs and more vegetables and protein. Discussed the plate method  Reviewed food choices briefly today.    Glucose Patterns & Trends:  Time in range of  mg/dL, 14% of the time. and Time below range of 70 mg/dL, 0% of the time.    PLAN  Increase Lantus by 10% to 55 units/day  Increase Novolog from 20 to 22 units/meal PLUS add correction scale 2:40 >150 mg/dL  Continue metformin at this time - no changes due to cost of other meds  Work on prescription assistance for patient  Topics to cover at upcoming visits: Healthy Eating, Monitoring, Taking Medication, Problem Solving, Reducing Risks, and Healthy Coping    Follow-up: 6-8 weeks    See Care Plan for co-developed, patient-state behavior change goals.  AVS provided for patient today.    Education Materials Provided:  [facility] Healthy Living with Diabetes Book and My Plate Planner      SUBJECTIVE/OBJECTIVE:     Cultural Influences/Ethnic Background:  Not  or     Diabetes  Symptoms & Complications:          Patient Problem List and Family Medical History reviewed for relevant medical history, current medical status, and diabetes risk factors.    Vitals:  There were no vitals taken for this visit.  Estimated body mass index is 38.65 kg/m  as calculated from the following:    Height as of 4/23/24: 1.829 m (6').    Weight as of 4/24/24: 129.3 kg (285 lb).   Last 3 BP:   BP Readings from Last 3 Encounters:   04/24/24 (!) 154/98   04/23/24 114/82   03/19/24 (!) 159/91       History   Smoking Status    Never   Smokeless Tobacco    Current    Types: Chew       Labs:  Lab Results   Component Value Date    A1C 13.5 04/23/2024    A1C 7.4 12/03/2020     Lab Results   Component Value Date     04/23/2024     09/29/2023     04/10/2023     01/29/2021     Lab Results   Component Value Date    LDL  04/10/2023      Comment:      Cannot estimate LDL when triglyceride exceeds 400 mg/dL     04/10/2023    LDL  03/26/2020     Cannot estimate LDL when triglyceride exceeds 400 mg/dL     03/26/2020     HDL Cholesterol   Date Value Ref Range Status   03/26/2020 32 (L) >39 mg/dL Final     Direct Measure HDL   Date Value Ref Range Status   04/10/2023 46 >=40 mg/dL Final   ]  GFR Estimate   Date Value Ref Range Status   04/23/2024 >90 >60 mL/min/1.73m2 Final   01/29/2021 >90 >60 mL/min/[1.73_m2] Final     Comment:     Non  GFR Calc  Starting 12/18/2018, serum creatinine based estimated GFR (eGFR) will be   calculated using the Chronic Kidney Disease Epidemiology Collaboration   (CKD-EPI) equation.       GFR Estimate If Black   Date Value Ref Range Status   01/29/2021 >90 >60 mL/min/[1.73_m2] Final     Comment:      GFR Calc  Starting 12/18/2018, serum creatinine based estimated GFR (eGFR) will be   calculated using the Chronic Kidney Disease Epidemiology Collaboration   (CKD-EPI) equation.       Lab Results   Component Value Date    CR 0.90  "04/23/2024    CR 0.94 01/29/2021     No results found for: \"MICROALBUMIN\"    Healthy Eating:  Breakfast: toast and eggs or schultz and sausage  Lunch: pasta or vegetables and meat  Dinner: meat and potato (pork) with vegetables Or pulled pork with veggie  Snacks: not often - granola bar  Beverages: Milk, Water, Diet soda (powdered milk)  Has patient met with a dietitian in the past?: Yes    Being Active:  Being Active Assessed Today: No  Exercise:: Currently not exercising  Barrier to exercise: Physical limitation    Monitoring:  Monitoring Assessed Today: Yes  Did patient bring glucose meter to appointment? : Yes  Blood Glucose Meter: CGM  Times checking blood sugar at home (number): 3  Times checking blood sugar at home (per): Day  Blood glucose trend: Decreasing      Taking Medications:  Diabetes Medication(s)       Biguanides       metFORMIN (GLUCOPHAGE XR) 500 MG 24 hr tablet Take 2 tablets (1,000 mg) by mouth 2 times daily (with meals)       Insulin       insulin aspart (NOVOLOG FLEXPEN) 100 UNIT/ML pen Inject 22 Units Subcutaneous 3 times daily (with meals) 22 units before meals plus correction scale. Max daily dose 90 units     insulin aspart (NOVOLOG VIAL) 100 UNITS/ML vial Inject 18 Units Subcutaneous 3 times daily (with meals)     insulin glargine 100 UNIT/ML pen Inject 55 Units Subcutaneous daily            Taking Medication Assessed Today: Yes  Current Treatments: Insulin Injections, Oral Medication (taken by mouth)  Dose schedule: Pre-breakfast, Pre-lunch, Pre-dinner  Given by: Patient  Injection/Infusion sites: Abdomen  Problems taking diabetes medications regularly?: No  Diabetes medication side effects?: No    Problem Solving:  Problem Solving Assessed Today: Yes  Is the patient at risk for hypoglycemia?: Yes  Hypoglycemia Treatment: Juice    Hypoglycemia Symptoms  Hypoglycemia: Dizziness/Lightheadedness, Feeling shaky, Sweats         Reducing Risks:  Reducing Risks Assessed Today: Yes  Diabetes " Risks: Age over 45 years, Sedentary Lifestyle  CAD Risks: Diabetes Mellitus, Male sex, Obesity, Sedentary lifestyle    Healthy Coping:  Healthy Coping Assessed Today: Yes  Emotional response to diabetes: Ready to learn  Informal Support system:: Family, Friends  Stage of change: ACTION (Actively working towards change)  Patient Activation Measure Survey Score:      1/24/2022     7:00 AM 4/10/2023     8:00 AM   DEMETRIUS Score (Last Two)   DEMETRIUS Raw Score 40 40   Activation Score 100 100   DEMETRIUS Level 4 4         Care Plan and Education Provided:  Healthy Eating: Balanced meals, Carbohydrate Counting, Plate planning method, and Portion control, Monitoring: Frequency of monitoring and Individual glucose targets, Taking Medication: Action of prescribed medication(s), Administering and storing injectable diabetes medications, and Proper site selection and rotation for injections, Problem Solving: Low glucose - causes, signs/symptoms, treatment and prevention, and Healthy Coping: Benefits of making appropriate lifestyle changes      Time Spent: 35 minutes  Encounter Type: Individual    Any diabetes medication dose changes were made via the CDE Protocol per the patient's referring provider. A copy of this encounter was shared with the provider.

## 2024-05-09 NOTE — TELEPHONE ENCOUNTER
Thanks Mi, we'll watch for him!!    Orly Caro  Prescription Assistance Supervisor  Pharmacy Assistance

## 2024-05-09 NOTE — LETTER
5/9/2024         RE: Adarsh Salvador  [address]  [place] [address]        Dear Colleague,    Thank you for referring your patient, Adarsh Salvador, to the [facility]. Please see a copy of my visit note below.    Diabetes Self-Management Education & Support    Presents for:      Type of Service: In Person Visit      REPORTS:            Pt verbalized understanding of concepts discussed and recommendations provided today.       Continue education with the following diabetes management concepts: Healthy Eating,, Monitoring, Taking Medication, Problem Solving, Reducing Risks, and Healthy Coping    ASSESSMENT:  Met with Adarsh today to review his glucose and diabetes management. He ran out of meds for a few months and A1C drastically danielle. Patient is now taking his medication as prescribed and is seeing improvements in his glucose. We reviewed use, insulin site rotation and the importance of improving glucose. Patient would like to change medication from metformin due to things he has heard. Cost of Jardiance ($47/month) too much at this time - will look into prescription assistance programs for patient.   Patient has limited mobility using a cane.Patient reports eating smaller amounts of carbs and more vegetables and protein. Discussed the plate method  Reviewed food choices briefly today.    Glucose Patterns & Trends:  Time in range of  mg/dL, 14% of the time. and Time below range of 70 mg/dL, 0% of the time.    PLAN  Increase Lantus by 10% to 55 units/day  Increase Novolog from 20 to 22 units/meal PLUS add correction scale 2:40 >150 mg/dL  Continue metformin at this time - no changes due to cost of other meds  Work on prescription assistance for patient  Topics to cover at upcoming visits: Healthy Eating, Monitoring, Taking Medication, Problem Solving, Reducing Risks, and Healthy Coping    Follow-up: 6-8 weeks    See Care Plan for co-developed, patient-state behavior change  goals.  AVS provided for patient today.    Education Materials Provided:  [place] Healthy Living with Diabetes Book and My Plate Planner      SUBJECTIVE/OBJECTIVE:     Cultural Influences/Ethnic Background:  Not  or     Diabetes Symptoms & Complications:          Patient Problem List and Family Medical History reviewed for relevant medical history, current medical status, and diabetes risk factors.    Vitals:  There were no vitals taken for this visit.  Estimated body mass index is 38.65 kg/m  as calculated from the following:    Height as of 4/23/24: 1.829 m (6').    Weight as of 4/24/24: 129.3 kg (285 lb).   Last 3 BP:   BP Readings from Last 3 Encounters:   04/24/24 (!) 154/98   04/23/24 114/82   03/19/24 (!) 159/91       History   Smoking Status     Never   Smokeless Tobacco     Current     Types: Chew       Labs:  Lab Results   Component Value Date    A1C 13.5 04/23/2024    A1C 7.4 12/03/2020     Lab Results   Component Value Date     04/23/2024     09/29/2023     04/10/2023     01/29/2021     Lab Results   Component Value Date    LDL  04/10/2023      Comment:      Cannot estimate LDL when triglyceride exceeds 400 mg/dL     04/10/2023    LDL  03/26/2020     Cannot estimate LDL when triglyceride exceeds 400 mg/dL     03/26/2020     HDL Cholesterol   Date Value Ref Range Status   03/26/2020 32 (L) >39 mg/dL Final     Direct Measure HDL   Date Value Ref Range Status   04/10/2023 46 >=40 mg/dL Final   ]  GFR Estimate   Date Value Ref Range Status   04/23/2024 >90 >60 mL/min/1.73m2 Final   01/29/2021 >90 >60 mL/min/[1.73_m2] Final     Comment:     Non  GFR Calc  Starting 12/18/2018, serum creatinine based estimated GFR (eGFR) will be   calculated using the Chronic Kidney Disease Epidemiology Collaboration   (CKD-EPI) equation.       GFR Estimate If Black   Date Value Ref Range Status   01/29/2021 >90 >60 mL/min/[1.73_m2] Final      "Comment:      GFR Calc  Starting 12/18/2018, serum creatinine based estimated GFR (eGFR) will be   calculated using the Chronic Kidney Disease Epidemiology Collaboration   (CKD-EPI) equation.       Lab Results   Component Value Date    CR 0.90 04/23/2024    CR 0.94 01/29/2021     No results found for: \"MICROALBUMIN\"    Healthy Eating:  Breakfast: toast and eggs or schultz and sausage  Lunch: pasta or vegetables and meat  Dinner: meat and potato (pork) with vegetables Or pulled pork with veggie  Snacks: not often - granola bar  Beverages: Milk, Water, Diet soda (powdered milk)  Has patient met with a dietitian in the past?: Yes    Being Active:  Being Active Assessed Today: No  Exercise:: Currently not exercising  Barrier to exercise: Physical limitation    Monitoring:  Monitoring Assessed Today: Yes  Did patient bring glucose meter to appointment? : Yes  Blood Glucose Meter: CGM  Times checking blood sugar at home (number): 3  Times checking blood sugar at home (per): Day  Blood glucose trend: Decreasing      Taking Medications:  Diabetes Medication(s)       Biguanides       metFORMIN (GLUCOPHAGE XR) 500 MG 24 hr tablet Take 2 tablets (1,000 mg) by mouth 2 times daily (with meals)       Insulin       insulin aspart (NOVOLOG FLEXPEN) 100 UNIT/ML pen Inject 22 Units Subcutaneous 3 times daily (with meals) 22 units before meals plus correction scale. Max daily dose 90 units     insulin aspart (NOVOLOG VIAL) 100 UNITS/ML vial Inject 18 Units Subcutaneous 3 times daily (with meals)     insulin glargine 100 UNIT/ML pen Inject 55 Units Subcutaneous daily            Taking Medication Assessed Today: Yes  Current Treatments: Insulin Injections, Oral Medication (taken by mouth)  Dose schedule: Pre-breakfast, Pre-lunch, Pre-dinner  Given by: Patient  Injection/Infusion sites: Abdomen  Problems taking diabetes medications regularly?: No  Diabetes medication side effects?: No    Problem Solving:  Problem Solving " Assessed Today: Yes  Is the patient at risk for hypoglycemia?: Yes  Hypoglycemia Treatment: Juice    Hypoglycemia Symptoms  Hypoglycemia: Dizziness/Lightheadedness, Feeling shaky, Sweats         Reducing Risks:  Reducing Risks Assessed Today: Yes  Diabetes Risks: Age over 45 years, Sedentary Lifestyle  CAD Risks: Diabetes Mellitus, Male sex, Obesity, Sedentary lifestyle    Healthy Coping:  Healthy Coping Assessed Today: Yes  Emotional response to diabetes: Ready to learn  Informal Support system:: Family, Friends  Stage of change: ACTION (Actively working towards change)  Patient Activation Measure Survey Score:      1/24/2022     7:00 AM 4/10/2023     8:00 AM   DEMETRIUS Score (Last Two)   DEMETRIUS Raw Score 40 40   Activation Score 100 100   DEMETRIUS Level 4 4         Care Plan and Education Provided:  Healthy Eating: Balanced meals, Carbohydrate Counting, Plate planning method, and Portion control, Monitoring: Frequency of monitoring and Individual glucose targets, Taking Medication: Action of prescribed medication(s), Administering and storing injectable diabetes medications, and Proper site selection and rotation for injections, Problem Solving: Low glucose - causes, signs/symptoms, treatment and prevention, and Healthy Coping: Benefits of making appropriate lifestyle changes      Time Spent: 35 minutes  Encounter Type: Individual    Any diabetes medication dose changes were made via the CDE Protocol per the patient's referring provider. A copy of this encounter was shared with the provider.

## 2024-05-09 NOTE — PATIENT INSTRUCTIONS
Goals for Diabetes Care:    1. Eat 3 balanced meals each day - Monitor carb intake and aim for 45-60 grams per meal  This would be equal to about 4 choices of carbohydrates. Carbohydrate 1 choice = 15 grams  Aim to eat the plate method style - half your plate fruits/veggies, 1/4 the plate protein and 1/4 plate starch (rice, potato, pasta)    2. Check blood sugars at least 3-4 times each day at varying times   Blood Glucose Targets:   1. Fasting and before meal target is 80 - 130   2. 2 hours after a meal target is < 180  Always remember to bring meter and log book to all appointments.    3. Activity really helps improve blood sugars. Try to Incorporate 30 minutes activity into each day - does not need to be all at one time & walking counts!    4. Treating low BG. Rule of 15 = BG 50-70 mg/dL = 15 gram carb (4 oz juice, 4 glucose tabs, OR 4 oz pop), then recheck BG in 15 minutes. If still low repeat. (If BG <50 mg/dL = 8 oz pop/juice or 8 glucose tabs).    5. Take diabetes medications as prescribed   -Increase your Lantus to 55 units/day  -Novolog   Take 22 units before meals  PLUS scale  Correction Scale:  2 unit lower your blood sugar 40 mg/dL  151-190 mg/dL = 2 unit  191-230 mg/dL = 4 units  231-270 mg/dL = 6 units  271-310 mg/dL = 8 units  311-350 mg/dL = 10 units  351-390 mg/dL = 12 units  391-430 mg/dL = 14 units   431-470 mg/dL = 16 units  471-510 mg/dL = 18 units  Call if higher than 500 mg/dL x 2 and not coming down call or go to ER      Goals  Take insulin before each meal  Aim for 3 carbs/meal     Follow up with your Diabetic Educator to assess BG targets and need for modifications to medications and/or lifestyle.    Call with any questions.  Thank you!  Mi Javed RDN, LD, [facility]   Certified Diabetes Care &   [facility]  Triage [phone]

## 2024-05-10 ENCOUNTER — MYC MEDICAL ADVICE (OUTPATIENT)
Dept: ANTICOAGULATION | Facility: CLINIC | Age: 59
End: 2024-05-10
Payer: COMMERCIAL

## 2024-05-13 ENCOUNTER — TELEPHONE (OUTPATIENT)
Dept: ANTICOAGULATION | Facility: CLINIC | Age: 59
End: 2024-05-13
Payer: COMMERCIAL

## 2024-05-13 NOTE — TELEPHONE ENCOUNTER
ANTICOAGULATION     Adarsh Salvador is overdue for an INR check.     Left message for patient to call and schedule lab appointment as soon as possible. If returning call, please schedule.     Nirali Carpenter RN

## 2024-05-20 ENCOUNTER — TELEPHONE (OUTPATIENT)
Dept: ANTICOAGULATION | Facility: CLINIC | Age: 59
End: 2024-05-20
Payer: COMMERCIAL

## 2024-05-20 NOTE — TELEPHONE ENCOUNTER
ANTICOAGULATION     Adarsh Salvador is overdue for an INR check.     Spoke with Adarsh and scheduled lab appointment on 5/22/24    Joby Marie RN

## 2024-05-22 ENCOUNTER — LAB (OUTPATIENT)
Dept: LAB | Facility: CLINIC | Age: 59
End: 2024-05-22
Payer: COMMERCIAL

## 2024-05-22 ENCOUNTER — ANTICOAGULATION THERAPY VISIT (OUTPATIENT)
Dept: ANTICOAGULATION | Facility: CLINIC | Age: 59
End: 2024-05-22

## 2024-05-22 DIAGNOSIS — I82.409 RECURRENT DEEP VEIN THROMBOSIS (DVT) (H): Primary | ICD-10-CM

## 2024-05-22 DIAGNOSIS — I82.409 RECURRENT DEEP VEIN THROMBOSIS (DVT) (H): ICD-10-CM

## 2024-05-22 LAB — INR BLD: 2.2 (ref 0.9–1.1)

## 2024-05-22 PROCEDURE — 85610 PROTHROMBIN TIME: CPT

## 2024-05-22 PROCEDURE — 36416 COLLJ CAPILLARY BLOOD SPEC: CPT

## 2024-05-22 NOTE — PROGRESS NOTES
ANTICOAGULATION MANAGEMENT     Adarsh Salvador 59 year old male is on warfarin with therapeutic INR result. (Goal INR 2.0-3.0)    Recent labs: (last 7 days)     05/22/24  1024   INR 2.2*       ASSESSMENT     Source(s): Chart Review and Patient/Caregiver Call     Warfarin doses taken: Warfarin taken as instructed  Diet: No new diet changes identified  Medication/supplement changes: None noted  New illness, injury, or hospitalization: No  Signs or symptoms of bleeding or clotting: No  Previous result: Subtherapeutic  Additional findings: None       PLAN     Recommended plan for no diet, medication or health factor changes affecting INR     Dosing Instructions: Continue your current warfarin dose with next INR in 4 weeks       Summary  As of 5/22/2024      Full warfarin instructions:  12.5 mg every Tue; 15 mg all other days   Next INR check:  6/19/2024               Telephone call with Adarsh who verbalizes understanding and agrees to plan and who agrees to plan and repeated back plan correctly    Lab visit scheduled    Education provided:   Goal range and lab monitoring: goal range and significance of current result, Importance of therapeutic range, and Importance of following up at instructed interval  Contact [phone]  with any changes, questions or concerns.     Plan made per [facility] anticoagulation protocol    Joby Marie, RN  Anticoagulation Clinic  5/22/2024    _______________________________________________________________________     Anticoagulation Episode Summary       Current INR goal:  2.0-3.0   TTR:  35.9% (1 y)   Target end date:  Indefinite   Send INR reminders to:  [facility] [place]    Indications    Recurrent deep vein thrombosis (DVT) (H) [I82.409]             Comments:               Anticoagulation Care Providers       Provider Role Specialty Phone number    Bertha Romero PA-C Referring Family Medicine [phone]    Kehr, Kristen M, PA-C Referring Family Medicine [phone]

## 2024-05-23 NOTE — TELEPHONE ENCOUNTER
Patient Quality Outreach    Type of outreach:    Sent letter.    Next Steps:  Reach out within 90 days via Phone, MyChart, and Letter.    Max number of attempts reached: No. Will try again in 90 days if patient still on fail list.        Renetta Valadez MA  Chart routed to Care Team.

## 2024-06-03 ENCOUNTER — PATIENT OUTREACH (OUTPATIENT)
Dept: CARE COORDINATION | Facility: CLINIC | Age: 59
End: 2024-06-03
Payer: COMMERCIAL

## 2024-06-03 NOTE — PROGRESS NOTES
[facility]/Voicemail    Clinical Data: Care Coordinator Outreach        Left message on patient's voicemail with call back information and requested return call.    Plan:   Care Coordinator will try to reach patient again in 3-5 business days.    Next outreach due: 6/10/24

## 2024-06-13 ENCOUNTER — PATIENT OUTREACH (OUTPATIENT)
Dept: CARE COORDINATION | Facility: CLINIC | Age: 59
End: 2024-06-13
Payer: COMMERCIAL

## 2024-06-13 NOTE — PROGRESS NOTES
Clinic Care Coordination Contact  [facility]/Voicemail       Clinical Data: CHW Outreach    Outreach attempted x 2. CHW was unable to leave a message on patient's voicemail with call back information and requested return call. Due to Patient's VM is full and not accepting new messages.     Plan: CHW will send message to Lead CC (RNCC) to determine whether a disenrollment letter should be sent to Patient or if additional attempt should be made to Patient. No further outreach attempts will be made. Should they return my call, I will discuss clinic care coordination per standard work.    CHRISTIANE Chopra  Clinic Care Coordination   [facility]   Phone: [phone]  [email or web address]

## 2024-06-17 ENCOUNTER — PATIENT OUTREACH (OUTPATIENT)
Dept: CARE COORDINATION | Facility: CLINIC | Age: 59
End: 2024-06-17
Payer: COMMERCIAL

## 2024-06-17 PROBLEM — Z71.89 ADVANCED DIRECTIVES, COUNSELING/DISCUSSION: Status: RESOLVED | Noted: 2022-12-22 | Resolved: 2024-06-17

## 2024-06-17 NOTE — PROGRESS NOTES
Clinic Care Coordination Contact  [facility]    Clinical Data: CHW unable to reach patient x2 outreach attempts (6/3 and 6/13).     Plan: Care Coordinator will send disenrollment letter with care coordinator contact information via [employer]. Care Coordinator will do no further outreaches at this time.    Summer Ricketts RN, BSN, PHN Care Coordinator  [place], [place], and [facility]ana [facility]   Phone: [phone]

## 2024-06-17 NOTE — LETTER
[facility] CARE COORDINATION  [address] [facility] [address]   June 17, 2024    Adarsh Salvador  [address]   [place] [address]      Dear Adarsh,    I have been unsuccessful in reaching you since our last contact. At this time the Care Coordination team will make no further attempts to reach you, however this does not change your ability to continue receiving care from your providers at your primary care clinic. If you need additional support from a care coordinator in the future please contact Lalita alcantara Community Heatl[employer] Worker (CHW) at [phone].    All of us at the [facility] are invested in your health and are here to assist you in meeting your goals.     Sincerely,    Primary Care Coordination Team

## 2024-06-18 ENCOUNTER — PATIENT OUTREACH (OUTPATIENT)
Dept: FAMILY MEDICINE | Facility: CLINIC | Age: 59
End: 2024-06-18
Payer: COMMERCIAL

## 2024-06-18 NOTE — TELEPHONE ENCOUNTER
Patient Quality Outreach    Patient is due for the following:   Diabetes -  Eye Exam, Microalbumin, and BP Check  Asthma  -  AAP  Hypertension -  BP check  Physical Annual Wellness Visit      Topic Date Due    Pneumococcal Vaccine (1 of 2 - PCV) Never done    Hepatitis B Vaccine (1 of 3 - 19+ 3-dose series) Never done    Zoster (Shingles) Vaccine (1 of 2) Never done    COVID-19 Vaccine (1 - 2023-24 season) Never done       Next Steps:   Patient was scheduled for 08/06/24 with Kristen Kehr PA-C, will check blood pressure at time of visit.     Type of outreach:    Chart review performed, no outreach needed.      Questions for provider review:    None           Delmar Bustos MA

## 2024-06-28 ENCOUNTER — TELEPHONE (OUTPATIENT)
Dept: ANTICOAGULATION | Facility: CLINIC | Age: 59
End: 2024-06-28
Payer: COMMERCIAL

## 2024-06-28 NOTE — TELEPHONE ENCOUNTER
ANTICOAGULATION     Adarsh Salvador is overdue for an INR check.     Spoke with Adarsh and scheduled lab appointment on 7/8/24    Joby Marie RN

## 2024-07-08 ENCOUNTER — LAB (OUTPATIENT)
Dept: LAB | Facility: CLINIC | Age: 59
End: 2024-07-08
Payer: COMMERCIAL

## 2024-07-08 ENCOUNTER — ANTICOAGULATION THERAPY VISIT (OUTPATIENT)
Dept: ANTICOAGULATION | Facility: CLINIC | Age: 59
End: 2024-07-08

## 2024-07-08 DIAGNOSIS — I82.409 RECURRENT DEEP VEIN THROMBOSIS (DVT) (H): ICD-10-CM

## 2024-07-08 DIAGNOSIS — I82.409 RECURRENT DEEP VEIN THROMBOSIS (DVT) (H): Primary | ICD-10-CM

## 2024-07-08 LAB — INR BLD: 1.3 (ref 0.9–1.1)

## 2024-07-08 PROCEDURE — 85610 PROTHROMBIN TIME: CPT

## 2024-07-08 PROCEDURE — 36416 COLLJ CAPILLARY BLOOD SPEC: CPT

## 2024-07-08 NOTE — PROGRESS NOTES
ANTICOAGULATION MANAGEMENT     Adarsh Salvador 59 year old male is on warfarin with subtherapeutic INR result. (Goal INR 2.0-3.0)    Recent labs: (last 7 days)     07/08/24  1145   INR 1.3*       ASSESSMENT     Source(s): Chart Review and Patient/Caregiver Call     Warfarin doses taken: Missed dose(s) may be affecting INR  Diet: No new diet changes identified  Medication/supplement changes: None noted  New illness, injury, or hospitalization: No  Signs or symptoms of bleeding or clotting: No  Previous result: Therapeutic last visit; previously outside of goal range  Additional findings:  [place] of [place]       PLAN     Recommended plan for temporary change(s) affecting INR     Dosing Instructions:Pt took booster dose last night, advised small additional booster dose then continue your current warfarin dose with next INR in 1 week       Summary  As of 7/8/2024      Full warfarin instructions:  7/8: 20 mg; Otherwise 12.5 mg every Tue; 15 mg all other days   Next INR check:  7/15/2024               Telephone call with Adarsh who verbalizes understanding and agrees to plan and who agrees to plan and repeated back plan correctly    Lab visit scheduled    Education provided: Taking warfarin: Importance of taking warfarin as instructed  Goal range and lab monitoring: Importance of therapeutic range  Symptom monitoring: monitoring for clotting signs and symptoms, monitoring for stroke signs and symptoms, when to seek medical attention/emergency care, and travel related clotting risk and prevention    Plan made per [facility] anticoagulation protocol    Priya Plunkett RN  Anticoagulation Clinic  7/8/2024    _______________________________________________________________________     Anticoagulation Episode Summary       Current INR goal:  2.0-3.0   TTR:  25.9% (1 y)   Target end date:  Indefinite   Send INR reminders to:  [facility]    Indications    Recurrent deep vein thrombosis (DVT) (H) [I82.409]              Comments:               Anticoagulation Care Providers       Provider Role Specialty Phone number    Bertha Romero PA-C Referring Family Medicine [phone]    Kehr, Kristen M, PA-C Referring Family Medicine [phone]

## 2024-07-11 DIAGNOSIS — Z79.4 TYPE 2 DIABETES MELLITUS WITH DIABETIC POLYNEUROPATHY, WITH LONG-TERM CURRENT USE OF INSULIN (H): ICD-10-CM

## 2024-07-11 DIAGNOSIS — E11.42 TYPE 2 DIABETES MELLITUS WITH DIABETIC POLYNEUROPATHY, WITH LONG-TERM CURRENT USE OF INSULIN (H): ICD-10-CM

## 2024-07-11 NOTE — TELEPHONE ENCOUNTER
Medication Question or Refill        What medication are you calling about (include dose and sig)?: Freestyle valeria 3 sensors- pended    Preferred Pharmacy:    [id number] - [place], [place] - [address] [address] [facility] [address] [place] [address]  [place] [address]  Phone: [phone] Fax: [phone]      Controlled Substance Agreement on file:   CSA -- Patient Level:    CSA: None found at the patient level.       Who prescribed the medication?: Dr sheth, PCP is Kristen Kehr    Do you need a refill? Yes    When did you use the medication last?     Patient offered an appointment? No    Do you have any questions or concerns?  Yes: pharmacist calling in asking for a refill on the sensors as Adarsh received a faulty sensor      Could we send this information to you in [facility] or would you prefer to receive a phone call?:  No need to call- pharmacy will notify patient when it is ready for

## 2024-07-14 RX ORDER — BLOOD-GLUCOSE SENSOR
1 EACH MISCELLANEOUS
Qty: 2 EACH | Refills: 5 | Status: SHIPPED | OUTPATIENT
Start: 2024-07-14 | End: 2024-09-26

## 2024-07-15 ENCOUNTER — ANTICOAGULATION THERAPY VISIT (OUTPATIENT)
Dept: ANTICOAGULATION | Facility: CLINIC | Age: 59
End: 2024-07-15

## 2024-07-15 ENCOUNTER — LAB (OUTPATIENT)
Dept: LAB | Facility: CLINIC | Age: 59
End: 2024-07-15
Payer: COMMERCIAL

## 2024-07-15 ENCOUNTER — TELEPHONE (OUTPATIENT)
Dept: FAMILY MEDICINE | Facility: CLINIC | Age: 59
End: 2024-07-15

## 2024-07-15 DIAGNOSIS — I82.409 RECURRENT DEEP VEIN THROMBOSIS (DVT) (H): ICD-10-CM

## 2024-07-15 DIAGNOSIS — I82.409 RECURRENT DEEP VEIN THROMBOSIS (DVT) (H): Primary | ICD-10-CM

## 2024-07-15 LAB — INR BLD: 1.4 (ref 0.9–1.1)

## 2024-07-15 PROCEDURE — 36416 COLLJ CAPILLARY BLOOD SPEC: CPT

## 2024-07-15 PROCEDURE — 85610 PROTHROMBIN TIME: CPT

## 2024-07-15 NOTE — TELEPHONE ENCOUNTER
Prior Authorization Retail Medication Request    Medication/Dose: FreeStyle Shari 3 Sensor  Diagnosis and ICD code (if different than what is on RX):    New/renewal/insurance change PA/secondary ins. PA:  Previously Tried and Failed:    Rationale:      Insurance   Primary: [facility] Medicare Advantage  Insurance ID:  [id number]     Secondary (if applicable):  Insurance ID:      Pharmacy Information (if different than what is on RX)  Name:  [facility]  Phone:  [phone]  Fax:  [phone]    QUINONES:  [id number]

## 2024-07-15 NOTE — PROGRESS NOTES
ANTICOAGULATION MANAGEMENT     Adarhs Salvador 59 year old male is on warfarin with subtherapeutic INR result. (Goal INR 2.0-3.0)    Recent labs: (last 7 days)     07/15/24  1444   INR 1.4*       ASSESSMENT     Source(s): Chart Review and Patient/Caregiver Call     Warfarin doses taken: Missed dose(s) may be affecting INR  Diet: No new diet changes identified  Medication/supplement changes: None noted  New illness, injury, or hospitalization: No  Signs or symptoms of bleeding or clotting: No  Previous result: Subtherapeutic  Additional findings: None       PLAN     Recommended plan for temporary change(s) affecting INR     Dosing Instructions: booster dose then continue your current warfarin dose with next INR in 1 week       Summary  As of 7/15/2024      Full warfarin instructions:  7/15: 20 mg; Otherwise 12.5 mg every Tue; 15 mg all other days   Next INR check:  7/22/2024               Telephone call with Adarsh who verbalizes understanding and agrees to plan and who agrees to plan and repeated back plan correctly    Lab visit scheduled    Education provided: Symptom monitoring: monitoring for clotting signs and symptoms, monitoring for stroke signs and symptoms, and when to seek medical attention/emergency care    Plan made per [facility] anticoagulation protocol    Priya Plunkett RN  Anticoagulation Clinic  7/15/2024    _______________________________________________________________________     Anticoagulation Episode Summary       Current INR goal:  2.0-3.0   TTR:  24.0% (1 y)   Target end date:  Indefinite   Send INR reminders to:  [facility]    Indications    Recurrent deep vein thrombosis (DVT) (H) [I82.409]             Comments:               Anticoagulation Care Providers       Provider Role Specialty Phone number    Bertha Romero PA-C Referring Family Medicine [phone]    Kehr, Kristen M, PA-C Referring Family Medicine [phone]

## 2024-07-18 NOTE — TELEPHONE ENCOUNTER
PA Initiation    Medication: [facility]ENS[facility]  Insurance Company: [employer] Part D - Phone [phone] Fax [phone]  Pharmacy Filling the Rx: [id number] - [place], [place] - [address] AT [facility] & [facility]  Filling Pharmacy Phone: [phone]  Filling Pharmacy Fax:    Start Date: 7/18/2024  Retail Pharmacy Prior Authorization Team   Phone: [phone]

## 2024-07-24 ENCOUNTER — LAB (OUTPATIENT)
Dept: LAB | Facility: CLINIC | Age: 59
End: 2024-07-24
Payer: COMMERCIAL

## 2024-07-24 ENCOUNTER — ANTICOAGULATION THERAPY VISIT (OUTPATIENT)
Dept: ANTICOAGULATION | Facility: CLINIC | Age: 59
End: 2024-07-24

## 2024-07-24 DIAGNOSIS — I82.409 RECURRENT DEEP VEIN THROMBOSIS (DVT) (H): ICD-10-CM

## 2024-07-24 DIAGNOSIS — I82.409 RECURRENT DEEP VEIN THROMBOSIS (DVT) (H): Primary | ICD-10-CM

## 2024-07-24 LAB — INR BLD: 1.8 (ref 0.9–1.1)

## 2024-07-24 PROCEDURE — 36416 COLLJ CAPILLARY BLOOD SPEC: CPT

## 2024-07-24 PROCEDURE — 85610 PROTHROMBIN TIME: CPT

## 2024-07-24 NOTE — PROGRESS NOTES
ANTICOAGULATION MANAGEMENT     Adarsh Salvador 59 year old male is on warfarin with subtherapeutic INR result. (Goal INR 2.0-3.0)    Recent labs: (last 7 days)     07/24/24  1135   INR 1.8*       ASSESSMENT     Source(s): Chart Review and Patient/Caregiver Call     Warfarin doses taken: Warfarin taken as instructed No missed tablets  Diet: No new diet changes identified  Medication/supplement changes: None noted  New illness, injury, or hospitalization: No  Signs or symptoms of bleeding or clotting: No  Previous result: Subtherapeutic  Additional findings: None       PLAN     Recommended plan for ongoing change(s) affecting INR     Dosing Instructions: Increase your warfarin dose (9.8% change) with next INR in 2 weeks       Summary  As of 7/24/2024      Full warfarin instructions:  17.5 mg every Mon, Wed, Fri; 15 mg all other days   Next INR check:  8/6/2024               Telephone call with Adarsh who verbalizes understanding and agrees to plan    Check at provider office visit    Education provided: Please call back if any changes to your diet, medications or how you've been taking warfarin  Goal range and lab monitoring: goal range and significance of current result, Importance of therapeutic range, and Importance of following up at instructed interval    Plan made per [facility] anticoagulation protocol    Argentina Antonio RN  Anticoagulation Clinic  7/24/2024    _______________________________________________________________________     Anticoagulation Episode Summary       Current INR goal:  2.0-3.0   TTR:  21.5% (1 y)   Target end date:  Indefinite   Send INR reminders to:  [facility]    Indications    Recurrent deep vein thrombosis (DVT) (H) [I82.409]             Comments:               Anticoagulation Care Providers       Provider Role Specialty Phone number    Bertha Romero PA-C Referring Family Medicine [phone]    Kehr, Kristen M, PA-C Referring Family Medicine [phone]

## 2024-08-06 ENCOUNTER — OFFICE VISIT (OUTPATIENT)
Dept: FAMILY MEDICINE | Facility: CLINIC | Age: 59
End: 2024-08-06
Payer: COMMERCIAL

## 2024-08-06 ENCOUNTER — ANTICOAGULATION THERAPY VISIT (OUTPATIENT)
Dept: ANTICOAGULATION | Facility: CLINIC | Age: 59
End: 2024-08-06

## 2024-08-06 VITALS
HEART RATE: 83 BPM | HEIGHT: 72 IN | RESPIRATION RATE: 20 BRPM | WEIGHT: 289 LBS | TEMPERATURE: 97.2 F | DIASTOLIC BLOOD PRESSURE: 84 MMHG | SYSTOLIC BLOOD PRESSURE: 124 MMHG | BODY MASS INDEX: 39.14 KG/M2 | OXYGEN SATURATION: 98 %

## 2024-08-06 DIAGNOSIS — I10 HYPERTENSION, UNSPECIFIED TYPE: ICD-10-CM

## 2024-08-06 DIAGNOSIS — J45.30 MILD PERSISTENT ASTHMA WITHOUT COMPLICATION: Chronic | ICD-10-CM

## 2024-08-06 DIAGNOSIS — Z79.4 TYPE 2 DIABETES MELLITUS WITH DIABETIC POLYNEUROPATHY, WITH LONG-TERM CURRENT USE OF INSULIN (H): Primary | ICD-10-CM

## 2024-08-06 DIAGNOSIS — E66.01 MORBID OBESITY (H): ICD-10-CM

## 2024-08-06 DIAGNOSIS — I82.409 RECURRENT DEEP VEIN THROMBOSIS (DVT) (H): Primary | ICD-10-CM

## 2024-08-06 DIAGNOSIS — E11.42 TYPE 2 DIABETES MELLITUS WITH DIABETIC POLYNEUROPATHY, WITH LONG-TERM CURRENT USE OF INSULIN (H): Primary | ICD-10-CM

## 2024-08-06 DIAGNOSIS — I82.409 RECURRENT DEEP VEIN THROMBOSIS (DVT) (H): ICD-10-CM

## 2024-08-06 LAB
ANION GAP SERPL CALCULATED.3IONS-SCNC: 9 MMOL/L (ref 7–15)
BUN SERPL-MCNC: 9.9 MG/DL (ref 6–20)
CALCIUM SERPL-MCNC: 9.2 MG/DL (ref 8.8–10.4)
CHLORIDE SERPL-SCNC: 105 MMOL/L (ref 98–107)
CHOLEST SERPL-MCNC: 291 MG/DL
CREAT SERPL-MCNC: 0.94 MG/DL (ref 0.67–1.17)
CREAT UR-MCNC: 127 MG/DL
EGFRCR SERPLBLD CKD-EPI 2021: >90 ML/MIN/1.73M2
FASTING STATUS PATIENT QL REPORTED: YES
FASTING STATUS PATIENT QL REPORTED: YES
GLUCOSE SERPL-MCNC: 230 MG/DL (ref 70–99)
HBA1C MFR BLD: 10.5 % (ref 0–5.6)
HCO3 SERPL-SCNC: 24 MMOL/L (ref 22–29)
HDLC SERPL-MCNC: 34 MG/DL
INR BLD: 3.1 (ref 0.9–1.1)
LDLC SERPL CALC-MCNC: 178 MG/DL
MICROALBUMIN UR-MCNC: 24.4 MG/L
MICROALBUMIN/CREAT UR: 19.21 MG/G CR (ref 0–17)
NONHDLC SERPL-MCNC: 257 MG/DL
POTASSIUM SERPL-SCNC: 4.3 MMOL/L (ref 3.4–5.3)
SODIUM SERPL-SCNC: 138 MMOL/L (ref 135–145)
TRIGL SERPL-MCNC: 393 MG/DL

## 2024-08-06 PROCEDURE — 82043 UR ALBUMIN QUANTITATIVE: CPT | Performed by: PHYSICIAN ASSISTANT

## 2024-08-06 PROCEDURE — 99214 OFFICE O/P EST MOD 30 MIN: CPT | Mod: 25 | Performed by: PHYSICIAN ASSISTANT

## 2024-08-06 PROCEDURE — 90677 PCV20 VACCINE IM: CPT | Performed by: PHYSICIAN ASSISTANT

## 2024-08-06 PROCEDURE — G0009 ADMIN PNEUMOCOCCAL VACCINE: HCPCS | Performed by: PHYSICIAN ASSISTANT

## 2024-08-06 PROCEDURE — 80061 LIPID PANEL: CPT | Performed by: PHYSICIAN ASSISTANT

## 2024-08-06 PROCEDURE — 83036 HEMOGLOBIN GLYCOSYLATED A1C: CPT | Performed by: PHYSICIAN ASSISTANT

## 2024-08-06 PROCEDURE — 85610 PROTHROMBIN TIME: CPT | Performed by: PHYSICIAN ASSISTANT

## 2024-08-06 PROCEDURE — 82570 ASSAY OF URINE CREATININE: CPT | Performed by: PHYSICIAN ASSISTANT

## 2024-08-06 PROCEDURE — 36415 COLL VENOUS BLD VENIPUNCTURE: CPT | Performed by: PHYSICIAN ASSISTANT

## 2024-08-06 PROCEDURE — 80048 BASIC METABOLIC PNL TOTAL CA: CPT | Performed by: PHYSICIAN ASSISTANT

## 2024-08-06 RX ORDER — LOSARTAN POTASSIUM 25 MG/1
25 TABLET ORAL DAILY
Qty: 90 TABLET | Refills: 1 | Status: SHIPPED | OUTPATIENT
Start: 2024-08-06

## 2024-08-06 RX ORDER — INSULIN GLARGINE 100 [IU]/ML
60 INJECTION, SOLUTION SUBCUTANEOUS DAILY
Qty: 60 ML | Refills: 4 | Status: SHIPPED | OUTPATIENT
Start: 2024-08-06 | End: 2024-09-26 | Stop reason: ALTCHOICE

## 2024-08-06 RX ORDER — AMLODIPINE BESYLATE 10 MG/1
10 TABLET ORAL DAILY
Qty: 90 TABLET | Refills: 1 | Status: SHIPPED | OUTPATIENT
Start: 2024-08-06

## 2024-08-06 RX ORDER — MONTELUKAST SODIUM 10 MG/1
10 TABLET ORAL AT BEDTIME
Qty: 90 TABLET | Refills: 2 | Status: SHIPPED | OUTPATIENT
Start: 2024-08-06

## 2024-08-06 RX ORDER — CHLORTHALIDONE 25 MG/1
25 TABLET ORAL DAILY
Qty: 90 TABLET | Refills: 1 | Status: SHIPPED | OUTPATIENT
Start: 2024-08-06

## 2024-08-06 RX ORDER — ATORVASTATIN CALCIUM 40 MG/1
40 TABLET, FILM COATED ORAL EVERY EVENING
Qty: 90 TABLET | Refills: 1 | Status: SHIPPED | OUTPATIENT
Start: 2024-08-06

## 2024-08-06 ASSESSMENT — PAIN SCALES - GENERAL: PAINLEVEL: MODERATE PAIN (4)

## 2024-08-06 NOTE — PROGRESS NOTES
Assessment & Plan     Type 2 diabetes mellitus with diabetic polyneuropathy, with long-term current use of insulin (H)  There is improvement with his A1C, but still needs adjustment.   He missed his last appointment with Diabetes Educator (he did not get up for the appointment)  I am going to have him adjust his lantus dose to 60 units. Continue with the sliding scale humalog  He is going to take metformin consistently at least the 500 mg dose with meals, try to advance to the 1000 mg twice daily.   Add Jardiance.   He will be establishing with Endocrinology in Sept to resume care and determine any further adjustments in the future.   He will schedule an appointment with Diabetes Education again within this timeframe  also.   - Hemoglobin A1c; Future  - Basic metabolic panel  (Ca, Cl, CO2, Creat, Gluc, K, Na, BUN); Future  - Lipid panel reflex to direct LDL Fasting; Future  - Albumin Random Urine Quantitative with Creat Ratio; Future  - Hemoglobin A1c  - Basic metabolic panel  (Ca, Cl, CO2, Creat, Gluc, K, Na, BUN)  - Lipid panel reflex to direct LDL Fasting  - Albumin Random Urine Quantitative with Creat Ratio  - insulin glargine 100 UNIT/ML pen; Inject 60 Units subcutaneously daily  - atorvastatin (LIPITOR) 40 MG tablet; Take 1 tablet (40 mg) by mouth every evening  - empagliflozin (JARDIANCE) 10 MG TABS tablet; Take 1 tablet (10 mg) by mouth daily      Recurrent deep vein thrombosis (DVT) (H)  He is in contact with Anticoagulation Team for adjustments  - INR point of care    Mild persistent asthma without complication  Refills given   - montelukast (SINGULAIR) 10 MG tablet; Take 1 tablet (10 mg) by mouth at bedtime For allergies/asthma    Hypertension, unspecified type  Stable, refills given   - amLODIPine (NORVASC) 10 MG tablet; Take 1 tablet (10 mg) by mouth daily for blood pressure  - losartan (COZAAR) 25 MG tablet; Take 1 tablet (25 mg) by mouth daily  - chlorthalidone (HYGROTON) 25 MG tablet; Take 1 tablet  (25 mg) by mouth daily Add on for blood pressure    Morbid obesity  Encouraged to continue to work on healthy habits with diet and exercise also.       The longitudinal plan of care for the diagnosis(es)/condition(s) as documented were addressed during this visit. Due to the added complexity in care, I will continue to support Adarsh in the subsequent management and with ongoing continuity of care.          Subjective   Adarsh is a 59 year old, presenting for the following health issues:  Recheck Medication        8/6/2024     9:00 AM   Additional Questions   Roomed by Delmar ALMAGUER MA     History of Present Illness       Reason for visit:  Dibets    He eats 2-3 servings of fruits and vegetables daily.He consumes 2 sweetened beverage(s) daily.He exercises with enough effort to increase his heart rate 10 to 19 minutes per day.  He exercises with enough effort to increase his heart rate 3 or less days per week. He is missing 2 dose(s) of medications per week.       Adarsh is here today for recheck of the following chronic health conditions:    Diabetes Type 2:  at his last appointment in April, he had been off of all of his medications. He is now on lantus 55 Units and using the humalog per sliding scale three times daily. He has a CGM, he has met with Diabetes Education. He has had better numbers with his CGM. He does not take the metformin consistently due to GI side effects.   Hypertension: managed with amlodipine, chlorthalidone and losartan.   Asthma: managed with the daily singulair and albuterol.   DVT: on coumadin. He is due for INR check and working with Anticoagulation team.   Neuropathy: managed by Neurology with future appointment scheduled.               Review of Systems  Constitutional, HEENT, cardiovascular, pulmonary, GI, , musculoskeletal, neuro, skin, endocrine and psych systems are negative, except as otherwise noted.      Objective    /84   Pulse 83   Temp 97.2  F (36.2  C) (Tympanic)   Resp 20    Ht 1.829 m (6')   Wt 131.1 kg (289 lb)   SpO2 98%   BMI 39.20 kg/m    Body mass index is 39.2 kg/m .  Physical Exam   GENERAL: alert and no distress  EYES: Eyes grossly normal to inspection, PERRL and conjunctivae and sclerae normal  RESP: lungs clear to auscultation - no rales, rhonchi or wheezes  CV: regular rate and rhythm, normal S1 S2, no S3 or S4, no murmur, click or rub, no peripheral edema   MS: no gross musculoskeletal defects noted, no edema  SKIN: no suspicious lesions or rashes  PSYCH: mentation appears normal, affect normal/bright    Results for orders placed or performed in visit on 08/06/24 (from the past 24 hour(s))   INR point of care   Result Value Ref Range    INR 3.1 (H) 0.9 - 1.1    Narrative    This test is intended for monitoring Coumadin therapy. Results are not accurate in patients with prolonged INR due to factor deficiency.   Hemoglobin A1c   Result Value Ref Range    Hemoglobin A1C 10.5 (H) 0.0 - 5.6 %     *Note: Due to a large number of results and/or encounters for the requested time period, some results have not been displayed. A complete set of results can be found in Results Review.           Signed Electronically by: Kristen M. Kehr, PA-C

## 2024-08-06 NOTE — LETTER
My Asthma Action Plan  Name: Adarsh Salvador   YOB: 1965  Date: 8/6/2024   My doctor: Kehr, Kristen M   My clinic: [facility]      My Control Medicine: Singulair        Dose: 10  My Rescue Medicine: Albuterol        Dose: as needed   My Asthma Severity: Intermittent / Exercise Induced  Avoid your asthma triggers: upper respiratory infections and pollens        GREEN ZONE   Good Control  I feel good  No cough or wheeze  Can work, sleep and play without asthma symptoms       Take your asthma control medicine every day.     If exercise triggers your asthma, take your rescue medication  15 minutes before exercise or sports, and  During exercise if you have asthma symptoms  Spacer to use with inhaler: If you have a spacer, make sure to use it with your inhaler             YELLOW ZONE Getting Worse  I have ANY of these:  I do not feel good  Cough or wheeze  Chest feels tight  Wake up at night   Keep taking your Green Zone medications  Start taking your rescue medicine:  every 20 minutes for up to 1 hour. Then every 4 hours for 24-48 hours.  If you stay in the Yellow Zone for more than 12-24 hours, contact your doctor.  If you do not return to the Green Zone in 12-24 hours or you get worse, start taking your oral steroid medicine if prescribed by your provider.           RED ZONE Medical Alert - Get Help  I have ANY of these:  I feel awful  Medicine is not helping  Breathing getting harder  Trouble walking or talking  Nose opens wide to breathe       Take your rescue medicine NOW  If your provider has prescribed an oral steroid medicine, start taking it NOW  Call your doctor NOW  If you are still in the Red Zone after 20 minutes and you have not reached your doctor:  Take your rescue medicine again and  Call 911 or go to the emergency room right away    See your regular doctor within 2 weeks of an Emergency Room or Urgent Care visit for follow-up treatment.        The above medication may be  given at school or day care?: Yes and N/A (Adult Patient)  Child can carry and use inhaler(s) at school with approval of school nurse?: Yes and N/A (Adult Patient)    Electronically signed by: Delmar Bustos MA, August 6, 2024    Annual Reminders:  Meet with Asthma Educator,  Flu Shot in the Fall, consider Pneumonia Vaccination for patients with asthma (aged 19 and older).    Pharmacy:    WRITTEN PRESCRIPTION REQUESTED  [facility] [phone]  [id number] - [place], [place] - [address] [place] [address] [address] AT [facility] [address]                    Asthma Triggers  How To Control Things That Make Your Asthma Worse    Triggers are things that make your asthma worse.  Look at the list below to help you find your triggers and what you can do about them.  You can help prevent asthma flare-ups by staying away from your triggers.      Trigger                                                          What you can do   Cigarette Smoke  Tobacco smoke can make asthma worse. Do not allow smoking in your home, car or around you.  Be sure no one smokes at a child s day care or school.  If you smoke, ask your health care provider for ways to help you quit.  Ask family members to quit too.  Ask your health care provider for a referral to Quit Plan to help you quit smoking, or call 7-333-513-PLAN.     Colds, Flu, Bronchitis  These are common triggers of asthma. Wash your hands often.  Don t touch your eyes, nose or mouth.  Get a flu shot every year.     Dust Mites  These are tiny bugs that live in cloth or carpet. They are too small to see. Wash sheets and blankets in hot water every week.   Encase pillows and mattress in dust mite proof covers.  Avoid having carpet if you can. If you have carpet, vacuum weekly.   Use a dust mask and HEPA vacuum.   Pollen and Outdoor Mold  Some people are allergic to trees, grass, or weed pollen, or molds. Try to keep your windows  closed.  Limit time out doors when pollen count is high.   Ask you health care provider about taking medicine during allergy season.     Animal Dander  Some people are allergic to skin flakes, urine or saliva from pets with fur or feathers. Keep pets with fur or feathers out of your home.    If you can t keep the pet outdoors, then keep the pet out of your bedroom.  Keep the bedroom door closed.  Keep pets off cloth furniture and away from stuffed toys.     Mice, Rats, and Cockroaches  Some people are allergic to the waste from these pests.   Cover food and garbage.  Clean up spills and food crumbs.  Store grease in the refrigerator.   Keep food out of the bedroom.   Indoor Mold  This can be a trigger if your home has high moisture. Fix leaking faucets, pipes, or other sources of water.   Clean moldy surfaces.  Dehumidify basement if it is damp and smelly.   Smoke, Strong Odors, and Sprays  These can reduce air quality. Stay away from strong odors and sprays, such as perfume, powder, hair spray, paints, smoke incense, paint, cleaning products, candles and new carpet.   Exercise or Sports  Some people with asthma have this trigger. Be active!  Ask your doctor about taking medicine before sports or exercise to prevent symptoms.    Warm up for 5-10 minutes before and after sports or exercise.     Other Triggers of Asthma  Cold air:  Cover your nose and mouth with a scarf.  Sometimes laughing or crying can be a trigger.  Some medicines and food can trigger asthma.

## 2024-08-06 NOTE — PROGRESS NOTES
ANTICOAGULATION MANAGEMENT     Adarsh Salvador 59 year old male is on warfarin with therapeutic INR result. (Goal INR 2.0-3.0)    Recent labs: (last 7 days)     08/06/24  0921   INR 3.1*       ASSESSMENT     Source(s): Chart Review and Patient/Caregiver Call     Warfarin doses taken: Warfarin taken as instructed  Diet: No new diet changes identified  Medication/supplement changes:  Jardiance started on 8/6/24 No interaction anticipated  Metformin dose increased on 8/6/24 No interaction anticipated  New illness, injury, or hospitalization: No  Signs or symptoms of bleeding or clotting: No  Previous result: Subtherapeutic  Additional findings:  patient has not missed any doses this past 2 weeks.        PLAN     Recommended plan for no diet, medication or health factor changes affecting INR     Dosing Instructions: decrease your warfarin dose (2.2% change) with next INR in 3 weeks       Summary  As of 8/6/2024      Full warfarin instructions:  17.5 mg every Mon, Fri; 15 mg all other days   Next INR check:  8/26/2024               Telephone call with Adarsh who verbalizes understanding and agrees to plan    Patient elected to schedule next visit in 3 weeks, will be on vacation until then     Education provided: Goal range and lab monitoring: goal range and significance of current result  Contact [phone] with any changes, questions or concerns.     Plan made per [facility] anticoagulation protocol    Janeth Cool RN  Anticoagulation Clinic  8/6/2024    _______________________________________________________________________     Anticoagulation Episode Summary       Current INR goal:  2.0-3.0   TTR:  22.2% (1 y)   Target end date:  Indefinite   Send INR reminders to:  [facility]    Indications    Recurrent deep vein thrombosis (DVT) (H) [I82.409]             Comments:               Anticoagulation Care Providers       Provider Role Specialty Phone number    Bertha Romero PA-C Referring Family Medicine  [phone]    Kehr, Kristen M, PA-C [facility] [phone]

## 2024-08-06 NOTE — PATIENT INSTRUCTIONS
Continue to work with Diabetes Education (make an appointment within the next few weeks)    Adjust the following medications:  Lantus insulin to 60 Units daily   Metformin: 500 mg twice daily (advance to 1000 mg twice daily if your stomach tolerates) take with meals to help with the stomach upset  Add the Jardiance 10 mg for now. Endocrinology may look to adjust this medication if needed in Sept.       Anticoagulation team will be reaching out about adjustment with coumadin.       Continue all blood pressure medications.     Work on healthy habits / diet / exercise / weight loss

## 2024-08-26 ENCOUNTER — LAB (OUTPATIENT)
Dept: LAB | Facility: CLINIC | Age: 59
End: 2024-08-26
Payer: COMMERCIAL

## 2024-08-26 ENCOUNTER — ANTICOAGULATION THERAPY VISIT (OUTPATIENT)
Dept: ANTICOAGULATION | Facility: CLINIC | Age: 59
End: 2024-08-26

## 2024-08-26 DIAGNOSIS — I82.409 RECURRENT DEEP VEIN THROMBOSIS (DVT) (H): ICD-10-CM

## 2024-08-26 DIAGNOSIS — I82.409 RECURRENT DEEP VEIN THROMBOSIS (DVT) (H): Primary | ICD-10-CM

## 2024-08-26 LAB — INR BLD: 1.6 (ref 0.9–1.1)

## 2024-08-26 PROCEDURE — 85610 PROTHROMBIN TIME: CPT

## 2024-08-26 PROCEDURE — 36416 COLLJ CAPILLARY BLOOD SPEC: CPT

## 2024-08-26 NOTE — PROGRESS NOTES
ANTICOAGULATION MANAGEMENT     Adarsh Salvador 59 year old male is on warfarin with subtherapeutic INR result. (Goal INR 2.0-3.0)    Recent labs: (last 7 days)     08/26/24  1024   INR 1.6*       ASSESSMENT     Source(s): Chart Review and Patient/Caregiver Call     Warfarin doses taken: Missed dose(s) may be affecting INR patient states that he missed 2 doses last week, unsure which days he missed.   Diet: No new diet changes identified  Medication/supplement changes: None noted  New illness, injury, or hospitalization: No  Signs or symptoms of bleeding or clotting: No  Previous result: Supratherapeutic  Additional findings: None       PLAN     Recommended plan for temporary change(s) affecting INR     Dosing Instructions: booster dose then continue your current warfarin dose with next INR in 2 weeks       Summary  As of 8/26/2024      Full warfarin instructions:  8/26: 22.5 mg; Otherwise 17.5 mg every Mon, Fri; 15 mg all other days   Next INR check:  9/9/2024               Telephone call with Adarsh who verbalizes understanding and agrees to plan    Lab visit scheduled    Education provided: Goal range and lab monitoring: goal range and significance of current result    Plan made per [facility] anticoagulation protocol    Janeth Cool RN  Anticoagulation Clinic  8/26/2024    _______________________________________________________________________     Anticoagulation Episode Summary       Current INR goal:  2.0-3.0   TTR:  25.8% (1 y)   Target end date:  Indefinite   Send INR reminders to:  [facility]    Indications    Recurrent deep vein thrombosis (DVT) (H) [I82.409]             Comments:               Anticoagulation Care Providers       Provider Role Specialty Phone number    Bertha Romero PA-C Referring Family Medicine [phone]    Kehr, Kristen M, PA-C Referring Family Medicine [phone]

## 2024-09-04 ENCOUNTER — OFFICE VISIT (OUTPATIENT)
Dept: ORTHOPEDICS | Facility: CLINIC | Age: 59
End: 2024-09-04
Payer: COMMERCIAL

## 2024-09-04 ENCOUNTER — NURSE TRIAGE (OUTPATIENT)
Dept: FAMILY MEDICINE | Facility: CLINIC | Age: 59
End: 2024-09-04

## 2024-09-04 ENCOUNTER — ALLIED HEALTH/NURSE VISIT (OUTPATIENT)
Dept: NURSING | Facility: CLINIC | Age: 59
End: 2024-09-04
Payer: COMMERCIAL

## 2024-09-04 ENCOUNTER — ANCILLARY PROCEDURE (OUTPATIENT)
Dept: GENERAL RADIOLOGY | Facility: CLINIC | Age: 59
End: 2024-09-04
Attending: PEDIATRICS
Payer: COMMERCIAL

## 2024-09-04 VITALS — BODY MASS INDEX: 38.33 KG/M2 | WEIGHT: 283 LBS | HEIGHT: 72 IN

## 2024-09-04 DIAGNOSIS — M25.532 LEFT WRIST PAIN: Primary | ICD-10-CM

## 2024-09-04 DIAGNOSIS — S69.92XA LEFT WRIST INJURY, INITIAL ENCOUNTER: ICD-10-CM

## 2024-09-04 DIAGNOSIS — S69.92XA LEFT WRIST INJURY, INITIAL ENCOUNTER: Primary | ICD-10-CM

## 2024-09-04 PROCEDURE — 99207 PR NO CHARGE NURSE ONLY: CPT

## 2024-09-04 PROCEDURE — 73110 X-RAY EXAM OF WRIST: CPT | Mod: LT | Performed by: STUDENT IN AN ORGANIZED HEALTH CARE EDUCATION/TRAINING PROGRAM

## 2024-09-04 PROCEDURE — 99203 OFFICE O/P NEW LOW 30 MIN: CPT | Performed by: PEDIATRICS

## 2024-09-04 NOTE — TELEPHONE ENCOUNTER
Pt evaluated as a walk in following a fall at home around 0200. Triaged pt according to protocol - Please see telephone encounter for visit dated 9/4/24. Huddled with provider who recommended [facility] walk in [facility]. Discussed options with pt who agreed that he will drive over to the ortho clinic at [place] as that is on his way home. No additional questions at this time.     Thank you - LANG ReynoldsN, RN    Reason for Disposition   MODERATE pain (e.g., interferes with normal activities) and high-risk adult (e.g., age > 60 years, osteoporosis, chronic steroid use)    Additional Information   Negative: Major bleeding (actively dripping or spurting) that can't be stopped   Negative: Serious injury with multiple fractures (broken bones)   Negative: Sounds like a life-threatening emergency to the triager   Negative: Wound looks infected   Negative: Arm pain from overuse (e.g., sports, lifting, physical work)   Negative: Arm pain not from an injury   Negative: Shoulder injury is main concern   Negative: Elbow injury is main concern   Negative: Hand or wrist injury is main concern   Negative: Bullet wound, stabbed by knife, or other serious penetrating wound   Negative: Looks like a broken bone or dislocated joint (crooked or deformed)   Negative: Bleeding won't stop after 10 minutes of direct pressure (using correct technique)   Negative: Skin is split open or gaping (or length > 1/2 inch or 12 mm)   Negative: Dirt in the wound and not removed after 15 minutes of scrubbing   Negative: Sounds like a serious injury to the triager   Negative: Can't move injured arm at all   Negative: SEVERE pain   Negative: Can't move injured arm normally (bend or straighten completely)   Negative: Large swelling or bruise and size > palm of person's hand   Negative: No prior tetanus shots (or is not fully vaccinated) and any wound (e.g., cut or scrape)   Negative: HIV positive or severe immunodeficiency (severely weak immune system)  "and DIRTY cut or scrape   Negative: Can't move injured arm normally (bend or straighten completely)   Negative: Suspicious history for the injury   Negative: Last tetanus shot > 5 years ago and DIRTY cut or scrape   Negative: Last tetanus shot > 10 years ago and CLEAN cut or scrape   Negative: Patient wants to be seen   Negative: Biceps muscle tear suspected (new bulge in upper arm area of biceps muscle, felt a pop)   Negative: Injury and pain has not improved after 3 days   Negative: Injury is still painful or swollen after 2 weeks   Negative: Minor injury or bruising from direct blow   Negative: Minor injury or pain from twisting or over-stretching   Negative: Small cut (scratch) or abrasion (scrape) is also present    Answer Assessment - Initial Assessment Questions  1. MECHANISM: \"How did the injury happen?\"      Fall around 2am when his knee buckled. Fell on Sunday as well.  2. ONSET: \"When did the injury happen?\" (Minutes or hours ago)       Around 2am  3. LOCATION: \"Where is the injury located?\" \"Which arm?\"      Left wrist  4. APPEARANCE of INJURY: \"What does the injury look like?\"       Has scratches from cats, but nothing from fall.   5. SEVERITY: \"Can you use the arm normally?\"       No pt states he was unable to open a bottle of water.  6. SWELLING or BRUISING: \"is there any swelling or bruising?\" If Yes, ask: \"How large is it? (e.g., inches, centimeters)       Swelling but pt has ACE wrap on wrist.  7. PAIN: \"Is there pain?\" If Yes, ask: \"How bad is the pain?\"    (Scale 1-10; or mild, moderate, severe)    - NONE (0): No pain.    - MILD (1-3): Doesn't interfere with normal activities.    - MODERATE (4-7): Interferes with normal activities (e.g., work or school) or awakens from sleep.    - SEVERE (8-10): Excruciating pain, unable to do any normal activities, unable to hold a cup of water.      6/10 - Moderate - Has taken a few aleve but it has not helped with the pain.  8. TETANUS: For any breaks in the " "skin, ask: \"When was the last tetanus booster?\"      None  9. OTHER SYMPTOMS: \"Do you have any other symptoms?\"  (e.g., numbness in hand)      None  10. PREGNANCY: \"Is there any chance you are pregnant?\" \"When was your last menstrual period?\"        N/a    Protocols used: Arm Injury-A-OH    "

## 2024-09-04 NOTE — PROGRESS NOTES
ASSESSMENT & PLAN    Adarsh was seen today for injury and injury.    Diagnoses and all orders for this visit:    Left wrist injury, initial encounter  -     XR Wrist Left G/E 3 Views; Future  -     Wrist/Arm/Hand Bracing Supplies Order Wrist Brace; Left; with thumb spica          See Patient Instructions  Patient Instructions   Left wrist injury most consistent with sprain/strain.  No clear fracture noted on my review of the x-rays from today.  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  May do icing, over-the-counter medication as needed for pain.  We discussed use of wrist brace to start, for comfort/support.  May remove for hygiene and when at rest, also for icing, as desired.  Monitor course in 7 to 10 days to begin.  If improving, follow-up is open-ended.  Otherwise, contact clinic for other questions or concerns.  Future considerations could include recheck in clinic with repeat x-ray, additional imaging with CT (MRI contraindicated), also consideration of hand therapy referral.    If you have any further questions for your physician or physician s care team you can contact them thru MyChart or by calling [phone].      Favio Castro [facility]    -----  Chief Complaint   Patient presents with    Left Wrist - Injury    Left Hand - Injury       SUBJECTIVE  Adarsh Salvador is a/an 59 year old male who is seen as a WALK IN patient for evaluation of left wrist hand.     The patient is seen by themselves.  The patient is Right handed    Onset: 3 day(s) ago. Patient describes injury as fell FOOSH on Sunday, 9/1 and then again late last night due to right knee buckling, knee was replaced 1 year ago.  Location of Pain: left distal radius & ulna  Worsened by: use  Better with: nothing  Treatments tried: Aleve and wrapping  Associated symptoms: swelling and tingling, sharp pain with use & dull otherwise    Orthopedic/Surgical  history: neuropathy in Both hands, diabetes  Social History/Occupation: retired     **  Above information per rooming staff.  Additional history:  Pain is more in ulnar wrist. Is location of landing on the wrist with first fall.        REVIEW OF SYSTEMS:  Review of Systems    OBJECTIVE:  Ht 1.829 m (6')   Wt 128.4 kg (283 lb)   BMI 38.38 kg/m         Hand/wrist (left):    Inspection:  No deformity noted.  Trace radial swelling. No ecchymosis.    Motion:  Forearm pronation, forearm supination grossly intact, some pain noted.  Wrist flexion mild limitation, pain  Wrist extension mild limitation, pain  Digit motion grossly intact    Sensation:  Grossly intact light touch.    Radial pulses normal, +2/4, capillary refill brisk.    Palpation:  Tender mild snuffbox, dorsal carpals, distal ulna, 5th metacarpal      RADIOLOGY:  Final results and radiologist's interpretation, available in the [facility] health record.  Images were reviewed with the patient in the office today.  My personal interpretation of the performed imaging: no clear fracture noted. First CMC and STT arthrosis noted.        Recent Results (from the past 24 hour(s))   XR Wrist Left G/E 3 Views    Narrative    XR WRIST LEFT G/E 3 VIEWS 9/4/2024 9:56 AM     HISTORY: Left wrist injury, initial encounter    COMPARISON: None.       Impression    IMPRESSION:  No fracture or malalignment. Mild degenerative change at the first CMC  joint. If there is persistent clinical concern for occult scaphoid  fracture, consider repeat radiographs in 7-10 days or MRI.    Brandon Crabtree MD         SYSTEM ID:  [id number]

## 2024-09-04 NOTE — PROGRESS NOTES
Pt evaluated as a walk in following a fall at home around 0200. Triaged pt according to protocol - Please see telephone encounter for visit dated 9/4/24. Huddled with provider who recommended [facility] walk in Clinic. Discussed options with pt who agreed that he will drive over to the ortho clinic at [place] as that is on his way home. No additional questions at this time.     Thank you - Mi Mejia, ALNGN, RN

## 2024-09-04 NOTE — LETTER
9/4/2024      Adarsh Salvador  [address]  [place] [address]      Dear Colleague,    Thank you for referring your patient, Adarsh Salvador, to the [facility]. Please see a copy of my visit note below.    ASSESSMENT & PLAN    Adarsh was seen today for injury and injury.    Diagnoses and all orders for this visit:    Left wrist injury, initial encounter  -     XR Wrist Left G/E 3 Views; Future  -     Wrist/Arm/Hand Bracing Supplies Order Wrist Brace; Left; with thumb spica          See Patient Instructions  Patient Instructions   Left wrist injury most consistent with sprain/strain.  No clear fracture noted on my review of the x-rays from today.  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  May do icing, over-the-counter medication as needed for pain.  We discussed use of wrist brace to start, for comfort/support.  May remove for hygiene and when at rest, also for icing, as desired.  Monitor course in 7 to 10 days to begin.  If improving, follow-up is open-ended.  Otherwise, contact clinic for other questions or concerns.  Future considerations could include recheck in clinic with repeat x-ray, additional imaging with CT (MRI contraindicated), also consideration of hand therapy referral.    If you have any further questions for your physician or physician s care team you can contact them thru [employer] or by calling [phone].      Favio Castro,   [facility]    -----  Chief Complaint   Patient presents with     Left Wrist - Injury     Left Hand - Injury       SUBJECTIVE  Adarsh Salvador is a/an 59 year old male who is seen as a WALK IN patient for evaluation of left wrist hand.     The patient is seen by themselves.  The patient is Right handed    Onset: 3 day(s) ago. Patient describes injury as fell FOOSH on Sunday, 9/1 and then again late last night due to right knee buckling, knee was  replaced 1 year ago.  Location of Pain: left distal radius & ulna  Worsened by: use  Better with: nothing  Treatments tried: Aleve and wrapping  Associated symptoms: swelling and tingling, sharp pain with use & dull otherwise    Orthopedic/Surgical history: neuropathy in Both hands, diabetes  Social History/Occupation: retired     **  Above information per rooming staff.  Additional history:  Pain is more in ulnar wrist. Is location of landing on the wrist with first fall.        REVIEW OF SYSTEMS:  Review of Systems    OBJECTIVE:  Ht 1.829 m (6')   Wt 128.4 kg (283 lb)   BMI 38.38 kg/m         Hand/wrist (left):    Inspection:  No deformity noted.  Trace radial swelling. No ecchymosis.    Motion:  Forearm pronation, forearm supination grossly intact, some pain noted.  Wrist flexion mild limitation, pain  Wrist extension mild limitation, pain  Digit motion grossly intact    Sensation:  Grossly intact light touch.    Radial pulses normal, +2/4, capillary refill brisk.    Palpation:  Tender mild snuffbox, dorsal carpals, distal ulna, 5th metacarpal      RADIOLOGY:  Final results and radiologist's interpretation, available in the [facility] health record.  Images were reviewed with the patient in the office today.  My personal interpretation of the performed imaging: no clear fracture noted. First CMC and STT arthrosis noted.        Recent Results (from the past 24 hour(s))   XR Wrist Left G/E 3 Views    Narrative    XR WRIST LEFT G/E 3 VIEWS 9/4/2024 9:56 AM     HISTORY: Left wrist injury, initial encounter    COMPARISON: None.       Impression    IMPRESSION:  No fracture or malalignment. Mild degenerative change at the first CMC  joint. If there is persistent clinical concern for occult scaphoid  fracture, consider repeat radiographs in 7-10 days or MRI.    Brandon Crabtree MD         SYSTEM ID:  [id number]             Again, thank you for allowing me to participate in the care of your patient.         Sincerely,        Favio Castro, DO

## 2024-09-04 NOTE — PATIENT INSTRUCTIONS
Left wrist injury most consistent with sprain/strain.  No clear fracture noted on my review of the x-rays from today.  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  May do icing, over-the-counter medication as needed for pain.  We discussed use of wrist brace to start, for comfort/support.  May remove for hygiene and when at rest, also for icing, as desired.  Monitor course in 7 to 10 days to begin.  If improving, follow-up is open-ended.  Otherwise, contact clinic for other questions or concerns.  Future considerations could include recheck in clinic with repeat x-ray, additional imaging with CT (MRI contraindicated), also consideration of hand therapy referral.    If you have any further questions for your physician or physician s care team you can contact them thru [employer] or by calling [phone].

## 2024-09-10 NOTE — PROGRESS NOTES
Chief Complaint   Patient presents with    Right Knee - Total Joint Post-op     DOS 9/26/23, 1 yr s/p. Patient presents to clinic using a walking stick as an aid. Patient notes he continues to fall many times, last time was a couple of weeks ago while getting out of bed. He has done a lot of strengthening and it is not getting any better. He continues to have pain in the knee. If he twists with his knee it is bad news. Pain is above and below the knee.          SURGERY: Total knee arthroplasty, right knee ([facility])  DATE OF SURGERY: 9/26/2023      HISTORY OF PRESENT ILLNESS: Adarsh Salvador is a 59 year old male seen for  postoperative evaluation of right total knee arthroplasty. It has been 12 months since surgery. Returns today stating he continues to have falls, most recently a couple of weeks ago getting out of bed. He's worked on strengthening but not improving. He does have pain above/below the knee, worse with twisting. His other knee has collapsed as well which has not had surgery. Its just like the knee won't do what he wants it to do.    He does have longstanding back problems, numbness and tingling down the legs. Worse as the day goes on.       Denies any wound problems. Denies numbness, tingling, or weakness in the affected extremity. Denies fevers chills night sweats. Continues to take warfarin for anti-coagulation for deep vein thrombosis prophylaxis, as well as longterm anticoagulation with history of DVT in the past.    Present symptoms: mild pain, mild swelling.  Pain 5/10.          Past medical history:   Past Medical History:   Diagnosis Date    Anaphylactic reaction 08/14/2015    Anxiety     Depression     DM2 (diabetes mellitus, type 2) (H)     GERD (gastroesophageal reflux disease)     HTN (hypertension)     IBS (irritable bowel syndrome)     Kidney stone 06/15/2011    Pt believes these were Calcium stones    Neuropathy     Pulmonary embolism, other, unspecified chronicity,  unspecified whether acute cor pulmonale present (H) 08/15/2023     Patient Active Problem List    Diagnosis Date Noted    Severe persistent asthma without complication (H28) 04/23/2024     Priority: Medium    Status post total right knee replacement 10/19/2023     Priority: Medium    S/P TKR (total knee replacement), right 09/26/2023     Priority: Medium    Acute pain of right knee 08/26/2022     Priority: Medium    Aftercare following surgery of the musculoskeletal system 08/26/2022     Priority: Medium    Chronic pain of right knee 08/08/2022     Priority: Medium     Added automatically from request for surgery [id number]      Bilateral leg pain 06/09/2022     Priority: Medium     Added automatically from request for surgery [id number]      Low volume of ejaculated semen 06/07/2022     Priority: Medium    DVT (deep venous thrombosis) (H) 01/15/2022     Priority: Medium    Recurrent deep vein thrombosis (DVT) (H) 09/27/2021     Priority: Medium    Precordial pain 02/15/2021     Priority: Medium    Dyslipidemia 02/15/2021     Priority: Medium    Elevated C-reactive protein 02/15/2021     Priority: Medium    Gastric reflux 02/15/2021     Priority: Medium    Internal derangement of knee 02/15/2021     Priority: Medium    Nicotine dependence 02/15/2021     Priority: Medium    Varicose veins of lower extremity 02/15/2021     Priority: Medium    Vitamin D deficiency 02/15/2021     Priority: Medium    Neuropathy 06/23/2020     Priority: Medium    Morbid obesity (H) 03/26/2020     Priority: Medium    Insomnia, unspecified type 05/09/2019     Priority: Medium    CONNOR (generalized anxiety disorder) 05/08/2019     Priority: Medium    Hyperlipidemia LDL goal <100 08/17/2018     Priority: Medium    Type 2 diabetes mellitus with diabetic polyneuropathy, with long-term current use of insulin (H) 01/18/2018     Priority: Medium    Mild persistent asthma without complication 01/12/2018     Priority: Medium    Right inguinal hernia  06/14/2016     Priority: Medium    Irritable bowel syndrome with diarrhea 03/21/2016     Priority: Medium    Fatty liver 02/22/2016     Priority: Medium    Hypertension, unspecified type 12/15/2015     Priority: Medium    Anemia in other chronic diseases classified elsewhere 12/15/2015     Priority: Medium    Hypertriglyceridemia 07/12/2013     Priority: Medium    Chronic maxillary sinusitis 09/18/2012     Priority: Medium     Believes this is secondary to exposure to toxic fumes at work- he is a .  He regularly wears a mask ventilator and believes this helps.  Has only tried intermittent nasal washes, and OTC medications.  Not ever tried steroid nasal sprays or other controller medications.        Chronic rhinitis 09/18/2012     Priority: Medium    Constipation 04/24/2012     Priority: Medium    GERD (gastroesophageal reflux disease) 06/15/2011     Priority: Medium    Chronic RLQ pain 06/15/2011     Priority: Medium       Past Surgical History:   Past Surgical History:   Procedure Laterality Date    ARTHROPLASTY KNEE Right 9/26/2023    Procedure: ARTHROPLASTY, KNEE, TOTAL Right;  Surgeon: Edwin Ch MD;  Location: [facility]    ARTHROSCOPY KNEE WITH MEDIAL MENISCECTOMY Right 8/19/2022    Procedure: examination under anesthesia, right knee arthroscopy, meniscectomy;  Surgeon: Carlos A Em MD;  Location: [facility] OR    COLONOSCOPY  5/1/2012    Procedure:COLONOSCOPY; screening colonoscopy; Surgeon:KINGSLEY DOS SANTOS; Location:[facility] OR    COLONOSCOPY  4/21/2014    Procedure: COMBINED COLONOSCOPY, SINGLE BIOPSY/POLYPECTOMY BY BIOPSY;  Surgeon: Duane, William Charles, MD;  Location: [facility] OR    COLONOSCOPY N/A 4/21/2022    Procedure: COLONOSCOPY, WITH POLYPECTOMY AND BIOPSY;  Surgeon: Luiz Calvert MD;  Location:  GI    CYSTOSCOPY  05/01/2008    CYSTOSCOPY W/ URETERAL STENT PLACEMENT 05/01/2008     CYSTOSCOPY  09/26/2010    CYSTOSCOPY W/ URETERAL STENT PLACEMENT 09/26/2010 Left     CYSTOSCOPY   05/18/2012    CYSTOSCOPY, LEFT URETEROSCOPY AND STENT PLACEMENT left retrograde 05/18/2012     CYSTOSCOPY,+URETEROSCOPY  06/10/2008    URETEROSCOPY 06/10/2008 Right     HC BREATH HYDROGEN TEST  3/7/2014    Procedure: HYDROGEN BREATH TEST;  Surgeon: Darion Swift MD;  Location: [facility]    INJECT EPIDURAL LUMBAR N/A 7/7/2022    Procedure: INJECTION, SPINE, LUMBAR, EPIDURAL L5/S1;  Surgeon: Michi Hhan MD;  Location: [facility] OR    LITHOTRIPSY  09/30/2010    LITHOTRIPSY 09/30/2010 LEFT EXTRACORPOREAL SHOCK WAVE LITHOTRIPSY, FLEXIBLE CYSTOSCOPY, LEFT STENT REMOVAL      ORTHOPEDIC SURGERY  10/03/2007    KNEE ARTHROSCOPY 10/03/2007 RightX2      RELEASE CARPAL TUNNEL Right 1/26/2018    Procedure: RELEASE CARPAL TUNNEL;  Right carpal tunnel release;  Surgeon: Wiliam Saenz MD;  Location: [facility] OR    VASCULAR SURGERY  11/24/2008    Radiofrequency ablation left saphenous vein 11/24/2008 Done by Dr. Lozoya         Medications:   Current Outpatient Medications:     acetaminophen (TYLENOL) 325 MG tablet, Take 2 tablets (650 mg) by mouth every 4 hours as needed for other (mild pain), Disp: 100 tablet, Rfl: 0    albuterol (PROAIR HFA/PROVENTIL HFA/VENTOLIN HFA) 108 (90 Base) MCG/ACT inhaler, Inhale 2 puffs into the lungs every 6 hours as needed for shortness of breath or wheezing, Disp: 6.7 g, Rfl: 3    amLODIPine (NORVASC) 10 MG tablet, Take 1 tablet (10 mg) by mouth daily for blood pressure, Disp: 90 tablet, Rfl: 1    atorvastatin (LIPITOR) 40 MG tablet, Take 1 tablet (40 mg) by mouth every evening, Disp: 90 tablet, Rfl: 1    blood glucose (ACCU-CHEK GUIDE) test strip, Use to test blood sugar 3 times daily or as directed., Disp: 300 strip, Rfl: 3    blood glucose monitoring (ACCU-CHEK FASTCLIX) lancets, Use to test blood sugar 1 times daily or as directed.  Ok to substitute alternative if insurance prefers., Disp: 102 each, Rfl: 11    calcium carbonate (TUMS) 500 MG chewable tablet, Take 1 chew tab by mouth daily,  "Disp: , Rfl:     chlorthalidone (HYGROTON) 25 MG tablet, Take 1 tablet (25 mg) by mouth daily Add on for blood pressure, Disp: 90 tablet, Rfl: 1    Continuous Glucose Sensor (FREESTYLE FROYLAN 3 SENSOR) MISC, 1 each every 14 days, Disp: 2 each, Rfl: 5    DULoxetine (CYMBALTA) 60 MG capsule, Take 1 capsule (60 mg) by mouth 2 times daily, Disp: 180 capsule, Rfl: 3    empagliflozin (JARDIANCE) 10 MG TABS tablet, Take 1 tablet (10 mg) by mouth daily, Disp: 90 tablet, Rfl: 1    EPINEPHrine (ANY BX GENERIC EQUIV) 0.3 MG/0.3ML injection 2-pack, Inject 0.3 mLs (0.3 mg) into the muscle once as needed for anaphylaxis, Disp: 2 each, Rfl: 2    fluticasone-salmeterol (ADVAIR) 500-50 MCG/ACT inhaler, Inhale 1 puff into the lungs every 12 hours, Disp: 1 each, Rfl: 11    insulin aspart (NOVOLOG FLEXPEN) 100 UNIT/ML pen, Inject 22 Units Subcutaneous 3 times daily (with meals) 22 units before meals plus correction scale. Max daily dose 90 units, Disp: 90 mL, Rfl: 1    insulin aspart (NOVOLOG VIAL) 100 UNITS/ML vial, Inject 18 Units Subcutaneous 3 times daily (with meals), Disp: 10 mL, Rfl: 3    insulin glargine 100 UNIT/ML pen, Inject 60 Units subcutaneously daily, Disp: 60 mL, Rfl: 4    insulin pen needle (32G X 4 MM) 32G X 4 MM miscellaneous, Use 3  pen needles daily or as directed., Disp: 200 each, Rfl: 1    insulin syringe-needle U-100 (29G X 1/2\" 0.5 ML) 29G X 1/2\" 0.5 ML miscellaneous, Use 3 syringes daily or as directed., Disp: 200 each, Rfl: 1    insulin syringe-needle U-100 (30G X 1/2\" 0.3 ML) 30G X 1/2\" 0.3 ML miscellaneous, Use 3 syringes daily or as directed., Disp: 100 each, Rfl: 3    losartan (COZAAR) 25 MG tablet, Take 1 tablet (25 mg) by mouth daily, Disp: 90 tablet, Rfl: 1    meclizine (ANTIVERT) 25 MG tablet, Take 1 tablet (25 mg) by mouth 3 times daily as needed for dizziness, Disp: 90 tablet, Rfl: 1    metFORMIN (GLUCOPHAGE XR) 500 MG 24 hr tablet, Take 2 tablets (1,000 mg) by mouth 2 times daily (with meals), Disp: " 360 tablet, Rfl: 2    montelukast (SINGULAIR) 10 MG tablet, Take 1 tablet (10 mg) by mouth at bedtime For allergies/asthma, Disp: 90 tablet, Rfl: 2    multivitamin, therapeutic (THERA-VIT) TABS, Take 1 tablet by mouth daily, Disp: , Rfl:     nortriptyline (PAMELOR) 10 MG capsule, Take 2 capsules (20 mg) by mouth at bedtime, Disp: 60 capsule, Rfl: 11    omeprazole (PRILOSEC) 40 MG DR capsule, Take 1 capsule (40 mg) by mouth daily Take for at least two months, Disp: 90 capsule, Rfl: 2    pregabalin (LYRICA) 75 MG capsule, Take 1 capsule (75 mg) by mouth 2 times daily, Disp: 60 capsule, Rfl: 11    rizatriptan (MAXALT-MLT) 10 MG ODT, TAKE 1 TABLET BY MOUTH AT ONSET OF HEADACHE FOR MIGRAINE MAY REPEAT IN 2 HOURS. MAX 3 TABS/24 HOURS., Disp: 18 tablet, Rfl: 1    warfarin ANTICOAGULANT (COUMADIN) 5 MG tablet, Take 20 mg Mon, Thurs and 15 mg all other days, or as directed by the Coumadin clinic, Disp: 280 tablet, Rfl: 1  No current facility-administered medications for this visit.    Facility-Administered Medications Ordered in Other Visits:     BUPivacaine (MARCAINE) injection 0.5%, 10 mL, Intradermal, Once, Vinnie Espinoza,     DOBUTamine 500 mg in dextrose 5% 250 mL (adult std conc) premix, 2.5-20 mcg/kg/min, Intravenous, Continuous, Navarro Butcher MD, Stopped at 04/25/19 1559    iopamidol (ISOVUE-M 200) solution 10 mL, 10 mL, Intravenous, Once, Vinnie Espinoza,     methylPREDNISolone (DEPO-MEDROL) injection 80 mg, 80 mg, Intramuscular, Once, Vinnie Espinoza DO    metoprolol (LOPRESSOR) injection 5 mg, 5 mg, Intravenous, Q5 Min PRN, Navarro Butcher MD, 2 mg at 04/25/19 1559    Allergies:   Allergies   Allergen Reactions    Artificial Sweetner (Informational Only) [Artificial Sweetner (Informational Only) ] GI Disturbance     ALL artificial sweetners cause severe diarrhea & flu symptoms    Hydromorphone Anaphylaxis    Ibuprofen GI Disturbance    Morphine Sulfate Concentrate Anaphylaxis     Morphine [Fumaric Acid] Anaphylaxis    Hydrocodone-Acetaminophen Itching    Tramadol Hives    Trazodone Hives    Gabapentin GI Disturbance     GI upset per pt    Keflex [Cephalexin] Diarrhea     Upset stomach    Codeine Phosphate Itching    Ketorolac Tromethamine Rash         REVIEW OF SYSTEMS:  CONSTITUTIONAL:NEGATIVE for fever, chills, night sweats, change in weight  INTEGUMENTARY/SKIN: NEGATIVE for worrisome rashes, moles or lesions  MUSCULOSKELETAL:See HPI above  NEURO: NEGATIVE for weakness, dizziness or paresthesias  CARDIOVASCULAR: negative for chest pain, shortness of breath    PHYSICAL EXAM:  There were no vitals taken for this visit.   GENERAL APPEARANCE: healthy, alert, no distress   SKIN: no suspicious lesions or rashes  NEURO: Normal strength and tone, mentation intact and speech normal  PSYCH:  mentation appears normal and affect normal  RESPIRATORY: No increased work of breathing.    BILATERAL LOWER EXTREMITIES:  Gait: uses can left hand, Mld quadriceps avoidance right.    Bilateral calf soft and nttp or squeeze.  Edema: trace    right  KNEE EXAM:    Incision: healed well.    Skin: intact.  ROM: full extension to 120 flexion  Effusion: trace  Tender: quadriceps tendon.  Stable to varus/valgus stress  Some laxity in flexion, anterior-posterior, within normal limits.      X-RAY:  2 views right knee from 9/13/2024 were reviewed in clinic today. No obvious fractures or dislocations. Total knee arthroplasty components in place without evidence of failure or loosening.  Trace Effusion.        Impression: Adarsh Salvador is a 59 year old male status post Total knee arthroplasty, right knee, 12 months from surgery, weakness, frequent falls.      Plan:   * reviewed xrays with patient, showing total knee arthroplasty implants. All look good and stable.  I cannot explain his falls based on his knee images, examinations. suspect diffuse weakness, deconditioning is why his knee(s) have given out. Also could be some  neurologic issue from low back, pinched nerves, etc.  Need to continue to work on strengthening and conditioning.  Continue to use cane for support  Discussed trial of bracing to see if helps with support. Provided today.  * continue DVT prophylaxis with warfarin indefinitely, as prior.  * xrays next visit: 2 views of the knee  * return to clinic 4 years.  * Physical Therapy, home exercise program.  * all questions addressed and answered prior to discharge from clinic today to patient's satisfaction.     * patient will need longterm (at least 2 years) prophylactic antibiotics use with dental procedures or other invasive procedures (2 g amoxicilin or  2g Keflex or 500mg azithromycin or clarithromycin or 100mg doxycycline) 30-60 minutes prior to dental procedures.    * KOOS Jr/Promis Knee score: IS to be done at 12 months; to be completed at 12 weeks and 12 months postoperative.      Edwin Ch M.D., M.S.  Dept. of Orthopaedic Surgery  [facility]

## 2024-09-11 ENCOUNTER — LAB (OUTPATIENT)
Dept: LAB | Facility: CLINIC | Age: 59
End: 2024-09-11
Payer: COMMERCIAL

## 2024-09-11 ENCOUNTER — ANTICOAGULATION THERAPY VISIT (OUTPATIENT)
Dept: ANTICOAGULATION | Facility: CLINIC | Age: 59
End: 2024-09-11

## 2024-09-11 DIAGNOSIS — I82.409 RECURRENT DEEP VEIN THROMBOSIS (DVT) (H): Primary | ICD-10-CM

## 2024-09-11 DIAGNOSIS — I82.409 RECURRENT DEEP VEIN THROMBOSIS (DVT) (H): ICD-10-CM

## 2024-09-11 LAB — INR BLD: 3 (ref 0.9–1.1)

## 2024-09-11 PROCEDURE — 36416 COLLJ CAPILLARY BLOOD SPEC: CPT

## 2024-09-11 PROCEDURE — 85610 PROTHROMBIN TIME: CPT

## 2024-09-11 NOTE — PROGRESS NOTES
ANTICOAGULATION MANAGEMENT     Adarsh Salvador 59 year old male is on warfarin with therapeutic INR result. (Goal INR 2.0-3.0)    Recent labs: (last 7 days)     09/11/24  0753   INR 3.0*       ASSESSMENT     Source(s): Chart Review  Previous INR was Subtherapeutic  Medication, diet, health changes since last INR chart reviewed; none identified  Did have a wrist injury that he was seen for on 9/4/24, given a brace          PLAN     Recommended plan for temporary change(s) affecting INR     Dosing Instructions: Continue your current warfarin dose with next INR in 3 weeks       Summary  As of 9/11/2024      Full warfarin instructions:  17.5 mg every Mon, Fri; 15 mg all other days   Next INR check:  10/2/2024               Detailed voice message left for Adarsh with dosing instructions and follow up date.     Contact [phone] to schedule and with any changes, questions or concerns.     Education provided: Contact [phone] with any changes, questions or concerns.     Plan made per [facility] anticoagulation protocol    Janeth Cool RN  9/11/2024  Anticoagulation Clinic  [employer] for routing messages: ermias MARTÍNEZ  [facility] patient phone line: [phone]        _______________________________________________________________________     Anticoagulation Episode Summary       Current INR goal:  2.0-3.0   TTR:  29.0% (1 y)   Target end date:  Indefinite   Send INR reminders to:  MANOJ MARTÍNEZ    Indications    Recurrent deep vein thrombosis (DVT) (H) [I82.409]             Comments:               Anticoagulation Care Providers       Provider Role Specialty Phone number    Bertha Romero PA-C Referring Family Medicine [phone]    Kehr, Kristen M, PA-C Referring Family Medicine [phone]

## 2024-09-13 ENCOUNTER — ANCILLARY PROCEDURE (OUTPATIENT)
Dept: GENERAL RADIOLOGY | Facility: CLINIC | Age: 59
End: 2024-09-13
Attending: ORTHOPAEDIC SURGERY
Payer: COMMERCIAL

## 2024-09-13 ENCOUNTER — OFFICE VISIT (OUTPATIENT)
Dept: ORTHOPEDICS | Facility: CLINIC | Age: 59
End: 2024-09-13
Payer: COMMERCIAL

## 2024-09-13 VITALS
HEIGHT: 72 IN | SYSTOLIC BLOOD PRESSURE: 158 MMHG | DIASTOLIC BLOOD PRESSURE: 102 MMHG | WEIGHT: 286.2 LBS | BODY MASS INDEX: 38.76 KG/M2 | HEART RATE: 78 BPM

## 2024-09-13 DIAGNOSIS — Z96.651 S/P TOTAL KNEE ARTHROPLASTY, RIGHT: ICD-10-CM

## 2024-09-13 DIAGNOSIS — Z96.651 S/P TOTAL KNEE ARTHROPLASTY, RIGHT: Primary | ICD-10-CM

## 2024-09-13 PROCEDURE — 73560 X-RAY EXAM OF KNEE 1 OR 2: CPT | Mod: TC | Performed by: INTERNAL MEDICINE

## 2024-09-13 PROCEDURE — 99213 OFFICE O/P EST LOW 20 MIN: CPT | Performed by: ORTHOPAEDIC SURGERY

## 2024-09-13 ASSESSMENT — KOOS JR
STRAIGHTENING KNEE FULLY: MILD
TWISING OR PIVOTING ON KNEE: SEVERE
RISING FROM SITTING: MODERATE
GOING UP OR DOWN STAIRS: SEVERE
STANDING UPRIGHT: MILD
KOOS JR SCORING: 52.47
BENDING TO THE FLOOR TO PICK UP OBJECT: MODERATE
HOW SEVERE IS YOUR KNEE STIFFNESS AFTER FIRST WAKING IN MORNING: MODERATE

## 2024-09-13 ASSESSMENT — PAIN SCALES - GENERAL: PAINLEVEL: MODERATE PAIN (5)

## 2024-09-13 NOTE — LETTER
9/13/2024      Adasrh Salvador  [address]  [place] [address]      Dear Colleague,    Thank you for referring your patient, Adarsh Salvador, [facility] the [facility]. Please see a copy of my visit note below.    Chief Complaint   Patient presents with     Right Knee - Total Joint Post-op     DOS 9/26/23, 1 yr s/p. Patient presents to clinic using a walking stick as an aid. Patient notes he continues to fall many times, last time was a couple of weeks ago while getting out of bed. He has done a lot of strengthening and it is not getting any better. He continues to have pain in the knee. If he twists with his knee it is bad news. Pain is above and below the knee.          SURGERY: Total knee arthroplasty, right knee ([facility])  DATE OF SURGERY: 9/26/2023      HISTORY OF PRESENT ILLNESS: Adarsh Salvador is a 59 year old male seen for  postoperative evaluation of right total knee arthroplasty. It has been 12 months since surgery. Returns today stating he continues to have falls, most recently a couple of weeks ago getting out of bed. He's worked on strengthening but not improving. He does have pain above/below the knee, worse with twisting. His other knee has collapsed as well which has not had surgery. Its just like the knee won't do what he wants it to do.    He does have longstanding back problems, numbness and tingling down the legs. Worse as the day goes on.       Denies any wound problems. Denies numbness, tingling, or weakness in the affected extremity. Denies fevers chills night sweats. Continues to take warfarin for anti-coagulation for deep vein thrombosis prophylaxis, as well as longterm anticoagulation with history of DVT in the past.    Present symptoms: mild pain, mild swelling.  Pain 5/10.          Past medical history:   Past Medical History:   Diagnosis Date     Anaphylactic reaction 08/14/2015     Anxiety      Depression      DM2 (diabetes mellitus, type 2) (H)       GERD (gastroesophageal reflux disease)      HTN (hypertension)      IBS (irritable bowel syndrome)      Kidney stone 06/15/2011    Pt believes these were Calcium stones     Neuropathy      Pulmonary embolism, other, unspecified chronicity, unspecified whether acute cor pulmonale present (H) 08/15/2023     Patient Active Problem List    Diagnosis Date Noted     Severe persistent asthma without complication (H28) 04/23/2024     Priority: Medium     Status post total right knee replacement 10/19/2023     Priority: Medium     S/P TKR (total knee replacement), right 09/26/2023     Priority: Medium     Acute pain of right knee 08/26/2022     Priority: Medium     Aftercare following surgery of the musculoskeletal system 08/26/2022     Priority: Medium     Chronic pain of right knee 08/08/2022     Priority: Medium     Added automatically from request for surgery [id number]       Bilateral leg pain 06/09/2022     Priority: Medium     Added automatically from request for surgery [id number]       Low volume of ejaculated semen 06/07/2022     Priority: Medium     DVT (deep venous thrombosis) (H) 01/15/2022     Priority: Medium     Recurrent deep vein thrombosis (DVT) (H) 09/27/2021     Priority: Medium     Precordial pain 02/15/2021     Priority: Medium     Dyslipidemia 02/15/2021     Priority: Medium     Elevated C-reactive protein 02/15/2021     Priority: Medium     Gastric reflux 02/15/2021     Priority: Medium     Internal derangement of knee 02/15/2021     Priority: Medium     Nicotine dependence 02/15/2021     Priority: Medium     Varicose veins of lower extremity 02/15/2021     Priority: Medium     Vitamin D deficiency 02/15/2021     Priority: Medium     Neuropathy 06/23/2020     Priority: Medium     Morbid obesity (H) 03/26/2020     Priority: Medium     Insomnia, unspecified type 05/09/2019     Priority: Medium     CONNOR (generalized anxiety disorder) 05/08/2019     Priority: Medium     Hyperlipidemia LDL goal <100 08/17/2018      Priority: Medium     Type 2 diabetes mellitus with diabetic polyneuropathy, with long-term current use of insulin (H) 01/18/2018     Priority: Medium     Mild persistent asthma without complication 01/12/2018     Priority: Medium     Right inguinal hernia 06/14/2016     Priority: Medium     Irritable bowel syndrome with diarrhea 03/21/2016     Priority: Medium     Fatty liver 02/22/2016     Priority: Medium     Hypertension, unspecified type 12/15/2015     Priority: Medium     Anemia in other chronic diseases classified elsewhere 12/15/2015     Priority: Medium     Hypertriglyceridemia 07/12/2013     Priority: Medium     Chronic maxillary sinusitis 09/18/2012     Priority: Medium     Believes this is secondary to exposure to toxic fumes at work- he is a .  He regularly wears a mask ventilator and believes this helps.  Has only tried intermittent nasal washes, and OTC medications.  Not ever tried steroid nasal sprays or other controller medications.         Chronic rhinitis 09/18/2012     Priority: Medium     Constipation 04/24/2012     Priority: Medium     GERD (gastroesophageal reflux disease) 06/15/2011     Priority: Medium     Chronic RLQ pain 06/15/2011     Priority: Medium       Past Surgical History:   Past Surgical History:   Procedure Laterality Date     ARTHROPLASTY KNEE Right 9/26/2023    Procedure: ARTHROPLASTY, KNEE, TOTAL Right;  Surgeon: Edwin Ch MD;  Location: [facility]     ARTHROSCOPY KNEE WITH MEDIAL MENISCECTOMY Right 8/19/2022    Procedure: examination under anesthesia, right knee arthroscopy, meniscectomy;  Surgeon: Carlos A Em MD;  Location: [facility] OR     COLONOSCOPY  5/1/2012    Procedure:COLONOSCOPY; screening colonoscopy; Surgeon:KINGSLEY DOS SANTOS; Location:[facility] OR     COLONOSCOPY  4/21/2014    Procedure: COMBINED COLONOSCOPY, SINGLE BIOPSY/POLYPECTOMY BY BIOPSY;  Surgeon: Duane, William Charles, MD;  Location: [facility] OR     COLONOSCOPY N/A 4/21/2022    Procedure:  COLONOSCOPY, WITH POLYPECTOMY AND BIOPSY;  Surgeon: Luiz Calvert MD;  Location: [facility]     CYSTOSCOPY  05/01/2008    CYSTOSCOPY W/ URETERAL STENT PLACEMENT 05/01/2008      CYSTOSCOPY  09/26/2010    CYSTOSCOPY W/ URETERAL STENT PLACEMENT 09/26/2010 Left      CYSTOSCOPY  05/18/2012    CYSTOSCOPY, LEFT URETEROSCOPY AND STENT PLACEMENT left retrograde 05/18/2012      CYSTOSCOPY,+URETEROSCOPY  06/10/2008    URETEROSCOPY 06/10/2008 Right      HC BREATH HYDROGEN TEST  3/7/2014    Procedure: HYDROGEN BREATH TEST;  Surgeon: Darion Swift MD;  Location: [facility]     INJECT EPIDURAL LUMBAR N/A 7/7/2022    Procedure: INJECTION, SPINE, LUMBAR, EPIDURAL L5/S1;  Surgeon: Michi Hahn MD;  Location: [facility]     LITHOTRIPSY  09/30/2010    LITHOTRIPSY 09/30/2010 LEFT EXTRACORPOREAL SHOCK WAVE LITHOTRIPSY, FLEXIBLE CYSTOSCOPY, LEFT STENT REMOVAL       ORTHOPEDIC SURGERY  10/03/2007    KNEE ARTHROSCOPY 10/03/2007 RightX2       RELEASE CARPAL TUNNEL Right 1/26/2018    Procedure: RELEASE CARPAL TUNNEL;  Right carpal tunnel release;  Surgeon: Wiliam Saenz MD;  Location: [facility] OR     VASCULAR SURGERY  11/24/2008    Radiofrequency ablation left saphenous vein 11/24/2008 Done by Dr. Lozoya         Medications:   Current Outpatient Medications:      acetaminophen (TYLENOL) 325 MG tablet, Take 2 tablets (650 mg) by mouth every 4 hours as needed for other (mild pain), Disp: 100 tablet, Rfl: 0     albuterol (PROAIR HFA/PROVENTIL HFA/VENTOLIN HFA) 108 (90 Base) MCG/ACT inhaler, Inhale 2 puffs into the lungs every 6 hours as needed for shortness of breath or wheezing, Disp: 6.7 g, Rfl: 3     amLODIPine (NORVASC) 10 MG tablet, Take 1 tablet (10 mg) by mouth daily for blood pressure, Disp: 90 tablet, Rfl: 1     atorvastatin (LIPITOR) 40 MG tablet, Take 1 tablet (40 mg) by mouth every evening, Disp: 90 tablet, Rfl: 1     blood glucose (ACCU-CHEK GUIDE) test strip, Use to test blood sugar 3 times daily or as directed., Disp: 300  "strip, Rfl: 3     blood glucose monitoring (ACCU-CHEK FASTCLIX) lancets, Use to test blood sugar 1 times daily or as directed.  Ok to substitute alternative if insurance prefers., Disp: 102 each, Rfl: 11     calcium carbonate (TUMS) 500 MG chewable tablet, Take 1 chew tab by mouth daily, Disp: , Rfl:      chlorthalidone (HYGROTON) 25 MG tablet, Take 1 tablet (25 mg) by mouth daily Add on for blood pressure, Disp: 90 tablet, Rfl: 1     Continuous Glucose Sensor (FREESTYLE FROYLAN 3 SENSOR) MISC, 1 each every 14 days, Disp: 2 each, Rfl: 5     DULoxetine (CYMBALTA) 60 MG capsule, Take 1 capsule (60 mg) by mouth 2 times daily, Disp: 180 capsule, Rfl: 3     empagliflozin (JARDIANCE) 10 MG TABS tablet, Take 1 tablet (10 mg) by mouth daily, Disp: 90 tablet, Rfl: 1     EPINEPHrine (ANY BX GENERIC EQUIV) 0.3 MG/0.3ML injection 2-pack, Inject 0.3 mLs (0.3 mg) into the muscle once as needed for anaphylaxis, Disp: 2 each, Rfl: 2     fluticasone-salmeterol (ADVAIR) 500-50 MCG/ACT inhaler, Inhale 1 puff into the lungs every 12 hours, Disp: 1 each, Rfl: 11     insulin aspart (NOVOLOG FLEXPEN) 100 UNIT/ML pen, Inject 22 Units Subcutaneous 3 times daily (with meals) 22 units before meals plus correction scale. Max daily dose 90 units, Disp: 90 mL, Rfl: 1     insulin aspart (NOVOLOG VIAL) 100 UNITS/ML vial, Inject 18 Units Subcutaneous 3 times daily (with meals), Disp: 10 mL, Rfl: 3     insulin glargine 100 UNIT/ML pen, Inject 60 Units subcutaneously daily, Disp: 60 mL, Rfl: 4     insulin pen needle (32G X 4 MM) 32G X 4 MM miscellaneous, Use 3  pen needles daily or as directed., Disp: 200 each, Rfl: 1     insulin syringe-needle U-100 (29G X 1/2\" 0.5 ML) 29G X 1/2\" 0.5 ML miscellaneous, Use 3 syringes daily or as directed., Disp: 200 each, Rfl: 1     insulin syringe-needle U-100 (30G X 1/2\" 0.3 ML) 30G X 1/2\" 0.3 ML miscellaneous, Use 3 syringes daily or as directed., Disp: 100 each, Rfl: 3     losartan (COZAAR) 25 MG tablet, Take 1 " tablet (25 mg) by mouth daily, Disp: 90 tablet, Rfl: 1     meclizine (ANTIVERT) 25 MG tablet, Take 1 tablet (25 mg) by mouth 3 times daily as needed for dizziness, Disp: 90 tablet, Rfl: 1     metFORMIN (GLUCOPHAGE XR) 500 MG 24 hr tablet, Take 2 tablets (1,000 mg) by mouth 2 times daily (with meals), Disp: 360 tablet, Rfl: 2     montelukast (SINGULAIR) 10 MG tablet, Take 1 tablet (10 mg) by mouth at bedtime For allergies/asthma, Disp: 90 tablet, Rfl: 2     multivitamin, therapeutic (THERA-VIT) TABS, Take 1 tablet by mouth daily, Disp: , Rfl:      nortriptyline (PAMELOR) 10 MG capsule, Take 2 capsules (20 mg) by mouth at bedtime, Disp: 60 capsule, Rfl: 11     omeprazole (PRILOSEC) 40 MG DR capsule, Take 1 capsule (40 mg) by mouth daily Take for at least two months, Disp: 90 capsule, Rfl: 2     pregabalin (LYRICA) 75 MG capsule, Take 1 capsule (75 mg) by mouth 2 times daily, Disp: 60 capsule, Rfl: 11     rizatriptan (MAXALT-MLT) 10 MG ODT, TAKE 1 TABLET BY MOUTH AT ONSET OF HEADACHE FOR MIGRAINE MAY REPEAT IN 2 HOURS. MAX 3 TABS/24 HOURS., Disp: 18 tablet, Rfl: 1     warfarin ANTICOAGULANT (COUMADIN) 5 MG tablet, Take 20 mg Mon, Thurs and 15 mg all other days, or as directed by the Coumadin clinic, Disp: 280 tablet, Rfl: 1  No current facility-administered medications for this visit.    Facility-Administered Medications Ordered in Other Visits:      BUPivacaine (MARCAINE) injection 0.5%, 10 mL, Intradermal, Once, Vinnie Espinoza,      DOBUTamine 500 mg in dextrose 5% 250 mL (adult std conc) premix, 2.5-20 mcg/kg/min, Intravenous, Continuous, Navarro Butcher MD, Stopped at 04/25/19 1550     iopamidol (ISOVUE-M 200) solution 10 mL, 10 mL, Intravenous, Once, Vinnie Espinoza,      methylPREDNISolone (DEPO-MEDROL) injection 80 mg, 80 mg, Intramuscular, Once, Vinnie Espinoza, DO     metoprolol (LOPRESSOR) injection 5 mg, 5 mg, Intravenous, Q5 Min PRN, Navarro Butcher MD, 2 mg at 04/25/19  1559    Allergies:   Allergies   Allergen Reactions     Artificial Sweetner (Informational Only) [Artificial Sweetner (Informational Only) ] GI Disturbance     ALL artificial sweetners cause severe diarrhea & flu symptoms     Hydromorphone Anaphylaxis     Ibuprofen GI Disturbance     Morphine Sulfate Concentrate Anaphylaxis     Morphine [Fumaric Acid] Anaphylaxis     Hydrocodone-Acetaminophen Itching     Tramadol Hives     Trazodone Hives     Gabapentin GI Disturbance     GI upset per pt     Keflex [Cephalexin] Diarrhea     Upset stomach     Codeine Phosphate Itching     Ketorolac Tromethamine Rash         REVIEW OF SYSTEMS:  CONSTITUTIONAL:NEGATIVE for fever, chills, night sweats, change in weight  INTEGUMENTARY/SKIN: NEGATIVE for worrisome rashes, moles or lesions  MUSCULOSKELETAL:See HPI above  NEURO: NEGATIVE for weakness, dizziness or paresthesias  CARDIOVASCULAR: negative for chest pain, shortness of breath    PHYSICAL EXAM:  There were no vitals taken for this visit.   GENERAL APPEARANCE: healthy, alert, no distress   SKIN: no suspicious lesions or rashes  NEURO: Normal strength and tone, mentation intact and speech normal  PSYCH:  mentation appears normal and affect normal  RESPIRATORY: No increased work of breathing.    BILATERAL LOWER EXTREMITIES:  Gait: uses can left hand, Mld quadriceps avoidance right.    Bilateral calf soft and nttp or squeeze.  Edema: trace    right  KNEE EXAM:    Incision: healed well.    Skin: intact.  ROM: full extension to 120 flexion  Effusion: trace  Tender: quadriceps tendon.  Stable to varus/valgus stress  Some laxity in flexion, anterior-posterior, within normal limits.      X-RAY:  2 views right knee from 9/13/2024 were reviewed in clinic today. No obvious fractures or dislocations. Total knee arthroplasty components in place without evidence of failure or loosening.  Trace Effusion.        Impression: Adarsh Salvador is a 59 year old male status post Total knee arthroplasty,  right knee, 12 months from surgery, weakness, frequent falls.      Plan:   * reviewed xrays with patient, showing total knee arthroplasty implants. All look good and stable.  I cannot explain his falls based on his knee images, examinations. suspect diffuse weakness, deconditioning is why his knee(s) have given out. Also could be some neurologic issue from low back, pinched nerves, etc.  Need to continue to work on strengthening and conditioning.  Continue to use cane for support  Discussed trial of bracing to see if helps with support. Provided today.  * continue DVT prophylaxis with warfarin indefinitely, as prior.  * xrays next visit: 2 views of the knee  * return to clinic 4 years.  * Physical Therapy, home exercise program.  * all questions addressed and answered prior to discharge from clinic today to patient's satisfaction.     * patient will need longterm (at least 2 years) prophylactic antibiotics use with dental procedures or other invasive procedures (2 g amoxicilin or  2g Keflex or 500mg azithromycin or clarithromycin or 100mg doxycycline) 30-60 minutes prior to dental procedures.    * KOOS Jr/Promis Knee score: IS to be done at 12 months; to be completed at 12 weeks and 12 months postoperative.      Edwin Ch M.D., M.S.  Dept. of Orthopaedic Surgery  [facility]             Again, thank you for allowing me to participate in the care of your patient.        Sincerely,        Edwin Ch MD

## 2024-09-24 NOTE — PROGRESS NOTES
Outcome for 09/24/24 11:04 AM: Device upload instructions sent to patient via Lucinda Phelan CMA  Adult Endocrinology  [facility], [place]

## 2024-09-26 ENCOUNTER — OFFICE VISIT (OUTPATIENT)
Dept: ENDOCRINOLOGY | Facility: CLINIC | Age: 59
End: 2024-09-26
Attending: PHYSICIAN ASSISTANT
Payer: COMMERCIAL

## 2024-09-26 VITALS
DIASTOLIC BLOOD PRESSURE: 101 MMHG | SYSTOLIC BLOOD PRESSURE: 158 MMHG | WEIGHT: 279 LBS | OXYGEN SATURATION: 99 % | BODY MASS INDEX: 37.84 KG/M2 | RESPIRATION RATE: 16 BRPM | HEART RATE: 71 BPM

## 2024-09-26 DIAGNOSIS — E11.42 TYPE 2 DIABETES MELLITUS WITH DIABETIC POLYNEUROPATHY, WITH LONG-TERM CURRENT USE OF INSULIN (H): ICD-10-CM

## 2024-09-26 DIAGNOSIS — E66.01 MORBID OBESITY (H): ICD-10-CM

## 2024-09-26 DIAGNOSIS — Z79.4 TYPE 2 DIABETES MELLITUS WITH DIABETIC POLYNEUROPATHY, WITH LONG-TERM CURRENT USE OF INSULIN (H): ICD-10-CM

## 2024-09-26 DIAGNOSIS — N18.2 CHRONIC KIDNEY DISEASE, STAGE 2 (MILD): Primary | ICD-10-CM

## 2024-09-26 PROCEDURE — 99204 OFFICE O/P NEW MOD 45 MIN: CPT | Performed by: PHYSICIAN ASSISTANT

## 2024-09-26 RX ORDER — BLOOD-GLUCOSE SENSOR
1 EACH MISCELLANEOUS
Qty: 2 EACH | Refills: 5 | Status: SHIPPED | OUTPATIENT
Start: 2024-09-26

## 2024-09-26 RX ORDER — INSULIN GLARGINE 300 U/ML
64 INJECTION, SOLUTION SUBCUTANEOUS AT BEDTIME
Qty: 30 ML | Refills: 1 | Status: SHIPPED | OUTPATIENT
Start: 2024-09-26

## 2024-09-26 NOTE — LETTER
9/26/2024      Adarsh Salvador  [address]  [place] [address]      Dear Colleague,    Thank you for referring your patient, Adarsh Salvador, to the [facility]. Please see a copy of my visit note below.    Outcome for 09/24/24 11:04 AM: Device upload instructions sent to patient via [employer] - shari Phelan CMA  Adult Endocrinology  [facility], [place]      Assessment/Plan :   Type 2 DM. Adarsh continues to struggle with hyperglycemia. He is worried about the affect of high blood sugars are his body and we discussed the importance of getting his blood sugars under tighter control. We also discussed the role of metformin in the management of diabetes. Metformin is a safe medication but if he is concerned about adverse effects, we can instead focus on adjusting his insulin. We discussed his current insulin therapy and I would like to switch the Lantus to Toujeo. Toujeo is a more concentrated insulin and it tends to have a flatter, more consistent absorption. This should help to stabilize his numbers, as we increase the dose. I will send in a new prescription for Toujeo 64 unit(s) daily. Lastly, we discussed the importance of glucose monitoring. He needs to get back on the Shari sensor. This will help us to adjust his medication, moving forward, and it helps him to make better dietary decisions. I will send in a refill of his Shari 3 sensor. If he has any problems picking up the medication, he is to contact our office. I would like to see him back in about 4 mos but I would also like him to follow-up with our CDE team for further insulin adjustment.      I have independently reviewed and interpreted labs, imaging as indicated.      Chief complaint:  Adarsh is a 59 year old male seen in consultation at the request of Kristen Kehr, PAC for uncontrolled Type 2 DM.    I have reviewed Care Everywhere including [facility], [facility], [facility], [facility], [place], [facility], [facility]  [facility] lab reports, imaging reports and provider notes as indicated.      HISTORY OF PRESENT ILLNESS  Adarsh was diagnosed with diabetes over 10 yrs ago. He was started on metformin at the time of diagnosis and that seemed to work well for some time. Eventually, his blood sugars started to increase and there was a discussion regarding starting a once weekly GLP1. Unfortunately, he could not afford any of the GLP1 medications. He was then started on insulin therapy. Insulin therapy led to a significant improvement in his blood sugars. Things were going well, until he lost his insurance. He had to discontinue all of his medications, which led to significant increase in his hemoglobin A1C. He felt horrible during this time. He restarted insulin and metformin in April but he has continued to struggle with hyperglycemia.    Adarsh is supposed to be taking metformin 2000 mg daily, Lantus 60 unit(s) daily, and 20-30 unit(s) of Novolog with meals. However, his significant other worries about adverse effects related to the metformin and they often hide his pills. He states that he has taken the metformin for the last few days but wasn't taking it for weeks before that. His significant other also lost his last sensor, so he has not been able to monitor his blood sugars for the last few weeks. He would like to get back on sensor therapy because he feels like it helps him to make better dietary decisions.    Adarsh does have complications related to his diabetes and he worries that these complications are only going to worsen. He is currently on social security disability due to debilitating neuropathy. He states that he has constant pain and tingling in his feet. This sensation worsens when his blood sugars are elevated. He also has a history of microalbuminuria. He currently takes losartan. He does not believe that he has a history of retinopathy but he has noticed that his vision is blurry with hyperglycemia. His  diabetes is complicated by obesity, hyperlipidemia, osteoarthritis, history of recurrent DVT, GERD, IBS, HTN, and CONNOR.    Endocrine relevant labs are as follows:   Latest Reference Range & Units 08/06/24 09:21   Hemoglobin A1C 0.0 - 5.6 % 10.5 (H)   (H): Data is abnormally high   Latest Reference Range & Units 08/06/24 09:21   Albumin Urine mg/g Cr 0.00 - 17.00 mg/g Cr 19.21 (H)   (H): Data is abnormally high   Latest Reference Range & Units 08/06/24 09:21   Albumin Urine mg/L mg/L 24.4      Latest Reference Range & Units 04/23/24 10:13   Hemoglobin A1C 0.0 - 5.6 % 13.5 (H)   (H): Data is abnormally high    REVIEW OF SYSTEMS    Endocrine: positive for diabetes and obesity  Skin: negative  Eyes: positive for visual blurring with hyperglycemia, negative for, redness, tearing  Ears/Nose/Throat: negative  Respiratory: No shortness of breath, dyspnea on exertion, cough, or hemoptysis  Cardiovascular: negative for, chest pain, dyspnea on exertion, and lower extremity edema  Gastrointestinal: negative for, nausea, vomiting, constipation, and diarrhea  Genitourinary: negative for, nocturia, dysuria, frequency, and urgency  Musculoskeletal: positive for arthritis, joint stiffness, muscular weakness, nocturnal cramping, and foot pain  Neurologic: positive for local weakness and numbness or tingling of feet, negative for, and numbness or tingling of hands  Psychiatric: negative  Hematologic/Lymphatic/Immunologic: negative    Past Medical History  Past Medical History:   Diagnosis Date     Anaphylactic reaction 08/14/2015     Anxiety      Depression      DM2 (diabetes mellitus, type 2) (H)      GERD (gastroesophageal reflux disease)      HTN (hypertension)      IBS (irritable bowel syndrome)      Kidney stone 06/15/2011    Pt believes these were Calcium stones     Neuropathy      Pulmonary embolism, other, unspecified chronicity, unspecified whether acute cor pulmonale present (H) 08/15/2023       Medications  Current  Outpatient Medications   Medication Sig Dispense Refill     acetaminophen (TYLENOL) 325 MG tablet Take 2 tablets (650 mg) by mouth every 4 hours as needed for other (mild pain) 100 tablet 0     albuterol (PROAIR HFA/PROVENTIL HFA/VENTOLIN HFA) 108 (90 Base) MCG/ACT inhaler Inhale 2 puffs into the lungs every 6 hours as needed for shortness of breath or wheezing 6.7 g 3     amLODIPine (NORVASC) 10 MG tablet Take 1 tablet (10 mg) by mouth daily for blood pressure 90 tablet 1     atorvastatin (LIPITOR) 40 MG tablet Take 1 tablet (40 mg) by mouth every evening 90 tablet 1     blood glucose (ACCU-CHEK GUIDE) test strip Use to test blood sugar 3 times daily or as directed. 300 strip 3     blood glucose monitoring (ACCU-CHEK FASTCLIX) lancets Use to test blood sugar 1 times daily or as directed.  Ok to substitute alternative if insurance prefers. 102 each 11     calcium carbonate (TUMS) 500 MG chewable tablet Take 1 chew tab by mouth daily       chlorthalidone (HYGROTON) 25 MG tablet Take 1 tablet (25 mg) by mouth daily Add on for blood pressure 90 tablet 1     Continuous Glucose Sensor (FREESTYLE FROYLAN 3 SENSOR) MISC 1 each every 14 days 2 each 5     DULoxetine (CYMBALTA) 60 MG capsule Take 1 capsule (60 mg) by mouth 2 times daily 180 capsule 3     empagliflozin (JARDIANCE) 10 MG TABS tablet Take 1 tablet (10 mg) by mouth daily 90 tablet 1     EPINEPHrine (ANY BX GENERIC EQUIV) 0.3 MG/0.3ML injection 2-pack Inject 0.3 mLs (0.3 mg) into the muscle once as needed for anaphylaxis 2 each 2     fluticasone-salmeterol (ADVAIR) 500-50 MCG/ACT inhaler Inhale 1 puff into the lungs every 12 hours 1 each 11     insulin aspart (NOVOLOG FLEXPEN) 100 UNIT/ML pen Inject 22 Units Subcutaneous 3 times daily (with meals) 22 units before meals plus correction scale. Max daily dose 90 units 90 mL 1     insulin aspart (NOVOLOG VIAL) 100 UNITS/ML vial Inject 18 Units Subcutaneous 3 times daily (with meals) 10 mL 3     insulin glargine 100  "UNIT/ML pen Inject 60 Units subcutaneously daily 60 mL 4     insulin pen needle (32G X 4 MM) 32G X 4 MM miscellaneous Use 3  pen needles daily or as directed. 200 each 1     insulin syringe-needle U-100 (29G X 1/2\" 0.5 ML) 29G X 1/2\" 0.5 ML miscellaneous Use 3 syringes daily or as directed. 200 each 1     insulin syringe-needle U-100 (30G X 1/2\" 0.3 ML) 30G X 1/2\" 0.3 ML miscellaneous Use 3 syringes daily or as directed. 100 each 3     losartan (COZAAR) 25 MG tablet Take 1 tablet (25 mg) by mouth daily 90 tablet 1     meclizine (ANTIVERT) 25 MG tablet Take 1 tablet (25 mg) by mouth 3 times daily as needed for dizziness 90 tablet 1     metFORMIN (GLUCOPHAGE XR) 500 MG 24 hr tablet Take 2 tablets (1,000 mg) by mouth 2 times daily (with meals) 360 tablet 2     montelukast (SINGULAIR) 10 MG tablet Take 1 tablet (10 mg) by mouth at bedtime For allergies/asthma 90 tablet 2     multivitamin, therapeutic (THERA-VIT) TABS Take 1 tablet by mouth daily       nortriptyline (PAMELOR) 10 MG capsule Take 2 capsules (20 mg) by mouth at bedtime 60 capsule 11     omeprazole (PRILOSEC) 40 MG DR capsule Take 1 capsule (40 mg) by mouth daily Take for at least two months 90 capsule 2     pregabalin (LYRICA) 75 MG capsule Take 1 capsule (75 mg) by mouth 2 times daily 60 capsule 11     rizatriptan (MAXALT-MLT) 10 MG ODT TAKE 1 TABLET BY MOUTH AT ONSET OF HEADACHE FOR MIGRAINE MAY REPEAT IN 2 HOURS. MAX 3 TABS/24 HOURS. 18 tablet 1     warfarin ANTICOAGULANT (COUMADIN) 5 MG tablet Take 20 mg Mon, Thurs and 15 mg all other days, or as directed by the Coumadin clinic 280 tablet 1       Allergies  Allergies   Allergen Reactions     Artificial Sweetner (Informational Only) [Artificial Sweetner (Informational Only) ] GI Disturbance     ALL artificial sweetners cause severe diarrhea & flu symptoms     Hydromorphone Anaphylaxis     Ibuprofen GI Disturbance     Morphine Sulfate Concentrate Anaphylaxis     Morphine [Fumaric Acid] Anaphylaxis     " Hydrocodone-Acetaminophen Itching     Tramadol Hives     Trazodone Hives     Gabapentin GI Disturbance     GI upset per pt     Keflex [Cephalexin] Diarrhea     Upset stomach     Codeine Phosphate Itching     Ketorolac Tromethamine Rash         Family History  family history includes Cancer (age of onset: 52) in his mother; Cancer - colorectal (age of onset: 53) in his father; Coronary Artery Disease in his father; Diabetes in his maternal aunt, maternal aunt, paternal uncle, paternal uncle, paternal uncle, paternal uncle, and another family member; Myocardial Infarction (age of onset: 35) in his paternal grandfather; Myocardial Infarction (age of onset: 45) in his father.    Social History  Social History     Tobacco Use     Smoking status: Never     Smokeless tobacco: Current     Types: Chew     Tobacco comments:     Chew   Vaping Use     Vaping status: Never Used   Substance Use Topics     Alcohol use: Not Currently     Alcohol/week: 0.0 standard drinks of alcohol     Comment: occasional, few drinks a month     Drug use: Yes     Comment: CBD occasional       Physical Exam  BP (!) 158/101 (BP Location: Left arm, Patient Position: Sitting, Cuff Size: Adult Large)   Pulse 71   Resp 16   Wt 126.6 kg (279 lb)   SpO2 99%   BMI 37.84 kg/m    Body mass index is 37.84 kg/m .  GENERAL :  In no apparent distress  SKIN: Normal color, normal temperature, texture.  No hirsutism, alopecia or purple striae.     EYES: PERRL, EOMI, No scleral icterus,  No proptosis, conjunctival redness, stare, retraction  RESP: Lungs clear to auscultation bilaterally  CARDIAC: Regular rate and rhythm, normal S1 S2, without murmurs, rubs or gallops    NEURO: awake, alert, responds appropriately to questions.  Cranial nerves intact.   Moves all extremities; Gait normal with the assistance of a cane  No tremor of the outstretched hand.   EXTREMITIES: No clubbing, cyanosis or edema.    DATA REVIEW  Not currently monitoring his blood  sugars        Again, thank you for allowing me to participate in the care of your patient.        Sincerely,        Allyn Lyon PA-C

## 2024-09-26 NOTE — PROGRESS NOTES
Assessment/Plan :   Type 2 DM. Adarsh continues to struggle with hyperglycemia. He is worried about the affect of high blood sugars are his body and we discussed the importance of getting his blood sugars under tighter control. We also discussed the role of metformin in the management of diabetes. Metformin is a safe medication but if he is concerned about adverse effects, we can instead focus on adjusting his insulin. We discussed his current insulin therapy and I would like to switch the Lantus to Toujeo. Toujeo is a more concentrated insulin and it tends to have a flatter, more consistent absorption. This should help to stabilize his numbers, as we increase the dose. I will send in a new prescription for Toujeo 64 unit(s) daily. Lastly, we discussed the importance of glucose monitoring. He needs to get back on the Shari sensor. This will help us to adjust his medication, moving forward, and it helps him to make better dietary decisions. I will send in a refill of his Shari 3 sensor. If he has any problems picking up the medication, he is to contact our office. I would like to see him back in about 4 mos but I would also like him to follow-up with our CDE team for further insulin adjustment.      I have independently reviewed and interpreted labs, imaging as indicated.      Chief complaint:  Adarsh is a 59 year old male seen in consultation at the request of Kristen Kehr, PAC for uncontrolled Type 2 DM.    I have reviewed Care Everywhere including [facility], [facility], [facility], [facility], [facility], [facility] , [facility], [place] lab reports, imaging reports and provider notes as indicated.      HISTORY OF PRESENT ILLNESS  Adarsh was diagnosed with diabetes over 10 yrs ago. He was started on metformin at the time of diagnosis and that seemed to work well for some time. Eventually, his blood sugars started to increase and there was a discussion regarding starting a once weekly GLP1. Unfortunately, he  could not afford any of the GLP1 medications. He was then started on insulin therapy. Insulin therapy led to a significant improvement in his blood sugars. Things were going well, until he lost his insurance. He had to discontinue all of his medications, which led to significant increase in his hemoglobin A1C. He felt horrible during this time. He restarted insulin and metformin in April but he has continued to struggle with hyperglycemia.    Adarsh is supposed to be taking metformin 2000 mg daily, Lantus 60 unit(s) daily, and 20-30 unit(s) of Novolog with meals. However, his significant other worries about adverse effects related to the metformin and they often hide his pills. He states that he has taken the metformin for the last few days but wasn't taking it for weeks before that. His significant other also lost his last sensor, so he has not been able to monitor his blood sugars for the last few weeks. He would like to get back on sensor therapy because he feels like it helps him to make better dietary decisions.    Adarsh does have complications related to his diabetes and he worries that these complications are only going to worsen. He is currently on social security disability due to debilitating neuropathy. He states that he has constant pain and tingling in his feet. This sensation worsens when his blood sugars are elevated. He also has a history of microalbuminuria. He currently takes losartan. He does not believe that he has a history of retinopathy but he has noticed that his vision is blurry with hyperglycemia. His diabetes is complicated by obesity, hyperlipidemia, osteoarthritis, history of recurrent DVT, GERD, IBS, HTN, and CONNOR.    Endocrine relevant labs are as follows:   Latest Reference Range & Units 08/06/24 09:21   Hemoglobin A1C 0.0 - 5.6 % 10.5 (H)   (H): Data is abnormally high   Latest Reference Range & Units 08/06/24 09:21   Albumin Urine mg/g Cr 0.00 - 17.00 mg/g Cr 19.21 (H)   (H): Data is  abnormally high   Latest Reference Range & Units 08/06/24 09:21   Albumin Urine mg/L mg/L 24.4      Latest Reference Range & Units 04/23/24 10:13   Hemoglobin A1C 0.0 - 5.6 % 13.5 (H)   (H): Data is abnormally high    REVIEW OF SYSTEMS    Endocrine: positive for diabetes and obesity  Skin: negative  Eyes: positive for visual blurring with hyperglycemia, negative for, redness, tearing  Ears/Nose/Throat: negative  Respiratory: No shortness of breath, dyspnea on exertion, cough, or hemoptysis  Cardiovascular: negative for, chest pain, dyspnea on exertion, and lower extremity edema  Gastrointestinal: negative for, nausea, vomiting, constipation, and diarrhea  Genitourinary: negative for, nocturia, dysuria, frequency, and urgency  Musculoskeletal: positive for arthritis, joint stiffness, muscular weakness, nocturnal cramping, and foot pain  Neurologic: positive for local weakness and numbness or tingling of feet, negative for, and numbness or tingling of hands  Psychiatric: negative  Hematologic/Lymphatic/Immunologic: negative    Past Medical History  Past Medical History:   Diagnosis Date    Anaphylactic reaction 08/14/2015    Anxiety     Depression     DM2 (diabetes mellitus, type 2) (H)     GERD (gastroesophageal reflux disease)     HTN (hypertension)     IBS (irritable bowel syndrome)     Kidney stone 06/15/2011    Pt believes these were Calcium stones    Neuropathy     Pulmonary embolism, other, unspecified chronicity, unspecified whether acute cor pulmonale present (H) 08/15/2023       Medications  Current Outpatient Medications   Medication Sig Dispense Refill    acetaminophen (TYLENOL) 325 MG tablet Take 2 tablets (650 mg) by mouth every 4 hours as needed for other (mild pain) 100 tablet 0    albuterol (PROAIR HFA/PROVENTIL HFA/VENTOLIN HFA) 108 (90 Base) MCG/ACT inhaler Inhale 2 puffs into the lungs every 6 hours as needed for shortness of breath or wheezing 6.7 g 3    amLODIPine (NORVASC) 10 MG tablet Take 1  "tablet (10 mg) by mouth daily for blood pressure 90 tablet 1    atorvastatin (LIPITOR) 40 MG tablet Take 1 tablet (40 mg) by mouth every evening 90 tablet 1    blood glucose (ACCU-CHEK GUIDE) test strip Use to test blood sugar 3 times daily or as directed. 300 strip 3    blood glucose monitoring (ACCU-CHEK FASTCLIX) lancets Use to test blood sugar 1 times daily or as directed.  Ok to substitute alternative if insurance prefers. 102 each 11    calcium carbonate (TUMS) 500 MG chewable tablet Take 1 chew tab by mouth daily      chlorthalidone (HYGROTON) 25 MG tablet Take 1 tablet (25 mg) by mouth daily Add on for blood pressure 90 tablet 1    Continuous Glucose Sensor (FREESTYLE FROYLAN 3 SENSOR) MISC 1 each every 14 days 2 each 5    DULoxetine (CYMBALTA) 60 MG capsule Take 1 capsule (60 mg) by mouth 2 times daily 180 capsule 3    empagliflozin (JARDIANCE) 10 MG TABS tablet Take 1 tablet (10 mg) by mouth daily 90 tablet 1    EPINEPHrine (ANY BX GENERIC EQUIV) 0.3 MG/0.3ML injection 2-pack Inject 0.3 mLs (0.3 mg) into the muscle once as needed for anaphylaxis 2 each 2    fluticasone-salmeterol (ADVAIR) 500-50 MCG/ACT inhaler Inhale 1 puff into the lungs every 12 hours 1 each 11    insulin aspart (NOVOLOG FLEXPEN) 100 UNIT/ML pen Inject 22 Units Subcutaneous 3 times daily (with meals) 22 units before meals plus correction scale. Max daily dose 90 units 90 mL 1    insulin aspart (NOVOLOG VIAL) 100 UNITS/ML vial Inject 18 Units Subcutaneous 3 times daily (with meals) 10 mL 3    insulin glargine 100 UNIT/ML pen Inject 60 Units subcutaneously daily 60 mL 4    insulin pen needle (32G X 4 MM) 32G X 4 MM miscellaneous Use 3  pen needles daily or as directed. 200 each 1    insulin syringe-needle U-100 (29G X 1/2\" 0.5 ML) 29G X 1/2\" 0.5 ML miscellaneous Use 3 syringes daily or as directed. 200 each 1    insulin syringe-needle U-100 (30G X 1/2\" 0.3 ML) 30G X 1/2\" 0.3 ML miscellaneous Use 3 syringes daily or as directed. 100 each 3 "    losartan (COZAAR) 25 MG tablet Take 1 tablet (25 mg) by mouth daily 90 tablet 1    meclizine (ANTIVERT) 25 MG tablet Take 1 tablet (25 mg) by mouth 3 times daily as needed for dizziness 90 tablet 1    metFORMIN (GLUCOPHAGE XR) 500 MG 24 hr tablet Take 2 tablets (1,000 mg) by mouth 2 times daily (with meals) 360 tablet 2    montelukast (SINGULAIR) 10 MG tablet Take 1 tablet (10 mg) by mouth at bedtime For allergies/asthma 90 tablet 2    multivitamin, therapeutic (THERA-VIT) TABS Take 1 tablet by mouth daily      nortriptyline (PAMELOR) 10 MG capsule Take 2 capsules (20 mg) by mouth at bedtime 60 capsule 11    omeprazole (PRILOSEC) 40 MG DR capsule Take 1 capsule (40 mg) by mouth daily Take for at least two months 90 capsule 2    pregabalin (LYRICA) 75 MG capsule Take 1 capsule (75 mg) by mouth 2 times daily 60 capsule 11    rizatriptan (MAXALT-MLT) 10 MG ODT TAKE 1 TABLET BY MOUTH AT ONSET OF HEADACHE FOR MIGRAINE MAY REPEAT IN 2 HOURS. MAX 3 TABS/24 HOURS. 18 tablet 1    warfarin ANTICOAGULANT (COUMADIN) 5 MG tablet Take 20 mg Mon, Thurs and 15 mg all other days, or as directed by the Coumadin clinic 280 tablet 1       Allergies  Allergies   Allergen Reactions    Artificial Sweetner (Informational Only) [Artificial Sweetner (Informational Only) ] GI Disturbance     ALL artificial sweetners cause severe diarrhea & flu symptoms    Hydromorphone Anaphylaxis    Ibuprofen GI Disturbance    Morphine Sulfate Concentrate Anaphylaxis    Morphine [Fumaric Acid] Anaphylaxis    Hydrocodone-Acetaminophen Itching    Tramadol Hives    Trazodone Hives    Gabapentin GI Disturbance     GI upset per pt    Keflex [Cephalexin] Diarrhea     Upset stomach    Codeine Phosphate Itching    Ketorolac Tromethamine Rash         Family History  family history includes Cancer (age of onset: 52) in his mother; Cancer - colorectal (age of onset: 53) in his father; Coronary Artery Disease in his father; Diabetes in his maternal aunt, maternal  aunt, paternal uncle, paternal uncle, paternal uncle, paternal uncle, and another family member; Myocardial Infarction (age of onset: 35) in his paternal grandfather; Myocardial Infarction (age of onset: 45) in his father.    Social History  Social History     Tobacco Use    Smoking status: Never    Smokeless tobacco: Current     Types: Chew    Tobacco comments:     Chew   Vaping Use    Vaping status: Never Used   Substance Use Topics    Alcohol use: Not Currently     Alcohol/week: 0.0 standard drinks of alcohol     Comment: occasional, few drinks a month    Drug use: Yes     Comment: CBD occasional       Physical Exam  BP (!) 158/101 (BP Location: Left arm, Patient Position: Sitting, Cuff Size: Adult Large)   Pulse 71   Resp 16   Wt 126.6 kg (279 lb)   SpO2 99%   BMI 37.84 kg/m    Body mass index is 37.84 kg/m .  GENERAL :  In no apparent distress  SKIN: Normal color, normal temperature, texture.  No hirsutism, alopecia or purple striae.     EYES: PERRL, EOMI, No scleral icterus,  No proptosis, conjunctival redness, stare, retraction  RESP: Lungs clear to auscultation bilaterally  CARDIAC: Regular rate and rhythm, normal S1 S2, without murmurs, rubs or gallops    NEURO: awake, alert, responds appropriately to questions.  Cranial nerves intact.   Moves all extremities; Gait normal with the assistance of a cane  No tremor of the outstretched hand.   EXTREMITIES: No clubbing, cyanosis or edema.    DATA REVIEW  Not currently monitoring his blood sugars

## 2024-09-26 NOTE — NURSING NOTE
Adarsh Salvador's goals for this visit include:   Chief Complaint   Patient presents with    New Patient    Diabetes       He requests these members of his care team be copied on today's visit information: yes    PCP: Kehr, Kristen M    Referring Provider:  Kristen M Kehr, PA-C  [id number] [place] [address]    BP (!) 158/101 (BP Location: Left arm, Patient Position: Sitting, Cuff Size: Adult Large)   Pulse 71   Resp 16   Wt 126.6 kg (279 lb)   SpO2 99%   BMI 37.84 kg/m      Do you need any medication refills at today's visit? Unsure    Miguel Angel Galvan, EMT

## 2024-09-26 NOTE — PATIENT INSTRUCTIONS
Welcome to the [facility]     Our Endocrinology Clinics are here to provide you with a team-based, collaborative approach in the diagnosis and treatment of patients with diabetes and endocrine disorders. The team is made up of Physicians, Physician Assistants, Certified Diabetes Educators, Registered Nurses, Medical Assistants, Emergency Medical Technicians, and many others, all of whom have the unified goal of providing our patients with high quality care.     Please see below for some helpful tips to best navigate and use the [facility]:     [place] Respect: At [facility], we are committed to a respectful and safe space for all patients, visitors, and staff.  We believe that mutual respect between patients and their care team is the foundation of quality care.  It is our expectation that you will be treated with respect by your care team.  In turn, we ask that all communication with the care team (written and verbal) be respectful and free from profanity, threatening, or abusive language.  Disrespectful communication undermines our therapeutic relationship with you and may result in us being unable to continue to provide your care.    Refills: A provider must see you at least annually to prescribe and refill medications. This is to ensure your safety as well as meet insurance and compliance regulations.    Scheduling: Many of our Providers offer both in-person or video visits. Please call to schedule any needed follow ups as soon as possible because our provider schedules fill up very quickly. Our care team has the right to require an in-person visit when they believe that it is medically necessary. Please remember that for any virtual visits, you must be in the [facility] at the time of the visit, otherwise we are unable to see you and you will need to be rescheduled.    Missed Appointments: If you need to cancel or miss your  scheduled appointment, please call the clinic at [phone] to reschedule.  Please note if you repeatedly miss appointments or repeatedly miss appointments without calling to inform us ahead of time (no-show), the clinic may elect to not allow you to reschedule without speaking to a manager, may require a Partnership In Care Agreement prior to rescheduling, or could result in you no longer being able to receive care from the clinic. Providing the clinic with timely notification if you have to miss an appointment, allows us to better serve the needs of all of our patients.    Primary Care Provider: Our Endocrinologists are Specialists in their field. We expect you to have a Primary Care Provider established to handle any needs outside of your diabetes and endocrine care.  We would be happy to assist you find a Primary Care Provider, if you do not have one.    Iterasi: [employer] is a wonderful resource that allows you access to your Care Team via online or the kirk. Please ask a member of the team if you would like help creating an account. Please note that it may take up to 2 business days for a response. [employer] messages are not reviewed on weekends or after business hours.  Emergent or urgent care needs should never be communicated via [employer].  If you experience a medical emergency call 911 or go to the nearest emergency room.    Labs: It is recommended that you stay within the [facility] for labs but you are welcome to obtain ordered labs (with some exceptions) from any location of your choice as long as they are able to complete and process the needed labs. If you need us to fax orders to your preferred lab, please provide us the name and fax number of the lab you would like to go to so we can fax the orders. If your labs are drawn outside of the [facility], please have them fax the results to [phone] ([place]) or [phone] ([place]) or via [employer]. It is your  responsibility to ensure that outside lab results are sent to us.    We look forward to working with you. Please do not hesitate to reach out with any questions.    Thank you,    The Endocrine Team    [facility] Address:   [place] Address:     [address] [place] [address]    Phone: [phone]  Fax: [phone] [address] [place] [address]    Phone: [phone]  Fax: [phone]     [facility] Cost Estimate Phone Number: [phone]    General Lab and Imaging Scheduling Phone Number: [phone]

## 2024-10-03 ENCOUNTER — TELEPHONE (OUTPATIENT)
Dept: ANTICOAGULATION | Facility: CLINIC | Age: 59
End: 2024-10-03
Payer: COMMERCIAL

## 2024-10-03 NOTE — TELEPHONE ENCOUNTER
ANTICOAGULATION     Adarsh G Modesto is overdue for an INR check.     Spoke with Adarsh and scheduled lab appointment on 10/4/24    Joby Marie, RN  10/3/2024  Anticoagulation Clinic  [facility] for routing messages: ermias MARTÍNEZ  [facility] patient phone line: [phone]

## 2024-10-08 ENCOUNTER — TELEPHONE (OUTPATIENT)
Dept: ANTICOAGULATION | Facility: CLINIC | Age: 59
End: 2024-10-08
Payer: COMMERCIAL

## 2024-10-08 NOTE — TELEPHONE ENCOUNTER
ANTICOAGULATION     Adarsh Salvador is overdue for an INR check.     Spoke with pt and scheduled lab appointment on 10/10    Priya Plunkett, RN  10/8/2024  Anticoagulation Clinic  [facility] for routing messages: ermias MARTÍNEZ  [facility] patient phone line: [phone]

## 2024-10-10 ENCOUNTER — ANTICOAGULATION THERAPY VISIT (OUTPATIENT)
Dept: ANTICOAGULATION | Facility: CLINIC | Age: 59
End: 2024-10-10

## 2024-10-10 ENCOUNTER — LAB (OUTPATIENT)
Dept: LAB | Facility: CLINIC | Age: 59
End: 2024-10-10
Payer: COMMERCIAL

## 2024-10-10 DIAGNOSIS — I82.409 RECURRENT DEEP VEIN THROMBOSIS (DVT) (H): ICD-10-CM

## 2024-10-10 DIAGNOSIS — I82.409 RECURRENT DEEP VEIN THROMBOSIS (DVT) (H): Primary | ICD-10-CM

## 2024-10-10 LAB — INR BLD: 3.1 (ref 0.9–1.1)

## 2024-10-10 PROCEDURE — 36416 COLLJ CAPILLARY BLOOD SPEC: CPT

## 2024-10-10 PROCEDURE — 85610 PROTHROMBIN TIME: CPT

## 2024-10-10 NOTE — PROGRESS NOTES
ANTICOAGULATION MANAGEMENT     Adarsh Salvador 59 year old male is on warfarin with supratherapeutic INR result. (Goal INR 2.0-3.0)    Recent labs: (last 7 days)     10/10/24  1030   INR 3.1*       ASSESSMENT     Source(s): Chart Review  Previous INR was Therapeutic last visit; previously outside of goal range  Medication, diet, health changes since last INR: Per OV on 9/13/24: Patient  continues to have falls, most recently a couple of weeks ago getting out of bed. He's worked on strengthening but not improving. He does have pain above/below the knee, worse with twisting. His other knee has collapsed as well which has not had surgery. Its just like the knee won't do what he wants it to do.           PLAN     Recommended plan for temporary change(s) affecting INR     Dosing Instructions: Continue your current warfarin dose with next INR in 2 weeks       Summary  As of 10/10/2024      Full warfarin instructions:  17.5 mg every Mon, Fri; 15 mg all other days   Next INR check:  10/24/2024               Detailed voice message left for Adarsh with dosing instructions and follow up date.     Contact [phone] to schedule and with any changes, questions or concerns.     Education provided: Please call back if any changes to your diet, medications or how you've been taking warfarin    Plan made per [facility] anticoagulation protocol    Petey HURTADO RN  10/10/2024  Anticoagulation Clinic  [facility] for routing messages: ermias MARTÍNEZ  [facility] patient phone line: [phone]        _______________________________________________________________________     Anticoagulation Episode Summary       Current INR goal:  2.0-3.0   TTR:  29.0% (1 y)   Target end date:  Indefinite   Send INR reminders to:  MANOJ MARTÍNEZ    Indications    Recurrent deep vein thrombosis (DVT) (H) [I82.409]             Comments:               Anticoagulation Care Providers       Provider Role Specialty Phone number    Bertha Romero PA-C Referring  Family Medicine [phone]    Kehr, Kristen M, PA-C Referring Family Medicine [phone]

## 2024-10-12 ENCOUNTER — OFFICE VISIT (OUTPATIENT)
Dept: URGENT CARE | Facility: URGENT CARE | Age: 59
End: 2024-10-12
Payer: COMMERCIAL

## 2024-10-12 VITALS
OXYGEN SATURATION: 97 % | TEMPERATURE: 98.3 F | BODY MASS INDEX: 37.84 KG/M2 | WEIGHT: 279 LBS | SYSTOLIC BLOOD PRESSURE: 119 MMHG | RESPIRATION RATE: 16 BRPM | DIASTOLIC BLOOD PRESSURE: 74 MMHG | HEART RATE: 82 BPM

## 2024-10-12 DIAGNOSIS — R21 RASH AND NONSPECIFIC SKIN ERUPTION: Primary | ICD-10-CM

## 2024-10-12 PROCEDURE — 99213 OFFICE O/P EST LOW 20 MIN: CPT | Performed by: FAMILY MEDICINE

## 2024-10-12 RX ORDER — PERMETHRIN 50 MG/G
CREAM TOPICAL
Qty: 60 G | Refills: 1 | Status: SHIPPED | OUTPATIENT
Start: 2024-10-12

## 2024-10-12 RX ORDER — TRIAMCINOLONE ACETONIDE 1 MG/G
CREAM TOPICAL 2 TIMES DAILY
Qty: 45 G | Refills: 0 | Status: SHIPPED | OUTPATIENT
Start: 2024-10-12

## 2024-10-12 ASSESSMENT — PAIN SCALES - GENERAL: PAINLEVEL: MODERATE PAIN (5)

## 2024-10-12 NOTE — PROGRESS NOTES
Assessment & Plan     Rash and nonspecific skin eruption  Differentials discussed in detail including scabies.  Partner also having similar rashes.  Permethrin and Kenalog cream prescribed.  Recommended to keep the area dry and clean and follow-up with PCP in 5 to 7 days or earlier if needed.  All questions answered.  - permethrin (ELIMITE) 5 % external cream; Apply cream from head to toe (except the face); leave on for 8-14 hours then wash off with water; reapply in 1 week if live mites appear.  - triamcinolone (KENALOG) 0.1 % external cream; Apply topically 2 times daily.      Subjective   Adarsh is a 59 year old, presenting for the following health issues:  Urgent Care and Derm Problem (Rash x's 2 week itchiness with burning )        10/12/2024     9:59 AM   Additional Questions   Roomed by ca   Accompanied by gerson COOK   Onset/Duration: 2 weeks  Description  Location: arms and legs   Character: raised, red  Itching: severe  Intensity:  moderate  Progression of Symptoms:  worsening  Accompanying signs and symptoms:   Fever: No  Body aches or joint pain: No  Sore throat symptoms: No  Recent cold symptoms: No  History:           Previous episodes of similar rash: None  New exposures:  None  Recent travel: No  Exposure to similar rash: No  Precipitating or alleviating factors:   Therapies tried and outcome: calamine lotion and benadryl         Review of Systems  Constitutional, HEENT, cardiovascular, pulmonary, gi and gu systems are negative, except as otherwise noted.      Objective    /74   Pulse 82   Temp 98.3  F (36.8  C) (Tympanic)   Resp 16   Wt 126.6 kg (279 lb)   SpO2 97%   BMI 37.84 kg/m    Body mass index is 37.84 kg/m .  Physical Exam   GENERAL: alert and no distress  EYES: Eyes grossly normal to inspection, PERRL and conjunctivae and sclerae normal  RESP: no wheezes  MS: no gross musculoskeletal defects noted, no edema  SKIN:  erythematous scattered rashes involving lower legs as shown  below, no vesicles or discharge noted  NEURO: Normal strength and tone, mentation intact and speech normal  PSYCH: mentation appears normal, affect normal/bright            Signed Electronically by: Rafael Junior MD

## 2024-10-28 ENCOUNTER — TELEPHONE (OUTPATIENT)
Dept: ANTICOAGULATION | Facility: CLINIC | Age: 59
End: 2024-10-28
Payer: COMMERCIAL

## 2024-10-28 NOTE — TELEPHONE ENCOUNTER
ANTICOAGULATION     Adarsh Salvador is overdue for an INR check.     Left message for patient to call and schedule lab appointment as soon as possible. If returning call, please schedule.     Joby Marie RN  10/28/2024  Anticoagulation Clinic  [facility] for routing messages: ermias MARTÍNEZ  [facility] patient phone line: [phone]

## 2024-10-29 NOTE — PROGRESS NOTES
[facility] Neurology Follow Up Visit    Adarsh Salvador MRN# [id number]   Age: 59 year old YOB: 1965     Brief history of symptoms: The patient was initially seen in neurologic consultation on 10/20/2020 for evaluation of neuropathy. Prior to that patient followed with Dr Montalvo. Please see the comprehensive neurologic consultation note from that date in the Epic records for details.          Assessment and Plan:   Assessment:  Adarsh Salvador is a 59 year old male who presents today for follow-up of diabetic polyneuropathy and migraines. We will continue Cymbalta and nortriptyline (has been out of nortriptyline the last week). We will also increase dosage of Lyrica again. He is working on diabetes control.    Migraines currently respond to prn Maxalt. Frequency will likely improve with patient restarting nortritpyline.     Patient is bothered by intermittent painful left lower extremity numbness/tingling/pain. Symptoms reportedly improved with epidural injection previously with Dr Hahn. Patient reports he is going to follow-up with Dr Hahn. 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     EMG of the left arm could be considered if left hand numbness (4th/5th fingers) worsens.     Plan:  - Continue Cymbalta 60 mg BID  - Continue Nortriptyline 20 mg nightly  - Increase Lyrica 100 mg BID  - Pain clinic follow-up  - Maxalt 10 mg prn for migraine    Follow up in Neurology clinic in 6 months or sooner if new issues arise    Juan Luis Gray MD   of Neurology  [facility]  --------------------------------------------------------------------------------------------------    Interval history:   His neuropathy symptoms bother him. It sometimes feels like he is stepping on nails. It sometimes lasts for seconds and other times lasts longer. The increase in Lyrica was helpful.     He has been out of the nortriptyline for the last week and his sleep is worse.     Headache  frequency is alright, they vary with stress. Perfumes cause headaches as well.    Patient had a fall about a month ago. He remembers falling because he tripped and then he thinks he lost 30 minutes of time. He had a goose egg on his head.       Past Medical History:     Patient Active Problem List   Diagnosis    GERD (gastroesophageal reflux disease)    Chronic RLQ pain    Constipation    Chronic maxillary sinusitis    Chronic rhinitis    Hypertriglyceridemia    Hypertension, unspecified type    Anemia in other chronic diseases classified elsewhere    Fatty liver    Irritable bowel syndrome with diarrhea    Right inguinal hernia    Mild persistent asthma without complication    Type 2 diabetes mellitus with diabetic polyneuropathy, with long-term current use of insulin (H)    Hyperlipidemia LDL goal <100    CONNOR (generalized anxiety disorder)    Insomnia, unspecified type    Morbid obesity (H)    Neuropathy    Recurrent deep vein thrombosis (DVT) (H)    Precordial pain    Dyslipidemia    Elevated C-reactive protein    Gastric reflux    Internal derangement of knee    Nicotine dependence    Varicose veins of lower extremity    Vitamin D deficiency    Low volume of ejaculated semen    Bilateral leg pain    Chronic pain of right knee    Acute pain of right knee    Aftercare following surgery of the musculoskeletal system    DVT (deep venous thrombosis) (H)    S/P TKR (total knee replacement), right    Status post total right knee replacement    Severe persistent asthma without complication (H)    Chronic kidney disease, stage 2 (mild)     Past Medical History:   Diagnosis Date    Anaphylactic reaction 08/14/2015    Anxiety     Depression     DM2 (diabetes mellitus, type 2) (H)     GERD (gastroesophageal reflux disease)     HTN (hypertension)     IBS (irritable bowel syndrome)     Kidney stone 06/15/2011    Pt believes these were Calcium stones    Neuropathy     Pulmonary embolism, other, unspecified chronicity,  unspecified whether acute cor pulmonale present (H) 08/15/2023        Past Surgical History:     Past Surgical History:   Procedure Laterality Date    ARTHROPLASTY KNEE Right 9/26/2023    Procedure: ARTHROPLASTY, KNEE, TOTAL Right;  Surgeon: Ediwn Ch MD;  Location: [facility]    ARTHROSCOPY KNEE WITH MEDIAL MENISCECTOMY Right 8/19/2022    Procedure: examination under anesthesia, right knee arthroscopy, meniscectomy;  Surgeon: Carlos A Em MD;  Location: [facility] OR    COLONOSCOPY  5/1/2012    Procedure:COLONOSCOPY; screening colonoscopy; Surgeon:KINGSLEY DOS SANTOS; Location:[facility] OR    COLONOSCOPY  4/21/2014    Procedure: COMBINED COLONOSCOPY, SINGLE BIOPSY/POLYPECTOMY BY BIOPSY;  Surgeon: Duane, William Charles, MD;  Location: [facility] OR    COLONOSCOPY N/A 4/21/2022    Procedure: COLONOSCOPY, WITH POLYPECTOMY AND BIOPSY;  Surgeon: Luiz Calvert MD;  Location: [facility]    CYSTOSCOPY  05/01/2008    CYSTOSCOPY W/ URETERAL STENT PLACEMENT 05/01/2008     CYSTOSCOPY  09/26/2010    CYSTOSCOPY W/ URETERAL STENT PLACEMENT 09/26/2010 Left     CYSTOSCOPY  05/18/2012    CYSTOSCOPY, LEFT URETEROSCOPY AND STENT PLACEMENT left retrograde 05/18/2012     CYSTOSCOPY,+URETEROSCOPY  06/10/2008    URETEROSCOPY 06/10/2008 Right     HC BREATH HYDROGEN TEST  3/7/2014    Procedure: HYDROGEN BREATH TEST;  Surgeon: Darion Swift MD;  Location: [facility]    INJECT EPIDURAL LUMBAR N/A 7/7/2022    Procedure: INJECTION, SPINE, LUMBAR, EPIDURAL L5/S1;  Surgeon: Michi Hahn MD;  Location: [facility] OR    LITHOTRIPSY  09/30/2010    LITHOTRIPSY 09/30/2010 LEFT EXTRACORPOREAL SHOCK WAVE LITHOTRIPSY, FLEXIBLE CYSTOSCOPY, LEFT STENT REMOVAL      ORTHOPEDIC SURGERY  10/03/2007    KNEE ARTHROSCOPY 10/03/2007 RightX2      RELEASE CARPAL TUNNEL Right 1/26/2018    Procedure: RELEASE CARPAL TUNNEL;  Right carpal tunnel release;  Surgeon: Wiliam Saenz MD;  Location: [facility] OR    VASCULAR SURGERY  11/24/2008    Radiofrequency ablation left  saphenous vein 11/24/2008 Done by Dr. Lozoya          Social History:     Social History     Tobacco Use    Smoking status: Never    Smokeless tobacco: Current     Types: Chew    Tobacco comments:     Chew   Vaping Use    Vaping status: Never Used   Substance Use Topics    Alcohol use: Not Currently     Alcohol/week: 0.0 standard drinks of alcohol     Comment: occasional, few drinks a month    Drug use: Yes     Comment: CBD occasional        Family History:     Family History   Problem Relation Age of Onset    Cancer Mother 52        lung, smoked    Cancer - colorectal Father 53        unsure type, pt attributes to radiation exposure    Myocardial Infarction Father 45    Coronary Artery Disease Father     Myocardial Infarction Paternal Grandfather 35    Diabetes Other         nephew- type 1    Diabetes Maternal Aunt         1    Diabetes Maternal Aunt         2    Diabetes Paternal Uncle         1    Diabetes Paternal Uncle         2    Diabetes Paternal Uncle         3    Diabetes Paternal Uncle         4    Colon Cancer No family hx of         Medications:     Current Outpatient Medications   Medication Sig Dispense Refill    acetaminophen (TYLENOL) 325 MG tablet Take 2 tablets (650 mg) by mouth every 4 hours as needed for other (mild pain) 100 tablet 0    albuterol (PROAIR HFA/PROVENTIL HFA/VENTOLIN HFA) 108 (90 Base) MCG/ACT inhaler Inhale 2 puffs into the lungs every 6 hours as needed for shortness of breath or wheezing 6.7 g 3    amLODIPine (NORVASC) 10 MG tablet Take 1 tablet (10 mg) by mouth daily for blood pressure 90 tablet 1    atorvastatin (LIPITOR) 40 MG tablet Take 1 tablet (40 mg) by mouth every evening 90 tablet 1    blood glucose (ACCU-CHEK GUIDE) test strip Use to test blood sugar 3 times daily or as directed. 300 strip 3    blood glucose monitoring (ACCU-CHEK FASTCLIX) lancets Use to test blood sugar 1 times daily or as directed.  Ok to substitute alternative if insurance prefers. 102 each 11     "calcium carbonate (TUMS) 500 MG chewable tablet Take 1 chew tab by mouth daily      chlorthalidone (HYGROTON) 25 MG tablet Take 1 tablet (25 mg) by mouth daily Add on for blood pressure 90 tablet 1    Continuous Glucose Sensor (FREESTYLE FROYLAN 3 SENSOR) MISC 1 each every 14 days. 2 each 5    DULoxetine (CYMBALTA) 60 MG capsule Take 1 capsule (60 mg) by mouth 2 times daily 180 capsule 3    empagliflozin (JARDIANCE) 10 MG TABS tablet Take 1 tablet (10 mg) by mouth daily 90 tablet 1    EPINEPHrine (ANY BX GENERIC EQUIV) 0.3 MG/0.3ML injection 2-pack Inject 0.3 mLs (0.3 mg) into the muscle once as needed for anaphylaxis 2 each 2    fluticasone-salmeterol (ADVAIR) 500-50 MCG/ACT inhaler Inhale 1 puff into the lungs every 12 hours 1 each 11    insulin aspart (NOVOLOG FLEXPEN) 100 UNIT/ML pen Inject 22 Units Subcutaneous 3 times daily (with meals) 22 units before meals plus correction scale. Max daily dose 90 units 90 mL 1    insulin aspart (NOVOLOG VIAL) 100 UNITS/ML vial Inject 18 Units Subcutaneous 3 times daily (with meals) 10 mL 3    insulin glargine U-300 (TOUJEO SOLOSTAR) 300 UNIT/ML (1 units dial) pen Inject 64 Units subcutaneously at bedtime. 30 mL 1    insulin pen needle (32G X 4 MM) 32G X 4 MM miscellaneous Use 3  pen needles daily or as directed. 200 each 1    insulin syringe-needle U-100 (29G X 1/2\" 0.5 ML) 29G X 1/2\" 0.5 ML miscellaneous Use 3 syringes daily or as directed. 200 each 1    insulin syringe-needle U-100 (30G X 1/2\" 0.3 ML) 30G X 1/2\" 0.3 ML miscellaneous Use 3 syringes daily or as directed. 100 each 3    losartan (COZAAR) 25 MG tablet Take 1 tablet (25 mg) by mouth daily 90 tablet 1    meclizine (ANTIVERT) 25 MG tablet Take 1 tablet (25 mg) by mouth 3 times daily as needed for dizziness 90 tablet 1    metFORMIN (GLUCOPHAGE XR) 500 MG 24 hr tablet Take 2 tablets (1,000 mg) by mouth 2 times daily (with meals) 360 tablet 2    montelukast (SINGULAIR) 10 MG tablet Take 1 tablet (10 mg) by mouth at " bedtime For allergies/asthma 90 tablet 2    multivitamin, therapeutic (THERA-VIT) TABS Take 1 tablet by mouth daily      nortriptyline (PAMELOR) 10 MG capsule Take 2 capsules (20 mg) by mouth at bedtime 60 capsule 11    omeprazole (PRILOSEC) 40 MG DR capsule Take 1 capsule (40 mg) by mouth daily Take for at least two months 90 capsule 2    permethrin (ELIMITE) 5 % external cream Apply cream from head to toe (except the face); leave on for 8-14 hours then wash off with water; reapply in 1 week if live mites appear. 60 g 1    pregabalin (LYRICA) 75 MG capsule Take 1 capsule (75 mg) by mouth 2 times daily 60 capsule 11    rizatriptan (MAXALT-MLT) 10 MG ODT TAKE 1 TABLET BY MOUTH AT ONSET OF HEADACHE FOR MIGRAINE MAY REPEAT IN 2 HOURS. MAX 3 TABS/24 HOURS. 18 tablet 1    triamcinolone (KENALOG) 0.1 % external cream Apply topically 2 times daily. 45 g 0    warfarin ANTICOAGULANT (COUMADIN) 5 MG tablet Take 20 mg Mon, Thurs and 15 mg all other days, or as directed by the Coumadin clinic 280 tablet 1     No current facility-administered medications for this visit.     Facility-Administered Medications Ordered in Other Visits   Medication Dose Route Frequency Provider Last Rate Last Admin    BUPivacaine (MARCAINE) injection 0.5%  10 mL Intradermal Once Vinnie Espinoza,         DOBUTamine 500 mg in dextrose 5% 250 mL (adult std conc) premix  2.5-20 mcg/kg/min Intravenous Continuous Navarro Butcher MD   Stopped at 04/25/19 1551    iopamidol (ISOVUE-M 200) solution 10 mL  10 mL Intravenous Once Vinnie Espinoza DO        methylPREDNISolone (DEPO-MEDROL) injection 80 mg  80 mg Intramuscular Once Vinnie Espinoza DO        metoprolol (LOPRESSOR) injection 5 mg  5 mg Intravenous Q5 Min PRN Navarro Butcher MD   2 mg at 04/25/19 155        Allergies:     Allergies   Allergen Reactions    Artificial Sweetner (Informational Only) [Artificial Sweetner (Informational Only) ] GI Disturbance     ALL artificial  sweetners cause severe diarrhea & flu symptoms    Hydromorphone Anaphylaxis    Ibuprofen GI Disturbance    Morphine Sulfate Concentrate Anaphylaxis    Morphine [Fumaric Acid] Anaphylaxis    Hydrocodone-Acetaminophen Itching    Tramadol Hives    Trazodone Hives    Gabapentin GI Disturbance     GI upset per pt    Keflex [Cephalexin] Diarrhea     Upset stomach    Codeine Phosphate Itching    Ketorolac Tromethamine Rash        Review of Systems:   As above     Physical Exam:   Vitals: /87 (BP Location: Right arm, Patient Position: Sitting, Cuff Size: Adult Large)   Pulse 80   Wt 130.3 kg (287 lb 3.2 oz)   BMI 38.95 kg/m     General: Seated comfortably in no acute distress.  Lungs: breathing comfortably  Neurologic:     Mental Status: Fully alert, attentive. Language normal, speech clear and fluent, no paraphasic errors.      Cranial Nerves: No dysarthria. PERRL. EOMI. Face symmetric and facial sensation intact. Tongue protrusion, palate elevation intact. Visual fields intact.     Motor: No tremors or other abnormal movements observed. Muscle tone normal throughout. Strength 5/5 throughout upper and lower extremities. Finger taps symmetric and normal speed.      Deep Tendon Reflexes: 2+/symmetric throughout upper extremities, 1+ patella and trace ankle jerks. Toes downgoing bilaterally.     Sensory: Vibration is 4 seconds in the bilateral great toes. Temperature are decreased in the feet compared to the hands. Light touch is decreased below the ankles and in the left lateral thigh. Proprioception is intact.      Coordination: Finger-nose-finger and heel to shin intact without dysmetria.      Gait: Mildly wide based antalgic steady casual gait. Able to do tandem with mild difficulty.     Data reviewed on previous visits    Procedures:  EMG/NCS 6/2020  Interpretation:  This is an abnormal study, demonstrating electrophysiologic evidence of the following:  Sensorimotor axonal polyneuropathy affecting bilateral  lower limbs. Comparison with two studies performed in 2017 demonstrates minimal interval worsening of amplitude attenuation.   Mild right ulnar neuropathy at the elbow.     Laboratory:  A1c 7.5 (3/2020)  B12 360, Heavy metals screen negative (2/2020)  TSH normal (2018)  Immunofixation negative (2017)  SSA/SSB negative (2018)  A1c 11.2 (1/2022)  A1c 7.4 (12/2020)    CT lumbar spine 2018  Impression:  1.  Multilevel lumbar spondylosis. Most prominent at L4-5 with  moderate to severe left neuroforaminal stenosis with left L4 exiting  nerve roots impingement, to a lesser grade at L3-4 with moderate left  neuroforaminal stenosis. Overall not significantly changed from  10/12/2017.  2.  Bilateral nonobstructing nephrolithiasis.    EMG 5/2022  Interpretation:  This is an abnormal study, demonstrating electrophysiologic evidence of the following:  - Moderately severe length dependent sensorimotor axonal polyneuropathy. In comparison to June 2020 study, there has been mild interval worsening.   - Moderately severe left median mononeuropathy at the wrist (carpal tunnel syndrome).  Comment: There is no evidence of lumbosacral polyradiculopathy as clinically questioned. 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 Nerve blocks of the lateral femoral cutaneous nerves could be considered at follow-up in pain clinic.     A1c 13.5 (4/2024)    Pertinent Investigations since last visit:   A1c 10.5 (8/2024)      The total time of this encounter today amounted to 32 minutes. This time included time spent with the patient, prep work, ordering tests, and performing post visit documentation.

## 2024-10-30 ENCOUNTER — OFFICE VISIT (OUTPATIENT)
Dept: NEUROLOGY | Facility: CLINIC | Age: 59
End: 2024-10-30
Payer: COMMERCIAL

## 2024-10-30 VITALS
HEART RATE: 80 BPM | WEIGHT: 287.2 LBS | DIASTOLIC BLOOD PRESSURE: 87 MMHG | SYSTOLIC BLOOD PRESSURE: 138 MMHG | BODY MASS INDEX: 38.95 KG/M2

## 2024-10-30 DIAGNOSIS — E11.42 DIABETIC POLYNEUROPATHY ASSOCIATED WITH TYPE 2 DIABETES MELLITUS (H): ICD-10-CM

## 2024-10-30 DIAGNOSIS — G43.109 MIGRAINE WITH AURA AND WITHOUT STATUS MIGRAINOSUS, NOT INTRACTABLE: ICD-10-CM

## 2024-10-30 DIAGNOSIS — M79.604 BILATERAL LEG PAIN: ICD-10-CM

## 2024-10-30 DIAGNOSIS — M79.605 BILATERAL LEG PAIN: ICD-10-CM

## 2024-10-30 PROCEDURE — 99214 OFFICE O/P EST MOD 30 MIN: CPT | Performed by: INTERNAL MEDICINE

## 2024-10-30 RX ORDER — RIZATRIPTAN BENZOATE 10 MG/1
TABLET, ORALLY DISINTEGRATING ORAL
Qty: 9 TABLET | Refills: 11 | Status: SHIPPED | OUTPATIENT
Start: 2024-10-30

## 2024-10-30 RX ORDER — PREGABALIN 100 MG/1
100 CAPSULE ORAL 2 TIMES DAILY
Qty: 60 CAPSULE | Refills: 11 | Status: SHIPPED | OUTPATIENT
Start: 2024-10-30

## 2024-10-30 NOTE — LETTER
10/30/2024      Adarsh Salvador  [address]  [place] [address]      Dear Colleague,    Thank you for referring your patient, Adarsh Salvador, to the [facility] NEUROLOGY CLINIC [place]. Please see a copy of my visit note below.    [facility] Neurology Follow Up Visit    Adarsh Salvador MRN# [id number]   Age: 59 year old YOB: 1965     Brief history of symptoms: The patient was initially seen in neurologic consultation on 10/20/2020 for evaluation of neuropathy. Prior to that patient followed with Dr Montalvo. Please see the comprehensive neurologic consultation note from that date in the Epic records for details.          Assessment and Plan:   Assessment:  Adarsh Salvador is a 59 year old male who presents today for follow-up of diabetic polyneuropathy and migraines. We will continue Cymbalta and nortriptyline (has been out of nortriptyline the last week). We will also increase dosage of Lyrica again. He is working on diabetes control.    Migraines currently respond to prn Maxalt. Frequency will likely improve with patient restarting nortritpyline.     Patient is bothered by intermittent painful left lower extremity numbness/tingling/pain. Symptoms reportedly improved with epidural injection previously with Dr Hahn. Patient reports he is going to follow-up with Dr Hahn. 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     EMG of the left arm could be considered if left hand numbness (4th/5th fingers) worsens.     Plan:  - Continue Cymbalta 60 mg BID  - Continue Nortriptyline 20 mg nightly  - Increase Lyrica 100 mg BID  - Pain clinic follow-up  - Maxalt 10 mg prn for migraine    Follow up in Neurology clinic in 6 months or sooner if new issues arise    Juan Luis Gray MD   of Neurology  [facility]  --------------------------------------------------------------------------------------------------    Interval history:   His neuropathy symptoms bother  him. It sometimes feels like he is stepping on nails. It sometimes lasts for seconds and other times lasts longer. The increase in Lyrica was helpful.     He has been out of the nortriptyline for the last week and his sleep is worse.     Headache frequency is alright, they vary with stress. Perfumes cause headaches as well.    Patient had a fall about a month ago. He remembers falling because he tripped and then he thinks he lost 30 minutes of time. He had a goose egg on his head.       Past Medical History:     Patient Active Problem List   Diagnosis     GERD (gastroesophageal reflux disease)     Chronic RLQ pain     Constipation     Chronic maxillary sinusitis     Chronic rhinitis     Hypertriglyceridemia     Hypertension, unspecified type     Anemia in other chronic diseases classified elsewhere     Fatty liver     Irritable bowel syndrome with diarrhea     Right inguinal hernia     Mild persistent asthma without complication     Type 2 diabetes mellitus with diabetic polyneuropathy, with long-term current use of insulin (H)     Hyperlipidemia LDL goal <100     CONNOR (generalized anxiety disorder)     Insomnia, unspecified type     Morbid obesity (H)     Neuropathy     Recurrent deep vein thrombosis (DVT) (H)     Precordial pain     Dyslipidemia     Elevated C-reactive protein     Gastric reflux     Internal derangement of knee     Nicotine dependence     Varicose veins of lower extremity     Vitamin D deficiency     Low volume of ejaculated semen     Bilateral leg pain     Chronic pain of right knee     Acute pain of right knee     Aftercare following surgery of the musculoskeletal system     DVT (deep venous thrombosis) (H)     S/P TKR (total knee replacement), right     Status post total right knee replacement     Severe persistent asthma without complication (H)     Chronic kidney disease, stage 2 (mild)     Past Medical History:   Diagnosis Date     Anaphylactic reaction 08/14/2015     Anxiety      Depression       DM2 (diabetes mellitus, type 2) (H)      GERD (gastroesophageal reflux disease)      HTN (hypertension)      IBS (irritable bowel syndrome)      Kidney stone 06/15/2011    Pt believes these were Calcium stones     Neuropathy      Pulmonary embolism, other, unspecified chronicity, unspecified whether acute cor pulmonale present (H) 08/15/2023        Past Surgical History:     Past Surgical History:   Procedure Laterality Date     ARTHROPLASTY KNEE Right 9/26/2023    Procedure: ARTHROPLASTY, KNEE, TOTAL Right;  Surgeon: Ediwn Ch MD;  Location: [facility]     ARTHROSCOPY KNEE WITH MEDIAL MENISCECTOMY Right 8/19/2022    Procedure: examination under anesthesia, right knee arthroscopy, meniscectomy;  Surgeon: Carlos A Em MD;  Location: [facility] OR     COLONOSCOPY  5/1/2012    Procedure:COLONOSCOPY; screening colonoscopy; Surgeon:KINGSLEY DOS SANTOS; Location:[facility] OR     COLONOSCOPY  4/21/2014    Procedure: COMBINED COLONOSCOPY, SINGLE BIOPSY/POLYPECTOMY BY BIOPSY;  Surgeon: Duane, William Charles, MD;  Location: [facility] OR     COLONOSCOPY N/A 4/21/2022    Procedure: COLONOSCOPY, WITH POLYPECTOMY AND BIOPSY;  Surgeon: Luiz Calvert MD;  Location: [facility]     CYSTOSCOPY  05/01/2008    CYSTOSCOPY W/ URETERAL STENT PLACEMENT 05/01/2008      CYSTOSCOPY  09/26/2010    CYSTOSCOPY W/ URETERAL STENT PLACEMENT 09/26/2010 Left      CYSTOSCOPY  05/18/2012    CYSTOSCOPY, LEFT URETEROSCOPY AND STENT PLACEMENT left retrograde 05/18/2012      CYSTOSCOPY,+URETEROSCOPY  06/10/2008    URETEROSCOPY 06/10/2008 Right      HC BREATH HYDROGEN TEST  3/7/2014    Procedure: HYDROGEN BREATH TEST;  Surgeon: Darion Swift MD;  Location: [facility]     INJECT EPIDURAL LUMBAR N/A 7/7/2022    Procedure: INJECTION, SPINE, LUMBAR, EPIDURAL L5/S1;  Surgeon: Michi Hahn MD;  Location: [facility] OR     LITHOTRIPSY  09/30/2010    LITHOTRIPSY 09/30/2010 LEFT EXTRACORPOREAL SHOCK WAVE LITHOTRIPSY, FLEXIBLE CYSTOSCOPY, LEFT STENT REMOVAL        ORTHOPEDIC SURGERY  10/03/2007    KNEE ARTHROSCOPY 10/03/2007 RightX2       RELEASE CARPAL TUNNEL Right 1/26/2018    Procedure: RELEASE CARPAL TUNNEL;  Right carpal tunnel release;  Surgeon: Wiliam Saenz MD;  Location: [facility] OR     VASCULAR SURGERY  11/24/2008    Radiofrequency ablation left saphenous vein 11/24/2008 Done by Dr. Lozoya          Social History:     Social History     Tobacco Use     Smoking status: Never     Smokeless tobacco: Current     Types: Chew     Tobacco comments:     Chew   Vaping Use     Vaping status: Never Used   Substance Use Topics     Alcohol use: Not Currently     Alcohol/week: 0.0 standard drinks of alcohol     Comment: occasional, few drinks a month     Drug use: Yes     Comment: CBD occasional        Family History:     Family History   Problem Relation Age of Onset     Cancer Mother 52        lung, smoked     Cancer - colorectal Father 53        unsure type, pt attributes to radiation exposure     Myocardial Infarction Father 45     Coronary Artery Disease Father      Myocardial Infarction Paternal Grandfather 35     Diabetes Other         nephew- type 1     Diabetes Maternal Aunt         1     Diabetes Maternal Aunt         2     Diabetes Paternal Uncle         1     Diabetes Paternal Uncle         2     Diabetes Paternal Uncle         3     Diabetes Paternal Uncle         4     Colon Cancer No family hx of         Medications:     Current Outpatient Medications   Medication Sig Dispense Refill     acetaminophen (TYLENOL) 325 MG tablet Take 2 tablets (650 mg) by mouth every 4 hours as needed for other (mild pain) 100 tablet 0     albuterol (PROAIR HFA/PROVENTIL HFA/VENTOLIN HFA) 108 (90 Base) MCG/ACT inhaler Inhale 2 puffs into the lungs every 6 hours as needed for shortness of breath or wheezing 6.7 g 3     amLODIPine (NORVASC) 10 MG tablet Take 1 tablet (10 mg) by mouth daily for blood pressure 90 tablet 1     atorvastatin (LIPITOR) 40 MG tablet Take 1 tablet (40 mg)  "by mouth every evening 90 tablet 1     blood glucose (ACCU-CHEK GUIDE) test strip Use to test blood sugar 3 times daily or as directed. 300 strip 3     blood glucose monitoring (ACCU-CHEK FASTCLIX) lancets Use to test blood sugar 1 times daily or as directed.  Ok to substitute alternative if insurance prefers. 102 each 11     calcium carbonate (TUMS) 500 MG chewable tablet Take 1 chew tab by mouth daily       chlorthalidone (HYGROTON) 25 MG tablet Take 1 tablet (25 mg) by mouth daily Add on for blood pressure 90 tablet 1     Continuous Glucose Sensor (FREESTYLE FROYLAN 3 SENSOR) MISC 1 each every 14 days. 2 each 5     DULoxetine (CYMBALTA) 60 MG capsule Take 1 capsule (60 mg) by mouth 2 times daily 180 capsule 3     empagliflozin (JARDIANCE) 10 MG TABS tablet Take 1 tablet (10 mg) by mouth daily 90 tablet 1     EPINEPHrine (ANY BX GENERIC EQUIV) 0.3 MG/0.3ML injection 2-pack Inject 0.3 mLs (0.3 mg) into the muscle once as needed for anaphylaxis 2 each 2     fluticasone-salmeterol (ADVAIR) 500-50 MCG/ACT inhaler Inhale 1 puff into the lungs every 12 hours 1 each 11     insulin aspart (NOVOLOG FLEXPEN) 100 UNIT/ML pen Inject 22 Units Subcutaneous 3 times daily (with meals) 22 units before meals plus correction scale. Max daily dose 90 units 90 mL 1     insulin aspart (NOVOLOG VIAL) 100 UNITS/ML vial Inject 18 Units Subcutaneous 3 times daily (with meals) 10 mL 3     insulin glargine U-300 (TOUJEO SOLOSTAR) 300 UNIT/ML (1 units dial) pen Inject 64 Units subcutaneously at bedtime. 30 mL 1     insulin pen needle (32G X 4 MM) 32G X 4 MM miscellaneous Use 3  pen needles daily or as directed. 200 each 1     insulin syringe-needle U-100 (29G X 1/2\" 0.5 ML) 29G X 1/2\" 0.5 ML miscellaneous Use 3 syringes daily or as directed. 200 each 1     insulin syringe-needle U-100 (30G X 1/2\" 0.3 ML) 30G X 1/2\" 0.3 ML miscellaneous Use 3 syringes daily or as directed. 100 each 3     losartan (COZAAR) 25 MG tablet Take 1 tablet (25 mg) by " mouth daily 90 tablet 1     meclizine (ANTIVERT) 25 MG tablet Take 1 tablet (25 mg) by mouth 3 times daily as needed for dizziness 90 tablet 1     metFORMIN (GLUCOPHAGE XR) 500 MG 24 hr tablet Take 2 tablets (1,000 mg) by mouth 2 times daily (with meals) 360 tablet 2     montelukast (SINGULAIR) 10 MG tablet Take 1 tablet (10 mg) by mouth at bedtime For allergies/asthma 90 tablet 2     multivitamin, therapeutic (THERA-VIT) TABS Take 1 tablet by mouth daily       nortriptyline (PAMELOR) 10 MG capsule Take 2 capsules (20 mg) by mouth at bedtime 60 capsule 11     omeprazole (PRILOSEC) 40 MG DR capsule Take 1 capsule (40 mg) by mouth daily Take for at least two months 90 capsule 2     permethrin (ELIMITE) 5 % external cream Apply cream from head to toe (except the face); leave on for 8-14 hours then wash off with water; reapply in 1 week if live mites appear. 60 g 1     pregabalin (LYRICA) 75 MG capsule Take 1 capsule (75 mg) by mouth 2 times daily 60 capsule 11     rizatriptan (MAXALT-MLT) 10 MG ODT TAKE 1 TABLET BY MOUTH AT ONSET OF HEADACHE FOR MIGRAINE MAY REPEAT IN 2 HOURS. MAX 3 TABS/24 HOURS. 18 tablet 1     triamcinolone (KENALOG) 0.1 % external cream Apply topically 2 times daily. 45 g 0     warfarin ANTICOAGULANT (COUMADIN) 5 MG tablet Take 20 mg Mon, Thurs and 15 mg all other days, or as directed by the Coumadin clinic 280 tablet 1     No current facility-administered medications for this visit.     Facility-Administered Medications Ordered in Other Visits   Medication Dose Route Frequency Provider Last Rate Last Admin     BUPivacaine (MARCAINE) injection 0.5%  10 mL Intradermal Once Vinnie Espinoza,          DOBUTamine 500 mg in dextrose 5% 250 mL (adult std conc) premix  2.5-20 mcg/kg/min Intravenous Continuous Navarro Butcher MD   Stopped at 04/25/19 2529     iopamidol (ISOVUE-M 200) solution 10 mL  10 mL Intravenous Once Vinnie Espinoza DO         methylPREDNISolone (DEPO-MEDROL) injection 80  mg  80 mg Intramuscular Once Vinnie Espinoza,          metoprolol (LOPRESSOR) injection 5 mg  5 mg Intravenous Q5 Min PRN Navarro Butcher MD   2 mg at 04/25/19 2611        Allergies:     Allergies   Allergen Reactions     Artificial Sweetner (Informational Only) [Artificial Sweetner (Informational Only) ] GI Disturbance     ALL artificial sweetners cause severe diarrhea & flu symptoms     Hydromorphone Anaphylaxis     Ibuprofen GI Disturbance     Morphine Sulfate Concentrate Anaphylaxis     Morphine [Fumaric Acid] Anaphylaxis     Hydrocodone-Acetaminophen Itching     Tramadol Hives     Trazodone Hives     Gabapentin GI Disturbance     GI upset per pt     Keflex [Cephalexin] Diarrhea     Upset stomach     Codeine Phosphate Itching     Ketorolac Tromethamine Rash        Review of Systems:   As above     Physical Exam:   Vitals: /87 (BP Location: Right arm, Patient Position: Sitting, Cuff Size: Adult Large)   Pulse 80   Wt 130.3 kg (287 lb 3.2 oz)   BMI 38.95 kg/m     General: Seated comfortably in no acute distress.  Lungs: breathing comfortably  Neurologic:     Mental Status: Fully alert, attentive. Language normal, speech clear and fluent, no paraphasic errors.      Cranial Nerves: No dysarthria. PERRL. EOMI. Face symmetric and facial sensation intact. Tongue protrusion, palate elevation intact. Visual fields intact.     Motor: No tremors or other abnormal movements observed. Muscle tone normal throughout. Strength 5/5 throughout upper and lower extremities. Finger taps symmetric and normal speed.      Deep Tendon Reflexes: 2+/symmetric throughout upper extremities, 1+ patella and trace ankle jerks. Toes downgoing bilaterally.     Sensory: Vibration is 4 seconds in the bilateral great toes. Temperature are decreased in the feet compared to the hands. Light touch is decreased below the ankles and in the left lateral thigh. Proprioception is intact.      Coordination: Finger-nose-finger and heel  to shin intact without dysmetria.      Gait: Mildly wide based antalgic steady casual gait. Able to do tandem with mild difficulty.     Data reviewed on previous visits    Procedures:  EMG/NCS 6/2020  Interpretation:  This is an abnormal study, demonstrating electrophysiologic evidence of the following:  Sensorimotor axonal polyneuropathy affecting bilateral lower limbs. Comparison with two studies performed in 2017 demonstrates minimal interval worsening of amplitude attenuation.   Mild right ulnar neuropathy at the elbow.     Laboratory:  A1c 7.5 (3/2020)  B12 360, Heavy metals screen negative (2/2020)  TSH normal (2018)  Immunofixation negative (2017)  SSA/SSB negative (2018)  A1c 11.2 (1/2022)  A1c 7.4 (12/2020)    CT lumbar spine 2018  Impression:  1.  Multilevel lumbar spondylosis. Most prominent at L4-5 with  moderate to severe left neuroforaminal stenosis with left L4 exiting  nerve roots impingement, to a lesser grade at L3-4 with moderate left  neuroforaminal stenosis. Overall not significantly changed from  10/12/2017.  2.  Bilateral nonobstructing nephrolithiasis.    EMG 5/2022  Interpretation:  This is an abnormal study, demonstrating electrophysiologic evidence of the following:  - Moderately severe length dependent sensorimotor axonal polyneuropathy. In comparison to June 2020 study, there has been mild interval worsening.   - Moderately severe left median mononeuropathy at the wrist (carpal tunnel syndrome).  Comment: There is no evidence of lumbosacral polyradiculopathy as clinically questioned. 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 Nerve blocks of the lateral femoral cutaneous nerves could be considered at follow-up in pain clinic.     A1c 13.5 (4/2024)    Pertinent Investigations since last visit:   A1c 10.5 (8/2024)      The total time of this encounter today amounted to 32 minutes. This time included time spent with the patient, prep work, ordering tests,  and performing post visit documentation.      Again, thank you for allowing me to participate in the care of your patient.        Sincerely,        Juan Luis Gray MD

## 2024-11-04 ENCOUNTER — TELEPHONE (OUTPATIENT)
Dept: ANTICOAGULATION | Facility: CLINIC | Age: 59
End: 2024-11-04
Payer: COMMERCIAL

## 2024-11-04 NOTE — TELEPHONE ENCOUNTER
ANTICOAGULATION     Adarsh Salvador is overdue for an INR check.     Was unable to reach patient and was unable to leave a voicemail. If patient calls, please schedule INR check as soon as possible.     Joya Suero RN  11/4/2024  Anticoagulation Clinic  [facility] for routing messages: ermias MARTÍNEZ  [facility] patient phone line: [phone]

## 2024-11-04 NOTE — LETTER
[facility] ANTICOAGULATION CLINIC  [address] [facility] [address]  Phone: [phone]  Fax: [phone]   November 4, 2024        Adarsh Salvador  [address]   [place] [facility] [place] [facility] [address]            Dear Adarsh,    You are currently under the care of [facility] for your warfarin (Coumadin , Jantoven ) therapy.  We are contacting you because our records show you were due for an INR on 10/24/2024.    There are potentially serious risks when taking warfarin without careful monitoring and we want to make sure you are safely managed.  Routine lab monitoring is required for warfarin refills.     Please call [phone] as soon as possible to schedule a lab appointment. If it is difficult for you to get to lab, please call us to discuss options.  If there has been a change in your care or other concerns, please let us know so we can help and/or update our records.         Sincerely,       [facility]

## 2024-11-11 ENCOUNTER — DOCUMENTATION ONLY (OUTPATIENT)
Dept: ANTICOAGULATION | Facility: CLINIC | Age: 59
End: 2024-11-11
Payer: COMMERCIAL

## 2024-11-11 NOTE — PROGRESS NOTES
ANTICOAGULATION     Adarsh Salvador is overdue for an INR check.     Reminder letter sent    Janeth Cool RN  11/11/2024  Anticoagulation Clinic  [facility] for routing messages: ermias MARTÍNEZ  [facility] patient phone line: [phone]

## 2024-11-11 NOTE — PROGRESS NOTES
Letter mailed from [place] on 11/11/2024    Thalia Chau RN    [facility] Anticoagulation [facility]

## 2024-11-11 NOTE — LETTER
[facility] ANTICOAGULATION CLINIC  [address] [facility] [address]  Phone: [phone]  Fax: [phone]   November 11, 2024        Adarsh Salvador  [address]   [place] [address]            Dear Adarsh,    You are currently under the care of [facility] for your warfarin (Coumadin , Jantoven ) therapy.  We are contacting you because our records show you were due for an INR on 10/24/24.    There are potentially serious risks when taking warfarin without careful monitoring and we want to make sure you are safely managed.  Routine lab monitoring is required for warfarin refills.     Please call [phone] as soon as possible to schedule a lab appointment. If it is difficult for you to get to lab, please call us to discuss options.  If there has been a change in your care or other concerns, please let us know so we can help and/or update our records.         Sincerely,       [facility] Anticoagulation Clinic

## 2024-11-20 ENCOUNTER — OFFICE VISIT (OUTPATIENT)
Dept: URGENT CARE | Facility: URGENT CARE | Age: 59
End: 2024-11-20
Payer: COMMERCIAL

## 2024-11-20 ENCOUNTER — HOSPITAL ENCOUNTER (EMERGENCY)
Facility: HOSPITAL | Age: 59
Discharge: HOME | End: 2024-11-20
Attending: EMERGENCY MEDICINE | Admitting: EMERGENCY MEDICINE
Payer: COMMERCIAL

## 2024-11-20 ENCOUNTER — ANCILLARY PROCEDURE (OUTPATIENT)
Dept: GENERAL RADIOLOGY | Facility: CLINIC | Age: 59
End: 2024-11-20
Attending: STUDENT IN AN ORGANIZED HEALTH CARE EDUCATION/TRAINING PROGRAM
Payer: COMMERCIAL

## 2024-11-20 VITALS
RESPIRATION RATE: 21 BRPM | DIASTOLIC BLOOD PRESSURE: 82 MMHG | HEIGHT: 72 IN | TEMPERATURE: 98.9 F | HEART RATE: 100 BPM | WEIGHT: 286 LBS | BODY MASS INDEX: 38.74 KG/M2 | SYSTOLIC BLOOD PRESSURE: 157 MMHG | OXYGEN SATURATION: 96 %

## 2024-11-20 VITALS
TEMPERATURE: 98 F | DIASTOLIC BLOOD PRESSURE: 95 MMHG | BODY MASS INDEX: 38.9 KG/M2 | OXYGEN SATURATION: 98 % | RESPIRATION RATE: 22 BRPM | WEIGHT: 286.8 LBS | HEART RATE: 112 BPM | SYSTOLIC BLOOD PRESSURE: 156 MMHG

## 2024-11-20 DIAGNOSIS — R07.9 CHEST PAIN, UNSPECIFIED TYPE: ICD-10-CM
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
## 2024-11-20 DIAGNOSIS — S49.91XA INJURY OF RIGHT SHOULDER, INITIAL ENCOUNTER: ICD-10-CM

## 2024-11-20 DIAGNOSIS — R07.89 CHEST DISCOMFORT: Primary | ICD-10-CM

## 2024-11-20 DIAGNOSIS — R05.1 ACUTE COUGH: ICD-10-CM

## 2024-11-20 LAB
ANION GAP SERPL CALCULATED.3IONS-SCNC: 12 MMOL/L (ref 7–15)
BASOPHILS # BLD AUTO: 0 10E3/UL (ref 0–0.2)
BASOPHILS NFR BLD AUTO: 0 %
BUN SERPL-MCNC: 17.4 MG/DL (ref 6–20)
CALCIUM SERPL-MCNC: 9.7 MG/DL (ref 8.8–10.4)
CHLORIDE SERPL-SCNC: 100 MMOL/L (ref 98–107)
CREAT SERPL-MCNC: 1.05 MG/DL (ref 0.67–1.17)
D DIMER PPP FEU-MCNC: <0.27 UG/ML FEU (ref 0–0.5)
EGFRCR SERPLBLD CKD-EPI 2021: 83 ML/MIN/1.73M2
EOSINOPHIL # BLD AUTO: 0.1 10E3/UL (ref 0–0.7)
EOSINOPHIL NFR BLD AUTO: 2 %
ERYTHROCYTE [DISTWIDTH] IN BLOOD BY AUTOMATED COUNT: 14.2 % (ref 10–15)
GLUCOSE SERPL-MCNC: 407 MG/DL (ref 70–99)
HCO3 SERPL-SCNC: 23 MMOL/L (ref 22–29)
HCT VFR BLD AUTO: 41.5 % (ref 40–53)
HGB BLD-MCNC: 13.8 G/DL (ref 13.3–17.7)
HOLD SPECIMEN: NORMAL
HOLD SPECIMEN: NORMAL
IMM GRANULOCYTES # BLD: 0 10E3/UL
IMM GRANULOCYTES NFR BLD: 1 %
INR PPP: 0.98 (ref 0.85–1.15)
LYMPHOCYTES # BLD AUTO: 1.6 10E3/UL (ref 0.8–5.3)
LYMPHOCYTES NFR BLD AUTO: 22 %
MCH RBC QN AUTO: 28.8 PG (ref 26.5–33)
MCHC RBC AUTO-ENTMCNC: 33.3 G/DL (ref 31.5–36.5)
MCV RBC AUTO: 87 FL (ref 78–100)
MONOCYTES # BLD AUTO: 0.6 10E3/UL (ref 0–1.3)
MONOCYTES NFR BLD AUTO: 8 %
NEUTROPHILS # BLD AUTO: 5.1 10E3/UL (ref 1.6–8.3)
NEUTROPHILS NFR BLD AUTO: 68 %
NRBC # BLD AUTO: 0 10E3/UL
NRBC BLD AUTO-RTO: 0 /100
PLATELET # BLD AUTO: 223 10E3/UL (ref 150–450)
POTASSIUM SERPL-SCNC: 4.8 MMOL/L (ref 3.4–5.3)
RBC # BLD AUTO: 4.79 10E6/UL (ref 4.4–5.9)
SODIUM SERPL-SCNC: 135 MMOL/L (ref 135–145)
TROPONIN T SERPL HS-MCNC: 9 NG/L
WBC # BLD AUTO: 7.5 10E3/UL (ref 4–11)

## 2024-11-20 PROCEDURE — 82565 ASSAY OF CREATININE: CPT | Performed by: STUDENT IN AN ORGANIZED HEALTH CARE EDUCATION/TRAINING PROGRAM

## 2024-11-20 PROCEDURE — 82310 ASSAY OF CALCIUM: CPT | Performed by: STUDENT IN AN ORGANIZED HEALTH CARE EDUCATION/TRAINING PROGRAM

## 2024-11-20 PROCEDURE — 85379 FIBRIN DEGRADATION QUANT: CPT | Performed by: EMERGENCY MEDICINE

## 2024-11-20 PROCEDURE — 80048 BASIC METABOLIC PNL TOTAL CA: CPT | Performed by: STUDENT IN AN ORGANIZED HEALTH CARE EDUCATION/TRAINING PROGRAM

## 2024-11-20 PROCEDURE — 84484 ASSAY OF TROPONIN QUANT: CPT | Performed by: STUDENT IN AN ORGANIZED HEALTH CARE EDUCATION/TRAINING PROGRAM

## 2024-11-20 PROCEDURE — 85610 PROTHROMBIN TIME: CPT | Performed by: EMERGENCY MEDICINE

## 2024-11-20 PROCEDURE — 99285 EMERGENCY DEPT VISIT HI MDM: CPT | Mod: 25 | Performed by: EMERGENCY MEDICINE

## 2024-11-20 PROCEDURE — 93005 ELECTROCARDIOGRAM TRACING: CPT | Performed by: STUDENT IN AN ORGANIZED HEALTH CARE EDUCATION/TRAINING PROGRAM

## 2024-11-20 PROCEDURE — 85025 COMPLETE CBC W/AUTO DIFF WBC: CPT | Performed by: STUDENT IN AN ORGANIZED HEALTH CARE EDUCATION/TRAINING PROGRAM

## 2024-11-20 PROCEDURE — 36415 COLL VENOUS BLD VENIPUNCTURE: CPT | Performed by: EMERGENCY MEDICINE

## 2024-11-20 ASSESSMENT — ACTIVITIES OF DAILY LIVING (ADL)
ADLS_ACUITY_SCORE: 0

## 2024-11-20 ASSESSMENT — COLUMBIA-SUICIDE SEVERITY RATING SCALE - C-SSRS
1. IN THE PAST MONTH, HAVE YOU WISHED YOU WERE DEAD OR WISHED YOU COULD GO TO SLEEP AND NOT WAKE UP?: NO
2. HAVE YOU ACTUALLY HAD ANY THOUGHTS OF KILLING YOURSELF IN THE PAST MONTH?: NO
6. HAVE YOU EVER DONE ANYTHING, STARTED TO DO ANYTHING, OR PREPARED TO DO ANYTHING TO END YOUR LIFE?: NO

## 2024-11-20 ASSESSMENT — PAIN SCALES - GENERAL: PAINLEVEL_OUTOF10: SEVERE PAIN (6)

## 2024-11-20 NOTE — PROGRESS NOTES
Assessment & Plan     Chest discomfort  Patient presents with fall today while getting into the truck and having acute right shoulder and right knee pain.  He has had issues with his right knee since his knee replacement surgery and his surgeon is aware.  He mentioned cough and as we discussed it after he had the shoulder and upper arm x-ray he notes that he has had chest discomfort for the past 2 days that is radiating down his left arm.  I did an EKG which shows negative deflection of QRS in V1-V3 which could point to previous MI. No acute ST changes.  He also reports feeling shortness of breath. I am concerned for possible MI, PE and advised he is seen in ED for further evaluation that we are unable to perform here in urgent care.  His  will bring him to the emergency department.  - EKG 12-lead complete w/read - Clinics  - XR Chest 2 Views    Fall, initial encounter   Injury of right shoulder, initial encounter  No fractures seen on x-ray of shoulder and humerus.  - XR Shoulder Right G/E 3 Views  - XR Humerus Right G/E 2 Views  - Orthopedic  Referral; Future        Acute cough  - XR Chest 2 Views     30 minutes spent by me on the date of the encounter doing chart review, review of test results, interpretation of tests, patient visit, and documentation         No follow-ups on file.    IRENE Mock [facility] URGENT CARE [facility]     Adarsh is a 59 year old male who presents to clinic today for the following health issues:  Chief Complaint   Patient presents with    Urgent Care    Knee Pain     Pt fell right knee gave up awhile getting in the ca, lower back pain. Right knee has been unstable since knee replacement    Asthma    Cough     X's 1 week          11/20/2024     4:10 PM   Additional Questions   Roomed by ca   Accompanied by self     HPI      Review of Systems  Constitutional, HEENT, cardiovascular, pulmonary, GI, , musculoskeletal, neuro, skin,  endocrine and psych systems are negative, except as otherwise noted.      Objective    BP (!) 156/95   Pulse 112   Temp 98  F (36.7  C) (Tympanic)   Resp 22   Wt 130.1 kg (286 lb 12.8 oz)   SpO2 98%   BMI 38.90 kg/m    Physical Exam   GENERAL: alert and no distress  EYES: Eyes grossly normal to inspection, PERRL and conjunctivae and sclerae normal  RESP: lungs clear to auscultation - no rales, rhonchi or wheezes  CV: regular rate and rhythm, normal S1 S2, no S3 or S4, no murmur, click or rub, no peripheral edema  MS: RUE exam shows shoulder range of motion to 90 degrees, unable to abduct beyond 90 degrees without pain, focal tenderness to palpation of mid right humerus, no focal tenderness to palpation of right knee  SKIN: no suspicious lesions or rashes  NEURO: Normal strength and tone, mentation intact and speech normal  PSYCH: mentation appears normal, affect normal/bright    EKG - Reviewed and interpreted by me appears normal, NSR, negative deflection of QRS in V1 through V3, normal axis, normal intervals, no acute ST/T changes c/w ischemia, no LVH by voltage criteria    Results for orders placed or performed in visit on 11/20/24   XR Shoulder Right G/E 3 Views     Status: None    Narrative    EXAM: XR SHOULDER RIGHT G/E 3 VIEWS, XR HUMERUS RIGHT G/E 2 VIEWS  LOCATION: [facility]  DATE: 11/20/2024    INDICATION: Fall, shoulder injury  COMPARISON: None.      Impression    IMPRESSION:     Negative for acute right shoulder or right humerus fracture or dislocation. Mild acromioclavicular degenerative changes. The glenohumeral joint space is not well profiled on these views.       XR Humerus Right G/E 2 Views     Status: None    Narrative    EXAM: XR SHOULDER RIGHT G/E 3 VIEWS, XR HUMERUS RIGHT G/E 2 VIEWS  LOCATION: [facility]  DATE: 11/20/2024    INDICATION: Fall, shoulder injury  COMPARISON: None.      Impression    IMPRESSION:     Negative for acute right shoulder  or right humerus fracture or dislocation. Mild acromioclavicular degenerative changes. The glenohumeral joint space is not well profiled on these views.

## 2024-11-21 ENCOUNTER — PATIENT OUTREACH (OUTPATIENT)
Dept: FAMILY MEDICINE | Facility: CLINIC | Age: 59
End: 2024-11-21

## 2024-11-21 ENCOUNTER — APPOINTMENT (OUTPATIENT)
Dept: CT IMAGING | Facility: CLINIC | Age: 59
End: 2024-11-21
Attending: EMERGENCY MEDICINE
Payer: COMMERCIAL

## 2024-11-21 ENCOUNTER — ANTICOAGULATION THERAPY VISIT (OUTPATIENT)
Dept: ANTICOAGULATION | Facility: CLINIC | Age: 59
End: 2024-11-21
Payer: COMMERCIAL

## 2024-11-21 ENCOUNTER — DOCUMENTATION ONLY (OUTPATIENT)
Dept: ANTICOAGULATION | Facility: CLINIC | Age: 59
End: 2024-11-21
Payer: COMMERCIAL

## 2024-11-21 ENCOUNTER — HOSPITAL ENCOUNTER (EMERGENCY)
Facility: CLINIC | Age: 59
Discharge: HOME | End: 2024-11-21
Attending: EMERGENCY MEDICINE
Payer: COMMERCIAL

## 2024-11-21 ENCOUNTER — PATIENT OUTREACH (OUTPATIENT)
Dept: CARE COORDINATION | Facility: CLINIC | Age: 59
End: 2024-11-21
Payer: COMMERCIAL

## 2024-11-21 VITALS
SYSTOLIC BLOOD PRESSURE: 134 MMHG | DIASTOLIC BLOOD PRESSURE: 85 MMHG | HEART RATE: 89 BPM | TEMPERATURE: 98.3 F | OXYGEN SATURATION: 96 % | WEIGHT: 286 LBS | BODY MASS INDEX: 38.74 KG/M2 | HEIGHT: 72 IN | RESPIRATION RATE: 18 BRPM

## 2024-11-21 DIAGNOSIS — R10.9 RIGHT FLANK PAIN: ICD-10-CM

## 2024-11-21 DIAGNOSIS — I82.409 RECURRENT DEEP VEIN THROMBOSIS (DVT) (H): Primary | ICD-10-CM

## 2024-11-21 DIAGNOSIS — Z79.01 CURRENT USE OF LONG TERM ANTICOAGULATION: ICD-10-CM

## 2024-11-21 LAB
ALBUMIN SERPL BCG-MCNC: 4.1 G/DL (ref 3.5–5.2)
ALBUMIN UR-MCNC: NEGATIVE MG/DL
ALP SERPL-CCNC: 117 U/L (ref 40–150)
ALT SERPL W P-5'-P-CCNC: 17 U/L (ref 0–70)
ANION GAP SERPL CALCULATED.3IONS-SCNC: 13 MMOL/L (ref 7–15)
APPEARANCE UR: CLEAR
AST SERPL W P-5'-P-CCNC: 31 U/L (ref 0–45)
BASOPHILS # BLD AUTO: 0 10E3/UL (ref 0–0.2)
BASOPHILS NFR BLD AUTO: 0 %
BILIRUB SERPL-MCNC: 0.2 MG/DL
BILIRUB UR QL STRIP: NEGATIVE
BUN SERPL-MCNC: 15.9 MG/DL (ref 6–20)
CALCIUM SERPL-MCNC: 8.9 MG/DL (ref 8.8–10.4)
CHLORIDE SERPL-SCNC: 96 MMOL/L (ref 98–107)
COLOR UR AUTO: YELLOW
CREAT SERPL-MCNC: 0.82 MG/DL (ref 0.67–1.17)
EGFRCR SERPLBLD CKD-EPI 2021: >90 ML/MIN/1.73M2
EOSINOPHIL # BLD AUTO: 0.2 10E3/UL (ref 0–0.7)
EOSINOPHIL NFR BLD AUTO: 2 %
ERYTHROCYTE [DISTWIDTH] IN BLOOD BY AUTOMATED COUNT: 13.8 % (ref 10–15)
GLUCOSE SERPL-MCNC: 411 MG/DL (ref 70–99)
GLUCOSE UR STRIP-MCNC: 500 MG/DL
HCO3 SERPL-SCNC: 22 MMOL/L (ref 22–29)
HCT VFR BLD AUTO: 38.1 % (ref 40–53)
HGB BLD-MCNC: 13.2 G/DL (ref 13.3–17.7)
HGB UR QL STRIP: NEGATIVE
IMM GRANULOCYTES # BLD: 0 10E3/UL
IMM GRANULOCYTES NFR BLD: 1 %
KETONES UR STRIP-MCNC: NEGATIVE MG/DL
LEUKOCYTE ESTERASE UR QL STRIP: NEGATIVE
LYMPHOCYTES # BLD AUTO: 2 10E3/UL (ref 0.8–5.3)
LYMPHOCYTES NFR BLD AUTO: 26 %
MCH RBC QN AUTO: 29.3 PG (ref 26.5–33)
MCHC RBC AUTO-ENTMCNC: 34.6 G/DL (ref 31.5–36.5)
MCV RBC AUTO: 85 FL (ref 78–100)
MONOCYTES # BLD AUTO: 0.7 10E3/UL (ref 0–1.3)
MONOCYTES NFR BLD AUTO: 9 %
NEUTROPHILS # BLD AUTO: 5 10E3/UL (ref 1.6–8.3)
NEUTROPHILS NFR BLD AUTO: 62 %
NITRATE UR QL: NEGATIVE
NRBC # BLD AUTO: 0 10E3/UL
NRBC BLD AUTO-RTO: 0 /100
PH UR STRIP: 6 [PH] (ref 5–7)
PLATELET # BLD AUTO: 193 10E3/UL (ref 150–450)
POTASSIUM SERPL-SCNC: 5.2 MMOL/L (ref 3.4–5.3)
PROT SERPL-MCNC: 7.4 G/DL (ref 6.4–8.3)
RBC # BLD AUTO: 4.51 10E6/UL (ref 4.4–5.9)
SODIUM SERPL-SCNC: 131 MMOL/L (ref 135–145)
SP GR UR STRIP: 1.01 (ref 1–1.03)
UROBILINOGEN UR STRIP-MCNC: NORMAL MG/DL
WBC # BLD AUTO: 8 10E3/UL (ref 4–11)

## 2024-11-21 PROCEDURE — 80053 COMPREHEN METABOLIC PANEL: CPT | Performed by: EMERGENCY MEDICINE

## 2024-11-21 PROCEDURE — 250N000013 HC RX MED GY IP 250 OP 250 PS 637: Performed by: EMERGENCY MEDICINE

## 2024-11-21 PROCEDURE — 76705 ECHO EXAM OF ABDOMEN: CPT | Performed by: EMERGENCY MEDICINE

## 2024-11-21 PROCEDURE — 81003 URINALYSIS AUTO W/O SCOPE: CPT | Performed by: EMERGENCY MEDICINE

## 2024-11-21 PROCEDURE — 96375 TX/PRO/DX INJ NEW DRUG ADDON: CPT | Performed by: EMERGENCY MEDICINE

## 2024-11-21 PROCEDURE — 74176 CT ABD & PELVIS W/O CONTRAST: CPT

## 2024-11-21 PROCEDURE — 250N000011 HC RX IP 250 OP 636: Performed by: EMERGENCY MEDICINE

## 2024-11-21 PROCEDURE — 85004 AUTOMATED DIFF WBC COUNT: CPT | Performed by: EMERGENCY MEDICINE

## 2024-11-21 PROCEDURE — 36415 COLL VENOUS BLD VENIPUNCTURE: CPT | Performed by: EMERGENCY MEDICINE

## 2024-11-21 PROCEDURE — 82040 ASSAY OF SERUM ALBUMIN: CPT | Performed by: EMERGENCY MEDICINE

## 2024-11-21 PROCEDURE — 76705 ECHO EXAM OF ABDOMEN: CPT | Mod: 26 | Performed by: EMERGENCY MEDICINE

## 2024-11-21 PROCEDURE — 96374 THER/PROPH/DIAG INJ IV PUSH: CPT | Performed by: EMERGENCY MEDICINE

## 2024-11-21 PROCEDURE — 99284 EMERGENCY DEPT VISIT MOD MDM: CPT | Performed by: EMERGENCY MEDICINE

## 2024-11-21 PROCEDURE — 99285 EMERGENCY DEPT VISIT HI MDM: CPT | Mod: 25 | Performed by: EMERGENCY MEDICINE

## 2024-11-21 PROCEDURE — 85048 AUTOMATED LEUKOCYTE COUNT: CPT | Performed by: EMERGENCY MEDICINE

## 2024-11-21 RX ORDER — ACETAMINOPHEN 500 MG
1000 TABLET ORAL ONCE
Status: COMPLETED | OUTPATIENT
Start: 2024-11-21 | End: 2024-11-21

## 2024-11-21 RX ORDER — ONDANSETRON 2 MG/ML
4 INJECTION INTRAMUSCULAR; INTRAVENOUS ONCE
Status: COMPLETED | OUTPATIENT
Start: 2024-11-21 | End: 2024-11-21

## 2024-11-21 RX ORDER — DIPHENHYDRAMINE HYDROCHLORIDE 50 MG/ML
50 INJECTION INTRAMUSCULAR; INTRAVENOUS ONCE
Status: COMPLETED | OUTPATIENT
Start: 2024-11-21 | End: 2024-11-21

## 2024-11-21 RX ORDER — HYDROMORPHONE HYDROCHLORIDE 1 MG/ML
0.5 INJECTION, SOLUTION INTRAMUSCULAR; INTRAVENOUS; SUBCUTANEOUS
Status: DISCONTINUED | OUTPATIENT
Start: 2024-11-21 | End: 2024-11-22 | Stop reason: HOSPADM

## 2024-11-21 RX ADMIN — ONDANSETRON 4 MG: 2 INJECTION INTRAMUSCULAR; INTRAVENOUS at 21:19

## 2024-11-21 RX ADMIN — ACETAMINOPHEN 1000 MG: 500 TABLET ORAL at 21:20

## 2024-11-21 RX ADMIN — HYDROMORPHONE HYDROCHLORIDE 0.5 MG: 1 INJECTION, SOLUTION INTRAMUSCULAR; INTRAVENOUS; SUBCUTANEOUS at 21:20

## 2024-11-21 RX ADMIN — DIPHENHYDRAMINE HYDROCHLORIDE 50 MG: 50 INJECTION INTRAMUSCULAR; INTRAVENOUS at 21:19

## 2024-11-21 ASSESSMENT — COLUMBIA-SUICIDE SEVERITY RATING SCALE - C-SSRS
2. HAVE YOU ACTUALLY HAD ANY THOUGHTS OF KILLING YOURSELF IN THE PAST MONTH?: NO
6. HAVE YOU EVER DONE ANYTHING, STARTED TO DO ANYTHING, OR PREPARED TO DO ANYTHING TO END YOUR LIFE?: NO
1. IN THE PAST MONTH, HAVE YOU WISHED YOU WERE DEAD OR WISHED YOU COULD GO TO SLEEP AND NOT WAKE UP?: NO

## 2024-11-21 ASSESSMENT — ACTIVITIES OF DAILY LIVING (ADL)
ADLS_ACUITY_SCORE: 0
ADLS_ACUITY_SCORE: 0

## 2024-11-21 NOTE — PATIENT INSTRUCTIONS
Adarsh was seen by me in UC for chest discomfort for the past 2 days. EKG shows negative deflection of QRS in V1-V3 which could point to previous MI. No acute changes. He also reports that he is having left arm discomfort and shortness of breath. I am concerned for possible MI, PE and advised he is seen in ED for further evaluation.     Tammy Mahajan, CNP

## 2024-11-21 NOTE — ED PROVIDER NOTES
EMERGENCY DEPARTMENT ENCOUNTER      NAME: Adarsh Salvador  AGE: 59 year old male  YOB: 1965  MRN: [id number]  EVALUATION DATE & TIME: 2024  7:37 PM    PCP: Kehr, Kristen M    ED PROVIDER: Carlos A Parra M.D.      Chief Complaint   Patient presents with    Chest Pain         FINAL IMPRESSION:  1. Chest pain, unspecified type          ED COURSE & MEDICAL DECISION MAKIN:58 PM Introduced myself to the patient, obtained history of present illness, and performed initial physical exam at this time.      59 year old male presents to the Emergency Department for evaluation of chest pain.  Patient referred from urgent care for evaluation of chest pain and abnormal EKG.  Patient arrives to the emergency department moderately hypertensive.  He does have a history of hypertension, diabetes, and pulmonary embolism on Coumadin.  He admits to inconsistent medication use including multiple missed doses of Coumadin over the last few days and difficulty with affording his insulin with subsequent hyperglycemia.  He also forgot to take his antihypertensives today.  He has an EKG with an LVH pattern and nonspecific ST-T wave abnormality.  There is nothing diagnostic of acute ischemia.  High-sensitivity troponin is within normal limits.  D-dimer is also within normal limits reassuring against pulmonary embolism even though his INR is completely normal today suggesting very poor adherence to his Coumadin.  Patient's chest x-ray is negative for infiltrate pneumothorax or widened mediastinum.  Discussed the results of the workup with the patient.  I strongly suggested that we keep him here for at least a delta troponin draw.  Patient was concerned about the hour of the day today and the need to get home with his partner before he gets later.  Discussed that without a repeat troponin and his evaluation for chest pain is not really complete and that by leaving prematurely there would be an increased risk of us missing  something like acute coronary syndrome.  That said the patient's initial evaluation has been stable here and he is insistent on leaving.  I am going to place a cardiology referral for follow-up.  Strongly encouraged the patient to adhere to medications including anticoagulation and antihypertensives which she does have access to at home.  Patient was discharged in overall stable condition.    At the conclusion of the encounter I discussed the results of all of the tests and the disposition. The questions were answered. The patient or family acknowledged understanding and was agreeable with the care plan.       Medical Decision Making  Obtained supplemental history:Supplemental history obtained?: Documented in chart and Family Member/Significant Other  Reviewed external records: External records reviewed?: Documented in chart, Outpatient Record: [facility], and [facility] Chest Xray  Care impacted by chronic illness:Chronic Kidney Disease, Chronic Pain, Diabetes, Hyperlipidemia, Hypertension, Mental Health, Smoking / Nicotine Use, and Other: DVT, asthma, GERD, chronic maxillary sinusitis, anemia  Did you consider but not order tests?: Work up considered but not performed and documented in chart, if applicable  Did you interpret images independently?: Independent interpretation of ECG and images noted in documentation, when applicable.  Consultation discussion with other provider:Did you involve another provider (consultant, MH, pharmacy, etc.)?: No  Discharge. I recommended the patient continue their current prescription strength medication(s): Coumadin. N/A.    MIPS: Not Applicable          MEDICATIONS GIVEN IN THE EMERGENCY:  Medications - No data to display    NEW PRESCRIPTIONS STARTED AT TODAY'S ER VISIT  New Prescriptions    No medications on file          =================================================================    HPI    Patient information was obtained from: the patient,  spouse    Use of : N/A         Adarsh Salvador is a 59 year old male with a pertinent history of pulmonary embolism, CONNOR, DVT, HTN, HLD, asthma, nicotine dependence, CKD who presents to this ED for evaluation of chest pain, shortness of breath.    Per chart review, patient was seen in [facility] today for chest discomfort. He fell today while getting into his truck and has acute right shoulder and right knee pain. Issues with knee since knee replacement surgery. Chest discomfort for the past two days radiating down the left arm. EKG showed negative deflection of QRS in V1-V3, possible previous MI with no acute ST changes. Endorses shortness of breath. Recommended ED visit.    Patient was seen at urgent care today for knee pain, his knee gave out on him while he was walking to accompany his partner for a medical visit. At the clinic, he began feeling chest heaviness and shooting, left-sided, chest pain into the left arm down into the wrist with nausea. He endorses congestion and a cough. He has chronic leg swelling due to neuropathy. He is on Warfarin due to a history of PE.    REVIEW OF SYSTEMS   All systems reviewed and negative except as noted in HPI.    PAST MEDICAL HISTORY:  Past Medical History:   Diagnosis Date    Anaphylactic reaction 08/14/2015    Anxiety     Depression     DM2 (diabetes mellitus, type 2) (H)     GERD (gastroesophageal reflux disease)     HTN (hypertension)     IBS (irritable bowel syndrome)     Kidney stone 06/15/2011    Pt believes these were Calcium stones    Neuropathy     Pulmonary embolism, other, unspecified chronicity, unspecified whether acute cor pulmonale present (H) 08/15/2023       PAST SURGICAL HISTORY:  Past Surgical History:   Procedure Laterality Date    ARTHROPLASTY KNEE Right 9/26/2023    Procedure: ARTHROPLASTY, KNEE, TOTAL Right;  Surgeon: Edwin Ch MD;  Location: [facility]    ARTHROSCOPY KNEE WITH MEDIAL MENISCECTOMY Right 8/19/2022    Procedure:  examination under anesthesia, right knee arthroscopy, meniscectomy;  Surgeon: Carlos A Em MD;  Location: [facility] OR    COLONOSCOPY  5/1/2012    Procedure:COLONOSCOPY; screening colonoscopy; Surgeon:KINGSLEY DOS SANTOS; Location:[facility]    COLONOSCOPY  4/21/2014    Procedure: COMBINED COLONOSCOPY, SINGLE BIOPSY/POLYPECTOMY BY BIOPSY;  Surgeon: Duane, William Charles, MD;  Location: [facility]    COLONOSCOPY N/A 4/21/2022    Procedure: COLONOSCOPY, WITH POLYPECTOMY AND BIOPSY;  Surgeon: Luiz Calvert MD;  Location: [facility]    CYSTOSCOPY  05/01/2008    CYSTOSCOPY W/ URETERAL STENT PLACEMENT 05/01/2008     CYSTOSCOPY  09/26/2010    CYSTOSCOPY W/ URETERAL STENT PLACEMENT 09/26/2010 Left     CYSTOSCOPY  05/18/2012    CYSTOSCOPY, LEFT URETEROSCOPY AND STENT PLACEMENT left retrograde 05/18/2012     CYSTOSCOPY,+URETEROSCOPY  06/10/2008    URETEROSCOPY 06/10/2008 Right     HC BREATH HYDROGEN TEST  3/7/2014    Procedure: HYDROGEN BREATH TEST;  Surgeon: Darion Swift MD;  Location: [facility]    INJECT EPIDURAL LUMBAR N/A 7/7/2022    Procedure: INJECTION, SPINE, LUMBAR, EPIDURAL L5/S1;  Surgeon: Michi Hahn MD;  Location: [facility]    LITHOTRIPSY  09/30/2010    LITHOTRIPSY 09/30/2010 LEFT EXTRACORPOREAL SHOCK WAVE LITHOTRIPSY, FLEXIBLE CYSTOSCOPY, LEFT STENT REMOVAL      ORTHOPEDIC SURGERY  10/03/2007    KNEE ARTHROSCOPY 10/03/2007 RightX2      RELEASE CARPAL TUNNEL Right 1/26/2018    Procedure: RELEASE CARPAL TUNNEL;  Right carpal tunnel release;  Surgeon: Wiliam Saenz MD;  Location: [facility]    VASCULAR SURGERY  11/24/2008    Radiofrequency ablation left saphenous vein 11/24/2008 Done by Dr. Lozoya             CURRENT MEDICATIONS:    No current facility-administered medications for this encounter.     Current Outpatient Medications   Medication Sig Dispense Refill    acetaminophen (TYLENOL) 325 MG tablet Take 2 tablets (650 mg) by mouth every 4 hours as needed for other (mild pain) 100 tablet 0     albuterol (PROAIR HFA/PROVENTIL HFA/VENTOLIN HFA) 108 (90 Base) MCG/ACT inhaler Inhale 2 puffs into the lungs every 6 hours as needed for shortness of breath or wheezing 6.7 g 3    amLODIPine (NORVASC) 10 MG tablet Take 1 tablet (10 mg) by mouth daily for blood pressure 90 tablet 1    atorvastatin (LIPITOR) 40 MG tablet Take 1 tablet (40 mg) by mouth every evening 90 tablet 1    blood glucose (ACCU-CHEK GUIDE) test strip Use to test blood sugar 3 times daily or as directed. 300 strip 3    blood glucose monitoring (ACCU-CHEK FASTCLIX) lancets Use to test blood sugar 1 times daily or as directed.  Ok to substitute alternative if insurance prefers. 102 each 11    calcium carbonate (TUMS) 500 MG chewable tablet Take 1 chew tab by mouth daily      chlorthalidone (HYGROTON) 25 MG tablet Take 1 tablet (25 mg) by mouth daily Add on for blood pressure 90 tablet 1    Continuous Glucose Sensor (FREESTYLE FROYLAN 3 SENSOR) [facility] 1 each every 14 days. 2 each 5    DULoxetine (CYMBALTA) 60 MG capsule Take 1 capsule (60 mg) by mouth 2 times daily 180 capsule 3    empagliflozin (JARDIANCE) 10 MG TABS tablet Take 1 tablet (10 mg) by mouth daily 90 tablet 1    EPINEPHrine (ANY BX GENERIC EQUIV) 0.3 MG/0.3ML injection 2-pack Inject 0.3 mLs (0.3 mg) into the muscle once as needed for anaphylaxis 2 each 2    fluticasone-salmeterol (ADVAIR) 500-50 MCG/ACT inhaler Inhale 1 puff into the lungs every 12 hours 1 each 11    insulin aspart (NOVOLOG FLEXPEN) 100 UNIT/ML pen Inject 22 Units Subcutaneous 3 times daily (with meals) 22 units before meals plus correction scale. Max daily dose 90 units 90 mL 1    insulin aspart (NOVOLOG VIAL) 100 UNITS/ML vial Inject 18 Units Subcutaneous 3 times daily (with meals) 10 mL 3    insulin glargine U-300 (TOUJEO SOLOSTAR) 300 UNIT/ML (1 units dial) pen Inject 64 Units subcutaneously at bedtime. 30 mL 1    insulin pen needle (32G X 4 MM) 32G X 4 MM miscellaneous Use 3  pen needles daily or as directed. 200 each  "1    insulin syringe-needle U-100 (29G X 1/2\" 0.5 ML) 29G X 1/2\" 0.5 ML miscellaneous Use 3 syringes daily or as directed. 200 each 1    insulin syringe-needle U-100 (30G X 1/2\" 0.3 ML) 30G X 1/2\" 0.3 ML miscellaneous Use 3 syringes daily or as directed. 100 each 3    losartan (COZAAR) 25 MG tablet Take 1 tablet (25 mg) by mouth daily 90 tablet 1    meclizine (ANTIVERT) 25 MG tablet Take 1 tablet (25 mg) by mouth 3 times daily as needed for dizziness 90 tablet 1    metFORMIN (GLUCOPHAGE XR) 500 MG 24 hr tablet Take 2 tablets (1,000 mg) by mouth 2 times daily (with meals) 360 tablet 2    montelukast (SINGULAIR) 10 MG tablet Take 1 tablet (10 mg) by mouth at bedtime For allergies/asthma 90 tablet 2    multivitamin, therapeutic (THERA-VIT) TABS Take 1 tablet by mouth daily      nortriptyline (PAMELOR) 10 MG capsule Take 2 capsules (20 mg) by mouth at bedtime 60 capsule 11    omeprazole (PRILOSEC) 40 MG DR capsule Take 1 capsule (40 mg) by mouth daily Take for at least two months 90 capsule 2    permethrin (ELIMITE) 5 % external cream Apply cream from head to toe (except the face); leave on for 8-14 hours then wash off with water; reapply in 1 week if live mites appear. 60 g 1    pregabalin (LYRICA) 100 MG capsule Take 1 capsule (100 mg) by mouth 2 times daily. 60 capsule 11    rizatriptan (MAXALT-MLT) 10 MG ODT TAKE 1 TABLET BY MOUTH AT ONSET OF HEADACHE FOR MIGRAINE MAY REPEAT IN 2 HOURS. MAX 3 TABS/24 HOURS. Limit to 9 tablets a month. 9 tablet 11    triamcinolone (KENALOG) 0.1 % external cream Apply topically 2 times daily. 45 g 0    warfarin ANTICOAGULANT (COUMADIN) 5 MG tablet Take 20 mg Mon, Thurs and 15 mg all other days, or as directed by the Coumadin clinic 280 tablet 1     Facility-Administered Medications Ordered in Other Encounters   Medication Dose Route Frequency Provider Last Rate Last Admin    BUPivacaine (MARCAINE) injection 0.5%  10 mL Intradermal Once Vinnie Espinoza, DO        DOBUTamine 500 mg in " dextrose 5% 250 mL (adult std conc) premix  2.5-20 mcg/kg/min Intravenous Continuous Navarro Butcher MD   Stopped at 04/25/19 1559    iopamidol (ISOVUE-M 200) solution 10 mL  10 mL Intravenous Once Vinnie Espinoza,         methylPREDNISolone (DEPO-MEDROL) injection 80 mg  80 mg Intramuscular Once Vinnie Espinoza, DO        metoprolol (LOPRESSOR) injection 5 mg  5 mg Intravenous Q5 Min PRN Navarro Butcher MD   2 mg at 04/25/19 1559         ALLERGIES:  Allergies   Allergen Reactions    Artificial Sweetner (Informational Only) [Artificial Sweetner (Informational Only) ] GI Disturbance     ALL artificial sweetners cause severe diarrhea & flu symptoms    Hydromorphone Anaphylaxis    Ibuprofen GI Disturbance    Morphine Sulfate Concentrate Anaphylaxis    Morphine [Fumaric Acid] Anaphylaxis    Hydrocodone-Acetaminophen Itching    Tramadol Hives    Trazodone Hives    Gabapentin GI Disturbance     GI upset per pt    Keflex [Cephalexin] Diarrhea     Upset stomach    Codeine Phosphate Itching    Ketorolac Tromethamine Rash       FAMILY HISTORY:  Family History   Problem Relation Age of Onset    Cancer Mother 52        lung, smoked    Cancer - colorectal Father 53        unsure type, pt attributes to radiation exposure    Myocardial Infarction Father 45    Coronary Artery Disease Father     Myocardial Infarction Paternal Grandfather 35    Diabetes Other         nephew- type 1    Diabetes Maternal Aunt         1    Diabetes Maternal Aunt         2    Diabetes Paternal Uncle         1    Diabetes Paternal Uncle         2    Diabetes Paternal Uncle         3    Diabetes Paternal Uncle         4    Colon Cancer No family hx of        SOCIAL HISTORY:   Social History     Socioeconomic History    Marital status: Single    Number of children: 0   Occupational History    Occupation: - with [email or web address]     Employer: [employer]   Tobacco Use    Smoking status: Never    Smokeless tobacco: Current     Types:  Chew    Tobacco comments:     Chew   Vaping Use    Vaping status: Never Used   Substance and Sexual Activity    Alcohol use: Not Currently     Alcohol/week: 0.0 standard drinks of alcohol     Comment: occasional, few drinks a month    Drug use: Yes     Comment: CBD occasional    Sexual activity: Never   Social History Narrative    Works at [employer], as a  (25+ years experience).       Social Drivers of Health      [place] [facility] [employer]    [facility]       VITALS:  BP (!) 168/93   Pulse 98   Temp 98.9  F (37.2  C) (Oral)   Resp 20   Ht 1.829 m (6')   Wt 129.7 kg (286 lb)   SpO2 95%   BMI 38.79 kg/m      PHYSICAL EXAM    Constitutional: Well developed, Well nourished, NAD.  HENT: Normocephalic, Atraumatic. Neck Supple.  Eyes: EOMI, Conjunctiva normal.  Respiratory: Breathing comfortably on room air. Speaks full sentences easily. Lungs clear to ascultation.  Cardiovascular: Normal heart rate, Regular rhythm. No peripheral edema.  Abdomen: Soft, nontender  Musculoskeletal: Good range of motion in all major joints. No major deformities noted.  Integument: Warm, Dry.  Neurologic: Alert & awake, Normal motor function, Normal sensory function, No focal deficits noted.   Psychiatric: Cooperative. Affect appropriate.     LAB:  All pertinent labs reviewed and interpreted.  Labs Ordered and Resulted from Time of ED Arrival to Time of ED Departure   BASIC METABOLIC PANEL - Abnormal       Result Value    Sodium 135      Potassium 4.8      Chloride 100      Carbon Dioxide (CO2) 23      Anion Gap 12      Urea Nitrogen 17.4      Creatinine 1.05      GFR Estimate 83      Calcium 9.7      Glucose 407 (*)    TROPONIN T, HIGH SENSITIVITY - Normal    Troponin T, High Sensitivity 9     D DIMER QUANTITATIVE - Normal    D-Dimer Quantitative <0.27     INR - Normal    INR 0.98     CBC WITH PLATELETS AND DIFFERENTIAL    WBC Count  7.5      RBC Count 4.79      Hemoglobin 13.8      Hematocrit 41.5      MCV 87      MCH 28.8      MCHC 33.3      RDW 14.2      Platelet Count 223      % Neutrophils 68      % Lymphocytes 22      % Monocytes 8      % Eosinophils 2      % Basophils 0      % Immature Granulocytes 1      NRBCs per 100 WBC 0      Absolute Neutrophils 5.1      Absolute Lymphocytes 1.6      Absolute Monocytes 0.6      Absolute Eosinophils 0.1      Absolute Basophils 0.0      Absolute Immature Granulocytes 0.0      Absolute NRBCs 0.0         RADIOLOGY:  Reviewed all pertinent imaging. Please see official radiology report.  Chest XR,  PA & LAT   Final Result   IMPRESSION:       No focal airspace disease. No pleural effusion or pneumothorax.      The cardiomediastinal silhouette is unremarkable.          EKG:    Performed at: 19:38    Impression: Sinus rhythm, LVH with repolarization disturbance    Rate: 95  Rhythm: Normal sinus rhythm  Axis: 6  NH Interval: 178  QRS Interval: 76  QTc Interval: 439  ST Changes: Nonspecific ST-T wave abnormality likely associated with LVH  Comparison: NA    I have independently reviewed and interpreted the EKG(s) documented above.    I, Uday Mayers, am serving as a scribe to document services personally performed by Dr. Carlos A Parra, based on my observation and the provider's statements to me. I, Carlos A Parra MD attest that Uday Mayers is acting in a scribe capacity, has observed my performance of the services and has documented them in accordance with my direction.    Carlos A Parra M.D.  Emergency Medicine  [facility] EMERGENCY DEPARTMENT  [address] [place] [id number] [phone]  Dept: [phone]       Carlos A Parra MD  11/20/24 4406

## 2024-11-21 NOTE — ED TRIAGE NOTES
"Pt arrives to triage escorted by wheelchair w/ fiance endorsing as of tonight chest pain in left upper chest that radiates down the left arm. \"Center feels like heart burn but not severe\". \"Feels like someone standing on my chest., worse w/ laying flat\", has had lightheadedness today, none currently. SOB as well.    Urgent care in [place] referred to ED for MI workup as concerns in EKG present.             "

## 2024-11-21 NOTE — ED NOTES
Resting in room, expressed being very anxious because was told by urgent care that he had a heart attack   Fluconazole Counseling:  Patient counseled regarding adverse effects of fluconazole including but not limited to headache, diarrhea, nausea, upset stomach, liver function test abnormalities, taste disturbance, and stomach pain.  There is a rare possibility of liver failure that can occur when taking fluconazole.  The patient understands that monitoring of LFTs and kidney function test may be required, especially at baseline. The patient verbalized understanding of the proper use and possible adverse effects of fluconazole.  All of the patient's questions and concerns were addressed.

## 2024-11-21 NOTE — TELEPHONE ENCOUNTER
Attempted to reach pt to complete hospital follow-up call. There was no answer. Left message to return call to a nurse at [phone].    If pt returns call please complete the following:    Please complete Post Discharge Assessment questionnaire found in Screenings tab.      2.  After completing Post Discharge Assessment questionnaire, please enter the following dot phrase and to complete note (.rnhospedoutreach):       Daisy Ely RN

## 2024-11-21 NOTE — DISCHARGE INSTRUCTIONS
You were seen in the emergency department for evaluation of chest pain.  Your evaluation here included an EKG.  This EKG was not completely normal but we think that the abnormalities seen represent a condition called left ventricular hypertrophy rather than representing a heart attack.  Your blood tests also showed no signs of heart injury.  We had suggested keeping you here for another blood test which you declined.  We would like you to follow-up with a cardiology clinic after this emergency department visit.  We are placing a referral to the rapid access clinic nearby to follow-up any ongoing chest pain concerns.  We would also recommend updating your primary doctor after this emergency department visit.  Your blood pressure and blood sugar were both elevated here.  Please make sure that you are taking your previously prescribed medications to treat these conditions.  Your INR was also normal and we would recommend restarting your Coumadin as soon as you can.

## 2024-11-21 NOTE — PROGRESS NOTES
ANTICOAGULATION CLINIC REFERRAL RENEWAL REQUEST       An annual renewal order is required for all patients referred to [facility].?  Please review and sign the pended referral order for Adarsh Salvador.       ANTICOAGULATION SUMMARY      Warfarin indication(s)   DVT    Mechanical heart valve present?  NO       Current goal range   INR: 2.0-3.0     Goal appropriate for indication? Goal INR 2-3, standard for indication(s) above     Time in Therapeutic Range (TTR)  (Goal > 60%) 29%       Office visit with referring provider's group within last year yes on 8/6/24         [facility]

## 2024-11-21 NOTE — PROGRESS NOTES
ANTICOAGULATION  MANAGEMENT: Discharge Review    Adarsh Salvador chart reviewed for anticoagulation continuity of care    Emergency room visit on 11/20/24 for Chest pain. Your evaluation here included an EKG. This EKG was not completely normal but we think that the abnormalities seen represent a condition called left ventricular hypertrophy rather than representing a heart attack.     We would like you to follow-up with a cardiology clinic after  this emergency department visit. We are placing a referral to the rapid  access clinic nearby to follow-up any ongoing chest pain concerns. We  would also recommend updating your primary doctor after this  emergency department visit. Your blood pressure and blood sugar were  both elevated here. Please make sure that you are taking your  previously prescribed medications to treat these conditions. Your INR  was also normal and we would recommend restarting your Coumadin  as soon as you can.    He admits to inconsistent medication use including multiple missed doses of Coumadin over the last few days.    Discharge disposition: Home    Results:    Recent labs: (last 7 days)     11/20/24 2034   INR 0.98     Anticoagulation inpatient management:     not applicable     Anticoagulation discharge instructions:     Warfarin dosing: Take 20 mg Mon, Thurs and 15 mg all other days, or as directed by the Coumadin Clinic is what was on the ED discharge paperwork. Current [facility] dose is 17.5 mg every Mon, Fri; 15 mg all other days.   Bridging: No   INR goal change: No      Medication changes affecting anticoagulation: No    Additional factors affecting anticoagulation: Yes: Inconsistency with taking Coumadin.     PLAN     Recommend to adjust dose to 20 mg today and to recheck the INR on 11/25/24.    Left a detailed message for [facility]    Anticoagulation Calendar updated    Petey HURTADO RN  11/21/2024  Anticoagulation Clinic  [facility] for routing messages: ermias MARTÍNEZ  [facility] patient phone line:  [phone]

## 2024-11-22 NOTE — TELEPHONE ENCOUNTER
2nd attempt to reach patient.    Attempted to reach pt to complete hospital follow-up call. There was no answer. Left message to return call to a nurse at [phone].    If pt returns call please complete the following:    Please complete Post Discharge Assessment questionnaire found in Screenings tab.      2.  After completing Post Discharge Assessment questionnaire, please enter the following dot phrase and to complete note (.rnhospedoutreach):        Patricia CROFTN, RN

## 2024-11-22 NOTE — ED TRIAGE NOTES
Patient reports right flank pain that radiates around to the front and into the groin. Patient reports long hx of kidney stones and reports this feels similar.     Triage Assessment (Adult)       Row Name 11/21/24 3337          Triage Assessment    Airway WDL WDL        Respiratory WDL    Respiratory WDL WDL        Cardiac WDL    Cardiac WDL WDL        Peripheral/Neurovascular WDL    Peripheral Neurovascular WDL WDL        Cognitive/Neuro/Behavioral WDL    Cognitive/Neuro/Behavioral WDL WDL

## 2024-11-22 NOTE — DISCHARGE INSTRUCTIONS
The test so far have looked reassuring.  Drink plenty of fluids.  Return for fevers, repeated vomiting, difficulty breathing, or other concerns.  Otherwise follow-up in clinic.

## 2024-11-22 NOTE — ED PROVIDER NOTES
History     Chief Complaint   Patient presents with    Flank Pain     HPI  Adarsh Salvador is a 59 year old male who presents for right flank pain.  Symptoms started several hours prior to arrival.  Severe pain in the right flank radiating around to the right lower abdomen.  Nausea but no vomiting.  He says this feels just like prior kidney stones.  He has not taken thing for symptoms.  No fevers.  He reports hematuria but no dysuria or urinary frequency.    Allergies:  Allergies   Allergen Reactions    Artificial Sweetner (Informational Only) [Artificial Sweetner (Informational Only) ] GI Disturbance     ALL artificial sweetners cause severe diarrhea & flu symptoms    Hydromorphone Anaphylaxis    Ibuprofen GI Disturbance    Morphine Sulfate Concentrate Anaphylaxis    Morphine [Fumaric Acid] Anaphylaxis    Hydrocodone-Acetaminophen Itching    Tramadol Hives    Trazodone Hives    Gabapentin GI Disturbance     GI upset per pt    Keflex [Cephalexin] Diarrhea     Upset stomach    Codeine Phosphate Itching    Ketorolac Tromethamine Rash       Problem List:    Patient Active Problem List    Diagnosis Date Noted    Current use of long term anticoagulation 11/21/2024     Priority: Medium    Chronic kidney disease, stage 2 (mild) 09/26/2024     Priority: Medium    Severe persistent asthma without complication (H) 04/23/2024     Priority: Medium    Status post total right knee replacement 10/19/2023     Priority: Medium    S/P TKR (total knee replacement), right 09/26/2023     Priority: Medium    Acute pain of right knee 08/26/2022     Priority: Medium    Aftercare following surgery of the musculoskeletal system 08/26/2022     Priority: Medium    Chronic pain of right knee 08/08/2022     Priority: Medium     Added automatically from request for surgery [id number]      Bilateral leg pain 06/09/2022     Priority: Medium     Added automatically from request for surgery [id number]      Low volume of ejaculated semen 06/07/2022      Priority: Medium    DVT (deep venous thrombosis) (H) 01/15/2022     Priority: Medium    Recurrent deep vein thrombosis (DVT) (H) 09/27/2021     Priority: Medium    Precordial pain 02/15/2021     Priority: Medium    Dyslipidemia 02/15/2021     Priority: Medium    Elevated C-reactive protein 02/15/2021     Priority: Medium    Gastric reflux 02/15/2021     Priority: Medium    Internal derangement of knee 02/15/2021     Priority: Medium    Nicotine dependence 02/15/2021     Priority: Medium    Varicose veins of lower extremity 02/15/2021     Priority: Medium    Vitamin D deficiency 02/15/2021     Priority: Medium    Neuropathy 06/23/2020     Priority: Medium    Morbid obesity (H) 03/26/2020     Priority: Medium    Insomnia, unspecified type 05/09/2019     Priority: Medium    CONNOR (generalized anxiety disorder) 05/08/2019     Priority: Medium    Hyperlipidemia LDL goal <100 08/17/2018     Priority: Medium    Type 2 diabetes mellitus with diabetic polyneuropathy, with long-term current use of insulin (H) 01/18/2018     Priority: Medium    Mild persistent asthma without complication 01/12/2018     Priority: Medium    Right inguinal hernia 06/14/2016     Priority: Medium    Irritable bowel syndrome with diarrhea 03/21/2016     Priority: Medium    Fatty liver 02/22/2016     Priority: Medium    Hypertension, unspecified type 12/15/2015     Priority: Medium    Anemia in other chronic diseases classified elsewhere 12/15/2015     Priority: Medium    Hypertriglyceridemia 07/12/2013     Priority: Medium    Chronic maxillary sinusitis 09/18/2012     Priority: Medium     Believes this is secondary to exposure to toxic fumes at work- he is a .  He regularly wears a mask ventilator and believes this helps.  Has only tried intermittent nasal washes, and OTC medications.  Not ever tried steroid nasal sprays or other controller medications.        Chronic rhinitis 09/18/2012     Priority: Medium    Constipation 04/24/2012      Priority: Medium    GERD (gastroesophageal reflux disease) 06/15/2011     Priority: Medium    Chronic RLQ pain 06/15/2011     Priority: Medium        Past Medical History:    Past Medical History:   Diagnosis Date    Anaphylactic reaction 08/14/2015    Anxiety     Depression     DM2 (diabetes mellitus, type 2) (H)     GERD (gastroesophageal reflux disease)     HTN (hypertension)     IBS (irritable bowel syndrome)     Kidney stone 06/15/2011    Neuropathy     Pulmonary embolism, other, unspecified chronicity, unspecified whether acute cor pulmonale present (H) 08/15/2023       Past Surgical History:    Past Surgical History:   Procedure Laterality Date    ARTHROPLASTY KNEE Right 9/26/2023    Procedure: ARTHROPLASTY, KNEE, TOTAL Right;  Surgeon: Edwin Ch MD;  Location: [facility]    ARTHROSCOPY KNEE WITH MEDIAL MENISCECTOMY Right 8/19/2022    Procedure: examination under anesthesia, right knee arthroscopy, meniscectomy;  Surgeon: Carlos A Em MD;  Location: [facility] OR    COLONOSCOPY  5/1/2012    Procedure:COLONOSCOPY; screening colonoscopy; Surgeon:KINGSLEY DOS SANTOS; Location:[facility]    COLONOSCOPY  4/21/2014    Procedure: COMBINED COLONOSCOPY, SINGLE BIOPSY/POLYPECTOMY BY BIOPSY;  Surgeon: Duane, William Charles, MD;  Location: [facility]    COLONOSCOPY N/A 4/21/2022    Procedure: COLONOSCOPY, WITH POLYPECTOMY AND BIOPSY;  Surgeon: Luiz Calvert MD;  Location: [facility]    CYSTOSCOPY  05/01/2008    CYSTOSCOPY W/ URETERAL STENT PLACEMENT 05/01/2008     CYSTOSCOPY  09/26/2010    CYSTOSCOPY W/ URETERAL STENT PLACEMENT 09/26/2010 Left     CYSTOSCOPY  05/18/2012    CYSTOSCOPY, LEFT URETEROSCOPY AND STENT PLACEMENT left retrograde 05/18/2012     CYSTOSCOPY,+URETEROSCOPY  06/10/2008    URETEROSCOPY 06/10/2008 Right     HC BREATH HYDROGEN TEST  3/7/2014    Procedure: HYDROGEN BREATH TEST;  Surgeon: Darion Swift MD;  Location: [facility]    INJECT EPIDURAL LUMBAR N/A 7/7/2022    Procedure: INJECTION, SPINE,  LUMBAR, EPIDURAL L5/S1;  Surgeon: Michi Hahn MD;  Location: [facility]    LITHOTRIPSY  09/30/2010    LITHOTRIPSY 09/30/2010 LEFT EXTRACORPOREAL SHOCK WAVE LITHOTRIPSY, FLEXIBLE CYSTOSCOPY, LEFT STENT REMOVAL      ORTHOPEDIC SURGERY  10/03/2007    KNEE ARTHROSCOPY 10/03/2007 RightX2      RELEASE CARPAL TUNNEL Right 1/26/2018    Procedure: RELEASE CARPAL TUNNEL;  Right carpal tunnel release;  Surgeon: Wiliam Saenz MD;  Location: [facility]    VASCULAR SURGERY  11/24/2008    Radiofrequency ablation left saphenous vein 11/24/2008 Done by Dr. Lozoya         Family History:    Family History   Problem Relation Age of Onset    Cancer Mother 52        lung, smoked    Cancer - colorectal Father 53        unsure type, pt attributes to radiation exposure    Myocardial Infarction Father 45    Coronary Artery Disease Father     Myocardial Infarction Paternal Grandfather 35    Diabetes Other         nephew- type 1    Diabetes Maternal Aunt         1    Diabetes Maternal Aunt         2    Diabetes Paternal Uncle         1    Diabetes Paternal Uncle         2    Diabetes Paternal Uncle         3    Diabetes Paternal Uncle         4    Colon Cancer No family hx of        Social History:  Marital Status:  Single [1]  Social History     Tobacco Use    Smoking status: Never    Smokeless tobacco: Current     Types: Chew    Tobacco comments:     Chew   Vaping Use    Vaping status: Never Used   Substance Use Topics    Alcohol use: Not Currently     Alcohol/week: 0.0 standard drinks of alcohol     Comment: occasional, few drinks a month    Drug use: Yes     Comment: CBD occasional        Medications:    acetaminophen (TYLENOL) 325 MG tablet  albuterol (PROAIR HFA/PROVENTIL HFA/VENTOLIN HFA) 108 (90 Base) MCG/ACT inhaler  amLODIPine (NORVASC) 10 MG tablet  atorvastatin (LIPITOR) 40 MG tablet  blood glucose (ACCU-CHEK GUIDE) test strip  blood glucose monitoring (ACCU-CHEK FASTCLIX) lancets  calcium carbonate (TUMS) 500 MG chewable  "tablet  chlorthalidone (HYGROTON) 25 MG tablet  Continuous Glucose Sensor (FREESTYLE FROYLAN 3 SENSOR) MISC  DULoxetine (CYMBALTA) 60 MG capsule  empagliflozin (JARDIANCE) 10 MG TABS tablet  EPINEPHrine (ANY BX GENERIC EQUIV) 0.3 MG/0.3ML injection 2-pack  fluticasone-salmeterol (ADVAIR) 500-50 MCG/ACT inhaler  insulin aspart (NOVOLOG FLEXPEN) 100 UNIT/ML pen  insulin aspart (NOVOLOG VIAL) 100 UNITS/ML vial  insulin glargine U-300 (TOUJEO SOLOSTAR) 300 UNIT/ML (1 units dial) pen  insulin pen needle (32G X 4 MM) 32G X 4 MM miscellaneous  insulin syringe-needle U-100 (29G X 1/2\" 0.5 ML) 29G X 1/2\" 0.5 ML miscellaneous  insulin syringe-needle U-100 (30G X 1/2\" 0.3 ML) 30G X 1/2\" 0.3 ML miscellaneous  losartan (COZAAR) 25 MG tablet  meclizine (ANTIVERT) 25 MG tablet  metFORMIN (GLUCOPHAGE XR) 500 MG 24 hr tablet  montelukast (SINGULAIR) 10 MG tablet  multivitamin, therapeutic (THERA-VIT) TABS  nortriptyline (PAMELOR) 10 MG capsule  omeprazole (PRILOSEC) 40 MG DR capsule  permethrin (ELIMITE) 5 % external cream  pregabalin (LYRICA) 100 MG capsule  rizatriptan (MAXALT-MLT) 10 MG ODT  triamcinolone (KENALOG) 0.1 % external cream  warfarin ANTICOAGULANT (COUMADIN) 5 MG tablet          Review of Systems    Physical Exam   BP: (!) 140/81  Pulse: 91  Temp: 98.3  F (36.8  C)  Resp: 18  Height: 182.9 cm (6')  Weight: 129.7 kg (286 lb)  SpO2: 95 %      Physical Exam  Constitutional:       General: He is in acute distress.      Appearance: He is well-developed.   HENT:      Head: Normocephalic and atraumatic.   Cardiovascular:      Rate and Rhythm: Normal rate.   Pulmonary:      Effort: No respiratory distress.      Breath sounds: No stridor.   Abdominal:      General: There is no distension.      Palpations: Abdomen is soft.      Tenderness: There is no abdominal tenderness. There is no right CVA tenderness, left CVA tenderness or guarding.   Skin:     General: Skin is warm and dry.   Neurological:      Mental Status: He is alert. "         ED Course        Procedures              Critical Care time:  none              Results for orders placed or performed during the hospital encounter of 11/21/24 (from the past 24 hours)   Urine Macroscopic with reflex to Microscopic   Result Value Ref Range    Color Urine Yellow Colorless, Straw, Light Yellow, Yellow    Appearance Urine Clear Clear    Glucose Urine 500 (A) Negative mg/dL    Bilirubin Urine Negative Negative    Ketones Urine Negative Negative mg/dL    Specific Gravity Urine 1.015 1.003 - 1.035    Blood Urine Negative Negative    pH Urine 6.0 5.0 - 7.0    Protein Albumin Urine Negative Negative mg/dL    Urobilinogen Urine Normal Normal, 2.0 mg/dL    Nitrite Urine Negative Negative    Leukocyte Esterase Urine Negative Negative    Narrative    Microscopic not indicated   CBC with Platelets & Differential    Narrative    The following orders were created for panel order CBC with Platelets & Differential.  Procedure                               Abnormality         Status                     ---------                               -----------         ------                     CBC with platelets and d...[[id number]]  Abnormal            Final result                 Please view results for these tests on the individual orders.   Comprehensive metabolic panel   Result Value Ref Range    Sodium 131 (L) 135 - 145 mmol/L    Potassium 5.2 3.4 - 5.3 mmol/L    Carbon Dioxide (CO2) 22 22 - 29 mmol/L    Anion Gap 13 7 - 15 mmol/L    Urea Nitrogen 15.9 6.0 - 20.0 mg/dL    Creatinine 0.82 0.67 - 1.17 mg/dL    GFR Estimate >90 >60 mL/min/1.73m2    Calcium 8.9 8.8 - 10.4 mg/dL    Chloride 96 (L) 98 - 107 mmol/L    Glucose 411 (H) 70 - 99 mg/dL    Alkaline Phosphatase 117 40 - 150 U/L    AST 31 0 - 45 U/L    ALT 17 0 - 70 U/L    Protein Total 7.4 6.4 - 8.3 g/dL    Albumin 4.1 3.5 - 5.2 g/dL    Bilirubin Total 0.2 <=1.2 mg/dL   CBC with platelets and differential   Result Value Ref Range    WBC Count 8.0 4.0 -  11.0 10e3/uL    RBC Count 4.51 4.40 - 5.90 10e6/uL    Hemoglobin 13.2 (L) 13.3 - 17.7 g/dL    Hematocrit 38.1 (L) 40.0 - 53.0 %    MCV 85 78 - 100 fL    MCH 29.3 26.5 - 33.0 pg    MCHC 34.6 31.5 - 36.5 g/dL    RDW 13.8 10.0 - 15.0 %    Platelet Count 193 150 - 450 10e3/uL    % Neutrophils 62 %    % Lymphocytes 26 %    % Monocytes 9 %    % Eosinophils 2 %    % Basophils 0 %    % Immature Granulocytes 1 %    NRBCs per 100 WBC 0 <1 /100    Absolute Neutrophils 5.0 1.6 - 8.3 10e3/uL    Absolute Lymphocytes 2.0 0.8 - 5.3 10e3/uL    Absolute Monocytes 0.7 0.0 - 1.3 10e3/uL    Absolute Eosinophils 0.2 0.0 - 0.7 10e3/uL    Absolute Basophils 0.0 0.0 - 0.2 10e3/uL    Absolute Immature Granulocytes 0.0 <=0.4 10e3/uL    Absolute NRBCs 0.0 10e3/uL   Abd/pelvis CT - no contrast - Stone Protocol    Narrative    EXAM: CT ABDOMEN PELVIS W/O CONTRAST  LOCATION: [facility]  DATE: 11/21/2024    INDICATION: flank pain, hematuria  COMPARISON: May 5, 2023.   TECHNIQUE: CT scan of the abdomen and pelvis was performed without IV contrast. Multiplanar reformats were obtained. Dose reduction techniques were used.  CONTRAST: None.    FINDINGS:   LOWER CHEST: Normal.    HEPATOBILIARY: Normal.    PANCREAS: Normal.    SPLEEN: Normal.    ADRENAL GLANDS: Normal.    KIDNEYS/BLADDER: Multiple renal calyceal calculi bilaterally. No hydronephrosis or ureteral calculus. Normal noncontrast appearance of the renal parenchyma and urinary bladder.    BOWEL: Normal appendix, small bowel, large bowel.    LYMPH NODES: Normal.    VASCULATURE: Scattered atheromatous calcifications.    PELVIC ORGANS: Normal.    MUSCULOSKELETAL: Transsacral nerve stimulator device. Prior right inguinal hernia repair.      Impression    IMPRESSION:   1.  No acute abnormality is present.  2.  Bilateral nephrolithiasis.       *Note: Due to a large number of results and/or encounters for the requested time period, some results have not been displayed. A  complete set of results can be found in Results Review.       Medications   HYDROmorphone (PF) (DILAUDID) injection 0.5 mg (0.5 mg Intravenous $Given 11/21/24 2120)   diphenhydrAMINE (BENADRYL) injection 50 mg (50 mg Intravenous $Given 11/21/24 2119)   acetaminophen (TYLENOL) tablet 1,000 mg (1,000 mg Oral $Given 11/21/24 2120)   ondansetron (ZOFRAN) injection 4 mg (4 mg Intravenous $Given 11/21/24 2119)       Assessments & Plan (with Medical Decision Making)   59-year-old male with a history of prior kidney stones who presents for right flank pain.  Vital signs are reassuring.  He is given IV hydromorphone and diphenhydramine for symptoms.  Urinalysis is negative for blood or signs of infection.  Electrolytes within normal limits.  LFTs are normal.  White blood cell count is 8 and hemoglobin is 13.2.  CT of the abdomen pelvis obtained, images interpreted independently as well as radiology reviewed, no signs of infection, abscess, obstruction, or kidney stone.  Bedside ultrasound is negative for signs of cholelithiasis or cholecystitis.  On recheck he is feeling better.  It could be that he had a stone and is passed it.  At this time multitest are reassuring and he is safe to discharge with instructions to return if he has worsening of his symptoms or other concerns, otherwise follow-up in clinic.  The patient is in agreement with this plan.    I have reviewed the nursing notes.    I have reviewed the findings, diagnosis, plan and need for follow up with the patient.         New Prescriptions    No medications on file       Final diagnoses:   Right flank pain       11/21/2024   [facility] EMERGENCY DEPT       Abhilash Patel MD  11/21/24 4828

## 2024-11-25 ENCOUNTER — ANTICOAGULATION THERAPY VISIT (OUTPATIENT)
Dept: ANTICOAGULATION | Facility: CLINIC | Age: 59
End: 2024-11-25

## 2024-11-25 ENCOUNTER — LAB (OUTPATIENT)
Dept: LAB | Facility: CLINIC | Age: 59
End: 2024-11-25
Payer: COMMERCIAL

## 2024-11-25 ENCOUNTER — DOCUMENTATION ONLY (OUTPATIENT)
Dept: ANTICOAGULATION | Facility: CLINIC | Age: 59
End: 2024-11-25

## 2024-11-25 DIAGNOSIS — I82.409 RECURRENT DEEP VEIN THROMBOSIS (DVT) (H): Primary | ICD-10-CM

## 2024-11-25 DIAGNOSIS — Z79.01 CURRENT USE OF LONG TERM ANTICOAGULATION: ICD-10-CM

## 2024-11-25 DIAGNOSIS — I82.409 RECURRENT DEEP VEIN THROMBOSIS (DVT) (H): ICD-10-CM

## 2024-11-25 LAB — INR BLD: 1.9 (ref 0.9–1.1)

## 2024-11-25 PROCEDURE — 36416 COLLJ CAPILLARY BLOOD SPEC: CPT

## 2024-11-25 PROCEDURE — 85610 PROTHROMBIN TIME: CPT

## 2024-11-25 NOTE — PROGRESS NOTES
Patient had INR done today. Will discuss when talk to patient.    Janeth Cool RN  [facility]

## 2024-11-25 NOTE — PROGRESS NOTES
ANTICOAGULATION MANAGEMENT     Adarsh Salvador 59 year old male is on warfarin with subtherapeutic INR result. (Goal INR 2.0-3.0)    Recent labs: (last 7 days)     11/25/24  1142   INR 1.9*       ASSESSMENT     Source(s): Chart Review and Patient/Caregiver Call     Warfarin doses taken: Less warfarin taken than planned which may be affecting INR and Missed dose(s) may be affecting INR  Diet: No new diet changes identified  Medication/supplement changes: None noted  New illness, injury, or hospitalization: Yes was in ED x 2 last week for flank pain and chest pain   Signs or symptoms of bleeding or clotting: No  Previous result: Supratherapeutic  Additional findings:  had missed doses last week  will keep dosing the same and recheck in one week        PLAN     Recommended plan for temporary change(s) affecting INR     Dosing Instructions: Continue your current warfarin dose with next INR in 1 week       Summary  As of 11/25/2024      Full warfarin instructions:  17.5 mg every Mon, Fri; 15 mg all other days   Next INR check:  12/2/2024               Telephone call with Adarsh who verbalizes understanding and agrees to plan    Lab visit scheduled    Education provided: Goal range and lab monitoring: goal range and significance of current result  Contact [phone] with any changes, questions or concerns.     Plan made per [facility] anticoagulation protocol    Janeth Cool RN  11/25/2024  Anticoagulation Clinic  [employer] for routing messages: ermias MARTÍNEZ  [facility] patient phone line: [phone]        _______________________________________________________________________     Anticoagulation Episode Summary       Current INR goal:  2.0-3.0   TTR:  33.0% (1 y)   Target end date:  Indefinite   Send INR reminders to:  MANOJ MARTÍNEZ    Indications    Recurrent deep vein thrombosis (DVT) (H) [I82.409]  Current use of long term anticoagulation [Z79.01]             Comments:  --             Anticoagulation Care Providers        Provider Role Specialty Phone number    Bertha Romero PA-C Referring Family Medicine [phone]    Kehr, Kristen M, PA-C Referring Family Medicine [phone]

## 2024-11-25 NOTE — PROGRESS NOTES
Anticoagulation    [facility] overdue reminder letter returned in the mail. Return reason:  [employer], unable to forward    Please call patient to verify address

## 2024-11-26 LAB
ATRIAL RATE - MUSE: 95 BPM
DIASTOLIC BLOOD PRESSURE - MUSE: NORMAL MMHG
INTERPRETATION ECG - MUSE: NORMAL
P AXIS - MUSE: 40 DEGREES
PR INTERVAL - MUSE: 178 MS
QRS DURATION - MUSE: 76 MS
QT - MUSE: 350 MS
QTC - MUSE: 439 MS
R AXIS - MUSE: 6 DEGREES
SYSTOLIC BLOOD PRESSURE - MUSE: NORMAL MMHG
T AXIS - MUSE: 23 DEGREES
VENTRICULAR RATE- MUSE: 95 BPM

## 2024-11-27 ENCOUNTER — DOCUMENTATION ONLY (OUTPATIENT)
Dept: ANTICOAGULATION | Facility: CLINIC | Age: 59
End: 2024-11-27
Payer: COMMERCIAL

## 2024-11-27 ENCOUNTER — PATIENT OUTREACH (OUTPATIENT)
Dept: FAMILY MEDICINE | Facility: CLINIC | Age: 59
End: 2024-11-27
Payer: COMMERCIAL

## 2024-11-27 NOTE — TELEPHONE ENCOUNTER
Patient Quality Outreach    Patient is due for the following:   Diabetes -  Eye Exam and Foot Exam  Asthma  -  ACT needed  Hypertension -  BP check  Physical Annual Wellness Visit    Action(s) Taken:   Patient was scheduled for 02/04/24 with Kristen Kehr PA-C for a physical, will check blood pressure at that time.     Type of outreach:    Chart review performed, no outreach needed.    Questions for provider review:    None           Delmar Bustos MA

## 2024-11-27 NOTE — PROGRESS NOTES
Will attempt to update address at next contact, made note in chart and address spot.     LANG NogueraN, RN        detailed exam warm and dry/color normal

## 2024-11-27 NOTE — PROGRESS NOTES
Anticoagulation    [facility] overdue reminder letter returned in the mail. Return reason:  [employer] Unable to Forward    Please call patient to verify address    Lula Moore RN  Anticoagulation Nurse - [facility] [place]

## 2024-12-02 ENCOUNTER — ANTICOAGULATION THERAPY VISIT (OUTPATIENT)
Dept: ANTICOAGULATION | Facility: CLINIC | Age: 59
End: 2024-12-02

## 2024-12-02 ENCOUNTER — LAB (OUTPATIENT)
Dept: LAB | Facility: CLINIC | Age: 59
End: 2024-12-02
Payer: COMMERCIAL

## 2024-12-02 DIAGNOSIS — Z79.01 CURRENT USE OF LONG TERM ANTICOAGULATION: ICD-10-CM

## 2024-12-02 DIAGNOSIS — I82.409 RECURRENT DEEP VEIN THROMBOSIS (DVT) (H): ICD-10-CM

## 2024-12-02 DIAGNOSIS — I82.409 RECURRENT DEEP VEIN THROMBOSIS (DVT) (H): Primary | ICD-10-CM

## 2024-12-02 LAB — INR BLD: 2.6 (ref 0.9–1.1)

## 2024-12-02 PROCEDURE — 85610 PROTHROMBIN TIME: CPT

## 2024-12-02 PROCEDURE — 36416 COLLJ CAPILLARY BLOOD SPEC: CPT

## 2024-12-02 NOTE — PROGRESS NOTES
ANTICOAGULATION MANAGEMENT     Adarsh Salvador 59 year old male is on warfarin with therapeutic INR result. (Goal INR 2.0-3.0)    Recent labs: (last 7 days)     12/02/24  1036   INR 2.6*       ASSESSMENT     Source(s): Chart Review and Patient/Caregiver Call     Warfarin doses taken: Warfarin taken as instructed  Diet: No new diet changes identified  Medication/supplement changes: None noted  New illness, injury, or hospitalization: No  Signs or symptoms of bleeding or clotting: No  Previous result: Subtherapeutic  Additional findings: None       PLAN     Recommended plan for no diet, medication or health factor changes affecting INR     Dosing Instructions: Continue your current warfarin dose with next INR in 2 weeks       Summary  As of 12/2/2024      Full warfarin instructions:  17.5 mg every Mon, Fri; 15 mg all other days   Next INR check:  12/16/2024               Telephone call with Adarsh who verbalizes understanding and agrees to plan and who agrees to plan and repeated back plan correctly    Lab visit scheduled    Education provided: None required    Plan made per [facility] anticoagulation protocol    Priya Plunkett RN  12/2/2024  Anticoagulation Clinic  [employer] for routing messages: ermias MARTÍNEZ  [facility] patient phone line: [phone]        _______________________________________________________________________     Anticoagulation Episode Summary       Current INR goal:  2.0-3.0   TTR:  33.9% (1 y)   Target end date:  Indefinite   Send INR reminders to:  MANOJ MARTÍNEZ    Indications    Recurrent deep vein thrombosis (DVT) (H) [I82.409]  Current use of long term anticoagulation [Z79.01]             Comments:  --             Anticoagulation Care Providers       Provider Role Specialty Phone number    Bertha Romero PA-C Referring Family Medicine [phone]    Kehr, Kristen M, PA-C Referring Family Medicine [phone]

## 2024-12-11 ENCOUNTER — OFFICE VISIT (OUTPATIENT)
Dept: URGENT CARE | Facility: URGENT CARE | Age: 59
End: 2024-12-11
Payer: COMMERCIAL

## 2024-12-11 ENCOUNTER — ANCILLARY PROCEDURE (OUTPATIENT)
Dept: GENERAL RADIOLOGY | Facility: CLINIC | Age: 59
End: 2024-12-11
Attending: STUDENT IN AN ORGANIZED HEALTH CARE EDUCATION/TRAINING PROGRAM
Payer: COMMERCIAL

## 2024-12-11 VITALS
OXYGEN SATURATION: 98 % | HEART RATE: 95 BPM | DIASTOLIC BLOOD PRESSURE: 105 MMHG | TEMPERATURE: 97.7 F | SYSTOLIC BLOOD PRESSURE: 155 MMHG

## 2024-12-11 DIAGNOSIS — R05.1 ACUTE COUGH: ICD-10-CM

## 2024-12-11 DIAGNOSIS — J18.9 PNEUMONIA OF LEFT LOWER LOBE DUE TO INFECTIOUS ORGANISM: ICD-10-CM

## 2024-12-11 DIAGNOSIS — J02.0 STREP THROAT: ICD-10-CM

## 2024-12-11 DIAGNOSIS — J18.9 PNEUMONIA OF LEFT LOWER LOBE DUE TO INFECTIOUS ORGANISM: Primary | ICD-10-CM

## 2024-12-11 DIAGNOSIS — J02.9 SORE THROAT: ICD-10-CM

## 2024-12-11 DIAGNOSIS — R05.1 ACUTE COUGH: Primary | ICD-10-CM

## 2024-12-11 LAB — DEPRECATED S PYO AG THROAT QL EIA: POSITIVE

## 2024-12-11 PROCEDURE — 71046 X-RAY EXAM CHEST 2 VIEWS: CPT | Mod: TC | Performed by: RADIOLOGY

## 2024-12-11 PROCEDURE — 87880 STREP A ASSAY W/OPTIC: CPT | Performed by: STUDENT IN AN ORGANIZED HEALTH CARE EDUCATION/TRAINING PROGRAM

## 2024-12-11 PROCEDURE — 99214 OFFICE O/P EST MOD 30 MIN: CPT | Performed by: STUDENT IN AN ORGANIZED HEALTH CARE EDUCATION/TRAINING PROGRAM

## 2024-12-11 RX ORDER — AZITHROMYCIN 250 MG/1
TABLET, FILM COATED ORAL
Qty: 6 TABLET | Refills: 0 | Status: SHIPPED | OUTPATIENT
Start: 2024-12-11 | End: 2024-12-11

## 2024-12-12 ENCOUNTER — TELEPHONE (OUTPATIENT)
Dept: ANTICOAGULATION | Facility: CLINIC | Age: 59
End: 2024-12-12
Payer: COMMERCIAL

## 2024-12-12 NOTE — PROGRESS NOTES
Assessment & Plan     There are no diagnoses linked to this encounter.   {2021 E&M time (Optional):918031}    {Provider  Link to [facility] Help Grid :705527}    No follow-ups on file.    Petrona Mahajan, IRENE [facility] [place]    Soren Altamirano is a 59 year old male who presents to clinic today for the following health issues:  Chief Complaint   Patient presents with    Urgent Care     - 3 Days  - Cough  - SOB  - Sore Throat  - Concerned that he may have Pneumonia (States that partner has it)     {(!) Visit Details have not yet been documented.  Please enter Visit Details and then use this list to pull in documentation. (Optional):898385}  HPI    ***  {UC Conditions (Optional):613134}    Review of Systems  {ROS COMP (Optional):448384}      Objective    BP (!) 155/105   Pulse 95   Temp 97.7  F (36.5  C) (Tympanic)   SpO2 98%   Physical Exam   {Exam List (Optional):565946}    {Diagnostic Test Results (Optional):487816}       lesions or rashes  NEURO: Normal strength and tone, mentation intact and speech normal  PSYCH: mentation appears normal, affect normal/bright    CXR - Reviewed and interpreted by me Infiltrate seen in the left lower lobe  awaiting formal interpretation from Radiologist at this time  Results for orders placed or performed in visit on 12/11/24   Streptococcus A Rapid Screen w/Reflex to PCR - Clinic Collect     Status: Abnormal    Specimen: Throat; Swab   Result Value Ref Range    Group A Strep antigen Positive (A) Negative

## 2024-12-12 NOTE — TELEPHONE ENCOUNTER
Had received notification that patient was going to start on Azithromycin, but now the medication has been changed to Augmentin BID x 7 days.  Patient has an INR already scheduled for  12/16/24 and will keep this at this time to make sure no interaction with warfarin.    Patient is in agreement.    Janeth Cool RN  [facility]

## 2024-12-18 ENCOUNTER — TELEPHONE (OUTPATIENT)
Dept: ANTICOAGULATION | Facility: CLINIC | Age: 59
End: 2024-12-18
Payer: COMMERCIAL

## 2024-12-18 NOTE — TELEPHONE ENCOUNTER
ANTICOAGULATION     Adarsh Salvador is overdue for an INR check.     Left message for patient to call and schedule lab appointment as soon as possible. If returning call, please schedule.     Joby Marie RN  12/18/2024  Anticoagulation Clinic  [facility] for routing messages: ermias MARTÍNEZ  [facility] patient phone line: [phone]

## 2024-12-24 ENCOUNTER — TELEPHONE (OUTPATIENT)
Dept: FAMILY MEDICINE | Facility: CLINIC | Age: 59
End: 2024-12-24
Payer: COMMERCIAL

## 2024-12-24 DIAGNOSIS — I82.409 RECURRENT DEEP VEIN THROMBOSIS (DVT) (H): ICD-10-CM

## 2024-12-24 RX ORDER — WARFARIN SODIUM 5 MG/1
TABLET ORAL
Qty: 90 TABLET | Refills: 0 | Status: SHIPPED | OUTPATIENT
Start: 2024-12-24

## 2024-12-24 NOTE — TELEPHONE ENCOUNTER
ANTICOAGULATION MANAGEMENT:  Medication Refill    Anticoagulation Summary  As of 12/2/2024      Warfarin maintenance plan:  17.5 mg (5 mg x 3.5) every Mon, Fri; 15 mg (5 mg x 3) all other days   Next INR check:  12/16/2024   Target end date:  Indefinite    Indications    Recurrent deep vein thrombosis (DVT) (H) [I82.409]  Current use of long term anticoagulation [Z79.01]                 Anticoagulation Care Providers       Provider Role Specialty Phone number    Bertha Romero PA-C Referring Family Medicine [phone]    Kehr, Kristen M, PA-C Referring Family Medicine [phone]            Refill Criteria    Visit with referring provider/group: Meets criteria: visit within referring provider group in the last 15 months on 8/6/24    [facility] referral last signed: 11/21/2024; within last year:  Yes    Lab monitoring not exceeding 2 weeks overdue: Yes    Adarsh does NOT meet all criteria for refill: > 2 weeks overdue for lab monitoring . 30 day alexandra fill approved; patient notified to schedule labs per [facility] protocol    Petey HURTADO RN  Anticoagulation Clinic

## 2024-12-24 NOTE — TELEPHONE ENCOUNTER
Prior Authorization Retail Medication Request    Medication/Dose: insulin aspart (NOVOLOG FLEXPEN) 100 UNIT/ML pen  Diagnosis and ICD code (if different than what is on RX):    Type 2 diabetes mellitus with diabetic polyneuropathy, with long-term current use of insulin (H) [E11.42, Z79.4]        New/renewal/insurance change PA/secondary ins. PA:  Previously Tried and Failed:    Rationale:      Insurance   Primary: [employer]  Insurance ID:  [id number]    Secondary (if applicable):  Insurance ID:      Pharmacy Information (if different than what is on RX)  Name:  Phone:    Fax:    Clinic Information  Preferred routing pool for dept communication: [facility] pool

## 2024-12-26 ENCOUNTER — TELEPHONE (OUTPATIENT)
Dept: ANTICOAGULATION | Facility: CLINIC | Age: 59
End: 2024-12-26
Payer: COMMERCIAL

## 2024-12-26 NOTE — TELEPHONE ENCOUNTER
ANTICOAGULATION     Adarsh Salvador is overdue for an INR check.     Spoke with Adarsh and scheduled lab appointment on 12/27/24    Joby Marie, RN  12/26/2024  Anticoagulation Clinic  [facility] for routing messages: ermias MARTÍNEZ  [employer] [employer] patient phone line: [phone]

## 2024-12-26 NOTE — TELEPHONE ENCOUNTER
PA Initiation    Medication: NOVOLOG FLEXPEN 100 UNIT/ML SC SOPN  Insurance Company: [employer] Part D - Phone [phone] Fax [phone]  Pharmacy Filling the Rx: [id number] - [place], [place] - [address] [address] AT [facility] & [facility]  Filling Pharmacy Phone: [phone]  Filling Pharmacy Fax:    Start Date: 12/26/2024

## 2024-12-30 NOTE — TELEPHONE ENCOUNTER
Prior Authorization Approval    Medication: NOVOLOG FLEXPEN 100 UNIT/ML SC SOPN  Authorization Effective Date: 12/26/2024  Authorization Expiration Date: 12/31/2025  Approved Dose/Quantity: 90/100  Reference #: [id number]   Insurance Company: [employer] Part D - Phone [phone] Fax [phone]  Expected CoPay: $    CoPay Card Available:      Financial Assistance Needed:   Which Pharmacy is filling the prescription: [id number] - [place], [place] - [address] [facility] AT [place] [address]  Pharmacy Notified: yES  Patient Notified: YES

## 2025-01-01 ENCOUNTER — APPOINTMENT (OUTPATIENT)
Dept: RADIOLOGY | Facility: HOSPITAL | Age: 60
End: 2025-01-01
Attending: FAMILY MEDICINE
Payer: COMMERCIAL

## 2025-01-01 ENCOUNTER — HOSPITAL ENCOUNTER (EMERGENCY)
Facility: HOSPITAL | Age: 60
Discharge: HOME | End: 2025-01-01
Attending: FAMILY MEDICINE
Payer: COMMERCIAL

## 2025-01-01 VITALS
HEART RATE: 93 BPM | SYSTOLIC BLOOD PRESSURE: 150 MMHG | DIASTOLIC BLOOD PRESSURE: 84 MMHG | BODY MASS INDEX: 39.59 KG/M2 | WEIGHT: 292.3 LBS | TEMPERATURE: 97.2 F | OXYGEN SATURATION: 90 % | RESPIRATION RATE: 19 BRPM | HEIGHT: 72 IN

## 2025-01-01 DIAGNOSIS — M79.2 NEURALGIA OF LEFT UPPER EXTREMITY: ICD-10-CM

## 2025-01-01 LAB
ANION GAP SERPL CALCULATED.3IONS-SCNC: 15 MMOL/L (ref 7–15)
BASE EXCESS BLDV CALC-SCNC: 1.1 MMOL/L (ref -3–3)
BASOPHILS # BLD AUTO: 0 10E3/UL (ref 0–0.2)
BASOPHILS NFR BLD AUTO: 0 %
BUN SERPL-MCNC: 9 MG/DL (ref 6–20)
CALCIUM SERPL-MCNC: 10 MG/DL (ref 8.8–10.4)
CHLORIDE SERPL-SCNC: 99 MMOL/L (ref 98–107)
CREAT SERPL-MCNC: 0.8 MG/DL (ref 0.67–1.17)
EGFRCR SERPLBLD CKD-EPI 2021: >90 ML/MIN/1.73M2
EOSINOPHIL # BLD AUTO: 0.1 10E3/UL (ref 0–0.7)
EOSINOPHIL NFR BLD AUTO: 2 %
ERYTHROCYTE [DISTWIDTH] IN BLOOD BY AUTOMATED COUNT: 14.2 % (ref 10–15)
GLUCOSE SERPL-MCNC: 291 MG/DL (ref 70–99)
HCO3 BLDV-SCNC: 27 MMOL/L (ref 21–28)
HCO3 SERPL-SCNC: 22 MMOL/L (ref 22–29)
HCT VFR BLD AUTO: 43.8 % (ref 40–53)
HGB BLD-MCNC: 14.5 G/DL (ref 13.3–17.7)
IMM GRANULOCYTES # BLD: 0 10E3/UL
IMM GRANULOCYTES NFR BLD: 0 %
INR PPP: 2.2 (ref 0.85–1.15)
LYMPHOCYTES # BLD AUTO: 1.6 10E3/UL (ref 0.8–5.3)
LYMPHOCYTES NFR BLD AUTO: 20 %
MAGNESIUM SERPL-MCNC: 1.6 MG/DL (ref 1.7–2.3)
MCH RBC QN AUTO: 28.4 PG (ref 26.5–33)
MCHC RBC AUTO-ENTMCNC: 33.1 G/DL (ref 31.5–36.5)
MCV RBC AUTO: 86 FL (ref 78–100)
MONOCYTES # BLD AUTO: 0.6 10E3/UL (ref 0–1.3)
MONOCYTES NFR BLD AUTO: 7 %
NEUTROPHILS # BLD AUTO: 5.4 10E3/UL (ref 1.6–8.3)
NEUTROPHILS NFR BLD AUTO: 70 %
NRBC # BLD AUTO: 0 10E3/UL
NRBC BLD AUTO-RTO: 0 /100
NT-PROBNP SERPL-MCNC: <36 PG/ML (ref 0–900)
O2/TOTAL GAS SETTING VFR VENT: 21 %
OXYHGB MFR BLDV: 58 % (ref 70–75)
PCO2 BLDV: 44 MM HG (ref 40–50)
PH BLDV: 7.39 [PH] (ref 7.32–7.43)
PLATELET # BLD AUTO: 216 10E3/UL (ref 150–450)
PO2 BLDV: 28 MM HG (ref 25–47)
POTASSIUM SERPL-SCNC: 4.3 MMOL/L (ref 3.4–5.3)
RBC # BLD AUTO: 5.1 10E6/UL (ref 4.4–5.9)
SAO2 % BLDV: 59 % (ref 70–75)
SODIUM SERPL-SCNC: 136 MMOL/L (ref 135–145)
TROPONIN T SERPL HS-MCNC: 8 NG/L
WBC # BLD AUTO: 7.7 10E3/UL (ref 4–11)

## 2025-01-01 PROCEDURE — 71046 X-RAY EXAM CHEST 2 VIEWS: CPT

## 2025-01-01 PROCEDURE — 84484 ASSAY OF TROPONIN QUANT: CPT | Performed by: FAMILY MEDICINE

## 2025-01-01 PROCEDURE — 83880 ASSAY OF NATRIURETIC PEPTIDE: CPT | Performed by: FAMILY MEDICINE

## 2025-01-01 PROCEDURE — 85610 PROTHROMBIN TIME: CPT | Performed by: FAMILY MEDICINE

## 2025-01-01 PROCEDURE — 83735 ASSAY OF MAGNESIUM: CPT | Performed by: FAMILY MEDICINE

## 2025-01-01 PROCEDURE — 36415 COLL VENOUS BLD VENIPUNCTURE: CPT | Performed by: FAMILY MEDICINE

## 2025-01-01 PROCEDURE — 250N000011 HC RX IP 250 OP 636: Performed by: FAMILY MEDICINE

## 2025-01-01 PROCEDURE — 250N000013 HC RX MED GY IP 250 OP 250 PS 637: Performed by: FAMILY MEDICINE

## 2025-01-01 PROCEDURE — 93005 ELECTROCARDIOGRAM TRACING: CPT | Performed by: STUDENT IN AN ORGANIZED HEALTH CARE EDUCATION/TRAINING PROGRAM

## 2025-01-01 PROCEDURE — 80048 BASIC METABOLIC PNL TOTAL CA: CPT | Performed by: FAMILY MEDICINE

## 2025-01-01 PROCEDURE — 93005 ELECTROCARDIOGRAM TRACING: CPT | Performed by: FAMILY MEDICINE

## 2025-01-01 PROCEDURE — 82805 BLOOD GASES W/O2 SATURATION: CPT | Performed by: FAMILY MEDICINE

## 2025-01-01 PROCEDURE — 85025 COMPLETE CBC W/AUTO DIFF WBC: CPT | Performed by: FAMILY MEDICINE

## 2025-01-01 RX ORDER — DIPHENHYDRAMINE HYDROCHLORIDE 50 MG/ML
25 INJECTION INTRAMUSCULAR; INTRAVENOUS ONCE
Status: COMPLETED | OUTPATIENT
Start: 2025-01-01 | End: 2025-01-01

## 2025-01-01 RX ORDER — PREDNISONE 10 MG/1
TABLET ORAL
Qty: 22 TABLET | Refills: 0 | Status: SHIPPED | OUTPATIENT
Start: 2025-01-01 | End: 2025-01-01

## 2025-01-01 RX ORDER — DEXAMETHASONE SODIUM PHOSPHATE 4 MG/ML
10 INJECTION, SOLUTION INTRA-ARTICULAR; INTRALESIONAL; INTRAMUSCULAR; INTRAVENOUS; SOFT TISSUE ONCE
Status: COMPLETED | OUTPATIENT
Start: 2025-01-01 | End: 2025-01-01

## 2025-01-01 RX ORDER — OXYCODONE HYDROCHLORIDE 5 MG/1
5 TABLET ORAL EVERY 6 HOURS PRN
Qty: 6 TABLET | Refills: 0 | Status: SHIPPED | OUTPATIENT
Start: 2025-01-01 | End: 2025-01-04

## 2025-01-01 RX ORDER — PREDNISONE 10 MG/1
TABLET ORAL
Qty: 22 TABLET | Refills: 0 | Status: SHIPPED | OUTPATIENT
Start: 2025-01-01 | End: 2025-01-12

## 2025-01-01 RX ORDER — OXYCODONE HYDROCHLORIDE 5 MG/1
5 TABLET ORAL ONCE
Status: COMPLETED | OUTPATIENT
Start: 2025-01-01 | End: 2025-01-01

## 2025-01-01 RX ADMIN — DEXAMETHASONE SODIUM PHOSPHATE 10 MG: 4 INJECTION, SOLUTION INTRA-ARTICULAR; INTRALESIONAL; INTRAMUSCULAR; INTRAVENOUS; SOFT TISSUE at 19:19

## 2025-01-01 RX ADMIN — DIPHENHYDRAMINE HYDROCHLORIDE 25 MG: 50 INJECTION INTRAMUSCULAR; INTRAVENOUS at 19:18

## 2025-01-01 RX ADMIN — OXYCODONE HYDROCHLORIDE 5 MG: 5 TABLET ORAL at 19:18

## 2025-01-01 ASSESSMENT — ACTIVITIES OF DAILY LIVING (ADL): ADLS_ACUITY_SCORE: 59

## 2025-01-01 NOTE — ED TRIAGE NOTES
Pt presents with primary complaint of left arm pain from shoulder to fingers. Pain started last night. Pt tried heat, cold, and aleve with no improvement. Pt also endorses new shortness of breath.      Triage Assessment (Adult)       Row Name 01/01/25 1530          Triage Assessment    Airway WDL WDL        Respiratory WDL    Respiratory WDL X;rhythm/pattern     Rhythm/Pattern, Respiratory shortness of breath        Skin Circulation/Temperature WDL    Skin Circulation/Temperature WDL WDL        Cardiac WDL    Cardiac WDL WDL        Peripheral/Neurovascular WDL    Peripheral Neurovascular WDL WDL        Cognitive/Neuro/Behavioral WDL    Cognitive/Neuro/Behavioral WDL WDL

## 2025-01-02 ENCOUNTER — DOCUMENTATION ONLY (OUTPATIENT)
Dept: ANTICOAGULATION | Facility: CLINIC | Age: 60
End: 2025-01-02
Payer: COMMERCIAL

## 2025-01-02 NOTE — PROGRESS NOTES
ANTICOAGULATION  MANAGEMENT: Discharge Review    Adarsh Salvador chart reviewed for anticoagulation continuity of care    Emergency room visit on 1/1/25 for neuralgia of the left upper extremity .    Discharge disposition: Home    Results:    Recent labs: (last 7 days)     12/27/24  1144 01/01/25  1913   INR 1.9* 2.20*     Anticoagulation inpatient management:     not applicable     Anticoagulation discharge instructions:     Warfarin dosing: home regimen continued   Bridging: No   INR goal change: No      Medication changes affecting anticoagulation: Yes: started on a prednisone taper     Additional factors affecting anticoagulation: No     PLAN     No adjustment to anticoagulation plan needed  Recommend to check INR on within one week     Left a detailed message for Adarsh, asking if to reschedule his INR for 16/25 or 1/7/25    Anticoagulation Calendar updated    Janeth Cool, RN  1/2/2025  Anticoagulation Clinic  [facility] for routing messages: ermias MARTÍNEZ  [facility] patient phone line: [phone]

## 2025-01-02 NOTE — ED PROVIDER NOTES
EMERGENCY DEPARTMENT ENCOUNTER      NAME: Adarsh Salvador  AGE: 59 year old male  YOB: 1965  MRN: [id number]  EVALUATION DATE & TIME: No admission date for patient encounter.    PCP: Kehr, Kristen M    ED PROVIDER: Shahab Rubin M.D.    Chief Complaint   Patient presents with    Arm Pain     left       FINAL IMPRESSION:  1. Neuralgia of left upper extremity        ED COURSE & MEDICAL DECISION MAKING:    Pertinent Labs & Imaging studies independently interpreted by me. (See chart for details)  Reviewed most recent urgent care office visit from December 2024 when patient was seen with left upper lobe pneumonia and strep throat, treated with antibiotics and discharged.    ED Course as of 01/01/25 2109 Wed Jan 01, 2025   1843 Patient seen and examined, presents with left arm pain since last night, says it is mostly from the elbow down and extends into his middle 3 fingers.  On exam, patient is mildly tachycardic otherwise vitally stable, mild tenderness to the left trapezius and left wrist extensor compartment with marked tenderness of the lateral elbow.  Suspect neuropathy, either from the elbow or from the neck.  Toradol and Decadron are ordered.  Patient also complains of some shortness of breath and left-sided chest pain.  Lungs are clear, patient is tachycardic but currently is on Coumadin for history of PE.  Labs are ordered along with chest x-ray.  Patient notes that his significant other is home with upper respiratory symptoms as well.   2004 Labs independently interpreted by me with mildly low magnesium, normal BNP, troponin 8 which is negative given time of onset of symptoms, normal basic panel, normal venous blood gas, normal CBC.  Chest x-ray independently interpreted by me does not demonstrate acute infiltrate.         At the conclusion of the encounter I discussed the results of all of the tests and the disposition. The questions were answered. The patient or family acknowledged  understanding and was agreeable with the care plan.     Medical Decision Making  Obtained supplemental history:Supplemental history obtained?: No  Reviewed external records: External records reviewed?: Documented in chart  Care impacted by chronic illness:Anticoagulated State, Chronic Lung Disease, Hyperlipidemia, and Hypertension  Did you consider but not order tests?: Work up considered but not performed and documented in chart, if applicable  Did you interpret images independently?: Independent interpretation of ECG and images noted in documentation, when applicable.  Consultation discussion with other provider:Did you involve another provider (consultant, , pharmacy, etc.)?: No  Discharge. I prescribed additional prescription strength medication(s) as charted. I considered admission, but discharged patient after significant clinical improvement.    MIPS: Not Applicable    MEDICATIONS GIVEN IN THE EMERGENCY:  Medications   dexAMETHasone (DECADRON) injection 10 mg (10 mg Intravenous $Given 1/1/25 1919)   oxyCODONE (ROXICODONE) tablet 5 mg (5 mg Oral $Given 1/1/25 1918)   diphenhydrAMINE (BENADRYL) injection 25 mg (25 mg Intravenous $Given 1/1/25 1918)       NEW PRESCRIPTIONS STARTED AT TODAY'S ER VISIT  New Prescriptions    No medications on file       =================================================================    HPI    Patient information was obtained from: Patient      Adarsh Salvador is a 59 year old male known right months with pain in the left shoulder going down to the left hand which he describes as cramping or burning  going on since last night going on since last night.  No recent fall of injury.  Uses cane in left hand for ambulation.    REVIEW OF SYSTEMS   Review of Systems   All other systems reviewed and negative    PAST MEDICAL HISTORY:  Past Medical History:   Diagnosis Date    Anaphylactic reaction 08/14/2015    Anxiety     Depression     DM2 (diabetes mellitus, type 2) (H)     GERD  (gastroesophageal reflux disease)     HTN (hypertension)     IBS (irritable bowel syndrome)     Kidney stone 06/15/2011    Pt believes these were Calcium stones    Neuropathy     Pulmonary embolism, other, unspecified chronicity, unspecified whether acute cor pulmonale present (H) 08/15/2023       PAST SURGICAL HISTORY:  Past Surgical History:   Procedure Laterality Date    ARTHROPLASTY KNEE Right 9/26/2023    Procedure: ARTHROPLASTY, KNEE, TOTAL Right;  Surgeon: Edwin Ch MD;  Location: [facility]    ARTHROSCOPY KNEE WITH MEDIAL MENISCECTOMY Right 8/19/2022    Procedure: examination under anesthesia, right knee arthroscopy, meniscectomy;  Surgeon: Carlos A Em MD;  Location: [facility] OR    COLONOSCOPY  5/1/2012    Procedure:COLONOSCOPY; screening colonoscopy; Surgeon:KINGSLEY DOS SANTOS; Location:[facility]    COLONOSCOPY  4/21/2014    Procedure: COMBINED COLONOSCOPY, SINGLE BIOPSY/POLYPECTOMY BY BIOPSY;  Surgeon: Duane, William Charles, MD;  Location: [facility] OR    COLONOSCOPY N/A 4/21/2022    Procedure: COLONOSCOPY, WITH POLYPECTOMY AND BIOPSY;  Surgeon: Luiz Calvert MD;  Location: [facility]    CYSTOSCOPY  05/01/2008    CYSTOSCOPY W/ URETERAL STENT PLACEMENT 05/01/2008     CYSTOSCOPY  09/26/2010    CYSTOSCOPY W/ URETERAL STENT PLACEMENT 09/26/2010 Left     CYSTOSCOPY  05/18/2012    CYSTOSCOPY, LEFT URETEROSCOPY AND STENT PLACEMENT left retrograde 05/18/2012     CYSTOSCOPY,+URETEROSCOPY  06/10/2008    URETEROSCOPY 06/10/2008 Right     HC BREATH HYDROGEN TEST  3/7/2014    Procedure: HYDROGEN BREATH TEST;  Surgeon: Darion Swift MD;  Location: [facility]    INJECT EPIDURAL LUMBAR N/A 7/7/2022    Procedure: INJECTION, SPINE, LUMBAR, EPIDURAL L5/S1;  Surgeon: Michi Hahn MD;  Location: [facility]    LITHOTRIPSY  09/30/2010    LITHOTRIPSY 09/30/2010 LEFT EXTRACORPOREAL SHOCK WAVE LITHOTRIPSY, FLEXIBLE CYSTOSCOPY, LEFT STENT REMOVAL      ORTHOPEDIC SURGERY  10/03/2007    KNEE ARTHROSCOPY 10/03/2007 RightX2       RELEASE CARPAL TUNNEL Right 1/26/2018    Procedure: RELEASE CARPAL TUNNEL;  Right carpal tunnel release;  Surgeon: Wiliam Saenz MD;  Location: [facility] OR    VASCULAR SURGERY  11/24/2008    Radiofrequency ablation left saphenous vein 11/24/2008 Done by Dr. Lozoya         CURRENT MEDICATIONS:    No current facility-administered medications for this encounter.     Current Outpatient Medications   Medication Sig Dispense Refill    acetaminophen (TYLENOL) 325 MG tablet Take 2 tablets (650 mg) by mouth every 4 hours as needed for other (mild pain) 100 tablet 0    albuterol (PROAIR HFA/PROVENTIL HFA/VENTOLIN HFA) 108 (90 Base) MCG/ACT inhaler Inhale 2 puffs into the lungs every 6 hours as needed for shortness of breath or wheezing 6.7 g 3    amLODIPine (NORVASC) 10 MG tablet Take 1 tablet (10 mg) by mouth daily for blood pressure 90 tablet 1    atorvastatin (LIPITOR) 40 MG tablet Take 1 tablet (40 mg) by mouth every evening 90 tablet 1    blood glucose (ACCU-CHEK GUIDE) test strip Use to test blood sugar 3 times daily or as directed. 300 strip 3    blood glucose monitoring (ACCU-CHEK FASTCLIX) lancets Use to test blood sugar 1 times daily or as directed.  Ok to substitute alternative if insurance prefers. 102 each 11    calcium carbonate (TUMS) 500 MG chewable tablet Take 1 chew tab by mouth daily      chlorthalidone (HYGROTON) 25 MG tablet Take 1 tablet (25 mg) by mouth daily Add on for blood pressure 90 tablet 1    Continuous Glucose Sensor (FREESTYLE FROYLAN 3 SENSOR) MISC 1 each every 14 days. 2 each 5    DULoxetine (CYMBALTA) 60 MG capsule Take 1 capsule (60 mg) by mouth 2 times daily 180 capsule 3    empagliflozin (JARDIANCE) 10 MG TABS tablet Take 1 tablet (10 mg) by mouth daily 90 tablet 1    EPINEPHrine (ANY BX GENERIC EQUIV) 0.3 MG/0.3ML injection 2-pack Inject 0.3 mLs (0.3 mg) into the muscle once as needed for anaphylaxis 2 each 2    fluticasone-salmeterol (ADVAIR) 500-50 MCG/ACT inhaler Inhale 1 puff  "into the lungs every 12 hours 1 each 11    insulin aspart (NOVOLOG FLEXPEN) 100 UNIT/ML pen Inject 22 Units Subcutaneous 3 times daily (with meals) 22 units before meals plus correction scale. Max daily dose 90 units 90 mL 1    insulin aspart (NOVOLOG VIAL) 100 UNITS/ML vial Inject 18 Units Subcutaneous 3 times daily (with meals) 10 mL 3    insulin glargine U-300 (TOUJEO SOLOSTAR) 300 UNIT/ML (1 units dial) pen Inject 64 Units subcutaneously at bedtime. 30 mL 1    insulin pen needle (32G X 4 MM) 32G X 4 MM miscellaneous Use 3  pen needles daily or as directed. 200 each 1    insulin syringe-needle U-100 (29G X 1/2\" 0.5 ML) 29G X 1/2\" 0.5 ML miscellaneous Use 3 syringes daily or as directed. 200 each 1    insulin syringe-needle U-100 (30G X 1/2\" 0.3 ML) 30G X 1/2\" 0.3 ML miscellaneous Use 3 syringes daily or as directed. 100 each 3    losartan (COZAAR) 25 MG tablet Take 1 tablet (25 mg) by mouth daily 90 tablet 1    meclizine (ANTIVERT) 25 MG tablet Take 1 tablet (25 mg) by mouth 3 times daily as needed for dizziness 90 tablet 1    metFORMIN (GLUCOPHAGE XR) 500 MG 24 hr tablet Take 2 tablets (1,000 mg) by mouth 2 times daily (with meals) 360 tablet 2    montelukast (SINGULAIR) 10 MG tablet Take 1 tablet (10 mg) by mouth at bedtime For allergies/asthma 90 tablet 2    multivitamin, therapeutic (THERA-VIT) TABS Take 1 tablet by mouth daily      nortriptyline (PAMELOR) 10 MG capsule Take 2 capsules (20 mg) by mouth at bedtime 60 capsule 11    omeprazole (PRILOSEC) 40 MG DR capsule Take 1 capsule (40 mg) by mouth daily Take for at least two months 90 capsule 2    permethrin (ELIMITE) 5 % external cream Apply cream from head to toe (except the face); leave on for 8-14 hours then wash off with water; reapply in 1 week if live mites appear. 60 g 1    pregabalin (LYRICA) 100 MG capsule Take 1 capsule (100 mg) by mouth 2 times daily. 60 capsule 11    rizatriptan (MAXALT-MLT) 10 MG ODT TAKE 1 TABLET BY MOUTH AT ONSET OF HEADACHE " FOR MIGRAINE MAY REPEAT IN 2 HOURS. MAX 3 TABS/24 HOURS. Limit to 9 tablets a month. 9 tablet 11    triamcinolone (KENALOG) 0.1 % external cream Apply topically 2 times daily. 45 g 0    warfarin ANTICOAGULANT (COUMADIN) 5 MG tablet Take 17.5 mg every Mon, Fri; 15 mg all other days or as directed by the INR clinic. INR DUE. 90 tablet 0     Facility-Administered Medications Ordered in Other Encounters   Medication Dose Route Frequency Provider Last Rate Last Admin    BUPivacaine (MARCAINE) injection 0.5%  10 mL Intradermal Once Vinnie Espinoza,         DOBUTamine 500 mg in dextrose 5% 250 mL (adult std conc) premix  2.5-20 mcg/kg/min Intravenous Continuous Navarro Butcher MD   Stopped at 04/25/19 1559    iopamidol (ISOVUE-M 200) solution 10 mL  10 mL Intravenous Once Vinnie Espinoza DO        methylPREDNISolone (DEPO-MEDROL) injection 80 mg  80 mg Intramuscular Once Vinnie Espinoza DO        metoprolol (LOPRESSOR) injection 5 mg  5 mg Intravenous Q5 Min PRN Navarro Butcher MD   2 mg at 04/25/19 1559       ALLERGIES:  Allergies   Allergen Reactions    Artificial Sweetner (Informational Only) [Artificial Sweetner (Informational Only) ] GI Disturbance     ALL artificial sweetners cause severe diarrhea & flu symptoms    Hydromorphone Anaphylaxis    Ibuprofen GI Disturbance    Morphine Sulfate Concentrate Anaphylaxis    Morphine [Fumaric Acid] Anaphylaxis    Hydrocodone-Acetaminophen Itching    Tramadol Hives    Trazodone Hives    Gabapentin GI Disturbance     GI upset per pt    Keflex [Cephalexin] Diarrhea     Upset stomach    Codeine Phosphate Itching    Ketorolac Tromethamine Rash       FAMILY HISTORY:  Family History   Problem Relation Age of Onset    Cancer Mother 52        lung, smoked    Cancer - colorectal Father 53        unsure type, pt attributes to radiation exposure    Myocardial Infarction Father 45    Coronary Artery Disease Father     Myocardial Infarction Paternal Grandfather 35     Diabetes Other         nephew- type 1    Diabetes Maternal Aunt         1    Diabetes Maternal Aunt         2    Diabetes Paternal Uncle         1    Diabetes Paternal Uncle         2    Diabetes Paternal Uncle         3    Diabetes Paternal Uncle         4    Colon Cancer No family hx of        SOCIAL HISTORY:   Social History     Socioeconomic History    Marital status: Single    Number of children: 0   Occupational History    Occupation: - with robotics     Employer: [employer]   Tobacco Use    Smoking status: Never    Smokeless tobacco: Current     Types: Chew    Tobacco comments:     Chew   Vaping Use    Vaping status: Never Used   Substance and Sexual Activity    Alcohol use: Not Currently     Alcohol/week: 0.0 standard drinks of alcohol     Comment: occasional, few drinks a month    Drug use: Yes     Comment: CBD occasional    Sexual activity: Never   Social History Narrative    Works at [employer], as a  (25+ years experience).       Social Drivers of Health      Received from [employer] [employer]       VITALS:  BP (!) 150/84   Pulse 88   Temp 97.2  F (36.2  C) (Temporal)   Resp 22   Ht 1.829 m (6')   Wt 132.6 kg (292 lb 4.8 oz)   SpO2 96%   BMI 39.64 kg/m      PHYSICAL EXAM:  Physical Exam  Vitals and nursing note reviewed.   Constitutional:       Appearance: Normal appearance.   HENT:      Head: Normocephalic and atraumatic.      Right Ear: External ear normal.      Left Ear: External ear normal.      Nose: Nose normal.      Mouth/Throat:      Mouth: Mucous membranes are moist.   Eyes:      Extraocular Movements: Extraocular movements intact.      Conjunctiva/sclera: Conjunctivae normal.      Pupils: Pupils are equal, round, and reactive to light.   Cardiovascular:      Rate and Rhythm: Normal rate and regular rhythm.   Pulmonary:      Effort: Pulmonary effort is normal.      Breath  sounds: Normal breath sounds. No wheezing or rales.   Abdominal:      General: Abdomen is flat. There is no distension.      Palpations: Abdomen is soft.      Tenderness: There is no abdominal tenderness. There is no guarding.   Musculoskeletal:         General: Normal range of motion.      Cervical back: Normal range of motion and neck supple.      Right lower leg: No edema.      Left lower leg: No edema.      Comments: Tenderness of the extensor compartment on the left, also tenderness over the lateral elbow, some tenderness of left trapezius as well.  Strength and sensation of the left upper extremity intact.   Lymphadenopathy:      Cervical: No cervical adenopathy.   Skin:     General: Skin is warm and dry.   Neurological:      General: No focal deficit present.      Mental Status: He is alert and oriented to person, place, and time. Mental status is at baseline.      Comments: No gross focal neurologic deficits   Psychiatric:         Mood and Affect: Mood normal.         Behavior: Behavior normal.         Thought Content: Thought content normal.          LAB:  All pertinent labs reviewed and interpreted.  Results for orders placed or performed during the hospital encounter of 01/01/25   Chest XR,  PA & LAT    Impression    IMPRESSION: Negative chest.   Basic metabolic panel   Result Value Ref Range    Sodium 136 135 - 145 mmol/L    Potassium 4.3 3.4 - 5.3 mmol/L    Chloride 99 98 - 107 mmol/L    Carbon Dioxide (CO2) 22 22 - 29 mmol/L    Anion Gap 15 7 - 15 mmol/L    Urea Nitrogen 9.0 6.0 - 20.0 mg/dL    Creatinine 0.80 0.67 - 1.17 mg/dL    GFR Estimate >90 >60 mL/min/1.73m2    Calcium 10.0 8.8 - 10.4 mg/dL    Glucose 291 (H) 70 - 99 mg/dL   Result Value Ref Range    INR 2.20 (H) 0.85 - 1.15   Result Value Ref Range    Troponin T, High Sensitivity 8 <=22 ng/L   Result Value Ref Range    Magnesium 1.6 (L) 1.7 - 2.3 mg/dL   Nt probnp inpatient (BNP)   Result Value Ref Range    N terminal Pro BNP Inpatient <36 0 -  900 pg/mL   Blood gas venous   Result Value Ref Range    pH Venous 7.39 7.32 - 7.43    pCO2 Venous 44 40 - 50 mm Hg    pO2 Venous 28 25 - 47 mm Hg    Bicarbonate Venous 27 21 - 28 mmol/L    Base Excess/Deficit Venous 1.1 -3.0 - 3.0 mmol/L    FIO2 21     Oxyhemoglobin Venous 58 (L) 70 - 75 %    O2 Sat, Venous 59.0 (L) 70.0 - 75.0 %   CBC with platelets and differential   Result Value Ref Range    WBC Count 7.7 4.0 - 11.0 10e3/uL    RBC Count 5.10 4.40 - 5.90 10e6/uL    Hemoglobin 14.5 13.3 - 17.7 g/dL    Hematocrit 43.8 40.0 - 53.0 %    MCV 86 78 - 100 fL    MCH 28.4 26.5 - 33.0 pg    MCHC 33.1 31.5 - 36.5 g/dL    RDW 14.2 10.0 - 15.0 %    Platelet Count 216 150 - 450 10e3/uL    % Neutrophils 70 %    % Lymphocytes 20 %    % Monocytes 7 %    % Eosinophils 2 %    % Basophils 0 %    % Immature Granulocytes 0 %    NRBCs per 100 WBC 0 <1 /100    Absolute Neutrophils 5.4 1.6 - 8.3 10e3/uL    Absolute Lymphocytes 1.6 0.8 - 5.3 10e3/uL    Absolute Monocytes 0.6 0.0 - 1.3 10e3/uL    Absolute Eosinophils 0.1 0.0 - 0.7 10e3/uL    Absolute Basophils 0.0 0.0 - 0.2 10e3/uL    Absolute Immature Granulocytes 0.0 <=0.4 10e3/uL    Absolute NRBCs 0.0 10e3/uL       RADIOLOGY:  Reviewed all pertinent imaging. Please see official radiology report.  Chest XR,  PA & LAT   Final Result   IMPRESSION: Negative chest.        Shahab Rubin M.D.  Emergency Medicine  [facility] EMERGENCY DEPARTMENT  [address] [address] [phone]  Dept: [phone]       Shahab Rubin MD  01/01/25 [id number]

## 2025-01-02 NOTE — DISCHARGE INSTRUCTIONS
Take pain medication as prescribed, take prednisone to help with inflammation.  Follow-up with your primary care doctor in 3 to 5 days for recheck

## 2025-01-06 LAB
ATRIAL RATE - MUSE: 84 BPM
DIASTOLIC BLOOD PRESSURE - MUSE: NORMAL MMHG
INTERPRETATION ECG - MUSE: NORMAL
P AXIS - MUSE: 45 DEGREES
PR INTERVAL - MUSE: 172 MS
QRS DURATION - MUSE: 80 MS
QT - MUSE: 368 MS
QTC - MUSE: 434 MS
R AXIS - MUSE: 10 DEGREES
SYSTOLIC BLOOD PRESSURE - MUSE: NORMAL MMHG
T AXIS - MUSE: 27 DEGREES
VENTRICULAR RATE- MUSE: 84 BPM

## 2025-01-20 ENCOUNTER — TELEPHONE (OUTPATIENT)
Dept: ANTICOAGULATION | Facility: CLINIC | Age: 60
End: 2025-01-20
Payer: COMMERCIAL

## 2025-01-20 NOTE — TELEPHONE ENCOUNTER
ANTICOAGULATION     Adarsh Salvador is overdue for an INR check.     Left message for patient to call and schedule lab appointment as soon as possible. If returning call, please schedule.     Joby Marie RN  1/20/2025  Anticoagulation Clinic  [facility] for routing messages: ermias MARTÍNEZ  [facility] patient phone line: [phone]

## 2025-02-04 ENCOUNTER — OFFICE VISIT (OUTPATIENT)
Dept: FAMILY MEDICINE | Facility: CLINIC | Age: 60
End: 2025-02-04
Payer: COMMERCIAL

## 2025-02-04 ENCOUNTER — ANTICOAGULATION THERAPY VISIT (OUTPATIENT)
Dept: ANTICOAGULATION | Facility: CLINIC | Age: 60
End: 2025-02-04

## 2025-02-04 VITALS
RESPIRATION RATE: 16 BRPM | HEIGHT: 72 IN | TEMPERATURE: 96.7 F | OXYGEN SATURATION: 98 % | BODY MASS INDEX: 39.42 KG/M2 | SYSTOLIC BLOOD PRESSURE: 136 MMHG | DIASTOLIC BLOOD PRESSURE: 88 MMHG | HEART RATE: 88 BPM | WEIGHT: 291 LBS

## 2025-02-04 DIAGNOSIS — I82.409 RECURRENT DEEP VEIN THROMBOSIS (DVT) (H): Primary | ICD-10-CM

## 2025-02-04 DIAGNOSIS — I82.409 RECURRENT DEEP VEIN THROMBOSIS (DVT) (H): ICD-10-CM

## 2025-02-04 DIAGNOSIS — I10 HYPERTENSION, UNSPECIFIED TYPE: ICD-10-CM

## 2025-02-04 DIAGNOSIS — Z79.4 TYPE 2 DIABETES MELLITUS WITH DIABETIC POLYNEUROPATHY, WITH LONG-TERM CURRENT USE OF INSULIN (H): ICD-10-CM

## 2025-02-04 DIAGNOSIS — E78.5 DYSLIPIDEMIA: ICD-10-CM

## 2025-02-04 DIAGNOSIS — Z00.00 ENCOUNTER FOR MEDICARE ANNUAL WELLNESS EXAM: Primary | ICD-10-CM

## 2025-02-04 DIAGNOSIS — Z79.01 CURRENT USE OF LONG TERM ANTICOAGULATION: ICD-10-CM

## 2025-02-04 DIAGNOSIS — E11.42 TYPE 2 DIABETES MELLITUS WITH DIABETIC POLYNEUROPATHY, WITH LONG-TERM CURRENT USE OF INSULIN (H): ICD-10-CM

## 2025-02-04 DIAGNOSIS — R10.31 RIGHT INGUINAL PAIN: ICD-10-CM

## 2025-02-04 DIAGNOSIS — E66.01 MORBID OBESITY (H): ICD-10-CM

## 2025-02-04 DIAGNOSIS — Z12.5 SCREENING FOR PROSTATE CANCER: ICD-10-CM

## 2025-02-04 DIAGNOSIS — J45.50 SEVERE PERSISTENT ASTHMA WITHOUT COMPLICATION (H): ICD-10-CM

## 2025-02-04 LAB
ANION GAP SERPL CALCULATED.3IONS-SCNC: 13 MMOL/L (ref 7–15)
BUN SERPL-MCNC: 8.2 MG/DL (ref 6–20)
CALCIUM SERPL-MCNC: 9.1 MG/DL (ref 8.8–10.4)
CHLORIDE SERPL-SCNC: 102 MMOL/L (ref 98–107)
CHOLEST SERPL-MCNC: 362 MG/DL
CREAT SERPL-MCNC: 0.78 MG/DL (ref 0.67–1.17)
EGFRCR SERPLBLD CKD-EPI 2021: >90 ML/MIN/1.73M2
FASTING STATUS PATIENT QL REPORTED: YES
FASTING STATUS PATIENT QL REPORTED: YES
GLUCOSE SERPL-MCNC: 310 MG/DL (ref 70–99)
HCO3 SERPL-SCNC: 23 MMOL/L (ref 22–29)
HDLC SERPL-MCNC: 38 MG/DL
INR BLD: 1.9 (ref 0.9–1.1)
LDLC SERPL CALC-MCNC: ABNORMAL MG/DL
LDLC SERPL DIRECT ASSAY-MCNC: 234 MG/DL
NONHDLC SERPL-MCNC: 324 MG/DL
POTASSIUM SERPL-SCNC: 4.2 MMOL/L (ref 3.4–5.3)
PSA SERPL DL<=0.01 NG/ML-MCNC: 0.39 NG/ML (ref 0–3.5)
SODIUM SERPL-SCNC: 138 MMOL/L (ref 135–145)
TRIGL SERPL-MCNC: 420 MG/DL

## 2025-02-04 PROCEDURE — G2211 COMPLEX E/M VISIT ADD ON: HCPCS | Performed by: PHYSICIAN ASSISTANT

## 2025-02-04 PROCEDURE — 83721 ASSAY OF BLOOD LIPOPROTEIN: CPT | Mod: 59 | Performed by: PHYSICIAN ASSISTANT

## 2025-02-04 PROCEDURE — 80061 LIPID PANEL: CPT | Performed by: PHYSICIAN ASSISTANT

## 2025-02-04 PROCEDURE — 85610 PROTHROMBIN TIME: CPT | Performed by: PHYSICIAN ASSISTANT

## 2025-02-04 PROCEDURE — 36415 COLL VENOUS BLD VENIPUNCTURE: CPT | Performed by: PHYSICIAN ASSISTANT

## 2025-02-04 PROCEDURE — G0402 INITIAL PREVENTIVE EXAM: HCPCS | Performed by: PHYSICIAN ASSISTANT

## 2025-02-04 PROCEDURE — 99214 OFFICE O/P EST MOD 30 MIN: CPT | Mod: 25 | Performed by: PHYSICIAN ASSISTANT

## 2025-02-04 PROCEDURE — G0103 PSA SCREENING: HCPCS | Performed by: PHYSICIAN ASSISTANT

## 2025-02-04 PROCEDURE — 80048 BASIC METABOLIC PNL TOTAL CA: CPT | Performed by: PHYSICIAN ASSISTANT

## 2025-02-04 RX ORDER — MONTELUKAST SODIUM 10 MG/1
TABLET ORAL
Qty: 90 TABLET | OUTPATIENT
Start: 2025-02-04

## 2025-02-04 RX ORDER — AMLODIPINE BESYLATE 10 MG/1
TABLET ORAL
Qty: 90 TABLET | OUTPATIENT
Start: 2025-02-04

## 2025-02-04 RX ORDER — CHLORTHALIDONE 25 MG/1
TABLET ORAL
Qty: 90 TABLET | OUTPATIENT
Start: 2025-02-04

## 2025-02-04 RX ORDER — AMLODIPINE BESYLATE 10 MG/1
10 TABLET ORAL DAILY
Qty: 30 TABLET | Refills: 5 | Status: SHIPPED | OUTPATIENT
Start: 2025-02-04

## 2025-02-04 RX ORDER — MONTELUKAST SODIUM 10 MG/1
10 TABLET ORAL AT BEDTIME
Qty: 30 TABLET | Refills: 5 | Status: SHIPPED | OUTPATIENT
Start: 2025-02-04

## 2025-02-04 RX ORDER — LOSARTAN POTASSIUM 25 MG/1
25 TABLET ORAL DAILY
Qty: 30 TABLET | Refills: 5 | Status: SHIPPED | OUTPATIENT
Start: 2025-02-04

## 2025-02-04 RX ORDER — ATORVASTATIN CALCIUM 40 MG/1
40 TABLET, FILM COATED ORAL EVERY EVENING
Qty: 90 TABLET | OUTPATIENT
Start: 2025-02-04

## 2025-02-04 RX ORDER — ALBUTEROL SULFATE 90 UG/1
2 INHALANT RESPIRATORY (INHALATION) EVERY 6 HOURS PRN
Qty: 6.7 G | Refills: 3 | Status: SHIPPED | OUTPATIENT
Start: 2025-02-04

## 2025-02-04 RX ORDER — CHLORTHALIDONE 25 MG/1
25 TABLET ORAL DAILY
Qty: 30 TABLET | Refills: 5 | Status: SHIPPED | OUTPATIENT
Start: 2025-02-04

## 2025-02-04 RX ORDER — FLUTICASONE PROPIONATE AND SALMETEROL 500; 50 UG/1; UG/1
1 POWDER RESPIRATORY (INHALATION) EVERY 12 HOURS
Qty: 1 EACH | Refills: 11 | Status: SHIPPED | OUTPATIENT
Start: 2025-02-04

## 2025-02-04 RX ORDER — ATORVASTATIN CALCIUM 40 MG/1
40 TABLET, FILM COATED ORAL EVERY EVENING
Qty: 30 TABLET | Refills: 5 | Status: SHIPPED | OUTPATIENT
Start: 2025-02-04 | End: 2025-02-05 | Stop reason: ALTCHOICE

## 2025-02-04 SDOH — HEALTH STABILITY: PHYSICAL HEALTH: ON AVERAGE, HOW MANY DAYS PER WEEK DO YOU ENGAGE IN MODERATE TO STRENUOUS EXERCISE (LIKE A BRISK WALK)?: 2 DAYS

## 2025-02-04 ASSESSMENT — ASTHMA QUESTIONNAIRES
ACT_TOTALSCORE: 16
QUESTION_2 LAST FOUR WEEKS HOW OFTEN HAVE YOU HAD SHORTNESS OF BREATH: THREE TO SIX TIMES A WEEK
QUESTION_5 LAST FOUR WEEKS HOW WOULD YOU RATE YOUR ASTHMA CONTROL: SOMEWHAT CONTROLLED
ACT_TOTALSCORE: 16
QUESTION_4 LAST FOUR WEEKS HOW OFTEN HAVE YOU USED YOUR RESCUE INHALER OR NEBULIZER MEDICATION (SUCH AS ALBUTEROL): TWO OR THREE TIMES PER WEEK
QUESTION_1 LAST FOUR WEEKS HOW MUCH OF THE TIME DID YOUR ASTHMA KEEP YOU FROM GETTING AS MUCH DONE AT WORK, SCHOOL OR AT HOME: SOME OF THE TIME
QUESTION_3 LAST FOUR WEEKS HOW OFTEN DID YOUR ASTHMA SYMPTOMS (WHEEZING, COUGHING, SHORTNESS OF BREATH, CHEST TIGHTNESS OR PAIN) WAKE YOU UP AT NIGHT OR EARLIER THAN USUAL IN THE MORNING: ONCE OR TWICE

## 2025-02-04 ASSESSMENT — SOCIAL DETERMINANTS OF HEALTH (SDOH): HOW OFTEN DO YOU GET TOGETHER WITH FRIENDS OR RELATIVES?: ONCE A WEEK

## 2025-02-04 NOTE — PROGRESS NOTES
Preventive Care Visit  [facility] [place] Kristen M. Kehr, PA-C, Family Medicine  Feb 4, 2025      Assessment & Plan     Encounter for Medicare annual wellness exam  Health maintenance reviewed and updated.  Unable to update vaccinations in clinic due to Medicare coverage  He will have them completed at the pharmacy.   Recommend COVID, Influenza, Hepatitis B and Shingles vaccines.     Hypertension, unspecified type  Stable, refills given.   Check kidney function  - amLODIPine (NORVASC) 10 MG tablet; Take 1 tablet (10 mg) by mouth daily. for blood pressure  - chlorthalidone (HYGROTON) 25 MG tablet; Take 1 tablet (25 mg) by mouth daily. Add on for blood pressure  - losartan (COZAAR) 25 MG tablet; Take 1 tablet (25 mg) by mouth daily.  - Basic metabolic panel  (Ca, Cl, CO2, Creat, Gluc, K, Na, BUN); Future  - Basic metabolic panel  (Ca, Cl, CO2, Creat, Gluc, K, Na, BUN)    Severe persistent asthma without complication (H)  He is having trouble with allergen exposure.   Regular use of the singulair and albuterol advised and daily use of the steroid inhaler needed.   - albuterol (PROAIR HFA/PROVENTIL HFA/VENTOLIN HFA) 108 (90 Base) MCG/ACT inhaler; Inhale 2 puffs into the lungs every 6 hours as needed for shortness of breath or wheezing.  - fluticasone-salmeterol (ADVAIR) 500-50 MCG/ACT inhaler; Inhale 1 puff into the lungs every 12 hours.    Dyslipidemia  Check lipids today.   He is on the atorvastatin and will continue with the 40 mg dose.   Continue to work on healthy habits.   - Lipid panel reflex to direct LDL Fasting; Future  - Lipid panel reflex to direct LDL Fasting    Right inguinal pain  Referral placed.   - Adult Gen Surg  Referral; Future    Type 2 diabetes mellitus with diabetic polyneuropathy, with long-term current use of insulin (H)  Continue to see Endocrinology regularly, he has an appointment scheduled in March     Recurrent deep vein thrombosis (DVT) (H)  Current use of long  term anticoagulation  Continue to see Anticoagulation Clinic   - INR point of care (finger stick)    Screening for prostate cancer  - PSA, screen; Future  - PSA, screen    Morbid obesity (H)  Work on healthy habits.     The longitudinal plan of care for the diagnosis(es)/condition(s) as documented were addressed during this visit. Due to the added complexity in care, I will continue to support Adarsh in the subsequent management and with ongoing continuity of care.          BMI  Estimated body mass index is 39.47 kg/m  as calculated from the following:    Height as of this encounter: 1.829 m (6').    Weight as of this encounter: 132 kg (291 lb).   Weight management plan: Discussed healthy diet and exercise guidelines    Counseling  Appropriate preventive services were addressed with this patient via screening, questionnaire, or discussion as appropriate for fall prevention, nutrition, physical activity, Tobacco-use cessation, social engagement, weight loss and cognition.  Checklist reviewing preventive services available has been given to the patient.  Reviewed patient's diet, addressing concerns and/or questions.   He is at risk for lack of exercise and has been provided with information to increase physical activity for the benefit of his well-being.   The patient was instructed to see the dentist every 6 months.   The patient was provided with written information regarding signs of hearing loss.   Information on urinary incontinence and treatment options given to patient.           Subjective   Adarsh is a 59 year old, presenting for the following:  Wellness Visit        2/4/2025     9:10 AM   Additional Questions   Roomed by Delmar COOK  Adarsh is here today for preventative appointment.     He will also need refills of medications for the following chronic stable health conditions:    Hypertension: managed with multiple medications, amlodipine, losartan and chlorthalidone.   Asthma: he has difficulty  affording his medications, he reports taking the daily inhaler, but he is living in an apartment that has a lot of allergens and he will be moving. He will need inhaler refills.   Hyperlipidemia: managed with atorvastatin.   Right inguinal pain: he is requesting a referral to General Surgery. He previously saw Dr. Poon prior to having knee surgery He has a previous hernia repair and thinks there is problems with the mesh.   Type 2 diabetes: managed with insulin and oral medications, managed by Endocrinology, he has a follow up appointment next month.   Recurrent DVT: chronic anticoagulation, managed by anticoagulation clinic          Health Care Directive  Patient does not have a Health Care Directive: Discussed advance care planning with patient; information given to patient to review.      2/4/2025   General Health   How would you rate your overall physical health? (!) FAIR   Feel stress (tense, anxious, or unable to sleep) Only a little   (!) STRESS CONCERN      2/4/2025   Nutrition   Diet: Carbohydrate counting         2/4/2025   Exercise   Days per week of moderate/strenous exercise 2 days   (!) EXERCISE CONCERN      2/4/2025   Social Factors   Frequency of gathering with friends or relatives Once a week   Worry food won't last until get money to buy more No   Food not last or not have enough money for food? Yes   Do you have housing? (Housing is defined as stable permanent housing and does not include staying ouside in a car, in a tent, in an abandoned building, in an overnight shelter, or couch-surfing.) Yes   Are you worried about losing your housing? Yes   Lack of transportation? No   Unable to get utilities (heat,electricity)? No   Want help with housing or utility concern? (!) YES   (!) FOOD SECURITY CONCERN PRESENT(!) HOUSING CONCERN PRESENT      2/4/2025   Fall Risk   Fallen 2 or more times in the past year? Yes   Trouble with walking or balance? Yes   Reason Gait Speed Test Not Completed Patient  declines   Reason for decline due to knee issues          2/4/2025   Activities of Daily Living- Home Safety   Needs help with the following daily activites None of the above   Safety concerns in the home None of the above         2/4/2025   Dental   Dentist two times every year? (!) NO         2/4/2025   Hearing Screening   Hearing concerns? (!) IT'S HARD TO FOLLOW A CONVERSATION IN A NOISY RESTAURANT OR CROWDED ROOM.         2/4/2025   Driving Risk Screening   Patient/family members have concerns about driving No         2/4/2025   General Alertness/Fatigue Screening   Have you been more tired than usual lately? No         2/4/2025   Urinary Incontinence Screening   Bothered by leaking urine in past 6 months Yes         2/4/2025   TB Screening   Were you born outside of the [place]? No         Today's PHQ-2 Score:       2/4/2025     9:13 AM   PHQ-2 ( 1999 [employer])   Q1: Little interest or pleasure in doing things 1   Q2: Feeling down, depressed or hopeless 0   PHQ-2 Score 1    Q1: Little interest or pleasure in doing things Several days   Q2: Feeling down, depressed or hopeless Not at all   PHQ-2 Score 1       Patient-reported           2/4/2025   Substance Use   Alcohol more than 3/day or more than 7/wk No   Do you have a current opioid prescription? No   How severe/bad is pain from 1 to 10? 6/10   Do you use any other substances recreationally? No     Social History     Tobacco Use    Smoking status: Never    Smokeless tobacco: Current     Types: Chew    Tobacco comments:     Chew   Vaping Use    Vaping status: Never Used   Substance Use Topics    Alcohol use: Not Currently     Alcohol/week: 0.0 standard drinks of alcohol     Comment: occasional, few drinks a month    Drug use: Yes     Comment: CBD occasional       Last PSA:   Prostate Specific Antigen Screen   Date Value Ref Range Status   06/07/2022 0.26 0.00 - 4.00 ug/L Final     ASCVD Risk   The 10-year ASCVD risk score (Kar MACKAY, et al., 2019) is:  30.4%    Values used to calculate the score:      Age: 59 years      Sex: Male      Is Non- : No      Diabetic: Yes      Tobacco smoker: No      Systolic Blood Pressure: 136 mmHg      Is BP treated: Yes      HDL Cholesterol: 34 mg/dL      Total Cholesterol: 291 mg/dL            Reviewed and updated as needed this visit by Provider   Tobacco  Allergies  Meds  Problems  Med Hx  Surg Hx  Fam Hx            Past Medical History:   Diagnosis Date    Anaphylactic reaction 08/14/2015    Anxiety     Depression     DM2 (diabetes mellitus, type 2) (H)     GERD (gastroesophageal reflux disease)     HTN (hypertension)     IBS (irritable bowel syndrome)     Kidney stone 06/15/2011    Pt believes these were Calcium stones    Neuropathy     Pulmonary embolism, other, unspecified chronicity, unspecified whether acute cor pulmonale present (H) 08/15/2023     Past Surgical History:   Procedure Laterality Date    ARTHROPLASTY KNEE Right 9/26/2023    Procedure: ARTHROPLASTY, KNEE, TOTAL Right;  Surgeon: Edwin Ch MD;  Location: [facility]    ARTHROSCOPY KNEE WITH MEDIAL MENISCECTOMY Right 8/19/2022    Procedure: examination under anesthesia, right knee arthroscopy, meniscectomy;  Surgeon: Carlos A Em MD;  Location: [facility] OR    COLONOSCOPY  5/1/2012    Procedure:COLONOSCOPY; screening colonoscopy; Surgeon:KINGSLEY DOS SANTOS; Location:[facility]    COLONOSCOPY  4/21/2014    Procedure: COMBINED COLONOSCOPY, SINGLE BIOPSY/POLYPECTOMY BY BIOPSY;  Surgeon: Duane, William Charles, MD;  Location: [facility]    COLONOSCOPY N/A 4/21/2022    Procedure: COLONOSCOPY, WITH POLYPECTOMY AND BIOPSY;  Surgeon: Luiz Calvert MD;  Location:  GI    CYSTOSCOPY  05/01/2008    CYSTOSCOPY W/ URETERAL STENT PLACEMENT 05/01/2008     CYSTOSCOPY  09/26/2010    CYSTOSCOPY W/ URETERAL STENT PLACEMENT 09/26/2010 Left     CYSTOSCOPY  05/18/2012    CYSTOSCOPY, LEFT URETEROSCOPY AND STENT PLACEMENT left retrograde  05/18/2012     CYSTOSCOPY,+URETEROSCOPY  06/10/2008    URETEROSCOPY 06/10/2008 Right     HC BREATH HYDROGEN TEST  3/7/2014    Procedure: HYDROGEN BREATH TEST;  Surgeon: Darion Swift MD;  Location: [facility]    INJECT EPIDURAL LUMBAR N/A 7/7/2022    Procedure: INJECTION, SPINE, LUMBAR, EPIDURAL L5/S1;  Surgeon: Michi Hahn MD;  Location: [facility]    LITHOTRIPSY  09/30/2010    LITHOTRIPSY 09/30/2010 LEFT EXTRACORPOREAL SHOCK WAVE LITHOTRIPSY, FLEXIBLE CYSTOSCOPY, LEFT STENT REMOVAL      ORTHOPEDIC SURGERY  10/03/2007    KNEE ARTHROSCOPY 10/03/2007 RightX2      RELEASE CARPAL TUNNEL Right 1/26/2018    Procedure: RELEASE CARPAL TUNNEL;  Right carpal tunnel release;  Surgeon: Wiliam Saenz MD;  Location: [facility]    VASCULAR SURGERY  11/24/2008    Radiofrequency ablation left saphenous vein 11/24/2008 Done by Dr. Lozoya       BP Readings from Last 3 Encounters:   02/04/25 136/88   01/01/25 (!) 150/84   12/11/24 (!) 155/105    Wt Readings from Last 3 Encounters:   02/04/25 132 kg (291 lb)   01/01/25 132.6 kg (292 lb 4.8 oz)   11/21/24 129.7 kg (286 lb)                  Patient Active Problem List   Diagnosis    GERD (gastroesophageal reflux disease)    Chronic RLQ pain    Constipation    Chronic maxillary sinusitis    Chronic rhinitis    Hypertriglyceridemia    Hypertension, unspecified type    Anemia in other chronic diseases classified elsewhere    Fatty liver    Irritable bowel syndrome with diarrhea    Right inguinal hernia    Type 2 diabetes mellitus with diabetic polyneuropathy, with long-term current use of insulin (H)    Hyperlipidemia LDL goal <100    CONNOR (generalized anxiety disorder)    Insomnia, unspecified type    Morbid obesity (H)    Neuropathy    Recurrent deep vein thrombosis (DVT) (H)    Precordial pain    Dyslipidemia    Elevated C-reactive protein    Gastric reflux    Internal derangement of knee    Nicotine dependence    Varicose veins of lower extremity    Vitamin D deficiency    Low  volume of ejaculated semen    Bilateral leg pain    Chronic pain of right knee    Acute pain of right knee    Aftercare following surgery of the musculoskeletal system    DVT (deep venous thrombosis) (H)    S/P TKR (total knee replacement), right    Status post total right knee replacement    Severe persistent asthma without complication (H)    Chronic kidney disease, stage 2 (mild)    Current use of long term anticoagulation     Past Surgical History:   Procedure Laterality Date    ARTHROPLASTY KNEE Right 9/26/2023    Procedure: ARTHROPLASTY, KNEE, TOTAL Right;  Surgeon: Edwin Ch MD;  Location: [facility]    ARTHROSCOPY KNEE WITH MEDIAL MENISCECTOMY Right 8/19/2022    Procedure: examination under anesthesia, right knee arthroscopy, meniscectomy;  Surgeon: aCrlos A Em MD;  Location: [facility] OR    COLONOSCOPY  5/1/2012    Procedure:COLONOSCOPY; screening colonoscopy; Surgeon:KINGSLEY DOS SANTOS; [facility]    COLONOSCOPY  4/21/2014    Procedure: COMBINED COLONOSCOPY, SINGLE BIOPSY/POLYPECTOMY BY BIOPSY;  Surgeon: Duane, William Charles, MD;  Location: [facility]    COLONOSCOPY N/A 4/21/2022    Procedure: COLONOSCOPY, WITH POLYPECTOMY AND BIOPSY;  Surgeon: Luiz Calvert MD;  Location: [facility]    CYSTOSCOPY  05/01/2008    CYSTOSCOPY W/ URETERAL STENT PLACEMENT 05/01/2008     CYSTOSCOPY  09/26/2010    CYSTOSCOPY W/ URETERAL STENT PLACEMENT 09/26/2010 Left     CYSTOSCOPY  05/18/2012    CYSTOSCOPY, LEFT URETEROSCOPY AND STENT PLACEMENT left retrograde 05/18/2012     CYSTOSCOPY,+URETEROSCOPY  06/10/2008    URETEROSCOPY 06/10/2008 Right     HC BREATH HYDROGEN TEST  3/7/2014    Procedure: HYDROGEN BREATH TEST;  Surgeon: Darion Swift MD;  Location: [facility]    INJECT EPIDURAL LUMBAR N/A 7/7/2022    Procedure: INJECTION, SPINE, LUMBAR, EPIDURAL L5/S1;  Surgeon: Michi Hahn MD;  [facility]    LITHOTRIPSY  09/30/2010    LITHOTRIPSY 09/30/2010 LEFT EXTRACORPOREAL SHOCK WAVE LITHOTRIPSY,  FLEXIBLE CYSTOSCOPY, LEFT STENT REMOVAL      ORTHOPEDIC SURGERY  10/03/2007    KNEE ARTHROSCOPY 10/03/2007 RightX2      RELEASE CARPAL TUNNEL Right 1/26/2018    Procedure: RELEASE CARPAL TUNNEL;  Right carpal tunnel release;  Surgeon: Wiliam Saenz MD;  Location: [facility]    VASCULAR SURGERY  11/24/2008    Radiofrequency ablation left saphenous vein 11/24/2008 Done by Dr. Lozoya         Social History     Tobacco Use    Smoking status: Never    Smokeless tobacco: Current     Types: Chew    Tobacco comments:     Chew   Substance Use Topics    Alcohol use: Not Currently     Alcohol/week: 0.0 standard drinks of alcohol     Comment: occasional, few drinks a month     Family History   Problem Relation Age of Onset    Cancer Mother 52        lung, smoked    Cancer - colorectal Father 53        unsure type, pt attributes to radiation exposure    Myocardial Infarction Father 45    Coronary Artery Disease Father     Myocardial Infarction Paternal Grandfather 35    Diabetes Other         nephew- type 1    Diabetes Maternal Aunt         1    Diabetes Maternal Aunt         2    Diabetes Paternal Uncle         1    Diabetes Paternal Uncle         2    Diabetes Paternal Uncle         3    Diabetes Paternal Uncle         4    Colon Cancer No family hx of          Current Outpatient Medications   Medication Sig Dispense Refill    albuterol (PROAIR HFA/PROVENTIL HFA/VENTOLIN HFA) 108 (90 Base) MCG/ACT inhaler Inhale 2 puffs into the lungs every 6 hours as needed for shortness of breath or wheezing. 6.7 g 3    amLODIPine (NORVASC) 10 MG tablet Take 1 tablet (10 mg) by mouth daily. for blood pressure 30 tablet 5    atorvastatin (LIPITOR) 40 MG tablet Take 1 tablet (40 mg) by mouth every evening. 30 tablet 5    chlorthalidone (HYGROTON) 25 MG tablet Take 1 tablet (25 mg) by mouth daily. Add on for blood pressure 30 tablet 5    fluticasone-salmeterol (ADVAIR) 500-50 MCG/ACT inhaler Inhale 1 puff into the lungs every 12 hours. 1  "each 11    losartan (COZAAR) 25 MG tablet Take 1 tablet (25 mg) by mouth daily. 30 tablet 5    montelukast (SINGULAIR) 10 MG tablet Take 1 tablet (10 mg) by mouth at bedtime. For allergies/asthma 30 tablet 5    acetaminophen (TYLENOL) 325 MG tablet Take 2 tablets (650 mg) by mouth every 4 hours as needed for other (mild pain) 100 tablet 0    blood glucose (ACCU-CHEK GUIDE) test strip Use to test blood sugar 3 times daily or as directed. 300 strip 3    blood glucose monitoring (ACCU-CHEK FASTCLIX) lancets Use to test blood sugar 1 times daily or as directed.  Ok to substitute alternative if insurance prefers. 102 each 11    calcium carbonate (TUMS) 500 MG chewable tablet Take 1 chew tab by mouth daily      Continuous Glucose Sensor (FREESTYLE FROYLAN 3 SENSOR) MISC 1 each every 14 days. 2 each 5    DULoxetine (CYMBALTA) 60 MG capsule Take 1 capsule (60 mg) by mouth 2 times daily 180 capsule 3    empagliflozin (JARDIANCE) 10 MG TABS tablet Take 1 tablet (10 mg) by mouth daily 90 tablet 1    EPINEPHrine (ANY BX GENERIC EQUIV) 0.3 MG/0.3ML injection 2-pack Inject 0.3 mLs (0.3 mg) into the muscle once as needed for anaphylaxis 2 each 2    insulin aspart (NOVOLOG FLEXPEN) 100 UNIT/ML pen Inject 22 Units subcutaneously 3 times daily (with meals). 22 units before meals plus correction scale. Max daily dose 90 units 90 mL 1    insulin aspart (NOVOLOG VIAL) 100 UNITS/ML vial Inject 18 Units Subcutaneous 3 times daily (with meals) 10 mL 3    insulin glargine U-300 (TOUJEO SOLOSTAR) 300 UNIT/ML (1 units dial) pen Inject 64 Units subcutaneously at bedtime. 30 mL 1    insulin pen needle (32G X 4 MM) 32G X 4 MM miscellaneous Use 3  pen needles daily or as directed. 200 each 1    insulin syringe-needle U-100 (29G X 1/2\" 0.5 ML) 29G X 1/2\" 0.5 ML miscellaneous Use 3 syringes daily or as directed. 200 each 1    insulin syringe-needle U-100 (30G X 1/2\" 0.3 ML) 30G X 1/2\" 0.3 ML miscellaneous Use 3 syringes daily or as directed. 100 each " 3    meclizine (ANTIVERT) 25 MG tablet Take 1 tablet (25 mg) by mouth 3 times daily as needed for dizziness 90 tablet 1    metFORMIN (GLUCOPHAGE XR) 500 MG 24 hr tablet Take 2 tablets (1,000 mg) by mouth 2 times daily (with meals) 360 tablet 2    multivitamin, therapeutic (THERA-VIT) TABS Take 1 tablet by mouth daily      nortriptyline (PAMELOR) 10 MG capsule Take 2 capsules (20 mg) by mouth at bedtime 60 capsule 11    omeprazole (PRILOSEC) 40 MG DR capsule Take 1 capsule (40 mg) by mouth daily Take for at least two months 90 capsule 2    permethrin (ELIMITE) 5 % external cream Apply cream from head to toe (except the face); leave on for 8-14 hours then wash off with water; reapply in 1 week if live mites appear. 60 g 1    pregabalin (LYRICA) 100 MG capsule Take 1 capsule (100 mg) by mouth 2 times daily. 60 capsule 11    rizatriptan (MAXALT-MLT) 10 MG ODT TAKE 1 TABLET BY MOUTH AT ONSET OF HEADACHE FOR MIGRAINE MAY REPEAT IN 2 HOURS. MAX 3 TABS/24 HOURS. Limit to 9 tablets a month. 9 tablet 11    triamcinolone (KENALOG) 0.1 % external cream Apply topically 2 times daily. 45 g 0    warfarin ANTICOAGULANT (COUMADIN) 5 MG tablet TAKE 17.5 MG(3.5 TABLETS) EVERY MONDAY, FRIDAY; 15 MG(3 TABLETS) ALL OTHER DAYS BY THE INR CLINIC 90 tablet 0     Allergies   Allergen Reactions    Artificial Sweetner (Informational Only) [Artificial Sweetner (Informational Only) ] GI Disturbance     ALL artificial sweetners cause severe diarrhea & flu symptoms    Hydromorphone Anaphylaxis    Ibuprofen GI Disturbance    Morphine Sulfate Concentrate Anaphylaxis    Morphine [Fumaric Acid] Anaphylaxis    Hydrocodone-Acetaminophen Itching    Tramadol Hives    Trazodone Hives    Gabapentin GI Disturbance     GI upset per pt    Keflex [Cephalexin] Diarrhea     Upset stomach    Codeine Phosphate Itching    Ketorolac Tromethamine Rash     Recent Labs   Lab Test 01/01/25  1913 11/21/24  2115 11/20/24  2034 08/06/24  0921 04/23/24  1013 02/08/24  0812  10/02/23  0349 08/15/23  1058 04/10/23  0925 02/21/22  1811 01/24/22  0812 01/29/21  0953 12/03/20  1038 08/13/20  0000 01/12/18  0955 01/09/18  1117   A1C  --   --   --  10.5* 13.5* 12.2*  --    < > 11.3*   < > 11.2*  --    < >  --    < >  --    LDL  --   --   --  178*  --   --   --   --  192*  --  172*  --   --   --    < >  --    HDL  --   --   --  34*  --   --   --   --  46  --  33*  --   --   --    < >  --    TRIG  --   --   --  393*  --   --   --   --  452*  --  699*  --   --   --    < >  --    ALT  --  17  --   --   --   --  18  --  31  --   --   --   --   --    < >  --    CR 0.80 0.82   < > 0.94 0.90 0.84 0.98   < > 0.94   < >  --  0.94  --  1.02   < >  --    GFRESTIMATED >90 >90   < > >90 >90 >90 >90   < > >90   < >  --  >90  --  >60   < >  --    GFRESTBLACK  --   --   --   --   --   --   --   --   --   --   --  >90  --  >60   < >  --    POTASSIUM 4.3 5.2   < > 4.3 4.5 4.3 3.7   < > 4.3   < >  --  3.9  --  4.4   < >  --    TSH  --   --   --   --   --   --   --   --   --   --   --   --   --   --   --  3.48    < > = values in this interval not displayed.      Current providers sharing in care for this patient include:  Patient Care Team:  Kehr, Kristen M, PA-C as PCP - General (Family Medicine)  Scottie Golden MD as MD (Gastroenterology)  Clint Uriostegui MD as MD (Pulmonary Disease)  Amanda Hussein RD as Diabetes Educator (Dietitian, Registered)  Ivory Dennis RD as Diabetes Educator (Dietitian, Registered)  Allyn Lyon PA-C as Physician Assistant (Endocrinology, Diabetes, and Metabolism)  Edwin Ch MD as Assigned Musculoskeletal Provider  Kehr, Kristen M, PA-C as Assigned PCP  Juan Luis Gray MD as Assigned Neuroscience Provider  Allyn Lyon PA-C as Assigned Endocrinology Provider    The following health maintenance items are reviewed in Epic and correct as of today:  Health Maintenance   Topic Date Due    ANNUAL REVIEW OF HM ORDERS  Never done    HEPATITIS B IMMUNIZATION (1  of 3 - 19+ 3-dose series) Never done    ZOSTER IMMUNIZATION (1 of 2) Never done    EYE EXAM  01/05/2024    DIABETIC FOOT EXAM  08/15/2024    INFLUENZA VACCINE (1) 09/01/2024    COVID-19 Vaccine (1 - 2024-25 season) Never done    Medicare Annual [place] Pharmacist Visit (once per calendar year)  Never done    A1C  02/06/2025    ASTHMA CONTROL TEST  08/04/2025    LIPID  08/06/2025    MICROALBUMIN  08/06/2025    ASTHMA ACTION PLAN  08/06/2025    BMP  01/01/2026    MEDICARE ANNUAL WELLNESS VISIT  02/04/2026    ADVANCE CARE PLANNING  09/13/2028    COLORECTAL CANCER SCREENING  04/21/2029    DTAP/TDAP/TD IMMUNIZATION (3 - Td or Tdap) 02/14/2032    HEPATITIS C SCREENING  Completed    PHQ-2 (once per calendar year)  Completed    Pneumococcal Vaccine: 50+ Years  Completed    URINALYSIS  Completed    HPV IMMUNIZATION  Aged Out    MENINGITIS IMMUNIZATION  Aged Out    URINE DRUG SCREEN  Discontinued    HIV SCREENING  Discontinued         Review of Systems  Constitutional, HEENT, cardiovascular, pulmonary, GI, , musculoskeletal, neuro, skin, endocrine and psych systems are negative, except as otherwise noted.     Objective    Exam  /88   Pulse 88   Temp (!) 96.7  F (35.9  C) (Tympanic)   Resp 16   Ht 1.829 m (6')   Wt 132 kg (291 lb)   SpO2 98%   BMI 39.47 kg/m     Estimated body mass index is 39.47 kg/m  as calculated from the following:    Height as of this encounter: 1.829 m (6').    Weight as of this encounter: 132 kg (291 lb).    Physical Exam  GENERAL: alert and no distress  EYES: Eyes grossly normal to inspection, PERRL and conjunctivae and sclerae normal  HENT: ear canals and TM's normal, nose and mouth without ulcers or lesions  NECK: no adenopathy, no asymmetry, masses, or scars  RESP: lungs clear to auscultation - no rales, rhonchi or wheezes  CV: regular rate and rhythm, normal S1 S2, no S3 or S4, no murmur, click or rub, no peripheral edema  ABDOMEN: soft, nontender, no hepatosplenomegaly, no masses and  bowel sounds normal  MS: no gross musculoskeletal defects noted, no edema  SKIN: no suspicious lesions or rashes  NEURO: Normal strength and tone, mentation intact and speech normal  PSYCH: mentation appears normal, affect normal/bright        2/4/2025   Mini Cog   Mini-Cog Not Completed (choose reason) Patient declines       Cognitive Screening Cognition normal during direct observation during interview and exam        Vision Screen         Signed Electronically by: Kristen M. Kehr, PA-C

## 2025-02-04 NOTE — PROGRESS NOTES
ANTICOAGULATION MANAGEMENT     Adarsh Salvador 59 year old male is on warfarin with subtherapeutic INR result. (Goal INR 2.0-3.0)    Recent labs: (last 7 days)     02/04/25  1011   INR 1.9*       ASSESSMENT     Source(s): Chart Review and Patient/Caregiver Call     Warfarin doses taken: Missed dose(s) may be affecting INR. Needed a refill so he missed a day of warfarin.   Diet: No new diet changes identified  Medication/supplement changes: None noted  New illness, injury, or hospitalization: No  Signs or symptoms of bleeding or clotting: No  Previous result: Supratherapeutic  Additional findings: None       PLAN     Recommended plan for temporary change(s) affecting INR     Dosing Instructions: booster dose then continue your current warfarin dose with next INR in 2 weeks       Summary  As of 2/4/2025      Full warfarin instructions:  2/4: 20 mg; Otherwise 17.5 mg every Mon, Fri; 15 mg all other days   Next INR check:  2/18/2025               Telephone call with Adarsh who verbalizes understanding and agrees to plan    Lab visit scheduled    Education provided: Contact [phone] with any changes, questions or concerns.     Plan made per [facility] anticoagulation protocol    Petey HURTADO RN  2/4/2025  Anticoagulation Clinic  [facility] for routing messages: ermias MARTÍNEZ  [facility] patient phone line: [phone]        _______________________________________________________________________     Anticoagulation Episode Summary       Current INR goal:  2.0-3.0   TTR:  43.1% (1 y)   Target end date:  Indefinite   Send INR reminders to:  MANOJ MARTÍNEZ    Indications    Recurrent deep vein thrombosis (DVT) (H) [I82.409]  Current use of long term anticoagulation [Z79.01]             Comments:  --             Anticoagulation Care Providers       Provider Role Specialty Phone number    Bertha Romero PA-C Referring Family Medicine [phone]    Kehr, Kristen M, PA-C Referring Family Medicine [phone]

## 2025-02-04 NOTE — PATIENT INSTRUCTIONS
You are due for Hepatitis B vaccine, shingles vaccine and influenza / COVID vaccines.     Medicare coverage for vaccinations is limited in the clinic, you are going to need to speak to the pharmacy or your check with insurance for coverage for the vaccines and discuss where you can get them.                 Patient Education   Preventive Care Advice   This is general advice given by our system to help you stay healthy. However, your care team may have specific advice just for you. Please talk to your care team about your preventive care needs.  Nutrition  Eat 5 or more servings of fruits and vegetables each day.  Try wheat bread, brown rice and whole grain pasta (instead of white bread, rice, and pasta).  Get enough calcium and vitamin D. Check the label on foods and aim for 100% of the RDA (recommended daily allowance).  Lifestyle  Exercise at least 150 minutes each week  (30 minutes a day, 5 days a week).  Do muscle strengthening activities 2 days a week. These help control your weight and prevent disease.  No smoking.  Wear sunscreen to prevent skin cancer.  Have a dental exam and cleaning every 6 months.  Yearly exams  See your health care team every year to talk about:  Any changes in your health.  Any medicines your care team has prescribed.  Preventive care, family planning, and ways to prevent chronic diseases.  Shots (vaccines)   HPV shots (up to age 26), if you've never had them before.  Hepatitis B shots (up to age 59), if you've never had them before.  COVID-19 shot: Get this shot when it's due.  Flu shot: Get a flu shot every year.  Tetanus shot: Get a tetanus shot every 10 years.  Pneumococcal, hepatitis A, and RSV shots: Ask your care team if you need these based on your risk.  Shingles shot (for age 50 and up)  General health tests  Diabetes screening:  Starting at age 35, Get screened for diabetes at least every 3 years.  If you are younger than age 35, ask your care team if you should be screened  for diabetes.  Cholesterol test: At age 39, start having a cholesterol test every 5 years, or more often if advised.  Bone density scan (DEXA): At age 50, ask your care team if you should have this scan for osteoporosis (brittle bones).  Hepatitis C: Get tested at least once in your life.  STIs (sexually transmitted infections)  Before age 24: Ask your care team if you should be screened for STIs.  After age 24: Get screened for STIs if you're at risk. You are at risk for STIs (including HIV) if:  You are sexually active with more than one person.  You don't use condoms every time.  You or a partner was diagnosed with a sexually transmitted infection.  If you are at risk for HIV, ask about PrEP medicine to prevent HIV.  Get tested for HIV at least once in your life, whether you are at risk for HIV or not.  Cancer screening tests  Cervical cancer screening: If you have a cervix, begin getting regular cervical cancer screening tests starting at age 21.  Breast cancer scan (mammogram): If you've ever had breasts, begin having regular mammograms starting at age 40. This is a scan to check for breast cancer.  Colon cancer screening: It is important to start screening for colon cancer at age 45.  Have a colonoscopy test every 10 years (or more often if you're at risk) Or, ask your provider about stool tests like a FIT test every year or Cologuard test every 3 years.  To learn more about your testing options, visit:   .  For help making a decision, visit:   https://bit.ly/lv58599.  Prostate cancer screening test: If you have a prostate, ask your care team if a prostate cancer screening test (PSA) at age 55 is right for you.  Lung cancer screening: If you are a current or former smoker ages 50 to 80, ask your care team if ongoing lung cancer screenings are right for you.  For informational purposes only. Not to replace the advice of your health care provider. Copyright   2023 [employer]. All rights reserved.  Clinically reviewed by the [facility]itions [facility]. [employer] [id number] - REV 01/24.

## 2025-02-05 RX ORDER — ROSUVASTATIN CALCIUM 40 MG/1
40 TABLET, COATED ORAL DAILY
Qty: 90 TABLET | Refills: 3 | Status: SHIPPED | OUTPATIENT
Start: 2025-02-05

## 2025-02-08 ENCOUNTER — HEALTH MAINTENANCE LETTER (OUTPATIENT)
Age: 60
End: 2025-02-08

## 2025-02-18 ENCOUNTER — LAB (OUTPATIENT)
Dept: LAB | Facility: CLINIC | Age: 60
End: 2025-02-18
Payer: COMMERCIAL

## 2025-02-18 ENCOUNTER — ANTICOAGULATION THERAPY VISIT (OUTPATIENT)
Dept: ANTICOAGULATION | Facility: CLINIC | Age: 60
End: 2025-02-18

## 2025-02-18 DIAGNOSIS — I82.409 RECURRENT DEEP VEIN THROMBOSIS (DVT) (H): Primary | ICD-10-CM

## 2025-02-18 DIAGNOSIS — I82.409 RECURRENT DEEP VEIN THROMBOSIS (DVT) (H): ICD-10-CM

## 2025-02-18 DIAGNOSIS — Z79.01 CURRENT USE OF LONG TERM ANTICOAGULATION: ICD-10-CM

## 2025-02-18 LAB — INR BLD: 4 (ref 0.9–1.1)

## 2025-02-18 PROCEDURE — 36416 COLLJ CAPILLARY BLOOD SPEC: CPT

## 2025-02-18 PROCEDURE — 85610 PROTHROMBIN TIME: CPT

## 2025-02-18 NOTE — PROGRESS NOTES
ANTICOAGULATION MANAGEMENT     Adarsh Salvador 59 year old male is on warfarin with supratherapeutic INR result. (Goal INR 2.0-3.0)    Recent labs: (last 7 days)     02/18/25  0915   INR 4.0*       ASSESSMENT     Source(s): Chart Review     Warfarin doses taken: Warfarin taken as instructed  Diet: No new diet changes identified  Medication/supplement changes:  Aleve occasionally for arthritis   New illness, injury, or hospitalization: Yes: arthritis flaring and has some pain in hernia area seeing surgeon for in march.  Signs or symptoms of bleeding or clotting: No  Previous result: Subtherapeutic  Additional findings: None       PLAN     Recommended plan for temporary change(s) and ongoing change(s) affecting INR     Dosing Instructions: partial hold then decrease your warfarin dose (2.3% change) with next INR in 2 weeks       Summary  As of 2/18/2025      Full warfarin instructions:  2/18: 12.5 mg; Otherwise 17.5 mg every Mon; 15 mg all other days   Next INR check:  3/4/2025               Telephone call with Adarsh who verbalizes understanding and agrees to plan    Lab visit scheduled    Education provided: Please call back if any changes to your diet, medications or how you've been taking warfarin  Symptom monitoring: monitoring for bleeding signs and symptoms    Plan made per [facility] anticoagulation protocol    Joya Suero RN  2/18/2025  Anticoagulation Clinic  [facility] for routing messages: ermias MARTÍNEZ  [facility] patient phone line: [phone]        _______________________________________________________________________     Anticoagulation Episode Summary       Current INR goal:  2.0-3.0   TTR:  44.0% (1 y)   Target end date:  Indefinite   Send INR reminders to:  MANOJ MARTÍNEZ    Indications    Recurrent deep vein thrombosis (DVT) (H) [I82.409]  Current use of long term anticoagulation [Z79.01]             Comments:  --             Anticoagulation Care Providers       Provider Role Specialty Phone number     Bertha Romero PA-C Referring Family Medicine [phone]    Kehr, Kristen M, PA-C Referring Family Medicine [phone]

## (undated) DEVICE — SU MONOCRYL 3-0 PS-2 27" Y427H

## (undated) DEVICE — ESU PENCIL SMOKE EVAC W/ROCKER SWITCH 0703-047-000

## (undated) DEVICE — BUR ARTHREX COOLCUT SABRE 4.0MMX13CM AR-8400SR

## (undated) DEVICE — SU MONOCRYL 3-0 SH 27" Y316H

## (undated) DEVICE — BONE CLEANING TIP INTERPULSE  0210-010-000

## (undated) DEVICE — GLOVE PROTEXIS W/NEU-THERA 7.5  2D73TE75

## (undated) DEVICE — BNDG ELASTIC 3"X5YDS UNSTERILE 6611-30

## (undated) DEVICE — TUBING SYSTEM ARTHREX PATIENT REDEUCE AR-6421

## (undated) DEVICE — SUCTION TIP YANKAUER STR K87

## (undated) DEVICE — SU ETHILON 3-0 PS-1 18" 1663H

## (undated) DEVICE — PACK HAND WRIST SOP15HWFSP

## (undated) DEVICE — CUFF TOURN 30IN STRL DISP 5921030235

## (undated) DEVICE — GLOVE PROTEXIS W/NEU-THERA 8.0  2D73TE80

## (undated) DEVICE — STOCKING SLEEVE COMPRESSION CALF MED

## (undated) DEVICE — LINEN TOWEL PACK X5 5464

## (undated) DEVICE — GLOVE BIOGEL PI MICRO SZ 7.5 48575

## (undated) DEVICE — SOL NACL 0.9% IRRIG 3000ML BAG 07972-08

## (undated) DEVICE — SYR BULB IRRIG 50ML LATEX FREE 0035280

## (undated) DEVICE — GOWN XLG DISP 9545

## (undated) DEVICE — BONE CEMENT KIT BOWL AND SPATULA STRK 6201-3-410

## (undated) DEVICE — PAD ARMBOARD FOAM EGGCRATE COVIDEN 3114367

## (undated) DEVICE — TRAY PAIN INJECTION 97A 640

## (undated) DEVICE — GLOVE PROTEXIS BLUE W/NEU-THERA 8.0  2D73EB80

## (undated) DEVICE — CAST PLASTER SPLINT 4X15" 7394

## (undated) DEVICE — SOL WATER IRRIG 1000ML BOTTLE 07139-09

## (undated) DEVICE — PREP DURAPREP REMOVER 4OZ 8611

## (undated) DEVICE — SUCTION MANIFOLD NEPTUNE 2 SYS 4 PORT 0702-020-000

## (undated) DEVICE — Device

## (undated) DEVICE — DRSG AQUACEL AG 3.5X9.75" HYDROFIBER 412011

## (undated) DEVICE — BLADE SAW SAGITTAL STRK 25X90X1.37MM 4H SYS 6 6125-137-090

## (undated) DEVICE — GLOVE BIOGEL PI MICRO INDICATOR UNDERGLOVE SZ 8.0 48980

## (undated) DEVICE — HOOD T4 PROTECTIVE STERI FACE SHIELD 400-800

## (undated) DEVICE — SU ETHIBOND 0 CT-1 CR 8X18" CX21D

## (undated) DEVICE — DRAPE IOBAN LG .375X23.5" 6648EZ

## (undated) DEVICE — DECANTER VIAL 2006S

## (undated) DEVICE — PREP CHLORAPREP 26ML TINTED ORANGE  260815

## (undated) DEVICE — SU PDO 1 STRATAFIX 36X36CM CTX TAPERPOINT SXPD2B405

## (undated) DEVICE — CAST PADDING 4" UNSTERILE 9044

## (undated) DEVICE — PREP DURAPREP 26ML APL 8630

## (undated) DEVICE — GLOVE BIOGEL PI MICRO SZ 8.0 48580

## (undated) DEVICE — BNDG SPANDAGRIP SZ J LF BEIGE 6.75" SAG13117

## (undated) DEVICE — GLOVE BIOGEL PI MICRO INDICATOR UNDERGLOVE SZ 7.5 48975

## (undated) DEVICE — SOL NACL 0.9% IRRIG 3000ML BAG 2B7477

## (undated) DEVICE — NDL 19GA 1.5"

## (undated) DEVICE — SYR 20ML LL W/O NDL

## (undated) DEVICE — BLADE SAW SAGITTAL STRK 18X90X1.27MM HD SYS 6 6118-127-090

## (undated) DEVICE — GLOVE PROTEXIS POWDER FREE 8.0 ORTHOPEDIC 2D73ET80

## (undated) DEVICE — ESU GROUND PAD ADULT W/CORD E7507

## (undated) DEVICE — ESU ELEC NDL 1" COATED/INSULATED E1465

## (undated) DEVICE — PACK ARTHROSCOPY CUSTOM ASC

## (undated) DEVICE — DRAPE CONVERTORS U-DRAPE 60X72" 8476

## (undated) DEVICE — SU DERMABOND ADVANCED .7ML DNX12

## (undated) DEVICE — DRAPE SHEET REV FOLD 3/4 9349

## (undated) DEVICE — PREP CHLORAPREP W/ORANGE TINT 10.5ML 930715

## (undated) DEVICE — NDL SPINAL 18GA 3.5" 405184

## (undated) DEVICE — SUCTION IRR SYSTEM W/O TIP INTERPULSE HANDPIECE 0210-100-000

## (undated) DEVICE — DRAPE STERI TOWEL SM 1000

## (undated) DEVICE — GLOVE PROTEXIS POWDER FREE SMT 8.0  2D72PT80X

## (undated) DEVICE — SU ETHILON 4-0 FS-2 18" 662H

## (undated) DEVICE — DRAPE STERI U 1015

## (undated) DEVICE — LINEN DRAPE 54X72" 5467

## (undated) DEVICE — SU VICRYL 0 CT-1 36" J946H

## (undated) RX ORDER — EPINEPHRINE 1 MG/ML
INJECTION, SOLUTION, CONCENTRATE INTRAVENOUS
Status: DISPENSED
Start: 2023-09-26

## (undated) RX ORDER — ACETAMINOPHEN 325 MG/1
TABLET ORAL
Status: DISPENSED
Start: 2023-09-26

## (undated) RX ORDER — BUPIVACAINE HYDROCHLORIDE 5 MG/ML
INJECTION, SOLUTION EPIDURAL; INTRACAUDAL
Status: DISPENSED
Start: 2023-09-26

## (undated) RX ORDER — CEFAZOLIN SODIUM 1 G/3ML
INJECTION, POWDER, FOR SOLUTION INTRAMUSCULAR; INTRAVENOUS
Status: DISPENSED
Start: 2023-09-26

## (undated) RX ORDER — FENTANYL CITRATE 50 UG/ML
INJECTION, SOLUTION INTRAMUSCULAR; INTRAVENOUS
Status: DISPENSED
Start: 2022-04-21

## (undated) RX ORDER — DEXAMETHASONE SODIUM PHOSPHATE 10 MG/ML
INJECTION, SOLUTION INTRAMUSCULAR; INTRAVENOUS
Status: DISPENSED
Start: 2017-05-19

## (undated) RX ORDER — FENTANYL CITRATE 50 UG/ML
INJECTION, SOLUTION INTRAMUSCULAR; INTRAVENOUS
Status: DISPENSED
Start: 2018-01-26

## (undated) RX ORDER — HYDROXYZINE HYDROCHLORIDE 25 MG/1
TABLET, FILM COATED ORAL
Status: DISPENSED
Start: 2023-09-26

## (undated) RX ORDER — DEXAMETHASONE SODIUM PHOSPHATE 10 MG/ML
INJECTION, SOLUTION INTRAMUSCULAR; INTRAVENOUS
Status: DISPENSED
Start: 2017-06-09

## (undated) RX ORDER — DIPHENHYDRAMINE HYDROCHLORIDE 50 MG/ML
INJECTION INTRAMUSCULAR; INTRAVENOUS
Status: DISPENSED
Start: 2022-08-19

## (undated) RX ORDER — EPHEDRINE SULFATE 50 MG/ML
INJECTION, SOLUTION INTRAMUSCULAR; INTRAVENOUS; SUBCUTANEOUS
Status: DISPENSED
Start: 2023-09-26

## (undated) RX ORDER — ONDANSETRON 4 MG/1
TABLET, ORALLY DISINTEGRATING ORAL
Status: DISPENSED
Start: 2017-05-19

## (undated) RX ORDER — LIDOCAINE HYDROCHLORIDE 10 MG/ML
INJECTION, SOLUTION EPIDURAL; INFILTRATION; INTRACAUDAL; PERINEURAL
Status: DISPENSED
Start: 2017-05-19

## (undated) RX ORDER — LIDOCAINE HYDROCHLORIDE 10 MG/ML
INJECTION, SOLUTION EPIDURAL; INFILTRATION; INTRACAUDAL; PERINEURAL
Status: DISPENSED
Start: 2017-06-09

## (undated) RX ORDER — CEFAZOLIN SODIUM 1 G/3ML
INJECTION, POWDER, FOR SOLUTION INTRAMUSCULAR; INTRAVENOUS
Status: DISPENSED
Start: 2018-01-26

## (undated) RX ORDER — FENTANYL CITRATE 50 UG/ML
INJECTION, SOLUTION INTRAMUSCULAR; INTRAVENOUS
Status: DISPENSED
Start: 2022-08-19

## (undated) RX ORDER — PROPOFOL 10 MG/ML
INJECTION, EMULSION INTRAVENOUS
Status: DISPENSED
Start: 2022-08-19

## (undated) RX ORDER — VANCOMYCIN HYDROCHLORIDE 1 G/20ML
INJECTION, POWDER, LYOPHILIZED, FOR SOLUTION INTRAVENOUS
Status: DISPENSED
Start: 2023-09-26

## (undated) RX ORDER — ACETAMINOPHEN 325 MG/1
TABLET ORAL
Status: DISPENSED
Start: 2018-01-26

## (undated) RX ORDER — OXYCODONE HYDROCHLORIDE 5 MG/1
TABLET ORAL
Status: DISPENSED
Start: 2022-08-19

## (undated) RX ORDER — TRANEXAMIC ACID 650 MG/1
TABLET ORAL
Status: DISPENSED
Start: 2023-09-26

## (undated) RX ORDER — ONDANSETRON 2 MG/ML
INJECTION INTRAMUSCULAR; INTRAVENOUS
Status: DISPENSED
Start: 2022-08-19

## (undated) RX ORDER — FENTANYL CITRATE 50 UG/ML
INJECTION, SOLUTION INTRAMUSCULAR; INTRAVENOUS
Status: DISPENSED
Start: 2023-09-26

## (undated) RX ORDER — DEXAMETHASONE SODIUM PHOSPHATE 4 MG/ML
INJECTION, SOLUTION INTRA-ARTICULAR; INTRALESIONAL; INTRAMUSCULAR; INTRAVENOUS; SOFT TISSUE
Status: DISPENSED
Start: 2022-08-19

## (undated) RX ORDER — CEFAZOLIN SODIUM 1 G/3ML
INJECTION, POWDER, FOR SOLUTION INTRAMUSCULAR; INTRAVENOUS
Status: DISPENSED
Start: 2022-08-19

## (undated) RX ORDER — SIMETHICONE 40MG/0.6ML
SUSPENSION, DROPS(FINAL DOSAGE FORM)(ML) ORAL
Status: DISPENSED
Start: 2022-04-21

## (undated) RX ORDER — LIDOCAINE HYDROCHLORIDE 10 MG/ML
INJECTION, SOLUTION EPIDURAL; INFILTRATION; INTRACAUDAL; PERINEURAL
Status: DISPENSED
Start: 2022-07-29

## (undated) RX ORDER — DEXAMETHASONE SODIUM PHOSPHATE 4 MG/ML
INJECTION, SOLUTION INTRA-ARTICULAR; INTRALESIONAL; INTRAMUSCULAR; INTRAVENOUS; SOFT TISSUE
Status: DISPENSED
Start: 2023-09-26

## (undated) RX ORDER — LIDOCAINE HYDROCHLORIDE 20 MG/ML
INJECTION, SOLUTION EPIDURAL; INFILTRATION; INTRACAUDAL; PERINEURAL
Status: DISPENSED
Start: 2022-08-19

## (undated) RX ORDER — PROPOFOL 10 MG/ML
INJECTION, EMULSION INTRAVENOUS
Status: DISPENSED
Start: 2023-09-26

## (undated) RX ORDER — CEFAZOLIN SODIUM/WATER 3 G/30 ML
SYRINGE (ML) INTRAVENOUS
Status: DISPENSED
Start: 2023-09-26

## (undated) RX ORDER — CEFAZOLIN SODIUM 2 G/100ML
INJECTION, SOLUTION INTRAVENOUS
Status: DISPENSED
Start: 2018-01-26

## (undated) RX ORDER — ACETAMINOPHEN 325 MG/1
TABLET ORAL
Status: DISPENSED
Start: 2022-08-19

## (undated) RX ORDER — ONDANSETRON 2 MG/ML
INJECTION INTRAMUSCULAR; INTRAVENOUS
Status: DISPENSED
Start: 2018-01-26

## (undated) RX ORDER — ONDANSETRON 2 MG/ML
INJECTION INTRAMUSCULAR; INTRAVENOUS
Status: DISPENSED
Start: 2023-09-26